# Patient Record
Sex: MALE | Race: BLACK OR AFRICAN AMERICAN | NOT HISPANIC OR LATINO | URBAN - METROPOLITAN AREA
[De-identification: names, ages, dates, MRNs, and addresses within clinical notes are randomized per-mention and may not be internally consistent; named-entity substitution may affect disease eponyms.]

---

## 2020-08-05 RX ADMIN — FENTANYL CITRATE 50 MICROGRAM(S): 50 INJECTION INTRAVENOUS at 23:00

## 2020-08-07 ENCOUNTER — INPATIENT (INPATIENT)
Facility: HOSPITAL | Age: 60
LOS: 1 days | Discharge: ANOTHER HOSPITAL | End: 2020-08-09
Attending: INTERNAL MEDICINE
Payer: MEDICAID

## 2020-08-07 ENCOUNTER — OUTPATIENT (OUTPATIENT)
Dept: OUTPATIENT SERVICES | Facility: HOSPITAL | Age: 60
LOS: 1 days | End: 2020-08-07

## 2020-08-07 PROCEDURE — 71045 X-RAY EXAM CHEST 1 VIEW: CPT | Mod: 26,77

## 2020-08-07 PROCEDURE — 74176 CT ABD & PELVIS W/O CONTRAST: CPT | Mod: 26

## 2020-08-07 PROCEDURE — 70450 CT HEAD/BRAIN W/O DYE: CPT | Mod: 26

## 2020-08-07 PROCEDURE — 99291 CRITICAL CARE FIRST HOUR: CPT | Mod: 25

## 2020-08-07 PROCEDURE — 71045 X-RAY EXAM CHEST 1 VIEW: CPT | Mod: 26

## 2020-08-07 PROCEDURE — 31500 INSERT EMERGENCY AIRWAY: CPT

## 2020-08-07 PROCEDURE — 72125 CT NECK SPINE W/O DYE: CPT | Mod: 26

## 2020-08-07 PROCEDURE — 93010 ELECTROCARDIOGRAM REPORT: CPT | Mod: 76

## 2020-08-07 PROCEDURE — 71250 CT THORAX DX C-: CPT | Mod: 26

## 2020-08-08 PROCEDURE — 99233 SBSQ HOSP IP/OBS HIGH 50: CPT

## 2020-08-08 PROCEDURE — 93010 ELECTROCARDIOGRAM REPORT: CPT

## 2020-08-08 PROCEDURE — 71045 X-RAY EXAM CHEST 1 VIEW: CPT | Mod: 26

## 2020-08-09 ENCOUNTER — TRANSCRIPTION ENCOUNTER (OUTPATIENT)
Age: 60
End: 2020-08-09

## 2020-08-09 ENCOUNTER — INPATIENT (INPATIENT)
Facility: HOSPITAL | Age: 60
LOS: 65 days | Discharge: NURSING, REHAB OR LONG-TERM CARE | DRG: 4 | End: 2020-10-14
Attending: HOSPITALIST | Admitting: NEUROLOGICAL SURGERY
Payer: MEDICAID

## 2020-08-09 VITALS
TEMPERATURE: 97 F | RESPIRATION RATE: 14 BRPM | OXYGEN SATURATION: 100 % | HEART RATE: 67 BPM | SYSTOLIC BLOOD PRESSURE: 144 MMHG | DIASTOLIC BLOOD PRESSURE: 105 MMHG

## 2020-08-09 DIAGNOSIS — I46.9 CARDIAC ARREST, CAUSE UNSPECIFIED: ICD-10-CM

## 2020-08-09 DIAGNOSIS — I62.00 NONTRAUMATIC SUBDURAL HEMORRHAGE, UNSPECIFIED: ICD-10-CM

## 2020-08-09 DIAGNOSIS — I48.91 UNSPECIFIED ATRIAL FIBRILLATION: ICD-10-CM

## 2020-08-09 DIAGNOSIS — I60.9 NONTRAUMATIC SUBARACHNOID HEMORRHAGE, UNSPECIFIED: ICD-10-CM

## 2020-08-09 LAB
A1C WITH ESTIMATED AVERAGE GLUCOSE RESULT: 6.3 % — HIGH (ref 4–5.6)
ALBUMIN SERPL ELPH-MCNC: 3.2 G/DL — LOW (ref 3.3–5)
ALP SERPL-CCNC: 80 U/L — SIGNIFICANT CHANGE UP (ref 40–120)
ALT FLD-CCNC: 52 U/L — HIGH (ref 10–45)
ANION GAP SERPL CALC-SCNC: 15 MMOL/L — SIGNIFICANT CHANGE UP (ref 5–17)
ANION GAP SERPL CALC-SCNC: 16 MMOL/L — SIGNIFICANT CHANGE UP (ref 5–17)
APPEARANCE UR: ABNORMAL
APTT BLD: 36.4 SEC — HIGH (ref 27.5–35.5)
APTT BLD: 38.8 SEC — HIGH (ref 27.5–35.5)
AST SERPL-CCNC: 81 U/L — HIGH (ref 10–40)
BACTERIA # UR AUTO: NEGATIVE — SIGNIFICANT CHANGE UP
BASOPHILS # BLD AUTO: 0.01 K/UL — SIGNIFICANT CHANGE UP (ref 0–0.2)
BASOPHILS # BLD AUTO: 0.02 K/UL — SIGNIFICANT CHANGE UP (ref 0–0.2)
BASOPHILS NFR BLD AUTO: 0.1 % — SIGNIFICANT CHANGE UP (ref 0–2)
BASOPHILS NFR BLD AUTO: 0.1 % — SIGNIFICANT CHANGE UP (ref 0–2)
BILIRUB SERPL-MCNC: 0.2 MG/DL — SIGNIFICANT CHANGE UP (ref 0.2–1.2)
BILIRUB UR-MCNC: NEGATIVE — SIGNIFICANT CHANGE UP
BLD GP AB SCN SERPL QL: NEGATIVE — SIGNIFICANT CHANGE UP
BLD GP AB SCN SERPL QL: NEGATIVE — SIGNIFICANT CHANGE UP
BUN SERPL-MCNC: 27 MG/DL — HIGH (ref 7–23)
BUN SERPL-MCNC: 27 MG/DL — HIGH (ref 7–23)
CALCIUM SERPL-MCNC: 8.8 MG/DL — SIGNIFICANT CHANGE UP (ref 8.4–10.5)
CALCIUM SERPL-MCNC: 8.8 MG/DL — SIGNIFICANT CHANGE UP (ref 8.4–10.5)
CHLORIDE SERPL-SCNC: 107 MMOL/L — SIGNIFICANT CHANGE UP (ref 96–108)
CHLORIDE SERPL-SCNC: 110 MMOL/L — HIGH (ref 96–108)
CHOLEST SERPL-MCNC: 148 MG/DL — SIGNIFICANT CHANGE UP (ref 10–199)
CO2 SERPL-SCNC: 18 MMOL/L — LOW (ref 22–31)
CO2 SERPL-SCNC: 18 MMOL/L — LOW (ref 22–31)
COLOR SPEC: SIGNIFICANT CHANGE UP
CREAT SERPL-MCNC: 1.62 MG/DL — HIGH (ref 0.5–1.3)
CREAT SERPL-MCNC: 1.72 MG/DL — HIGH (ref 0.5–1.3)
DIFF PNL FLD: ABNORMAL
EOSINOPHIL # BLD AUTO: 0.01 K/UL — SIGNIFICANT CHANGE UP (ref 0–0.5)
EOSINOPHIL # BLD AUTO: 0.05 K/UL — SIGNIFICANT CHANGE UP (ref 0–0.5)
EOSINOPHIL NFR BLD AUTO: 0.1 % — SIGNIFICANT CHANGE UP (ref 0–6)
EOSINOPHIL NFR BLD AUTO: 0.3 % — SIGNIFICANT CHANGE UP (ref 0–6)
EPI CELLS # UR: 1 /HPF — SIGNIFICANT CHANGE UP
ESTIMATED AVERAGE GLUCOSE: 134 MG/DL — HIGH (ref 68–114)
GAS PNL BLDA: SIGNIFICANT CHANGE UP
GAS PNL BLDA: SIGNIFICANT CHANGE UP
GLUCOSE SERPL-MCNC: 120 MG/DL — HIGH (ref 70–99)
GLUCOSE SERPL-MCNC: 138 MG/DL — HIGH (ref 70–99)
GLUCOSE UR QL: NEGATIVE — SIGNIFICANT CHANGE UP
HCT VFR BLD CALC: 37.1 % — LOW (ref 39–50)
HCT VFR BLD CALC: 38.4 % — LOW (ref 39–50)
HDLC SERPL-MCNC: 37 MG/DL — LOW
HGB BLD-MCNC: 11.7 G/DL — LOW (ref 13–17)
HGB BLD-MCNC: 12.2 G/DL — LOW (ref 13–17)
HYALINE CASTS # UR AUTO: 13 /LPF — HIGH (ref 0–2)
IMM GRANULOCYTES NFR BLD AUTO: 0.6 % — SIGNIFICANT CHANGE UP (ref 0–1.5)
IMM GRANULOCYTES NFR BLD AUTO: 0.7 % — SIGNIFICANT CHANGE UP (ref 0–1.5)
INR BLD: 1.42 RATIO — HIGH (ref 0.88–1.16)
INR BLD: 1.63 RATIO — HIGH (ref 0.88–1.16)
KETONES UR-MCNC: NEGATIVE — SIGNIFICANT CHANGE UP
LEUKOCYTE ESTERASE UR-ACNC: ABNORMAL
LIPID PNL WITH DIRECT LDL SERPL: 68 MG/DL — SIGNIFICANT CHANGE UP
LYMPHOCYTES # BLD AUTO: 0.51 K/UL — LOW (ref 1–3.3)
LYMPHOCYTES # BLD AUTO: 0.97 K/UL — LOW (ref 1–3.3)
LYMPHOCYTES # BLD AUTO: 2.8 % — LOW (ref 13–44)
LYMPHOCYTES # BLD AUTO: 5.4 % — LOW (ref 13–44)
MAGNESIUM SERPL-MCNC: 1.9 MG/DL — SIGNIFICANT CHANGE UP (ref 1.6–2.6)
MAGNESIUM SERPL-MCNC: 2.1 MG/DL — SIGNIFICANT CHANGE UP (ref 1.6–2.6)
MCHC RBC-ENTMCNC: 27.2 PG — SIGNIFICANT CHANGE UP (ref 27–34)
MCHC RBC-ENTMCNC: 27.4 PG — SIGNIFICANT CHANGE UP (ref 27–34)
MCHC RBC-ENTMCNC: 31.5 GM/DL — LOW (ref 32–36)
MCHC RBC-ENTMCNC: 31.8 GM/DL — LOW (ref 32–36)
MCV RBC AUTO: 86.1 FL — SIGNIFICANT CHANGE UP (ref 80–100)
MCV RBC AUTO: 86.3 FL — SIGNIFICANT CHANGE UP (ref 80–100)
MONOCYTES # BLD AUTO: 1.19 K/UL — HIGH (ref 0–0.9)
MONOCYTES # BLD AUTO: 1.3 K/UL — HIGH (ref 0–0.9)
MONOCYTES NFR BLD AUTO: 6.6 % — SIGNIFICANT CHANGE UP (ref 2–14)
MONOCYTES NFR BLD AUTO: 7.2 % — SIGNIFICANT CHANGE UP (ref 2–14)
NEUTROPHILS # BLD AUTO: 15.79 K/UL — HIGH (ref 1.8–7.4)
NEUTROPHILS # BLD AUTO: 16.05 K/UL — HIGH (ref 1.8–7.4)
NEUTROPHILS NFR BLD AUTO: 87 % — HIGH (ref 43–77)
NEUTROPHILS NFR BLD AUTO: 89.1 % — HIGH (ref 43–77)
NITRITE UR-MCNC: NEGATIVE — SIGNIFICANT CHANGE UP
NRBC # BLD: 0 /100 WBCS — SIGNIFICANT CHANGE UP (ref 0–0)
NRBC # BLD: 0 /100 WBCS — SIGNIFICANT CHANGE UP (ref 0–0)
PH UR: 5.5 — SIGNIFICANT CHANGE UP (ref 5–8)
PHOSPHATE SERPL-MCNC: 3.6 MG/DL — SIGNIFICANT CHANGE UP (ref 2.5–4.5)
PHOSPHATE SERPL-MCNC: 4 MG/DL — SIGNIFICANT CHANGE UP (ref 2.5–4.5)
PLATELET # BLD AUTO: 198 K/UL — SIGNIFICANT CHANGE UP (ref 150–400)
PLATELET # BLD AUTO: 203 K/UL — SIGNIFICANT CHANGE UP (ref 150–400)
POTASSIUM SERPL-MCNC: 4 MMOL/L — SIGNIFICANT CHANGE UP (ref 3.5–5.3)
POTASSIUM SERPL-MCNC: 4.1 MMOL/L — SIGNIFICANT CHANGE UP (ref 3.5–5.3)
POTASSIUM SERPL-SCNC: 4 MMOL/L — SIGNIFICANT CHANGE UP (ref 3.5–5.3)
POTASSIUM SERPL-SCNC: 4.1 MMOL/L — SIGNIFICANT CHANGE UP (ref 3.5–5.3)
PROT SERPL-MCNC: 6.2 G/DL — SIGNIFICANT CHANGE UP (ref 6–8.3)
PROT UR-MCNC: 100 — SIGNIFICANT CHANGE UP
PROTHROM AB SERPL-ACNC: 16.6 SEC — HIGH (ref 10.6–13.6)
PROTHROM AB SERPL-ACNC: 18.9 SEC — HIGH (ref 10.6–13.6)
RBC # BLD: 4.3 M/UL — SIGNIFICANT CHANGE UP (ref 4.2–5.8)
RBC # BLD: 4.46 M/UL — SIGNIFICANT CHANGE UP (ref 4.2–5.8)
RBC # FLD: 14.6 % — HIGH (ref 10.3–14.5)
RBC # FLD: 14.8 % — HIGH (ref 10.3–14.5)
RBC CASTS # UR COMP ASSIST: HIGH /HPF (ref 0–4)
RH IG SCN BLD-IMP: POSITIVE — SIGNIFICANT CHANGE UP
RH IG SCN BLD-IMP: POSITIVE — SIGNIFICANT CHANGE UP
SODIUM SERPL-SCNC: 140 MMOL/L — SIGNIFICANT CHANGE UP (ref 135–145)
SODIUM SERPL-SCNC: 144 MMOL/L — SIGNIFICANT CHANGE UP (ref 135–145)
SP GR SPEC: 1.03 — HIGH (ref 1.01–1.02)
T4 FREE SERPL-MCNC: 1.2 NG/DL — SIGNIFICANT CHANGE UP (ref 0.9–1.8)
TOTAL CHOLESTEROL/HDL RATIO MEASUREMENT: 4.1 RATIO — SIGNIFICANT CHANGE UP (ref 3.4–9.6)
TRIGL SERPL-MCNC: 218 MG/DL — HIGH (ref 10–149)
TROPONIN T, HIGH SENSITIVITY RESULT: 477 NG/L — HIGH (ref 0–51)
TSH SERPL-MCNC: 1.91 UIU/ML — SIGNIFICANT CHANGE UP (ref 0.27–4.2)
UROBILINOGEN FLD QL: NEGATIVE — SIGNIFICANT CHANGE UP
WBC # BLD: 18 K/UL — HIGH (ref 3.8–10.5)
WBC # BLD: 18.13 K/UL — HIGH (ref 3.8–10.5)
WBC # FLD AUTO: 18 K/UL — HIGH (ref 3.8–10.5)
WBC # FLD AUTO: 18.13 K/UL — HIGH (ref 3.8–10.5)
WBC UR QL: 166 /HPF — HIGH (ref 0–5)

## 2020-08-09 PROCEDURE — 93010 ELECTROCARDIOGRAM REPORT: CPT | Mod: 76

## 2020-08-09 PROCEDURE — 71045 X-RAY EXAM CHEST 1 VIEW: CPT | Mod: 26

## 2020-08-09 PROCEDURE — 99291 CRITICAL CARE FIRST HOUR: CPT

## 2020-08-09 PROCEDURE — 70496 CT ANGIOGRAPHY HEAD: CPT | Mod: 26

## 2020-08-09 PROCEDURE — 93010 ELECTROCARDIOGRAM REPORT: CPT | Mod: 77

## 2020-08-09 PROCEDURE — 95718 EEG PHYS/QHP 2-12 HR W/VEEG: CPT

## 2020-08-09 PROCEDURE — 99233 SBSQ HOSP IP/OBS HIGH 50: CPT

## 2020-08-09 PROCEDURE — 70498 CT ANGIOGRAPHY NECK: CPT | Mod: 26

## 2020-08-09 PROCEDURE — 93010 ELECTROCARDIOGRAM REPORT: CPT

## 2020-08-09 PROCEDURE — 99292 CRITICAL CARE ADDL 30 MIN: CPT

## 2020-08-09 PROCEDURE — 70450 CT HEAD/BRAIN W/O DYE: CPT | Mod: 26

## 2020-08-09 PROCEDURE — 93306 TTE W/DOPPLER COMPLETE: CPT | Mod: 26

## 2020-08-09 RX ORDER — MEPERIDINE HYDROCHLORIDE 50 MG/ML
50 INJECTION INTRAMUSCULAR; INTRAVENOUS; SUBCUTANEOUS EVERY 8 HOURS
Refills: 0 | Status: DISCONTINUED | OUTPATIENT
Start: 2020-08-09 | End: 2020-08-09

## 2020-08-09 RX ORDER — SODIUM CHLORIDE 9 MG/ML
10 INJECTION INTRAMUSCULAR; INTRAVENOUS; SUBCUTANEOUS
Refills: 0 | Status: DISCONTINUED | OUTPATIENT
Start: 2020-08-09 | End: 2020-08-29

## 2020-08-09 RX ORDER — MAGNESIUM SULFATE 500 MG/ML
1 VIAL (ML) INJECTION ONCE
Refills: 0 | Status: COMPLETED | OUTPATIENT
Start: 2020-08-09 | End: 2020-08-09

## 2020-08-09 RX ORDER — DEXTROSE 50 % IN WATER 50 %
25 SYRINGE (ML) INTRAVENOUS ONCE
Refills: 0 | Status: DISCONTINUED | OUTPATIENT
Start: 2020-08-09 | End: 2020-10-14

## 2020-08-09 RX ORDER — LEVETIRACETAM 250 MG/1
500 TABLET, FILM COATED ORAL EVERY 12 HOURS
Refills: 0 | Status: DISCONTINUED | OUTPATIENT
Start: 2020-08-09 | End: 2020-08-13

## 2020-08-09 RX ORDER — FENTANYL CITRATE 50 UG/ML
0.5 INJECTION INTRAVENOUS
Qty: 5000 | Refills: 0 | Status: DISCONTINUED | OUTPATIENT
Start: 2020-08-09 | End: 2020-08-09

## 2020-08-09 RX ORDER — SODIUM CHLORIDE 9 MG/ML
500 INJECTION INTRAMUSCULAR; INTRAVENOUS; SUBCUTANEOUS ONCE
Refills: 0 | Status: COMPLETED | OUTPATIENT
Start: 2020-08-09 | End: 2020-08-09

## 2020-08-09 RX ORDER — CHLORHEXIDINE GLUCONATE 213 G/1000ML
15 SOLUTION TOPICAL EVERY 12 HOURS
Refills: 0 | Status: DISCONTINUED | OUTPATIENT
Start: 2020-08-09 | End: 2020-09-01

## 2020-08-09 RX ORDER — PROPOFOL 10 MG/ML
10 INJECTION, EMULSION INTRAVENOUS
Qty: 1000 | Refills: 0 | Status: DISCONTINUED | OUTPATIENT
Start: 2020-08-09 | End: 2020-08-11

## 2020-08-09 RX ORDER — SODIUM CHLORIDE 9 MG/ML
1000 INJECTION, SOLUTION INTRAVENOUS
Refills: 0 | Status: DISCONTINUED | OUTPATIENT
Start: 2020-08-09 | End: 2020-10-14

## 2020-08-09 RX ORDER — NIMODIPINE 60 MG/10ML
60 SOLUTION ORAL EVERY 4 HOURS
Refills: 0 | Status: DISCONTINUED | OUTPATIENT
Start: 2020-08-09 | End: 2020-08-09

## 2020-08-09 RX ORDER — INSULIN LISPRO 100/ML
VIAL (ML) SUBCUTANEOUS EVERY 6 HOURS
Refills: 0 | Status: DISCONTINUED | OUTPATIENT
Start: 2020-08-09 | End: 2020-08-12

## 2020-08-09 RX ORDER — INSULIN LISPRO 100/ML
VIAL (ML) SUBCUTANEOUS
Refills: 0 | Status: DISCONTINUED | OUTPATIENT
Start: 2020-08-09 | End: 2020-08-09

## 2020-08-09 RX ORDER — CHLORHEXIDINE GLUCONATE 213 G/1000ML
1 SOLUTION TOPICAL
Refills: 0 | Status: DISCONTINUED | OUTPATIENT
Start: 2020-08-09 | End: 2020-08-21

## 2020-08-09 RX ORDER — NOREPINEPHRINE BITARTRATE/D5W 8 MG/250ML
0.05 PLASTIC BAG, INJECTION (ML) INTRAVENOUS
Qty: 16 | Refills: 0 | Status: DISCONTINUED | OUTPATIENT
Start: 2020-08-09 | End: 2020-08-09

## 2020-08-09 RX ORDER — FENTANYL CITRATE 50 UG/ML
25 INJECTION INTRAVENOUS
Refills: 0 | Status: DISCONTINUED | OUTPATIENT
Start: 2020-08-09 | End: 2020-08-11

## 2020-08-09 RX ORDER — DEXMEDETOMIDINE HYDROCHLORIDE IN 0.9% SODIUM CHLORIDE 4 UG/ML
0.3 INJECTION INTRAVENOUS
Qty: 200 | Refills: 0 | Status: DISCONTINUED | OUTPATIENT
Start: 2020-08-09 | End: 2020-08-09

## 2020-08-09 RX ORDER — DEXTROSE 50 % IN WATER 50 %
15 SYRINGE (ML) INTRAVENOUS ONCE
Refills: 0 | Status: DISCONTINUED | OUTPATIENT
Start: 2020-08-09 | End: 2020-10-14

## 2020-08-09 RX ORDER — SODIUM CHLORIDE 5 G/100ML
500 INJECTION, SOLUTION INTRAVENOUS
Refills: 0 | Status: DISCONTINUED | OUTPATIENT
Start: 2020-08-09 | End: 2020-08-09

## 2020-08-09 RX ORDER — FENTANYL CITRATE 50 UG/ML
0.5 INJECTION INTRAVENOUS
Qty: 2500 | Refills: 0 | Status: DISCONTINUED | OUTPATIENT
Start: 2020-08-09 | End: 2020-08-09

## 2020-08-09 RX ORDER — NIMODIPINE 60 MG/10ML
60 SOLUTION ORAL EVERY 4 HOURS
Refills: 0 | Status: DISCONTINUED | OUTPATIENT
Start: 2020-08-09 | End: 2020-08-13

## 2020-08-09 RX ORDER — SENNA PLUS 8.6 MG/1
10 TABLET ORAL AT BEDTIME
Refills: 0 | Status: DISCONTINUED | OUTPATIENT
Start: 2020-08-09 | End: 2020-08-18

## 2020-08-09 RX ORDER — PANTOPRAZOLE SODIUM 20 MG/1
40 TABLET, DELAYED RELEASE ORAL EVERY 12 HOURS
Refills: 0 | Status: DISCONTINUED | OUTPATIENT
Start: 2020-08-09 | End: 2020-08-10

## 2020-08-09 RX ORDER — POLYETHYLENE GLYCOL 3350 17 G/17G
17 POWDER, FOR SOLUTION ORAL EVERY 12 HOURS
Refills: 0 | Status: DISCONTINUED | OUTPATIENT
Start: 2020-08-09 | End: 2020-08-18

## 2020-08-09 RX ORDER — HYDRALAZINE HCL 50 MG
25 TABLET ORAL EVERY 8 HOURS
Refills: 0 | Status: DISCONTINUED | OUTPATIENT
Start: 2020-08-09 | End: 2020-08-10

## 2020-08-09 RX ORDER — DEXTROSE 50 % IN WATER 50 %
12.5 SYRINGE (ML) INTRAVENOUS ONCE
Refills: 0 | Status: DISCONTINUED | OUTPATIENT
Start: 2020-08-09 | End: 2020-10-14

## 2020-08-09 RX ORDER — AMIODARONE HYDROCHLORIDE 400 MG/1
1 TABLET ORAL
Qty: 900 | Refills: 0 | Status: DISCONTINUED | OUTPATIENT
Start: 2020-08-09 | End: 2020-08-10

## 2020-08-09 RX ORDER — PHYTONADIONE (VIT K1) 5 MG
10 TABLET ORAL DAILY
Refills: 0 | Status: COMPLETED | OUTPATIENT
Start: 2020-08-10 | End: 2020-08-11

## 2020-08-09 RX ORDER — GLUCAGON INJECTION, SOLUTION 0.5 MG/.1ML
1 INJECTION, SOLUTION SUBCUTANEOUS ONCE
Refills: 0 | Status: DISCONTINUED | OUTPATIENT
Start: 2020-08-09 | End: 2020-10-14

## 2020-08-09 RX ORDER — NICARDIPINE HYDROCHLORIDE 30 MG/1
5 CAPSULE, EXTENDED RELEASE ORAL
Qty: 40 | Refills: 0 | Status: DISCONTINUED | OUTPATIENT
Start: 2020-08-09 | End: 2020-08-10

## 2020-08-09 RX ADMIN — SENNA PLUS 10 MILLILITER(S): 8.6 TABLET ORAL at 22:58

## 2020-08-09 RX ADMIN — PROPOFOL 6.78 MICROGRAM(S)/KG/MIN: 10 INJECTION, EMULSION INTRAVENOUS at 23:07

## 2020-08-09 RX ADMIN — NICARDIPINE HYDROCHLORIDE 25 MG/HR: 30 CAPSULE, EXTENDED RELEASE ORAL at 23:07

## 2020-08-09 RX ADMIN — NICARDIPINE HYDROCHLORIDE 25 MG/HR: 30 CAPSULE, EXTENDED RELEASE ORAL at 17:00

## 2020-08-09 RX ADMIN — NIMODIPINE 60 MILLIGRAM(S): 60 SOLUTION ORAL at 18:39

## 2020-08-09 RX ADMIN — AMIODARONE HYDROCHLORIDE 33.3 MG/MIN: 400 TABLET ORAL at 23:08

## 2020-08-09 RX ADMIN — CHLORHEXIDINE GLUCONATE 1 APPLICATION(S): 213 SOLUTION TOPICAL at 23:02

## 2020-08-09 RX ADMIN — PROPOFOL 6.78 MICROGRAM(S)/KG/MIN: 10 INJECTION, EMULSION INTRAVENOUS at 14:00

## 2020-08-09 RX ADMIN — SODIUM CHLORIDE 30 MILLILITER(S): 5 INJECTION, SOLUTION INTRAVENOUS at 14:00

## 2020-08-09 RX ADMIN — Medication 100 GRAM(S): at 23:07

## 2020-08-09 RX ADMIN — LEVETIRACETAM 400 MILLIGRAM(S): 250 TABLET, FILM COATED ORAL at 18:20

## 2020-08-09 RX ADMIN — CHLORHEXIDINE GLUCONATE 15 MILLILITER(S): 213 SOLUTION TOPICAL at 18:20

## 2020-08-09 RX ADMIN — DEXMEDETOMIDINE HYDROCHLORIDE IN 0.9% SODIUM CHLORIDE 8.48 MICROGRAM(S)/KG/HR: 4 INJECTION INTRAVENOUS at 18:00

## 2020-08-09 RX ADMIN — SODIUM CHLORIDE 1000 MILLILITER(S): 9 INJECTION INTRAMUSCULAR; INTRAVENOUS; SUBCUTANEOUS at 17:00

## 2020-08-09 RX ADMIN — PANTOPRAZOLE SODIUM 40 MILLIGRAM(S): 20 TABLET, DELAYED RELEASE ORAL at 18:39

## 2020-08-09 NOTE — H&P ADULT - NSHPPHYSICALEXAM_GEN_ALL_CORE
Intubated, on Nimbex, prop, fentanyl, L pupil 2mm fixed, R pupil 4mm irregular fixed, no corneals, no gag, no dolls, moves nothing x4.

## 2020-08-09 NOTE — CHART NOTE - NSCHARTNOTEFT_GEN_A_CORE
CAPRINI SCORE [CLOT] Score on Admission for     AGE RELATED RISK FACTORS                                                       MOBILITY RELATED FACTORS  [X] Age 41-60 years                                            (1 Point)                  [ ] Bed rest                                                        (1 Point)  [ ] Age: 61-74 years                                           (2 Points)                [ ] Plaster cast                                                   (2 Points)  [ ] Age= 75 years                                              (3 Points)                  [ ] Bed bound for more than 72 hours         (2 Points)    DISEASE RELATED RISK FACTORS                                               GENDER SPECIFIC FACTORS  [ ] Edema in the lower extremities                       (1 Point)           [ ] Pregnancy                                                          (1 Point)  [ ] Varicose veins                                               (1 Point)                  [ ] Post-partum < 6 weeks                                   (1 Point)             [ ] BMI > 25 Kg/m2                                            (1 Point)                  [ ] Hormonal therapy  or oral contraception   (1 Point)                 [ ] Sepsis (in the previous month)                        (1 Point)            [ ] History of pregnancy complications             (1 point)  [ ] Pneumonia or serious lung disease                                            [ ] Unexplained or recurrent               (1 Point)           (in the previous month)                               (1 Point)  [ ] Abnormal pulmonary function test                     (1 Point)                 SURGERY RELATED RISK FACTORS (include planned surgeries)  [X] Acute myocardial infarction                              (1 Point)                 [ ]  Section                                             (1 Point)  [ ] Congestive heart failure (in the previous month)  (1 Point)        [ ] Minor surgery                                                  (1 Point)   [ ] Inflammatory bowel disease                             (1 Point)                 [ ] Arthroscopic surgery                                        (2 Points)  [ ] Central venous access                                      (2 Points)  [ ] History / presence of malignancy                   (2 points)   [] General surgery lasting more than 45 minutes   (2 Points)       [X] Stroke (in the previous month)                          (5 Points)               [ ] Elective arthroplasty                                         (5 Points)                                                                                                                                               HEMATOLOGY RELATED FACTORS                                                 TRAUMA RELATED RISK FACTORS  [ ] Prior episodes of VTE                                     (3 Points)                [ ] Fracture of the hip, pelvis, or leg                       (5 Points)  [ ] Positive family history for VTE                         (3 Points)             [ ] Acute spinal cord injury (in the previous month)  (5 Points)  [ ] Prothrombin 65162 A                                     (3 Points)                [ ] Paralysis  (less than 1 month)                             (5 Points)  [ ] Factor V Leiden                                             (3 Points)                   [ ] Multiple Trauma within 1 month                        (5 Points)  [ ] Lupus anticoagulants                                     (3 Points)                                                           [ ] Anticardiolipin antibodies                               (3 Points)                                                       [ ] High homocysteine in the blood                      (3 Points)                                             [ ] Other congenital or acquired thrombophilia      (3 Points)                                                [ ] Heparin induced thrombocytopenia                  (3 Points)                                          Total Score [     7     ]    Risk:  Very low 0   Low 1 to 2   Moderate 3 to 4   High =5       VTE Prophylaxis Recommendations:  [X] mechanical pneumatic compression devices                                      [ ] contraindicated: _____________________  [ ] chemoprophylaxis                                                                                    [X] contraindicated ______SAH_______________    **** HIGH LIKELIHOOD DVT PRESENT ON ADMISSION  [X] (please order LE dopplers within 24 hours of admission)

## 2020-08-09 NOTE — PROGRESS NOTE ADULT - ASSESSMENT
ASSESSMENT/PLAN:  Perimesencephalic (HH4 mF4) subarachnoid hemorrhage, post-bleed day 2?  afib  HTN    NEURO: SAH, possible sentinal event with cardiac arrest  Diagnosis: CT Head, CTA Head, conventional angiogram  Seizure Prophylaxis: Levetiracetam until aneurysm treated  Delayed Cerebral Ischemia Management: Serial screening TCDs, euvolemia, nimodipine  No hydro at this time- no EVD  hypertonics- cont 3% NS at 30cc/hr  Sedation: propofol wean as tolerated  stop nimbex gtt    PULM:  mech vent  spO2>92%  CXR    CV:  SBP goal<160  cardene prn  TTE pending  cardiac arrest- troponin 530, no cardiac cath at OSH due to neuro status  cardiology consult    RENAL:  Fluids: turbid urine, conservative fluid mgt  replace fluids prn  scott for monitoring    GI:  Diet: start TF  GI prophylaxis [] not indicated x[] PPI [] other:  Bowel regimen [x] senna [] other:    ENDO:   Goal euglycemia (-180)  ISS    HEME/ONC:  s/p vit K  VTE prophylaxis: [] SCDs [] chemoprophylaxis [x] hold chemoprophylaxis due to: SAH    ID:  monitor leukocytosis  TTM- goal 37C arctic sun    MISC:    SOCIAL/FAMILY:  [] awaiting [] updated at bedside [] family meeting    CODE STATUS:  [] Full Code [] DNR [] DNI [] Palliative/Comfort Care    DISPOSITION:  [] ICU [] Stroke Unit [] Floor [] EMU [] RCU [] PCU    [] Patient is at high risk of neurologic deterioration/death due to:     Time seen:  Time spent: ___ [] critical care minutes ASSESSMENT/PLAN:  Perimesencephalic (HH4 mF4) subarachnoid hemorrhage, post-bleed day 2?  afib  HTN    NEURO: SAH, possible sentinal event with cardiac arrest  Diagnosis: CT Head, CTA Head, conventional angiogram  Seizure Prophylaxis: Levetiracetam until aneurysm treated  Delayed Cerebral Ischemia Management: Serial screening TCDs, euvolemia, nimodipine  No hydro at this time- no EVD  hypertonics- cont 3% NS at 30cc/hr  Sedation: propofol wean as tolerated  stop nimbex gtt    PULM:  mech vent  spO2>92%  CXR    CV:  SBP goal<160  cardene prn  TTE pending  cardiac arrest- troponin 530, no cardiac cath at OSH due to neuro status  cardiology consult    RENAL:  Fluids: turbid urine, conservative fluid mgt  replace fluids prn  scott for monitoring    GI:  Diet: start TF  GI prophylaxis [] not indicated x[] PPI [] other:  Bowel regimen [x] senna [] other:    ENDO:   Goal euglycemia (-180)  ISS    HEME/ONC:  s/p vit K  VTE prophylaxis: [] SCDs [] chemoprophylaxis [x] hold chemoprophylaxis due to: SAH    ID:  monitor leukocytosis  TTM- goal 37C arctic sun    MISC:    SOCIAL/FAMILY:  [] awaiting [x] updated at bedside [] family meeting    CODE STATUS:  [x] Full Code [] DNR [] DNI [] Palliative/Comfort Care    DISPOSITION:  [x] ICU [] Stroke Unit [] Floor [] EMU [] RCU [] PCU    [x] Patient is at high risk of neurologic deterioration/death due to: sah    Time seen:  Time spent: _40 critical care minutes ASSESSMENT/PLAN:  Perimesencephalic (HH4 mF4) subarachnoid hemorrhage, post-bleed day 2?  afib  HTN    NEURO: SAH, possible sentinal event with cardiac arrest  Diagnosis: CT Head, CTA Head, conventional angiogram  Seizure Prophylaxis: Levetiracetam until aneurysm treated  Delayed Cerebral Ischemia Management: Serial screening TCDs, euvolemia, nimodipine  No hydro at this time- no EVD  hypertonics- cont 3% NS at 30cc/hr  Sedation: propofol wean as tolerated  stop nimbex gtt    PULM:  mech vent  spO2>92%  CXR    CV:  SBP goal<160  cardene prn  TTE pending  cardiac arrest- troponin 530, no cardiac cath at OSH due to neuro status  cardiology consult    RENAL:  Fluids: turbid urine, conservative fluid mgt  replace fluids prn  scott for monitoring    GI:  Diet: start TF  GI prophylaxis [] not indicated x[] PPI [] other:  Bowel regimen [x] senna [] other:    ENDO:   Goal euglycemia (-180)  ISS    HEME/ONC: afib on CMD at home  s/p vit K, cont 2 more doses  INR 1.6  VTE prophylaxis: [] SCDs [] chemoprophylaxis [x] hold chemoprophylaxis due to: SAH    ID:  monitor leukocytosis  TTM- goal 37C arctic sun    MISC:    SOCIAL/FAMILY:  [] awaiting [x] updated at bedside [] family meeting    CODE STATUS:  [x] Full Code [] DNR [] DNI [] Palliative/Comfort Care    DISPOSITION:  [x] ICU [] Stroke Unit [] Floor [] EMU [] RCU [] PCU    [x] Patient is at high risk of neurologic deterioration/death due to: sah    Time seen:  Time spent: _40 critical care minutes

## 2020-08-09 NOTE — H&P ADULT - ASSESSMENT
59YO male on coumadin for afib s/p cardiac arrest at work, down 25 minutes prior to ROSC. Pupils were R fixed and dilated, left fixed at the time, no gag reflex, post-CA cooling protocol was initiated down to 35 deg. Intubated and put on Nimbex drip. Also on Propofol, fentanyl, levophed. R groin minerva placed. Also put on heparin post-CA. 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 Course also c/b GIB, put on protonix drip. Prior to xfer was started on 3% at 30cc/hr. Exam improved on reeval off sedation, R side spont, L side nothing, L pupil 2 sluggish, R pupil 4 nonreactive, eo noxious.     - CTA head 3pm  - Hold nimbex  - hold off on EVD for now, no hydro  - rewarm to 37 deg per protocol   - SSI  - nimodipine  - continue levo as needed for MAP >65  - q1h neuro checks

## 2020-08-09 NOTE — CHART NOTE - NSCHARTNOTEFT_GEN_A_CORE
EEG preliminary read (not final):  On the initial hour(s) x 2 of recording.   No seizures recorded.  Slowing noted, nonspecific.  Final report to follow in morning tomorrow after completion of study.

## 2020-08-09 NOTE — PROCEDURE NOTE - NSURITECHNIQUE_GU_A_CORE
Proper hand hygiene was performed/A sterile drape was used to cover all adjacent areas/The site was cleaned with soap/water and sterile solution (betadine)/The catheter was secured with a securement device (e.g. StatLock)/The urinary drainage system is closed at the end of the procedure/All applicable medical record documentation is completed/Sterile gloves were worn for the duration of the procedure/The catheter was appropriately lubricated/The collection bag is below the level of the patient and urinary bladder

## 2020-08-09 NOTE — CONSULT NOTE ADULT - SUBJECTIVE AND OBJECTIVE BOX
CHIEF COMPLAINT:  Cardiac Arrest     HISTORY OF PRESENT ILLNESS:  61 YO male on coumadin for afib s/p cardiac arrest at work, down 25 minutes prior to ROSC. Pupils were R fixed and dilated, left fixed at the time, no gag reflex, post-CA cooling protocol was initiated down to 35 deg. Intubated and put on Nimbex drip. Also on Propofol, fentanyl, levophed. R groin minerva placed. Also put on heparin post-CA. 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 Course also c/b GIB, put on protonix drip. Prior to xfer was started on 3% at 30cc/hr.   Has been in Afib HR 90-110s with frequent ectopy.     PAST MEDICAL & SURGICAL HISTORY:  On warfarin for atrial fibrillation  No significant past surgical history          MEDICATIONS:  niCARdipine Infusion 5 mG/Hr IV Continuous <Continuous>  niMODipine 60 milliGRAM(s) Oral every 4 hours        busPIRone 30 milliGRAM(s) Oral every 8 hours PRN  dexMEDEtomidine Infusion 0.3 MICROgram(s)/kG/Hr IV Continuous <Continuous>  levETIRAcetam  IVPB 500 milliGRAM(s) IV Intermittent every 12 hours  meperidine     Injectable 50 milliGRAM(s) IV Push every 8 hours PRN  propofol Infusion 10 MICROgram(s)/kG/Min IV Continuous <Continuous>    pantoprazole    Tablet 40 milliGRAM(s) Oral every 12 hours    dextrose 40% Gel 15 Gram(s) Oral once PRN  dextrose 50% Injectable 12.5 Gram(s) IV Push once  dextrose 50% Injectable 25 Gram(s) IV Push once  dextrose 50% Injectable 25 Gram(s) IV Push once  glucagon  Injectable 1 milliGRAM(s) IntraMuscular once PRN  insulin lispro (HumaLOG) corrective regimen sliding scale   SubCutaneous three times a day before meals    chlorhexidine 0.12% Liquid 15 milliLiter(s) Oral Mucosa every 12 hours  chlorhexidine 4% Liquid 1 Application(s) Topical <User Schedule>  dextrose 5%. 1000 milliLiter(s) IV Continuous <Continuous>  sodium chloride 0.9% Bolus 500 milliLiter(s) IV Bolus once  sodium chloride 0.9% lock flush 10 milliLiter(s) IV Push every 1 hour PRN  sodium chloride 3%. 500 milliLiter(s) IV Continuous <Continuous>      FAMILY HISTORY:  No pertinent family history in first degree relatives      SOCIAL HISTORY:    [ ] Non-smoker  [ ] Smoker  [ ] Alcohol    Allergies    No Known Allergies    Intolerances    	    REVIEW OF SYSTEMS:    [ ] All others negative	  [XX ] Unable to obtain    PHYSICAL EXAM:  T(C): 37.3 (08-09-20 @ 18:00), Max: 37.4 (08-09-20 @ 17:00)  HR: 94 (08-09-20 @ 18:00) (66 - 110)  BP: 124/104 (08-09-20 @ 12:45) (124/104 - 144/105)  RR: 15 (08-09-20 @ 18:00) (14 - 18)  SpO2: 99% (08-09-20 @ 18:00) (98% - 100%)  Wt(kg): --  I&O's Summary    09 Aug 2020 07:01  -  09 Aug 2020 18:14  --------------------------------------------------------  IN: 780.7 mL / OUT: 210 mL / NET: 570.7 mL        Appearance: Intubated sedated 	  HEENT:   Dry  oral mucosa,  Lymphatic: No lymphadenopathy  Cardiovascular: Normal S1 S2, No JVD, No murmurs, No edema  Respiratory: Ventilated   Psychiatry: A & O x 0  Gastrointestinal:  Soft, Non-tender, + BS	  Skin: No rashes, No ecchymoses, No cyanosis	  Neurologic: Non-focal  Extremities: decreased range of motion, No clubbing, cyanosis or edema  Vascular: Peripheral pulses palpable 2+ bilaterally  +scott    TELEMETRY: 	Afib 90-110s    ECG:  	Afib PVCs, non specific stt changes   RADIOLOGY:  < from: CT Angio Neck w/ IV Cont (08.09.20 @ 15:41) >    EXAM:  CT ANGIO BRAIN (W)AW IC                          EXAM:  CT ANGIO NECK (W)AW IC                            PROCEDURE DATE:  08/09/2020            INTERPRETATION:  CLINICAL INFORMATION: Subarachnoid hemorrhage.    TECHNIQUE: Noncontrast axial CT images were acquired through the head. Two-dimensional sagittal and coronal reformats were generated.    Contrast enhanced axial CT images were acquired from the aortic arch to the vertex of the calvarium, during the angiographic phase.  Two-Dimensional and three-dimensional maximum intensity projection reformats were generated.  90 cc of Omnipaque-350 mg/ml were administered intravenously, without immediate complication.    CAROTID STENOSIS REFERENCE: (NASCET = 100x1-(N/D)). N=greatest narrowing. D=normal distal diameter.  MILD = <50% stenosis.  MODERATE = 50-69% stenosis.  SEVERE = 70-89% stenosis.  HAIRLINE/CRITICAL = 90-99% stenosis.  OCCLUDED = 100% stenosis.    COMPARISON: CT head from earlier same day    FINDINGS:  NONCONTRAST CT BRAIN:  Partially imaged nasoenteric and endotracheal tubes.    Redemonstrated moderate subarachnoid hemorrhage in the right prepontine, ambient, and suprasellar cisterns. Redemonstrated trace right intraventricular hemorrhage. These are grossly unchanged from earlier same day.    Chronic left occipital cortical infarct. A right parietal lucency likely representing chronic infarct.    The ventricles and sulci are normal in size.    The calvarium is intact.    Redemonstrated mucosal thickening of bilateral maxillary, sphenoid, ethmoid, and frontal sinuses. Bilateral  mastoid air cells are clear.    CT ANGIOGRAPHY NECK:  The aortic arch and great vessels are normal in appearance. The left vertebral artery arises directly from the aortic arch, an anatomic variant.    There is no evidence for significant stenosis or major vessel occlusion involving the bilateral carotid arteries.    There is no evidence for significant stenosis or major vessel occlusion involving the bilateral vertebral arteries.    Endotracheal tube tip terminates above the kayleigh. Enteric tube courses below the field-of-view. Right internal jugular central venous catheter tip is in the right brachiocephalic vein.    Visualized osseous structures are unremarkable.      CT ANGIOGRAPHY BRAIN:  There is no evidence for significant stenosis, major vessel occlusion, or aneurysm about the [place].    There is no evidence for significant stenosis, major vessel occlusion, or aneurysm involving the vertebrobasilar system.    No enlarged vascular lesions or clusters of abnormal vessels are noted to suggest an arterial venous malformation within the field-of-view.    Visualized portions of the superficial and deep venous systems are unremarkable.    IMPRESSION:  CT head: Redemonstrated moderate subarachnoid hemorrhage in the right prepontine, ambient, and suprasellar cisterns. Redemonstrated trace right intraventricular hemorrhage. These are grossly unchanged from earlier same day.    CT angiography neck: No evidence for carotid or vertebral artery stenosis or dissection.    CT angiography brain: No major vessel occlusion, proximal stenosis, or aneurysm identified. Conventional angiography is recommended.    The results of this examination were discussed with BONI Laboy in the N SCU at 4:39 PM on 8/9/2020              BIRGIT LITTLE M.D., RESIDENT RADIOLOGIST  This document has been electronically signed.  BENNIE SCHROEDER M.D., ATTENDING RADIOLOGIST  This document has been electronically signed. Aug  9 2020  4:40PM               < end of copied text >    OTHER: 	  	  LABS:	 	    CARDIAC MARKERS:  Troponin T, High Sensitivity (08.09.20 @ 13:25)    Troponin T, High Sensitivity Result: 530: Rapid upward or downward changes in high-sensitivity troponin levels  suggest acute myocardial injury. Renal impairment may cause sustained  troponin elevations.  Normal: <6 - 14 ng/L  Indeterminate: 15-51 ng/L  Elevated: > 51 ng/L  See http://labs/test/[email or web address] on the [facility] [employer] for more  information ng/L                                    12.2   18.13 )-----------( 203      ( 09 Aug 2020 13:25 )             38.4     08-09    140  |  107  |  27<H>  ----------------------------<  120<H>  4.1   |  18<L>  |  1.62<H>    Ca    8.8      09 Aug 2020 13:25  Phos  4.0     08-09  Mg     2.1     08-09    TPro  6.2  /  Alb  3.2<L>  /  TBili  0.2  /  DBili  x   /  AST  81<H>  /  ALT  52<H>  /  AlkPhos  80  08-09    proBNP:   Lipid Profile:   HgA1c:   TSH: Thyroid Stimulating Hormone, Serum: 1.91 uIU/mL (08-09 @ 17:20) CHIEF COMPLAINT:  Cardiac Arrest     HISTORY OF PRESENT ILLNESS:  61 YO male on coumadin for afib s/p cardiac arrest at work, down 25 minutes prior to ROSC. Pupils were R fixed and dilated, left fixed at the time, no gag reflex, post-CA cooling protocol was initiated down to 35 deg. Intubated and put on Nimbex drip. Also on Propofol, fentanyl, levophed. R groin minerva placed. Also put on heparin post-CA. 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 Course also c/b GIB, put on protonix drip. Prior to xfer was started on 3% at 30cc/hr.   Has been in Afib HR 90-110s with frequent ectopy.     PAST MEDICAL & SURGICAL HISTORY:  On warfarin for atrial fibrillation  No significant past surgical history          MEDICATIONS:  niCARdipine Infusion 5 mG/Hr IV Continuous <Continuous>  niMODipine 60 milliGRAM(s) Oral every 4 hours        busPIRone 30 milliGRAM(s) Oral every 8 hours PRN  dexMEDEtomidine Infusion 0.3 MICROgram(s)/kG/Hr IV Continuous <Continuous>  levETIRAcetam  IVPB 500 milliGRAM(s) IV Intermittent every 12 hours  meperidine     Injectable 50 milliGRAM(s) IV Push every 8 hours PRN  propofol Infusion 10 MICROgram(s)/kG/Min IV Continuous <Continuous>    pantoprazole    Tablet 40 milliGRAM(s) Oral every 12 hours    dextrose 40% Gel 15 Gram(s) Oral once PRN  dextrose 50% Injectable 12.5 Gram(s) IV Push once  dextrose 50% Injectable 25 Gram(s) IV Push once  dextrose 50% Injectable 25 Gram(s) IV Push once  glucagon  Injectable 1 milliGRAM(s) IntraMuscular once PRN  insulin lispro (HumaLOG) corrective regimen sliding scale   SubCutaneous three times a day before meals    chlorhexidine 0.12% Liquid 15 milliLiter(s) Oral Mucosa every 12 hours  chlorhexidine 4% Liquid 1 Application(s) Topical <User Schedule>  dextrose 5%. 1000 milliLiter(s) IV Continuous <Continuous>  sodium chloride 0.9% Bolus 500 milliLiter(s) IV Bolus once  sodium chloride 0.9% lock flush 10 milliLiter(s) IV Push every 1 hour PRN  sodium chloride 3%. 500 milliLiter(s) IV Continuous <Continuous>      FAMILY HISTORY:  No pertinent family history in first degree relatives      SOCIAL HISTORY:    [ ] Non-smoker  [ ] Smoker  [ ] Alcohol    Allergies    No Known Allergies    Intolerances    	    REVIEW OF SYSTEMS:    [ ] All others negative	  [XX ] Unable to obtain    PHYSICAL EXAM:  T(C): 37.3 (08-09-20 @ 18:00), Max: 37.4 (08-09-20 @ 17:00)  HR: 94 (08-09-20 @ 18:00) (66 - 110)  BP: 124/104 (08-09-20 @ 12:45) (124/104 - 144/105)  RR: 15 (08-09-20 @ 18:00) (14 - 18)  SpO2: 99% (08-09-20 @ 18:00) (98% - 100%)  Wt(kg): --  I&O's Summary    09 Aug 2020 07:01  -  09 Aug 2020 18:14  --------------------------------------------------------  IN: 780.7 mL / OUT: 210 mL / NET: 570.7 mL        Appearance: Intubated sedated 	  HEENT:   Dry  oral mucosa,  Lymphatic: No lymphadenopathy  Cardiovascular: Normal S1 S2, No JVD, No murmurs, No edema  Respiratory: Ventilated   Psychiatry: A & O x 0  Gastrointestinal:  Soft, Non-tender, + BS	  Skin: No rashes, No ecchymoses, No cyanosis	  Mental status- No acute distress, EO to stim  -CN- Pupils R 4mm NR, L 2mm sluggish, EOMI, tongue midline, face symmetric  +cough/gag  RUE localize AG  RLE spont AG  LUE flaccid  LLE flaccid    Extremities: decreased range of motion, No clubbing, cyanosis or edema  Vascular: Peripheral pulses palpable 2+ bilaterally  +scott    TELEMETRY: 	Afib 90-110s    ECG:  	Afib PVCs, non specific stt changes   RADIOLOGY:  < from: CT Angio Neck w/ IV Cont (08.09.20 @ 15:41) >    EXAM:  CT ANGIO BRAIN (W)AW IC                          EXAM:  CT ANGIO NECK (W)AW IC                            PROCEDURE DATE:  08/09/2020            INTERPRETATION:  CLINICAL INFORMATION: Subarachnoid hemorrhage.    TECHNIQUE: Noncontrast axial CT images were acquired through the head. Two-dimensional sagittal and coronal reformats were generated.    Contrast enhanced axial CT images were acquired from the aortic arch to the vertex of the calvarium, during the angiographic phase.  Two-Dimensional and three-dimensional maximum intensity projection reformats were generated.  90 cc of Omnipaque-350 mg/ml were administered intravenously, without immediate complication.    CAROTID STENOSIS REFERENCE: (NASCET = 100x1-(N/D)). N=greatest narrowing. D=normal distal diameter.  MILD = <50% stenosis.  MODERATE = 50-69% stenosis.  SEVERE = 70-89% stenosis.  HAIRLINE/CRITICAL = 90-99% stenosis.  OCCLUDED = 100% stenosis.    COMPARISON: CT head from earlier same day    FINDINGS:  NONCONTRAST CT BRAIN:  Partially imaged nasoenteric and endotracheal tubes.    Redemonstrated moderate subarachnoid hemorrhage in the right prepontine, ambient, and suprasellar cisterns. Redemonstrated trace right intraventricular hemorrhage. These are grossly unchanged from earlier same day.    Chronic left occipital cortical infarct. A right parietal lucency likely representing chronic infarct.    The ventricles and sulci are normal in size.    The calvarium is intact.    Redemonstrated mucosal thickening of bilateral maxillary, sphenoid, ethmoid, and frontal sinuses. Bilateral  mastoid air cells are clear.    CT ANGIOGRAPHY NECK:  The aortic arch and great vessels are normal in appearance. The left vertebral artery arises directly from the aortic arch, an anatomic variant.    There is no evidence for significant stenosis or major vessel occlusion involving the bilateral carotid arteries.    There is no evidence for significant stenosis or major vessel occlusion involving the bilateral vertebral arteries.    Endotracheal tube tip terminates above the kayleigh. Enteric tube courses below the field-of-view. Right internal jugular central venous catheter tip is in the right brachiocephalic vein.    Visualized osseous structures are unremarkable.      CT ANGIOGRAPHY BRAIN:  There is no evidence for significant stenosis, major vessel occlusion, or aneurysm about the [place].    There is no evidence for significant stenosis, major vessel occlusion, or aneurysm involving the vertebrobasilar system.    No enlarged vascular lesions or clusters of abnormal vessels are noted to suggest an arterial venous malformation within the field-of-view.    Visualized portions of the superficial and deep venous systems are unremarkable.    IMPRESSION:  CT head: Redemonstrated moderate subarachnoid hemorrhage in the right prepontine, ambient, and suprasellar cisterns. Redemonstrated trace right intraventricular hemorrhage. These are grossly unchanged from earlier same day.    CT angiography neck: No evidence for carotid or vertebral artery stenosis or dissection.    CT angiography brain: No major vessel occlusion, proximal stenosis, or aneurysm identified. Conventional angiography is recommended.    The results of this examination were discussed with BONI Laboy in the N SCU at 4:39 PM on 8/9/2020              BIRGIT LITTLE M.D., RESIDENT RADIOLOGIST  This document has been electronically signed.  BENNIE SCHROEDER M.D., ATTENDING RADIOLOGIST  This document has been electronically signed. Aug  9 2020  4:40PM               < end of copied text >    OTHER: 	  	  LABS:	 	    CARDIAC MARKERS:  Troponin T, High Sensitivity (08.09.20 @ 13:25)    Troponin T, High Sensitivity Result: 530: Rapid upward or downward changes in high-sensitivity troponin levels  suggest acute myocardial injury. Renal impairment may cause sustained  troponin elevations.  Normal: <6 - 14 ng/L  Indeterminate: 15-51 ng/L  Elevated: > 51 ng/L  See http://labs/test/[email or web address] on the [employer] for more  information ng/L                                    12.2   18.13 )-----------( 203      ( 09 Aug 2020 13:25 )             38.4     08-09    140  |  107  |  27<H>  ----------------------------<  120<H>  4.1   |  18<L>  |  1.62<H>    Ca    8.8      09 Aug 2020 13:25  Phos  4.0     08-09  Mg     2.1     08-09    TPro  6.2  /  Alb  3.2<L>  /  TBili  0.2  /  DBili  x   /  AST  81<H>  /  ALT  52<H>  /  AlkPhos  80  08-09    proBNP:   Lipid Profile:   HgA1c:   TSH: Thyroid Stimulating Hormone, Serum: 1.91 uIU/mL (08-09 @ 17:20)

## 2020-08-09 NOTE — PATIENT PROFILE ADULT - NSPROGENOTHERPROVIDER_GEN_A_NUR
Progress Note - Behavioral Health   Luis Underwood 64 y o  male MRN: [id number]  Unit/Bed#: LL4 467-43 Encounter: [id number]    The patient was seen for continuing care and reviewed with treatment team   Staff reports that the patient is minimally social and has limited interaction with peers  He is irritable at times  We are waiting to see if [facility] will accept him  The patient reports he is feeling lousy  He has increased anxiety related to not hearing a decision from [facility] yet  [facility] but has not [facility] yet  The patient rated his depression as 8/10 and anxiety as 7/10  The patient is not currently having suicidal ideation however he still reports that he might have thoughts to end his life if he cannot find housing  The patient still admits he has thoughts to kill his sisters and Kristen by strangling them but he said there is "no point" and he does not want to "go through the trouble"  He is reporting that he feels groggy and sleepy on his medications  He has been sleeping well at night and his appetite is normal   He has been attending all groups        Mental Status Evaluation:  Appearance:  Adequate hygiene and grooming, intermittent eye contact   Behavior:  calm and cooperative   Mood:  anxious and depressed   Affect: constricted   Speech: Normal rate and Normal volume   Thought Process:  Goal directed and coherent   Thought Content:  Does not verbalize delusional material   Perceptual Disturbances: Denies hallucinations and does not appear to be responding to internal stimuli   Risk Potential: Conditional suicidal ideations if discharged with no housing and homicidal ideations to strangle other people with no intent   Attention/Concentration [place] than expected   Orientation:   Oriented x3   Gait/Station: normal gait/station and normal balance   Motor Activity: No abnormal movement noted     Progress Toward Goals:  No change    Assessment/Plan    Principal Problem:    Bipolar 2 disorder, major depressive episode (HCC)  Active Problems:    Attention deficit hyperactivity disorder (ADHD), combined type      Recommended Treatment:      Continue Lexapro 20 mg daily  Continue lamotrigine 75 mg b i d       Continue Seroquel 300 mg hs  Continue trazodone 100 mg hs  Decrease Atarax to 25 mg t i d  Due to excess sedation  Continue with pharmacotherapy, group therapy, milieu therapy and occupational therapy  The patient will be maintained on the following medications:    Current Facility-Administered Medications:  acetaminophen 650 mg Oral Q6H PRN Abhijit Laws MD   aluminum-magnesium hydroxide-simethicone 30 mL Oral Q4H PRN Abhijit Laws MD   benztropine 1 mg Intramuscular Q6H PRN Abhijit Laws MD   benztropine 1 mg Oral Q6H PRN Abhijit Laws MD   escitalopram 20 mg Oral Daily Ken Sykes MD   haloperidol 5 mg Oral Q6H PRN Abhijit Laws MD   haloperidol lactate 5 mg Intramuscular Q6H PRN Abhijit Laws MD   hydrOXYzine HCL 25 mg Oral Q6H PRN Abhijit Laws MD   hydrOXYzine HCL 50 mg Oral TID NEHEMIAH Case   ibuprofen 600 mg Oral Q8H PRN Abhijit Laws MD   ibuprofen 800 mg Oral Q8H PRN Abhijit Laws MD   lamoTRIgine 75 mg Oral BID Ken Sykes MD   magnesium hydroxide 30 mL Oral Daily PRN Abhijit Laws MD   nicotine 1 patch Transdermal Daily Loly Kuo PA-C   QUEtiapine 300 mg Oral HS NEHEMIAH Case   risperiDONE 1 mg Oral Q6H PRN Abhijit Laws MD   traZODone 100 mg Oral HS NEHEMIAH Case   traZODone 50 mg Oral HS PRN NEHEMIAH Case       Risks, benefits and possible side effects of Medications:   Patient does not verbalize understanding at this time and will require further explanation  none

## 2020-08-09 NOTE — CONSULT NOTE ADULT - SUBJECTIVE AND OBJECTIVE BOX
HPI:    59YO male on coumadin for afib s/p cardiac arrest at work, down 25 minutes prior to ROSC. Pupils were R fixed and dilated, left fixed at the time, no gag reflex, post-CA cooling protocol was initiated down to 35 deg. Intubated and put on Nimbex drip. Also on Propofol, fentanyl, levophed. R groin minerva placed. Also put on heparin post-CA. 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 Course also c/b GIB, put on protonix drip. Prior to xfer was started on 3% at 30cc/hr.     Urology consulted for gross hematuria.  Upon arrival patient underwent scott exchange.  After exchange blood and clot noted in catheter.  Per chart, no prior urologic history.  ~50cc clot irrigated, urine light pink at end.  Catheter replaced.    O: Vital Signs Last 24 Hrs  T(C): 37.3 (09 Aug 2020 18:00), Max: 37.4 (09 Aug 2020 17:00)  T(F): 99.1 (09 Aug 2020 18:00), Max: 99.3 (09 Aug 2020 17:00)  HR: 88 (09 Aug 2020 18:15) (66 - 110)  BP: 124/104 (09 Aug 2020 12:45) (124/104 - 144/105)  BP(mean): 110 (09 Aug 2020 12:45) (110 - 113)  RR: 16 (09 Aug 2020 18:15) (14 - 18)  SpO2: 99% (09 Aug 2020 18:15) (98% - 100%)  Mode: AC/ CMV (Assist Control/ Continuous Mandatory Ventilation)  RR (machine): 14  TV (machine): 500  FiO2: 40  PEEP: 5  ITime: 0.1  MAP: 7  PIP: 15      09 Aug 2020 07:01  -  09 Aug 2020 19:10  --------------------------------------------------------  IN:    dexmedetomidine Infusion: 19.9 mL    niCARdipine Infusion: 125 mL    propofol Infusion: 64.2 mL    Sodium Chloride 0.9% IV Bolus: 500 mL    sodium chloride 3%.: 180 mL  Total IN: 889.1 mL    OUT:    Indwelling Catheter - Urethral: 240 mL  Total OUT: 240 mL    Total NET: 649.1 mL          Physical Exam:  Intubated and sedated  Abd soft, non-tender  Scott in place draining port colroed urine    Labs:                        12.2   18.13 )-----------( 203      ( 09 Aug 2020 13:25 )             38.4     09 Aug 2020 13:25    140    |  107    |  27     ----------------------------<  120    4.1     |  18     |  1.62     Ca    8.8        09 Aug 2020 13:25  Phos  4.0       09 Aug 2020 13:25  Mg     2.1       09 Aug 2020 13:25    TPro  6.2    /  Alb  3.2    /  TBili  0.2    /  DBili  x      /  AST  81     /  ALT  52     /  AlkPhos  80     09 Aug 2020 13:25    PT/INR - ( 09 Aug 2020 13:24 )   PT: 18.9 sec;   INR: 1.63 ratio         PTT - ( 09 Aug 2020 13:24 )  PTT:38.8 sec  CAPILLARY BLOOD GLUCOSE      POCT Blood Glucose.: 78 mg/dL (09 Aug 2020 18:45)        LIVER FUNCTIONS - ( 09 Aug 2020 13:25 )  Alb: 3.2 g/dL / Pro: 6.2 g/dL / ALK PHOS: 80 U/L / ALT: 52 U/L / AST: 81 U/L / GGT: x             Urinalysis Basic - ( 09 Aug 2020 13:29 )    Color: DARK BROWN / Appearance: Turbid / S.030 / pH: x  Gluc: x / Ketone: Negative  / Bili: Negative / Urobili: Negative   Blood: x / Protein: 100 / Nitrite: Negative   Leuk Esterase: Large / RBC: 05758 /hpf /  /HPF   Sq Epi: x / Non Sq Epi: 1 /hpf / Bacteria: Negative        MEDICATIONS  (STANDING):  aMIOdarone Infusion 1 mG/Min (33.3 mL/Hr) IV Continuous <Continuous>  chlorhexidine 0.12% Liquid 15 milliLiter(s) Oral Mucosa every 12 hours  chlorhexidine 4% Liquid 1 Application(s) Topical <User Schedule>  dexMEDEtomidine Infusion 0.3 MICROgram(s)/kG/Hr (8.48 mL/Hr) IV Continuous <Continuous>  dextrose 5%. 1000 milliLiter(s) (50 mL/Hr) IV Continuous <Continuous>  dextrose 50% Injectable 12.5 Gram(s) IV Push once  dextrose 50% Injectable 25 Gram(s) IV Push once  dextrose 50% Injectable 25 Gram(s) IV Push once  insulin lispro (HumaLOG) corrective regimen sliding scale   SubCutaneous three times a day before meals  levETIRAcetam  IVPB 500 milliGRAM(s) IV Intermittent every 12 hours  niCARdipine Infusion 5 mG/Hr (25 mL/Hr) IV Continuous <Continuous>  niMODipine 60 milliGRAM(s) Oral every 4 hours  pantoprazole    Tablet 40 milliGRAM(s) Oral every 12 hours  propofol Infusion 10 MICROgram(s)/kG/Min (6.78 mL/Hr) IV Continuous <Continuous>  sodium chloride 3%. 500 milliLiter(s) (30 mL/Hr) IV Continuous <Continuous>    MEDICATIONS  (PRN):  busPIRone 30 milliGRAM(s) Oral every 8 hours PRN shievering  dextrose 40% Gel 15 Gram(s) Oral once PRN Blood Glucose LESS THAN 70 milliGRAM(s)/deciliter  glucagon  Injectable 1 milliGRAM(s) IntraMuscular once PRN Glucose LESS THAN 70 milligrams/deciliter  meperidine     Injectable 50 milliGRAM(s) IV Push every 8 hours PRN sheivering  sodium chloride 0.9% lock flush 10 milliLiter(s) IV Push every 1 hour PRN Pre/post blood products, medications, blood draw, and to maintain line patency

## 2020-08-09 NOTE — PROGRESS NOTE ADULT - SUBJECTIVE AND OBJECTIVE BOX
Chief complaint:   Patient is a 60y old  Male who presents with a chief complaint of SAH  HPI:  59YO male on coumadin for afib s/p cardiac arrest at work, down 25 minutes prior to ROSC. Pupils were R fixed and dilated, left fixed at the time, no gag reflex, post-CA cooling protocol was initiated down to 35 deg. Intubated and put on Nimbex drip. Also on Propofol, fentanyl, levophed. R groin minerva placed. Also put on heparin post-CA. 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 Course also c/b GIB, put on protonix drip. Prior to xfer was started on 3% at 30cc/hr. (09 Aug 2020 14:30)    [place]hr EVENTS:  [place] [facility] [place]    ROS: [ x]  Unable to assess due to mental status   All other systems negative    -----------------------------------------------------------------------------------------------------------------------------------------------------------------------------------  ICU Vital Signs Last 24 Hrs  T(C): 36.2 (09 Aug 2020 12:35), Max: 36.2 (09 Aug 2020 12:35)  T(F): 97.2 (09 Aug 2020 12:35), Max: 97.2 (09 Aug 2020 12:35)  HR: 66 (09 Aug 2020 14:00) (66 - 77)  BP: 124/104 (09 Aug 2020 12:45) (124/104 - 144/105)  BP(mean): 110 (09 Aug 2020 12:45) (110 - 113)  ABP: 129/73 (09 Aug 2020 14:00) (129/73 - 140/74)  ABP(mean): 89 (09 Aug 2020 14:00) (89 - 107)  RR: 14 (09 Aug 2020 14:00) (14 - 18)  SpO2: 99% (09 Aug 2020 14:00) (98% - 100%)      I&O's Summary    09 Aug 2020 07:01  -  09 Aug 2020 15:59  --------------------------------------------------------  IN: 60 mL / OUT: 50 mL / NET: 10 mL        MEDICATIONS  (STANDING):  chlorhexidine 0.12% Liquid 15 milliLiter(s) Oral Mucosa every 12 hours  dexMEDEtomidine Infusion 0.3 MICROgram(s)/kG/Hr (8.48 mL/Hr) IV Continuous <Continuous>  dextrose 5%. 1000 milliLiter(s) (50 mL/Hr) IV Continuous <Continuous>  dextrose 50% Injectable 12.5 Gram(s) IV Push once  dextrose 50% Injectable 25 Gram(s) IV Push once  dextrose 50% Injectable 25 Gram(s) IV Push once  insulin lispro (HumaLOG) corrective regimen sliding scale   SubCutaneous three times a day before meals  levETIRAcetam  IVPB 500 milliGRAM(s) IV Intermittent every 12 hours  niMODipine 60 milliGRAM(s) Oral every 4 hours  norepinephrine Infusion 0.05 MICROgram(s)/kG/Min (5.3 mL/Hr) IV Continuous <Continuous>  pantoprazole    Tablet 40 milliGRAM(s) Oral every 12 hours  propofol Infusion 10 MICROgram(s)/kG/Min (6.78 mL/Hr) IV Continuous <Continuous>  sodium chloride 0.9% Bolus 500 milliLiter(s) IV Bolus once  sodium chloride 3%. 500 milliLiter(s) (30 mL/Hr) IV Continuous <Continuous>    IMAGING:   Recent imaging studies were reviewed.    LAB RESULTS:                          12.2   18.13 )-----------( 203      ( 09 Aug 2020 13:25 )             38.4       PT/INR - ( 09 Aug 2020 13:24 )   PT: 18.9 sec;   INR: 1.63 ratio         PTT - ( 09 Aug 2020 13:24 )  PTT:38.8 sec    08-09    140  |  107  |  27<H>  ----------------------------<  120<H>  4.1   |  18<L>  |  1.62<H>    Ca    8.8      09 Aug 2020 13:25  Phos  4.0     08-09  Mg     2.1     08-09    TPro  6.2  /  Alb  3.2<L>  /  TBili  0.2  /  DBili  x   /  AST  81<H>  /  ALT  52<H>  /  AlkPhos  80  08-09          ABG - ( 09 Aug 2020 13:11 )  pH, Arterial: 7.37  pH, Blood: x     /  pCO2: 39    /  pO2: 366   / HCO3: 22    / Base Excess: -2.3  /  SaO2: 100         -----------------------------------------------------------------------------------------------------------------------------------------------------------------------------------    PHYSICAL EXAM:  General: Calm, intubated  HEENT: MMM  Neuro:  -Mental status- No acute distress, EO to stim  -CN- Pupils R 4mm NR, L 2mm sluggish, EOMI, tongue midline, face symmetric  +cough/gag  RUE localize AG  RLE spont AG  LUE flaccid  LLE flaccid    CV: RRR  Pulm: clear to auscultation  Abd: Soft, nontender, nondistended  Ext: no noted edema in lower ext  Skin: warm, dry

## 2020-08-09 NOTE — CONSULT NOTE ADULT - ASSESSMENT
59YO male on coumadin for afib s/p cardiac arrest at work, down 25 minutes prior to ROSC. Pupils were R fixed and dilated, left fixed at the time, no gag reflex, post-CA cooling protocol was initiated down to 35 deg.  Now w/ gross hematuria.  Likely 2/2 traumatic catheterization.    - Umaña in  - Monitor color  - Urine cx pending  - Pending clinical course, will need outpatient follow-up with further workup  - Please reach out with any questions  - Discussed with attending on call Dr. Back

## 2020-08-09 NOTE — CONSULT NOTE ADULT - ASSESSMENT
61 YO male on coumadin for afib s/p cardiac arrest at work, down 25 minutes prior to ROSC. Pupils were R fixed and dilated, left fixed at the time, no gag reflex, post-CA cooling protocol was initiated down to 35 deg. Intubated and put on Nimbex drip. Also on Propofol, fentanyl, levophed. R groin minerva placed. Also put on heparin post-CA. 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 Course also c/b GIB, put on protonix drip. Prior to xfer was started on 3% at 30cc/hr.   Has been in Afib HR 90-110s with frequent ectopy.

## 2020-08-09 NOTE — H&P ADULT - HISTORY OF PRESENT ILLNESS
59YO male on coumadin for afib s/p cardiac arrest at work, down 25 minutes prior to ROSC. Pupils were R fixed and dilated, left fixed at the time, no gag reflex, post-CA cooling protocol was initiated down to 35 deg. Intubated and put on Nimbex drip. Also on Propofol, fentanyl, levophed. R groin minerva placed. Also put on heparin post-CA. 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 Course also c/b GIB, put on protonix drip. Prior to xfer was started on 3% at 30cc/hr.

## 2020-08-09 NOTE — PROGRESS NOTE ADULT - SUBJECTIVE AND OBJECTIVE BOX
O: Started on amiodarone for a fib   T(C): 37.3 (08-09-20 @ 18:00), Max: 37.4 (08-09-20 @ 17:00)  HR: 84 (08-09-20 @ 18:45) (66 - 110)  BP: 124/104 (08-09-20 @ 12:45) (124/104 - 144/105)  RR: 16 (08-09-20 @ 18:45) (14 - 18)  SpO2: 98% (08-09-20 @ 18:45) (98% - 100%)  08-09-20 @ 07:01  -  08-09-20 @ 20:04  --------------------------------------------------------  IN: 1097.5 mL / OUT: 270 mL / NET: 827.5 mL    aMIOdarone Infusion 1 mG/Min IV Continuous <Continuous>  busPIRone 30 milliGRAM(s) Oral every 8 hours PRN  chlorhexidine 0.12% Liquid 15 milliLiter(s) Oral Mucosa every 12 hours  chlorhexidine 4% Liquid 1 Application(s) Topical <User Schedule>  dexMEDEtomidine Infusion 0.3 MICROgram(s)/kG/Hr IV Continuous <Continuous>  dextrose 40% Gel 15 Gram(s) Oral once PRN  dextrose 5%. 1000 milliLiter(s) IV Continuous <Continuous>  dextrose 50% Injectable 12.5 Gram(s) IV Push once  dextrose 50% Injectable 25 Gram(s) IV Push once  dextrose 50% Injectable 25 Gram(s) IV Push once  glucagon  Injectable 1 milliGRAM(s) IntraMuscular once PRN  insulin lispro (HumaLOG) corrective regimen sliding scale   SubCutaneous three times a day before meals  levETIRAcetam  IVPB 500 milliGRAM(s) IV Intermittent every 12 hours  meperidine     Injectable 50 milliGRAM(s) IV Push every 8 hours PRN  niCARdipine Infusion 5 mG/Hr IV Continuous <Continuous>  niMODipine 60 milliGRAM(s) Oral every 4 hours  pantoprazole    Tablet 40 milliGRAM(s) Oral every 12 hours  polyethylene glycol 3350 17 Gram(s) Oral every 12 hours  propofol Infusion 10 MICROgram(s)/kG/Min IV Continuous <Continuous>  senna Syrup 10 milliLiter(s) Oral at bedtime  sodium chloride 0.9% lock flush 10 milliLiter(s) IV Push every 1 hour PRN  sodium chloride 3%. 500 milliLiter(s) IV Continuous <Continuous>  Mode: AC/ CMV (Assist Control/ Continuous Mandatory Ventilation), RR (machine): 14, TV (machine): 500, FiO2: 40, PEEP: 5, ITime: 0.1, MAP: 7, PIP: 15  PHYSICAL EXAM:  General: Calm, intubated  HEENT: MMM  Neuro:  -Mental status- No acute distress, EO to stim  -CN- Pupils R 4mm NR, L 2mm sluggish, EOMI, tongue midline, face symmetric  +cough/gag  RUE localize AG  RLE spont AG  LUE flaccid  LLE flaccid    CV: RRR  Pulm: clear to auscultation  Abd: Soft, nontender, nondistended  Ext: no noted edema in lower ext  Skin: warm, dry      LABS:  Na: 140 (08-09 @ 13:25)  K: 4.1 (08-09 @ 13:25)  Cl: 107 (08-09 @ 13:25)  CO2: 18 (08-09 @ 13:25)  BUN: 27 (08-09 @ 13:25)  Cr: 1.62 (08-09 @ 13:25)  Glu: 120(08-09 @ 13:25)    Hgb: 12.2 (08-09 @ 13:25)  Hct: 38.4 (08-09 @ 13:25)  WBC: 18.13 (08-09 @ 13:25)  Plt: 203 (08-09 @ 13:25)    INR: 1.63 08-09-20 @ 13:24  PTT: 38.8 08-09-20 @ 13:24    ASSESSMENT/PLAN:  Perimesencephalic (HH4 mF4) subarachnoid hemorrhage, post-bleed day 2?  afib  HTN  Unsure the reason of the cardiac arrest, could have been from the SAH   NEURO: SAH, CTA no aneurysm, will get DSA   Seizure Prophylaxis: Levetiracetam until aneurysm treated  Delayed Cerebral Ischemia Management: Serial screening TCDs, euvolemia, nimodipine  hypertonics- cont 3% NS at 30cc/hr  Sedation: propofol wean as tolerated  respiratory failure, intubated on mech vent  spO2>92%  CXR ET in good position   SBP goal 100-160  cardene prn  TTE pending  cardiac arrest- unsure the cause, rewarmed today   A.fib started on amiodarone   JALEN, likely from hypoperfusion during cardiac arrest   replace fluids prn  scott for monitoring  Diet: start TF  GI prophylaxis [] not indicated x[] PPI [] other:  Bowel regimen [x] senna [] other  Goal euglycemia (-180)  ISS   afib on CMD at home  INR 1.6 vitamin K   VTE prophylaxis: [] SCDs [] chemoprophylaxis [x] hold chemoprophylaxis due to: SAH  monitor leukocytosis  hyperthermia tylenol PRN     MISC:    SOCIAL/FAMILY:  [] awaiting [x] updated at bedside [] family meeting    CODE STATUS:  [x] Full Code [] DNR [] DNI [] Palliative/Comfort Care    DISPOSITION:  [x] ICU [] Stroke Unit [] Floor [] EMU [] RCU [] PCU    [x] Patient is at high risk of neurologic deterioration/death due to: sah    Time seen:  Time spent: _35 critical care minutes O: Started on amiodarone for a fib     T(C): 37.3 (08-09-20 @ 18:00), Max: 37.4 (08-09-20 @ 17:00)  HR: 84 (08-09-20 @ 18:45) (66 - 110)  BP: 124/104 (08-09-20 @ 12:45) (124/104 - 144/105)  RR: 16 (08-09-20 @ 18:45) (14 - 18)  SpO2: 98% (08-09-20 @ 18:45) (98% - 100%)  08-09-20 @ 07:01  -  08-09-20 @ 20:04  --------------------------------------------------------  IN: 1097.5 mL / OUT: 270 mL / NET: 827.5 mL    aMIOdarone Infusion 1 mG/Min IV Continuous <Continuous>  busPIRone 30 milliGRAM(s) Oral every 8 hours PRN  chlorhexidine 0.12% Liquid 15 milliLiter(s) Oral Mucosa every 12 hours  chlorhexidine 4% Liquid 1 Application(s) Topical <User Schedule>  dexMEDEtomidine Infusion 0.3 MICROgram(s)/kG/Hr IV Continuous <Continuous>  dextrose 40% Gel 15 Gram(s) Oral once PRN  dextrose 5%. 1000 milliLiter(s) IV Continuous <Continuous>  dextrose 50% Injectable 12.5 Gram(s) IV Push once  dextrose 50% Injectable 25 Gram(s) IV Push once  dextrose 50% Injectable 25 Gram(s) IV Push once  glucagon  Injectable 1 milliGRAM(s) IntraMuscular once PRN  insulin lispro (HumaLOG) corrective regimen sliding scale   SubCutaneous three times a day before meals  levETIRAcetam  IVPB 500 milliGRAM(s) IV Intermittent every 12 hours  meperidine     Injectable 50 milliGRAM(s) IV Push every 8 hours PRN  niCARdipine Infusion 5 mG/Hr IV Continuous <Continuous>  niMODipine 60 milliGRAM(s) Oral every 4 hours  pantoprazole    Tablet 40 milliGRAM(s) Oral every 12 hours  polyethylene glycol 3350 17 Gram(s) Oral every 12 hours  propofol Infusion 10 MICROgram(s)/kG/Min IV Continuous <Continuous>  senna Syrup 10 milliLiter(s) Oral at bedtime  sodium chloride 0.9% lock flush 10 milliLiter(s) IV Push every 1 hour PRN  sodium chloride 3%. 500 milliLiter(s) IV Continuous <Continuous>  Mode: AC/ CMV (Assist Control/ Continuous Mandatory Ventilation), RR (machine): 14, TV (machine): 500, FiO2: 40, PEEP: 5, ITime: 0.1, MAP: 7, PIP: 15  PHYSICAL EXAM:  General: Calm, intubated  HEENT: MMM  Neuro:  -Mental status- No acute distress, EO to stim  -CN- Pupils R 4mm NR, L 2mm sluggish, EOMI, tongue midline, face symmetric  +cough/gag  RUE localize AG  RLE spont AG  LUE flaccid  LLE flaccid    CV: RRR  Pulm: clear to auscultation  Abd: Soft, nontender, nondistended  Ext: no noted edema in lower ext  Skin: warm, dry      LABS:  Na: 140 (08-09 @ 13:25)  K: 4.1 (08-09 @ 13:25)  Cl: 107 (08-09 @ 13:25)  CO2: 18 (08-09 @ 13:25)  BUN: 27 (08-09 @ 13:25)  Cr: 1.62 (08-09 @ 13:25)  Glu: 120(08-09 @ 13:25)    Hgb: 12.2 (08-09 @ 13:25)  Hct: 38.4 (08-09 @ 13:25)  WBC: 18.13 (08-09 @ 13:25)  Plt: 203 (08-09 @ 13:25)    INR: 1.63 08-09-20 @ 13:24  PTT: 38.8 08-09-20 @ 13:24    ASSESSMENT/PLAN: SAH HH4 mF4 subarachnoid hemorrhage, post-bleed day 2?  afib  HTN  Unsure the reason of the cardiac arrest, could have been from the SAH   NEURO: SAH, CTA no aneurysm, will get DSA   Seizure Prophylaxis: Levetiracetam until aneurysm treated  Delayed Cerebral Ischemia Management: Serial screening TCDs, euvolemia, nimodipine  d/c 3% keep sodium 140-150  Sedation: propofol wean as tolerated  hydralazine 25 mg q 8 hrs orally , wean nicardipine   respiratory failure, intubated on mech vent  spO2>92%  CXR ET in good position   SBP goal 100-160  cardene prn  TTE pending  cardiac arrest- unsure the cause, rewarmed today   A.aneesh started on amiodarone   JALEN, likely from hypoperfusion during cardiac arrest   replace fluids prn  scott for monitoring  Diet: start TF, NPO after midnight   GI prophylaxis [] not indicated x[] PPI [] other:  Bowel regimen [x] senna [] other  Goal euglycemia (-180)  ISS   afib on CMD at home  INR 1.6 vitamin K   VTE prophylaxis: [] SCDs [] chemoprophylaxis [x] hold chemoprophylaxis due to: SAH  monitor leukocytosis  hyperthermia tylenol PRN and cooling blanket   repeat LFTs tomorrow     MISC:    SOCIAL/FAMILY:  [] awaiting [x] updated at bedside [] family meeting    CODE STATUS:  [x] Full Code [] DNR [] DNI [] Palliative/Comfort Care    DISPOSITION:  [x] ICU [] Stroke Unit [] Floor [] EMU [] RCU [] PCU    [x] Patient is at high risk of neurologic deterioration/death due to: sah    Time seen:  Time spent: _35 critical care minutes O: Started on amiodarone for a fib     T(C): 37.3 (08-09-20 @ 18:00), Max: 37.4 (08-09-20 @ 17:00)  HR: 84 (08-09-20 @ 18:45) (66 - 110)  BP: 124/104 (08-09-20 @ 12:45) (124/104 - 144/105)  RR: 16 (08-09-20 @ 18:45) (14 - 18)  SpO2: 98% (08-09-20 @ 18:45) (98% - 100%)  08-09-20 @ 07:01  -  08-09-20 @ 20:04  --------------------------------------------------------  IN: 1097.5 mL / OUT: 270 mL / NET: 827.5 mL    aMIOdarone Infusion 1 mG/Min IV Continuous <Continuous>  busPIRone 30 milliGRAM(s) Oral every 8 hours PRN  chlorhexidine 0.12% Liquid 15 milliLiter(s) Oral Mucosa every 12 hours  chlorhexidine 4% Liquid 1 Application(s) Topical <User Schedule>  dexMEDEtomidine Infusion 0.3 MICROgram(s)/kG/Hr IV Continuous <Continuous>  dextrose 40% Gel 15 Gram(s) Oral once PRN  dextrose 5%. 1000 milliLiter(s) IV Continuous <Continuous>  dextrose 50% Injectable 12.5 Gram(s) IV Push once  dextrose 50% Injectable 25 Gram(s) IV Push once  dextrose 50% Injectable 25 Gram(s) IV Push once  glucagon  Injectable 1 milliGRAM(s) IntraMuscular once PRN  insulin lispro (HumaLOG) corrective regimen sliding scale   SubCutaneous three times a day before meals  levETIRAcetam  IVPB 500 milliGRAM(s) IV Intermittent every 12 hours  meperidine     Injectable 50 milliGRAM(s) IV Push every 8 hours PRN  niCARdipine Infusion 5 mG/Hr IV Continuous <Continuous>  niMODipine 60 milliGRAM(s) Oral every 4 hours  pantoprazole    Tablet 40 milliGRAM(s) Oral every 12 hours  polyethylene glycol 3350 17 Gram(s) Oral every 12 hours  propofol Infusion 10 MICROgram(s)/kG/Min IV Continuous <Continuous>  senna Syrup 10 milliLiter(s) Oral at bedtime  sodium chloride 0.9% lock flush 10 milliLiter(s) IV Push every 1 hour PRN  sodium chloride 3%. 500 milliLiter(s) IV Continuous <Continuous>  Mode: AC/ CMV (Assist Control/ Continuous Mandatory Ventilation), RR (machine): 14, TV (machine): 500, FiO2: 40, PEEP: 5, ITime: 0.1, MAP: 7, PIP: 15  PHYSICAL EXAM:  General: Calm, intubated  HEENT: MMM  Neuro:  -Mental status- No acute distress, EO to stim  -CN- Pupils R 5mm NR, L 2mm sluggish, Eyes midline,   +cough/gag  + corneals  RUE localize AG  RLE spont AG  LUE flaccid  LLE flaccid    CV: RRR  Pulm: clear to auscultation  Abd: Soft, nontender, nondistended  Ext: no noted edema in lower ext  Skin: warm, dry      LABS:  Na: 140 (08-09 @ 13:25)  K: 4.1 (08-09 @ 13:25)  Cl: 107 (08-09 @ 13:25)  CO2: 18 (08-09 @ 13:25)  BUN: 27 (08-09 @ 13:25)  Cr: 1.62 (08-09 @ 13:25)  Glu: 120(08-09 @ 13:25)    Hgb: 12.2 (08-09 @ 13:25)  Hct: 38.4 (08-09 @ 13:25)  WBC: 18.13 (08-09 @ 13:25)  Plt: 203 (08-09 @ 13:25)    INR: 1.63 08-09-20 @ 13:24  PTT: 38.8 08-09-20 @ 13:24    ASSESSMENT/PLAN: SAH HH4 mF4 subarachnoid hemorrhage, post-bleed day 2?  afib  HTN  Unsure the reason of the cardiac arrest, could have been from the SAH   NEURO: SAH, CTA no aneurysm, will get DSA   Seizure Prophylaxis: Levetiracetam until aneurysm treated  Delayed Cerebral Ischemia Management: Serial screening TCDs, euvolemia, nimodipine  d/c 3% keep sodium 140-150  Sedation: propofol wean as tolerated  hydralazine 25 mg q 8 hrs orally , wean nicardipine   respiratory failure, intubated on mech vent  spO2>92%  CXR ET in good position   SBP goal 100-160  cardene prn  TTE pending  cardiac arrest- unsure the cause, rewarmed today   Mattie started on amiodarone   JALEN, likely from hypoperfusion during cardiac arrest   replace fluids prn  scott for monitoring  Diet: start TF, NPO after midnight   GI prophylaxis [] not indicated x[] PPI [] other:  Bowel regimen [x] senna [] other  Goal euglycemia (-180)  ISS   afib on CMD at home  INR 1.6 vitamin K   VTE prophylaxis: [] SCDs [] chemoprophylaxis [x] hold chemoprophylaxis due to: SAH  monitor leukocytosis  hyperthermia tylenol PRN and cooling blanket   repeat LFTs tomorrow     MISC:    SOCIAL/FAMILY:  [] awaiting [x] updated at bedside [] family meeting    CODE STATUS:  [x] Full Code [] DNR [] DNI [] Palliative/Comfort Care    DISPOSITION:  [x] ICU [] Stroke Unit [] Floor [] EMU [] RCU [] PCU    [x] Patient is at high risk of neurologic deterioration/death due to: sah    Time seen:  Time spent: _35 critical care minutes

## 2020-08-09 NOTE — PROCEDURE NOTE - ADDITIONAL PROCEDURE DETAILS
20 fr six eye catheter inserted.  Evacuated ~50cc of clot.  ~2L of irrigant infused overall.  By end of irrigation, urine light pink.  18fr scott w/ temp probe replace afterward w/ + return of urine.  Urine draining light red.

## 2020-08-10 ENCOUNTER — APPOINTMENT (OUTPATIENT)
Dept: NEUROSURGERY | Facility: HOSPITAL | Age: 60
End: 2020-08-10
Payer: MEDICAID

## 2020-08-10 LAB
A1C WITH ESTIMATED AVERAGE GLUCOSE RESULT: 6.6 % — HIGH (ref 4–5.6)
ALBUMIN SERPL ELPH-MCNC: 3.4 G/DL — SIGNIFICANT CHANGE UP (ref 3.3–5)
ALBUMIN SERPL ELPH-MCNC: 3.5 G/DL — SIGNIFICANT CHANGE UP (ref 3.3–5)
ALP SERPL-CCNC: 72 U/L — SIGNIFICANT CHANGE UP (ref 40–120)
ALP SERPL-CCNC: 76 U/L — SIGNIFICANT CHANGE UP (ref 40–120)
ALT FLD-CCNC: 47 U/L — HIGH (ref 10–45)
ALT FLD-CCNC: 48 U/L — HIGH (ref 10–45)
ANION GAP SERPL CALC-SCNC: 14 MMOL/L — SIGNIFICANT CHANGE UP (ref 5–17)
ANION GAP SERPL CALC-SCNC: 14 MMOL/L — SIGNIFICANT CHANGE UP (ref 5–17)
ANION GAP SERPL CALC-SCNC: 15 MMOL/L — SIGNIFICANT CHANGE UP (ref 5–17)
APTT BLD: 31.4 SEC — SIGNIFICANT CHANGE UP (ref 27.5–35.5)
AST SERPL-CCNC: 71 U/L — HIGH (ref 10–40)
AST SERPL-CCNC: 86 U/L — HIGH (ref 10–40)
BASOPHILS # BLD AUTO: 0.02 K/UL — SIGNIFICANT CHANGE UP (ref 0–0.2)
BASOPHILS NFR BLD AUTO: 0.1 % — SIGNIFICANT CHANGE UP (ref 0–2)
BILIRUB DIRECT SERPL-MCNC: 0.1 MG/DL — SIGNIFICANT CHANGE UP (ref 0–0.2)
BILIRUB DIRECT SERPL-MCNC: 0.2 MG/DL — SIGNIFICANT CHANGE UP (ref 0–0.2)
BILIRUB INDIRECT FLD-MCNC: 0.2 MG/DL — SIGNIFICANT CHANGE UP (ref 0.2–1)
BILIRUB INDIRECT FLD-MCNC: 0.2 MG/DL — SIGNIFICANT CHANGE UP (ref 0.2–1)
BILIRUB SERPL-MCNC: 0.3 MG/DL — SIGNIFICANT CHANGE UP (ref 0.2–1.2)
BILIRUB SERPL-MCNC: 0.4 MG/DL — SIGNIFICANT CHANGE UP (ref 0.2–1.2)
BUN SERPL-MCNC: 23 MG/DL — SIGNIFICANT CHANGE UP (ref 7–23)
BUN SERPL-MCNC: 26 MG/DL — HIGH (ref 7–23)
BUN SERPL-MCNC: 27 MG/DL — HIGH (ref 7–23)
CALCIUM SERPL-MCNC: 8.3 MG/DL — LOW (ref 8.4–10.5)
CALCIUM SERPL-MCNC: 8.7 MG/DL — SIGNIFICANT CHANGE UP (ref 8.4–10.5)
CALCIUM SERPL-MCNC: 8.8 MG/DL — SIGNIFICANT CHANGE UP (ref 8.4–10.5)
CHLORIDE SERPL-SCNC: 108 MMOL/L — SIGNIFICANT CHANGE UP (ref 96–108)
CHLORIDE SERPL-SCNC: 109 MMOL/L — HIGH (ref 96–108)
CHLORIDE SERPL-SCNC: 110 MMOL/L — HIGH (ref 96–108)
CO2 SERPL-SCNC: 20 MMOL/L — LOW (ref 22–31)
CREAT ?TM UR-MCNC: 134 MG/DL — SIGNIFICANT CHANGE UP
CREAT SERPL-MCNC: 1.73 MG/DL — HIGH (ref 0.5–1.3)
CREAT SERPL-MCNC: 1.77 MG/DL — HIGH (ref 0.5–1.3)
CREAT SERPL-MCNC: 1.86 MG/DL — HIGH (ref 0.5–1.3)
CULTURE RESULTS: NO GROWTH — SIGNIFICANT CHANGE UP
EOSINOPHIL # BLD AUTO: 0.03 K/UL — SIGNIFICANT CHANGE UP (ref 0–0.5)
EOSINOPHIL NFR BLD AUTO: 0.2 % — SIGNIFICANT CHANGE UP (ref 0–6)
ESTIMATED AVERAGE GLUCOSE: 143 MG/DL — HIGH (ref 68–114)
GAS PNL BLDA: SIGNIFICANT CHANGE UP
GAS PNL BLDA: SIGNIFICANT CHANGE UP
GLUCOSE SERPL-MCNC: 128 MG/DL — HIGH (ref 70–99)
GLUCOSE SERPL-MCNC: 145 MG/DL — HIGH (ref 70–99)
GLUCOSE SERPL-MCNC: 160 MG/DL — HIGH (ref 70–99)
GRAM STN FLD: SIGNIFICANT CHANGE UP
HCT VFR BLD CALC: 34.5 % — LOW (ref 39–50)
HCV AB S/CO SERPL IA: 0.11 S/CO — SIGNIFICANT CHANGE UP (ref 0–0.99)
HCV AB SERPL-IMP: SIGNIFICANT CHANGE UP
HGB BLD-MCNC: 10.6 G/DL — LOW (ref 13–17)
IMM GRANULOCYTES NFR BLD AUTO: 1.9 % — HIGH (ref 0–1.5)
INR BLD: 1.23 RATIO — HIGH (ref 0.88–1.16)
LYMPHOCYTES # BLD AUTO: 0.65 K/UL — LOW (ref 1–3.3)
LYMPHOCYTES # BLD AUTO: 4.3 % — LOW (ref 13–44)
MAGNESIUM SERPL-MCNC: 2.1 MG/DL — SIGNIFICANT CHANGE UP (ref 1.6–2.6)
MAGNESIUM SERPL-MCNC: 2.2 MG/DL — SIGNIFICANT CHANGE UP (ref 1.6–2.6)
MCHC RBC-ENTMCNC: 26.8 PG — LOW (ref 27–34)
MCHC RBC-ENTMCNC: 30.7 GM/DL — LOW (ref 32–36)
MCV RBC AUTO: 87.1 FL — SIGNIFICANT CHANGE UP (ref 80–100)
MONOCYTES # BLD AUTO: 1.24 K/UL — HIGH (ref 0–0.9)
MONOCYTES NFR BLD AUTO: 8.2 % — SIGNIFICANT CHANGE UP (ref 2–14)
NEUTROPHILS # BLD AUTO: 12.87 K/UL — HIGH (ref 1.8–7.4)
NEUTROPHILS NFR BLD AUTO: 85.3 % — HIGH (ref 43–77)
NRBC # BLD: 0 /100 WBCS — SIGNIFICANT CHANGE UP (ref 0–0)
PHOSPHATE SERPL-MCNC: 3.6 MG/DL — SIGNIFICANT CHANGE UP (ref 2.5–4.5)
PHOSPHATE SERPL-MCNC: 4.1 MG/DL — SIGNIFICANT CHANGE UP (ref 2.5–4.5)
PLATELET # BLD AUTO: 182 K/UL — SIGNIFICANT CHANGE UP (ref 150–400)
POTASSIUM SERPL-MCNC: 3.7 MMOL/L — SIGNIFICANT CHANGE UP (ref 3.5–5.3)
POTASSIUM SERPL-MCNC: 3.8 MMOL/L — SIGNIFICANT CHANGE UP (ref 3.5–5.3)
POTASSIUM SERPL-MCNC: 4 MMOL/L — SIGNIFICANT CHANGE UP (ref 3.5–5.3)
POTASSIUM SERPL-SCNC: 3.7 MMOL/L — SIGNIFICANT CHANGE UP (ref 3.5–5.3)
POTASSIUM SERPL-SCNC: 3.8 MMOL/L — SIGNIFICANT CHANGE UP (ref 3.5–5.3)
POTASSIUM SERPL-SCNC: 4 MMOL/L — SIGNIFICANT CHANGE UP (ref 3.5–5.3)
PROT SERPL-MCNC: 6.3 G/DL — SIGNIFICANT CHANGE UP (ref 6–8.3)
PROT SERPL-MCNC: 6.5 G/DL — SIGNIFICANT CHANGE UP (ref 6–8.3)
PROTHROM AB SERPL-ACNC: 14.5 SEC — HIGH (ref 10.6–13.6)
RBC # BLD: 3.96 M/UL — LOW (ref 4.2–5.8)
RBC # FLD: 14.8 % — HIGH (ref 10.3–14.5)
SODIUM SERPL-SCNC: 142 MMOL/L — SIGNIFICANT CHANGE UP (ref 135–145)
SODIUM SERPL-SCNC: 144 MMOL/L — SIGNIFICANT CHANGE UP (ref 135–145)
SODIUM SERPL-SCNC: 144 MMOL/L — SIGNIFICANT CHANGE UP (ref 135–145)
SODIUM UR-SCNC: <35 MMOL/L — SIGNIFICANT CHANGE UP
SPECIMEN SOURCE: SIGNIFICANT CHANGE UP
SPECIMEN SOURCE: SIGNIFICANT CHANGE UP
TROPONIN T, HIGH SENSITIVITY RESULT: 374 NG/L — HIGH (ref 0–51)
TROPONIN T, HIGH SENSITIVITY RESULT: 453 NG/L — HIGH (ref 0–51)
UUN UR-MCNC: 802 MG/DL — SIGNIFICANT CHANGE UP
WBC # BLD: 15.09 K/UL — HIGH (ref 3.8–10.5)
WBC # FLD AUTO: 15.09 K/UL — HIGH (ref 3.8–10.5)

## 2020-08-10 PROCEDURE — 99253 IP/OBS CNSLTJ NEW/EST LOW 45: CPT

## 2020-08-10 PROCEDURE — 95720 EEG PHY/QHP EA INCR W/VEEG: CPT

## 2020-08-10 PROCEDURE — 36224 PLACE CATH CAROTD ART: CPT | Mod: 50

## 2020-08-10 PROCEDURE — 36620 INSERTION CATHETER ARTERY: CPT

## 2020-08-10 PROCEDURE — 93970 EXTREMITY STUDY: CPT | Mod: 26

## 2020-08-10 PROCEDURE — 93306 TTE W/DOPPLER COMPLETE: CPT | Mod: 26

## 2020-08-10 PROCEDURE — 36227 PLACE CATH XTRNL CAROTID: CPT

## 2020-08-10 PROCEDURE — 70450 CT HEAD/BRAIN W/O DYE: CPT | Mod: 26

## 2020-08-10 PROCEDURE — 99292 CRITICAL CARE ADDL 30 MIN: CPT

## 2020-08-10 PROCEDURE — 36226 PLACE CATH VERTEBRAL ART: CPT | Mod: 50

## 2020-08-10 PROCEDURE — 76377 3D RENDER W/INTRP POSTPROCES: CPT | Mod: 26

## 2020-08-10 PROCEDURE — 99291 CRITICAL CARE FIRST HOUR: CPT

## 2020-08-10 RX ORDER — HYDRALAZINE HCL 50 MG
25 TABLET ORAL ONCE
Refills: 0 | Status: COMPLETED | OUTPATIENT
Start: 2020-08-10 | End: 2020-08-10

## 2020-08-10 RX ORDER — AMIODARONE HYDROCHLORIDE 400 MG/1
0.5 TABLET ORAL
Qty: 900 | Refills: 0 | Status: DISCONTINUED | OUTPATIENT
Start: 2020-08-10 | End: 2020-08-11

## 2020-08-10 RX ORDER — ACETAMINOPHEN 500 MG
1000 TABLET ORAL ONCE
Refills: 0 | Status: COMPLETED | OUTPATIENT
Start: 2020-08-10 | End: 2020-08-10

## 2020-08-10 RX ORDER — AMIODARONE HYDROCHLORIDE 400 MG/1
200 TABLET ORAL DAILY
Refills: 0 | Status: DISCONTINUED | OUTPATIENT
Start: 2020-08-11 | End: 2020-08-23

## 2020-08-10 RX ORDER — SODIUM CHLORIDE 9 MG/ML
1000 INJECTION, SOLUTION INTRAVENOUS
Refills: 0 | Status: DISCONTINUED | OUTPATIENT
Start: 2020-08-10 | End: 2020-08-11

## 2020-08-10 RX ORDER — PANTOPRAZOLE SODIUM 20 MG/1
40 TABLET, DELAYED RELEASE ORAL EVERY 12 HOURS
Refills: 0 | Status: DISCONTINUED | OUTPATIENT
Start: 2020-08-10 | End: 2020-08-14

## 2020-08-10 RX ORDER — HYDRALAZINE HCL 50 MG
50 TABLET ORAL EVERY 8 HOURS
Refills: 0 | Status: DISCONTINUED | OUTPATIENT
Start: 2020-08-10 | End: 2020-08-11

## 2020-08-10 RX ORDER — AMIODARONE HYDROCHLORIDE 400 MG/1
0.05 TABLET ORAL
Qty: 900 | Refills: 0 | Status: DISCONTINUED | OUTPATIENT
Start: 2020-08-10 | End: 2020-08-10

## 2020-08-10 RX ORDER — SODIUM CHLORIDE 9 MG/ML
1000 INJECTION INTRAMUSCULAR; INTRAVENOUS; SUBCUTANEOUS
Refills: 0 | Status: DISCONTINUED | OUTPATIENT
Start: 2020-08-10 | End: 2020-08-10

## 2020-08-10 RX ORDER — HYDRALAZINE HCL 50 MG
10 TABLET ORAL ONCE
Refills: 0 | Status: COMPLETED | OUTPATIENT
Start: 2020-08-10 | End: 2020-08-10

## 2020-08-10 RX ADMIN — Medication 25 MILLIGRAM(S): at 05:29

## 2020-08-10 RX ADMIN — NIMODIPINE 60 MILLIGRAM(S): 60 SOLUTION ORAL at 11:22

## 2020-08-10 RX ADMIN — AMIODARONE HYDROCHLORIDE 16.7 MG/MIN: 400 TABLET ORAL at 08:15

## 2020-08-10 RX ADMIN — CHLORHEXIDINE GLUCONATE 1 APPLICATION(S): 213 SOLUTION TOPICAL at 05:29

## 2020-08-10 RX ADMIN — NIMODIPINE 60 MILLIGRAM(S): 60 SOLUTION ORAL at 17:09

## 2020-08-10 RX ADMIN — FENTANYL CITRATE 25 MICROGRAM(S): 50 INJECTION INTRAVENOUS at 20:00

## 2020-08-10 RX ADMIN — CHLORHEXIDINE GLUCONATE 15 MILLILITER(S): 213 SOLUTION TOPICAL at 17:17

## 2020-08-10 RX ADMIN — PANTOPRAZOLE SODIUM 40 MILLIGRAM(S): 20 TABLET, DELAYED RELEASE ORAL at 17:17

## 2020-08-10 RX ADMIN — SENNA PLUS 10 MILLILITER(S): 8.6 TABLET ORAL at 21:19

## 2020-08-10 RX ADMIN — NIMODIPINE 60 MILLIGRAM(S): 60 SOLUTION ORAL at 21:19

## 2020-08-10 RX ADMIN — LEVETIRACETAM 400 MILLIGRAM(S): 250 TABLET, FILM COATED ORAL at 05:29

## 2020-08-10 RX ADMIN — POLYETHYLENE GLYCOL 3350 17 GRAM(S): 17 POWDER, FOR SOLUTION ORAL at 18:02

## 2020-08-10 RX ADMIN — FENTANYL CITRATE 25 MICROGRAM(S): 50 INJECTION INTRAVENOUS at 20:15

## 2020-08-10 RX ADMIN — Medication 25 MILLIGRAM(S): at 13:38

## 2020-08-10 RX ADMIN — PROPOFOL 6.78 MICROGRAM(S)/KG/MIN: 10 INJECTION, EMULSION INTRAVENOUS at 18:47

## 2020-08-10 RX ADMIN — Medication 102 MILLIGRAM(S): at 11:22

## 2020-08-10 RX ADMIN — SODIUM CHLORIDE 100 MILLILITER(S): 9 INJECTION, SOLUTION INTRAVENOUS at 18:46

## 2020-08-10 RX ADMIN — NIMODIPINE 60 MILLIGRAM(S): 60 SOLUTION ORAL at 06:07

## 2020-08-10 RX ADMIN — Medication 50 MILLIGRAM(S): at 21:19

## 2020-08-10 RX ADMIN — CHLORHEXIDINE GLUCONATE 15 MILLILITER(S): 213 SOLUTION TOPICAL at 05:29

## 2020-08-10 RX ADMIN — Medication 400 MILLIGRAM(S): at 03:30

## 2020-08-10 RX ADMIN — SODIUM CHLORIDE 100 MILLILITER(S): 9 INJECTION, SOLUTION INTRAVENOUS at 09:38

## 2020-08-10 RX ADMIN — AMIODARONE HYDROCHLORIDE 16.7 MG/MIN: 400 TABLET ORAL at 18:47

## 2020-08-10 RX ADMIN — PROPOFOL 6.78 MICROGRAM(S)/KG/MIN: 10 INJECTION, EMULSION INTRAVENOUS at 09:33

## 2020-08-10 RX ADMIN — POLYETHYLENE GLYCOL 3350 17 GRAM(S): 17 POWDER, FOR SOLUTION ORAL at 05:30

## 2020-08-10 RX ADMIN — NIMODIPINE 60 MILLIGRAM(S): 60 SOLUTION ORAL at 03:03

## 2020-08-10 RX ADMIN — Medication 10 MILLIGRAM(S): at 20:00

## 2020-08-10 RX ADMIN — PANTOPRAZOLE SODIUM 40 MILLIGRAM(S): 20 TABLET, DELAYED RELEASE ORAL at 05:37

## 2020-08-10 RX ADMIN — LEVETIRACETAM 400 MILLIGRAM(S): 250 TABLET, FILM COATED ORAL at 17:16

## 2020-08-10 NOTE — OCCUPATIONAL THERAPY INITIAL EVALUATION ADULT - RANGE OF MOTION EXAMINATION, UPPER EXTREMITY
Left UE Passive ROM was WFL  (within functional limits)/R hand AROM WFL- able to squeeze therapist's hand

## 2020-08-10 NOTE — PROCEDURE NOTE - NSCHLORHEXIDINEBATH_GEN_A_CORE
4% solution Bilobed Transposition Flap Text: The defect edges were debeveled with a #15 scalpel blade.  Given the location of the defect and the proximity to free margins a bilobed transposition flap was deemed most appropriate.  Using a sterile surgical marker, an appropriate bilobe flap drawn around the defect.    The area thus outlined was incised deep to adipose tissue with a #15 scalpel blade.  The skin margins were undermined to an appropriate distance in all directions utilizing iris scissors.

## 2020-08-10 NOTE — DISCHARGE NOTE NURSING/CASE MANAGEMENT/SOCIAL WORK - NSDCPEPTSTRK_GEN_ALL_CORE
Risk factors for stroke/Call 911 for stroke/Stroke support groups for patients, families, and friends/Need for follow up after discharge/Stroke education booklet/Stroke warning signs and symptoms/Prescribed medications/Signs and symptoms of stroke

## 2020-08-10 NOTE — OCCUPATIONAL THERAPY INITIAL EVALUATION ADULT - ADDITIONAL COMMENTS
Cerebral angiography 8/20/20  Head CT 8/12/20 IMPRESSION: Subarachnoid hemorrhage is again seen and slightly less conspicuous. New area of high attenuation involving the right posterior temporal/parietal region as described above.  MRI BRAIN 8/11/20: Restricted There is restricted diffusion within the region of the right basal ganglia, posterior limb of the right internal capsule, and anterior right temporal lobe, likely representing an acute right MCA territory infarct. Similar cisternal and sulcal subarachnoid hemorrhage, given differences in modality. Similar small hemorrhage layering in the occipital horns. No hydrocephalus. No abnormal parenchymal or leptomeningeal enhancement.  MRI CERVICAL SPINE 8/11/20: Multilevel degenerative changes superimposed upon congenital spinal canal stenosis.

## 2020-08-10 NOTE — PROCEDURE NOTE - NSINFORMCONSENT_GEN_A_CORE
Benefits, risks, and possible complications of procedure explained to patient/caregiver who verbalized understanding and gave verbal consent.
son/HCP/Benefits, risks, and possible complications of procedure explained to patient/caregiver who verbalized understanding and gave written consent.

## 2020-08-10 NOTE — OCCUPATIONAL THERAPY INITIAL EVALUATION ADULT - GENERAL OBSERVATIONS, REHAB EVAL
Pt received semisupine in bed NAD, +intubated, +ICU monitor, +NG tube. Pt received semisupine in bed NAD, +trach to vent, +ICU monitor, +rectal tube, +condom cath, +peg tube, +R hand restraint, +IV

## 2020-08-10 NOTE — PROGRESS NOTE ADULT - SUBJECTIVE AND OBJECTIVE BOX
UROLOGY DAILY PROGRESS NOTE:     Subjective:    Urine color improved.    Objective:    NAD  Intubated  Uma[place] urine clear    MEDICATIONS  (STANDING):  aMIOdarone Infusion 0.5 mG/Min (16.7 mL/Hr) IV Continuous <Continuous>  chlorhexidine 0.12% Liquid 15 milliLiter(s) Oral Mucosa every 12 hours  chlorhexidine 4% Liquid 1 Application(s) Topical <User Schedule>  dextrose 5%. 1000 milliLiter(s) (50 mL/Hr) IV Continuous <Continuous>  dextrose 50% Injectable 12.5 Gram(s) IV Push once  dextrose 50% Injectable 25 Gram(s) IV Push once  dextrose 50% Injectable 25 Gram(s) IV Push once  hydrALAZINE 25 milliGRAM(s) Oral every 8 hours  insulin lispro (HumaLOG) corrective regimen sliding scale   SubCutaneous every 6 hours  levETIRAcetam  IVPB 500 milliGRAM(s) IV Intermittent every 12 hours  niCARdipine Infusion 5 mG/Hr (25 mL/Hr) IV Continuous <Continuous>  niMODipine Oral Solution 60 milliGRAM(s) Oral/Enteral Tube every 4 hours  pantoprazole  Injectable 40 milliGRAM(s) IV Push every 12 hours  phytonadione  IVPB 10 milliGRAM(s) IV Intermittent daily  polyethylene glycol 3350 17 Gram(s) Oral every 12 hours  propofol Infusion 10 MICROgram(s)/kG/Min (6.78 mL/Hr) IV Continuous <Continuous>  senna Syrup 10 milliLiter(s) Oral at bedtime    MEDICATIONS  (PRN):  dextrose 40% Gel 15 Gram(s) Oral once PRN Blood Glucose LESS THAN 70 milliGRAM(s)/deciliter  fentaNYL    Injectable 25 MICROGram(s) IV Push every 2 hours PRN breakthrough pain  glucagon  Injectable 1 milliGRAM(s) IntraMuscular once PRN Glucose LESS THAN 70 milligrams/deciliter  sodium chloride 0.9% lock flush 10 milliLiter(s) IV Push every 1 hour PRN Pre/post blood products, medications, blood draw, and to maintain line patency      Vital Signs Last 24 Hrs  T(C): 37.1 (10 Aug 2020 07:00), Max: 37.9 (10 Aug 2020 03:00)  T(F): 98.8 (10 Aug 2020 07:00), Max: 100.2 (10 Aug 2020 03:00)  HR: 73 (10 Aug 2020 08:30) (63 - 110)  BP: 129/77 (10 Aug 2020 00:15) (92/73 - 144/105)  BP(mean): 92 (10 Aug 2020 00:15) (72 - 113)  RR: 22 (10 Aug 2020 08:30) (14 - 27)  SpO2: 100% (10 Aug 2020 08:30) (94% - 100%)    I&O's Detail    09 Aug 2020 07:01  -  10 Aug 2020 07:00  --------------------------------------------------------  IN:    amiodarone Infusion: 399.6 mL    dexmedetomidine Infusion: 52.9 mL    Enteral Tube Flush: 180 mL    IV PiggyBack: 300 mL    Nepro with Carb Steady: 50 mL    niCARdipine Infusion: 525 mL    propofol Infusion: 321.6 mL    Sodium Chloride 0.9% IV Bolus: 500 mL    sodium chloride 3%: 270 mL  Total IN: 2599.1 mL    OUT:    Indwelling Catheter - Urethral: 885 mL  Total OUT: 885 mL    Total NET: 1714.1 mL      10 Aug 2020 07:01  -  10 Aug 2020 08:58  --------------------------------------------------------  IN:    amiodarone Infusion: 33.3 mL    amiodarone Infusion: 16.7 mL    propofol Infusion: 19.8 mL  Total IN: 69.8 mL    OUT:    Indwelling Catheter - Urethral: 90 mL  Total OUT: 90 mL    Total NET: -20.2 mL          Daily Height in cm: 172.72 (09 Aug 2020 19:00)    Daily     LABS:                        11.7   18.00 )-----------( 198      ( 09 Aug 2020 22:14 )             37.1     08-10    142  |  108  |  27<H>  ----------------------------<  160<H>  3.8   |  20<L>  |  1.77<H>    Ca    8.3<L>      10 Aug 2020 06:17  Phos  3.6     08-  Mg     1.9     08-    TPro  6.3  /  Alb  3.4  /  TBili  0.3  /  DBili  0.1  /  AST  86<H>  /  ALT  48<H>  /  AlkPhos  72  08-10    PT/INR - ( 09 Aug 2020 22:14 )   PT: 16.6 sec;   INR: 1.42 ratio         PTT - ( 09 Aug 2020 22:14 )  PTT:36.4 sec  Urinalysis Basic - ( 09 Aug 2020 13:29 )    Color: DARK BROWN / Appearance: Turbid / S.030 / pH: x  Gluc: x / Ketone: Negative  / Bili: Negative / Urobili: Negative   Blood: x / Protein: 100 / Nitrite: Negative   Leuk Esterase: Large / RBC: 77633 /hpf /  /HPF   Sq Epi: x / Non Sq Epi: 1 /hpf / Bacteria: Negative

## 2020-08-10 NOTE — PROGRESS NOTE ADULT - SUBJECTIVE AND OBJECTIVE BOX
Subjective: Patient seen and examined. No new events except as noted.   remains intubated in NSCU   Rewarming   REmains on amiodarone gtt   HR stable and less ectopy       REVIEW OF SYSTEMS:  Unable to obtain       MEDICATIONS:  MEDICATIONS  (STANDING):  aMIOdarone Infusion 0.5 mG/Min (16.7 mL/Hr) IV Continuous <Continuous>  chlorhexidine 0.12% Liquid 15 milliLiter(s) Oral Mucosa every 12 hours  chlorhexidine 4% Liquid 1 Application(s) Topical <User Schedule>  dextrose 5%. 1000 milliLiter(s) (50 mL/Hr) IV Continuous <Continuous>  dextrose 50% Injectable 12.5 Gram(s) IV Push once  dextrose 50% Injectable 25 Gram(s) IV Push once  dextrose 50% Injectable 25 Gram(s) IV Push once  hydrALAZINE 25 milliGRAM(s) Oral every 8 hours  insulin lispro (HumaLOG) corrective regimen sliding scale   SubCutaneous every 6 hours  levETIRAcetam  IVPB 500 milliGRAM(s) IV Intermittent every 12 hours  multiple electrolytes Injection Type 1 1000 milliLiter(s) (100 mL/Hr) IV Continuous <Continuous>  niCARdipine Infusion 5 mG/Hr (25 mL/Hr) IV Continuous <Continuous>  niMODipine Oral Solution 60 milliGRAM(s) Oral/Enteral Tube every 4 hours  pantoprazole  Injectable 40 milliGRAM(s) IV Push every 12 hours  phytonadione  IVPB 10 milliGRAM(s) IV Intermittent daily  polyethylene glycol 3350 17 Gram(s) Oral every 12 hours  propofol Infusion 10 MICROgram(s)/kG/Min (6.78 mL/Hr) IV Continuous <Continuous>  senna Syrup 10 milliLiter(s) Oral at bedtime      PHYSICAL EXAM:  T(C): 37.1 (08-10-20 @ 07:00), Max: 37.9 (08-10-20 @ 03:00)  HR: 70 (08-10-20 @ 10:15) (63 - 110)  BP: 129/77 (08-10-20 @ 00:15) (92/73 - 144/105)  RR: 22 (08-10-20 @ 10:15) (14 - 27)  SpO2: 100% (08-10-20 @ 10:15) (94% - 100%)  Wt(kg): --  I&O's Summary    09 Aug 2020 07:01  -  10 Aug 2020 07:00  --------------------------------------------------------  IN: 2599.1 mL / OUT: 885 mL / NET: 1714.1 mL    10 Aug 2020 07:01  -  10 Aug 2020 10:51  --------------------------------------------------------  IN: 132.7 mL / OUT: 155 mL / NET: -22.3 mL      Height (cm): 172.7 ( @ :)  Weight (kg): 110 ( @ :)  BMI (kg/m2): 36.9 ( @ :)  BSA (m2): 2.22 ( @ :)        Appearance: Intubated sedated 	  HEENT:   Dry  oral mucosa,  Lymphatic: No lymphadenopathy  Cardiovascular: Normal S1 S2, No JVD, No murmurs, No edema  Respiratory: Ventilated   Psychiatry: A & O x 0  Gastrointestinal:  Soft, Non-tender, + BS	  Skin: No rashes, No ecchymoses, No cyanosis	  Mental status- No acute distress, EO to stim  -CN- Pupils R 4mm NR, L 2mm sluggish, EOMI, tongue midline, face symmetric  +cough/gag  RUE localize AG  RLE spont AG  LUE flaccid  LLE flaccid    Extremities: decreased range of motion, No clubbing, cyanosis or edema  Vascular: Peripheral pulses palpable 2+ bilaterally  +scott        LABS:    CARDIAC MARKERS:                                11.7   18.00 )-----------( 198      ( 09 Aug 2020 22:14 )             37.1     08-10    142  |  108  |  27<H>  ----------------------------<  160<H>  3.8   |  20<L>  |  1.77<H>    Ca    8.3<L>      10 Aug 2020 06:17  Phos  3.6       Mg     1.9         TPro  6.3  /  Alb  3.4  /  TBili  0.3  /  DBili  0.1  /  AST  86<H>  /  ALT  48<H>  /  AlkPhos  72  08-10    proBNP:   Lipid Profile:   HgA1c:   TSH: Thyroid Stimulating Hormone, Serum: 1.91 uIU/mL ( @ 17:20)      EEG REPORT:   EEG Report:  · EEG Report		  [facility] EPILEPSY [facility]   REPORT OF LONG-TERM VIDEO EEG     [facility]: [address] [place] [address], [place] [address], Ph#: [phone]    Patient Name: GUILLE DOLAN  Age and : 60y (64)  MRN #: [id number]  Location: [place] [address]  Referring Physician: Killian Reid    Start Time/Date: 18:15 on 20  End Time/Date: 08:00 on 08-10-20  Duration: 7 H 9 minutes     _____________________________________________________________  STUDY INFORMATION    EEG Recording Technique:  The patient underwent continuous Video-EEG monitoring, using Telemetry System hardware on the XLTek Digital System. EEG and video data were stored on a computer hard drive with important events saved in digital archive files. The material was reviewed by a physician (electroencephalographer / epileptologist) on a daily basis. Abhishek and seizure detection algorithms were utilized and reviewed. An EEG Technician attended to the patient, and was available throughout daytime work hours.  The epilepsy center neurologist was available in person or on call 24-hours per day.    EEG Placement and Labeling of Electrodes:  The EEG was performed utilizing 20 channel referential EEG connections (coronal over temporal over parasagittal montage) using all standard 10-20 electrode placements with EKG, with additional electrodes placed in the inferior temporal region using the modified 10-10 montage electrode placements for elective admissions, or if deemed necessary. Recording was at a sampling rate of 256 samples per second per channel. Time synchronized digital video recording was done simultaneously with EEG recording. A low light infrared camera was used for low light recording.     _____________________________________________________________  HISTORY    Patient is a 60y old  Male who presents with a chief complaint of     PERTINENT MEDICATION:  MEDICATIONS  (STANDING):  aMIOdarone Infusion 0.5 mG/Min (16.7 mL/Hr) IV Continuous <Continuous>  hydrALAZINE 25 milliGRAM(s) Oral every 8 hours  insulin lispro (HumaLOG) corrective regimen sliding scale   SubCutaneous every 6 hours  levETIRAcetam  IVPB 500 milliGRAM(s) IV Intermittent every 12 hours  niCARdipine Infusion 5 mG/Hr (25 mL/Hr) IV Continuous <Continuous>  niMODipine Oral Solution 60 milliGRAM(s) Oral/Enteral Tube every 4 hours  pantoprazole  Injectable 40 milliGRAM(s) IV Push every 12 hours  phytonadione  IVPB 10 milliGRAM(s) IV Intermittent daily  polyethylene glycol 3350 17 Gram(s) Oral every 12 hours  propofol Infusion 10 MICROgram(s)/kG/Min (6.78 mL/Hr) IV Continuous <Continuous>  senna Syrup 10 milliLiter(s) Oral at bedtime    _____________________________________________________________  STUDY INTERPRETATION    Findings: The background was continuous, spontaneously variable and reactive. During wakefulness, the posterior dominant rhythm consisted of symmetric, poorly formed modulated 5.5 Hz activity, with amplitude to 30 uV, that attenuated to eye opening.  Low amplitude frontal beta was noted in wakefulness.    Background Slowing:  continuous theta and polymorphic delta slowing.     Focal Slowing:   None were present.      Sleep Background:  Drowsiness was characterized by fragmentation, attenuation, and slowing of the background activity.    Sleep was characterized by the presence of vertex waves, rudimentary symmetric sleep spindles and K-complexes.    Other Non-Epileptiform Findings:  None were present.    Interictal Epileptiform Activity:   None were present.    Events:  Clinical events: None recorded.  Seizures: None recorded.    Activation Procedures:   Hyperventilation was not performed.    Photic stimulation was not performed.     Artifacts:  Intermittent myogenic and movement artifacts were noted.    ECG:  artifactual    _____________________________________________________________  EEG SUMMARY/CLASSIFICATION    Abnormal EEG in an altered / a sedated patient.  - Continuous theta and polymorphic delta slowing    _____________________________________________________________  EEG IMPRESSION/CLINICAL CORRELATE    Abnormal EEG study.  1. Moderate nonspecific diffuse or multifocal cerebral dysfunction.   2. No epileptiform pattern or seizure seen.  _____________________________________________________________      Jim Stroud   Epilepsy Fellow  [facility]    	      Electronic Signatures:  Hubert Walter)  (Signed 10-Aug-2020 10:44)  	Authored: EEG REPORT  	Co-Signer: EEG REPORT  Jim Stroud ([facility])  (Signed 10-Aug-2020 08:59)  	Authored: EEG REPORT      Last Updated: 10-Aug-2020 10:44 by Hubert Walter ([id number]          TELEMETRY: 	Afib 60-70s     ECG:  	  RADIOLOGY: < from: CT Head No Cont (08.10.20 @ 02:31) >    EXAM:  CT BRAIN                            PROCEDURE DATE:  08/10/2020            INTERPRETATION:  INDICATIONS:  sah    TECHNIQUE:  Serial axial images were obtained from the skull base to the vertex without intravenous contrast.    COMPARISON EXAMINATION: 2020 at 3:26 PM    FINDINGS:  VENTRICLES AND SULCI: Intraventricular hemorrhage layers in the occipital horns of the lateral ventricle as seen on the prior without significant interval change in appearance of the ventricles compared with the prior  INTRA-AXIAL: Evidence of left PCA territory infarct as seen on the prior. Right posterior insular lucency identified.  EXTRA-AXIAL: Interval stability and subarachnoid hemorrhage as seen on the prior with large hematoma in the right aspect of the suprasellar cistern  VISUALIZED SINUSES: Ethmoid mucosal thickening. Right maxillary sinus fluid level. Left maxillary sinus mucosal thickening. Subtle sphenoid mucosal thickening  VISUALIZED MASTOIDS:  Clear.  CALVARIUM:  Normal.  MISCELLANEOUS: NG and ET tube    IMPRESSION: Interval stability compared with the prior, no rehemorrhage. No change in the ventricle size compared with the prior                  YASMIN LAW M.D., ATTENDING RADIOLOGIST  This document has been electronically signed. Aug 10 2020  9:29AM                < end of copied text >    DIAGNOSTIC TESTING:  [ ] Echocardiogram:  [ ]  Catheterization:  [ ] Stress Test:    OTHER:

## 2020-08-10 NOTE — OCCUPATIONAL THERAPY INITIAL EVALUATION ADULT - PERTINENT HX OF CURRENT PROBLEM, REHAB EVAL
59 yo M coumadin for afib s/p cardiac arrest at work, down 25 minutes prior to ROSC. Pupils were R fixed and dilated, left fixed at the time, no gag reflex, post-CA cooling protocol was initiated down to 35 deg. Intubated and put on Nimbex drip. Also on Propofol, fentanyl, levophed. R groin minerva placed.  See below

## 2020-08-10 NOTE — OCCUPATIONAL THERAPY INITIAL EVALUATION ADULT - PRECAUTIONS/LIMITATIONS, REHAB EVAL
Also put on heparin post-CA. 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 Course also c/b GIB, put on protonix drip. Prior to xfer was started on 3% at 30cc/hr. fall precautions/Also put on heparin post-CA. 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 Course also c/b GIB, put on protonix drip. Prior to xfer was started on 3% at 30cc/hr.

## 2020-08-10 NOTE — EEG REPORT - NS EEG TEXT BOX
[facility]   COMPREHENSIVE EPILEPSY CENTER   REPORT OF LONG-TERM VIDEO EEG     [facility]: [address] [place] [address] [address], [place] [address], Ph#: [phone]    Patient Name: GUILLE DOLAN  Age and : 60y (64)  MRN #: [id number]  Location: [place] [address]  Referring Physician: Killian Reid    Start Time/Date: 18:15 on 20  End Time/Date: 08:00 on 08-10-20  Duration: 7 H 9 minutes     _____________________________________________________________  STUDY INFORMATION    EEG Recording Technique:  The patient underwent continuous Video-EEG monitoring, using Telemetry System hardware on the XLTek Digital System. EEG and video data were stored on a computer hard drive with important events saved in digital archive files. The material was reviewed by a physician (electroencephalographer / epileptologist) on a daily basis. Abhishek and seizure detection algorithms were utilized and reviewed. An EEG Technician attended to the patient, and was available throughout daytime work hours.  The epilepsy center neurologist was available in person or on call 24-hours per day.    EEG Placement and Labeling of Electrodes:  The EEG was performed utilizing 20 channel referential EEG connections (coronal over temporal over parasagittal montage) using all standard 10-20 electrode placements with EKG, with additional electrodes placed in the inferior temporal region using the modified 10-10 montage electrode placements for elective admissions, or if deemed necessary. Recording was at a sampling rate of 256 samples per second per channel. Time synchronized digital video recording was done simultaneously with EEG recording. A low light infrared camera was used for low light recording.     _____________________________________________________________  HISTORY    Patient is a 60y old  Male who presents with a chief complaint of     PERTINENT MEDICATION:  MEDICATIONS  (STANDING):  aMIOdarone Infusion 0.5 mG/Min (16.7 mL/Hr) IV Continuous <Continuous>  hydrALAZINE 25 milliGRAM(s) Oral every 8 hours  insulin lispro (HumaLOG) corrective regimen sliding scale   SubCutaneous every 6 hours  levETIRAcetam  IVPB 500 milliGRAM(s) IV Intermittent every 12 hours  niCARdipine Infusion 5 mG/Hr (25 mL/Hr) IV Continuous <Continuous>  niMODipine Oral Solution 60 milliGRAM(s) Oral/Enteral Tube every 4 hours  pantoprazole  Injectable 40 milliGRAM(s) IV Push every 12 hours  phytonadione  IVPB 10 milliGRAM(s) IV Intermittent daily  polyethylene glycol 3350 17 Gram(s) Oral every 12 hours  propofol Infusion 10 MICROgram(s)/kG/Min (6.78 mL/Hr) IV Continuous <Continuous>  senna Syrup 10 milliLiter(s) Oral at bedtime    _____________________________________________________________  STUDY INTERPRETATION    Findings: The background was continuous, spontaneously variable and reactive. During wakefulness, the posterior dominant rhythm consisted of symmetric, poorly formed modulated 5.5 Hz activity, with amplitude to 30 uV, that attenuated to eye opening.  Low amplitude frontal beta was noted in wakefulness.    Background Slowing:  continuous theta and polymorphic delta slowing.     Focal Slowing:   None were present.      Sleep Background:  Drowsiness was characterized by fragmentation, attenuation, and slowing of the background activity.    Sleep was characterized by the presence of vertex waves, rudimentary symmetric sleep spindles and K-complexes.    Other Non-Epileptiform Findings:  None were present.    Interictal Epileptiform Activity:   None were present.    Events:  Clinical events: None recorded.  Seizures: None recorded.    Activation Procedures:   Hyperventilation was not performed.    Photic stimulation was not performed.     Artifacts:  Intermittent myogenic and movement artifacts were noted.    ECG:  artifact     _____________________________________________________________  EEG SUMMARY/CLASSIFICATION    Abnormal EEG in an altered / a sedated patient.  - Continuous theta and polymorphic delta slowing    _____________________________________________________________  EEG IMPRESSION/CLINICAL CORRELATE    Abnormal EEG study.  1. Moderate nonspecific diffuse or multifocal cerebral dysfunction.   2. No epileptiform pattern or seizure seen.  _____________________________________________________________    Prelim read   [facility]   Epilepsy Fellow  [facility] Epilepsy [facility]   COMPREHENSIVE EPILEPSY CENTER   REPORT OF LONG-TERM VIDEO EEG     [facility]: [address] [place] [address] [address], [place] [address], Ph#: [phone]    Patient Name: GUILLE DOLAN  Age and : 60y (64)  MRN #: [id number]  Location: [place] [address]  Referring Physician: Killian Reid    Start Time/Date: 18:15 on 20  End Time/Date: 08:00 on 08-10-20  Duration: 7 H 9 minutes     _____________________________________________________________  STUDY INFORMATION    EEG Recording Technique:  The patient underwent continuous Video-EEG monitoring, using Telemetry System hardware on the XLTek Digital System. EEG and video data were stored on a computer hard drive with important events saved in digital archive files. The material was reviewed by a physician (electroencephalographer / epileptologist) on a daily basis. Abhishek and seizure detection algorithms were utilized and reviewed. An EEG Technician attended to the patient, and was available throughout daytime work hours.  The epilepsy center neurologist was available in person or on call 24-hours per day.    EEG Placement and Labeling of Electrodes:  The EEG was performed utilizing 20 channel referential EEG connections (coronal over temporal over parasagittal montage) using all standard 10-20 electrode placements with EKG, with additional electrodes placed in the inferior temporal region using the modified 10-10 montage electrode placements for elective admissions, or if deemed necessary. Recording was at a sampling rate of 256 samples per second per channel. Time synchronized digital video recording was done simultaneously with EEG recording. A low light infrared camera was used for low light recording.     _____________________________________________________________  HISTORY    Patient is a 60y old  Male who presents with a chief complaint of     PERTINENT MEDICATION:  MEDICATIONS  (STANDING):  aMIOdarone Infusion 0.5 mG/Min (16.7 mL/Hr) IV Continuous <Continuous>  hydrALAZINE 25 milliGRAM(s) Oral every 8 hours  insulin lispro (HumaLOG) corrective regimen sliding scale   SubCutaneous every 6 hours  levETIRAcetam  IVPB 500 milliGRAM(s) IV Intermittent every 12 hours  niCARdipine Infusion 5 mG/Hr (25 mL/Hr) IV Continuous <Continuous>  niMODipine Oral Solution 60 milliGRAM(s) Oral/Enteral Tube every 4 hours  pantoprazole  Injectable 40 milliGRAM(s) IV Push every 12 hours  phytonadione  IVPB 10 milliGRAM(s) IV Intermittent daily  polyethylene glycol 3350 17 Gram(s) Oral every 12 hours  propofol Infusion 10 MICROgram(s)/kG/Min (6.78 mL/Hr) IV Continuous <Continuous>  senna Syrup 10 milliLiter(s) Oral at bedtime    _____________________________________________________________  STUDY INTERPRETATION    Findings: The background was continuous, spontaneously variable and reactive. During wakefulness, the posterior dominant rhythm consisted of symmetric, poorly formed modulated 5.5 Hz activity, with amplitude to 30 uV, that attenuated to eye opening.  Low amplitude frontal beta was noted in wakefulness.    Background Slowing:  continuous theta and polymorphic delta slowing.     Focal Slowing:   None were present.      Sleep Background:  Drowsiness was characterized by fragmentation, attenuation, and slowing of the background activity.    Sleep was characterized by the presence of vertex waves, rudimentary symmetric sleep spindles and K-complexes.    Other Non-Epileptiform Findings:  None were present.    Interictal Epileptiform Activity:   None were present.    Events:  Clinical events: None recorded.  Seizures: None recorded.    Activation Procedures:   Hyperventilation was not performed.    Photic stimulation was not performed.     Artifacts:  Intermittent myogenic and movement artifacts were noted.    ECG:  artifactual    _____________________________________________________________  EEG SUMMARY/CLASSIFICATION    Abnormal EEG in an altered / a sedated patient.  - Continuous theta and polymorphic delta slowing    _____________________________________________________________  EEG IMPRESSION/CLINICAL CORRELATE    Abnormal EEG study.  1. Moderate nonspecific diffuse or multifocal cerebral dysfunction.   2. No epileptiform pattern or seizure seen.  _____________________________________________________________      [facility]   Epilepsy Fellow  [facility] Epilepsy [place]

## 2020-08-10 NOTE — OCCUPATIONAL THERAPY INITIAL EVALUATION ADULT - LIVES WITH, PROFILE
friend/Patient resides with roommate/girlfriend in a trailer home in [place], [place] with 2-4 steps to enter. Patient is employed as a  and was independent with ADLs and ambulation prior to admit with no AD. Patient has a CPAP and no home care services prior to admission.

## 2020-08-10 NOTE — PROGRESS NOTE ADULT - ASSESSMENT
Assessment: 60 year old old male s/p cardiac arrest c/b GIB and bleeding from scott with Perimesencephalic (HH4 mF4) subarachnoid hemorrhage, PBD 3    NEURO: SAH, possible sentinal event with cardiac arrest  CT Head: perimesencephalic SAH   CTA Head: no aneurysm noted  conventional angiogram pending  Seizure Prophylaxis: Levetiracetam until after angio?  Delayed Cerebral Ischemia Management: euvolemia, nimodipine 60 q4  hydro at this time- no EVD  stop nimbex gtt    PULM:  mech vent  spO2>92%  CXR: clear    CV:  SBP goal<160  TTE pending  cardiac arrest- troponin 530, no cardiac cath at OSH due to neuro status  cardiology consult appreciated  on amio gtt for Afib    RENAL:  Fluids: turbid urine, conservative fluid mgmt  on NS now  sodium goal 140-150  scott for monitoring    GI:  Diet: start TF  GI prophylaxis [] not indicated [x] PPI 40 BID IV push [] other:  Bowel regimen [x] senna [] other:    ENDO:   Goal euglycemia (-180)  ISS    HEME/ONC: afib on coumadin at home  s/p vit K, cont 2 more doses  INR 1.42  VTE prophylaxis: [] SCDs [] chemoprophylaxis [x] hold chemoprophylaxis due to: SAH    ID:  monitor leukocytosis  TTM- goal 37C arctic sun    SOCIAL/FAMILY:  [] awaiting [x] updated at bedside [] family meeting    CODE STATUS:  [x] Full Code [] DNR [] DNI [] Palliative/Comfort Care    DISPOSITION:  [x] ICU [] Stroke Unit [] Floor [] EMU [] RCU [] PCU    [x] Patient is at high risk of neurologic deterioration/death due to: sah    Time seen:  Time spent: _40 critical care minutes Assessment: 60 year old old male s/p cardiac arrest c/b GIB and bleeding from scott with Perimesencephalic (HH4 mF4) subarachnoid hemorrhage, PBD 3    NEURO: SAH, possible sentinal event with cardiac arrest  CT Head: perimesencephalic SAH   CTA Head: no aneurysm noted  conventional angiogram pending  Seizure Prophylaxis: Levetiracetam until after angio?  Delayed Cerebral Ischemia Management: euvolemia, nimodipine 60 q4  hydro at this time- no EVD  nimbex stopped    PULM:  mech vent  spO2>92%  CXR: clear    CV:  SBP goal<160  TTE pending  cardiac arrest- troponin 530, no cardiac cath at OSH due to neuro status  cardiology consult appreciated  on amio gtt for Afib: on 0.5 for 24 hr    RENAL:  Fluids: turbid urine, conservative fluid mgmt  on NS now  sodium goal 140-150  scott for monitoring    GI:  Diet: start TF  GI prophylaxis [] not indicated [x] PPI 40 BID IV push [] other:  Bowel regimen [x] senna [] other:    ENDO:   Goal euglycemia (-180)  ISS    HEME/ONC: afib on coumadin at home  s/p vit K, cont 2 more doses  INR 1.42  VTE prophylaxis: [] SCDs [] chemoprophylaxis [x] hold chemoprophylaxis due to: SAH    ID:  monitor leukocytosis  TTM- goal 37C arctic sun    SOCIAL/FAMILY:  [] awaiting [x] updated at bedside [] family meeting    CODE STATUS:  [x] Full Code [] DNR [] DNI [] Palliative/Comfort Care    DISPOSITION:  [x] ICU [] Stroke Unit [] Floor [] EMU [] RCU [] PCU    [x] Patient is at high risk of neurologic deterioration/death due to: sah    Time seen:  Time spent: _40 critical care minutes Assessment: 60 year old old male s/p cardiac arrest c/b GIB and bleeding from scott with Perimesencephalic (HH4 mF4) subarachnoid hemorrhage, PBD 3    NEURO: SAH, possible sentinal event with cardiac arrest  CT Head: perimesencephalic SAH   CTA Head: no aneurysm noted  conventional angiogram pending  Seizure Prophylaxis: Levetiracetam until after angio?  Delayed Cerebral Ischemia Management: euvolemia, nimodipine 60 q4  hydro at this time- no EVD  nimbex stopped  SBP < 140    PULM:  mech vent  spO2>92%  CXR: clear    CV:  SBP goal<140  TTE pending  cardiac arrest- troponin trending down, no cardiac cath at OSH due to neuro status  cardiology consult appreciated  on amio gtt for Afib: on 0.5 for 24 hr    RENAL:  Fluids: turbid urine, conservative fluid mgmt  plasmolyte  sodium goal 140-150  scott for monitoring    GI:  Diet: start TF  GI prophylaxis [] not indicated [x] PPI 40 BID IV push [] other:  Bowel regimen [x] senna [] other:    ENDO:   Goal euglycemia (-180)  ISS    HEME/ONC: afib on coumadin at home  s/p vit K, cont 2 more doses  INR 1.42  VTE prophylaxis: [] SCDs [] chemoprophylaxis [x] hold chemoprophylaxis due to: SAH    ID:  monitor leukocytosis  TTM- goal 37C arctic sun    SOCIAL/FAMILY:  [] awaiting [x] updated at bedside [] family meeting    CODE STATUS:  [x] Full Code [] DNR [] DNI [] Palliative/Comfort Care    DISPOSITION:  [x] ICU [] Stroke Unit [] Floor [] EMU [] RCU [] PCU    [x] Patient is at high risk of neurologic deterioration/death due to: sah    Time seen:  Time spent: _40 critical care minutes Assessment: 60 year old old male s/p cardiac arrest c/b GIB and bleeding from scott with Perimesencephalic (HH4 mF4) subarachnoid hemorrhage, PBD 3    NEURO: SAH, possible sentinal event with cardiac arrest  CT Head: perimesencephalic SAH   CTA Head: no aneurysm noted  conventional angiogram pending  Seizure Prophylaxis: Levetiracetam until after angio?  Delayed Cerebral Ischemia Management: euvolemia, nimodipine 60 q4  hydro at this time- no EVD  nimbex stopped  SBP < 140    PULM:  mech vent  spO2>92%  CXR: clear    CV:  SBP goal<140  TTE pending  cardiac arrest- troponin trending down, no cardiac cath at OSH due to neuro status  cardiology consult appreciated  on amio gtt for Afib: on 0.5 for 24 hr    RENAL:  Fluids: turbid urine, conservative fluid mgmt  plasmolyte  sodium goal 140-150  scott for monitoring    GI:  Diet: start TF  GI prophylaxis [] not indicated [x] PPI 40 BID IV push [] other:  Bowel regimen [x] senna [] other:    ENDO:   Goal euglycemia (-180)  ISS    HEME/ONC: afib on coumadin at home  s/p vit K, cont 2 more doses  INR 1.42  VTE prophylaxis: [] SCDs [] chemoprophylaxis [x] hold chemoprophylaxis due to: SAH    ID:  monitor leukocytosis  TTM- goal 37C arctic sun    SOCIAL/FAMILY:  [] awaiting [x] updated at bedside [] family meeting    CODE STATUS:  [x] Full Code [] DNR [] DNI [] Palliative/Comfort Care    DISPOSITION:  [x] ICU [] Stroke Unit [] Floor [] EMU [] RCU [] PCU    [x] Patient is at high risk of neurologic deterioration/death due to: sah    Time seen:  Time spent: _45 critical care minutes

## 2020-08-10 NOTE — CHART NOTE - NSCHARTNOTEFT_GEN_A_CORE
Interventional Neuro Radiology  Pre-Procedure Note ROBSON      This is a 60 year old male on coumadin for afib s/p cardiac arrest at work, down 25 minutes prior to ROSC. Pupils were R fixed and dilated, left fixed at the time, no gag reflex, post-CA cooling protocol was initiated down to 35 deg. Intubated and put on Nimbex drip. Also on Propofol, fentanyl, levophed. R groin minerva placed. Also put on heparin post-CA. 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 Course also c/b GIB, put on protonix drip. Prior to xfer was started on 3% at 30cc/hr. (09 Aug 2020 14:30)      Allergies: No Known Allergies  PMHX: atrial fibrillation  No significant past surgical history  Social History:   FAMILY HISTORY:  No pertinent family history in first degree relatives      Current Medications: aMIOdarone Infusion 0.5 mG/Min IV Continuous   chlorhexidine 0.12% Liquid 15 milliLiter(s) Oral Mucosa every 12 hours  chlorhexidine 4% Liquid 1 Application(s) Topical  dextrose 40% Gel 15 Gram(s) Oral once PRN  dextrose 5%. 1000 milliLiter(s) IV Continuous   dextrose 50% Injectable 12.5 Gram(s) IV Push once  dextrose 50% Injectable 25 Gram(s) IV Push once  dextrose 50% Injectable 25 Gram(s) IV Push once  fentaNYL    Injectable 25 MICROGram(s) IV Push every 2 hours PRN  glucagon  Injectable 1 milliGRAM(s) IntraMuscular once PRN  hydrALAZINE 25 milliGRAM(s) Oral every 8 hours  insulin lispro (HumaLOG) corrective regimen sliding scale   SubCutaneous every 6 hours  levETIRAcetam  IVPB 500 milliGRAM(s) IV Intermittent every 12 hours  multiple electrolytes Injection Type 1 1000 milliLiter(s) IV Continuous <Continuous>  niCARdipine Infusion 5 mG/Hr IV Continuous <Continuous>  niMODipine Oral Solution 60 milliGRAM(s) Oral/Enteral Tube every 4 hours  pantoprazole  Injectable 40 milliGRAM(s) IV Push every 12 hours  phytonadione  IVPB 10 milliGRAM(s) IV Intermittent daily  polyethylene glycol 3350 17 Gram(s) Oral every 12 hours  propofol Infusion 10 MICROgram(s)/kG/Min IV Continuous <Continuous>  senna Syrup 10 milliLiter(s) Oral at bedtime  sodium chloride 0.9% lock flush 10 milliLiter(s) IV Push every 1 hour PRN      Labs:                         11.7   18.00 )-----------( 198      ( 09 Aug 2020 22:14 )             37.1       08-10    144  |  110<H>  |  26<H>  ----------------------------<  145<H>  3.7   |  20<L>  |  1.86<H>    Ca    8.8      10 Aug 2020 13:06  Phos  3.6     08-10  Mg     2.2     08-10    TPro  6.3  /  Alb  3.4  /  TBili  0.3  /  DBili  0.1  /  AST  86<H>  /  ALT  48<H>  /  AlkPhos  72  08-10        Blood Bank: 0 positive         Assessment/Plan:   This is a 60y  year old right  hand dominant male who is transported to Neuro- for selective cerebral angiography and possible treatment of aneurysm. Procedure, goals, risks, benefits and alternatives were discussed with patient and patient's family.  All questions were answered.  Risks include but are not limited to stroke, vessel injury, hemorrhage, and or right  groin hematoma.  Patient demonstrates understanding  of all risks involved with this procedure and wishes to continue.   Appropriate  content was obtained from patient and consent is in the patient's chart. Interventional Neuro Radiology  Pre-Procedure Note ROBSON      This is a 60 year old male on coumadin for AFIB s/p cardiac arrest at work, down 25 minutes prior to ROSC. Pupils were R fixed and dilated, left fixed at the time, no gag reflex, post-CA cooling protocol was initiated down to 35 deg. Intubated and put on Nimbex drip. Also on Propofol, fentanyl, levophed. R groin minerva placed. Also put on heparin post-CA. Ct of the head revealed a right perimesencephalic SAH, concerning for aneurysm rupture, no hydrocephalus. Course also c/b GI bleed put on Protonix drip. patient is transported to Neuro IR for a selective cerebral angiography and possible embolization of aneurysm or vascular malformation.     Upon exam patient is intubated, sedated, not following commands, right pupil is 5mm, non-reactive and left pupil is 2mm, moving right side spontaneously.     Allergies: No Known Allergies  PMHX: atrial fibrillation  No significant past surgical history  Social History:   FAMILY HISTORY:  No pertinent family history in first degree relatives    Current Medications: aMIOdarone Infusion 0.5 mG/Min IV Continuous   chlorhexidine 0.12% Liquid 15 milliLiter(s) Oral Mucosa every 12 hours  chlorhexidine 4% Liquid 1 Application(s) Topical  dextrose 40% Gel 15 Gram(s) Oral once PRN  dextrose 5%. 1000 milliLiter(s) IV Continuous   dextrose 50% Injectable 12.5 Gram(s) IV Push once  dextrose 50% Injectable 25 Gram(s) IV Push once  dextrose 50% Injectable 25 Gram(s) IV Push once  fentaNYL    Injectable 25 MICROGram(s) IV Push every 2 hours PRN  glucagon  Injectable 1 milliGRAM(s) IntraMuscular once PRN  hydrALAZINE 25 milliGRAM(s) Oral every 8 hours  insulin lispro (HumaLOG) corrective regimen sliding scale   SubCutaneous every 6 hours  levETIRAcetam  IVPB 500 milliGRAM(s) IV Intermittent every 12 hours  multiple electrolytes Injection Type 1 1000 milliLiter(s) IV Continuous   niCARdipine Infusion 5 mG/Hr IV Continuous  niMODipine Oral Solution 60 milliGRAM(s) Oral/Enteral Tube every 4 hours  pantoprazole  Injectable 40 milliGRAM(s) IV Push every 12 hours  phytonadione  IVPB 10 milliGRAM(s) IV Intermittent daily  polyethylene glycol 3350 17 Gram(s) Oral every 12 hours  propofol Infusion 10 MICROgram(s)/kG/Min IV Continuous   senna Syrup 10 milliLiter(s) Oral at bedtime  sodium chloride 0.9% lock flush 10 milliLiter(s) IV Push every 1 hour PRN    Labs:                         11.7   18.00 )-----------( 198      ( 09 Aug 2020 22:14 )             37.1       08-10    144  |  110<H>  |  26<H>  ----------------------------<  145<H>  3.7   |  20<L>  |  1.86<H>    Ca    8.8      10 Aug 2020 13:06  Phos  3.6     08-10  Mg     2.2     08-10    TPro  6.3  /  Alb  3.4  /  TBili  0.3  /  DBili  0.1  /  AST  86<H>  /  ALT  48<H>  /  AlkPhos  72  08-10      Blood Bank: 0 positive     Assessment/Plan:   This is a 60 year old right  hand dominant male who is transported to Neuro- for selective cerebral angiography and possible treatment of aneurysm. Procedure, goals, risks, benefits and alternatives were discussed with patient and patient's family.  All questions were answered.  Risks include but are not limited to stroke, vessel injury, hemorrhage, and or right groin hematoma.  Patient's son demonstrates understanding of all risks involved with this procedure and wishes to continue.  Appropriate content was obtained from patient's son and consent is in the patient's chart.

## 2020-08-10 NOTE — PROGRESS NOTE ADULT - PROBLEM SELECTOR PLAN 2
Now better rate controlled   Once drip is completed, start  mg Daily   TTE as above   thyroid panel.

## 2020-08-10 NOTE — PROGRESS NOTE ADULT - SUBJECTIVE AND OBJECTIVE BOX
Patient seen and examined    Overnight events: exam stable, getting HCT to reevaluate the need for a EVD    Exam:  R pupils 4 NR, L 2mm reactive, moving R side spon, loc ue and wd LE on the R. L side nothing. Not FC.

## 2020-08-10 NOTE — PROGRESS NOTE ADULT - SUBJECTIVE AND OBJECTIVE BOX
events:  angio negative for vascular malformation   T(C): 37.4 (08-10-20 @ 11:00), Max: 37.9 (08-10-20 @ 03:00)  HR: 79 (08-10-20 @ 18:00) (61 - 110)  BP: 129/77 (08-10-20 @ 00:15) (92/73 - 137/79)  RR: 24 (08-10-20 @ 18:00) (12 - 27)  SpO2: 92% (08-10-20 @ 18:00) (92% - 100%)  08-09-20 @ 07:01  -  08-10-20 @ 07:00  --------------------------------------------------------  IN: 2599.1 mL / OUT: 885 mL / NET: 1714.1 mL    08-10-20 @ 07:01  -  08-10-20 @ 19:00  --------------------------------------------------------  IN: 1128.2 mL / OUT: 565 mL / NET: 563.2 mL    aMIOdarone Infusion 0.5 mG/Min IV Continuous <Continuous>  chlorhexidine 0.12% Liquid 15 milliLiter(s) Oral Mucosa every 12 hours  chlorhexidine 4% Liquid 1 Application(s) Topical <User Schedule>  dextrose 40% Gel 15 Gram(s) Oral once PRN  dextrose 5%. 1000 milliLiter(s) IV Continuous <Continuous>  dextrose 50% Injectable 12.5 Gram(s) IV Push once  dextrose 50% Injectable 25 Gram(s) IV Push once  dextrose 50% Injectable 25 Gram(s) IV Push once  fentaNYL    Injectable 25 MICROGram(s) IV Push every 2 hours PRN  glucagon  Injectable 1 milliGRAM(s) IntraMuscular once PRN  hydrALAZINE 25 milliGRAM(s) Oral every 8 hours  insulin lispro (HumaLOG) corrective regimen sliding scale   SubCutaneous every 6 hours  levETIRAcetam  IVPB 500 milliGRAM(s) IV Intermittent every 12 hours  multiple electrolytes Injection Type 1 1000 milliLiter(s) IV Continuous <Continuous>  niCARdipine Infusion 5 mG/Hr IV Continuous <Continuous>  niMODipine Oral Solution 60 milliGRAM(s) Oral/Enteral Tube every 4 hours  pantoprazole  Injectable 40 milliGRAM(s) IV Push every 12 hours  phytonadione  IVPB 10 milliGRAM(s) IV Intermittent daily  polyethylene glycol 3350 17 Gram(s) Oral every 12 hours  propofol Infusion 10 MICROgram(s)/kG/Min IV Continuous <Continuous>  senna Syrup 10 milliLiter(s) Oral at bedtime  sodium chloride 0.9% lock flush 10 milliLiter(s) IV Push every 1 hour PRN    Culture - Bronchial (collected 08-10-20 @ 09:59)  Source: Bronch Wash Combicath trap  Gram Stain (08-10-20 @ 13:56):    Few polymorphonuclear leukocytes per low power field    Few Squamous epithelial cells per low power field    Moderate Gram Negative Rods per oil power field    Moderate Gram positive cocci in pairs per oil power field    Mode: AC/ CMV (Assist Control/ Continuous Mandatory Ventilation), RR (machine): 14, TV (machine): 500, FiO2: 40, PEEP: 5, ITime: 1, MAP: 11, PIP: 21  EXAMINATION:  General: Calm, intubated  HEENT: MMM  Neuro:  -Mental status- No acute distress, EO to voice  -CN- Pupils R 5mm NR, L 2mm sluggish, Eyes midline, , +cough/gag, +corneals  RUE localize AG, RLE spont AG, LUE flaccid, LLE flaccid    Assessment and Plan:   · Assessment	  Assessment: 60 year old old male s/p cardiac arrest c/b GIB and bleeding from scott with Perimesencephalic (HH4 mF4) subarachnoid hemorrhage, PBD 3    NEURO: SAH, possible sentinal event with cardiac arrest  CT Head: perimesencephalic SAH   CTA Head: no aneurysm noted  conventional angiogram pending  Seizure Prophylaxis: Levetiracetam until after angio?  Delayed Cerebral Ischemia Management: euvolemia, nimodipine 60 q4  hydro at this time- no EVD  nimbex stopped  SBP < 140    PULM:  mech vent  spO2>92%  CXR: clear    CV:  SBP goal<140  TTE pending  cardiac arrest- troponin trending down, no cardiac cath at OSH due to neuro status  cardiology consult appreciated  on amio gtt for Afib: on 0.5 for 24 hr    RENAL:  Fluids: turbid urine, conservative fluid mgmt  plasmolyte  sodium goal 140-150  scott for monitoring    GI:  Diet: start TF  GI prophylaxis [] not indicated [x] PPI 40 BID IV push [] other:  Bowel regimen [x] senna [] other:    ENDO:   Goal euglycemia (-180)  ISS    HEME/ONC: afib on coumadin at home  s/p vit K, cont 2 more doses  INR 1.42  VTE prophylaxis: [] SCDs [] chemoprophylaxis [x] hold chemoprophylaxis due to: SAH    ID:  monitor leukocytosis  TTM- goal 37C arctic sun    SOCIAL/FAMILY:  [] awaiting [x] updated at bedside [] family meeting    CODE STATUS:  [x] Full Code [] DNR [] DNI [] Palliative/Comfort Care    DISPOSITION:  [x] ICU [] Stroke Unit [] Floor [] EMU [] RCU [] PCU    [x] Patient is at high risk of neurologic deterioration/death due to: sah    Time seen:  Time spent: _45 critical care minutes events:  angio negative for vascular malformation     T(C): 37.4 (08-10-20 @ 11:00), Max: 37.9 (08-10-20 @ 03:00)  HR: 79 (08-10-20 @ 18:00) (61 - 110)  BP: 129/77 (08-10-20 @ 00:15) (92/73 - 137/79)  RR: 24 (08-10-20 @ 18:00) (12 - 27)  SpO2: 92% (08-10-20 @ 18:00) (92% - 100%)  08-09-20 @ 07:01  -  08-10-20 @ 07:00  --------------------------------------------------------  IN: 2599.1 mL / OUT: 885 mL / NET: 1714.1 mL    08-10-20 @ 07:01  -  08-10-20 @ 19:00  --------------------------------------------------------  IN: 1128.2 mL / OUT: 565 mL / NET: 563.2 mL    chlorhexidine 0.12% Liquid 15 milliLiter(s) Oral Mucosa every 12 hours  chlorhexidine 4% Liquid 1 Application(s) Topical <User Schedule>  dextrose 40% Gel 15 Gram(s) Oral once PRN  dextrose 5%. 1000 milliLiter(s) IV Continuous <Continuous>  dextrose 50% Injectable 12.5 Gram(s) IV Push once  dextrose 50% Injectable 25 Gram(s) IV Push once  dextrose 50% Injectable 25 Gram(s) IV Push once  fentaNYL    Injectable 25 MICROGram(s) IV Push every 2 hours PRN  glucagon  Injectable 1 milliGRAM(s) IntraMuscular once PRN  hydrALAZINE 25 milliGRAM(s) Oral every 8 hours  insulin lispro (HumaLOG) corrective regimen sliding scale   SubCutaneous every 6 hours  levETIRAcetam  IVPB 500 milliGRAM(s) IV Intermittent every 12 hours  multiple electrolytes Injection Type 1 1000 milliLiter(s) IV Continuous <Continuous>  niCARdipine Infusion 5 mG/Hr IV Continuous <Continuous>  niMODipine Oral Solution 60 milliGRAM(s) Oral/Enteral Tube every 4 hours  pantoprazole  Injectable 40 milliGRAM(s) IV Push every 12 hours  phytonadione  IVPB 10 milliGRAM(s) IV Intermittent daily  polyethylene glycol 3350 17 Gram(s) Oral every 12 hours  propofol Infusion 10 MICROgram(s)/kG/Min IV Continuous <Continuous>  senna Syrup 10 milliLiter(s) Oral at bedtime  sodium chloride 0.9% lock flush 10 milliLiter(s) IV Push every 1 hour PRN    Culture - Bronchial (collected 08-10-20 @ 09:59)  Source: Bronch Wash Combicath trap  Gram Stain (08-10-20 @ 13:56):    Few polymorphonuclear leukocytes per low power field    Few Squamous epithelial cells per low power field    Moderate Gram Negative Rods per oil power field    Moderate Gram positive cocci in pairs per oil power field    Mode: AC/ CMV (Assist Control/ Continuous Mandatory Ventilation), RR (machine): 14, TV (machine): 500, FiO2: 40, PEEP: 5, ITime: 1, MAP: 11, PIP: 21  EXAMINATION:  General: Calm, intubated  HEENT: MMM  Neuro:  -Mental status- No acute distress, EO to voice  -CN- Pupils R 5mm NR, L 2mm sluggish, Eyes midline, , +cough/gag, +corneals  RUE localize AG, RLE spont AG, LUE flaccid, LLE flaccid    Assessment and Plan:   · Assessment	  Assessment: 60 year old old male s/p cardiac arrest c/b GIB and bleeding from scott with Perimesencephalic (HH4 mF4) subarachnoid hemorrhage, PBD 3  SAH, with cardiac arrest ROSC afetr 20 min   CT Head: perimesencephalic SAH   CTA Head: no aneurysm noted  conventional angiogram no aneurysm   Seizure Prophylaxis: Levetiracetam until after angio?  Delayed Cerebral Ischemia Management: euvolemia, nimodipine 60 q4  hydro at this time- no EVD  - 140 mmhg   MRI brain and cervical spine   EEG no seizures   respiratory failure intubated on  mech vent  spO2>92%, hypoxemic, increase FiO2 to 60   TTE right ventricular P  overload, will consider ct pe PROTOCOL , contraindicated for anticoagulation DUE TO SAH, LE dopplers negative, will discuss with cardiology if high suspicion for PE, might consider CT PE protocol, the has JALEN so trying to avoid contrast   on amio gtt for Afib change to PO amiodarone   plasmolyte  50 ml/hr  sodium goal 140-150  start TF   GI prophylaxis [] not indicated [x] PPI 40 BID IV push [] other:  Bowel regimen [x] senna [] other:  Goal euglycemia (-180)  ISS   afib on coumadin at home /p vit K, cont 2 more doses  VTE prophylaxis: [] SCDs [] chemoprophylaxis [x] hold chemoprophylaxis due to: SAH  monitor leukocytosis  euthermia   SOCIAL/FAMILY:  [] awaiting [x] updated at bedside [] family meeting    CODE STATUS:  [x] Full Code [] DNR [] DNI [] Palliative/Comfort Care    DISPOSITION:  [x] ICU [] Stroke Unit [] Floor [] EMU [] RCU [] PCU    [x] Patient is at high risk of neurologic deterioration/death due to: sah    Time seen:  Time spent: _35 critical care minutes

## 2020-08-10 NOTE — PROCEDURE NOTE - NSPROCDETAILS_GEN_ALL_CORE
all materials/supplies accounted for at end of procedure/location identified, draped/prepped, sterile technique used, needle inserted/introduced/sutured in place/positive blood return obtained via catheter/hemostasis with direct pressure, dressing applied/Seldinger technique/connected to a pressurized flush line
sterile technique, indwelling urinary device inserted

## 2020-08-10 NOTE — CHART NOTE - NSCHARTNOTEFT_GEN_A_CORE
EEG prelim report 08-10-20  EEG reviewed from 8 am until 3:30 pm.   No epileptiform pattern or seizure seen.   Final report will be uploaded in the morning.

## 2020-08-10 NOTE — CHART NOTE - NSCHARTNOTEFT_GEN_A_CORE
Interventional Neuro- Radiology   Procedure Note PA-NILA    Procedure: Selective Cerebral Angiography   Pre- Procedure Diagnosis:  Post- Procedure Diagnosis:    : Dr Killian Reid  Physician Assistant: Bren Petty PA-C    Nurse:  Radiologic Tech:  Anesthesiologist:  Sheath:      I/Os: EBL less than 10cc  IV fluids:     cc     Urine output     cc    Contrast Omnipaque 240      cc             Vitals: BP         HR      Spo2             Preliminary Report:  Using a 5 Colombian long sheath to the right groin under MAC sedation via left vertebral artery,  left internal carotid artery, left external carotid artery, right vertebral artery, right internal carotid artery, right external carotid artery a selective cerebral angiography was performed and demonstrated                       Official note to follow.  Patient tolerated procedure well, hemodynamically stable, no change in neurological status compared to baseline.  Results discussed with neuro ICU team, patient and patient's family. Right groin sheath was removed, manual compression held to hemostasis for 20 minutes, no active bleeding, no hematoma, quick clot and safeguard balloon dressing applied at Interventional Neuro- Radiology   Procedure Note PA-C    Procedure: Selective Cerebral Angiography   Pre- Procedure Diagnosis:    Post- Procedure Diagnosis:    : Dr Killian Reid  Physician Assistant: Bren Petty PA-C    Nurse:                    Veronica Spence RN  Radiologic Tech:    Stephon Gustafson LRT  Anesthesiologist:    Dr Henny Edwards  Sheath:      I/Os: EBL less than 10cc  IV fluids:     cc  Urine output     cc   Contrast Omnipaque 240      cc             Vitals: BP              Spo2 100%            Preliminary Report:  Using a 5 Macedonian long sheath to the right groin under MAC sedation via left vertebral artery,  left internal carotid artery, left external carotid artery, right vertebral artery, right internal carotid artery, right external carotid artery a selective cerebral angiography was performed and demonstrated                       Official note to follow.  Patient tolerated procedure well, hemodynamically stable, no change in neurological status compared to baseline.  Results discussed with neuro ICU team, patient and patient's family. Right groin sheath was removed, manual compression held to hemostasis for 20 minutes, no active bleeding, no hematoma, quick clot and safeguard balloon dressing applied at Interventional Neuro- Radiology   Procedure Note PA-C    Procedure: Selective Cerebral Angiography   Pre- Procedure Diagnosis:    Post- Procedure Diagnosis:    : Dr Killian Reid  Physician Assistant: Bren Petty PA-C    Nurse:                    Veronica Spence RN  Radiologic Tech:    Stephon Gustafson LRT  Anesthesiologist:    Dr Henny Edwards  Sheath:      I/Os: EBL less than 10cc  IV fluids:     cc  Urine output     cc   Contrast Omnipaque 240      cc             Vitals: BP              Spo2 100%            Preliminary Report:  Using a 5 Malaysian long sheath to the right groin under MAC sedation via left vertebral artery,  left internal carotid artery, left external carotid artery, right vertebral artery, right internal carotid artery, right external carotid artery a selective cerebral angiography was performed and demonstrated                       Official note to follow.  Patient tolerated procedure well, hemodynamically stable, no change in neurological status compared to baseline.  Results discussed with neuro ICU team, patient and patient's family. Right groin sheath was removed, manual compression held to hemostasis for 20 minutes, no active bleeding, no hematoma, quick clot and safeguard balloon dressing applied at Interventional Neuro- Radiology   Procedure Note PA-C    Procedure: Selective Cerebral Angiography   Pre- Procedure Diagnosis:    Post- Procedure Diagnosis:    : Dr Killian Reid  Physician Assistant: Bren Petty PA-C    Nurse:                    Veronica Spence RN  Radiologic Tech:    Stpehon Gustafson LRT  Anesthesiologist:    Dr Henny Edwards  Sheath:      I/Os: EBL less than 10cc  IV fluids:     cc  Urine output     cc   Contrast Omnipaque 240            Vitals: BP              Spo2 100%            Preliminary Report:  Using a 5 Malawian long sheath to the right groin under MAC sedation via left vertebral artery,  left internal carotid artery, left external carotid artery, right vertebral artery, right internal carotid artery, right external carotid artery a selective cerebral angiography was performed and demonstrated                       Official note to follow.  Patient tolerated procedure well, hemodynamically stable, no change in neurological status compared to baseline.  Results discussed with neuro ICU team, patient and patient's family. Right groin sheath was removed, manual compression held to hemostasis for 20 minutes, no active bleeding, no hematoma, quick clot and safeguard balloon dressing applied at Interventional Neuro- Radiology   Procedure Note PA-C    Procedure: Selective Cerebral Angiography   Pre- Procedure Diagnosis:   subarachnoid hemorrhage   Post- Procedure Diagnosis:  no source for hemorrhage no aneurysm, no AVM and no vascular abnormalities     : Dr Killian Reid  Physician Assistant: Bren Petty PA-C    Nurse:                    Veronica Spence RN      Rebekah Spence RN  Radiologic Tech:    Stephon Gustafson LRT  Anesthesiologist:    Dr Henny Edwards  Sheath:                   5 Niuean long sheath       I/Os: EBL less than 10cc  IV fluids:100cc  Urine output 400cc   Contrast Omnipaque 240 114          Vitals: BP  112/70            Spo2 100%            Preliminary Report:  Using a 5 Niuean long sheath to the right groin under general sedation via left vertebral artery, left internal carotid artery, left external carotid artery, right vertebral artery, right internal carotid artery, right external carotid artery a selective cerebral angiography was performed and demonstrated no source for hemorrhage. Official note to follow.  Patient tolerated procedure well, hemodynamically stable, no change in neurological status compared to baseline. Results discussed with neuro ICU team and patient's family. Right groin sheath was removed, manual compression held to hemostasis for 20 minutes, no active bleeding, no hematoma, quick clot and safeguard balloon dressing applied at 1615. Disposition Neuro ICU bed 15. Interventional Neuro- Radiology   Procedure Note PA-C    Procedure: Selective Cerebral Angiography   Pre- Procedure Diagnosis:   subarachnoid hemorrhage   Post- Procedure Diagnosis:  no source for hemorrhage no aneurysm, no AVM and no vascular abnormalities     : Dr Killian Reid  Physician Assistant: Bren Petty PA-C    Nurse:                    Veronica Spence RN      Rebekah Spence RN  Radiologic Tech:    Stephon Gustafson LRT  Anesthesiologist:    Dr Henny Edwards  Sheath:                   5 Divehi long sheath       I/Os: EBL less than 10cc  IV fluids:100cc  Urine output 400cc   Contrast Omnipaque 240 114          Vitals: BP  112/70            Spo2 100%            Preliminary Report:  Using a 5 Divehi long sheath to the right groin under general sedation via left vertebral artery, left internal carotid artery, left external carotid artery, right vertebral artery, right internal carotid artery, right external carotid artery a selective cerebral angiography was performed and demonstrated no source for hemorrhage. Official note to follow.  Patient tolerated procedure well, hemodynamically stable, no change in neurological status compared to baseline. Results discussed with neuro ICU team and patient's family. Right groin sheath was removed, manual compression held to hemostasis for 20 minutes, no active bleeding, no hematoma, quick clot and safeguard balloon dressing applied at 1615. Disposition Neuro ICU bed 15.

## 2020-08-10 NOTE — PROCEDURE NOTE - NSINDICATIONS_GEN_A_CORE
hematuria
subarachnoid hemorrhage/monitoring purposes/arterial puncture to obtain ABG's/critical patient/blood sampling

## 2020-08-10 NOTE — OCCUPATIONAL THERAPY INITIAL EVALUATION ADULT - PLANNED THERAPY INTERVENTIONS, OT EVAL
bed mobility training/transfer training/balance training/ADL retraining balance training/transfer training/cognitive, visual perceptual/ADL retraining/bed mobility training

## 2020-08-10 NOTE — DISCHARGE NOTE NURSING/CASE MANAGEMENT/SOCIAL WORK - PATIENT PORTAL LINK FT
You can access the FollowMyHealth Patient Portal offered by [facility] by registering at the following website: [email or web address]. By joining [employer]’s FollowMyHealth portal, you will also be able to view your health information using other applications (apps) compatible with our system.

## 2020-08-10 NOTE — PROGRESS NOTE ADULT - ASSESSMENT
61YO male on coumadin for afib s/p cardiac arrest at work, down 25 minutes prior to ROSC. Pupils were R fixed and dilated, left fixed at the time, no gag reflex, post-CA cooling protocol was initiated down to 35 deg.    Urology consulted for gross hematuria likely 2/2 traumatic catheterization    Urine color now improved    - Continue scott per primary team  - Monitor color  - Pending clinical course, will need outpatient follow-up with further workup  - Please notify urology if urine color acutely worsens

## 2020-08-10 NOTE — PROGRESS NOTE ADULT - PROBLEM SELECTOR PLAN 1
Hypothermia protocol. Now rewarming   Check TTE   Will need eventual cardiac cath for assessment of CORS

## 2020-08-10 NOTE — OCCUPATIONAL THERAPY INITIAL EVALUATION ADULT - RANGE OF MOTION EXAMINATION, LOWER EXTREMITY
pt spontaneously moved R LE, able to perform knee extension./bilateral LE Passive ROM was WFL  (within functional limits)

## 2020-08-10 NOTE — OCCUPATIONAL THERAPY INITIAL EVALUATION ADULT - BALANCE TRAINING, PT EVAL
GOAL: Pt will demonstrate improved static/dynamic balance to fair+ in order to increase safety and independence in ADLs within 4 weeks

## 2020-08-10 NOTE — PROCEDURE NOTE - NSPOSTCAREGUIDE_GEN_A_CORE
Instructed patient/caregiver to follow-up with primary care physician/Care for catheter as per unit/ICU protocols/Verbal/written post procedure instructions were given to patient/caregiver/Keep the cast/splint/dressing clean and dry/Instructed patient/caregiver regarding signs and symptoms of infection

## 2020-08-11 LAB
ANION GAP SERPL CALC-SCNC: 12 MMOL/L — SIGNIFICANT CHANGE UP (ref 5–17)
BASOPHILS # BLD AUTO: 0.04 K/UL — SIGNIFICANT CHANGE UP (ref 0–0.2)
BASOPHILS NFR BLD AUTO: 0.3 % — SIGNIFICANT CHANGE UP (ref 0–2)
BUN SERPL-MCNC: 21 MG/DL — SIGNIFICANT CHANGE UP (ref 7–23)
CALCIUM SERPL-MCNC: 9 MG/DL — SIGNIFICANT CHANGE UP (ref 8.4–10.5)
CHLORIDE SERPL-SCNC: 114 MMOL/L — HIGH (ref 96–108)
CO2 SERPL-SCNC: 19 MMOL/L — LOW (ref 22–31)
CREAT SERPL-MCNC: 1.67 MG/DL — HIGH (ref 0.5–1.3)
EOSINOPHIL # BLD AUTO: 0.25 K/UL — SIGNIFICANT CHANGE UP (ref 0–0.5)
EOSINOPHIL NFR BLD AUTO: 2.2 % — SIGNIFICANT CHANGE UP (ref 0–6)
GAS PNL BLDA: SIGNIFICANT CHANGE UP
GLUCOSE SERPL-MCNC: 127 MG/DL — HIGH (ref 70–99)
HCT VFR BLD CALC: 32.2 % — LOW (ref 39–50)
HGB BLD-MCNC: 10.1 G/DL — LOW (ref 13–17)
IMM GRANULOCYTES NFR BLD AUTO: 0.8 % — SIGNIFICANT CHANGE UP (ref 0–1.5)
LYMPHOCYTES # BLD AUTO: 0.79 K/UL — LOW (ref 1–3.3)
LYMPHOCYTES # BLD AUTO: 6.9 % — LOW (ref 13–44)
MAGNESIUM SERPL-MCNC: 2.2 MG/DL — SIGNIFICANT CHANGE UP (ref 1.6–2.6)
MCHC RBC-ENTMCNC: 27.7 PG — SIGNIFICANT CHANGE UP (ref 27–34)
MCHC RBC-ENTMCNC: 31.4 GM/DL — LOW (ref 32–36)
MCV RBC AUTO: 88.5 FL — SIGNIFICANT CHANGE UP (ref 80–100)
MONOCYTES # BLD AUTO: 0.79 K/UL — SIGNIFICANT CHANGE UP (ref 0–0.9)
MONOCYTES NFR BLD AUTO: 6.9 % — SIGNIFICANT CHANGE UP (ref 2–14)
NEUTROPHILS # BLD AUTO: 9.48 K/UL — HIGH (ref 1.8–7.4)
NEUTROPHILS NFR BLD AUTO: 82.9 % — HIGH (ref 43–77)
NRBC # BLD: 0 /100 WBCS — SIGNIFICANT CHANGE UP (ref 0–0)
NT-PROBNP SERPL-SCNC: 4789 PG/ML — HIGH (ref 0–300)
PHOSPHATE SERPL-MCNC: 3.4 MG/DL — SIGNIFICANT CHANGE UP (ref 2.5–4.5)
PLATELET # BLD AUTO: 168 K/UL — SIGNIFICANT CHANGE UP (ref 150–400)
POTASSIUM SERPL-MCNC: 3.8 MMOL/L — SIGNIFICANT CHANGE UP (ref 3.5–5.3)
POTASSIUM SERPL-SCNC: 3.8 MMOL/L — SIGNIFICANT CHANGE UP (ref 3.5–5.3)
RBC # BLD: 3.64 M/UL — LOW (ref 4.2–5.8)
RBC # FLD: 15.1 % — HIGH (ref 10.3–14.5)
SODIUM SERPL-SCNC: 145 MMOL/L — SIGNIFICANT CHANGE UP (ref 135–145)
WBC # BLD: 11.44 K/UL — HIGH (ref 3.8–10.5)
WBC # FLD AUTO: 11.44 K/UL — HIGH (ref 3.8–10.5)

## 2020-08-11 PROCEDURE — 93888 INTRACRANIAL LIMITED STUDY: CPT | Mod: 26

## 2020-08-11 PROCEDURE — 71275 CT ANGIOGRAPHY CHEST: CPT | Mod: 26

## 2020-08-11 PROCEDURE — 72156 MRI NECK SPINE W/O & W/DYE: CPT | Mod: 26

## 2020-08-11 PROCEDURE — 99291 CRITICAL CARE FIRST HOUR: CPT

## 2020-08-11 PROCEDURE — 99292 CRITICAL CARE ADDL 30 MIN: CPT

## 2020-08-11 PROCEDURE — 70553 MRI BRAIN STEM W/O & W/DYE: CPT | Mod: 26

## 2020-08-11 PROCEDURE — 71045 X-RAY EXAM CHEST 1 VIEW: CPT | Mod: 26

## 2020-08-11 PROCEDURE — 95720 EEG PHY/QHP EA INCR W/VEEG: CPT

## 2020-08-11 RX ORDER — ACETAMINOPHEN 500 MG
650 TABLET ORAL EVERY 6 HOURS
Refills: 0 | Status: DISCONTINUED | OUTPATIENT
Start: 2020-08-11 | End: 2020-08-29

## 2020-08-11 RX ORDER — HYDRALAZINE HCL 50 MG
75 TABLET ORAL EVERY 8 HOURS
Refills: 0 | Status: DISCONTINUED | OUTPATIENT
Start: 2020-08-11 | End: 2020-08-18

## 2020-08-11 RX ORDER — FENTANYL CITRATE 50 UG/ML
50 INJECTION INTRAVENOUS
Refills: 0 | Status: DISCONTINUED | OUTPATIENT
Start: 2020-08-11 | End: 2020-08-13

## 2020-08-11 RX ORDER — PROPOFOL 10 MG/ML
25 INJECTION, EMULSION INTRAVENOUS
Qty: 1000 | Refills: 0 | Status: DISCONTINUED | OUTPATIENT
Start: 2020-08-11 | End: 2020-08-13

## 2020-08-11 RX ORDER — OXYCODONE HYDROCHLORIDE 5 MG/1
10 TABLET ORAL EVERY 6 HOURS
Refills: 0 | Status: DISCONTINUED | OUTPATIENT
Start: 2020-08-11 | End: 2020-08-13

## 2020-08-11 RX ORDER — FENTANYL CITRATE 50 UG/ML
50 INJECTION INTRAVENOUS ONCE
Refills: 0 | Status: DISCONTINUED | OUTPATIENT
Start: 2020-08-11 | End: 2020-08-11

## 2020-08-11 RX ORDER — HYDRALAZINE HCL 50 MG
25 TABLET ORAL ONCE
Refills: 0 | Status: COMPLETED | OUTPATIENT
Start: 2020-08-11 | End: 2020-08-11

## 2020-08-11 RX ORDER — OXYCODONE HYDROCHLORIDE 5 MG/1
5 TABLET ORAL EVERY 6 HOURS
Refills: 0 | Status: DISCONTINUED | OUTPATIENT
Start: 2020-08-11 | End: 2020-08-13

## 2020-08-11 RX ADMIN — PANTOPRAZOLE SODIUM 40 MILLIGRAM(S): 20 TABLET, DELAYED RELEASE ORAL at 18:43

## 2020-08-11 RX ADMIN — LEVETIRACETAM 400 MILLIGRAM(S): 250 TABLET, FILM COATED ORAL at 07:41

## 2020-08-11 RX ADMIN — FENTANYL CITRATE 50 MICROGRAM(S): 50 INJECTION INTRAVENOUS at 07:55

## 2020-08-11 RX ADMIN — FENTANYL CITRATE 50 MICROGRAM(S): 50 INJECTION INTRAVENOUS at 23:45

## 2020-08-11 RX ADMIN — FENTANYL CITRATE 50 MICROGRAM(S): 50 INJECTION INTRAVENOUS at 20:40

## 2020-08-11 RX ADMIN — FENTANYL CITRATE 50 MICROGRAM(S): 50 INJECTION INTRAVENOUS at 20:25

## 2020-08-11 RX ADMIN — OXYCODONE HYDROCHLORIDE 5 MILLIGRAM(S): 5 TABLET ORAL at 10:15

## 2020-08-11 RX ADMIN — FENTANYL CITRATE 50 MICROGRAM(S): 50 INJECTION INTRAVENOUS at 04:30

## 2020-08-11 RX ADMIN — FENTANYL CITRATE 25 MICROGRAM(S): 50 INJECTION INTRAVENOUS at 01:00

## 2020-08-11 RX ADMIN — OXYCODONE HYDROCHLORIDE 10 MILLIGRAM(S): 5 TABLET ORAL at 20:40

## 2020-08-11 RX ADMIN — Medication 1 DROP(S): at 05:08

## 2020-08-11 RX ADMIN — FENTANYL CITRATE 50 MICROGRAM(S): 50 INJECTION INTRAVENOUS at 19:15

## 2020-08-11 RX ADMIN — NIMODIPINE 60 MILLIGRAM(S): 60 SOLUTION ORAL at 01:00

## 2020-08-11 RX ADMIN — Medication 102 MILLIGRAM(S): at 12:17

## 2020-08-11 RX ADMIN — AMIODARONE HYDROCHLORIDE 200 MILLIGRAM(S): 400 TABLET ORAL at 01:02

## 2020-08-11 RX ADMIN — SODIUM CHLORIDE 50 MILLILITER(S): 9 INJECTION, SOLUTION INTRAVENOUS at 07:39

## 2020-08-11 RX ADMIN — Medication 50 MILLIGRAM(S): at 13:17

## 2020-08-11 RX ADMIN — NIMODIPINE 60 MILLIGRAM(S): 60 SOLUTION ORAL at 13:17

## 2020-08-11 RX ADMIN — SODIUM CHLORIDE 50 MILLILITER(S): 9 INJECTION, SOLUTION INTRAVENOUS at 01:04

## 2020-08-11 RX ADMIN — SENNA PLUS 10 MILLILITER(S): 8.6 TABLET ORAL at 21:09

## 2020-08-11 RX ADMIN — Medication 1 DROP(S): at 00:28

## 2020-08-11 RX ADMIN — FENTANYL CITRATE 50 MICROGRAM(S): 50 INJECTION INTRAVENOUS at 15:15

## 2020-08-11 RX ADMIN — Medication 1 DROP(S): at 11:52

## 2020-08-11 RX ADMIN — FENTANYL CITRATE 50 MICROGRAM(S): 50 INJECTION INTRAVENOUS at 12:15

## 2020-08-11 RX ADMIN — PROPOFOL 6.78 MICROGRAM(S)/KG/MIN: 10 INJECTION, EMULSION INTRAVENOUS at 01:04

## 2020-08-11 RX ADMIN — Medication 650 MILLIGRAM(S): at 01:00

## 2020-08-11 RX ADMIN — NIMODIPINE 60 MILLIGRAM(S): 60 SOLUTION ORAL at 18:43

## 2020-08-11 RX ADMIN — FENTANYL CITRATE 50 MICROGRAM(S): 50 INJECTION INTRAVENOUS at 04:45

## 2020-08-11 RX ADMIN — FENTANYL CITRATE 50 MICROGRAM(S): 50 INJECTION INTRAVENOUS at 17:00

## 2020-08-11 RX ADMIN — Medication 1 DROP(S): at 18:44

## 2020-08-11 RX ADMIN — Medication 50 MILLIGRAM(S): at 21:09

## 2020-08-11 RX ADMIN — FENTANYL CITRATE 50 MICROGRAM(S): 50 INJECTION INTRAVENOUS at 09:45

## 2020-08-11 RX ADMIN — FENTANYL CITRATE 50 MICROGRAM(S): 50 INJECTION INTRAVENOUS at 12:45

## 2020-08-11 RX ADMIN — POLYETHYLENE GLYCOL 3350 17 GRAM(S): 17 POWDER, FOR SOLUTION ORAL at 05:07

## 2020-08-11 RX ADMIN — OXYCODONE HYDROCHLORIDE 10 MILLIGRAM(S): 5 TABLET ORAL at 05:00

## 2020-08-11 RX ADMIN — FENTANYL CITRATE 50 MICROGRAM(S): 50 INJECTION INTRAVENOUS at 23:30

## 2020-08-11 RX ADMIN — CHLORHEXIDINE GLUCONATE 15 MILLILITER(S): 213 SOLUTION TOPICAL at 05:08

## 2020-08-11 RX ADMIN — FENTANYL CITRATE 25 MICROGRAM(S): 50 INJECTION INTRAVENOUS at 01:15

## 2020-08-11 RX ADMIN — FENTANYL CITRATE 50 MICROGRAM(S): 50 INJECTION INTRAVENOUS at 15:00

## 2020-08-11 RX ADMIN — OXYCODONE HYDROCHLORIDE 10 MILLIGRAM(S): 5 TABLET ORAL at 20:10

## 2020-08-11 RX ADMIN — Medication 50 MILLIGRAM(S): at 05:07

## 2020-08-11 RX ADMIN — FENTANYL CITRATE 50 MICROGRAM(S): 50 INJECTION INTRAVENOUS at 17:15

## 2020-08-11 RX ADMIN — FENTANYL CITRATE 50 MICROGRAM(S): 50 INJECTION INTRAVENOUS at 07:40

## 2020-08-11 RX ADMIN — CHLORHEXIDINE GLUCONATE 1 APPLICATION(S): 213 SOLUTION TOPICAL at 05:08

## 2020-08-11 RX ADMIN — PANTOPRAZOLE SODIUM 40 MILLIGRAM(S): 20 TABLET, DELAYED RELEASE ORAL at 05:07

## 2020-08-11 RX ADMIN — NIMODIPINE 60 MILLIGRAM(S): 60 SOLUTION ORAL at 09:39

## 2020-08-11 RX ADMIN — FENTANYL CITRATE 50 MICROGRAM(S): 50 INJECTION INTRAVENOUS at 19:00

## 2020-08-11 RX ADMIN — CHLORHEXIDINE GLUCONATE 15 MILLILITER(S): 213 SOLUTION TOPICAL at 18:43

## 2020-08-11 RX ADMIN — PROPOFOL 16.5 MICROGRAM(S)/KG/MIN: 10 INJECTION, EMULSION INTRAVENOUS at 07:39

## 2020-08-11 RX ADMIN — LEVETIRACETAM 400 MILLIGRAM(S): 250 TABLET, FILM COATED ORAL at 18:44

## 2020-08-11 RX ADMIN — OXYCODONE HYDROCHLORIDE 10 MILLIGRAM(S): 5 TABLET ORAL at 05:30

## 2020-08-11 RX ADMIN — Medication 25 MILLIGRAM(S): at 00:28

## 2020-08-11 RX ADMIN — FENTANYL CITRATE 50 MICROGRAM(S): 50 INJECTION INTRAVENOUS at 09:30

## 2020-08-11 RX ADMIN — OXYCODONE HYDROCHLORIDE 5 MILLIGRAM(S): 5 TABLET ORAL at 09:30

## 2020-08-11 RX ADMIN — FENTANYL CITRATE 50 MICROGRAM(S): 50 INJECTION INTRAVENOUS at 04:15

## 2020-08-11 RX ADMIN — NIMODIPINE 60 MILLIGRAM(S): 60 SOLUTION ORAL at 05:07

## 2020-08-11 NOTE — PROGRESS NOTE ADULT - SUBJECTIVE AND OBJECTIVE BOX
Patient seen and examined    Overnight events: exam stable, getting HCT to reevaluate the need for a EVD    Exam:  R pupils 4 NR, L 2mm reactive,RUE loc, RLE TF, L side nothing. Not FC.

## 2020-08-11 NOTE — EEG REPORT - NS EEG TEXT BOX
[facility]   COMPREHENSIVE EPILEPSY CENTER   REPORT OF LONG-TERM VIDEO EEG     [facility]: [address] [place] [address] [address], [place] [address], Ph#: [phone]    Patient Name: GUILLE DOLAN  Age and : 60y (64)  MRN #: [id number]  Location: [place] [address]  Referring Physician: Killian Reid    Start Time/Date: 08:00 on 20  End Time/Date: 11:26 on 20  Duration: 3 H 26 minutes     _____________________________________________________________  STUDY INFORMATION    EEG Recording Technique:  The patient underwent continuous Video-EEG monitoring, using Telemetry System hardware on the XLTek Digital System. EEG and video data were stored on a computer hard drive with important events saved in digital archive files. The material was reviewed by a physician (electroencephalographer / epileptologist) on a daily basis. Abhishek and seizure detection algorithms were utilized and reviewed. An EEG Technician attended to the patient, and was available throughout daytime work hours.  The epilepsy center neurologist was available in person or on call 24-hours per day.    EEG Placement and Labeling of Electrodes:  The EEG was performed utilizing 20 channel referential EEG connections (coronal over temporal over parasagittal montage) using all standard 10-20 electrode placements with EKG, with additional electrodes placed in the inferior temporal region using the modified 10-10 montage electrode placements for elective admissions, or if deemed necessary. Recording was at a sampling rate of 256 samples per second per channel. Time synchronized digital video recording was done simultaneously with EEG recording. A low light infrared camera was used for low light recording.     _____________________________________________________________  HISTORY    Patient is a 60y old  Male who presents with a chief complaint of     PERTINENT MEDICATION:  MEDICATIONS  (STANDING):  aMIOdarone Infusion 0.5 mG/Min (16.7 mL/Hr) IV Continuous <Continuous>  hydrALAZINE 25 milliGRAM(s) Oral every 8 hours  insulin lispro (HumaLOG) corrective regimen sliding scale   SubCutaneous every 6 hours  levETIRAcetam  IVPB 500 milliGRAM(s) IV Intermittent every 12 hours  niCARdipine Infusion 5 mG/Hr (25 mL/Hr) IV Continuous <Continuous>  niMODipine Oral Solution 60 milliGRAM(s) Oral/Enteral Tube every 4 hours  pantoprazole  Injectable 40 milliGRAM(s) IV Push every 12 hours  phytonadione  IVPB 10 milliGRAM(s) IV Intermittent daily  polyethylene glycol 3350 17 Gram(s) Oral every 12 hours  propofol Infusion 10 MICROgram(s)/kG/Min (6.78 mL/Hr) IV Continuous <Continuous>      _____________________________________________________________  STUDY INTERPRETATION    Findings: Entire recording in drowsy and asleep state only, no PDR seen.      Background Slowing:  continuous theta and polymorphic delta slowing.     Focal Slowing:   None were present.    Sleep Background:  Drowsiness was characterized by fragmentation, attenuation, and slowing of the background activity.    Sleep was characterized by the presence of vertex waves, rudimentary symmetric sleep spindles and K-complexes.    Other Non-Epileptiform Findings:  Intermittent GIRDA     Interictal Epileptiform Activity:   none     Events:  Clinical events: None recorded.  Seizures: None recorded.    Activation Procedures:   Hyperventilation was not performed.    Photic stimulation was not performed.     Artifacts:  Intermittent myogenic and movement artifacts were noted.    ECG:  artifactual    _____________________________________________________________  EEG SUMMARY/CLASSIFICATION    Abnormal EEG in an altered / a sedated patient.  - Continuous theta and polymorphic delta slowing    _____________________________________________________________  EEG IMPRESSION/CLINICAL CORRELATE    Abnormal EEG study.  1. Moderate nonspecific diffuse or multifocal cerebral dysfunction.   2. No epileptiform pattern or seizure seen.  _____________________________________________________________      Prelim report       Jim Stroud   Epilepsy Fellow  [facility] Epilepsy [place] [facility]   COMPREHENSIVE EPILEPSY CENTER   REPORT OF LONG-TERM VIDEO EEG     [facility]: [address] [place] [address] [address], [place] [address], Ph#: [phone]    Patient Name: GUILLE DOLAN  Age and : 60y (64)  MRN #: [id number]  Location: [place] [address]  Referring Physician: Killian Reid    Start Time/Date: 08:00 on 20  End Time/Date: 11:26 on 20  Duration: 4 H 26 minutes     _____________________________________________________________  STUDY INFORMATION    EEG Recording Technique:  The patient underwent continuous Video-EEG monitoring, using Telemetry System hardware on the XLTek Digital System. EEG and video data were stored on a computer hard drive with important events saved in digital archive files. The material was reviewed by a physician (electroencephalographer / epileptologist) on a daily basis. Abhishek and seizure detection algorithms were utilized and reviewed. An EEG Technician attended to the patient, and was available throughout daytime work hours.  The epilepsy center neurologist was available in person or on call 24-hours per day.    EEG Placement and Labeling of Electrodes:  The EEG was performed utilizing 20 channel referential EEG connections (coronal over temporal over parasagittal montage) using all standard 10-20 electrode placements with EKG, with additional electrodes placed in the inferior temporal region using the modified 10-10 montage electrode placements for elective admissions, or if deemed necessary. Recording was at a sampling rate of 256 samples per second per channel. Time synchronized digital video recording was done simultaneously with EEG recording. A low light infrared camera was used for low light recording.     _____________________________________________________________  HISTORY    Patient is a 60y old  Male who presents with a chief complaint of     PERTINENT MEDICATION:  MEDICATIONS  (STANDING):  aMIOdarone Infusion 0.5 mG/Min (16.7 mL/Hr) IV Continuous <Continuous>  hydrALAZINE 25 milliGRAM(s) Oral every 8 hours  insulin lispro (HumaLOG) corrective regimen sliding scale   SubCutaneous every 6 hours  levETIRAcetam  IVPB 500 milliGRAM(s) IV Intermittent every 12 hours  niCARdipine Infusion 5 mG/Hr (25 mL/Hr) IV Continuous <Continuous>  niMODipine Oral Solution 60 milliGRAM(s) Oral/Enteral Tube every 4 hours	  pantoprazole  Injectable 40 milliGRAM(s) IV Push every 12 hours  phytonadione  IVPB 10 milliGRAM(s) IV Intermittent daily  polyethylene glycol 3350 17 Gram(s) Oral every 12 hours  propofol Infusion 10 MICROgram(s)/kG/Min (6.78 mL/Hr) IV Continuous <Continuous>      _____________________________________________________________  STUDY INTERPRETATION    Findings: Entire recording in drowsy and asleep state only, no PDR seen.      Background Slowing:  continuous theta and polymorphic delta slowing.     Focal Slowing:   None were present.    Sleep Background:  Drowsiness was characterized by fragmentation, attenuation, and slowing of the background activity.    Sleep was characterized by the presence of vertex waves, rudimentary symmetric sleep spindles and K-complexes.    Other Non-Epileptiform Findings:  Intermittent GIRDA     Interictal Epileptiform Activity:   none     Events:  Clinical events: None recorded.  Seizures: None recorded.    Activation Procedures:   Hyperventilation was not performed.    Photic stimulation was not performed.     Artifacts:  Intermittent myogenic and movement artifacts were noted.    ECG:  artifactual    _____________________________________________________________  EEG SUMMARY/CLASSIFICATION    Abnormal EEG in an altered / a sedated patient.  - Continuous theta and polymorphic delta slowing    _____________________________________________________________  EEG IMPRESSION/CLINICAL CORRELATE    Abnormal EEG study.  1. Moderate nonspecific diffuse or multifocal cerebral dysfunction.   2. No epileptiform pattern or seizure seen.  _____________________________________________________________      Prelim report       Jim Stroud   Epilepsy Fellow  [facility] Epilepsy [facility]   COMPREHENSIVE EPILEPSY CENTER   REPORT OF LONG-TERM VIDEO EEG     [facility]: [address] [place] [address] [address], [place] [address], Ph#: [phone]    Patient Name: GUILLE DOLAN  Age and : 60y (64)  MRN #: [id number]  Location: [place] [address]  Referring Physician: Killian Reid    Start Time/Date: 08:00 on 20  End Time/Date: 11:26 on 20  Duration: 4 H 26 minutes     _____________________________________________________________  STUDY INFORMATION    EEG Recording Technique:  The patient underwent continuous Video-EEG monitoring, using Telemetry System hardware on the XLTek Digital System. EEG and video data were stored on a computer hard drive with important events saved in digital archive files. The material was reviewed by a physician (electroencephalographer / epileptologist) on a daily basis. Abhishek and seizure detection algorithms were utilized and reviewed. An EEG Technician attended to the patient, and was available throughout daytime work hours.  The epilepsy center neurologist was available in person or on call 24-hours per day.    EEG Placement and Labeling of Electrodes:  The EEG was performed utilizing 20 channel referential EEG connections (coronal over temporal over parasagittal montage) using all standard 10-20 electrode placements with EKG, with additional electrodes placed in the inferior temporal region using the modified 10-10 montage electrode placements for elective admissions, or if deemed necessary. Recording was at a sampling rate of 256 samples per second per channel. Time synchronized digital video recording was done simultaneously with EEG recording. A low light infrared camera was used for low light recording.     _____________________________________________________________  HISTORY    Patient is a 60y old  Male who presents with a chief complaint of     PERTINENT MEDICATION:  MEDICATIONS  (STANDING):  aMIOdarone Infusion 0.5 mG/Min (16.7 mL/Hr) IV Continuous <Continuous>  hydrALAZINE 25 milliGRAM(s) Oral every 8 hours  insulin lispro (HumaLOG) corrective regimen sliding scale   SubCutaneous every 6 hours  levETIRAcetam  IVPB 500 milliGRAM(s) IV Intermittent every 12 hours  niCARdipine Infusion 5 mG/Hr (25 mL/Hr) IV Continuous <Continuous>  niMODipine Oral Solution 60 milliGRAM(s) Oral/Enteral Tube every 4 hours	  pantoprazole  Injectable 40 milliGRAM(s) IV Push every 12 hours  phytonadione  IVPB 10 milliGRAM(s) IV Intermittent daily  polyethylene glycol 3350 17 Gram(s) Oral every 12 hours  propofol Infusion 10 MICROgram(s)/kG/Min (6.78 mL/Hr) IV Continuous <Continuous>    _____________________________________________________________  STUDY INTERPRETATION    Findings: Entire recording in drowsy and asleep state only, no PDR seen.      Background Slowing:  continuous theta and polymorphic delta slowing.     Focal Slowing:   None were present.    Sleep Background:  Drowsiness was characterized by fragmentation, attenuation, and slowing of the background activity.    Sleep was characterized by the presence of vertex waves, rudimentary symmetric sleep spindles and K-complexes.    Other Non-Epileptiform Findings:  Intermittent GIRDA     Interictal Epileptiform Activity:   none     Events:  Clinical events: None recorded.  Seizures: None recorded.    Activation Procedures:   Hyperventilation was not performed.    Photic stimulation was not performed.     Artifacts:  Intermittent myogenic and movement artifacts were noted.    ECG:  artifactual    _____________________________________________________________  EEG SUMMARY/CLASSIFICATION    Abnormal EEG in an altered / a sedated patient.  - Continuous theta and polymorphic delta slowing    _____________________________________________________________  EEG IMPRESSION/CLINICAL CORRELATE    Abnormal EEG study.  1. Moderate nonspecific diffuse or multifocal cerebral dysfunction.   2. No epileptiform pattern or seizure seen.  _____________________________________________________________      Jim [facility]   Epilepsy Fellow  [facility] Epilepsy [place]

## 2020-08-11 NOTE — EEG REPORT - NS EEG TEXT BOX
[facility]   COMPREHENSIVE EPILEPSY CENTER   REPORT OF LONG-TERM VIDEO EEG     [facility]: [address] [place] [address] [address], [place] [address], Ph#: [phone]    Patient Name: GUILLE DOLAN  Age and : 60y (64)  MRN #: [id number]  Location: [place] [address]  Referring Physician: Killian Reid    Start Time/Date: 18:15 on 08-10-20  End Time/Date: 08:00 on 20  Duration: 20 H 42 minutes     _____________________________________________________________  STUDY INFORMATION    EEG Recording Technique:  The patient underwent continuous Video-EEG monitoring, using Telemetry System hardware on the XLTek Digital System. EEG and video data were stored on a computer hard drive with important events saved in digital archive files. The material was reviewed by a physician (electroencephalographer / epileptologist) on a daily basis. Abhishek and seizure detection algorithms were utilized and reviewed. An EEG Technician attended to the patient, and was available throughout daytime work hours.  The epilepsy center neurologist was available in person or on call 24-hours per day.    EEG Placement and Labeling of Electrodes:  The EEG was performed utilizing 20 channel referential EEG connections (coronal over temporal over parasagittal montage) using all standard 10-20 electrode placements with EKG, with additional electrodes placed in the inferior temporal region using the modified 10-10 montage electrode placements for elective admissions, or if deemed necessary. Recording was at a sampling rate of 256 samples per second per channel. Time synchronized digital video recording was done simultaneously with EEG recording. A low light infrared camera was used for low light recording.     _____________________________________________________________  HISTORY    Patient is a 60y old  Male who presents with a chief complaint of     PERTINENT MEDICATION:  MEDICATIONS  (STANDING):  aMIOdarone Infusion 0.5 mG/Min (16.7 mL/Hr) IV Continuous <Continuous>  hydrALAZINE 25 milliGRAM(s) Oral every 8 hours  insulin lispro (HumaLOG) corrective regimen sliding scale   SubCutaneous every 6 hours  levETIRAcetam  IVPB 500 milliGRAM(s) IV Intermittent every 12 hours  niCARdipine Infusion 5 mG/Hr (25 mL/Hr) IV Continuous <Continuous>  niMODipine Oral Solution 60 milliGRAM(s) Oral/Enteral Tube every 4 hours  pantoprazole  Injectable 40 milliGRAM(s) IV Push every 12 hours  phytonadione  IVPB 10 milliGRAM(s) IV Intermittent daily  polyethylene glycol 3350 17 Gram(s) Oral every 12 hours  propofol Infusion 10 MICROgram(s)/kG/Min (6.78 mL/Hr) IV Continuous <Continuous>  senna Syrup 10 milliLiter(s) Oral at bedtime    _____________________________________________________________  STUDY INTERPRETATION    Findings: Entire recording in drowsy and asleep state only, no PDR seen.      Background Slowing:  continuous theta and polymorphic delta slowing.     Focal Slowing:   None were present.    Sleep Background:  Drowsiness was characterized by fragmentation, attenuation, and slowing of the background activity.    Sleep was characterized by the presence of vertex waves, rudimentary symmetric sleep spindles and K-complexes.    Other Non-Epileptiform Findings:  Intermittent GIRDA     Interictal Epileptiform Activity:   none     Events:  Clinical events: None recorded.  Seizures: None recorded.    Activation Procedures:   Hyperventilation was not performed.    Photic stimulation was not performed.     Artifacts:  Intermittent myogenic and movement artifacts were noted.    ECG:  artifactual    _____________________________________________________________  EEG SUMMARY/CLASSIFICATION    Abnormal EEG in an altered / a sedated patient.  - Continuous theta and polymorphic delta slowing    _____________________________________________________________  EEG IMPRESSION/CLINICAL CORRELATE    Abnormal EEG study.  1. Moderate nonspecific diffuse or multifocal cerebral dysfunction.   2. No epileptiform pattern or seizure seen.  _____________________________________________________________      [facility]   Epilepsy Fellow  [facility] Epilepsy [place]

## 2020-08-11 NOTE — PROGRESS NOTE ADULT - ASSESSMENT
Assessment: 60 year old male s/p cardiac arrest c/b GIB and bleeding from scott with Perimesencephalic (HH4 mF4) subarachnoid hemorrhage, PBD 4.    NEURO: SAH, possible sentinal event with cardiac arrest  CT Head: perimesencephalic SAH   CTA Head: no aneurysm noted  Conventional angiogram negative  MRI neuroaxis  Seizure Prophylaxis: Levetiracetam.  VEEG negative thus far (no epileptiform discharges). DC 8/11 if remains neg.   Delayed Cerebral Ischemia Management: euvolemia, nimodipine 60 q4  No EVD as no significant hydrocephalus      PULM:  [facility] vent: settings- 14/500/60/5  spO2>92%  CXR: clear  CTA R/O PE.   CPAP today    CV:  SBP goal<140  TTE: Global LV dysfunction. Severe concentric LVH, flattening of interventricular septum.   Concern for possible PE. CTA r/o PE.   Cardiac arrest- troponin trending down, no cardiac cath at OSH due to neuro status  Cardiology consult appreciated  S/P amio gtt; changed to PO.      RENAL: ?CKD with superimposed JALEN. Unclear baseline.   Cr improving 1.8--> 1.7 (despite contrast for angio)  On plasmalyte @50  Sodium goal 140-150  Is,Os (urine output >50cc/hr)    GI:  Diet: TF  GI prophylaxis [] not indicated [x] PPI 40 BID IV push [] other:  Bowel regimen [x] senna [] other:  LBM: Awaiting    ENDO:   Goal euglycemia (-180)  ISS      HEME/ONC: afib on coumadin at home  s/p vit K  INR 1.42-->1.2  VTE prophylaxis: [] SCDs [] chemoprophylaxis [x] hold chemoprophylaxis due to: SAH- unclear etiology of bleed  Dopplers negative for DVT    ID:  Monitor leukocytosis- improving  S/P TTM. Now normothermia. Monitor for fevers.     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    NEURO: SAH, possible sentinal event with cardiac arrest  CT Head: perimesencephalic SAH   CTA Head: no aneurysm noted  Conventional angiogram negative  MRI neuroaxis  Seizure Prophylaxis: Levetiracetam.   VEEG negative thus far (no epileptiform discharges). DC 8/11 if remains neg.   Delayed Cerebral Ischemia Management: euvolemia, nimodipine 60 q4  No EVD as no significant hydrocephalus      PULM:  [facility] vent: settings- 14/500/60/5  spO2>92%  CXR: clear  CTA R/O PE.   CPAP today.     CV:  SBP goal<140  TTE: Global LV dysfunction. EF41%, Severe concentric LVH, flattening of interventricular septum.   Concern for possible PE. CTA r/o PE.   pro BNP  Cardiac arrest- troponin trending down, no cardiac cath at OSH due to neuro status  Will need cath per cardiology  Cardiology consult appreciated. Possible cMRI.   S/P amio gtt; changed to PO.       RENAL: ?CKD with superimposed JALEN. Unclear baseline.   Cr improving 1.8--> 1.7 (despite contrast for angio).  On plasmalyte @50  Sodium goal 140-150  Is,Os (urine output >50cc/hr)      GI: GI bleed.   Diet: TF- Nepro  GI prophylaxis [] not indicated [x] PPI 40 BID IV push [] other:  Bowel regimen [x] senna [] other:  LBM: 8/7.       ENDO:   Goal euglycemia (-180)  ISS      HEME/ONC: afib on coumadin at home  s/p vit K  INR 1.42-->1.2  VTE prophylaxis: [] SCDs [] chemoprophylaxis [x] hold chemoprophylaxis due to: SAH- unclear etiology of bleed  Dopplers negative for .    ID:  Monitor leukocytosis- improving  S/P TTM. Now normothermia. Monitor for fevers.     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    NEURO: SAH, possible sentinal event with cardiac arrest  CT Head: perimesencephalic SAH   CTA Head: no aneurysm noted  Conventional angiogram negative  MRI neuroaxis  Seizure Prophylaxis: Levetiracetam.   VEEG negative thus far (no epileptiform discharges). DC 8/11 if remains neg.   Delayed Cerebral Ischemia Management: euvolemia, nimodipine 60 q4  No EVD as no significant hydrocephalus      PULM:  [facility] vent: settings- 14/500/60/5  spO2>92%  CXR: clear  CTA R/O PE.   CPAP deferred today re P/F ratio 148    CV:  SBP goal<140  TTE: Global LV dysfunction. EF41%, Severe concentric LVH, flattening of interventricular septum.   Concern for possible PE. CTA r/o PE.   pro BNP. Possible need for diuresis in next 24 hrs.  Cardiac arrest- troponin trending down, no cardiac cath at OSH due to neuro status  Will need cath per cardiology  Cardiology consult appreciated. Possible cMRI.   S/P amio gtt; changed to PO.       RENAL: ?CKD with superimposed JALEN. Unclear baseline.   Cr improving 1.8--> 1.7 (despite contrast for angio).  On plasmalyte @50 for now, renoprotective.   Monitor closely for need for diuresis  Sodium goal 140-150  Is,Os (urine output >50cc/hr)      GI: GI bleed.   Diet: TF- Nepro  GI prophylaxis [] not indicated [x] PPI 40 BID IV push [] other:  Bowel regimen [x] senna [] other:  LBM: 8/7.       ENDO:   Goal euglycemia (-180)  ISS      HEME/ONC: afib on coumadin at home  s/p vit K  INR 1.42-->1.2  VTE prophylaxis: [] SCDs [] chemoprophylaxis [x] hold chemoprophylaxis due to: SAH- unclear etiology of bleed  Dopplers negative for .    ID:  Monitor leukocytosis- improving  S/P TTM. Now normothermia. Monitor for fevers.     SOCIAL/FAMILY:  [] awaiting [x] updated at bedside [] family meeting    CODE STATUS:  [x] Full Code [] DNR [] DNI [] Palliative/Comfort Care    DISPOSITION:  [x] ICU [] Stroke Unit [] Floor [] EMU [] RCU [] PCU    [x] Patient is at high risk of neurologic deterioration/death due to: SAH, cardiac arrest      Time spent: 45 critical care minutes

## 2020-08-11 NOTE — PROGRESS NOTE ADULT - SUBJECTIVE AND OBJECTIVE BOX
HPI:  Patient is a 60 year old male with PMHx of Afib on coumadin s/p cardiac arrest at work, downtime 25 minutes prior to ROSC. Pupils were R fixed and dilated, left fixed at the time, no gag reflex, post-CA cooling protocol was initiated down to 35 deg. Intubated and put on Nimbex drip. Also on Propofol, fentanyl, levophed. R groin minerva placed. Also put on heparin post-CA. HCT showed R periclinoidal/perimesencephalic SAH, d/t ?aneurysm rupture, no hydrocephalus. Course also c/b GIB, was put on protonix drip. Also had bleeding from [facility] - urology consulted, clot irrigated.     HOSP COURSE:   8/10: Angio- negative    Overnight events:  Afebrile. Followed commands on right side (showed 2 fingers, wiggles toes).        ICU Vital Signs Last 24 Hrs  T(C): 37.7 (11 Aug 2020 03:00), Max: 37.9 (10 Aug 2020 23:00)  T(F): 99.9 (11 Aug 2020 03:00), Max: 100.2 (10 Aug 2020 23:00)  HR: 69 (11 Aug 2020 06:00) (61 - 113)  ABP: 127/68 (11 Aug 2020 06:00) (104/62 - 191/92)  ABP(mean): 84 (11 Aug 2020 06:00) (68 - 117)  RR: 19 (11 Aug 2020 06:00) (12 - 30)  SpO2: 98% (11 Aug 2020 06:00) (89% - 100%)      08-10-20 @ 07:01  -  08-11-20 @ 07:00  --------------------------------------------------------  IN: 2549.4 mL / OUT: 1475 mL / NET: 1074.4 mL      Mode: AC/ CMV (Assist Control/ Continuous Mandatory Ventilation), RR (machine): 14, TV (machine): 500, FiO2: 40, PEEP: 5, ITime: 1, MAP: 9, PIP: 15  acetaminophen   Tablet .. 650 milliGRAM(s) Oral every 6 hours PRN  aMIOdarone    Tablet 200 milliGRAM(s) Oral daily  artificial  tears Solution 1 Drop(s) Both EYES every 6 hours  chlorhexidine 0.12% Liquid 15 milliLiter(s) Oral Mucosa every 12 hours  chlorhexidine 4% Liquid 1 Application(s) Topical <User Schedule>  dextrose 40% Gel 15 Gram(s) Oral once PRN  dextrose 5%. 1000 milliLiter(s) (50 mL/Hr) IV Continuous <Continuous>  dextrose 50% Injectable 12.5 Gram(s) IV Push once  dextrose 50% Injectable 25 Gram(s) IV Push once  dextrose 50% Injectable 25 Gram(s) IV Push once  fentaNYL    Injectable 50 MICROGram(s) IV Push every 2 hours PRN  glucagon  Injectable 1 milliGRAM(s) IntraMuscular once PRN  hydrALAZINE 50 milliGRAM(s) Oral every 8 hours  insulin lispro (HumaLOG) corrective regimen sliding scale   SubCutaneous every 6 hours  levETIRAcetam  IVPB 500 milliGRAM(s) IV Intermittent every 12 hours  multiple electrolytes Injection Type 1 1000 milliLiter(s) (50 mL/Hr) IV Continuous <Continuous>  niMODipine Oral Solution 60 milliGRAM(s) Oral/Enteral Tube every 4 hours  oxyCODONE    IR 5 milliGRAM(s) Oral every 6 hours PRN  oxyCODONE    IR 10 milliGRAM(s) Oral every 6 hours PRN  pantoprazole  Injectable 40 milliGRAM(s) IV Push every 12 hours  phytonadione  IVPB 10 milliGRAM(s) IV Intermittent daily  polyethylene glycol 3350 17 Gram(s) Oral every 12 hours  propofol Infusion 25 MICROgram(s)/kG/Min (16.5 mL/Hr) IV Continuous <Continuous>  senna Syrup 10 milliLiter(s) Oral at bedtime  sodium chloride 0.9% lock flush 10 milliLiter(s) IV Push every 1 hour PRN                          10.6   15.09 )-----------( 182      ( 10 Aug 2020 19:52 )             34.5     08-10    144  |  109<H>  |  23  ----------------------------<  128<H>  4.0   |  20<L>  |  1.73<H>    Ca    8.7      10 Aug 2020 19:52  Phos  4.1     08-10  Mg     2.1     08-10    TPro  6.5  /  Alb  3.5  /  TBili  0.4  /  DBili  0.2  /  AST  71<H>  /  ALT  47<H>  /  AlkPhos  76  08-10    LIVER FUNCTIONS - ( 10 Aug 2020 19:52 )  Alb: 3.5 g/dL / Pro: 6.5 g/dL / ALK PHOS: 76 U/L / ALT: 47 U/L / AST: 71 U/L / GGT: x           ABG - ( 10 Aug 2020 23:34 )  pH, Arterial: 7.44  pH, Blood: x     /  pCO2: 32    /  pO2: 89    / HCO3: 21    / Base Excess: -1.9  /  SaO2: 97          EXAMINATION:  General: Calm, intubated  HEENT: MMM  Neuro:  -Mental status- No acute distress, EO to voice. **Follows commands*  -CN- Pupils R 5mm NR, L 2mm sluggish, Eyes midline,  +cough/gag, +corneals  RUE localize AG, RLE spont AG, LUE flaccid, LLE flaccid  CVS: S1/S2  RESP: Diminished at bases  GI: Soft, non tender, mildly distended  Extremities: Warm, skin intact      1. Normal mitral valve. Minimal mitral regurgitation.  2. Normal trileaflet aortic valve. No aortic valve  regurgitation seen.  3. Severe concentric left ventricular hypertrophy. Consider  further evaluation with cMRI to evaluate for infiltrative  disease.  4. Mild global left ventricular systolic dysfunction.  Endocardial visualization enhanced with intravenous  injection of Ultrasonic Enhancing Agent (Definity). No left  ventricular thrombus. Flattening of the interventricular  septum in both systole and diastole is  consistent with  right ventricular pressure overload.  5. Indeterminate diastolic function.  6. The right ventricle is not well visualized; grossly  normal right ventricular systolic function.  *** No previous Echo exam. HPI:  Patient is a 60 year old male with PMHx of Afib on coumadin s/p cardiac arrest at work, downtime 25 minutes prior to ROSC. Pupils were R fixed and dilated, left fixed at the time, no gag reflex, post-CA cooling protocol was initiated down to 35 deg. Intubated and put on Nimbex drip. Also on Propofol, fentanyl, levophed. R groin minerva placed. Also put on heparin post-CA. HCT showed R periclinoidal/perimesencephalic SAH, d/t ?aneurysm rupture, no hydrocephalus. Course also c/b GIB, was put on protonix drip. Also had bleeding from [facility] - urology consulted, clot irrigated.     HOSP COURSE:   8/10: Angio- negative    Overnight events:  Afebrile. Followed commands on right side (showed 2 fingers, wiggles toes).      ICU Vital Signs Last 24 Hrs  T(C): 37.7 (11 Aug 2020 03:00), Max: 37.9 (10 Aug 2020 23:00)  T(F): 99.9 (11 Aug 2020 03:00), Max: 100.2 (10 Aug 2020 23:00)  HR: 69 (11 Aug 2020 06:00) (61 - 113)  ABP: 127/68 (11 Aug 2020 06:00) (104/62 - 191/92)  ABP(mean): 84 (11 Aug 2020 06:00) (68 - 117)  RR: 19 (11 Aug 2020 06:00) (12 - 30)  SpO2: 98% (11 Aug 2020 06:00) (89% - 100%)      08-10-20 @ 07:01  -  08-11-20 @ 07:00  --------------------------------------------------------  IN: 2549.4 mL / OUT: 1475 mL / NET: 1074.4 mL      Mode: AC/ CMV (Assist Control/ Continuous Mandatory Ventilation), RR (machine): 14, TV (machine): 500, FiO2: 40, PEEP: 5, ITime: 1, MAP: 9, PIP: 15  acetaminophen   Tablet .. 650 milliGRAM(s) Oral every 6 hours PRN  aMIOdarone    Tablet 200 milliGRAM(s) Oral daily  artificial  tears Solution 1 Drop(s) Both EYES every 6 hours  chlorhexidine 0.12% Liquid 15 milliLiter(s) Oral Mucosa every 12 hours  chlorhexidine 4% Liquid 1 Application(s) Topical <User Schedule>  dextrose 40% Gel 15 Gram(s) Oral once PRN  dextrose 5%. 1000 milliLiter(s) (50 mL/Hr) IV Continuous <Continuous>  dextrose 50% Injectable 12.5 Gram(s) IV Push once  dextrose 50% Injectable 25 Gram(s) IV Push once  dextrose 50% Injectable 25 Gram(s) IV Push once  fentaNYL    Injectable 50 MICROGram(s) IV Push every 2 hours PRN  glucagon  Injectable 1 milliGRAM(s) IntraMuscular once PRN  hydrALAZINE 50 milliGRAM(s) Oral every 8 hours  insulin lispro (HumaLOG) corrective regimen sliding scale   SubCutaneous every 6 hours  levETIRAcetam  IVPB 500 milliGRAM(s) IV Intermittent every 12 hours  multiple electrolytes Injection Type 1 1000 milliLiter(s) (50 mL/Hr) IV Continuous <Continuous>  niMODipine Oral Solution 60 milliGRAM(s) Oral/Enteral Tube every 4 hours  oxyCODONE    IR 5 milliGRAM(s) Oral every 6 hours PRN  oxyCODONE    IR 10 milliGRAM(s) Oral every 6 hours PRN  pantoprazole  Injectable 40 milliGRAM(s) IV Push every 12 hours  phytonadione  IVPB 10 milliGRAM(s) IV Intermittent daily  polyethylene glycol 3350 17 Gram(s) Oral every 12 hours  propofol Infusion 25 MICROgram(s)/kG/Min (16.5 mL/Hr) IV Continuous <Continuous>  senna Syrup 10 milliLiter(s) Oral at bedtime  sodium chloride 0.9% lock flush 10 milliLiter(s) IV Push every 1 hour PRN                          10.6   15.09 )-----------( 182      ( 10 Aug 2020 19:52 )             34.5     08-10    144  |  109<H>  |  23  ----------------------------<  128<H>  4.0   |  20<L>  |  1.73<H>    Ca    8.7      10 Aug 2020 19:52  Phos  4.1     08-10  Mg     2.1     08-10    TPro  6.5  /  Alb  3.5  /  TBili  0.4  /  DBili  0.2  /  AST  71<H>  /  ALT  47<H>  /  AlkPhos  76  08-10    LIVER FUNCTIONS - ( 10 Aug 2020 19:52 )  Alb: 3.5 g/dL / Pro: 6.5 g/dL / ALK PHOS: 76 U/L / ALT: 47 U/L / AST: 71 U/L / GGT: x           ABG - ( 10 Aug 2020 23:34 )  pH, Arterial: 7.44  pH, Blood: x     /  pCO2: 32    /  pO2: 89    / HCO3: 21    / Base Excess: -1.9  /  SaO2: 97          EXAMINATION:  General: Calm, intubated  HEENT: MMM  Neuro:  -Mental status- No acute distress, EO to voice. **Follows commands*  -CN- Pupils R 5mm NR, L 2mm sluggish, Eyes midline,  +cough/gag, +corneals  RUE localize AG, RLE spont AG, LUE flaccid, LLE flaccid  CVS: S1/S2  RESP: Diminished at bases  GI: Soft, non tender, mildly distended  Extremities: Warm, skin intact      1. Normal mitral valve. Minimal mitral regurgitation.  2. Normal trileaflet aortic valve. No aortic valve  regurgitation seen.  3. Severe concentric left ventricular hypertrophy. Consider  further evaluation with cMRI to evaluate for infiltrative  disease.  4. Mild global left ventricular systolic dysfunction.  Endocardial visualization enhanced with intravenous  injection of Ultrasonic Enhancing Agent (Definity). No left  ventricular thrombus. Flattening of the interventricular  septum in both systole and diastole is  consistent with  right ventricular pressure overload.  5. Indeterminate diastolic function.  6. The right ventricle is not well visualized; grossly  normal right ventricular systolic function.  *** No previous Echo exam. HPI:  Patient is a 60 year old male with PMHx of Afib on coumadin s/p cardiac arrest at work, downtime 25 minutes prior to ROSC. Pupils were R fixed and dilated, left fixed at the time, no gag reflex, post-CA cooling protocol was initiated down to 35 deg. Intubated and put on Nimbex drip. Also on Propofol, fentanyl, levophed. R groin minerva placed. Also put on heparin post-CA. HCT showed R periclinoidal/perimesencephalic SAH, d/t ?aneurysm rupture, no hydrocephalus. Course also c/b GIB, was put on protonix drip. Also had bleeding from [facility] - urology consulted, clot irrigated.     HOSP COURSE:   8/10: Angio- negative    Overnight events:  Afebrile. Followed commands on right side (showed 2 fingers, wiggles toes).      ICU Vital Signs Last 24 Hrs  T(C): 37.7 (11 Aug 2020 03:00), Max: 37.9 (10 Aug 2020 23:00)  T(F): 99.9 (11 Aug 2020 03:00), Max: 100.2 (10 Aug 2020 23:00)  HR: 69 (11 Aug 2020 06:00) (61 - 113)  ABP: 127/68 (11 Aug 2020 06:00) (104/62 - 191/92)  ABP(mean): 84 (11 Aug 2020 06:00) (68 - 117)  RR: 19 (11 Aug 2020 06:00) (12 - 30)  SpO2: 98% (11 Aug 2020 06:00) (89% - 100%)      08-10-20 @ 07:01  -  08-11-20 @ 07:00  --------------------------------------------------------  IN: 2549.4 mL / OUT: 1475 mL / NET: 1074.4 mL      Mode: AC/ CMV (Assist Control/ Continuous Mandatory Ventilation), RR (machine): 14, TV (machine): 500, FiO2: 40, PEEP: 5, ITime: 1, MAP: 9, PIP: 15  acetaminophen   Tablet .. 650 milliGRAM(s) Oral every 6 hours PRN  aMIOdarone    Tablet 200 milliGRAM(s) Oral daily  artificial  tears Solution 1 Drop(s) Both EYES every 6 hours  chlorhexidine 0.12% Liquid 15 milliLiter(s) Oral Mucosa every 12 hours  chlorhexidine 4% Liquid 1 Application(s) Topical <User Schedule>  dextrose 40% Gel 15 Gram(s) Oral once PRN  dextrose 5%. 1000 milliLiter(s) (50 mL/Hr) IV Continuous <Continuous>  dextrose 50% Injectable 12.5 Gram(s) IV Push once  dextrose 50% Injectable 25 Gram(s) IV Push once  dextrose 50% Injectable 25 Gram(s) IV Push once  fentaNYL    Injectable 50 MICROGram(s) IV Push every 2 hours PRN  glucagon  Injectable 1 milliGRAM(s) IntraMuscular once PRN  hydrALAZINE 50 milliGRAM(s) Oral every 8 hours  insulin lispro (HumaLOG) corrective regimen sliding scale   SubCutaneous every 6 hours  levETIRAcetam  IVPB 500 milliGRAM(s) IV Intermittent every 12 hours  multiple electrolytes Injection Type 1 1000 milliLiter(s) (50 mL/Hr) IV Continuous <Continuous>  niMODipine Oral Solution 60 milliGRAM(s) Oral/Enteral Tube every 4 hours  oxyCODONE    IR 5 milliGRAM(s) Oral every 6 hours PRN  oxyCODONE    IR 10 milliGRAM(s) Oral every 6 hours PRN  pantoprazole  Injectable 40 milliGRAM(s) IV Push every 12 hours  phytonadione  IVPB 10 milliGRAM(s) IV Intermittent daily  polyethylene glycol 3350 17 Gram(s) Oral every 12 hours  propofol Infusion 25 MICROgram(s)/kG/Min (16.5 mL/Hr) IV Continuous <Continuous>  senna Syrup 10 milliLiter(s) Oral at bedtime  sodium chloride 0.9% lock flush 10 milliLiter(s) IV Push every 1 hour PRN                          10.6   15.09 )-----------( 182      ( 10 Aug 2020 19:52 )             34.5     08-10    144  |  109<H>  |  23  ----------------------------<  128<H>  4.0   |  20<L>  |  1.73<H>    Ca    8.7      10 Aug 2020 19:52  Phos  4.1     08-10  Mg     2.1     08-10    TPro  6.5  /  Alb  3.5  /  TBili  0.4  /  DBili  0.2  /  AST  71<H>  /  ALT  47<H>  /  AlkPhos  76  08-10    LIVER FUNCTIONS - ( 10 Aug 2020 19:52 )  Alb: 3.5 g/dL / Pro: 6.5 g/dL / ALK PHOS: 76 U/L / ALT: 47 U/L / AST: 71 U/L / GGT: x           ABG - ( 10 Aug 2020 23:34 )  pH, Arterial: 7.44  pH, Blood: x     /  pCO2: 32    /  pO2: 89    / HCO3: 21    / Base Excess: -1.9  /  SaO2: 97          EXAMINATION:  General: Calm, intubated  HEENT: MMM  Neuro:  -Mental status- No acute distress, EO to voice. **Follows commands briskly, shows 2 fingers and wiggles toes on right. Able to lift RUE off bed AG.   -CN- Pupils R 5mm NR, L 2mm sluggish, Eyes midline,  +cough/gag, +corneals  RUE localize AG, RLE spont AG, LUE flaccid, LLE flaccid  CVS: S1/S2  RESP: Diminished at bases  GI: Soft, non tender, mildly distended  Extremities: Warm, skin intact      1. Normal mitral valve. Minimal mitral regurgitation.  2. Normal trileaflet aortic valve. No aortic valve  regurgitation seen.  3. Severe concentric left ventricular hypertrophy. Consider  further evaluation with cMRI to evaluate for infiltrative  disease.  4. Mild global left ventricular systolic dysfunction.  Endocardial visualization enhanced with intravenous  injection of Ultrasonic Enhancing Agent (Definity). No left  ventricular thrombus. Flattening of the interventricular  septum in both systole and diastole is  consistent with  right ventricular pressure overload.  5. Indeterminate diastolic function.  6. The right ventricle is not well visualized; grossly  normal right ventricular systolic function.  *** No previous Echo exam.

## 2020-08-11 NOTE — PROGRESS NOTE ADULT - SUBJECTIVE AND OBJECTIVE BOX
Subjective: Patient seen and examined. No new events except as noted.   remains intubated in NSCU   HR stable     REVIEW OF SYSTEMS:  Unable t o obtain       MEDICATIONS:  MEDICATIONS  (STANDING):  aMIOdarone    Tablet 200 milliGRAM(s) Oral daily  artificial  tears Solution 1 Drop(s) Both EYES every 6 hours  chlorhexidine 0.12% Liquid 15 milliLiter(s) Oral Mucosa every 12 hours  chlorhexidine 4% Liquid 1 Application(s) Topical <User Schedule>  dextrose 5%. 1000 milliLiter(s) (50 mL/Hr) IV Continuous <Continuous>  dextrose 50% Injectable 12.5 Gram(s) IV Push once  dextrose 50% Injectable 25 Gram(s) IV Push once  dextrose 50% Injectable 25 Gram(s) IV Push once  hydrALAZINE 50 milliGRAM(s) Oral every 8 hours  insulin lispro (HumaLOG) corrective regimen sliding scale   SubCutaneous every 6 hours  levETIRAcetam  IVPB 500 milliGRAM(s) IV Intermittent every 12 hours  multiple electrolytes Injection Type 1 1000 milliLiter(s) (50 mL/Hr) IV Continuous <Continuous>  niMODipine Oral Solution 60 milliGRAM(s) Oral/Enteral Tube every 4 hours  pantoprazole  Injectable 40 milliGRAM(s) IV Push every 12 hours  polyethylene glycol 3350 17 Gram(s) Oral every 12 hours  propofol Infusion 25 MICROgram(s)/kG/Min (16.5 mL/Hr) IV Continuous <Continuous>  senna Syrup 10 milliLiter(s) Oral at bedtime      PHYSICAL EXAM:  T(C): 37 (20 @ 11:00), Max: 37.9 (08-10-20 @ 23:00)  HR: 60 (20 @ 12:00) (60 - 113)  BP: --  RR: 14 (20 @ 12:00) (12 - 30)  SpO2: 100% (20 @ 12:00) (89% - 100%)  Wt(kg): --  I&O's Summary    10 Aug 2020 07:01  -  11 Aug 2020 07:00  --------------------------------------------------------  IN: 2635.9 mL / OUT: 1525 mL / NET: 1110.9 mL    11 Aug 2020 07:01  -  11 Aug 2020 12:39  --------------------------------------------------------  IN: 727.5 mL / OUT: 325 mL / NET: 402.5 mL      Height (cm): 172.72 (08-10 @ 15:19)  Weight (kg): 110 (08-10 @ 15:19)  BMI (kg/m2): 36.9 (08-10 @ 15:)  BSA (m2): 2.22 (08-10 @ 15:)      Appearance: Intubated sedated 	  HEENT:   Dry  oral mucosa,  Lymphatic: No lymphadenopathy  Cardiovascular: Normal S1 S2, No JVD, No murmurs, No edema  Respiratory: Ventilated   Psychiatry: A & O x 0  Gastrointestinal:  Soft, Non-tender, + BS	  Skin: No rashes, No ecchymoses, No cyanosis	  Mental status- No acute distress, EO to stim  -CN- Pupils R 4mm NR, L 2mm sluggish, EOMI, tongue midline, face symmetric  +cough/gag  RUE localize AG  RLE spont AG  LUE flaccid  LLE flaccid    Extremities: decreased range of motion, No clubbing, cyanosis or edema  Vascular: Peripheral pulses palpable 2+ bilaterally  +scott        LABS:    CARDIAC MARKERS:                                10.6   15.09 )-----------( 182      ( 10 Aug 2020 19:52 )             34.5     08-10    144  |  109<H>  |  23  ----------------------------<  128<H>  4.0   |  20<L>  |  1.73<H>    Ca    8.7      10 Aug 2020 19:52  Phos  4.1     08-10  Mg     2.1     08-10    TPro  6.5  /  Alb  3.5  /  TBili  0.4  /  DBili  0.2  /  AST  71<H>  /  ALT  47<H>  /  AlkPhos  76  08-10    proBNP:   Lipid Profile:   HgA1c:   TSH:     13          TELEMETRY: 	AF    ECG:  	  RADIOLOGY: < from: CT Head No Cont (08.10.20 @ 02:31) >    EXAM:  CT BRAIN                            PROCEDURE DATE:  08/10/2020            INTERPRETATION:  INDICATIONS:  sah    TECHNIQUE:  Serial axial images were obtained from the skull base to the vertex without intravenous contrast.    COMPARISON EXAMINATION: 2020 at 3:26 PM    FINDINGS:  VENTRICLES AND SULCI: Intraventricular hemorrhage layers in the occipital horns of the lateral ventricle as seen on the prior without significant interval change in appearance of the ventricles compared with the prior  INTRA-AXIAL: Evidence of left PCA territory infarct as seen on the prior. Right posterior insular lucency identified.  EXTRA-AXIAL: Interval stability and subarachnoid hemorrhage as seen on the prior with large hematoma in the right aspect of the suprasellar cistern  VISUALIZED SINUSES: Ethmoid mucosal thickening. Right maxillary sinus fluid level. Left maxillary sinus mucosal thickening. Subtle sphenoid mucosal thickening  VISUALIZED MASTOIDS:  Clear.  CALVARIUM:  Normal.  MISCELLANEOUS: NG and ET tube    IMPRESSION: Interval stability compared with the prior, no rehemorrhage. No change in the ventricle size compared with the prior                  YASMIN LAW M.D., ATTENDING RADIOLOGIST  This document has been electronically signed. Aug 10 2020  9:29AM                < end of copied text >    DIAGNOSTIC TESTING:  [ ] Echocardiogram:  < from: Transthoracic Echocardiogram (08.10.20 @ 10:48) >    Patient name: GUILLE DOLAN  YOB: 1960   Age: 60 (M)   MR#: [id number]  Study Date: 8/10/2020  [facility]  Study quality: Technically fair  Referring Physician: Killian Reid MD  Blood Pressure: 135/75 mmHg  Height: 172 cm  Weight: 113 kg  BSA: 2.2 m2  Heart Rate: 77 mmHg  ------------------------------------------------------------------------  PROCEDURE: Transthoracic echocardiogram with 2-D, M-Mode  and complete spectral and color flowDoppler.  INDICATION: Abnormal electrocardiogram (ECG) (EKG) (R94.31)  ------------------------------------------------------------------------  Dimensions:    Normal Values:  LA:     3.6    2.0 - 4.0 cm  Ao:     3.9    2.0 - 3.8 cm  SEPTUM: 1.4    0.6 - 1.2 cm  PWT:    1.8    0.6 - 1.1 cm  LVIDd:  5.4    3.0 - 5.6 cm  LVIDs:  4.1    1.8 - 4.0 cm  Derived variables:  LVMI: 178 g/m2  RWT: 0.66  Fractional short: 24 %  EF (Coon Rule): 41 %Doppler Peak Velocity (m/sec):  MV=1.3 AoV=1.8  ------------------------------------------------------------------------  Observations:  Mitral Valve: Normal mitral valve. Minimal mitral  regurgitation.  Aortic Valve/Aorta: Normal trileaflet aortic valve. Peak  transaortic valve gradient equals 13 mm Hg, mean  transaortic valve gradient equals 7 mm Hg. No aortic valve  regurgitation seen. Peak left ventricular outflow tract  gradient equals 5 mm Hg, mean gradient is equal to 2 mm Hg,  LVOT velocity time integral equals 18 cm.  Aortic Root: 3.9 cm.  LeftAtrium: Severely dilated left atrium.  LA volume index  = 69 cc/m2.  Left Ventricle: Mild global left ventricular systolic  dysfunction. Endocardial visualization enhanced with  intravenous injection of Ultrasonic Enhancing Agent  (Definity). No leftventricular thrombus. Flattening of the  interventricular septum in both systole and diastole is  consistent with right ventricular pressure overload. Severe  concentric left ventricular hypertrophy. Consider further  evaluation with cMRI to evaluatefor infiltrative disease.  Indeterminate diastolic function.  Right Heart: Normal right atrium. The right ventricle is  not well visualized; grossly normal right ventricular  systolic function. Normal tricuspid valve. Mild tricuspid  regurgitation. Normal pulmonic valve. Minimal pulmonic  regurgitation.  Pericardium/Pleura: Normal pericardium with no pericardial  effusion.  Hemodynamic: Estimated right ventricular systolic pressure  equals 29.36 - 34.36 mm Hg, assuming right atrial pressure  equals 10 - 15 mm Hg, consistent with normal pulmonary  hypertension.  ------------------------------------------------------------------------  Conclusions:  1. Normal mitral valve. Minimal mitral regurgitation.  2. Normal trileaflet aortic valve. No aortic valve  regurgitation seen.  3. Severe concentric left ventricular hypertrophy. Consider  further evaluation with cMRI to evaluate for infiltrative  disease.  4. Mild global left ventricular systolic dysfunction.  Endocardial visualization enhancedwith intravenous  injection of Ultrasonic Enhancing Agent (Definity). No left  ventricular thrombus. Flattening of the interventricular  septum in both systole and diastole is  consistent with  right ventricular pressure overload.  5. Indeterminate diastolic function.  6. The right ventricle is not well visualized; grossly  normal right ventricular systolic function.  *** No previous Echo exam.  ------------------------------------------------------------------------  Confirmed on  8/10/2020 - 13:42:44 by Valerio Hansen M.D.  ------------------------------------------------------------------------    < end of copied text >    [ ]  Catheterization:  [ ] Stress Test:    OTHER: 	EEG REPORT:   EEG Report:  · EEG Report		  [facility]PILEPS[facility] [place]   REPORT OF LONG-TERM VIDEO EEG     [facility]: [address], [place] [address], Ph#: [phone]    Patient Name: GUILLE DOLAN  Age and : 60y ([birth year])  MRN #: [id number]  Location: [place] [address]  Referring Physician: Killian Reid    Start Time/Date: 18:15 on 08-10-20  End Time/Date: 08:00 on 20  Duration: 20 H 42 minutes     _____________________________________________________________  STUDY INFORMATION    EEG Recording Technique:  The patient underwent continuous Video-EEG monitoring, using Telemetry System hardware on the XLTek Digital System. EEG and video data were stored on a computer hard drive with important events saved in digital archive files. The material was reviewed by a physician (electroencephalographer / epileptologist) on a daily basis. Abhishek and seizure detection algorithms were utilized and reviewed. An EEG Technician attended to the patient, and was available throughout daytime work hours.  The epilepsy center neurologist was available in person or on call 24-hours per day.    EEG Placement and Labeling of Electrodes:  The EEG was performed utilizing 20 channel referential EEG connections (coronal over temporal over parasagittal montage) using all standard 10-20 electrode placements with EKG, with additional electrodes placed in the inferior temporal region using the modified 10-10 montage electrode placements for elective admissions, or if deemed necessary. Recording was at a sampling rate of 256 samples per second per channel. Time synchronized digital video recording was done simultaneously with EEG recording. A low light infrared camera was used for low light recording.     _____________________________________________________________  HISTORY    Patient is a 60y old  Male who presents with a chief complaint of     PERTINENT MEDICATION:  MEDICATIONS  (STANDING):  aMIOdarone Infusion 0.5 mG/Min (16.7 mL/Hr) IV Continuous <Continuous>  hydrALAZINE 25 milliGRAM(s) Oral every 8 hours  insulin lispro (HumaLOG) corrective regimen sliding scale   SubCutaneous every 6 hours  levETIRAcetam  IVPB 500 milliGRAM(s) IV Intermittent every 12 hours  niCARdipine Infusion 5 mG/Hr (25 mL/Hr) IV Continuous <Continuous>  niMODipine Oral Solution 60 milliGRAM(s) Oral/Enteral Tube every 4 hours  pantoprazole  Injectable 40 milliGRAM(s) IV Push every 12 hours  phytonadione  IVPB 10 milliGRAM(s) IV Intermittent daily  polyethylene glycol 3350 17 Gram(s) Oral every 12 hours  propofol Infusion 10 MICROgram(s)/kG/Min (6.78 mL/Hr) IV Continuous <Continuous>  senna Syrup 10 milliLiter(s) Oral at bedtime    _____________________________________________________________  STUDY INTERPRETATION    Findings: Entire recording in drowsy and asleep state only, no PDR seen.      Background Slowing:  continuous theta and polymorphic delta slowing.     Focal Slowing:   None were present.    Sleep Background:  Drowsiness was characterized by fragmentation, attenuation, and slowing of the background activity.    Sleep was characterized by the presence of vertex waves, rudimentary symmetric sleep spindles and K-complexes.    Other Non-Epileptiform Findings:  Intermittent GIRDA     Interictal Epileptiform Activity:   none     Events:  Clinical events: None recorded.  Seizures: None recorded.    Activation Procedures:   Hyperventilation was not performed.    Photic stimulation was not performed.     Artifacts:  Intermittent myogenic and movement artifacts were noted.    ECG:  artifactual    _____________________________________________________________  EEG SUMMARY/CLASSIFICATION    Abnormal EEG in an altered / a sedated patient.  - Continuous theta and polymorphic delta slowing    _____________________________________________________________  EEG IMPRESSION/CLINICAL CORRELATE    Abnormal EEG study.  1. Moderate nonspecific diffuse or multifocal cerebral dysfunction.   2. No epileptiform pattern or seizure seen.  _____________________________________________________________      Jim Stroud   Epilepsy Fellow  [facility] [place]    	      Electronic Signatures:  Jim Stroud (JOANNA)  (Signed 11-Aug-2020 09:40)  	Authored: EEG REPORT      Last Updated: 11-Aug-2020 09:40 by Jim Stroud (JOANNA)

## 2020-08-12 LAB
-  AMIKACIN: SIGNIFICANT CHANGE UP
-  AZTREONAM: SIGNIFICANT CHANGE UP
-  CEFEPIME: SIGNIFICANT CHANGE UP
-  CEFTAZIDIME: SIGNIFICANT CHANGE UP
-  CIPROFLOXACIN: SIGNIFICANT CHANGE UP
-  GENTAMICIN: SIGNIFICANT CHANGE UP
-  IMIPENEM: SIGNIFICANT CHANGE UP
-  LEVOFLOXACIN: SIGNIFICANT CHANGE UP
-  MEROPENEM: SIGNIFICANT CHANGE UP
-  PIPERACILLIN/TAZOBACTAM: SIGNIFICANT CHANGE UP
-  TOBRAMYCIN: SIGNIFICANT CHANGE UP
ANION GAP SERPL CALC-SCNC: 14 MMOL/L — SIGNIFICANT CHANGE UP (ref 5–17)
BASOPHILS # BLD AUTO: 0.04 K/UL — SIGNIFICANT CHANGE UP (ref 0–0.2)
BASOPHILS NFR BLD AUTO: 0.4 % — SIGNIFICANT CHANGE UP (ref 0–2)
BUN SERPL-MCNC: 22 MG/DL — SIGNIFICANT CHANGE UP (ref 7–23)
CALCIUM SERPL-MCNC: 9.2 MG/DL — SIGNIFICANT CHANGE UP (ref 8.4–10.5)
CHLORIDE SERPL-SCNC: 112 MMOL/L — HIGH (ref 96–108)
CO2 SERPL-SCNC: 18 MMOL/L — LOW (ref 22–31)
CREAT SERPL-MCNC: 1.71 MG/DL — HIGH (ref 0.5–1.3)
CULTURE RESULTS: SIGNIFICANT CHANGE UP
EOSINOPHIL # BLD AUTO: 0.4 K/UL — SIGNIFICANT CHANGE UP (ref 0–0.5)
EOSINOPHIL NFR BLD AUTO: 3.6 % — SIGNIFICANT CHANGE UP (ref 0–6)
GAS PNL BLDA: SIGNIFICANT CHANGE UP
GLUCOSE SERPL-MCNC: 137 MG/DL — HIGH (ref 70–99)
HCT VFR BLD CALC: 35.6 % — LOW (ref 39–50)
HGB BLD-MCNC: 10.9 G/DL — LOW (ref 13–17)
IMM GRANULOCYTES NFR BLD AUTO: 1.1 % — SIGNIFICANT CHANGE UP (ref 0–1.5)
LYMPHOCYTES # BLD AUTO: 0.87 K/UL — LOW (ref 1–3.3)
LYMPHOCYTES # BLD AUTO: 7.9 % — LOW (ref 13–44)
MAGNESIUM SERPL-MCNC: 1.9 MG/DL — SIGNIFICANT CHANGE UP (ref 1.6–2.6)
MCHC RBC-ENTMCNC: 27 PG — SIGNIFICANT CHANGE UP (ref 27–34)
MCHC RBC-ENTMCNC: 30.6 GM/DL — LOW (ref 32–36)
MCV RBC AUTO: 88.3 FL — SIGNIFICANT CHANGE UP (ref 80–100)
METHOD TYPE: SIGNIFICANT CHANGE UP
MONOCYTES # BLD AUTO: 0.67 K/UL — SIGNIFICANT CHANGE UP (ref 0–0.9)
MONOCYTES NFR BLD AUTO: 6.1 % — SIGNIFICANT CHANGE UP (ref 2–14)
NEUTROPHILS # BLD AUTO: 8.97 K/UL — HIGH (ref 1.8–7.4)
NEUTROPHILS NFR BLD AUTO: 80.9 % — HIGH (ref 43–77)
NRBC # BLD: 0 /100 WBCS — SIGNIFICANT CHANGE UP (ref 0–0)
ORGANISM # SPEC MICROSCOPIC CNT: SIGNIFICANT CHANGE UP
ORGANISM # SPEC MICROSCOPIC CNT: SIGNIFICANT CHANGE UP
PHOSPHATE SERPL-MCNC: 3.4 MG/DL — SIGNIFICANT CHANGE UP (ref 2.5–4.5)
PLATELET # BLD AUTO: 195 K/UL — SIGNIFICANT CHANGE UP (ref 150–400)
POTASSIUM SERPL-MCNC: 3.6 MMOL/L — SIGNIFICANT CHANGE UP (ref 3.5–5.3)
POTASSIUM SERPL-SCNC: 3.6 MMOL/L — SIGNIFICANT CHANGE UP (ref 3.5–5.3)
PROCALCITONIN SERPL-MCNC: 0.32 NG/ML — HIGH (ref 0.02–0.1)
RBC # BLD: 4.03 M/UL — LOW (ref 4.2–5.8)
RBC # FLD: 15.3 % — HIGH (ref 10.3–14.5)
SODIUM SERPL-SCNC: 144 MMOL/L — SIGNIFICANT CHANGE UP (ref 135–145)
SPECIMEN SOURCE: SIGNIFICANT CHANGE UP
WBC # BLD: 11.07 K/UL — HIGH (ref 3.8–10.5)
WBC # FLD AUTO: 11.07 K/UL — HIGH (ref 3.8–10.5)

## 2020-08-12 PROCEDURE — 71045 X-RAY EXAM CHEST 1 VIEW: CPT | Mod: 26

## 2020-08-12 PROCEDURE — 70450 CT HEAD/BRAIN W/O DYE: CPT | Mod: 26

## 2020-08-12 PROCEDURE — 99291 CRITICAL CARE FIRST HOUR: CPT

## 2020-08-12 PROCEDURE — 99292 CRITICAL CARE ADDL 30 MIN: CPT

## 2020-08-12 RX ORDER — ACETYLCYSTEINE 200 MG/ML
4 VIAL (ML) MISCELLANEOUS EVERY 6 HOURS
Refills: 0 | Status: DISCONTINUED | OUTPATIENT
Start: 2020-08-12 | End: 2020-08-13

## 2020-08-12 RX ORDER — POTASSIUM CHLORIDE 20 MEQ
20 PACKET (EA) ORAL ONCE
Refills: 0 | Status: COMPLETED | OUTPATIENT
Start: 2020-08-12 | End: 2020-08-12

## 2020-08-12 RX ORDER — FUROSEMIDE 40 MG
20 TABLET ORAL ONCE
Refills: 0 | Status: COMPLETED | OUTPATIENT
Start: 2020-08-12 | End: 2020-08-12

## 2020-08-12 RX ORDER — HYDRALAZINE HCL 50 MG
10 TABLET ORAL ONCE
Refills: 0 | Status: COMPLETED | OUTPATIENT
Start: 2020-08-12 | End: 2020-08-12

## 2020-08-12 RX ORDER — IPRATROPIUM/ALBUTEROL SULFATE 18-103MCG
3 AEROSOL WITH ADAPTER (GRAM) INHALATION EVERY 6 HOURS
Refills: 0 | Status: COMPLETED | OUTPATIENT
Start: 2020-08-12 | End: 2020-08-13

## 2020-08-12 RX ORDER — HEPARIN SODIUM 5000 [USP'U]/ML
5000 INJECTION INTRAVENOUS; SUBCUTANEOUS EVERY 8 HOURS
Refills: 0 | Status: DISCONTINUED | OUTPATIENT
Start: 2020-08-12 | End: 2020-08-25

## 2020-08-12 RX ORDER — HEPARIN SODIUM 5000 [USP'U]/ML
5000 INJECTION INTRAVENOUS; SUBCUTANEOUS EVERY 8 HOURS
Refills: 0 | Status: DISCONTINUED | OUTPATIENT
Start: 2020-08-12 | End: 2020-08-12

## 2020-08-12 RX ORDER — LABETALOL HCL 100 MG
10 TABLET ORAL ONCE
Refills: 0 | Status: COMPLETED | OUTPATIENT
Start: 2020-08-12 | End: 2020-08-12

## 2020-08-12 RX ORDER — FUROSEMIDE 40 MG
10 TABLET ORAL ONCE
Refills: 0 | Status: COMPLETED | OUTPATIENT
Start: 2020-08-12 | End: 2020-08-12

## 2020-08-12 RX ORDER — FUROSEMIDE 40 MG
40 TABLET ORAL ONCE
Refills: 0 | Status: DISCONTINUED | OUTPATIENT
Start: 2020-08-12 | End: 2020-08-12

## 2020-08-12 RX ORDER — HYDRALAZINE HCL 50 MG
25 TABLET ORAL ONCE
Refills: 0 | Status: COMPLETED | OUTPATIENT
Start: 2020-08-12 | End: 2020-08-12

## 2020-08-12 RX ADMIN — Medication 75 MILLIGRAM(S): at 05:13

## 2020-08-12 RX ADMIN — CHLORHEXIDINE GLUCONATE 15 MILLILITER(S): 213 SOLUTION TOPICAL at 05:12

## 2020-08-12 RX ADMIN — Medication 75 MILLIGRAM(S): at 21:22

## 2020-08-12 RX ADMIN — Medication 1 DROP(S): at 05:13

## 2020-08-12 RX ADMIN — FENTANYL CITRATE 50 MICROGRAM(S): 50 INJECTION INTRAVENOUS at 07:30

## 2020-08-12 RX ADMIN — HEPARIN SODIUM 5000 UNIT(S): 5000 INJECTION INTRAVENOUS; SUBCUTANEOUS at 14:37

## 2020-08-12 RX ADMIN — LEVETIRACETAM 400 MILLIGRAM(S): 250 TABLET, FILM COATED ORAL at 05:11

## 2020-08-12 RX ADMIN — PROPOFOL 16.5 MICROGRAM(S)/KG/MIN: 10 INJECTION, EMULSION INTRAVENOUS at 00:52

## 2020-08-12 RX ADMIN — FENTANYL CITRATE 50 MICROGRAM(S): 50 INJECTION INTRAVENOUS at 19:00

## 2020-08-12 RX ADMIN — Medication 10 MILLIGRAM(S): at 07:51

## 2020-08-12 RX ADMIN — FENTANYL CITRATE 50 MICROGRAM(S): 50 INJECTION INTRAVENOUS at 14:00

## 2020-08-12 RX ADMIN — Medication 4 MILLILITER(S): at 17:21

## 2020-08-12 RX ADMIN — POLYETHYLENE GLYCOL 3350 17 GRAM(S): 17 POWDER, FOR SOLUTION ORAL at 17:59

## 2020-08-12 RX ADMIN — PANTOPRAZOLE SODIUM 40 MILLIGRAM(S): 20 TABLET, DELAYED RELEASE ORAL at 17:58

## 2020-08-12 RX ADMIN — OXYCODONE HYDROCHLORIDE 10 MILLIGRAM(S): 5 TABLET ORAL at 21:50

## 2020-08-12 RX ADMIN — AMIODARONE HYDROCHLORIDE 200 MILLIGRAM(S): 400 TABLET ORAL at 00:23

## 2020-08-12 RX ADMIN — CHLORHEXIDINE GLUCONATE 1 APPLICATION(S): 213 SOLUTION TOPICAL at 05:13

## 2020-08-12 RX ADMIN — NIMODIPINE 60 MILLIGRAM(S): 60 SOLUTION ORAL at 14:37

## 2020-08-12 RX ADMIN — SENNA PLUS 10 MILLILITER(S): 8.6 TABLET ORAL at 21:22

## 2020-08-12 RX ADMIN — PANTOPRAZOLE SODIUM 40 MILLIGRAM(S): 20 TABLET, DELAYED RELEASE ORAL at 05:12

## 2020-08-12 RX ADMIN — Medication 10 MILLIGRAM(S): at 11:22

## 2020-08-12 RX ADMIN — Medication 20 MILLIGRAM(S): at 12:10

## 2020-08-12 RX ADMIN — FENTANYL CITRATE 50 MICROGRAM(S): 50 INJECTION INTRAVENOUS at 14:15

## 2020-08-12 RX ADMIN — Medication 4 MILLILITER(S): at 23:35

## 2020-08-12 RX ADMIN — Medication 1 DROP(S): at 00:23

## 2020-08-12 RX ADMIN — FENTANYL CITRATE 50 MICROGRAM(S): 50 INJECTION INTRAVENOUS at 18:45

## 2020-08-12 RX ADMIN — Medication 25 MILLIGRAM(S): at 00:23

## 2020-08-12 RX ADMIN — HEPARIN SODIUM 5000 UNIT(S): 5000 INJECTION INTRAVENOUS; SUBCUTANEOUS at 21:22

## 2020-08-12 RX ADMIN — Medication 1 DROP(S): at 11:23

## 2020-08-12 RX ADMIN — FENTANYL CITRATE 50 MICROGRAM(S): 50 INJECTION INTRAVENOUS at 22:45

## 2020-08-12 RX ADMIN — FENTANYL CITRATE 50 MICROGRAM(S): 50 INJECTION INTRAVENOUS at 07:45

## 2020-08-12 RX ADMIN — FENTANYL CITRATE 50 MICROGRAM(S): 50 INJECTION INTRAVENOUS at 04:05

## 2020-08-12 RX ADMIN — Medication 10 MILLIGRAM(S): at 03:35

## 2020-08-12 RX ADMIN — OXYCODONE HYDROCHLORIDE 10 MILLIGRAM(S): 5 TABLET ORAL at 11:15

## 2020-08-12 RX ADMIN — LEVETIRACETAM 400 MILLIGRAM(S): 250 TABLET, FILM COATED ORAL at 17:58

## 2020-08-12 RX ADMIN — Medication 1 DROP(S): at 18:00

## 2020-08-12 RX ADMIN — OXYCODONE HYDROCHLORIDE 10 MILLIGRAM(S): 5 TABLET ORAL at 11:45

## 2020-08-12 RX ADMIN — Medication 10 MILLIGRAM(S): at 01:10

## 2020-08-12 RX ADMIN — Medication 75 MILLIGRAM(S): at 14:37

## 2020-08-12 RX ADMIN — FENTANYL CITRATE 50 MICROGRAM(S): 50 INJECTION INTRAVENOUS at 20:45

## 2020-08-12 RX ADMIN — OXYCODONE HYDROCHLORIDE 10 MILLIGRAM(S): 5 TABLET ORAL at 05:30

## 2020-08-12 RX ADMIN — FENTANYL CITRATE 50 MICROGRAM(S): 50 INJECTION INTRAVENOUS at 04:20

## 2020-08-12 RX ADMIN — OXYCODONE HYDROCHLORIDE 10 MILLIGRAM(S): 5 TABLET ORAL at 05:00

## 2020-08-12 RX ADMIN — CHLORHEXIDINE GLUCONATE 15 MILLILITER(S): 213 SOLUTION TOPICAL at 17:58

## 2020-08-12 RX ADMIN — PROPOFOL 16.5 MICROGRAM(S)/KG/MIN: 10 INJECTION, EMULSION INTRAVENOUS at 07:45

## 2020-08-12 RX ADMIN — NIMODIPINE 60 MILLIGRAM(S): 60 SOLUTION ORAL at 09:29

## 2020-08-12 RX ADMIN — FENTANYL CITRATE 50 MICROGRAM(S): 50 INJECTION INTRAVENOUS at 21:00

## 2020-08-12 RX ADMIN — FENTANYL CITRATE 50 MICROGRAM(S): 50 INJECTION INTRAVENOUS at 09:30

## 2020-08-12 RX ADMIN — Medication 3 MILLILITER(S): at 17:21

## 2020-08-12 RX ADMIN — Medication 10 MILLIGRAM(S): at 00:19

## 2020-08-12 RX ADMIN — OXYCODONE HYDROCHLORIDE 10 MILLIGRAM(S): 5 TABLET ORAL at 21:20

## 2020-08-12 RX ADMIN — Medication 3 MILLILITER(S): at 23:32

## 2020-08-12 RX ADMIN — FENTANYL CITRATE 50 MICROGRAM(S): 50 INJECTION INTRAVENOUS at 11:30

## 2020-08-12 RX ADMIN — Medication 20 MILLIEQUIVALENT(S): at 05:12

## 2020-08-12 RX ADMIN — FENTANYL CITRATE 50 MICROGRAM(S): 50 INJECTION INTRAVENOUS at 11:45

## 2020-08-12 RX ADMIN — NIMODIPINE 60 MILLIGRAM(S): 60 SOLUTION ORAL at 17:59

## 2020-08-12 RX ADMIN — FENTANYL CITRATE 50 MICROGRAM(S): 50 INJECTION INTRAVENOUS at 09:45

## 2020-08-12 RX ADMIN — POLYETHYLENE GLYCOL 3350 17 GRAM(S): 17 POWDER, FOR SOLUTION ORAL at 05:11

## 2020-08-12 RX ADMIN — NIMODIPINE 60 MILLIGRAM(S): 60 SOLUTION ORAL at 21:22
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
## 2020-08-12 NOTE — PROGRESS NOTE ADULT - ASSESSMENT
Assessment: 60 year old male s/p cardiac arrest c/b GIB and bleeding from scott with Perimesencephalic (HH4 mF4) subarachnoid hemorrhage, PBD 5.    NEURO: SAH, possible sentinal event with cardiac arrest  CT Head: perimesencephalic SAH   CTA Head: no aneurysm noted  Conventional angiogram negative  MRI neuroaxis: restricted diffusion in R BG, posterior limb of R IC, and anterior right temporal lobe   VEEG negative   Delayed Cerebral Ischemia Management: euvolemia, nimodipine 60 q4  No EVD as no significant hydrocephalus    PULM:  [facility] vent: settings- 14/500/60/5  spO2>92%  CXR: clear  CTA R/O PE: negative  CPAP deferred today re P/F ratio 148    CV:  SBP goal<140  TTE: Global LV dysfunction. EF41%, Severe concentric LVH, flattening of interventricular septum.   Concern for possible PE. CTA r/o PE.   hydralazine 75 q8  pro BNP. Possible need for diuresis in next 24 hrs.  Cardiac arrest- troponin trending down, no cardiac cath at OSH due to neuro status  Will need cath per cardiology  Cardiology consult appreciated. Possible cMRI.   S/P amio gtt; changed to  daily.     RENAL: ?CKD with superimposed JALEN. Unclear baseline.   Cr improving 1.8--> 1.7 (despite contrast for angio).  On plasmalyte @50 for now, renoprotective.   Monitor closely for need for diuresis  Sodium goal 140-150  Is,Os (urine output >50cc/hr)    GI: GI bleed.   Diet: TF- Nepro  GI prophylaxis [] not indicated [x] PPI 40 BID IV push [] other:  Bowel regimen [x] senna [] other:  LBM: 8/7.     ENDO:   Goal euglycemia (-180)  ISS    HEME/ONC: afib on coumadin at home  s/p vit K  INR 1.42-->1.2  VTE prophylaxis: [] SCDs [] chemoprophylaxis [x] hold chemoprophylaxis due to: SAH- unclear etiology of bleed  Dopplers negative for.    ID:  leukocytosis improving  S/P TTM. Now normothermia. Monitor for fevers.     SOCIAL/FAMILY:  [] awaiting [x] updated at bedside [] family meeting    CODE STATUS:  [x] Full Code [] DNR [] DNI [] Palliative/Comfort Care    DISPOSITION:  [x] ICU [] Stroke Unit [] Floor [] EMU [] RCU [] PCU    [x] Patient is at high risk of neurologic deterioration/death due to: SAH, cardiac arrest    Time spent: 45 critical care minutes Assessment: 60 year old male s/p cardiac arrest c/b GIB and bleeding from sctot with Perimesencephalic (HH4 mF4) subarachnoid hemorrhage, PBD 5.    NEURO: SAH, possible sentinal event with cardiac arrest  CT Head: perimesencephalic SAH   CTA Head: no aneurysm noted  Conventional angiogram negative  MRI neuroaxis: restricted diffusion in R BG, posterior limb of R IC, and anterior right temporal lobe   VEEG negative   Delayed Cerebral Ischemia Management: euvolemia, nimodipine 60 q4 as tolerated  No EVD as no significant hydrocephalus    PULM:  [facility] vent: settings- 14/500/60/5  spO2>92%  CXR: clear  CTA R/O PE: negative  CPAP deferred yesterday     CV:  SBP goal<140  TTE: Global LV dysfunction. EF41%, Severe concentric LVH, flattening of interventricular septum.   Concern for possible PE. CTA r/o PE.   hydralazine 75 q8  pro BNP. Possible need for diuresis in next 24 hrs.  Cardiac arrest- troponin trending down, no cardiac cath at OSH due to neuro status  Will need cath per cardiology  Cardiology consult appreciated. Possible cMRI.   S/P amio gtt; changed to  daily.     RENAL: ?CKD with superimposed JALEN. Unclear baseline.   Cr improving 1.8--> 1.7 (despite contrast for angio).  On plasmalyte @50 for now, renoprotective.   Monitor closely for need for diuresis  Sodium goal 140-150  Is,Os (urine output >50cc/hr)    GI: GI bleed.   Diet: TF- Nepro  GI prophylaxis [] not indicated [x] PPI 40 BID IV push [] other:  Bowel regimen [x] senna [] other:  LBM: 8/7.     ENDO:   Goal euglycemia (-180)  ISS    HEME/ONC: afib on coumadin at home  s/p vit K  INR 1.42-->1.2  VTE prophylaxis: [] SCDs [] chemoprophylaxis [x] hold chemoprophylaxis due to: SAH- unclear etiology of bleed  Dopplers negative for.    ID:  leukocytosis improving  S/P TTM. Now normothermia. Monitor for fevers.     SOCIAL/FAMILY:  [] awaiting [x] updated at bedside [] family meeting    CODE STATUS:  [x] Full Code [] DNR [] DNI [] Palliative/Comfort Care    DISPOSITION:  [x] ICU [] Stroke Unit [] Floor [] EMU [] RCU [] PCU    [x] Patient is at high risk of neurologic deterioration/death due to: SAH, cardiac arrest    Time spent: 45 critical care minutes Assessment: 60 year old male s/p cardiac arrest c/b GIB and bleeding from scott with Perimesencephalic (HH4 mF4) subarachnoid hemorrhage, PBD 5.    NEURO: SAH, possible sentinal event with cardiac arrest  CT Head: perimesencephalic SAH   CTA Head: no aneurysm noted  Conventional angiogram negative  MRI neuroaxis: restricted diffusion in R BG, posterior limb of R IC, and anterior right temporal lobe   VEEG negative   Delayed Cerebral Ischemia Management: euvolemia, nimodipine 60 q4 as tolerated  No EVD as no significant hydrocephalus    PULM:  [facility] vent: settings- 14/500/60/5  spO2>92%  CXR: clear  CTA R/O PE: negative  CPAP deferred yesterday     CV:  SBP goal<160  TTE: Global LV dysfunction. EF41%, Severe concentric LVH, flattening of interventricular septum.   Concern for possible PE - CTA negative  hydralazine 75 q8  Cardiac arrest- troponin trending down, no cardiac cath at OSH due to neuro status  Will need cath per cardiology  Cardiology consult appreciated. Possible cMRI.   S/P amio gtt; changed to  daily.     RENAL: ?CKD with superimposed JALEN. Unclear baseline.   Cr improving 1.8--> 1.6 (despite contrast for angio).  Monitor closely for need for diuresis  Sodium goal 140-150  Is,Os (urine output >50-75 cc/hr)    GI: GI bleed.   Diet: TF- Nepro  GI prophylaxis [] not indicated [x] PPI 40 BID IV push [] other:  Bowel regimen [x] senna [] other:  LBM: 8/7.     ENDO:   Goal euglycemia (-180)  ISS    HEME/ONC: afib on coumadin at home  s/p vit K  INR 1.42-->1.2  VTE prophylaxis: [] SCDs [] chemoprophylaxis [x] hold chemoprophylaxis due to: SAH- unclear etiology of bleed  Dopplers negative for.    ID:  leukocytosis improving  S/P TTM. Now normothermia. Monitor for fevers.     SOCIAL/FAMILY:  [] awaiting [x] updated at bedside [] family meeting    CODE STATUS:  [x] Full Code [] DNR [] DNI [] Palliative/Comfort Care    DISPOSITION:  [x] ICU [] Stroke Unit [] Floor [] EMU [] RCU [] PCU    [x] Patient is at high risk of neurologic deterioration/death due to: SAH, cardiac arrest    Time spent: 45 critical care minutes Assessment: 60 year old male s/p cardiac arrest c/b GIB and bleeding from [place]ott with Perimesencephalic (HH4 mF4) subarachnoid hemorrhage, PBD 5.    NEURO: SAH, possible sentinal event with cardiac arrest  CT Head: perimesencephalic SAH   CTA Head: no aneurysm noted  Conventional angiogram negative  MRI neuroaxis: restricted diffusion in R BG, posterior limb of R IC, and anterior right temporal lobe   VEEG negative   Delayed Cerebral Ischemia Management: euvolemia, nimodipine 60 q4 as tolerated  No EVD as no significant hydrocephalus    PULM:  [facility] vent: settings- 14/500/60/5  spO2>92%  CXR: clear  CTA R/O PE: negative  CPAP deferred yesterday     CV:  SBP goal<160  TTE: Global LV dysfunction. EF41%, Severe concentric LVH, flattening of interventricular septum.   Concern for possible PE - CTA negative  hydralazine 75 q8  Cardiac arrest- troponin trending down, no cardiac cath at OSH due to neuro status  Will need cath per cardiology  Cardiology consult appreciated. Possible cMRI.   S/P amio gtt; changed to  daily.     RENAL: ?CKD with superimposed JALEN. Unclear baseline.   Cr improving 1.8--> 1.6 (despite contrast for angio).  Monitor closely for need for diuresis  Sodium goal 140-150  I/os (urine output >50-75 cc/hr)    GI: GI bleed.   Diet: TF- Nepro  GI prophylaxis [] not indicated [x] PPI 40 BID IV push [] other:  Bowel regimen [x] senna [] other:  LBM: 8/7.     ENDO:   Goal euglycemia (-180)  ISS    HEME/ONC: afib on coumadin at home  s/p vit K  INR 1.42-->1.2  VTE prophylaxis: [] SCDs [] chemoprophylaxis [x] hold chemoprophylaxis due to: SAH- unclear etiology of bleed  Dopplers negative for.    ID:  leukocytosis improving  S/P TTM. Now normothermia. Monitor for fevers.     SOCIAL/FAMILY:  [] awaiting [x] updated at bedside [] family meeting    CODE STATUS:  [x] Full Code [] DNR [] DNI [] Palliative/Comfort Care    DISPOSITION:  [x] ICU [] Stroke Unit [] Floor [] EMU [] RCU [] PCU    [x] Patient is at high risk of neurologic deterioration/death due to: SAH, cardiac arrest    Time spent: 45 critical care minutes Assessment: 60 year old male s/p cardiac arrest c/b GIB and bleeding from [place]ott with Perimesencephalic (HH4 mF4) subarachnoid hemorrhage, PBD 5.    NEURO: SAH, possible sentinal event with cardiac arrest  CT Head: perimesencephalic SAH   CTA Head: no aneurysm noted  Conventional angiogram negative  MRI neuroaxis: restricted diffusion in R BG, posterior limb of R IC, and anterior right temporal lobe   VEEG negative   Delayed Cerebral Ischemia Management: euvolemia, nimodipine 60 q4 as tolerated  No EVD as no significant hydrocephalus    PULM:  [facility] vent: settings- 14/500/60/5  spO2>92%  CXR: pulm edema  CTA R/O PE: negative  CPAP deferred yesterday   lasix for pulm edema    CV:  SBP goal<160  TTE: Global LV dysfunction. EF41%, Severe concentric LVH, flattening of interventricular septum.   Concern for possible PE - CTA negative  hydralazine 75 q8  Cardiac arrest- troponin trending down, no cardiac cath at OSH due to neuro status  Will need cath per cardiology  Cardiology consult appreciated. Possible cMRI.   S/P amio gtt; changed to  daily.     RENAL: ?CKD with superimposed JALEN. Unclear baseline.   Cr improving 1.8--> 1.6 (despite contrast for angio).  Monitor closely for need for diuresis  Sodium goal 140-150  I/os (urine output >50-75 cc/hr)    GI: GI bleed.   Diet: TF- Nepro  GI prophylaxis [] not indicated [x] PPI 40 BID IV push [] other:  Bowel regimen [x] senna [] other:  LBM: 8/11.     ENDO:   Goal euglycemia (-180)  ISS    HEME/ONC: afib on coumadin at home  s/p vit K  INR 1.42-->1.2  VTE prophylaxis: [] SCDs [x] chemoprophylaxis on DVT ppx heparin subq  Dopplers negative for.    ID:  leukocytosis improving  S/P TTM. Now normothermia. Monitor for fevers.     SOCIAL/FAMILY:  [] awaiting [x] updated at bedside [] family meeting    CODE STATUS:  [x] Full Code [] DNR [] DNI [] Palliative/Comfort Care    DISPOSITION:  [x] ICU [] Stroke Unit [] Floor [] EMU [] RCU [] PCU    [x] Patient is at high risk of neurologic deterioration/death due to: SAH, cardiac arrest    Time spent: 45 critical care minutes

## 2020-08-12 NOTE — CHART NOTE - NSCHARTNOTEFT_GEN_A_CORE
Transcranial doppler exam #1 8/11/2020  mean velocity  cm/sec                              Left         Right  KUNAL                    x            x  MCA                   x            x  PCA                    x            x  VERT                  27          24  BA                            24  Technically difficult study.  No temporal windows bilaterally.  Official report to follow.  Edna

## 2020-08-12 NOTE — PROGRESS NOTE ADULT - SUBJECTIVE AND OBJECTIVE BOX
Subjective: Patient seen and examined. No new events except as noted.   Remains intubated in NSCU     REVIEW OF SYSTEMS:  Unable to obtain         MEDICATIONS:  MEDICATIONS  (STANDING):  acetylcysteine 20%  Inhalation 4 milliLiter(s) Inhalation every 6 hours  albuterol/ipratropium for Nebulization 3 milliLiter(s) Nebulizer every 6 hours  aMIOdarone    Tablet 200 milliGRAM(s) Oral daily  artificial  tears Solution 1 Drop(s) Both EYES every 6 hours  bisacodyl Suppository 10 milliGRAM(s) Rectal daily  chlorhexidine 0.12% Liquid 15 milliLiter(s) Oral Mucosa every 12 hours  chlorhexidine 4% Liquid 1 Application(s) Topical <User Schedule>  dextrose 5%. 1000 milliLiter(s) (50 mL/Hr) IV Continuous <Continuous>  dextrose 50% Injectable 12.5 Gram(s) IV Push once  dextrose 50% Injectable 25 Gram(s) IV Push once  dextrose 50% Injectable 25 Gram(s) IV Push once  heparin   Injectable 5000 Unit(s) SubCutaneous every 8 hours  hydrALAZINE 75 milliGRAM(s) Oral every 8 hours  levETIRAcetam  IVPB 500 milliGRAM(s) IV Intermittent every 12 hours  niMODipine Oral Solution 60 milliGRAM(s) Oral/Enteral Tube every 4 hours  pantoprazole  Injectable 40 milliGRAM(s) IV Push every 12 hours  polyethylene glycol 3350 17 Gram(s) Oral every 12 hours  propofol Infusion 25 MICROgram(s)/kG/Min (16.5 mL/Hr) IV Continuous <Continuous>  senna Syrup 10 milliLiter(s) Oral at bedtime      PHYSICAL EXAM:  T(C): 37.3 (08-12-20 @ 15:00), Max: 37.7 (08-12-20 @ 03:00)  HR: 59 (08-12-20 @ 18:00) (54 - 100)  BP: --  RR: 16 (08-12-20 @ 18:00) (12 - 25)  SpO2: 98% (08-12-20 @ 18:00) (96% - 100%)  Wt(kg): --  I&O's Summary    11 Aug 2020 07:01  -  12 Aug 2020 07:00  --------------------------------------------------------  IN: 2419.6 mL / OUT: 1930 mL / NET: 489.6 mL    12 Aug 2020 07:01  -  12 Aug 2020 19:51  --------------------------------------------------------  IN: 935.2 mL / OUT: 2025 mL / NET: -1089.8 mL          Appearance: Intubated sedated 	  HEENT:   Dry  oral mucosa,  Lymphatic: No lymphadenopathy  Cardiovascular: Normal S1 S2, No JVD, No murmurs, No edema  Respiratory: Ventilated   Psychiatry: A & O x 0  Gastrointestinal:  Soft, Non-tender, + BS	  Skin: No rashes, No ecchymoses, No cyanosis	  Mental status- No acute distress, EO to stim  -CN- Pupils R 4mm NR, L 2mm sluggish, EOMI, tongue midline, face symmetric  +cough/gag  RUE localize AG  RLE spont AG  LUE flaccid  LLE flaccid    Extremities: decreased range of motion, No clubbing, cyanosis or edema  Vascular: Peripheral pulses palpable 2+ bilaterally  +scott          LABS:    CARDIAC MARKERS:                                10.1   11.44 )-----------( 168      ( 11 Aug 2020 22:53 )             32.2     08-11    145  |  114<H>  |  21  ----------------------------<  127<H>  3.8   |  19<L>  |  1.67<H>    Ca    9.0      11 Aug 2020 22:53  Phos  3.4     08-11  Mg     2.2     08-11    TPro  6.5  /  Alb  3.5  /  TBili  0.4  /  DBili  0.2  /  AST  71<H>  /  ALT  47<H>  /  AlkPhos  76  08-10    proBNP:   Lipid Profile:   HgA1c:   TSH:             TELEMETRY: 	 AF   ECG:  	  RADIOLOGY: < from: CT Head No Cont (08.12.20 @ 09:51) >    EXAM:  CT BRAIN                            PROCEDURE DATE:  08/12/2020            INTERPRETATION:  Indication: Follow-up subarachnoid hemorrhage    Multiple axial sections were performed base skull to vertex for contrast enhancement.    Previously noted subarachnoid hemorrhage is less conspicuous.    New area of high attenuation is seen involving the right posterior temporal/parietal region. This could be compatible with new areas subarachnoid hemorrhage though parenchymal hemorrhage cannot because excluded. This finding can be better characterized with MRI (if there are no contraindications).    No significant shift or herniation is seen    Evaluation of the osseous structures with the appropriate window appears unremarkable.    Right-sided NG tube is seen.    Oral tracheal tube is seen.    Bilateral maxillary ethmoid sphenoid sinus coastal thickening is seen. Small air-fluid level seen involving right frontal sinus.    IMPRESSION: Subarachnoid hemorrhage is again seen and slightly less conspicuous.    New area of high attenuation involving the right posterior temporal/parietal region as described above.                  EUGENIO HIGUREA M.D., ATTENDING RADIOLOGIST  This document has been electronically signed. Aug 12 2020 10:07AM              < end of copied text >    DIAGNOSTIC TESTING:  [ ] Echocardiogram:  [ ]  Catheterization:  [ ] Stress Test:    OTHER:

## 2020-08-12 NOTE — DIETITIAN INITIAL EVALUATION ADULT. - ADD RECOMMEND
1) Monitor provision of propofol; GI tolerance. RD to remain available to adjust EN formulary, volume/rate PRN. 2) Obtain subjective wt/diet history from pt/family PRN. 3) Monitor electrolytes; consider resume Nepro if phos/K persistently elevated. 4) Monitor wt trends, nutrition related labs, skin integrity, hydration status and bowel regularity.

## 2020-08-12 NOTE — DIETITIAN INITIAL EVALUATION ADULT. - PERTINENT MEDS FT
Heparin, Peridex, Hibiclens, Duoneb, Amiodarone, Dulcolax, Fentanyl, Hydralazine, Oxycodone, Propofol, Protonix, Keppra, Nimodipine, Miralax, Senna

## 2020-08-12 NOTE — PROGRESS NOTE ADULT - SUBJECTIVE AND OBJECTIVE BOX
Patient seen and examined    Overnight events: started FC  Exam:  R pupils 4 NR, L 2mm reactive,RUE spon movement FC, RLE spon movement, L side nothing.

## 2020-08-12 NOTE — DIETITIAN INITIAL EVALUATION ADULT. - ENERGY NEEDS
Ht: 68"  Wt: 242 lbs   BMI: 36.9 kg/m2   IBW: 154 lbs  (+/-10%)     157 % IBW  Edema: 2+ periorbital          Skin: surgical incision right groin s/p angio

## 2020-08-12 NOTE — PROGRESS NOTE ADULT - SUBJECTIVE AND OBJECTIVE BOX
remains intubated  requiring propofol for vent synchrony, bradycardia with precedex    vitals/labs/meds reviewed  minimal EO to stim, intubated, does not nod, but FC, two fingers on RUE, distally AG, wiggles toes on RLE, no movements on L

## 2020-08-12 NOTE — DIETITIAN INITIAL EVALUATION ADULT. - PHYSICAL APPEARANCE
Pt unable to provide consent for Nutrition Focused Physical Assessment. Based on visual observation, pt with ? mild body fat depletion to orbitals. Will conduct full Nutrition Focused Physical Assessment as able.

## 2020-08-12 NOTE — DIETITIAN INITIAL EVALUATION ADULT. - ENTERAL
1) Consider change EN feeds to Vital AF at 60ml/hr x 24 hrs. To provide (with provision of Propofol, providing +517kcal daily): 1440ml, 2245kcal and 108g protein. Based on upper IBW 76.8kg, provides: 29kcal/kg and 1.4g protein/kg

## 2020-08-12 NOTE — DIETITIAN INITIAL EVALUATION ADULT. - OTHER INFO
Pt is a 59 yo M with PMH: [place] on coumadin s/p cardiac arrest at work. Downtime 25 minutes prior to ROSC. HCT showed R periclinoidal / perimesencephalic SAH on unclear etiology. Course c/b GIB; put on Protonix drip. Overnight, nimodipine held due to bradycardia; got Lasix for fluid overload. Per MD note, ? CKD with superimposed JALEN; unclear baseline.     Pt observed resting in bed at time of RD visit. Remains intubated at this time. Unable to obtain subjective wt/diet history. No documented food allergies. Baseline tolerance to chewing/swallowing and baseline provision of energy/protein intake unknown.     Currently prescribed Propofol; infusing at 19.6ml/hr. If to continue x 24 hrs, will provide: 517kcal daily.    Currently receiving Nepro with Carb Steady at 50ml/hr x 24 hrs. Providing 1200ml, 2160kcal and 97g protein. Held at time of RD visit. No documented BM's recorded thus far. On bowel regimen as ordered. Continues on Humalog for blood glucose control. A1c 6.6%. Home medication list unspecified as per review of H&P.     Dosing wt (8/10/20): 242 lbs. UBW unclear.

## 2020-08-12 NOTE — PROGRESS NOTE ADULT - SUBJECTIVE AND OBJECTIVE BOX
HPI:  Patient is a 60 year old male with PMHx of Afib on coumadin s/p cardiac arrest at work, downtime 25 minutes prior to ROSC. Pupils were R fixed and dilated, left fixed at the time, no gag reflex, post-CA cooling protocol was initiated down to 35 deg. Intubated and put on Nimbex drip. Also on Propofol, fentanyl, levophed. R groin minerva placed. Also put on heparin post-CA. HCT showed R periclinoidal/perimesencephalic SAH of unclear etiology, no hydrocephalus. Course also c/b GIB, was put on protonix drip. Also had bleeding from [facility] - urology consulted, clot irrigated.     HOSP COURSE:   8/10: Angio- negative    Overnight events: nimodipine DC due to bradycardia, got lasix 10 x1    VITALS:  T(C): , Max: 37.7 (08-12-20 @ 03:00)  HR:  (54 - 97)  BP: --  ABP:  (128/65 - 190/84)  RR:  (12 - 25)  SpO2:  (98% - 100%)  Wt(kg): --  Device: Avea, Mode: AC/ CMV (Assist Control/ Continuous Mandatory Ventilation), RR (machine): 14, TV (machine): 500, FiO2: 60, PEEP: 5, ITime: 1, MAP: 10, PIP: 20    08-11-20 @ 07:01  -  08-12-20 @ 07:00  --------------------------------------------------------  IN: 2350 mL / OUT: 1830 mL / NET: 520 mL    MEDICATIONS:  acetaminophen   Tablet .. 650 milliGRAM(s) Oral every 6 hours PRN  aMIOdarone    Tablet 200 milliGRAM(s) Oral daily  artificial  tears Solution 1 Drop(s) Both EYES every 6 hours  bisacodyl Suppository 10 milliGRAM(s) Rectal daily  chlorhexidine 0.12% Liquid 15 milliLiter(s) Oral Mucosa every 12 hours  chlorhexidine 4% Liquid 1 Application(s) Topical <User Schedule>  dextrose 40% Gel 15 Gram(s) Oral once PRN  dextrose 5%. 1000 milliLiter(s) IV Continuous <Continuous>  dextrose 50% Injectable 12.5 Gram(s) IV Push once  dextrose 50% Injectable 25 Gram(s) IV Push once  dextrose 50% Injectable 25 Gram(s) IV Push once  fentaNYL    Injectable 50 MICROGram(s) IV Push every 2 hours PRN  glucagon  Injectable 1 milliGRAM(s) IntraMuscular once PRN  hydrALAZINE 75 milliGRAM(s) Oral every 8 hours  insulin lispro (HumaLOG) corrective regimen sliding scale   SubCutaneous every 6 hours  levETIRAcetam  IVPB 500 milliGRAM(s) IV Intermittent every 12 hours  niMODipine Oral Solution 60 milliGRAM(s) Oral/Enteral Tube every 4 hours  oxyCODONE    IR 5 milliGRAM(s) Oral every 6 hours PRN  oxyCODONE    IR 10 milliGRAM(s) Oral every 6 hours PRN  pantoprazole  Injectable 40 milliGRAM(s) IV Push every 12 hours  polyethylene glycol 3350 17 Gram(s) Oral every 12 hours  propofol Infusion 25 MICROgram(s)/kG/Min IV Continuous <Continuous>  senna Syrup 10 milliLiter(s) Oral at bedtime  sodium chloride 0.9% lock flush 10 milliLiter(s) IV Push every 1 hour PRN    EXAMINATION:  General: Calm, intubated  HEENT: MMM  Neuro:  -Mental status- No acute distress, EO to voice. Follows commands briskly, shows 2 fingers and wiggles toes on right. Able to lift RUE off bed AG.   -CN- Pupils R 5mm NR, L 2mm sluggish, Eyes midline,  +cough/gag, +corneals  RUE localize AG, RLE spont AG, LUE flaccid, LLE flaccid  CVS: S1/S2  RESP: Diminished at bases  GI: Soft, non tender, mildly distended  Extremities: Warm, skin intact HPI:  Patient is a 60 year old male with PMHx of Afib on coumadin s/p cardiac arrest at work, downtime 25 minutes prior to ROSC. Pupils were R fixed and dilated, left fixed at the time, no gag reflex, post-CA cooling protocol was initiated down to 35 deg. Intubated and put on Nimbex drip. Also on Propofol, fentanyl, levophed. R groin minerva placed. Also put on heparin post-CA. HCT showed R periclinoidal/perimesencephalic SAH of unclear etiology, no hydrocephalus. Course also c/b GIB, was put on protonix drip. Also had bleeding from [facility] - urology consulted, clot irrigated.     HOSP COURSE:   8/10: Angio- negative    Overnight events: nimodipine held due to bradycardia, got lasix 10 x1    VITALS:  T(C): , Max: 37.7 (08-12-20 @ 03:00)  HR:  (54 - 97)  BP: --  ABP:  (128/65 - 190/84)  RR:  (12 - 25)  SpO2:  (98% - 100%)  Wt(kg): --  Device: Avea, Mode: AC/ CMV (Assist Control/ Continuous Mandatory Ventilation), RR (machine): 14, TV (machine): 500, FiO2: 60, PEEP: 5, ITime: 1, MAP: 10, PIP: 20    08-11-20 @ 07:01  -  08-12-20 @ 07:00  --------------------------------------------------------  IN: 2350 mL / OUT: 1830 mL / NET: 520 mL    MEDICATIONS:  acetaminophen   Tablet .. 650 milliGRAM(s) Oral every 6 hours PRN  aMIOdarone    Tablet 200 milliGRAM(s) Oral daily  artificial  tears Solution 1 Drop(s) Both EYES every 6 hours  bisacodyl Suppository 10 milliGRAM(s) Rectal daily  chlorhexidine 0.12% Liquid 15 milliLiter(s) Oral Mucosa every 12 hours  chlorhexidine 4% Liquid 1 Application(s) Topical <User Schedule>  dextrose 40% Gel 15 Gram(s) Oral once PRN  dextrose 5%. 1000 milliLiter(s) IV Continuous <Continuous>  dextrose 50% Injectable 12.5 Gram(s) IV Push once  dextrose 50% Injectable 25 Gram(s) IV Push once  dextrose 50% Injectable 25 Gram(s) IV Push once  fentaNYL    Injectable 50 MICROGram(s) IV Push every 2 hours PRN  glucagon  Injectable 1 milliGRAM(s) IntraMuscular once PRN  hydrALAZINE 75 milliGRAM(s) Oral every 8 hours  insulin lispro (HumaLOG) corrective regimen sliding scale   SubCutaneous every 6 hours  levETIRAcetam  IVPB 500 milliGRAM(s) IV Intermittent every 12 hours  niMODipine Oral Solution 60 milliGRAM(s) Oral/Enteral Tube every 4 hours  oxyCODONE    IR 5 milliGRAM(s) Oral every 6 hours PRN  oxyCODONE    IR 10 milliGRAM(s) Oral every 6 hours PRN  pantoprazole  Injectable 40 milliGRAM(s) IV Push every 12 hours  polyethylene glycol 3350 17 Gram(s) Oral every 12 hours  propofol Infusion 25 MICROgram(s)/kG/Min IV Continuous <Continuous>  senna Syrup 10 milliLiter(s) Oral at bedtime  sodium chloride 0.9% lock flush 10 milliLiter(s) IV Push every 1 hour PRN    EXAMINATION:  General: Calm, intubated  HEENT: MMM  Neuro:  -Mental status- No acute distress, EO to voice. Follows commands briskly, shows 2 fingers and wiggles toes on right. Able to lift RUE off bed AG.   -CN- Pupils R 5mm NR, L 2mm sluggish, Eyes midline,  +cough/gag, +corneals  RUE localize AG, RLE spont AG, LUE flaccid, LLE flaccid  CVS: S1/S2  RESP: Diminished at bases  GI: Soft, non tender, mildly distended  Extremities: Warm, skin intact HPI:  Patient is a 60 year old male with PMHx of Afib on coumadin s/p cardiac arrest at work, downtime 25 minutes prior to ROSC. Pupils were R fixed and dilated, left fixed at the time, no gag reflex, post-CA cooling protocol was initiated down to 35 deg. Intubated and put on Nimbex drip. Also on Propofol, fentanyl, levophed. R groin minerva placed. Also put on heparin post-CA. HCT showed R periclinoidal/perimesencephalic SAH of unclear etiology, no hydrocephalus. Course also c/b GIB, was put on protonix drip. Also had bleeding from [facility] - urology consulted, clot irrigated.     HOSP COURSE:   8/10: Angio- negative    Overnight events: nimodipine held due to bradycardia, got lasix 10 x1 for fluids overload (d/t getting contrast during daytime - BUN/Cr improving)    VITALS:  T(C): , Max: 37.7 (08-12-20 @ 03:00)  HR:  (54 - 97)  BP: --  ABP:  (128/65 - 190/84)  RR:  (12 - 25)  SpO2:  (98% - 100%)  Wt(kg): --  Device: Avea, Mode: AC/ CMV (Assist Control/ Continuous Mandatory Ventilation), RR (machine): 14, TV (machine): 500, FiO2: 60, PEEP: 5, ITime: 1, MAP: 10, PIP: 20    08-11-20 @ 07:01  -  08-12-20 @ 07:00  --------------------------------------------------------  IN: 2350 mL / OUT: 1830 mL / NET: 520 mL    MEDICATIONS:  acetaminophen   Tablet .. 650 milliGRAM(s) Oral every 6 hours PRN  aMIOdarone    Tablet 200 milliGRAM(s) Oral daily  artificial  tears Solution 1 Drop(s) Both EYES every 6 hours  bisacodyl Suppository 10 milliGRAM(s) Rectal daily  chlorhexidine 0.12% Liquid 15 milliLiter(s) Oral Mucosa every 12 hours  chlorhexidine 4% Liquid 1 Application(s) Topical <User Schedule>  dextrose 40% Gel 15 Gram(s) Oral once PRN  dextrose 5%. 1000 milliLiter(s) IV Continuous <Continuous>  dextrose 50% Injectable 12.5 Gram(s) IV Push once  dextrose 50% Injectable 25 Gram(s) IV Push once  dextrose 50% Injectable 25 Gram(s) IV Push once  fentaNYL    Injectable 50 MICROGram(s) IV Push every 2 hours PRN  glucagon  Injectable 1 milliGRAM(s) IntraMuscular once PRN  hydrALAZINE 75 milliGRAM(s) Oral every 8 hours  insulin lispro (HumaLOG) corrective regimen sliding scale   SubCutaneous every 6 hours  levETIRAcetam  IVPB 500 milliGRAM(s) IV Intermittent every 12 hours  niMODipine Oral Solution 60 milliGRAM(s) Oral/Enteral Tube every 4 hours  oxyCODONE    IR 5 milliGRAM(s) Oral every 6 hours PRN  oxyCODONE    IR 10 milliGRAM(s) Oral every 6 hours PRN  pantoprazole  Injectable 40 milliGRAM(s) IV Push every 12 hours  polyethylene glycol 3350 17 Gram(s) Oral every 12 hours  propofol Infusion 25 MICROgram(s)/kG/Min IV Continuous <Continuous>  senna Syrup 10 milliLiter(s) Oral at bedtime  sodium chloride 0.9% lock flush 10 milliLiter(s) IV Push every 1 hour PRN    EXAMINATION:  General: Calm, intubated  HEENT: MMM  Neuro:  -Mental status- No acute distress, EO to voice. Follows commands briskly, shows 2 fingers and wiggles toes on right. Able to lift RUE off bed AG.   -CN- Pupils R 5mm NR, L 2mm sluggish, Eyes midline,  +cough/gag, +corneals  RUE localize AG, RLE spont AG, LUE flaccid, LLE flaccid  CVS: S1/S2  RESP: Diminished at bases  GI: Soft, non tender, mildly distended  Extremities: Warm, skin intact HPI:  Patient is a 60 year old male with PMHx of Afib on coumadin s/p cardiac arrest at work, downtime 25 minutes prior to ROSC. Pupils were R fixed and dilated, left fixed at the time, no gag reflex, post-CA cooling protocol was initiated down to 35 deg. Intubated and put on Nimbex drip. Also on Propofol, fentanyl, levophed. R groin minerva placed. Also put on heparin post-CA. HCT showed R periclinoidal/perimesencephalic SAH of unclear etiology, no hydrocephalus. Course also c/b GIB, was put on protonix drip. Also had bleeding from [place] - urology consulted, clot irrigated.     HOSP COURSE:   8/10: Angio- negative    Overnight events: nimodipine held due to bradycardia, got lasix 10 x1 for fluids overload (d/t getting contrast during daytime - BUN/Cr improving)    VITALS:  T(C): , Max: 37.7 (08-12-20 @ 03:00)  HR:  (54 - 97)  BP: --  ABP:  (128/65 - 190/84)  RR:  (12 - 25)  SpO2:  (98% - 100%)  Wt(kg): --  Device: Avea, Mode: AC/ CMV (Assist Control/ Continuous Mandatory Ventilation), RR (machine): 14, TV (machine): 500, FiO2: 60, PEEP: 5, ITime: 1, MAP: 10, PIP: 20    08-11-20 @ 07:01  -  08-12-20 @ 07:00  --------------------------------------------------------  IN: 2350 mL / OUT: 1830 mL / NET: 520 mL    MEDICATIONS:  acetaminophen   Tablet .. 650 milliGRAM(s) Oral every 6 hours PRN  aMIOdarone    Tablet 200 milliGRAM(s) Oral daily  artificial  tears Solution 1 Drop(s) Both EYES every 6 hours  bisacodyl Suppository 10 milliGRAM(s) Rectal daily  chlorhexidine 0.12% Liquid 15 milliLiter(s) Oral Mucosa every 12 hours  chlorhexidine 4% Liquid 1 Application(s) Topical <User Schedule>  dextrose 40% Gel 15 Gram(s) Oral once PRN  dextrose 5%. 1000 milliLiter(s) IV Continuous <Continuous>  dextrose 50% Injectable 12.5 Gram(s) IV Push once  dextrose 50% Injectable 25 Gram(s) IV Push once  dextrose 50% Injectable 25 Gram(s) IV Push once  fentaNYL    Injectable 50 MICROGram(s) IV Push every 2 hours PRN  glucagon  Injectable 1 milliGRAM(s) IntraMuscular once PRN  hydrALAZINE 75 milliGRAM(s) Oral every 8 hours  insulin lispro (HumaLOG) corrective regimen sliding scale   SubCutaneous every 6 hours  levETIRAcetam  IVPB 500 milliGRAM(s) IV Intermittent every 12 hours  niMODipine Oral Solution 60 milliGRAM(s) Oral/Enteral Tube every 4 hours  oxyCODONE    IR 5 milliGRAM(s) Oral every 6 hours PRN  oxyCODONE    IR 10 milliGRAM(s) Oral every 6 hours PRN  pantoprazole  Injectable 40 milliGRAM(s) IV Push every 12 hours  polyethylene glycol 3350 17 Gram(s) Oral every 12 hours  propofol Infusion 25 MICROgram(s)/kG/Min IV Continuous <Continuous>  senna Syrup 10 milliLiter(s) Oral at bedtime  sodium chloride 0.9% lock flush 10 milliLiter(s) IV Push every 1 hour PRN    EXAMINATION:  General: Calm, intubated  HEENT: MMM  Neuro:  -Mental status- (when not sedated) No acute distress, EO to voice. Follows commands briskly, shows 2 fingers and wiggles toes on right. Able to lift RUE off bed AG.   -CN- Pupils R 5mm NR, L 1mm sluggish, Eyes midline,  +cough/gag, +corneals  RUE localize AG, RLE spont AG, LUE flaccid, LLE flaccid  CVS: S1/S2  RESP: Diminished at bases  GI: Soft, non tender, mildly distended  Extremities: Warm, skin intact

## 2020-08-12 NOTE — DIETITIAN INITIAL EVALUATION ADULT. - PERTINENT LABORATORY DATA
(8/11-12): POCT Blood Glucose 119, 112, 117; (8/11): Cr 1.67, Glu 127, GFR 44, Blood Gas Arterial Lactate 0.5, A1c 6.6%, Estimated Average Glucose 143; (8/9): , HDL 37

## 2020-08-12 NOTE — PROGRESS NOTE ADULT - ASSESSMENT
PBD#3 HH5 MF4 SAH, angio negative    minimize sedation  pain control  cont keppra  nimodipine interm held for bradycardia  -160  intubated, cont full vent support  aggressive pulm toilet, frequent suctioning  monitor renal function  cont tube feeding  monitor for fevers  SQH

## 2020-08-13 LAB
APPEARANCE UR: CLEAR — SIGNIFICANT CHANGE UP
BILIRUB UR-MCNC: NEGATIVE — SIGNIFICANT CHANGE UP
COLOR SPEC: YELLOW — SIGNIFICANT CHANGE UP
DIFF PNL FLD: NEGATIVE — SIGNIFICANT CHANGE UP
GLUCOSE UR QL: NEGATIVE — SIGNIFICANT CHANGE UP
GRAM STN FLD: SIGNIFICANT CHANGE UP
KETONES UR-MCNC: NEGATIVE — SIGNIFICANT CHANGE UP
LEUKOCYTE ESTERASE UR-ACNC: NEGATIVE — SIGNIFICANT CHANGE UP
LEVETIRACETAM SERPL-MCNC: 9.7 MCG/ML — LOW (ref 12–46)
NITRITE UR-MCNC: NEGATIVE — SIGNIFICANT CHANGE UP
PH UR: 6 — SIGNIFICANT CHANGE UP (ref 5–8)
PROT UR-MCNC: 100 — SIGNIFICANT CHANGE UP
SP GR SPEC: 1.04 — HIGH (ref 1.01–1.02)
SPECIMEN SOURCE: SIGNIFICANT CHANGE UP
UROBILINOGEN FLD QL: NEGATIVE — SIGNIFICANT CHANGE UP

## 2020-08-13 PROCEDURE — 70450 CT HEAD/BRAIN W/O DYE: CPT | Mod: 26

## 2020-08-13 PROCEDURE — 99291 CRITICAL CARE FIRST HOUR: CPT

## 2020-08-13 PROCEDURE — 99292 CRITICAL CARE ADDL 30 MIN: CPT

## 2020-08-13 PROCEDURE — 71045 X-RAY EXAM CHEST 1 VIEW: CPT | Mod: 26

## 2020-08-13 RX ORDER — LABETALOL HCL 100 MG
5 TABLET ORAL ONCE
Refills: 0 | Status: COMPLETED | OUTPATIENT
Start: 2020-08-13 | End: 2020-08-13

## 2020-08-13 RX ORDER — FENTANYL CITRATE 50 UG/ML
100 INJECTION INTRAVENOUS ONCE
Refills: 0 | Status: DISCONTINUED | OUTPATIENT
Start: 2020-08-13 | End: 2020-08-13

## 2020-08-13 RX ORDER — SODIUM CHLORIDE 9 MG/ML
4 INJECTION INTRAMUSCULAR; INTRAVENOUS; SUBCUTANEOUS EVERY 12 HOURS
Refills: 0 | Status: DISCONTINUED | OUTPATIENT
Start: 2020-08-13 | End: 2020-08-27

## 2020-08-13 RX ORDER — FENTANYL CITRATE 50 UG/ML
100 INJECTION INTRAVENOUS EVERY 4 HOURS
Refills: 0 | Status: DISCONTINUED | OUTPATIENT
Start: 2020-08-13 | End: 2020-08-13

## 2020-08-13 RX ORDER — AMPICILLIN SODIUM AND SULBACTAM SODIUM 250; 125 MG/ML; MG/ML
3 INJECTION, POWDER, FOR SUSPENSION INTRAMUSCULAR; INTRAVENOUS EVERY 6 HOURS
Refills: 0 | Status: DISCONTINUED | OUTPATIENT
Start: 2020-08-14 | End: 2020-08-14

## 2020-08-13 RX ORDER — POTASSIUM CHLORIDE 20 MEQ
20 PACKET (EA) ORAL ONCE
Refills: 0 | Status: COMPLETED | OUTPATIENT
Start: 2020-08-13 | End: 2020-08-13

## 2020-08-13 RX ORDER — FENTANYL CITRATE 50 UG/ML
0.5 INJECTION INTRAVENOUS
Qty: 5000 | Refills: 0 | Status: DISCONTINUED | OUTPATIENT
Start: 2020-08-13 | End: 2020-08-20

## 2020-08-13 RX ORDER — AMPICILLIN SODIUM AND SULBACTAM SODIUM 250; 125 MG/ML; MG/ML
3 INJECTION, POWDER, FOR SUSPENSION INTRAMUSCULAR; INTRAVENOUS ONCE
Refills: 0 | Status: COMPLETED | OUTPATIENT
Start: 2020-08-13 | End: 2020-08-13

## 2020-08-13 RX ORDER — IPRATROPIUM/ALBUTEROL SULFATE 18-103MCG
3 AEROSOL WITH ADAPTER (GRAM) INHALATION EVERY 6 HOURS
Refills: 0 | Status: DISCONTINUED | OUTPATIENT
Start: 2020-08-13 | End: 2020-09-19

## 2020-08-13 RX ORDER — AMPICILLIN SODIUM AND SULBACTAM SODIUM 250; 125 MG/ML; MG/ML
INJECTION, POWDER, FOR SUSPENSION INTRAMUSCULAR; INTRAVENOUS
Refills: 0 | Status: DISCONTINUED | OUTPATIENT
Start: 2020-08-13 | End: 2020-08-14

## 2020-08-13 RX ORDER — FENTANYL CITRATE 50 UG/ML
100 INJECTION INTRAVENOUS
Refills: 0 | Status: DISCONTINUED | OUTPATIENT
Start: 2020-08-13 | End: 2020-08-13

## 2020-08-13 RX ADMIN — Medication 4 MILLILITER(S): at 05:43

## 2020-08-13 RX ADMIN — PANTOPRAZOLE SODIUM 40 MILLIGRAM(S): 20 TABLET, DELAYED RELEASE ORAL at 17:46

## 2020-08-13 RX ADMIN — NIMODIPINE 60 MILLIGRAM(S): 60 SOLUTION ORAL at 01:33

## 2020-08-13 RX ADMIN — PROPOFOL 16.5 MICROGRAM(S)/KG/MIN: 10 INJECTION, EMULSION INTRAVENOUS at 00:00

## 2020-08-13 RX ADMIN — Medication 650 MILLIGRAM(S): at 00:30

## 2020-08-13 RX ADMIN — OXYCODONE HYDROCHLORIDE 10 MILLIGRAM(S): 5 TABLET ORAL at 10:30

## 2020-08-13 RX ADMIN — NIMODIPINE 60 MILLIGRAM(S): 60 SOLUTION ORAL at 10:40

## 2020-08-13 RX ADMIN — AMPICILLIN SODIUM AND SULBACTAM SODIUM 200 GRAM(S): 250; 125 INJECTION, POWDER, FOR SUSPENSION INTRAMUSCULAR; INTRAVENOUS at 20:30

## 2020-08-13 RX ADMIN — POLYETHYLENE GLYCOL 3350 17 GRAM(S): 17 POWDER, FOR SOLUTION ORAL at 05:10

## 2020-08-13 RX ADMIN — HEPARIN SODIUM 5000 UNIT(S): 5000 INJECTION INTRAVENOUS; SUBCUTANEOUS at 05:11

## 2020-08-13 RX ADMIN — FENTANYL CITRATE 50 MICROGRAM(S): 50 INJECTION INTRAVENOUS at 05:05

## 2020-08-13 RX ADMIN — Medication 1 DROP(S): at 00:24

## 2020-08-13 RX ADMIN — Medication 20 MILLIEQUIVALENT(S): at 05:11

## 2020-08-13 RX ADMIN — Medication 1 DROP(S): at 05:12

## 2020-08-13 RX ADMIN — NIMODIPINE 60 MILLIGRAM(S): 60 SOLUTION ORAL at 05:10

## 2020-08-13 RX ADMIN — OXYCODONE HYDROCHLORIDE 10 MILLIGRAM(S): 5 TABLET ORAL at 11:00

## 2020-08-13 RX ADMIN — FENTANYL CITRATE 50 MICROGRAM(S): 50 INJECTION INTRAVENOUS at 10:20

## 2020-08-13 RX ADMIN — PANTOPRAZOLE SODIUM 40 MILLIGRAM(S): 20 TABLET, DELAYED RELEASE ORAL at 05:11

## 2020-08-13 RX ADMIN — LEVETIRACETAM 400 MILLIGRAM(S): 250 TABLET, FILM COATED ORAL at 05:10

## 2020-08-13 RX ADMIN — OXYCODONE HYDROCHLORIDE 10 MILLIGRAM(S): 5 TABLET ORAL at 04:30

## 2020-08-13 RX ADMIN — Medication 1 DROP(S): at 12:43

## 2020-08-13 RX ADMIN — FENTANYL CITRATE 50 MICROGRAM(S): 50 INJECTION INTRAVENOUS at 05:20

## 2020-08-13 RX ADMIN — HEPARIN SODIUM 5000 UNIT(S): 5000 INJECTION INTRAVENOUS; SUBCUTANEOUS at 14:26

## 2020-08-13 RX ADMIN — FENTANYL CITRATE 50 MICROGRAM(S): 50 INJECTION INTRAVENOUS at 08:20

## 2020-08-13 RX ADMIN — Medication 3 MILLILITER(S): at 05:44

## 2020-08-13 RX ADMIN — FENTANYL CITRATE 100 MICROGRAM(S): 50 INJECTION INTRAVENOUS at 12:30

## 2020-08-13 RX ADMIN — Medication 5 MILLIGRAM(S): at 20:00

## 2020-08-13 RX ADMIN — Medication 10 MILLIGRAM(S): at 11:40

## 2020-08-13 RX ADMIN — OXYCODONE HYDROCHLORIDE 10 MILLIGRAM(S): 5 TABLET ORAL at 05:00

## 2020-08-13 RX ADMIN — FENTANYL CITRATE 100 MICROGRAM(S): 50 INJECTION INTRAVENOUS at 12:45

## 2020-08-13 RX ADMIN — Medication 3 MILLILITER(S): at 17:42

## 2020-08-13 RX ADMIN — Medication 1 DROP(S): at 17:47

## 2020-08-13 RX ADMIN — CHLORHEXIDINE GLUCONATE 1 APPLICATION(S): 213 SOLUTION TOPICAL at 05:12

## 2020-08-13 RX ADMIN — FENTANYL CITRATE 100 MICROGRAM(S): 50 INJECTION INTRAVENOUS at 11:50

## 2020-08-13 RX ADMIN — CHLORHEXIDINE GLUCONATE 15 MILLILITER(S): 213 SOLUTION TOPICAL at 05:10

## 2020-08-13 RX ADMIN — FENTANYL CITRATE 50 MICROGRAM(S): 50 INJECTION INTRAVENOUS at 10:35

## 2020-08-13 RX ADMIN — SODIUM CHLORIDE 4 MILLILITER(S): 9 INJECTION INTRAMUSCULAR; INTRAVENOUS; SUBCUTANEOUS at 17:42

## 2020-08-13 RX ADMIN — FENTANYL CITRATE 100 MICROGRAM(S): 50 INJECTION INTRAVENOUS at 15:45

## 2020-08-13 RX ADMIN — Medication 75 MILLIGRAM(S): at 14:26

## 2020-08-13 RX ADMIN — FENTANYL CITRATE 2.75 MICROGRAM(S)/KG/HR: 50 INJECTION INTRAVENOUS at 19:00

## 2020-08-13 RX ADMIN — FENTANYL CITRATE 50 MICROGRAM(S): 50 INJECTION INTRAVENOUS at 03:05

## 2020-08-13 RX ADMIN — Medication 75 MILLIGRAM(S): at 05:11

## 2020-08-13 RX ADMIN — FENTANYL CITRATE 100 MICROGRAM(S): 50 INJECTION INTRAVENOUS at 13:50

## 2020-08-13 RX ADMIN — CHLORHEXIDINE GLUCONATE 15 MILLILITER(S): 213 SOLUTION TOPICAL at 17:46

## 2020-08-13 RX ADMIN — FENTANYL CITRATE 100 MICROGRAM(S): 50 INJECTION INTRAVENOUS at 11:35

## 2020-08-13 RX ADMIN — FENTANYL CITRATE 50 MICROGRAM(S): 50 INJECTION INTRAVENOUS at 03:20

## 2020-08-13 RX ADMIN — FENTANYL CITRATE 100 MICROGRAM(S): 50 INJECTION INTRAVENOUS at 16:00

## 2020-08-13 RX ADMIN — HEPARIN SODIUM 5000 UNIT(S): 5000 INJECTION INTRAVENOUS; SUBCUTANEOUS at 21:19

## 2020-08-13 RX ADMIN — FENTANYL CITRATE 50 MICROGRAM(S): 50 INJECTION INTRAVENOUS at 08:35

## 2020-08-13 RX ADMIN — AMIODARONE HYDROCHLORIDE 200 MILLIGRAM(S): 400 TABLET ORAL at 01:30

## 2020-08-13 RX ADMIN — FENTANYL CITRATE 100 MICROGRAM(S): 50 INJECTION INTRAVENOUS at 13:35

## 2020-08-13 RX ADMIN — Medication 75 MILLIGRAM(S): at 21:31

## 2020-08-13 NOTE — PROGRESS NOTE ADULT - SUBJECTIVE AND OBJECTIVE BOX
remains intubated  off propofol    vitals/labs/meds reviewed  minimal EO to stim, intubated, does not nod, but FC, two fingers on RUE, distally AG, wiggles toes on RLE, no movements on L

## 2020-08-13 NOTE — PROGRESS NOTE ADULT - ASSESSMENT
59 YO male on coumadin for afib s/p cardiac arrest at work, down 25 minutes prior to ROSC. Pupils were R fixed and dilated, left fixed at the time, no gag reflex, post-CA cooling protocol was initiated down to 35 deg. Intubated and put on Nimbex drip. Also on Propofol, fentanyl, levophed. R groin minerva placed. Also put on heparin post-CA. 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 Course also c/b GIB, put on protonix drip. Prior to xfer was started on 3% at 30cc/hr.   Has been in Afib HR 90-110s with frequent ectopy.

## 2020-08-13 NOTE — PROGRESS NOTE ADULT - ASSESSMENT
Assessment: 60 year old male s/p cardiac arrest c/b GIB and bleeding from [facility] with Perimesencephalic (HH4 mF4) subarachnoid hemorrhage, PBD 6.    NEURO:   SAH, possible sentinal event with cardiac arrest  CT Head: perimesencephalic SAH   CTA Head: no aneurysm noted  Conventional angiogram negative  MRI neuroaxis: restricted diffusion in R BG, posterior limb of R IC, and anterior right temporal lobe   VEEG negative   Delayed Cerebral Ischemia Management: euvolemia, nimodipine 60 q4 as tolerated  No EVD as no significant hydrocephalus    PULM:  [facility] vent: settings- 14/500/60/5  spO2>92%  CXR: pulm edema  CTA R/O PE: negative  CPAP deferred yesterday   lasix for pulm edema    CV:  SBP goal<160  TTE: Global LV dysfunction. EF41%, Severe concentric LVH, flattening of interventricular septum.   Concern for possible PE - CTA negative  hydralazine 75 q8  Cardiac arrest- troponin trending down, no cardiac cath at OSH due to neuro status  Will need cath per cardiology  Cardiology consult appreciated. Possible cMRI.   S/P amio gtt; changed to  daily.     RENAL: ?CKD with superimposed JALEN. Unclear baseline.   Cr improving 1.8--> 1.6 (despite contrast for angio).  Monitor closely for need for diuresis  Sodium goal 140-150  I/os (urine output >50-75 cc/hr)    GI: GI bleed.   Diet: TF- Nepro  GI prophylaxis [] not indicated [x] PPI 40 BID IV push [] other:  Bowel regimen [x] senna [] other:  LBM: 8/11.     ENDO:   Goal euglycemia (-180)  ISS    HEME/ONC: afib on coumadin at home  s/p vit K  INR 1.42-->1.2  VTE prophylaxis: [] SCDs [x] chemoprophylaxis heparin subq  Dopplers negative for.    ID:  leukocytosis improving  S/P TTM. Now normothermia. Monitor for fevers.     SOCIAL/FAMILY:  [] awaiting [x] updated at bedside [] family meeting    CODE STATUS:  [x] Full Code [] DNR [] DNI [] Palliative/Comfort Care    DISPOSITION:  [x] ICU [] Stroke Unit [] Floor [] EMU [] RCU [] PCU    [x] Patient is at high risk of neurologic deterioration/death due to: SAH, cardiac arrest    Time spent: 45 critical care minutes Assessment: 60 year old male s/p cardiac arrest c/b GIB and bleeding from [place] with Perimesencephalic (HH4 mF4) subarachnoid hemorrhage, PBD 6.    NEURO:   SAH, possible sentinal event with cardiac arrest  CT Head: perimesencephalic SAH   CTA Head: no aneurysm noted  Conventional angiogram negative  MRI neuroaxis: restricted diffusion in R BG, posterior limb of R IC, and anterior right temporal lobe   VEEG negative   Delayed Cerebral Ischemia Management: euvolemia, nimodipine 60 q4 as tolerated  No EVD as no significant hydrocephalus  q2 neurochecks  fentanyl pushes    PULM:  Mech vent: settings- 14/500/60/5  spO2>92%  CXR: improved  CTA R/O PE: negative    CV:  SBP goal <200  TTE: Global LV dysfunction. EF41%, Severe concentric LVH, flattening of interventricular septum.   Concern for possible PE - CTA negative  hydralazine 75 q8  Cardiac arrest- troponin trending down, no cardiac cath at OSH due to neuro status  Will need cath per cardiology  Cardiology consult appreciated. Possible cMRI.   S/P amio gtt; changed to  daily.     RENAL: ?CKD with superimposed JALEN. Unclear baseline.   Cr improving   Monitor closely for need for diuresis  Sodium goal 140-150  I/os (urine output >50-75 cc/hr)    GI: GI bleed.   Diet: TF- Nepro  GI prophylaxis [] not indicated [x] PPI 40 BID IV push [] other:  Bowel regimen [x] senna [] other:  LBM: 8/11.     ENDO:   Goal euglycemia (-180)  ISS    HEME/ONC: afib on coumadin at home  s/p vit K  INR 1.42-->1.2  VTE prophylaxis: [] SCDs [x] chemoprophylaxis heparin subq  Dopplers negative for.    ID:  leukocytosis improving  S/P TTM. Now normothermia. Monitor for fevers.     SOCIAL/FAMILY:  [] awaiting [x] updated at bedside [] family meeting    CODE STATUS:  [x] Full Code [] DNR [] DNI [] Palliative/Comfort Care    DISPOSITION:  [x] ICU [] Stroke Unit [] Floor [] EMU [] RCU [] PCU    [x] Patient is at high risk of neurologic deterioration/death due to: SAH, cardiac arrest    Time spent: 45 critical care minutes

## 2020-08-13 NOTE — PROGRESS NOTE ADULT - ASSESSMENT
PBD#4 HH5 MF4 SAH, angio negative    fentanyl drip  cont keppra  nimodipine interm held for bradycardia  -180  intubated, cont full vent support  copious secretions  aggressive pulm toilet, frequent suctioning  bronch gram stain gram negative rods and gram negative diplococci, started on unasyn (8/13-8/20)  monitor renal function  cont tube feeding, NPO early am for possible extubation  monitor for fevers  SQH

## 2020-08-13 NOTE — PROGRESS NOTE ADULT - SUBJECTIVE AND OBJECTIVE BOX
HPI:  60 year old male with PMHx of Afib on coumadin s/p cardiac arrest at work, downtime 25 minutes prior to ROSC. Pupils were R fixed and dilated, left fixed at the time, no gag reflex, post-CA cooling protocol was initiated down to 35 deg. Intubated and put on Nimbex drip. Also on Propofol, fentanyl, levophed. R groin minerva placed. Also put on heparin post-CA. HCT showed R periclinoidal/perimesencephalic SAH of unclear etiology, no hydrocephalus. Course also c/b GIB, was put on protonix drip. Also had bleeding from [place]ott - urology consulted, clot irrigated.     HOSP COURSE:   8/10: Angio- negative    Overnight events: K 3.6, repleted, followed commands overnight.     VITALS:  T(C): , Max: 38.3 (08-13-20 @ 00:30)  HR:  (59 - 100)  BP: --  ABP:  (121/65 - 187/78)  RR:  (13 - 25)  SpO2:  (96% - 100%)  Wt(kg): --  Device: Avea, Mode: CPAP with PS, FiO2: 40, PEEP: 5, PS: 10, ITime: 1, MAP: 9, PIP: 17    08-12-20 @ 07:01  -  08-13-20 @ 07:00  --------------------------------------------------------  IN: 1955.6 mL / OUT: 2800 mL / NET: -844.4 mL    IMAGING:   Recent imaging studies were reviewed.    MEDICATIONS:  acetaminophen   Tablet .. 650 milliGRAM(s) Oral every 6 hours PRN  acetylcysteine 20%  Inhalation 4 milliLiter(s) Inhalation every 6 hours  albuterol/ipratropium for Nebulization 3 milliLiter(s) Nebulizer every 6 hours  aMIOdarone    Tablet 200 milliGRAM(s) Oral daily  artificial  tears Solution 1 Drop(s) Both EYES every 6 hours  bisacodyl Suppository 10 milliGRAM(s) Rectal daily  chlorhexidine 0.12% Liquid 15 milliLiter(s) Oral Mucosa every 12 hours  chlorhexidine 4% Liquid 1 Application(s) Topical <User Schedule>  dextrose 40% Gel 15 Gram(s) Oral once PRN  dextrose 5%. 1000 milliLiter(s) IV Continuous <Continuous>  dextrose 50% Injectable 12.5 Gram(s) IV Push once  dextrose 50% Injectable 25 Gram(s) IV Push once  dextrose 50% Injectable 25 Gram(s) IV Push once  fentaNYL    Injectable 50 MICROGram(s) IV Push every 2 hours PRN  glucagon  Injectable 1 milliGRAM(s) IntraMuscular once PRN  heparin   Injectable 5000 Unit(s) SubCutaneous every 8 hours  hydrALAZINE 75 milliGRAM(s) Oral every 8 hours  levETIRAcetam  IVPB 500 milliGRAM(s) IV Intermittent every 12 hours  niMODipine Oral Solution 60 milliGRAM(s) Oral/Enteral Tube every 4 hours  oxyCODONE    IR 5 milliGRAM(s) Oral every 6 hours PRN  oxyCODONE    IR 10 milliGRAM(s) Oral every 6 hours PRN  pantoprazole  Injectable 40 milliGRAM(s) IV Push every 12 hours  polyethylene glycol 3350 17 Gram(s) Oral every 12 hours  propofol Infusion 25 MICROgram(s)/kG/Min IV Continuous <Continuous>  senna Syrup 10 milliLiter(s) Oral at bedtime  sodium chloride 0.9% lock flush 10 milliLiter(s) IV Push every 1 hour PRN    EXAMINATION:  General: Calm, intubated  HEENT: MMM  Neuro:  -Mental status- (when not sedated) No acute distress, EO to voice. Follows commands briskly, shows 2 fingers and wiggles toes on right. Able to lift RUE off bed AG.   -CN- Pupils R 5mm NR, L 1mm sluggish, Eyes midline,  +cough/gag, +corneals  RUE localize AG, RLE spont AG, LUE flaccid, LLE flaccid  CVS: S1/S2  RESP: Diminished at bases  GI: Soft, non tender, mildly distended  Extremities: Warm, skin intact HPI:  60 year old male with PMHx of Afib on coumadin s/p cardiac arrest at work, downtime 25 minutes prior to ROSC. Pupils were R fixed and dilated, left fixed at the time, no gag reflex, post-CA cooling protocol was initiated down to 35 deg. Intubated and put on Nimbex drip. Also on Propofol, fentanyl, levophed. R groin minerva placed. Also put on heparin post-CA. HCT showed R periclinoidal/perimesencephalic SAH of unclear etiology, no hydrocephalus. Course also c/b GIB, was put on protonix drip. Also had bleeding from [place] - urology consulted, clot irrigated.     HOSP COURSE:   8/10: Angio- negative    Overnight events: K 3.6, repleted, followed commands overnight. Cr 1.7 <- 1.67. started on mucomyst and albuterol for copious secretions    VITALS:  T(C): , Max: 38.3 (08-13-20 @ 00:30)  HR:  (59 - 100)  BP: --  ABP:  (121/65 - 187/78)  RR:  (13 - 25)  SpO2:  (96% - 100%)  Wt(kg): --  Device: Avea, Mode: CPAP with PS, FiO2: 40, PEEP: 5, PS: 10, ITime: 1, MAP: 9, PIP: 17    08-12-20 @ 07:01  -  08-13-20 @ 07:00  --------------------------------------------------------  IN: 1955.6 mL / OUT: 2800 mL / NET: -844.4 mL    IMAGING:   Recent imaging studies were reviewed.    MEDICATIONS:  acetaminophen   Tablet .. 650 milliGRAM(s) Oral every 6 hours PRN  acetylcysteine 20%  Inhalation 4 milliLiter(s) Inhalation every 6 hours  albuterol/ipratropium for Nebulization 3 milliLiter(s) Nebulizer every 6 hours  aMIOdarone    Tablet 200 milliGRAM(s) Oral daily  artificial  tears Solution 1 Drop(s) Both EYES every 6 hours  bisacodyl Suppository 10 milliGRAM(s) Rectal daily  chlorhexidine 0.12% Liquid 15 milliLiter(s) Oral Mucosa every 12 hours  chlorhexidine 4% Liquid 1 Application(s) Topical <User Schedule>  dextrose 40% Gel 15 Gram(s) Oral once PRN  dextrose 5%. 1000 milliLiter(s) IV Continuous <Continuous>  dextrose 50% Injectable 12.5 Gram(s) IV Push once  dextrose 50% Injectable 25 Gram(s) IV Push once  dextrose 50% Injectable 25 Gram(s) IV Push once  fentaNYL    Injectable 50 MICROGram(s) IV Push every 2 hours PRN  glucagon  Injectable 1 milliGRAM(s) IntraMuscular once PRN  heparin   Injectable 5000 Unit(s) SubCutaneous every 8 hours  hydrALAZINE 75 milliGRAM(s) Oral every 8 hours  levETIRAcetam  IVPB 500 milliGRAM(s) IV Intermittent every 12 hours  niMODipine Oral Solution 60 milliGRAM(s) Oral/Enteral Tube every 4 hours  oxyCODONE    IR 5 milliGRAM(s) Oral every 6 hours PRN  oxyCODONE    IR 10 milliGRAM(s) Oral every 6 hours PRN  pantoprazole  Injectable 40 milliGRAM(s) IV Push every 12 hours  polyethylene glycol 3350 17 Gram(s) Oral every 12 hours  propofol Infusion 25 MICROgram(s)/kG/Min IV Continuous <Continuous>  senna Syrup 10 milliLiter(s) Oral at bedtime  sodium chloride 0.9% lock flush 10 milliLiter(s) IV Push every 1 hour PRN    EXAMINATION:  General: Calm, intubated  HEENT: MMM  Neuro:  -Mental status- (when not sedated) No acute distress, EO to voice. Follows commands briskly, shows 2 fingers and wiggles toes on right. Able to lift RUE off bed AG.   -CN- Pupils R 5mm NR, L 1mm sluggish, Eyes midline,  +cough/gag, +corneals  RUE localize AG, RLE spont AG, LUE flaccid, LLE flaccid  CVS: S1/S2  RESP: Diminished at bases  GI: Soft, non tender, mildly distended  Extremities: Warm, skin intact

## 2020-08-13 NOTE — PROGRESS NOTE ADULT - SUBJECTIVE AND OBJECTIVE BOX
Subjective: Patient seen and examined. No new events except as noted.   remains intubated in NSCU   HR stable       REVIEW OF SYSTEMS:  Unable to obtain     MEDICATIONS:  MEDICATIONS  (STANDING):  acetylcysteine 20%  Inhalation 4 milliLiter(s) Inhalation every 6 hours  albuterol/ipratropium for Nebulization 3 milliLiter(s) Nebulizer every 6 hours  aMIOdarone    Tablet 200 milliGRAM(s) Oral daily  artificial  tears Solution 1 Drop(s) Both EYES every 6 hours  bisacodyl Suppository 10 milliGRAM(s) Rectal daily  chlorhexidine 0.12% Liquid 15 milliLiter(s) Oral Mucosa every 12 hours  chlorhexidine 4% Liquid 1 Application(s) Topical <User Schedule>  dextrose 5%. 1000 milliLiter(s) (50 mL/Hr) IV Continuous <Continuous>  dextrose 50% Injectable 12.5 Gram(s) IV Push once  dextrose 50% Injectable 25 Gram(s) IV Push once  dextrose 50% Injectable 25 Gram(s) IV Push once  heparin   Injectable 5000 Unit(s) SubCutaneous every 8 hours  hydrALAZINE 75 milliGRAM(s) Oral every 8 hours  levETIRAcetam  IVPB 500 milliGRAM(s) IV Intermittent every 12 hours  niMODipine Oral Solution 60 milliGRAM(s) Oral/Enteral Tube every 4 hours  pantoprazole  Injectable 40 milliGRAM(s) IV Push every 12 hours  polyethylene glycol 3350 17 Gram(s) Oral every 12 hours  propofol Infusion 25 MICROgram(s)/kG/Min (16.5 mL/Hr) IV Continuous <Continuous>  senna Syrup 10 milliLiter(s) Oral at bedtime      PHYSICAL EXAM:  T(C): 37.3 (08-13-20 @ 05:00), Max: 38.3 (08-13-20 @ 00:30)  HR: 74 (08-13-20 @ 08:06) (59 - 94)  BP: --  RR: 17 (08-13-20 @ 08:00) (13 - 21)  SpO2: 99% (08-13-20 @ 08:06) (96% - 100%)  Wt(kg): --  I&O's Summary    12 Aug 2020 07:01  -  13 Aug 2020 07:00  --------------------------------------------------------  IN: 1955.6 mL / OUT: 2800 mL / NET: -844.4 mL          Appearance: Intubated sedated 	  HEENT:   Dry  oral mucosa,  Lymphatic: No lymphadenopathy  Cardiovascular: Normal S1 S2, No JVD, No murmurs, No edema  Respiratory: Ventilated   Psychiatry: A & O x 0  Gastrointestinal:  Soft, Non-tender, + BS	  Skin: No rashes, No ecchymoses, No cyanosis	  Mental status- No acute distress, EO to stim  -CN- Pupils R 4mm NR, L 2mm sluggish, EOMI, tongue midline, face symmetric  +cough/gag  RUE localize AG  RLE spont AG  LUE flaccid  LLE flaccid    Extremities: decreased range of motion, No clubbing, cyanosis or edema  Vascular: Peripheral pulses palpable 2+ bilaterally  +scott      LABS:    CARDIAC MARKERS:                                10.9   11.07 )-----------( 195      ( 12 Aug 2020 22:37 )             35.6     08-12    144  |  112<H>  |  22  ----------------------------<  137<H>  3.6   |  18<L>  |  1.71<H>    Ca    9.2      12 Aug 2020 22:37  Phos  3.4     08-12  Mg     1.9     08-12      proBNP:   Lipid Profile:   HgA1c:   TSH:   Blood Gas Profile - Arterial (08.12.20 @ 22:32)    pH, Arterial: 7.37    pCO2, Arterial: 37 mmHg    pO2, Arterial: 117 mmHg    HCO3, Arterial: 21 mmol/L    Base Excess, Arterial: -3.4 mmol/L    Oxygen Saturation, Arterial: 98 %    Total CO2, Arterial: 22 mmoL/L    FIO2, Arterial: 40            TELEMETRY: 	AF    ECG:  	  RADIOLOGY: < from: CT Head No Cont (08.12.20 @ 09:51) >    EXAM:  CT BRAIN                            PROCEDURE DATE:  08/12/2020            INTERPRETATION:  Indication: Follow-up subarachnoid hemorrhage    Multiple axial sections were performed base skull to vertex for contrast enhancement.    Previously noted subarachnoid hemorrhage is less conspicuous.    New area of high attenuation is seen involving the right posterior temporal/parietal region. This could be compatible with new areas subarachnoid hemorrhage though parenchymal hemorrhage cannot because excluded. This finding can be better characterized with MRI (if there are no contraindications).    No significant shift or herniation is seen    Evaluation of the osseous structures with the appropriate window appears unremarkable.    Right-sided NG tube is seen.    Oral tracheal tube is seen.    Bilateral maxillary ethmoid sphenoid sinus coastal thickening is seen. Small air-fluid level seen involving right frontal sinus.    IMPRESSION: Subarachnoid hemorrhage is again seen and slightly less conspicuous.    New area of high attenuation involving the right posterior temporal/parietal region as described above.                  EUGENIO HIGUERA M.D., ATTENDING RADIOLOGIST  This document has been electronically signed. Aug 12 2020 10:07AM              < end of copied text >    DIAGNOSTIC TESTING:  [ ] Echocardiogram:  [ ]  Catheterization:  [ ] Stress Test:    OTHER:

## 2020-08-13 NOTE — PROGRESS NOTE ADULT - SUBJECTIVE AND OBJECTIVE BOX
Patient seen and examined    Overnight events: started FC  Exam:  R pupils 4 NR, L 2mm reactive,RUE spon movement, RLE spon movement, L side nothing. not FC

## 2020-08-14 LAB
ANION GAP SERPL CALC-SCNC: 12 MMOL/L — SIGNIFICANT CHANGE UP (ref 5–17)
ANION GAP SERPL CALC-SCNC: 15 MMOL/L — SIGNIFICANT CHANGE UP (ref 5–17)
BASOPHILS # BLD AUTO: 0.03 K/UL — SIGNIFICANT CHANGE UP (ref 0–0.2)
BASOPHILS # BLD AUTO: 0.05 K/UL — SIGNIFICANT CHANGE UP (ref 0–0.2)
BASOPHILS NFR BLD AUTO: 0.3 % — SIGNIFICANT CHANGE UP (ref 0–2)
BASOPHILS NFR BLD AUTO: 0.6 % — SIGNIFICANT CHANGE UP (ref 0–2)
BUN SERPL-MCNC: 21 MG/DL — SIGNIFICANT CHANGE UP (ref 7–23)
BUN SERPL-MCNC: 26 MG/DL — HIGH (ref 7–23)
CALCIUM SERPL-MCNC: 9 MG/DL — SIGNIFICANT CHANGE UP (ref 8.4–10.5)
CALCIUM SERPL-MCNC: 9.6 MG/DL — SIGNIFICANT CHANGE UP (ref 8.4–10.5)
CHLORIDE SERPL-SCNC: 112 MMOL/L — HIGH (ref 96–108)
CHLORIDE SERPL-SCNC: 112 MMOL/L — HIGH (ref 96–108)
CO2 SERPL-SCNC: 16 MMOL/L — LOW (ref 22–31)
CO2 SERPL-SCNC: 19 MMOL/L — LOW (ref 22–31)
CREAT SERPL-MCNC: 1.55 MG/DL — HIGH (ref 0.5–1.3)
CREAT SERPL-MCNC: 1.74 MG/DL — HIGH (ref 0.5–1.3)
CULTURE RESULTS: SIGNIFICANT CHANGE UP
CULTURE RESULTS: SIGNIFICANT CHANGE UP
EOSINOPHIL # BLD AUTO: 0.19 K/UL — SIGNIFICANT CHANGE UP (ref 0–0.5)
EOSINOPHIL # BLD AUTO: 0.21 K/UL — SIGNIFICANT CHANGE UP (ref 0–0.5)
EOSINOPHIL NFR BLD AUTO: 2.4 % — SIGNIFICANT CHANGE UP (ref 0–6)
EOSINOPHIL NFR BLD AUTO: 2.4 % — SIGNIFICANT CHANGE UP (ref 0–6)
GAS PNL BLDA: SIGNIFICANT CHANGE UP
GAS PNL BLDA: SIGNIFICANT CHANGE UP
GLUCOSE SERPL-MCNC: 105 MG/DL — HIGH (ref 70–99)
GLUCOSE SERPL-MCNC: 144 MG/DL — HIGH (ref 70–99)
HCT VFR BLD CALC: 33.8 % — LOW (ref 39–50)
HCT VFR BLD CALC: 36 % — LOW (ref 39–50)
HGB BLD-MCNC: 10.1 G/DL — LOW (ref 13–17)
HGB BLD-MCNC: 10.9 G/DL — LOW (ref 13–17)
IMM GRANULOCYTES NFR BLD AUTO: 1.4 % — SIGNIFICANT CHANGE UP (ref 0–1.5)
IMM GRANULOCYTES NFR BLD AUTO: 1.6 % — HIGH (ref 0–1.5)
LYMPHOCYTES # BLD AUTO: 0.74 K/UL — LOW (ref 1–3.3)
LYMPHOCYTES # BLD AUTO: 0.77 K/UL — LOW (ref 1–3.3)
LYMPHOCYTES # BLD AUTO: 8.4 % — LOW (ref 13–44)
LYMPHOCYTES # BLD AUTO: 9.6 % — LOW (ref 13–44)
MAGNESIUM SERPL-MCNC: 1.9 MG/DL — SIGNIFICANT CHANGE UP (ref 1.6–2.6)
MAGNESIUM SERPL-MCNC: 2.2 MG/DL — SIGNIFICANT CHANGE UP (ref 1.6–2.6)
MCHC RBC-ENTMCNC: 26.4 PG — LOW (ref 27–34)
MCHC RBC-ENTMCNC: 27.1 PG — SIGNIFICANT CHANGE UP (ref 27–34)
MCHC RBC-ENTMCNC: 29.9 GM/DL — LOW (ref 32–36)
MCHC RBC-ENTMCNC: 30.3 GM/DL — LOW (ref 32–36)
MCV RBC AUTO: 88.3 FL — SIGNIFICANT CHANGE UP (ref 80–100)
MCV RBC AUTO: 89.6 FL — SIGNIFICANT CHANGE UP (ref 80–100)
MONOCYTES # BLD AUTO: 0.79 K/UL — SIGNIFICANT CHANGE UP (ref 0–0.9)
MONOCYTES # BLD AUTO: 0.99 K/UL — HIGH (ref 0–0.9)
MONOCYTES NFR BLD AUTO: 11.2 % — SIGNIFICANT CHANGE UP (ref 2–14)
MONOCYTES NFR BLD AUTO: 9.9 % — SIGNIFICANT CHANGE UP (ref 2–14)
NEUTROPHILS # BLD AUTO: 6.07 K/UL — SIGNIFICANT CHANGE UP (ref 1.8–7.4)
NEUTROPHILS # BLD AUTO: 6.74 K/UL — SIGNIFICANT CHANGE UP (ref 1.8–7.4)
NEUTROPHILS NFR BLD AUTO: 75.9 % — SIGNIFICANT CHANGE UP (ref 43–77)
NEUTROPHILS NFR BLD AUTO: 76.3 % — SIGNIFICANT CHANGE UP (ref 43–77)
NRBC # BLD: 0 /100 WBCS — SIGNIFICANT CHANGE UP (ref 0–0)
NRBC # BLD: 0 /100 WBCS — SIGNIFICANT CHANGE UP (ref 0–0)
PHOSPHATE SERPL-MCNC: 3.2 MG/DL — SIGNIFICANT CHANGE UP (ref 2.5–4.5)
PHOSPHATE SERPL-MCNC: 4 MG/DL — SIGNIFICANT CHANGE UP (ref 2.5–4.5)
PLATELET # BLD AUTO: 195 K/UL — SIGNIFICANT CHANGE UP (ref 150–400)
PLATELET # BLD AUTO: 206 K/UL — SIGNIFICANT CHANGE UP (ref 150–400)
POTASSIUM SERPL-MCNC: 4 MMOL/L — SIGNIFICANT CHANGE UP (ref 3.5–5.3)
POTASSIUM SERPL-MCNC: 4.2 MMOL/L — SIGNIFICANT CHANGE UP (ref 3.5–5.3)
POTASSIUM SERPL-SCNC: 4 MMOL/L — SIGNIFICANT CHANGE UP (ref 3.5–5.3)
POTASSIUM SERPL-SCNC: 4.2 MMOL/L — SIGNIFICANT CHANGE UP (ref 3.5–5.3)
RBC # BLD: 3.83 M/UL — LOW (ref 4.2–5.8)
RBC # BLD: 4.02 M/UL — LOW (ref 4.2–5.8)
RBC # FLD: 14.6 % — HIGH (ref 10.3–14.5)
RBC # FLD: 15.1 % — HIGH (ref 10.3–14.5)
SODIUM SERPL-SCNC: 143 MMOL/L — SIGNIFICANT CHANGE UP (ref 135–145)
SODIUM SERPL-SCNC: 143 MMOL/L — SIGNIFICANT CHANGE UP (ref 135–145)
SPECIMEN SOURCE: SIGNIFICANT CHANGE UP
SPECIMEN SOURCE: SIGNIFICANT CHANGE UP
WBC # BLD: 8 K/UL — SIGNIFICANT CHANGE UP (ref 3.8–10.5)
WBC # BLD: 8.83 K/UL — SIGNIFICANT CHANGE UP (ref 3.8–10.5)
WBC # FLD AUTO: 8 K/UL — SIGNIFICANT CHANGE UP (ref 3.8–10.5)
WBC # FLD AUTO: 8.83 K/UL — SIGNIFICANT CHANGE UP (ref 3.8–10.5)

## 2020-08-14 PROCEDURE — 71045 X-RAY EXAM CHEST 1 VIEW: CPT | Mod: 26,77

## 2020-08-14 PROCEDURE — 71045 X-RAY EXAM CHEST 1 VIEW: CPT | Mod: 26

## 2020-08-14 PROCEDURE — 99291 CRITICAL CARE FIRST HOUR: CPT

## 2020-08-14 PROCEDURE — 99292 CRITICAL CARE ADDL 30 MIN: CPT

## 2020-08-14 RX ORDER — CEFEPIME 1 G/1
2000 INJECTION, POWDER, FOR SOLUTION INTRAMUSCULAR; INTRAVENOUS EVERY 8 HOURS
Refills: 0 | Status: DISCONTINUED | OUTPATIENT
Start: 2020-08-14 | End: 2020-08-14

## 2020-08-14 RX ORDER — CEFEPIME 1 G/1
2000 INJECTION, POWDER, FOR SOLUTION INTRAMUSCULAR; INTRAVENOUS EVERY 8 HOURS
Refills: 0 | Status: COMPLETED | OUTPATIENT
Start: 2020-08-14 | End: 2020-08-20

## 2020-08-14 RX ORDER — CEFEPIME 1 G/1
INJECTION, POWDER, FOR SOLUTION INTRAMUSCULAR; INTRAVENOUS
Refills: 0 | Status: DISCONTINUED | OUTPATIENT
Start: 2020-08-14 | End: 2020-08-14

## 2020-08-14 RX ORDER — FENTANYL CITRATE 50 UG/ML
100 INJECTION INTRAVENOUS ONCE
Refills: 0 | Status: DISCONTINUED | OUTPATIENT
Start: 2020-08-14 | End: 2020-08-14

## 2020-08-14 RX ORDER — PANTOPRAZOLE SODIUM 20 MG/1
40 TABLET, DELAYED RELEASE ORAL DAILY
Refills: 0 | Status: DISCONTINUED | OUTPATIENT
Start: 2020-08-15 | End: 2020-08-21

## 2020-08-14 RX ORDER — CEFEPIME 1 G/1
2000 INJECTION, POWDER, FOR SOLUTION INTRAMUSCULAR; INTRAVENOUS ONCE
Refills: 0 | Status: DISCONTINUED | OUTPATIENT
Start: 2020-08-14 | End: 2020-08-14

## 2020-08-14 RX ORDER — MAGNESIUM SULFATE 500 MG/ML
1 VIAL (ML) INJECTION ONCE
Refills: 0 | Status: COMPLETED | OUTPATIENT
Start: 2020-08-14 | End: 2020-08-14

## 2020-08-14 RX ORDER — FUROSEMIDE 40 MG
20 TABLET ORAL ONCE
Refills: 0 | Status: COMPLETED | OUTPATIENT
Start: 2020-08-14 | End: 2020-08-14

## 2020-08-14 RX ORDER — CHLORHEXIDINE GLUCONATE 213 G/1000ML
1 SOLUTION TOPICAL
Refills: 0 | Status: DISCONTINUED | OUTPATIENT
Start: 2020-08-14 | End: 2020-08-28

## 2020-08-14 RX ORDER — CEFEPIME 1 G/1
2000 INJECTION, POWDER, FOR SOLUTION INTRAMUSCULAR; INTRAVENOUS ONCE
Refills: 0 | Status: COMPLETED | OUTPATIENT
Start: 2020-08-14 | End: 2020-08-14

## 2020-08-14 RX ADMIN — SODIUM CHLORIDE 4 MILLILITER(S): 9 INJECTION INTRAMUSCULAR; INTRAVENOUS; SUBCUTANEOUS at 18:05

## 2020-08-14 RX ADMIN — AMPICILLIN SODIUM AND SULBACTAM SODIUM 200 GRAM(S): 250; 125 INJECTION, POWDER, FOR SUSPENSION INTRAMUSCULAR; INTRAVENOUS at 00:50

## 2020-08-14 RX ADMIN — SODIUM CHLORIDE 4 MILLILITER(S): 9 INJECTION INTRAMUSCULAR; INTRAVENOUS; SUBCUTANEOUS at 05:39

## 2020-08-14 RX ADMIN — Medication 1 DROP(S): at 00:50

## 2020-08-14 RX ADMIN — CEFEPIME 100 MILLIGRAM(S): 1 INJECTION, POWDER, FOR SOLUTION INTRAMUSCULAR; INTRAVENOUS at 10:50

## 2020-08-14 RX ADMIN — CHLORHEXIDINE GLUCONATE 1 APPLICATION(S): 213 SOLUTION TOPICAL at 05:07

## 2020-08-14 RX ADMIN — Medication 3 MILLILITER(S): at 23:55

## 2020-08-14 RX ADMIN — CEFEPIME 100 MILLIGRAM(S): 1 INJECTION, POWDER, FOR SOLUTION INTRAMUSCULAR; INTRAVENOUS at 21:33

## 2020-08-14 RX ADMIN — Medication 1 DROP(S): at 11:22

## 2020-08-14 RX ADMIN — Medication 3 MILLILITER(S): at 11:39

## 2020-08-14 RX ADMIN — Medication 75 MILLIGRAM(S): at 14:25

## 2020-08-14 RX ADMIN — CHLORHEXIDINE GLUCONATE 15 MILLILITER(S): 213 SOLUTION TOPICAL at 05:06

## 2020-08-14 RX ADMIN — Medication 20 MILLIGRAM(S): at 10:38

## 2020-08-14 RX ADMIN — Medication 1 DROP(S): at 05:07

## 2020-08-14 RX ADMIN — CHLORHEXIDINE GLUCONATE 15 MILLILITER(S): 213 SOLUTION TOPICAL at 17:23

## 2020-08-14 RX ADMIN — Medication 75 MILLIGRAM(S): at 05:06

## 2020-08-14 RX ADMIN — CEFEPIME 100 MILLIGRAM(S): 1 INJECTION, POWDER, FOR SOLUTION INTRAMUSCULAR; INTRAVENOUS at 14:26

## 2020-08-14 RX ADMIN — FENTANYL CITRATE 2.75 MICROGRAM(S)/KG/HR: 50 INJECTION INTRAVENOUS at 21:51

## 2020-08-14 RX ADMIN — AMIODARONE HYDROCHLORIDE 200 MILLIGRAM(S): 400 TABLET ORAL at 01:28

## 2020-08-14 RX ADMIN — FENTANYL CITRATE 2.75 MICROGRAM(S)/KG/HR: 50 INJECTION INTRAVENOUS at 19:00

## 2020-08-14 RX ADMIN — Medication 102 GRAM(S): at 11:22

## 2020-08-14 RX ADMIN — PANTOPRAZOLE SODIUM 40 MILLIGRAM(S): 20 TABLET, DELAYED RELEASE ORAL at 05:06

## 2020-08-14 RX ADMIN — Medication 3 MILLILITER(S): at 18:05

## 2020-08-14 RX ADMIN — AMPICILLIN SODIUM AND SULBACTAM SODIUM 200 GRAM(S): 250; 125 INJECTION, POWDER, FOR SUSPENSION INTRAMUSCULAR; INTRAVENOUS at 05:06

## 2020-08-14 RX ADMIN — Medication 3 MILLILITER(S): at 05:17

## 2020-08-14 RX ADMIN — HEPARIN SODIUM 5000 UNIT(S): 5000 INJECTION INTRAVENOUS; SUBCUTANEOUS at 14:25

## 2020-08-14 RX ADMIN — Medication 1 DROP(S): at 17:23

## 2020-08-14 RX ADMIN — Medication 3 MILLILITER(S): at 00:08

## 2020-08-14 RX ADMIN — FENTANYL CITRATE 100 MICROGRAM(S): 50 INJECTION INTRAVENOUS at 16:40

## 2020-08-14 RX ADMIN — Medication 75 MILLIGRAM(S): at 21:33

## 2020-08-14 RX ADMIN — FENTANYL CITRATE 2.75 MICROGRAM(S)/KG/HR: 50 INJECTION INTRAVENOUS at 00:00

## 2020-08-14 RX ADMIN — HEPARIN SODIUM 5000 UNIT(S): 5000 INJECTION INTRAVENOUS; SUBCUTANEOUS at 05:06

## 2020-08-14 RX ADMIN — HEPARIN SODIUM 5000 UNIT(S): 5000 INJECTION INTRAVENOUS; SUBCUTANEOUS at 21:33

## 2020-08-14 NOTE — PROGRESS NOTE ADULT - ASSESSMENT
PBD#4 [id number] [id number] SAH, angio negative    fentanyl drip  cont keppra  nimodipine interm held for bradycardia  -180  intubated, cont full vent support  thick copious secretions  aggressive pulm toilet, frequent suctioning  pseudomonas on bronch culture, started on 8/14 cefepime, CrCl >60, but uptrending Cr, monitor renal function  cont tube feeding  monitor for fevers  SQH

## 2020-08-14 NOTE — PROGRESS NOTE ADULT - SUBJECTIVE AND OBJECTIVE BOX
Patient seen and examined    Overnight events: started FC  Exam:  R pupils 4 NR, L 2mm reactive,RUE spon movement, RLE spon movement, L side nothing. FC shows thumb and wiggle toes

## 2020-08-14 NOTE — PROGRESS NOTE ADULT - SUBJECTIVE AND OBJECTIVE BOX
Subjective: Patient seen and examined. No new events except as noted.   remains intubated in NSCU   started on unasyn for gram neg rods and gram neg diplococci on bronch gram stain -> switched to cefipime for pseudomonas  fentanyl pushes changed to drip for sedation  nimodipine and keppra DC  R IJ still terminating in R subclavian on CXR    REVIEW OF SYSTEMS:    Unable to obtain     MEDICATIONS:  MEDICATIONS  (STANDING):  albuterol/ipratropium for Nebulization. 3 milliLiter(s) Nebulizer every 6 hours  aMIOdarone    Tablet 200 milliGRAM(s) Oral daily  artificial  tears Solution 1 Drop(s) Both EYES every 6 hours  bisacodyl Suppository 10 milliGRAM(s) Rectal daily  cefepime   IVPB 2000 milliGRAM(s) IV Intermittent every 8 hours  chlorhexidine 0.12% Liquid 15 milliLiter(s) Oral Mucosa every 12 hours  chlorhexidine 4% Liquid 1 Application(s) Topical <User Schedule>  dextrose 5%. 1000 milliLiter(s) (50 mL/Hr) IV Continuous <Continuous>  dextrose 50% Injectable 12.5 Gram(s) IV Push once  dextrose 50% Injectable 25 Gram(s) IV Push once  dextrose 50% Injectable 25 Gram(s) IV Push once  fentaNYL   Infusion... 0.5 MICROgram(s)/kG/Hr (2.75 mL/Hr) IV Continuous <Continuous>  heparin   Injectable 5000 Unit(s) SubCutaneous every 8 hours  hydrALAZINE 75 milliGRAM(s) Oral every 8 hours  polyethylene glycol 3350 17 Gram(s) Oral every 12 hours  senna Syrup 10 milliLiter(s) Oral at bedtime  sodium chloride 7% Inhalation 4 milliLiter(s) Inhalation every 12 hours      PHYSICAL EXAM:  T(C): 37.5 (08-14-20 @ 07:00), Max: 37.8 (08-13-20 @ 19:00)  HR: 81 (08-14-20 @ 13:10) (65 - 111)  BP: --  RR: 20 (08-14-20 @ 11:00) (11 - 22)  SpO2: 100% (08-14-20 @ 13:10) (98% - 100%)  Wt(kg): --  I&O's Summary    13 Aug 2020 07:01  -  14 Aug 2020 07:00  --------------------------------------------------------  IN: 949.4 mL / OUT: 1415 mL / NET: -465.6 mL    14 Aug 2020 07:01  -  14 Aug 2020 13:30  --------------------------------------------------------  IN: 166 mL / OUT: 750 mL / NET: -584 mL          Appearance: Intubated sedated 	  HEENT:   Dry  oral mucosa,  Lymphatic: No lymphadenopathy  Cardiovascular: Normal S1 S2, No JVD, No murmurs, No edema  Respiratory: Ventilated   Psychiatry: A & O x 0  Gastrointestinal:  Soft, Non-tender, + BS	  Skin: No rashes, No ecchymoses, No cyanosis	  Mental status- No acute distress, EO to stim  -CN- Pupils R 4mm NR, L 2mm sluggish, EOMI, tongue midline, face symmetric  +cough/gag  RUE localize AG  RLE spont AG  LUE flaccid  LLE flaccid    Extremities: decreased range of motion, No clubbing, cyanosis or edema  Vascular: Peripheral pulses palpable 2+ bilaterally  +scott        LABS:    CARDIAC MARKERS:                                10.9   8.83  )-----------( 206      ( 14 Aug 2020 01:09 )             36.0     08-14    143  |  112<H>  |  21  ----------------------------<  105<H>  4.2   |  16<L>  |  1.55<H>    Ca    9.0      14 Aug 2020 01:09  Phos  4.0     08-14  Mg     1.9     08-14      proBNP:   Lipid Profile:   HgA1c:   TSH:    Culture - Bronchial (08.13.20 @ 07:00)    Gram Stain:   Few polymorphonuclear leukocytes per low power field  Few Squamous epithelial cells per low power field  Moderate Gram Negative Rods per oil power field  Few Gram Negative Diplococci per oil power field    Specimen Source: Bronch Wash Combicath          TELEMETRY: 	  AF  ECG:  	  RADIOLOGY: < from: CT Head No Cont (08.13.20 @ 08:55) >    EXAM:  CT BRAIN                            PROCEDURE DATE:  08/13/2020            INTERPRETATION:  Noncontrast CT of the brain.    CLINICAL INDICATION:  SAH    TECHNIQUE : Axial CT scanning of the brain was obtained from the skull base to the vertex without the administration of intravenous contrast. Sagittal and coronal reformats were provided.    COMPARISON: CT brain 8/12/2020    FINDINGS:    Similar extra-axial hemorrhage in the right aspect of the suprasellar cistern extending into the right sylvian fissure.    Scattered sulcal subarachnoid hemorrhage is similar.    Similar small layering intraventricular hemorrhage.    No hydrocephalus or midline shift.    Edema in the region of the right cerebral peduncle and posterior limb of the right internal capsule is redemonstrated.    IMPRESSION:    No significant interval change from 8/12/2020.                      IVANNA KIRK M.D., ATTENDING RADIOLOGIST  This document has been electronically signed. Aug 13 2020  9:31AM                < end of copied text >    DIAGNOSTIC TESTING:  [ ] Echocardiogram:  [ ]  Catheterization:  [ ] Stress Test:    OTHER:

## 2020-08-14 NOTE — PROGRESS NOTE ADULT - SUBJECTIVE AND OBJECTIVE BOX
HPI:  60 year old male with PMHx of Afib on coumadin s/p cardiac arrest at work, downtime 25 minutes prior to ROSC. Pupils were R fixed and dilated, left fixed at the time, no gag reflex, post-CA cooling protocol was initiated down to 35 deg. Intubated and put on Nimbex drip. Also on Propofol, fentanyl, levophed. R groin minerva placed. Also put on heparin post-CA. HCT showed R perimesencephalic SAH of unclear etiology, no hydrocephalus. Course also c/b GIB, was put on protonix drip. Also had bleeding from [place] - urology consulted, clot irrigated.     Overnight events:   started on unasyn for gram neg rods and gram neg diplococci on bronch gram stain  fentanyl pushes changed to drip for sedation  nimodipine and keppra DC  R IJ still terminating in R subclavian on CXR  feeds held for possible extubation    VITALS:  T(C): , Max: 37.8 (08-13-20 @ 19:00)  HR:  (65 - 104)  BP: --  ABP:  (149/70 - 196/94)  RR:  (15 - 22)  SpO2:  (97% - 100%)  Wt(kg): --  Device: Avea, Mode: AC/ CMV (Assist Control/ Continuous Mandatory Ventilation), RR (machine): 14, TV (machine): 500, FiO2: 40, PEEP: 5, ITime: 1, MAP: 14, PIP: 34    08-13-20 @ 07:01  -  08-14-20 @ 07:00  --------------------------------------------------------  IN: 932.9 mL / OUT: 1415 mL / NET: -482.1 mL    IMAGING:   Recent imaging studies were reviewed.    MEDICATIONS:  acetaminophen   Tablet .. 650 milliGRAM(s) Oral every 6 hours PRN  albuterol/ipratropium for Nebulization. 3 milliLiter(s) Nebulizer every 6 hours  aMIOdarone    Tablet 200 milliGRAM(s) Oral daily  ampicillin/sulbactam  IVPB      ampicillin/sulbactam  IVPB 3 Gram(s) IV Intermittent every 6 hours  artificial  tears Solution 1 Drop(s) Both EYES every 6 hours  bisacodyl Suppository 10 milliGRAM(s) Rectal daily  chlorhexidine 0.12% Liquid 15 milliLiter(s) Oral Mucosa every 12 hours  chlorhexidine 4% Liquid 1 Application(s) Topical <User Schedule>  dextrose 40% Gel 15 Gram(s) Oral once PRN  dextrose 5%. 1000 milliLiter(s) IV Continuous <Continuous>  dextrose 50% Injectable 12.5 Gram(s) IV Push once  dextrose 50% Injectable 25 Gram(s) IV Push once  dextrose 50% Injectable 25 Gram(s) IV Push once  fentaNYL   Infusion... 0.5 MICROgram(s)/kG/Hr IV Continuous <Continuous>  glucagon  Injectable 1 milliGRAM(s) IntraMuscular once PRN  heparin   Injectable 5000 Unit(s) SubCutaneous every 8 hours  hydrALAZINE 75 milliGRAM(s) Oral every 8 hours  pantoprazole  Injectable 40 milliGRAM(s) IV Push every 12 hours  polyethylene glycol 3350 17 Gram(s) Oral every 12 hours  senna Syrup 10 milliLiter(s) Oral at bedtime  sodium chloride 0.9% lock flush 10 milliLiter(s) IV Push every 1 hour PRN  sodium chloride 7% Inhalation 4 milliLiter(s) Inhalation every 12 hours    EXAMINATION:  General:  calm  HEENT:  MMM  Neuro:  awake, alert, oriented x 3, follows commands, moves all extremities  Cards:  RRR  Respiratory:  no respiratory distress  Adomen:  soft  Extremities:  no edema  Skin:  warm/dry HPI:  60 year old male with PMHx of Afib on coumadin s/p cardiac arrest at work, downtime 25 minutes prior to ROSC. Pupils were R fixed and dilated, left fixed at the time, no gag reflex, post-CA cooling protocol was initiated down to 35 deg. Intubated and put on Nimbex drip. Also on Propofol, fentanyl, levophed. R groin minerva placed. Also put on heparin post-CA. HCT showed R perimesencephalic SAH of unclear etiology, no hydrocephalus. Course also c/b GIB, was put on protonix drip. Also had bleeding from [place] - urology consulted, clot irrigated.     Overnight events:   started on unasyn for gram neg rods and gram neg diplococci on bronch gram stain  fentanyl pushes changed to drip for sedation  nimodipine and keppra DC  R IJ still terminating in R subclavian on CXR  feeds held for possible extubation    VITALS:  T(C): , Max: 37.8 (08-13-20 @ 19:00)  HR:  (65 - 104)  BP: --  ABP:  (149/70 - 196/94)  RR:  (15 - 22)  SpO2:  (97% - 100%)  Wt(kg): --  Device: Avea, Mode: AC/ CMV (Assist Control/ Continuous Mandatory Ventilation), RR (machine): 14, TV (machine): 500, FiO2: 40, PEEP: 5, ITime: 1, MAP: 14, PIP: 34    08-13-20 @ 07:01  -  08-14-20 @ 07:00  --------------------------------------------------------  IN: 932.9 mL / OUT: 1415 mL / NET: -482.1 mL    IMAGING:   Recent imaging studies were reviewed.    MEDICATIONS:  acetaminophen   Tablet .. 650 milliGRAM(s) Oral every 6 hours PRN  albuterol/ipratropium for Nebulization. 3 milliLiter(s) Nebulizer every 6 hours  aMIOdarone    Tablet 200 milliGRAM(s) Oral daily  ampicillin/sulbactam  IVPB      ampicillin/sulbactam  IVPB 3 Gram(s) IV Intermittent every 6 hours  artificial  tears Solution 1 Drop(s) Both EYES every 6 hours  bisacodyl Suppository 10 milliGRAM(s) Rectal daily  chlorhexidine 0.12% Liquid 15 milliLiter(s) Oral Mucosa every 12 hours  chlorhexidine 4% Liquid 1 Application(s) Topical <User Schedule>  dextrose 40% Gel 15 Gram(s) Oral once PRN  dextrose 5%. 1000 milliLiter(s) IV Continuous <Continuous>  dextrose 50% Injectable 12.5 Gram(s) IV Push once  dextrose 50% Injectable 25 Gram(s) IV Push once  dextrose 50% Injectable 25 Gram(s) IV Push once  fentaNYL   Infusion... 0.5 MICROgram(s)/kG/Hr IV Continuous <Continuous>  glucagon  Injectable 1 milliGRAM(s) IntraMuscular once PRN  heparin   Injectable 5000 Unit(s) SubCutaneous every 8 hours  hydrALAZINE 75 milliGRAM(s) Oral every 8 hours  pantoprazole  Injectable 40 milliGRAM(s) IV Push every 12 hours  polyethylene glycol 3350 17 Gram(s) Oral every 12 hours  senna Syrup 10 milliLiter(s) Oral at bedtime  sodium chloride 0.9% lock flush 10 milliLiter(s) IV Push every 1 hour PRN  sodium chloride 7% Inhalation 4 milliLiter(s) Inhalation every 12 hours    EXAMINATION:  General: Calm, intubated  HEENT: MMM  Neuro:  -Mental status- (when not sedated) No acute distress, EO to voice. Follows commands briskly, shows 2 fingers and wiggles toes on right. Able to lift RUE off bed AG.   -CN- Pupils R 5mm NR, L 1mm sluggish, Eyes midline,  +cough/gag, +corneals  RUE localize AG, RLE spont AG, LUE flaccid, LLE flaccid  CVS: S1/S2  RESP: Diminished at bases  GI: Soft, non tender, mildly distended  Extremities: Warm, skin intact HPI:  60 year old male with PMHx of Afib on coumadin s/p cardiac arrest at work, downtime 25 minutes prior to ROSC. Pupils were R fixed and dilated, left fixed at the time, no gag reflex, post-CA cooling protocol was initiated down to 35 deg. Intubated and put on Nimbex drip. Also on Propofol, fentanyl, levophed. R groin minerva placed. Also put on heparin post-CA. HCT showed R perimesencephalic SAH of unclear etiology, no hydrocephalus. Course also c/b GIB, was put on protonix drip. Also had bleeding from [place] - urology consulted, clot irrigated.     Overnight events:   started on unasyn for gram neg rods and gram neg diplococci on bronch gram stain -> switched to cefipime for pseudomonas  fentanyl pushes changed to drip for sedation  nimodipine and keppra DC  R IJ still terminating in R subclavian on CXR  feeds held for possible extubation    VITALS:  T(C): , Max: 37.8 (08-13-20 @ 19:00)  HR:  (65 - 104)  BP: --  ABP:  (149/70 - 196/94)  RR:  (15 - 22)  SpO2:  (97% - 100%)  Wt(kg): --  Device: Avea, Mode: AC/ CMV (Assist Control/ Continuous Mandatory Ventilation), RR (machine): 14, TV (machine): 500, FiO2: 40, PEEP: 5, ITime: 1, MAP: 14, PIP: 34    08-13-20 @ 07:01  -  08-14-20 @ 07:00  --------------------------------------------------------  IN: 932.9 mL / OUT: 1415 mL / NET: -482.1 mL    IMAGING:   Recent imaging studies were reviewed.    MEDICATIONS:  acetaminophen   Tablet .. 650 milliGRAM(s) Oral every 6 hours PRN  albuterol/ipratropium for Nebulization. 3 milliLiter(s) Nebulizer every 6 hours  aMIOdarone    Tablet 200 milliGRAM(s) Oral daily  ampicillin/sulbactam  IVPB      ampicillin/sulbactam  IVPB 3 Gram(s) IV Intermittent every 6 hours  artificial  tears Solution 1 Drop(s) Both EYES every 6 hours  bisacodyl Suppository 10 milliGRAM(s) Rectal daily  chlorhexidine 0.12% Liquid 15 milliLiter(s) Oral Mucosa every 12 hours  chlorhexidine 4% Liquid 1 Application(s) Topical <User Schedule>  dextrose 40% Gel 15 Gram(s) Oral once PRN  dextrose 5%. 1000 milliLiter(s) IV Continuous <Continuous>  dextrose 50% Injectable 12.5 Gram(s) IV Push once  dextrose 50% Injectable 25 Gram(s) IV Push once  dextrose 50% Injectable 25 Gram(s) IV Push once  fentaNYL   Infusion... 0.5 MICROgram(s)/kG/Hr IV Continuous <Continuous>  glucagon  Injectable 1 milliGRAM(s) IntraMuscular once PRN  heparin   Injectable 5000 Unit(s) SubCutaneous every 8 hours  hydrALAZINE 75 milliGRAM(s) Oral every 8 hours  pantoprazole  Injectable 40 milliGRAM(s) IV Push every 12 hours  polyethylene glycol 3350 17 Gram(s) Oral every 12 hours  senna Syrup 10 milliLiter(s) Oral at bedtime  sodium chloride 0.9% lock flush 10 milliLiter(s) IV Push every 1 hour PRN  sodium chloride 7% Inhalation 4 milliLiter(s) Inhalation every 12 hours    EXAMINATION:  General: Calm, intubated  HEENT: MMM  Neuro:  -Mental status- (when not sedated) No acute distress, EO to voice. Follows commands briskly, shows 2 fingers and wiggles toes on right. Able to lift RUE off bed AG.   -CN- Pupils R 5mm NR, L 1mm sluggish, Eyes midline,  +cough/gag, +corneals  RUE localize AG, RLE spont AG, LUE flaccid, LLE flaccid  CVS: S1/S2  RESP: Diminished at bases  GI: Soft, non tender, mildly distended  Extremities: Warm, skin intact

## 2020-08-14 NOTE — PROGRESS NOTE ADULT - ASSESSMENT
60 year old male s/p cardiac arrest c/b GIB and bleeding from [place] with Perimesencephalic (HH4 mF4) subarachnoid hemorrhage, PBD 7.    NEURO:   SAH, possible sentinal event with cardiac arrest  CT Head: perimesencephalic SAH   CTA Head: no aneurysm noted  Conventional angiogram negative  MRI neuroaxis: restricted diffusion in R BG, posterior limb of R IC, and anterior right temporal lobe   VEEG negative   DCI management - nimodipine DC, keppra DC  No EVD as no significant hydrocephalus  q2 neurochecks  fentanyl gtt for sedation    PULM:  Mech vent: settings- 14/500/60/5  spO2>92%  CXR: improved  CTA R/O PE: negative  wean to extubate    CV:  SBP goal <200  TTE: Global LV dysfunction. EF41%, Severe concentric LVH, flattening of interventricular septum.   Concern for possible PE - CTA negative  hydralazine 75 q8  Will need cath per cardiology  Cardiology consult appreciated. Possible cMRI.   S/P amio gtt; changed to  daily.     RENAL: ?CKD with superimposed JALEN. Unclear baseline.   Cr improving   Monitor closely for need for diuresis  Sodium goal 140-150  I/os (urine output >50-75 cc/hr)    GI: GI bleed.   Diet: TF- Nepro  GI prophylaxis [] not indicated [x] PPI 40 BID IV push [] other:  Bowel regimen [x] senna [] other:  LBM: 8/11.     ENDO:   Goal euglycemia (-180)  ISS    HEME/ONC: afib on coumadin at home  s/p vit K  INR 1.42-->1.2  VTE prophylaxis: [] SCDs [x] chemoprophylaxis heparin subq  Dopplers negative for DVT.    ID:  leukocytosis improving  S/P TTM. Now normothermia. Monitor for fevers.   started on unasyn (8/13 - 8/20) for gram neg rods and gram neg diplococci on bronch gram stain    SOCIAL/FAMILY:  [] awaiting [x] updated at bedside [] family meeting    CODE STATUS:  [x] Full Code [] DNR [] DNI [] Palliative/Comfort Care    DISPOSITION:  [x] ICU [] Stroke Unit [] Floor [] EMU [] RCU [] PCU    [x] Patient is at high risk of neurologic deterioration/death due to: SAH, cardiac arrest    Time spent: 45 critical care minutes 60 year old male s/p cardiac arrest c/b GIB and bleeding from [place] with Perimesencephalic (HH4 mF4) subarachnoid hemorrhage, PBD 7.    NEURO:   SAH, possible sentinal event with cardiac arrest  CT Head: perimesencephalic SAH   CTA Head: no aneurysm noted  Conventional angiogram negative  MRI neuroaxis: restricted diffusion in R BG, posterior limb of R IC, and anterior right temporal lobe   VEEG negative   DCI management - nimodipine DC, keppra DC  No EVD as no significant hydrocephalus  q2 neurochecks  fentanyl gtt for sedation    PULM:  Mech vent: settings- 14/500/60/5  spO2>92%  CXR: improved  CTA R/O PE: negative  wean to extubate    CV:  SBP goal <200  TTE: Global LV dysfunction. EF41%, Severe concentric LVH, flattening of interventricular septum.   Concern for possible PE - CTA negative  hydralazine 75 q8  Will need cath per cardiology  Cardiology consult appreciated. Possible cMRI.   S/P amio gtt; changed to  daily.     RENAL: ?CKD with superimposed JALEN. Unclear baseline.   Cr improving   Monitor closely for need for diuresis  Sodium goal 140-150  I/os - overnight 350 cc over 5 hrs.    GI: GI bleed.   Diet: TF- Nepro  GI prophylaxis [] not indicated [x] PPI 40 BID IV push [] other:  Bowel regimen [x] senna [] other:  LBM: 8/11.     ENDO:   Goal euglycemia (-180)  ISS    HEME/ONC: afib on coumadin at home  s/p vit K  INR 1.42-->1.2  VTE prophylaxis: [] SCDs [x] chemoprophylaxis heparin subq  Dopplers negative for DVT.    ID:  leukocytosis improving  S/P TTM. Now normothermia. Monitor for fevers.   started on unasyn (8/13 - 8/20) for gram neg rods and gram neg diplococci on bronch gram stain    SOCIAL/FAMILY:  [] awaiting [x] updated at bedside [] family meeting    CODE STATUS:  [x] Full Code [] DNR [] DNI [] Palliative/Comfort Care    DISPOSITION:  [x] ICU [] Stroke Unit [] Floor [] EMU [] RCU [] PCU    [x] Patient is at high risk of neurologic deterioration/death due to: SAH, cardiac arrest    Time spent: 45 critical care minutes 60 year old male s/p cardiac arrest c/b GIB and bleeding from [place] with Perimesencephalic (HH4 mF4) subarachnoid hemorrhage, PBD 7.    NEURO:   MR carmen planned for sunday 8/16 per nsx  SAH, possible sentinal event with cardiac arrest  CT Head: perimesencephalic SAH   CTA Head: no aneurysm noted  Conventional angiogram negative  MRI neuroaxis: restricted diffusion in R BG, posterior limb of R IC, and anterior right temporal lobe   VEEG negative   DCI management - nimodipine DC, keppra DC  No EVD as no significant hydrocephalus  q2 neurochecks  fentanyl gtt for sedation    PULM:  Mech vent: settings- 14/500/60/5  spO2>92%  CXR: improved  CTA R/O PE: negative  wean to extubate    CV:  SBP goal <200  TTE: Global LV dysfunction. EF41%, Severe concentric LVH, flattening of interventricular septum.   Concern for possible PE - CTA negative  hydralazine 75 q8  Will need cath per cardiology  Cardiology consult appreciated. Possible cMRI.   S/P amio gtt; changed to  daily.     RENAL: ?CKD with superimposed JALEN. Unclear baseline.   Cr improving   Monitor closely for need for diuresis  Sodium goal 140-150  I/os - overnight 350 cc over 5 hrs.    GI: GI bleed.   Diet: TF- Nepro  GI prophylaxis [] not indicated [x] PPI 40 BID IV push [] other:  Bowel regimen [x] senna [] other:  LBM: 8/11.   protonix daily    ENDO:   Goal euglycemia (-180)  ISS    HEME/ONC: afib on coumadin at home  s/p vit K  INR 1.42-->1.2  VTE prophylaxis: [] SCDs [x] chemoprophylaxis heparin subq  Dopplers negative for DVT.    ID:  leukocytosis improving  S/P TTM. Now normothermia. Monitor for fevers.   started on on cefipime for pseudomonas on bronch    SOCIAL/FAMILY:  [] awaiting [x] updated at bedside [] family meeting    CODE STATUS:  [x] Full Code [] DNR [] DNI [] Palliative/Comfort Care    DISPOSITION:  [x] ICU [] Stroke Unit [] Floor [] EMU [] RCU [] PCU    [x] Patient is at high risk of neurologic deterioration/death due to: SAH, cardiac arrest    Time spent: 45 critical care minutes

## 2020-08-15 LAB
-  AMIKACIN: SIGNIFICANT CHANGE UP
-  AZTREONAM: SIGNIFICANT CHANGE UP
-  CEFEPIME: SIGNIFICANT CHANGE UP
-  CEFTAZIDIME: SIGNIFICANT CHANGE UP
-  CIPROFLOXACIN: SIGNIFICANT CHANGE UP
-  GENTAMICIN: SIGNIFICANT CHANGE UP
-  IMIPENEM: SIGNIFICANT CHANGE UP
-  LEVOFLOXACIN: SIGNIFICANT CHANGE UP
-  MEROPENEM: SIGNIFICANT CHANGE UP
-  PIPERACILLIN/TAZOBACTAM: SIGNIFICANT CHANGE UP
-  TOBRAMYCIN: SIGNIFICANT CHANGE UP
ALBUMIN SERPL ELPH-MCNC: 3.3 G/DL — SIGNIFICANT CHANGE UP (ref 3.3–5)
ALP SERPL-CCNC: 83 U/L — SIGNIFICANT CHANGE UP (ref 40–120)
ALT FLD-CCNC: 54 U/L — HIGH (ref 10–45)
ANION GAP SERPL CALC-SCNC: 10 MMOL/L — SIGNIFICANT CHANGE UP (ref 5–17)
APPEARANCE UR: CLEAR — SIGNIFICANT CHANGE UP
AST SERPL-CCNC: 55 U/L — HIGH (ref 10–40)
BILIRUB DIRECT SERPL-MCNC: <0.1 MG/DL — SIGNIFICANT CHANGE UP (ref 0–0.2)
BILIRUB INDIRECT FLD-MCNC: >0.1 MG/DL — LOW (ref 0.2–1)
BILIRUB SERPL-MCNC: 0.2 MG/DL — SIGNIFICANT CHANGE UP (ref 0.2–1.2)
BILIRUB UR-MCNC: NEGATIVE — SIGNIFICANT CHANGE UP
BUN SERPL-MCNC: 32 MG/DL — HIGH (ref 7–23)
CALCIUM SERPL-MCNC: 9.4 MG/DL — SIGNIFICANT CHANGE UP (ref 8.4–10.5)
CHLORIDE SERPL-SCNC: 114 MMOL/L — HIGH (ref 96–108)
CO2 SERPL-SCNC: 21 MMOL/L — LOW (ref 22–31)
COLOR SPEC: YELLOW — SIGNIFICANT CHANGE UP
CREAT SERPL-MCNC: 1.76 MG/DL — HIGH (ref 0.5–1.3)
CULTURE RESULTS: SIGNIFICANT CHANGE UP
DIFF PNL FLD: NEGATIVE — SIGNIFICANT CHANGE UP
GLUCOSE SERPL-MCNC: 143 MG/DL — HIGH (ref 70–99)
GLUCOSE UR QL: NEGATIVE — SIGNIFICANT CHANGE UP
GRAM STN FLD: SIGNIFICANT CHANGE UP
KETONES UR-MCNC: NEGATIVE — SIGNIFICANT CHANGE UP
LEUKOCYTE ESTERASE UR-ACNC: NEGATIVE — SIGNIFICANT CHANGE UP
MAGNESIUM SERPL-MCNC: 2.1 MG/DL — SIGNIFICANT CHANGE UP (ref 1.6–2.6)
METHOD TYPE: SIGNIFICANT CHANGE UP
NITRITE UR-MCNC: NEGATIVE — SIGNIFICANT CHANGE UP
ORGANISM # SPEC MICROSCOPIC CNT: SIGNIFICANT CHANGE UP
ORGANISM # SPEC MICROSCOPIC CNT: SIGNIFICANT CHANGE UP
PH UR: 6 — SIGNIFICANT CHANGE UP (ref 5–8)
PHOSPHATE SERPL-MCNC: 3.5 MG/DL — SIGNIFICANT CHANGE UP (ref 2.5–4.5)
POTASSIUM SERPL-MCNC: 4 MMOL/L — SIGNIFICANT CHANGE UP (ref 3.5–5.3)
POTASSIUM SERPL-SCNC: 4 MMOL/L — SIGNIFICANT CHANGE UP (ref 3.5–5.3)
PROCALCITONIN SERPL-MCNC: 0.19 NG/ML — HIGH (ref 0.02–0.1)
PROT SERPL-MCNC: 6.9 G/DL — SIGNIFICANT CHANGE UP (ref 6–8.3)
PROT UR-MCNC: 100 — SIGNIFICANT CHANGE UP
SODIUM SERPL-SCNC: 145 MMOL/L — SIGNIFICANT CHANGE UP (ref 135–145)
SP GR SPEC: 1.03 — HIGH (ref 1.01–1.02)
SPECIMEN SOURCE: SIGNIFICANT CHANGE UP
UROBILINOGEN FLD QL: NEGATIVE — SIGNIFICANT CHANGE UP

## 2020-08-15 PROCEDURE — 99292 CRITICAL CARE ADDL 30 MIN: CPT

## 2020-08-15 PROCEDURE — 71045 X-RAY EXAM CHEST 1 VIEW: CPT | Mod: 26,76

## 2020-08-15 PROCEDURE — 99291 CRITICAL CARE FIRST HOUR: CPT

## 2020-08-15 RX ORDER — ALTEPLASE 100 MG
2 KIT INTRAVENOUS ONCE
Refills: 0 | Status: COMPLETED | OUTPATIENT
Start: 2020-08-15 | End: 2020-08-16

## 2020-08-15 RX ORDER — SODIUM CHLORIDE 9 MG/ML
250 INJECTION, SOLUTION INTRAVENOUS ONCE
Refills: 0 | Status: COMPLETED | OUTPATIENT
Start: 2020-08-15 | End: 2020-08-15

## 2020-08-15 RX ORDER — ALTEPLASE 100 MG
2 KIT INTRAVENOUS ONCE
Refills: 0 | Status: DISCONTINUED | OUTPATIENT
Start: 2020-08-15 | End: 2020-08-16

## 2020-08-15 RX ADMIN — CHLORHEXIDINE GLUCONATE 1 APPLICATION(S): 213 SOLUTION TOPICAL at 05:10

## 2020-08-15 RX ADMIN — CEFEPIME 100 MILLIGRAM(S): 1 INJECTION, POWDER, FOR SOLUTION INTRAMUSCULAR; INTRAVENOUS at 21:16

## 2020-08-15 RX ADMIN — CEFEPIME 100 MILLIGRAM(S): 1 INJECTION, POWDER, FOR SOLUTION INTRAMUSCULAR; INTRAVENOUS at 05:06

## 2020-08-15 RX ADMIN — HEPARIN SODIUM 5000 UNIT(S): 5000 INJECTION INTRAVENOUS; SUBCUTANEOUS at 05:06

## 2020-08-15 RX ADMIN — CEFEPIME 100 MILLIGRAM(S): 1 INJECTION, POWDER, FOR SOLUTION INTRAMUSCULAR; INTRAVENOUS at 15:12

## 2020-08-15 RX ADMIN — SODIUM CHLORIDE 4 MILLILITER(S): 9 INJECTION INTRAMUSCULAR; INTRAVENOUS; SUBCUTANEOUS at 17:15

## 2020-08-15 RX ADMIN — Medication 650 MILLIGRAM(S): at 00:03

## 2020-08-15 RX ADMIN — HEPARIN SODIUM 5000 UNIT(S): 5000 INJECTION INTRAVENOUS; SUBCUTANEOUS at 15:12

## 2020-08-15 RX ADMIN — AMIODARONE HYDROCHLORIDE 200 MILLIGRAM(S): 400 TABLET ORAL at 00:08

## 2020-08-15 RX ADMIN — PANTOPRAZOLE SODIUM 40 MILLIGRAM(S): 20 TABLET, DELAYED RELEASE ORAL at 13:03

## 2020-08-15 RX ADMIN — SODIUM CHLORIDE 250 MILLILITER(S): 9 INJECTION, SOLUTION INTRAVENOUS at 22:44

## 2020-08-15 RX ADMIN — Medication 1 DROP(S): at 13:03

## 2020-08-15 RX ADMIN — HEPARIN SODIUM 5000 UNIT(S): 5000 INJECTION INTRAVENOUS; SUBCUTANEOUS at 21:16

## 2020-08-15 RX ADMIN — Medication 3 MILLILITER(S): at 05:06

## 2020-08-15 RX ADMIN — FENTANYL CITRATE 2.75 MICROGRAM(S)/KG/HR: 50 INJECTION INTRAVENOUS at 19:24

## 2020-08-15 RX ADMIN — Medication 3 MILLILITER(S): at 12:09

## 2020-08-15 RX ADMIN — SODIUM CHLORIDE 4 MILLILITER(S): 9 INJECTION INTRAMUSCULAR; INTRAVENOUS; SUBCUTANEOUS at 05:06

## 2020-08-15 RX ADMIN — Medication 3 MILLILITER(S): at 17:14

## 2020-08-15 RX ADMIN — Medication 75 MILLIGRAM(S): at 05:07

## 2020-08-15 RX ADMIN — CHLORHEXIDINE GLUCONATE 15 MILLILITER(S): 213 SOLUTION TOPICAL at 05:06

## 2020-08-15 RX ADMIN — Medication 650 MILLIGRAM(S): at 08:30

## 2020-08-15 RX ADMIN — Medication 1 DROP(S): at 17:34

## 2020-08-15 RX ADMIN — Medication 1 DROP(S): at 00:04

## 2020-08-15 RX ADMIN — Medication 1 DROP(S): at 05:07

## 2020-08-15 RX ADMIN — Medication 75 MILLIGRAM(S): at 15:12

## 2020-08-15 RX ADMIN — CHLORHEXIDINE GLUCONATE 15 MILLILITER(S): 213 SOLUTION TOPICAL at 17:34

## 2020-08-15 RX ADMIN — Medication 75 MILLIGRAM(S): at 21:16

## 2020-08-15 RX ADMIN — FENTANYL CITRATE 2.75 MICROGRAM(S)/KG/HR: 50 INJECTION INTRAVENOUS at 10:03

## 2020-08-15 NOTE — PROGRESS NOTE ADULT - ASSESSMENT
PBD#5 HH5 MF4 SAH, angio negative    fentanyl drip--wean as able  cont keppra  nimodipine interm held for bradycardia  -180  intubated, cont full vent support  thick copious secretions  aggressive pulm toilet, frequent suctioning  pseudomonas on bronch culture, started on 8/14 cefepime, CrCl >60, but uptrending Cr, monitor renal function  f/u 8/15 cultures  cont tube feeding  monitor for fevers  SQH  d/w family re: trach/PEG

## 2020-08-15 NOTE — PROGRESS NOTE ADULT - SUBJECTIVE AND OBJECTIVE BOX
Subjective: Patient seen and examined. No new events except as noted.   requiring fentanyl gtt for vent synchrony  remains intubated in NSCU   copious secretions   WBC rising       REVIEW OF SYSTEMS:    Unable to obtain     MEDICATIONS:  MEDICATIONS  (STANDING):  albuterol/ipratropium for Nebulization. 3 milliLiter(s) Nebulizer every 6 hours  alteplase for catheter clearance 2 milliGRAM(s) Catheter once  alteplase for catheter clearance 2 milliGRAM(s) Catheter once  aMIOdarone    Tablet 200 milliGRAM(s) Oral daily  artificial  tears Solution 1 Drop(s) Both EYES every 6 hours  bisacodyl Suppository 10 milliGRAM(s) Rectal daily  cefepime   IVPB 2000 milliGRAM(s) IV Intermittent every 8 hours  chlorhexidine 0.12% Liquid 15 milliLiter(s) Oral Mucosa every 12 hours  chlorhexidine 4% Liquid 1 Application(s) Topical <User Schedule>  chlorhexidine 4% Liquid 1 Application(s) Topical <User Schedule>  dextrose 5%. 1000 milliLiter(s) (50 mL/Hr) IV Continuous <Continuous>  dextrose 50% Injectable 12.5 Gram(s) IV Push once  dextrose 50% Injectable 25 Gram(s) IV Push once  dextrose 50% Injectable 25 Gram(s) IV Push once  fentaNYL   Infusion... 0.5 MICROgram(s)/kG/Hr (2.75 mL/Hr) IV Continuous <Continuous>  heparin   Injectable 5000 Unit(s) SubCutaneous every 8 hours  hydrALAZINE 75 milliGRAM(s) Oral every 8 hours  pantoprazole  Injectable 40 milliGRAM(s) IV Push daily  polyethylene glycol 3350 17 Gram(s) Oral every 12 hours  senna Syrup 10 milliLiter(s) Oral at bedtime  sodium chloride 7% Inhalation 4 milliLiter(s) Inhalation every 12 hours      PHYSICAL EXAM:  T(C): 37.3 (08-15-20 @ 19:00), Max: 38.4 (08-14-20 @ 23:00)  HR: 86 (08-15-20 @ 22:00) (67 - 108)  BP: --  RR: 12 (08-15-20 @ 22:00) (0 - 18)  SpO2: 100% (08-15-20 @ 22:00) (98% - 100%)  Wt(kg): --  I&O's Summary    14 Aug 2020 07:01  -  15 Aug 2020 07:00  --------------------------------------------------------  IN: 2087.5 mL / OUT: 2750 mL / NET: -662.5 mL    15 Aug 2020 07:01  -  15 Aug 2020 22:45  --------------------------------------------------------  IN: 1040 mL / OUT: 850 mL / NET: 190 mL          Appearance: Intubated sedated 	  HEENT:   Dry  oral mucosa,  Lymphatic: No lymphadenopathy  Cardiovascular: Normal S1 S2, No JVD, No murmurs, No edema  Respiratory: Ventilated   Psychiatry: A & O x 0  Gastrointestinal:  Soft, Non-tender, + BS	  Skin: No rashes, No ecchymoses, No cyanosis	  Mental status- No acute distress, EO to stim  -CN- Pupils R 4mm NR, L 2mm sluggish, EOMI, tongue midline, face symmetric  +cough/gag  RUE localize AG  RLE spont AG  LUE flaccid  LLE flaccid    Extremities: decreased range of motion, No clubbing, cyanosis or edema  Vascular: Peripheral pulses palpable 2+ bilaterally  +scott      LABS:    CARDIAC MARKERS:                                10.1   8.00  )-----------( 195      ( 14 Aug 2020 21:57 )             33.8     08-15    145  |  114<H>  |  32<H>  ----------------------------<  143<H>  4.0   |  21<L>  |  1.76<H>    Ca    9.4      15 Aug 2020 21:25  Phos  3.5     08-15  Mg     2.1     08-15    TPro  6.9  /  Alb  3.3  /  TBili  0.2  /  DBili  <0.1  /  AST  55<H>  /  ALT  54<H>  /  AlkPhos  83  08-15    proBNP:   Lipid Profile:   HgA1c:   TSH:     Negative  Negative          TELEMETRY: AF	    ECG:  	  RADIOLOGY: < from: Xray Chest 1 View- PORTABLE-Urgent (08.15.20 @ 13:02) >    EXAM:  XR CHEST PORTABLE URGENT 1V                            PROCEDURE DATE:  08/15/2020            INTERPRETATION:  Indication: Check central line position.    Technique: Single portable view of the chest.    Comparison: 8/15/2020 at 5:27 AM    Impression: There is a right IJ central venous catheter with tip overlying the right internal jugular vein, unchanged. There has been no significant interval change compared to the prior exam.                  TARAS SÁNCHEZ M.D., ATTENDING RADIOLOGIST  This document has been electronically signed. Aug 15 2020 12:40PM                < end of copied text >    DIAGNOSTIC TESTING:  [ ] Echocardiogram:  [ ]  Catheterization:  [ ] Stress Test:    OTHER:

## 2020-08-15 NOTE — PROGRESS NOTE ADULT - SUBJECTIVE AND OBJECTIVE BOX
HPI:  60 year old male with PMHx of Afib on coumadin s/p cardiac arrest at work, downtime 25 minutes prior to ROSC. Pupils were R fixed and dilated, left fixed at the time, no gag reflex, post-CA cooling protocol was initiated down to 35 deg. Intubated and put on Nimbex drip. Also on Propofol, fentanyl, levophed. R groin minerva placed. Also put on heparin post-CA. HCT showed R perimesencephalic SAH of unclear etiology, no hydrocephalus. Course also c/b GIB, was put on protonix drip. Also had bleeding from [place] - urology consulted, clot irrigated.       HOSP COURSE:   8/11: VTACH noted (7 beats)  8/13: Febrile  8/14: Started on Unasyn for gram neg rods and gram neg diplococci on bronch gram stain -> switched to cefepime for pseudomonas noted 8/10    Overnight events: Afebrile. Remains intubated    ICU Vital Signs Last 24 Hrs  T(C): 37.9 (15 Aug 2020 03:00), Max: 38.4 (14 Aug 2020 23:00)  T(F): 100.2 (15 Aug 2020 03:00), Max: 101.2 (14 Aug 2020 23:00)  HR: 90 (15 Aug 2020 06:00) (67 - 111)  ABP: 174/89 (15 Aug 2020 06:00) (157/83 - 201/99)  ABP(mean): 106 (15 Aug 2020 06:00) (94 - 133)  RR: 18 (15 Aug 2020 06:00) (11 - 24)  SpO2: 100% (15 Aug 2020 06:00) (98% - 100%)      08-14-20 @ 07:01  -  08-15-20 @ 07:00  --------------------------------------------------------  IN: 2065.5 mL / OUT: 2750 mL / NET: -684.5 mL      Mode: AC/ CMV (Assist Control/ Continuous Mandatory Ventilation), RR (machine): 18, TV (machine): 500, FiO2: 40, PEEP: 5, ITime: 1, MAP: 13, PIP: 28  acetaminophen   Tablet .. 650 milliGRAM(s) Oral every 6 hours PRN  albuterol/ipratropium for Nebulization. 3 milliLiter(s) Nebulizer every 6 hours  aMIOdarone    Tablet 200 milliGRAM(s) Oral daily  artificial  tears Solution 1 Drop(s) Both EYES every 6 hours  bisacodyl Suppository 10 milliGRAM(s) Rectal daily  cefepime   IVPB 2000 milliGRAM(s) IV Intermittent every 8 hours  chlorhexidine 0.12% Liquid 15 milliLiter(s) Oral Mucosa every 12 hours  chlorhexidine 4% Liquid 1 Application(s) Topical <User Schedule>  chlorhexidine 4% Liquid 1 Application(s) Topical <User Schedule>  dextrose 40% Gel 15 Gram(s) Oral once PRN  dextrose 5%. 1000 milliLiter(s) (50 mL/Hr) IV Continuous <Continuous>  dextrose 50% Injectable 12.5 Gram(s) IV Push once  dextrose 50% Injectable 25 Gram(s) IV Push once  dextrose 50% Injectable 25 Gram(s) IV Push once  fentaNYL   Infusion... 0.5 MICROgram(s)/kG/Hr (2.75 mL/Hr) IV Continuous <Continuous>  glucagon  Injectable 1 milliGRAM(s) IntraMuscular once PRN  heparin   Injectable 5000 Unit(s) SubCutaneous every 8 hours  hydrALAZINE 75 milliGRAM(s) Oral every 8 hours  pantoprazole  Injectable 40 milliGRAM(s) IV Push daily  polyethylene glycol 3350 17 Gram(s) Oral every 12 hours  senna Syrup 10 milliLiter(s) Oral at bedtime  sodium chloride 0.9% lock flush 10 milliLiter(s) IV Push every 1 hour PRN  sodium chloride 7% Inhalation 4 milliLiter(s) Inhalation every 12 hours                          10.1   8.00  )-----------( 195      ( 14 Aug 2020 21:57 )             33.8     08-14    143  |  112<H>  |  26<H>  ----------------------------<  144<H>  4.0   |  19<L>  |  1.74<H>    Ca    9.6      14 Aug 2020 21:57  Phos  3.2     08-14  Mg     2.2     08-14        ABG - ( 14 Aug 2020 02:42 )  pH, Arterial: 7.34  pH, Blood: x     /  pCO2: 38    /  pO2: 145   / HCO3: 20    / Base Excess: -4.6  /  SaO2: 99        EXAMINATION:  General: Calm, intubated  HEENT: MMM  Neuro:  -Mental status- (sedation paused) No acute distress, EO to voice. Follows commands briskly, shows 2 fingers and wiggles toes on right. Able to lift RUE off bed AG.   -CN- Pupils R 5mm NR, L 1mm sluggish, Eyes midline,  +cough/gag, +corneals  RUE localize AG, RLE spont AG, LUE flaccid, LLE flaccid  CVS: S1/S2  RESP: Diminished at bases  GI: Soft, non tender, mildly distended  Extremities: Warm, skin intact HPI:  60 year old male with PMHx of Afib on coumadin s/p cardiac arrest at work, downtime 25 minutes prior to ROSC. Pupils were R fixed and dilated, left fixed at the time, no gag reflex, post-CA cooling protocol was initiated down to 35 deg. Intubated and put on Nimbex drip. Also on Propofol, fentanyl, levophed. R groin minerva placed. Also put on heparin post-CA. HCT showed R perimesencephalic SAH of unclear etiology, no hydrocephalus. Course also c/b GIB, was put on protonix drip. Also had bleeding from [place] - urology consulted, clot irrigated.       HOSP COURSE:   8/11: VTACH noted (7 beats)  8/13: Febrile  8/14: Started on Unasyn for gram neg rods and gram neg diplococci on bronch gram stain -> switched to cefepime for pseudomonas noted 8/10    Overnight events: Afebrile. Remains intubated    ICU Vital Signs Last 24 Hrs  T(C): 37.9 (15 Aug 2020 03:00), Max: 38.4 (14 Aug 2020 23:00)  T(F): 100.2 (15 Aug 2020 03:00), Max: 101.2 (14 Aug 2020 23:00)  HR: 90 (15 Aug 2020 06:00) (67 - 111)  ABP: 174/89 (15 Aug 2020 06:00) (157/83 - 201/99)  ABP(mean): 106 (15 Aug 2020 06:00) (94 - 133)  RR: 18 (15 Aug 2020 06:00) (11 - 24)  SpO2: 100% (15 Aug 2020 06:00) (98% - 100%)      08-14-20 @ 07:01  -  08-15-20 @ 07:00  --------------------------------------------------------  IN: 2065.5 mL / OUT: 2750 mL / NET: -684.5 mL      Mode: AC/ CMV (Assist Control/ Continuous Mandatory Ventilation), RR (machine): 18, TV (machine): 500, FiO2: 40, PEEP: 5, ITime: 1, MAP: 13, PIP: 28  acetaminophen   Tablet .. 650 milliGRAM(s) Oral every 6 hours PRN  albuterol/ipratropium for Nebulization. 3 milliLiter(s) Nebulizer every 6 hours  aMIOdarone    Tablet 200 milliGRAM(s) Oral daily  artificial  tears Solution 1 Drop(s) Both EYES every 6 hours  bisacodyl Suppository 10 milliGRAM(s) Rectal daily  cefepime   IVPB 2000 milliGRAM(s) IV Intermittent every 8 hours  chlorhexidine 0.12% Liquid 15 milliLiter(s) Oral Mucosa every 12 hours  chlorhexidine 4% Liquid 1 Application(s) Topical <User Schedule>  chlorhexidine 4% Liquid 1 Application(s) Topical <User Schedule>  dextrose 40% Gel 15 Gram(s) Oral once PRN  dextrose 5%. 1000 milliLiter(s) (50 mL/Hr) IV Continuous <Continuous>  dextrose 50% Injectable 12.5 Gram(s) IV Push once  dextrose 50% Injectable 25 Gram(s) IV Push once  dextrose 50% Injectable 25 Gram(s) IV Push once  fentaNYL   Infusion... 0.5 MICROgram(s)/kG/Hr (2.75 mL/Hr) IV Continuous <Continuous>  glucagon  Injectable 1 milliGRAM(s) IntraMuscular once PRN  heparin   Injectable 5000 Unit(s) SubCutaneous every 8 hours  hydrALAZINE 75 milliGRAM(s) Oral every 8 hours  pantoprazole  Injectable 40 milliGRAM(s) IV Push daily  polyethylene glycol 3350 17 Gram(s) Oral every 12 hours  senna Syrup 10 milliLiter(s) Oral at bedtime  sodium chloride 0.9% lock flush 10 milliLiter(s) IV Push every 1 hour PRN  sodium chloride 7% Inhalation 4 milliLiter(s) Inhalation every 12 hours                          10.1   8.00  )-----------( 195      ( 14 Aug 2020 21:57 )             33.8     08-14    143  |  112<H>  |  26<H>  ----------------------------<  144<H>  4.0   |  19<L>  |  1.74<H>    Ca    9.6      14 Aug 2020 21:57  Phos  3.2     08-14  Mg     2.2     08-14        ABG - ( 14 Aug 2020 02:42 )  pH, Arterial: 7.34  pH, Blood: x     /  pCO2: 38    /  pO2: 145   / HCO3: 20    / Base Excess: -4.6  /  SaO2: 99        EXAMINATION:  General: Calm, intubated  HEENT: MMM  Neuro:  -Mental status- (sedation paused) No acute distress, EO to voice. Strong and purposeful on R but did not follow commands today 8/15. Left side  0/5  -CN- Pupils R 5mm NR, L 1mm sluggish, Eyes dysconjugate,  +cough/gag, +corneals  RUE localize AG, RLE spont AG, LUE flaccid, LLE flaccid  CVS: S1/S2  RESP: Diminished at bases  GI: Soft, non tender, mildly distended  Extremities: Warm, skin intact

## 2020-08-15 NOTE — PROGRESS NOTE ADULT - SUBJECTIVE AND OBJECTIVE BOX
remains intubated  requiring fentanyl gtt for vent synchrony  febrile today, re-cultured    vitals/labs/meds reviewed  EO to voice, intubated, does not nod, but FC intermittently, two fingers on RUE, distally AG, wiggles toes on RLE, no movements on L

## 2020-08-15 NOTE — PROGRESS NOTE ADULT - ASSESSMENT
60 year old male s/p cardiac arrest c/b GIB and bleeding from [place] with Perimesencephalic (HH4 mF4) subarachnoid hemorrhage, PBD 8.    NEURO:   SAH, possible sentinal event with cardiac arrest  Q2 neurochecks  CT Head: perimesencephalic SAH   CTA Head: no aneurysm noted  Conventional angiogram negative  MRI neuroaxis: restricted diffusion in R BG, posterior limb of R IC, and anterior right temporal lobe  MR carmen planned for sunday 8/16 per nsx  VEEG negative   DCI management (off nimodipine). Poor windows  No EVD as no significant hydrocephalus  Fentanyl gtt for sedation    PULM:  [facility] vent: settings- 14/500/60/5 **  spO2>92%  CXR: improved  CTA R/O PE: negative  Weaning to extubate, however, still with significant thick secretions.  Continue hypertonic nebs.   CPAP as tolerated.      CV:  SBP goal <200  TTE: Global LV dysfunction. EF41%, Severe concentric LVH, flattening of interventricular septum.   Concern for possible PE - CTA negative  Continue hydralazine 75 q8  Will need cath per cardiology  S/P amio gtt; changed to  daily.     RENAL: ?CKD with superimposed JALEN. Unclear baseline.   Cr improving   Sodium goal 140-150  Has been diuresced. Net neg 680cc.     GI: GI bleed S/P PPI gtt  Diet: TF- Nepro  GI prophylaxis [] not indicated [x] PPI 40 daily in setting of GI bleed and intubation [] other:  Bowel regimen [x] senna [] other:  LBM: 8/11 **    ENDO:   Goal euglycemia (-180)  ISS    HEME/ONC: Afib on coumadin at home  s/p vit K  INR 1.42-->1.2  VTE prophylaxis: [] SCDs [x] chemoprophylaxis heparin subq  Dopplers negative for DVT.    ID:  Leukocytosis improving  S/P TTM. Now normothermia. Monitor for fevers.   Started on cefepime (switched from unasyn on 8/14) for pseudomonas noted in bronch cx 8/10    SOCIAL/FAMILY:  [] awaiting [x] updated at bedside [] family meeting    CODE STATUS:  [x] Full Code [] DNR [] DNI [] Palliative/Comfort Care    DISPOSITION:  [x] ICU [] Stroke Unit [] Floor [] EMU [] RCU [] PCU    [x] Patient is at high risk of neurologic deterioration/death due to: SAH, cardiac arrest    Time spent: 45 critical care minutes 60 year old male s/p cardiac arrest c/b GIB and bleeding from [place] with Perimesencephalic (HH4 mF4) subarachnoid hemorrhage, PBD 8.    NEURO:   SAH, possible sentinal event with cardiac arrest  Q2 neurochecks  CT Head: perimesencephalic SAH   CTA Head: no aneurysm noted  Conventional angiogram negative  MRI neuroaxis: restricted diffusion in R BG, posterior limb of R IC, and anterior right temporal lobe  MR carmen planned for sunday 8/16 per nsx.  VEEG negative   DCI management (off nimodipine). Poor windows  No EVD as no significant hydrocephalus  Fentanyl gtt for sedation    PULM:  [facility] vent: settings- 14/500/60/5   spO2>92%  CXR: improved  CTA R/O PE: negative  Weaning to extubate, however, still with significant thick secretions.  Continue hypertonic nebs.   CPAP as tolerated.      CV:  SBP goal <200  TTE: Global LV dysfunction. EF41%, Severe concentric LVH, flattening of interventricular septum.   Concern for possible PE - CTA negative  Continue hydralazine 75 q8  Will need cath per cardiology  S/P amio gtt; changed to  daily.       RENAL: ?CKD with superimposed JALEN. Unclear baseline.   Cr stable  Sodium goal 140-150  Has been diuresced. Net neg 680cc.       GI: GI bleed S/P PPI gtt  Diet: TF- Nepro.  GI prophylaxis [] not indicated [x] PPI 40 daily in setting of GI bleed and intubation [] other:  Bowel regimen [x] senna [] other:  LBM: 8/15    ENDO:   Goal euglycemia (-180)  ISS    HEME/ONC: Afib on coumadin at home  s/p vit K  INR 1.42-->1.2  VTE prophylaxis: [] SCDs [x] chemoprophylaxis heparin subq  Dopplers negative for DVT.    ID:  Leukocytosis improving  Fevers  Started on cefepime (switched from unasyn on 8/14) for pseudomonas noted in bronch cx 8/10  Urine culture, procal.   ?Arnol Sun.     SOCIAL/FAMILY:  [] awaiting [x] updated at bedside [] family meeting    CODE STATUS:  [x] Full Code [] DNR [] DNI [] Palliative/Comfort Care    DISPOSITION:  [x] ICU [] Stroke Unit [] Floor [] EMU [] RCU [] PCU    [x] Patient is at high risk of neurologic deterioration/death due to: SAH, cardiac arrest    Time spent: 45 critical care minutes

## 2020-08-16 LAB
ANION GAP SERPL CALC-SCNC: 11 MMOL/L — SIGNIFICANT CHANGE UP (ref 5–17)
ANION GAP SERPL CALC-SCNC: 13 MMOL/L — SIGNIFICANT CHANGE UP (ref 5–17)
BUN SERPL-MCNC: 35 MG/DL — HIGH (ref 7–23)
BUN SERPL-MCNC: 36 MG/DL — HIGH (ref 7–23)
CALCIUM SERPL-MCNC: 9.2 MG/DL — SIGNIFICANT CHANGE UP (ref 8.4–10.5)
CALCIUM SERPL-MCNC: 9.7 MG/DL — SIGNIFICANT CHANGE UP (ref 8.4–10.5)
CHLORIDE SERPL-SCNC: 113 MMOL/L — HIGH (ref 96–108)
CHLORIDE SERPL-SCNC: 114 MMOL/L — HIGH (ref 96–108)
CO2 SERPL-SCNC: 18 MMOL/L — LOW (ref 22–31)
CO2 SERPL-SCNC: 19 MMOL/L — LOW (ref 22–31)
CREAT SERPL-MCNC: 1.57 MG/DL — HIGH (ref 0.5–1.3)
CREAT SERPL-MCNC: 1.7 MG/DL — HIGH (ref 0.5–1.3)
GAS PNL BLDA: SIGNIFICANT CHANGE UP
GLUCOSE SERPL-MCNC: 106 MG/DL — HIGH (ref 70–99)
GLUCOSE SERPL-MCNC: 136 MG/DL — HIGH (ref 70–99)
MAGNESIUM SERPL-MCNC: 2.1 MG/DL — SIGNIFICANT CHANGE UP (ref 1.6–2.6)
MAGNESIUM SERPL-MCNC: 2.3 MG/DL — SIGNIFICANT CHANGE UP (ref 1.6–2.6)
PHOSPHATE SERPL-MCNC: 3.3 MG/DL — SIGNIFICANT CHANGE UP (ref 2.5–4.5)
PHOSPHATE SERPL-MCNC: 3.4 MG/DL — SIGNIFICANT CHANGE UP (ref 2.5–4.5)
POTASSIUM SERPL-MCNC: 3.7 MMOL/L — SIGNIFICANT CHANGE UP (ref 3.5–5.3)
POTASSIUM SERPL-MCNC: 3.9 MMOL/L — SIGNIFICANT CHANGE UP (ref 3.5–5.3)
POTASSIUM SERPL-SCNC: 3.7 MMOL/L — SIGNIFICANT CHANGE UP (ref 3.5–5.3)
POTASSIUM SERPL-SCNC: 3.9 MMOL/L — SIGNIFICANT CHANGE UP (ref 3.5–5.3)
SARS-COV-2 RNA SPEC QL NAA+PROBE: SIGNIFICANT CHANGE UP
SODIUM SERPL-SCNC: 143 MMOL/L — SIGNIFICANT CHANGE UP (ref 135–145)
SODIUM SERPL-SCNC: 145 MMOL/L — SIGNIFICANT CHANGE UP (ref 135–145)
TROPONIN T, HIGH SENSITIVITY RESULT: 57 NG/L — HIGH (ref 0–51)
TROPONIN T, HIGH SENSITIVITY RESULT: 62 NG/L — HIGH (ref 0–51)

## 2020-08-16 PROCEDURE — 99291 CRITICAL CARE FIRST HOUR: CPT

## 2020-08-16 PROCEDURE — 71045 X-RAY EXAM CHEST 1 VIEW: CPT | Mod: 26

## 2020-08-16 PROCEDURE — 99292 CRITICAL CARE ADDL 30 MIN: CPT

## 2020-08-16 RX ADMIN — HEPARIN SODIUM 5000 UNIT(S): 5000 INJECTION INTRAVENOUS; SUBCUTANEOUS at 05:19

## 2020-08-16 RX ADMIN — Medication 1 DROP(S): at 01:32

## 2020-08-16 RX ADMIN — Medication 3 MILLILITER(S): at 00:35

## 2020-08-16 RX ADMIN — ALTEPLASE 2 MILLIGRAM(S): KIT at 04:35

## 2020-08-16 RX ADMIN — CHLORHEXIDINE GLUCONATE 1 APPLICATION(S): 213 SOLUTION TOPICAL at 05:20

## 2020-08-16 RX ADMIN — SODIUM CHLORIDE 4 MILLILITER(S): 9 INJECTION INTRAMUSCULAR; INTRAVENOUS; SUBCUTANEOUS at 18:23

## 2020-08-16 RX ADMIN — Medication 75 MILLIGRAM(S): at 21:51

## 2020-08-16 RX ADMIN — FENTANYL CITRATE 2.75 MICROGRAM(S)/KG/HR: 50 INJECTION INTRAVENOUS at 19:45

## 2020-08-16 RX ADMIN — Medication 3 MILLILITER(S): at 23:53

## 2020-08-16 RX ADMIN — Medication 3 MILLILITER(S): at 18:21

## 2020-08-16 RX ADMIN — CEFEPIME 100 MILLIGRAM(S): 1 INJECTION, POWDER, FOR SOLUTION INTRAMUSCULAR; INTRAVENOUS at 14:21

## 2020-08-16 RX ADMIN — AMIODARONE HYDROCHLORIDE 200 MILLIGRAM(S): 400 TABLET ORAL at 01:33

## 2020-08-16 RX ADMIN — Medication 75 MILLIGRAM(S): at 05:19

## 2020-08-16 RX ADMIN — Medication 1 DROP(S): at 12:10

## 2020-08-16 RX ADMIN — HEPARIN SODIUM 5000 UNIT(S): 5000 INJECTION INTRAVENOUS; SUBCUTANEOUS at 14:21

## 2020-08-16 RX ADMIN — CHLORHEXIDINE GLUCONATE 15 MILLILITER(S): 213 SOLUTION TOPICAL at 05:19

## 2020-08-16 RX ADMIN — HEPARIN SODIUM 5000 UNIT(S): 5000 INJECTION INTRAVENOUS; SUBCUTANEOUS at 21:51

## 2020-08-16 RX ADMIN — CHLORHEXIDINE GLUCONATE 15 MILLILITER(S): 213 SOLUTION TOPICAL at 17:51

## 2020-08-16 RX ADMIN — CEFEPIME 100 MILLIGRAM(S): 1 INJECTION, POWDER, FOR SOLUTION INTRAMUSCULAR; INTRAVENOUS at 05:19

## 2020-08-16 RX ADMIN — Medication 75 MILLIGRAM(S): at 14:21

## 2020-08-16 RX ADMIN — SODIUM CHLORIDE 4 MILLILITER(S): 9 INJECTION INTRAMUSCULAR; INTRAVENOUS; SUBCUTANEOUS at 05:19

## 2020-08-16 RX ADMIN — Medication 3 MILLILITER(S): at 11:42

## 2020-08-16 RX ADMIN — Medication 3 MILLILITER(S): at 05:16

## 2020-08-16 RX ADMIN — Medication 1 DROP(S): at 17:52

## 2020-08-16 RX ADMIN — FENTANYL CITRATE 2.75 MICROGRAM(S)/KG/HR: 50 INJECTION INTRAVENOUS at 12:11

## 2020-08-16 RX ADMIN — Medication 1 DROP(S): at 05:20

## 2020-08-16 RX ADMIN — PANTOPRAZOLE SODIUM 40 MILLIGRAM(S): 20 TABLET, DELAYED RELEASE ORAL at 12:10

## 2020-08-16 RX ADMIN — CEFEPIME 100 MILLIGRAM(S): 1 INJECTION, POWDER, FOR SOLUTION INTRAMUSCULAR; INTRAVENOUS at 21:50

## 2020-08-16 NOTE — CHART NOTE - NSCHARTNOTEFT_GEN_A_CORE
Patient had fevers for past 24 hours. 2 peripheral IVs established. Rt IJ TLC was removed under standard precaution. Direct pressure to achieve hemostasis. Tegaderm dressing applied. Pt tolerated procedure well. No complication noted

## 2020-08-16 NOTE — PROGRESS NOTE ADULT - ASSESSMENT
60 year old male s/p cardiac arrest c/b GIB and bleeding from [place] with Perimesencephalic (HH4 mF4) subarachnoid hemorrhage, angio neg ,PBD 9.    NEURO:   SAH, possible sentinal event with cardiac arrest  Q2 neurochecks  CT Head: perimesencephalic SAH   CTA Head: no aneurysm noted  Conventional angiogram negative  MRI neuroaxis: restricted diffusion in R BG, posterior limb of R IC, and anterior right temporal lobe  MR carmen planned for sunday 8/17 per nsx.  VEEG negative   DCI management (off nimodipine). Poor windows  No EVD as no significant hydrocephalus  Fentanyl gtt for sedation    PULM:  [facility] vent: settings- 14/500/60/5   spO2>92%  CXR: improved  CTA R/O PE: negative  Weaning to extubate, however, still with significant thick secretions.  Continue hypertonic nebs.   CPAP as tolerated.      CV:  SBP goal <200  TTE: Global LV dysfunction. EF41%, Severe concentric LVH, flattening of interventricular septum.   Concern for possible PE - CTA negative  Continue hydralazine 75 q8  Will need cath per cardiology  S/P amio gtt; changed to  daily.       RENAL: ?CKD with superimposed JALEN. Unclear baseline.   Cr stable  Sodium goal 140-150  Has been diuresced. Net neg 680cc.       GI: GI bleed S/P PPI gtt  Diet: TF- Nepro.  GI prophylaxis [] not indicated [x] PPI 40 daily in setting of GI bleed and intubation [] other:  Bowel regimen [x] senna [] other:  LBM: 8/15    ENDO:   Goal euglycemia (-180)  ISS    HEME/ONC: Afib on coumadin at home  s/p vit K  INR 1.42-->1.2  VTE prophylaxis: [] SCDs [x] chemoprophylaxis heparin subq  Dopplers negative for DVT.    ID:  Leukocytosis improving  Fevers  Started on cefepime (switched from unasyn on 8/14) for pseudomonas noted in bronch cx 8/10  Urine culture, procal.   ?Arctic Sun.     SOCIAL/FAMILY:  [] awaiting [x] updated at bedside [] family meeting    CODE STATUS:  [x] Full Code [] DNR [] DNI [] Palliative/Comfort Care    DISPOSITION:  [x] ICU [] Stroke Unit [] Floor [] EMU [] RCU [] PCU    [x] Patient is at high risk of neurologic deterioration/death due to: SAH, cardiac arrest 60 year old male s/p cardiac arrest c/b GIB and bleeding from [place] with Perimesencephalic (HH4 mF4) subarachnoid hemorrhage, angio neg ,PBD 9.    NEURO:   SAH, possible sentinal event with cardiac arrest  Q2h neurochecks  CT Head: perimesencephalic SAH   CTA Head: no aneurysm noted  Conventional angiogram negative  MRI neuroaxis: restricted diffusion in R BG, posterior limb of R IC, and anterior right temporal lobe  MR carmen planned for mon 8/17 @ 13:00 pm  VEEG negative   DCI management (off nimodipine). Poor windows  No EVD as no significant hydrocephalus  Fentanyl gtt for sedation    PULM:  [facility] vent: settings- 18/500/40/5   spO2>92%  repeat abg today  CXR: improved  CTA R/O PE: negative  Weaning to extubate, however, still with significant thick secretions.  Continue hypertonic nebs.   CPAP as tolerated.      CV:  SBP goal <200  TTE: Global LV dysfunction. EF41%, Severe concentric LVH, flattening of interventricular septum.   Concern for possible PE - CTA negative  Continue hydralazine 75 q8  Will need cath per cardiology  S/P amio gtt; changed to  daily.       RENAL: ?CKD with superimposed JALEN. Unclear baseline.   Cr stable  Sodium goal 140-150  Has been diuresced. Net neg 680cc.       GI: GI bleed S/P PPI gtt  Diet: TF- Nepro.  GI prophylaxis [] not indicated [x] PPI 40 daily in setting of GI bleed and intubation [] other:  Bowel regimen [x] senna [] other:  LBM: 8/15    ENDO:   Goal euglycemia (-180)  ISS    HEME/ONC: Afib on coumadin at home  s/p vit K  INR 1.42-->1.2  VTE prophylaxis: [] SCDs [x] chemoprophylaxis heparin subq  Dopplers negative for DVT.    ID:  Leukocytosis improving  Fevers  Started on cefepime (switched from unasyn on 8/14) for pseudomonas noted in bronch cx 8/10  Urine culture, procal.   ?Arctic Sun.     SOCIAL/FAMILY:  [] awaiting [x] updated at bedside [] family meeting    CODE STATUS:  [x] Full Code [] DNR [] DNI [] Palliative/Comfort Care    DISPOSITION:  [x] ICU [] Stroke Unit [] Floor [] EMU [] RCU [] PCU    [x] Patient is at high risk of neurologic deterioration/death due to: SAH, cardiac arrest

## 2020-08-16 NOTE — PROGRESS NOTE ADULT - ASSESSMENT
PBD#6 HH5 MF4 SAH, angio negative    fentanyl drip--wean as able  cont keppra  nimodipine interm held for bradycardia  -180  intubated, cont full vent support  thick copious secretions  aggressive pulm toilet, frequent suctioning  pseudomonas on bronch culture, started on 8/14 cefepime, CrCl >60, but uptrending Cr, monitor renal function--stable  8/15 cultures NTD  cont tube feeding  monitor for fevers  SQH  d/w family re: trach/PEG

## 2020-08-16 NOTE — PROGRESS NOTE ADULT - SUBJECTIVE AND OBJECTIVE BOX
remains intubated  requiring fentanyl gtt for vent synchrony  low grade fever    vitals/labs/meds reviewed  EO to voice, intubated, does not nod, but FC intermittently, two fingers on RUE, distally AG, wiggles toes on RLE, no movements on L

## 2020-08-16 NOTE — PROGRESS NOTE ADULT - ASSESSMENT
PLAN  MR Corrigan in am  Discuss trach / peg w/ family  8/15 Pancx (Tmax 38.3) - P  Monitor LFTs  CPAP as tolerated (monitor secretions)  Will need cardiac cath (post ext)

## 2020-08-16 NOTE — PROGRESS NOTE ADULT - SUBJECTIVE AND OBJECTIVE BOX
Vital Signs Last 24 Hrs  T(C): 37.3 (15 Aug 2020 19:00), Max: 38.4 (15 Aug 2020 08:00)  T(F): 99.1 (15 Aug 2020 19:00), Max: 101.1 (15 Aug 2020 08:00)  HR: 86 (15 Aug 2020 22:00) (75 - 108)  BP: --  BP(mean): --  RR: 12 (15 Aug 2020 22:00) (0 - 18)  SpO2: 100% (15 Aug 2020 22:00) (98% - 100%)    EXAM  Intubated, EOs to voice,  Right pupil 4 s;uggish, left 3 R, (+) cough/gag, (+) corneals,  disconjugate gaze, interm FC RUE (two fingers), RLE spont, LT side nothing

## 2020-08-16 NOTE — PROGRESS NOTE ADULT - SUBJECTIVE AND OBJECTIVE BOX
HPI:  60 year old male with PMHx of Afib on coumadin s/p cardiac arrest at work, downtime 25 minutes prior to ROSC. Pupils were R fixed and dilated, left fixed at the time, no gag reflex, post-CA cooling protocol was initiated down to 35 deg. Intubated and put on Nimbex drip. Also on Propofol, fentanyl, levophed. R groin minerva placed. Also put on heparin post-CA. HCT showed R perimesencephalic SAH of unclear etiology, no hydrocephalus. Course also c/b GIB, was put on protonix drip. Also had bleeding from [place] - urology consulted, clot irrigated.       HOSP COURSE:   8/11: VTACH noted (7 beats)  8/13: Febrile  8/14: Started on Unasyn for gram neg rods and gram neg diplococci on bronch gram stain -> switched to cefepime for pseudomonas noted 8/10    Overnight events: none , Afebrile. Remains intubated    ICU Vital Signs Last 24 Hrs  T(C): 37.5 (16 Aug 2020 03:00), Max: 38.4 (15 Aug 2020 08:00)  T(F): 99.5 (16 Aug 2020 03:00), Max: 101.1 (15 Aug 2020 08:00)  HR: 76 (16 Aug 2020 07:00) (71 - 108)  BP: --  BP(mean): --  ABP: 172/88 (16 Aug 2020 07:00) (123/99 - 200/107)  ABP(mean): 107 (16 Aug 2020 07:00) (91 - 136)  RR: 18 (16 Aug 2020 07:00) (0 - 18)  SpO2: 100% (16 Aug 2020 07:00) (98% - 100%)      08-15-20 @ 07:01  -  08-16-20 @ 07:00  --------------------------------------------------------  IN: 1761.5 mL / OUT: 1525 mL / NET: 236.5 mL      Mode: AC/ CMV (Assist Control/ Continuous Mandatory Ventilation), RR (machine): 18, TV (machine): 500, FiO2: 40, PEEP: 5, ITime: 1, MAP: 12, PIP: 27  acetaminophen   Tablet .. 650 milliGRAM(s) Oral every 6 hours PRN  albuterol/ipratropium for Nebulization. 3 milliLiter(s) Nebulizer every 6 hours  alteplase for catheter clearance 2 milliGRAM(s) Catheter once  aMIOdarone    Tablet 200 milliGRAM(s) Oral daily  artificial  tears Solution 1 Drop(s) Both EYES every 6 hours  bisacodyl Suppository 10 milliGRAM(s) Rectal daily  cefepime   IVPB 2000 milliGRAM(s) IV Intermittent every 8 hours  chlorhexidine 0.12% Liquid 15 milliLiter(s) Oral Mucosa every 12 hours  chlorhexidine 4% Liquid 1 Application(s) Topical <User Schedule>  chlorhexidine 4% Liquid 1 Application(s) Topical <User Schedule>  dextrose 40% Gel 15 Gram(s) Oral once PRN  dextrose 5%. 1000 milliLiter(s) (50 mL/Hr) IV Continuous <Continuous>  dextrose 50% Injectable 12.5 Gram(s) IV Push once  dextrose 50% Injectable 25 Gram(s) IV Push once  dextrose 50% Injectable 25 Gram(s) IV Push once  fentaNYL   Infusion... 0.5 MICROgram(s)/kG/Hr (2.75 mL/Hr) IV Continuous <Continuous>  glucagon  Injectable 1 milliGRAM(s) IntraMuscular once PRN  heparin   Injectable 5000 Unit(s) SubCutaneous every 8 hours  hydrALAZINE 75 milliGRAM(s) Oral every 8 hours  pantoprazole  Injectable 40 milliGRAM(s) IV Push daily  polyethylene glycol 3350 17 Gram(s) Oral every 12 hours  senna Syrup 10 milliLiter(s) Oral at bedtime  sodium chloride 0.9% lock flush 10 milliLiter(s) IV Push every 1 hour PRN  sodium chloride 7% Inhalation 4 milliLiter(s) Inhalation every 12 hours                          10.1   8.00  )-----------( 195      ( 14 Aug 2020 21:57 )             33.8     08-15    145  |  114<H>  |  32<H>  ----------------------------<  143<H>  4.0   |  21<L>  |  1.76<H>    Ca    9.4      15 Aug 2020 21:25  Phos  3.5     08-15  Mg     2.1     08-15    TPro  6.9  /  Alb  3.3  /  TBili  0.2  /  DBili  <0.1  /  AST  55<H>  /  ALT  54<H>  /  AlkPhos  83  08-15    LIVER FUNCTIONS - ( 15 Aug 2020 21:25 )  Alb: 3.3 g/dL / Pro: 6.9 g/dL / ALK PHOS: 83 U/L / ALT: 54 U/L / AST: 55 U/L / GGT: x           EXAMINATION:  General: Calm, intubated  HEENT: MMM  Neuro:  -Mental status- (sedation paused) No acute distress, EO to voice. Strong and purposeful on R but did not follow commands today 8/15. Left side  0/5  -CN- Pupils R 5mm NR, L 1mm sluggish, Eyes dysconjugate,  +cough/gag, +corneals  RUE localize AG, RLE spont AG, LUE flaccid, LLE flaccid  CVS: S1/S2  RESP: Diminished at bases  GI: Soft, non tender, mildly distended  Extremities: Warm, skin intact

## 2020-08-16 NOTE — PROGRESS NOTE ADULT - SUBJECTIVE AND OBJECTIVE BOX
Subjective: Patient seen and examined. No new events except as noted.   Remains intubated   HE controlled     REVIEW OF SYSTEMS:  Unable to obtain     MEDICATIONS:  MEDICATIONS  (STANDING):  albuterol/ipratropium for Nebulization. 3 milliLiter(s) Nebulizer every 6 hours  alteplase for catheter clearance 2 milliGRAM(s) Catheter once  aMIOdarone    Tablet 200 milliGRAM(s) Oral daily  artificial  tears Solution 1 Drop(s) Both EYES every 6 hours  bisacodyl Suppository 10 milliGRAM(s) Rectal daily  cefepime   IVPB 2000 milliGRAM(s) IV Intermittent every 8 hours  chlorhexidine 0.12% Liquid 15 milliLiter(s) Oral Mucosa every 12 hours  chlorhexidine 4% Liquid 1 Application(s) Topical <User Schedule>  chlorhexidine 4% Liquid 1 Application(s) Topical <User Schedule>  dextrose 5%. 1000 milliLiter(s) (50 mL/Hr) IV Continuous <Continuous>  dextrose 50% Injectable 12.5 Gram(s) IV Push once  dextrose 50% Injectable 25 Gram(s) IV Push once  dextrose 50% Injectable 25 Gram(s) IV Push once  fentaNYL   Infusion... 0.5 MICROgram(s)/kG/Hr (2.75 mL/Hr) IV Continuous <Continuous>  heparin   Injectable 5000 Unit(s) SubCutaneous every 8 hours  hydrALAZINE 75 milliGRAM(s) Oral every 8 hours  pantoprazole  Injectable 40 milliGRAM(s) IV Push daily  polyethylene glycol 3350 17 Gram(s) Oral every 12 hours  senna Syrup 10 milliLiter(s) Oral at bedtime  sodium chloride 7% Inhalation 4 milliLiter(s) Inhalation every 12 hours      PHYSICAL EXAM:  T(C): 38.1 (08-16-20 @ 07:00), Max: 38.1 (08-16-20 @ 07:00)  HR: 79 (08-16-20 @ 11:44) (71 - 108)  BP: --  RR: 18 (08-16-20 @ 07:00) (0 - 18)  SpO2: 100% (08-16-20 @ 11:44) (98% - 100%)  Wt(kg): --  I&O's Summary    15 Aug 2020 07:01  -  16 Aug 2020 07:00  --------------------------------------------------------  IN: 1761.5 mL / OUT: 1525 mL / NET: 236.5 mL    16 Aug 2020 07:01  -  16 Aug 2020 11:46  --------------------------------------------------------  IN: 71 mL / OUT: 0 mL / NET: 71 mL            Appearance: Intubated sedated 	  HEENT:   Dry  oral mucosa,  Lymphatic: No lymphadenopathy  Cardiovascular: Normal S1 S2, No JVD, No murmurs, No edema  Respiratory: Ventilated   Psychiatry: A & O x 0  Gastrointestinal:  Soft, Non-tender, + BS	  Skin: No rashes, No ecchymoses, No cyanosis	  Mental status- No acute distress, EO to stim  -CN- Pupils R 4mm NR, L 2mm sluggish, EOMI, tongue midline, face symmetric  +cough/gag  RUE localize AG  RLE spont AG  LUE flaccid  LLE flaccid  Extremities: decreased range of motion, No clubbing, cyanosis or edema  Vascular: Peripheral pulses palpable 2+ bilaterally  +scott      LABS:    CARDIAC MARKERS:                                10.1   8.00  )-----------( 195      ( 14 Aug 2020 21:57 )             33.8     08-15    145  |  114<H>  |  32<H>  ----------------------------<  143<H>  4.0   |  21<L>  |  1.76<H>    Ca    9.4      15 Aug 2020 21:25  Phos  3.5     08-15  Mg     2.1     08-15    TPro  6.9  /  Alb  3.3  /  TBili  0.2  /  DBili  <0.1  /  AST  55<H>  /  ALT  54<H>  /  AlkPhos  83  08-15    proBNP:   Lipid Profile:   HgA1c:   TSH:           TELEMETRY: 	AF    ECG:  	  RADIOLOGY:   DIAGNOSTIC TESTING:  [ ] Echocardiogram:  [ ]  Catheterization:  [ ] Stress Test:    OTHER:

## 2020-08-17 LAB
ANION GAP SERPL CALC-SCNC: 10 MMOL/L — SIGNIFICANT CHANGE UP (ref 5–17)
BASOPHILS # BLD AUTO: 0 K/UL — SIGNIFICANT CHANGE UP (ref 0–0.2)
BASOPHILS NFR BLD AUTO: 0 % — SIGNIFICANT CHANGE UP (ref 0–2)
BUN SERPL-MCNC: 38 MG/DL — HIGH (ref 7–23)
CALCIUM SERPL-MCNC: 9.4 MG/DL — SIGNIFICANT CHANGE UP (ref 8.4–10.5)
CHLORIDE SERPL-SCNC: 114 MMOL/L — HIGH (ref 96–108)
CO2 SERPL-SCNC: 21 MMOL/L — LOW (ref 22–31)
CREAT SERPL-MCNC: 1.81 MG/DL — HIGH (ref 0.5–1.3)
EOSINOPHIL # BLD AUTO: 0 K/UL — SIGNIFICANT CHANGE UP (ref 0–0.5)
EOSINOPHIL NFR BLD AUTO: 0 % — SIGNIFICANT CHANGE UP (ref 0–6)
GLUCOSE SERPL-MCNC: 152 MG/DL — HIGH (ref 70–99)
HCT VFR BLD CALC: 36.1 % — LOW (ref 39–50)
HGB BLD-MCNC: 11.1 G/DL — LOW (ref 13–17)
LYMPHOCYTES # BLD AUTO: 0.29 K/UL — LOW (ref 1–3.3)
LYMPHOCYTES # BLD AUTO: 0.9 % — LOW (ref 13–44)
MAGNESIUM SERPL-MCNC: 2 MG/DL — SIGNIFICANT CHANGE UP (ref 1.6–2.6)
MANUAL SMEAR VERIFICATION: SIGNIFICANT CHANGE UP
MCHC RBC-ENTMCNC: 27 PG — SIGNIFICANT CHANGE UP (ref 27–34)
MCHC RBC-ENTMCNC: 30.7 GM/DL — LOW (ref 32–36)
MCV RBC AUTO: 87.8 FL — SIGNIFICANT CHANGE UP (ref 80–100)
MONOCYTES # BLD AUTO: 0.55 K/UL — SIGNIFICANT CHANGE UP (ref 0–0.9)
MONOCYTES NFR BLD AUTO: 1.7 % — LOW (ref 2–14)
NEUTROPHILS # BLD AUTO: 31.75 K/UL — HIGH (ref 1.8–7.4)
NEUTROPHILS NFR BLD AUTO: 96.5 % — HIGH (ref 43–77)
NEUTS BAND # BLD: 0.9 % — SIGNIFICANT CHANGE UP (ref 0–8)
PHOSPHATE SERPL-MCNC: 3.4 MG/DL — SIGNIFICANT CHANGE UP (ref 2.5–4.5)
PLAT MORPH BLD: NORMAL — SIGNIFICANT CHANGE UP
PLATELET # BLD AUTO: 224 K/UL — SIGNIFICANT CHANGE UP (ref 150–400)
POIKILOCYTOSIS BLD QL AUTO: SIGNIFICANT CHANGE UP
POTASSIUM SERPL-MCNC: 4.1 MMOL/L — SIGNIFICANT CHANGE UP (ref 3.5–5.3)
POTASSIUM SERPL-SCNC: 4.1 MMOL/L — SIGNIFICANT CHANGE UP (ref 3.5–5.3)
RBC # BLD: 4.11 M/UL — LOW (ref 4.2–5.8)
RBC # FLD: 14.3 % — SIGNIFICANT CHANGE UP (ref 10.3–14.5)
RBC BLD AUTO: SIGNIFICANT CHANGE UP
SODIUM SERPL-SCNC: 145 MMOL/L — SIGNIFICANT CHANGE UP (ref 135–145)
WBC # BLD: 32.6 K/UL — HIGH (ref 3.8–10.5)
WBC # FLD AUTO: 32.6 K/UL — HIGH (ref 3.8–10.5)

## 2020-08-17 PROCEDURE — 99291 CRITICAL CARE FIRST HOUR: CPT

## 2020-08-17 PROCEDURE — 71045 X-RAY EXAM CHEST 1 VIEW: CPT | Mod: 26

## 2020-08-17 PROCEDURE — 99292 CRITICAL CARE ADDL 30 MIN: CPT

## 2020-08-17 RX ORDER — ISOSORBIDE DINITRATE 5 MG/1
10 TABLET ORAL THREE TIMES A DAY
Refills: 0 | Status: DISCONTINUED | OUTPATIENT
Start: 2020-08-17 | End: 2020-08-21

## 2020-08-17 RX ORDER — FUROSEMIDE 40 MG
30 TABLET ORAL ONCE
Refills: 0 | Status: COMPLETED | OUTPATIENT
Start: 2020-08-17 | End: 2020-08-17

## 2020-08-17 RX ORDER — POTASSIUM CHLORIDE 20 MEQ
20 PACKET (EA) ORAL
Refills: 0 | Status: COMPLETED | OUTPATIENT
Start: 2020-08-17 | End: 2020-08-17

## 2020-08-17 RX ORDER — DEXMEDETOMIDINE HYDROCHLORIDE IN 0.9% SODIUM CHLORIDE 4 UG/ML
0.2 INJECTION INTRAVENOUS
Qty: 200 | Refills: 0 | Status: DISCONTINUED | OUTPATIENT
Start: 2020-08-17 | End: 2020-08-24

## 2020-08-17 RX ORDER — ACETYLCYSTEINE 200 MG/ML
4 VIAL (ML) MISCELLANEOUS THREE TIMES A DAY
Refills: 0 | Status: COMPLETED | OUTPATIENT
Start: 2020-08-17 | End: 2020-08-18

## 2020-08-17 RX ORDER — FENTANYL CITRATE 50 UG/ML
50 INJECTION INTRAVENOUS ONCE
Refills: 0 | Status: DISCONTINUED | OUTPATIENT
Start: 2020-08-17 | End: 2020-08-17

## 2020-08-17 RX ADMIN — HEPARIN SODIUM 5000 UNIT(S): 5000 INJECTION INTRAVENOUS; SUBCUTANEOUS at 14:21

## 2020-08-17 RX ADMIN — Medication 1 DROP(S): at 05:30

## 2020-08-17 RX ADMIN — Medication 1 DROP(S): at 18:01

## 2020-08-17 RX ADMIN — Medication 3 MILLILITER(S): at 06:12

## 2020-08-17 RX ADMIN — FENTANYL CITRATE 2.75 MICROGRAM(S)/KG/HR: 50 INJECTION INTRAVENOUS at 11:32

## 2020-08-17 RX ADMIN — PANTOPRAZOLE SODIUM 40 MILLIGRAM(S): 20 TABLET, DELAYED RELEASE ORAL at 11:31

## 2020-08-17 RX ADMIN — CEFEPIME 100 MILLIGRAM(S): 1 INJECTION, POWDER, FOR SOLUTION INTRAMUSCULAR; INTRAVENOUS at 14:29

## 2020-08-17 RX ADMIN — Medication 20 MILLIEQUIVALENT(S): at 15:33

## 2020-08-17 RX ADMIN — Medication 3 MILLILITER(S): at 17:17

## 2020-08-17 RX ADMIN — AMIODARONE HYDROCHLORIDE 200 MILLIGRAM(S): 400 TABLET ORAL at 00:22

## 2020-08-17 RX ADMIN — CHLORHEXIDINE GLUCONATE 1 APPLICATION(S): 213 SOLUTION TOPICAL at 05:30

## 2020-08-17 RX ADMIN — Medication 75 MILLIGRAM(S): at 22:11

## 2020-08-17 RX ADMIN — HEPARIN SODIUM 5000 UNIT(S): 5000 INJECTION INTRAVENOUS; SUBCUTANEOUS at 05:30

## 2020-08-17 RX ADMIN — ISOSORBIDE DINITRATE 10 MILLIGRAM(S): 5 TABLET ORAL at 22:11

## 2020-08-17 RX ADMIN — Medication 1 DROP(S): at 11:32

## 2020-08-17 RX ADMIN — Medication 20 MILLIEQUIVALENT(S): at 14:29

## 2020-08-17 RX ADMIN — CHLORHEXIDINE GLUCONATE 15 MILLILITER(S): 213 SOLUTION TOPICAL at 05:31

## 2020-08-17 RX ADMIN — CEFEPIME 100 MILLIGRAM(S): 1 INJECTION, POWDER, FOR SOLUTION INTRAMUSCULAR; INTRAVENOUS at 22:10

## 2020-08-17 RX ADMIN — FENTANYL CITRATE 50 MICROGRAM(S): 50 INJECTION INTRAVENOUS at 18:10

## 2020-08-17 RX ADMIN — CEFEPIME 100 MILLIGRAM(S): 1 INJECTION, POWDER, FOR SOLUTION INTRAMUSCULAR; INTRAVENOUS at 05:31

## 2020-08-17 RX ADMIN — Medication 3 MILLILITER(S): at 12:14

## 2020-08-17 RX ADMIN — HEPARIN SODIUM 5000 UNIT(S): 5000 INJECTION INTRAVENOUS; SUBCUTANEOUS at 22:11

## 2020-08-17 RX ADMIN — SODIUM CHLORIDE 4 MILLILITER(S): 9 INJECTION INTRAMUSCULAR; INTRAVENOUS; SUBCUTANEOUS at 06:12

## 2020-08-17 RX ADMIN — Medication 20 MILLIEQUIVALENT(S): at 18:01

## 2020-08-17 RX ADMIN — Medication 1 DROP(S): at 00:22

## 2020-08-17 RX ADMIN — CHLORHEXIDINE GLUCONATE 15 MILLILITER(S): 213 SOLUTION TOPICAL at 18:01

## 2020-08-17 RX ADMIN — FENTANYL CITRATE 50 MICROGRAM(S): 50 INJECTION INTRAVENOUS at 17:40

## 2020-08-17 RX ADMIN — Medication 4 MILLILITER(S): at 17:18

## 2020-08-17 RX ADMIN — Medication 75 MILLIGRAM(S): at 14:21

## 2020-08-17 RX ADMIN — Medication 75 MILLIGRAM(S): at 05:30

## 2020-08-17 RX ADMIN — Medication 30 MILLIGRAM(S): at 15:33

## 2020-08-17 NOTE — PROGRESS NOTE ADULT - SUBJECTIVE AND OBJECTIVE BOX
Subjective: Patient seen and examined. No new events except as noted.   remains intubated in NSCU   HR stable     REVIEW OF SYSTEMS:  Unable to obtain       MEDICATIONS:  MEDICATIONS  (STANDING):  albuterol/ipratropium for Nebulization. 3 milliLiter(s) Nebulizer every 6 hours  aMIOdarone    Tablet 200 milliGRAM(s) Oral daily  artificial  tears Solution 1 Drop(s) Both EYES every 6 hours  bisacodyl Suppository 10 milliGRAM(s) Rectal daily  cefepime   IVPB 2000 milliGRAM(s) IV Intermittent every 8 hours  chlorhexidine 0.12% Liquid 15 milliLiter(s) Oral Mucosa every 12 hours  chlorhexidine 4% Liquid 1 Application(s) Topical <User Schedule>  chlorhexidine 4% Liquid 1 Application(s) Topical <User Schedule>  dextrose 5%. 1000 milliLiter(s) (50 mL/Hr) IV Continuous <Continuous>  dextrose 50% Injectable 12.5 Gram(s) IV Push once  dextrose 50% Injectable 25 Gram(s) IV Push once  dextrose 50% Injectable 25 Gram(s) IV Push once  fentaNYL   Infusion... 0.5 MICROgram(s)/kG/Hr (2.75 mL/Hr) IV Continuous <Continuous>  heparin   Injectable 5000 Unit(s) SubCutaneous every 8 hours  hydrALAZINE 75 milliGRAM(s) Oral every 8 hours  pantoprazole  Injectable 40 milliGRAM(s) IV Push daily  polyethylene glycol 3350 17 Gram(s) Oral every 12 hours  senna Syrup 10 milliLiter(s) Oral at bedtime  sodium chloride 7% Inhalation 4 milliLiter(s) Inhalation every 12 hours      PHYSICAL EXAM:  T(C): 36.4 (08-17-20 @ 07:00), Max: 37.6 (08-16-20 @ 10:00)  HR: 87 (08-17-20 @ 08:07) (73 - 98)  BP: --  RR: 9 (08-17-20 @ 08:00) (0 - 19)  SpO2: 100% (08-17-20 @ 08:07) (99% - 100%)  Wt(kg): --  I&O's Summary    16 Aug 2020 07:01  -  17 Aug 2020 07:00  --------------------------------------------------------  IN: 1475.5 mL / OUT: 1675 mL / NET: -199.5 mL    17 Aug 2020 07:01  -  17 Aug 2020 09:28  --------------------------------------------------------  IN: 76.5 mL / OUT: 0 mL / NET: 76.5 mL        Appearance: Intubated sedated 	  HEENT:   Dry  oral mucosa,  Lymphatic: No lymphadenopathy  Cardiovascular: Normal S1 S2, No JVD, No murmurs, No edema  Respiratory: Ventilated   Psychiatry: A & O x 0  Gastrointestinal:  Soft, Non-tender, + BS	  Skin: No rashes, No ecchymoses, No cyanosis	  Mental status- No acute distress, EO to stim  -CN- Pupils R 4mm NR, L 2mm sluggish, EOMI, tongue midline, face symmetric  +cough/gag  RUE localize AG  RLE spont AG  LUE flaccid  LLE flaccid  Extremities: decreased range of motion, No clubbing, cyanosis or edema  Vascular: Peripheral pulses palpable 2+ bilaterally  +scott        LABS:    CARDIAC MARKERS:            08-16    145  |  114<H>  |  36<H>  ----------------------------<  106<H>  3.7   |  18<L>  |  1.57<H>    Ca    9.2      16 Aug 2020 21:04  Phos  3.3     08-16  Mg     2.1     08-16    TPro  6.9  /  Alb  3.3  /  TBili  0.2  /  DBili  <0.1  /  AST  55<H>  /  ALT  54<H>  /  AlkPhos  83  08-15    proBNP:   Lipid Profile:   HgA1c:   TSH:     Negative          TELEMETRY: 	AF    ECG:  	  RADIOLOGY:   DIAGNOSTIC TESTING:  [ ] Echocardiogram:  [ ]  Catheterization:  [ ] Stress Test:    OTHER:

## 2020-08-17 NOTE — PROGRESS NOTE ADULT - SUBJECTIVE AND OBJECTIVE BOX
remains intubated  requiring fentanyl gtt for vent synchrony    vitals/labs/meds reviewed  EO to voice, intubated, does not nod, but FC intermittently, two fingers on RUE, distally AG, wiggles toes on RLE, no movements on L

## 2020-08-17 NOTE — PROGRESS NOTE ADULT - ASSESSMENT
PBD#[id number] SAH, angio negative    fentanyl drip--wean as able  cont keppra  nimodipine interm held for bradycardia  -180  intubated, cont full vent support  thick copious secretions  aggressive pulm toilet, frequent suctioning  pseudomonas on bronch culture, started on 8/14 cefepime, CrCl >60, but uptrending Cr, monitor renal function--stable  8/15 cultures NTD  cont tube feeding  monitor for fevers  SQH  d/w family re: trach/PEG

## 2020-08-17 NOTE — PROGRESS NOTE ADULT - SUBJECTIVE AND OBJECTIVE BOX
HPI:  60 year old male with PMHx of Afib on coumadin s/p cardiac arrest at work, downtime 25 minutes prior to ROSC. Pupils were R fixed and dilated, left fixed at the time, no gag reflex, post-CA cooling protocol was initiated down to 35 deg. Intubated and put on Nimbex drip. Also on Propofol, fentanyl, levophed. R groin minerva placed. Also put on heparin post-CA. HCT showed R perimesencephalic SAH of unclear etiology, no hydrocephalus. Course also c/b GIB, was put on protonix drip. Also had bleeding from csott - urology consulted, clot irrigated.     HOSP COURSE:   8/11: VTACH noted (7 beats)  8/13: Febrile  8/14: Started on Unasyn for gram neg rods and gram neg diplococci on bronch gram stain -> switched to cefepime for pseudomonas noted 8/10    Overnight events: central line removed on 8/16. Cr improving 1.7 -> 1.57. MR Corrigan planned for today    VITALS:  T(C): , Max: 38 (08-16-20 @ 08:00)  HR:  (73 - 98)  BP: --  ABP:  (150/106 - 200/102)  RR:  (0 - 19)  SpO2:  (99% - 100%)  Wt(kg): --  Device: Avea, Mode: AC/ CMV (Assist Control/ Continuous Mandatory Ventilation), RR (machine): 18, TV (machine): 500, FiO2: 40, PEEP: 5, ITime: 1, MAP: 11, PIP: 21    08-16-20 @ 07:01  -  08-17-20 @ 07:00  --------------------------------------------------------  IN: 1475.5 mL / OUT: 1600 mL / NET: -124.5 mL    IMAGING:   Recent imaging studies were reviewed.    MEDICATIONS:  acetaminophen   Tablet .. 650 milliGRAM(s) Oral every 6 hours PRN  albuterol/ipratropium for Nebulization. 3 milliLiter(s) Nebulizer every 6 hours  aMIOdarone    Tablet 200 milliGRAM(s) Oral daily  artificial  tears Solution 1 Drop(s) Both EYES every 6 hours  bisacodyl Suppository 10 milliGRAM(s) Rectal daily  cefepime   IVPB 2000 milliGRAM(s) IV Intermittent every 8 hours  chlorhexidine 0.12% Liquid 15 milliLiter(s) Oral Mucosa every 12 hours  chlorhexidine 4% Liquid 1 Application(s) Topical <User Schedule>  chlorhexidine 4% Liquid 1 Application(s) Topical <User Schedule>  dextrose 40% Gel 15 Gram(s) Oral once PRN  dextrose 5%. 1000 milliLiter(s) IV Continuous <Continuous>  dextrose 50% Injectable 12.5 Gram(s) IV Push once  dextrose 50% Injectable 25 Gram(s) IV Push once  dextrose 50% Injectable 25 Gram(s) IV Push once  fentaNYL   Infusion... 0.5 MICROgram(s)/kG/Hr IV Continuous <Continuous>  glucagon  Injectable 1 milliGRAM(s) IntraMuscular once PRN  heparin   Injectable 5000 Unit(s) SubCutaneous every 8 hours  hydrALAZINE 75 milliGRAM(s) Oral every 8 hours  pantoprazole  Injectable 40 milliGRAM(s) IV Push daily  polyethylene glycol 3350 17 Gram(s) Oral every 12 hours  senna Syrup 10 milliLiter(s) Oral at bedtime  sodium chloride 0.9% lock flush 10 milliLiter(s) IV Push every 1 hour PRN  sodium chloride 7% Inhalation 4 milliLiter(s) Inhalation every 12 hours    EXAMINATION:  General: Calm, intubated  HEENT: MMM  Neuro:  -Mental status- (sedation paused) No acute distress, EO to voice. Strong and purposeful on R but did not follow commands today 8/15. Left side  0/5  -CN- Pupils R 5mm NR, L 1mm sluggish, Eyes dysconjugate,  +cough/gag, +corneals  RUE localize AG, RLE spont AG, LUE flaccid, LLE flaccid  CVS: S1/S2  RESP: Diminished at bases  GI: Soft, non tender, mildly distended  Extremities: Warm, skin intact HPI:  60 year old male with PMHx of Afib on coumadin s/p cardiac arrest at work, downtime 25 minutes prior to ROSC. Pupils were R fixed and dilated, left fixed at the time, no gag reflex, post-CA cooling protocol was initiated down to 35 deg. Intubated and put on Nimbex drip. Also on Propofol, fentanyl, levophed. R groin minerva placed. Also put on heparin post-CA. HCT showed R perimesencephalic SAH of unclear etiology, no hydrocephalus. Course also c/b GIB, was put on protonix drip. Also had bleeding from scott - urology consulted, clot irrigated.     HOSP COURSE:   8/11: VTACH noted (7 beats)  8/13: Febrile  8/14: Started on Unasyn for gram neg rods and gram neg diplococci on bronch gram stain -> switched to cefepime for pseudomonas noted 8/10    Overnight events: central line removed on 8/16. Cr improving 1.7 -> 1.57. MR Corrigan planned for today    VITALS:  T(C): , Max: 38 (08-16-20 @ 08:00)  HR:  (73 - 98)  BP: --  ABP:  (150/106 - 200/102)  RR:  (0 - 19)  SpO2:  (99% - 100%)  Wt(kg): --  Device: Avea, Mode: AC/ CMV (Assist Control/ Continuous Mandatory Ventilation), RR (machine): 18, TV (machine): 500, FiO2: 40, PEEP: 5, ITime: 1, MAP: 11, PIP: 21    08-16-20 @ 07:01  -  08-17-20 @ 07:00  --------------------------------------------------------  IN: 1475.5 mL / OUT: 1600 mL / NET: -124.5 mL    IMAGING:   Recent imaging studies were reviewed.    MEDICATIONS:  acetaminophen   Tablet .. 650 milliGRAM(s) Oral every 6 hours PRN  albuterol/ipratropium for Nebulization. 3 milliLiter(s) Nebulizer every 6 hours  aMIOdarone    Tablet 200 milliGRAM(s) Oral daily  artificial  tears Solution 1 Drop(s) Both EYES every 6 hours  bisacodyl Suppository 10 milliGRAM(s) Rectal daily  cefepime   IVPB 2000 milliGRAM(s) IV Intermittent every 8 hours  chlorhexidine 0.12% Liquid 15 milliLiter(s) Oral Mucosa every 12 hours  chlorhexidine 4% Liquid 1 Application(s) Topical <User Schedule>  chlorhexidine 4% Liquid 1 Application(s) Topical <User Schedule>  dextrose 40% Gel 15 Gram(s) Oral once PRN  dextrose 5%. 1000 milliLiter(s) IV Continuous <Continuous>  dextrose 50% Injectable 12.5 Gram(s) IV Push once  dextrose 50% Injectable 25 Gram(s) IV Push once  dextrose 50% Injectable 25 Gram(s) IV Push once  fentaNYL   Infusion... 0.5 MICROgram(s)/kG/Hr IV Continuous <Continuous>  glucagon  Injectable 1 milliGRAM(s) IntraMuscular once PRN  heparin   Injectable 5000 Unit(s) SubCutaneous every 8 hours  hydrALAZINE 75 milliGRAM(s) Oral every 8 hours  pantoprazole  Injectable 40 milliGRAM(s) IV Push daily  polyethylene glycol 3350 17 Gram(s) Oral every 12 hours  senna Syrup 10 milliLiter(s) Oral at bedtime  sodium chloride 0.9% lock flush 10 milliLiter(s) IV Push every 1 hour PRN  sodium chloride 7% Inhalation 4 milliLiter(s) Inhalation every 12 hours    EXAMINATION:  General: Calm, intubated  HEENT: MMM  Neuro:  -Mental status- (sedation paused) No acute distress, EO to voice. Strong and purposeful on RUE,  followed showing 2 fingers. Left side  0/5  -CN- Pupils R 5mm NR, L 1mm sluggish, Eyes dysconjugate,  +cough/gag, +corneals  RUE localize AG, RLE spont AG, LUE flaccid, LLE flaccid  CVS: S1/S2  RESP: Diminished at bases  GI: Soft, non tender, mildly distended  Extremities: Warm, skin intact HPI:  60 year old male with PMHx of Afib on coumadin s/p cardiac arrest at work, downtime 25 minutes prior to ROSC. Pupils were R fixed and dilated, left fixed at the time, no gag reflex, post-CA cooling protocol was initiated down to 35 deg. Intubated and put on Nimbex drip. Also on Propofol, fentanyl, levophed. R groin minerva placed. Also put on heparin post-CA. HCT showed R perimesencephalic SAH of unclear etiology, no hydrocephalus. Course also c/b GIB, was put on protonix drip. Also had bleeding from scott - urology consulted, clot irrigated.     HOSP COURSE:   8/10- Angio- neg   8/11: VTACH noted (7 beats)  8/13: Febrile  8/14: Started on Unasyn for gram neg rods and gram neg diplococci on bronch gram stain -> switched to cefepime for pseudomonas noted 8/10    Overnight events: central line removed on 8/16. Cr improving 1.7 -> 1.57. MR Corrigan planned for today    VITALS:  T(C): , Max: 38 (08-16-20 @ 08:00)  HR:  (73 - 98)  ABP:  (150/106 - 200/102)  RR:  (0 - 19)  SpO2:  (99% - 100%)  Wt(kg): --  Device: Avea, Mode: AC/ CMV (Assist Control/ Continuous Mandatory Ventilation), RR (machine): 18, TV (machine): 500, FiO2: 40, PEEP: 5, ITime: 1, MAP: 11, PIP: 21    08-16-20 @ 07:01  -  08-17-20 @ 07:00  --------------------------------------------------------  IN: 1475.5 mL / OUT: 1600 mL / NET: -124.5 mL    IMAGING:   Recent imaging studies were reviewed.   Xray Chest 1 View- PORTABLE-Routine (08.17.20 @ 05:53) >  COMPARISON: Multiple prior chest x-rays, with the most recent dated 8/16/2020.    FINDINGS:  There is an endotracheal tube, with its tip above the level of the kayleigh.  There is an enteric tube, coursing below the diaphragm, with its tip non-visualized below the level of the film.  There has been interval removal of a right IJ approach central venous catheter.  The size of the cardiomediastinal silhouette is enlarged.  There are no focal pulmonary consolidations.  There is no pneumothorax or pleural effusion.    IMPRESSION:  Interval removal of central line. Clear lungs.    CT Head No Cont (08.13.20 @ 08:55) >  TECHNIQUE : Axial CT scanning of the brain was obtained from the skull base to the vertex without the administration of intravenous contrast. Sagittal and coronal reformats were provided.    COMPARISON: CT brain 8/12/2020    FINDINGS:    Similar extra-axial hemorrhage in the right aspect of the suprasellar cistern extending into the right sylvian fissure.    Scattered sulcal subarachnoid hemorrhage is similar.    Similar small layering intraventricular hemorrhage.    No hydrocephalus or midline shift.    Edema in the region of the right cerebral peduncle and posterior limb of the right internal capsule is redemonstrated.    IMPRESSION:    No significant interval change from 8/12/2020.       MR Head w/wo IV Cont (08.11.20 @ 17:50) >  MRI BRAIN:    Redemonstration of subarachnoid hemorrhage in the right aspect of the suprasellar cistern and within the right sylvian fissure.    Scattered sulcal subarachnoid hemorrhage is redemonstrated.    Small hemorrhage layering in the occipital horns is similar.. Ventricles similar in size. No hydrocephalus.    There is restricted diffusion within the region of the right basal ganglia, posterior limb of the right internal capsule, and anterior right temporal lobe, likely representing an acute right MCA territory infarct.    Chronic left occipital infarct. Mild white matter microvascular ischemic disease. Signal voids are seen within the major intracranial vessels consistent with their patency.    No abnormal parenchymal or leptomeningeal enhancement.    Air-fluid levels and mucosal thickening in the paranasal sinuses. Minimal bilateral mastoid air cell effusions.    MRI CERVICAL SPINE:    Vertebral body height, marrow signal homogeneity, and facet alignment are maintained throughout the visualized spinal segments. Straightening of the normal cervical lordosis. Mild multilevel disc space narrowing. Cervicomedullary junction unremarkable.    No prevertebral or paravertebral edema.  No abnormal enhancement in the cervical region.    No spinal cord compression or abnormal intrinsic cord signal.    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    C2-C3: No acquired spinal canal stenosis. No neural foraminal narrowing.    C3-C4: Broad-based disc protrusion reaches the cord. Bilateral uncinate hypertrophy resulting in moderate bilateral neural foraminal narrowing.    C4-C5: Broad-based disc protrusion asymmetric to the left nearly reaches the cord. Bilateral uncinate hypertrophy resulting in moderate left and moderate to severe right neural foraminal narrowing.    C5-C6: Broad-based disc protrusion asymmetric to the left and left uncinate hypertrophy. Deformation of the left ventral thecal sac and effacement of the left lateral recess. Moderate left neural foraminal narrowing.    C6-C7: Broad-based disc protrusion deforms the ventral thecal sac. Left uncinate hypertrophy. Mild to moderate left neural foraminal narrowing.    C7-T1: Left facet hypertrophy. No acquired spinal canal stenosis or neural foraminal narrowing.    IMPRESSION:    MRI BRAIN:    Restricted There is restricted diffusion within the region of the right basal ganglia, posterior limb of the right internal capsule, and anterior right temporal lobe, likely representing an acute right MCA territory infarct.    Similar cisternal and sulcal subarachnoid hemorrhage, given differences in modality.    Similar small hemorrhage layering in the occipital horns. No hydrocephalus.    No abnormal parenchymal or leptomeningeal enhancement.    MRI CERVICAL SPINE:    Multilevel degenerative changes superimposed upon congenital spinal canal stenosis.    No abnormal enhancement in the cervical region.    < end of copied text >        MEDICATIONS:  acetaminophen   Tablet .. 650 milliGRAM(s) Oral every 6 hours PRN  albuterol/ipratropium for Nebulization. 3 milliLiter(s) Nebulizer every 6 hours  aMIOdarone    Tablet 200 milliGRAM(s) Oral daily  artificial  tears Solution 1 Drop(s) Both EYES every 6 hours  bisacodyl Suppository 10 milliGRAM(s) Rectal daily  cefepime   IVPB 2000 milliGRAM(s) IV Intermittent every 8 hours  chlorhexidine 0.12% Liquid 15 milliLiter(s) Oral Mucosa every 12 hours  chlorhexidine 4% Liquid 1 Application(s) Topical <User Schedule>  chlorhexidine 4% Liquid 1 Application(s) Topical <User Schedule>  dextrose 40% Gel 15 Gram(s) Oral once PRN  dextrose 5%. 1000 milliLiter(s) IV Continuous <Continuous>  dextrose 50% Injectable 12.5 Gram(s) IV Push once  dextrose 50% Injectable 25 Gram(s) IV Push once  dextrose 50% Injectable 25 Gram(s) IV Push once  fentaNYL   Infusion... 0.5 MICROgram(s)/kG/Hr IV Continuous <Continuous>  glucagon  Injectable 1 milliGRAM(s) IntraMuscular once PRN  heparin   Injectable 5000 Unit(s) SubCutaneous every 8 hours  hydrALAZINE 75 milliGRAM(s) Oral every 8 hours  pantoprazole  Injectable 40 milliGRAM(s) IV Push daily  polyethylene glycol 3350 17 Gram(s) Oral every 12 hours  senna Syrup 10 milliLiter(s) Oral at bedtime  sodium chloride 0.9% lock flush 10 milliLiter(s) IV Push every 1 hour PRN  sodium chloride 7% Inhalation 4 milliLiter(s) Inhalation every 12 hours    08-16    145  |  114<H>  |  36<H>  ----------------------------<  106<H>  3.7   |  18<L>  |  1.57<H>    Ca    9.2      16 Aug 2020 21:04  Phos  3.3     08-16  Mg     2.1     08-16    TPro  6.9  /  Alb  3.3  /  TBili  0.2  /  DBili  <0.1  /  AST  55<H>  /  ALT  54<H>  /  AlkPhos  83  08-15    ABG - ( 16 Aug 2020 12:27 )  pH, Arterial: 7.39  pH, Blood: x     /  pCO2: 35    /  pO2: 134   / HCO3: 21    / Base Excess: -3.1  /  SaO2: 99          BRONCH- 8/13/20- Pseudomonas- Cefepime           EXAMINATION:  General: Calm, intubated  HEENT: MMM  Neuro:  -Mental status-  No acute distress, EO to voice. Strong and purposeful on RUE,  followed showing 2 fingers. Left side  0/5  -CN- Pupils R 5mm NR, L 1mm sluggish, Eyes dysconjugate,  +cough/gag, +corneals  RUE localize AG, RLE spont AG, LUE flaccid, LLE flaccid  CVS: S1/S2  RESP: Diminished at bases  GI: Soft, non tender, mildly distended  Extremities: Warm, skin intact

## 2020-08-17 NOTE — CHART NOTE - NSCHARTNOTEFT_GEN_A_CORE
Nutrition Follow Up Note     Patient seen for: nutrition follow up on ICU    Source: patient, medical record, communication with team.     Pt is a 61 yo M with PMH: A.Fib on coumadin s/p cardiac arrest at work. Downtime 25 minutes prior to ROSC. 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 Was intubated at OSH, transferred to [facility] for further care. Course c/b GIB, put on Protonix drip. Started on antibiotics for gram neg rods and gram neg diplococci on bronch stain / pseudomonas. Discussions ongoing with family re: trach/PEG. Noted with significant thick secretions deferring extubation at this time.     Diet Order: Diet, NPO with Tube Feed:   Tube Feeding Modality: Nasogastric  Nepro with Carb Steady (NEPRORTH)  Total Volume for 24 Hours (mL): 1440  Continuous  Starting Tube Feed Rate {mL per Hour}: 10  Increase Tube Feed Rate by (mL): 10     Every 4 hours  Until Goal Tube Feed Rate (mL per Hour): 60  Tube Feed Duration (in Hours): 24  Tube Feed Start Time: 17:00  Supplement Feeding Modality:  Nasogastric  Probiotic Yogurt/Smoothie Cans or Servings Per Day:  2       Frequency:  Daily (08-14-20 @ 09:31)    CURRENT EN ORDER PROVIDES: 1440ml, 2592kcal and 117g protein.     EN provision (per nursing flow sheet):   (8/17): 480ml (thus far)  (8/16): 1140ml (79% of goal)  (8/15): 1140ml (79% of goal)  (8/14): 1020ml (71% of goal)  (8/13): 360ml (25% of goal)    Stool count (per nursing flow sheet): on bowel regimen as ordered (Senna, Miralax).   (8/16): x 3  (8/15): x 1  (8/14): x 1  (8/13): x 1    Via rectal tube: (8/17): 200ml.    Per discussion with RN, pt noted with loose stool this AM. Request to evaluate EN feeds. Propofol now discontinued.     Anthropometric Measurements:   Height (cm): 172.72 (08-10-20 @ 15:19)  Weight (kg): 110 (08-10-20 @ 15:19)  BMI (kg/m2): 36.9 (08-10-20 @ 15:19)    Medications: MEDICATIONS  (STANDING):  albuterol/ipratropium for Nebulization. 3 milliLiter(s) Nebulizer every 6 hours  aMIOdarone    Tablet 200 milliGRAM(s) Oral daily  artificial  tears Solution 1 Drop(s) Both EYES every 6 hours  bisacodyl Suppository 10 milliGRAM(s) Rectal daily  cefepime   IVPB 2000 milliGRAM(s) IV Intermittent every 8 hours  chlorhexidine 0.12% Liquid 15 milliLiter(s) Oral Mucosa every 12 hours  chlorhexidine 4% Liquid 1 Application(s) Topical <User Schedule>  chlorhexidine 4% Liquid 1 Application(s) Topical <User Schedule>  dextrose 5%. 1000 milliLiter(s) (50 mL/Hr) IV Continuous <Continuous>  dextrose 50% Injectable 12.5 Gram(s) IV Push once  dextrose 50% Injectable 25 Gram(s) IV Push once  dextrose 50% Injectable 25 Gram(s) IV Push once  fentaNYL   Infusion... 0.5 MICROgram(s)/kG/Hr (2.75 mL/Hr) IV Continuous <Continuous>  heparin   Injectable 5000 Unit(s) SubCutaneous every 8 hours  hydrALAZINE 75 milliGRAM(s) Oral every 8 hours  pantoprazole  Injectable 40 milliGRAM(s) IV Push daily  polyethylene glycol 3350 17 Gram(s) Oral every 12 hours  senna Syrup 10 milliLiter(s) Oral at bedtime  sodium chloride 7% Inhalation 4 milliLiter(s) Inhalation every 12 hours    MEDICATIONS  (PRN):  acetaminophen   Tablet .. 650 milliGRAM(s) Oral every 6 hours PRN Temp greater or equal to 38C (100.4F), Mild Pain (1 - 3)  dextrose 40% Gel 15 Gram(s) Oral once PRN Blood Glucose LESS THAN 70 milliGRAM(s)/deciliter  glucagon  Injectable 1 milliGRAM(s) IntraMuscular once PRN Glucose LESS THAN 70 milligrams/deciliter  sodium chloride 0.9% lock flush 10 milliLiter(s) IV Push every 1 hour PRN Pre/post blood products, medications, blood draw, and to maintain line patency    Labs: 08-16 @ 21:04: Sodium 145, Potassium 3.7, Calcium 9.2, Magnesium 2.1, Phosphorus 3.3, BUN 36<H>, Creatinine 1.57<H>, Glucose 106<H>, Alk Phos --, ALT/SGPT --, AST/SGOT --, Albumin --, Prealbumin --, Total Bilirubin --, Hemoglobin --, Hematocrit --, Ferritin --, C-Reactive Protein --, Creatine Kinase <<27>  08-16 @ 13:36: Sodium 143, Potassium 3.9, Calcium 9.7, Magnesium 2.3, Phosphorus 3.4, BUN 35<H>, Creatinine 1.70<H>, Glucose 136<H>, Alk Phos --, ALT/SGPT --, AST/SGOT --, Albumin --, Prealbumin --, Total Bilirubin --, Hemoglobin --, Hematocrit --, Ferritin --, C-Reactive Protein --, Creatine Kinase <<27>    Triglycerides, Serum: 218 mg/dL (08-09-20 @ 17:21)    Skin per nursing documentation: no pressure injuries documented; surgical incision right groin s/p angio; skin tear cervical spine  Edema: 2+ generalized    Estimated Needs: (based on upper IBW 76.8kg:   Energy: (25-30kcal/kg): 1920-2304kcal  Protein: (1.4-1.6g protein/kg): 108-123g protein    Previous Nutrition Diagnosis: increased nutrient needs  Nutrition Diagnosis is: ongoing, with provision of EN feeds    New Nutrition Diagnosis:  none at this time    Recommended Interventions:   1. Recommend change EN feeds: Vital AF at GOAL rate 70ml/hr x 24 hrs. To provide (at goal rate, based on upper IBW 76.8kg): 1680ml, 2016kcal (26kcal/kg) and 126g protein (1.6g protein/kg). Phos/K WNL; no clear indication for Nepro at this time.   2. Monitor GI tolerance. RD to remain available to adjust EN formulary, volume/rate PRN.   3. Determine nutritional goals of care (? PEG/trach).   4. Monitor wt trends, nutrition related labs, skin integrity, hydration status and bowel regularity.   5. Continue Yogurt/Smoothie Cans (Danactive) 2x daily in context of antibiotic use, loose BM's.   6. Trend BG levels, renal indices, LFT's, electrolytes and triglycerides     Monitoring and Evaluation:   Continue to monitor nutrition provision and tolerance, weights, labs, skin integrity.   RD remains available upon request and will follow up per protocol.    Alison Kleiner RD, [facility] ([phone])

## 2020-08-17 NOTE — PROGRESS NOTE ADULT - ASSESSMENT
60 year old male s/p cardiac arrest c/b GIB and bleeding from [place] with Perimesencephalic (HH4 mF4) subarachnoid hemorrhage, angio neg ,PBD 10.    NEURO:   SAH, possible sentinal event with cardiac arrest  Q2h neurochecks  CT Head: perimesencephalic SAH   CTA Head: no aneurysm noted  Conventional angiogram negative  MRI neuroaxis: restricted diffusion in R BG, posterior limb of R IC, and anterior right temporal lobe  MR carmen planned for mon 8/17 @ 13:00 pm  VEEG negative   DCI management (off nimodipine). Poor windows  No EVD as no significant hydrocephalus  Fentanyl gtt for sedation    PULM:  [facility] vent: settings- 18/500/40/5   spO2>92%  repeat abg today  CXR: improved  CTA R/O PE: negative  Weaning to extubate, however, still with significant thick secretions.  Continue hypertonic nebs.   CPAP as tolerated.      CV:  SBP goal <200  TTE: Global LV dysfunction. EF41%, Severe concentric LVH, flattening of interventricular septum.   Concern for possible PE - CTA negative  Continue hydralazine 75 q8  Will need cath per cardiology  S/P amio gtt; changed to  daily.     RENAL: ?CKD with superimposed JALEN. Unclear baseline.   Cr stable  Sodium goal 140-150  Has been diuresced. Net neg 680cc.     GI: GI bleed S/P PPI gtt  Diet: TF- Nepro.  GI prophylaxis [] not indicated [x] PPI 40 daily in setting of GI bleed and intubation [] other:  Bowel regimen [x] senna [] other:  LBM: 8/15    ENDO:   Goal euglycemia (-180)  ISS    HEME/ONC: Afib on coumadin at home  s/p vit K  INR 1.42-->1.2  VTE prophylaxis: [] SCDs [x] chemoprophylaxis heparin subq  Dopplers negative for DVT.    ID:  Leukocytosis improving  Fevers  Started on cefepime (switched from unasyn on 8/14) for pseudomonas noted in bronch cx 8/10  Urine culture, procal .19  ?[place].     SOCIAL/FAMILY:  [] awaiting [x] updated at bedside [] family meeting    CODE STATUS:  [x] Full Code [] DNR [] DNI [] Palliative/Comfort Care    DISPOSITION:  [x] ICU [] Stroke Unit [] Floor [] EMU [] RCU [] PCU    [x] Patient is at high risk of neurologic deterioration/death due to: SAH, cardiac arrest 60 year old male s/p cardiac arrest c/b GIB and bleeding from [place] with Perimesencephalic (HH4 mF4) subarachnoid hemorrhage, angio neg ,PBD 9.    NEURO:   Awaiting MR SERRANO  SAH, possible sentinal event with cardiac arrest  Q2h neurochecks  CT Head: perimesencephalic SAH   CTA Head: no aneurysm noted  Conventional angiogram negative  MRI neuroaxis: restricted diffusion in R BG, posterior limb of R IC, and anterior right temporal lobe  VEEG negative   DCI management (off nimodipine). Poor windows  No EVD as no significant hydrocephalus  Fentanyl gtt for sedation    PULM: Acute PUL edema  Lasix 30mg x1 now   [facility] vent: settings- 18/500/40/5   spO2>92%  CXR:  pul edema   CTA R/O PE: negative  Continue hypertonic nebs./ Mucomyst prn    CPAP as tolerated.      CV:  SBP goal <200  TTE: Global LV dysfunction. EF41%, Severe concentric LVH, flattening of interventricular septum.    S/P Concern for possible PE - CTA negative   hydralazine 75 q8 + isosorbide 10 mg q 12  Will need cath per cardiology  S/P amio gtt; changed to  daily.     RENAL: ?CKD with superimposed JALEN.- stable   Cr stable  Sodium goal 140-150  Has been diuresced. Net neg 680cc.     GI:  S/P GI bleed S/P PPI gtt  Diet: TF- Vital at 60cc/hr   GI prophylaxis [] not indicated [x] PPI 40 daily in setting of GI bleed and intubation [] other:  Bowel regimen [x] senna - rectal tube   LBM: 8/15    ENDO:   Goal euglycemia (-180)    HEME/ONC: Afib on coumadin at home  s/p vit K  INR 1.42-->1.2  VTE prophylaxis: [] SCDs [x] chemoprophylaxis heparin subq  Dopplers negative for DVT.    ID:  Leukocytosis improving  Fevers  Started on cefepime (switched from unasyn on 8/14) for pseudomonas- D/C 8/21/20  Urine culture, procal .19    SOCIAL/FAMILY:   [x] updated at bedside     CODE STATUS:  [x] Full Code [] DNR [] DNI [] Palliative/Comfort Care    DISPOSITION:  [x] ICU [] Stroke Unit [] Floor [] EMU [] RCU [] PCU    [x] Patient is at high risk of neurologic deterioration/death due to: SAH, cardiac arrest

## 2020-08-18 LAB
-  AMIKACIN: SIGNIFICANT CHANGE UP
-  AZTREONAM: SIGNIFICANT CHANGE UP
-  CEFEPIME: SIGNIFICANT CHANGE UP
-  CEFTAZIDIME: SIGNIFICANT CHANGE UP
-  CIPROFLOXACIN: SIGNIFICANT CHANGE UP
-  GENTAMICIN: SIGNIFICANT CHANGE UP
-  IMIPENEM: SIGNIFICANT CHANGE UP
-  LEVOFLOXACIN: SIGNIFICANT CHANGE UP
-  MEROPENEM: SIGNIFICANT CHANGE UP
-  PIPERACILLIN/TAZOBACTAM: SIGNIFICANT CHANGE UP
-  TOBRAMYCIN: SIGNIFICANT CHANGE UP
ALBUMIN SERPL ELPH-MCNC: 3 G/DL — LOW (ref 3.3–5)
ALP SERPL-CCNC: 72 U/L — SIGNIFICANT CHANGE UP (ref 40–120)
ALT FLD-CCNC: 62 U/L — HIGH (ref 10–45)
ANION GAP SERPL CALC-SCNC: 13 MMOL/L — SIGNIFICANT CHANGE UP (ref 5–17)
APTT BLD: 31.5 SEC — SIGNIFICANT CHANGE UP (ref 27.5–35.5)
AST SERPL-CCNC: 38 U/L — SIGNIFICANT CHANGE UP (ref 10–40)
BASOPHILS # BLD AUTO: 0.08 K/UL — SIGNIFICANT CHANGE UP (ref 0–0.2)
BASOPHILS NFR BLD AUTO: 0.3 % — SIGNIFICANT CHANGE UP (ref 0–2)
BILIRUB DIRECT SERPL-MCNC: <0.1 MG/DL — SIGNIFICANT CHANGE UP (ref 0–0.2)
BILIRUB INDIRECT FLD-MCNC: SIGNIFICANT CHANGE UP MG/DL (ref 0.2–1)
BILIRUB SERPL-MCNC: 0.3 MG/DL — SIGNIFICANT CHANGE UP (ref 0.2–1.2)
BLD GP AB SCN SERPL QL: NEGATIVE — SIGNIFICANT CHANGE UP
BUN SERPL-MCNC: 40 MG/DL — HIGH (ref 7–23)
C DIFF GDH STL QL: POSITIVE — SIGNIFICANT CHANGE UP
C DIFF GDH STL QL: SIGNIFICANT CHANGE UP
CALCIUM SERPL-MCNC: 9.3 MG/DL — SIGNIFICANT CHANGE UP (ref 8.4–10.5)
CHLORIDE SERPL-SCNC: 111 MMOL/L — HIGH (ref 96–108)
CO2 SERPL-SCNC: 21 MMOL/L — LOW (ref 22–31)
CREAT SERPL-MCNC: 1.95 MG/DL — HIGH (ref 0.5–1.3)
CULTURE RESULTS: SIGNIFICANT CHANGE UP
EOSINOPHIL # BLD AUTO: 0.21 K/UL — SIGNIFICANT CHANGE UP (ref 0–0.5)
EOSINOPHIL NFR BLD AUTO: 0.7 % — SIGNIFICANT CHANGE UP (ref 0–6)
GLUCOSE SERPL-MCNC: 100 MG/DL — HIGH (ref 70–99)
HCT VFR BLD CALC: 33.6 % — LOW (ref 39–50)
HGB BLD-MCNC: 10.5 G/DL — LOW (ref 13–17)
IMM GRANULOCYTES NFR BLD AUTO: 0.9 % — SIGNIFICANT CHANGE UP (ref 0–1.5)
INR BLD: 1.46 RATIO — HIGH (ref 0.88–1.16)
LIDOCAIN IGE QN: 101 U/L — HIGH (ref 7–60)
LYMPHOCYTES # BLD AUTO: 1.02 K/UL — SIGNIFICANT CHANGE UP (ref 1–3.3)
LYMPHOCYTES # BLD AUTO: 3.6 % — LOW (ref 13–44)
MAGNESIUM SERPL-MCNC: 2 MG/DL — SIGNIFICANT CHANGE UP (ref 1.6–2.6)
MCHC RBC-ENTMCNC: 27.3 PG — SIGNIFICANT CHANGE UP (ref 27–34)
MCHC RBC-ENTMCNC: 31.3 GM/DL — LOW (ref 32–36)
MCV RBC AUTO: 87.5 FL — SIGNIFICANT CHANGE UP (ref 80–100)
METHOD TYPE: SIGNIFICANT CHANGE UP
MONOCYTES # BLD AUTO: 1.17 K/UL — HIGH (ref 0–0.9)
MONOCYTES NFR BLD AUTO: 4.1 % — SIGNIFICANT CHANGE UP (ref 2–14)
NEUTROPHILS # BLD AUTO: 25.98 K/UL — HIGH (ref 1.8–7.4)
NEUTROPHILS NFR BLD AUTO: 90.4 % — HIGH (ref 43–77)
NRBC # BLD: 0 /100 WBCS — SIGNIFICANT CHANGE UP (ref 0–0)
NT-PROBNP SERPL-SCNC: 4472 PG/ML — HIGH (ref 0–300)
ORGANISM # SPEC MICROSCOPIC CNT: SIGNIFICANT CHANGE UP
ORGANISM # SPEC MICROSCOPIC CNT: SIGNIFICANT CHANGE UP
PHOSPHATE SERPL-MCNC: 3.4 MG/DL — SIGNIFICANT CHANGE UP (ref 2.5–4.5)
PLATELET # BLD AUTO: 234 K/UL — SIGNIFICANT CHANGE UP (ref 150–400)
POTASSIUM SERPL-MCNC: 3.7 MMOL/L — SIGNIFICANT CHANGE UP (ref 3.5–5.3)
POTASSIUM SERPL-SCNC: 3.7 MMOL/L — SIGNIFICANT CHANGE UP (ref 3.5–5.3)
PROCALCITONIN SERPL-MCNC: 0.34 NG/ML — HIGH (ref 0.02–0.1)
PROCALCITONIN SERPL-MCNC: 0.34 NG/ML — HIGH (ref 0.02–0.1)
PROT SERPL-MCNC: 6.5 G/DL — SIGNIFICANT CHANGE UP (ref 6–8.3)
PROTHROM AB SERPL-ACNC: 17.1 SEC — HIGH (ref 10.6–13.6)
RBC # BLD: 3.84 M/UL — LOW (ref 4.2–5.8)
RBC # FLD: 14.4 % — SIGNIFICANT CHANGE UP (ref 10.3–14.5)
RH IG SCN BLD-IMP: POSITIVE — SIGNIFICANT CHANGE UP
SODIUM SERPL-SCNC: 145 MMOL/L — SIGNIFICANT CHANGE UP (ref 135–145)
SPECIMEN SOURCE: SIGNIFICANT CHANGE UP
WBC # BLD: 28.72 K/UL — HIGH (ref 3.8–10.5)
WBC # FLD AUTO: 28.72 K/UL — HIGH (ref 3.8–10.5)

## 2020-08-18 PROCEDURE — 99292 CRITICAL CARE ADDL 30 MIN: CPT

## 2020-08-18 PROCEDURE — 71045 X-RAY EXAM CHEST 1 VIEW: CPT | Mod: 26

## 2020-08-18 PROCEDURE — 99291 CRITICAL CARE FIRST HOUR: CPT

## 2020-08-18 RX ORDER — METOPROLOL TARTRATE 50 MG
12.5 TABLET ORAL
Refills: 0 | Status: DISCONTINUED | OUTPATIENT
Start: 2020-08-18 | End: 2020-08-27

## 2020-08-18 RX ORDER — FUROSEMIDE 40 MG
40 TABLET ORAL ONCE
Refills: 0 | Status: COMPLETED | OUTPATIENT
Start: 2020-08-18 | End: 2020-08-18

## 2020-08-18 RX ORDER — METOPROLOL TARTRATE 50 MG
5 TABLET ORAL ONCE
Refills: 0 | Status: COMPLETED | OUTPATIENT
Start: 2020-08-18 | End: 2020-08-18

## 2020-08-18 RX ORDER — VANCOMYCIN HCL 1 G
125 VIAL (EA) INTRAVENOUS EVERY 6 HOURS
Refills: 0 | Status: DISCONTINUED | OUTPATIENT
Start: 2020-08-18 | End: 2020-08-24

## 2020-08-18 RX ORDER — HYDRALAZINE HCL 50 MG
50 TABLET ORAL EVERY 8 HOURS
Refills: 0 | Status: DISCONTINUED | OUTPATIENT
Start: 2020-08-18 | End: 2020-08-29

## 2020-08-18 RX ADMIN — Medication 75 MILLIGRAM(S): at 21:02

## 2020-08-18 RX ADMIN — Medication 1 DROP(S): at 12:28

## 2020-08-18 RX ADMIN — Medication 3 MILLILITER(S): at 12:39

## 2020-08-18 RX ADMIN — CHLORHEXIDINE GLUCONATE 1 APPLICATION(S): 213 SOLUTION TOPICAL at 05:04

## 2020-08-18 RX ADMIN — CHLORHEXIDINE GLUCONATE 15 MILLILITER(S): 213 SOLUTION TOPICAL at 05:03

## 2020-08-18 RX ADMIN — DEXMEDETOMIDINE HYDROCHLORIDE IN 0.9% SODIUM CHLORIDE 5.5 MICROGRAM(S)/KG/HR: 4 INJECTION INTRAVENOUS at 01:00

## 2020-08-18 RX ADMIN — ISOSORBIDE DINITRATE 10 MILLIGRAM(S): 5 TABLET ORAL at 05:04

## 2020-08-18 RX ADMIN — Medication 1 DROP(S): at 05:05

## 2020-08-18 RX ADMIN — Medication 3 MILLILITER(S): at 05:03

## 2020-08-18 RX ADMIN — Medication 4 MILLILITER(S): at 05:03

## 2020-08-18 RX ADMIN — ISOSORBIDE DINITRATE 10 MILLIGRAM(S): 5 TABLET ORAL at 13:26

## 2020-08-18 RX ADMIN — CEFEPIME 100 MILLIGRAM(S): 1 INJECTION, POWDER, FOR SOLUTION INTRAMUSCULAR; INTRAVENOUS at 13:25

## 2020-08-18 RX ADMIN — SODIUM CHLORIDE 4 MILLILITER(S): 9 INJECTION INTRAMUSCULAR; INTRAVENOUS; SUBCUTANEOUS at 17:28

## 2020-08-18 RX ADMIN — ISOSORBIDE DINITRATE 10 MILLIGRAM(S): 5 TABLET ORAL at 21:01

## 2020-08-18 RX ADMIN — AMIODARONE HYDROCHLORIDE 200 MILLIGRAM(S): 400 TABLET ORAL at 00:05

## 2020-08-18 RX ADMIN — HEPARIN SODIUM 5000 UNIT(S): 5000 INJECTION INTRAVENOUS; SUBCUTANEOUS at 21:01

## 2020-08-18 RX ADMIN — PANTOPRAZOLE SODIUM 40 MILLIGRAM(S): 20 TABLET, DELAYED RELEASE ORAL at 12:27

## 2020-08-18 RX ADMIN — CEFEPIME 100 MILLIGRAM(S): 1 INJECTION, POWDER, FOR SOLUTION INTRAMUSCULAR; INTRAVENOUS at 21:01

## 2020-08-18 RX ADMIN — Medication 5 MILLIGRAM(S): at 23:15

## 2020-08-18 RX ADMIN — Medication 125 MILLIGRAM(S): at 17:41

## 2020-08-18 RX ADMIN — CHLORHEXIDINE GLUCONATE 15 MILLILITER(S): 213 SOLUTION TOPICAL at 17:41

## 2020-08-18 RX ADMIN — Medication 40 MILLIGRAM(S): at 09:50

## 2020-08-18 RX ADMIN — Medication 3 MILLILITER(S): at 00:19

## 2020-08-18 RX ADMIN — FENTANYL CITRATE 2.75 MICROGRAM(S)/KG/HR: 50 INJECTION INTRAVENOUS at 10:24

## 2020-08-18 RX ADMIN — Medication 125 MILLIGRAM(S): at 10:24

## 2020-08-18 RX ADMIN — HEPARIN SODIUM 5000 UNIT(S): 5000 INJECTION INTRAVENOUS; SUBCUTANEOUS at 05:04

## 2020-08-18 RX ADMIN — DEXMEDETOMIDINE HYDROCHLORIDE IN 0.9% SODIUM CHLORIDE 5.5 MICROGRAM(S)/KG/HR: 4 INJECTION INTRAVENOUS at 05:04

## 2020-08-18 RX ADMIN — Medication 1 DROP(S): at 00:05

## 2020-08-18 RX ADMIN — CEFEPIME 100 MILLIGRAM(S): 1 INJECTION, POWDER, FOR SOLUTION INTRAMUSCULAR; INTRAVENOUS at 05:03

## 2020-08-18 RX ADMIN — SODIUM CHLORIDE 4 MILLILITER(S): 9 INJECTION INTRAMUSCULAR; INTRAVENOUS; SUBCUTANEOUS at 05:05

## 2020-08-18 RX ADMIN — Medication 75 MILLIGRAM(S): at 05:03

## 2020-08-18 RX ADMIN — Medication 4 MILLILITER(S): at 00:19

## 2020-08-18 RX ADMIN — Medication 3 MILLILITER(S): at 17:28

## 2020-08-18 NOTE — PROGRESS NOTE ADULT - ASSESSMENT
60 year old male s/p cardiac arrest c/b GIB and bleeding from [place] with Perimesencephalic (HH4 mF4) subarachnoid hemorrhage, angio neg ,PBD 9.    NEURO:   Awaiting MR SERRANO  SAH, possible sentinal event with cardiac arrest  Q2h neurochecks  CT Head: perimesencephalic SAH   CTA Head: no aneurysm noted  Conventional angiogram negative  MRI neuroaxis: restricted diffusion in R BG, posterior limb of R IC, and anterior right temporal lobe  VEEG negative   DCI management (off nimodipine). Poor windows  No EVD as no significant hydrocephalus  Fentanyl gtt for sedation    PULM: Acute PUL edema  Lasix 30mg x1 now   [facility] vent: settings- 18/500/40/5   spO2>92%  CXR:  pul edema   CTA R/O PE: negative  Continue hypertonic nebs./ Mucomyst prn    CPAP as tolerated.      CV:  SBP goal <200  TTE: Global LV dysfunction. EF41%, Severe concentric LVH, flattening of interventricular septum.    S/P Concern for possible PE - CTA negative   hydralazine 75 q8 + isosorbide 10 mg q 12  Will need cath per cardiology  S/P amio gtt; changed to  daily.     RENAL: ?CKD with superimposed JALEN.- stable   Cr stable  Sodium goal 140-150  Has been diuresced. Net neg 680cc.     GI:  S/P GI bleed S/P PPI gtt  Diet: TF- Vital at 60cc/hr   GI prophylaxis [] not indicated [x] PPI 40 daily in setting of GI bleed and intubation [] other:  Bowel regimen [x] senna - rectal tube   LBM: 8/15    ENDO:   Goal euglycemia (-180)    HEME/ONC: Afib on coumadin at home  s/p vit K  INR 1.42-->1.2  VTE prophylaxis: [] SCDs [x] chemoprophylaxis heparin subq  Dopplers negative for DVT.    ID:  Leukocytosis improving  Fevers  Started on cefepime (switched from unasyn on 8/14) for pseudomonas- D/C 8/21/20  Urine culture, procal .19    SOCIAL/FAMILY:   [x] updated at bedside     CODE STATUS:  [x] Full Code [] DNR [] DNI [] Palliative/Comfort Care    DISPOSITION:  [x] ICU [] Stroke Unit [] Floor [] EMU [] RCU [] PCU    [x] Patient is at high risk of neurologic deterioration/death due to: SAH, cardiac arrest 60 year old male s/p cardiac arrest c/b GIB and bleeding from [place] with Perimesencephalic (HH4 mF4) subarachnoid hemorrhage, angio neg ,PBD 9.    NEURO:   Awaiting MR SERRANO  SAH, possible sentinal event with cardiac arrest  Q2h neurochecks  CT Head: perimesencephalic SAH   CTA Head: no aneurysm noted  Conventional angiogram negative  MRI neuroaxis: restricted diffusion in R BG, posterior limb of R IC, and anterior right temporal lobe  VEEG negative   DCI management (off nimodipine). Poor windows  No EVD as no significant hydrocephalus  Precedex gtt for sedation- RASS 0 to -1    PULM: Acute PUL edema  Lasix 40mg x1 now   [facility] vent: settings- 18/500/40/5   spO2>92%  CXR:  pul edema - unchanged  CTA R/O PE: negative  Continue hypertonic nebs./ Mucomyst prn    CPAP as tolerated.      CV:  SBP goal <200  TTE: Global LV dysfunction. EF41%, Severe concentric LVH, flattening of interventricular septum.    S/P Concern for possible PE - CTA negative  hydralazine 75 q8 + isosorbide 10 mg q 8  Will need cath per cardiology  S/P amio gtt; changed to  daily.  Mg- 2.0 ; Po4- 3.0 ; k- 4.0    RENAL: ?CKD with superimposed JALEN.- stable   Cr stable  Sodium goal 140-150  Has been diuresced. Net neg 680cc.     GI:  S/P GI bleed S/P PPI gtt/ Heme stable  Diarrhea- - Increased WBC and ABX--> Vanco 125 mg q6 till cdiff results    Diet: TF- Vital at 60cc/hr ---> NPO for possible angio/MRI  GI prophylaxis - [x] PPI 40 daily in setting of GI bleed and intubation [] other:  Bowel regimen [x] senna - rectal tube   LBM: 8/15    ENDO:   Goal euglycemia (-180)    HEME/ONC: Afib on coumadin at home; leucocytosis  No eosinophilia check C diff   Check Lipase/ LFT  s/p vit K  INR 1.42-->1.2  VTE prophylaxis: [] SCDs [x] chemoprophylaxis heparin subq  Dopplers negative for DVT.    ID:  Leukocytosis - Diarrhea  R/O C difficile - see above; hypoactive BS  No fever  Started on cefepime (switched from unasyn on 8/14) for pseudomonas- D/C 8/21/20  Urine culture, procal .19    SOCIAL/FAMILY:   [x] updated at bedside     CODE STATUS:  [x] Full Code [] DNR [] DNI [] Palliative/Comfort Care    DISPOSITION:  [x] ICU [] Stroke Unit [] Floor [] EMU [] RCU [] PCU    [x] Patient is at high risk of neurologic deterioration/death due to: SAH, cardiac arrest

## 2020-08-18 NOTE — PROGRESS NOTE ADULT - ASSESSMENT
PBD#8 HH5 MF4 SAH, angio negative    pre op for angio in am  fentanyl drip--wean as able  cont keppra  -180, cont antihypertensives and amio, add metoprolol  intubated, cont full vent support  thick copious secretions  aggressive pulm toilet, frequent suctioning  pseudomonas on bronch culture, started on 8/14 cefepime, CrCl >60, but uptrending Cr, monitor renal function--stable  cdiff positive started on PO vanco   cont tube feeding, NPO after midnight  monitor for fevers  SQH  d/w family re: trach/PEG

## 2020-08-18 NOTE — PROGRESS NOTE ADULT - SUBJECTIVE AND OBJECTIVE BOX
HPI:  60 year old male with PMHx of Afib on coumadin s/p cardiac arrest at work, downtime 25 minutes prior to ROSC. Pupils were R fixed and dilated, left fixed at the time, no gag reflex, post-CA cooling protocol was initiated down to 35 deg. Intubated and put on Nimbex drip. Also on Propofol, fentanyl, levophed. R groin minerva placed. Also put on heparin post-CA. HCT showed R perimesencephalic SAH of unclear etiology, no hydrocephalus. Course also c/b GIB, was put on protonix drip. Also had bleeding from scott - urology consulted, clot irrigated.     HOSP COURSE:   8/10- Angio- neg   8/11: VTACH noted (7 beats)  8/13: Febrile  8/14: Started on Unasyn for gram neg rods and gram neg diplococci on bronch gram stain -> switched to cefepime for pseudomonas noted 8/10  8/16: central line removed on 8/16. Cr improving 1.7 -> 1.57. MR Meenu george. Could not tolerate since desatted when supine    Overnight events:    ICU Vital Signs Last 24 Hrs  T(C): 37.1 (18 Aug 2020 07:00), Max: 38.1 (17 Aug 2020 10:00)  T(F): 98.8 (18 Aug 2020 07:00), Max: 100.6 (17 Aug 2020 10:00)  HR: 93 (18 Aug 2020 08:27) (59 - 108)  ABP: 123/62 (18 Aug 2020 08:00) (115/62 - 199/100)  ABP(mean): 78 (18 Aug 2020 08:00) (76 - 127)  RR: 18 (18 Aug 2020 08:00) (13 - 21)  SpO2: 100% (18 Aug 2020 08:27) (97% - 100%)      08-17-20 @ 07:01  -  08-18-20 @ 07:00  --------------------------------------------------------  IN: 1823.9 mL / OUT: 2350 mL / NET: -526.1 mL    08-18-20 @ 07:01  -  08-18-20 @ 08:56  --------------------------------------------------------  IN: 44 mL / OUT: 0 mL / NET: 44 mL      Mode: AC/ CMV (Assist Control/ Continuous Mandatory Ventilation), RR (machine): 18, TV (machine): 500, FiO2: 40, PEEP: 5, ITime: 1, MAP: 10, PIP: 14  acetaminophen   Tablet .. 650 milliGRAM(s) Oral every 6 hours PRN  albuterol/ipratropium for Nebulization. 3 milliLiter(s) Nebulizer every 6 hours  aMIOdarone    Tablet 200 milliGRAM(s) Oral daily  artificial  tears Solution 1 Drop(s) Both EYES every 6 hours  cefepime   IVPB 2000 milliGRAM(s) IV Intermittent every 8 hours  chlorhexidine 0.12% Liquid 15 milliLiter(s) Oral Mucosa every 12 hours  chlorhexidine 4% Liquid 1 Application(s) Topical <User Schedule>  chlorhexidine 4% Liquid 1 Application(s) Topical <User Schedule>  dexMEDEtomidine Infusion 0.2 MICROgram(s)/kG/Hr (5.5 mL/Hr) IV Continuous <Continuous>  dextrose 40% Gel 15 Gram(s) Oral once PRN  dextrose 5%. 1000 milliLiter(s) (50 mL/Hr) IV Continuous <Continuous>  dextrose 50% Injectable 12.5 Gram(s) IV Push once  dextrose 50% Injectable 25 Gram(s) IV Push once  dextrose 50% Injectable 25 Gram(s) IV Push once  fentaNYL   Infusion... 0.5 MICROgram(s)/kG/Hr (2.75 mL/Hr) IV Continuous <Continuous>  glucagon  Injectable 1 milliGRAM(s) IntraMuscular once PRN  heparin   Injectable 5000 Unit(s) SubCutaneous every 8 hours  hydrALAZINE 75 milliGRAM(s) Oral every 8 hours  isosorbide   dinitrate Tablet (ISORDIL) 10 milliGRAM(s) Oral three times a day  pantoprazole  Injectable 40 milliGRAM(s) IV Push daily  sodium chloride 0.9% lock flush 10 milliLiter(s) IV Push every 1 hour PRN  sodium chloride 7% Inhalation 4 milliLiter(s) Inhalation every 12 hours                          11.1   32.60 )-----------( 224      ( 17 Aug 2020 21:39 )             36.1     08-17    145  |  114<H>  |  38<H>  ----------------------------<  152<H>  4.1   |  21<L>  |  1.81<H>    Ca    9.4      17 Aug 2020 21:39  Phos  3.4     08-17  Mg     2.0     08-17      ABG - ( 16 Aug 2020 12:27 )  pH, Arterial: 7.39  pH, Blood: x     /  pCO2: 35    /  pO2: 134   / HCO3: 21    / Base Excess: -3.1  /  SaO2: 99        08-16    145  |  114<H>  |  36<H>  ----------------------------<  106<H>  3.7   |  18<L>  |  1.57<H>    Ca    9.2      16 Aug 2020 21:04  Phos  3.3     08-16  Mg     2.1     08-16    TPro  6.9  /  Alb  3.3  /  TBili  0.2  /  DBili  <0.1  /  AST  55<H>  /  ALT  54<H>  /  AlkPhos  83  08-15    ABG - ( 16 Aug 2020 12:27 )  pH, Arterial: 7.39  pH, Blood: x     /  pCO2: 35    /  pO2: 134   / HCO3: 21    / Base Excess: -3.1  /  SaO2: 99          BRONCH- 8/13/20- Pseudomonas- Cefepime     IMAGING:   Recent imaging studies were reviewed.   Xray Chest 1 View- PORTABLE-Routine (08.17.20 @ 05:53) >  COMPARISON: Multiple prior chest x-rays, with the most recent dated 8/16/2020.    FINDINGS:  There is an endotracheal tube, with its tip above the level of the kayleigh.  There is an enteric tube, coursing below the diaphragm, with its tip non-visualized below the level of the film.  There has been interval removal of a right IJ approach central venous catheter.  The size of the cardiomediastinal silhouette is enlarged.  There are no focal pulmonary consolidations.  There is no pneumothorax or pleural effusion.    IMPRESSION:  Interval removal of central line. Clear lungs.    CT Head No Cont (08.13.20 @ 08:55) >  TECHNIQUE : Axial CT scanning of the brain was obtained from the skull base to the vertex without the administration of intravenous contrast. Sagittal and coronal reformats were provided.    COMPARISON: CT brain 8/12/2020    FINDINGS:    Similar extra-axial hemorrhage in the right aspect of the suprasellar cistern extending into the right sylvian fissure.    Scattered sulcal subarachnoid hemorrhage is similar.    Similar small layering intraventricular hemorrhage.    No hydrocephalus or midline shift.    Edema in the region of the right cerebral peduncle and posterior limb of the right internal capsule is redemonstrated.    IMPRESSION:    No significant interval change from 8/12/2020.       MR Head w/wo IV Cont (08.11.20 @ 17:50) >  MRI BRAIN:    Redemonstration of subarachnoid hemorrhage in the right aspect of the suprasellar cistern and within the right sylvian fissure.    Scattered sulcal subarachnoid hemorrhage is redemonstrated.    Small hemorrhage layering in the occipital horns is similar.. Ventricles similar in size. No hydrocephalus.    There is restricted diffusion within the region of the right basal ganglia, posterior limb of the right internal capsule, and anterior right temporal lobe, likely representing an acute right MCA territory infarct.    Chronic left occipital infarct. Mild white matter microvascular ischemic disease. Signal voids are seen within the major intracranial vessels consistent with their patency.    No abnormal parenchymal or leptomeningeal enhancement.    Air-fluid levels and mucosal thickening in the paranasal sinuses. Minimal bilateral mastoid air cell effusions.    MRI CERVICAL SPINE:    Vertebral body height, marrow signal homogeneity, and facet alignment are maintained throughout the visualized spinal segments. Straightening of the normal cervical lordosis. Mild multilevel disc space narrowing. Cervicomedullary junction unremarkable.    No prevertebral or paravertebral edema.  No abnormal enhancement in the cervical region.    No spinal cord compression or abnormal intrinsic cord signal.    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    C2-C3: No acquired spinal canal stenosis. No neural foraminal narrowing.    C3-C4: Broad-based disc protrusion reaches the cord. Bilateral uncinate hypertrophy resulting in moderate bilateral neural foraminal narrowing.    C4-C5: Broad-based disc protrusion asymmetric to the left nearly reaches the cord. Bilateral uncinate hypertrophy resulting in moderate left and moderate to severe right neural foraminal narrowing.    C5-C6: Broad-based disc protrusion asymmetric to the left and left uncinate hypertrophy. Deformation of the left ventral thecal sac and effacement of the left lateral recess. Moderate left neural foraminal narrowing.    C6-C7: Broad-based disc protrusion deforms the ventral thecal sac. Left uncinate hypertrophy. Mild to moderate left neural foraminal narrowing.    C7-T1: Left facet hypertrophy. No acquired spinal canal stenosis or neural foraminal narrowing.    IMPRESSION:    MRI BRAIN:    Restricted There is restricted diffusion within the region of the right basal ganglia, posterior limb of the right internal capsule, and anterior right temporal lobe, likely representing an acute right MCA territory infarct.    Similar cisternal and sulcal subarachnoid hemorrhage, given differences in modality.    Similar small hemorrhage layering in the occipital horns. No hydrocephalus.    No abnormal parenchymal or leptomeningeal enhancement.    MRI CERVICAL SPINE:    Multilevel degenerative changes superimposed upon congenital spinal canal stenosis.    No abnormal enhancement in the cervical region.            EXAMINATION:  General: Calm, intubated  HEENT: MMM  Neuro:  -Mental status-  No acute distress, EO to voice. Strong and purposeful on RUE,  followed showing 2 fingers. Left side  0/5  -CN- Pupils R 5mm NR, L 1mm sluggish, Eyes dysconjugate,  +cough/gag, +corneals  RUE localize AG, RLE spont AG, LUE flaccid, LLE flaccid  CVS: S1/S2  RESP: Diminished at bases  GI: Soft, non tender, mildly distended  Extremities: Warm, skin intact HPI:  60 year old male with PMHx of Afib on coumadin s/p cardiac arrest at work, downtime 25 minutes prior to ROSC. Pupils were R fixed and dilated, left fixed at the time, no gag reflex, post-CA cooling protocol was initiated down to 35 deg. Intubated and put on Nimbex drip. Also on Propofol, fentanyl, levophed. R groin minerva placed. Also put on heparin post-CA. HCT showed R perimesencephalic SAH of unclear etiology, no hydrocephalus. Course also c/b GIB, was put on protonix drip. Also had bleeding from [place] - urology consulted, clot irrigated.     HOSP COURSE:   8/10- Angio- neg   8/11: VTACH noted (7 beats)  8/13: Febrile  8/14: Started on Unasyn for gram neg rods and gram neg diplococci on bronch gram stain -> switched to cefepime for pseudomonas noted 8/10  8/16: central line removed on 8/16. Cr improving 1.7 -> 1.57. MR Meenu george. Could not tolerate since desatted when supine  8/17/20- Lasix 30mg x1    Overnight events:    ICU Vital Signs Last 24 Hrs  T(C): 37.1 (18 Aug 2020 07:00), Max: 38.1 (17 Aug 2020 10:00)  T(F): 98.8 (18 Aug 2020 07:00), Max: 100.6 (17 Aug 2020 10:00)  HR: 93 (18 Aug 2020 08:27) (59 - 108)  ABP: 123/62 (18 Aug 2020 08:00) (115/62 - 199/100)  ABP(mean): 78 (18 Aug 2020 08:00) (76 - 127)  RR: 18 (18 Aug 2020 08:00) (13 - 21)  SpO2: 100% (18 Aug 2020 08:27) (97% - 100%)      08-17-20 @ 07:01  -  08-18-20 @ 07:00  --------------------------------------------------------  IN: 1823.9 mL / OUT: 2350 mL / NET: -526.1 mL    08-18-20 @ 07:01  -  08-18-20 @ 08:56  --------------------------------------------------------  IN: 44 mL / OUT: 0 mL / NET: 44 mL      Mode: AC/ CMV (Assist Control/ Continuous Mandatory Ventilation), RR (machine): 18, TV (machine): 500, FiO2: 40, PEEP: 5, ITime: 1, MAP: 10, PIP: 14  acetaminophen   Tablet .. 650 milliGRAM(s) Oral every 6 hours PRN  albuterol/ipratropium for Nebulization. 3 milliLiter(s) Nebulizer every 6 hours  aMIOdarone    Tablet 200 milliGRAM(s) Oral daily  artificial  tears Solution 1 Drop(s) Both EYES every 6 hours  cefepime   IVPB 2000 milliGRAM(s) IV Intermittent every 8 hours  chlorhexidine 0.12% Liquid 15 milliLiter(s) Oral Mucosa every 12 hours  chlorhexidine 4% Liquid 1 Application(s) Topical <User Schedule>  chlorhexidine 4% Liquid 1 Application(s) Topical <User Schedule>  dexMEDEtomidine Infusion 0.2 MICROgram(s)/kG/Hr (5.5 mL/Hr) IV Continuous <Continuous>  dextrose 40% Gel 15 Gram(s) Oral once PRN  dextrose 5%. 1000 milliLiter(s) (50 mL/Hr) IV Continuous <Continuous>  dextrose 50% Injectable 12.5 Gram(s) IV Push once  dextrose 50% Injectable 25 Gram(s) IV Push once  dextrose 50% Injectable 25 Gram(s) IV Push once  fentaNYL   Infusion... 0.5 MICROgram(s)/kG/Hr (2.75 mL/Hr) IV Continuous <Continuous>  glucagon  Injectable 1 milliGRAM(s) IntraMuscular once PRN  heparin   Injectable 5000 Unit(s) SubCutaneous every 8 hours  hydrALAZINE 75 milliGRAM(s) Oral every 8 hours  isosorbide   dinitrate Tablet (ISORDIL) 10 milliGRAM(s) Oral three times a day  pantoprazole  Injectable 40 milliGRAM(s) IV Push daily  sodium chloride 0.9% lock flush 10 milliLiter(s) IV Push every 1 hour PRN  sodium chloride 7% Inhalation 4 milliLiter(s) Inhalation every 12 hours                          11.1   32.60 )-----------( 224      ( 17 Aug 2020 21:39 )             36.1     08-17    145  |  114<H>  |  38<H>  ----------------------------<  152<H>  4.1   |  21<L>  |  1.81<H>    Ca    9.4      17 Aug 2020 21:39  Phos  3.4     08-17  Mg     2.0     08-17      ABG - ( 16 Aug 2020 12:27 )  pH, Arterial: 7.39  pH, Blood: x     /  pCO2: 35    /  pO2: 134   / HCO3: 21    / Base Excess: -3.1  /  SaO2: 99        08-16    145  |  114<H>  |  36<H>  ----------------------------<  106<H>  3.7   |  18<L>  |  1.57<H>    Ca    9.2      16 Aug 2020 21:04  Phos  3.3     08-16  Mg     2.1     08-16    TPro  6.9  /  Alb  3.3  /  TBili  0.2  /  DBili  <0.1  /  AST  55<H>  /  ALT  54<H>  /  AlkPhos  83  08-15    ABG - ( 16 Aug 2020 12:27 )  pH, Arterial: 7.39  pH, Blood: x     /  pCO2: 35    /  pO2: 134   / HCO3: 21    / Base Excess: -3.1  /  SaO2: 99          BRONCH- 8/13/20- Pseudomonas- Cefepime     IMAGING:   Recent imaging studies were reviewed.   Xray Chest 1 View- PORTABLE-Routine (08.17.20 @ 05:53) >  COMPARISON: Multiple prior chest x-rays, with the most recent dated 8/16/2020.    FINDINGS:  There is an endotracheal tube, with its tip above the level of the kayleigh.  There is an enteric tube, coursing below the diaphragm, with its tip non-visualized below the level of the film.  There has been interval removal of a right IJ approach central venous catheter.  The size of the cardiomediastinal silhouette is enlarged.  There are no focal pulmonary consolidations.  There is no pneumothorax or pleural effusion.    IMPRESSION:  Interval removal of central line. Clear lungs.    CT Head No Cont (08.13.20 @ 08:55) >  TECHNIQUE : Axial CT scanning of the brain was obtained from the skull base to the vertex without the administration of intravenous contrast. Sagittal and coronal reformats were provided.    COMPARISON: CT brain 8/12/2020    FINDINGS:    Similar extra-axial hemorrhage in the right aspect of the suprasellar cistern extending into the right sylvian fissure.    Scattered sulcal subarachnoid hemorrhage is similar.    Similar small layering intraventricular hemorrhage.    No hydrocephalus or midline shift.    Edema in the region of the right cerebral peduncle and posterior limb of the right internal capsule is redemonstrated.    IMPRESSION:    No significant interval change from 8/12/2020.       MR Head w/wo IV Cont (08.11.20 @ 17:50) >  MRI BRAIN:    Redemonstration of subarachnoid hemorrhage in the right aspect of the suprasellar cistern and within the right sylvian fissure.    Scattered sulcal subarachnoid hemorrhage is redemonstrated.    Small hemorrhage layering in the occipital horns is similar.. Ventricles similar in size. No hydrocephalus.    There is restricted diffusion within the region of the right basal ganglia, posterior limb of the right internal capsule, and anterior right temporal lobe, likely representing an acute right MCA territory infarct.    Chronic left occipital infarct. Mild white matter microvascular ischemic disease. Signal voids are seen within the major intracranial vessels consistent with their patency.    No abnormal parenchymal or leptomeningeal enhancement.    Air-fluid levels and mucosal thickening in the paranasal sinuses. Minimal bilateral mastoid air cell effusions.    MRI CERVICAL SPINE:    Vertebral body height, marrow signal homogeneity, and facet alignment are maintained throughout the visualized spinal segments. Straightening of the normal cervical lordosis. Mild multilevel disc space narrowing. Cervicomedullary junction unremarkable.    No prevertebral or paravertebral edema.  No abnormal enhancement in the cervical region.    No spinal cord compression or abnormal intrinsic cord signal.    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    C2-C3: No acquired spinal canal stenosis. No neural foraminal narrowing.    C3-C4: Broad-based disc protrusion reaches the cord. Bilateral uncinate hypertrophy resulting in moderate bilateral neural foraminal narrowing.    C4-C5: Broad-based disc protrusion asymmetric to the left nearly reaches the cord. Bilateral uncinate hypertrophy resulting in moderate left and moderate to severe right neural foraminal narrowing.    C5-C6: Broad-based disc protrusion asymmetric to the left and left uncinate hypertrophy. Deformation of the left ventral thecal sac and effacement of the left lateral recess. Moderate left neural foraminal narrowing.    C6-C7: Broad-based disc protrusion deforms the ventral thecal sac. Left uncinate hypertrophy. Mild to moderate left neural foraminal narrowing.    C7-T1: Left facet hypertrophy. No acquired spinal canal stenosis or neural foraminal narrowing.    IMPRESSION:    MRI BRAIN:    Restricted There is restricted diffusion within the region of the right basal ganglia, posterior limb of the right internal capsule, and anterior right temporal lobe, likely representing an acute right MCA territory infarct.    Similar cisternal and sulcal subarachnoid hemorrhage, given differences in modality.    Similar small hemorrhage layering in the occipital horns. No hydrocephalus.    No abnormal parenchymal or leptomeningeal enhancement.    MRI CERVICAL SPINE:    Multilevel degenerative changes superimposed upon congenital spinal canal stenosis.    No abnormal enhancement in the cervical region.            EXAMINATION:  General: Calm, intubated  HEENT: MMM  Neuro:  -Mental status-  No acute distress, EO to voice. Strong and purposeful on RUE,  followed showing 2 fingers. Left side  0/5  -CN- Pupils R 5mm NR, L 1mm sluggish, Eyes dysconjugate,  +cough/gag, +corneals  RUE localize AG, RLE spont AG, LUE flaccid, LLE flaccid  CVS: S1/S2  RESP: Diminished at bases  GI: Soft, non tender, mildly distended, hypoactive BS  Extremities: Warm, skin intact

## 2020-08-18 NOTE — PROGRESS NOTE ADULT - SUBJECTIVE AND OBJECTIVE BOX
remains intubated  requiring fentanyl gtt for vent synchrony  afib with rvr    vitals/labs/meds reviewed  EO to voice, intubated, does not nod, but FC intermittently, two fingers on RUE, distally AG, wiggles toes on RLE, no movements on L remains intubated  requiring fentanyl gtt for vent synchrony  afib with rvr    vitals/labs/meds reviewed  EO to voice, intubated, does not consistently nod, unable to answer to choices for orientation, but FC briskly, two fingers/thumbs up on RUE, distally AG, wiggles toes on RLE, no movements on L

## 2020-08-18 NOTE — PROGRESS NOTE ADULT - SUBJECTIVE AND OBJECTIVE BOX
Subjective: Patient seen and examined. No new events except as noted.   Remains intubated in NSCU   HR controlled     REVIEW OF SYSTEMS:  Unable to obtain       MEDICATIONS:  MEDICATIONS  (STANDING):  albuterol/ipratropium for Nebulization. 3 milliLiter(s) Nebulizer every 6 hours  aMIOdarone    Tablet 200 milliGRAM(s) Oral daily  artificial  tears Solution 1 Drop(s) Both EYES every 6 hours  cefepime   IVPB 2000 milliGRAM(s) IV Intermittent every 8 hours  chlorhexidine 0.12% Liquid 15 milliLiter(s) Oral Mucosa every 12 hours  chlorhexidine 4% Liquid 1 Application(s) Topical <User Schedule>  chlorhexidine 4% Liquid 1 Application(s) Topical <User Schedule>  dexMEDEtomidine Infusion 0.2 MICROgram(s)/kG/Hr (5.5 mL/Hr) IV Continuous <Continuous>  dextrose 5%. 1000 milliLiter(s) (50 mL/Hr) IV Continuous <Continuous>  dextrose 50% Injectable 12.5 Gram(s) IV Push once  dextrose 50% Injectable 25 Gram(s) IV Push once  dextrose 50% Injectable 25 Gram(s) IV Push once  fentaNYL   Infusion... 0.5 MICROgram(s)/kG/Hr (2.75 mL/Hr) IV Continuous <Continuous>  furosemide   Injectable 40 milliGRAM(s) IV Push once  heparin   Injectable 5000 Unit(s) SubCutaneous every 8 hours  hydrALAZINE 75 milliGRAM(s) Oral every 8 hours  isosorbide   dinitrate Tablet (ISORDIL) 10 milliGRAM(s) Oral three times a day  pantoprazole  Injectable 40 milliGRAM(s) IV Push daily  sodium chloride 7% Inhalation 4 milliLiter(s) Inhalation every 12 hours  vancomycin    Solution 125 milliGRAM(s) Oral every 6 hours      PHYSICAL EXAM:  T(C): 37.1 (08-18-20 @ 07:00), Max: 38.1 (08-17-20 @ 10:00)  HR: 93 (08-18-20 @ 08:27) (59 - 108)  BP: --  RR: 18 (08-18-20 @ 08:00) (13 - 21)  SpO2: 100% (08-18-20 @ 08:27) (97% - 100%)  Wt(kg): --  I&O's Summary    17 Aug 2020 07:01  -  18 Aug 2020 07:00  --------------------------------------------------------  IN: 1823.9 mL / OUT: 2350 mL / NET: -526.1 mL    18 Aug 2020 07:01  -  18 Aug 2020 09:45  --------------------------------------------------------  IN: 66 mL / OUT: 0 mL / NET: 66 mL          Appearance: Intubated sedated 	  HEENT:   Dry  oral mucosa,  Lymphatic: No lymphadenopathy  Cardiovascular: Normal S1 S2, No JVD, No murmurs, No edema  Respiratory: Ventilated   Psychiatry: A & O x 0  Gastrointestinal:  Soft, Non-tender, + BS	  Skin: No rashes, No ecchymoses, No cyanosis	  Mental status- No acute distress, EO to stim  -CN- Pupils R 4mm NR, L 2mm sluggish, EOMI, tongue midline, face symmetric  +cough/gag  RUE localize AG  RLE spont AG  LUE flaccid  LLE flaccid  Extremities: decreased range of motion, No clubbing, cyanosis or edema  Vascular: Peripheral pulses palpable 2+ bilaterally  +scott            LABS:    CARDIAC MARKERS:                                11.1   32.60 )-----------( 224      ( 17 Aug 2020 21:39 )             36.1     08-17    145  |  114<H>  |  38<H>  ----------------------------<  152<H>  4.1   |  21<L>  |  1.81<H>    Ca    9.4      17 Aug 2020 21:39  Phos  3.4     08-17  Mg     2.0     08-17      proBNP:   Lipid Profile:   HgA1c:   TSH:     Negative          TELEMETRY: 	AF    ECG:  	  RADIOLOGY:   DIAGNOSTIC TESTING:  [ ] Echocardiogram:  [ ]  Catheterization:  [ ] Stress Test:    OTHER:

## 2020-08-19 DIAGNOSIS — A41.9 SEPSIS, UNSPECIFIED ORGANISM: ICD-10-CM

## 2020-08-19 PROBLEM — Z00.00 ENCOUNTER FOR PREVENTIVE HEALTH EXAMINATION: Status: ACTIVE | Noted: 2020-08-19

## 2020-08-19 PROBLEM — I48.91 UNSPECIFIED ATRIAL FIBRILLATION: Chronic | Status: ACTIVE | Noted: 2020-08-09

## 2020-08-19 LAB
ALBUMIN SERPL ELPH-MCNC: 3.1 G/DL — LOW (ref 3.3–5)
ALP SERPL-CCNC: 75 U/L — SIGNIFICANT CHANGE UP (ref 40–120)
ALT FLD-CCNC: 52 U/L — HIGH (ref 10–45)
ANION GAP SERPL CALC-SCNC: 10 MMOL/L — SIGNIFICANT CHANGE UP (ref 5–17)
AST SERPL-CCNC: 32 U/L — SIGNIFICANT CHANGE UP (ref 10–40)
BILIRUB SERPL-MCNC: 0.3 MG/DL — SIGNIFICANT CHANGE UP (ref 0.2–1.2)
BUN SERPL-MCNC: 42 MG/DL — HIGH (ref 7–23)
CALCIUM SERPL-MCNC: 9.4 MG/DL — SIGNIFICANT CHANGE UP (ref 8.4–10.5)
CHLORIDE SERPL-SCNC: 109 MMOL/L — HIGH (ref 96–108)
CO2 SERPL-SCNC: 21 MMOL/L — LOW (ref 22–31)
CREAT SERPL-MCNC: 1.8 MG/DL — HIGH (ref 0.5–1.3)
GLUCOSE SERPL-MCNC: 116 MG/DL — HIGH (ref 70–99)
MAGNESIUM SERPL-MCNC: 2 MG/DL — SIGNIFICANT CHANGE UP (ref 1.6–2.6)
PHOSPHATE SERPL-MCNC: 3.2 MG/DL — SIGNIFICANT CHANGE UP (ref 2.5–4.5)
POTASSIUM SERPL-MCNC: 3.6 MMOL/L — SIGNIFICANT CHANGE UP (ref 3.5–5.3)
POTASSIUM SERPL-SCNC: 3.6 MMOL/L — SIGNIFICANT CHANGE UP (ref 3.5–5.3)
PROT SERPL-MCNC: 6.9 G/DL — SIGNIFICANT CHANGE UP (ref 6–8.3)
SODIUM SERPL-SCNC: 140 MMOL/L — SIGNIFICANT CHANGE UP (ref 135–145)
TROPONIN T, HIGH SENSITIVITY RESULT: 66 NG/L — HIGH (ref 0–51)

## 2020-08-19 PROCEDURE — 99292 CRITICAL CARE ADDL 30 MIN: CPT

## 2020-08-19 PROCEDURE — 99291 CRITICAL CARE FIRST HOUR: CPT

## 2020-08-19 PROCEDURE — 93010 ELECTROCARDIOGRAM REPORT: CPT

## 2020-08-19 PROCEDURE — 71045 X-RAY EXAM CHEST 1 VIEW: CPT | Mod: 26

## 2020-08-19 RX ORDER — POTASSIUM CHLORIDE 20 MEQ
20 PACKET (EA) ORAL ONCE
Refills: 0 | Status: COMPLETED | OUTPATIENT
Start: 2020-08-19 | End: 2020-08-19

## 2020-08-19 RX ORDER — SODIUM CHLORIDE 9 MG/ML
500 INJECTION INTRAMUSCULAR; INTRAVENOUS; SUBCUTANEOUS ONCE
Refills: 0 | Status: COMPLETED | OUTPATIENT
Start: 2020-08-19 | End: 2020-08-19

## 2020-08-19 RX ORDER — POTASSIUM CHLORIDE 20 MEQ
40 PACKET (EA) ORAL ONCE
Refills: 0 | Status: COMPLETED | OUTPATIENT
Start: 2020-08-19 | End: 2020-08-19

## 2020-08-19 RX ADMIN — Medication 125 MILLIGRAM(S): at 11:25

## 2020-08-19 RX ADMIN — FENTANYL CITRATE 2.75 MICROGRAM(S)/KG/HR: 50 INJECTION INTRAVENOUS at 02:00

## 2020-08-19 RX ADMIN — DEXMEDETOMIDINE HYDROCHLORIDE IN 0.9% SODIUM CHLORIDE 5.5 MICROGRAM(S)/KG/HR: 4 INJECTION INTRAVENOUS at 02:00

## 2020-08-19 RX ADMIN — DEXMEDETOMIDINE HYDROCHLORIDE IN 0.9% SODIUM CHLORIDE 5.5 MICROGRAM(S)/KG/HR: 4 INJECTION INTRAVENOUS at 11:38

## 2020-08-19 RX ADMIN — Medication 3 MILLILITER(S): at 17:45

## 2020-08-19 RX ADMIN — ISOSORBIDE DINITRATE 10 MILLIGRAM(S): 5 TABLET ORAL at 17:17

## 2020-08-19 RX ADMIN — Medication 125 MILLIGRAM(S): at 05:34

## 2020-08-19 RX ADMIN — Medication 1 DROP(S): at 00:05

## 2020-08-19 RX ADMIN — Medication 125 MILLIGRAM(S): at 00:04

## 2020-08-19 RX ADMIN — Medication 1 DROP(S): at 12:00

## 2020-08-19 RX ADMIN — ISOSORBIDE DINITRATE 10 MILLIGRAM(S): 5 TABLET ORAL at 05:35

## 2020-08-19 RX ADMIN — Medication 1 DROP(S): at 05:36

## 2020-08-19 RX ADMIN — Medication 50 MILLIGRAM(S): at 15:52

## 2020-08-19 RX ADMIN — ISOSORBIDE DINITRATE 10 MILLIGRAM(S): 5 TABLET ORAL at 22:58

## 2020-08-19 RX ADMIN — SODIUM CHLORIDE 4 MILLILITER(S): 9 INJECTION INTRAMUSCULAR; INTRAVENOUS; SUBCUTANEOUS at 17:45

## 2020-08-19 RX ADMIN — HEPARIN SODIUM 5000 UNIT(S): 5000 INJECTION INTRAVENOUS; SUBCUTANEOUS at 05:35

## 2020-08-19 RX ADMIN — CHLORHEXIDINE GLUCONATE 1 APPLICATION(S): 213 SOLUTION TOPICAL at 05:35

## 2020-08-19 RX ADMIN — Medication 3 MILLILITER(S): at 00:44

## 2020-08-19 RX ADMIN — CEFEPIME 100 MILLIGRAM(S): 1 INJECTION, POWDER, FOR SOLUTION INTRAMUSCULAR; INTRAVENOUS at 13:57

## 2020-08-19 RX ADMIN — CHLORHEXIDINE GLUCONATE 1 APPLICATION(S): 213 SOLUTION TOPICAL at 05:36

## 2020-08-19 RX ADMIN — Medication 50 MILLIGRAM(S): at 23:00

## 2020-08-19 RX ADMIN — AMIODARONE HYDROCHLORIDE 200 MILLIGRAM(S): 400 TABLET ORAL at 00:04

## 2020-08-19 RX ADMIN — Medication 1 DROP(S): at 23:33

## 2020-08-19 RX ADMIN — Medication 20 MILLIEQUIVALENT(S): at 01:23

## 2020-08-19 RX ADMIN — Medication 3 MILLILITER(S): at 05:19

## 2020-08-19 RX ADMIN — PANTOPRAZOLE SODIUM 40 MILLIGRAM(S): 20 TABLET, DELAYED RELEASE ORAL at 11:25

## 2020-08-19 RX ADMIN — SODIUM CHLORIDE 500 MILLILITER(S): 9 INJECTION INTRAMUSCULAR; INTRAVENOUS; SUBCUTANEOUS at 20:25

## 2020-08-19 RX ADMIN — CHLORHEXIDINE GLUCONATE 15 MILLILITER(S): 213 SOLUTION TOPICAL at 05:34

## 2020-08-19 RX ADMIN — HEPARIN SODIUM 5000 UNIT(S): 5000 INJECTION INTRAVENOUS; SUBCUTANEOUS at 13:58

## 2020-08-19 RX ADMIN — Medication 12.5 MILLIGRAM(S): at 05:35

## 2020-08-19 RX ADMIN — Medication 50 MILLIGRAM(S): at 08:10

## 2020-08-19 RX ADMIN — SODIUM CHLORIDE 4 MILLILITER(S): 9 INJECTION INTRAMUSCULAR; INTRAVENOUS; SUBCUTANEOUS at 05:20

## 2020-08-19 RX ADMIN — CEFEPIME 100 MILLIGRAM(S): 1 INJECTION, POWDER, FOR SOLUTION INTRAMUSCULAR; INTRAVENOUS at 22:58

## 2020-08-19 RX ADMIN — Medication 40 MILLIEQUIVALENT(S): at 13:57

## 2020-08-19 RX ADMIN — Medication 125 MILLIGRAM(S): at 17:19

## 2020-08-19 RX ADMIN — CHLORHEXIDINE GLUCONATE 15 MILLILITER(S): 213 SOLUTION TOPICAL at 17:17

## 2020-08-19 RX ADMIN — Medication 125 MILLIGRAM(S): at 23:00

## 2020-08-19 RX ADMIN — CEFEPIME 100 MILLIGRAM(S): 1 INJECTION, POWDER, FOR SOLUTION INTRAMUSCULAR; INTRAVENOUS at 05:35

## 2020-08-19 RX ADMIN — Medication 1 DROP(S): at 17:20

## 2020-08-19 RX ADMIN — Medication 3 MILLILITER(S): at 12:37

## 2020-08-19 RX ADMIN — HEPARIN SODIUM 5000 UNIT(S): 5000 INJECTION INTRAVENOUS; SUBCUTANEOUS at 22:58

## 2020-08-19 NOTE — PROGRESS NOTE ADULT - ASSESSMENT
60 year old male s/p cardiac arrest c/b GIB and bleeding from [facility] with Perimesencephalic (HH4 mF4) subarachnoid hemorrhage, angio neg ,PBD 9.    NEURO:   Awaiting MR SERRANO  SAH, possible sentinal event with cardiac arrest  Q2h neurochecks  CT Head: perimesencephalic SAH   CTA Head: no aneurysm noted  Conventional angiogram negative  MRI neuroaxis: restricted diffusion in R BG, posterior limb of R IC, and anterior right temporal lobe  VEEG negative   DCI management (off nimodipine). Poor windows  No EVD as no significant hydrocephalus  Precedex gtt for sedation- RASS 0 to -1    PULM: Acute PUL edema  Lasix 40mg x1 now   Mech vent: settings- 18/500/40/5   spO2>92%  CXR:  pul edema - unchanged  CTA R/O PE: negative  Continue hypertonic nebs./ Mucomyst prn    CPAP as tolerated.      CV:  SBP goal <200  TTE: Global LV dysfunction. EF41%, Severe concentric LVH, flattening of interventricular septum.    S/P Concern for possible PE - CTA negative  hydralazine 75 q8 + isosorbide 10 mg q 8  Will need cath per cardiology  S/P amio gtt; changed to  daily.  Mg- 2.0 ; Po4- 3.0 ; k- 4.0    RENAL: ?CKD with superimposed JALEN.- stable   Cr stable  Sodium goal 140-150  Has been diuresced. Net neg 680cc.     GI:  S/P GI bleed S/P PPI gtt/ Heme stable  Diarrhea- - Increased WBC and ABX--> Vanco 125 mg q6 till cdiff results    Diet: TF- Vital at 60cc/hr ---> NPO for possible angio/MRI  GI prophylaxis - [x] PPI 40 daily in setting of GI bleed and intubation [] other:  Bowel regimen [x] senna - rectal tube   LBM: 8/15    ENDO:   Goal euglycemia (-180)    HEME/ONC: Afib on coumadin at home; leucocytosis  No eosinophilia check C diff   Check Lipase/ LFT  s/p vit K  INR 1.42-->1.2  VTE prophylaxis: [] SCDs [x] chemoprophylaxis heparin subq  Dopplers negative for DVT.    ID: Cdiff positive  Leukocytosis - Diarrhea  C difficile - see above; hypoactive BS  No fever  Started on cefepime (switched from unasyn on 8/14) for pseudomonas- D/C 8/21/20  Urine culture, procal .19  PO vancomycin    SOCIAL/FAMILY:   [x] updated at bedside     CODE STATUS:  [x] Full Code [] DNR [] DNI [] Palliative/Comfort Care    DISPOSITION:  [x] ICU [] Stroke Unit [] Floor [] EMU [] RCU [] PCU    [x] Patient is at high risk of neurologic deterioration/death due to: SAH, cardiac arrest 60 year old male s/p cardiac arrest c/b GIB and bleeding from [facility] with Perimesencephalic (HH4 mF4) subarachnoid hemorrhage, angio neg ,PBD 9.    NEURO:    NPO for angio today   SAH, possible sentinal event with cardiac arrest  Q2h neurochecks  CT Head: perimesencephalic SAH   CTA Head: no aneurysm noted  Initial Conventional angiogram negative  MRI neuroaxis: restricted diffusion in R BG, posterior limb of R IC, and anterior right temporal lobe  VEEG negative   DCI management (off nimodipine). Poor windows  No EVD as no significant hydrocephalus  Precedex gtt for sedation- RASS 0 to -1    PULM: Acute PUL edema  [facility] vent: settings- 18/500/40/5   spO2>92%  CXR:  pul edema - unchanged  CTA R/O PE: negative  Continue hypertonic nebs./ Mucomyst prn    CPAP as tolerated.      CV:  SBP goal <200  TTE: Global LV dysfunction. EF41%, Severe concentric LVH, flattening of interventricular septum.    S/P Concern for possible PE - CTA negative  hydralazine 75 q8 + isosorbide 10 mg q 8  Will need cath per cardiology  S/P amio gtt; changed to  daily.  Mg- 2.0 ; Po4- 3.0 ; k- 4.0    RENAL: ?CKD with superimposed JALEN.- stable   Cr stable  Sodium goal 140-150  Has been diuresced. Net neg 680cc.     GI:  S/P GI bleed S/P PPI gtt/ Heme stable  Diarrhea- - Increased WBC and ABX--> Vanco 125 mg q6 till cdiff results    Diet: TF- Vital at 60cc/hr ---> NPO for possible angio/MRI  GI prophylaxis - [x] PPI 40 daily in setting of GI bleed and intubation [] other:  Bowel regimen [x] senna - rectal tube   LBM: 8/15    ENDO:   Goal euglycemia (-180)    HEME/ONC: Afib on coumadin at home; leucocytosis  No eosinophilia check C diff   Check Lipase/ LFT  s/p vit K  INR 1.42-->1.2  VTE prophylaxis: [] SCDs [x] chemoprophylaxis heparin subq  Dopplers negative for DVT.    ID: Cdiff positive  Leukocytosis - Diarrhea  C difficile - see above; hypoactive BS  No fever  Started on cefepime (switched from unasyn on 8/14) for pseudomonas- D/C 8/21/20  Urine culture, procal .19  PO vancomycin    SOCIAL/FAMILY:   [x] updated at bedside     CODE STATUS:  [x] Full Code [] DNR [] DNI [] Palliative/Comfort Care    DISPOSITION:  [x] ICU [] Stroke Unit [] Floor [] EMU [] RCU [] PCU    [x] Patient is at high risk of neurologic deterioration/death due to: SAH, cardiac arrest

## 2020-08-19 NOTE — PROGRESS NOTE ADULT - SUBJECTIVE AND OBJECTIVE BOX
Subjective: Patient seen and examined. No new events except as noted.   Remains intubated in NSCU   requiring fentanyl gtt for vent synchrony  afib with rvr   REVIEW OF SYSTEMS:    Unable to obtain       MEDICATIONS:  MEDICATIONS  (STANDING):  albuterol/ipratropium for Nebulization. 3 milliLiter(s) Nebulizer every 6 hours  aMIOdarone    Tablet 200 milliGRAM(s) Oral daily  artificial  tears Solution 1 Drop(s) Both EYES every 6 hours  cefepime   IVPB 2000 milliGRAM(s) IV Intermittent every 8 hours  chlorhexidine 0.12% Liquid 15 milliLiter(s) Oral Mucosa every 12 hours  chlorhexidine 4% Liquid 1 Application(s) Topical <User Schedule>  chlorhexidine 4% Liquid 1 Application(s) Topical <User Schedule>  dexMEDEtomidine Infusion 0.2 MICROgram(s)/kG/Hr (5.5 mL/Hr) IV Continuous <Continuous>  dextrose 5%. 1000 milliLiter(s) (50 mL/Hr) IV Continuous <Continuous>  dextrose 50% Injectable 12.5 Gram(s) IV Push once  dextrose 50% Injectable 25 Gram(s) IV Push once  dextrose 50% Injectable 25 Gram(s) IV Push once  fentaNYL   Infusion... 0.5 MICROgram(s)/kG/Hr (2.75 mL/Hr) IV Continuous <Continuous>  heparin   Injectable 5000 Unit(s) SubCutaneous every 8 hours  hydrALAZINE 50 milliGRAM(s) Oral every 8 hours  isosorbide   dinitrate Tablet (ISORDIL) 10 milliGRAM(s) Oral three times a day  metoprolol tartrate 12.5 milliGRAM(s) Oral two times a day  pantoprazole  Injectable 40 milliGRAM(s) IV Push daily  sodium chloride 7% Inhalation 4 milliLiter(s) Inhalation every 12 hours  vancomycin    Solution 125 milliGRAM(s) Oral every 6 hours      PHYSICAL EXAM:  T(C): 37 (08-19-20 @ 07:00), Max: 37.4 (08-18-20 @ 11:00)  HR: 70 (08-19-20 @ 10:00) (68 - 140)  BP: 106/61 (08-18-20 @ 13:22) (106/61 - 106/61)  RR: 18 (08-19-20 @ 10:00) (13 - 21)  SpO2: 100% (08-19-20 @ 10:00) (99% - 100%)  Wt(kg): --  I&O's Summary    18 Aug 2020 07:01  -  19 Aug 2020 07:00  --------------------------------------------------------  IN: 933.5 mL / OUT: 2075 mL / NET: -1141.5 mL    19 Aug 2020 07:01  -  19 Aug 2020 10:47  --------------------------------------------------------  IN: 88 mL / OUT: 0 mL / NET: 88 mL      Height (cm): 172.72 (08-19 @ 06:20)  Weight (kg): 110 (08-19 @ 06:20)  BMI (kg/m2): 36.9 (08-19 @ 06:20)  BSA (m2): 2.22 (08-19 @ 06:20)        Appearance: Intubated sedated 	  HEENT:   Dry  oral mucosa,  Lymphatic: No lymphadenopathy  Cardiovascular: Normal S1 S2, No JVD, No murmurs, No edema  Respiratory: Ventilated   Psychiatry: A & O x 0  Gastrointestinal:  Soft, Non-tender, + BS	  Skin: No rashes, No ecchymoses, No cyanosis	  Mental status- No acute distress, EO to stim  -CN- Pupils R 4mm NR, L 2mm sluggish, EOMI, tongue midline, face symmetric  +cough/gag  C briskly, two fingers/thumbs up on RUE, distally AG, wiggles toes on RLE, no m  LABS:    CARDIAC MARKERS:                                10.5   28.72 )-----------( 234      ( 18 Aug 2020 21:36 )             33.6     08-18    145  |  111<H>  |  40<H>  ----------------------------<  100<H>  3.7   |  21<L>  |  1.95<H>    Ca    9.3      18 Aug 2020 21:36  Phos  3.4     08-18  Mg     2.0     08-18    TPro  6.5  /  Alb  3.0<L>  /  TBili  0.3  /  DBili  <0.1  /  AST  31  /  ALT  57<H>  /  AlkPhos  76  08-18    proBNP:   Lipid Profile:   HgA1c:   TSH:             TELEMETRY: AF   ECG:  	  RADIOLOGY: < from: CT Angio Chest w/ IV Cont (08.11.20 @ 12:36) >    EXAM:  CT ANGIO CHEST (W)AW IC                            PROCEDURE DATE:  08/11/2020            INTERPRETATION:  CLINICAL INFORMATION: Status post cardiac arrest. Request to evaluate for pulmonary embolism.    COMPARISON: Chest x-ray from 8/11/2020.    PROCEDURE:  CT Angiography of the Chest.  90 ml of Omnipaque 350 was injected intravenously. 10 ml were discarded.  Sagittal and coronal reformats were performed as well as 3D (MIP) reconstructions.    FINDINGS:    LUNGS AND AIRWAYS: The tip of the endotracheal tube is above the kayleigh.  Evaluation of lung parenchyma is somewhat limited due to respiratory motion and streak artifacts. Small bilateral pleural effusions with associated atelectasis.  PLEURA: No pneumothorax.  MEDIASTINUM AND RAUL: No lymphadenopathy.  VESSELS: The main pulmonary artery measures 3.0 cm which may indicate pulmonary anterior hypertension.  HEART: Cardiomegaly. No pericardial effusion. No signs of right ventricular strain. There is no main, central or left sidedlobar pulmonary embolus. Evaluation of right lobar, bilateral segmental and subsegmental branches is limited due to the study technique.  CHEST WALL AND LOWER NECK: Within normal limits.  VISUALIZED UPPER ABDOMEN: Enteric tube terminates in the stomach.  BONES: Degenerative changes of the visualized spine.    IMPRESSION:    Limited study. There is no main, central or left-sided lobar pulmonary embolus.    Cardiomegaly. Small bilateral pleural effusions.              ELÍAS ENCINAS M.D., RADIOLOGY RESIDENT  This document has been electronically signed.  KONRAD ANN M.D., ATTENDING RADIOLOGIST  This document has been electronically signed. Aug 11 2020  2:14PM                < end of copied text >    DIAGNOSTIC TESTING:  [ ] Echocardiogram:  < from: Transthoracic Echocardiogram (08.10.20 @ 10:48) >    Patient name: GUILLE DOLAN  YOB: 1960   Age: 60 (M)   MR#: [id number]  Study Date: 8/10/2020  [facility]ise [facility]CS  Study quality: Technically fair  Referring Physician: Killian Reid MD  Blood Pressure: 135/75 mmHg  Height: 172 cm  Weight: 113 kg  BSA: 2.2 m2  Heart Rate: 77 mmHg  ------------------------------------------------------------------------  PROCEDURE: Transthoracic echocardiogram with 2-D, M-Mode  and complete spectral and color flowDoppler.  INDICATION: Abnormal electrocardiogram (ECG) (EKG) (R94.31)  ------------------------------------------------------------------------  Dimensions:    Normal Values:  LA:     3.6    2.0 - 4.0 cm  Ao:     3.9    2.0 - 3.8 cm  SEPTUM: 1.4    0.6 - 1.2 cm  PWT:    1.8    0.6 - 1.1 cm  LVIDd:  5.4    3.0 - 5.6 cm  LVIDs:  4.1    1.8 - 4.0 cm  Derived variables:  LVMI: 178 g/m2  RWT: 0.66  Fractional short: 24 %  EF (Coon Rule): 41 %Doppler Peak Velocity (m/sec):  MV=1.3 AoV=1.8  ------------------------------------------------------------------------  Observations:  Mitral Valve: Normal mitral valve. Minimal mitral  regurgitation.  Aortic Valve/Aorta: Normal trileaflet aortic valve. Peak  transaortic valve gradient equals 13 mm Hg, mean  transaortic valve gradient equals 7 mm Hg. No aortic valve  regurgitation seen. Peak left ventricular outflow tract  gradient equals 5 mm Hg, mean gradient is equal to 2 mm Hg,  LVOT velocity time integral equals 18 cm.  Aortic Root: 3.9 cm.  LeftAtrium: Severely dilated left atrium.  LA volume index  = 69 cc/m2.  Left Ventricle: Mild global left ventricular systolic  dysfunction. Endocardial visualization enhanced with  intravenous injection of Ultrasonic Enhancing Agent  (Definity). No leftventricular thrombus. Flattening of the  interventricular septum in both systole and diastole is  consistent with right ventricular pressure overload. Severe  concentric left ventricular hypertrophy. Consider further  evaluation with cMRI to evaluatefor infiltrative disease.  Indeterminate diastolic function.  Right Heart: Normal right atrium. The right ventricle is  not well visualized; grossly normal right ventricular  systolic function. Normal tricuspid valve. Mild tricuspid  regurgitation. Normal pulmonic valve. Minimal pulmonic  regurgitation.  Pericardium/Pleura: Normal pericardium with no pericardial  effusion.  Hemodynamic: Estimated right ventricular systolic pressure  equals 29.36 - 34.36 mm Hg, assuming right atrial pressure  equals 10 - 15 mm Hg, consistent with normal pulmonary  hypertension.  ------------------------------------------------------------------------  Conclusions:  1. Normal mitral valve. Minimal mitral regurgitation.  2. Normal trileaflet aortic valve. No aortic valve  regurgitation seen.  3. Severe concentric left ventricular hypertrophy. Consider  further evaluation with cMRI to evaluate for infiltrative  disease.  4. Mild global left ventricular systolic dysfunction.  Endocardial visualization enhancedwith intravenous  injection of Ultrasonic Enhancing Agent (Definity). No left  ventricular thrombus. Flattening of the interventricular  septum in both systole and diastole is  consistent with  right ventricular pressure overload.  5. Indeterminate diastolic function.  6. The right ventricle is not well visualized; grossly  normal right ventricular systolic function.  *** No previous Echo exam.  ------------------------------------------------------------------------  Confirmed on  8/10/2020 - 13:42:44 by Valerio Hansen M.D.  ------------------------------------------------------------------------    < end of copied text >    [ ]  Catheterization:  [ ] Stress Test:    OTHER:

## 2020-08-19 NOTE — PROGRESS NOTE ADULT - SUBJECTIVE AND OBJECTIVE BOX
O: V TACH     EXAMINATION:  General: Calm, intubated  HEENT: MMM  Neuro:  -Mental status-  No acute distress, EO to voice. Strong and purposeful on RUE,  followed showing 2 fingers. Left side  0/5  -CN- Pupils R 5mm NR, L 1mm sluggish, Eyes dysconjugate,  +cough/gag, +corneals  RUE localize AG, RLE spont AG, LUE flaccid, LLE flaccid  CVS: S1/S2  RESP: Diminished at bases  GI: Soft, non tender, mildly distended, hypoactive BS  Extremities: Warm, skin intact    Assessment and Plan:   · Assessment	  60 year old male s/p cardiac arrest c/b GIB and bleeding from [facility] with Perimesencephalic (HH4 mF4) subarachnoid hemorrhage, angio neg ,PBD 9.    NEURO:    NPO for angio today   SAH, possible sentinal event with cardiac arrest  Q2h neurochecks  CT Head: perimesencephalic SAH   CTA Head: no aneurysm noted  Initial Conventional angiogram negative  MRI neuroaxis: restricted diffusion in R BG, posterior limb of R IC, and anterior right temporal lobe  VEEG negative   DCI management (off nimodipine). Poor windows  No EVD as no significant hydrocephalus  Precedex gtt for sedation- RASS 0 to -1    PULM: Acute PUL edema  [facility] vent: settings- 18/500/40/5   spO2>92%  CXR:  pul edema - unchanged  CTA R/O PE: negative  Continue hypertonic nebs./ Mucomyst prn    CPAP as tolerated.      CV:  SBP goal <200  TTE: Global LV dysfunction. EF41%, Severe concentric LVH, flattening of interventricular septum.    S/P Concern for possible PE - CTA negative  hydralazine 75 q8 + isosorbide 10 mg q 8  Will need cath per cardiology  S/P amio gtt; changed to  daily.  Mg- 2.0 ; Po4- 3.0 ; k- 4.0    RENAL: ?CKD with superimposed JALEN.- stable   Cr stable  Sodium goal 140-150  Has been diuresced. Net neg 680cc.     GI:  S/P GI bleed S/P PPI gtt/ Heme stable  Diarrhea- - Increased WBC and ABX--> Vanco 125 mg q6 till cdiff results    Diet: TF- Vital at 60cc/hr ---> NPO for possible angio/MRI  GI prophylaxis - [x] PPI 40 daily in setting of GI bleed and intubation [] other:  Bowel regimen [x] senna - rectal tube   LBM: 8/15    ENDO:   Goal euglycemia (-180)    HEME/ONC: Afib on coumadin at home; leucocytosis  No eosinophilia check C diff   Check Lipase/ LFT  s/p vit K  INR 1.42-->1.2  VTE prophylaxis: [] SCDs [x] chemoprophylaxis heparin subq  Dopplers negative for DVT.    ID: Cdiff positive  Leukocytosis - Diarrhea  C difficile - see above; hypoactive BS  No fever  Started on cefepime (switched from unasyn on 8/14) for pseudomonas- D/C 8/21/20  Urine culture, procal .19  PO vancomycin    SOCIAL/FAMILY:   [x] updated at bedside     CODE STATUS:  [x] Full Code [] DNR [] DNI [] Palliative/Comfort Care    DISPOSITION:  [x] ICU [] Stroke Unit [] Floor [] EMU [] RCU [] PCU    [x] Patient is at high risk of neurologic deterioration/death due to: SAH, cardiac arrest O: V TACH   T(C): 37.5 (08-19-20 @ 15:00), Max: 37.5 (08-19-20 @ 15:00)  HR: 83 (08-19-20 @ 20:47) (70 - 140)  BP: --  RR: 23 (08-19-20 @ 17:00) (13 - 23)  SpO2: 100% (08-19-20 @ 20:47) (100% - 100%)  08-18-20 @ 07:01  -  08-19-20 @ 07:00  --------------------------------------------------------  IN: 933.5 mL / OUT: 2075 mL / NET: -1141.5 mL    08-19-20 @ 07:01  -  08-19-20 @ 23:11  --------------------------------------------------------  IN: 361.9 mL / OUT: 500 mL / NET: -138.1 mL    acetaminophen   Tablet .. 650 milliGRAM(s) Oral every 6 hours PRN  albuterol/ipratropium for Nebulization. 3 milliLiter(s) Nebulizer every 6 hours  aMIOdarone    Tablet 200 milliGRAM(s) Oral daily  artificial  tears Solution 1 Drop(s) Both EYES every 6 hours  cefepime   IVPB 2000 milliGRAM(s) IV Intermittent every 8 hours  chlorhexidine 0.12% Liquid 15 milliLiter(s) Oral Mucosa every 12 hours  chlorhexidine 4% Liquid 1 Application(s) Topical <User Schedule>  chlorhexidine 4% Liquid 1 Application(s) Topical <User Schedule>  dexMEDEtomidine Infusion 0.2 MICROgram(s)/kG/Hr IV Continuous <Continuous>  dextrose 40% Gel 15 Gram(s) Oral once PRN  dextrose 5%. 1000 milliLiter(s) IV Continuous <Continuous>  dextrose 50% Injectable 12.5 Gram(s) IV Push once  dextrose 50% Injectable 25 Gram(s) IV Push once  dextrose 50% Injectable 25 Gram(s) IV Push once  fentaNYL   Infusion... 0.5 MICROgram(s)/kG/Hr IV Continuous <Continuous>  glucagon  Injectable 1 milliGRAM(s) IntraMuscular once PRN  heparin   Injectable 5000 Unit(s) SubCutaneous every 8 hours  hydrALAZINE 50 milliGRAM(s) Oral every 8 hours  isosorbide   dinitrate Tablet (ISORDIL) 10 milliGRAM(s) Oral three times a day  metoprolol tartrate 12.5 milliGRAM(s) Oral two times a day  pantoprazole  Injectable 40 milliGRAM(s) IV Push daily  sodium chloride 0.9% lock flush 10 milliLiter(s) IV Push every 1 hour PRN  sodium chloride 7% Inhalation 4 milliLiter(s) Inhalation every 12 hours  vancomycin    Solution 125 milliGRAM(s) Oral every 6 hours  Mode: AC/ CMV (Assist Control/ Continuous Mandatory Ventilation), RR (machine): 18, TV (machine): 500, FiO2: 40, PEEP: 5, ITime: 1, MAP: 12, PIP: 25    EXAMINATION:  General: Calm, intubated  HEENT: MMM  Neuro:  -Mental status-  No acute distress, EO to voice. Strong and purposeful on RUE,  followed showing 2 fingers. Left side  0/5  -CN- Pupils R 5mm NR, L 1mm sluggish, Eyes dysconjugate,  +cough/gag, +corneals  RUE localize AG, RLE spont AG, LUE flaccid, LLE flaccid  CVS: S1/S2  RESP: Diminished at bases  GI: Soft, non tender, mildly distended, hypoactive BS  Extremities: Warm, skin intact      LABS:  Na: 140 (08-19 @ 10:25), 145 (08-18 @ 21:36), 145 (08-17 @ 21:39)  K: 3.6 (08-19 @ 10:25), 3.7 (08-18 @ 21:36), 4.1 (08-17 @ 21:39)  Cl: 109 (08-19 @ 10:25), 111 (08-18 @ 21:36), 114 (08-17 @ 21:39)  CO2: 21 (08-19 @ 10:25), 21 (08-18 @ 21:36), 21 (08-17 @ 21:39)  BUN: 42 (08-19 @ 10:25), 40 (08-18 @ 21:36), 38 (08-17 @ 21:39)  Cr: 1.80 (08-19 @ 10:25), 1.95 (08-18 @ 21:36), 1.81 (08-17 @ 21:39)  Glu: 116(08-19 @ 10:25), 100(08-18 @ 21:36), 152(08-17 @ 21:39)    Hgb: 10.5 (08-18 @ 21:36), 11.1 (08-17 @ 21:39)  Hct: 33.6 (08-18 @ 21:36), 36.1 (08-17 @ 21:39)  WBC: 28.72 (08-18 @ 21:36), 32.60 (08-17 @ 21:39)  Plt: 234 (08-18 @ 21:36), 224 (08-17 @ 21:39)    INR: 1.46 08-18-20 @ 21:36  PTT: 31.5 08-18-20 @ 21:36      Assessment and Plan:   60 year old male s/p cardiac arrest c/b GIB and bleeding from [place] with Perimesencephalic (HH4 mF4) subarachnoid hemorrhage, angio neg ,PBD 9.  didnt get angio today   Q2h neurochecks  CT Head: perimesencephalic SAH   CTA Head: no aneurysm noted  will need repeat DSA to look for vascular malformation   MRI neuroaxis: restricted diffusion in R BG, posterior limb of R IC, and anterior right temporal lobe  VEEG negative   Precedex gtt for sedation- RASS 0 to -1  respiratory failure, ABG and adjust vent settings accordingly   [facility] vent: settings- 18/500/40/5   spO2>92%  CTA R/O PE: negative  Continue hypertonic nebs./ Mucomyst prn    CPAP as tolerated.    SBP goal 100-200  TTE: Global LV dysfunction. EF41%, Severe concentric LVH, flattening of interventricular septum.    S/P Concern for possible PE - CTA negative  hydralazine 75 q8 + isosorbide 10 mg q 8  Will need cath per cardiology  S/P amio gtt; changed to  daily.  Mg- 2.0 ; Po4- 3.0 ; k- 4.0   ?CKD with superimposed JALEN.- stable   Cr stable    S/P GI bleed S/P PPI gtt/ Heme stable  Diarrhea- - Vanco 125 mg q6 till cdiff results    Diet: TF- Vital at 60cc/hr   GI prophylaxis - [x] PPI 40 daily in setting of GI bleed and intubation [] other:  Bowel regimen [x] senna - rectal tube   LBM: 8/19  Goal euglycemia (-180)   Afib on coumadin at home; leucocytosis  VTE prophylaxis: [] SCDs [x] chemoprophylaxis heparin subq    SOCIAL/FAMILY:   [x] updated at bedside     CODE STATUS:  [x] Full Code [] DNR [] DNI [] Palliative/Comfort Care    DISPOSITION:  [x] ICU [] Stroke Unit [] Floor [] EMU [] RCU [] PCU    [x] Patient is at high risk of neurologic deterioration/death due to: SAH, cardiac arrest

## 2020-08-19 NOTE — PROGRESS NOTE ADULT - SUBJECTIVE AND OBJECTIVE BOX
HPI:  60 year old male with PMHx of Afib on coumadin s/p cardiac arrest at work, downtime 25 minutes prior to ROSC. Pupils were R fixed and dilated, left fixed at the time, no gag reflex, post-CA cooling protocol was initiated down to 35 deg. Intubated and put on Nimbex drip. Also on Propofol, fentanyl, levophed. R groin minerva placed. Also put on heparin post-CA. HCT showed R perimesencephalic SAH of unclear etiology, no hydrocephalus. Course also c/b GIB, was put on protonix drip. Also had bleeding from [facility] - urology consulted, clot irrigated.     HOSP COURSE:   8/10- Angio- neg   8/11: VTACH noted (7 beats)  8/13: Febrile  8/14: Started on Unasyn for gram neg rods and gram neg diplococci on bronch gram stain -> switched to cefepime for pseudomonas noted 8/10  8/16: central line removed on 8/16. Cr improving 1.7 -> 1.57. MR Meenu george. Could not tolerate since desatted when supine  8/17/20- Lasix 30mg x1  8/18: PO vanc for Cdiff    Overnight events: Cdiff positive    ICU Vital Signs Last 24 Hrs  T(C): 37 (19 Aug 2020 03:00), Max: 37.4 (18 Aug 2020 11:00)  T(F): 98.6 (19 Aug 2020 03:00), Max: 99.3 (18 Aug 2020 11:00)  HR: 90 (19 Aug 2020 06:00) (59 - 140)  BP: 106/61 (18 Aug 2020 13:22) (106/61 - 106/61)  ABP: 153/92 (19 Aug 2020 06:00) (112/61 - 195/92)  ABP(mean): 107 (19 Aug 2020 06:00) (72 - 118)  RR: 18 (19 Aug 2020 06:00) (13 - 21)  SpO2: 100% (19 Aug 2020 06:00) (99% - 100%)      08-18-20 @ 07:01  -  08-19-20 @ 07:00  --------------------------------------------------------  IN: 911.5 mL / OUT: 2075 mL / NET: -1163.5 mL        08-17-20 @ 07:01  - 08-18-20 @ 07:00  --------------------------------------------------------  IN: 1823.9 mL / OUT: 2350 mL / NET: -526.1 mL    08-18-20 @ 07:01  - 08-18-20 @ 08:56  --------------------------------------------------------  IN: 44 mL / OUT: 0 mL / NET: 44 mL      Mode: AC/ CMV (Assist Control/ Continuous Mandatory Ventilation), RR (machine): 18, TV (machine): 500, FiO2: 40, PEEP: 5, ITime: 1, MAP: 13, PIP: 30    acetaminophen   Tablet .. 650 milliGRAM(s) Oral every 6 hours PRN  albuterol/ipratropium for Nebulization. 3 milliLiter(s) Nebulizer every 6 hours  aMIOdarone    Tablet 200 milliGRAM(s) Oral daily  artificial  tears Solution 1 Drop(s) Both EYES every 6 hours  cefepime   IVPB 2000 milliGRAM(s) IV Intermittent every 8 hours  chlorhexidine 0.12% Liquid 15 milliLiter(s) Oral Mucosa every 12 hours  chlorhexidine 4% Liquid 1 Application(s) Topical <User Schedule>  chlorhexidine 4% Liquid 1 Application(s) Topical <User Schedule>  dexMEDEtomidine Infusion 0.2 MICROgram(s)/kG/Hr (5.5 mL/Hr) IV Continuous <Continuous>  dextrose 40% Gel 15 Gram(s) Oral once PRN  dextrose 5%. 1000 milliLiter(s) (50 mL/Hr) IV Continuous <Continuous>  dextrose 50% Injectable 12.5 Gram(s) IV Push once  dextrose 50% Injectable 25 Gram(s) IV Push once  dextrose 50% Injectable 25 Gram(s) IV Push once  fentaNYL   Infusion... 0.5 MICROgram(s)/kG/Hr (2.75 mL/Hr) IV Continuous <Continuous>  glucagon  Injectable 1 milliGRAM(s) IntraMuscular once PRN  heparin   Injectable 5000 Unit(s) SubCutaneous every 8 hours  hydrALAZINE 50 milliGRAM(s) Oral every 8 hours  isosorbide   dinitrate Tablet (ISORDIL) 10 milliGRAM(s) Oral three times a day  metoprolol tartrate 12.5 milliGRAM(s) Oral two times a day  pantoprazole  Injectable 40 milliGRAM(s) IV Push daily  sodium chloride 0.9% lock flush 10 milliLiter(s) IV Push every 1 hour PRN  sodium chloride 7% Inhalation 4 milliLiter(s) Inhalation every 12 hours  vancomycin    Solution 125 milliGRAM(s) Oral every 6 hours                              11.1   32.60 )-----------( 224      ( 17 Aug 2020 21:39 )             36.1     08-17    145  |  114<H>  |  38<H>  ----------------------------<  152<H>  4.1   |  21<L>  |  1.81<H>    Ca    9.4      17 Aug 2020 21:39  Phos  3.4     08-17  Mg     2.0     08-17      ABG - ( 16 Aug 2020 12:27 )  pH, Arterial: 7.39  pH, Blood: x     /  pCO2: 35    /  pO2: 134   / HCO3: 21    / Base Excess: -3.1  /  SaO2: 99        08-16    145  |  114<H>  |  36<H>  ----------------------------<  106<H>  3.7   |  18<L>  |  1.57<H>    Ca    9.2      16 Aug 2020 21:04  Phos  3.3     08-16  Mg     2.1     08-16    TPro  6.9  /  Alb  3.3  /  TBili  0.2  /  DBili  <0.1  /  AST  55<H>  /  ALT  54<H>  /  AlkPhos  83  08-15    ABG - ( 16 Aug 2020 12:27 )  pH, Arterial: 7.39  pH, Blood: x     /  pCO2: 35    /  pO2: 134   / HCO3: 21    / Base Excess: -3.1  /  SaO2: 99          BRONCH- 8/13/20- Pseudomonas- Cefepime     IMAGING:   Recent imaging studies were reviewed.   Xray Chest 1 View- PORTABLE-Routine (08.17.20 @ 05:53) >  COMPARISON: Multiple prior chest x-rays, with the most recent dated 8/16/2020.    FINDINGS:  There is an endotracheal tube, with its tip above the level of the kayleigh.  There is an enteric tube, coursing below the diaphragm, with its tip non-visualized below the level of the film.  There has been interval removal of a right IJ approach central venous catheter.  The size of the cardiomediastinal silhouette is enlarged.  There are no focal pulmonary consolidations.  There is no pneumothorax or pleural effusion.    IMPRESSION:  Interval removal of central line. Clear lungs.    CT Head No Cont (08.13.20 @ 08:55) >  TECHNIQUE : Axial CT scanning of the brain was obtained from the skull base to the vertex without the administration of intravenous contrast. Sagittal and coronal reformats were provided.    COMPARISON: CT brain 8/12/2020    FINDINGS:    Similar extra-axial hemorrhage in the right aspect of the suprasellar cistern extending into the right sylvian fissure.    Scattered sulcal subarachnoid hemorrhage is similar.    Similar small layering intraventricular hemorrhage.    No hydrocephalus or midline shift.    Edema in the region of the right cerebral peduncle and posterior limb of the right internal capsule is redemonstrated.    IMPRESSION:    No significant interval change from 8/12/2020.       MR Head w/wo IV Cont (08.11.20 @ 17:50) >  MRI BRAIN:    Redemonstration of subarachnoid hemorrhage in the right aspect of the suprasellar cistern and within the right sylvian fissure.    Scattered sulcal subarachnoid hemorrhage is redemonstrated.    Small hemorrhage layering in the occipital horns is similar.. Ventricles similar in size. No hydrocephalus.    There is restricted diffusion within the region of the right basal ganglia, posterior limb of the right internal capsule, and anterior right temporal lobe, likely representing an acute right MCA territory infarct.    Chronic left occipital infarct. Mild white matter microvascular ischemic disease. Signal voids are seen within the major intracranial vessels consistent with their patency.    No abnormal parenchymal or leptomeningeal enhancement.    Air-fluid levels and mucosal thickening in the paranasal sinuses. Minimal bilateral mastoid air cell effusions.    MRI CERVICAL SPINE:    Vertebral body height, marrow signal homogeneity, and facet alignment are maintained throughout the visualized spinal segments. Straightening of the normal cervical lordosis. Mild multilevel disc space narrowing. Cervicomedullary junction unremarkable.    No prevertebral or paravertebral edema.  No abnormal enhancement in the cervical region.    No spinal cord compression or abnormal intrinsic cord signal.    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    C2-C3: No acquired spinal canal stenosis. No neural foraminal narrowing.    C3-C4: Broad-based disc protrusion reaches the cord. Bilateral uncinate hypertrophy resulting in moderate bilateral neural foraminal narrowing.    C4-C5: Broad-based disc protrusion asymmetric to the left nearly reaches the cord. Bilateral uncinate hypertrophy resulting in moderate left and moderate to severe right neural foraminal narrowing.    C5-C6: Broad-based disc protrusion asymmetric to the left and left uncinate hypertrophy. Deformation of the left ventral thecal sac and effacement of the left lateral recess. Moderate left neural foraminal narrowing.    C6-C7: Broad-based disc protrusion deforms the ventral thecal sac. Left uncinate hypertrophy. Mild to moderate left neural foraminal narrowing.    C7-T1: Left facet hypertrophy. No acquired spinal canal stenosis or neural foraminal narrowing.    IMPRESSION:    MRI BRAIN:    Restricted There is restricted diffusion within the region of the right basal ganglia, posterior limb of the right internal capsule, and anterior right temporal lobe, likely representing an acute right MCA territory infarct.    Similar cisternal and sulcal subarachnoid hemorrhage, given differences in modality.    Similar small hemorrhage layering in the occipital horns. No hydrocephalus.    No abnormal parenchymal or leptomeningeal enhancement.    MRI CERVICAL SPINE:    Multilevel degenerative changes superimposed upon congenital spinal canal stenosis.    No abnormal enhancement in the cervical region.            EXAMINATION:  General: Calm, intubated  HEENT: MMM  Neuro:  -Mental status-  No acute distress, EO to voice. Strong and purposeful on RUE,  followed showing 2 fingers. Left side  0/5  -CN- Pupils R 5mm NR, L 1mm sluggish, Eyes dysconjugate,  +cough/gag, +corneals  RUE localize AG, RLE spont AG, LUE flaccid, LLE flaccid  CVS: S1/S2  RESP: Diminished at bases  GI: Soft, non tender, mildly distended, hypoactive BS  Extremities: Warm, skin intact HPI:  60 year old male with PMHx of Afib on coumadin s/p cardiac arrest at work, downtime 25 minutes prior to ROSC. Pupils were R fixed and dilated, left fixed at the time, no gag reflex, post-CA cooling protocol was initiated down to 35 deg. Intubated and put on Nimbex drip. Also on Propofol, fentanyl, levophed. R groin minerva placed. Also put on heparin post-CA. HCT showed R perimesencephalic SAH of unclear etiology, no hydrocephalus. Course also c/b GIB, was put on protonix drip. Also had bleeding from [facility] - urology consulted, clot irrigated.     HOSP COURSE:   8/10- Angio- neg   8/11: VTACH noted (7 beats)  8/13: Febrile  8/14: Started on Unasyn for gram neg rods and gram neg diplococci on bronch gram stain -> switched to cefepime for pseudomonas noted 8/10  8/16: central line removed on 8/16. Cr improving 1.7 -> 1.57. MR [facility] george. Could not tolerate since desatted when supine  8/17/20- Lasix 30mg x1  8/18: PO vanc for Cdiff  8/19- angio today / 7 beats VTACH    Overnight events: Cdiff positive    ICU Vital Signs Last 24 Hrs  T(C): 37 (19 Aug 2020 03:00), Max: 37.4 (18 Aug 2020 11:00)  T(F): 98.6 (19 Aug 2020 03:00), Max: 99.3 (18 Aug 2020 11:00)  HR: 90 (19 Aug 2020 06:00) (59 - 140)  BP: 106/61 (18 Aug 2020 13:22) (106/61 - 106/61)  ABP: 153/92 (19 Aug 2020 06:00) (112/61 - 195/92)  ABP(mean): 107 (19 Aug 2020 06:00) (72 - 118)  RR: 18 (19 Aug 2020 06:00) (13 - 21)  SpO2: 100% (19 Aug 2020 06:00) (99% - 100%)      08-18-20 @ 07:01  -  08-19-20 @ 07:00  --------------------------------------------------------  IN: 911.5 mL / OUT: 2075 mL / NET: -1163.5 mL        08-17-20 @ 07:01  -  08-18-20 @ 07:00  --------------------------------------------------------  IN: 1823.9 mL / OUT: 2350 mL / NET: -526.1 mL    08-18-20 @ 07:01  - 08-18-20 @ 08:56  --------------------------------------------------------  IN: 44 mL / OUT: 0 mL / NET: 44 mL      Mode: AC/ CMV (Assist Control/ Continuous Mandatory Ventilation), RR (machine): 18, TV (machine): 500, FiO2: 40, PEEP: 5, ITime: 1, MAP: 13, PIP: 30    acetaminophen   Tablet .. 650 milliGRAM(s) Oral every 6 hours PRN  albuterol/ipratropium for Nebulization. 3 milliLiter(s) Nebulizer every 6 hours  aMIOdarone    Tablet 200 milliGRAM(s) Oral daily  artificial  tears Solution 1 Drop(s) Both EYES every 6 hours  cefepime   IVPB 2000 milliGRAM(s) IV Intermittent every 8 hours  chlorhexidine 0.12% Liquid 15 milliLiter(s) Oral Mucosa every 12 hours  chlorhexidine 4% Liquid 1 Application(s) Topical <User Schedule>  chlorhexidine 4% Liquid 1 Application(s) Topical <User Schedule>  dexMEDEtomidine Infusion 0.2 MICROgram(s)/kG/Hr (5.5 mL/Hr) IV Continuous <Continuous>  dextrose 40% Gel 15 Gram(s) Oral once PRN  dextrose 5%. 1000 milliLiter(s) (50 mL/Hr) IV Continuous <Continuous>  dextrose 50% Injectable 12.5 Gram(s) IV Push once  dextrose 50% Injectable 25 Gram(s) IV Push once  dextrose 50% Injectable 25 Gram(s) IV Push once  fentaNYL   Infusion... 0.5 MICROgram(s)/kG/Hr (2.75 mL/Hr) IV Continuous <Continuous>  glucagon  Injectable 1 milliGRAM(s) IntraMuscular once PRN  heparin   Injectable 5000 Unit(s) SubCutaneous every 8 hours  hydrALAZINE 50 milliGRAM(s) Oral every 8 hours  isosorbide   dinitrate Tablet (ISORDIL) 10 milliGRAM(s) Oral three times a day  metoprolol tartrate 12.5 milliGRAM(s) Oral two times a day  pantoprazole  Injectable 40 milliGRAM(s) IV Push daily  sodium chloride 0.9% lock flush 10 milliLiter(s) IV Push every 1 hour PRN  sodium chloride 7% Inhalation 4 milliLiter(s) Inhalation every 12 hours  vancomycin    Solution 125 milliGRAM(s) Oral every 6 hours                              11.1   32.60 )-----------( 224      ( 17 Aug 2020 21:39 )             36.1     08-17    145  |  114<H>  |  38<H>  ----------------------------<  152<H>  4.1   |  21<L>  |  1.81<H>    Ca    9.4      17 Aug 2020 21:39  Phos  3.4     08-17  Mg     2.0     08-17      ABG - ( 16 Aug 2020 12:27 )  pH, Arterial: 7.39  pH, Blood: x     /  pCO2: 35    /  pO2: 134   / HCO3: 21    / Base Excess: -3.1  /  SaO2: 99        08-16    145  |  114<H>  |  36<H>  ----------------------------<  106<H>  3.7   |  18<L>  |  1.57<H>    Ca    9.2      16 Aug 2020 21:04  Phos  3.3     08-16  Mg     2.1     08-16    TPro  6.9  /  Alb  3.3  /  TBili  0.2  /  DBili  <0.1  /  AST  55<H>  /  ALT  54<H>  /  AlkPhos  83  08-15    ABG - ( 16 Aug 2020 12:27 )  pH, Arterial: 7.39  pH, Blood: x     /  pCO2: 35    /  pO2: 134   / HCO3: 21    / Base Excess: -3.1  /  SaO2: 99          BRONCH- 8/13/20- Pseudomonas- Cefepime     IMAGING:   Recent imaging studies were reviewed.   Xray Chest 1 View- PORTABLE-Routine (08.17.20 @ 05:53) >  COMPARISON: Multiple prior chest x-rays, with the most recent dated 8/16/2020.    FINDINGS:  There is an endotracheal tube, with its tip above the level of the kayleigh.  There is an enteric tube, coursing below the diaphragm, with its tip non-visualized below the level of the film.  There has been interval removal of a right IJ approach central venous catheter.  The size of the cardiomediastinal silhouette is enlarged.  There are no focal pulmonary consolidations.  There is no pneumothorax or pleural effusion.    IMPRESSION:  Interval removal of central line. Clear lungs.    CT Head No Cont (08.13.20 @ 08:55) >  TECHNIQUE : Axial CT scanning of the brain was obtained from the skull base to the vertex without the administration of intravenous contrast. Sagittal and coronal reformats were provided.    COMPARISON: CT brain 8/12/2020    FINDINGS:    Similar extra-axial hemorrhage in the right aspect of the suprasellar cistern extending into the right sylvian fissure.    Scattered sulcal subarachnoid hemorrhage is similar.    Similar small layering intraventricular hemorrhage.    No hydrocephalus or midline shift.    Edema in the region of the right cerebral peduncle and posterior limb of the right internal capsule is redemonstrated.    IMPRESSION:    No significant interval change from 8/12/2020.       MR Head w/wo IV Cont (08.11.20 @ 17:50) >  MRI BRAIN:    Redemonstration of subarachnoid hemorrhage in the right aspect of the suprasellar cistern and within the right sylvian fissure.    Scattered sulcal subarachnoid hemorrhage is redemonstrated.    Small hemorrhage layering in the occipital horns is similar.. Ventricles similar in size. No hydrocephalus.    There is restricted diffusion within the region of the right basal ganglia, posterior limb of the right internal capsule, and anterior right temporal lobe, likely representing an acute right MCA territory infarct.    Chronic left occipital infarct. Mild white matter microvascular ischemic disease. Signal voids are seen within the major intracranial vessels consistent with their patency.    No abnormal parenchymal or leptomeningeal enhancement.    Air-fluid levels and mucosal thickening in the paranasal sinuses. Minimal bilateral mastoid air cell effusions.    MRI CERVICAL SPINE:    Vertebral body height, marrow signal homogeneity, and facet alignment are maintained throughout the visualized spinal segments. Straightening of the normal cervical lordosis. Mild multilevel disc space narrowing. Cervicomedullary junction unremarkable.    No prevertebral or paravertebral edema.  No abnormal enhancement in the cervical region.    No spinal cord compression or abnormal intrinsic cord signal.    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    C2-C3: No acquired spinal canal stenosis. No neural foraminal narrowing.    C3-C4: Broad-based disc protrusion reaches the cord. Bilateral uncinate hypertrophy resulting in moderate bilateral neural foraminal narrowing.    C4-C5: Broad-based disc protrusion asymmetric to the left nearly reaches the cord. Bilateral uncinate hypertrophy resulting in moderate left and moderate to severe right neural foraminal narrowing.    C5-C6: Broad-based disc protrusion asymmetric to the left and left uncinate hypertrophy. Deformation of the left ventral thecal sac and effacement of the left lateral recess. Moderate left neural foraminal narrowing.    C6-C7: Broad-based disc protrusion deforms the ventral thecal sac. Left uncinate hypertrophy. Mild to moderate left neural foraminal narrowing.    C7-T1: Left facet hypertrophy. No acquired spinal canal stenosis or neural foraminal narrowing.    IMPRESSION:    MRI BRAIN:    Restricted There is restricted diffusion within the region of the right basal ganglia, posterior limb of the right internal capsule, and anterior right temporal lobe, likely representing an acute right MCA territory infarct.    Similar cisternal and sulcal subarachnoid hemorrhage, given differences in modality.    Similar small hemorrhage layering in the occipital horns. No hydrocephalus.    No abnormal parenchymal or leptomeningeal enhancement.    MRI CERVICAL SPINE:    Multilevel degenerative changes superimposed upon congenital spinal canal stenosis.    No abnormal enhancement in the cervical region.            EXAMINATION:  General: Calm, intubated  HEENT: MMM  Neuro:  -Mental status-  No acute distress, EO to voice. Strong and purposeful on RUE,  followed showing 2 fingers. Left side  0/5  -CN- Pupils R 5mm NR, L 1mm sluggish, Eyes dysconjugate,  +cough/gag, +corneals  RUE localize AG, RLE spont AG, LUE flaccid, LLE flaccid  CVS: S1/S2  RESP: Diminished at bases  GI: Soft, non tender, mildly distended, hypoactive BS  Extremities: Warm, skin intact HPI:  60 year old male with PMHx of Afib on coumadin s/p cardiac arrest at work, downtime 25 minutes prior to ROSC. Pupils were R fixed and dilated, left fixed at the time, no gag reflex, post-CA cooling protocol was initiated down to 35 deg. Intubated and put on Nimbex drip. Also on Propofol, fentanyl, levophed. R groin minerva placed. Also put on heparin post-CA. HCT showed R perimesencephalic SAH of unclear etiology, no hydrocephalus. Course also c/b GIB, was put on protonix drip. Also had bleeding from [facility] - urology consulted, clot irrigated.     HOSP COURSE:   8/10- Angio- neg   8/11: VTACH noted (7 beats)  8/13: Febrile  8/14: Started on Unasyn for gram neg rods and gram neg diplococci on bronch gram stain -> switched to cefepime for pseudomonas noted 8/10  8/16: central line removed on 8/16. Cr improving 1.7 -> 1.57. MR [facility] george. Could not tolerate since desatted when supine  8/17/20- Lasix 30mg x1  8/18: PO vanc for Cdiff  8/19- angio today / 7 beats VTACH    Overnight events: Cdiff positive    ICU Vital Signs Last 24 Hrs  T(C): 37 (19 Aug 2020 03:00), Max: 37.4 (18 Aug 2020 11:00)  T(F): 98.6 (19 Aug 2020 03:00), Max: 99.3 (18 Aug 2020 11:00)  HR: 90 (19 Aug 2020 06:00) (59 - 140)  BP: 106/61 (18 Aug 2020 13:22) (106/61 - 106/61)  ABP: 153/92 (19 Aug 2020 06:00) (112/61 - 195/92)  ABP(mean): 107 (19 Aug 2020 06:00) (72 - 118)  RR: 18 (19 Aug 2020 06:00) (13 - 21)  SpO2: 100% (19 Aug 2020 06:00) (99% - 100%)      08-18-20 @ 07:01  -  08-19-20 @ 07:00  --------------------------------------------------------  IN: 911.5 mL / OUT: 2075 mL / NET: -1163.5 mL        08-17-20 @ 07:01  -  08-18-20 @ 07:00  --------------------------------------------------------  IN: 1823.9 mL / OUT: 2350 mL / NET: -526.1 mL    08-18-20 @ 07:01  - 08-18-20 @ 08:56  --------------------------------------------------------  IN: 44 mL / OUT: 0 mL / NET: 44 mL      Mode: AC/ CMV (Assist Control/ Continuous Mandatory Ventilation), RR (machine): 18, TV (machine): 500, FiO2: 40, PEEP: 5, ITime: 1, MAP: 13, PIP: 30    acetaminophen   Tablet .. 650 milliGRAM(s) Oral every 6 hours PRN  albuterol/ipratropium for Nebulization. 3 milliLiter(s) Nebulizer every 6 hours  aMIOdarone    Tablet 200 milliGRAM(s) Oral daily  artificial  tears Solution 1 Drop(s) Both EYES every 6 hours  cefepime   IVPB 2000 milliGRAM(s) IV Intermittent every 8 hours  chlorhexidine 0.12% Liquid 15 milliLiter(s) Oral Mucosa every 12 hours  chlorhexidine 4% Liquid 1 Application(s) Topical <User Schedule>  chlorhexidine 4% Liquid 1 Application(s) Topical <User Schedule>  dexMEDEtomidine Infusion 0.2 MICROgram(s)/kG/Hr (5.5 mL/Hr) IV Continuous <Continuous>  dextrose 40% Gel 15 Gram(s) Oral once PRN  dextrose 5%. 1000 milliLiter(s) (50 mL/Hr) IV Continuous <Continuous>  dextrose 50% Injectable 12.5 Gram(s) IV Push once  dextrose 50% Injectable 25 Gram(s) IV Push once  dextrose 50% Injectable 25 Gram(s) IV Push once  fentaNYL   Infusion... 0.5 MICROgram(s)/kG/Hr (2.75 mL/Hr) IV Continuous <Continuous>  glucagon  Injectable 1 milliGRAM(s) IntraMuscular once PRN  heparin   Injectable 5000 Unit(s) SubCutaneous every 8 hours  hydrALAZINE 50 milliGRAM(s) Oral every 8 hours  isosorbide   dinitrate Tablet (ISORDIL) 10 milliGRAM(s) Oral three times a day  metoprolol tartrate 12.5 milliGRAM(s) Oral two times a day  pantoprazole  Injectable 40 milliGRAM(s) IV Push daily  sodium chloride 0.9% lock flush 10 milliLiter(s) IV Push every 1 hour PRN  sodium chloride 7% Inhalation 4 milliLiter(s) Inhalation every 12 hours  vancomycin    Solution 125 milliGRAM(s) Oral every 6 hours      08-19    140  |  109<H>  |  42<H>  ----------------------------<  116<H>  3.6   |  21<L>  |  1.80<H>                          10.5   28.72 )-----------( 234      ( 18 Aug 2020 21:36 )             33.6       Ca    9.4      19 Aug 2020 10:25  Phos  3.2     08-19  Mg     2.0     08-19    TPro  6.9  /  Alb  3.1<L>  /  TBili  0.3  /  DBili  x   /  AST  32  /  ALT  52<H>  /  AlkPhos  75  08-19                          11.1   32.60 )-----------( 224      ( 17 Aug 2020 21:39 )             36.1     08-17    145  |  114<H>  |  38<H>  ----------------------------<  152<H>  4.1   |  21<L>  |  1.81<H>    Ca    9.4      17 Aug 2020 21:39  Phos  3.4     08-17  Mg     2.0     08-17      ABG - ( 16 Aug 2020 12:27 )  pH, Arterial: 7.39  pH, Blood: x     /  pCO2: 35    /  pO2: 134   / HCO3: 21    / Base Excess: -3.1  /  SaO2: 99        08-16    145  |  114<H>  |  36<H>  ----------------------------<  106<H>  3.7   |  18<L>  |  1.57<H>    Ca    9.2      16 Aug 2020 21:04  Phos  3.3     08-16  Mg     2.1     08-16    TPro  6.9  /  Alb  3.3  /  TBili  0.2  /  DBili  <0.1  /  AST  55<H>  /  ALT  54<H>  /  AlkPhos  83  08-15    ABG - ( 16 Aug 2020 12:27 )  pH, Arterial: 7.39  pH, Blood: x     /  pCO2: 35    /  pO2: 134   / HCO3: 21    / Base Excess: -3.1  /  SaO2: 99          BRONCH- 8/13/20- Pseudomonas- Cefepime     IMAGING:   Recent imaging studies were reviewed.   Xray Chest 1 View- PORTABLE-Routine (08.17.20 @ 05:53) >  COMPARISON: Multiple prior chest x-rays, with the most recent dated 8/16/2020.    FINDINGS:  There is an endotracheal tube, with its tip above the level of the kayleigh.  There is an enteric tube, coursing below the diaphragm, with its tip non-visualized below the level of the film.  There has been interval removal of a right IJ approach central venous catheter.  The size of the cardiomediastinal silhouette is enlarged.  There are no focal pulmonary consolidations.  There is no pneumothorax or pleural effusion.    IMPRESSION:  Interval removal of central line. Clear lungs.    CT Head No Cont (08.13.20 @ 08:55) >  TECHNIQUE : Axial CT scanning of the brain was obtained from the skull base to the vertex without the administration of intravenous contrast. Sagittal and coronal reformats were provided.    COMPARISON: CT brain 8/12/2020    FINDINGS:    Similar extra-axial hemorrhage in the right aspect of the suprasellar cistern extending into the right sylvian fissure.    Scattered sulcal subarachnoid hemorrhage is similar.    Similar small layering intraventricular hemorrhage.    No hydrocephalus or midline shift.    Edema in the region of the right cerebral peduncle and posterior limb of the right internal capsule is redemonstrated.    IMPRESSION:    No significant interval change from 8/12/2020.       MR Head w/wo IV Cont (08.11.20 @ 17:50) >  MRI BRAIN:    Redemonstration of subarachnoid hemorrhage in the right aspect of the suprasellar cistern and within the right sylvian fissure.    Scattered sulcal subarachnoid hemorrhage is redemonstrated.    Small hemorrhage layering in the occipital horns is similar.. Ventricles similar in size. No hydrocephalus.    There is restricted diffusion within the region of the right basal ganglia, posterior limb of the right internal capsule, and anterior right temporal lobe, likely representing an acute right MCA territory infarct.    Chronic left occipital infarct. Mild white matter microvascular ischemic disease. Signal voids are seen within the major intracranial vessels consistent with their patency.    No abnormal parenchymal or leptomeningeal enhancement.    Air-fluid levels and mucosal thickening in the paranasal sinuses. Minimal bilateral mastoid air cell effusions.    MRI CERVICAL SPINE:    Vertebral body height, marrow signal homogeneity, and facet alignment are maintained throughout the visualized spinal segments. Straightening of the normal cervical lordosis. Mild multilevel disc space narrowing. Cervicomedullary junction unremarkable.    No prevertebral or paravertebral edema.  No abnormal enhancement in the cervical region.    No spinal cord compression or abnormal intrinsic cord signal.    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    C2-C3: No acquired spinal canal stenosis. No neural foraminal narrowing.    C3-C4: Broad-based disc protrusion reaches the cord. Bilateral uncinate hypertrophy resulting in moderate bilateral neural foraminal narrowing.    C4-C5: Broad-based disc protrusion asymmetric to the left nearly reaches the cord. Bilateral uncinate hypertrophy resulting in moderate left and moderate to severe right neural foraminal narrowing.    C5-C6: Broad-based disc protrusion asymmetric to the left and left uncinate hypertrophy. Deformation of the left ventral thecal sac and effacement of the left lateral recess. Moderate left neural foraminal narrowing.    C6-C7: Broad-based disc protrusion deforms the ventral thecal sac. Left uncinate hypertrophy. Mild to moderate left neural foraminal narrowing.    C7-T1: Left facet hypertrophy. No acquired spinal canal stenosis or neural foraminal narrowing.    IMPRESSION:    MRI BRAIN:    Restricted There is restricted diffusion within the region of the right basal ganglia, posterior limb of the right internal capsule, and anterior right temporal lobe, likely representing an acute right MCA territory infarct.    Similar cisternal and sulcal subarachnoid hemorrhage, given differences in modality.    Similar small hemorrhage layering in the occipital horns. No hydrocephalus.    No abnormal parenchymal or leptomeningeal enhancement.    MRI CERVICAL SPINE:    Multilevel degenerative changes superimposed upon congenital spinal canal stenosis.    No abnormal enhancement in the cervical region.            EXAMINATION:  General: Calm, intubated  HEENT: MMM  Neuro:  -Mental status-  No acute distress, EO to voice. Strong and purposeful on RUE,  followed showing 2 fingers. Left side  0/5  -CN- Pupils R 5mm NR, L 1mm sluggish, Eyes dysconjugate,  +cough/gag, +corneals  RUE localize AG, RLE spont AG, LUE flaccid, LLE flaccid  CVS: S1/S2  RESP: Diminished at bases  GI: Soft, non tender, mildly distended, hypoactive BS  Extremities: Warm, skin intact

## 2020-08-20 ENCOUNTER — APPOINTMENT (OUTPATIENT)
Dept: NEUROSURGERY | Facility: HOSPITAL | Age: 60
End: 2020-08-20
Payer: MEDICAID

## 2020-08-20 LAB
ALBUMIN SERPL ELPH-MCNC: 3.2 G/DL — LOW (ref 3.3–5)
ALP SERPL-CCNC: 81 U/L — SIGNIFICANT CHANGE UP (ref 40–120)
ALT FLD-CCNC: 47 U/L — HIGH (ref 10–45)
ANION GAP SERPL CALC-SCNC: 14 MMOL/L — SIGNIFICANT CHANGE UP (ref 5–17)
ANION GAP SERPL CALC-SCNC: 15 MMOL/L — SIGNIFICANT CHANGE UP (ref 5–17)
APTT BLD: 32.8 SEC — SIGNIFICANT CHANGE UP (ref 27.5–35.5)
AST SERPL-CCNC: 27 U/L — SIGNIFICANT CHANGE UP (ref 10–40)
BASOPHILS # BLD AUTO: 0.06 K/UL — SIGNIFICANT CHANGE UP (ref 0–0.2)
BASOPHILS # BLD AUTO: 0.07 K/UL — SIGNIFICANT CHANGE UP (ref 0–0.2)
BASOPHILS NFR BLD AUTO: 0.2 % — SIGNIFICANT CHANGE UP (ref 0–2)
BASOPHILS NFR BLD AUTO: 0.4 % — SIGNIFICANT CHANGE UP (ref 0–2)
BILIRUB DIRECT SERPL-MCNC: <0.1 MG/DL — SIGNIFICANT CHANGE UP (ref 0–0.2)
BILIRUB INDIRECT FLD-MCNC: >0.2 MG/DL — SIGNIFICANT CHANGE UP (ref 0.2–1)
BILIRUB SERPL-MCNC: 0.3 MG/DL — SIGNIFICANT CHANGE UP (ref 0.2–1.2)
BUN SERPL-MCNC: 40 MG/DL — HIGH (ref 7–23)
BUN SERPL-MCNC: 41 MG/DL — HIGH (ref 7–23)
CALCIUM SERPL-MCNC: 9 MG/DL — SIGNIFICANT CHANGE UP (ref 8.4–10.5)
CALCIUM SERPL-MCNC: 9.4 MG/DL — SIGNIFICANT CHANGE UP (ref 8.4–10.5)
CHLORIDE SERPL-SCNC: 109 MMOL/L — HIGH (ref 96–108)
CHLORIDE SERPL-SCNC: 113 MMOL/L — HIGH (ref 96–108)
CO2 SERPL-SCNC: 16 MMOL/L — LOW (ref 22–31)
CO2 SERPL-SCNC: 17 MMOL/L — LOW (ref 22–31)
CREAT SERPL-MCNC: 1.86 MG/DL — HIGH (ref 0.5–1.3)
CREAT SERPL-MCNC: 1.91 MG/DL — HIGH (ref 0.5–1.3)
CULTURE RESULTS: SIGNIFICANT CHANGE UP
CULTURE RESULTS: SIGNIFICANT CHANGE UP
EOSINOPHIL # BLD AUTO: 0.22 K/UL — SIGNIFICANT CHANGE UP (ref 0–0.5)
EOSINOPHIL # BLD AUTO: 0.34 K/UL — SIGNIFICANT CHANGE UP (ref 0–0.5)
EOSINOPHIL NFR BLD AUTO: 0.9 % — SIGNIFICANT CHANGE UP (ref 0–6)
EOSINOPHIL NFR BLD AUTO: 1.8 % — SIGNIFICANT CHANGE UP (ref 0–6)
GAS PNL BLDA: SIGNIFICANT CHANGE UP
GLUCOSE SERPL-MCNC: 124 MG/DL — HIGH (ref 70–99)
GLUCOSE SERPL-MCNC: 135 MG/DL — HIGH (ref 70–99)
HCT VFR BLD CALC: 33 % — LOW (ref 39–50)
HCT VFR BLD CALC: 33.1 % — LOW (ref 39–50)
HGB BLD-MCNC: 10.2 G/DL — LOW (ref 13–17)
HGB BLD-MCNC: 10.2 G/DL — LOW (ref 13–17)
IMM GRANULOCYTES NFR BLD AUTO: 1.1 % — SIGNIFICANT CHANGE UP (ref 0–1.5)
IMM GRANULOCYTES NFR BLD AUTO: 1.4 % — SIGNIFICANT CHANGE UP (ref 0–1.5)
INR BLD: 1.53 RATIO — HIGH (ref 0.88–1.16)
LYMPHOCYTES # BLD AUTO: 0.71 K/UL — LOW (ref 1–3.3)
LYMPHOCYTES # BLD AUTO: 0.75 K/UL — LOW (ref 1–3.3)
LYMPHOCYTES # BLD AUTO: 2.8 % — LOW (ref 13–44)
LYMPHOCYTES # BLD AUTO: 3.9 % — LOW (ref 13–44)
MAGNESIUM SERPL-MCNC: 2 MG/DL — SIGNIFICANT CHANGE UP (ref 1.6–2.6)
MAGNESIUM SERPL-MCNC: 2 MG/DL — SIGNIFICANT CHANGE UP (ref 1.6–2.6)
MCHC RBC-ENTMCNC: 27 PG — SIGNIFICANT CHANGE UP (ref 27–34)
MCHC RBC-ENTMCNC: 27.3 PG — SIGNIFICANT CHANGE UP (ref 27–34)
MCHC RBC-ENTMCNC: 30.8 GM/DL — LOW (ref 32–36)
MCHC RBC-ENTMCNC: 30.9 GM/DL — LOW (ref 32–36)
MCV RBC AUTO: 87.3 FL — SIGNIFICANT CHANGE UP (ref 80–100)
MCV RBC AUTO: 88.5 FL — SIGNIFICANT CHANGE UP (ref 80–100)
MONOCYTES # BLD AUTO: 0.72 K/UL — SIGNIFICANT CHANGE UP (ref 0–0.9)
MONOCYTES # BLD AUTO: 0.94 K/UL — HIGH (ref 0–0.9)
MONOCYTES NFR BLD AUTO: 3.7 % — SIGNIFICANT CHANGE UP (ref 2–14)
MONOCYTES NFR BLD AUTO: 3.7 % — SIGNIFICANT CHANGE UP (ref 2–14)
NEUTROPHILS # BLD AUTO: 17.33 K/UL — HIGH (ref 1.8–7.4)
NEUTROPHILS # BLD AUTO: 23.09 K/UL — HIGH (ref 1.8–7.4)
NEUTROPHILS NFR BLD AUTO: 89.1 % — HIGH (ref 43–77)
NEUTROPHILS NFR BLD AUTO: 91 % — HIGH (ref 43–77)
NRBC # BLD: 0 /100 WBCS — SIGNIFICANT CHANGE UP (ref 0–0)
NRBC # BLD: 0 /100 WBCS — SIGNIFICANT CHANGE UP (ref 0–0)
PHOSPHATE SERPL-MCNC: 3.5 MG/DL — SIGNIFICANT CHANGE UP (ref 2.5–4.5)
PHOSPHATE SERPL-MCNC: 3.8 MG/DL — SIGNIFICANT CHANGE UP (ref 2.5–4.5)
PLATELET # BLD AUTO: 265 K/UL — SIGNIFICANT CHANGE UP (ref 150–400)
PLATELET # BLD AUTO: 271 K/UL — SIGNIFICANT CHANGE UP (ref 150–400)
POTASSIUM SERPL-MCNC: 3.5 MMOL/L — SIGNIFICANT CHANGE UP (ref 3.5–5.3)
POTASSIUM SERPL-MCNC: 3.9 MMOL/L — SIGNIFICANT CHANGE UP (ref 3.5–5.3)
POTASSIUM SERPL-SCNC: 3.5 MMOL/L — SIGNIFICANT CHANGE UP (ref 3.5–5.3)
POTASSIUM SERPL-SCNC: 3.9 MMOL/L — SIGNIFICANT CHANGE UP (ref 3.5–5.3)
PROT SERPL-MCNC: 6.7 G/DL — SIGNIFICANT CHANGE UP (ref 6–8.3)
PROTHROM AB SERPL-ACNC: 17.8 SEC — HIGH (ref 10.6–13.6)
RBC # BLD: 3.74 M/UL — LOW (ref 4.2–5.8)
RBC # BLD: 3.78 M/UL — LOW (ref 4.2–5.8)
RBC # FLD: 14.3 % — SIGNIFICANT CHANGE UP (ref 10.3–14.5)
RBC # FLD: 14.6 % — HIGH (ref 10.3–14.5)
SODIUM SERPL-SCNC: 141 MMOL/L — SIGNIFICANT CHANGE UP (ref 135–145)
SODIUM SERPL-SCNC: 143 MMOL/L — SIGNIFICANT CHANGE UP (ref 135–145)
SPECIMEN SOURCE: SIGNIFICANT CHANGE UP
SPECIMEN SOURCE: SIGNIFICANT CHANGE UP
TROPONIN T, HIGH SENSITIVITY RESULT: 77 NG/L — HIGH (ref 0–51)
WBC # BLD: 19.42 K/UL — HIGH (ref 3.8–10.5)
WBC # BLD: 25.37 K/UL — HIGH (ref 3.8–10.5)
WBC # FLD AUTO: 19.42 K/UL — HIGH (ref 3.8–10.5)
WBC # FLD AUTO: 25.37 K/UL — HIGH (ref 3.8–10.5)

## 2020-08-20 PROCEDURE — 71045 X-RAY EXAM CHEST 1 VIEW: CPT | Mod: 26

## 2020-08-20 PROCEDURE — 36218 PLACE CATHETER IN ARTERY: CPT | Mod: 59

## 2020-08-20 PROCEDURE — 76377 3D RENDER W/INTRP POSTPROCES: CPT | Mod: 26

## 2020-08-20 PROCEDURE — 99291 CRITICAL CARE FIRST HOUR: CPT

## 2020-08-20 PROCEDURE — 75774 ARTERY X-RAY EACH VESSEL: CPT | Mod: 26,59

## 2020-08-20 PROCEDURE — 36226 PLACE CATH VERTEBRAL ART: CPT | Mod: 50

## 2020-08-20 PROCEDURE — 99292 CRITICAL CARE ADDL 30 MIN: CPT

## 2020-08-20 PROCEDURE — 36227 PLACE CATH XTRNL CAROTID: CPT

## 2020-08-20 PROCEDURE — 36224 PLACE CATH CAROTD ART: CPT | Mod: 50

## 2020-08-20 RX ORDER — FENTANYL CITRATE 50 UG/ML
50 INJECTION INTRAVENOUS ONCE
Refills: 0 | Status: DISCONTINUED | OUTPATIENT
Start: 2020-08-20 | End: 2020-08-20

## 2020-08-20 RX ORDER — HALOPERIDOL DECANOATE 100 MG/ML
2 INJECTION INTRAMUSCULAR ONCE
Refills: 0 | Status: COMPLETED | OUTPATIENT
Start: 2020-08-20 | End: 2020-08-20

## 2020-08-20 RX ORDER — POTASSIUM CHLORIDE 20 MEQ
40 PACKET (EA) ORAL ONCE
Refills: 0 | Status: COMPLETED | OUTPATIENT
Start: 2020-08-20 | End: 2020-08-21

## 2020-08-20 RX ORDER — FENTANYL CITRATE 50 UG/ML
25 INJECTION INTRAVENOUS
Refills: 0 | Status: DISCONTINUED | OUTPATIENT
Start: 2020-08-20 | End: 2020-08-23

## 2020-08-20 RX ORDER — HALOPERIDOL DECANOATE 100 MG/ML
2 INJECTION INTRAMUSCULAR ONCE
Refills: 0 | Status: DISCONTINUED | OUTPATIENT
Start: 2020-08-20 | End: 2020-08-20

## 2020-08-20 RX ORDER — CEFEPIME 1 G/1
2000 INJECTION, POWDER, FOR SOLUTION INTRAMUSCULAR; INTRAVENOUS EVERY 8 HOURS
Refills: 0 | Status: DISCONTINUED | OUTPATIENT
Start: 2020-08-20 | End: 2020-08-24

## 2020-08-20 RX ORDER — LACTOBACILLUS ACIDOPHILUS 100MM CELL
1 CAPSULE ORAL
Refills: 0 | Status: DISCONTINUED | OUTPATIENT
Start: 2020-08-20 | End: 2020-08-29

## 2020-08-20 RX ORDER — FENTANYL CITRATE 50 UG/ML
0.5 INJECTION INTRAVENOUS
Qty: 5000 | Refills: 0 | Status: DISCONTINUED | OUTPATIENT
Start: 2020-08-20 | End: 2020-08-27

## 2020-08-20 RX ORDER — ACETYLCYSTEINE 200 MG/ML
4 VIAL (ML) MISCELLANEOUS THREE TIMES A DAY
Refills: 0 | Status: DISCONTINUED | OUTPATIENT
Start: 2020-08-20 | End: 2020-09-02

## 2020-08-20 RX ADMIN — CEFEPIME 100 MILLIGRAM(S): 1 INJECTION, POWDER, FOR SOLUTION INTRAMUSCULAR; INTRAVENOUS at 13:21

## 2020-08-20 RX ADMIN — Medication 12.5 MILLIGRAM(S): at 05:30

## 2020-08-20 RX ADMIN — Medication 1 DROP(S): at 12:24

## 2020-08-20 RX ADMIN — CHLORHEXIDINE GLUCONATE 15 MILLILITER(S): 213 SOLUTION TOPICAL at 05:31

## 2020-08-20 RX ADMIN — Medication 50 MILLIGRAM(S): at 16:55

## 2020-08-20 RX ADMIN — Medication 3 MILLILITER(S): at 17:40

## 2020-08-20 RX ADMIN — FENTANYL CITRATE 25 MICROGRAM(S): 50 INJECTION INTRAVENOUS at 15:45

## 2020-08-20 RX ADMIN — Medication 1 TABLET(S): at 17:49

## 2020-08-20 RX ADMIN — FENTANYL CITRATE 50 MICROGRAM(S): 50 INJECTION INTRAVENOUS at 21:10

## 2020-08-20 RX ADMIN — Medication 1 DROP(S): at 05:29

## 2020-08-20 RX ADMIN — AMIODARONE HYDROCHLORIDE 200 MILLIGRAM(S): 400 TABLET ORAL at 01:00

## 2020-08-20 RX ADMIN — FENTANYL CITRATE 25 MICROGRAM(S): 50 INJECTION INTRAVENOUS at 23:45

## 2020-08-20 RX ADMIN — ISOSORBIDE DINITRATE 10 MILLIGRAM(S): 5 TABLET ORAL at 21:21

## 2020-08-20 RX ADMIN — Medication 125 MILLIGRAM(S): at 12:22

## 2020-08-20 RX ADMIN — HEPARIN SODIUM 5000 UNIT(S): 5000 INJECTION INTRAVENOUS; SUBCUTANEOUS at 21:20

## 2020-08-20 RX ADMIN — HEPARIN SODIUM 5000 UNIT(S): 5000 INJECTION INTRAVENOUS; SUBCUTANEOUS at 13:21

## 2020-08-20 RX ADMIN — FENTANYL CITRATE 25 MICROGRAM(S): 50 INJECTION INTRAVENOUS at 12:15

## 2020-08-20 RX ADMIN — CHLORHEXIDINE GLUCONATE 1 APPLICATION(S): 213 SOLUTION TOPICAL at 05:30

## 2020-08-20 RX ADMIN — HALOPERIDOL DECANOATE 2 MILLIGRAM(S): 100 INJECTION INTRAMUSCULAR at 13:23

## 2020-08-20 RX ADMIN — FENTANYL CITRATE 50 MICROGRAM(S): 50 INJECTION INTRAVENOUS at 09:30

## 2020-08-20 RX ADMIN — FENTANYL CITRATE 25 MICROGRAM(S): 50 INJECTION INTRAVENOUS at 23:30

## 2020-08-20 RX ADMIN — FENTANYL CITRATE 50 MICROGRAM(S): 50 INJECTION INTRAVENOUS at 04:25

## 2020-08-20 RX ADMIN — Medication 125 MILLIGRAM(S): at 05:29

## 2020-08-20 RX ADMIN — FENTANYL CITRATE 25 MICROGRAM(S): 50 INJECTION INTRAVENOUS at 12:00

## 2020-08-20 RX ADMIN — Medication 125 MILLIGRAM(S): at 17:50

## 2020-08-20 RX ADMIN — Medication 3 MILLILITER(S): at 05:01

## 2020-08-20 RX ADMIN — FENTANYL CITRATE 50 MICROGRAM(S): 50 INJECTION INTRAVENOUS at 04:31

## 2020-08-20 RX ADMIN — DEXMEDETOMIDINE HYDROCHLORIDE IN 0.9% SODIUM CHLORIDE 5.5 MICROGRAM(S)/KG/HR: 4 INJECTION INTRAVENOUS at 05:27

## 2020-08-20 RX ADMIN — FENTANYL CITRATE 50 MICROGRAM(S): 50 INJECTION INTRAVENOUS at 20:55

## 2020-08-20 RX ADMIN — FENTANYL CITRATE 50 MICROGRAM(S): 50 INJECTION INTRAVENOUS at 09:15

## 2020-08-20 RX ADMIN — ISOSORBIDE DINITRATE 10 MILLIGRAM(S): 5 TABLET ORAL at 13:22

## 2020-08-20 RX ADMIN — ISOSORBIDE DINITRATE 10 MILLIGRAM(S): 5 TABLET ORAL at 05:29

## 2020-08-20 RX ADMIN — CEFEPIME 100 MILLIGRAM(S): 1 INJECTION, POWDER, FOR SOLUTION INTRAMUSCULAR; INTRAVENOUS at 21:21

## 2020-08-20 RX ADMIN — Medication 0.1 MILLIGRAM(S): at 21:21

## 2020-08-20 RX ADMIN — PANTOPRAZOLE SODIUM 40 MILLIGRAM(S): 20 TABLET, DELAYED RELEASE ORAL at 12:23

## 2020-08-20 RX ADMIN — SODIUM CHLORIDE 4 MILLILITER(S): 9 INJECTION INTRAMUSCULAR; INTRAVENOUS; SUBCUTANEOUS at 05:01

## 2020-08-20 RX ADMIN — Medication 3 MILLILITER(S): at 00:51

## 2020-08-20 RX ADMIN — HALOPERIDOL DECANOATE 2 MILLIGRAM(S): 100 INJECTION INTRAMUSCULAR at 15:30

## 2020-08-20 RX ADMIN — Medication 50 MILLIGRAM(S): at 07:28

## 2020-08-20 RX ADMIN — FENTANYL CITRATE 25 MICROGRAM(S): 50 INJECTION INTRAVENOUS at 16:00

## 2020-08-20 RX ADMIN — Medication 1 DROP(S): at 17:53

## 2020-08-20 RX ADMIN — CHLORHEXIDINE GLUCONATE 15 MILLILITER(S): 213 SOLUTION TOPICAL at 17:49

## 2020-08-20 RX ADMIN — SODIUM CHLORIDE 4 MILLILITER(S): 9 INJECTION INTRAMUSCULAR; INTRAVENOUS; SUBCUTANEOUS at 17:41

## 2020-08-20 RX ADMIN — CEFEPIME 100 MILLIGRAM(S): 1 INJECTION, POWDER, FOR SOLUTION INTRAMUSCULAR; INTRAVENOUS at 05:49

## 2020-08-20 RX ADMIN — HEPARIN SODIUM 5000 UNIT(S): 5000 INJECTION INTRAVENOUS; SUBCUTANEOUS at 05:29

## 2020-08-20 RX ADMIN — Medication 12.5 MILLIGRAM(S): at 17:50

## 2020-08-20 NOTE — PROGRESS NOTE ADULT - SUBJECTIVE AND OBJECTIVE BOX
Subjective: Patient seen and examined. No new events except as noted.   remains in NSCU today   few beats of WCT overnight   REVIEW OF SYSTEMS:  Unable to obtain     MEDICATIONS:  MEDICATIONS  (STANDING):  albuterol/ipratropium for Nebulization. 3 milliLiter(s) Nebulizer every 6 hours  aMIOdarone    Tablet 200 milliGRAM(s) Oral daily  artificial  tears Solution 1 Drop(s) Both EYES every 6 hours  cefepime   IVPB 2000 milliGRAM(s) IV Intermittent every 8 hours  chlorhexidine 0.12% Liquid 15 milliLiter(s) Oral Mucosa every 12 hours  chlorhexidine 4% Liquid 1 Application(s) Topical <User Schedule>  chlorhexidine 4% Liquid 1 Application(s) Topical <User Schedule>  dexMEDEtomidine Infusion 0.2 MICROgram(s)/kG/Hr (5.5 mL/Hr) IV Continuous <Continuous>  dextrose 5%. 1000 milliLiter(s) (50 mL/Hr) IV Continuous <Continuous>  dextrose 50% Injectable 12.5 Gram(s) IV Push once  dextrose 50% Injectable 25 Gram(s) IV Push once  dextrose 50% Injectable 25 Gram(s) IV Push once  fentaNYL   Infusion... 0.5 MICROgram(s)/kG/Hr (2.75 mL/Hr) IV Continuous <Continuous>  heparin   Injectable 5000 Unit(s) SubCutaneous every 8 hours  hydrALAZINE 50 milliGRAM(s) Oral every 8 hours  isosorbide   dinitrate Tablet (ISORDIL) 10 milliGRAM(s) Oral three times a day  metoprolol tartrate 12.5 milliGRAM(s) Oral two times a day  pantoprazole  Injectable 40 milliGRAM(s) IV Push daily  sodium chloride 7% Inhalation 4 milliLiter(s) Inhalation every 12 hours  vancomycin    Solution 125 milliGRAM(s) Oral every 6 hours      PHYSICAL EXAM:  T(C): 36.3 (08-20-20 @ 08:26), Max: 37.5 (08-19-20 @ 15:00)  HR: 68 (08-20-20 @ 09:00) (66 - 168)  BP: 147/77 (08-20-20 @ 08:26) (147/77 - 147/77)  RR: 19 (08-20-20 @ 09:00) (18 - 31)  SpO2: 100% (08-20-20 @ 09:00) (100% - 100%)  Wt(kg): --  I&O's Summary    19 Aug 2020 07:01  -  20 Aug 2020 07:00  --------------------------------------------------------  IN: 1553.3 mL / OUT: 975 mL / NET: 578.3 mL    20 Aug 2020 07:01  -  20 Aug 2020 11:22  --------------------------------------------------------  IN: 99.6 mL / OUT: 0 mL / NET: 99.6 mL      Height (cm): 172.72 (08-20 @ 08:21)  Weight (kg): 110 (08-20 @ 08:21)  BMI (kg/m2): 36.9 (08-20 @ 08:21)  BSA (m2): 2.22 (08-20 @ 08:21)    Appearance: Intubated sedated 	  HEENT:   Dry  oral mucosa,  Lymphatic: No lymphadenopathy  Cardiovascular: Normal S1 S2, No JVD, No murmurs, No edema  Respiratory: Ventilated   Psychiatry: A & O x 0  Gastrointestinal:  Soft, Non-tender, + BS	  Skin: No rashes, No ecchymoses, No cyanosis	  Mental status- No acute distress, EO to stim  -CN- Pupils R 4mm NR, L 2mm sluggish, EOMI, tongue midline, face symmetric  +cough/gag  C briskly, two fingers/thumbs up on RUE, distally AG, wiggles toes on RLE, no   LABS:    CARDIAC MARKERS:                                10.2   25.37 )-----------( 271      ( 20 Aug 2020 01:26 )             33.0     08-20    141  |  109<H>  |  41<H>  ----------------------------<  124<H>  3.9   |  17<L>  |  1.91<H>    Ca    9.4      20 Aug 2020 01:26  Phos  3.8     08-20  Mg     2.0     08-20    TPro  6.7  /  Alb  3.2<L>  /  TBili  0.3  /  DBili  <0.1  /  AST  27  /  ALT  47<H>  /  AlkPhos  81  08-20    proBNP:   Lipid Profile:   HgA1c:   TSH:             TELEMETRY: AF, WCT     ECG:  	  RADIOLOGY:   DIAGNOSTIC TESTING:  [ ] Echocardiogram:  [ ]  Catheterization:  [ ] Stress Test:    OTHER:

## 2020-08-20 NOTE — CHART NOTE - NSCHARTNOTEFT_GEN_A_CORE
Nutrition Follow Up Note     Patient seen for: nutrition follow up on ICU    Source: patient, medical record, communication with team.     Pt is a 59 yo M with PMH: A.Fib on coumadin s/p cardiac arrest at work. Downtime 25 minutes prior to ROSC. 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 Was intubated at OSH, transferred to [facility] for further care. Course c/b GIB, put on Protonix drip. Started on antibiotics for gram neg rods and gram neg diplococci on bronch stain / pseudomonas. Found to be C.Diff positive. Discussions ongoing with family re: trach/PEG. Noted with significant thick secretions deferring extubation at this time.      Diet Order: Diet, NPO with Tube Feed:   Tube Feeding Modality: Nasogastric  Vital 1.5 Meir (VITAL1.5RTH)  Total Volume for 24 Hours (mL): 1440  Continuous  Starting Tube Feed Rate {mL per Hour}: 10  Increase Tube Feed Rate by (mL): 10     Every 4 hours  Until Goal Tube Feed Rate (mL per Hour): 60  Tube Feed Duration (in Hours): 24  Tube Feed Start Time: 17:00  Supplement Feeding Modality:  Nasogastric  Probiotic Yogurt/Smoothie Cans or Servings Per Day:  2       Frequency:  Daily (08-18-20 @ 19:59)  Diet, NPO after Midnight:      NPO Start Date: 18-Aug-2020,   NPO Start Time: 23:59 (08-18-20 @ 19:59)    CURRENT EN ORDER PROVIDES:1440ml, 2160kcal and 97g protein.     EN provision (per nursing flow sheet):   (8/20): 240ml (thus far; EN held for angio)  (8/19): EN held for angio (EN feeds changed to Vital 1.5 at 60ml/hr x 24 hrs)  (8/18): 420ml (pt was prescribed Vital AF at 70ml/hr x 24 hrs)  (8/17): 1120ml (EN feeds changed from Nepro 60ml/hr to Vital AF at 70ml/hr x 24 hrs)    Last BM (8/19): x 1; (8/16): x 4. Via rectal tube: (8/20): 100ml; (8/18): 50ml; (8/17): 400ml. Off bowel regimen.     Anthropometric Measurements:   Height (cm): 172.72 (08-20-20 @ 08:21)  Weight (kg): 110 (08-20-20 @ 08:21)  BMI (kg/m2): 36.9 (08-20-20 @ 08:21)    Medications: MEDICATIONS  (STANDING):  albuterol/ipratropium for Nebulization. 3 milliLiter(s) Nebulizer every 6 hours  aMIOdarone    Tablet 200 milliGRAM(s) Oral daily  artificial  tears Solution 1 Drop(s) Both EYES every 6 hours  cefepime   IVPB 2000 milliGRAM(s) IV Intermittent every 8 hours  chlorhexidine 0.12% Liquid 15 milliLiter(s) Oral Mucosa every 12 hours  chlorhexidine 4% Liquid 1 Application(s) Topical <User Schedule>  chlorhexidine 4% Liquid 1 Application(s) Topical <User Schedule>  dexMEDEtomidine Infusion 0.2 MICROgram(s)/kG/Hr (5.5 mL/Hr) IV Continuous <Continuous>  dextrose 5%. 1000 milliLiter(s) (50 mL/Hr) IV Continuous <Continuous>  dextrose 50% Injectable 12.5 Gram(s) IV Push once  dextrose 50% Injectable 25 Gram(s) IV Push once  dextrose 50% Injectable 25 Gram(s) IV Push once  fentaNYL   Infusion... 0.5 MICROgram(s)/kG/Hr (2.75 mL/Hr) IV Continuous <Continuous>  heparin   Injectable 5000 Unit(s) SubCutaneous every 8 hours  hydrALAZINE 50 milliGRAM(s) Oral every 8 hours  isosorbide   dinitrate Tablet (ISORDIL) 10 milliGRAM(s) Oral three times a day  metoprolol tartrate 12.5 milliGRAM(s) Oral two times a day  pantoprazole  Injectable 40 milliGRAM(s) IV Push daily  sodium chloride 7% Inhalation 4 milliLiter(s) Inhalation every 12 hours  vancomycin    Solution 125 milliGRAM(s) Oral every 6 hours    MEDICATIONS  (PRN):  acetaminophen   Tablet .. 650 milliGRAM(s) Oral every 6 hours PRN Temp greater or equal to 38C (100.4F), Mild Pain (1 - 3)  dextrose 40% Gel 15 Gram(s) Oral once PRN Blood Glucose LESS THAN 70 milliGRAM(s)/deciliter  glucagon  Injectable 1 milliGRAM(s) IntraMuscular once PRN Glucose LESS THAN 70 milligrams/deciliter  sodium chloride 0.9% lock flush 10 milliLiter(s) IV Push every 1 hour PRN Pre/post blood products, medications, blood draw, and to maintain line patency    Labs: 08-20 @ 01:26: Sodium 141, Potassium 3.9, Calcium 9.4, Magnesium 2.0, Phosphorus 3.8, BUN 41<H>, Creatinine 1.91<H>, Glucose 124<H>, Alk Phos 81, ALT/SGPT 47<H>, AST/SGOT 27, Albumin 3.2<L>, Prealbumin --, Total Bilirubin 0.3, Hemoglobin 10.2<L>, Hematocrit 33.0<L>, Ferritin --, C-Reactive Protein --, Creatine Kinase <<27>    Triglycerides, Serum: 218 mg/dL (08-09-20 @ 17:21)    POCT Blood Glucose.: 125 mg/dL (08-20-20 @ 06:44)  POCT Blood Glucose.: 106 mg/dL (08-19-20 @ 23:45)    Skin per nursing documentation: no pressure injuries documented  Edema:     Estimated Needs:   Energy:  Protein:    [place] Equation (REE):     Previous Nutrition Diagnosis:   Nutrition Diagnosis is:     New Nutrition Diagnosis:      Recommended Interventions:   1.   2.  3.  4.       Monitoring and Evaluation:   Continue to monitor nutrition provision and tolerance, weights, labs, skin integrity.   RD remains available upon request and will follow up per protocol.    Alison Kleiner RD, [facility] ([phone]) Nutrition Follow Up Note     Patient seen for: nutrition follow up on ICU    Source: patient, medical record, communication with team.     Pt is a 59 yo M with PMH: A.Fib on coumadin s/p cardiac arrest at work. Downtime 25 minutes prior to ROSC. 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 Was intubated at OSH, transferred to [facility] for further care. Course c/b GIB, put on Protonix drip. Started on antibiotics for gram neg rods and gram neg diplococci on bronch stain / pseudomonas. Found to be C.Diff positive. Discussions ongoing with family re: trach/PEG. Noted with significant thick secretions deferring extubation at this time.  Pt s/p angio.    Diet Order: Diet, NPO with Tube Feed:   Tube Feeding Modality: Nasogastric  Vital 1.5 Meir (VITAL1.5RTH)  Total Volume for 24 Hours (mL): 1440  Continuous  Starting Tube Feed Rate {mL per Hour}: 10  Increase Tube Feed Rate by (mL): 10     Every 4 hours  Until Goal Tube Feed Rate (mL per Hour): 60  Tube Feed Duration (in Hours): 24  Tube Feed Start Time: 17:00  Supplement Feeding Modality:  Nasogastric  Probiotic Yogurt/Smoothie Cans or Servings Per Day:  2       Frequency:  Daily (08-18-20 @ 19:59)  Diet, NPO after Midnight:      NPO Start Date: 18-Aug-2020,   NPO Start Time: 23:59 (08-18-20 @ 19:59)    CURRENT EN ORDER PROVIDES:1440ml, 2160kcal and 97g protein.     Pt observed resting in bed; s/p angio. Unable to participate in nutrition evaluation at this time.     EN provision (per nursing flow sheet):   (8/20): 240ml (thus far; EN held for angio)  (8/19): EN held for angio (EN feeds changed to Vital 1.5 at 60ml/hr x 24 hrs)  (8/18): 420ml (pt was prescribed Vital AF at 70ml/hr x 24 hrs)  (8/17): 1120ml (EN feeds changed from Nepro 60ml/hr to Vital AF at 70ml/hr x 24 hrs)  (8/16): 1140ml (79% of goal)  (8/15): 1140ml (79% of goal)  (8/14): 1020ml (71% of goal)  (8/13): 360ml (25% of goal)    Last BM (8/19): x 1; (8/16): x 4. Via rectal tube: (8/20): 100ml; (8/18): 50ml; (8/17): 400ml. Off bowel regimen.     Anthropometric Measurements:   Height (cm): 172.72 (08-20-20 @ 08:21)  Weight (kg): 110 (08-20-20 @ 08:21)  BMI (kg/m2): 36.9 (08-20-20 @ 08:21)    Medications: MEDICATIONS  (STANDING):  albuterol/ipratropium for Nebulization. 3 milliLiter(s) Nebulizer every 6 hours  aMIOdarone    Tablet 200 milliGRAM(s) Oral daily  artificial  tears Solution 1 Drop(s) Both EYES every 6 hours  cefepime   IVPB 2000 milliGRAM(s) IV Intermittent every 8 hours  chlorhexidine 0.12% Liquid 15 milliLiter(s) Oral Mucosa every 12 hours  chlorhexidine 4% Liquid 1 Application(s) Topical <User Schedule>  chlorhexidine 4% Liquid 1 Application(s) Topical <User Schedule>  dexMEDEtomidine Infusion 0.2 MICROgram(s)/kG/Hr (5.5 mL/Hr) IV Continuous <Continuous>  dextrose 5%. 1000 milliLiter(s) (50 mL/Hr) IV Continuous <Continuous>  dextrose 50% Injectable 12.5 Gram(s) IV Push once  dextrose 50% Injectable 25 Gram(s) IV Push once  dextrose 50% Injectable 25 Gram(s) IV Push once  fentaNYL   Infusion... 0.5 MICROgram(s)/kG/Hr (2.75 mL/Hr) IV Continuous <Continuous>  heparin   Injectable 5000 Unit(s) SubCutaneous every 8 hours  hydrALAZINE 50 milliGRAM(s) Oral every 8 hours  isosorbide   dinitrate Tablet (ISORDIL) 10 milliGRAM(s) Oral three times a day  metoprolol tartrate 12.5 milliGRAM(s) Oral two times a day  pantoprazole  Injectable 40 milliGRAM(s) IV Push daily  sodium chloride 7% Inhalation 4 milliLiter(s) Inhalation every 12 hours  vancomycin    Solution 125 milliGRAM(s) Oral every 6 hours    MEDICATIONS  (PRN):  acetaminophen   Tablet .. 650 milliGRAM(s) Oral every 6 hours PRN Temp greater or equal to 38C (100.4F), Mild Pain (1 - 3)  dextrose 40% Gel 15 Gram(s) Oral once PRN Blood Glucose LESS THAN 70 milliGRAM(s)/deciliter  glucagon  Injectable 1 milliGRAM(s) IntraMuscular once PRN Glucose LESS THAN 70 milligrams/deciliter  sodium chloride 0.9% lock flush 10 milliLiter(s) IV Push every 1 hour PRN Pre/post blood products, medications, blood draw, and to maintain line patency    Labs: 08-20 @ 01:26: Sodium 141, Potassium 3.9, Calcium 9.4, Magnesium 2.0, Phosphorus 3.8, BUN 41<H>, Creatinine 1.91<H>, Glucose 124<H>, Alk Phos 81, ALT/SGPT 47<H>, AST/SGOT 27, Albumin 3.2<L>, Prealbumin --, Total Bilirubin 0.3, Hemoglobin 10.2<L>, Hematocrit 33.0<L>, Ferritin --, C-Reactive Protein --, Creatine Kinase <<27>    Triglycerides, Serum: 218 mg/dL (08-09-20 @ 17:21)    POCT Blood Glucose.: 125 mg/dL (08-20-20 @ 06:44)  POCT Blood Glucose.: 106 mg/dL (08-19-20 @ 23:45)    Skin per nursing documentation: no pressure injuries documented  Edema: 2+ dependent;  generalized    Estimated Needs: (based on upper IBW 76.8kg:   Energy: (25-30kcal/kg): 1920-2304kcal  Protein: (1.4-1.6g protein/kg): 108-123g protein    Previous Nutrition Diagnosis: Increased nutrient needs  Nutrition Diagnosis is:  Ongoing, with provision of EN feeds    New Nutrition Diagnosis:  none noted    Recommended Interventions:   1.   2.  3.  4.       Monitoring and Evaluation:   Continue to monitor nutrition provision and tolerance, weights, labs, skin integrity.   RD remains available upon request and will follow up per protocol.    Alison Kleiner RD, [facility] ([phone]) Nutrition Follow Up Note     Patient seen for: nutrition follow up on ICU    Source: patient, medical record, communication with team.     Pt is a 61 yo M with PMH: A.Fib on coumadin s/p cardiac arrest at work. Downtime 25 minutes prior to ROSC. 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 Was intubated at OSH, transferred to [facility] for further care. Course c/b GIB, put on Protonix drip. Started on antibiotics for gram neg rods and gram neg diplococci on bronch stain / pseudomonas. Found to be C.Diff positive. Discussions ongoing with family re: trach/PEG. Noted with significant thick secretions deferring extubation at this time.  Pt s/p angio.    Diet Order: Diet, NPO with Tube Feed:   Tube Feeding Modality: Nasogastric  Vital 1.5 Meir (VITAL1.5RTH)  Total Volume for 24 Hours (mL): 1440  Continuous  Starting Tube Feed Rate {mL per Hour}: 10  Increase Tube Feed Rate by (mL): 10     Every 4 hours  Until Goal Tube Feed Rate (mL per Hour): 60  Tube Feed Duration (in Hours): 24  Tube Feed Start Time: 17:00  Supplement Feeding Modality:  Nasogastric  Probiotic Yogurt/Smoothie Cans or Servings Per Day:  2       Frequency:  Daily (08-18-20 @ 19:59)  Diet, NPO after Midnight:      NPO Start Date: 18-Aug-2020,   NPO Start Time: 23:59 (08-18-20 @ 19:59)    CURRENT EN ORDER PROVIDES:1440ml, 2160kcal and 97g protein.     Pt observed resting in bed; s/p angio. Unable to participate in nutrition evaluation at this time. Phos/K WNL. On antibiotics; dan active.     EN provision (per nursing flow sheet):   (8/20): 240ml (thus far; EN held for angio)  (8/19): EN held for angio (EN feeds changed to Vital 1.5 at 60ml/hr x 24 hrs)  (8/18): 420ml (pt was prescribed Vital AF at 70ml/hr x 24 hrs)  (8/17): 1120ml (EN feeds changed from Nepro 60ml/hr to Vital AF at 70ml/hr x 24 hrs)  (8/16): 1140ml (79% of goal)  (8/15): 1140ml (79% of goal)  (8/14): 1020ml (71% of goal)  (8/13): 360ml (25% of goal)    Last BM (8/19): x 1; (8/16): x 4. Via rectal tube: (8/20): 100ml; (8/18): 50ml; (8/17): 400ml. Off bowel regimen.     Anthropometric Measurements:   Height (cm): 172.72 (08-20-20 @ 08:21)  Weight (kg): 110 (08-20-20 @ 08:21)  BMI (kg/m2): 36.9 (08-20-20 @ 08:21)    Medications: MEDICATIONS  (STANDING):  albuterol/ipratropium for Nebulization. 3 milliLiter(s) Nebulizer every 6 hours  aMIOdarone    Tablet 200 milliGRAM(s) Oral daily  artificial  tears Solution 1 Drop(s) Both EYES every 6 hours  cefepime   IVPB 2000 milliGRAM(s) IV Intermittent every 8 hours  chlorhexidine 0.12% Liquid 15 milliLiter(s) Oral Mucosa every 12 hours  chlorhexidine 4% Liquid 1 Application(s) Topical <User Schedule>  chlorhexidine 4% Liquid 1 Application(s) Topical <User Schedule>  dexMEDEtomidine Infusion 0.2 MICROgram(s)/kG/Hr (5.5 mL/Hr) IV Continuous <Continuous>  dextrose 5%. 1000 milliLiter(s) (50 mL/Hr) IV Continuous <Continuous>  dextrose 50% Injectable 12.5 Gram(s) IV Push once  dextrose 50% Injectable 25 Gram(s) IV Push once  dextrose 50% Injectable 25 Gram(s) IV Push once  fentaNYL   Infusion... 0.5 MICROgram(s)/kG/Hr (2.75 mL/Hr) IV Continuous <Continuous>  heparin   Injectable 5000 Unit(s) SubCutaneous every 8 hours  hydrALAZINE 50 milliGRAM(s) Oral every 8 hours  isosorbide   dinitrate Tablet (ISORDIL) 10 milliGRAM(s) Oral three times a day  metoprolol tartrate 12.5 milliGRAM(s) Oral two times a day  pantoprazole  Injectable 40 milliGRAM(s) IV Push daily  sodium chloride 7% Inhalation 4 milliLiter(s) Inhalation every 12 hours  vancomycin    Solution 125 milliGRAM(s) Oral every 6 hours    MEDICATIONS  (PRN):  acetaminophen   Tablet .. 650 milliGRAM(s) Oral every 6 hours PRN Temp greater or equal to 38C (100.4F), Mild Pain (1 - 3)  dextrose 40% Gel 15 Gram(s) Oral once PRN Blood Glucose LESS THAN 70 milliGRAM(s)/deciliter  glucagon  Injectable 1 milliGRAM(s) IntraMuscular once PRN Glucose LESS THAN 70 milligrams/deciliter  sodium chloride 0.9% lock flush 10 milliLiter(s) IV Push every 1 hour PRN Pre/post blood products, medications, blood draw, and to maintain line patency    Labs: 08-20 @ 01:26: Sodium 141, Potassium 3.9, Calcium 9.4, Magnesium 2.0, Phosphorus 3.8, BUN 41<H>, Creatinine 1.91<H>, Glucose 124<H>, Alk Phos 81, ALT/SGPT 47<H>, AST/SGOT 27, Albumin 3.2<L>, Prealbumin --, Total Bilirubin 0.3, Hemoglobin 10.2<L>, Hematocrit 33.0<L>, Ferritin --, C-Reactive Protein --, Creatine Kinase <<27>    Triglycerides, Serum: 218 mg/dL (08-09-20 @ 17:21)    POCT Blood Glucose.: 125 mg/dL (08-20-20 @ 06:44)  POCT Blood Glucose.: 106 mg/dL (08-19-20 @ 23:45)    Skin per nursing documentation: no pressure injuries documented  Edema: 2+ dependent;  generalized    Estimated Needs: (based on upper IBW 76.8kg:   Energy: (25-30kcal/kg): 1920-2304kcal  Protein: (1.4-1.6g protein/kg): 108-123g protein    Previous Nutrition Diagnosis: Increased nutrient needs  Nutrition Diagnosis is:  Ongoing, with provision of EN feeds    New Nutrition Diagnosis:  none noted    Recommended Interventions:   1. Recommend to change feeds: Vital AF at GOAL rate 70ml/hr x 24 hrs. To provide (at goal rate, based on upper IBW 76.8kg): 1680ml, 2016kcal (26kcal/kg) and 126g protein (1.6g protein/kg). Phos/K WNL; no clear indication for Nepro at this time.   2. Monitor GI tolerance. RD to remain available to adjust EN formulary, volume/rate PRN.   3. Determine nutritional goals of care (? PEG/trach).   4. Monitor wt trends, nutrition related labs, skin integrity, hydration status and bowel regularity.   5. Consider Banatrol 1x daily if loose BM's persists.   4.       Monitoring and Evaluation:   Continue to monitor nutrition provision and tolerance, weights, labs, skin integrity.   RD remains available upon request and will follow up per protocol.    Alison Kleiner RD, [facility] ([phone]) Nutrition Follow Up Note     Patient seen for: nutrition follow up on ICU    Source: patient, medical record, communication with team.     Pt is a 61 yo M with PMH: A.Fib on coumadin s/p cardiac arrest at work. Downtime 25 minutes prior to ROSC. 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 Was intubated at OSH, transferred to [facility] for further care. Course c/b GIB, put on Protonix drip. Started on antibiotics for gram neg rods and gram neg diplococci on bronch stain / pseudomonas. Found to be C.Diff positive. Discussions ongoing with family re: trach/PEG. Noted with significant thick secretions deferring extubation at this time.  Pt s/p angio.    Diet Order: Diet, NPO with Tube Feed:   Tube Feeding Modality: Nasogastric  Vital 1.5 Meir (VITAL1.5RTH)  Total Volume for 24 Hours (mL): 1440  Continuous  Starting Tube Feed Rate {mL per Hour}: 10  Increase Tube Feed Rate by (mL): 10     Every 4 hours  Until Goal Tube Feed Rate (mL per Hour): 60  Tube Feed Duration (in Hours): 24  Tube Feed Start Time: 17:00  Supplement Feeding Modality:  Nasogastric  Probiotic Yogurt/Smoothie Cans or Servings Per Day:  2       Frequency:  Daily (08-18-20 @ 19:59)  Diet, NPO after Midnight:      NPO Start Date: 18-Aug-2020,   NPO Start Time: 23:59 (08-18-20 @ 19:59)    CURRENT EN ORDER PROVIDES:1440ml, 2160kcal and 97g protein.     Pt observed resting in bed; s/p angio. Unable to participate in nutrition evaluation at this time. Phos/K WNL. On antibiotics; dan active.     EN provision (per nursing flow sheet):   (8/20): 240ml (thus far; EN held for angio)  (8/19): EN held for angio (EN feeds changed to Vital 1.5 at 60ml/hr x 24 hrs)  (8/18): 420ml (pt was prescribed Vital AF at 70ml/hr x 24 hrs)  (8/17): 1120ml (EN feeds changed from Nepro 60ml/hr to Vital AF at 70ml/hr x 24 hrs)  (8/16): 1140ml (79% of goal)  (8/15): 1140ml (79% of goal)  (8/14): 1020ml (71% of goal)  (8/13): 360ml (25% of goal)    Last BM (8/19): x 1; (8/16): x 4. Via rectal tube: (8/20): 100ml; (8/18): 50ml; (8/17): 400ml. Off bowel regimen.     Anthropometric Measurements:   Height (cm): 172.72 (08-20-20 @ 08:21)  Weight (kg): 110 (08-20-20 @ 08:21)  BMI (kg/m2): 36.9 (08-20-20 @ 08:21)    Medications: MEDICATIONS  (STANDING):  albuterol/ipratropium for Nebulization. 3 milliLiter(s) Nebulizer every 6 hours  aMIOdarone    Tablet 200 milliGRAM(s) Oral daily  artificial  tears Solution 1 Drop(s) Both EYES every 6 hours  cefepime   IVPB 2000 milliGRAM(s) IV Intermittent every 8 hours  chlorhexidine 0.12% Liquid 15 milliLiter(s) Oral Mucosa every 12 hours  chlorhexidine 4% Liquid 1 Application(s) Topical <User Schedule>  chlorhexidine 4% Liquid 1 Application(s) Topical <User Schedule>  dexMEDEtomidine Infusion 0.2 MICROgram(s)/kG/Hr (5.5 mL/Hr) IV Continuous <Continuous>  dextrose 5%. 1000 milliLiter(s) (50 mL/Hr) IV Continuous <Continuous>  dextrose 50% Injectable 12.5 Gram(s) IV Push once  dextrose 50% Injectable 25 Gram(s) IV Push once  dextrose 50% Injectable 25 Gram(s) IV Push once  fentaNYL   Infusion... 0.5 MICROgram(s)/kG/Hr (2.75 mL/Hr) IV Continuous <Continuous>  heparin   Injectable 5000 Unit(s) SubCutaneous every 8 hours  hydrALAZINE 50 milliGRAM(s) Oral every 8 hours  isosorbide   dinitrate Tablet (ISORDIL) 10 milliGRAM(s) Oral three times a day  metoprolol tartrate 12.5 milliGRAM(s) Oral two times a day  pantoprazole  Injectable 40 milliGRAM(s) IV Push daily  sodium chloride 7% Inhalation 4 milliLiter(s) Inhalation every 12 hours  vancomycin    Solution 125 milliGRAM(s) Oral every 6 hours    MEDICATIONS  (PRN):  acetaminophen   Tablet .. 650 milliGRAM(s) Oral every 6 hours PRN Temp greater or equal to 38C (100.4F), Mild Pain (1 - 3)  dextrose 40% Gel 15 Gram(s) Oral once PRN Blood Glucose LESS THAN 70 milliGRAM(s)/deciliter  glucagon  Injectable 1 milliGRAM(s) IntraMuscular once PRN Glucose LESS THAN 70 milligrams/deciliter  sodium chloride 0.9% lock flush 10 milliLiter(s) IV Push every 1 hour PRN Pre/post blood products, medications, blood draw, and to maintain line patency    Labs: 08-20 @ 01:26: Sodium 141, Potassium 3.9, Calcium 9.4, Magnesium 2.0, Phosphorus 3.8, BUN 41<H>, Creatinine 1.91<H>, Glucose 124<H>, Alk Phos 81, ALT/SGPT 47<H>, AST/SGOT 27, Albumin 3.2<L>, Prealbumin --, Total Bilirubin 0.3, Hemoglobin 10.2<L>, Hematocrit 33.0<L>, Ferritin --, C-Reactive Protein --, Creatine Kinase <<27>    Triglycerides, Serum: 218 mg/dL (08-09-20 @ 17:21)    POCT Blood Glucose.: 125 mg/dL (08-20-20 @ 06:44)  POCT Blood Glucose.: 106 mg/dL (08-19-20 @ 23:45)    Skin per nursing documentation: no pressure injuries documented  Edema: 2+ dependent;  generalized    Estimated Needs: (based on upper IBW 76.8kg:   Energy: (25-30kcal/kg): 1920-2304kcal  Protein: (1.4-1.6g protein/kg): 108-123g protein    Previous Nutrition Diagnosis: Increased nutrient needs  Nutrition Diagnosis is:  Ongoing, with provision of EN feeds    New Nutrition Diagnosis:  none noted    Recommended Interventions:   1. Recommend to change feeds: Vital AF at GOAL rate 70ml/hr x 24 hrs. To provide (at goal rate, based on upper IBW 76.8kg): 1680ml, 2016kcal (26kcal/kg) and 126g protein (1.6g protein/kg). Phos/K WNL; no clear indication for Nepro at this time.   2. Monitor GI tolerance. RD to remain available to adjust EN formulary, volume/rate PRN.   3. Determine nutritional goals of care (? PEG/trach).   4. Monitor wt trends, nutrition related labs, skin integrity, hydration status and bowel regularity.   5. Consider Banatrol 1x daily if loose BM's persists.       Monitoring and Evaluation:   Continue to monitor nutrition provision and tolerance, weights, labs, skin integrity.   RD remains available upon request and will follow up per protocol.    Alison Kleiner RD, [facility] ([phone]) Nutrition Follow Up Note     Patient seen for: nutrition follow up on ICU    Source: patient, medical record, communication with team.     Pt is a 61 yo M with PMH: A.Fib on coumadin s/p cardiac arrest at work. Downtime 25 minutes prior to ROSC. 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 Was intubated at OSH, transferred to [facility] for further care. Course c/b GIB, put on Protonix drip. Started on antibiotics for gram neg rods and gram neg diplococci on bronch stain / pseudomonas. Found to be C.Diff positive. Discussions ongoing with family re: trach/PEG. Noted with significant thick secretions deferring extubation at this time.  Pt s/p angio.    Diet Order: Diet, NPO with Tube Feed:   Tube Feeding Modality: Nasogastric  Vital 1.5 Meir (VITAL1.5RTH)  Total Volume for 24 Hours (mL): 1440  Continuous  Starting Tube Feed Rate {mL per Hour}: 10  Increase Tube Feed Rate by (mL): 10     Every 4 hours  Until Goal Tube Feed Rate (mL per Hour): 60  Tube Feed Duration (in Hours): 24  Tube Feed Start Time: 17:00  Supplement Feeding Modality:  Nasogastric  Probiotic Yogurt/Smoothie Cans or Servings Per Day:  2       Frequency:  Daily (08-18-20 @ 19:59)  Diet, NPO after Midnight:      NPO Start Date: 18-Aug-2020,   NPO Start Time: 23:59 (08-18-20 @ 19:59)    CURRENT EN ORDER PROVIDES:1440ml, 2160kcal and 97g protein.     Pt observed resting in bed; s/p angio. Unable to participate in nutrition evaluation at this time. Phos/K WNL. On antibiotics; dan active.     EN provision (per nursing flow sheet):   (8/20): 240ml (thus far; EN held for angio)  (8/19): EN held for angio (EN feeds changed to Vital 1.5 at 60ml/hr x 24 hrs)  (8/18): 420ml (pt was prescribed Vital AF at 70ml/hr x 24 hrs)  (8/17): 1120ml (EN feeds changed from Nepro 60ml/hr to Vital AF at 70ml/hr x 24 hrs)  (8/16): 1140ml (79% of goal)  (8/15): 1140ml (79% of goal)  (8/14): 1020ml (71% of goal)  (8/13): 360ml (25% of goal)    Last BM (8/19): x 1; (8/16): x 4. Via rectal tube: (8/20): 100ml; (8/18): 50ml; (8/17): 400ml. Off bowel regimen.     Anthropometric Measurements:   Height (cm): 172.72 (08-20-20 @ 08:21)  Weight (kg): 110 (08-20-20 @ 08:21)  BMI (kg/m2): 36.9 (08-20-20 @ 08:21)    Medications: MEDICATIONS  (STANDING):  albuterol/ipratropium for Nebulization. 3 milliLiter(s) Nebulizer every 6 hours  aMIOdarone    Tablet 200 milliGRAM(s) Oral daily  artificial  tears Solution 1 Drop(s) Both EYES every 6 hours  cefepime   IVPB 2000 milliGRAM(s) IV Intermittent every 8 hours  chlorhexidine 0.12% Liquid 15 milliLiter(s) Oral Mucosa every 12 hours  chlorhexidine 4% Liquid 1 Application(s) Topical <User Schedule>  chlorhexidine 4% Liquid 1 Application(s) Topical <User Schedule>  dexMEDEtomidine Infusion 0.2 MICROgram(s)/kG/Hr (5.5 mL/Hr) IV Continuous <Continuous>  dextrose 5%. 1000 milliLiter(s) (50 mL/Hr) IV Continuous <Continuous>  dextrose 50% Injectable 12.5 Gram(s) IV Push once  dextrose 50% Injectable 25 Gram(s) IV Push once  dextrose 50% Injectable 25 Gram(s) IV Push once  fentaNYL   Infusion... 0.5 MICROgram(s)/kG/Hr (2.75 mL/Hr) IV Continuous <Continuous>  heparin   Injectable 5000 Unit(s) SubCutaneous every 8 hours  hydrALAZINE 50 milliGRAM(s) Oral every 8 hours  isosorbide   dinitrate Tablet (ISORDIL) 10 milliGRAM(s) Oral three times a day  metoprolol tartrate 12.5 milliGRAM(s) Oral two times a day  pantoprazole  Injectable 40 milliGRAM(s) IV Push daily  sodium chloride 7% Inhalation 4 milliLiter(s) Inhalation every 12 hours  vancomycin    Solution 125 milliGRAM(s) Oral every 6 hours    MEDICATIONS  (PRN):  acetaminophen   Tablet .. 650 milliGRAM(s) Oral every 6 hours PRN Temp greater or equal to 38C (100.4F), Mild Pain (1 - 3)  dextrose 40% Gel 15 Gram(s) Oral once PRN Blood Glucose LESS THAN 70 milliGRAM(s)/deciliter  glucagon  Injectable 1 milliGRAM(s) IntraMuscular once PRN Glucose LESS THAN 70 milligrams/deciliter  sodium chloride 0.9% lock flush 10 milliLiter(s) IV Push every 1 hour PRN Pre/post blood products, medications, blood draw, and to maintain line patency    Labs: 08-20 @ 01:26: Sodium 141, Potassium 3.9, Calcium 9.4, Magnesium 2.0, Phosphorus 3.8, BUN 41<H>, Creatinine 1.91<H>, Glucose 124<H>, Alk Phos 81, ALT/SGPT 47<H>, AST/SGOT 27, Albumin 3.2<L>, Prealbumin --, Total Bilirubin 0.3, Hemoglobin 10.2<L>, Hematocrit 33.0<L>, Ferritin --, C-Reactive Protein --, Creatine Kinase <<27>    Triglycerides, Serum: 218 mg/dL (08-09-20 @ 17:21)    POCT Blood Glucose.: 125 mg/dL (08-20-20 @ 06:44)  POCT Blood Glucose.: 106 mg/dL (08-19-20 @ 23:45)    Skin per nursing documentation: no pressure injuries documented  Edema: 2+ dependent;  generalized    Estimated Needs: (based on upper IBW 76.8kg:   Energy: (25-30kcal/kg): 1920-2304kcal  Protein: (1.4-1.6g protein/kg): 108-123g protein    Previous Nutrition Diagnosis: Increased nutrient needs  Nutrition Diagnosis is:  Ongoing, with provision of EN feeds    New Nutrition Diagnosis:  none noted    Recommended Interventions:   1. Recommend to change feeds: Vital AF at GOAL rate 70ml/hr x 24 hrs. To provide (at goal rate, based on upper IBW 76.8kg): 1680ml, 2016kcal (26kcal/kg) and 126g protein (1.6g protein/kg). Continue Cuong Active twice daily.   2. Monitor GI tolerance. RD to remain available to adjust EN formulary, volume/rate PRN.   3. Determine nutritional goals of care (? PEG/trach).   4. Monitor wt trends, nutrition related labs, skin integrity, hydration status and bowel regularity.   5. Consider Banatrol 1-2 packets daily if loose BM's persists.       Monitoring and Evaluation:   Continue to monitor nutrition provision and tolerance, weights, labs, skin integrity.   RD remains available upon request and will follow up per protocol.    Alison Kleiner RD, [facility], [place] ([phone])

## 2020-08-20 NOTE — CHART NOTE - NSCHARTNOTEFT_GEN_A_CORE
Interventional Neuro Radiology  Pre-Procedure Note PA-NILA      This is a 60 year old right hand dominant male on coumadin for afib s/p cardiac arrest at work, down 25 minutes prior to ROSC. Pupils were R fixed and dilated, left fixed at the time, no gag reflex, post-CA cooling protocol was initiated down to 35 deg. Intubated and put on Nimbex drip. Also on Propofol, fentanyl, levophed. R groin minerva placed. Also put on heparin post-CA. HCT showed right periclinoidal perimesencephalic SAH, c/f aneurysm rupture, no hydrocephalus. Course also c/b GIB, put on protonix drip. Prior to xfer was started on 3% at 30cc/hour. Patient is transported to Neuro IR for a selective cerebral angiography to determine a source for the hemorrhage.     Allergies: No Known Allergies  PMHX: atrial fibrillation  PSHX: 8- dx angio   Social History:   FAMILY HISTORY: No pertinent family history in first degree relatives    Current Medications: acetaminophen   Tablet 650 milliGRAM(s) Oral every 6 hours PRN  albuterol/ipratropium for Nebulization. 3 milliLiter(s) Nebulizer every 6 hours  aMIOdarone    Tablet 200 milliGRAM(s) Oral daily  artificial  tears Solution 1 Drop(s) Both EYES every 6 hours  cefepime   IVPB 2000 milliGRAM(s) IV Intermittent every 8 hours  chlorhexidine 0.12% Liquid 15 milliLiter(s) Oral Mucosa every 12 hours  chlorhexidine 4% Liquid 1 Application(s) Topical   chlorhexidine 4% Liquid 1 Application(s) Topical   dexMEDEtomidine Infusion 0.2 MICROgram(s)/kG/Hr IV Continuous   dextrose 40% Gel 15 Gram(s) Oral once PRN  dextrose 5%. 1000 milliLiter(s) IV Continuous   dextrose 50% Injectable 12.5 Gram(s) IV Push once  dextrose 50% Injectable 25 Gram(s) IV Push once  dextrose 50% Injectable 25 Gram(s) IV Push once  fentaNYL   Infusion... 0.5 MICROgram(s)/kG/Hr IV Continuous   glucagon  Injectable 1 milliGRAM(s) IntraMuscular once PRN  heparin   Injectable 5000 Unit(s) SubCutaneous every 8 hours  hydrALAZINE 50 milliGRAM(s) Oral every 8 hours  isosorbide   dinitrate Tablet (ISORDIL) 10 milliGRAM(s) Oral three times a day  metoprolol tartrate 12.5 milliGRAM(s) Oral two times a day  pantoprazole  Injectable 40 milliGRAM(s) IV Push daily  sodium chloride 0.9% lock flush 10 milliLiter(s) IV Push every 1 hour PRN  sodium chloride 7% Inhalation 4 milliLiter(s) Inhalation every 12 hours  vancomycin    Solution 125 milliGRAM(s) Oral every 6 hours      Labs:                         10.2   25.37 )-----------( 271      ( 20 Aug 2020 01:26 )             33.0       08-20    141  |  109<H>  |  41<H>  ----------------------------<  124<H>  3.9   |  17<L>  |  1.91<H>    Ca    9.4      20 Aug 2020 01:26  Phos  3.8     08-20  Mg     2.0     08-20    TPro  6.7  /  Alb  3.2<L>  /  TBili  0.3  /  DBili  <0.1  /  AST  27  /  ALT  47<H>  /  AlkPhos  81  08-20        Blood Bank: 0 positive available         Assessment/Plan:   This is a 60 year old right  hand dominant Male  presents with   Patient presents to neuro-IR for selective cerebral angiography.   Procedure, goals, risks, benefits and alternatives  were discussed with patient and patient's family.  All questions were answered.  Risks include but are not limited to stroke, vessel injury, hemorrhage, and or right  groin hematoma.  Patient demonstrates understanding  of all risks involved with this procedure and wishes to continue.   Appropriate  content was obtained from patient and consent is in the patient's chart. Interventional Neuro Radiology  Pre-Procedure Note PA-NILA    This is a 60 year old right hand dominant male on coumadin for afib s/p cardiac arrest at work, down 25 minutes prior to ROSC. Pupils were R fixed and dilated, left fixed at the time, no gag reflex, post-CA cooling protocol was initiated down to 35 deg. Intubated and put on Nimbex drip. Also on Propofol, fentanyl, levophed. R groin minerva placed. Also put on heparin post-CA. HCT showed right periclinoidal perimesencephalic SAH, c/f aneurysm rupture, no hydrocephalus. Course also c/b GIB, put on protonix drip. Prior to xfer was started on 3% at 30cc/hour. Patient is transported to Neuro IR for a selective cerebral angiography to determine a source for the hemorrhage.     Allergies: No Known Allergies  PMHX: atrial fibrillation  PSHX: 8- dx angio   Social History:   FAMILY HISTORY: No pertinent family history in first degree relatives    Current Medications: acetaminophen   Tablet 650 milliGRAM(s) Oral every 6 hours PRN  albuterol/ipratropium for Nebulization. 3 milliLiter(s) Nebulizer every 6 hours  aMIOdarone    Tablet 200 milliGRAM(s) Oral daily  artificial  tears Solution 1 Drop(s) Both EYES every 6 hours  cefepime   IVPB 2000 milliGRAM(s) IV Intermittent every 8 hours  chlorhexidine 0.12% Liquid 15 milliLiter(s) Oral Mucosa every 12 hours  chlorhexidine 4% Liquid 1 Application(s) Topical   chlorhexidine 4% Liquid 1 Application(s) Topical   dexMEDEtomidine Infusion 0.2 MICROgram(s)/kG/Hr IV Continuous   dextrose 40% Gel 15 Gram(s) Oral once PRN  dextrose 5%. 1000 milliLiter(s) IV Continuous   dextrose 50% Injectable 12.5 Gram(s) IV Push once  dextrose 50% Injectable 25 Gram(s) IV Push once  dextrose 50% Injectable 25 Gram(s) IV Push once  fentaNYL   Infusion... 0.5 MICROgram(s)/kG/Hr IV Continuous   glucagon  Injectable 1 milliGRAM(s) IntraMuscular once PRN  heparin   Injectable 5000 Unit(s) SubCutaneous every 8 hours  hydrALAZINE 50 milliGRAM(s) Oral every 8 hours  isosorbide   dinitrate Tablet (ISORDIL) 10 milliGRAM(s) Oral three times a day  metoprolol tartrate 12.5 milliGRAM(s) Oral two times a day  pantoprazole  Injectable 40 milliGRAM(s) IV Push daily  sodium chloride 0.9% lock flush 10 milliLiter(s) IV Push every 1 hour PRN  sodium chloride 7% Inhalation 4 milliLiter(s) Inhalation every 12 hours  vancomycin    Solution 125 milliGRAM(s) Oral every 6 hours      Labs:                         10.2   25.37 )-----------( 271      ( 20 Aug 2020 01:26 )             33.0       08-20    141  |  109<H>  |  41<H>  ----------------------------<  124<H>  3.9   |  17<L>  |  1.91<H>    Ca    9.4      20 Aug 2020 01:26  Phos  3.8     08-20  Mg     2.0     08-20    TPro  6.7  /  Alb  3.2<L>  /  TBili  0.3  /  DBili  <0.1  /  AST  27  /  ALT  47<H>  /  AlkPhos  81  08-20      Blood Bank: 0 positive available       Assessment/Plan:   This is a 60 year old right hand dominant male with a subarachnoid hemorrhage who is transported to Neuro IR for a selective cerebral angiography to determine a source for the hemorrhage. Procedure, goals, risks, benefits and alternatives were discussed with patient's son. All questions were answered. Risks include but are not limited to stroke, vessel injury, hemorrhage, and or right  groin hematoma. Patient's son demonstrates understanding of all risks involved with this procedure and wishes to continue.   Appropriate content was obtained from patient's son and consent is in the patient's chart.

## 2020-08-20 NOTE — CHART NOTE - NSCHARTNOTEFT_GEN_A_CORE
Interventional Neuro- Radiology   Procedure Note PA-C    Procedure: Selective Cerebral Angiography   Pre- Procedure Diagnosis:  Post- Procedure Diagnosis:    : Dr Abe Morris   Fellow:     Dr Timbo Loredo   Physician Assistant: Bren Petty PA-C    Nurse:  Radiologic Tech:  Anesthesiologist:  Sheath:      I/Os: EBL less than 10cc  IV fluids:     cc     Urine output     cc    Contrast Omnipaque 240      cc             Vitals: BP         HR      Spo2          Preliminary Report:  Using a 5 Maltese long sheath to the right groin under MAC sedation via left vertebral artery,  left internal carotid artery, left external carotid artery, right vertebral artery, right internal carotid artery, right external carotid artery a selective cerebral angiography was performed and demonstrated                       Official note to follow.  Patient tolerated procedure well, hemodynamically stable, no change in neurological status compared to baseline.  Results discussed with neuro ICU team, patient and patient's family. Right groin sheath was removed, manual compression held to hemostasis for 20 minutes, no active bleeding, no hematoma, quick clot and safeguard balloon dressing applied at Interventional Neuro- Radiology   Procedure Note PA-C    Procedure: Selective Cerebral Angiography   Pre- Procedure Diagnosis: SAH  Post- Procedure Diagnosis: no source for hemorrhage     : Dr Abe Morris   Fellow:     Dr Timbo Loredo   Physician Assistant: Bren Petty PA-C    Nurse:  Radiologic Tech:  Anesthesiologist:  Sheath:      I/Os: EBL less than 10cc  IV fluids:     cc     Urine output     cc   Contrast Omnipaque 240                Vitals: BP         HR      Spo2          Preliminary Report:  Using a 5 [place]vorian long sheath to the right groin under MAC sedation via left vertebral artery,  left internal carotid artery, left external carotid artery, right vertebral artery, right internal carotid artery, right external carotid artery a selective cerebral angiography was performed and demonstrated                       Official note to follow.  Patient tolerated procedure well, hemodynamically stable, no change in neurological status compared to baseline.  Results discussed with neuro ICU team, patient and patient's family. Right groin sheath was removed, manual compression held to hemostasis for 20 minutes, no active bleeding, no hematoma, quick clot and safeguard balloon dressing applied at Interventional Neuro- Radiology   Procedure Note PA-C    Procedure: Selective Cerebral Angiography   Pre- Procedure Diagnosis: SAH  Post- Procedure Diagnosis: no source for hemorrhage     : Dr Abe Morris   Fellow:     Dr Timbo Loredo   Physician Assistant: Bren Petty PA-C    Nurse:                   Adriana Spence RN  Radiologic Tech:   Martha Balbuena LRT  Anesthesiologist:   Dr Gilbert Lucas   Sheath:                 5 [place]isian long sheath       I/Os: EBL less than 10cc  IV fluids: 200cc Urine output due to void Contrast Omnipaque 240 120cc                Vitals: /88        HR 90     Spo2 99%         Preliminary Report:  Using a 5 Wallisian long sheath to the right groin under MAC sedation via left vertebral artery,  left internal carotid artery, left external carotid artery, right vertebral artery, right internal carotid artery, right external carotid artery a selective cerebral angiography was performed and demonstrated no source for hemorrhage. No aneurysm and no AVM. Official note to follow.  Patient tolerated procedure well, hemodynamically stable, no change in neurological status compared to baseline. Results discussed with neuro ICU team and patient's family. Right groin sheath was removed, manual compression held to hemostasis for 20 minutes, no active bleeding, no hematoma, quick clot and safeguard balloon dressing applied at 11:15am. Disposition Neuro ICU.

## 2020-08-20 NOTE — PROGRESS NOTE ADULT - SUBJECTIVE AND OBJECTIVE BOX
O: could not extubate given tachypena and tachycardia on PS  Angio repeated negative   T(C): 37 (08-20-20 @ 15:00), Max: 37.2 (08-20-20 @ 04:00)  HR: 67 (08-20-20 @ 18:00) (63 - 168)  BP: 147/77 (08-20-20 @ 08:26) (147/77 - 147/77)  RR: 18 (08-20-20 @ 18:00) (16 - 31)  SpO2: 100% (08-20-20 @ 18:00) (95% - 100%)  08-19-20 @ 07:01  -  08-20-20 @ 07:00  --------------------------------------------------------  IN: 1553.3 mL / OUT: 975 mL / NET: 578.3 mL    08-20-20 @ 07:01  -  08-20-20 @ 18:25  --------------------------------------------------------  IN: 417 mL / OUT: 950 mL / NET: -533 mL    acetaminophen   Tablet .. 650 milliGRAM(s) Oral every 6 hours PRN  acetylcysteine 20%  Inhalation 4 milliLiter(s) Inhalation three times a day PRN  albuterol/ipratropium for Nebulization. 3 milliLiter(s) Nebulizer every 6 hours  aMIOdarone    Tablet 200 milliGRAM(s) Oral daily  artificial  tears Solution 1 Drop(s) Both EYES every 6 hours  cefepime   IVPB 2000 milliGRAM(s) IV Intermittent every 8 hours  chlorhexidine 0.12% Liquid 15 milliLiter(s) Oral Mucosa every 12 hours  chlorhexidine 4% Liquid 1 Application(s) Topical <User Schedule>  chlorhexidine 4% Liquid 1 Application(s) Topical <User Schedule>  cloNIDine 0.1 milliGRAM(s) Oral three times a day  dexMEDEtomidine Infusion 0.2 MICROgram(s)/kG/Hr IV Continuous <Continuous>  dextrose 40% Gel 15 Gram(s) Oral once PRN  dextrose 5%. 1000 milliLiter(s) IV Continuous <Continuous>  dextrose 50% Injectable 12.5 Gram(s) IV Push once  dextrose 50% Injectable 25 Gram(s) IV Push once  dextrose 50% Injectable 25 Gram(s) IV Push once  fentaNYL    Injectable 25 MICROGram(s) IV Push every 2 hours PRN  fentaNYL   Infusion... 0.5 MICROgram(s)/kG/Hr IV Continuous <Continuous>  glucagon  Injectable 1 milliGRAM(s) IntraMuscular once PRN  heparin   Injectable 5000 Unit(s) SubCutaneous every 8 hours  hydrALAZINE 50 milliGRAM(s) Oral every 8 hours  isosorbide   dinitrate Tablet (ISORDIL) 10 milliGRAM(s) Oral three times a day  lactobacillus acidophilus 1 Tablet(s) Oral two times a day  metoprolol tartrate 12.5 milliGRAM(s) Oral two times a day  pantoprazole  Injectable 40 milliGRAM(s) IV Push daily  sodium chloride 0.9% lock flush 10 milliLiter(s) IV Push every 1 hour PRN  sodium chloride 7% Inhalation 4 milliLiter(s) Inhalation every 12 hours  vancomycin    Solution 125 milliGRAM(s) Oral every 6 hours  Mode: AC/ CMV (Assist Control/ Continuous Mandatory Ventilation), RR (machine): 18, TV (machine): 500, FiO2: 40, PEEP: 5, ITime: 1, MAP: 10, PIP: 20  EXAMINATION:  General: Calm, intubated  HEENT: MMM  Neuro:  -Mental status-  No acute distress, EO to voice. Strong and purposeful on RUE,  followed showing 2 fingers. Left side  0/5  -CN- Pupils R 5mm NR, L 1mm sluggish, Eyes dysconjugate,  +cough/gag, +corneals  RUE localize AG, RLE spont AG, LUE flaccid, LLE flaccid  CVS: S1/S2  RESP: Diminished at bases  GI: Soft, non tender, mildly distended, hypoactive BS  Extremities: Warm, skin intact    Assessment and Plan:   · Assessment	  60 year old male s/p cardiac arrest c/b GIB and bleeding from [facility] with Perimesencephalic (HH4 mF4) subarachnoid hemorrhage, angio neg ,PBD 13.    NEURO:    Angio 8/20: negative.   SAH, possible sentinal event with cardiac arrest  Q2h neurochecks  CT Head: perimesencephalic SAH   CTA Head: no aneurysm noted  Initial Conventional angiogram negative  MRI neuroaxis: Restricted diffusion in R BG, posterior limb of R IC, and anterior right temporal lobe  VEEG negative. DCed.   DCI management (off nimodipine). Poor windows  No EVD as no significant hydrocephalus  Precedex gtt for sedation- RASS 0 to -1, fentanyl gtt  Agitated: Haldol    PULM: Acute PULM edema  [facility] vent: settings- 18/500/40/5   spO2>92%  CXR:  pul edema 8/20 improved  CTA R/O PE: negative  Continue hypertonic nebs./ Mucomyst prn. Secretions improved.    CPAP as tolerated.  Weaning parameters  Pulm toilet    CV:  SBP goal 100- 160  TTE: Global LV dysfunction. EF41%, Severe concentric LVH, flattening of interventricular septum.    S/P Concern for possible PE - CTA negative  Hydralazine 75 q8 + isosorbide 10 mg q 8  Will need cath per cardiology  S/P amio gtt; changed to  daily.  Mg- 2.0 ; Po4- 3.0 ; k- 4.0  Troponin 65--> 66. Stable.      RENAL: ?CKD with superimposed JALEN.- stable   Cr stable 1.9. Monitor Cr and lytes.  Sodium goal 140-150  Has been diuresced.   Monitor Cr post angio      GI:  S/P GI bleed, hyperlipasemia  Diarrhea- - Increased WBC and ABX--> Vanco 125 mg q6 for Cdiff   Diet: NPO  GI prophylaxis - [x] PPI 40 daily in setting of recent GI bleed and intubation  Bowel regimen [x] senna - rectal tube   LBM: 8/20. Cdiff  Lipase 101 --> repeat.   LFTs    ENDO:   Goal euglycemia (-180)  TFTs wnl      HEME/ONC: Afib on coumadin at home; leucocytosis  Leukocytosis: No eosinophilia   Lipase slightly elevated 101, LFTs wnl  s/p vit K: INR 1.42-->1.2--> 1.5  VTE prophylaxis: [] SCDs [x] chemoprophylaxis heparin subq  Dopplers negative for DVT.      ID: Cdiff positive  Leukocytosis - Diarrhea. Improving  C difficile - see above; hypoactive BS  No fever  Started on cefepime (switched from unasyn on 8/14) for pseudomonas- D/C 8/28/20)- 2 week course for PNA  Urine culture, procal .19  PO vancomycin  Add probiotic    SOCIAL/FAMILY:  [x] updated at bedside       CODE STATUS:  [x] Full Code [] DNR [] DNI [] Palliative/Comfort Care    DISPOSITION:  [x] ICU [] Stroke Unit [] Floor [] EMU [] RCU [] PCU    [x] Patient is at high risk of neurologic deterioration/death due to: SAH, cardiac arrest O: could not extubate given tachypnea and tachycardia on PS  Angio repeated negative   episode of desaturation, had mucous plugging, bagged , oxygenation improved     T(C): 37 (08-20-20 @ 15:00), Max: 37.2 (08-20-20 @ 04:00)  HR: 67 (08-20-20 @ 18:00) (63 - 168)  BP: 147/77 (08-20-20 @ 08:26) (147/77 - 147/77)  RR: 18 (08-20-20 @ 18:00) (16 - 31)  SpO2: 100% (08-20-20 @ 18:00) (95% - 100%)  08-19-20 @ 07:01  -  08-20-20 @ 07:00  --------------------------------------------------------  IN: 1553.3 mL / OUT: 975 mL / NET: 578.3 mL    08-20-20 @ 07:01  -  08-20-20 @ 18:25  --------------------------------------------------------  IN: 417 mL / OUT: 950 mL / NET: -533 mL    acetaminophen   Tablet .. 650 milliGRAM(s) Oral every 6 hours PRN  acetylcysteine 20%  Inhalation 4 milliLiter(s) Inhalation three times a day PRN  albuterol/ipratropium for Nebulization. 3 milliLiter(s) Nebulizer every 6 hours  aMIOdarone    Tablet 200 milliGRAM(s) Oral daily  artificial  tears Solution 1 Drop(s) Both EYES every 6 hours  cefepime   IVPB 2000 milliGRAM(s) IV Intermittent every 8 hours  chlorhexidine 0.12% Liquid 15 milliLiter(s) Oral Mucosa every 12 hours  chlorhexidine 4% Liquid 1 Application(s) Topical <User Schedule>  chlorhexidine 4% Liquid 1 Application(s) Topical <User Schedule>  cloNIDine 0.1 milliGRAM(s) Oral three times a day  dexMEDEtomidine Infusion 0.2 MICROgram(s)/kG/Hr IV Continuous <Continuous>  dextrose 40% Gel 15 Gram(s) Oral once PRN  dextrose 5%. 1000 milliLiter(s) IV Continuous <Continuous>  dextrose 50% Injectable 12.5 Gram(s) IV Push once  dextrose 50% Injectable 25 Gram(s) IV Push once  dextrose 50% Injectable 25 Gram(s) IV Push once  fentaNYL    Injectable 25 MICROGram(s) IV Push every 2 hours PRN  fentaNYL   Infusion... 0.5 MICROgram(s)/kG/Hr IV Continuous <Continuous>  glucagon  Injectable 1 milliGRAM(s) IntraMuscular once PRN  heparin   Injectable 5000 Unit(s) SubCutaneous every 8 hours  hydrALAZINE 50 milliGRAM(s) Oral every 8 hours  isosorbide   dinitrate Tablet (ISORDIL) 10 milliGRAM(s) Oral three times a day  lactobacillus acidophilus 1 Tablet(s) Oral two times a day  metoprolol tartrate 12.5 milliGRAM(s) Oral two times a day  pantoprazole  Injectable 40 milliGRAM(s) IV Push daily  sodium chloride 0.9% lock flush 10 milliLiter(s) IV Push every 1 hour PRN  sodium chloride 7% Inhalation 4 milliLiter(s) Inhalation every 12 hours  vancomycin    Solution 125 milliGRAM(s) Oral every 6 hours    Mode: AC/ CMV (Assist Control/ Continuous Mandatory Ventilation), RR (machine): 18, TV (machine): 500, FiO2: 40, PEEP: 5, ITime: 1, MAP: 10, PIP: 20  EXAMINATION:  General: Calm, intubated  HEENT: MMM  Neuro:  -Mental status-  No acute distress, EO to voice. Strong and purposeful on RUE,  followed showing 2 fingers. Left side  0/5  -CN- Pupils R 5mm NR, L 1mm sluggish, Eyes dysconjugate,  +cough/gag, +corneals  RUE localize AG, RLE spont AG, LUE flaccid, LLE flaccid  CVS: S1/S2  RESP: Diminished at bases  GI: Soft, non tender, mildly distended, hypoactive BS  Extremities: Warm, skin intact      LABS:  Na: 141 (08-20 @ 01:26), 140 (08-19 @ 10:25), 145 (08-18 @ 21:36)  K: 3.9 (08-20 @ 01:26), 3.6 (08-19 @ 10:25), 3.7 (08-18 @ 21:36)  Cl: 109 (08-20 @ 01:26), 109 (08-19 @ 10:25), 111 (08-18 @ 21:36)  CO2: 17 (08-20 @ 01:26), 21 (08-19 @ 10:25), 21 (08-18 @ 21:36)  BUN: 41 (08-20 @ 01:26), 42 (08-19 @ 10:25), 40 (08-18 @ 21:36)  Cr: 1.91 (08-20 @ 01:26), 1.80 (08-19 @ 10:25), 1.95 (08-18 @ 21:36)  Glu: 124(08-20 @ 01:26), 116(08-19 @ 10:25), 100(08-18 @ 21:36)    Hgb: 10.2 (08-20 @ 22:09), 10.2 (08-20 @ 01:26), 10.5 (08-18 @ 21:36)  Hct: 33.1 (08-20 @ 22:09), 33.0 (08-20 @ 01:26), 33.6 (08-18 @ 21:36)  WBC: 19.42 (08-20 @ 22:09), 25.37 (08-20 @ 01:26), 28.72 (08-18 @ 21:36)  Plt: 265 (08-20 @ 22:09), 271 (08-20 @ 01:26), 234 (08-18 @ 21:36)    INR: 1.53 08-20-20 @ 01:26, 1.46 08-18-20 @ 21:36  PTT: 32.8 08-20-20 @ 01:26, 31.5 08-18-20 @ 21:36            Assessment and Plan:   · Assessment	  60 year old male s/p cardiac arrest c/b GIB and bleeding from [facility] with Perimesencephalic (HH4 mF4) subarachnoid hemorrhage, angio neg ,PBD 13.  angio x 2 negative   SAH, possible sentinal event with cardiac arrest  Q2h neurochecks  MRI neuroaxis: Restricted diffusion in R BG, posterior limb of R IC, and anterior right temporal lobe  VEEG negative. DCed.   DCI management (off nimodipine). Poor windows  No EVD as no significant hydrocephalus  Precedex gtt for sedation- RASS 0 to -1, fentanyl gt  PULM:   Mech vent: settings- 18/500/40/5   episode of desaturation, has thick secretions, on Mucomyst and dunoebs   will get chest xray   spO2>92%  might need lasix   CV:  SBP goal 100- 160  TTE: Global LV dysfunction. EF41%, Severe concentric LVH, flattening of interventricular septum.    S/P Concern for possible PE - CTA negative  Hydralazine 75 q8 + isosorbide 10 mg q 8   amio   daily. for a fib   Mg- 2.0 ; Po4- 3.0 ; k- 4.0  Troponin 65--> 66. Stable.   ?CKD with superimposed JALEN.- stable   Cr stable 1.9. Monitor Cr and lytes.  Sodium goal 140-150  Has been diuresced.   Monitor Cr post angio      GI:  S/P GI bleed, hyperlipasemia  Diarrhea- - Increased WBC and ABX--> Vanco 125 mg q6 for Cdiff   Diet: restrated on TF   Goal euglycemia (-180)  HEME/ONC: Afib on coumadin at home; leucocytosis  VTE prophylaxis: [] SCDs [x] chemoprophylaxis heparin subq  Dopplers negative for DVT.    Started on cefepime (switched from unasyn on 8/14) for pseudomonas- D/C 8/28/20)- 2 week course for PNA  PO vancomycin for c.diff     SOCIAL/FAMILY:  [x] updated at bedside       CODE STATUS:  [x] Full Code [] DNR [] DNI [] Palliative/Comfort Care    DISPOSITION:  [x] ICU [] Stroke Unit [] Floor [] EMU [] RCU [] PCU    [x] Patient is at high risk of neurologic deterioration/death due to: SAH, cardiac arrest

## 2020-08-20 NOTE — PROGRESS NOTE ADULT - SUBJECTIVE AND OBJECTIVE BOX
HPI:  60 year old male with PMHx of Afib on coumadin s/p cardiac arrest at work, downtime 25 minutes prior to ROSC. Pupils were R fixed and dilated, left fixed at the time, no gag reflex, post-CA cooling protocol was initiated down to 35 deg. Intubated and put on Nimbex drip. Also on Propofol, fentanyl, levophed. R groin minerva placed. Also put on heparin post-CA. HCT showed R perimesencephalic SAH of unclear etiology, no hydrocephalus. Course also c/b GIB, was put on protonix drip. Also had bleeding from [facility] - urology consulted, clot irrigated.     HOSP COURSE:   8/10- Angio- neg   8/11: VTACH noted (7 beats)  8/13: Febrile  8/14: Started on Unasyn for gram neg rods and gram neg diplococci on bronch gram stain -> switched to cefepime for pseudomonas noted 8/10  8/16: central line removed on 8/16. Cr improving 1.7 -> 1.57. MRNOVA planned. Could not tolerate since desatted when supine  8/17/20- Lasix 30mg x1  8/18: PO vanc for Cdiff  8/19- angio today / 7 beats VTACH  8/19: Few beats of VTACH noted.     Overnight events: Kept NPO for possible extubation.       ICU Vital Signs Last 24 Hrs  T(C): 37.2 (20 Aug 2020 04:00), Max: 37.5 (19 Aug 2020 15:00)  T(F): 98.9 (20 Aug 2020 04:00), Max: 99.5 (19 Aug 2020 15:00)  HR: 70 (20 Aug 2020 06:45) (66 - 168)  ABP: 133/64 (20 Aug 2020 06:45) (85/51 - 231/131)  ABP(mean): 80 (20 Aug 2020 06:45) (61 - 161)  RR: 19 (20 Aug 2020 06:45) (18 - 31)  SpO2: 100% (20 Aug 2020 06:45) (100% - 100%)      08-19-20 @ 07:01  -  08-20-20 @ 07:00  --------------------------------------------------------  IN: 1528.5 mL / OUT: 975 mL / NET: 553.5 mL        08-18-20 @ 07:01  -  08-19-20 @ 07:00  --------------------------------------------------------  IN: 911.5 mL / OUT: 2075 mL / NET: -1163.5 mL        08-17-20 @ 07:01  -  08-18-20 @ 07:00  --------------------------------------------------------  IN: 1823.9 mL / OUT: 2350 mL / NET: -526.1 mL    08-18-20 @ 07:01  -  08-18-20 @ 08:56  --------------------------------------------------------  IN: 44 mL / OUT: 0 mL / NET: 44 mL        Mode: CPAP with PS, FiO2: 40, PEEP: 5, PS: 10, MAP: 10, PIP: 15  acetaminophen   Tablet .. 650 milliGRAM(s) Oral every 6 hours PRN  albuterol/ipratropium for Nebulization. 3 milliLiter(s) Nebulizer every 6 hours  aMIOdarone    Tablet 200 milliGRAM(s) Oral daily  artificial  tears Solution 1 Drop(s) Both EYES every 6 hours  cefepime   IVPB 2000 milliGRAM(s) IV Intermittent every 8 hours  chlorhexidine 0.12% Liquid 15 milliLiter(s) Oral Mucosa every 12 hours  chlorhexidine 4% Liquid 1 Application(s) Topical <User Schedule>  chlorhexidine 4% Liquid 1 Application(s) Topical <User Schedule>  dexMEDEtomidine Infusion 0.2 MICROgram(s)/kG/Hr (5.5 mL/Hr) IV Continuous <Continuous>  dextrose 40% Gel 15 Gram(s) Oral once PRN  dextrose 5%. 1000 milliLiter(s) (50 mL/Hr) IV Continuous <Continuous>  dextrose 50% Injectable 12.5 Gram(s) IV Push once  dextrose 50% Injectable 25 Gram(s) IV Push once  dextrose 50% Injectable 25 Gram(s) IV Push once  fentaNYL   Infusion... 0.5 MICROgram(s)/kG/Hr (2.75 mL/Hr) IV Continuous <Continuous>  glucagon  Injectable 1 milliGRAM(s) IntraMuscular once PRN  heparin   Injectable 5000 Unit(s) SubCutaneous every 8 hours  hydrALAZINE 50 milliGRAM(s) Oral every 8 hours  isosorbide   dinitrate Tablet (ISORDIL) 10 milliGRAM(s) Oral three times a day  metoprolol tartrate 12.5 milliGRAM(s) Oral two times a day  pantoprazole  Injectable 40 milliGRAM(s) IV Push daily  sodium chloride 0.9% lock flush 10 milliLiter(s) IV Push every 1 hour PRN  sodium chloride 7% Inhalation 4 milliLiter(s) Inhalation every 12 hours  vancomycin    Solution 125 milliGRAM(s) Oral every 6 hours                         10.2   25.37 )-----------( 271      ( 20 Aug 2020 01:26 )             33.0     08-20    141  |  109<H>  |  41<H>  ----------------------------<  124<H>  3.9   |  17<L>  |  1.91<H>    Ca    9.4      20 Aug 2020 01:26  Phos  3.8     08-20  Mg     2.0     08-20    TPro  6.7  /  Alb  3.2<L>  /  TBili  0.3  /  DBili  <0.1  /  AST  27  /  ALT  47<H>  /  AlkPhos  81  08-20    LIVER FUNCTIONS - ( 20 Aug 2020 01:26 )  Alb: 3.2 g/dL / Pro: 6.7 g/dL / ALK PHOS: 81 U/L / ALT: 47 U/L / AST: 27 U/L / GGT: x               08-19    140  |  109<H>  |  42<H>  ----------------------------<  116<H>  3.6   |  21<L>  |  1.80<H>                          10.5   28.72 )-----------( 234      ( 18 Aug 2020 21:36 )             33.6       Ca    9.4      19 Aug 2020 10:25  Phos  3.2     08-19  Mg     2.0     08-19    TPro  6.9  /  Alb  3.1<L>  /  TBili  0.3  /  DBili  x   /  AST  32  /  ALT  52<H>  /  AlkPhos  75  08-19                          11.1   32.60 )-----------( 224      ( 17 Aug 2020 21:39 )             36.1     08-17    145  |  114<H>  |  38<H>  ----------------------------<  152<H>  4.1   |  21<L>  |  1.81<H>    Ca    9.4      17 Aug 2020 21:39  Phos  3.4     08-17  Mg     2.0     08-17      ABG - ( 16 Aug 2020 12:27 )  pH, Arterial: 7.39  pH, Blood: x     /  pCO2: 35    /  pO2: 134   / HCO3: 21    / Base Excess: -3.1  /  SaO2: 99        08-16    145  |  114<H>  |  36<H>  ----------------------------<  106<H>  3.7   |  18<L>  |  1.57<H>    Ca    9.2      16 Aug 2020 21:04  Phos  3.3     08-16  Mg     2.1     08-16    TPro  6.9  /  Alb  3.3  /  TBili  0.2  /  DBili  <0.1  /  AST  55<H>  /  ALT  54<H>  /  AlkPhos  83  08-15    ABG - ( 16 Aug 2020 12:27 )  pH, Arterial: 7.39  pH, Blood: x     /  pCO2: 35    /  pO2: 134   / HCO3: 21    / Base Excess: -3.1  /  SaO2: 99          BRONCH- 8/13/20- Pseudomonas- Cefepime     IMAGING:   Recent imaging studies were reviewed.   Xray Chest 1 View- PORTABLE-Routine (08.17.20 @ 05:53) >  COMPARISON: Multiple prior chest x-rays, with the most recent dated 8/16/2020.    FINDINGS:  There is an endotracheal tube, with its tip above the level of the kayleigh.  There is an enteric tube, coursing below the diaphragm, with its tip non-visualized below the level of the film.  There has been interval removal of a right IJ approach central venous catheter.  The size of the cardiomediastinal silhouette is enlarged.  There are no focal pulmonary consolidations.  There is no pneumothorax or pleural effusion.    IMPRESSION:  Interval removal of central line. Clear lungs.    CT Head No Cont (08.13.20 @ 08:55) >  TECHNIQUE : Axial CT scanning of the brain was obtained from the skull base to the vertex without the administration of intravenous contrast. Sagittal and coronal reformats were provided.    COMPARISON: CT brain 8/12/2020    FINDINGS:    Similar extra-axial hemorrhage in the right aspect of the suprasellar cistern extending into the right sylvian fissure.    Scattered sulcal subarachnoid hemorrhage is similar.    Similar small layering intraventricular hemorrhage.    No hydrocephalus or midline shift.    Edema in the region of the right cerebral peduncle and posterior limb of the right internal capsule is redemonstrated.    IMPRESSION:    No significant interval change from 8/12/2020.      MR Head w/wo IV Cont (08.11.20 @ 17:50) >  MRI BRAIN:    Redemonstration of subarachnoid hemorrhage in the right aspect of the suprasellar cistern and within the right sylvian fissure.    Scattered sulcal subarachnoid hemorrhage is redemonstrated.    Small hemorrhage layering in the occipital horns is similar.. Ventricles similar in size. No hydrocephalus.    There is restricted diffusion within the region of the right basal ganglia, posterior limb of the right internal capsule, and anterior right temporal lobe, likely representing an acute right MCA territory infarct.    Chronic left occipital infarct. Mild white matter microvascular ischemic disease. Signal voids are seen within the major intracranial vessels consistent with their patency.    No abnormal parenchymal or leptomeningeal enhancement.    Air-fluid levels and mucosal thickening in the paranasal sinuses. Minimal bilateral mastoid air cell effusions.    MRI CERVICAL SPINE:  Vertebral body height, marrow signal homogeneity, and facet alignment are maintained throughout the visualized spinal segments. Straightening of the normal cervical lordosis. Mild multilevel disc space narrowing. Cervicomedullary junction unremarkable.  No prevertebral or paravertebral edema.  No abnormal enhancement in the cervical region.  No spinal cord compression or abnormal intrinsic cord signal.  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    C2-C3: No acquired spinal canal stenosis. No neural foraminal narrowing.    C3-C4: Broad-based disc protrusion reaches the cord. Bilateral uncinate hypertrophy resulting in moderate bilateral neural foraminal narrowing.    C4-C5: Broad-based disc protrusion asymmetric to the left nearly reaches the cord. Bilateral uncinate hypertrophy resulting in moderate left and moderate to severe right neural foraminal narrowing.    C5-C6: Broad-based disc protrusion asymmetric to the left and left uncinate hypertrophy. Deformation of the left ventral thecal sac and effacement of the left lateral recess. Moderate left neural foraminal narrowing.    C6-C7: Broad-based disc protrusion deforms the ventral thecal sac. Left uncinate hypertrophy. Mild to moderate left neural foraminal narrowing.    C7-T1: Left facet hypertrophy. No acquired spinal canal stenosis or neural foraminal narrowing.    IMPRESSION:    MRI BRAIN:  Restricted There is restricted diffusion within the region of the right basal ganglia, posterior limb of the right internal capsule, and anterior right temporal lobe, likely representing an acute right MCA territory infarct.  Similar cisternal and sulcal subarachnoid hemorrhage, given differences in modality.  Similar small hemorrhage layering in the occipital horns. No hydrocephalus.  No abnormal parenchymal or leptomeningeal enhancement.    MRI CERVICAL SPINE:  Multilevel degenerative changes superimposed upon congenital spinal canal stenosis.  No abnormal enhancement in the cervical region.        EXAMINATION:  General: Calm, intubated  HEENT: MMM  Neuro:  -Mental status-  No acute distress, EO to voice. Strong and purposeful on RUE,  followed showing 2 fingers. Left side  0/5  -CN- Pupils R 5mm NR, L 1mm sluggish, Eyes dysconjugate,  +cough/gag, +corneals  RUE localize AG, RLE spont AG, LUE flaccid, LLE flaccid  CVS: S1/S2  RESP: Diminished at bases  GI: Soft, non tender, mildly distended, hypoactive BS  Extremities: Warm, skin intact HPI:  60 year old male with PMHx of Afib on coumadin s/p cardiac arrest at work, downtime 25 minutes prior to ROSC. Pupils were R fixed and dilated, left fixed at the time, no gag reflex, post-CA cooling protocol was initiated down to 35 deg. Intubated and put on Nimbex drip. Also on Propofol, fentanyl, levophed. R groin minerva placed. Also put on heparin post-CA. HCT showed R perimesencephalic SAH of unclear etiology, no hydrocephalus. Course also c/b GIB, was put on protonix drip. Also had bleeding from [place] - urology consulted, clot irrigated.     HOSP COURSE:   8/10- Angio- neg   8/11: VTACH noted (7 beats)  8/13: Febrile  8/14: Started on Unasyn for gram neg rods and gram neg diplococci on bronch gram stain -> switched to cefepime for pseudomonas noted 8/10  8/16: central line removed on 8/16. Cr improving 1.7 -> 1.57. MRNOVA planned. Could not tolerate since desatted when supine  8/17/20- Lasix 30mg x1  8/18: PO vanc for Cdiff  8/19- angio today / 7 beats VTACH  8/19: Few beats of VTACH noted.  8/20- agitation intermittent hypoxia secondary to mucous plugging     Overnight events: Kept NPO for possible extubation.       ICU Vital Signs Last 24 Hrs  T(C): 37.2 (20 Aug 2020 04:00), Max: 37.5 (19 Aug 2020 15:00)  T(F): 98.9 (20 Aug 2020 04:00), Max: 99.5 (19 Aug 2020 15:00)  HR: 70 (20 Aug 2020 06:45) (66 - 168)  ABP: 133/64 (20 Aug 2020 06:45) (85/51 - 231/131)  ABP(mean): 80 (20 Aug 2020 06:45) (61 - 161)  RR: 19 (20 Aug 2020 06:45) (18 - 31)  SpO2: 100% (20 Aug 2020 06:45) (100% - 100%)      08-19-20 @ 07:01  -  08-20-20 @ 07:00  --------------------------------------------------------  IN: 1528.5 mL / OUT: 975 mL / NET: 553.5 mL        08-18-20 @ 07:01  -  08-19-20 @ 07:00  --------------------------------------------------------  IN: 911.5 mL / OUT: 2075 mL / NET: -1163.5 mL        08-17-20 @ 07:01  -  08-18-20 @ 07:00  --------------------------------------------------------  IN: 1823.9 mL / OUT: 2350 mL / NET: -526.1 mL    08-18-20 @ 07:01  -  08-18-20 @ 08:56  --------------------------------------------------------  IN: 44 mL / OUT: 0 mL / NET: 44 mL        Mode: CPAP with PS, FiO2: 40, PEEP: 5, PS: 10, MAP: 10, PIP: 15  acetaminophen   Tablet .. 650 milliGRAM(s) Oral every 6 hours PRN  albuterol/ipratropium for Nebulization. 3 milliLiter(s) Nebulizer every 6 hours  aMIOdarone    Tablet 200 milliGRAM(s) Oral daily  artificial  tears Solution 1 Drop(s) Both EYES every 6 hours  cefepime   IVPB 2000 milliGRAM(s) IV Intermittent every 8 hours  chlorhexidine 0.12% Liquid 15 milliLiter(s) Oral Mucosa every 12 hours  chlorhexidine 4% Liquid 1 Application(s) Topical <User Schedule>  chlorhexidine 4% Liquid 1 Application(s) Topical <User Schedule>  dexMEDEtomidine Infusion 0.2 MICROgram(s)/kG/Hr (5.5 mL/Hr) IV Continuous <Continuous>  dextrose 40% Gel 15 Gram(s) Oral once PRN  dextrose 5%. 1000 milliLiter(s) (50 mL/Hr) IV Continuous <Continuous>  dextrose 50% Injectable 12.5 Gram(s) IV Push once  dextrose 50% Injectable 25 Gram(s) IV Push once  dextrose 50% Injectable 25 Gram(s) IV Push once  fentaNYL   Infusion... 0.5 MICROgram(s)/kG/Hr (2.75 mL/Hr) IV Continuous <Continuous>  glucagon  Injectable 1 milliGRAM(s) IntraMuscular once PRN  heparin   Injectable 5000 Unit(s) SubCutaneous every 8 hours  hydrALAZINE 50 milliGRAM(s) Oral every 8 hours  isosorbide   dinitrate Tablet (ISORDIL) 10 milliGRAM(s) Oral three times a day  metoprolol tartrate 12.5 milliGRAM(s) Oral two times a day  pantoprazole  Injectable 40 milliGRAM(s) IV Push daily  sodium chloride 0.9% lock flush 10 milliLiter(s) IV Push every 1 hour PRN  sodium chloride 7% Inhalation 4 milliLiter(s) Inhalation every 12 hours  vancomycin    Solution 125 milliGRAM(s) Oral every 6 hours                         10.2   25.37 )-----------( 271      ( 20 Aug 2020 01:26 )             33.0     08-20    141  |  109<H>  |  41<H>  ----------------------------<  124<H>  3.9   |  17<L>  |  1.91<H>    Ca    9.4      20 Aug 2020 01:26  Phos  3.8     08-20  Mg     2.0     08-20    TPro  6.7  /  Alb  3.2<L>  /  TBili  0.3  /  DBili  <0.1  /  AST  27  /  ALT  47<H>  /  AlkPhos  81  08-20    LIVER FUNCTIONS - ( 20 Aug 2020 01:26 )  Alb: 3.2 g/dL / Pro: 6.7 g/dL / ALK PHOS: 81 U/L / ALT: 47 U/L / AST: 27 U/L / GGT: x               08-19    140  |  109<H>  |  42<H>  ----------------------------<  116<H>  3.6   |  21<L>  |  1.80<H>                          10.5   28.72 )-----------( 234      ( 18 Aug 2020 21:36 )             33.6       Ca    9.4      19 Aug 2020 10:25  Phos  3.2     08-19  Mg     2.0     08-19    TPro  6.9  /  Alb  3.1<L>  /  TBili  0.3  /  DBili  x   /  AST  32  /  ALT  52<H>  /  AlkPhos  75  08-19                          11.1   32.60 )-----------( 224      ( 17 Aug 2020 21:39 )             36.1     08-17    145  |  114<H>  |  38<H>  ----------------------------<  152<H>  4.1   |  21<L>  |  1.81<H>    Ca    9.4      17 Aug 2020 21:39  Phos  3.4     08-17  Mg     2.0     08-17      ABG - ( 16 Aug 2020 12:27 )  pH, Arterial: 7.39  pH, Blood: x     /  pCO2: 35    /  pO2: 134   / HCO3: 21    / Base Excess: -3.1  /  SaO2: 99        08-16    145  |  114<H>  |  36<H>  ----------------------------<  106<H>  3.7   |  18<L>  |  1.57<H>    Ca    9.2      16 Aug 2020 21:04  Phos  3.3     08-16  Mg     2.1     08-16    TPro  6.9  /  Alb  3.3  /  TBili  0.2  /  DBili  <0.1  /  AST  55<H>  /  ALT  54<H>  /  AlkPhos  83  08-15    ABG - ( 16 Aug 2020 12:27 )  pH, Arterial: 7.39  pH, Blood: x     /  pCO2: 35    /  pO2: 134   / HCO3: 21    / Base Excess: -3.1  /  SaO2: 99          BRONCH- 8/13/20- Pseudomonas- Cefepime     IMAGING:   Recent imaging studies were reviewed.   Xray Chest 1 View- PORTABLE-Routine (08.17.20 @ 05:53) >  COMPARISON: Multiple prior chest x-rays, with the most recent dated 8/16/2020.    FINDINGS:  There is an endotracheal tube, with its tip above the level of the kayleigh.  There is an enteric tube, coursing below the diaphragm, with its tip non-visualized below the level of the film.  There has been interval removal of a right IJ approach central venous catheter.  The size of the cardiomediastinal silhouette is enlarged.  There are no focal pulmonary consolidations.  There is no pneumothorax or pleural effusion.    IMPRESSION:  Interval removal of central line. Clear lungs.    CT Head No Cont (08.13.20 @ 08:55) >  TECHNIQUE : Axial CT scanning of the brain was obtained from the skull base to the vertex without the administration of intravenous contrast. Sagittal and coronal reformats were provided.    COMPARISON: CT brain 8/12/2020    FINDINGS:    Similar extra-axial hemorrhage in the right aspect of the suprasellar cistern extending into the right sylvian fissure.    Scattered sulcal subarachnoid hemorrhage is similar.    Similar small layering intraventricular hemorrhage.    No hydrocephalus or midline shift.    Edema in the region of the right cerebral peduncle and posterior limb of the right internal capsule is redemonstrated.    IMPRESSION:    No significant interval change from 8/12/2020.      MR Head w/wo IV Cont (08.11.20 @ 17:50) >  MRI BRAIN:    Redemonstration of subarachnoid hemorrhage in the right aspect of the suprasellar cistern and within the right sylvian fissure.    Scattered sulcal subarachnoid hemorrhage is redemonstrated.    Small hemorrhage layering in the occipital horns is similar.. Ventricles similar in size. No hydrocephalus.    There is restricted diffusion within the region of the right basal ganglia, posterior limb of the right internal capsule, and anterior right temporal lobe, likely representing an acute right MCA territory infarct.    Chronic left occipital infarct. Mild white matter microvascular ischemic disease. Signal voids are seen within the major intracranial vessels consistent with their patency.    No abnormal parenchymal or leptomeningeal enhancement.    Air-fluid levels and mucosal thickening in the paranasal sinuses. Minimal bilateral mastoid air cell effusions.    MRI CERVICAL SPINE:  Vertebral body height, marrow signal homogeneity, and facet alignment are maintained throughout the visualized spinal segments. Straightening of the normal cervical lordosis. Mild multilevel disc space narrowing. Cervicomedullary junction unremarkable.  No prevertebral or paravertebral edema.  No abnormal enhancement in the cervical region.  No spinal cord compression or abnormal intrinsic cord signal.  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    C2-C3: No acquired spinal canal stenosis. No neural foraminal narrowing.    C3-C4: Broad-based disc protrusion reaches the cord. Bilateral uncinate hypertrophy resulting in moderate bilateral neural foraminal narrowing.    C4-C5: Broad-based disc protrusion asymmetric to the left nearly reaches the cord. Bilateral uncinate hypertrophy resulting in moderate left and moderate to severe right neural foraminal narrowing.    C5-C6: Broad-based disc protrusion asymmetric to the left and left uncinate hypertrophy. Deformation of the left ventral thecal sac and effacement of the left lateral recess. Moderate left neural foraminal narrowing.    C6-C7: Broad-based disc protrusion deforms the ventral thecal sac. Left uncinate hypertrophy. Mild to moderate left neural foraminal narrowing.    C7-T1: Left facet hypertrophy. No acquired spinal canal stenosis or neural foraminal narrowing.    IMPRESSION:    MRI BRAIN:  Restricted There is restricted diffusion within the region of the right basal ganglia, posterior limb of the right internal capsule, and anterior right temporal lobe, likely representing an acute right MCA territory infarct.  Similar cisternal and sulcal subarachnoid hemorrhage, given differences in modality.  Similar small hemorrhage layering in the occipital horns. No hydrocephalus.  No abnormal parenchymal or leptomeningeal enhancement.    MRI CERVICAL SPINE:  Multilevel degenerative changes superimposed upon congenital spinal canal stenosis.  No abnormal enhancement in the cervical region.        EXAMINATION:  General: Calm, intubated  HEENT: MMM  Neuro:  -Mental status-  No acute distress, EO to voice. Strong and purposeful on RUE,  followed showing 2 fingers. Left side  0/5  -CN- Pupils R 5mm NR, L 1mm sluggish, Eyes dysconjugate,  +cough/gag, +corneals  RUE localize AG, RLE spont AG, LUE flaccid, LLE flaccid  CVS: S1/S2  RESP: Diminished at bases  GI: Soft, non tender, mildly distended, hypoactive BS  Extremities: Warm, skin intact

## 2020-08-20 NOTE — PROGRESS NOTE ADULT - ASSESSMENT
60 year old male s/p cardiac arrest c/b GIB and bleeding from [facility] with Perimesencephalic (HH4 mF4) subarachnoid hemorrhage, angio neg ,PBD 13.    NEURO:    NPO for possible angio today (was not done on 8/19)  SAH, possible sentinal event with cardiac arrest  Q2h neurochecks  CT Head: perimesencephalic SAH   CTA Head: no aneurysm noted  Initial Conventional angiogram negative  MRI neuroaxis: Restricted diffusion in R BG, posterior limb of R IC, and anterior right temporal lobe  VEEG negative. DCed.   DCI management (off nimodipine). Poor windows  No EVD as no significant hydrocephalus  Precedex gtt for sedation- RASS 0 to -1    PULM: Acute PULM edema  [facility] vent: settings- 18/500/40/5   spO2>92%  CXR:  pul edema - unchanged  CTA R/O PE: negative  Continue hypertonic nebs./ Mucomyst prn. Secretions improved.    CPAP as tolerated.      CV:  SBP goal <200  TTE: Global LV dysfunction. EF41%, Severe concentric LVH, flattening of interventricular septum.    S/P Concern for possible PE - CTA negative  Hydralazine 75 q8 + isosorbide 10 mg q 8  Will need cath per cardiology  S/P amio gtt; changed to  daily.  Mg- 2.0 ; Po4- 3.0 ; k- 4.0    RENAL: ?CKD with superimposed JALEN.- stable   Cr stable 1.9. Monitor Cr and lytes.  Sodium goal 140-150  Has been diuresced.     GI:  S/P GI bleed S/P PPI gtt/ Heme stable  Diarrhea- - Increased WBC and ABX--> Vanco 125 mg q6 for Cdiff   Diet: TF- Vital at 60cc/hr ---> NPO for possible angio/MRI  GI prophylaxis - [x] PPI 40 daily in setting of recent GI bleed and intubation  Bowel regimen [x] senna - rectal tube   LBM: **    ENDO:   Goal euglycemia (-180)    HEME/ONC: Afib on coumadin at home; leucocytosis  Leukocytosis: No eosinophilia   Lipase slightly elevated 101, LFTs wnl  s/p vit K: INR 1.42-->1.2--> 1.5  VTE prophylaxis: [] SCDs [x] chemoprophylaxis heparin subq  Dopplers negative for DVT.    ID: Cdiff positive  Leukocytosis - Diarrhea  C difficile - see above; hypoactive BS  No fever  Started on cefepime (switched from unasyn on 8/14) for pseudomonas- D/C 8/21/20.  Urine culture, procal .19  PO vancomycin    SOCIAL/FAMILY:   [x] updated at bedside     CODE STATUS:  [x] Full Code [] DNR [] DNI [] Palliative/Comfort Care    DISPOSITION:  [x] ICU [] Stroke Unit [] Floor [] EMU [] RCU [] PCU    [x] Patient is at high risk of neurologic deterioration/death due to: SAH, cardiac arrest 60 year old male s/p cardiac arrest c/b GIB and bleeding from [place] with Perimesencephalic (HH4 mF4) subarachnoid hemorrhage, angio neg ,PBD 13.    NEURO:    Angio 8/20: negative.   SAH, possible sentinal event with cardiac arrest  Q2h neurochecks  CT Head: perimesencephalic SAH   CTA Head: no aneurysm noted  Initial Conventional angiogram negative  MRI neuroaxis: Restricted diffusion in R BG, posterior limb of R IC, and anterior right temporal lobe  VEEG negative. DCed.   DCI management (off nimodipine). Poor windows  No EVD as no significant hydrocephalus  Precedex gtt for sedation- RASS 0 to -1, fentanyl gtt  Agitated: Haldol    PULM: Acute PULM edema  [facility] vent: settings- 18/500/40/5   spO2>92%  CXR:  pul edema 8/20 improved  CTA R/O PE: negative  Continue hypertonic nebs./ Mucomyst prn. Secretions improved.    CPAP as tolerated.  Weaning parameters  Pulm toilet    CV:  SBP goal 100- 160  TTE: Global LV dysfunction. EF41%, Severe concentric LVH, flattening of interventricular septum.    S/P Concern for possible PE - CTA negative  Hydralazine 75 q8 + isosorbide 10 mg q 8  Will need cath per cardiology  S/P amio gtt; changed to  daily.  Mg- 2.0 ; Po4- 3.0 ; k- 4.0  Troponin 65--> 66. Stable.      RENAL: ?CKD with superimposed JALEN.- stable   Cr stable 1.9. Monitor Cr and lytes.  Sodium goal 140-150  Has been diuresced.   Monitor Cr post angio      GI:  S/P GI bleed, hyperlipasemia  Diarrhea- - Increased WBC and ABX--> Vanco 125 mg q6 for Cdiff   Diet: NPO  GI prophylaxis - [x] PPI 40 daily in setting of recent GI bleed and intubation  Bowel regimen [x] senna - rectal tube   LBM: 8/20. Cdiff  Lipase 101 --> repeat.   LFTs    ENDO:   Goal euglycemia (-180)  TFTs wnl      HEME/ONC: Afib on coumadin at home; leucocytosis  Leukocytosis: No eosinophilia   Lipase slightly elevated 101, LFTs wnl  s/p vit K: INR 1.42-->1.2--> 1.5  VTE prophylaxis: [] SCDs [x] chemoprophylaxis heparin subq  Dopplers negative for DVT.      ID: Cdiff positive  Leukocytosis - Diarrhea. Improving  C difficile - see above; hypoactive BS  No fever  Started on cefepime (switched from unasyn on 8/14) for pseudomonas- D/C 8/28/20)- 2 week course for PNA  Urine culture, procal .19  PO vancomycin  Add probiotic    SOCIAL/FAMILY:  [x] updated at bedside       CODE STATUS:  [x] Full Code [] DNR [] DNI [] Palliative/Comfort Care    DISPOSITION:  [x] ICU [] Stroke Unit [] Floor [] EMU [] RCU [] PCU    [x] Patient is at high risk of neurologic deterioration/death due to: SAH, cardiac arrest

## 2020-08-20 NOTE — PROGRESS NOTE ADULT - PROBLEM SELECTOR PLAN 3
Stable   Orders per NSCU team. For cerebral angiogram today   For PEG/Trach. Acceptable cardiac risk to proceed

## 2020-08-21 DIAGNOSIS — J96.90 RESPIRATORY FAILURE, UNSPECIFIED, UNSPECIFIED WHETHER WITH HYPOXIA OR HYPERCAPNIA: ICD-10-CM

## 2020-08-21 LAB
ANION GAP SERPL CALC-SCNC: 13 MMOL/L — SIGNIFICANT CHANGE UP (ref 5–17)
BASOPHILS # BLD AUTO: 0.06 K/UL — SIGNIFICANT CHANGE UP (ref 0–0.2)
BASOPHILS NFR BLD AUTO: 0.4 % — SIGNIFICANT CHANGE UP (ref 0–2)
BUN SERPL-MCNC: 38 MG/DL — HIGH (ref 7–23)
CALCIUM SERPL-MCNC: 9.2 MG/DL — SIGNIFICANT CHANGE UP (ref 8.4–10.5)
CHLORIDE SERPL-SCNC: 114 MMOL/L — HIGH (ref 96–108)
CO2 SERPL-SCNC: 18 MMOL/L — LOW (ref 22–31)
CREAT SERPL-MCNC: 1.92 MG/DL — HIGH (ref 0.5–1.3)
EOSINOPHIL # BLD AUTO: 0.47 K/UL — SIGNIFICANT CHANGE UP (ref 0–0.5)
EOSINOPHIL NFR BLD AUTO: 3 % — SIGNIFICANT CHANGE UP (ref 0–6)
GAS PNL BLDA: SIGNIFICANT CHANGE UP
GLUCOSE SERPL-MCNC: 130 MG/DL — HIGH (ref 70–99)
HCT VFR BLD CALC: 34.9 % — LOW (ref 39–50)
HGB BLD-MCNC: 10.7 G/DL — LOW (ref 13–17)
IMM GRANULOCYTES NFR BLD AUTO: 0.6 % — SIGNIFICANT CHANGE UP (ref 0–1.5)
LYMPHOCYTES # BLD AUTO: 1.2 K/UL — SIGNIFICANT CHANGE UP (ref 1–3.3)
LYMPHOCYTES # BLD AUTO: 7.7 % — LOW (ref 13–44)
MAGNESIUM SERPL-MCNC: 2.2 MG/DL — SIGNIFICANT CHANGE UP (ref 1.6–2.6)
MCHC RBC-ENTMCNC: 26.8 PG — LOW (ref 27–34)
MCHC RBC-ENTMCNC: 30.7 GM/DL — LOW (ref 32–36)
MCV RBC AUTO: 87.3 FL — SIGNIFICANT CHANGE UP (ref 80–100)
MONOCYTES # BLD AUTO: 1.04 K/UL — HIGH (ref 0–0.9)
MONOCYTES NFR BLD AUTO: 6.7 % — SIGNIFICANT CHANGE UP (ref 2–14)
NEUTROPHILS # BLD AUTO: 12.74 K/UL — HIGH (ref 1.8–7.4)
NEUTROPHILS NFR BLD AUTO: 81.6 % — HIGH (ref 43–77)
NRBC # BLD: 0 /100 WBCS — SIGNIFICANT CHANGE UP (ref 0–0)
NT-PROBNP SERPL-SCNC: 5106 PG/ML — HIGH (ref 0–300)
PHOSPHATE SERPL-MCNC: 2.8 MG/DL — SIGNIFICANT CHANGE UP (ref 2.5–4.5)
PLATELET # BLD AUTO: 328 K/UL — SIGNIFICANT CHANGE UP (ref 150–400)
POTASSIUM SERPL-MCNC: 3.6 MMOL/L — SIGNIFICANT CHANGE UP (ref 3.5–5.3)
POTASSIUM SERPL-SCNC: 3.6 MMOL/L — SIGNIFICANT CHANGE UP (ref 3.5–5.3)
RBC # BLD: 4 M/UL — LOW (ref 4.2–5.8)
RBC # FLD: 14.3 % — SIGNIFICANT CHANGE UP (ref 10.3–14.5)
SODIUM SERPL-SCNC: 145 MMOL/L — SIGNIFICANT CHANGE UP (ref 135–145)
WBC # BLD: 15.61 K/UL — HIGH (ref 3.8–10.5)
WBC # FLD AUTO: 15.61 K/UL — HIGH (ref 3.8–10.5)

## 2020-08-21 PROCEDURE — 71045 X-RAY EXAM CHEST 1 VIEW: CPT | Mod: 26

## 2020-08-21 PROCEDURE — 99254 IP/OBS CNSLTJ NEW/EST MOD 60: CPT

## 2020-08-21 PROCEDURE — 99292 CRITICAL CARE ADDL 30 MIN: CPT

## 2020-08-21 PROCEDURE — 99291 CRITICAL CARE FIRST HOUR: CPT

## 2020-08-21 RX ORDER — FAMOTIDINE 10 MG/ML
20 INJECTION INTRAVENOUS
Refills: 0 | Status: DISCONTINUED | OUTPATIENT
Start: 2020-08-21 | End: 2020-08-25

## 2020-08-21 RX ORDER — CHLORHEXIDINE GLUCONATE 213 G/1000ML
1 SOLUTION TOPICAL
Refills: 0 | Status: DISCONTINUED | OUTPATIENT
Start: 2020-08-21 | End: 2020-10-14

## 2020-08-21 RX ORDER — ISOSORBIDE DINITRATE 5 MG/1
10 TABLET ORAL THREE TIMES A DAY
Refills: 0 | Status: DISCONTINUED | OUTPATIENT
Start: 2020-08-21 | End: 2020-08-29

## 2020-08-21 RX ORDER — FENTANYL CITRATE 50 UG/ML
50 INJECTION INTRAVENOUS ONCE
Refills: 0 | Status: DISCONTINUED | OUTPATIENT
Start: 2020-08-21 | End: 2020-08-21

## 2020-08-21 RX ORDER — FUROSEMIDE 40 MG
40 TABLET ORAL ONCE
Refills: 0 | Status: COMPLETED | OUTPATIENT
Start: 2020-08-21 | End: 2020-08-21

## 2020-08-21 RX ADMIN — FENTANYL CITRATE 50 MICROGRAM(S): 50 INJECTION INTRAVENOUS at 14:45

## 2020-08-21 RX ADMIN — Medication 3 MILLILITER(S): at 17:17

## 2020-08-21 RX ADMIN — Medication 1 DROP(S): at 06:03

## 2020-08-21 RX ADMIN — CHLORHEXIDINE GLUCONATE 1 APPLICATION(S): 213 SOLUTION TOPICAL at 22:05

## 2020-08-21 RX ADMIN — FENTANYL CITRATE 50 MICROGRAM(S): 50 INJECTION INTRAVENOUS at 15:00

## 2020-08-21 RX ADMIN — HEPARIN SODIUM 5000 UNIT(S): 5000 INJECTION INTRAVENOUS; SUBCUTANEOUS at 05:04

## 2020-08-21 RX ADMIN — Medication 125 MILLIGRAM(S): at 13:03

## 2020-08-21 RX ADMIN — Medication 12.5 MILLIGRAM(S): at 05:02

## 2020-08-21 RX ADMIN — Medication 0.1 MILLIGRAM(S): at 05:04

## 2020-08-21 RX ADMIN — HEPARIN SODIUM 5000 UNIT(S): 5000 INJECTION INTRAVENOUS; SUBCUTANEOUS at 13:03

## 2020-08-21 RX ADMIN — Medication 50 MILLIGRAM(S): at 17:18

## 2020-08-21 RX ADMIN — Medication 3 MILLILITER(S): at 05:21

## 2020-08-21 RX ADMIN — Medication 1 TABLET(S): at 05:03

## 2020-08-21 RX ADMIN — DEXMEDETOMIDINE HYDROCHLORIDE IN 0.9% SODIUM CHLORIDE 5.5 MICROGRAM(S)/KG/HR: 4 INJECTION INTRAVENOUS at 05:02

## 2020-08-21 RX ADMIN — CHLORHEXIDINE GLUCONATE 15 MILLILITER(S): 213 SOLUTION TOPICAL at 05:03

## 2020-08-21 RX ADMIN — HEPARIN SODIUM 5000 UNIT(S): 5000 INJECTION INTRAVENOUS; SUBCUTANEOUS at 21:21

## 2020-08-21 RX ADMIN — SODIUM CHLORIDE 4 MILLILITER(S): 9 INJECTION INTRAMUSCULAR; INTRAVENOUS; SUBCUTANEOUS at 17:18

## 2020-08-21 RX ADMIN — CHLORHEXIDINE GLUCONATE 15 MILLILITER(S): 213 SOLUTION TOPICAL at 17:18

## 2020-08-21 RX ADMIN — Medication 40 MILLIEQUIVALENT(S): at 00:05

## 2020-08-21 RX ADMIN — Medication 0.1 MILLIGRAM(S): at 22:06

## 2020-08-21 RX ADMIN — Medication 3 MILLILITER(S): at 00:04

## 2020-08-21 RX ADMIN — CHLORHEXIDINE GLUCONATE 1 APPLICATION(S): 213 SOLUTION TOPICAL at 05:05

## 2020-08-21 RX ADMIN — ISOSORBIDE DINITRATE 10 MILLIGRAM(S): 5 TABLET ORAL at 05:08

## 2020-08-21 RX ADMIN — Medication 0.1 MILLIGRAM(S): at 13:03

## 2020-08-21 RX ADMIN — Medication 40 MILLIGRAM(S): at 04:58

## 2020-08-21 RX ADMIN — ISOSORBIDE DINITRATE 10 MILLIGRAM(S): 5 TABLET ORAL at 22:06

## 2020-08-21 RX ADMIN — Medication 1 DROP(S): at 13:03

## 2020-08-21 RX ADMIN — AMIODARONE HYDROCHLORIDE 200 MILLIGRAM(S): 400 TABLET ORAL at 00:57

## 2020-08-21 RX ADMIN — Medication 3 MILLILITER(S): at 12:42

## 2020-08-21 RX ADMIN — Medication 50 MILLIGRAM(S): at 09:04

## 2020-08-21 RX ADMIN — Medication 1 TABLET(S): at 17:17

## 2020-08-21 RX ADMIN — Medication 1 DROP(S): at 17:18

## 2020-08-21 RX ADMIN — FENTANYL CITRATE 25 MICROGRAM(S): 50 INJECTION INTRAVENOUS at 14:30

## 2020-08-21 RX ADMIN — Medication 125 MILLIGRAM(S): at 17:21

## 2020-08-21 RX ADMIN — FENTANYL CITRATE 50 MICROGRAM(S): 50 INJECTION INTRAVENOUS at 14:55

## 2020-08-21 RX ADMIN — CEFEPIME 100 MILLIGRAM(S): 1 INJECTION, POWDER, FOR SOLUTION INTRAMUSCULAR; INTRAVENOUS at 05:03

## 2020-08-21 RX ADMIN — CEFEPIME 100 MILLIGRAM(S): 1 INJECTION, POWDER, FOR SOLUTION INTRAMUSCULAR; INTRAVENOUS at 13:03

## 2020-08-21 RX ADMIN — FAMOTIDINE 20 MILLIGRAM(S): 10 INJECTION INTRAVENOUS at 17:17

## 2020-08-21 RX ADMIN — FENTANYL CITRATE 50 MICROGRAM(S): 50 INJECTION INTRAVENOUS at 15:10

## 2020-08-21 RX ADMIN — SODIUM CHLORIDE 4 MILLILITER(S): 9 INJECTION INTRAMUSCULAR; INTRAVENOUS; SUBCUTANEOUS at 05:22

## 2020-08-21 RX ADMIN — CEFEPIME 100 MILLIGRAM(S): 1 INJECTION, POWDER, FOR SOLUTION INTRAMUSCULAR; INTRAVENOUS at 21:20

## 2020-08-21 RX ADMIN — FENTANYL CITRATE 2.75 MICROGRAM(S)/KG/HR: 50 INJECTION INTRAVENOUS at 09:03

## 2020-08-21 RX ADMIN — Medication 50 MILLIGRAM(S): at 00:05

## 2020-08-21 RX ADMIN — Medication 1 DROP(S): at 00:57

## 2020-08-21 RX ADMIN — Medication 125 MILLIGRAM(S): at 00:05

## 2020-08-21 RX ADMIN — FENTANYL CITRATE 25 MICROGRAM(S): 50 INJECTION INTRAVENOUS at 13:30

## 2020-08-21 RX ADMIN — Medication 4 MILLILITER(S): at 00:04

## 2020-08-21 RX ADMIN — FENTANYL CITRATE 2.75 MICROGRAM(S)/KG/HR: 50 INJECTION INTRAVENOUS at 22:06

## 2020-08-21 RX ADMIN — ISOSORBIDE DINITRATE 10 MILLIGRAM(S): 5 TABLET ORAL at 13:03

## 2020-08-21 RX ADMIN — Medication 125 MILLIGRAM(S): at 05:03

## 2020-08-21 NOTE — CONSULT NOTE ADULT - PROBLEM SELECTOR RECOMMENDATION 9
- pending booking for trach monday 8/24 with Dr. Alvarez - pending booking for trach monday 8/24 with Dr. Alvarez  optimize medically   hold any anticoagulation if not medically contraindicated   pre-op

## 2020-08-21 NOTE — PROGRESS NOTE ADULT - SUBJECTIVE AND OBJECTIVE BOX
Subjective: Patient seen and examined. No new events except as noted.   remains intubated in NSCU   s/p Cerebral angiogram, negative  REVIEW OF SYSTEMS:  Unable to obtain       MEDICATIONS:  MEDICATIONS  (STANDING):  albuterol/ipratropium for Nebulization. 3 milliLiter(s) Nebulizer every 6 hours  aMIOdarone    Tablet 200 milliGRAM(s) Oral daily  artificial  tears Solution 1 Drop(s) Both EYES every 6 hours  cefepime   IVPB 2000 milliGRAM(s) IV Intermittent every 8 hours  chlorhexidine 0.12% Liquid 15 milliLiter(s) Oral Mucosa every 12 hours  chlorhexidine 4% Liquid 1 Application(s) Topical <User Schedule>  cloNIDine 0.1 milliGRAM(s) Oral three times a day  dexMEDEtomidine Infusion 0.2 MICROgram(s)/kG/Hr (5.5 mL/Hr) IV Continuous <Continuous>  dextrose 5%. 1000 milliLiter(s) (50 mL/Hr) IV Continuous <Continuous>  dextrose 50% Injectable 12.5 Gram(s) IV Push once  dextrose 50% Injectable 25 Gram(s) IV Push once  dextrose 50% Injectable 25 Gram(s) IV Push once  fentaNYL   Infusion... 0.5 MICROgram(s)/kG/Hr (2.75 mL/Hr) IV Continuous <Continuous>  heparin   Injectable 5000 Unit(s) SubCutaneous every 8 hours  hydrALAZINE 50 milliGRAM(s) Oral every 8 hours  isosorbide   dinitrate Tablet (ISORDIL) 10 milliGRAM(s) Oral three times a day  lactobacillus acidophilus 1 Tablet(s) Oral two times a day  metoprolol tartrate 12.5 milliGRAM(s) Oral two times a day  pantoprazole  Injectable 40 milliGRAM(s) IV Push daily  sodium chloride 7% Inhalation 4 milliLiter(s) Inhalation every 12 hours  vancomycin    Solution 125 milliGRAM(s) Oral every 6 hours      PHYSICAL EXAM:  T(C): 36.5 (08-21-20 @ 03:00), Max: 37 (08-20-20 @ 15:00)  HR: 74 (08-21-20 @ 08:43) (59 - 122)  BP: --  RR: 18 (08-21-20 @ 06:00) (16 - 27)  SpO2: 100% (08-21-20 @ 08:43) (91% - 100%)  Wt(kg): --  I&O's Summary    20 Aug 2020 07:01  -  21 Aug 2020 07:00  --------------------------------------------------------  IN: 1534.6 mL / OUT: 2700 mL / NET: -1165.4 mL        Appearance: Intubated sedated 	  HEENT:   Dry  oral mucosa,  Lymphatic: No lymphadenopathy  Cardiovascular: Normal S1 S2, No JVD, No murmurs, No edema  Respiratory: Ventilated   Psychiatry: A & O x 0  Gastrointestinal:  Soft, Non-tender, + BS	  Skin: No rashes, No ecchymoses, No cyanosis	  Mental status- No acute distress, EO to stim  -CN- Pupils R 4mm NR, L 2mm sluggish, EOMI, tongue midline, face symmetric  +cough/gag  C briskly, two fingers/thumbs up on RUE, distally AG, wiggles toes on RLE, no    LABS:    CARDIAC MARKERS:  C. difficile GDH &amp; toxins A/B by EIA (08.18.20 @ 10:47)    Clostridium difficile GDH Toxins A&amp;B, EIA:   Positive    Clostridium difficile GDH Interpretation: Positive for toxigenic C. Difficile.  This specimen is positive for C.  Difficile glutamate dehydrogenase (GDH) antigen and positive for C.  Difficile Toxins A & B, by EIA.  GDH is a highly sensitive marker for C.  Difficile that is produced in largeamounts by all C. Difficile strains,  both toxigenic and nontoxigenic.  This assay has not been validated as a  test of cure.  The results of this assay should always be interpreted in  conjunction with patient's clinical history.                                  10.2   19.42 )-----------( 265      ( 20 Aug 2020 22:09 )             33.1     08-20    143  |  113<H>  |  40<H>  ----------------------------<  135<H>  3.5   |  16<L>  |  1.86<H>    Ca    9.0      20 Aug 2020 22:10  Phos  3.5     08-20  Mg     2.0     08-20    TPro  6.7  /  Alb  3.2<L>  /  TBili  0.3  /  DBili  <0.1  /  AST  27  /  ALT  47<H>  /  AlkPhos  81  08-20    proBNP: Serum Pro-Brain Natriuretic Peptide: 5106 pg/mL (08-21 @ 08:24)    Lipid Profile:   HgA1c:   TSH:             TELEMETRY: AF	    ECG:  	  RADIOLOGY: < from: IR Neuro (08.20.20 @ 11:01) >    EXAM:  IR PROCEDURE NEURO                                PROCEDURE DATE:  08/20/2020          INTERPRETATION:  Pre-procedure diagnosis: Subarachnoid hemorrhage    Post-procedure diagnosis:  Normal cerebral angiogram    Procedure: Cerebral angiography    Interventionalist:  Abe Morris MD    Fellow: Timbo Loredo MD    Assistant:  Bren PLATA    Anesthesiologist: Henny Kent MD , Jovanna Edwards CRNA    Contrast: Omnipaque 240, 114 cc    Radiation Dose:  A: 20.7 min, 1668 mGy B: 11.4 min,  348.5 mGy  Total: 32.0 min, 2016 mGy    Indications:  The patient is a 60-year-old male on Coumadin for atrial fibrillation who suffered a cardiac arrest were and was subsequently resuscitated. His pupils were fixed and dilated at that time and hypothermia was initiated.  He was intubated placed on Nimbex, Versed, propofol and fentanyl at [facility]. A noncontrast head CT demonstrated subarachnoid hemorrhage predominantly in the right suprasellar cistern extending into the right crural cistern. He was transferred to [facility] for further evaluation and management. He underwent cerebral angiogram on 8/10/2020 which was negative for aneurysm. He has since made a clinical recovery to the point where he was following commands. The patient now presents for repeat cerebral angiography to again evaluate for underlying vascular lesion. The risks, benefits, alternatives, complications and personnel associated with the procedure was discussed with the patient's family in great detail. They request that we proceed.    Procedure: The patient was brought into the angiography suite and positioned on the table supine.  Both femoral regions were prepped and draped in the standard sterile fashion. The skin overlying the right femoral artery was infiltrated with lidocaine and accessed using a micropuncture kit and modified Seldinger technique. The baseline activated clotting time was 143 seconds. A 5 Turkish long sheath was inserted and attached to heparinized flush. A 5 Turkish [employer] Vert diagnostic catheter over an angled Glidewire was navigated into the right internal and external carotid artery and angiography of the cranial and extracranial circulation was performed.  A rotational angiogram was performed ofthe right internal carotid circulation. Three-dimensional reconstructed images were evaluated on an independent [employer] workstation. The catheter was then navigated into the right subclavian artery and angiography of the cervical circulation was performed. The catheter was then navigated into the right thyrocervical trunk and angiography of the cervical circulation was performed. The catheter was then navigated into the right vertebral artery and angiography of the cervical and intracranial circulation was performed. A rotational angiogram was performed of the right internal carotid circulation. Three-dimensional reconstructed images were evaluated on an independent [employer] workstation. The catheter was then navigated into the left internal and external carotid artery and angiography of the cranial and extracranial circulation was performed. The catheter was navigated into the left vertebral artery and angiography of the intracranial circulation was performed. The catheter was navigated into the left subclavian artery and angiography of the cervical circulation was performed. The catheter was then navigated into the left thyrocervical trunk and angiography of the cervical circulation was performed. The catheter was then navigated into the left costocervical trunk and angiography of the cervical circulation was performed.    All catheters and guidewires were removed and hemostasis was obtained with manual compression, Quickclot and the Safeguard dressing.    Findings:    Right internal and external carotid artery including 3-D rotational:  There is normal transit of contrast through the arterial, capillary and venous phases. Anterior and posterior communicating arteries were briefly opacified.  There is no evidence of intracranial aneurysm, arteriovenous malformation or arteriovenous shunting. Multiple superficial cortical veins are identified. The superior sagittal sinus drains into the left transverse and sigmoid sinuses. The basal vein of Cristofer is identified.  The internal cerebral vein drains into the great vein of Mack and the straight sinus. The sylvian venous system drains into the cavernous and inferior petrosal sinus. There is normal transit of contrast through the right external carotid circulation without evidence of arteriovenous shunting or dural fistula.    Right subclavian artery: There is normal transit of contrast through the arterial, capillary and venous phases. Anterograde flow is identified into the right common carotid artery, right vertebral artery, right internal mammary artery, and right costocervical and thyrocervical trunks. There is no evidence of aneurysm, arteriovenous definition or fistula.    Right thyrocervical trunk: There is normal transit of contrast through the arterial, capillary and venous phases. There is no evidence of aneurysm, arteriovenous definition or fistula.    Right vertebral artery including 3-D rotational:  There is normal transit of contrast through the arterial, capillary and venous phases. There is minimal retrograde reflux into the contralateral vertebral artery and washout is visualized.  The basilar artery terminates in the right posterior cerebral artery. The left P1 segment was not opacified. Posterior communicating arteries were not opacified  There is no evidence of intracranial aneurysm, arteriovenous malformation or arteriovenous shunting. The posterior circulation drains into the straight sinus and both transverse and sigmoid sinuses.    Left internal and external carotid artery:  There is normal transit of contrast through the arterial, capillary and venous phases. There is brief opacification across the anterior communicating complex. A fetal origin posterior cerebral artery is visualized.  There is no evidence of intracranial aneurysm, arteriovenous malformation or arteriovenous shunting. Multiple superficial cortical veins are identified. The superior sagittal sinus drains into both transverse and sigmoid sinuses. The vein of Darren is identified. The basal vein of Cristofer is identified.  The internal cerebral vein drains into the great vein of Mack and the straight sinus. The sylvian venous system drains into the superior petrosal sinus. There is normal transit of contrast through the left external carotid circulation without evidence of arteriovenous shunting or dural fistula.    Left vertebral artery: The left vertebral artery arises from the aortic arch. There is normal transit of contrast through the arterial, capillary and venous phases. There is minimal retrograde reflux into the contralateral vertebral artery and significant washout is visualized.  The basilar artery is only faintly opacified. A mild stenosis is identified in the distal left vertebral artery. There is no evidence of intracranialaneurysm, arteriovenous malformation or arteriovenous shunting. The posterior circulation drains into the straight sinus and both transverse and sigmoid sinuses.    Left thyrocervical trunk: There is normal transit of contrast through the arterial, capillary and venous phases. There is no evidence of aneurysm, arteriovenous definition or fistula.    Left costocervical trunk: There is normal transit of contrast through the arterial, capillary and venous phases. The deep cervical arteries identified. There is no evidence of aneurysm, arteriovenous definition or fistula.      Impression: Normal cerebral angiogram      ABE MORRIS M.D., NEUROSURGERY  This document has been electronically signed. Aug 20 2020  3:00PM                < end of copied text >    DIAGNOSTIC TESTING:  [ ] Echocardiogram:  [ ]  Catheterization:  [ ] Stress Test:    OTHER:

## 2020-08-21 NOTE — PROGRESS NOTE ADULT - SUBJECTIVE AND OBJECTIVE BOX
O: has thick secretions    T(C): 37.5 (08-21-20 @ 15:00), Max: 37.5 (08-21-20 @ 15:00)  HR: 77 (08-21-20 @ 18:00) (56 - 114)  BP: 117/82 (08-21-20 @ 18:00) (101/76 - 143/98)  RR: 16 (08-21-20 @ 18:00) (0 - 31)  SpO2: 100% (08-21-20 @ 18:00) (91% - 100%)  08-20-20 @ 07:01  -  08-21-20 @ 07:00  --------------------------------------------------------  IN: 1534.6 mL / OUT: 2700 mL / NET: -1165.4 mL    08-21-20 @ 07:01  -  08-21-20 @ 18:32  --------------------------------------------------------  IN: 1754.5 mL / OUT: 1900 mL / NET: -145.5 mL    acetaminophen   Tablet .. 650 milliGRAM(s) Oral every 6 hours PRN  acetylcysteine 20%  Inhalation 4 milliLiter(s) Inhalation three times a day PRN  albuterol/ipratropium for Nebulization. 3 milliLiter(s) Nebulizer every 6 hours  aMIOdarone    Tablet 200 milliGRAM(s) Oral daily  artificial  tears Solution 1 Drop(s) Both EYES every 6 hours  cefepime   IVPB 2000 milliGRAM(s) IV Intermittent every 8 hours  chlorhexidine 0.12% Liquid 15 milliLiter(s) Oral Mucosa every 12 hours  chlorhexidine 4% Liquid 1 Application(s) Topical <User Schedule>  cloNIDine 0.1 milliGRAM(s) Oral three times a day  dexMEDEtomidine Infusion 0.2 MICROgram(s)/kG/Hr IV Continuous <Continuous>  dextrose 40% Gel 15 Gram(s) Oral once PRN  dextrose 5%. 1000 milliLiter(s) IV Continuous <Continuous>  dextrose 50% Injectable 12.5 Gram(s) IV Push once  dextrose 50% Injectable 25 Gram(s) IV Push once  dextrose 50% Injectable 25 Gram(s) IV Push once  famotidine    Tablet 20 milliGRAM(s) Oral two times a day  fentaNYL    Injectable 25 MICROGram(s) IV Push every 2 hours PRN  fentaNYL   Infusion... 0.5 MICROgram(s)/kG/Hr IV Continuous <Continuous>  glucagon  Injectable 1 milliGRAM(s) IntraMuscular once PRN  heparin   Injectable 5000 Unit(s) SubCutaneous every 8 hours  hydrALAZINE 50 milliGRAM(s) Oral every 8 hours  isosorbide   dinitrate Tablet (ISORDIL) 10 milliGRAM(s) Oral three times a day  lactobacillus acidophilus 1 Tablet(s) Oral two times a day  metoprolol tartrate 12.5 milliGRAM(s) Oral two times a day  sodium chloride 0.9% lock flush 10 milliLiter(s) IV Push every 1 hour PRN  sodium chloride 7% Inhalation 4 milliLiter(s) Inhalation every 12 hours  vancomycin    Solution 125 milliGRAM(s) Oral every 6 hours  Mode: AC/ CMV (Assist Control/ Continuous Mandatory Ventilation), RR (machine): 18, TV (machine): 500, FiO2: 40, PEEP: 5, ITime: 1, MAP: 12, PIP: 34  EXAMINATION:  General: Calm, intubated  HEENT: MMM  Neuro:  -Mental status-  No acute distress, EO to voice. Strong and purposeful on RUE,  followed showing 2 fingers. Left side  0/5  -CN- Pupils R 5mm NR, L 1mm sluggish, Eyes dysconjugate,  +cough/gag, +corneals  RUE localize AG, RLE spont AG, LUE flaccid, LLE flaccid  CVS: S1/S2  RESP: Diminished at bases  GI: Soft, non tender, mildly distended, hypoactive BS  Extremities: Warm, skin intact    Assessment and Plan:   · Assessment	  60 year old male s/p cardiac arrest c/b GIB and bleeding from [place] with perimesencephalic (HH4 mF4) subarachnoid hemorrhage, angio neg x2 ,PBD 14.  Intubation day: 12.    NEURO:    SAH, possible sentinal event with cardiac arrest  Angio 8/20: negative.   Q2h neurochecks  CT Head: perimesencephalic SAH   CTA Head: no aneurysm noted  Initial Conventional angiogram 8/10 negative  MRI neuroaxis: Restricted diffusion in R BG, posterior limb of R IC, and anterior right temporal lobe  VEEG negative. DCed.   DCI management (off nimodipine). Poor windows  No EVD as no significant hydrocephalus  Precedex gtt for sedation- RASS 0 to -1, fentanyl gtt.  Agitated: Haldol prn- monitor QTc. Consider olanzepine, risperidone prn to wean if needed for less QTc prolongation.     PULM: Acute PULM edema- resolved. Hypoxia secondary to mucus plugging  Mec vent: settings- 18/500/40/5.   spO2>92%  S/P lasix 40mg x1. Currently 1.1L neg.  CXR 8/21:  CTA R/O PE: negative  Continue hypertonic nebs./ Mucomyst prn. Thick secretions.  CPAP as tolerated though less likely to be extubated.   Will need trach. 8/24 trach.  Pulm toilet    CV:  SBP goal 100- 160  TTE: Global LV dysfunction. EF 41%, Severe concentric LVH, flattening of interventricular septum.   S/P Concern for possible PE - CTA negative  Hydralazine 75 q8 + isosorbide 10 mg q 8. Clonidine added.  Will need cath per cardiology  S/P amio gtt; changed to  daily.  Mg- 2.0 ; Po4- 3.0 ; k- 4.0  Troponin 65--> 66. Stable.  Continue lasix.   proBNP 5000.       RENAL: Non anion gap metabolic acidosis  Likely in the setting of diarrhea.  ?CKD with superimposed JALEN.- stable   Monitor Cr and lytes.  Sodium goal 140-150  S/P lasix 40mg. Monitor Is/Os  Cr post angio: stable      GI: S/P GI bleed, hyperlipasemia, Cdiff positive  Diarrhea- - Increased WBC and ABX--> Vanco 125 mg q6 for Cdiff   Diet: Resume TFs.  GI prophylaxis - [x] PPI 40 daily in setting of recent GI bleed and intubation. PO pepcid  Bowel regimen - hold for diarrhea  LBM: 8/20. Cdiff  Lipase 101 --> 89  LFTs stable. Monitor on amio    ENDO:   Goal euglycemia (-180)  TFTs wnl      HEME/ONC: Afib on coumadin at home; leukocytosis  Leukocytosis: No eosinophilia   Lipase slightly elevated 101-->89, LFTs wnl  s/p vit K: INR 1.42-->1.2--> 1.5  VTE prophylaxis: [] SCDs [x] chemoprophylaxis heparin subq.  Dopplers negative for DVT.  Will eventually need to resume coumadin once trach/PEG.       ID: Cdiff positive  Leukocytosis- Diarrhea. Improving  C difficile - see above; hypoactive BS. On PO vancomycin. Continue PO vanc for additional week after cefepime course completed.  Afebrile  Started on cefepime (switched from unasyn on 8/14) for pseudomonas- D/C 8/28/20)- 2 week course for PNA  Urine culture, procal .19  Added probiotic      SOCIAL/FAMILY:  [x] updated at bedside     CODE STATUS:  [x] Full Code [] DNR [] DNI [] Palliative/Comfort Care    DISPOSITION:  [x] ICU [] Stroke Unit [] Floor [] EMU [] RCU [] PCU    [x] Patient is at high risk of neurologic deterioration/death due to: SAH, cardiac arrest O: has thick secretions  ET was adjusted, he became very agitated     T(C): 37.5 (08-21-20 @ 15:00), Max: 37.5 (08-21-20 @ 15:00)  HR: 77 (08-21-20 @ 18:00) (56 - 114)  BP: 117/82 (08-21-20 @ 18:00) (101/76 - 143/98)  RR: 16 (08-21-20 @ 18:00) (0 - 31)  SpO2: 100% (08-21-20 @ 18:00) (91% - 100%)  08-20-20 @ 07:01  -  08-21-20 @ 07:00  --------------------------------------------------------  IN: 1534.6 mL / OUT: 2700 mL / NET: -1165.4 mL    08-21-20 @ 07:01  -  08-21-20 @ 18:32  --------------------------------------------------------  IN: 1754.5 mL / OUT: 1900 mL / NET: -145.5 mL    acetaminophen   Tablet .. 650 milliGRAM(s) Oral every 6 hours PRN  acetylcysteine 20%  Inhalation 4 milliLiter(s) Inhalation three times a day PRN  albuterol/ipratropium for Nebulization. 3 milliLiter(s) Nebulizer every 6 hours  aMIOdarone    Tablet 200 milliGRAM(s) Oral daily  artificial  tears Solution 1 Drop(s) Both EYES every 6 hours  cefepime   IVPB 2000 milliGRAM(s) IV Intermittent every 8 hours  chlorhexidine 0.12% Liquid 15 milliLiter(s) Oral Mucosa every 12 hours  chlorhexidine 4% Liquid 1 Application(s) Topical <User Schedule>  cloNIDine 0.1 milliGRAM(s) Oral three times a day  dexMEDEtomidine Infusion 0.2 MICROgram(s)/kG/Hr IV Continuous <Continuous>  dextrose 40% Gel 15 Gram(s) Oral once PRN  dextrose 5%. 1000 milliLiter(s) IV Continuous <Continuous>  dextrose 50% Injectable 12.5 Gram(s) IV Push once  dextrose 50% Injectable 25 Gram(s) IV Push once  dextrose 50% Injectable 25 Gram(s) IV Push once  famotidine    Tablet 20 milliGRAM(s) Oral two times a day  fentaNYL    Injectable 25 MICROGram(s) IV Push every 2 hours PRN  fentaNYL   Infusion... 0.5 MICROgram(s)/kG/Hr IV Continuous <Continuous>  glucagon  Injectable 1 milliGRAM(s) IntraMuscular once PRN  heparin   Injectable 5000 Unit(s) SubCutaneous every 8 hours  hydrALAZINE 50 milliGRAM(s) Oral every 8 hours  isosorbide   dinitrate Tablet (ISORDIL) 10 milliGRAM(s) Oral three times a day  lactobacillus acidophilus 1 Tablet(s) Oral two times a day  metoprolol tartrate 12.5 milliGRAM(s) Oral two times a day  sodium chloride 0.9% lock flush 10 milliLiter(s) IV Push every 1 hour PRN  sodium chloride 7% Inhalation 4 milliLiter(s) Inhalation every 12 hours  vancomycin    Solution 125 milliGRAM(s) Oral every 6 hours  Mode: AC/ CMV (Assist Control/ Continuous Mandatory Ventilation), RR (machine): 18, TV (machine): 500, FiO2: 40, PEEP: 5, ITime: 1, MAP: 12, PIP: 34      EXAMINATION:  General: Calm, intubated  HEENT: MMM  Neuro:  -Mental status-  No acute distress, EO to voice. Strong and purposeful on RUE,  followed showing 2 fingers. Left side  0/5  -CN- Pupils R 5mm NR, L 1mm sluggish, Eyes dysconjugate,  +cough/gag, +corneals  RUE localize AG, RLE spont AG, LUE flaccid, LLE flaccid  CVS: S1/S2  RESP: Diminished at bases  GI: Soft, non tender, mildly distended, hypoactive BS  Extremities: Warm, skin intact      LABS:  Na: 143 (08-20 @ 22:10), 141 (08-20 @ 01:26), 140 (08-19 @ 10:25), 145 (08-18 @ 21:36)  K: 3.5 (08-20 @ 22:10), 3.9 (08-20 @ 01:26), 3.6 (08-19 @ 10:25), 3.7 (08-18 @ 21:36)  Cl: 113 (08-20 @ 22:10), 109 (08-20 @ 01:26), 109 (08-19 @ 10:25), 111 (08-18 @ 21:36)  CO2: 16 (08-20 @ 22:10), 17 (08-20 @ 01:26), 21 (08-19 @ 10:25), 21 (08-18 @ 21:36)  BUN: 40 (08-20 @ 22:10), 41 (08-20 @ 01:26), 42 (08-19 @ 10:25), 40 (08-18 @ 21:36)  Cr: 1.86 (08-20 @ 22:10), 1.91 (08-20 @ 01:26), 1.80 (08-19 @ 10:25), 1.95 (08-18 @ 21:36)  Glu: 135(08-20 @ 22:10), 124(08-20 @ 01:26), 116(08-19 @ 10:25), 100(08-18 @ 21:36)    Hgb: 10.2 (08-20 @ 22:09), 10.2 (08-20 @ 01:26), 10.5 (08-18 @ 21:36)  Hct: 33.1 (08-20 @ 22:09), 33.0 (08-20 @ 01:26), 33.6 (08-18 @ 21:36)  WBC: 19.42 (08-20 @ 22:09), 25.37 (08-20 @ 01:26), 28.72 (08-18 @ 21:36)  Plt: 265 (08-20 @ 22:09), 271 (08-20 @ 01:26), 234 (08-18 @ 21:36)    INR: 1.53 08-20-20 @ 01:26, 1.46 08-18-20 @ 21:36  PTT: 32.8 08-20-20 @ 01:26, 31.5 08-18-20 @ 21:36            Assessment and Plan: 	  60 year old male s/p cardiac arrest c/b GIB and bleeding from [place] with perimesencephalic (HH4 mF4) subarachnoid hemorrhage, angio neg x2 ,PBD 14.  Intubation day: 12.   SAH, possible sentinal event with cardiac arrest  Angio 8/20: negative.   Q4h neurochecks  DSA x 2 negative for vascular malformation   MRI neuroaxis: Restricted diffusion in R BG, posterior limb of R IC, and anterior right temporal lobe  Precedex gtt for sedation- RASS 0 to -1, fentanyl gtt.  PULM: Acute PULM edema- resolved. Hypoxia secondary to mucus plugging  ET elevated, was pushed down, now at 5 cm from [place]   Mech vent: settings- 18/500/40/5.   ABG and adjsut vent settings accordingly   Continue hypertonic nebs./ Mucomyst prn. Thick secretions.  CPAP as tolerated though less likely to be extubated.   Will need trach. 8/24 trach  Pulm toilet  SBP goal 100- 160  TTE: Global LV dysfunction. EF 41%, Severe concentric LVH, flattening of interventricular septum.   S/P Concern for possible PE - CTA negative  Hydralazine 75 q8 + isosorbide 10 mg q 8. Clonidine added.  amiodarone  daily for a fib   ?CKD with superimposed JALEN.- stable   Sodium goal 140-150  c.diff on oral vanco   cefepime for ABX  d/c on the 27   Goal euglycemia (-180)  VTE prophylaxis: [] SCDs [x] chemoprophylaxis heparin subq.      SOCIAL/FAMILY:  [x] updated at bedside     CODE STATUS:  [x] Full Code [] DNR [] DNI [] Palliative/Comfort Care    DISPOSITION:  [x] ICU [] Stroke Unit [] Floor [] EMU [] RCU [] PCU    [x] Patient is at high risk of neurologic deterioration/death due to: SAH, cardiac arrest

## 2020-08-21 NOTE — CONSULT NOTE ADULT - SUBJECTIVE AND OBJECTIVE BOX
CC: trach     HPI:  60 year old male with PMHx of Afib on coumadin s/p cardiac arrest at work, downtime 25 minutes prior to ROSC. Pupils were R fixed and dilated, left fixed at the time, no gag reflex, post-CA cooling protocol was initiated down to 35 deg. Intubated and put on Nimbex drip. Also on Propofol, fentanyl, levophed. R groin minerva placed. Also put on heparin post-CA. HCT showed R perimesencephalic SAH of unclear etiology, no hydrocephalus. Course also c/b GIB, was put on protonix drip. ENT called for trach evaluation. fio2- 40 with peep 5, wbc 19 down from 25, temp 98.6.     PAST MEDICAL & SURGICAL HISTORY:  On warfarin for atrial fibrillation  No significant past surgical history    Allergies    No Known Allergies    Intolerances      MEDICATIONS  (STANDING):  albuterol/ipratropium for Nebulization. 3 milliLiter(s) Nebulizer every 6 hours  aMIOdarone    Tablet 200 milliGRAM(s) Oral daily  artificial  tears Solution 1 Drop(s) Both EYES every 6 hours  cefepime   IVPB 2000 milliGRAM(s) IV Intermittent every 8 hours  chlorhexidine 0.12% Liquid 15 milliLiter(s) Oral Mucosa every 12 hours  chlorhexidine 4% Liquid 1 Application(s) Topical <User Schedule>  cloNIDine 0.1 milliGRAM(s) Oral three times a day  dexMEDEtomidine Infusion 0.2 MICROgram(s)/kG/Hr (5.5 mL/Hr) IV Continuous <Continuous>  dextrose 5%. 1000 milliLiter(s) (50 mL/Hr) IV Continuous <Continuous>  dextrose 50% Injectable 12.5 Gram(s) IV Push once  dextrose 50% Injectable 25 Gram(s) IV Push once  dextrose 50% Injectable 25 Gram(s) IV Push once  fentaNYL   Infusion... 0.5 MICROgram(s)/kG/Hr (2.75 mL/Hr) IV Continuous <Continuous>  heparin   Injectable 5000 Unit(s) SubCutaneous every 8 hours  hydrALAZINE 50 milliGRAM(s) Oral every 8 hours  isosorbide   dinitrate Tablet (ISORDIL) 10 milliGRAM(s) Oral three times a day  lactobacillus acidophilus 1 Tablet(s) Oral two times a day  metoprolol tartrate 12.5 milliGRAM(s) Oral two times a day  pantoprazole  Injectable 40 milliGRAM(s) IV Push daily  sodium chloride 7% Inhalation 4 milliLiter(s) Inhalation every 12 hours  vancomycin    Solution 125 milliGRAM(s) Oral every 6 hours    MEDICATIONS  (PRN):  acetaminophen   Tablet .. 650 milliGRAM(s) Oral every 6 hours PRN Temp greater or equal to 38C (100.4F), Mild Pain (1 - 3)  acetylcysteine 20%  Inhalation 4 milliLiter(s) Inhalation three times a day PRN secretions  dextrose 40% Gel 15 Gram(s) Oral once PRN Blood Glucose LESS THAN 70 milliGRAM(s)/deciliter  fentaNYL    Injectable 25 MICROGram(s) IV Push every 2 hours PRN Moderate Pain (4 - 6)  glucagon  Injectable 1 milliGRAM(s) IntraMuscular once PRN Glucose LESS THAN 70 milligrams/deciliter  sodium chloride 0.9% lock flush 10 milliLiter(s) IV Push every 1 hour PRN Pre/post blood products, medications, blood draw, and to maintain line patency      Social History: no tobacco, no etoh   Family history: Pt denies any sign FHx    ROS:   ENT: all negative except as noted in HPI   CV: denies palpitations  Pulm: denies SOB, cough, hemoptysis  GI: denies change in apetite, indigestion, n/v  : denies pertinent urinary symptoms, urgency  Neuro: denies numbness/tingling, loss of sensation  Psych: denies anxiety  MS: denies muscle weakness, instability  Heme: denies easy bruising or bleeding  Endo: denies heat/cold intolerance, excessive sweating  Vascular: denies LE edema    Vital Signs Last 24 Hrs  T(C): 36.5 (21 Aug 2020 03:00), Max: 37 (20 Aug 2020 15:00)  T(F): 97.7 (21 Aug 2020 03:00), Max: 98.6 (20 Aug 2020 15:00)  HR: 74 (21 Aug 2020 08:43) (59 - 122)  BP: --  BP(mean): --  RR: 18 (21 Aug 2020 06:00) (16 - 27)  SpO2: 100% (21 Aug 2020 08:43) (91% - 100%)                          10.2   19.42 )-----------( 265      ( 20 Aug 2020 22:09 )             33.1    08-20    143  |  113<H>  |  40<H>  ----------------------------<  135<H>  3.5   |  16<L>  |  1.86<H>    Ca    9.0      20 Aug 2020 22:10  Phos  3.5     08-20  Mg     2.0     08-20    TPro  6.7  /  Alb  3.2<L>  /  TBili  0.3  /  DBili  <0.1  /  AST  27  /  ALT  47<H>  /  AlkPhos  81  08-20   PT/INR - ( 20 Aug 2020 01:26 )   PT: 17.8 sec;   INR: 1.53 ratio         PTT - ( 20 Aug 2020 01:26 )  PTT:32.8 sec    PHYSICAL EXAM:  Gen: NAD  Skin: No rashes, bruises, or lesions  Head: Normocephalic, Atraumatic  Face: no edema, erythema, or fluctuance. Parotid glands soft without mass  Eyes: no scleral injection  Nose: Nares bilaterally patent, no discharge  Mouth: No Stridor / Drooling / Trismus.  Mucosa moist, tongue/uvula midline, oropharynx with ETT in place   Neck: Flat, supple, no lymphadenopathy, trachea midline, no masses  Lymphatic: No lymphadenopathy  Resp: on vent   CV: no peripheral edema/cyanosis  GI: nondistended   Peripheral vascular: no JVD or edema  Neuro: facial nerve intact, no facial droop

## 2020-08-21 NOTE — PROGRESS NOTE ADULT - ASSESSMENT
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    NEURO:    SAH, possible sentinal event with cardiac arrest  Angio 8/20: negative.   Q2h neurochecks  CT Head: perimesencephalic SAH   CTA Head: no aneurysm noted  Initial Conventional angiogram 8/10 negative  MRI neuroaxis: Restricted diffusion in R BG, posterior limb of R IC, and anterior right temporal lobe  VEEG negative. DCed.   DCI management (off nimodipine). Poor windows  No EVD as no significant hydrocephalus  Precedex gtt for sedation- RASS 0 to -1, fentanyl gtt  Agitated: Haldol prn    PULM: Acute PULM edema  [facility] vent: settings- 18/500/40/5   spO2>92%  S/P lasix 40mg x1. Currently 1.1L neg.  CXR 8/21:  CTA R/O PE: negative  Continue hypertonic nebs./ Mucomyst prn. Thick secretions.  CPAP as tolerated though less likely to be extubated.   Will need trach  Pulm toilet    CV:  SBP goal 100- 160  TTE: Global LV dysfunction. EF41%, Severe concentric LVH, flattening of interventricular septum.   S/P Concern for possible PE - CTA negative  Hydralazine 75 q8 + isosorbide 10 mg q 8. Clonidine added.  Will need cath per cardiology  S/P amio gtt; changed to  daily.  Mg- 2.0 ; Po4- 3.0 ; k- 4.0  Troponin 65--> 66. Stable.  Continue lasix  proBNP      RENAL: ?CKD with superimposed JALEN.- stable   Monitor Cr and lytes.  Sodium goal 140-150  S/P lasix 40mg. Monitor Is/Os  Cr post angio: stable      GI: S/P GI bleed, hyperlipasemia, Cdiff positive  Diarrhea- - Increased WBC and ABX--> Vanco 125 mg q6 for Cdiff   Diet: Resume TFs  GI prophylaxis - [x] PPI 40 daily in setting of recent GI bleed and intubation  Bowel regimen - hold for diarrhea  LBM: 8/20. Cdiff  Lipase 101 --> 89  LFTs stable. Monitor on amio    ENDO:   Goal euglycemia (-180)  TFTs wnl      HEME/ONC: Afib on coumadin at home; leukocytosis  Leukocytosis: No eosinophilia   Lipase slightly elevated 101-->89, LFTs wnl  s/p vit K: INR 1.42-->1.2--> 1.5  VTE prophylaxis: [] SCDs [x] chemoprophylaxis heparin subq  Dopplers negative for DVT.      ID: Cdiff positive  Leukocytosis- Diarrhea. Improving  C difficile - see above; hypoactive BS. On PO vancomycin. Continue PO vanc for additional week after cefepime course completed  Afebrile  Started on cefepime (switched from unasyn on 8/14) for pseudomonas- D/C 8/28/20)- 2 week course for PNA  Urine culture, procal .19  Added probiotic    SOCIAL/FAMILY:  [x] updated at bedside       CODE STATUS:  [x] Full Code [] DNR [] DNI [] Palliative/Comfort Care    DISPOSITION:  [x] ICU [] Stroke Unit [] Floor [] EMU [] RCU [] PCU    [x] Patient is at high risk of neurologic deterioration/death due to: SAH, cardiac arrest 60 year old male s/p cardiac arrest c/b GIB and bleeding from [place] with perimesencephalic (HH4 mF4) subarachnoid hemorrhage, angio neg x2 ,PBD 14.  Intubation day: 12.    NEURO:    SAH, possible sentinal event with cardiac arrest  Angio 8/20: negative.   Q2h neurochecks  CT Head: perimesencephalic SAH   CTA Head: no aneurysm noted  Initial Conventional angiogram 8/10 negative  MRI neuroaxis: Restricted diffusion in R BG, posterior limb of R IC, and anterior right temporal lobe  VEEG negative. DCed.   DCI management (off nimodipine). Poor windows  No EVD as no significant hydrocephalus  Precedex gtt for sedation- RASS 0 to -1, fentanyl gtt.  Agitated: Haldol prn- monitor QTc. Consider olanzepine, risperidone prn to wean if needed for less QTc prolongation.     PULM: Acute PULM edema- resolved. Hypoxia secondary to mucus plugging  [facility] vent: settings- 18/500/40/5.   spO2>92%  S/P lasix 40mg x1. Currently 1.1L neg.  CXR 8/21:  CTA R/O PE: negative  Continue hypertonic nebs./ Mucomyst prn. Thick secretions.  CPAP as tolerated though less likely to be extubated.   Will need trach. 8/24 trach.  Pulm toilet    CV:  SBP goal 100- 160  TTE: Global LV dysfunction. EF 41%, Severe concentric LVH, flattening of interventricular septum.   S/P Concern for possible PE - CTA negative  Hydralazine 75 q8 + isosorbide 10 mg q 8. Clonidine added.  Will need cath per cardiology  S/P amio gtt; changed to  daily.  Mg- 2.0 ; Po4- 3.0 ; k- 4.0  Troponin 65--> 66. Stable.  Continue lasix.   proBNP 5000.       RENAL: Non anion gap metabolic acidosis  Likely in the setting of diarrhea.  ?CKD with superimposed JALEN.- stable   Monitor Cr and lytes.  Sodium goal 140-150  S/P lasix 40mg. Monitor Is/Os  Cr post angio: stable      GI: S/P GI bleed, hyperlipasemia, Cdiff positive  Diarrhea- - Increased WBC and ABX--> Vanco 125 mg q6 for Cdiff   Diet: Resume TFs.  GI prophylaxis - [x] PPI 40 daily in setting of recent GI bleed and intubation. PO pepcid  Bowel regimen - hold for diarrhea  LBM: 8/20. Cdiff  Lipase 101 --> 89  LFTs stable. Monitor on amio    ENDO:   Goal euglycemia (-180)  TFTs wnl      HEME/ONC: Afib on coumadin at home; leukocytosis  Leukocytosis: No eosinophilia   Lipase slightly elevated 101-->89, LFTs wnl  s/p vit K: INR 1.42-->1.2--> 1.5  VTE prophylaxis: [] SCDs [x] chemoprophylaxis heparin subq.  Dopplers negative for DVT.  Will eventually need to resume coumadin once trach/PEG.       ID: Cdiff positive  Leukocytosis- Diarrhea. Improving  C difficile - see above; hypoactive BS. On PO vancomycin. Continue PO vanc for additional week after cefepime course completed.  Afebrile  Started on cefepime (switched from unasyn on 8/14) for pseudomonas- D/C 8/28/20)- 2 week course for PNA  Urine culture, procal .19  Added probiotic      SOCIAL/FAMILY:  [x] updated at bedside     CODE STATUS:  [x] Full Code [] DNR [] DNI [] Palliative/Comfort Care    DISPOSITION:  [x] ICU [] Stroke Unit [] Floor [] EMU [] RCU [] PCU    [x] Patient is at high risk of neurologic deterioration/death due to: SAH, cardiac arrest 60 year old male s/p cardiac arrest c/b GIB and bleeding from [place] with perimesencephalic (HH4 mF4) subarachnoid hemorrhage, angio neg x2 ,PBD 14.  Intubation day: 12.    NEURO:    SAH,   possible sentinal event with cardiac arrest  Angio 8/20: negative.   Q2h neurochecks  CT Head: perimesencephalic SAH   CTA Head: no aneurysm noted  Initial Conventional angiogram 8/10 negative  MRI neuroaxis: Restricted diffusion in R BG, posterior limb of R IC, and anterior right temporal lobe  VEEG negative. DCed.   DCI management (off nimodipine). Poor windows  No EVD as no significant hydrocephalus  Precedex gtt for sedation- RASS 0 to -1, fentanyl gtt.  Agitated: Haldol prn- monitor QTc. Consider olanzepine, risperidone prn to wean if needed for less QTc prolongation.     PULM: Acute PULM edema- resolved. Hypoxia secondary to mucus plugging  [facility] vent: settings- 18/500/40/5.   spO2>92%  S/P lasix 40mg x1. Currently 1.1L neg.  CXR 8/21:  CTA R/O PE: negative  Continue hypertonic nebs./ Mucomyst prn. Thick secretions.  CPAP as tolerated though less likely to be extubated.   Will need trach. 8/24 trach.  Pulm toilet    CV:  SBP goal 100- 160  TTE: Global LV dysfunction. EF 41%, Severe concentric LVH, flattening of interventricular septum.   S/P Concern for possible PE - CTA negative  Hydralazine 75 q8 + isosorbide 10 mg q 8. Clonidine added.  Will need cath per cardiology  S/P amio gtt; changed to  daily.  Mg- 2.0 ; Po4- 3.0 ; k- 4.0  Troponin 65--> 66. Stable.  Continue lasix.   proBNP 5000.       RENAL: Non anion gap metabolic acidosis  Likely in the setting of diarrhea.  ?CKD with superimposed JALEN.- stable   Monitor Cr and lytes.  Sodium goal 140-150  S/P lasix 40mg. Monitor Is/Os  Cr post angio: stable      GI: S/P GI bleed, hyperlipasemia, Cdiff positive  Diarrhea- - Increased WBC and ABX--> Vanco 125 mg q6 for Cdiff   Diet: Resume TFs.  GI prophylaxis - [x] PPI 40 daily in setting of recent GI bleed and intubation. PO pepcid  Bowel regimen - hold for diarrhea  LBM: 8/20. Cdiff  Lipase 101 --> 89  LFTs stable. Monitor on amio    ENDO:   Goal euglycemia (-180)  TFTs wnl      HEME/ONC: Afib on coumadin at home; leukocytosis  Leukocytosis: No eosinophilia   Lipase slightly elevated 101-->89, LFTs wnl  s/p vit K: INR 1.42-->1.2--> 1.5  VTE prophylaxis: [] SCDs [x] chemoprophylaxis heparin subq.  Dopplers negative for DVT.  Will eventually need to resume coumadin once trach/PEG.       ID: Cdiff positive  Leukocytosis- Diarrhea. Improving  C difficile - see above; hypoactive BS. On PO vancomycin. Continue PO vanc for additional week after cefepime course completed.  Afebrile  Started on cefepime (switched from unasyn on 8/14) for pseudomonas- D/C 8/28/20)- 2 week course for PNA  Urine culture, procal .19  Added probiotic      SOCIAL/FAMILY:  [x] updated at bedside     CODE STATUS:  [x] Full Code [] DNR [] DNI [] Palliative/Comfort Care    DISPOSITION:  [x] ICU [] Stroke Unit [] Floor [] EMU [] RCU [] PCU    [x] Patient is at high risk of neurologic deterioration/death due to: SAH, cardiac arrest

## 2020-08-21 NOTE — PROGRESS NOTE ADULT - PROBLEM SELECTOR PLAN 3
Stable   Orders per [facility]SCU team. s/p cerebral angiogram  For PEG/Trach. Acceptable cardiac risk to proceed

## 2020-08-21 NOTE — PROGRESS NOTE ADULT - SUBJECTIVE AND OBJECTIVE BOX
HPI:  60 year old male with PMHx of Afib on coumadin s/p cardiac arrest at work, downtime 25 minutes prior to ROSC. Pupils were R fixed and dilated, left fixed at the time, no gag reflex, post-CA cooling protocol was initiated down to 35 deg. Intubated and put on Nimbex drip. Also on Propofol, fentanyl, levophed. R groin minerva placed. Also put on heparin post-CA. HCT showed R perimesencephalic SAH of unclear etiology, no hydrocephalus. Course also c/b GIB, was put on protonix drip. Also had bleeding from [facility] - urology consulted, clot irrigated.       HOSP COURSE:   8/10- Angio- neg   8/11: VTACH noted (7 beats)  8/13: Febrile  8/14: Started on Unasyn for gram neg rods and gram neg diplococci on bronch gram stain -> switched to cefepime for pseudomonas noted 8/10  8/16: central line removed on 8/16. Cr improving 1.7 -> 1.57. MRNOVA planned. Could not tolerate since desatted when supine  8/17/20: Lasix 30mg x1  8/18: PO vanc for Cdiff  8/19: Few beats of VTACH noted.   8/20: Taken for angio: Negative.    Overnight events: Episode of respiratory distress overnight; desaturated 90%, mucus plug/ambu bagged.   S/P lasix 40mgx1    ICU Vital Signs Last 24 Hrs  T(C): 36.5 (21 Aug 2020 03:00), Max: 37 (20 Aug 2020 15:00)  T(F): 97.7 (21 Aug 2020 03:00), Max: 98.6 (20 Aug 2020 15:00)  HR: 64 (21 Aug 2020 06:00) (59 - 122)  BP: 147/77 (20 Aug 2020 08:26) (147/77 - 147/77)  ABP: 111/72 (21 Aug 2020 06:00) (100/88 - 171/100)  ABP(mean): 82 (21 Aug 2020 06:00) (68 - 119)  RR: 18 (21 Aug 2020 06:00) (16 - 27)  SpO2: 100% (21 Aug 2020 06:00) (91% - 100%)      08-20-20 @ 07:01  -  08-21-20 @ 07:00  --------------------------------------------------------  IN: 1534.6 mL / OUT: 2700 mL / NET: -1165.4 mL      08-19-20 @ 07:01  -  08-20-20 @ 07:00  --------------------------------------------------------  IN: 1528.5 mL / OUT: 975 mL / NET: 553.5 mL      Mode: AC/ CMV (Assist Control/ Continuous Mandatory Ventilation), RR (machine): 18, TV (machine): 500, FiO2: 40, PEEP: 5, ITime: 1, MAP: 12, PIP: 25  acetaminophen   Tablet .. 650 milliGRAM(s) Oral every 6 hours PRN  acetylcysteine 20%  Inhalation 4 milliLiter(s) Inhalation three times a day PRN  albuterol/ipratropium for Nebulization. 3 milliLiter(s) Nebulizer every 6 hours  aMIOdarone    Tablet 200 milliGRAM(s) Oral daily  artificial  tears Solution 1 Drop(s) Both EYES every 6 hours  cefepime   IVPB 2000 milliGRAM(s) IV Intermittent every 8 hours  chlorhexidine 0.12% Liquid 15 milliLiter(s) Oral Mucosa every 12 hours  chlorhexidine 4% Liquid 1 Application(s) Topical <User Schedule>  cloNIDine 0.1 milliGRAM(s) Oral three times a day  dexMEDEtomidine Infusion 0.2 MICROgram(s)/kG/Hr (5.5 mL/Hr) IV Continuous <Continuous>  dextrose 40% Gel 15 Gram(s) Oral once PRN  dextrose 5%. 1000 milliLiter(s) (50 mL/Hr) IV Continuous <Continuous>  dextrose 50% Injectable 12.5 Gram(s) IV Push once  dextrose 50% Injectable 25 Gram(s) IV Push once  dextrose 50% Injectable 25 Gram(s) IV Push once  fentaNYL    Injectable 25 MICROGram(s) IV Push every 2 hours PRN  fentaNYL   Infusion... 0.5 MICROgram(s)/kG/Hr (2.75 mL/Hr) IV Continuous <Continuous>  glucagon  Injectable 1 milliGRAM(s) IntraMuscular once PRN  heparin   Injectable 5000 Unit(s) SubCutaneous every 8 hours  hydrALAZINE 50 milliGRAM(s) Oral every 8 hours  isosorbide   dinitrate Tablet (ISORDIL) 10 milliGRAM(s) Oral three times a day  lactobacillus acidophilus 1 Tablet(s) Oral two times a day  metoprolol tartrate 12.5 milliGRAM(s) Oral two times a day  pantoprazole  Injectable 40 milliGRAM(s) IV Push daily  sodium chloride 0.9% lock flush 10 milliLiter(s) IV Push every 1 hour PRN  sodium chloride 7% Inhalation 4 milliLiter(s) Inhalation every 12 hours  vancomycin    Solution 125 milliGRAM(s) Oral every 6 hours                          10.2   19.42 )-----------( 265      ( 20 Aug 2020 22:09 )             33.1     08-20    143  |  113<H>  |  40<H>  ----------------------------<  135<H>  3.5   |  16<L>  |  1.86<H>    Ca    9.0      20 Aug 2020 22:10  Phos  3.5     08-20  Mg     2.0     08-20    TPro  6.7  /  Alb  3.2<L>  /  TBili  0.3  /  DBili  <0.1  /  AST  27  /  ALT  47<H>  /  AlkPhos  81  08-20    LIVER FUNCTIONS - ( 20 Aug 2020 01:26 )  Alb: 3.2 g/dL / Pro: 6.7 g/dL / ALK PHOS: 81 U/L / ALT: 47 U/L / AST: 27 U/L / GGT: x           ABG - ( 20 Aug 2020 22:07 )  pH, Arterial: 7.36  pH, Blood: x     /  pCO2: 24    /  pO2: 72    / HCO3: 13    / Base Excess: -10.8 /  SaO2: 94            08-19    140  |  109<H>  |  42<H>  ----------------------------<  116<H>  3.6   |  21<L>  |  1.80<H>                          10.5   28.72 )-----------( 234      ( 18 Aug 2020 21:36 )             33.6       Ca    9.4      19 Aug 2020 10:25  Phos  3.2     08-19  Mg     2.0     08-19    TPro  6.9  /  Alb  3.1<L>  /  TBili  0.3  /  DBili  x   /  AST  32  /  ALT  52<H>  /  AlkPhos  75  08-19                          11.1   32.60 )-----------( 224      ( 17 Aug 2020 21:39 )             36.1     08-17    145  |  114<H>  |  38<H>  ----------------------------<  152<H>  4.1   |  21<L>  |  1.81<H>    Ca    9.4      17 Aug 2020 21:39  Phos  3.4     08-17  Mg     2.0     08-17      BRONCH- 8/13/20- Pseudomonas- Cefepime     IMAGING:   Recent imaging studies were reviewed.  Xray Chest 1 View- PORTABLE-Routine (08.17.20 @ 05:53) >  COMPARISON: Multiple prior chest x-rays, with the most recent dated 8/16/2020.  FINDINGS:  There is an endotracheal tube, with its tip above the level of the kayleigh.  There is an enteric tube, coursing below the diaphragm, with its tip non-visualized below the level of the film.  There has been interval removal of a right IJ approach central venous catheter.  The size of the cardiomediastinal silhouette is enlarged.  There are no focal pulmonary consolidations.  There is no pneumothorax or pleural effusion.    IMPRESSION:  Interval removal of central line. Clear lungs.      CT Head No Cont (08.13.20 @ 08:55) >  TECHNIQUE : Axial CT scanning of the brain was obtained from the skull base to the vertex without the administration of intravenous contrast. Sagittal and coronal reformats were provided.  COMPARISON: CT brain 8/12/2020  FINDINGS:  Similar extra-axial hemorrhage in the right aspect of the suprasellar cistern extending into the right sylvian fissure.  Scattered sulcal subarachnoid hemorrhage is similar.  Similar small layering intraventricular hemorrhage.  No hydrocephalus or midline shift.  Edema in the region of the right cerebral peduncle and posterior limb of the right internal capsule is redemonstrated.    IMPRESSION:  No significant interval change from 8/12/2020.      MR Head w/wo IV Cont (08.11.20 @ 17:50) >  MRI BRAIN:  Redemonstration of subarachnoid hemorrhage in the right aspect of the suprasellar cistern and within the right sylvian fissure.  Scattered sulcal subarachnoid hemorrhage is redemonstrated.  Small hemorrhage layering in the occipital horns is similar.. Ventricles similar in size. No hydrocephalus.  There is restricted diffusion within the region of the right basal ganglia, posterior limb of the right internal capsule, and anterior right temporal lobe, likely representing an acute right MCA territory infarct.  Chronic left occipital infarct. Mild white matter microvascular ischemic disease. Signal voids are seen within the major intracranial vessels consistent with their patency.  No abnormal parenchymal or leptomeningeal enhancement.  Air-fluid levels and mucosal thickening in the paranasal sinuses. Minimal bilateral mastoid air cell effusions.      MRI CERVICAL SPINE:  Vertebral body height, marrow signal homogeneity, and facet alignment are maintained throughout the visualized spinal segments. Straightening of the normal cervical lordosis. Mild multilevel disc space narrowing. Cervicomedullary junction unremarkable.  No prevertebral or paravertebral edema.  No abnormal enhancement in the cervical region.  No spinal cord compression or abnormal intrinsic cord signal.  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  C2-C3: No acquired spinal canal stenosis. No neural foraminal narrowing.  C3-C4: Broad-based disc protrusion reaches the cord. Bilateral uncinate hypertrophy resulting in moderate bilateral neural foraminal narrowing.  C4-C5: Broad-based disc protrusion asymmetric to the left nearly reaches the cord. Bilateral uncinate hypertrophy resulting in moderate left and moderate to severe right neural foraminal narrowing.  C5-C6: Broad-based disc protrusion asymmetric to the left and left uncinate hypertrophy. Deformation of the left ventral thecal sac and effacement of the left lateral recess. Moderate left neural foraminal narrowing.  C6-C7: Broad-based disc protrusion deforms the ventral thecal sac. Left uncinate hypertrophy. Mild to moderate left neural foraminal narrowing.  C7-T1: Left facet hypertrophy. No acquired spinal canal stenosis or neural foraminal narrowing.    IMPRESSION:      MRI BRAIN:  Restricted There is restricted diffusion within the region of the right basal ganglia, posterior limb of the right internal capsule, and anterior right temporal lobe, likely representing an acute right MCA territory infarct.  Similar cisternal and sulcal subarachnoid hemorrhage, given differences in modality.  Similar small hemorrhage layering in the occipital horns. No hydrocephalus.  No abnormal parenchymal or leptomeningeal enhancement.      MRI CERVICAL SPINE:  Multilevel degenerative changes superimposed upon congenital spinal canal stenosis.  No abnormal enhancement in the cervical region.        EXAMINATION:  General: Calm, intubated  HEENT: MMM  Neuro:  -Mental status-  No acute distress, EO to voice. Strong and purposeful on RUE,  followed showing 2 fingers. Left side  0/5  -CN- Pupils R 5mm NR, L 1mm sluggish, Eyes dysconjugate,  +cough/gag, +corneals  RUE localize AG, RLE spont AG, LUE flaccid, LLE flaccid  CVS: S1/S2  RESP: Diminished at bases  GI: Soft, non tender, mildly distended, hypoactive BS  Extremities: Warm, skin intact HPI:  60 year old male with PMHx of Afib on coumadin s/p cardiac arrest at work, downtime 25 minutes prior to ROSC. Pupils were R fixed and dilated, left fixed at the time, no gag reflex, post-CA cooling protocol was initiated down to 35 deg. Intubated and put on Nimbex drip. Also on Propofol, fentanyl, levophed. R groin minerva placed. Also put on heparin post-CA. HCT showed R perimesencephalic SAH of unclear etiology, no hydrocephalus. Course also c/b GIB, was put on protonix drip. Also had bleeding from [facility] - urology consulted, clot irrigated.       HOSP COURSE:   8/10- Angio- neg   8/11: VTACH noted (7 beats)  8/13: Febrile  8/14: Started on Unasyn for gram neg rods and gram neg diplococci on bronch gram stain -> switched to cefepime for pseudomonas noted 8/10  8/16: central line removed on 8/16. Cr improving 1.7 -> 1.57. MRNOVA planned. Could not tolerate since desatted when supine  8/17/20: Lasix 30mg x1  8/18: PO vanc for Cdiff  8/19: Few beats of VTACH noted.   8/20: Taken for angio: Negative.    Overnight events: Episode of respiratory distress overnight; desaturated 85%, mucus plug/ambu bagged.   S/P lasix 40mgx1.       ICU Vital Signs Last 24 Hrs  T(C): 36.5 (21 Aug 2020 03:00), Max: 37 (20 Aug 2020 15:00)  T(F): 97.7 (21 Aug 2020 03:00), Max: 98.6 (20 Aug 2020 15:00)  HR: 64 (21 Aug 2020 06:00) (59 - 122)  BP: 147/77 (20 Aug 2020 08:26) (147/77 - 147/77)  ABP: 111/72 (21 Aug 2020 06:00) (100/88 - 171/100)  ABP(mean): 82 (21 Aug 2020 06:00) (68 - 119)  RR: 18 (21 Aug 2020 06:00) (16 - 27)  SpO2: 100% (21 Aug 2020 06:00) (91% - 100%)      08-20-20 @ 07:01  -  08-21-20 @ 07:00  --------------------------------------------------------  IN: 1534.6 mL / OUT: 2700 mL / NET: -1165.4 mL      08-19-20 @ 07:01  -  08-20-20 @ 07:00  --------------------------------------------------------  IN: 1528.5 mL / OUT: 975 mL / NET: 553.5 mL      Mode: AC/ CMV (Assist Control/ Continuous Mandatory Ventilation), RR (machine): 18, TV (machine): 500, FiO2: 40, PEEP: 5, ITime: 1, MAP: 12, PIP: 25  acetaminophen   Tablet .. 650 milliGRAM(s) Oral every 6 hours PRN  acetylcysteine 20%  Inhalation 4 milliLiter(s) Inhalation three times a day PRN  albuterol/ipratropium for Nebulization. 3 milliLiter(s) Nebulizer every 6 hours  aMIOdarone    Tablet 200 milliGRAM(s) Oral daily  artificial  tears Solution 1 Drop(s) Both EYES every 6 hours  cefepime   IVPB 2000 milliGRAM(s) IV Intermittent every 8 hours  chlorhexidine 0.12% Liquid 15 milliLiter(s) Oral Mucosa every 12 hours  chlorhexidine 4% Liquid 1 Application(s) Topical <User Schedule>  cloNIDine 0.1 milliGRAM(s) Oral three times a day  dexMEDEtomidine Infusion 0.2 MICROgram(s)/kG/Hr (5.5 mL/Hr) IV Continuous <Continuous>  dextrose 40% Gel 15 Gram(s) Oral once PRN  dextrose 5%. 1000 milliLiter(s) (50 mL/Hr) IV Continuous <Continuous>  dextrose 50% Injectable 12.5 Gram(s) IV Push once  dextrose 50% Injectable 25 Gram(s) IV Push once  dextrose 50% Injectable 25 Gram(s) IV Push once  fentaNYL    Injectable 25 MICROGram(s) IV Push every 2 hours PRN  fentaNYL   Infusion... 0.5 MICROgram(s)/kG/Hr (2.75 mL/Hr) IV Continuous <Continuous>  glucagon  Injectable 1 milliGRAM(s) IntraMuscular once PRN  heparin   Injectable 5000 Unit(s) SubCutaneous every 8 hours  hydrALAZINE 50 milliGRAM(s) Oral every 8 hours  isosorbide   dinitrate Tablet (ISORDIL) 10 milliGRAM(s) Oral three times a day  lactobacillus acidophilus 1 Tablet(s) Oral two times a day  metoprolol tartrate 12.5 milliGRAM(s) Oral two times a day  pantoprazole  Injectable 40 milliGRAM(s) IV Push daily  sodium chloride 0.9% lock flush 10 milliLiter(s) IV Push every 1 hour PRN  sodium chloride 7% Inhalation 4 milliLiter(s) Inhalation every 12 hours  vancomycin    Solution 125 milliGRAM(s) Oral every 6 hours                          10.2   19.42 )-----------( 265      ( 20 Aug 2020 22:09 )             33.1     08-20    143  |  113<H>  |  40<H>  ----------------------------<  135<H>  3.5   |  16<L>  |  1.86<H>    Ca    9.0      20 Aug 2020 22:10  Phos  3.5     08-20  Mg     2.0     08-20    TPro  6.7  /  Alb  3.2<L>  /  TBili  0.3  /  DBili  <0.1  /  AST  27  /  ALT  47<H>  /  AlkPhos  81  08-20    LIVER FUNCTIONS - ( 20 Aug 2020 01:26 )  Alb: 3.2 g/dL / Pro: 6.7 g/dL / ALK PHOS: 81 U/L / ALT: 47 U/L / AST: 27 U/L / GGT: x           ABG - ( 20 Aug 2020 22:07 )  pH, Arterial: 7.36  pH, Blood: x     /  pCO2: 24    /  pO2: 72    / HCO3: 13    / Base Excess: -10.8 /  SaO2: 94            08-19    140  |  109<H>  |  42<H>  ----------------------------<  116<H>  3.6   |  21<L>  |  1.80<H>                          10.5   28.72 )-----------( 234      ( 18 Aug 2020 21:36 )             33.6       Ca    9.4      19 Aug 2020 10:25  Phos  3.2     08-19  Mg     2.0     08-19    TPro  6.9  /  Alb  3.1<L>  /  TBili  0.3  /  DBili  x   /  AST  32  /  ALT  52<H>  /  AlkPhos  75  08-19                          11.1   32.60 )-----------( 224      ( 17 Aug 2020 21:39 )             36.1     08-17    145  |  114<H>  |  38<H>  ----------------------------<  152<H>  4.1   |  21<L>  |  1.81<H>    Ca    9.4      17 Aug 2020 21:39  Phos  3.4     08-17  Mg     2.0     08-17      BRONCH- 8/13/20- Pseudomonas- Cefepime     IMAGING:   Recent imaging studies were reviewed.  Xray Chest 1 View- PORTABLE-Routine (08.17.20 @ 05:53) >  COMPARISON: Multiple prior chest x-rays, with the most recent dated 8/16/2020.  FINDINGS:  There is an endotracheal tube, with its tip above the level of the kayleigh.  There is an enteric tube, coursing below the diaphragm, with its tip non-visualized below the level of the film.  There has been interval removal of a right IJ approach central venous catheter.  The size of the cardiomediastinal silhouette is enlarged.  There are no focal pulmonary consolidations.  There is no pneumothorax or pleural effusion.    IMPRESSION:  Interval removal of central line. Clear lungs.      CT Head No Cont (08.13.20 @ 08:55) >  TECHNIQUE : Axial CT scanning of the brain was obtained from the skull base to the vertex without the administration of intravenous contrast. Sagittal and coronal reformats were provided.  COMPARISON: CT brain 8/12/2020  FINDINGS:  Similar extra-axial hemorrhage in the right aspect of the suprasellar cistern extending into the right sylvian fissure.  Scattered sulcal subarachnoid hemorrhage is similar.  Similar small layering intraventricular hemorrhage.  No hydrocephalus or midline shift.  Edema in the region of the right cerebral peduncle and posterior limb of the right internal capsule is redemonstrated.    IMPRESSION:  No significant interval change from 8/12/2020.      MR Head w/wo IV Cont (08.11.20 @ 17:50) >  MRI BRAIN:  Redemonstration of subarachnoid hemorrhage in the right aspect of the suprasellar cistern and within the right sylvian fissure.  Scattered sulcal subarachnoid hemorrhage is redemonstrated.  Small hemorrhage layering in the occipital horns is similar.. Ventricles similar in size. No hydrocephalus.  There is restricted diffusion within the region of the right basal ganglia, posterior limb of the right internal capsule, and anterior right temporal lobe, likely representing an acute right MCA territory infarct.  Chronic left occipital infarct. Mild white matter microvascular ischemic disease. Signal voids are seen within the major intracranial vessels consistent with their patency.  No abnormal parenchymal or leptomeningeal enhancement.  Air-fluid levels and mucosal thickening in the paranasal sinuses. Minimal bilateral mastoid air cell effusions.      MRI CERVICAL SPINE:  Vertebral body height, marrow signal homogeneity, and facet alignment are maintained throughout the visualized spinal segments. Straightening of the normal cervical lordosis. Mild multilevel disc space narrowing. Cervicomedullary junction unremarkable.  No prevertebral or paravertebral edema.  No abnormal enhancement in the cervical region.  No spinal cord compression or abnormal intrinsic cord signal.  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  C2-C3: No acquired spinal canal stenosis. No neural foraminal narrowing.  C3-C4: Broad-based disc protrusion reaches the cord. Bilateral uncinate hypertrophy resulting in moderate bilateral neural foraminal narrowing.  C4-C5: Broad-based disc protrusion asymmetric to the left nearly reaches the cord. Bilateral uncinate hypertrophy resulting in moderate left and moderate to severe right neural foraminal narrowing.  C5-C6: Broad-based disc protrusion asymmetric to the left and left uncinate hypertrophy. Deformation of the left ventral thecal sac and effacement of the left lateral recess. Moderate left neural foraminal narrowing.  C6-C7: Broad-based disc protrusion deforms the ventral thecal sac. Left uncinate hypertrophy. Mild to moderate left neural foraminal narrowing.  C7-T1: Left facet hypertrophy. No acquired spinal canal stenosis or neural foraminal narrowing.    IMPRESSION:      MRI BRAIN:  Restricted There is restricted diffusion within the region of the right basal ganglia, posterior limb of the right internal capsule, and anterior right temporal lobe, likely representing an acute right MCA territory infarct.  Similar cisternal and sulcal subarachnoid hemorrhage, given differences in modality.  Similar small hemorrhage layering in the occipital horns. No hydrocephalus.  No abnormal parenchymal or leptomeningeal enhancement.      MRI CERVICAL SPINE:  Multilevel degenerative changes superimposed upon congenital spinal canal stenosis.  No abnormal enhancement in the cervical region.        EXAMINATION:  General: Calm, intubated  HEENT: MMM  Neuro:  -Mental status-  No acute distress, EO to voice. Strong and purposeful on RUE,  followed showing 2 fingers. Left side  0/5  -CN- Pupils R 5mm NR, L 1mm sluggish, Eyes dysconjugate,  +cough/gag, +corneals  RUE localize AG, RLE spont AG, LUE flaccid, LLE flaccid  CVS: S1/S2  RESP: Diminished at bases  GI: Soft, non tender, mildly distended, hypoactive BS  Extremities: Warm, skin intact

## 2020-08-21 NOTE — PROGRESS NOTE ADULT - SUBJECTIVE AND OBJECTIVE BOX
Patient seen and examined    Overnight events: s/p angio neg      EXAM  Intubated, EOs to voice,  Right pupil 4 sluggish, left 3 R,  disconjugate gaze, interm FC RUE (two fingers), RLE spont, LT side nothing

## 2020-08-22 PROCEDURE — 99292 CRITICAL CARE ADDL 30 MIN: CPT

## 2020-08-22 PROCEDURE — 71045 X-RAY EXAM CHEST 1 VIEW: CPT | Mod: 26

## 2020-08-22 PROCEDURE — 99291 CRITICAL CARE FIRST HOUR: CPT

## 2020-08-22 RX ORDER — FENTANYL CITRATE 50 UG/ML
50 INJECTION INTRAVENOUS ONCE
Refills: 0 | Status: DISCONTINUED | OUTPATIENT
Start: 2020-08-22 | End: 2020-08-22

## 2020-08-22 RX ORDER — QUETIAPINE FUMARATE 200 MG/1
25 TABLET, FILM COATED ORAL
Refills: 0 | Status: DISCONTINUED | OUTPATIENT
Start: 2020-08-22 | End: 2020-08-22

## 2020-08-22 RX ORDER — QUETIAPINE FUMARATE 200 MG/1
25 TABLET, FILM COATED ORAL EVERY 8 HOURS
Refills: 0 | Status: DISCONTINUED | OUTPATIENT
Start: 2020-08-22 | End: 2020-08-24

## 2020-08-22 RX ORDER — MIDAZOLAM HYDROCHLORIDE 1 MG/ML
2 INJECTION, SOLUTION INTRAMUSCULAR; INTRAVENOUS ONCE
Refills: 0 | Status: DISCONTINUED | OUTPATIENT
Start: 2020-08-22 | End: 2020-08-22

## 2020-08-22 RX ORDER — SODIUM CHLORIDE 9 MG/ML
500 INJECTION INTRAMUSCULAR; INTRAVENOUS; SUBCUTANEOUS ONCE
Refills: 0 | Status: COMPLETED | OUTPATIENT
Start: 2020-08-22 | End: 2020-08-22

## 2020-08-22 RX ADMIN — Medication 50 MILLIGRAM(S): at 00:31

## 2020-08-22 RX ADMIN — HEPARIN SODIUM 5000 UNIT(S): 5000 INJECTION INTRAVENOUS; SUBCUTANEOUS at 14:23

## 2020-08-22 RX ADMIN — FENTANYL CITRATE 50 MICROGRAM(S): 50 INJECTION INTRAVENOUS at 16:55

## 2020-08-22 RX ADMIN — Medication 3 MILLILITER(S): at 12:15

## 2020-08-22 RX ADMIN — DEXMEDETOMIDINE HYDROCHLORIDE IN 0.9% SODIUM CHLORIDE 5.5 MICROGRAM(S)/KG/HR: 4 INJECTION INTRAVENOUS at 11:53

## 2020-08-22 RX ADMIN — QUETIAPINE FUMARATE 25 MILLIGRAM(S): 200 TABLET, FILM COATED ORAL at 20:05

## 2020-08-22 RX ADMIN — CHLORHEXIDINE GLUCONATE 1 APPLICATION(S): 213 SOLUTION TOPICAL at 05:22

## 2020-08-22 RX ADMIN — Medication 1 DROP(S): at 05:21

## 2020-08-22 RX ADMIN — FENTANYL CITRATE 25 MICROGRAM(S): 50 INJECTION INTRAVENOUS at 00:15

## 2020-08-22 RX ADMIN — Medication 1 DROP(S): at 00:00

## 2020-08-22 RX ADMIN — Medication 125 MILLIGRAM(S): at 05:22

## 2020-08-22 RX ADMIN — Medication 3 MILLILITER(S): at 17:15

## 2020-08-22 RX ADMIN — FENTANYL CITRATE 2.75 MICROGRAM(S)/KG/HR: 50 INJECTION INTRAVENOUS at 12:12

## 2020-08-22 RX ADMIN — CEFEPIME 100 MILLIGRAM(S): 1 INJECTION, POWDER, FOR SOLUTION INTRAMUSCULAR; INTRAVENOUS at 14:23

## 2020-08-22 RX ADMIN — SODIUM CHLORIDE 4 MILLILITER(S): 9 INJECTION INTRAMUSCULAR; INTRAVENOUS; SUBCUTANEOUS at 17:15

## 2020-08-22 RX ADMIN — Medication 0.1 MILLIGRAM(S): at 23:08

## 2020-08-22 RX ADMIN — FENTANYL CITRATE 25 MICROGRAM(S): 50 INJECTION INTRAVENOUS at 00:30

## 2020-08-22 RX ADMIN — CEFEPIME 100 MILLIGRAM(S): 1 INJECTION, POWDER, FOR SOLUTION INTRAMUSCULAR; INTRAVENOUS at 23:07

## 2020-08-22 RX ADMIN — AMIODARONE HYDROCHLORIDE 200 MILLIGRAM(S): 400 TABLET ORAL at 00:30

## 2020-08-22 RX ADMIN — CHLORHEXIDINE GLUCONATE 15 MILLILITER(S): 213 SOLUTION TOPICAL at 05:26

## 2020-08-22 RX ADMIN — Medication 50 MILLIGRAM(S): at 08:33

## 2020-08-22 RX ADMIN — ISOSORBIDE DINITRATE 10 MILLIGRAM(S): 5 TABLET ORAL at 23:08

## 2020-08-22 RX ADMIN — FENTANYL CITRATE 25 MICROGRAM(S): 50 INJECTION INTRAVENOUS at 06:45

## 2020-08-22 RX ADMIN — QUETIAPINE FUMARATE 25 MILLIGRAM(S): 200 TABLET, FILM COATED ORAL at 11:52

## 2020-08-22 RX ADMIN — Medication 1 DROP(S): at 17:43

## 2020-08-22 RX ADMIN — Medication 125 MILLIGRAM(S): at 23:07

## 2020-08-22 RX ADMIN — HEPARIN SODIUM 5000 UNIT(S): 5000 INJECTION INTRAVENOUS; SUBCUTANEOUS at 23:07

## 2020-08-22 RX ADMIN — ISOSORBIDE DINITRATE 10 MILLIGRAM(S): 5 TABLET ORAL at 05:23

## 2020-08-22 RX ADMIN — Medication 0.1 MILLIGRAM(S): at 05:26

## 2020-08-22 RX ADMIN — FAMOTIDINE 20 MILLIGRAM(S): 10 INJECTION INTRAVENOUS at 05:23

## 2020-08-22 RX ADMIN — Medication 1 TABLET(S): at 17:43

## 2020-08-22 RX ADMIN — Medication 125 MILLIGRAM(S): at 11:52

## 2020-08-22 RX ADMIN — CHLORHEXIDINE GLUCONATE 15 MILLILITER(S): 213 SOLUTION TOPICAL at 17:43

## 2020-08-22 RX ADMIN — Medication 125 MILLIGRAM(S): at 17:43

## 2020-08-22 RX ADMIN — Medication 3 MILLILITER(S): at 05:11

## 2020-08-22 RX ADMIN — Medication 1 TABLET(S): at 05:23

## 2020-08-22 RX ADMIN — HEPARIN SODIUM 5000 UNIT(S): 5000 INJECTION INTRAVENOUS; SUBCUTANEOUS at 05:23

## 2020-08-22 RX ADMIN — MIDAZOLAM HYDROCHLORIDE 2 MILLIGRAM(S): 1 INJECTION, SOLUTION INTRAMUSCULAR; INTRAVENOUS at 17:00

## 2020-08-22 RX ADMIN — SODIUM CHLORIDE 4 MILLILITER(S): 9 INJECTION INTRAMUSCULAR; INTRAVENOUS; SUBCUTANEOUS at 05:11

## 2020-08-22 RX ADMIN — Medication 125 MILLIGRAM(S): at 00:31

## 2020-08-22 RX ADMIN — FAMOTIDINE 20 MILLIGRAM(S): 10 INJECTION INTRAVENOUS at 17:43

## 2020-08-22 RX ADMIN — Medication 3 MILLILITER(S): at 01:31

## 2020-08-22 RX ADMIN — Medication 12.5 MILLIGRAM(S): at 05:23

## 2020-08-22 RX ADMIN — Medication 1 DROP(S): at 23:08

## 2020-08-22 RX ADMIN — SODIUM CHLORIDE 500 MILLILITER(S): 9 INJECTION INTRAMUSCULAR; INTRAVENOUS; SUBCUTANEOUS at 17:44

## 2020-08-22 RX ADMIN — CEFEPIME 100 MILLIGRAM(S): 1 INJECTION, POWDER, FOR SOLUTION INTRAMUSCULAR; INTRAVENOUS at 05:21

## 2020-08-22 RX ADMIN — Medication 1 DROP(S): at 11:53

## 2020-08-22 RX ADMIN — FENTANYL CITRATE 25 MICROGRAM(S): 50 INJECTION INTRAVENOUS at 06:30

## 2020-08-22 NOTE — PROGRESS NOTE ADULT - SUBJECTIVE AND OBJECTIVE BOX
Subjective: Patient seen and examined. No new events except as noted.   remains intubated in [facility]     REVIEW OF SYSTEMS:  Unable to obtain       MEDICATIONS:  MEDICATIONS  (STANDING):  albuterol/ipratropium for Nebulization. 3 milliLiter(s) Nebulizer every 6 hours  aMIOdarone    Tablet 200 milliGRAM(s) Oral daily  artificial  tears Solution 1 Drop(s) Both EYES every 6 hours  cefepime   IVPB 2000 milliGRAM(s) IV Intermittent every 8 hours  chlorhexidine 0.12% Liquid 15 milliLiter(s) Oral Mucosa every 12 hours  chlorhexidine 4% Liquid 1 Application(s) Topical <User Schedule>  chlorhexidine 4% Liquid 1 Application(s) Topical <User Schedule>  cloNIDine 0.1 milliGRAM(s) Oral three times a day  dexMEDEtomidine Infusion 0.2 MICROgram(s)/kG/Hr (5.5 mL/Hr) IV Continuous <Continuous>  dextrose 5%. 1000 milliLiter(s) (50 mL/Hr) IV Continuous <Continuous>  dextrose 50% Injectable 12.5 Gram(s) IV Push once  dextrose 50% Injectable 25 Gram(s) IV Push once  dextrose 50% Injectable 25 Gram(s) IV Push once  famotidine    Tablet 20 milliGRAM(s) Oral two times a day  fentaNYL   Infusion... 0.5 MICROgram(s)/kG/Hr (2.75 mL/Hr) IV Continuous <Continuous>  heparin   Injectable 5000 Unit(s) SubCutaneous every 8 hours  hydrALAZINE 50 milliGRAM(s) Oral every 8 hours  isosorbide   dinitrate Tablet (ISORDIL) 10 milliGRAM(s) Oral three times a day  lactobacillus acidophilus 1 Tablet(s) Oral two times a day  metoprolol tartrate 12.5 milliGRAM(s) Oral two times a day  QUEtiapine 25 milliGRAM(s) Oral every 8 hours  sodium chloride 7% Inhalation 4 milliLiter(s) Inhalation every 12 hours  vancomycin    Solution 125 milliGRAM(s) Oral every 6 hours      PHYSICAL EXAM:  T(C): 36.9 (08-22-20 @ 19:00), Max: 37.8 (08-22-20 @ 07:00)  HR: 75 (08-22-20 @ 22:00) (57 - 113)  BP: --  RR: 18 (08-22-20 @ 22:00) (9 - 22)  SpO2: 100% (08-22-20 @ 22:00) (88% - 100%)  Wt(kg): --  I&O's Summary    21 Aug 2020 07:01  -  22 Aug 2020 07:00  --------------------------------------------------------  IN: 3254.6 mL / OUT: 2675 mL / NET: 579.6 mL    22 Aug 2020 07:01  -  22 Aug 2020 22:21  --------------------------------------------------------  IN: 2423.5 mL / OUT: 575 mL / NET: 1848.5 mL          Appearance: Intubated sedated 	  HEENT:   Dry  oral mucosa,  Lymphatic: No lymphadenopathy  Cardiovascular: Normal S1 S2, No JVD, No murmurs, No edema  Respiratory: Ventilated   Psychiatry: A & O x 0  Gastrointestinal:  Soft, Non-tender, + BS	  Skin: No rashes, No ecchymoses, No cyanosis	  Mental status- No acute distress, EO to stim  -CN- Pupils R 4mm NR, L 2mm sluggish, EOMI, tongue midline, face symmetric  +cough/gag  C briskly, two fingers/thumbs up on RUE, distally AG, wiggles toes on RLE, no      LABS:    CARDIAC MARKERS:                                10.7   15.61 )-----------( 328      ( 21 Aug 2020 22:43 )             34.9     08-21    145  |  114<H>  |  38<H>  ----------------------------<  130<H>  3.6   |  18<L>  |  1.92<H>    Ca    9.2      21 Aug 2020 22:43  Phos  2.8     08-21  Mg     2.2     08-21      proBNP:   Lipid Profile:   HgA1c:   TSH:             TELEMETRY: 	AF    ECG:  	  RADIOLOGY:   DIAGNOSTIC TESTING:  [ ] Echocardiogram:  [ ]  Catheterization:  [ ] Stress Test:    OTHER:

## 2020-08-22 NOTE — PROGRESS NOTE ADULT - ASSESSMENT
60 year old male s/p cardiac arrest c/b GIB and bleeding from [facility] with perimesencephalic (HH4 mF4) subarachnoid hemorrhage, angio neg x2 ,PBD 15.  Intubation day: 13.    NEURO:    SAH, possible sentinal event with cardiac arrest  Angio 8/20: negative.   Q2h neurochecks  CT Head: perimesencephalic SAH   CTA Head: no aneurysm noted  Initial Conventional angiogram 8/10 negative  MRI neuroaxis: Restricted diffusion in R BG, posterior limb of R IC, and anterior right temporal lobe  VEEG negative. DCed.   DCI management (off nimodipine). Poor windows  No EVD as no significant hydrocephalus  Precedex gtt for sedation- RASS 0 to -1, fentanyl gtt.  Agitated: Haldol prn- monitor QTc. Consider olanzepine, risperidone prn to wean if needed for less QTc prolongation.   Start seroquel 8/22    PULM: Acute PULM edema- resolved. Hypoxia secondary to mucus plugging  Mech vent: settings- 18/500/40/5.   spO2>92%  CXR- improved  CTA R/O PE: negative  Continue hypertonic nebs./ Mucomyst prn. Thick secretions.  CPAP as tolerated though less likely to be extubated.   Will need trach. 8/24 trach.  Pulm toilet    CV:  SBP goal 100- 160  TTE: Global LV dysfunction. EF 41%, Severe concentric LVH, flattening of interventricular septum.   S/P Concern for possible PE - CTA negative  Hydralazine 75 q8 + isosorbide 10 mg q 8. Clonidine added.  Will need cath per cardiology  S/P amio gtt; changed to  daily.  Mg- 2.0 ; Po4- 3.0 ; k- 4.0  Troponin 65--> 66. Stable.  Hold lasix.   Repeat EKG for QTc      RENAL: Non anion gap metabolic acidosis  Likely in the setting of diarrhea.  ?CKD with superimposed JALEN.- stable   Monitor Cr and lytes.  Sodium goal 140-150  S/P lasix 40mg. Monitor Is/Os  Cr post angio: stable      GI: S/P GI bleed, hyperlipasemia, Cdiff positive  Diarrhea- - Increased WBC and ABX--> Vanco 125 mg q6 for Cdiff   Diet:Contine TFs. Vital.  GI prop [x] PPI 40 daily in setting of recent GI bleed and intubation. PO pepcid  Bowel regimen - hold for diarrhea  LBM: 8/22. Cdiff  Lipase 101 --> 89  LFTs stable. Monitor while   on amiodarone.     ENDO:   Goal euglycemia (-180)  TFTs wnl      HEME/ONC: Afib on coumadin at home; leukocytosis  Leukocytosis: No eosinophilia   Lipase slightly elevated 101-->89, LFTs wnl  s/p vit K: INR 1.42-->1.2--> 1.5.   Repeat INR 8/23.  VTE prophylaxis: [] SCDs [x] chemoprophylaxis heparin subq.  Dopplers negative for DVT.  Will eventually need to resume coumadin once trach/PEG.       ID: Cdiff positive  Leukocytosis- Diarrhea. Improving  C difficile - see above; hypoactive BS. On PO vancomycin. Continue PO vanc for additional week after cefepime course completed.  Afebrile  Started on cefepime (switched from unasyn on 8/14) for pseudomonas- D/C 8/28/20)- 2 week course for PNA  Urine culture, procal .19  Added probiotic      SOCIAL/FAMILY:  [x] updated at bedside     CODE STATUS:  [x] Full Code [] DNR [] DNI [] Palliative/Comfort Care    DISPOSITION:  [x] ICU [] Stroke Unit [] Floor [] EMU [] RCU [] PCU    [x] Patient is at high risk of neurologic deterioration/death due to: SAH, cardiac arrest

## 2020-08-22 NOTE — PROGRESS NOTE ADULT - SUBJECTIVE AND OBJECTIVE BOX
Hypotensive during day status post fluid bolus. Hypotension due to sedation. Sedation held, but then became agitated.     Intubated, follows on the right, nods appropriately to questions, left side plegic

## 2020-08-22 NOTE — PROGRESS NOTE ADULT - ASSESSMENT
angio negative subarachnoid hemorrhage, cardiac arrest status post TTM   - Sedation: on Precedex, Fentanyl gtt - wean in favor of quetiapine (but must check QtC)  - Cefepime for PNA until 8/28  - Planned for tracheostomy/PEG  - Vtach: amiodarone angio negative subarachnoid hemorrhage, cardiac arrest status post TTM   - Sedation: on Precedex, Fentanyl gtt - wean in favor of quetiapine (but must check QtC)  - Cefepime for PNA until 8/28  - Enteral Vanco for c. diff  - Planned for tracheostomy/PEG  - Vtach: amiodarone

## 2020-08-22 NOTE — PROGRESS NOTE ADULT - SUBJECTIVE AND OBJECTIVE BOX
HPI:  60 year old male with PMHx of Afib on coumadin s/p cardiac arrest at work, downtime 25 minutes prior to ROSC. Pupils were R fixed and dilated, left fixed at the time, no gag reflex, post-CA cooling protocol was initiated down to 35 deg. Intubated and put on Nimbex drip. Also on Propofol, fentanyl, levophed. R groin minerva placed. Also put on heparin post-CA. HCT showed R perimesencephalic SAH of unclear etiology, no hydrocephalus. Course also c/b GIB, was put on protonix drip. Also had bleeding from [facility] - urology consulted, clot irrigated.       HOSP COURSE:   8/10- Angio- neg   8/11: VTACH noted (7 beats)  8/13: Febrile  8/14: Started on Unasyn for gram neg rods and gram neg diplococci on bronch gram stain -> switched to cefepime for pseudomonas noted 8/10  8/16: central line removed on 8/16. Cr improving 1.7 -> 1.57. MRNOVA planned. Could not tolerate since desatted when supine  8/17/20: Lasix 30mg x1  8/18: PO vanc for Cdiff  8/19: Few beats of VTACH noted.   8/20: Taken for angio: Negative.  8/21: Episode of respiratory distress overnight; desaturated 85%, mucus plug/ambu bagged.   S/P lasix 40mgx1.     Overnight events: Yesterday pm, sedation paused. Pt thrashing and agitated.     ICU Vital Signs Last 24 Hrs  T(C): 37.8 (22 Aug 2020 07:00), Max: 37.8 (22 Aug 2020 07:00)  T(F): 100 (22 Aug 2020 07:00), Max: 100 (22 Aug 2020 07:00)  HR: 70 (22 Aug 2020 09:30) (54 - 114)  BP: 117/82 (21 Aug 2020 18:00) (101/76 - 143/98)  BP(mean): 90 (21 Aug 2020 18:00) (85 - 112)  ABP: 106/54 (22 Aug 2020 09:00) (95/60 - 168/77)  ABP(mean): 67 (22 Aug 2020 09:00) (67 - 110)  RR: 18 (22 Aug 2020 09:00) (0 - 22)  SpO2: 100% (22 Aug 2020 09:30) (88% - 100%)      08-21-20 @ 07:01  -  08-22-20 @ 07:00  --------------------------------------------------------  IN: 3254.6 mL / OUT: 2675 mL / NET: 579.6 mL    08-22-20 @ 07:01  -  08-22-20 @ 10:21  --------------------------------------------------------  IN: 442.6 mL / OUT: 0 mL / NET: 442.6 mL      Mode: AC/ CMV (Assist Control/ Continuous Mandatory Ventilation), RR (machine): 18, TV (machine): 500, FiO2: 40, PEEP: 5, ITime: 1, MAP: 12, PIP: 25  acetaminophen   Tablet .. 650 milliGRAM(s) Oral every 6 hours PRN  acetylcysteine 20%  Inhalation 4 milliLiter(s) Inhalation three times a day PRN  albuterol/ipratropium for Nebulization. 3 milliLiter(s) Nebulizer every 6 hours  aMIOdarone    Tablet 200 milliGRAM(s) Oral daily  artificial  tears Solution 1 Drop(s) Both EYES every 6 hours  cefepime   IVPB 2000 milliGRAM(s) IV Intermittent every 8 hours  chlorhexidine 0.12% Liquid 15 milliLiter(s) Oral Mucosa every 12 hours  chlorhexidine 4% Liquid 1 Application(s) Topical <User Schedule>  chlorhexidine 4% Liquid 1 Application(s) Topical <User Schedule>  cloNIDine 0.1 milliGRAM(s) Oral three times a day  dexMEDEtomidine Infusion 0.2 MICROgram(s)/kG/Hr (5.5 mL/Hr) IV Continuous <Continuous>  dextrose 40% Gel 15 Gram(s) Oral once PRN  dextrose 5%. 1000 milliLiter(s) (50 mL/Hr) IV Continuous <Continuous>  dextrose 50% Injectable 12.5 Gram(s) IV Push once  dextrose 50% Injectable 25 Gram(s) IV Push once  dextrose 50% Injectable 25 Gram(s) IV Push once  famotidine    Tablet 20 milliGRAM(s) Oral two times a day  fentaNYL    Injectable 25 MICROGram(s) IV Push every 2 hours PRN  fentaNYL   Infusion... 0.5 MICROgram(s)/kG/Hr (2.75 mL/Hr) IV Continuous <Continuous>  glucagon  Injectable 1 milliGRAM(s) IntraMuscular once PRN  heparin   Injectable 5000 Unit(s) SubCutaneous every 8 hours  hydrALAZINE 50 milliGRAM(s) Oral every 8 hours  isosorbide   dinitrate Tablet (ISORDIL) 10 milliGRAM(s) Oral three times a day  lactobacillus acidophilus 1 Tablet(s) Oral two times a day  metoprolol tartrate 12.5 milliGRAM(s) Oral two times a day  sodium chloride 0.9% lock flush 10 milliLiter(s) IV Push every 1 hour PRN  sodium chloride 7% Inhalation 4 milliLiter(s) Inhalation every 12 hours  vancomycin    Solution 125 milliGRAM(s) Oral every 6 hours                          10.7   15.61 )-----------( 328      ( 21 Aug 2020 22:43 )             34.9     08-21    145  |  114<H>  |  38<H>  ----------------------------<  130<H>  3.6   |  18<L>  |  1.92<H>    Ca    9.2      21 Aug 2020 22:43  Phos  2.8     08-21  Mg     2.2     08-21        ABG - ( 21 Aug 2020 22:40 )  pH, Arterial: 7.35  pH, Blood: x     /  pCO2: 39    /  pO2: 141   / HCO3: 21    / Base Excess: -3.9  /  SaO2: 99                       10.2   19.42 )-----------( 265      ( 20 Aug 2020 22:09 )             33.1     08-20    143  |  113<H>  |  40<H>  ----------------------------<  135<H>  3.5   |  16<L>  |  1.86<H>    Ca    9.0      20 Aug 2020 22:10  Phos  3.5     08-20  Mg     2.0     08-20      BRONCH- 8/13/20- Pseudomonas- Cefepime     IMAGING:   Recent imaging studies were reviewed.  Xray Chest 1 View- PORTABLE-Routine (08.17.20 @ 05:53) >  COMPARISON: Multiple prior chest x-rays, with the most recent dated 8/16/2020.  FINDINGS:  There is an endotracheal tube, with its tip above the level of the kayleigh.  There is an enteric tube, coursing below the diaphragm, with its tip non-visualized below the level of the film.  There has been interval removal of a right IJ approach central venous catheter.  The size of the cardiomediastinal silhouette is enlarged.  There are no focal pulmonary consolidations.  There is no pneumothorax or pleural effusion.    IMPRESSION:  Interval removal of central line. Clear lungs.      CT Head No Cont (08.13.20 @ 08:55) >  TECHNIQUE : Axial CT scanning of the brain was obtained from the skull base to the vertex without the administration of intravenous contrast. Sagittal and coronal reformats were provided.  COMPARISON: CT brain 8/12/2020  FINDINGS:  Similar extra-axial hemorrhage in the right aspect of the suprasellar cistern extending into the right sylvian fissure.  Scattered sulcal subarachnoid hemorrhage is similar.  Similar small layering intraventricular hemorrhage.  No hydrocephalus or midline shift.  Edema in the region of the right cerebral peduncle and posterior limb of the right internal capsule is redemonstrated.    IMPRESSION:  No significant interval change from 8/12/2020.      MR Head w/wo IV Cont (08.11.20 @ 17:50) >  MRI BRAIN:  Redemonstration of subarachnoid hemorrhage in the right aspect of the suprasellar cistern and within the right sylvian fissure.  Scattered sulcal subarachnoid hemorrhage is redemonstrated.  Small hemorrhage layering in the occipital horns is similar.. Ventricles similar in size. No hydrocephalus.  There is restricted diffusion within the region of the right basal ganglia, posterior limb of the right internal capsule, and anterior right temporal lobe, likely representing an acute right MCA territory infarct.  Chronic left occipital infarct. Mild white matter microvascular ischemic disease. Signal voids are seen within the major intracranial vessels consistent with their patency.  No abnormal parenchymal or leptomeningeal enhancement.  Air-fluid levels and mucosal thickening in the paranasal sinuses. Minimal bilateral mastoid air cell effusions.      MRI CERVICAL SPINE:  Vertebral body height, marrow signal homogeneity, and facet alignment are maintained throughout the visualized spinal segments. Straightening of the normal cervical lordosis. Mild multilevel disc space narrowing. Cervicomedullary junction unremarkable.  No prevertebral or paravertebral edema.  No abnormal enhancement in the cervical region.  No spinal cord compression or abnormal intrinsic cord signal.  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  C2-C3: No acquired spinal canal stenosis. No neural foraminal narrowing.  C3-C4: Broad-based disc protrusion reaches the cord. Bilateral uncinate hypertrophy resulting in moderate bilateral neural foraminal narrowing.  C4-C5: Broad-based disc protrusion asymmetric to the left nearly reaches the cord. Bilateral uncinate hypertrophy resulting in moderate left and moderate to severe right neural foraminal narrowing.  C5-C6: Broad-based disc protrusion asymmetric to the left and left uncinate hypertrophy. Deformation of the left ventral thecal sac and effacement of the left lateral recess. Moderate left neural foraminal narrowing.  C6-C7: Broad-based disc protrusion deforms the ventral thecal sac. Left uncinate hypertrophy. Mild to moderate left neural foraminal narrowing.  C7-T1: Left facet hypertrophy. No acquired spinal canal stenosis or neural foraminal narrowing.    IMPRESSION:      MRI BRAIN:  Restricted There is restricted diffusion within the region of the right basal ganglia, posterior limb of the right internal capsule, and anterior right temporal lobe, likely representing an acute right MCA territory infarct.  Similar cisternal and sulcal subarachnoid hemorrhage, given differences in modality.  Similar small hemorrhage layering in the occipital horns. No hydrocephalus.  No abnormal parenchymal or leptomeningeal enhancement.      MRI CERVICAL SPINE:  Multilevel degenerative changes superimposed upon congenital spinal canal stenosis.  No abnormal enhancement in the cervical region.        EXAMINATION:  General: Agitated, intubated  HEENT: MMM  Neuro:  -Mental status-  No acute distress, EO to voice. Strong and purposeful on RUE,  followed showing 2 fingers. Left side  0/5  -CN- Pupils R 5mm NR, L 1mm sluggish, Eyes dysconjugate,  +cough/gag, +corneals  RUE localize AG, RLE spont AG, LUE flaccid, LLE flaccid  CVS: S1/S2  RESP: Diminished at bases  GI: Soft, non tender, mildly distended, hypoactive BS  Extremities: Warm, skin intact

## 2020-08-23 ENCOUNTER — TRANSCRIPTION ENCOUNTER (OUTPATIENT)
Age: 60
End: 2020-08-23

## 2020-08-23 LAB
ANION GAP SERPL CALC-SCNC: 10 MMOL/L — SIGNIFICANT CHANGE UP (ref 5–17)
ANION GAP SERPL CALC-SCNC: 12 MMOL/L — SIGNIFICANT CHANGE UP (ref 5–17)
APTT BLD: 29.6 SEC — SIGNIFICANT CHANGE UP (ref 27.5–35.5)
BASOPHILS # BLD AUTO: 0.09 K/UL — SIGNIFICANT CHANGE UP (ref 0–0.2)
BASOPHILS NFR BLD AUTO: 0.8 % — SIGNIFICANT CHANGE UP (ref 0–2)
BLD GP AB SCN SERPL QL: NEGATIVE — SIGNIFICANT CHANGE UP
BUN SERPL-MCNC: 36 MG/DL — HIGH (ref 7–23)
BUN SERPL-MCNC: 37 MG/DL — HIGH (ref 7–23)
CALCIUM SERPL-MCNC: 8.9 MG/DL — SIGNIFICANT CHANGE UP (ref 8.4–10.5)
CALCIUM SERPL-MCNC: 9.3 MG/DL — SIGNIFICANT CHANGE UP (ref 8.4–10.5)
CHLORIDE SERPL-SCNC: 115 MMOL/L — HIGH (ref 96–108)
CHLORIDE SERPL-SCNC: 117 MMOL/L — HIGH (ref 96–108)
CO2 SERPL-SCNC: 17 MMOL/L — LOW (ref 22–31)
CO2 SERPL-SCNC: 17 MMOL/L — LOW (ref 22–31)
CREAT SERPL-MCNC: 1.89 MG/DL — HIGH (ref 0.5–1.3)
CREAT SERPL-MCNC: 2.08 MG/DL — HIGH (ref 0.5–1.3)
EOSINOPHIL # BLD AUTO: 0.44 K/UL — SIGNIFICANT CHANGE UP (ref 0–0.5)
EOSINOPHIL NFR BLD AUTO: 4.2 % — SIGNIFICANT CHANGE UP (ref 0–6)
GLUCOSE SERPL-MCNC: 123 MG/DL — HIGH (ref 70–99)
GLUCOSE SERPL-MCNC: 128 MG/DL — HIGH (ref 70–99)
HCT VFR BLD CALC: 33.1 % — LOW (ref 39–50)
HGB BLD-MCNC: 10 G/DL — LOW (ref 13–17)
IMM GRANULOCYTES NFR BLD AUTO: 1.1 % — SIGNIFICANT CHANGE UP (ref 0–1.5)
INR BLD: 1.23 RATIO — HIGH (ref 0.88–1.16)
LYMPHOCYTES # BLD AUTO: 0.96 K/UL — LOW (ref 1–3.3)
LYMPHOCYTES # BLD AUTO: 9.1 % — LOW (ref 13–44)
MAGNESIUM SERPL-MCNC: 2.1 MG/DL — SIGNIFICANT CHANGE UP (ref 1.6–2.6)
MAGNESIUM SERPL-MCNC: 2.1 MG/DL — SIGNIFICANT CHANGE UP (ref 1.6–2.6)
MCHC RBC-ENTMCNC: 26.7 PG — LOW (ref 27–34)
MCHC RBC-ENTMCNC: 30.2 GM/DL — LOW (ref 32–36)
MCV RBC AUTO: 88.5 FL — SIGNIFICANT CHANGE UP (ref 80–100)
MONOCYTES # BLD AUTO: 0.82 K/UL — SIGNIFICANT CHANGE UP (ref 0–0.9)
MONOCYTES NFR BLD AUTO: 7.7 % — SIGNIFICANT CHANGE UP (ref 2–14)
NEUTROPHILS # BLD AUTO: 8.16 K/UL — HIGH (ref 1.8–7.4)
NEUTROPHILS NFR BLD AUTO: 77.1 % — HIGH (ref 43–77)
NRBC # BLD: 0 /100 WBCS — SIGNIFICANT CHANGE UP (ref 0–0)
PHOSPHATE SERPL-MCNC: 2.6 MG/DL — SIGNIFICANT CHANGE UP (ref 2.5–4.5)
PHOSPHATE SERPL-MCNC: 2.8 MG/DL — SIGNIFICANT CHANGE UP (ref 2.5–4.5)
PLATELET # BLD AUTO: 352 K/UL — SIGNIFICANT CHANGE UP (ref 150–400)
POTASSIUM SERPL-MCNC: 3.4 MMOL/L — LOW (ref 3.5–5.3)
POTASSIUM SERPL-MCNC: 3.6 MMOL/L — SIGNIFICANT CHANGE UP (ref 3.5–5.3)
POTASSIUM SERPL-SCNC: 3.4 MMOL/L — LOW (ref 3.5–5.3)
POTASSIUM SERPL-SCNC: 3.6 MMOL/L — SIGNIFICANT CHANGE UP (ref 3.5–5.3)
PROTHROM AB SERPL-ACNC: 14.5 SEC — HIGH (ref 10.6–13.6)
RBC # BLD: 3.74 M/UL — LOW (ref 4.2–5.8)
RBC # FLD: 14.4 % — SIGNIFICANT CHANGE UP (ref 10.3–14.5)
RH IG SCN BLD-IMP: POSITIVE — SIGNIFICANT CHANGE UP
SODIUM SERPL-SCNC: 142 MMOL/L — SIGNIFICANT CHANGE UP (ref 135–145)
SODIUM SERPL-SCNC: 146 MMOL/L — HIGH (ref 135–145)
WBC # BLD: 10.59 K/UL — HIGH (ref 3.8–10.5)
WBC # FLD AUTO: 10.59 K/UL — HIGH (ref 3.8–10.5)

## 2020-08-23 PROCEDURE — 71045 X-RAY EXAM CHEST 1 VIEW: CPT | Mod: 26

## 2020-08-23 PROCEDURE — 99291 CRITICAL CARE FIRST HOUR: CPT

## 2020-08-23 PROCEDURE — 99292 CRITICAL CARE ADDL 30 MIN: CPT

## 2020-08-23 PROCEDURE — 93010 ELECTROCARDIOGRAM REPORT: CPT

## 2020-08-23 RX ADMIN — CHLORHEXIDINE GLUCONATE 15 MILLILITER(S): 213 SOLUTION TOPICAL at 05:39

## 2020-08-23 RX ADMIN — Medication 3 MILLILITER(S): at 00:27

## 2020-08-23 RX ADMIN — Medication 0.1 MILLIGRAM(S): at 21:28

## 2020-08-23 RX ADMIN — CHLORHEXIDINE GLUCONATE 1 APPLICATION(S): 213 SOLUTION TOPICAL at 05:40

## 2020-08-23 RX ADMIN — HEPARIN SODIUM 5000 UNIT(S): 5000 INJECTION INTRAVENOUS; SUBCUTANEOUS at 05:39

## 2020-08-23 RX ADMIN — Medication 12.5 MILLIGRAM(S): at 17:08

## 2020-08-23 RX ADMIN — FAMOTIDINE 20 MILLIGRAM(S): 10 INJECTION INTRAVENOUS at 05:39

## 2020-08-23 RX ADMIN — FENTANYL CITRATE 2.75 MICROGRAM(S)/KG/HR: 50 INJECTION INTRAVENOUS at 14:27

## 2020-08-23 RX ADMIN — Medication 1 TABLET(S): at 17:08

## 2020-08-23 RX ADMIN — FAMOTIDINE 20 MILLIGRAM(S): 10 INJECTION INTRAVENOUS at 17:08

## 2020-08-23 RX ADMIN — SODIUM CHLORIDE 4 MILLILITER(S): 9 INJECTION INTRAMUSCULAR; INTRAVENOUS; SUBCUTANEOUS at 18:03

## 2020-08-23 RX ADMIN — CEFEPIME 100 MILLIGRAM(S): 1 INJECTION, POWDER, FOR SOLUTION INTRAMUSCULAR; INTRAVENOUS at 13:01

## 2020-08-23 RX ADMIN — Medication 125 MILLIGRAM(S): at 05:38

## 2020-08-23 RX ADMIN — Medication 1 DROP(S): at 05:41

## 2020-08-23 RX ADMIN — SODIUM CHLORIDE 4 MILLILITER(S): 9 INJECTION INTRAMUSCULAR; INTRAVENOUS; SUBCUTANEOUS at 05:02

## 2020-08-23 RX ADMIN — HEPARIN SODIUM 5000 UNIT(S): 5000 INJECTION INTRAVENOUS; SUBCUTANEOUS at 13:01

## 2020-08-23 RX ADMIN — Medication 3 MILLILITER(S): at 23:59

## 2020-08-23 RX ADMIN — Medication 12.5 MILLIGRAM(S): at 05:40

## 2020-08-23 RX ADMIN — DEXMEDETOMIDINE HYDROCHLORIDE IN 0.9% SODIUM CHLORIDE 5.5 MICROGRAM(S)/KG/HR: 4 INJECTION INTRAVENOUS at 19:33

## 2020-08-23 RX ADMIN — AMIODARONE HYDROCHLORIDE 200 MILLIGRAM(S): 400 TABLET ORAL at 01:19

## 2020-08-23 RX ADMIN — CHLORHEXIDINE GLUCONATE 15 MILLILITER(S): 213 SOLUTION TOPICAL at 17:08

## 2020-08-23 RX ADMIN — Medication 1 TABLET(S): at 05:39

## 2020-08-23 RX ADMIN — Medication 1 DROP(S): at 13:01

## 2020-08-23 RX ADMIN — QUETIAPINE FUMARATE 25 MILLIGRAM(S): 200 TABLET, FILM COATED ORAL at 05:40

## 2020-08-23 RX ADMIN — Medication 1 DROP(S): at 17:08

## 2020-08-23 RX ADMIN — FENTANYL CITRATE 2.75 MICROGRAM(S)/KG/HR: 50 INJECTION INTRAVENOUS at 21:29

## 2020-08-23 RX ADMIN — QUETIAPINE FUMARATE 25 MILLIGRAM(S): 200 TABLET, FILM COATED ORAL at 21:28

## 2020-08-23 RX ADMIN — CEFEPIME 100 MILLIGRAM(S): 1 INJECTION, POWDER, FOR SOLUTION INTRAMUSCULAR; INTRAVENOUS at 05:38

## 2020-08-23 RX ADMIN — Medication 3 MILLILITER(S): at 05:04

## 2020-08-23 RX ADMIN — ISOSORBIDE DINITRATE 10 MILLIGRAM(S): 5 TABLET ORAL at 21:28

## 2020-08-23 RX ADMIN — ISOSORBIDE DINITRATE 10 MILLIGRAM(S): 5 TABLET ORAL at 13:01

## 2020-08-23 RX ADMIN — Medication 125 MILLIGRAM(S): at 17:08

## 2020-08-23 RX ADMIN — CEFEPIME 100 MILLIGRAM(S): 1 INJECTION, POWDER, FOR SOLUTION INTRAMUSCULAR; INTRAVENOUS at 21:28

## 2020-08-23 RX ADMIN — QUETIAPINE FUMARATE 25 MILLIGRAM(S): 200 TABLET, FILM COATED ORAL at 13:01

## 2020-08-23 RX ADMIN — Medication 3 MILLILITER(S): at 12:00

## 2020-08-23 RX ADMIN — Medication 3 MILLILITER(S): at 18:02

## 2020-08-23 RX ADMIN — Medication 125 MILLIGRAM(S): at 13:01

## 2020-08-23 RX ADMIN — Medication 50 MILLIGRAM(S): at 15:56

## 2020-08-23 RX ADMIN — HEPARIN SODIUM 5000 UNIT(S): 5000 INJECTION INTRAVENOUS; SUBCUTANEOUS at 21:28

## 2020-08-23 NOTE — PROGRESS NOTE ADULT - PROBLEM SELECTOR PLAN 2
WCT on tele likely Afib with aberrancy.   Metoprolol  DC Amiodarone for now given bradycardia   TTE as above

## 2020-08-23 NOTE — PROGRESS NOTE ADULT - SUBJECTIVE AND OBJECTIVE BOX
Subjective: Patient seen and examined. No new events except as noted.   Remains intubated in NSCU   Has been bradycardic     REVIEW OF SYSTEMS:  Unable to obtain       MEDICATIONS:  MEDICATIONS  (STANDING):  albuterol/ipratropium for Nebulization. 3 milliLiter(s) Nebulizer every 6 hours  aMIOdarone    Tablet 200 milliGRAM(s) Oral daily  artificial  tears Solution 1 Drop(s) Both EYES every 6 hours  cefepime   IVPB 2000 milliGRAM(s) IV Intermittent every 8 hours  chlorhexidine 0.12% Liquid 15 milliLiter(s) Oral Mucosa every 12 hours  chlorhexidine 4% Liquid 1 Application(s) Topical <User Schedule>  chlorhexidine 4% Liquid 1 Application(s) Topical <User Schedule>  cloNIDine 0.1 milliGRAM(s) Oral three times a day  dexMEDEtomidine Infusion 0.2 MICROgram(s)/kG/Hr (5.5 mL/Hr) IV Continuous <Continuous>  dextrose 5%. 1000 milliLiter(s) (50 mL/Hr) IV Continuous <Continuous>  dextrose 50% Injectable 12.5 Gram(s) IV Push once  dextrose 50% Injectable 25 Gram(s) IV Push once  dextrose 50% Injectable 25 Gram(s) IV Push once  famotidine    Tablet 20 milliGRAM(s) Oral two times a day  fentaNYL   Infusion... 0.5 MICROgram(s)/kG/Hr (2.75 mL/Hr) IV Continuous <Continuous>  heparin   Injectable 5000 Unit(s) SubCutaneous every 8 hours  hydrALAZINE 50 milliGRAM(s) Oral every 8 hours  isosorbide   dinitrate Tablet (ISORDIL) 10 milliGRAM(s) Oral three times a day  lactobacillus acidophilus 1 Tablet(s) Oral two times a day  metoprolol tartrate 12.5 milliGRAM(s) Oral two times a day  QUEtiapine 25 milliGRAM(s) Oral every 8 hours  sodium chloride 7% Inhalation 4 milliLiter(s) Inhalation every 12 hours  vancomycin    Solution 125 milliGRAM(s) Oral every 6 hours      PHYSICAL EXAM:  T(C): 36.9 (08-22-20 @ 19:00), Max: 36.9 (08-22-20 @ 11:00)  HR: 54 (08-23-20 @ 08:55) (54 - 113)  BP: --  RR: 19 (08-23-20 @ 08:00) (16 - 20)  SpO2: 100% (08-23-20 @ 08:55) (100% - 100%)  Wt(kg): --  I&O's Summary    22 Aug 2020 07:01  -  23 Aug 2020 07:00  --------------------------------------------------------  IN: 3396.7 mL / OUT: 1125 mL / NET: 2271.7 mL    23 Aug 2020 07:01  -  23 Aug 2020 10:54  --------------------------------------------------------  IN: 293.8 mL / OUT: 400 mL / NET: -106.2 mL            Appearance: Intubated sedated 	  HEENT:   Dry  oral mucosa,  Lymphatic: No lymphadenopathy  Cardiovascular: Normal S1 S2, No JVD, No murmurs, No edema  Respiratory: Ventilated   Psychiatry: A & O x 0  Gastrointestinal:  Soft, Non-tender, + BS	  Skin: No rashes, No ecchymoses, No cyanosis	  Mental status- No acute distress, EO to stim  -CN- Pupils R 4mm NR, L 2mm sluggish, EOMI, tongue midline, face symmetric  +cough/gag  C briskly, two fingers/thumbs up on RUE, distally AG, wiggles toes on RLE, no      LABS:    CARDIAC MARKERS:                                10.7   15.61 )-----------( 328      ( 21 Aug 2020 22:43 )             34.9     08-21    145  |  114<H>  |  38<H>  ----------------------------<  130<H>  3.6   |  18<L>  |  1.92<H>    Ca    9.2      21 Aug 2020 22:43  Phos  2.8     08-21  Mg     2.2     08-21      proBNP:   Lipid Profile:   HgA1c:   TSH:             TELEMETRY: 	AF 40s to 50s    ECG:  	  RADIOLOGY:   DIAGNOSTIC TESTING:  [ ] Echocardiogram:  [ ]  Catheterization:  [ ] Stress Test:    OTHER:

## 2020-08-23 NOTE — PROGRESS NOTE ADULT - SUBJECTIVE AND OBJECTIVE BOX
Surgeon: Dr Sutton  Dx: Respiratory Failure Surgeon: Dr Sutton  Dx: Respiratory Failure  Procedure: Tracheostomy    ICU Vital Signs Last 24 Hrs  T(C): 37 (23 Aug 2020 19:00), Max: 37 (23 Aug 2020 19:00)  T(F): 98.6 (23 Aug 2020 19:00), Max: 98.6 (23 Aug 2020 19:00)  HR: 90 (24 Aug 2020 00:02) (54 - 93)  BP: --  BP(mean): --  ABP: 133/71 (23 Aug 2020 22:00) (97/51 - 171/75)  ABP(mean): 85 (23 Aug 2020 22:00) (53 - 103)  RR: 18 (23 Aug 2020 22:00) (16 - 23)  SpO2: 100% (24 Aug 2020 00:02) (99% - 100%)    08-23    142  |  115<H>  |  36<H>  ----------------------------<  123<H>  3.6   |  17<L>  |  1.89<H>    Ca    8.9      23 Aug 2020 21:57  Phos  2.6     08-23  Mg     2.1     08-23    PT/INR - ( 23 Aug 2020 21:57 )   PT: 14.5 sec;   INR: 1.23 ratio    PTT - ( 23 Aug 2020 21:57 )  PTT:29.6 sec    EKG: Afib, incomplete RBBB  CXR: Questionable small left pleural effusion  Type/Screen: Blood available

## 2020-08-23 NOTE — PROGRESS NOTE ADULT - SUBJECTIVE AND OBJECTIVE BOX
HPI:  61YO male on coumadin for afib s/p cardiac arrest at work, down 25 minutes prior to ROSC. Pupils were R fixed and dilated, left fixed at the time, no gag reflex, post-CA cooling protocol was initiated down to 35 deg. Intubated and put on Nimbex drip. Also on Propofol, fentanyl, levophed. R groin minerva placed. Also put on heparin post-CA. 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 Course also c/b GIB, put on protonix drip. Prior to xfer was started on 3% at 30cc/hr. (09 Aug 2020 14:30)    SURGERY:   PAST MEDICAL HISTORY: On warfarin for atrial fibrillation    PAST SURGICAL HISTORY: No significant past surgical history    FAMILY HISTORY:  No pertinent family history in first degree relatives    ALLERGIES: No Known Allergies    **************************************  **************************************    OVERNIGHT EVENTS: [] None    ROS  Unobtainable due to mental status[] Negative []  Positives:    ADMISSION SCORES: GCS: HH: MF: NIHSS: RASS: CAM-ICU: ICP:    ICU Vital Signs Last 24 Hrs  T(C): 36.9 (22 Aug 2020 19:00), Max: 36.9 (22 Aug 2020 11:00)  T(F): 98.4 (22 Aug 2020 19:00), Max: 98.4 (22 Aug 2020 11:00)  HR: 63 (23 Aug 2020 07:00) (58 - 113)  BP: --  BP(mean): --  ABP: 97/51 (23 Aug 2020 07:00) (75/59 - 202/95)  ABP(mean): 64 (23 Aug 2020 07:00) (53 - 121)  RR: 18 (23 Aug 2020 07:00) (16 - 20)  SpO2: 100% (23 Aug 2020 07:00) (100% - 100%)     08-22 @ 07:01  -  08-23 @ 07:00  --------------------------------------------------------  IN: 3396.7 mL / OUT: 1125 mL / NET: 2271.7 mL    08-23 @ 07:01  - 08-23 @ 08:33  --------------------------------------------------------  IN: 83.8 mL / OUT: 0 mL / NET: 83.8 mL       Mode: AC/ CMV (Assist Control/ Continuous Mandatory Ventilation)  RR (machine): 18  TV (machine): 500  FiO2: 40  PEEP: 5  ITime: 1  MAP: 10  PIP: 24      DEVICES: [] Restraints [] BERNARDO/HMV []LD [] ET tube [] Trach [] Chest Tube [] A-line [] [place] [] NGT [] Rectal Tube [] EVD [] CVL  [] ICP/LiCOx    NEUROIMAGING:     EEG REPORT:     MEDICATIONS:  acetaminophen   Tablet .. 650 milliGRAM(s) Oral every 6 hours PRN  acetylcysteine 20%  Inhalation 4 milliLiter(s) Inhalation three times a day PRN  albuterol/ipratropium for Nebulization. 3 milliLiter(s) Nebulizer every 6 hours  aMIOdarone    Tablet 200 milliGRAM(s) Oral daily  artificial  tears Solution 1 Drop(s) Both EYES every 6 hours  cefepime   IVPB 2000 milliGRAM(s) IV Intermittent every 8 hours  chlorhexidine 0.12% Liquid 15 milliLiter(s) Oral Mucosa every 12 hours  chlorhexidine 4% Liquid 1 Application(s) Topical <User Schedule>  chlorhexidine 4% Liquid 1 Application(s) Topical <User Schedule>  cloNIDine 0.1 milliGRAM(s) Oral three times a day  dexMEDEtomidine Infusion 0.2 MICROgram(s)/kG/Hr IV Continuous <Continuous>  dextrose 40% Gel 15 Gram(s) Oral once PRN  dextrose 5%. 1000 milliLiter(s) IV Continuous <Continuous>  dextrose 50% Injectable 12.5 Gram(s) IV Push once  dextrose 50% Injectable 25 Gram(s) IV Push once  dextrose 50% Injectable 25 Gram(s) IV Push once  famotidine    Tablet 20 milliGRAM(s) Oral two times a day  fentaNYL    Injectable 25 MICROGram(s) IV Push every 2 hours PRN  fentaNYL   Infusion... 0.5 MICROgram(s)/kG/Hr IV Continuous <Continuous>  glucagon  Injectable 1 milliGRAM(s) IntraMuscular once PRN  heparin   Injectable 5000 Unit(s) SubCutaneous every 8 hours  hydrALAZINE 50 milliGRAM(s) Oral every 8 hours  isosorbide   dinitrate Tablet (ISORDIL) 10 milliGRAM(s) Oral three times a day  lactobacillus acidophilus 1 Tablet(s) Oral two times a day  metoprolol tartrate 12.5 milliGRAM(s) Oral two times a day  QUEtiapine 25 milliGRAM(s) Oral every 8 hours  sodium chloride 0.9% lock flush 10 milliLiter(s) IV Push every 1 hour PRN  sodium chloride 7% Inhalation 4 milliLiter(s) Inhalation every 12 hours  vancomycin    Solution 125 milliGRAM(s) Oral every 6 hours      PHYSICAL EXAM:  General: Agitated, intubated  HEENT: MMM  Neuro:  -Mental status-  No acute distress, EO to voice. Strong and purposeful on RUE,  followed showing 2 fingers. Left side  0/5  -CN- Pupils R 5mm NR, L 1mm sluggish, Eyes dysconjugate,  +cough/gag, +corneals  RUE localize AG, RLE spont AG, LUE flaccid, LLE flaccid  CVS: S1/S2  RESP: Diminished at bases  GI: Soft, non tender, mildly distended, hypoactive BS  Extremities: Warm, skin intact    LABS:                        10.7   15.61 )-----------( 328      ( 21 Aug 2020 22:43 )             34.9    08-21    145  |  114<H>  |  38<H>  ----------------------------<  130<H>  3.6   |  18<L>  |  1.92<H>    Ca    9.2      21 Aug 2020 22:43  Phos  2.8     08-21  Mg     2.2     08-21     ABG - ( 21 Aug 2020 22:40 )  pH, Arterial: 7.35  pH, Blood: x     /  pCO2: 39    /  pO2: 141   / HCO3: 21    / Base Excess: -3.9  /  SaO2: 99

## 2020-08-23 NOTE — PROGRESS NOTE ADULT - SUBJECTIVE AND OBJECTIVE BOX
Weaned off Precedex. Planned for tracheostomy in AM.    Intubated, eye opening to voice, intermittently follows commands on the right, spontaneous on the right, plegic on the left

## 2020-08-23 NOTE — PROGRESS NOTE ADULT - ASSESSMENT
60 year old male s/p cardiac arrest c/b GIB and bleeding from [place]ott with perimesencephalic (HH4 mF4) subarachnoid hemorrhage, angio neg x2 ,PBD 15.  Intubation day: 13.    NEURO:    SAH, possible sentinal event with cardiac arrest  Angio 8/20: negative.   Q2h neurochecks  CT Head: perimesencephalic SAH   CTA Head: no aneurysm noted  Initial Conventional angiogram 8/10 negative  MRI neuroaxis: Restricted diffusion in R BG, posterior limb of R IC, and anterior right temporal lobe  VEEG negative. DCed.   DCI management (off nimodipine). Poor windows  No EVD as no significant hydrocephalus  Precedex gtt for sedation- RASS 0 to -1, fentanyl gtt.  Agitated: Haldol prn- monitor QTc. Consider olanzepine, risperidone prn to wean if needed for less QTc prolongation.   Start seroquel 8/22    PULM: Acute PULM edema- resolved. Hypoxia secondary to mucus plugging  Mech vent: settings- 18/500/40/5.   spO2>92%  CXR- improved  CTA R/O PE: negative  Continue hypertonic nebs./ Mucomyst prn. Thick secretions.  CPAP as tolerated though less likely to be extubated.   Will need trach. 8/24 trach.  Pulm toilet    CV:  SBP goal 100- 160  TTE: Global LV dysfunction. EF 41%, Severe concentric LVH, flattening of interventricular septum.   S/P Concern for possible PE - CTA negative  Hydralazine 75 q8 + isosorbide 10 mg q 8. Clonidine added.  Will need cath per cardiology  S/P amio gtt; changed to  daily.  Mg- 2.0 ; Po4- 3.0 ; k- 4.0  Troponin 65--> 66. Stable.  Hold lasix.   Repeat EKG for QTc      RENAL: Non anion gap metabolic acidosis  Likely in the setting of diarrhea.  ?CKD with superimposed JALEN.- stable   Monitor Cr and lytes.  Sodium goal 140-150  S/P lasix 40mg. Monitor Is/Os  Cr post angio: stable      GI: S/P GI bleed, hyperlipasemia, Cdiff positive  Diarrhea- - Increased WBC and ABX--> Vanco 125 mg q6 for Cdiff   Diet:Contine TFs. Vital.  GI prop [x] PPI 40 daily in setting of recent GI bleed and intubation. PO pepcid  Bowel regimen - hold for diarrhea  LBM: 8/22. Cdiff  Lipase 101 --> 89  LFTs stable. Monitor while   on amiodarone.     ENDO:   Goal euglycemia (-180)  TFTs wnl      HEME/ONC: Afib on coumadin at home; leukocytosis  Leukocytosis: No eosinophilia   Lipase slightly elevated 101-->89, LFTs wnl  s/p vit K: INR 1.42-->1.2--> 1.5.   Repeat INR 8/23.  VTE prophylaxis: [] SCDs [x] chemoprophylaxis heparin subq.  Dopplers negative for DVT.  Will eventually need to resume coumadin once trach/PEG.       ID: Cdiff positive  Leukocytosis- Diarrhea. Improving  C difficile - see above; hypoactive BS. On PO vancomycin. Continue PO vanc for additional week after cefepime course completed.  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  Intubation day: 14.    NEURO:    SAH, possible sentinal event with cardiac arrest  Angio 8/20: negative.   Q2h neurochecks  CT Head: perimesencephalic SAH   CTA Head: no aneurysm noted  Initial Conventional angiogram 8/10 negative  MRI neuroaxis: Restricted diffusion in R BG, posterior limb of R IC, and anterior right temporal lobe  VEEG negative. DCed.   DCI management (off nimodipine). Poor windows  No EVD as no significant hydrocephalus  Precedex gtt for sedation- RASS 0 to -1, fentanyl gtt.  Agitated: Haldol prn- monitor QTc. Consider olanzepine, risperidone prn to wean if needed for less QTc prolongation.   Start seroquel 8/22    PULM: Acute PULM edema- resolved. Hypoxia secondary to mucus plugging  Mech vent: settings- 18/500/40/5.   spO2>92%  CXR- improved  CTA R/O PE: negative  Continue hypertonic nebs./ Mucomyst prn. Thick secretions.  CPAP as tolerated though less likely to be extubated.   Will need trach. 8/24 trach.  Pulm toilet    CV:  SBP goal 100- 160  TTE: Global LV dysfunction. EF 41%, Severe concentric LVH, flattening of interventricular septum.   S/P Concern for possible PE - CTA negative  Hydralazine 75 q8 + isosorbide 10 mg q 8. Clonidine added.  Will need cath per cardiology  S/P amio gtt; changed to  daily.  Mg- 2.0 ; Po4- 3.0 ; k- 4.0  Troponin 65--> 66. Stable.  Hold lasix.   Repeat EKG for QTc      RENAL: Non anion gap metabolic acidosis  Likely in the setting of diarrhea.  ?CKD with superimposed JALEN.- stable   Monitor Cr and lytes.  Sodium goal 140-150  S/P lasix 40mg. Monitor Is/Os  Cr post angio: stable      GI: S/P GI bleed, hyperlipasemia, Cdiff positive  Diarrhea- - Increased WBC and ABX--> Vanco 125 mg q6 for Cdiff   Diet:Contine TFs. Vital.  GI prop [x] PPI 40 daily in setting of recent GI bleed and intubation. PO pepcid  Bowel regimen - hold for diarrhea  LBM: 8/22. Cdiff  Lipase 101 --> 89  LFTs stable. Monitor while   on amiodarone.     ENDO:   Goal euglycemia (-180)  TFTs wnl      HEME/ONC: Afib on coumadin at home; leukocytosis  Leukocytosis: No eosinophilia   Lipase slightly elevated 101-->89, LFTs wnl  s/p vit K: INR 1.42-->1.2--> 1.5.   Repeat INR 8/23.  VTE prophylaxis: [] SCDs [x] chemoprophylaxis heparin subq.  Dopplers negative for DVT.  Will eventually need to resume coumadin once trach/PEG.       ID: Cdiff positive  Leukocytosis- Diarrhea. Improving  C difficile - see above; hypoactive BS. On PO vancomycin. Continue PO vanc for additional week after cefepime course completed.  Afebrile  Started on cefepime (switched from unasyn on 8/14) for pseudomonas- D/C 8/28/20)- 2 week course for PNA  Urine culture, procal .19  Added probiotic      SOCIAL/FAMILY:  [x] updated at bedside     CODE STATUS:  [x] Full Code [] DNR [] DNI [] Palliative/Comfort Care    DISPOSITION:  [x] ICU [] Stroke Unit [] Floor [] EMU [] RCU [] PCU    [x] Patient is at high risk of neurologic deterioration/death due to: SAH, cardiac arres

## 2020-08-23 NOTE — PROGRESS NOTE ADULT - ASSESSMENT
HH5 mF4, post-bleed day 14  - Cefepime for PNA  - Enteral Vanco for c. diff  - Awaiting tracheostomy  - Continue sedation with low dose fentanyl gtt  - Seroquel, monitor Qtc  - Amiodarone for arrhythmia

## 2020-08-23 NOTE — PROGRESS NOTE ADULT - SUBJECTIVE AND OBJECTIVE BOX
Visit Summary: Patient seen and evaluated at bedside.    Overnight Events: none    Exam:  T(C): 36.9 (08-22-20 @ 19:00), Max: 37.8 (08-22-20 @ 07:00)  HR: 67 (08-23-20 @ 05:37) (58 - 113)  BP: --  RR: 18 (08-23-20 @ 05:37) (9 - 20)  SpO2: 100% (08-23-20 @ 05:37) (100% - 100%)  Wt(kg): --    Intubated, follows on the right, nods appropriately to questions, left side plegic                             10.7   15.61 )-----------( 328      ( 21 Aug 2020 22:43 )             34.9     08-21    145  |  114<H>  |  38<H>  ----------------------------<  130<H>  3.6   |  18<L>  |  1.92<H>    Ca    9.2      21 Aug 2020 22:43  Phos  2.8     08-21  Mg     2.2     08-21

## 2020-08-24 DIAGNOSIS — Z93.0 TRACHEOSTOMY STATUS: ICD-10-CM

## 2020-08-24 LAB
ALBUMIN SERPL ELPH-MCNC: 2.8 G/DL — LOW (ref 3.3–5)
ALP SERPL-CCNC: 67 U/L — SIGNIFICANT CHANGE UP (ref 40–120)
ALT FLD-CCNC: 29 U/L — SIGNIFICANT CHANGE UP (ref 10–45)
ANION GAP SERPL CALC-SCNC: 12 MMOL/L — SIGNIFICANT CHANGE UP (ref 5–17)
AST SERPL-CCNC: 20 U/L — SIGNIFICANT CHANGE UP (ref 10–40)
BILIRUB DIRECT SERPL-MCNC: <0.1 MG/DL — SIGNIFICANT CHANGE UP (ref 0–0.2)
BILIRUB INDIRECT FLD-MCNC: >0.1 MG/DL — LOW (ref 0.2–1)
BILIRUB SERPL-MCNC: 0.2 MG/DL — SIGNIFICANT CHANGE UP (ref 0.2–1.2)
BUN SERPL-MCNC: 35 MG/DL — HIGH (ref 7–23)
CALCIUM SERPL-MCNC: 9.2 MG/DL — SIGNIFICANT CHANGE UP (ref 8.4–10.5)
CHLORIDE SERPL-SCNC: 115 MMOL/L — HIGH (ref 96–108)
CO2 SERPL-SCNC: 16 MMOL/L — LOW (ref 22–31)
CREAT SERPL-MCNC: 2.3 MG/DL — HIGH (ref 0.5–1.3)
GLUCOSE SERPL-MCNC: 130 MG/DL — HIGH (ref 70–99)
MAGNESIUM SERPL-MCNC: 2.1 MG/DL — SIGNIFICANT CHANGE UP (ref 1.6–2.6)
PHOSPHATE SERPL-MCNC: 4 MG/DL — SIGNIFICANT CHANGE UP (ref 2.5–4.5)
POTASSIUM SERPL-MCNC: 3.7 MMOL/L — SIGNIFICANT CHANGE UP (ref 3.5–5.3)
POTASSIUM SERPL-SCNC: 3.7 MMOL/L — SIGNIFICANT CHANGE UP (ref 3.5–5.3)
PROT SERPL-MCNC: 6.3 G/DL — SIGNIFICANT CHANGE UP (ref 6–8.3)
SARS-COV-2 RNA SPEC QL NAA+PROBE: SIGNIFICANT CHANGE UP
SODIUM SERPL-SCNC: 143 MMOL/L — SIGNIFICANT CHANGE UP (ref 135–145)

## 2020-08-24 PROCEDURE — 99292 CRITICAL CARE ADDL 30 MIN: CPT

## 2020-08-24 PROCEDURE — 31600 PLANNED TRACHEOSTOMY: CPT

## 2020-08-24 PROCEDURE — 99233 SBSQ HOSP IP/OBS HIGH 50: CPT

## 2020-08-24 PROCEDURE — 99291 CRITICAL CARE FIRST HOUR: CPT

## 2020-08-24 PROCEDURE — ZZZZZ: CPT

## 2020-08-24 PROCEDURE — 99232 SBSQ HOSP IP/OBS MODERATE 35: CPT | Mod: 25

## 2020-08-24 PROCEDURE — 93010 ELECTROCARDIOGRAM REPORT: CPT

## 2020-08-24 PROCEDURE — 71045 X-RAY EXAM CHEST 1 VIEW: CPT | Mod: 26

## 2020-08-24 RX ORDER — FENTANYL CITRATE 50 UG/ML
50 INJECTION INTRAVENOUS ONCE
Refills: 0 | Status: DISCONTINUED | OUTPATIENT
Start: 2020-08-24 | End: 2020-08-24

## 2020-08-24 RX ORDER — VANCOMYCIN HCL 1 G
125 VIAL (EA) INTRAVENOUS EVERY 6 HOURS
Refills: 0 | Status: COMPLETED | OUTPATIENT
Start: 2020-08-24 | End: 2020-08-29

## 2020-08-24 RX ORDER — PROPOFOL 10 MG/ML
10 INJECTION, EMULSION INTRAVENOUS
Qty: 500 | Refills: 0 | Status: DISCONTINUED | OUTPATIENT
Start: 2020-08-24 | End: 2020-08-25

## 2020-08-24 RX ORDER — CEFEPIME 1 G/1
2000 INJECTION, POWDER, FOR SOLUTION INTRAMUSCULAR; INTRAVENOUS EVERY 8 HOURS
Refills: 0 | Status: DISCONTINUED | OUTPATIENT
Start: 2020-08-24 | End: 2020-08-25

## 2020-08-24 RX ORDER — QUETIAPINE FUMARATE 200 MG/1
50 TABLET, FILM COATED ORAL EVERY 8 HOURS
Refills: 0 | Status: DISCONTINUED | OUTPATIENT
Start: 2020-08-24 | End: 2020-08-25

## 2020-08-24 RX ORDER — TRAMADOL HYDROCHLORIDE 50 MG/1
50 TABLET ORAL EVERY 6 HOURS
Refills: 0 | Status: DISCONTINUED | OUTPATIENT
Start: 2020-08-24 | End: 2020-08-27

## 2020-08-24 RX ORDER — LIDOCAINE 4 G/100G
1 CREAM TOPICAL DAILY
Refills: 0 | Status: DISCONTINUED | OUTPATIENT
Start: 2020-08-24 | End: 2020-08-24

## 2020-08-24 RX ORDER — QUETIAPINE FUMARATE 200 MG/1
25 TABLET, FILM COATED ORAL ONCE
Refills: 0 | Status: COMPLETED | OUTPATIENT
Start: 2020-08-24 | End: 2020-08-24

## 2020-08-24 RX ORDER — AMIODARONE HYDROCHLORIDE 400 MG/1
200 TABLET ORAL DAILY
Refills: 0 | Status: DISCONTINUED | OUTPATIENT
Start: 2020-08-24 | End: 2020-08-24

## 2020-08-24 RX ORDER — MIDAZOLAM HYDROCHLORIDE 1 MG/ML
2 INJECTION, SOLUTION INTRAMUSCULAR; INTRAVENOUS ONCE
Refills: 0 | Status: DISCONTINUED | OUTPATIENT
Start: 2020-08-24 | End: 2020-08-24

## 2020-08-24 RX ORDER — TRAMADOL HYDROCHLORIDE 50 MG/1
25 TABLET ORAL EVERY 6 HOURS
Refills: 0 | Status: DISCONTINUED | OUTPATIENT
Start: 2020-08-24 | End: 2020-08-27

## 2020-08-24 RX ORDER — VALPROIC ACID (AS SODIUM SALT) 250 MG/5ML
500 SOLUTION, ORAL ORAL THREE TIMES A DAY
Refills: 0 | Status: DISCONTINUED | OUTPATIENT
Start: 2020-08-24 | End: 2020-08-25

## 2020-08-24 RX ADMIN — CEFEPIME 100 MILLIGRAM(S): 1 INJECTION, POWDER, FOR SOLUTION INTRAMUSCULAR; INTRAVENOUS at 21:01

## 2020-08-24 RX ADMIN — Medication 50 MILLIGRAM(S): at 00:12

## 2020-08-24 RX ADMIN — FAMOTIDINE 20 MILLIGRAM(S): 10 INJECTION INTRAVENOUS at 18:09

## 2020-08-24 RX ADMIN — Medication 12.5 MILLIGRAM(S): at 05:08

## 2020-08-24 RX ADMIN — FENTANYL CITRATE 50 MICROGRAM(S): 50 INJECTION INTRAVENOUS at 12:40

## 2020-08-24 RX ADMIN — CHLORHEXIDINE GLUCONATE 1 APPLICATION(S): 213 SOLUTION TOPICAL at 05:07

## 2020-08-24 RX ADMIN — Medication 27.5 MILLIGRAM(S): at 05:07

## 2020-08-24 RX ADMIN — Medication 125 MILLIGRAM(S): at 11:40

## 2020-08-24 RX ADMIN — Medication 1 DROP(S): at 18:09

## 2020-08-24 RX ADMIN — Medication 50 MILLIGRAM(S): at 23:02

## 2020-08-24 RX ADMIN — QUETIAPINE FUMARATE 50 MILLIGRAM(S): 200 TABLET, FILM COATED ORAL at 13:22

## 2020-08-24 RX ADMIN — Medication 125 MILLIGRAM(S): at 23:02

## 2020-08-24 RX ADMIN — Medication 27.5 MILLIGRAM(S): at 21:01

## 2020-08-24 RX ADMIN — Medication 1 DROP(S): at 11:40

## 2020-08-24 RX ADMIN — CHLORHEXIDINE GLUCONATE 15 MILLILITER(S): 213 SOLUTION TOPICAL at 18:09

## 2020-08-24 RX ADMIN — CHLORHEXIDINE GLUCONATE 15 MILLILITER(S): 213 SOLUTION TOPICAL at 05:07

## 2020-08-24 RX ADMIN — Medication 125 MILLIGRAM(S): at 12:03

## 2020-08-24 RX ADMIN — Medication 125 MILLIGRAM(S): at 00:12

## 2020-08-24 RX ADMIN — FENTANYL CITRATE 50 MICROGRAM(S): 50 INJECTION INTRAVENOUS at 17:00

## 2020-08-24 RX ADMIN — Medication 3 MILLILITER(S): at 18:02

## 2020-08-24 RX ADMIN — Medication 0.1 MILLIGRAM(S): at 05:08

## 2020-08-24 RX ADMIN — Medication 1 DROP(S): at 05:08

## 2020-08-24 RX ADMIN — Medication 3 MILLILITER(S): at 05:13

## 2020-08-24 RX ADMIN — QUETIAPINE FUMARATE 50 MILLIGRAM(S): 200 TABLET, FILM COATED ORAL at 21:02

## 2020-08-24 RX ADMIN — Medication 1 TABLET(S): at 05:08

## 2020-08-24 RX ADMIN — PROPOFOL 6.6 MICROGRAM(S)/KG/MIN: 10 INJECTION, EMULSION INTRAVENOUS at 19:19

## 2020-08-24 RX ADMIN — ISOSORBIDE DINITRATE 10 MILLIGRAM(S): 5 TABLET ORAL at 05:08

## 2020-08-24 RX ADMIN — Medication 12.5 MILLIGRAM(S): at 18:09

## 2020-08-24 RX ADMIN — Medication 3 MILLILITER(S): at 12:09

## 2020-08-24 RX ADMIN — Medication 27.5 MILLIGRAM(S): at 13:22

## 2020-08-24 RX ADMIN — PROPOFOL 6.6 MICROGRAM(S)/KG/MIN: 10 INJECTION, EMULSION INTRAVENOUS at 01:53

## 2020-08-24 RX ADMIN — QUETIAPINE FUMARATE 50 MILLIGRAM(S): 200 TABLET, FILM COATED ORAL at 05:08

## 2020-08-24 RX ADMIN — Medication 1 TABLET(S): at 18:09

## 2020-08-24 RX ADMIN — ISOSORBIDE DINITRATE 10 MILLIGRAM(S): 5 TABLET ORAL at 13:39

## 2020-08-24 RX ADMIN — HEPARIN SODIUM 5000 UNIT(S): 5000 INJECTION INTRAVENOUS; SUBCUTANEOUS at 21:02

## 2020-08-24 RX ADMIN — HEPARIN SODIUM 5000 UNIT(S): 5000 INJECTION INTRAVENOUS; SUBCUTANEOUS at 13:22

## 2020-08-24 RX ADMIN — SODIUM CHLORIDE 4 MILLILITER(S): 9 INJECTION INTRAMUSCULAR; INTRAVENOUS; SUBCUTANEOUS at 05:13

## 2020-08-24 RX ADMIN — SODIUM CHLORIDE 4 MILLILITER(S): 9 INJECTION INTRAMUSCULAR; INTRAVENOUS; SUBCUTANEOUS at 18:02

## 2020-08-24 RX ADMIN — MIDAZOLAM HYDROCHLORIDE 2 MILLIGRAM(S): 1 INJECTION, SOLUTION INTRAMUSCULAR; INTRAVENOUS at 00:12

## 2020-08-24 RX ADMIN — Medication 125 MILLIGRAM(S): at 18:09

## 2020-08-24 RX ADMIN — FENTANYL CITRATE 50 MICROGRAM(S): 50 INJECTION INTRAVENOUS at 12:55

## 2020-08-24 RX ADMIN — FENTANYL CITRATE 2.75 MICROGRAM(S)/KG/HR: 50 INJECTION INTRAVENOUS at 19:17

## 2020-08-24 RX ADMIN — Medication 0.1 MILLIGRAM(S): at 21:01

## 2020-08-24 RX ADMIN — Medication 1 DROP(S): at 00:14

## 2020-08-24 RX ADMIN — Medication 2 MILLIGRAM(S): at 01:51

## 2020-08-24 RX ADMIN — Medication 1 DROP(S): at 23:05

## 2020-08-24 RX ADMIN — ISOSORBIDE DINITRATE 10 MILLIGRAM(S): 5 TABLET ORAL at 21:02

## 2020-08-24 RX ADMIN — CEFEPIME 100 MILLIGRAM(S): 1 INJECTION, POWDER, FOR SOLUTION INTRAMUSCULAR; INTRAVENOUS at 05:07

## 2020-08-24 RX ADMIN — Medication 125 MILLIGRAM(S): at 05:08

## 2020-08-24 RX ADMIN — HEPARIN SODIUM 5000 UNIT(S): 5000 INJECTION INTRAVENOUS; SUBCUTANEOUS at 05:08

## 2020-08-24 RX ADMIN — QUETIAPINE FUMARATE 25 MILLIGRAM(S): 200 TABLET, FILM COATED ORAL at 00:50

## 2020-08-24 RX ADMIN — FENTANYL CITRATE 50 MICROGRAM(S): 50 INJECTION INTRAVENOUS at 17:15

## 2020-08-24 RX ADMIN — FAMOTIDINE 20 MILLIGRAM(S): 10 INJECTION INTRAVENOUS at 05:08

## 2020-08-24 NOTE — CONSULT NOTE ADULT - ASSESSMENT
59YO male on coumadin for afib s/p cardiac arrest at work, down 25 minutes prior to ROSC. Pupils were R fixed and dilated, left fixed at the time, no gag reflex, post-CA cooling protocol was initiated down to 35 deg. Intubated and put on Nimbex drip. Also on Propofol, fentanyl, levophed. R groin mnierva placed. Also put on heparin post-CA. 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 Course also c/b GIB, put on protonix drip. Prior to xfer was started on 3% at 30cc/hr. (09 Aug 2020 14:30)  Also developed gross hematuria - Urology consulted +catheter with clot (irrigated and exchanged) suspected 2/2 traumatic catheterization    Hospital Course complicated by fever and Pseudomonas PNA (s/p Rx) then developed C diff (now with resolved leukocytosis and improved stooling, now soft per report); remains on enteral Vanco    Respiratory Failure - planned trach 8/24  Dysphagia - +NGT  C diff - clinically improving but noted to have significant abdominal distension    RECS:  -Continue enteral Vanco and Bacid as ordered  -Check AXR in AM  -Monitor stools and WBC  -Continue NGT for now  -Will reach out to family and review indications, risks, benefits and alternatives to PEG. If agreeable, will arrange for later this week    Further management per NSCU team    Thank you for the courtesy of this consult.    Benja Flores PA-C    [facility]  [phone]  After hours and weekend coverage [phone]

## 2020-08-24 NOTE — PROGRESS NOTE ADULT - SUBJECTIVE AND OBJECTIVE BOX
ENT ISSUE/POD: S/P #8 DCT Tracheostomy POD #0.     HPI:       PAST MEDICAL & SURGICAL HISTORY:  On warfarin for atrial fibrillation  No significant past surgical history    Allergies.  No Known Allergies    Intolerances.  MEDICATIONS  (STANDING):  albuterol/ipratropium for Nebulization. 3 milliLiter(s) Nebulizer every 6 hours  artificial  tears Solution 1 Drop(s) Both EYES every 6 hours  cefepime   IVPB 2000 milliGRAM(s) IV Intermittent every 8 hours  chlorhexidine 0.12% Liquid 15 milliLiter(s) Oral Mucosa every 12 hours  chlorhexidine 4% Liquid 1 Application(s) Topical <User Schedule>  chlorhexidine 4% Liquid 1 Application(s) Topical <User Schedule>  cloNIDine 0.1 milliGRAM(s) Oral three times a day  dextrose 5%. 1000 milliLiter(s) (50 mL/Hr) IV Continuous <Continuous>  dextrose 50% Injectable 12.5 Gram(s) IV Push once  dextrose 50% Injectable 25 Gram(s) IV Push once  dextrose 50% Injectable 25 Gram(s) IV Push once  famotidine    Tablet 20 milliGRAM(s) Oral two times a day  fentaNYL   Infusion... 0.5 MICROgram(s)/kG/Hr (2.75 mL/Hr) IV Continuous <Continuous>  heparin   Injectable 5000 Unit(s) SubCutaneous every 8 hours  hydrALAZINE 50 milliGRAM(s) Oral every 8 hours  isosorbide   dinitrate Tablet (ISORDIL) 10 milliGRAM(s) Oral three times a day  lactobacillus acidophilus 1 Tablet(s) Oral two times a day  metoprolol tartrate 12.5 milliGRAM(s) Oral two times a day  propofol Infusion 10 MICROgram(s)/kG/Min (6.6 mL/Hr) IV Continuous <Continuous>  QUEtiapine 50 milliGRAM(s) Oral every 8 hours  sodium chloride 7% Inhalation 4 milliLiter(s) Inhalation every 12 hours  valproate sodium IVPB 500 milliGRAM(s) IV Intermittent three times a day  vancomycin    Solution 125 milliGRAM(s) Oral every 6 hours    MEDICATIONS  (PRN):  acetaminophen   Tablet .. 650 milliGRAM(s) Oral every 6 hours PRN Temp greater or equal to 38C (100.4F), Mild Pain (1 - 3)  acetylcysteine 20%  Inhalation 4 milliLiter(s) Inhalation three times a day PRN secretions  dextrose 40% Gel 15 Gram(s) Oral once PRN Blood Glucose LESS THAN 70 milliGRAM(s)/deciliter  glucagon  Injectable 1 milliGRAM(s) IntraMuscular once PRN Glucose LESS THAN 70 milligrams/deciliter  sodium chloride 0.9% lock flush 10 milliLiter(s) IV Push every 1 hour PRN Pre/post blood products, medications, blood draw, and to maintain line patency  traMADol 25 milliGRAM(s) Oral every 6 hours PRN Moderate Pain (4 - 6)  traMADol 50 milliGRAM(s) Oral every 6 hours PRN Severe Pain (7 - 10)    Social History: SEE CONSULT.   Family history: SEE CONSULT.   ROS:   ENT: all negative except as noted in HPI     Vital Signs Last 24 Hrs  T(C): 36.7 (24 Aug 2020 19:00), Max: 37.1 (24 Aug 2020 03:00)  T(F): 98.1 (24 Aug 2020 19:00), Max: 98.7 (24 Aug 2020 03:00)  HR: 98 (24 Aug 2020 22:00) (66 - 143)  BP: --  BP(mean): --  RR: 18 (24 Aug 2020 22:00) (17 - 18)  SpO2: 100% (24 Aug 2020 22:00) (97% - 100%)                        10.0   10.59 )-----------( 352      ( 23 Aug 2020 21:57 )             33.1    08-24    143  |  115<H>  |  35<H>  ----------------------------<  130<H>  3.7   |  16<L>  |  2.30<H>    Ca    9.2      24 Aug 2020 21:28  Phos  4.0     08-24  Mg     2.1     08-24    TPro  6.3  /  Alb  2.8<L>  /  TBili  0.2  /  DBili  <0.1  /  AST  20  /  ALT  29  /  AlkPhos  67  08-24   PT/INR - ( 23 Aug 2020 21:57 )   PT: 14.5 sec;   INR: 1.23 ratio     PTT - ( 23 Aug 2020 21:57 )  PTT:29.6 sec    PHYSICAL EXAM:  Gen: On Vent.  Skin: No rashes, bruises, or lesions.  Head: Normocephalic, Atraumatic.  Face: no edema, erythema, or fluctuance. Parotid glands soft without mass.  Eyes: no scleral injection.  Nose: Nares bilaterally patent, no discharge  Mouth: No Stridor / Drooling / Trismus.  Mucosa moist, tongue/uvula midline, oropharynx clear  Neck: # 8 DCT Trach  secured with sutures x4 and Umbilical Tie. Minimal oozing of serosanguinous secretions, no bleeding noted. No hematoma/crepitus. Flat, supple, no lymphadenopathy, trachea midline, no masses.  Lymphatic: No lymphadenopathy  Resp: On Vent.   Neuro: Unable to obtain. ENT ISSUE/POD: S/P #8 DCT Tracheostomy POD #0.     HPI: 61 YO M with PMH of  Afib on coumadin,  cardiac arrest at work downtime 25 minutes prior to ROSC s/p Intubation and put on Nimbex drip. Hospital course c/b R perimesencephalic SAH of unclear etiology, no hydrocephalus. Now S/P #8 DCT Tracheostomy on 8/24. Doing well on the ventilator. No bleeding noted.    PAST MEDICAL & SURGICAL HISTORY:  On warfarin for atrial fibrillation  No significant past surgical history    Allergies.  No Known Allergies    Intolerances.  MEDICATIONS  (STANDING):  albuterol/ipratropium for Nebulization. 3 milliLiter(s) Nebulizer every 6 hours  artificial  tears Solution 1 Drop(s) Both EYES every 6 hours  cefepime   IVPB 2000 milliGRAM(s) IV Intermittent every 8 hours  chlorhexidine 0.12% Liquid 15 milliLiter(s) Oral Mucosa every 12 hours  chlorhexidine 4% Liquid 1 Application(s) Topical <User Schedule>  chlorhexidine 4% Liquid 1 Application(s) Topical <User Schedule>  cloNIDine 0.1 milliGRAM(s) Oral three times a day  dextrose 5%. 1000 milliLiter(s) (50 mL/Hr) IV Continuous <Continuous>  dextrose 50% Injectable 12.5 Gram(s) IV Push once  dextrose 50% Injectable 25 Gram(s) IV Push once  dextrose 50% Injectable 25 Gram(s) IV Push once  famotidine    Tablet 20 milliGRAM(s) Oral two times a day  fentaNYL   Infusion... 0.5 MICROgram(s)/kG/Hr (2.75 mL/Hr) IV Continuous <Continuous>  heparin   Injectable 5000 Unit(s) SubCutaneous every 8 hours  hydrALAZINE 50 milliGRAM(s) Oral every 8 hours  isosorbide   dinitrate Tablet (ISORDIL) 10 milliGRAM(s) Oral three times a day  lactobacillus acidophilus 1 Tablet(s) Oral two times a day  metoprolol tartrate 12.5 milliGRAM(s) Oral two times a day  propofol Infusion 10 MICROgram(s)/kG/Min (6.6 mL/Hr) IV Continuous <Continuous>  QUEtiapine 50 milliGRAM(s) Oral every 8 hours  sodium chloride 7% Inhalation 4 milliLiter(s) Inhalation every 12 hours  valproate sodium IVPB 500 milliGRAM(s) IV Intermittent three times a day  vancomycin    Solution 125 milliGRAM(s) Oral every 6 hours    MEDICATIONS  (PRN):  acetaminophen   Tablet .. 650 milliGRAM(s) Oral every 6 hours PRN Temp greater or equal to 38C (100.4F), Mild Pain (1 - 3)  acetylcysteine 20%  Inhalation 4 milliLiter(s) Inhalation three times a day PRN secretions  dextrose 40% Gel 15 Gram(s) Oral once PRN Blood Glucose LESS THAN 70 milliGRAM(s)/deciliter  glucagon  Injectable 1 milliGRAM(s) IntraMuscular once PRN Glucose LESS THAN 70 milligrams/deciliter  sodium chloride 0.9% lock flush 10 milliLiter(s) IV Push every 1 hour PRN Pre/post blood products, medications, blood draw, and to maintain line patency  traMADol 25 milliGRAM(s) Oral every 6 hours PRN Moderate Pain (4 - 6)  traMADol 50 milliGRAM(s) Oral every 6 hours PRN Severe Pain (7 - 10)    Social History: SEE CONSULT.   Family history: SEE CONSULT.   ROS:   ENT: all negative except as noted in HPI     Vital Signs Last 24 Hrs  T(C): 36.7 (24 Aug 2020 19:00), Max: 37.1 (24 Aug 2020 03:00)  T(F): 98.1 (24 Aug 2020 19:00), Max: 98.7 (24 Aug 2020 03:00)  HR: 98 (24 Aug 2020 22:00) (66 - 143)  BP: --  BP(mean): --  RR: 18 (24 Aug 2020 22:00) (17 - 18)  SpO2: 100% (24 Aug 2020 22:00) (97% - 100%)                        10.0   10.59 )-----------( 352      ( 23 Aug 2020 21:57 )             33.1    08-24    143  |  115<H>  |  35<H>  ----------------------------<  130<H>  3.7   |  16<L>  |  2.30<H>    Ca    9.2      24 Aug 2020 21:28  Phos  4.0     08-24  Mg     2.1     08-24    TPro  6.3  /  Alb  2.8<L>  /  TBili  0.2  /  DBili  <0.1  /  AST  20  /  ALT  29  /  AlkPhos  67  08-24   PT/INR - ( 23 Aug 2020 21:57 )   PT: 14.5 sec;   INR: 1.23 ratio     PTT - ( 23 Aug 2020 21:57 )  PTT:29.6 sec    PHYSICAL EXAM:  Gen: On Vent.  Skin: No rashes, bruises, or lesions.  Head: Normocephalic, Atraumatic.  Face: no edema, erythema, or fluctuance. Parotid glands soft without mass.  Eyes: no scleral injection.  Nose: Nares bilaterally patent, no discharge  Mouth: No Stridor / Drooling / Trismus.  Mucosa moist, tongue/uvula midline, oropharynx clear  Neck: # 8 DCT Trach  secured with sutures x4 and Umbilical Tie. Minimal oozing of serosanguinous secretions, no bleeding noted. No hematoma/crepitus. Flat, supple, no lymphadenopathy, trachea midline, no masses.  Lymphatic: No lymphadenopathy  Resp: On Vent.   Neuro: Unable to obtain.

## 2020-08-24 NOTE — PROGRESS NOTE ADULT - ASSESSMENT
59 YO M with PMH of  Afib on coumadin, cardiac arrest at work downtime 25 minutes prior to ROSC s/p Intubation and put on Nimbex drip. Hospital course c/b  R perimesencephalic SAH of unclear etiology, no hydrocephalus. Now S/P #8 DCT Tracheostomy on 8/24.  Trach  secured with sutures x4 and Umbilical Tie. Minimal oozing of serosanguinous secretions, no bleeding noted. No hematoma/crepitus. Doing well on the ventilator.

## 2020-08-24 NOTE — CONSULT NOTE ADULT - SUBJECTIVE AND OBJECTIVE BOX
Patient is a 60y old  Male who presents with a chief complaint of sah (16 Aug 2020 07:48)    Pt seen in AM along with NSCU team on morning rounds    HPI:  61YO male on coumadin for afib s/p cardiac arrest at work, down 25 minutes prior to ROSC. Pupils were R fixed and dilated, left fixed at the time, no gag reflex, post-CA cooling protocol was initiated down to 35 deg. Intubated and put on Nimbex drip. Also on Propofol, fentanyl, levophed. R groin minerva placed. Also put on heparin post-CA. 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 Course also c/b GIB, put on protonix drip. Prior to xfer was started on 3% at 30cc/hr. (09 Aug 2020 14:30)  Also developed gross hematuria - Urology consulted +catheter with clot (irrigated and exchanged) suspected 2/2 traumatic catheterization    HOSP COURSE:   8/10- Angio- neg   8/11: VTACH noted (7 beats)  8/13: Febrile  8/14: Started on Unasyn for gram neg rods and gram neg diplococci on bronch gram stain -> switched to cefepime for pseudomonas noted 8/10  8/16: central line removed on 8/16. Cr improving 1.7 -> 1.57. MRNOVA planned. Could not tolerate since desatted when supine  8/17/20: Lasix 30mg x1  8/18: PO vanc for Cdiff  8/19: Few beats of VTACH noted.   8/20: Taken for angio: Negative.  8/21: Episode of respiratory distress overnight; desaturated 85%, mucus plug/ambu bagged.   S/P lasix 40mgx1.     PLanned trach today 8/24  rectal tube removed yesterday - reported 3 stools (brown, soft). On enteral Vanco for +C diff  Leukocytosis much improved  pt not following commands, remains agitated/restless  +NGT for feeds and meds    PAST MEDICAL & SURGICAL HISTORY:  On warfarin for atrial fibrillation  No significant past surgical history      Allergies  No Known Allergies        MEDICATIONS  (STANDING):  albuterol/ipratropium for Nebulization. 3 milliLiter(s) Nebulizer every 6 hours  artificial  tears Solution 1 Drop(s) Both EYES every 6 hours  chlorhexidine 0.12% Liquid 15 milliLiter(s) Oral Mucosa every 12 hours  chlorhexidine 4% Liquid 1 Application(s) Topical <User Schedule>  chlorhexidine 4% Liquid 1 Application(s) Topical <User Schedule>  cloNIDine 0.1 milliGRAM(s) Oral three times a day  dextrose 5%. 1000 milliLiter(s) (50 mL/Hr) IV Continuous <Continuous>  dextrose 50% Injectable 12.5 Gram(s) IV Push once  dextrose 50% Injectable 25 Gram(s) IV Push once  dextrose 50% Injectable 25 Gram(s) IV Push once  famotidine    Tablet 20 milliGRAM(s) Oral two times a day  fentaNYL    Injectable 50 MICROGram(s) IV Push once  fentaNYL   Infusion... 0.5 MICROgram(s)/kG/Hr (2.75 mL/Hr) IV Continuous <Continuous>  heparin   Injectable 5000 Unit(s) SubCutaneous every 8 hours  hydrALAZINE 50 milliGRAM(s) Oral every 8 hours  isosorbide   dinitrate Tablet (ISORDIL) 10 milliGRAM(s) Oral three times a day  lactobacillus acidophilus 1 Tablet(s) Oral two times a day  metoprolol tartrate 12.5 milliGRAM(s) Oral two times a day  propofol Infusion 10 MICROgram(s)/kG/Min (6.6 mL/Hr) IV Continuous <Continuous>  QUEtiapine 50 milliGRAM(s) Oral every 8 hours  sodium chloride 7% Inhalation 4 milliLiter(s) Inhalation every 12 hours  valproate sodium IVPB 500 milliGRAM(s) IV Intermittent three times a day  vancomycin    Solution 125 milliGRAM(s) Oral every 6 hours    MEDICATIONS  (PRN):  acetaminophen   Tablet .. 650 milliGRAM(s) Oral every 6 hours PRN Temp greater or equal to 38C (100.4F), Mild Pain (1 - 3)  acetylcysteine 20%  Inhalation 4 milliLiter(s) Inhalation three times a day PRN secretions  dextrose 40% Gel 15 Gram(s) Oral once PRN Blood Glucose LESS THAN 70 milliGRAM(s)/deciliter  glucagon  Injectable 1 milliGRAM(s) IntraMuscular once PRN Glucose LESS THAN 70 milligrams/deciliter  sodium chloride 0.9% lock flush 10 milliLiter(s) IV Push every 1 hour PRN Pre/post blood products, medications, blood draw, and to maintain line patency  traMADol 25 milliGRAM(s) Oral every 6 hours PRN Moderate Pain (4 - 6)  traMADol 50 milliGRAM(s) Oral every 6 hours PRN Severe Pain (7 - 10)      Social History:  , lives with spouse  Emergency Contact Name Rufino Vieyra  Emergency Contact Phone # [phone]  Emergency Contact Relationship Son      Substance Use (street drugs): (  ) never used  (  ) other:  Tobacco Usage:  (   ) never smoked   (   ) former smoker   (   ) current smoker  (     ) pack year  (        ) last cigarette date  Alcohol Usage:  Sexual History:     Family History   unable to obtain       Advanced Directives: (   X  ) None    (      ) DNR    (     ) DNI    (     ) Health Care Proxy:     Review of Systems:  unable to obtain from pt    Vital Signs Last 24 Hrs  T(C): 36.9 (24 Aug 2020 15:00), Max: 37.1 (23 Aug 2020 23:00)  T(F): 98.5 (24 Aug 2020 15:00), Max: 98.8 (23 Aug 2020 23:00)  HR: 112 (24 Aug 2020 16:00) (56 - 143)  BP: --  BP(mean): --  RR: 18 (24 Aug 2020 16:00) (17 - 23)  SpO2: 100% (24 Aug 2020 16:00) (99% - 100%)    PHYSICAL EXAM:    Constitutional: NAD, agitated/restless +NGT  orally intubated on vent. opens eyes, not following commands  Neck: No LAD, supple  Respiratory: +vent sounds, decreased BS at bases  Cardiovascular: S1 and S2, tachy  Gastrointestinal: BS+, softly distended no rebound or rigidity  Extremities: No peripheral edema  Vascular: 2+ peripheral pulses  Neurological: restless/agitated. opens eyes. not following commands  moves RUE  +dysconjugate gaze  Skin: No rashes        LABS:                        10.0   10.59 )-----------( 352      ( 23 Aug 2020 21:57 )             33.1     WBC Count: 32.60 K/uL (08.17.20 @ 21:39)      08-23    142  |  115<H>  |  36<H>  ----------------------------<  123<H>  3.6   |  17<L>  |  1.89<H>    Ca    8.9      23 Aug 2020 21:57  Phos  2.6     08-23  Mg     2.1     08-23      PT/INR - ( 23 Aug 2020 21:57 )   PT: 14.5 sec;   INR: 1.23 ratio    PTT - ( 23 Aug 2020 21:57 )  PTT:29.6 sec    Thyroid Stimulating Hormone, Serum (08.09.20 @ 17:20)    Thyroid Stimulating Hormone, Serum: 1.91 uIU/mL      LIVER FUNCTIONS - ( 20 Aug 2020 01:26 )  Alb: 3.2 g/dL / Pro: 6.7 g/dL / ALK PHOS: 81 U/L / ALT: 47 U/L / AST: 27 U/L / GGT: x           COVID-19 PCR . (08.23.20 @ 19:58)    COVID-19 PCR: NotDetec: Testing is performed using polymerase chain reaction (PCR) or  transcription mediated amplification (TMA). This COVID-19 (SARS-CoV-2)  nucleic acid amplification test was validated by [employer] and is  in use under the FDA Emergency Use Authorization (EUA) for clinical labs  CLIA-certified to perform high complexity testing. Test results should be  correlated with clinical presentation, patient history, and epidemiology.      C. difficile GDH &amp; toxins A/B by EIA (08.18.20 @ 10:47)    Clostridium difficile GDH Toxins A&amp;B, EIA:   Positive    Clostridium difficile GDH Interpretation: Positive for toxigenic C. Difficile.  This specimen is positive for C.  Difficile glutamate dehydrogenase (GDH) antigen and positive for C.  Difficile Toxins A & B, by EIA.  GDH is a highly sensitive marker for C.  Difficile that is produced in largeamounts by all C. Difficile strains,  both toxigenic and nontoxigenic.  This assay has not been validated as a  test of cure.  The results of this assay should always be interpreted in  conjunction with patient's clinical history.        RADIOLOGY & ADDITIONAL TESTS:  < from: Xray Chest 1 View- PORTABLE-Routine (08.24.20 @ 05:49) >  IMPRESSION:    The mediastinal cardiac silhouette is unremarkable. Endotracheal tube at level of clavicles. Enteric tube below hemidiaphragms tip not seen. Right PICC line in superior vena cava.    Obscuration of left hemidiaphragm which may be secondary to overlying soft tissues versus small effusion and/or atelectasis. Unchanged from previous exam.    No acute osseous finding.      < from: CT Head No Cont (08.13.20 @ 08:55) >  FINDINGS:    Similar extra-axial hemorrhage in the right aspect of the suprasellar cistern extending into the right sylvian fissure.    Scattered sulcal subarachnoid hemorrhage is similar.    Similar small layering intraventricular hemorrhage.    No hydrocephalus or midline shift.    Edema in the region of the right cerebral peduncle and posterior limb of the right internal capsule is redemonstrated.    IMPRESSION:    No significant interval change from 8/12/2020.

## 2020-08-24 NOTE — PROGRESS NOTE ADULT - SUBJECTIVE AND OBJECTIVE BOX
Visit Summary: Patient seen and evaluated at bedside.    Overnight Events: none    Exam:  Vital Signs Last 24 Hrs  T(C): 37.1 (23 Aug 2020 23:00), Max: 37.1 (23 Aug 2020 23:00)  T(F): 98.8 (23 Aug 2020 23:00), Max: 98.8 (23 Aug 2020 23:00)  HR: 90 (24 Aug 2020 02:00) (54 - 143)  BP: --  BP(mean): --  RR: 18 (24 Aug 2020 02:00) (17 - 23)  SpO2: 100% (24 Aug 2020 02:00) (99% - 100%)    Intubated, follows on the right, nods appropriately to questions, left side plegic                               10.7   15.61 )-----------( 328      ( 21 Aug 2020 22:43 )             34.9     08-21    145  |  114<H>  |  38<H>  ----------------------------<  130<H>  3.6   |  18<L>  |  1.92<H>    Ca    9.2      21 Aug 2020 22:43  Phos  2.8     08-21  Mg     2.2     08-21

## 2020-08-24 NOTE — PROGRESS NOTE ADULT - SUBJECTIVE AND OBJECTIVE BOX
HPI:  60 year old male with PMHx of Afib on coumadin s/p cardiac arrest at work, downtime 25 minutes prior to ROSC. Pupils were R fixed and dilated, left fixed at the time, no gag reflex, post-CA cooling protocol was initiated down to 35 deg. Intubated and put on Nimbex drip. Also on Propofol, fentanyl, levophed. R groin minerva placed. Also put on heparin post-CA. HCT showed R perimesencephalic SAH of unclear etiology, no hydrocephalus. Course also c/b GIB, was put on protonix drip. Also had bleeding from [facility] - urology consulted, clot irrigated.       HOSP COURSE:   8/10- Angio- neg   8/11: VTACH noted (7 beats)  8/13: Febrile  8/14: Started on Unasyn for gram neg rods and gram neg diplococci on bronch gram stain -> switched to cefepime for pseudomonas noted 8/10  8/16: central line removed on 8/16. Cr improving 1.7 -> 1.57. MRNOVA planned. Could not tolerate since desatted when supine  8/17/20: Lasix 30mg x1  8/18: PO vanc for Cdiff  8/19: Few beats of VTACH noted.   8/20: Taken for angio: Negative.  8/21: Episode of respiratory distress overnight; desaturated 85%, mucus plug/ambu bagged.   S/P lasix 40mgx1.     Overnight events:no events          ICU Vital Signs Last 24 Hrs  T(C): 36.7 (24 Aug 2020 07:00), Max: 37.1 (23 Aug 2020 23:00)  T(F): 98 (24 Aug 2020 07:00), Max: 98.8 (23 Aug 2020 23:00)  HR: 88 (24 Aug 2020 11:00) (54 - 143)  BP: --  BP(mean): --  ABP: 133/64 (24 Aug 2020 11:00) (105/56 - 164/81)  ABP(mean): 80 (24 Aug 2020 11:00) (69 - 118)  RR: 18 (24 Aug 2020 11:00) (17 - 23)  SpO2: 100% (24 Aug 2020 11:00) (100% - 100%)      08-23-20 @ 07:01  -  08-24-20 @ 07:00  --------------------------------------------------------  IN: 1675.1 mL / OUT: 1625 mL / NET: 50.1 mL    08-24-20 @ 07:01  -  08-24-20 @ 11:19  --------------------------------------------------------  IN: 49.3 mL / OUT: 0 mL / NET: 49.3 mL        Mode: AC/ CMV (Assist Control/ Continuous Mandatory Ventilation), RR (machine): 18, TV (machine): 500, FiO2: 40, PEEP: 5, ITime: 1, MAP: 12, PIP: 30    acetaminophen   Tablet .. 650 milliGRAM(s) Oral every 6 hours PRN  acetylcysteine 20%  Inhalation 4 milliLiter(s) Inhalation three times a day PRN  albuterol/ipratropium for Nebulization. 3 milliLiter(s) Nebulizer every 6 hours  artificial  tears Solution 1 Drop(s) Both EYES every 6 hours  cefepime   IVPB 2000 milliGRAM(s) IV Intermittent every 8 hours  cloNIDine 0.1 milliGRAM(s) Oral three times a day    famotidine    Tablet 20 milliGRAM(s) Oral two times a day  fentaNYL   Infusion... 0.5 MICROgram(s)/kG/Hr (2.75 mL/Hr) IV Continuous <Continuous>  glucagon  Injectable 1 milliGRAM(s) IntraMuscular once PRN  heparin   Injectable 5000 Unit(s) SubCutaneous every 8 hours  hydrALAZINE 50 milliGRAM(s) Oral every 8 hours  isosorbide   dinitrate Tablet (ISORDIL) 10 milliGRAM(s) Oral three times a day  lactobacillus acidophilus 1 Tablet(s) Oral two times a day  metoprolol tartrate 12.5 milliGRAM(s) Oral two times a day  propofol Infusion 10 MICROgram(s)/kG/Min (6.6 mL/Hr) IV Continuous <Continuous>  QUEtiapine 50 milliGRAM(s) Oral every 8 hours  sodium chloride 0.9% lock flush 10 milliLiter(s) IV Push every 1 hour PRN  sodium chloride 7% Inhalation 4 milliLiter(s) Inhalation every 12 hours  traMADol 25 milliGRAM(s) Oral every 6 hours PRN  traMADol 50 milliGRAM(s) Oral every 6 hours PRN  valproate sodium IVPB 500 milliGRAM(s) IV Intermittent three times a day  vancomycin    Solution 125 milliGRAM(s) Oral every 6 hours      LABS:  Na: 142 (08-23 @ 21:57), 146 (08-23 @ 13:23), 145 (08-21 @ 22:43)  K: 3.6 (08-23 @ 21:57), 3.4 (08-23 @ 13:23), 3.6 (08-21 @ 22:43)  Cl: 115 (08-23 @ 21:57), 117 (08-23 @ 13:23), 114 (08-21 @ 22:43)  CO2: 17 (08-23 @ 21:57), 17 (08-23 @ 13:23), 18 (08-21 @ 22:43)  BUN: 36 (08-23 @ 21:57), 37 (08-23 @ 13:23), 38 (08-21 @ 22:43)  Cr: 1.89 (08-23 @ 21:57), 2.08 (08-23 @ 13:23), 1.92 (08-21 @ 22:43)  Glu: 123(08-23 @ 21:57), 128(08-23 @ 13:23), 130(08-21 @ 22:43)    Hgb: 10.0 (08-23 @ 21:57), 10.7 (08-21 @ 22:43)  Hct: 33.1 (08-23 @ 21:57), 34.9 (08-21 @ 22:43)  WBC: 10.59 (08-23 @ 21:57), 15.61 (08-21 @ 22:43)  Plt: 352 (08-23 @ 21:57), 328 (08-21 @ 22:43)    INR: 1.23 08-23-20 @ 21:57  PTT: 29.6 08-23-20 @ 21:57                    BRONCH- 8/13/20- Pseudomonas- Cefepime     EXAMINATION:  General: Agitated, intubated  HEENT: MMM  Neuro:  -Mental status-  No acute distress, EO to voice. Strong and purposeful on RUE,  followed showing 2 fingers. Left side  0/5  -CN- Pupils R 5mm NR, L 1mm sluggish, Eyes dysconjugate,  +cough/gag, +corneals  RUE localize AG, RLE spont AG, LUE flaccid, LLE flaccid  CVS: S1/S2  RESP: Diminished at bases  GI: Soft, non tender, mildly distended, hypoactive BS  Extremities: Warm, skin intact

## 2020-08-24 NOTE — PROGRESS NOTE ADULT - ASSESSMENT
60 year old male s/p cardiac arrest c/b GIB and bleeding from [facility] with perimesencephalic (HH4 mF4) subarachnoid hemorrhage, angio neg x2 ,PBD 17.  Intubation day: 15.    NEURO:    SAH, possible sentinal event with cardiac arrest  Angio 8/20: negative.   Q2h neurochecks  CT Head: perimesencephalic SAH   CTA Head: no aneurysm noted  Initial Conventional angiogram 8/10 negative  MRI neuroaxis: Restricted diffusion in R BG, posterior limb of R IC, and anterior right temporal lobe  VEEG negative. DCed.   DCI management (off nimodipine). Poor windows  No EVD as no significant hydrocephalus  Precedex gtt for sedation- RASS 0 to -1, fentanyl gtt.  agitation : Seroquel, Clonidine  , VPA   Sedation Propofol     PULM: Acute PULM edema- resolved. Hypoxia secondary to mucus plugging  [facility] vent: settings- 18/500/40/5.   spO2>92%  CTA R/O PE: negative  Continue hypertonic nebs./ Mucomyst prn.   CPAP as tolerated though less likely to be extubated.   trach today     CV:  SBP goal 100- 160  TTE: Global LV dysfunction. EF 41%, Severe concentric LVH, flattening of interventricular septum.   S/P Concern for possible PE - CTA negative  Hydralazine , metoprolol   Clonidine   Will need cath per cardiology  S/P amio gtt; c/w  daily.  Mg- 2.0 ; Po4- 3.0 ; k- 4.0  Troponin 65--> 66. Stable.  Hold lasix.   monitor QTc      RENAL: Non anion gap metabolic acidosis  Likely in the setting of diarrhea.  ?CKD with superimposed JALEN.- stable   Monitor Cr and lytes.  Sodium goal 140-150  Monitor Is/Os  Cr post angio: stable      GI: S/P GI bleed, hyperlipasemia, Cdiff positive  Diarrhea- - Increased WBC and ABX--> Vanco 125 mg q6 for Cdiff   Diet: NPO for procedure   GI prop PO pepcid  Bowel regimen - hold for diarrhea  LBM: 8/22. Cdiff  Lipase 101 --> 89  LFTs stable. Monitor while    ENDO:   Goal euglycemia (-180)  TFTs wnl      HEME/ONC: Afib on coumadin at home; leukocytosis  Leukocytosis: No eosinophilia   Lipase slightly elevated 101-->89, LFTs wnl  s/p vit K: INR 1.42-->1.2--> 1.5.   Repeat INR 8/23.  VTE prophylaxis: [] SCDs [x] chemoprophylaxis heparin subq.  Dopplers negative for DVT.  Will eventually need to resume coumadin once trach/PEG.       ID: Cdiff positive  Leukocytosis- Diarrhea. Improving  C difficile - see above; hypoactive BS. On PO vancomycin. Continue PO vanc for additional week after cefepime course completed.  Afebrile  Started on cefepime (switched from unasyn on 8/14) for pseudomonas- D/C 8/28/20)- 2 week course for PNA  Added probiotic      SOCIAL/FAMILY:  [x] updated at bedside     CODE STATUS:  [x] Full Code [] DNR [] DNI [] Palliative/Comfort Care    DISPOSITION:  [x] ICU [] Stroke Unit [] Floor [] EMU [] RCU [] PCU    [x] Patient is at high risk of neurologic deterioration/death due to: SAH, cardiac arrest

## 2020-08-24 NOTE — PROGRESS NOTE ADULT - SUBJECTIVE AND OBJECTIVE BOX
S/p tracheostomy today. Markedly agitated requiring propofol and fentanyl gtts.     Eye opening to voice, intermittently follows commands on the right, spontaneous on the right, plegic on the left

## 2020-08-24 NOTE — PROGRESS NOTE ADULT - PROBLEM SELECTOR PLAN 1
- Keep the Trach site clean and dry.   - Will remove the sutures on POD #7.   - Keep the Omniflex for a week to avoid the manipulation of the stoma.   - Elevate HOB.  - Gentle suction prn.   - Nebs prn.   - Vent per RT.   - ENT will follow.   - Call with questions.     HERMAN LAMB PA-C.   # [id number]  ENT PA.

## 2020-08-24 NOTE — PROGRESS NOTE ADULT - ASSESSMENT
60M angio negative subarachnoid hemorrhage, cardiac arrest status post TTM   - Sedation: on Precedex, Fentanyl gtt - wean in favor of quetiapine (but must check QtC)  - Cefepime for PNA until 8/28  - Enteral Vanco for c. diff  - Planned for tracheostomy/PEG  - Vtach: amiodarone

## 2020-08-24 NOTE — PROGRESS NOTE ADULT - SUBJECTIVE AND OBJECTIVE BOX
Subjective: Patient seen and examined. No new events except as noted.   remains intubated in NSCU   for PEG/Trach       REVIEW OF SYSTEMS:  Unable to obtain       MEDICATIONS:  MEDICATIONS  (STANDING):  albuterol/ipratropium for Nebulization. 3 milliLiter(s) Nebulizer every 6 hours  artificial  tears Solution 1 Drop(s) Both EYES every 6 hours  cefepime   IVPB 2000 milliGRAM(s) IV Intermittent every 8 hours  chlorhexidine 0.12% Liquid 15 milliLiter(s) Oral Mucosa every 12 hours  chlorhexidine 4% Liquid 1 Application(s) Topical <User Schedule>  chlorhexidine 4% Liquid 1 Application(s) Topical <User Schedule>  cloNIDine 0.1 milliGRAM(s) Oral three times a day  dextrose 5%. 1000 milliLiter(s) (50 mL/Hr) IV Continuous <Continuous>  dextrose 50% Injectable 12.5 Gram(s) IV Push once  dextrose 50% Injectable 25 Gram(s) IV Push once  dextrose 50% Injectable 25 Gram(s) IV Push once  famotidine    Tablet 20 milliGRAM(s) Oral two times a day  fentaNYL   Infusion... 0.5 MICROgram(s)/kG/Hr (2.75 mL/Hr) IV Continuous <Continuous>  heparin   Injectable 5000 Unit(s) SubCutaneous every 8 hours  hydrALAZINE 50 milliGRAM(s) Oral every 8 hours  isosorbide   dinitrate Tablet (ISORDIL) 10 milliGRAM(s) Oral three times a day  lactobacillus acidophilus 1 Tablet(s) Oral two times a day  metoprolol tartrate 12.5 milliGRAM(s) Oral two times a day  propofol Infusion 10 MICROgram(s)/kG/Min (6.6 mL/Hr) IV Continuous <Continuous>  QUEtiapine 50 milliGRAM(s) Oral every 8 hours  sodium chloride 7% Inhalation 4 milliLiter(s) Inhalation every 12 hours  valproate sodium IVPB 500 milliGRAM(s) IV Intermittent three times a day  vancomycin    Solution 125 milliGRAM(s) Oral every 6 hours      PHYSICAL EXAM:  T(C): 36.7 (08-24-20 @ 07:00), Max: 37.1 (08-23-20 @ 23:00)  HR: 71 (08-24-20 @ 08:17) (54 - 143)  BP: --  RR: 18 (08-24-20 @ 08:00) (17 - 23)  SpO2: 100% (08-24-20 @ 08:17) (99% - 100%)  Wt(kg): --  I&O's Summary    23 Aug 2020 07:01  -  24 Aug 2020 07:00  --------------------------------------------------------  IN: 1675.1 mL / OUT: 1625 mL / NET: 50.1 mL    24 Aug 2020 07:01  -  24 Aug 2020 10:03  --------------------------------------------------------  IN: 22.9 mL / OUT: 0 mL / NET: 22.9 mL            Appearance: Intubated sedated 	  HEENT:   Dry  oral mucosa,  Lymphatic: No lymphadenopathy  Cardiovascular: Normal S1 S2, No JVD, No murmurs, No edema  Respiratory: Ventilated   Psychiatry: A & O x 0  Gastrointestinal:  Soft, Non-tender, + BS	  Skin: No rashes, No ecchymoses, No cyanosis	  Mental status- No acute distress, EO to stim  -CN- Pupils R 4mm NR, L 2mm sluggish, EOMI, tongue midline, face symmetric  +cough/gag  C briskly, two fingers/thumbs up on RUE, distally AG, wiggles toes on RLE, no    LABS:    CARDIAC MARKERS:                                10.0   10.59 )-----------( 352      ( 23 Aug 2020 21:57 )             33.1     08-23    142  |  115<H>  |  36<H>  ----------------------------<  123<H>  3.6   |  17<L>  |  1.89<H>    Ca    8.9      23 Aug 2020 21:57  Phos  2.6     08-23  Mg     2.1     08-23      proBNP:   Lipid Profile:   HgA1c:   TSH:             TELEMETRY: 	  AF  ECG:  	  RADIOLOGY:   DIAGNOSTIC TESTING:  [ ] Echocardiogram:  [ ]  Catheterization:  [ ] Stress Test:    OTHER:

## 2020-08-24 NOTE — PROGRESS NOTE ADULT - PROBLEM SELECTOR PLAN 2
WCT on tele likely Afib with aberrancy.   Metoprolol  DCed Amiodarone for now given bradycardia   TTE as above

## 2020-08-24 NOTE — PROGRESS NOTE ADULT - ASSESSMENT
HH5 mF4, post-bleed day 15  - Cefepime for PNA  - Enteral Vanco for c. diff  - S/p tracheostomy, full vent support tonight but pressure support trial in AM  - Continue sedation given likely pain/discomfort from tracheostomy; will plan to wean in early AM and throughout tomorrow  - Seroquel, monitor Qtc  - Amiodarone for arrhythmia

## 2020-08-25 DIAGNOSIS — N17.9 ACUTE KIDNEY FAILURE, UNSPECIFIED: ICD-10-CM

## 2020-08-25 LAB
ALBUMIN SERPL ELPH-MCNC: 3 G/DL — LOW (ref 3.3–5)
ALP SERPL-CCNC: 66 U/L — SIGNIFICANT CHANGE UP (ref 40–120)
ALT FLD-CCNC: 21 U/L — SIGNIFICANT CHANGE UP (ref 10–45)
ANION GAP SERPL CALC-SCNC: 14 MMOL/L — SIGNIFICANT CHANGE UP (ref 5–17)
APTT BLD: 32.1 SEC — SIGNIFICANT CHANGE UP (ref 27.5–35.5)
AST SERPL-CCNC: 23 U/L — SIGNIFICANT CHANGE UP (ref 10–40)
BASOPHILS # BLD AUTO: 0.05 K/UL — SIGNIFICANT CHANGE UP (ref 0–0.2)
BASOPHILS NFR BLD AUTO: 0.4 % — SIGNIFICANT CHANGE UP (ref 0–2)
BILIRUB DIRECT SERPL-MCNC: <0.1 MG/DL — SIGNIFICANT CHANGE UP (ref 0–0.2)
BILIRUB INDIRECT FLD-MCNC: >0.1 MG/DL — LOW (ref 0.2–1)
BILIRUB SERPL-MCNC: 0.2 MG/DL — SIGNIFICANT CHANGE UP (ref 0.2–1.2)
BLD GP AB SCN SERPL QL: NEGATIVE — SIGNIFICANT CHANGE UP
BUN SERPL-MCNC: 36 MG/DL — HIGH (ref 7–23)
CALCIUM SERPL-MCNC: 9.2 MG/DL — SIGNIFICANT CHANGE UP (ref 8.4–10.5)
CHLORIDE SERPL-SCNC: 114 MMOL/L — HIGH (ref 96–108)
CHLORIDE UR-SCNC: 70 MMOL/L — SIGNIFICANT CHANGE UP
CO2 SERPL-SCNC: 15 MMOL/L — LOW (ref 22–31)
CREAT ?TM UR-MCNC: 148 MG/DL — SIGNIFICANT CHANGE UP
CREAT SERPL-MCNC: 2.31 MG/DL — HIGH (ref 0.5–1.3)
EOSINOPHIL # BLD AUTO: 0.21 K/UL — SIGNIFICANT CHANGE UP (ref 0–0.5)
EOSINOPHIL NFR BLD AUTO: 1.8 % — SIGNIFICANT CHANGE UP (ref 0–6)
GLUCOSE SERPL-MCNC: 155 MG/DL — HIGH (ref 70–99)
HCT VFR BLD CALC: 33.6 % — LOW (ref 39–50)
HGB BLD-MCNC: 10.4 G/DL — LOW (ref 13–17)
IMM GRANULOCYTES NFR BLD AUTO: 1.4 % — SIGNIFICANT CHANGE UP (ref 0–1.5)
INR BLD: 1.36 RATIO — HIGH (ref 0.88–1.16)
LYMPHOCYTES # BLD AUTO: 0.72 K/UL — LOW (ref 1–3.3)
LYMPHOCYTES # BLD AUTO: 6.2 % — LOW (ref 13–44)
MAGNESIUM SERPL-MCNC: 2.2 MG/DL — SIGNIFICANT CHANGE UP (ref 1.6–2.6)
MCHC RBC-ENTMCNC: 27.2 PG — SIGNIFICANT CHANGE UP (ref 27–34)
MCHC RBC-ENTMCNC: 31 GM/DL — LOW (ref 32–36)
MCV RBC AUTO: 87.7 FL — SIGNIFICANT CHANGE UP (ref 80–100)
MONOCYTES # BLD AUTO: 1.07 K/UL — HIGH (ref 0–0.9)
MONOCYTES NFR BLD AUTO: 9.2 % — SIGNIFICANT CHANGE UP (ref 2–14)
NEUTROPHILS # BLD AUTO: 9.41 K/UL — HIGH (ref 1.8–7.4)
NEUTROPHILS NFR BLD AUTO: 81 % — HIGH (ref 43–77)
NRBC # BLD: 0 /100 WBCS — SIGNIFICANT CHANGE UP (ref 0–0)
PHOSPHATE SERPL-MCNC: 4.1 MG/DL — SIGNIFICANT CHANGE UP (ref 2.5–4.5)
PLATELET # BLD AUTO: 341 K/UL — SIGNIFICANT CHANGE UP (ref 150–400)
POTASSIUM SERPL-MCNC: 4.1 MMOL/L — SIGNIFICANT CHANGE UP (ref 3.5–5.3)
POTASSIUM SERPL-SCNC: 4.1 MMOL/L — SIGNIFICANT CHANGE UP (ref 3.5–5.3)
PROT SERPL-MCNC: 6.6 G/DL — SIGNIFICANT CHANGE UP (ref 6–8.3)
PROTHROM AB SERPL-ACNC: 16 SEC — HIGH (ref 10.6–13.6)
RBC # BLD: 3.83 M/UL — LOW (ref 4.2–5.8)
RBC # FLD: 14.7 % — HIGH (ref 10.3–14.5)
RH IG SCN BLD-IMP: POSITIVE — SIGNIFICANT CHANGE UP
SODIUM SERPL-SCNC: 143 MMOL/L — SIGNIFICANT CHANGE UP (ref 135–145)
SODIUM UR-SCNC: 70 MMOL/L — SIGNIFICANT CHANGE UP
WBC # BLD: 11.62 K/UL — HIGH (ref 3.8–10.5)
WBC # FLD AUTO: 11.62 K/UL — HIGH (ref 3.8–10.5)

## 2020-08-25 PROCEDURE — 99231 SBSQ HOSP IP/OBS SF/LOW 25: CPT

## 2020-08-25 PROCEDURE — 93010 ELECTROCARDIOGRAM REPORT: CPT

## 2020-08-25 PROCEDURE — 71045 X-RAY EXAM CHEST 1 VIEW: CPT | Mod: 26

## 2020-08-25 PROCEDURE — 99292 CRITICAL CARE ADDL 30 MIN: CPT

## 2020-08-25 PROCEDURE — 99291 CRITICAL CARE FIRST HOUR: CPT

## 2020-08-25 PROCEDURE — 74018 RADEX ABDOMEN 1 VIEW: CPT | Mod: 26

## 2020-08-25 RX ORDER — FENTANYL CITRATE 50 UG/ML
1 INJECTION INTRAVENOUS
Refills: 0 | Status: DISCONTINUED | OUTPATIENT
Start: 2020-08-25 | End: 2020-08-25

## 2020-08-25 RX ORDER — DEXMEDETOMIDINE HYDROCHLORIDE IN 0.9% SODIUM CHLORIDE 4 UG/ML
0.2 INJECTION INTRAVENOUS
Qty: 200 | Refills: 0 | Status: DISCONTINUED | OUTPATIENT
Start: 2020-08-25 | End: 2020-08-27

## 2020-08-25 RX ORDER — QUETIAPINE FUMARATE 200 MG/1
50 TABLET, FILM COATED ORAL EVERY 6 HOURS
Refills: 0 | Status: DISCONTINUED | OUTPATIENT
Start: 2020-08-25 | End: 2020-08-25

## 2020-08-25 RX ORDER — CEFAZOLIN SODIUM 1 G
2000 VIAL (EA) INJECTION ONCE
Refills: 0 | Status: COMPLETED | OUTPATIENT
Start: 2020-08-26 | End: 2020-08-26

## 2020-08-25 RX ORDER — ALBUMIN HUMAN 25 %
250 VIAL (ML) INTRAVENOUS ONCE
Refills: 0 | Status: COMPLETED | OUTPATIENT
Start: 2020-08-25 | End: 2020-08-25

## 2020-08-25 RX ORDER — FENTANYL CITRATE 50 UG/ML
1 INJECTION INTRAVENOUS
Refills: 0 | Status: DISCONTINUED | OUTPATIENT
Start: 2020-08-25 | End: 2020-08-27

## 2020-08-25 RX ORDER — QUETIAPINE FUMARATE 200 MG/1
50 TABLET, FILM COATED ORAL EVERY 6 HOURS
Refills: 0 | Status: DISCONTINUED | OUTPATIENT
Start: 2020-08-25 | End: 2020-08-29

## 2020-08-25 RX ORDER — FAMOTIDINE 10 MG/ML
20 INJECTION INTRAVENOUS DAILY
Refills: 0 | Status: DISCONTINUED | OUTPATIENT
Start: 2020-08-25 | End: 2020-08-26

## 2020-08-25 RX ORDER — FENTANYL CITRATE 50 UG/ML
50 INJECTION INTRAVENOUS
Refills: 0 | Status: DISCONTINUED | OUTPATIENT
Start: 2020-08-25 | End: 2020-08-26

## 2020-08-25 RX ADMIN — QUETIAPINE FUMARATE 50 MILLIGRAM(S): 200 TABLET, FILM COATED ORAL at 17:31

## 2020-08-25 RX ADMIN — Medication 125 MILLIGRAM(S): at 11:30

## 2020-08-25 RX ADMIN — Medication 1 DROP(S): at 05:23

## 2020-08-25 RX ADMIN — Medication 1 TABLET(S): at 17:31

## 2020-08-25 RX ADMIN — QUETIAPINE FUMARATE 50 MILLIGRAM(S): 200 TABLET, FILM COATED ORAL at 05:23

## 2020-08-25 RX ADMIN — Medication 125 MILLIGRAM(S): at 17:31

## 2020-08-25 RX ADMIN — Medication 1 DROP(S): at 23:01

## 2020-08-25 RX ADMIN — CHLORHEXIDINE GLUCONATE 15 MILLILITER(S): 213 SOLUTION TOPICAL at 17:31

## 2020-08-25 RX ADMIN — CHLORHEXIDINE GLUCONATE 1 APPLICATION(S): 213 SOLUTION TOPICAL at 05:23

## 2020-08-25 RX ADMIN — Medication 27.5 MILLIGRAM(S): at 05:21

## 2020-08-25 RX ADMIN — Medication 3 MILLILITER(S): at 00:40

## 2020-08-25 RX ADMIN — SODIUM CHLORIDE 4 MILLILITER(S): 9 INJECTION INTRAMUSCULAR; INTRAVENOUS; SUBCUTANEOUS at 05:16

## 2020-08-25 RX ADMIN — FENTANYL CITRATE 50 MICROGRAM(S): 50 INJECTION INTRAVENOUS at 16:10

## 2020-08-25 RX ADMIN — FAMOTIDINE 20 MILLIGRAM(S): 10 INJECTION INTRAVENOUS at 05:23

## 2020-08-25 RX ADMIN — Medication 125 MILLIGRAM(S): at 05:22

## 2020-08-25 RX ADMIN — Medication 3 MILLILITER(S): at 17:35

## 2020-08-25 RX ADMIN — FENTANYL CITRATE 1 PATCH: 50 INJECTION INTRAVENOUS at 19:59

## 2020-08-25 RX ADMIN — HEPARIN SODIUM 5000 UNIT(S): 5000 INJECTION INTRAVENOUS; SUBCUTANEOUS at 05:22

## 2020-08-25 RX ADMIN — DEXMEDETOMIDINE HYDROCHLORIDE IN 0.9% SODIUM CHLORIDE 5.5 MICROGRAM(S)/KG/HR: 4 INJECTION INTRAVENOUS at 20:19

## 2020-08-25 RX ADMIN — FENTANYL CITRATE 50 MICROGRAM(S): 50 INJECTION INTRAVENOUS at 13:50

## 2020-08-25 RX ADMIN — Medication 1 TABLET(S): at 05:23

## 2020-08-25 RX ADMIN — Medication 3 MILLILITER(S): at 05:16

## 2020-08-25 RX ADMIN — FENTANYL CITRATE 50 MICROGRAM(S): 50 INJECTION INTRAVENOUS at 14:05

## 2020-08-25 RX ADMIN — Medication 125 MILLILITER(S): at 15:12

## 2020-08-25 RX ADMIN — Medication 3 MILLILITER(S): at 11:28

## 2020-08-25 RX ADMIN — CEFEPIME 100 MILLIGRAM(S): 1 INJECTION, POWDER, FOR SOLUTION INTRAMUSCULAR; INTRAVENOUS at 05:22

## 2020-08-25 RX ADMIN — ISOSORBIDE DINITRATE 10 MILLIGRAM(S): 5 TABLET ORAL at 13:36

## 2020-08-25 RX ADMIN — ISOSORBIDE DINITRATE 10 MILLIGRAM(S): 5 TABLET ORAL at 05:22

## 2020-08-25 RX ADMIN — CHLORHEXIDINE GLUCONATE 15 MILLILITER(S): 213 SOLUTION TOPICAL at 05:22

## 2020-08-25 RX ADMIN — HEPARIN SODIUM 5000 UNIT(S): 5000 INJECTION INTRAVENOUS; SUBCUTANEOUS at 13:36

## 2020-08-25 RX ADMIN — ISOSORBIDE DINITRATE 10 MILLIGRAM(S): 5 TABLET ORAL at 21:47

## 2020-08-25 RX ADMIN — Medication 12.5 MILLIGRAM(S): at 17:31

## 2020-08-25 RX ADMIN — FENTANYL CITRATE 2.75 MICROGRAM(S)/KG/HR: 50 INJECTION INTRAVENOUS at 20:18

## 2020-08-25 RX ADMIN — Medication 50 MILLIGRAM(S): at 23:01

## 2020-08-25 RX ADMIN — HEPARIN SODIUM 5000 UNIT(S): 5000 INJECTION INTRAVENOUS; SUBCUTANEOUS at 21:47

## 2020-08-25 RX ADMIN — QUETIAPINE FUMARATE 50 MILLIGRAM(S): 200 TABLET, FILM COATED ORAL at 23:01

## 2020-08-25 RX ADMIN — Medication 0.1 MILLIGRAM(S): at 05:23

## 2020-08-25 RX ADMIN — SODIUM CHLORIDE 4 MILLILITER(S): 9 INJECTION INTRAMUSCULAR; INTRAVENOUS; SUBCUTANEOUS at 17:35

## 2020-08-25 RX ADMIN — FENTANYL CITRATE 1 PATCH: 50 INJECTION INTRAVENOUS at 11:30

## 2020-08-25 RX ADMIN — Medication 1 DROP(S): at 17:32

## 2020-08-25 RX ADMIN — Medication 50 MILLIGRAM(S): at 17:31

## 2020-08-25 RX ADMIN — Medication 1 DROP(S): at 11:30

## 2020-08-25 RX ADMIN — Medication 125 MILLIGRAM(S): at 23:01

## 2020-08-25 RX ADMIN — QUETIAPINE FUMARATE 50 MILLIGRAM(S): 200 TABLET, FILM COATED ORAL at 11:30

## 2020-08-25 RX ADMIN — Medication 12.5 MILLIGRAM(S): at 05:23

## 2020-08-25 NOTE — PHYSICAL THERAPY INITIAL EVALUATION ADULT - DIAGNOSIS, PT EVAL
Pt presents with impairments in cognition, command follow, ROM, strength all impacting ability to perform ADLs and functional mobility.

## 2020-08-25 NOTE — PROGRESS NOTE ADULT - SUBJECTIVE AND OBJECTIVE BOX
Visit Summary: Patient seen and evaluated at bedside.    Overnight Events: none    Exam:  .Vital Signs Last 24 Hrs  T(C): 36.9 (25 Aug 2020 03:00), Max: 36.9 (24 Aug 2020 15:00)  T(F): 98.4 (25 Aug 2020 03:00), Max: 98.5 (24 Aug 2020 15:00)  HR: 100 (25 Aug 2020 05:18) (66 - 112)  BP: --  BP(mean): --  RR: 18 (25 Aug 2020 03:00) (18 - 18)  SpO2: 100% (25 Aug 2020 05:18) (97% - 100%)    Intubated, follows on the right, nods appropriately to questions, left side plegic                               10.7   15.61 )-----------( 328      ( 21 Aug 2020 22:43 )             34.9     08-21    145  |  114<H>  |  38<H>  ----------------------------<  130<H>  3.6   |  18<L>  |  1.92<H>    Ca    9.2      21 Aug 2020 22:43  Phos  2.8     08-21  Mg     2.2     08-21

## 2020-08-25 NOTE — PROGRESS NOTE ADULT - SUBJECTIVE AND OBJECTIVE BOX
ENT ISSUE/POD: s/p trach POD 1    HPI: 61 y/o male s/p tracheostomy #8 [facility], POD 1. Pt seen and examined at bedside. No acute events overnight. Pt doing well on vent. No issues per team.       PAST MEDICAL & SURGICAL HISTORY:  On warfarin for atrial fibrillation  No significant past surgical history    Allergies    No Known Allergies    Intolerances      MEDICATIONS  (STANDING):  albuterol/ipratropium for Nebulization. 3 milliLiter(s) Nebulizer every 6 hours  artificial  tears Solution 1 Drop(s) Both EYES every 6 hours  cefepime   IVPB 2000 milliGRAM(s) IV Intermittent every 8 hours  chlorhexidine 0.12% Liquid 15 milliLiter(s) Oral Mucosa every 12 hours  chlorhexidine 4% Liquid 1 Application(s) Topical <User Schedule>  chlorhexidine 4% Liquid 1 Application(s) Topical <User Schedule>  cloNIDine 0.1 milliGRAM(s) Oral three times a day  dextrose 5%. 1000 milliLiter(s) (50 mL/Hr) IV Continuous <Continuous>  dextrose 50% Injectable 12.5 Gram(s) IV Push once  dextrose 50% Injectable 25 Gram(s) IV Push once  dextrose 50% Injectable 25 Gram(s) IV Push once  famotidine    Tablet 20 milliGRAM(s) Oral two times a day  fentaNYL   Infusion... 0.5 MICROgram(s)/kG/Hr (2.75 mL/Hr) IV Continuous <Continuous>  heparin   Injectable 5000 Unit(s) SubCutaneous every 8 hours  hydrALAZINE 50 milliGRAM(s) Oral every 8 hours  isosorbide   dinitrate Tablet (ISORDIL) 10 milliGRAM(s) Oral three times a day  lactobacillus acidophilus 1 Tablet(s) Oral two times a day  metoprolol tartrate 12.5 milliGRAM(s) Oral two times a day  propofol Infusion 10 MICROgram(s)/kG/Min (6.6 mL/Hr) IV Continuous <Continuous>  QUEtiapine 50 milliGRAM(s) Oral every 8 hours  sodium chloride 7% Inhalation 4 milliLiter(s) Inhalation every 12 hours  valproate sodium IVPB 500 milliGRAM(s) IV Intermittent three times a day  vancomycin    Solution 125 milliGRAM(s) Oral every 6 hours    MEDICATIONS  (PRN):  acetaminophen   Tablet .. 650 milliGRAM(s) Oral every 6 hours PRN Temp greater or equal to 38C (100.4F), Mild Pain (1 - 3)  acetylcysteine 20%  Inhalation 4 milliLiter(s) Inhalation three times a day PRN secretions  dextrose 40% Gel 15 Gram(s) Oral once PRN Blood Glucose LESS THAN 70 milliGRAM(s)/deciliter  glucagon  Injectable 1 milliGRAM(s) IntraMuscular once PRN Glucose LESS THAN 70 milligrams/deciliter  sodium chloride 0.9% lock flush 10 milliLiter(s) IV Push every 1 hour PRN Pre/post blood products, medications, blood draw, and to maintain line patency  traMADol 25 milliGRAM(s) Oral every 6 hours PRN Moderate Pain (4 - 6)  traMADol 50 milliGRAM(s) Oral every 6 hours PRN Severe Pain (7 - 10)      Social History: see consult note    Family history: see consult note    ROS:   Unable to obtain due to patient's condition.      Vital Signs Last 24 Hrs  T(C): 36.9 (25 Aug 2020 03:00), Max: 36.9 (24 Aug 2020 15:00)  T(F): 98.4 (25 Aug 2020 03:00), Max: 98.5 (24 Aug 2020 15:00)  HR: 131 (25 Aug 2020 06:02) (66 - 131)  BP: --  BP(mean): --  RR: 18 (25 Aug 2020 06:00) (18 - 18)  SpO2: 100% (25 Aug 2020 06:02) (97% - 100%)                          10.0   10.59 )-----------( 352      ( 23 Aug 2020 21:57 )             33.1    08-24    143  |  115<H>  |  35<H>  ----------------------------<  130<H>  3.7   |  16<L>  |  2.30<H>    Ca    9.2      24 Aug 2020 21:28  Phos  4.0     08-24  Mg     2.1     08-24    TPro  6.3  /  Alb  2.8<L>  /  TBili  0.2  /  DBili  <0.1  /  AST  20  /  ALT  29  /  AlkPhos  67  08-24   PT/INR - ( 23 Aug 2020 21:57 )   PT: 14.5 sec;   INR: 1.23 ratio         PTT - ( 23 Aug 2020 21:57 )  PTT:29.6 sec    PHYSICAL EXAM:  Gen: NAD  Skin: No rashes, bruises, or lesions  Head: Normocephalic, Atraumatic  Face: no edema, erythema, or fluctuance. Parotid glands soft without mass  Eyes: no scleral injection  Nose: Nares bilaterally patent, no discharge  Mouth: No Stridor / Drooling / Trismus.  Mucosa moist, tongue/uvula midline, oropharynx clear  Neck: #8 [place] DCT in place, inflated cuff. Scant serosanguineous drainage. No bleeding noted from stoma, sutures x4 and umbilical tie in place.  No lymphadenopathy, trachea midline, no masses  Lymphatic: No lymphadenopathy  Resp: on vent, ventilating well  Neuro: unable to assess due to patient's condition

## 2020-08-25 NOTE — PHYSICAL THERAPY INITIAL EVALUATION ADULT - LIVES WITH, PROFILE
significant other/Patient resides with roommate/girlfriend in a trailer home in [place], [place] with 2-4 steps to enter. Patient is employed as a  and was independent with ADLs and ambulation prior to admit with no AD. Patient has a CPAP and no home care services prior to admission.

## 2020-08-25 NOTE — PROGRESS NOTE ADULT - SUBJECTIVE AND OBJECTIVE BOX
Weaned down to minimal fentanyl gtt    Eye opening to voice, intermittently follows commands on the right, spontaneous on the right, plegic on the left

## 2020-08-25 NOTE — PHYSICAL THERAPY INITIAL EVALUATION ADULT - MANUAL MUSCLE TESTING RESULTS, REHAB EVAL
R elbow flex/ext and R hand  at least 3/5, R ankle: 3/5, R knee flex/ext: 3/5, pt spontaneously moving R UE/LE inconsistent command follow. Unable to formally assess MMT/grossly assessed due to grossly assessed due to/L UE/LE: 0/5, R elbow flex/ext and R hand  at least 3/5, R ankle: 3/5, R knee flex/ext: 3/5, pt spontaneously moving R UE/LE inconsistent command follow. Unable to formally assess MMT

## 2020-08-25 NOTE — PROGRESS NOTE ADULT - ASSESSMENT
59YO male on coumadin for afib s/p cardiac arrest at work, down 25 minutes prior to ROSC. Pupils were R fixed and dilated, left fixed at the time, no gag reflex, post-CA cooling protocol was initiated down to 35 deg. Intubated and put on Nimbex drip. Also on Propofol, fentanyl, levophed. R groin minerva placed. Also put on heparin post-CA. 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 Course also c/b GIB, put on protonix drip. Prior to xfer was started on 3% at 30cc/hr. (09 Aug 2020 14:30)  Also developed gross hematuria - Urology consulted +catheter with clot (irrigated and exchanged) suspected 2/2 traumatic catheterization    Hospital Course complicated by fever and Pseudomonas PNA (s/p Rx) then developed C diff (now with resolved leukocytosis and improved stooling, now soft per report); remains on enteral Vanco    Respiratory Failure - s/p trach 8/24  Dysphagia - +NGT  C diff - clinically improving   Abdominal distension - follow up offical ACR report (prelim viewing by me with GI attending - NOBG, no colonic distension, ileus or obstruction)    Emergency Contact Name Rufino Vieyra  Emergency Contact Phone # [phone]  Emergency Contact Relationship Son    RECS:  -Continue enteral Vanco and Bacid as ordered  -f/u official AXR report  -Monitor stools and WBC  -Continue NGT for now, hold feeds at MN for planned EGD with PEG placement 8/26 (bedside). Discussed with HCP (pt's son) indications, risks, benefits and alternatives reviewed. Willing to proceed with PEG  -Ancef 2gram x1 dose on call for PEG    Further management per NSCU team    Benja Flores PA-C    [facility]  [phone]  After hours and weekend coverage [phone]

## 2020-08-25 NOTE — PHYSICAL THERAPY INITIAL EVALUATION ADULT - GENERAL OBSERVATIONS, REHAB EVAL
Pt received semisupine in bed NAD, +intubated, +ICU monitor, +NG tube. Pt received semisupine in bed NAD, +tele, +cont pulse ox, +BP cuff, +NGT, +IVF, +R wrist restraint, +b/l venodynes, +trach, VSS, agreeable to participate in therapy at this time

## 2020-08-25 NOTE — PHYSICAL THERAPY INITIAL EVALUATION ADULT - PERTINENT HX OF CURRENT PROBLEM, REHAB EVAL
61 yo M coumadin for afib s/p cardiac arrest at work, down 25 minutes prior to ROSC. Pupils were R fixed and dilated, left fixed at the time, no gag reflex, post-CA cooling protocol was initiated down to 35 deg. Intubated and put on Nimbex drip. Also on Propofol, fentanyl, levophed. R groin minerva placed.  See below

## 2020-08-25 NOTE — PHYSICAL THERAPY INITIAL EVALUATION ADULT - FOLLOWS COMMANDS/ANSWERS QUESTIONS, REHAB EVAL
25% of the time/intermittently following ~25% basic one step commands and instructions/able to follow single-step instructions

## 2020-08-25 NOTE — PROGRESS NOTE ADULT - ASSESSMENT
61 y/o male s/p trach POD 1. On exam, #8 DCT trach in place, sutures x4 and umbilical tie in place. No bleeding noted. Doing well on ventilator.

## 2020-08-25 NOTE — PROGRESS NOTE ADULT - ASSESSMENT
HH5 mF4, post-bleed day 16  - Cefepime for PNA  - Enteral Vanco for c. diff  - S/p tracheostomy, pressure support trial  - Seroquel, monitor Qtc  - Amiodarone for arrhythmia  - Plan for PEG tomorrow  - Cardiac cath when stable

## 2020-08-25 NOTE — PHYSICAL THERAPY INITIAL EVALUATION ADULT - PRECAUTIONS/LIMITATIONS, REHAB EVAL
CONTINUED: Also put on heparin post-CA. 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 Course also c/b GIB, put on protonix drip. Prior to xfer was started on 3% at 30cc/hr/fall precautions

## 2020-08-25 NOTE — PROGRESS NOTE ADULT - ASSESSMENT
60 year old male s/p cardiac arrest c/b GIB and bleeding from scott with perimesencephalic (HH4 mF4) subarachnoid hemorrhage, angio neg x2 ,PBD 17.  Intubation day: 15.    NEURO:    SAH, possible sentinal event with cardiac arrest  Angio 8/20: negative.   Q2h neurochecks  CT Head: perimesencephalic SAH   CTA Head: no aneurysm noted  Initial Conventional angiogram 8/10 negative  MRI neuroaxis: Restricted diffusion in R BG, posterior limb of R IC, and anterior right temporal lobe  VEEG negative. DCed.   DCI management (off nimodipine). Poor windows  No EVD as no significant hydrocephalus  Precedex gtt for sedation- RASS 0 to -1, fentanyl gtt.  agitation : Seroquel, Clonidine  , VPA   Sedation Propofol     PULM: Acute PULM edema- resolved. Hypoxia secondary to mucus plugging  [facility] vent: settings- 18/500/40/5.   spO2>92%  CTA R/O PE: negative  Continue hypertonic nebs./ Mucomyst prn.   CPAP as tolerated though less likely to be extubated.   trach today     CV:  SBP goal 100- 160  TTE: Global LV dysfunction. EF 41%, Severe concentric LVH, flattening of interventricular septum.   S/P Concern for possible PE - CTA negative  Hydralazine , metoprolol   Clonidine   Will need cath per cardiology  S/P amio gtt; c/w  daily.  Mg- 2.0 ; Po4- 3.0 ; k- 4.0  Troponin 65--> 66. Stable.  Hold lasix.   monitor QTc      RENAL: Non anion gap metabolic acidosis  Likely in the setting of diarrhea.  ?CKD with superimposed JALEN.- stable   Monitor Cr and lytes.  Sodium goal 140-150  Monitor Is/Os  Cr post angio: stable      GI: S/P GI bleed, hyperlipasemia, Cdiff positive  Diarrhea- - Increased WBC and ABX--> Vanco 125 mg q6 for Cdiff   Diet: NPO for procedure   GI prop PO pepcid  Bowel regimen - hold for diarrhea  LBM: 8/22. Cdiff  Lipase 101 --> 89  LFTs stable. Monitor while    ENDO:   Goal euglycemia (-180)  TFTs wnl      HEME/ONC: Afib on coumadin at home; leukocytosis  Leukocytosis: No eosinophilia   Lipase slightly elevated 101-->89, LFTs wnl  s/p vit K: INR 1.42-->1.2--> 1.5.   Repeat INR 8/23.  VTE prophylaxis: [] SCDs [x] chemoprophylaxis heparin subq.  Dopplers negative for DVT.  Will eventually need to resume coumadin once trach/PEG.       ID: Cdiff positive  Leukocytosis- Diarrhea. Improving  C difficile - see above; hypoactive BS. On PO vancomycin. Continue PO vanc for additional week after cefepime course completed.  Afebrile  Started on cefepime (switched from unasyn on 8/14) for pseudomonas- D/C 8/28/20)- 2 week course for PNA  Added probiotic      SOCIAL/FAMILY:  [x] updated at bedside     CODE STATUS:  [x] Full Code [] DNR [] DNI [] Palliative/Comfort Care    DISPOSITION:  [x] ICU [] Stroke Unit [] Floor [] EMU [] RCU [] PCU    [x] Patient is at high risk of neurologic deterioration/death due to: SAH, cardiac arrest 60 year old male s/p cardiac arrest c/b GIB and bleeding from scott with perimesencephalic (HH4 mF4) subarachnoid hemorrhage, angio neg x2 ,PBD 18.    NEURO:    SAH, possible sentinal event with cardiac arrest  Angio 8/20: negative.   Q2h neurochecks  CT Head: perimesencephalic SAH   CTA Head: no aneurysm noted  Initial Conventional angiogram 8/10 negative  MRI neuroaxis: Restricted diffusion in R BG, posterior limb of R IC, and anterior right temporal lobe  VEEG negative. DCed.   DCI management (off nimodipine). Poor windows  No EVD as no significant hydrocephalus  Precedex gtt for sedation- RASS 0 to -1, fentanyl gtt weaned off  agitation : Seroquel, prn opioids, patch    PULM: Acute PULM edema- resolved  trach, psv/tc trials  spO2>92%  CTA R/O PE: negative  Continue hypertonic nebs./ Mucomyst prn.   CPAP as tolerated though less likely to be extubated.   trach today     CV:  SBP goal 100- 160  TTE: Global LV dysfunction. EF 41%, Severe concentric LVH, flattening of interventricular septum.   S/P Concern for possible PE - CTA negative  Hydralazine/imdur , metoprolol   Will need cath per cardiology  S/P amio gtt; c/w  daily.  Mg- 2.0 ; Po4- 3.0 ; k- 4.0  Troponin 65--> 66. Stable.  monitor QTc      RENAL: Non anion gap metabolic acidosis  Likely in the setting of diarrhea.  ?CKD with superimposed JALEN.- worsening  Monitor Cr and lytes.  Monitor Is/Os  Cr post angio: stable      GI: S/P GI bleed, hyperlipasemia, Cdiff positive  Diarrhea- - Increased WBC and ABX--> Vanco 125 mg q6 for Cdiff   Diet: NPO for procedure   GI prop PO pepcid  Bowel regimen - hold for diarrhea  LBM: 8/22. Cdiff  Lipase 101 --> 89  LFTs stable    ENDO:   Goal euglycemia (-180)  TFTs wnl      HEME/ONC: Afib on coumadin at home; leukocytosis  Leukocytosis: No eosinophilia   Lipase slightly elevated 101-->89, LFTs wnl  s/p vit K: INR 1.42-->1.2--> 1.5.   Repeat INR 8/23.  VTE prophylaxis: [] SCDs [x] chemoprophylaxis heparin subq.  Dopplers negative for DVT.  Will eventually need to resume coumadin once trach/PEG.       ID: Cdiff positive  Leukocytosis- Diarrhea. Improving  C difficile - see above; hypoactive BS. On PO vancomycin. Continue PO vanc for additional week after cefepime course completed.  Afebrile  completed cefepime  Added probiotic      SOCIAL/FAMILY:  [x] updated at bedside     CODE STATUS:  [x] Full Code [] DNR [] DNI [] Palliative/Comfort Care    DISPOSITION:  [x] ICU [] Stroke Unit [] Floor [] EMU [] RCU [] PCU    [x] Patient is at high risk of neurologic deterioration/death due to: SAH, cardiac arrest

## 2020-08-25 NOTE — PHYSICAL THERAPY INITIAL EVALUATION ADULT - IMPAIRMENTS CONTRIBUTING IMPAIRED BED MOBILITY, REHAB EVAL
impaired coordination/decreased flexibility/decreased strength/cognition/decreased ROM/impaired postural control/impaired balance

## 2020-08-25 NOTE — PROGRESS NOTE ADULT - SUBJECTIVE AND OBJECTIVE BOX
Patient is a 60y old  Male who presented with a chief complaint of sah (16 Aug 2020 07:48)      INTERVAL HPI/OVERNIGHT EVENTS:  s/p trach yesterday  2 soft BMs during day yesterday and 2 soft BMs overnight per nursing report  tolerating NGTF  brown stools, no rectal bleeding or melena      MEDICATIONS  (STANDING):  albuterol/ipratropium for Nebulization. 3 milliLiter(s) Nebulizer every 6 hours  artificial  tears Solution 1 Drop(s) Both EYES every 6 hours  chlorhexidine 0.12% Liquid 15 milliLiter(s) Oral Mucosa every 12 hours  chlorhexidine 4% Liquid 1 Application(s) Topical <User Schedule>  chlorhexidine 4% Liquid 1 Application(s) Topical <User Schedule>  dexMEDEtomidine Infusion 0.2 MICROgram(s)/kG/Hr (5.5 mL/Hr) IV Continuous <Continuous>  dextrose 5%. 1000 milliLiter(s) (50 mL/Hr) IV Continuous <Continuous>  dextrose 50% Injectable 12.5 Gram(s) IV Push once  dextrose 50% Injectable 25 Gram(s) IV Push once  dextrose 50% Injectable 25 Gram(s) IV Push once  famotidine    Tablet 20 milliGRAM(s) Oral daily  fentaNYL   Infusion... 0.5 MICROgram(s)/kG/Hr (2.75 mL/Hr) IV Continuous <Continuous>  fentaNYL   Patch 100 MICROgram(s)/Hr 1 Patch Transdermal every 72 hours  heparin   Injectable 5000 Unit(s) SubCutaneous every 8 hours  hydrALAZINE 50 milliGRAM(s) Oral every 8 hours  isosorbide   dinitrate Tablet (ISORDIL) 10 milliGRAM(s) Oral three times a day  lactobacillus acidophilus 1 Tablet(s) Oral two times a day  metoprolol tartrate 12.5 milliGRAM(s) Oral two times a day  QUEtiapine 50 milliGRAM(s) Oral every 6 hours  sodium chloride 7% Inhalation 4 milliLiter(s) Inhalation every 12 hours  vancomycin    Solution 125 milliGRAM(s) Oral every 6 hours    MEDICATIONS  (PRN):  acetaminophen   Tablet .. 650 milliGRAM(s) Oral every 6 hours PRN Temp greater or equal to 38C (100.4F), Mild Pain (1 - 3)  acetylcysteine 20%  Inhalation 4 milliLiter(s) Inhalation three times a day PRN secretions  dextrose 40% Gel 15 Gram(s) Oral once PRN Blood Glucose LESS THAN 70 milliGRAM(s)/deciliter  fentaNYL    Injectable 50 MICROGram(s) IV Push every 2 hours PRN Severe Pain (7 - 10)  glucagon  Injectable 1 milliGRAM(s) IntraMuscular once PRN Glucose LESS THAN 70 milligrams/deciliter  sodium chloride 0.9% lock flush 10 milliLiter(s) IV Push every 1 hour PRN Pre/post blood products, medications, blood draw, and to maintain line patency  traMADol 25 milliGRAM(s) Oral every 6 hours PRN Moderate Pain (4 - 6)  traMADol 50 milliGRAM(s) Oral every 6 hours PRN Severe Pain (7 - 10)      Allergies  No Known Allergies    Review of Systems:  unable to obtain from pt    Vital Signs Last 24 Hrs  T(C): 37.3 (25 Aug 2020 11:00), Max: 37.4 (25 Aug 2020 07:00)  T(F): 99.2 (25 Aug 2020 11:00), Max: 99.4 (25 Aug 2020 07:00)  HR: 80 (25 Aug 2020 11:30) (69 - 131)  BP: --  BP(mean): --  RR: 21 (25 Aug 2020 11:00) (18 - 21)  SpO2: 100% (25 Aug 2020 11:30) (97% - 100%)    PHYSICAL EXAM:  Constitutional: NAD, intermittently restless +NGT . opens eyes, not following commands  Neck: +trach - clean  Respiratory: +vent sounds, decreased BS at bases  Cardiovascular: S1 and S2, tachy  Gastrointestinal: BS+, softly distended no rebound or rigidity  Extremities: No peripheral edema  Vascular: 2+ peripheral pulses  Neurological: intermittently restless opens eyes. not following commands  moves RUE  +dysconjugate gaze  Skin: No rashes    LABS:                        10.0   10.59 )-----------( 352      ( 23 Aug 2020 21:57 )             33.1     08-24    143  |  115<H>  |  35<H>  ----------------------------<  130<H>  3.7   |  16<L>  |  2.30<H>    Ca    9.2      24 Aug 2020 21:28  Phos  4.0     08-24  Mg     2.1     08-24    TPro  6.3  /  Alb  2.8<L>  /  TBili  0.2  /  DBili  <0.1  /  AST  20  /  ALT  29  /  AlkPhos  67  08-24    PT/INR - ( 23 Aug 2020 21:57 )   PT: 14.5 sec;   INR: 1.23 ratio    PTT - ( 23 Aug 2020 21:57 )  PTT:29.6 sec        RADIOLOGY & ADDITIONAL TESTS:  AXR - completed, report pending  image reviewed by myself with GI attending  no toxic megacolon, no obstruction, ileus or colonic distension

## 2020-08-25 NOTE — PROGRESS NOTE ADULT - SUBJECTIVE AND OBJECTIVE BOX
HPI:  60 year old male with PMHx of Afib on coumadin s/p cardiac arrest at work, downtime 25 minutes prior to ROSC. Pupils were R fixed and dilated, left fixed at the time, no gag reflex, post-CA cooling protocol was initiated down to 35 deg. Intubated and put on Nimbex drip. Also on Propofol, fentanyl, levophed. R groin minerva placed. Also put on heparin post-CA. HCT showed R perimesencephalic SAH of unclear etiology, no hydrocephalus. Course also c/b GIB, was put on protonix drip. Also had bleeding from [facility] - urology consulted, clot irrigated.       HOSP COURSE:   8/10- Angio- neg   8/11: VTACH noted (7 beats)  8/13: Febrile  8/14: Started on Unasyn for gram neg rods and gram neg diplococci on bronch gram stain -> switched to cefepime for pseudomonas noted 8/10  8/16: central line removed on 8/16. Cr improving 1.7 -> 1.57. MRNOVA planned. Could not tolerate since desatted when supine  8/17/20: Lasix 30mg x1  8/18: PO vanc for Cdiff  8/19: Few beats of VTACH noted.   8/20: Taken for angio: Negative.  8/21: Episode of respiratory distress overnight; desaturated 85%, mucus plug/ambu bagged.   S/P lasix 40mgx1.     Overnight events:no events              ICU Vital Signs Last 24 Hrs  T(C): 37.4 (25 Aug 2020 07:00), Max: 37.4 (25 Aug 2020 07:00)  T(F): 99.4 (25 Aug 2020 07:00), Max: 99.4 (25 Aug 2020 07:00)  HR: 91 (25 Aug 2020 07:00) (69 - 131)  BP: --  BP(mean): --  ABP: 121/65 (25 Aug 2020 07:00) (99/55 - 158/84)  ABP(mean): 79 (25 Aug 2020 07:00) (66 - 101)  RR: 19 (25 Aug 2020 07:00) (18 - 19)  SpO2: 100% (25 Aug 2020 07:00) (97% - 100%)      08-24-20 @ 07:01  -  08-25-20 @ 07:00  --------------------------------------------------------  IN: 913.4 mL / OUT: 825 mL / NET: 88.4 mL        Mode: CPAP with PS, FiO2: 30, PEEP: 5, PS: 10, MAP: 11, PIP: 18    acetaminophen   Tablet .. 650 milliGRAM(s) Oral every 6 hours PRN  acetylcysteine 20%  Inhalation 4 milliLiter(s) Inhalation three times a day PRN  albuterol/ipratropium for Nebulization. 3 milliLiter(s) Nebulizer every 6 hours  artificial  tears Solution 1 Drop(s) Both EYES every 6 hours  cefepime   IVPB 2000 milliGRAM(s) IV Intermittent every 8 hours  chlorhexidine 0.12% Liquid 15 milliLiter(s) Oral Mucosa every 12 hours  chlorhexidine 4% Liquid 1 Application(s) Topical <User Schedule>  chlorhexidine 4% Liquid 1 Application(s) Topical <User Schedule>  cloNIDine 0.1 milliGRAM(s) Oral three times a day  dextrose 40% Gel 15 Gram(s) Oral once PRN  dextrose 5%. 1000 milliLiter(s) (50 mL/Hr) IV Continuous <Continuous>  dextrose 50% Injectable 12.5 Gram(s) IV Push once  dextrose 50% Injectable 25 Gram(s) IV Push once  dextrose 50% Injectable 25 Gram(s) IV Push once  famotidine    Tablet 20 milliGRAM(s) Oral two times a day  fentaNYL   Infusion... 0.5 MICROgram(s)/kG/Hr (2.75 mL/Hr) IV Continuous <Continuous>  glucagon  Injectable 1 milliGRAM(s) IntraMuscular once PRN  heparin   Injectable 5000 Unit(s) SubCutaneous every 8 hours  hydrALAZINE 50 milliGRAM(s) Oral every 8 hours  isosorbide   dinitrate Tablet (ISORDIL) 10 milliGRAM(s) Oral three times a day  lactobacillus acidophilus 1 Tablet(s) Oral two times a day  metoprolol tartrate 12.5 milliGRAM(s) Oral two times a day  propofol Infusion 10 MICROgram(s)/kG/Min (6.6 mL/Hr) IV Continuous <Continuous>  QUEtiapine 50 milliGRAM(s) Oral every 8 hours  sodium chloride 0.9% lock flush 10 milliLiter(s) IV Push every 1 hour PRN  sodium chloride 7% Inhalation 4 milliLiter(s) Inhalation every 12 hours  traMADol 25 milliGRAM(s) Oral every 6 hours PRN  traMADol 50 milliGRAM(s) Oral every 6 hours PRN  valproate sodium IVPB 500 milliGRAM(s) IV Intermittent three times a day  vancomycin    Solution 125 milliGRAM(s) Oral every 6 hours      LABS:  Na: 143 (08-24 @ 21:28), 142 (08-23 @ 21:57), 146 (08-23 @ 13:23)  K: 3.7 (08-24 @ 21:28), 3.6 (08-23 @ 21:57), 3.4 (08-23 @ 13:23)  Cl: 115 (08-24 @ 21:28), 115 (08-23 @ 21:57), 117 (08-23 @ 13:23)  CO2: 16 (08-24 @ 21:28), 17 (08-23 @ 21:57), 17 (08-23 @ 13:23)  BUN: 35 (08-24 @ 21:28), 36 (08-23 @ 21:57), 37 (08-23 @ 13:23)  Cr: 2.30 (08-24 @ 21:28), 1.89 (08-23 @ 21:57), 2.08 (08-23 @ 13:23)  Glu: 130(08-24 @ 21:28), 123(08-23 @ 21:57), 128(08-23 @ 13:23)    Hgb: 10.0 (08-23 @ 21:57)  Hct: 33.1 (08-23 @ 21:57)  WBC: 10.59 (08-23 @ 21:57)  Plt: 352 (08-23 @ 21:57)    INR: 1.23 08-23-20 @ 21:57  PTT: 29.6 08-23-20 @ 21:57          LIVER FUNCTIONS - ( 24 Aug 2020 21:28 )  Alb: 2.8 g/dL / Pro: 6.3 g/dL / ALK PHOS: 67 U/L / ALT: 29 U/L / AST: 20 U/L / GGT: x                       BRONCH- 8/13/20- Pseudomonas- Cefepime     EXAMINATION:  General: Agitated, intubated  HEENT: MMM  Neuro:  -Mental status-  No acute distress, EO to pain . Strong and purposeful on RUE,  followed simple commands on the right ,  Left side  0/5  -CN- Pupils R 5mm NR, L 1mm sluggish, Eyes dysconjugate,  +cough/gag, +corneals  RUE localize AG, RLE spont AG, LUE flaccid, LLE flaccid  CVS: S1/S2  RESP: Diminished at bases  GI: Soft, non tender, mildly distended, hypoactive BS  Extremities: Warm, skin intact HPI:  60 year old male with PMHx of Afib on coumadin s/p cardiac arrest at work, downtime 25 minutes prior to ROSC. Pupils were R fixed and dilated, left fixed at the time, no gag reflex, post-CA cooling protocol was initiated down to 35 deg. Intubated and put on Nimbex drip. Also on Propofol, fentanyl, levophed. R groin minerva placed. Also put on heparin post-CA. HCT showed R perimesencephalic SAH of unclear etiology, no hydrocephalus. Course also c/b GIB, was put on protonix drip. Also had bleeding from [place] - urology consulted, clot irrigated.       HOSP COURSE:   8/10- Angio- neg   8/11: VTACH noted (7 beats)  8/13: Febrile  8/14: Started on Unasyn for gram neg rods and gram neg diplococci on bronch gram stain -> switched to cefepime for pseudomonas noted 8/10  8/16: central line removed on 8/16. Cr improving 1.7 -> 1.57. MRNOVA planned. Could not tolerate since desatted when supine  8/17/20: Lasix 30mg x1  8/18: PO vanc for Cdiff  8/19: Few beats of VTACH noted.   8/20: Taken for angio: Negative.  8/21: Episode of respiratory distress overnight; desaturated 85%, mucus plug/ambu bagged.   S/P lasix 40mgx1.     Overnight events:no events              ICU Vital Signs Last 24 Hrs  T(C): 37.4 (25 Aug 2020 07:00), Max: 37.4 (25 Aug 2020 07:00)  T(F): 99.4 (25 Aug 2020 07:00), Max: 99.4 (25 Aug 2020 07:00)  HR: 91 (25 Aug 2020 07:00) (69 - 131)  BP: --  BP(mean): --  ABP: 121/65 (25 Aug 2020 07:00) (99/55 - 158/84)  ABP(mean): 79 (25 Aug 2020 07:00) (66 - 101)  RR: 19 (25 Aug 2020 07:00) (18 - 19)  SpO2: 100% (25 Aug 2020 07:00) (97% - 100%)      08-24-20 @ 07:01  -  08-25-20 @ 07:00  --------------------------------------------------------  IN: 913.4 mL / OUT: 825 mL / NET: 88.4 mL        Mode: CPAP with PS, FiO2: 30, PEEP: 5, PS: 10, MAP: 11, PIP: 18    acetaminophen   Tablet .. 650 milliGRAM(s) Oral every 6 hours PRN  acetylcysteine 20%  Inhalation 4 milliLiter(s) Inhalation three times a day PRN  albuterol/ipratropium for Nebulization. 3 milliLiter(s) Nebulizer every 6 hours  artificial  tears Solution 1 Drop(s) Both EYES every 6 hours  cefepime   IVPB 2000 milliGRAM(s) IV Intermittent every 8 hours  chlorhexidine 0.12% Liquid 15 milliLiter(s) Oral Mucosa every 12 hours  chlorhexidine 4% Liquid 1 Application(s) Topical <User Schedule>  chlorhexidine 4% Liquid 1 Application(s) Topical <User Schedule>  cloNIDine 0.1 milliGRAM(s) Oral three times a day  dextrose 40% Gel 15 Gram(s) Oral once PRN  dextrose 5%. 1000 milliLiter(s) (50 mL/Hr) IV Continuous <Continuous>  dextrose 50% Injectable 12.5 Gram(s) IV Push once  dextrose 50% Injectable 25 Gram(s) IV Push once  dextrose 50% Injectable 25 Gram(s) IV Push once  famotidine    Tablet 20 milliGRAM(s) Oral two times a day  fentaNYL   Infusion... 0.5 MICROgram(s)/kG/Hr (2.75 mL/Hr) IV Continuous <Continuous>  glucagon  Injectable 1 milliGRAM(s) IntraMuscular once PRN  heparin   Injectable 5000 Unit(s) SubCutaneous every 8 hours  hydrALAZINE 50 milliGRAM(s) Oral every 8 hours  isosorbide   dinitrate Tablet (ISORDIL) 10 milliGRAM(s) Oral three times a day  lactobacillus acidophilus 1 Tablet(s) Oral two times a day  metoprolol tartrate 12.5 milliGRAM(s) Oral two times a day  propofol Infusion 10 MICROgram(s)/kG/Min (6.6 mL/Hr) IV Continuous <Continuous>  QUEtiapine 50 milliGRAM(s) Oral every 8 hours  sodium chloride 0.9% lock flush 10 milliLiter(s) IV Push every 1 hour PRN  sodium chloride 7% Inhalation 4 milliLiter(s) Inhalation every 12 hours  traMADol 25 milliGRAM(s) Oral every 6 hours PRN  traMADol 50 milliGRAM(s) Oral every 6 hours PRN  valproate sodium IVPB 500 milliGRAM(s) IV Intermittent three times a day  vancomycin    Solution 125 milliGRAM(s) Oral every 6 hours      LABS:  Na: 143 (08-24 @ 21:28), 142 (08-23 @ 21:57), 146 (08-23 @ 13:23)  K: 3.7 (08-24 @ 21:28), 3.6 (08-23 @ 21:57), 3.4 (08-23 @ 13:23)  Cl: 115 (08-24 @ 21:28), 115 (08-23 @ 21:57), 117 (08-23 @ 13:23)  CO2: 16 (08-24 @ 21:28), 17 (08-23 @ 21:57), 17 (08-23 @ 13:23)  BUN: 35 (08-24 @ 21:28), 36 (08-23 @ 21:57), 37 (08-23 @ 13:23)  Cr: 2.30 (08-24 @ 21:28), 1.89 (08-23 @ 21:57), 2.08 (08-23 @ 13:23)  Glu: 130(08-24 @ 21:28), 123(08-23 @ 21:57), 128(08-23 @ 13:23)    Hgb: 10.0 (08-23 @ 21:57)  Hct: 33.1 (08-23 @ 21:57)  WBC: 10.59 (08-23 @ 21:57)  Plt: 352 (08-23 @ 21:57)    INR: 1.23 08-23-20 @ 21:57  PTT: 29.6 08-23-20 @ 21:57          LIVER FUNCTIONS - ( 24 Aug 2020 21:28 )  Alb: 2.8 g/dL / Pro: 6.3 g/dL / ALK PHOS: 67 U/L / ALT: 29 U/L / AST: 20 U/L / GGT: x                       BRONCH- 8/13/20- Pseudomonas- Cefepime     EXAMINATION:  General: Agitated, intubated  HEENT: MMM  Neuro:  -Mental status-  No acute distress, EO to pain . Strong and purposeful on RUE,  followed simple commands on the right ,  Left side  0/5  -CN- Pupils R 5mm NR, L 1mm sluggish, Eyes dysconjugate,    CVS: S1/S2  RESP: Diminished at bases  GI: Soft, non tender, mildly distended, hypoactive BS  Extremities: Warm, skin intact

## 2020-08-25 NOTE — PROGRESS NOTE ADULT - PROBLEM SELECTOR PLAN 1
- Plan to remove sutures on POD #7  - Do not remove umbilical trach tie  - HOB elevation  - Suction PRN  - Continue trach care  - ENT will continue to follow, call with questions x [phone].

## 2020-08-25 NOTE — CHART NOTE - NSCHARTNOTEFT_GEN_A_CORE
Nutrition Follow Up Note     Patient seen for: nutrition follow up on ICU    Source: patient, medical record, communication with team.     Chart reviewed, events noted. Pt is a 61 yo M with PMH: A.Katia on coumadin s/p cardiac arrest at work. Downtime 25 minutes prior to ROSC. 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 Was intubated at OSH, transferred to [facility] for further care. Course c/b GIB, put on Protonix drip. C.Diff positive; continues on antibiotics as ordered. Pt s/p angio 8/20; negative. S/P tracheostomy 8/24. C/b agitation, requiring propofol and fentanyl gtts. Pending PEG placement.     Diet Order: Diet, NPO with Tube Feed:   Tube Feeding Modality: Nasogastric  Vital AF (VITALAFRTH)  Total Volume for 24 Hours (mL): 1680  Continuous  Starting Tube Feed Rate {mL per Hour}: 10  Increase Tube Feed Rate by (mL): 10     Every 4 hours  Until Goal Tube Feed Rate (mL per Hour): 70  Tube Feed Duration (in Hours): 24  Tube Feed Start Time: 17:00  Supplement Feeding Modality:  Nasogastric  Probiotic Yogurt/Smoothie Cans or Servings Per Day:  2       Frequency:  Daily (08-21-20 @ 12:31)    CURRENT EN ORDER PROVIDES: 1680ml, 2016kcal and 126g protein.     EN provision (per nursing flow sheet):   (8/25):  (8/24):  (8/23):  (8/22):   (8/21):     Nutrition Events:   - PO Intake:  - Enteral Nutrition:  -  -  - Nutrition Education:  - Handouts Provided:     Last BM (date). Bowel regimen: Miralax, senna    NGT/OGT output:     Ileostomy/Colostomy output:    Anthropometric Measurements:   Height (cm): 172.72 (08-24-20 @ 14:49)  Weight (kg): 110 (08-24-20 @ 14:49)  BMI (kg/m2): 36.9 (08-24-20 @ 14:49)  IBW:       Medications: MEDICATIONS  (STANDING):  albuterol/ipratropium for Nebulization. 3 milliLiter(s) Nebulizer every 6 hours  artificial  tears Solution 1 Drop(s) Both EYES every 6 hours  chlorhexidine 0.12% Liquid 15 milliLiter(s) Oral Mucosa every 12 hours  chlorhexidine 4% Liquid 1 Application(s) Topical <User Schedule>  chlorhexidine 4% Liquid 1 Application(s) Topical <User Schedule>  dexMEDEtomidine Infusion 0.2 MICROgram(s)/kG/Hr (5.5 mL/Hr) IV Continuous <Continuous>  dextrose 5%. 1000 milliLiter(s) (50 mL/Hr) IV Continuous <Continuous>  dextrose 50% Injectable 12.5 Gram(s) IV Push once  dextrose 50% Injectable 25 Gram(s) IV Push once  dextrose 50% Injectable 25 Gram(s) IV Push once  famotidine    Tablet 20 milliGRAM(s) Oral two times a day  fentaNYL   Infusion... 0.5 MICROgram(s)/kG/Hr (2.75 mL/Hr) IV Continuous <Continuous>  heparin   Injectable 5000 Unit(s) SubCutaneous every 8 hours  hydrALAZINE 50 milliGRAM(s) Oral every 8 hours  isosorbide   dinitrate Tablet (ISORDIL) 10 milliGRAM(s) Oral three times a day  lactobacillus acidophilus 1 Tablet(s) Oral two times a day  metoprolol tartrate 12.5 milliGRAM(s) Oral two times a day  QUEtiapine 50 milliGRAM(s) Oral every 8 hours  sodium chloride 7% Inhalation 4 milliLiter(s) Inhalation every 12 hours  vancomycin    Solution 125 milliGRAM(s) Oral every 6 hours    MEDICATIONS  (PRN):  acetaminophen   Tablet .. 650 milliGRAM(s) Oral every 6 hours PRN Temp greater or equal to 38C (100.4F), Mild Pain (1 - 3)  acetylcysteine 20%  Inhalation 4 milliLiter(s) Inhalation three times a day PRN secretions  dextrose 40% Gel 15 Gram(s) Oral once PRN Blood Glucose LESS THAN 70 milliGRAM(s)/deciliter  glucagon  Injectable 1 milliGRAM(s) IntraMuscular once PRN Glucose LESS THAN 70 milligrams/deciliter  sodium chloride 0.9% lock flush 10 milliLiter(s) IV Push every 1 hour PRN Pre/post blood products, medications, blood draw, and to maintain line patency  traMADol 25 milliGRAM(s) Oral every 6 hours PRN Moderate Pain (4 - 6)  traMADol 50 milliGRAM(s) Oral every 6 hours PRN Severe Pain (7 - 10)    Labs: 08-24 @ 21:28: Sodium 143, Potassium 3.7, Calcium 9.2, Magnesium 2.1, Phosphorus 4.0, BUN 35<H>, Creatinine 2.30<H>, Glucose 130<H>, Alk Phos 67, ALT/SGPT 29, AST/SGOT 20, Albumin 2.8<L>, Prealbumin --, Total Bilirubin 0.2, Hemoglobin --, Hematocrit --, Ferritin --, C-Reactive Protein --, Creatine Kinase <<27>      Triglycerides, Serum: 218 mg/dL (08-09-20 @ 17:21)      Skin per nursing documentation: no pressure injuries documented  Edema:     Estimated Needs:   Energy:  Protein:    [place] State Equation (REE):     Previous Nutrition Diagnosis:   Nutrition Diagnosis is:     New Nutrition Diagnosis:      Recommended Interventions:   1.   2.  3.  4.       Monitoring and Evaluation:   Continue to monitor nutrition provision and tolerance, weights, labs, skin integrity.   RD remains available upon request and will follow up per protocol.    Alison Kleiner RD, [facility], [place] ([phone]) Nutrition Follow Up Note     Patient seen for: nutrition follow up on ICU    Source: patient, medical record, communication with team.     Chart reviewed, events noted. Pt is a 61 yo M with PMH: A.Katia on coumadin s/p cardiac arrest at work. Downtime 25 minutes prior to ROSC. 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 Was intubated at OSH, transferred to [facility] for further care. Course c/b GIB, put on Protonix drip. C.Diff positive; continues on antibiotics as ordered. Pt s/p angio 8/20; negative. S/P tracheostomy 8/24. C/b agitation, requiring propofol and fentanyl gtts. Pending PEG placement.     Diet Order: Diet, NPO with Tube Feed:   Tube Feeding Modality: Nasogastric  Vital AF (VITALAFRTH)  Total Volume for 24 Hours (mL): 1680  Continuous  Starting Tube Feed Rate {mL per Hour}: 10  Increase Tube Feed Rate by (mL): 10     Every 4 hours  Until Goal Tube Feed Rate (mL per Hour): 70  Tube Feed Duration (in Hours): 24  Tube Feed Start Time: 17:00  Supplement Feeding Modality:  Nasogastric  Probiotic Yogurt/Smoothie Cans or Servings Per Day:  2       Frequency:  Daily (08-21-20 @ 12:31)    CURRENT EN ORDER PROVIDES: 1680ml, 2016kcal and 126g protein.     EN provision (per nursing flow sheet):   (8/25): 220ml (thus far)  (8/24): 60ml (3.6% of goal; EN held for trach placement, resumed midday)  (8/23): 1540ml (92% of goal)  (8/22): 1645ml (98% of goal)          Nutrition Events:   - PO Intake:  - Enteral Nutrition:  -  -  - Nutrition Education:  - Handouts Provided:     Last BM (date). Bowel regimen: Miralax, senna    NGT/OGT output:     Ileostomy/Colostomy output:    Anthropometric Measurements:   Height (cm): 172.72 (08-24-20 @ 14:49)  Weight (kg): 110 (08-24-20 @ 14:49)  BMI (kg/m2): 36.9 (08-24-20 @ 14:49)  IBW:       Medications: MEDICATIONS  (STANDING):  albuterol/ipratropium for Nebulization. 3 milliLiter(s) Nebulizer every 6 hours  artificial  tears Solution 1 Drop(s) Both EYES every 6 hours  chlorhexidine 0.12% Liquid 15 milliLiter(s) Oral Mucosa every 12 hours  chlorhexidine 4% Liquid 1 Application(s) Topical <User Schedule>  chlorhexidine 4% Liquid 1 Application(s) Topical <User Schedule>  dexMEDEtomidine Infusion 0.2 MICROgram(s)/kG/Hr (5.5 mL/Hr) IV Continuous <Continuous>  dextrose 5%. 1000 milliLiter(s) (50 mL/Hr) IV Continuous <Continuous>  dextrose 50% Injectable 12.5 Gram(s) IV Push once  dextrose 50% Injectable 25 Gram(s) IV Push once  dextrose 50% Injectable 25 Gram(s) IV Push once  famotidine    Tablet 20 milliGRAM(s) Oral two times a day  fentaNYL   Infusion... 0.5 MICROgram(s)/kG/Hr (2.75 mL/Hr) IV Continuous <Continuous>  heparin   Injectable 5000 Unit(s) SubCutaneous every 8 hours  hydrALAZINE 50 milliGRAM(s) Oral every 8 hours  isosorbide   dinitrate Tablet (ISORDIL) 10 milliGRAM(s) Oral three times a day  lactobacillus acidophilus 1 Tablet(s) Oral two times a day  metoprolol tartrate 12.5 milliGRAM(s) Oral two times a day  QUEtiapine 50 milliGRAM(s) Oral every 8 hours  sodium chloride 7% Inhalation 4 milliLiter(s) Inhalation every 12 hours  vancomycin    Solution 125 milliGRAM(s) Oral every 6 hours    MEDICATIONS  (PRN):  acetaminophen   Tablet .. 650 milliGRAM(s) Oral every 6 hours PRN Temp greater or equal to 38C (100.4F), Mild Pain (1 - 3)  acetylcysteine 20%  Inhalation 4 milliLiter(s) Inhalation three times a day PRN secretions  dextrose 40% Gel 15 Gram(s) Oral once PRN Blood Glucose LESS THAN 70 milliGRAM(s)/deciliter  glucagon  Injectable 1 milliGRAM(s) IntraMuscular once PRN Glucose LESS THAN 70 milligrams/deciliter  sodium chloride 0.9% lock flush 10 milliLiter(s) IV Push every 1 hour PRN Pre/post blood products, medications, blood draw, and to maintain line patency  traMADol 25 milliGRAM(s) Oral every 6 hours PRN Moderate Pain (4 - 6)  traMADol 50 milliGRAM(s) Oral every 6 hours PRN Severe Pain (7 - 10)    Labs: 08-24 @ 21:28: Sodium 143, Potassium 3.7, Calcium 9.2, Magnesium 2.1, Phosphorus 4.0, BUN 35<H>, Creatinine 2.30<H>, Glucose 130<H>, Alk Phos 67, ALT/SGPT 29, AST/SGOT 20, Albumin 2.8<L>, Prealbumin --, Total Bilirubin 0.2, Hemoglobin --, Hematocrit --, Ferritin --, C-Reactive Protein --, Creatine Kinase <<27>      Triglycerides, Serum: 218 mg/dL (08-09-20 @ 17:21)      Skin per nursing documentation: no pressure injuries documented  Edema:     Estimated Needs:   Energy:  Protein:    [place] State Equation (REE):     Previous Nutrition Diagnosis:   Nutrition Diagnosis is:     New Nutrition Diagnosis:      Recommended Interventions:   1.   2.  3.  4.       Monitoring and Evaluation:   Continue to monitor nutrition provision and tolerance, weights, labs, skin integrity.   RD remains available upon request and will follow up per protocol.    Alison Kleiner RD, [facility], [place] ([phone]) Nutrition Follow Up Note     Patient seen for: nutrition follow up on ICU    Source: patient, medical record, communication with team.     Chart reviewed, events noted. Pt is a 61 yo M with PMH: A.Katia on coumadin s/p cardiac arrest at work. Downtime 25 minutes prior to ROSC. 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 Was intubated at OSH, transferred to [facility] for further care. Course c/b GIB, put on Protonix drip. C.Diff positive; continues on antibiotics as ordered. Pt s/p angio 8/20; negative. S/P tracheostomy 8/24. C/b agitation, requiring propofol and fentanyl gtts. Pending PEG placement.     Diet Order: Diet, NPO with Tube Feed:   Tube Feeding Modality: Nasogastric  Vital AF (VITALAFRTH)  Total Volume for 24 Hours (mL): 1680  Continuous  Starting Tube Feed Rate {mL per Hour}: 10  Increase Tube Feed Rate by (mL): 10     Every 4 hours  Until Goal Tube Feed Rate (mL per Hour): 70  Tube Feed Duration (in Hours): 24  Tube Feed Start Time: 17:00  Supplement Feeding Modality:  Nasogastric  Probiotic Yogurt/Smoothie Cans or Servings Per Day:  2       Frequency:  Daily (08-21-20 @ 12:31)    CURRENT EN ORDER PROVIDES: 1680ml, 2016kcal and 126g protein. Pt had previously been prescribed propofol between 8/24-8/25, stopped this AM at 9am. Continues to receive Cuong Active twice daily.     EN provision (per nursing flow sheet):   (8/25): 220ml (thus far)  (8/24): 60ml (3.6% of goal; EN held for trach placement, resumed midday)  (8/23): 1540ml (92% of goal)  (8/22): 1645ml (98% of goal)  (8/21): 1310ml (EN feeds changed from Vital 1.5 at 60ml/hr x 24 hrs to Vital AF at 70ml/hr x 24 hrs) .     Stool count (per nursing flow sheet):   (8/25): x 2  (8/24): x 4  (8/23): x 1  (8/22): x 1   Via rectal tube: (discontinued 8/23)  (8/23): 200ml   (8/22): 125ml   (8/21): 125ml  (8/20): 200ml     Anthropometric Measurements:   Height (cm): 172.72 (08-24-20 @ 14:49)  Weight (kg): 110 (08-24-20 @ 14:49)  BMI (kg/m2): 36.9 (08-24-20 @ 14:49)    Medications: MEDICATIONS  (STANDING):  albuterol/ipratropium for Nebulization. 3 milliLiter(s) Nebulizer every 6 hours  artificial  tears Solution 1 Drop(s) Both EYES every 6 hours  chlorhexidine 0.12% Liquid 15 milliLiter(s) Oral Mucosa every 12 hours  chlorhexidine 4% Liquid 1 Application(s) Topical <User Schedule>  chlorhexidine 4% Liquid 1 Application(s) Topical <User Schedule>  dexMEDEtomidine Infusion 0.2 MICROgram(s)/kG/Hr (5.5 mL/Hr) IV Continuous <Continuous>  dextrose 5%. 1000 milliLiter(s) (50 mL/Hr) IV Continuous <Continuous>  dextrose 50% Injectable 12.5 Gram(s) IV Push once  dextrose 50% Injectable 25 Gram(s) IV Push once  dextrose 50% Injectable 25 Gram(s) IV Push once  famotidine    Tablet 20 milliGRAM(s) Oral two times a day  fentaNYL   Infusion... 0.5 MICROgram(s)/kG/Hr (2.75 mL/Hr) IV Continuous <Continuous>  heparin   Injectable 5000 Unit(s) SubCutaneous every 8 hours  hydrALAZINE 50 milliGRAM(s) Oral every 8 hours  isosorbide   dinitrate Tablet (ISORDIL) 10 milliGRAM(s) Oral three times a day  lactobacillus acidophilus 1 Tablet(s) Oral two times a day  metoprolol tartrate 12.5 milliGRAM(s) Oral two times a day  QUEtiapine 50 milliGRAM(s) Oral every 8 hours  sodium chloride 7% Inhalation 4 milliLiter(s) Inhalation every 12 hours  vancomycin    Solution 125 milliGRAM(s) Oral every 6 hours    MEDICATIONS  (PRN):  acetaminophen   Tablet .. 650 milliGRAM(s) Oral every 6 hours PRN Temp greater or equal to 38C (100.4F), Mild Pain (1 - 3)  acetylcysteine 20%  Inhalation 4 milliLiter(s) Inhalation three times a day PRN secretions  dextrose 40% Gel 15 Gram(s) Oral once PRN Blood Glucose LESS THAN 70 milliGRAM(s)/deciliter  glucagon  Injectable 1 milliGRAM(s) IntraMuscular once PRN Glucose LESS THAN 70 milligrams/deciliter  sodium chloride 0.9% lock flush 10 milliLiter(s) IV Push every 1 hour PRN Pre/post blood products, medications, blood draw, and to maintain line patency  traMADol 25 milliGRAM(s) Oral every 6 hours PRN Moderate Pain (4 - 6)  traMADol 50 milliGRAM(s) Oral every 6 hours PRN Severe Pain (7 - 10)    Labs: 08-24 @ 21:28: Sodium 143, Potassium 3.7, Calcium 9.2, Magnesium 2.1, Phosphorus 4.0, BUN 35<H>, Creatinine 2.30<H>, Glucose 130<H>, Alk Phos 67, ALT/SGPT 29, AST/SGOT 20, Albumin 2.8<L>, Prealbumin --, Total Bilirubin 0.2, Hemoglobin --, Hematocrit --, Ferritin --, C-Reactive Protein --, Creatine Kinase <<27>    Triglycerides, Serum: 218 mg/dL (08-09-20 @ 17:21)    Skin per nursing documentation: no pressure injuries documented. Right groin surgical incision s/p angio 8/20  Edema: 2+ generalized     Estimated Needs: (based on upper IBW 76.8kg:   Energy: (25-30kcal/kg): 1920-2304kcal  Protein: (1.4-1.6g protein/kg): 108-123g protein    Previous Nutrition Diagnosis: increased nutrient needs  Nutrition Diagnosis is: ongoing, with provision of EN feeds    New Nutrition Diagnosis: none    Recommended Interventions:   1. Recommend to continue feeds: Vital AF at GOAL rate 70ml/hr x 24 hrs. To provide (at goal rate, based on upper IBW 76.8kg): 1680ml, 2016kcal (26kcal/kg) and 126g protein (1.6g protein/kg). Continue Cuong Active twice daily.   2. Monitor GI tolerance. RD to remain available to adjust EN formulary, volume/rate PRN.   3. Monitor wt trends, nutrition related labs, skin integrity, hydration status, bowel regularity.     Monitoring and Evaluation:   Continue to monitor nutrition provision and tolerance, weights, labs, skin integrity.   RD remains available upon request and will follow up per protocol.    Alison Kleiner RD, [facility] ([phone])

## 2020-08-25 NOTE — PHYSICAL THERAPY INITIAL EVALUATION ADULT - ADDITIONAL COMMENTS
Procedure: Cerebral angiography 8/20/20  Head CT 8/12/20 IMPRESSION: Subarachnoid hemorrhage is again seen and slightly less conspicuous. New area of high attenuation involving the right posterior temporal/parietal region as described above.  MRI BRAIN 8/11/20: Restricted There is restricted diffusion within the region of the right basal ganglia, posterior limb of the right internal capsule, and anterior right temporal lobe, likely representing an acute right MCA territory infarct. Similar cisternal and sulcal subarachnoid hemorrhage, given differences in modality. Similar small hemorrhage layering in the occipital horns. No hydrocephalus. No abnormal parenchymal or leptomeningeal enhancement.  MRI CERVICAL SPINE 8/11/20: Multilevel degenerative changes superimposed upon congenital spinal canal stenosis.

## 2020-08-25 NOTE — PROGRESS NOTE ADULT - ATTENDING COMMENTS
Ovidio Tomas MD, FACP, FACG, AGAF  [facility]  [phone]     After hours and weekend coverage GI service : [phone]

## 2020-08-25 NOTE — PROGRESS NOTE ADULT - PROBLEM SELECTOR PLAN 1
Completed Hypothermia protocol  Will need eventual cardiac cath for assessment of CORS once off sedation and cleared by nephrology and ID

## 2020-08-25 NOTE — PROGRESS NOTE ADULT - SUBJECTIVE AND OBJECTIVE BOX
Subjective: Patient seen and examined. No new events except as noted.   remains in NSCU   s/p trach   remains on precedex and fentanyl     REVIEW OF SYSTEMS:  Unable to obtain       MEDICATIONS:  MEDICATIONS  (STANDING):  albuterol/ipratropium for Nebulization. 3 milliLiter(s) Nebulizer every 6 hours  artificial  tears Solution 1 Drop(s) Both EYES every 6 hours  chlorhexidine 0.12% Liquid 15 milliLiter(s) Oral Mucosa every 12 hours  chlorhexidine 4% Liquid 1 Application(s) Topical <User Schedule>  chlorhexidine 4% Liquid 1 Application(s) Topical <User Schedule>  dexMEDEtomidine Infusion 0.2 MICROgram(s)/kG/Hr (5.5 mL/Hr) IV Continuous <Continuous>  dextrose 5%. 1000 milliLiter(s) (50 mL/Hr) IV Continuous <Continuous>  dextrose 50% Injectable 12.5 Gram(s) IV Push once  dextrose 50% Injectable 25 Gram(s) IV Push once  dextrose 50% Injectable 25 Gram(s) IV Push once  famotidine    Tablet 20 milliGRAM(s) Oral daily  fentaNYL   Infusion... 0.5 MICROgram(s)/kG/Hr (2.75 mL/Hr) IV Continuous <Continuous>  fentaNYL   Patch 100 MICROgram(s)/Hr 1 Patch Transdermal every 72 hours  heparin   Injectable 5000 Unit(s) SubCutaneous every 8 hours  hydrALAZINE 50 milliGRAM(s) Oral every 8 hours  isosorbide   dinitrate Tablet (ISORDIL) 10 milliGRAM(s) Oral three times a day  lactobacillus acidophilus 1 Tablet(s) Oral two times a day  metoprolol tartrate 12.5 milliGRAM(s) Oral two times a day  QUEtiapine 50 milliGRAM(s) Oral every 6 hours  sodium chloride 7% Inhalation 4 milliLiter(s) Inhalation every 12 hours  vancomycin    Solution 125 milliGRAM(s) Oral every 6 hours      PHYSICAL EXAM:  T(C): 37.3 (08-25-20 @ 11:00), Max: 37.4 (08-25-20 @ 07:00)  HR: 80 (08-25-20 @ 11:30) (69 - 131)  BP: --  RR: 21 (08-25-20 @ 11:00) (18 - 21)  SpO2: 100% (08-25-20 @ 11:30) (97% - 100%)  Wt(kg): --  I&O's Summary    24 Aug 2020 07:01  -  25 Aug 2020 07:00  --------------------------------------------------------  IN: 913.4 mL / OUT: 825 mL / NET: 88.4 mL    25 Aug 2020 07:01  -  25 Aug 2020 12:07  --------------------------------------------------------  IN: 251.7 mL / OUT: 550 mL / NET: -298.3 mL      Height (cm): 172.72 (08-24 @ 14:49)  Weight (kg): 110 (08-24 @ 14:49)  BMI (kg/m2): 36.9 (08-24 @ 14:49)  BSA (m2): 2.22 (08-24 @ 14:49)    Appearance: sedated , +trach   HEENT:   Dry  oral mucosa,  Lymphatic: No lymphadenopathy  Cardiovascular: Normal S1 S2, No JVD, No murmurs, No edema  Respiratory: Ventilated   Psychiatry: A & O x 0  Gastrointestinal:  Soft, Non-tender, + BS	  Skin: No rashes, No ecchymoses, No cyanosis	  Mental status- No acute distress, EO to stim  -CN- Pupils R 4mm NR, L 2mm sluggish, EOMI, tongue midline, face symmetric  +cough/gag  C briskly, two fingers/thumbs up on RUE, distally AG, wiggles toes on RLE, no      LABS:    CARDIAC MARKERS:                                10.0   10.59 )-----------( 352      ( 23 Aug 2020 21:57 )             33.1     08-24    143  |  115<H>  |  35<H>  ----------------------------<  130<H>  3.7   |  16<L>  |  2.30<H>    Ca    9.2      24 Aug 2020 21:28  Phos  4.0     08-24  Mg     2.1     08-24    TPro  6.3  /  Alb  2.8<L>  /  TBili  0.2  /  DBili  <0.1  /  AST  20  /  ALT  29  /  AlkPhos  67  08-24    proBNP:   Lipid Profile:   HgA1c:   TSH:             TELEMETRY: 	 AF   ECG:  	  RADIOLOGY:   DIAGNOSTIC TESTING:  [ ] Echocardiogram:  [ ]  Catheterization:  [ ] Stress Test:    OTHER:

## 2020-08-26 LAB
ALBUMIN SERPL ELPH-MCNC: 2.6 G/DL — LOW (ref 3.3–5)
ALP SERPL-CCNC: 58 U/L — SIGNIFICANT CHANGE UP (ref 40–120)
ALT FLD-CCNC: 20 U/L — SIGNIFICANT CHANGE UP (ref 10–45)
ANION GAP SERPL CALC-SCNC: 15 MMOL/L — SIGNIFICANT CHANGE UP (ref 5–17)
APTT BLD: 31.9 SEC — SIGNIFICANT CHANGE UP (ref 27.5–35.5)
AST SERPL-CCNC: 20 U/L — SIGNIFICANT CHANGE UP (ref 10–40)
BILIRUB DIRECT SERPL-MCNC: <0.1 MG/DL — SIGNIFICANT CHANGE UP (ref 0–0.2)
BILIRUB INDIRECT FLD-MCNC: >0.1 MG/DL — LOW (ref 0.2–1)
BILIRUB SERPL-MCNC: 0.2 MG/DL — SIGNIFICANT CHANGE UP (ref 0.2–1.2)
BUN SERPL-MCNC: 34 MG/DL — HIGH (ref 7–23)
CALCIUM SERPL-MCNC: 8.3 MG/DL — LOW (ref 8.4–10.5)
CHLORIDE SERPL-SCNC: 114 MMOL/L — HIGH (ref 96–108)
CO2 SERPL-SCNC: 17 MMOL/L — LOW (ref 22–31)
CREAT SERPL-MCNC: 2.31 MG/DL — HIGH (ref 0.5–1.3)
GLUCOSE SERPL-MCNC: 119 MG/DL — HIGH (ref 70–99)
INR BLD: 1.38 RATIO — HIGH (ref 0.88–1.16)
MAGNESIUM SERPL-MCNC: 2.2 MG/DL — SIGNIFICANT CHANGE UP (ref 1.6–2.6)
PHOSPHATE SERPL-MCNC: 3.4 MG/DL — SIGNIFICANT CHANGE UP (ref 2.5–4.5)
POTASSIUM SERPL-MCNC: 3.8 MMOL/L — SIGNIFICANT CHANGE UP (ref 3.5–5.3)
POTASSIUM SERPL-SCNC: 3.8 MMOL/L — SIGNIFICANT CHANGE UP (ref 3.5–5.3)
PROT SERPL-MCNC: 5.7 G/DL — LOW (ref 6–8.3)
PROTHROM AB SERPL-ACNC: 16.2 SEC — HIGH (ref 10.6–13.6)
SODIUM SERPL-SCNC: 146 MMOL/L — HIGH (ref 135–145)

## 2020-08-26 PROCEDURE — 99292 CRITICAL CARE ADDL 30 MIN: CPT

## 2020-08-26 PROCEDURE — 99291 CRITICAL CARE FIRST HOUR: CPT

## 2020-08-26 PROCEDURE — 99232 SBSQ HOSP IP/OBS MODERATE 35: CPT | Mod: 25

## 2020-08-26 PROCEDURE — 43246 EGD PLACE GASTROSTOMY TUBE: CPT

## 2020-08-26 PROCEDURE — 99231 SBSQ HOSP IP/OBS SF/LOW 25: CPT

## 2020-08-26 RX ORDER — SODIUM CHLORIDE 9 MG/ML
1000 INJECTION, SOLUTION INTRAVENOUS
Refills: 0 | Status: DISCONTINUED | OUTPATIENT
Start: 2020-08-26 | End: 2020-08-27

## 2020-08-26 RX ORDER — FENTANYL CITRATE 50 UG/ML
100 INJECTION INTRAVENOUS ONCE
Refills: 0 | Status: DISCONTINUED | OUTPATIENT
Start: 2020-08-26 | End: 2020-08-26

## 2020-08-26 RX ORDER — MIDAZOLAM HYDROCHLORIDE 1 MG/ML
0.02 INJECTION, SOLUTION INTRAMUSCULAR; INTRAVENOUS
Qty: 100 | Refills: 0 | Status: DISCONTINUED | OUTPATIENT
Start: 2020-08-26 | End: 2020-08-26

## 2020-08-26 RX ORDER — CLONAZEPAM 1 MG
0.5 TABLET ORAL EVERY 8 HOURS
Refills: 0 | Status: DISCONTINUED | OUTPATIENT
Start: 2020-08-26 | End: 2020-08-29

## 2020-08-26 RX ORDER — SODIUM CHLORIDE 9 MG/ML
500 INJECTION INTRAMUSCULAR; INTRAVENOUS; SUBCUTANEOUS ONCE
Refills: 0 | Status: COMPLETED | OUTPATIENT
Start: 2020-08-26 | End: 2020-08-26

## 2020-08-26 RX ORDER — HYDROMORPHONE HYDROCHLORIDE 2 MG/ML
0.5 INJECTION INTRAMUSCULAR; INTRAVENOUS; SUBCUTANEOUS
Refills: 0 | Status: DISCONTINUED | OUTPATIENT
Start: 2020-08-26 | End: 2020-08-27

## 2020-08-26 RX ORDER — HEPARIN SODIUM 5000 [USP'U]/ML
5000 INJECTION INTRAVENOUS; SUBCUTANEOUS EVERY 8 HOURS
Refills: 0 | Status: DISCONTINUED | OUTPATIENT
Start: 2020-08-27 | End: 2020-10-14

## 2020-08-26 RX ORDER — FAMOTIDINE 10 MG/ML
20 INJECTION INTRAVENOUS DAILY
Refills: 0 | Status: DISCONTINUED | OUTPATIENT
Start: 2020-08-26 | End: 2020-08-28

## 2020-08-26 RX ORDER — MIDAZOLAM HYDROCHLORIDE 1 MG/ML
4 INJECTION, SOLUTION INTRAMUSCULAR; INTRAVENOUS ONCE
Refills: 0 | Status: DISCONTINUED | OUTPATIENT
Start: 2020-08-26 | End: 2020-08-26

## 2020-08-26 RX ORDER — HEPARIN SODIUM 5000 [USP'U]/ML
5000 INJECTION INTRAVENOUS; SUBCUTANEOUS
Refills: 0 | Status: COMPLETED | OUTPATIENT
Start: 2020-08-26 | End: 2020-08-26

## 2020-08-26 RX ORDER — MIDAZOLAM HYDROCHLORIDE 1 MG/ML
4 INJECTION, SOLUTION INTRAMUSCULAR; INTRAVENOUS ONCE
Refills: 0 | Status: DISCONTINUED | OUTPATIENT
Start: 2020-08-26 | End: 2020-08-27

## 2020-08-26 RX ORDER — ATORVASTATIN CALCIUM 80 MG/1
1 TABLET, FILM COATED ORAL
Qty: 0 | Refills: 0 | DISCHARGE

## 2020-08-26 RX ADMIN — Medication 1 DROP(S): at 17:05

## 2020-08-26 RX ADMIN — Medication 3 MILLILITER(S): at 05:36

## 2020-08-26 RX ADMIN — DEXMEDETOMIDINE HYDROCHLORIDE IN 0.9% SODIUM CHLORIDE 5.5 MICROGRAM(S)/KG/HR: 4 INJECTION INTRAVENOUS at 17:06

## 2020-08-26 RX ADMIN — Medication 100 MILLIGRAM(S): at 10:03

## 2020-08-26 RX ADMIN — Medication 1 DROP(S): at 11:11

## 2020-08-26 RX ADMIN — Medication 1 DROP(S): at 23:50

## 2020-08-26 RX ADMIN — ISOSORBIDE DINITRATE 10 MILLIGRAM(S): 5 TABLET ORAL at 13:50

## 2020-08-26 RX ADMIN — FENTANYL CITRATE 100 MICROGRAM(S): 50 INJECTION INTRAVENOUS at 12:45

## 2020-08-26 RX ADMIN — HYDROMORPHONE HYDROCHLORIDE 0.5 MILLIGRAM(S): 2 INJECTION INTRAMUSCULAR; INTRAVENOUS; SUBCUTANEOUS at 07:15

## 2020-08-26 RX ADMIN — Medication 125 MILLIGRAM(S): at 17:04

## 2020-08-26 RX ADMIN — Medication 125 MILLIGRAM(S): at 11:10

## 2020-08-26 RX ADMIN — CHLORHEXIDINE GLUCONATE 1 APPLICATION(S): 213 SOLUTION TOPICAL at 05:09

## 2020-08-26 RX ADMIN — FENTANYL CITRATE 50 MICROGRAM(S): 50 INJECTION INTRAVENOUS at 09:00

## 2020-08-26 RX ADMIN — Medication 3 MILLILITER(S): at 18:47

## 2020-08-26 RX ADMIN — QUETIAPINE FUMARATE 50 MILLIGRAM(S): 200 TABLET, FILM COATED ORAL at 05:11

## 2020-08-26 RX ADMIN — FENTANYL CITRATE 1 PATCH: 50 INJECTION INTRAVENOUS at 07:17

## 2020-08-26 RX ADMIN — FENTANYL CITRATE 50 MICROGRAM(S): 50 INJECTION INTRAVENOUS at 08:45

## 2020-08-26 RX ADMIN — Medication 3 MILLILITER(S): at 12:36

## 2020-08-26 RX ADMIN — SODIUM CHLORIDE 4 MILLILITER(S): 9 INJECTION INTRAMUSCULAR; INTRAVENOUS; SUBCUTANEOUS at 05:37

## 2020-08-26 RX ADMIN — Medication 1 TABLET(S): at 17:04

## 2020-08-26 RX ADMIN — SODIUM CHLORIDE 500 MILLILITER(S): 9 INJECTION INTRAMUSCULAR; INTRAVENOUS; SUBCUTANEOUS at 14:00

## 2020-08-26 RX ADMIN — ISOSORBIDE DINITRATE 10 MILLIGRAM(S): 5 TABLET ORAL at 05:11

## 2020-08-26 RX ADMIN — SODIUM CHLORIDE 1000 MILLILITER(S): 9 INJECTION INTRAMUSCULAR; INTRAVENOUS; SUBCUTANEOUS at 22:09

## 2020-08-26 RX ADMIN — HYDROMORPHONE HYDROCHLORIDE 0.5 MILLIGRAM(S): 2 INJECTION INTRAMUSCULAR; INTRAVENOUS; SUBCUTANEOUS at 20:50

## 2020-08-26 RX ADMIN — ISOSORBIDE DINITRATE 10 MILLIGRAM(S): 5 TABLET ORAL at 21:33

## 2020-08-26 RX ADMIN — CHLORHEXIDINE GLUCONATE 15 MILLILITER(S): 213 SOLUTION TOPICAL at 05:12

## 2020-08-26 RX ADMIN — HYDROMORPHONE HYDROCHLORIDE 0.5 MILLIGRAM(S): 2 INJECTION INTRAMUSCULAR; INTRAVENOUS; SUBCUTANEOUS at 21:05

## 2020-08-26 RX ADMIN — QUETIAPINE FUMARATE 50 MILLIGRAM(S): 200 TABLET, FILM COATED ORAL at 23:49

## 2020-08-26 RX ADMIN — Medication 50 MILLIGRAM(S): at 08:14

## 2020-08-26 RX ADMIN — MIDAZOLAM HYDROCHLORIDE 4 MILLIGRAM(S): 1 INJECTION, SOLUTION INTRAMUSCULAR; INTRAVENOUS at 12:50

## 2020-08-26 RX ADMIN — Medication 50 MILLIGRAM(S): at 16:42

## 2020-08-26 RX ADMIN — HEPARIN SODIUM 5000 UNIT(S): 5000 INJECTION INTRAVENOUS; SUBCUTANEOUS at 21:33

## 2020-08-26 RX ADMIN — QUETIAPINE FUMARATE 50 MILLIGRAM(S): 200 TABLET, FILM COATED ORAL at 17:06

## 2020-08-26 RX ADMIN — Medication 125 MILLIGRAM(S): at 23:49

## 2020-08-26 RX ADMIN — Medication 1 TABLET(S): at 05:12

## 2020-08-26 RX ADMIN — FENTANYL CITRATE 100 MICROGRAM(S): 50 INJECTION INTRAVENOUS at 12:30

## 2020-08-26 RX ADMIN — FAMOTIDINE 20 MILLIGRAM(S): 10 INJECTION INTRAVENOUS at 05:12

## 2020-08-26 RX ADMIN — Medication 0.5 MILLIGRAM(S): at 21:33

## 2020-08-26 RX ADMIN — QUETIAPINE FUMARATE 50 MILLIGRAM(S): 200 TABLET, FILM COATED ORAL at 11:10

## 2020-08-26 RX ADMIN — SODIUM CHLORIDE 4 MILLILITER(S): 9 INJECTION INTRAMUSCULAR; INTRAVENOUS; SUBCUTANEOUS at 18:47

## 2020-08-26 RX ADMIN — CHLORHEXIDINE GLUCONATE 1 APPLICATION(S): 213 SOLUTION TOPICAL at 05:10

## 2020-08-26 RX ADMIN — Medication 12.5 MILLIGRAM(S): at 05:14

## 2020-08-26 RX ADMIN — Medication 1 DROP(S): at 05:14

## 2020-08-26 RX ADMIN — Medication 125 MILLIGRAM(S): at 05:11

## 2020-08-26 RX ADMIN — HYDROMORPHONE HYDROCHLORIDE 0.5 MILLIGRAM(S): 2 INJECTION INTRAMUSCULAR; INTRAVENOUS; SUBCUTANEOUS at 07:00

## 2020-08-26 RX ADMIN — CHLORHEXIDINE GLUCONATE 15 MILLILITER(S): 213 SOLUTION TOPICAL at 17:04

## 2020-08-26 RX ADMIN — Medication 12.5 MILLIGRAM(S): at 17:39

## 2020-08-26 RX ADMIN — SODIUM CHLORIDE 50 MILLILITER(S): 9 INJECTION, SOLUTION INTRAVENOUS at 17:06

## 2020-08-26 RX ADMIN — FENTANYL CITRATE 1 PATCH: 50 INJECTION INTRAVENOUS at 19:54

## 2020-08-26 RX ADMIN — Medication 3 MILLILITER(S): at 01:16

## 2020-08-26 NOTE — PROGRESS NOTE ADULT - SUBJECTIVE AND OBJECTIVE BOX
S/p PEG. Still requiring IV sedation.    Trached, oriented to self (nods), intermittently follows on right, right side spontaneous, left side 0/5

## 2020-08-26 NOTE — PROGRESS NOTE ADULT - SUBJECTIVE AND OBJECTIVE BOX
Diplopia message sent to patient. Chandler Card do not show evidence of rheumatology medication toxicity. Continue current treatment plan. Repeat labs every 3 months. Follow up as planned.   Dr. Denisse Vazquez Visit Summary: Patient seen and evaluated at bedside.    Overnight Events: none    Exam:  .Vital Signs Last 24 Hrs  T(C): 36.9 (25 Aug 2020 03:00), Max: 36.9 (24 Aug 2020 15:00)  T(F): 98.4 (25 Aug 2020 03:00), Max: 98.5 (24 Aug 2020 15:00)  HR: 100 (25 Aug 2020 05:18) (66 - 112)  BP: --  BP(mean): --  RR: 18 (25 Aug 2020 03:00) (18 - 18)  SpO2: 100% (25 Aug 2020 05:18) (97% - 100%)    Intubated, sedated, EO light stim, FC on R 2 fingers, wiggle toes, nothing on L                              10.7   15.61 )-----------( 328      ( 21 Aug 2020 22:43 )             34.9     08-21    145  |  114<H>  |  38<H>  ----------------------------<  130<H>  3.6   |  18<L>  |  1.92<H>    Ca    9.2      21 Aug 2020 22:43  Phos  2.8     08-21  Mg     2.2     08-21

## 2020-08-26 NOTE — PROGRESS NOTE ADULT - SUBJECTIVE AND OBJECTIVE BOX
HPI:  60 year old male with PMHx of Afib on coumadin s/p cardiac arrest at work, downtime 25 minutes prior to ROSC. Pupils were R fixed and dilated, left fixed at the time, no gag reflex, post-CA cooling protocol was initiated down to 35 deg. Intubated and put on Nimbex drip. Also on Propofol, fentanyl, levophed. R groin minerva placed. Also put on heparin post-CA. HCT showed R perimesencephalic SAH of unclear etiology, no hydrocephalus. Course also c/b GIB, was put on protonix drip. Also had bleeding from [facility] - urology consulted, clot irrigated.       HOSP COURSE:   8/10- Angio- neg   8/11: VTACH noted (7 beats)  8/13: Febrile  8/14: Started on Unasyn for gram neg rods and gram neg diplococci on bronch gram stain -> switched to cefepime for pseudomonas noted 8/10  8/16: central line removed on 8/16. Cr improving 1.7 -> 1.57. MRNOVA planned. Could not tolerate since desatted when supine  8/17/20: Lasix 30mg x1  8/18: PO vanc for Cdiff  8/19: Few beats of VTACH noted.   8/20: Taken for angio: Negative.  8/21: Episode of respiratory distress overnight; desaturated 85%, mucus plug/ambu bagged.   S/P lasix 40mgx1.     Overnight events:no events              ICU Vital Signs Last 24 Hrs  T(C): 37.2 (26 Aug 2020 03:00), Max: 37.4 (25 Aug 2020 07:00)  T(F): 99 (26 Aug 2020 03:00), Max: 99.4 (25 Aug 2020 07:00)  HR: 80 (26 Aug 2020 06:00) (56 - 112)  BP: --  BP(mean): --  ABP: 114/62 (26 Aug 2020 06:00) (103/61 - 211/94)  ABP(mean): 76 (26 Aug 2020 06:00) (69 - 121)  RR: 18 (26 Aug 2020 06:00) (18 - 23)  SpO2: 100% (26 Aug 2020 06:00) (96% - 100%)      08-24-20 @ 07:01  -  08-25-20 @ 07:00  --------------------------------------------------------  IN: 913.4 mL / OUT: 825 mL / NET: 88.4 mL    08-25-20 @ 07:01  -  08-26-20 @ 06:51  --------------------------------------------------------  IN: 1467.1 mL / OUT: 1525 mL / NET: -57.9 mL        Mode: CPAP with PS, FiO2: 30, PEEP: 5, PS: 5, MAP: 8, PIP: 11    acetaminophen   Tablet .. 650 milliGRAM(s) Oral every 6 hours PRN  acetylcysteine 20%  Inhalation 4 milliLiter(s) Inhalation three times a day PRN  albuterol/ipratropium for Nebulization. 3 milliLiter(s) Nebulizer every 6 hours  artificial  tears Solution 1 Drop(s) Both EYES every 6 hours  ceFAZolin   IVPB 2000 milliGRAM(s) IV Intermittent once  chlorhexidine 0.12% Liquid 15 milliLiter(s) Oral Mucosa every 12 hours  chlorhexidine 4% Liquid 1 Application(s) Topical <User Schedule>  chlorhexidine 4% Liquid 1 Application(s) Topical <User Schedule>  dexMEDEtomidine Infusion 0.2 MICROgram(s)/kG/Hr (5.5 mL/Hr) IV Continuous <Continuous>  dextrose 40% Gel 15 Gram(s) Oral once PRN  dextrose 5%. 1000 milliLiter(s) (50 mL/Hr) IV Continuous <Continuous>  dextrose 50% Injectable 12.5 Gram(s) IV Push once  dextrose 50% Injectable 25 Gram(s) IV Push once  dextrose 50% Injectable 25 Gram(s) IV Push once  famotidine    Tablet 20 milliGRAM(s) Oral daily  fentaNYL    Injectable 50 MICROGram(s) IV Push every 2 hours PRN  fentaNYL   Infusion... 0.5 MICROgram(s)/kG/Hr (2.75 mL/Hr) IV Continuous <Continuous>  fentaNYL   Patch 100 MICROgram(s)/Hr 1 Patch Transdermal every 72 hours  glucagon  Injectable 1 milliGRAM(s) IntraMuscular once PRN  hydrALAZINE 50 milliGRAM(s) Oral every 8 hours  isosorbide   dinitrate Tablet (ISORDIL) 10 milliGRAM(s) Oral three times a day  lactobacillus acidophilus 1 Tablet(s) Oral two times a day  metoprolol tartrate 12.5 milliGRAM(s) Oral two times a day  QUEtiapine 50 milliGRAM(s) Oral every 6 hours  sodium chloride 0.9% lock flush 10 milliLiter(s) IV Push every 1 hour PRN  sodium chloride 7% Inhalation 4 milliLiter(s) Inhalation every 12 hours  traMADol 25 milliGRAM(s) Oral every 6 hours PRN  traMADol 50 milliGRAM(s) Oral every 6 hours PRN  vancomycin    Solution 125 milliGRAM(s) Oral every 6 hours      LABS:  Na: 143 (08-25 @ 20:57), 143 (08-24 @ 21:28), 142 (08-23 @ 21:57), 146 (08-23 @ 13:23)  K: 4.1 (08-25 @ 20:57), 3.7 (08-24 @ 21:28), 3.6 (08-23 @ 21:57), 3.4 (08-23 @ 13:23)  Cl: 114 (08-25 @ 20:57), 115 (08-24 @ 21:28), 115 (08-23 @ 21:57), 117 (08-23 @ 13:23)  CO2: 15 (08-25 @ 20:57), 16 (08-24 @ 21:28), 17 (08-23 @ 21:57), 17 (08-23 @ 13:23)  BUN: 36 (08-25 @ 20:57), 35 (08-24 @ 21:28), 36 (08-23 @ 21:57), 37 (08-23 @ 13:23)  Cr: 2.31 (08-25 @ 20:57), 2.30 (08-24 @ 21:28), 1.89 (08-23 @ 21:57), 2.08 (08-23 @ 13:23)  Glu: 155(08-25 @ 20:57), 130(08-24 @ 21:28), 123(08-23 @ 21:57), 128(08-23 @ 13:23)    Hgb: 10.4 (08-25 @ 20:57), 10.0 (08-23 @ 21:57)  Hct: 33.6 (08-25 @ 20:57), 33.1 (08-23 @ 21:57)  WBC: 11.62 (08-25 @ 20:57), 10.59 (08-23 @ 21:57)  Plt: 341 (08-25 @ 20:57), 352 (08-23 @ 21:57)    INR: 1.36 08-25-20 @ 20:57, 1.23 08-23-20 @ 21:57  PTT: 32.1 08-25-20 @ 20:57, 29.6 08-23-20 @ 21:57          LIVER FUNCTIONS - ( 25 Aug 2020 20:57 )  Alb: 3.0 g/dL / Pro: 6.6 g/dL / ALK PHOS: 66 U/L / ALT: 21 U/L / AST: 23 U/L / GGT: x                   BRONCH- 8/13/20- Pseudomonas- Cefepime     EXAMINATION:  General: Agitated, intubated  HEENT: MMM  Neuro:  -Mental status-  No acute distress, EO spont . Strong and purposeful on RUE,  followed simple commands on the right ,  Left side  0/5  -CN- Pupils R 5mm NR, L 1mm sluggish, Eyes dysconjugate,    CVS: S1/S2  RESP: Diminished at bases  GI: Soft, non tender, mildly distended, hypoactive BS  Extremities: Warm, skin intact HPI:  60 year old male with PMHx of Afib on coumadin s/p cardiac arrest at work, downtime 25 minutes prior to ROSC. Pupils were R fixed and dilated, left fixed at the time, no gag reflex, post-CA cooling protocol was initiated down to 35 deg. Intubated and put on Nimbex drip. Also on Propofol, fentanyl, levophed. R groin minerva placed. Also put on heparin post-CA. HCT showed R perimesencephalic SAH of unclear etiology, no hydrocephalus. Course also c/b GIB, was put on protonix drip. Also had bleeding from [facility] - urology consulted, clot irrigated.       HOSP COURSE:   8/10- Angio- neg   8/11: VTACH noted (7 beats)  8/13: Febrile  8/14: Started on Unasyn for gram neg rods and gram neg diplococci on bronch gram stain -> switched to cefepime for pseudomonas noted 8/10  8/16: central line removed on 8/16. Cr improving 1.7 -> 1.57. MRNOVA planned. Could not tolerate since desatted when supine  8/17/20: Lasix 30mg x1  8/18: PO vanc for Cdiff  8/19: Few beats of VTACH noted.   8/20: Taken for angio: Negative.  8/21: Episode of respiratory distress overnight; desaturated 85%, mucus plug/ambu bagged.   S/P lasix 40mgx1.     Overnight events:no events              ICU Vital Signs Last 24 Hrs  T(C): 37.2 (26 Aug 2020 03:00), Max: 37.4 (25 Aug 2020 07:00)  T(F): 99 (26 Aug 2020 03:00), Max: 99.4 (25 Aug 2020 07:00)  HR: 80 (26 Aug 2020 06:00) (56 - 112)  BP: --  BP(mean): --  ABP: 114/62 (26 Aug 2020 06:00) (103/61 - 211/94)  ABP(mean): 76 (26 Aug 2020 06:00) (69 - 121)  RR: 18 (26 Aug 2020 06:00) (18 - 23)  SpO2: 100% (26 Aug 2020 06:00) (96% - 100%)      08-24-20 @ 07:01  -  08-25-20 @ 07:00  --------------------------------------------------------  IN: 913.4 mL / OUT: 825 mL / NET: 88.4 mL    08-25-20 @ 07:01  -  08-26-20 @ 06:51  --------------------------------------------------------  IN: 1467.1 mL / OUT: 1525 mL / NET: -57.9 mL        Mode: CPAP with PS, FiO2: 30, PEEP: 5, PS: 5, MAP: 8, PIP: 11    acetaminophen   Tablet .. 650 milliGRAM(s) Oral every 6 hours PRN  acetylcysteine 20%  Inhalation 4 milliLiter(s) Inhalation three times a day PRN  albuterol/ipratropium for Nebulization. 3 milliLiter(s) Nebulizer every 6 hours  artificial  tears Solution 1 Drop(s) Both EYES every 6 hours  ceFAZolin   IVPB 2000 milliGRAM(s) IV Intermittent once  chlorhexidine 0.12% Liquid 15 milliLiter(s) Oral Mucosa every 12 hours  chlorhexidine 4% Liquid 1 Application(s) Topical <User Schedule>  chlorhexidine 4% Liquid 1 Application(s) Topical <User Schedule>  dexMEDEtomidine Infusion 0.2 MICROgram(s)/kG/Hr (5.5 mL/Hr) IV Continuous <Continuous>  dextrose 40% Gel 15 Gram(s) Oral once PRN  dextrose 5%. 1000 milliLiter(s) (50 mL/Hr) IV Continuous <Continuous>  dextrose 50% Injectable 12.5 Gram(s) IV Push once  dextrose 50% Injectable 25 Gram(s) IV Push once  dextrose 50% Injectable 25 Gram(s) IV Push once  famotidine    Tablet 20 milliGRAM(s) Oral daily  fentaNYL    Injectable 50 MICROGram(s) IV Push every 2 hours PRN  fentaNYL   Infusion... 0.5 MICROgram(s)/kG/Hr (2.75 mL/Hr) IV Continuous <Continuous>  fentaNYL   Patch 100 MICROgram(s)/Hr 1 Patch Transdermal every 72 hours  glucagon  Injectable 1 milliGRAM(s) IntraMuscular once PRN  hydrALAZINE 50 milliGRAM(s) Oral every 8 hours  isosorbide   dinitrate Tablet (ISORDIL) 10 milliGRAM(s) Oral three times a day  lactobacillus acidophilus 1 Tablet(s) Oral two times a day  metoprolol tartrate 12.5 milliGRAM(s) Oral two times a day  QUEtiapine 50 milliGRAM(s) Oral every 6 hours  sodium chloride 0.9% lock flush 10 milliLiter(s) IV Push every 1 hour PRN  sodium chloride 7% Inhalation 4 milliLiter(s) Inhalation every 12 hours  traMADol 25 milliGRAM(s) Oral every 6 hours PRN  traMADol 50 milliGRAM(s) Oral every 6 hours PRN  vancomycin    Solution 125 milliGRAM(s) Oral every 6 hours      LABS:  Na: 143 (08-25 @ 20:57), 143 (08-24 @ 21:28), 142 (08-23 @ 21:57), 146 (08-23 @ 13:23)  K: 4.1 (08-25 @ 20:57), 3.7 (08-24 @ 21:28), 3.6 (08-23 @ 21:57), 3.4 (08-23 @ 13:23)  Cl: 114 (08-25 @ 20:57), 115 (08-24 @ 21:28), 115 (08-23 @ 21:57), 117 (08-23 @ 13:23)  CO2: 15 (08-25 @ 20:57), 16 (08-24 @ 21:28), 17 (08-23 @ 21:57), 17 (08-23 @ 13:23)  BUN: 36 (08-25 @ 20:57), 35 (08-24 @ 21:28), 36 (08-23 @ 21:57), 37 (08-23 @ 13:23)  Cr: 2.31 (08-25 @ 20:57), 2.30 (08-24 @ 21:28), 1.89 (08-23 @ 21:57), 2.08 (08-23 @ 13:23)  Glu: 155(08-25 @ 20:57), 130(08-24 @ 21:28), 123(08-23 @ 21:57), 128(08-23 @ 13:23)    Hgb: 10.4 (08-25 @ 20:57), 10.0 (08-23 @ 21:57)  Hct: 33.6 (08-25 @ 20:57), 33.1 (08-23 @ 21:57)  WBC: 11.62 (08-25 @ 20:57), 10.59 (08-23 @ 21:57)  Plt: 341 (08-25 @ 20:57), 352 (08-23 @ 21:57)    INR: 1.36 08-25-20 @ 20:57, 1.23 08-23-20 @ 21:57  PTT: 32.1 08-25-20 @ 20:57, 29.6 08-23-20 @ 21:57          LIVER FUNCTIONS - ( 25 Aug 2020 20:57 )  Alb: 3.0 g/dL / Pro: 6.6 g/dL / ALK PHOS: 66 U/L / ALT: 21 U/L / AST: 23 U/L / GGT: x                   BRONCH- 8/13/20- Pseudomonas- Cefepime     EXAMINATION:  General: Agitated, intubated  HEENT: MMM  Neuro:  -Mental status-  No acute distress, EO spont . Strong and purposeful on RUE AG,  followed simple commands on the right , RLE Prox at least 3 distally 4,  Left side  0/5  -CN- Pupils R 5mm NR, L 1mm sluggish, Eyes dysconjugate,    CVS: S1/S2  RESP: Diminished at bases  GI: Soft, non tender, mildly distended, hypoactive BS  Extremities: Warm, skin intact HPI:  60 year old male with PMHx of Afib on coumadin s/p cardiac arrest at work, downtime 25 minutes prior to ROSC. Pupils were R fixed and dilated, left fixed at the time, no gag reflex, post-CA cooling protocol was initiated down to 35 deg. Intubated and put on Nimbex drip. Also on Propofol, fentanyl, levophed. R groin minerva placed. Also put on heparin post-CA. HCT showed R perimesencephalic SAH of unclear etiology, no hydrocephalus. Course also c/b GIB, was put on protonix drip. Also had bleeding from [facility] - urology consulted, clot irrigated.       HOSP COURSE:   8/10- Angio- neg   8/11: VTACH noted (7 beats)  8/13: Febrile  8/14: Started on Unasyn for gram neg rods and gram neg diplococci on bronch gram stain -> switched to cefepime for pseudomonas noted 8/10  8/16: central line removed on 8/16. Cr improving 1.7 -> 1.57. MRNOVA planned. Could not tolerate since desatted when supine  8/17/20: Lasix 30mg x1  8/18: PO vanc for Cdiff  8/19: Few beats of VTACH noted.   8/20: Taken for angio: Negative.  8/21: Episode of respiratory distress overnight; desaturated 85%, mucus plug/ambu bagged.   S/P lasix 40mgx1.     Overnight events: agitation       T(C): 37.2 (08-26-20 @ 11:00), Max: 37.2 (08-26-20 @ 03:00)  HR: 83 (08-26-20 @ 12:40) (11 - 118)  BP: --  RR: 20 (08-26-20 @ 12:00) (18 - 25)  SpO2: 95% (08-26-20 @ 12:40) (95% - 100%)  08-25-20 @ 07:01  -  08-26-20 @ 07:00  --------------------------------------------------------  IN: 1480.9 mL / OUT: 1525 mL / NET: -44.1 mL    08-26-20 @ 07:01  -  08-26-20 @ 12:43  --------------------------------------------------------  IN: 87.6 mL / OUT: 0 mL / NET: 87.6 mL    acetaminophen   Tablet .. 650 milliGRAM(s) Oral every 6 hours PRN  acetylcysteine 20%  Inhalation 4 milliLiter(s) Inhalation three times a day PRN  albuterol/ipratropium for Nebulization. 3 milliLiter(s) Nebulizer every 6 hours  artificial  tears Solution 1 Drop(s) Both EYES every 6 hours  chlorhexidine 0.12% Liquid 15 milliLiter(s) Oral Mucosa every 12 hours  chlorhexidine 4% Liquid 1 Application(s) Topical <User Schedule>  chlorhexidine 4% Liquid 1 Application(s) Topical <User Schedule>  dexMEDEtomidine Infusion 0.2 MICROgram(s)/kG/Hr IV Continuous <Continuous>  dextrose 40% Gel 15 Gram(s) Oral once PRN  dextrose 5%. 1000 milliLiter(s) IV Continuous <Continuous>  dextrose 50% Injectable 12.5 Gram(s) IV Push once  dextrose 50% Injectable 25 Gram(s) IV Push once  dextrose 50% Injectable 25 Gram(s) IV Push once  famotidine    Tablet 20 milliGRAM(s) Oral daily  fentaNYL    Injectable 50 MICROGram(s) IV Push every 2 hours PRN  fentaNYL    Injectable 100 MICROGram(s) IV Push once  fentaNYL    Injectable 100 MICROGram(s) IV Push once  fentaNYL   Infusion... 0.5 MICROgram(s)/kG/Hr IV Continuous <Continuous>  fentaNYL   Patch 100 MICROgram(s)/Hr 1 Patch Transdermal every 72 hours  glucagon  Injectable 1 milliGRAM(s) IntraMuscular once PRN  hydrALAZINE 50 milliGRAM(s) Oral every 8 hours  HYDROmorphone  Injectable 0.5 milliGRAM(s) IV Push every 3 hours PRN  isosorbide   dinitrate Tablet (ISORDIL) 10 milliGRAM(s) Oral three times a day  lactobacillus acidophilus 1 Tablet(s) Oral two times a day  metoprolol tartrate 12.5 milliGRAM(s) Oral two times a day  midazolam Infusion 0.02 mG/kG/Hr IV Continuous <Continuous>  midazolam Injectable 4 milliGRAM(s) IV Push once  midazolam Injectable 4 milliGRAM(s) IV Push once  QUEtiapine 50 milliGRAM(s) Oral every 6 hours  sodium chloride 0.9% lock flush 10 milliLiter(s) IV Push every 1 hour PRN  sodium chloride 7% Inhalation 4 milliLiter(s) Inhalation every 12 hours  traMADol 25 milliGRAM(s) Oral every 6 hours PRN  traMADol 50 milliGRAM(s) Oral every 6 hours PRN  vancomycin    Solution 125 milliGRAM(s) Oral every 6 hours  Mode: AC/ CMV (Assist Control/ Continuous Mandatory Ventilation), RR (machine): 18, TV (machine): 500, FiO2: 30, PEEP: 5, ITime: 1, MAP: 8, PIP: 15  Mode: CPAP with PS, FiO2: 30, PEEP: 5, PS: 5, MAP: 8, PIP: 11          LIVER FUNCTIONS - ( 25 Aug 2020 20:57 )  Alb: 3.0 g/dL / Pro: 6.6 g/dL / ALK PHOS: 66 U/L / ALT: 21 U/L / AST: 23 U/L / GGT: x                   BRONCH- 8/13/20- Pseudomonas- Cefepime     EXAMINATION:  General: Agitated, intubated  HEENT: MMM  Neuro:  -Mental status-  No acute distress, EO spont . Strong and purposeful on RUE AG,  followed simple commands on the right , RLE Prox at least 3 distally 4,  Left side  0/5  -CN- Pupils R 5mm NR, L 1mm sluggish, Eyes dysconjugate,    CVS: S1/S2  RESP: Diminished at bases  GI: Soft, non tender, mildly distended, hypoactive BS  Extremities: Warm, skin intact      LABS:  Na: 143 (08-25 @ 20:57), 143 (08-24 @ 21:28), 142 (08-23 @ 21:57), 146 (08-23 @ 13:23)  K: 4.1 (08-25 @ 20:57), 3.7 (08-24 @ 21:28), 3.6 (08-23 @ 21:57), 3.4 (08-23 @ 13:23)  Cl: 114 (08-25 @ 20:57), 115 (08-24 @ 21:28), 115 (08-23 @ 21:57), 117 (08-23 @ 13:23)  CO2: 15 (08-25 @ 20:57), 16 (08-24 @ 21:28), 17 (08-23 @ 21:57), 17 (08-23 @ 13:23)  BUN: 36 (08-25 @ 20:57), 35 (08-24 @ 21:28), 36 (08-23 @ 21:57), 37 (08-23 @ 13:23)  Cr: 2.31 (08-25 @ 20:57), 2.30 (08-24 @ 21:28), 1.89 (08-23 @ 21:57), 2.08 (08-23 @ 13:23)  Glu: 155(08-25 @ 20:57), 130(08-24 @ 21:28), 123(08-23 @ 21:57), 128(08-23 @ 13:23)    Hgb: 10.4 (08-25 @ 20:57), 10.0 (08-23 @ 21:57)  Hct: 33.6 (08-25 @ 20:57), 33.1 (08-23 @ 21:57)  WBC: 11.62 (08-25 @ 20:57), 10.59 (08-23 @ 21:57)  Plt: 341 (08-25 @ 20:57), 352 (08-23 @ 21:57)    INR: 1.36 08-25-20 @ 20:57, 1.23 08-23-20 @ 21:57  PTT: 32.1 08-25-20 @ 20:57, 29.6 08-23-20 @ 21:57

## 2020-08-26 NOTE — PROGRESS NOTE ADULT - PROBLEM SELECTOR PLAN 3
Stable   Orders per [facility]U team. s/p cerebral angiogram  s/p trach   For PEG. Acceptable cardiac risk to proceed

## 2020-08-26 NOTE — PRE-OP CHECKLIST - TUBE FEEDING HELD SINCE
23-Aug-2020 23:59
25-Aug-2020 23:59
10-Aug-2020 00:00
18-Aug-2020 23:59
20-Aug-2020 03:00
18-Aug-2020 06:00

## 2020-08-26 NOTE — PROGRESS NOTE ADULT - ASSESSMENT
60 year old male s/p cardiac arrest c/b GIB and bleeding from [facility] with perimesencephalic (HH4 mF4) subarachnoid hemorrhage, angio neg x2 ,PBD 18.    NEURO:    SAH, possible sentinal event with cardiac arrest  Angio 8/20: negative.   Q2h neurochecks  CT Head: perimesencephalic SAH   CTA Head: no aneurysm noted  Initial Conventional angiogram 8/10 negative  MRI neuroaxis: Restricted diffusion in R BG, posterior limb of R IC, and anterior right temporal lobe  VEEG negative. DCed.   DCI management (off nimodipine). Poor windows  No EVD as no significant hydrocephalus  Precedex gtt for sedation- RASS 0 to -1, fentanyl gtt weaned off  agitation : Seroquel, prn opioids, patch    PULM: Acute PULM edema- resolved  trach, psv/tc trials  spO2>92%  CTA R/O PE: negative  Continue hypertonic nebs./ Mucomyst prn.   CPAP as tolerated though less likely to be extubated.   trach today     CV:  SBP goal 100- 160  TTE: Global LV dysfunction. EF 41%, Severe concentric LVH, flattening of interventricular septum.   S/P Concern for possible PE - CTA negative  Hydralazine/imdur , metoprolol   Will need cath per cardiology  S/P amio gtt; c/w  daily.  Mg- 2.0 ; Po4- 3.0 ; k- 4.0  Troponin 65--> 66. Stable.  monitor QTc      RENAL: Non anion gap metabolic acidosis  Likely in the setting of diarrhea.  ?CKD with superimposed JALEN.- worsening  Monitor Cr and lytes.  Monitor Is/Os  Cr post angio: stable      GI: S/P GI bleed, hyperlipasemia, Cdiff positive  Diarrhea- - Increased WBC and ABX--> Vanco 125 mg q6 for Cdiff   Diet: NPO for procedure   GI prop PO pepcid  Bowel regimen - hold for diarrhea  LBM: 8/22. Cdiff  Lipase 101 --> 89  LFTs stable    ENDO:   Goal euglycemia (-180)  TFTs wnl      HEME/ONC: Afib on coumadin at home; leukocytosis  Leukocytosis: No eosinophilia   Lipase slightly elevated 101-->89, LFTs wnl  s/p vit K: INR 1.42-->1.2--> 1.5.   Repeat INR 8/23.  VTE prophylaxis: [] SCDs [x] chemoprophylaxis heparin subq.  Dopplers negative for DVT.  Will eventually need to resume coumadin once trach/PEG.       ID: Cdiff positive  Leukocytosis- Diarrhea. Improving  C difficile - see above; hypoactive BS. On PO vancomycin. Continue PO vanc for additional week after cefepime course completed.  Afebrile  completed cefepime  Added probiotic      SOCIAL/FAMILY:  [x] updated at bedside     CODE STATUS:  [x] Full Code [] DNR [] DNI [] Palliative/Comfort Care    DISPOSITION:  [x] ICU [] Stroke Unit [] Floor [] EMU [] RCU [] PCU    [x] Patient is at high risk of neurologic deterioration/death due to: SAH, cardiac arrest 60 year old male s/p cardiac arrest c/b GIB and bleeding from [facility] with perimesencephalic (HH4 mF4) subarachnoid hemorrhage, angio neg x2 ,PBD 19.    NEURO:    SAH, possible sentinal event with cardiac arrest  Angio 8/20: negative.   Q2h neurochecks  CT Head: perimesencephalic SAH   CTA Head: no aneurysm noted  Initial Conventional angiogram 8/10 negative  MRI neuroaxis: Restricted diffusion in R BG, posterior limb of R IC, and anterior right temporal lobe  VEEG negative. DCed.   DCI management (off nimodipine). Poor windows  No EVD as no significant hydrocephalus  Precedex gtt for sedation- RASS 0 to -1, fentanyl gtt weaned off  agitation : Seroquel, prn opioids, patch    PULM: Acute PULM edema- resolved  trach, psv/tc trials  spO2>92%  CTA R/O PE: negative  Continue hypertonic nebs./ Mucomyst prn.   CPAP as tolerated though less likely to be extubated.   trach today     CV:  SBP goal 100- 160  TTE: Global LV dysfunction. EF 41%, Severe concentric LVH, flattening of interventricular septum.   S/P Concern for possible PE - CTA negative  Hydralazine/imdur , metoprolol   Will need cath per cardiology  S/P amio gtt; c/w  daily.  Mg- 2.0 ; Po4- 3.0 ; k- 4.0  Troponin 65--> 66. Stable.  monitor QTc      RENAL: Non anion gap metabolic acidosis  Likely in the setting of diarrhea.  ?CKD with superimposed JALEN.- worsening  Monitor Cr and lytes.  Monitor Is/Os  Cr post angio: stable      GI: S/P GI bleed, hyperlipasemia, Cdiff positive  Diarrhea- - Increased WBC and ABX--> Vanco 125 mg q6 for Cdiff   Diet: NPO for procedure   GI prop PO pepcid  Bowel regimen - hold for diarrhea  LBM: 8/22. Cdiff  Lipase 101 --> 89  LFTs stable    ENDO:   Goal euglycemia (-180)  TFTs wnl      HEME/ONC: Afib on coumadin at home; leukocytosis  Leukocytosis: No eosinophilia   Lipase slightly elevated 101-->89, LFTs wnl  s/p vit K: INR 1.42-->1.2--> 1.5.   Repeat INR 8/23.  VTE prophylaxis: [] SCDs [x] chemoprophylaxis heparin subq.  Dopplers negative for DVT.  Will eventually need to resume coumadin once trach/PEG.       ID: Cdiff positive  Leukocytosis- Diarrhea. Improving  C difficile - see above; hypoactive BS. On PO vancomycin. Continue PO vanc for additional week after cefepime course completed.  Afebrile  completed cefepime  Added probiotic      SOCIAL/FAMILY:  [x] updated at bedside     CODE STATUS:  [x] Full Code [] DNR [] DNI [] Palliative/Comfort Care    DISPOSITION:  [x] ICU [] Stroke Unit [] Floor [] EMU [] RCU [] PCU    [x] Patient is at high risk of neurologic deterioration/death due to: SAH, cardiac arrest 60 year old male s/p cardiac arrest c/b GIB and bleeding from [facility] with perimesencephalic (HH4 mF4) subarachnoid hemorrhage, angio neg x2 ,PBD 19.    NEURO:    SAH, possible sentinal event with cardiac arrest  Angio 8/20: negative.   Q4h neurochecks  CT Head: perimesencephalic SAH   CTA Head: no aneurysm noted  Initial Conventional angiogram 8/10 negative  MRI neuroaxis: Restricted diffusion in R BG, posterior limb of R IC, and anterior right temporal lobe  VEEG negative. DCed.   DCI management (off nimodipine). Poor windows  No EVD as no significant hydrocephalus  Precedex gtt for sedation- RASS 0 to -1  valium 5 mg q 8 hr   agitation : Seroquel 50 mg q6hr   fentanyl 100 mcg patch    PULM: Acute PULM edema- resolved  trached    psv/tc trials  spO2>92%  CTA R/O PE: negative  Continue hypertonic nebs./ Mucomyst prn.   less secretions     CV:  SBP goal 100- 160  TTE: Global LV dysfunction. EF 41%, Severe concentric LVH, flattening of interventricular septum.   S/P Concern for possible PE - CTA negative  Hydralazine/imdur , metoprolol   Will need cath per cardiology  amio  200 daily.  Mg- 2.0 ; Po4- 3.0 ; k- 4.0  monitor QTc while on seroquel       RENAL: Non anion gap metabolic acidosis  Likely in the setting of diarrhea.  ?CKD with superimposed JALEN.- worsening  Monitor Cr and lytes.  Monitor Is/Os  Cr post angio: stable      GI: S/P GI bleed, hyperlipasemia, Cdiff positive  PEG placement today   C.diff Vanco 125 mg q6 for Cdiff   Diet: NPO for procedure   GI prop pepcid  last BM 8/26/2020     ENDO:   Goal euglycemia (-180)  TFTs wnl      HEME/ONC: Afib on coumadin at home; leukocytosis  Leukocytosis: No eosinophilia   VTE prophylaxis: [] SCDs [x] chemoprophylaxis heparin subq.  Dopplers negative for DVT.  Will eventually need to resume coumadin once trach/PEG.       ID: Cdiff positive   Continue PO vanc   Afebrile  completed cefepime  Added probiotic      SOCIAL/FAMILY:  [x] updated at bedside     CODE STATUS:  [x] Full Code [] DNR [] DNI [] Palliative/Comfort Care    DISPOSITION:  [x] ICU [] Stroke Unit [] Floor [] EMU [] RCU [] PCU    [x] Patient is at high risk of neurologic deterioration/death due to: SAH, cardiac arrest

## 2020-08-26 NOTE — PROGRESS NOTE ADULT - SUBJECTIVE AND OBJECTIVE BOX
ENT ISSUE/POD: s/p trach POD 2    HPI: 59 y/o male s/p tracheostomy #8 [facility], POD 2. Pt seen and examined at bedside. No acute events overnight. Pt doing well on vent. No issues per team.         PAST MEDICAL & SURGICAL HISTORY:  On warfarin for atrial fibrillation  No significant past surgical history    Allergies    No Known Allergies    Intolerances      MEDICATIONS  (STANDING):  albuterol/ipratropium for Nebulization. 3 milliLiter(s) Nebulizer every 6 hours  artificial  tears Solution 1 Drop(s) Both EYES every 6 hours  ceFAZolin   IVPB 2000 milliGRAM(s) IV Intermittent once  chlorhexidine 0.12% Liquid 15 milliLiter(s) Oral Mucosa every 12 hours  chlorhexidine 4% Liquid 1 Application(s) Topical <User Schedule>  chlorhexidine 4% Liquid 1 Application(s) Topical <User Schedule>  dexMEDEtomidine Infusion 0.2 MICROgram(s)/kG/Hr (5.5 mL/Hr) IV Continuous <Continuous>  dextrose 5%. 1000 milliLiter(s) (50 mL/Hr) IV Continuous <Continuous>  dextrose 50% Injectable 12.5 Gram(s) IV Push once  dextrose 50% Injectable 25 Gram(s) IV Push once  dextrose 50% Injectable 25 Gram(s) IV Push once  famotidine    Tablet 20 milliGRAM(s) Oral daily  fentaNYL   Infusion... 0.5 MICROgram(s)/kG/Hr (2.75 mL/Hr) IV Continuous <Continuous>  fentaNYL   Patch 100 MICROgram(s)/Hr 1 Patch Transdermal every 72 hours  hydrALAZINE 50 milliGRAM(s) Oral every 8 hours  isosorbide   dinitrate Tablet (ISORDIL) 10 milliGRAM(s) Oral three times a day  lactobacillus acidophilus 1 Tablet(s) Oral two times a day  metoprolol tartrate 12.5 milliGRAM(s) Oral two times a day  QUEtiapine 50 milliGRAM(s) Oral every 6 hours  sodium chloride 7% Inhalation 4 milliLiter(s) Inhalation every 12 hours  vancomycin    Solution 125 milliGRAM(s) Oral every 6 hours    MEDICATIONS  (PRN):  acetaminophen   Tablet .. 650 milliGRAM(s) Oral every 6 hours PRN Temp greater or equal to 38C (100.4F), Mild Pain (1 - 3)  acetylcysteine 20%  Inhalation 4 milliLiter(s) Inhalation three times a day PRN secretions  dextrose 40% Gel 15 Gram(s) Oral once PRN Blood Glucose LESS THAN 70 milliGRAM(s)/deciliter  fentaNYL    Injectable 50 MICROGram(s) IV Push every 2 hours PRN Severe Pain (7 - 10)  glucagon  Injectable 1 milliGRAM(s) IntraMuscular once PRN Glucose LESS THAN 70 milligrams/deciliter  HYDROmorphone  Injectable 0.5 milliGRAM(s) IV Push every 3 hours PRN Agitation  sodium chloride 0.9% lock flush 10 milliLiter(s) IV Push every 1 hour PRN Pre/post blood products, medications, blood draw, and to maintain line patency  traMADol 25 milliGRAM(s) Oral every 6 hours PRN Moderate Pain (4 - 6)  traMADol 50 milliGRAM(s) Oral every 6 hours PRN Severe Pain (7 - 10)      Social History: see consult    Family history: see consult    ROS:   unable to obtain due to pts condition      Vital Signs Last 24 Hrs  T(C): 37.1 (26 Aug 2020 07:00), Max: 37.3 (25 Aug 2020 11:00)  T(F): 98.8 (26 Aug 2020 07:00), Max: 99.2 (25 Aug 2020 11:00)  HR: 71 (26 Aug 2020 08:20) (56 - 112)  BP: --  BP(mean): --  RR: 21 (26 Aug 2020 07:00) (18 - 23)  SpO2: 100% (26 Aug 2020 08:20) (96% - 100%)                          10.4   11.62 )-----------( 341      ( 25 Aug 2020 20:57 )             33.6    08-25    143  |  114<H>  |  36<H>  ----------------------------<  155<H>  4.1   |  15<L>  |  2.31<H>    Ca    9.2      25 Aug 2020 20:57  Phos  4.1     08-25  Mg     2.2     08-25    TPro  6.6  /  Alb  3.0<L>  /  TBili  0.2  /  DBili  <0.1  /  AST  23  /  ALT  21  /  AlkPhos  66  08-25   PT/INR - ( 25 Aug 2020 20:57 )   PT: 16.0 sec;   INR: 1.36 ratio         PTT - ( 25 Aug 2020 20:57 )  PTT:32.1 sec    PHYSICAL EXAM:  Gen: NAD  Skin: No rashes, bruises, or lesions  Head: Normocephalic, Atraumatic  Face: no edema, erythema, or fluctuance. Parotid glands soft without mass  Eyes: no scleral injection  Nose: Nares bilaterally patent, no discharge  Mouth: No Stridor / Drooling / Trismus.  Mucosa moist, tongue/uvula midline, oropharynx clear  Neck: #8 [facility] DCT in place, inflated cuff. Scant serosanguineous drainage. No bleeding noted from stoma, sutures x4 and umbilical tie in place.  No lymphadenopathy, trachea midline, no masses  Lymphatic: No lymphadenopathy  Resp: on vent, ventilating well  Neuro: unable to assess due to patient's condition

## 2020-08-26 NOTE — PROGRESS NOTE ADULT - SUBJECTIVE AND OBJECTIVE BOX
Subjective: Patient seen and examined. No new events except as noted.   remains in NSCU     REVIEW OF SYSTEMS:  Unable to obtain     MEDICATIONS:  MEDICATIONS  (STANDING):  albuterol/ipratropium for Nebulization. 3 milliLiter(s) Nebulizer every 6 hours  artificial  tears Solution 1 Drop(s) Both EYES every 6 hours  chlorhexidine 0.12% Liquid 15 milliLiter(s) Oral Mucosa every 12 hours  chlorhexidine 4% Liquid 1 Application(s) Topical <User Schedule>  chlorhexidine 4% Liquid 1 Application(s) Topical <User Schedule>  clonazePAM  Tablet 0.5 milliGRAM(s) Oral every 8 hours  dexMEDEtomidine Infusion 0.2 MICROgram(s)/kG/Hr (5.5 mL/Hr) IV Continuous <Continuous>  dextrose 5%. 1000 milliLiter(s) (50 mL/Hr) IV Continuous <Continuous>  dextrose 50% Injectable 12.5 Gram(s) IV Push once  dextrose 50% Injectable 25 Gram(s) IV Push once  dextrose 50% Injectable 25 Gram(s) IV Push once  famotidine Injectable 20 milliGRAM(s) IV Push daily  fentaNYL   Infusion... 0.5 MICROgram(s)/kG/Hr (2.75 mL/Hr) IV Continuous <Continuous>  fentaNYL   Patch 100 MICROgram(s)/Hr 1 Patch Transdermal every 72 hours  heparin   Injectable 5000 Unit(s) SubCutaneous <User Schedule>  hydrALAZINE 50 milliGRAM(s) Oral every 8 hours  isosorbide   dinitrate Tablet (ISORDIL) 10 milliGRAM(s) Oral three times a day  lactobacillus acidophilus 1 Tablet(s) Oral two times a day  metoprolol tartrate 12.5 milliGRAM(s) Oral two times a day  midazolam Injectable 4 milliGRAM(s) IV Push once  multiple electrolytes Injection Type 1 1000 milliLiter(s) (50 mL/Hr) IV Continuous <Continuous>  QUEtiapine 50 milliGRAM(s) Oral every 6 hours  sodium chloride 7% Inhalation 4 milliLiter(s) Inhalation every 12 hours  vancomycin    Solution 125 milliGRAM(s) Oral every 6 hours      PHYSICAL EXAM:  T(C): 37.1 (08-26-20 @ 20:00), Max: 37.2 (08-26-20 @ 03:00)  HR: 72 (08-26-20 @ 20:00) (11 - 118)  BP: --  RR: 18 (08-26-20 @ 20:00) (15 - 27)  SpO2: 100% (08-26-20 @ 20:00) (95% - 100%)  Wt(kg): --  I&O's Summary    25 Aug 2020 07:01  -  26 Aug 2020 07:00  --------------------------------------------------------  IN: 1480.9 mL / OUT: 1525 mL / NET: -44.1 mL    26 Aug 2020 07:01  -  26 Aug 2020 20:53  --------------------------------------------------------  IN: 245.4 mL / OUT: 400 mL / NET: -154.6 mL        Appearance: more awake , +trach   HEENT:   Dry  oral mucosa,  Lymphatic: No lymphadenopathy  Cardiovascular: Normal S1 S2, No JVD, No murmurs, No edema  Respiratory: Ventilated   Psychiatry: A & O x 0  Gastrointestinal:  Soft, Non-tender, + BS	  Skin: No rashes, No ecchymoses, No cyanosis	  Mental status- No acute distress, EO to stim  -CN- Pupils R 4mm NR, L 2mm sluggish, EOMI, tongue midline, face symmetric  +cough/gag  C briskly, two fingers/thumbs up on RUE, distally AG, wiggles toes on RLE, no        LABS:    CARDIAC MARKERS:                                10.4   11.62 )-----------( 341      ( 25 Aug 2020 20:57 )             33.6     08-25    143  |  114<H>  |  36<H>  ----------------------------<  155<H>  4.1   |  15<L>  |  2.31<H>    Ca    9.2      25 Aug 2020 20:57  Phos  4.1     08-25  Mg     2.2     08-25    TPro  6.6  /  Alb  3.0<L>  /  TBili  0.2  /  DBili  <0.1  /  AST  23  /  ALT  21  /  AlkPhos  66  08-25    proBNP:   Lipid Profile:   HgA1c:   TSH:             TELEMETRY: 	 AF   ECG:  	  RADIOLOGY:   DIAGNOSTIC TESTING:  [ ] Echocardiogram:  [ ]  Catheterization:  [ ] Stress Test:    OTHER:

## 2020-08-26 NOTE — PROGRESS NOTE ADULT - ASSESSMENT
HH5 mF4, post-bleed day 17  - Enteral Vanco for c. diff  - S/p tracheostomy, pressure support trial  - Seroquel, monitor Qtc  - Amiodarone for arrhythmia  - S/p PEG   - Cardiac cath when stable  - Add benzo to wean sedation

## 2020-08-26 NOTE — PROGRESS NOTE ADULT - ASSESSMENT
61 y/o male s/p trach POD 2. On exam, #8 DCT trach in place, sutures x4 and umbilical tie in place. No bleeding noted. Doing well on ventilator.

## 2020-08-26 NOTE — PROGRESS NOTE ADULT - PROBLEM SELECTOR PLAN 1
- Plan to remove sutures on POD #7  - Do not remove umbilical trach tie  - HOB elevation  - Suction PRN  - Continue trach care  - ENT will continue to follow, call with questions x [phone]

## 2020-08-26 NOTE — PROGRESS NOTE ADULT - ASSESSMENT
59YO male on coumadin for afib s/p cardiac arrest at work, down 25 minutes prior to ROSC. Pupils were R fixed and dilated, left fixed at the time, no gag reflex, post-CA cooling protocol was initiated down to 35 deg. Intubated and put on Nimbex drip. Also on Propofol, fentanyl, levophed. R groin minerva placed. Also put on heparin post-CA. 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 Course also c/b GIB, put on protonix drip. Prior to xfer was started on 3% at 30cc/hr. (09 Aug 2020 14:30)  Also developed gross hematuria - Urology consulted +catheter with clot (irrigated and exchanged) suspected 2/2 traumatic catheterization    Hospital Course complicated by fever and Pseudomonas PNA (s/p Rx) then developed C diff (now with resolved leukocytosis and improved stooling, now soft per report); remains on enteral Vanco    Respiratory Failure - s/p trach 8/24  Dysphagia - +NGT, awaiting PEG  C diff - clinically improving   Abdominal distension -NOBGP; no colonic distension      RECS:  -Continue enteral Vanco and Bacid as ordered  -Monitor stools and WBC  -Pepcid for GI prophylaxis  -NPO for planned PEG today; informed consent obtained from pt's spouse Ashley Vieyra (placed in chart)  -Ancef 2gram x1 dose on call for PEG  -sedation as per NSCU team    Further management per NSCU team    Benja Flores PA-C    [facility]  [phone]  After hours and weekend coverage [phone]

## 2020-08-26 NOTE — PROGRESS NOTE ADULT - SUBJECTIVE AND OBJECTIVE BOX
Patient is a 60y old  Male who presented with a chief complaint of sah (16 Aug 2020 07:48)      INTERVAL HPI/OVERNIGHT EVENTS:  3 BMs yesterday  intermittently agitated, requiring sedation  no rectal bleeding or melena    MEDICATIONS  (STANDING):  albuterol/ipratropium for Nebulization. 3 milliLiter(s) Nebulizer every 6 hours  artificial  tears Solution 1 Drop(s) Both EYES every 6 hours  ceFAZolin   IVPB 2000 milliGRAM(s) IV Intermittent once  chlorhexidine 0.12% Liquid 15 milliLiter(s) Oral Mucosa every 12 hours  chlorhexidine 4% Liquid 1 Application(s) Topical <User Schedule>  chlorhexidine 4% Liquid 1 Application(s) Topical <User Schedule>  dexMEDEtomidine Infusion 0.2 MICROgram(s)/kG/Hr (5.5 mL/Hr) IV Continuous <Continuous>  dextrose 5%. 1000 milliLiter(s) (50 mL/Hr) IV Continuous <Continuous>  dextrose 50% Injectable 12.5 Gram(s) IV Push once  dextrose 50% Injectable 25 Gram(s) IV Push once  dextrose 50% Injectable 25 Gram(s) IV Push once  famotidine    Tablet 20 milliGRAM(s) Oral daily  fentaNYL   Infusion... 0.5 MICROgram(s)/kG/Hr (2.75 mL/Hr) IV Continuous <Continuous>  fentaNYL   Patch 100 MICROgram(s)/Hr 1 Patch Transdermal every 72 hours  hydrALAZINE 50 milliGRAM(s) Oral every 8 hours  isosorbide   dinitrate Tablet (ISORDIL) 10 milliGRAM(s) Oral three times a day  lactobacillus acidophilus 1 Tablet(s) Oral two times a day  metoprolol tartrate 12.5 milliGRAM(s) Oral two times a day  QUEtiapine 50 milliGRAM(s) Oral every 6 hours  sodium chloride 7% Inhalation 4 milliLiter(s) Inhalation every 12 hours  vancomycin    Solution 125 milliGRAM(s) Oral every 6 hours    MEDICATIONS  (PRN):  acetaminophen   Tablet .. 650 milliGRAM(s) Oral every 6 hours PRN Temp greater or equal to 38C (100.4F), Mild Pain (1 - 3)  acetylcysteine 20%  Inhalation 4 milliLiter(s) Inhalation three times a day PRN secretions  dextrose 40% Gel 15 Gram(s) Oral once PRN Blood Glucose LESS THAN 70 milliGRAM(s)/deciliter  fentaNYL    Injectable 50 MICROGram(s) IV Push every 2 hours PRN Severe Pain (7 - 10)  glucagon  Injectable 1 milliGRAM(s) IntraMuscular once PRN Glucose LESS THAN 70 milligrams/deciliter  HYDROmorphone  Injectable 0.5 milliGRAM(s) IV Push every 3 hours PRN Agitation  sodium chloride 0.9% lock flush 10 milliLiter(s) IV Push every 1 hour PRN Pre/post blood products, medications, blood draw, and to maintain line patency  traMADol 25 milliGRAM(s) Oral every 6 hours PRN Moderate Pain (4 - 6)  traMADol 50 milliGRAM(s) Oral every 6 hours PRN Severe Pain (7 - 10)      Allergies  No Known Allergies      Review of Systems:  unable to obtain    Vital Signs Last 24 Hrs  T(C): 37.1 (26 Aug 2020 07:00), Max: 37.3 (25 Aug 2020 11:00)  T(F): 98.8 (26 Aug 2020 07:00), Max: 99.2 (25 Aug 2020 11:00)  HR: 118 (26 Aug 2020 09:00) (56 - 118)  BP: --  BP(mean): --  RR: 21 (26 Aug 2020 09:00) (18 - 25)  SpO2: 99% (26 Aug 2020 09:00) (96% - 100%)    PHYSICAL EXAM:  Constitutional: NAD, intermittently restless +NGT . opens eyes, not following commands  Neck: +trach - clean  Respiratory: +vent sounds, decreased BS at bases  Cardiovascular: S1 and S2, tachy  Gastrointestinal: BS+, softly distended no rebound or rigidity  Extremities: No peripheral edema  Vascular: 2+ peripheral pulses  Neurological: intermittently restless opens eyes. not following commands  moves RUE  +dysconjugate gaze  Skin: No rashes    LABS:                        10.4   11.62 )-----------( 341      ( 25 Aug 2020 20:57 )             33.6     08-25    143  |  114<H>  |  36<H>  ----------------------------<  155<H>  4.1   |  15<L>  |  2.31<H>    Ca    9.2      25 Aug 2020 20:57  Phos  4.1     08-25  Mg     2.2     08-25    TPro  6.6  /  Alb  3.0<L>  /  TBili  0.2  /  DBili  <0.1  /  AST  23  /  ALT  21  /  AlkPhos  66  08-25    PT/INR - ( 25 Aug 2020 20:57 )   PT: 16.0 sec;   INR: 1.36 ratio    PTT - ( 25 Aug 2020 20:57 )  PTT:32.1 sec        RADIOLOGY & ADDITIONAL TESTS:  < from: Xray Abdomen 1 View PORTABLE -Routine (08.25.20 @ 06:19) >  FINDINGS:    Nonobstructive bowel gas pattern.  There are degenerative changes of the spine.  An enteric tube is seen with its tip overlying the stomach.    IMPRESSION:    Nonobstructive bowel gas pattern..

## 2020-08-27 LAB
ANION GAP SERPL CALC-SCNC: 14 MMOL/L — SIGNIFICANT CHANGE UP (ref 5–17)
BASOPHILS # BLD AUTO: 0.07 K/UL — SIGNIFICANT CHANGE UP (ref 0–0.2)
BASOPHILS NFR BLD AUTO: 0.6 % — SIGNIFICANT CHANGE UP (ref 0–2)
BUN SERPL-MCNC: 32 MG/DL — HIGH (ref 7–23)
CALCIUM SERPL-MCNC: 9.2 MG/DL — SIGNIFICANT CHANGE UP (ref 8.4–10.5)
CHLORIDE SERPL-SCNC: 116 MMOL/L — HIGH (ref 96–108)
CO2 SERPL-SCNC: 16 MMOL/L — LOW (ref 22–31)
CREAT SERPL-MCNC: 2.37 MG/DL — HIGH (ref 0.5–1.3)
EOSINOPHIL # BLD AUTO: 0.21 K/UL — SIGNIFICANT CHANGE UP (ref 0–0.5)
EOSINOPHIL NFR BLD AUTO: 1.9 % — SIGNIFICANT CHANGE UP (ref 0–6)
GAS PNL BLDA: SIGNIFICANT CHANGE UP
GLUCOSE SERPL-MCNC: 121 MG/DL — HIGH (ref 70–99)
HCT VFR BLD CALC: 30.3 % — LOW (ref 39–50)
HGB BLD-MCNC: 9.3 G/DL — LOW (ref 13–17)
IMM GRANULOCYTES NFR BLD AUTO: 1.5 % — SIGNIFICANT CHANGE UP (ref 0–1.5)
LYMPHOCYTES # BLD AUTO: 0.82 K/UL — LOW (ref 1–3.3)
LYMPHOCYTES # BLD AUTO: 7.6 % — LOW (ref 13–44)
MAGNESIUM SERPL-MCNC: 2.2 MG/DL — SIGNIFICANT CHANGE UP (ref 1.6–2.6)
MCHC RBC-ENTMCNC: 27.3 PG — SIGNIFICANT CHANGE UP (ref 27–34)
MCHC RBC-ENTMCNC: 30.7 GM/DL — LOW (ref 32–36)
MCV RBC AUTO: 88.9 FL — SIGNIFICANT CHANGE UP (ref 80–100)
MONOCYTES # BLD AUTO: 0.81 K/UL — SIGNIFICANT CHANGE UP (ref 0–0.9)
MONOCYTES NFR BLD AUTO: 7.5 % — SIGNIFICANT CHANGE UP (ref 2–14)
NEUTROPHILS # BLD AUTO: 8.79 K/UL — HIGH (ref 1.8–7.4)
NEUTROPHILS NFR BLD AUTO: 80.9 % — HIGH (ref 43–77)
NRBC # BLD: 0 /100 WBCS — SIGNIFICANT CHANGE UP (ref 0–0)
PHOSPHATE SERPL-MCNC: 3.6 MG/DL — SIGNIFICANT CHANGE UP (ref 2.5–4.5)
PLATELET # BLD AUTO: 309 K/UL — SIGNIFICANT CHANGE UP (ref 150–400)
POTASSIUM SERPL-MCNC: 4.2 MMOL/L — SIGNIFICANT CHANGE UP (ref 3.5–5.3)
POTASSIUM SERPL-SCNC: 4.2 MMOL/L — SIGNIFICANT CHANGE UP (ref 3.5–5.3)
RBC # BLD: 3.41 M/UL — LOW (ref 4.2–5.8)
RBC # FLD: 15 % — HIGH (ref 10.3–14.5)
SODIUM SERPL-SCNC: 146 MMOL/L — HIGH (ref 135–145)
WBC # BLD: 10.86 K/UL — HIGH (ref 3.8–10.5)
WBC # FLD AUTO: 10.86 K/UL — HIGH (ref 3.8–10.5)

## 2020-08-27 PROCEDURE — 99223 1ST HOSP IP/OBS HIGH 75: CPT

## 2020-08-27 PROCEDURE — 93010 ELECTROCARDIOGRAM REPORT: CPT

## 2020-08-27 PROCEDURE — 99232 SBSQ HOSP IP/OBS MODERATE 35: CPT

## 2020-08-27 PROCEDURE — 99291 CRITICAL CARE FIRST HOUR: CPT

## 2020-08-27 PROCEDURE — 99231 SBSQ HOSP IP/OBS SF/LOW 25: CPT

## 2020-08-27 PROCEDURE — 99292 CRITICAL CARE ADDL 30 MIN: CPT

## 2020-08-27 RX ORDER — HYDROMORPHONE HYDROCHLORIDE 2 MG/ML
0.25 INJECTION INTRAMUSCULAR; INTRAVENOUS; SUBCUTANEOUS ONCE
Refills: 0 | Status: DISCONTINUED | OUTPATIENT
Start: 2020-08-27 | End: 2020-08-27

## 2020-08-27 RX ORDER — POTASSIUM CHLORIDE 20 MEQ
20 PACKET (EA) ORAL ONCE
Refills: 0 | Status: COMPLETED | OUTPATIENT
Start: 2020-08-27 | End: 2020-08-27

## 2020-08-27 RX ORDER — SODIUM CHLORIDE 9 MG/ML
250 INJECTION INTRAMUSCULAR; INTRAVENOUS; SUBCUTANEOUS ONCE
Refills: 0 | Status: COMPLETED | OUTPATIENT
Start: 2020-08-27 | End: 2020-08-27

## 2020-08-27 RX ORDER — HYDROMORPHONE HYDROCHLORIDE 2 MG/ML
2 INJECTION INTRAMUSCULAR; INTRAVENOUS; SUBCUTANEOUS EVERY 4 HOURS
Refills: 0 | Status: DISCONTINUED | OUTPATIENT
Start: 2020-08-27 | End: 2020-08-29

## 2020-08-27 RX ORDER — FENTANYL CITRATE 50 UG/ML
50 INJECTION INTRAVENOUS ONCE
Refills: 0 | Status: DISCONTINUED | OUTPATIENT
Start: 2020-08-27 | End: 2020-08-27

## 2020-08-27 RX ORDER — FENTANYL CITRATE 50 UG/ML
100 INJECTION INTRAVENOUS ONCE
Refills: 0 | Status: DISCONTINUED | OUTPATIENT
Start: 2020-08-27 | End: 2020-08-27

## 2020-08-27 RX ORDER — HYDROMORPHONE HYDROCHLORIDE 2 MG/ML
0.5 INJECTION INTRAMUSCULAR; INTRAVENOUS; SUBCUTANEOUS ONCE
Refills: 0 | Status: DISCONTINUED | OUTPATIENT
Start: 2020-08-27 | End: 2020-08-27

## 2020-08-27 RX ORDER — METOPROLOL TARTRATE 50 MG
5 TABLET ORAL ONCE
Refills: 0 | Status: COMPLETED | OUTPATIENT
Start: 2020-08-27 | End: 2020-08-27

## 2020-08-27 RX ORDER — SODIUM CHLORIDE 9 MG/ML
4 INJECTION INTRAMUSCULAR; INTRAVENOUS; SUBCUTANEOUS EVERY 8 HOURS
Refills: 0 | Status: DISCONTINUED | OUTPATIENT
Start: 2020-08-27 | End: 2020-09-01

## 2020-08-27 RX ORDER — ALTEPLASE 100 MG
2 KIT INTRAVENOUS ONCE
Refills: 0 | Status: COMPLETED | OUTPATIENT
Start: 2020-08-27 | End: 2020-08-28

## 2020-08-27 RX ORDER — METOPROLOL TARTRATE 50 MG
25 TABLET ORAL ONCE
Refills: 0 | Status: COMPLETED | OUTPATIENT
Start: 2020-08-27 | End: 2020-08-27

## 2020-08-27 RX ORDER — METOPROLOL TARTRATE 50 MG
50 TABLET ORAL THREE TIMES A DAY
Refills: 0 | Status: DISCONTINUED | OUTPATIENT
Start: 2020-08-27 | End: 2020-08-29

## 2020-08-27 RX ORDER — METOPROLOL TARTRATE 50 MG
25 TABLET ORAL THREE TIMES A DAY
Refills: 0 | Status: DISCONTINUED | OUTPATIENT
Start: 2020-08-27 | End: 2020-08-27

## 2020-08-27 RX ORDER — SODIUM CHLORIDE 9 MG/ML
500 INJECTION INTRAMUSCULAR; INTRAVENOUS; SUBCUTANEOUS ONCE
Refills: 0 | Status: COMPLETED | OUTPATIENT
Start: 2020-08-27 | End: 2020-08-27

## 2020-08-27 RX ORDER — AMIODARONE HYDROCHLORIDE 400 MG/1
200 TABLET ORAL DAILY
Refills: 0 | Status: DISCONTINUED | OUTPATIENT
Start: 2020-08-27 | End: 2020-08-29

## 2020-08-27 RX ADMIN — HYDROMORPHONE HYDROCHLORIDE 0.25 MILLIGRAM(S): 2 INJECTION INTRAMUSCULAR; INTRAVENOUS; SUBCUTANEOUS at 11:45

## 2020-08-27 RX ADMIN — Medication 25 MILLIGRAM(S): at 14:33

## 2020-08-27 RX ADMIN — Medication 1 DROP(S): at 18:13

## 2020-08-27 RX ADMIN — Medication 12.5 MILLIGRAM(S): at 06:02

## 2020-08-27 RX ADMIN — Medication 4 MILLILITER(S): at 14:22

## 2020-08-27 RX ADMIN — HYDROMORPHONE HYDROCHLORIDE 0.5 MILLIGRAM(S): 2 INJECTION INTRAMUSCULAR; INTRAVENOUS; SUBCUTANEOUS at 00:15

## 2020-08-27 RX ADMIN — ISOSORBIDE DINITRATE 10 MILLIGRAM(S): 5 TABLET ORAL at 21:00

## 2020-08-27 RX ADMIN — Medication 25 MILLIGRAM(S): at 21:00

## 2020-08-27 RX ADMIN — Medication 1 TABLET(S): at 17:20

## 2020-08-27 RX ADMIN — Medication 3 MILLILITER(S): at 18:02

## 2020-08-27 RX ADMIN — CHLORHEXIDINE GLUCONATE 15 MILLILITER(S): 213 SOLUTION TOPICAL at 05:04

## 2020-08-27 RX ADMIN — ISOSORBIDE DINITRATE 10 MILLIGRAM(S): 5 TABLET ORAL at 14:29

## 2020-08-27 RX ADMIN — SODIUM CHLORIDE 1000 MILLILITER(S): 9 INJECTION INTRAMUSCULAR; INTRAVENOUS; SUBCUTANEOUS at 01:50

## 2020-08-27 RX ADMIN — Medication 0.5 MILLIGRAM(S): at 05:01

## 2020-08-27 RX ADMIN — Medication 5 MILLIGRAM(S): at 11:48

## 2020-08-27 RX ADMIN — Medication 50 MILLIGRAM(S): at 16:13

## 2020-08-27 RX ADMIN — HYDROMORPHONE HYDROCHLORIDE 0.5 MILLIGRAM(S): 2 INJECTION INTRAMUSCULAR; INTRAVENOUS; SUBCUTANEOUS at 01:10

## 2020-08-27 RX ADMIN — HYDROMORPHONE HYDROCHLORIDE 0.5 MILLIGRAM(S): 2 INJECTION INTRAMUSCULAR; INTRAVENOUS; SUBCUTANEOUS at 14:00

## 2020-08-27 RX ADMIN — Medication 125 MILLIGRAM(S): at 17:14

## 2020-08-27 RX ADMIN — SODIUM CHLORIDE 4 MILLILITER(S): 9 INJECTION INTRAMUSCULAR; INTRAVENOUS; SUBCUTANEOUS at 14:30

## 2020-08-27 RX ADMIN — Medication 125 MILLIGRAM(S): at 05:01

## 2020-08-27 RX ADMIN — Medication 1 TABLET(S): at 05:01

## 2020-08-27 RX ADMIN — HYDROMORPHONE HYDROCHLORIDE 0.5 MILLIGRAM(S): 2 INJECTION INTRAMUSCULAR; INTRAVENOUS; SUBCUTANEOUS at 01:30

## 2020-08-27 RX ADMIN — CHLORHEXIDINE GLUCONATE 1 APPLICATION(S): 213 SOLUTION TOPICAL at 05:02

## 2020-08-27 RX ADMIN — Medication 1 DROP(S): at 23:02

## 2020-08-27 RX ADMIN — Medication 50 MILLIGRAM(S): at 23:00

## 2020-08-27 RX ADMIN — HYDROMORPHONE HYDROCHLORIDE 0.5 MILLIGRAM(S): 2 INJECTION INTRAMUSCULAR; INTRAVENOUS; SUBCUTANEOUS at 07:57

## 2020-08-27 RX ADMIN — TRAMADOL HYDROCHLORIDE 50 MILLIGRAM(S): 50 TABLET ORAL at 19:20

## 2020-08-27 RX ADMIN — SODIUM CHLORIDE 1000 MILLILITER(S): 9 INJECTION INTRAMUSCULAR; INTRAVENOUS; SUBCUTANEOUS at 01:30

## 2020-08-27 RX ADMIN — QUETIAPINE FUMARATE 50 MILLIGRAM(S): 200 TABLET, FILM COATED ORAL at 17:15

## 2020-08-27 RX ADMIN — Medication 3 MILLILITER(S): at 00:51

## 2020-08-27 RX ADMIN — HEPARIN SODIUM 5000 UNIT(S): 5000 INJECTION INTRAVENOUS; SUBCUTANEOUS at 05:04

## 2020-08-27 RX ADMIN — Medication 2 MILLIGRAM(S): at 01:15

## 2020-08-27 RX ADMIN — QUETIAPINE FUMARATE 50 MILLIGRAM(S): 200 TABLET, FILM COATED ORAL at 05:01

## 2020-08-27 RX ADMIN — QUETIAPINE FUMARATE 50 MILLIGRAM(S): 200 TABLET, FILM COATED ORAL at 11:39

## 2020-08-27 RX ADMIN — HYDROMORPHONE HYDROCHLORIDE 0.5 MILLIGRAM(S): 2 INJECTION INTRAMUSCULAR; INTRAVENOUS; SUBCUTANEOUS at 23:41

## 2020-08-27 RX ADMIN — HEPARIN SODIUM 5000 UNIT(S): 5000 INJECTION INTRAVENOUS; SUBCUTANEOUS at 14:26

## 2020-08-27 RX ADMIN — FENTANYL CITRATE 1 PATCH: 50 INJECTION INTRAVENOUS at 19:14

## 2020-08-27 RX ADMIN — HYDROMORPHONE HYDROCHLORIDE 0.5 MILLIGRAM(S): 2 INJECTION INTRAMUSCULAR; INTRAVENOUS; SUBCUTANEOUS at 07:42

## 2020-08-27 RX ADMIN — FENTANYL CITRATE 50 MICROGRAM(S): 50 INJECTION INTRAVENOUS at 10:30

## 2020-08-27 RX ADMIN — Medication 25 MILLIGRAM(S): at 23:49

## 2020-08-27 RX ADMIN — SODIUM CHLORIDE 4 MILLILITER(S): 9 INJECTION INTRAMUSCULAR; INTRAVENOUS; SUBCUTANEOUS at 05:26

## 2020-08-27 RX ADMIN — Medication 0.5 MILLIGRAM(S): at 21:00

## 2020-08-27 RX ADMIN — TRAMADOL HYDROCHLORIDE 50 MILLIGRAM(S): 50 TABLET ORAL at 19:50

## 2020-08-27 RX ADMIN — QUETIAPINE FUMARATE 50 MILLIGRAM(S): 200 TABLET, FILM COATED ORAL at 23:00

## 2020-08-27 RX ADMIN — HYDROMORPHONE HYDROCHLORIDE 0.5 MILLIGRAM(S): 2 INJECTION INTRAMUSCULAR; INTRAVENOUS; SUBCUTANEOUS at 23:26

## 2020-08-27 RX ADMIN — CHLORHEXIDINE GLUCONATE 1 APPLICATION(S): 213 SOLUTION TOPICAL at 05:03

## 2020-08-27 RX ADMIN — Medication 20 MILLIEQUIVALENT(S): at 05:01

## 2020-08-27 RX ADMIN — FENTANYL CITRATE 50 MICROGRAM(S): 50 INJECTION INTRAVENOUS at 10:45

## 2020-08-27 RX ADMIN — FAMOTIDINE 20 MILLIGRAM(S): 10 INJECTION INTRAVENOUS at 11:39

## 2020-08-27 RX ADMIN — Medication 0.5 MILLIGRAM(S): at 14:28

## 2020-08-27 RX ADMIN — HYDROMORPHONE HYDROCHLORIDE 0.25 MILLIGRAM(S): 2 INJECTION INTRAMUSCULAR; INTRAVENOUS; SUBCUTANEOUS at 11:30

## 2020-08-27 RX ADMIN — Medication 125 MILLIGRAM(S): at 23:00

## 2020-08-27 RX ADMIN — HEPARIN SODIUM 5000 UNIT(S): 5000 INJECTION INTRAVENOUS; SUBCUTANEOUS at 21:00

## 2020-08-27 RX ADMIN — Medication 1 DROP(S): at 05:02

## 2020-08-27 RX ADMIN — FENTANYL CITRATE 1 PATCH: 50 INJECTION INTRAVENOUS at 07:41

## 2020-08-27 RX ADMIN — CHLORHEXIDINE GLUCONATE 15 MILLILITER(S): 213 SOLUTION TOPICAL at 17:14

## 2020-08-27 RX ADMIN — Medication 125 MILLIGRAM(S): at 11:39

## 2020-08-27 RX ADMIN — HYDROMORPHONE HYDROCHLORIDE 0.5 MILLIGRAM(S): 2 INJECTION INTRAMUSCULAR; INTRAVENOUS; SUBCUTANEOUS at 00:00

## 2020-08-27 RX ADMIN — HYDROMORPHONE HYDROCHLORIDE 0.5 MILLIGRAM(S): 2 INJECTION INTRAMUSCULAR; INTRAVENOUS; SUBCUTANEOUS at 13:45

## 2020-08-27 RX ADMIN — Medication 1 DROP(S): at 12:14

## 2020-08-27 RX ADMIN — ISOSORBIDE DINITRATE 10 MILLIGRAM(S): 5 TABLET ORAL at 06:02

## 2020-08-27 RX ADMIN — AMIODARONE HYDROCHLORIDE 200 MILLIGRAM(S): 400 TABLET ORAL at 12:01

## 2020-08-27 RX ADMIN — Medication 3 MILLILITER(S): at 05:26

## 2020-08-27 RX ADMIN — Medication 3 MILLILITER(S): at 12:18

## 2020-08-27 NOTE — PROGRESS NOTE ADULT - SUBJECTIVE AND OBJECTIVE BOX
Patient is a 60y old  Male who presented with a chief complaint of sah (16 Aug 2020 07:48)      INTERVAL HPI/OVERNIGHT EVENTS:  s/p PEG yesterday, remains on vent  no reported issues with PEG overnight - no bleeding  +stools more loose/mucoid, non bloody. rectal tube replaced  continues to have agitation  no fever    < from: Upper Endoscopy (08.26.20 @ 14:57) >  Findings:       The examined esophagus was normal.       No gross lesions were noted in the entire examined stomach. Placement of an externally        removable PEG with 2 T-fasteners was successfully completed. The external bumper was at the        2.0 cm marking on the tube. Estimated blood loss was minimal.       The examined duodenum was normal.                                                                                                        Impression:          - Normal esophagus.                       - No gross lesions in the stomach.                       - Normal examined duodenum.                       - An externally removable PEG placement was successfully completed.                       - No specimens collected.  Recommendation:      - Please follow the post-PEG recommendations including: antibiotic ointment                        to site, change dressing once per day, check site for bleeding q 4 hrs, clean                        site with soap and water daily and dry thoroughly, NPO x4 hrs then water              today, may use PEG today for meds and water and may use PEG tomorrow for                        feedings.      MEDICATIONS  (STANDING):  albuterol/ipratropium for Nebulization. 3 milliLiter(s) Nebulizer every 6 hours  aMIOdarone    Tablet 200 milliGRAM(s) Oral daily  artificial  tears Solution 1 Drop(s) Both EYES every 6 hours  chlorhexidine 0.12% Liquid 15 milliLiter(s) Oral Mucosa every 12 hours  chlorhexidine 4% Liquid 1 Application(s) Topical <User Schedule>  chlorhexidine 4% Liquid 1 Application(s) Topical <User Schedule>  clonazePAM  Tablet 0.5 milliGRAM(s) Oral every 8 hours  dextrose 5%. 1000 milliLiter(s) (50 mL/Hr) IV Continuous <Continuous>  dextrose 50% Injectable 12.5 Gram(s) IV Push once  dextrose 50% Injectable 25 Gram(s) IV Push once  dextrose 50% Injectable 25 Gram(s) IV Push once  famotidine Injectable 20 milliGRAM(s) IV Push daily  heparin   Injectable 5000 Unit(s) SubCutaneous every 8 hours  hydrALAZINE 50 milliGRAM(s) Oral every 8 hours  isosorbide   dinitrate Tablet (ISORDIL) 10 milliGRAM(s) Oral three times a day  lactobacillus acidophilus 1 Tablet(s) Oral two times a day  metoprolol tartrate 25 milliGRAM(s) Oral three times a day  QUEtiapine 50 milliGRAM(s) Oral every 6 hours  sodium chloride 7% Inhalation 4 milliLiter(s) Inhalation every 8 hours  vancomycin    Solution 125 milliGRAM(s) Oral every 6 hours    MEDICATIONS  (PRN):  acetaminophen   Tablet .. 650 milliGRAM(s) Oral every 6 hours PRN Temp greater or equal to 38C (100.4F), Mild Pain (1 - 3)  acetylcysteine 20%  Inhalation 4 milliLiter(s) Inhalation three times a day PRN secretions  dextrose 40% Gel 15 Gram(s) Oral once PRN Blood Glucose LESS THAN 70 milliGRAM(s)/deciliter  glucagon  Injectable 1 milliGRAM(s) IntraMuscular once PRN Glucose LESS THAN 70 milligrams/deciliter  HYDROmorphone  Injectable 0.5 milliGRAM(s) IV Push every 3 hours PRN Agitation  sodium chloride 0.9% lock flush 10 milliLiter(s) IV Push every 1 hour PRN Pre/post blood products, medications, blood draw, and to maintain line patency  traMADol 25 milliGRAM(s) Oral every 6 hours PRN Moderate Pain (4 - 6)  traMADol 50 milliGRAM(s) Oral every 6 hours PRN Severe Pain (7 - 10)    Allergies  No Known Allergies    Review of Systems:  unable to obtain    Vital Signs Last 24 Hrs  T(C): 37.4 (27 Aug 2020 11:00), Max: 37.4 (27 Aug 2020 11:00)  T(F): 99.4 (27 Aug 2020 11:00), Max: 99.4 (27 Aug 2020 11:00)  HR: 121 (27 Aug 2020 11:45) (11 - 143)  BP: 159/90 (27 Aug 2020 11:45) (126/87 - 159/90)  BP(mean): 111 (27 Aug 2020 11:45) (76 - 111)  RR: 24 (27 Aug 2020 11:45) (15 - 50)  SpO2: 99% (27 Aug 2020 11:45) (91% - 100%)    PHYSICAL EXAM:  Constitutional: NAD, restless +right UE wrist restraint +LUE splint in place. opens eyes, not following commands  Neck: +trach - clean  Respiratory: +vent sounds, decreased BS at bases  Cardiovascular: S1 and S2, tachy  Gastrointestinal: BS+, obese softly distended no rebound or rigidity  +PEG site clean and dry bumper at 2 cm, spins freely 360 degrees +rectal tube  Extremities: No peripheral edema  Vascular: 2+ peripheral pulses  Neurological: intermittently restless opens eyes. not following commands  moves RUE  +dysconjugate gaze  Skin: No rashes    LABS:                        9.3    10.86 )-----------( 309      ( 26 Aug 2020 23:11 )             30.3     08-27    146<H>  |  116<H>  |  32<H>  ----------------------------<  121<H>  4.2   |  16<L>  |  2.37<H>    Ca    9.2      27 Aug 2020 10:28  Phos  3.6     08-27  Mg     2.2     08-27    TPro  5.7<L>  /  Alb  2.6<L>  /  TBili  0.2  /  DBili  <0.1  /  AST  20  /  ALT  20  /  AlkPhos  58  08-26    PT/INR - ( 26 Aug 2020 23:11 )   PT: 16.2 sec;   INR: 1.38 ratio    PTT - ( 26 Aug 2020 23:11 )  PTT:31.9 sec       RADIOLOGY & ADDITIONAL TESTS:

## 2020-08-27 NOTE — CONSULT NOTE ADULT - ASSESSMENT
Mr. Vieyra is a 60-year-old male with a history of A-Fib on warfarin who presents with cardiac arrest at work with downtime of about 25 minutes prior to ROSC. S/p therapeutic hypothermia. 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 Now with resolved neurogenic/cardiogenic shock. Course complicated by GI bleed s/p PPI gtt, bleeding from scott s/p clot irrigation, prolonged respiratory failure s/p trach (8/24) and PEG (8/26). Currently with A-Fib with RVR and C diff colitis.    1. SAH with negative angiogram:  - Moving RUE/RLE, but without movement on left  - No further neurosurgical intervention at this time    2. Acute Encephalopathy:  - Continue fentanyl patch, clonazepam, and quetiapine  - Hydromorphone and tramadol prn    3. Acute systolic heart failure:  - Possible stunned myocardium due to cardiac arrest vs. SAH  - Will need cardiac cath to evaluate for ischemia  - Continue afterload/preload reduction with hydralazine and isosorbide dinitrate    4. A-Fib with RVR:  - Continue amiodarone 200 mg daily  - Increase metoprolol to 25 mg q8h  - Hold off on therapeutic a/c given SAH    5. Acute hypoxemic respiratory failure s/p tracheostomy:  - Continue lung protective ventilation, pressure support trials as tolerated  - Continue Duonebs and hypertonic saline, chest PT    6. Oropharyngeal dysphagia:  - PEG feeds as tolerated  - Famotidine for GI ppx    7. C diff colitis:  - Continue oral vancomycin  - Leukocytosis improved    8. Pseudomonas aeruginosa pneumonia/colonization:  - S/p prolonged course of cefepime until 8/25  - Hold off on systemic antibiotics at this time  - If develops increased secretions, may benefit from inhaled tobramycin    9. JALEN, possible CKD:  - Strict I/O's, trend kidney function and lytes    10. Dispo:  - Prognosis guarded depending on neurological recovery  - PT/OT eval  - If encephalopathy improves and he can cooperate, will consider acute rehab eval  - Full code  - Tried to call wife ([phone]), but number not working. Left message for son Rufino ([phone]) Mr. Vieyra is a 60-year-old male with a history of A-Fib on warfarin who presents with cardiac arrest at work with downtime of about 25 minutes prior to ROSC. S/p therapeutic hypothermia. 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 Now with resolved neurogenic/cardiogenic shock. Course complicated by GI bleed s/p PPI gtt, bleeding from scott s/p clot irrigation, prolonged respiratory failure s/p trach (8/24) and PEG (8/26). Currently with A-Fib with RVR and C diff colitis.    1. SAH with negative angiogram:  - Moving RUE/RLE, but without movement on left  - No further neurosurgical intervention at this time    2. Acute Encephalopathy:  - Continue fentanyl patch, clonazepam, and quetiapine  - Hydromorphone and tramadol prn    3. Acute systolic heart failure:  - Possible stunned myocardium due to cardiac arrest vs. SAH  - Will need cardiac cath to evaluate for ischemia  - Continue afterload/preload reduction with hydralazine and isosorbide dinitrate    4. A-Fib with RVR:  - Continue amiodarone 200 mg daily  - Increase metoprolol to 25 mg q8h  - Hold off on therapeutic a/c given SAH    5. Acute hypoxemic respiratory failure s/p tracheostomy:  - Continue lung protective ventilation, pressure support trials as tolerated  - Continue Duonebs and hypertonic saline, chest PT    6. Oropharyngeal dysphagia:  - PEG feeds as tolerated  - Famotidine for GI ppx    7. C diff colitis:  - Continue oral vancomycin  - Leukocytosis improved    8. Pseudomonas aeruginosa pneumonia/colonization:  - S/p prolonged course of cefepime until 8/25  - Hold off on systemic antibiotics at this time  - If develops increased secretions, may benefit from inhaled tobramycin    9. JALEN, possible CKD:  - Strict I/O's, trend kidney function and lytes    10. Dispo:  - Transfer to [facility]CU in AM if A-Fib more adequately controlled with metoprolol and amiodarone  - Overall prognosis guarded depending on neurological recovery  - PT/OT eval  - If encephalopathy improves and he can cooperate, will consider acute rehab eval  - Full code  - Tried to call wife ([phone]), but number not working. Left message for son Rufino ([phone])

## 2020-08-27 NOTE — PROGRESS NOTE ADULT - SUBJECTIVE AND OBJECTIVE BOX
· Subjective and Objective:   HPI:  60 year old male with PMHx of Afib on coumadin s/p cardiac arrest at work, downtime 25 minutes prior to ROSC. Pupils were R fixed and dilated, left fixed at the time, no gag reflex, post-CA cooling protocol was initiated down to 35 deg. Intubated and put on Nimbex drip. Also on Propofol, fentanyl, levophed. R groin minerva placed. Also put on heparin post-CA. HCT showed R perimesencephalic SAH of unclear etiology, no hydrocephalus. Course also c/b GIB, was put on protonix drip. Also had bleeding from [facility] - urology consulted, clot irrigated.       HOSP COURSE:   8/10- Angio- neg   8/11: VTACH noted (7 beats)  8/13: Febrile  8/14: Started on Unasyn for gram neg rods and gram neg diplococci on bronch gram stain -> switched to cefepime for pseudomonas noted 8/10  8/16: central line removed on 8/16. Cr improving 1.7 -> 1.57. MRNOVA planned. Could not tolerate since desatted when supine  8/17/20: Lasix 30mg x1  8/18: PO vanc for Cdiff  8/19: Few beats of VTACH noted.   8/20: Taken for angio: Negative.  8/21: Episode of respiratory distress overnight; desaturated 85%, mucus plug/ambu bagged.   S/P lasix 40mgx1.     Events: high rate a fib       T(C): 37 (08-27-20 @ 07:00), Max: 37.2 (08-26-20 @ 15:00)  HR: 97 (08-27-20 @ 10:00) (11 - 139)  BP: 126/87 (08-27-20 @ 10:00) (126/87 - 126/87)  RR: 23 (08-27-20 @ 10:00) (15 - 50)  SpO2: 99% (08-27-20 @ 10:00) (91% - 100%)  08-26-20 @ 07:01  -  08-27-20 @ 07:00  --------------------------------------------------------  IN: 2178.4 mL / OUT: 1125 mL / NET: 1053.4 mL    08-27-20 @ 07:01 - 08-27-20 @ 11:19  --------------------------------------------------------  IN: 100 mL / OUT: 0 mL / NET: 100 mL    acetaminophen   Tablet .. 650 milliGRAM(s) Oral every 6 hours PRN  acetylcysteine 20%  Inhalation 4 milliLiter(s) Inhalation three times a day PRN  albuterol/ipratropium for Nebulization. 3 milliLiter(s) Nebulizer every 6 hours  aMIOdarone    Tablet 200 milliGRAM(s) Oral daily  artificial  tears Solution 1 Drop(s) Both EYES every 6 hours  chlorhexidine 0.12% Liquid 15 milliLiter(s) Oral Mucosa every 12 hours  chlorhexidine 4% Liquid 1 Application(s) Topical <User Schedule>  chlorhexidine 4% Liquid 1 Application(s) Topical <User Schedule>  clonazePAM  Tablet 0.5 milliGRAM(s) Oral every 8 hours  dextrose 40% Gel 15 Gram(s) Oral once PRN  dextrose 5%. 1000 milliLiter(s) IV Continuous <Continuous>  dextrose 50% Injectable 12.5 Gram(s) IV Push once  dextrose 50% Injectable 25 Gram(s) IV Push once  dextrose 50% Injectable 25 Gram(s) IV Push once  famotidine Injectable 20 milliGRAM(s) IV Push daily  fentaNYL   Patch 100 MICROgram(s)/Hr 1 Patch Transdermal every 72 hours  glucagon  Injectable 1 milliGRAM(s) IntraMuscular once PRN  heparin   Injectable 5000 Unit(s) SubCutaneous every 8 hours  hydrALAZINE 50 milliGRAM(s) Oral every 8 hours  HYDROmorphone  Injectable 0.5 milliGRAM(s) IV Push every 3 hours PRN  isosorbide   dinitrate Tablet (ISORDIL) 10 milliGRAM(s) Oral three times a day  lactobacillus acidophilus 1 Tablet(s) Oral two times a day  metoprolol tartrate 12.5 milliGRAM(s) Oral two times a day  midazolam Injectable 4 milliGRAM(s) IV Push once  multiple electrolytes Injection Type 1 1000 milliLiter(s) IV Continuous <Continuous>  QUEtiapine 50 milliGRAM(s) Oral every 6 hours  sodium chloride 0.9% lock flush 10 milliLiter(s) IV Push every 1 hour PRN  sodium chloride 7% Inhalation 4 milliLiter(s) Inhalation every 12 hours  traMADol 25 milliGRAM(s) Oral every 6 hours PRN  traMADol 50 milliGRAM(s) Oral every 6 hours PRN  vancomycin    Solution 125 milliGRAM(s) Oral every 6 hours  Mode: CPAP with PS, FiO2: 30, PEEP: 5, PS: 5, MAP: 7, PIP: 12LIVER FUNCTIONS - ( 25 Aug 2020 20:57 )  Alb: 3.0 g/dL / Pro: 6.6 g/dL / ALK PHOS: 66 U/L / ALT: 21 U/L / AST: 23 U/L / GGT: x                   BRONCH- 8/13/20- Pseudomonas- Cefepime     EXAMINATION:  General: Agitated, intubated  HEENT: MMM  Neuro:  -Mental status-  No acute distress, EO spont . Strong and purposeful on RUE AG,  followed simple commands on the right , RLE Prox at least 3 distally 4,  Left side  0/5  -CN- Pupils R 5mm NR, L 1mm sluggish, Eyes dysconjugate,    CVS: S1/S2  RESP: Diminished at bases  GI: Soft, non tender, mildly distended, hypoactive BS  Extremities: Warm, skin intact      LABS:  Na: 143 (08-25 @ 20:57), 143 (08-24 @ 21:28), 142 (08-23 @ 21:57), 146 (08-23 @ 13:23)  K: 4.1 (08-25 @ 20:57), 3.7 (08-24 @ 21:28), 3.6 (08-23 @ 21:57), 3.4 (08-23 @ 13:23)  Cl: 114 (08-25 @ 20:57), 115 (08-24 @ 21:28), 115 (08-23 @ 21:57), 117 (08-23 @ 13:23)  CO2: 15 (08-25 @ 20:57), 16 (08-24 @ 21:28), 17 (08-23 @ 21:57), 17 (08-23 @ 13:23)  BUN: 36 (08-25 @ 20:57), 35 (08-24 @ 21:28), 36 (08-23 @ 21:57), 37 (08-23 @ 13:23)  Cr: 2.31 (08-25 @ 20:57), 2.30 (08-24 @ 21:28), 1.89 (08-23 @ 21:57), 2.08 (08-23 @ 13:23)  Glu: 155(08-25 @ 20:57), 130(08-24 @ 21:28), 123(08-23 @ 21:57), 128(08-23 @ 13:23)    Hgb: 10.4 (08-25 @ 20:57), 10.0 (08-23 @ 21:57)  Hct: 33.6 (08-25 @ 20:57), 33.1 (08-23 @ 21:57)  WBC: 11.62 (08-25 @ 20:57), 10.59 (08-23 @ 21:57)  Plt: 341 (08-25 @ 20:57), 352 (08-23 @ 21:57)    INR: 1.36 08-25-20 @ 20:57, 1.23 08-23-20 @ 21:57  PTT: 32.1 08-25-20 @ 20:57, 29.6 08-23-20 @ 21:57            Assessment and Plan:   · Assessment	  60 year old male s/p cardiac arrest c/b GIB and bleeding from [facility] with perimesencephalic (HH4 mF4) subarachnoid hemorrhage, angio neg x2 ,PBD 19.    NEURO:    SAH, possible sentinal event with cardiac arrest  Angio 8/20: negative.   Q4h neurochecks  CT Head: perimesencephalic SAH   CTA Head: no aneurysm noted  Initial Conventional angiogram 8/10 negative  MRI neuroaxis: Restricted diffusion in R BG, posterior limb of R IC, and anterior right temporal lobe  VEEG negative. DCed.   DCI management (off nimodipine). Poor windows  No EVD as no significant hydrocephalus  Precedex gtt for sedation- RASS 0 to -1  valium 5 mg q 8 hr   agitation : Seroquel 50 mg q6hr   fentanyl 100 mcg patch    PULM: Acute PULM edema- resolved  trached    psv/tc trials  spO2>92%  CTA R/O PE: negative  Continue hypertonic nebs./ Mucomyst prn.   less secretions     CV:  SBP goal 100- 160  TTE: Global LV dysfunction. EF 41%, Severe concentric LVH, flattening of interventricular septum.   S/P Concern for possible PE - CTA negative  Hydralazine/imdur , metoprolol   Will need cath per cardiology  amio  200 daily.  Mg- 2.0 ; Po4- 3.0 ; k- 4.0  monitor QTc while on seroquel       RENAL: Non anion gap metabolic acidosis  Likely in the setting of diarrhea.  ?CKD with superimposed JALEN.- worsening  Monitor Cr and lytes.  Monitor Is/Os  Cr post angio: stable      GI: S/P GI bleed, hyperlipasemia, Cdiff positive  PEG placement today   C.diff Vanco 125 mg q6 for Cdiff   Diet: NPO for procedure   GI prop pepcid  last BM 8/26/2020     ENDO:   Goal euglycemia (-180)  TFTs wnl      HEME/ONC: Afib on coumadin at home; leukocytosis  Leukocytosis: No eosinophilia   VTE prophylaxis: [] SCDs [x] chemoprophylaxis heparin subq.  Dopplers negative for DVT.  Will eventually need to resume coumadin once trach/PEG.       ID: Cdiff positive   Continue PO vanc   Afebrile  completed cefepime  Added probiotic      SOCIAL/FAMILY:  [x] updated at bedside     CODE STATUS:  [x] Full Code [] DNR [] DNI [] Palliative/Comfort Care    DISPOSITION:  [x] ICU [] Stroke Unit [] Floor [] EMU [] RCU [] PCU    [x] Patient is at high risk of neurologic deterioration/death due to: SAH, cardiac arrest · Subjective and Objective:   HPI:  60 year old male with PMHx of Afib on coumadin s/p cardiac arrest at work, downtime 25 minutes prior to ROSC. Pupils were R fixed and dilated, left fixed at the time, no gag reflex, post-CA cooling protocol was initiated down to 35 deg. Intubated and put on Nimbex drip. Also on Propofol, fentanyl, levophed. R groin minerva placed. Also put on heparin post-CA. HCT showed R perimesencephalic SAH of unclear etiology, no hydrocephalus. Course also c/b GIB, was put on protonix drip. Also had bleeding from [facility] - urology consulted, clot irrigated.       HOSP COURSE:   8/10- Angio- neg   8/11: VTACH noted (7 beats)  8/13: Febrile  8/14: Started on Unasyn for gram neg rods and gram neg diplococci on bronch gram stain -> switched to cefepime for pseudomonas noted 8/10  8/16: central line removed on 8/16. Cr improving 1.7 -> 1.57. MRNOVA planned. Could not tolerate since desatted when supine  8/17/20: Lasix 30mg x1  8/18: PO vanc for Cdiff  8/19: Few beats of VTACH noted.   8/20: Taken for angio: Negative.  8/21: Episode of respiratory distress overnight; desaturated 85%, mucus plug/ambu bagged.   S/P lasix 40mgx1.     Events: high rate a fib   agitation       T(C): 37 (08-27-20 @ 07:00), Max: 37.2 (08-26-20 @ 15:00)  HR: 97 (08-27-20 @ 10:00) (11 - 139)  BP: 126/87 (08-27-20 @ 10:00) (126/87 - 126/87)  RR: 23 (08-27-20 @ 10:00) (15 - 50)  SpO2: 99% (08-27-20 @ 10:00) (91% - 100%)  08-26-20 @ 07:01  -  08-27-20 @ 07:00  --------------------------------------------------------  IN: 2178.4 mL / OUT: 1125 mL / NET: 1053.4 mL    08-27-20 @ 07:01  - 08-27-20 @ 11:19  --------------------------------------------------------  IN: 100 mL / OUT: 0 mL / NET: 100 mL    acetaminophen   Tablet .. 650 milliGRAM(s) Oral every 6 hours PRN  acetylcysteine 20%  Inhalation 4 milliLiter(s) Inhalation three times a day PRN  albuterol/ipratropium for Nebulization. 3 milliLiter(s) Nebulizer every 6 hours  aMIOdarone    Tablet 200 milliGRAM(s) Oral daily  artificial  tears Solution 1 Drop(s) Both EYES every 6 hours  chlorhexidine 0.12% Liquid 15 milliLiter(s) Oral Mucosa every 12 hours  chlorhexidine 4% Liquid 1 Application(s) Topical <User Schedule>  chlorhexidine 4% Liquid 1 Application(s) Topical <User Schedule>  clonazePAM  Tablet 0.5 milliGRAM(s) Oral every 8 hours  dextrose 40% Gel 15 Gram(s) Oral once PRN  dextrose 5%. 1000 milliLiter(s) IV Continuous <Continuous>  dextrose 50% Injectable 12.5 Gram(s) IV Push once  dextrose 50% Injectable 25 Gram(s) IV Push once  dextrose 50% Injectable 25 Gram(s) IV Push once  famotidine Injectable 20 milliGRAM(s) IV Push daily  fentaNYL   Patch 100 MICROgram(s)/Hr 1 Patch Transdermal every 72 hours  glucagon  Injectable 1 milliGRAM(s) IntraMuscular once PRN  heparin   Injectable 5000 Unit(s) SubCutaneous every 8 hours  hydrALAZINE 50 milliGRAM(s) Oral every 8 hours  HYDROmorphone  Injectable 0.5 milliGRAM(s) IV Push every 3 hours PRN  isosorbide   dinitrate Tablet (ISORDIL) 10 milliGRAM(s) Oral three times a day  lactobacillus acidophilus 1 Tablet(s) Oral two times a day  metoprolol tartrate 12.5 milliGRAM(s) Oral two times a day  midazolam Injectable 4 milliGRAM(s) IV Push once  multiple electrolytes Injection Type 1 1000 milliLiter(s) IV Continuous <Continuous>  QUEtiapine 50 milliGRAM(s) Oral every 6 hours  sodium chloride 0.9% lock flush 10 milliLiter(s) IV Push every 1 hour PRN  sodium chloride 7% Inhalation 4 milliLiter(s) Inhalation every 12 hours  traMADol 25 milliGRAM(s) Oral every 6 hours PRN  traMADol 50 milliGRAM(s) Oral every 6 hours PRN  vancomycin    Solution 125 milliGRAM(s) Oral every 6 hours  Mode: CPAP with PS, FiO2: 30, PEEP: 5, PS: 5, MAP: 7, PIP: 12LIVER FUNCTIONS - ( 25 Aug 2020 20:57 )  Alb: 3.0 g/dL / Pro: 6.6 g/dL / ALK PHOS: 66 U/L / ALT: 21 U/L / AST: 23 U/L / GGT: x                   BRONCH- 8/13/20- Pseudomonas- Cefepime     EXAMINATION:  General: Agitated, intubated  HEENT: MMM  Neuro:  -Mental status-  No acute distress, EO spont . Strong and purposeful on RUE AG,  followed simple commands on the right , RLE Prox at least 3 distally 4,  Left side  0/5  -CN- Pupils R 5mm NR, L 1mm sluggish, Eyes dysconjugate,    CVS: S1/S2  RESP: Diminished at bases  GI: Soft, non tender, mildly distended, hypoactive BS  Extremities: Warm, skin intact        LABS:  Na: 146 (08-27 @ 10:28), 146 (08-26 @ 23:11), 143 (08-25 @ 20:57), 143 (08-24 @ 21:28)  K: 4.2 (08-27 @ 10:28), 3.8 (08-26 @ 23:11), 4.1 (08-25 @ 20:57), 3.7 (08-24 @ 21:28)  Cl: 116 (08-27 @ 10:28), 114 (08-26 @ 23:11), 114 (08-25 @ 20:57), 115 (08-24 @ 21:28)  CO2: 16 (08-27 @ 10:28), 17 (08-26 @ 23:11), 15 (08-25 @ 20:57), 16 (08-24 @ 21:28)  BUN: 32 (08-27 @ 10:28), 34 (08-26 @ 23:11), 36 (08-25 @ 20:57), 35 (08-24 @ 21:28)  Cr: 2.37 (08-27 @ 10:28), 2.31 (08-26 @ 23:11), 2.31 (08-25 @ 20:57), 2.30 (08-24 @ 21:28)  Glu: 121(08-27 @ 10:28), 119(08-26 @ 23:11), 155(08-25 @ 20:57), 130(08-24 @ 21:28)    Hgb: 9.3 (08-26 @ 23:11), 10.4 (08-25 @ 20:57)  Hct: 30.3 (08-26 @ 23:11), 33.6 (08-25 @ 20:57)  WBC: 10.86 (08-26 @ 23:11), 11.62 (08-25 @ 20:57)  Plt: 309 (08-26 @ 23:11), 341 (08-25 @ 20:57)    INR: 1.38 08-26-20 @ 23:11, 1.36 08-25-20 @ 20:57  PTT: 31.9 08-26-20 @ 23:11, 32.1 08-25-20 @ 20:57                  Assessment and Plan:   · Assessment	  60 year old male s/p cardiac arrest c/b GIB and bleeding from [facility] with perimesencephalic (HH4 mF4) subarachnoid hemorrhage, angio neg x2 ,PBD 19.    NEURO:    SAH, possible sentinal event with cardiac arrest  Angiox2 : negative.   Q4h neurochecks  MRI neuroaxis: Restricted diffusion in R BG, posterior limb of R IC, and anterior right temporal lobe  off precedex   clonazepam  5 mg q 8 hr   dilaudid   agitation : Seroquel 50 mg q6hr   fentanyl 100 mcg patch    PULM: Acute PULM edema- resolved  trached    psv/tc trials  spO2>92%  CTA R/O PE: negative  Continue hypertonic nebs./ Mucomyst prn.   thick secretions     CV:  SBP goal 100- 160  TTE: Global LV dysfunction. EF 41%, Severe concentric LVH, flattening of interventricular septum.   S/P Concern for possible PE - CTA negative  Hydralazine/imdur , metoprolol   high rate a.fib, restart amiodarone   Will need cath per cardiology, needs nephrology clearance   Mg- 2.0 ; Po4- 3.0 ; k- 4.0   QTc 422 On seroquel       RENAL: Non anion gap metabolic acidosis  Likely in the setting of diarrhea.  ?CKD with superimposed JALEN.- worsening  Monitor Cr and lytes.  Monitor Is/Os  Cr post angio: stable      GI: S/P GI bleed, hyperlipasemia, Cdiff positive  s/p PEG  start feeds today   C.diff Vanco 125 mg q6 for Cdiff   GI prop pepcid  last BM 8/27/2020     ENDO:   Goal euglycemia (-180)  TFTs wnl      HEME/ONC: Afib on coumadin at home; leukocytosis  Leukocytosis: No eosinophilia   VTE prophylaxis: [] SCDs [x] chemoprophylaxis heparin subq.  Dopplers negative for DVT.  Will eventually need to resume coumadin once trach/PEG.       ID: Cdiff positive   Continue PO vanc   afebrile       SOCIAL/FAMILY:  [x] updated at bedside     CODE STATUS:  [x] Full Code [] DNR [] DNI [] Palliative/Comfort Care    DISPOSITION:  [x] ICU [] Stroke Unit [] Floor [] EMU [] RCU [] PCU    [x] Patient is at high risk of neurologic deterioration/death due to: SAH, cardiac arrest · Subjective and Objective:   HPI:  60 year old male with PMHx of Afib on coumadin s/p cardiac arrest at work, downtime 25 minutes prior to ROSC. Pupils were R fixed and dilated, left fixed at the time, no gag reflex, post-CA cooling protocol was initiated down to 35 deg. Intubated and put on Nimbex drip. Also on Propofol, fentanyl, levophed. R groin minerva placed. Also put on heparin post-CA. HCT showed R perimesencephalic SAH of unclear etiology, no hydrocephalus. Course also c/b GIB, was put on protonix drip. Also had bleeding from [facility] - urology consulted, clot irrigated.       HOSP COURSE:   8/10- Angio- neg   8/11: VTACH noted (7 beats)  8/13: Febrile  8/14: Started on Unasyn for gram neg rods and gram neg diplococci on bronch gram stain -> switched to cefepime for pseudomonas noted 8/10  8/16: central line removed on 8/16. Cr improving 1.7 -> 1.57. MRNOVA planned. Could not tolerate since desatted when supine  8/17/20: Lasix 30mg x1  8/18: PO vanc for Cdiff  8/19: Few beats of VTACH noted.   8/20: Taken for angio: Negative.  8/21: Episode of respiratory distress overnight; desaturated 85%, mucus plug/ambu bagged.   S/P lasix 40mgx1.     Events: high rate a fib   agitation       T(C): 37 (08-27-20 @ 07:00), Max: 37.2 (08-26-20 @ 15:00)  HR: 97 (08-27-20 @ 10:00) (11 - 139)  BP: 126/87 (08-27-20 @ 10:00) (126/87 - 126/87)  RR: 23 (08-27-20 @ 10:00) (15 - 50)  SpO2: 99% (08-27-20 @ 10:00) (91% - 100%)  08-26-20 @ 07:01  -  08-27-20 @ 07:00  --------------------------------------------------------  IN: 2178.4 mL / OUT: 1125 mL / NET: 1053.4 mL    08-27-20 @ 07:01  - 08-27-20 @ 11:19  --------------------------------------------------------  IN: 100 mL / OUT: 0 mL / NET: 100 mL    acetaminophen   Tablet .. 650 milliGRAM(s) Oral every 6 hours PRN  acetylcysteine 20%  Inhalation 4 milliLiter(s) Inhalation three times a day PRN  albuterol/ipratropium for Nebulization. 3 milliLiter(s) Nebulizer every 6 hours  aMIOdarone    Tablet 200 milliGRAM(s) Oral daily  artificial  tears Solution 1 Drop(s) Both EYES every 6 hours  chlorhexidine 0.12% Liquid 15 milliLiter(s) Oral Mucosa every 12 hours  chlorhexidine 4% Liquid 1 Application(s) Topical <User Schedule>  chlorhexidine 4% Liquid 1 Application(s) Topical <User Schedule>  clonazePAM  Tablet 0.5 milliGRAM(s) Oral every 8 hours  dextrose 40% Gel 15 Gram(s) Oral once PRN  dextrose 5%. 1000 milliLiter(s) IV Continuous <Continuous>  dextrose 50% Injectable 12.5 Gram(s) IV Push once  dextrose 50% Injectable 25 Gram(s) IV Push once  dextrose 50% Injectable 25 Gram(s) IV Push once  famotidine Injectable 20 milliGRAM(s) IV Push daily  fentaNYL   Patch 100 MICROgram(s)/Hr 1 Patch Transdermal every 72 hours  glucagon  Injectable 1 milliGRAM(s) IntraMuscular once PRN  heparin   Injectable 5000 Unit(s) SubCutaneous every 8 hours  hydrALAZINE 50 milliGRAM(s) Oral every 8 hours  HYDROmorphone  Injectable 0.5 milliGRAM(s) IV Push every 3 hours PRN  isosorbide   dinitrate Tablet (ISORDIL) 10 milliGRAM(s) Oral three times a day  lactobacillus acidophilus 1 Tablet(s) Oral two times a day  metoprolol tartrate 12.5 milliGRAM(s) Oral two times a day  midazolam Injectable 4 milliGRAM(s) IV Push once  multiple electrolytes Injection Type 1 1000 milliLiter(s) IV Continuous <Continuous>  QUEtiapine 50 milliGRAM(s) Oral every 6 hours  sodium chloride 0.9% lock flush 10 milliLiter(s) IV Push every 1 hour PRN  sodium chloride 7% Inhalation 4 milliLiter(s) Inhalation every 12 hours  traMADol 25 milliGRAM(s) Oral every 6 hours PRN  traMADol 50 milliGRAM(s) Oral every 6 hours PRN  vancomycin    Solution 125 milliGRAM(s) Oral every 6 hours  Mode: CPAP with PS, FiO2: 30, PEEP: 5, PS: 5, MAP: 7, PIP: 12LIVER FUNCTIONS - ( 25 Aug 2020 20:57 )  Alb: 3.0 g/dL / Pro: 6.6 g/dL / ALK PHOS: 66 U/L / ALT: 21 U/L / AST: 23 U/L / GGT: x                   BRONCH- 8/13/20- Pseudomonas- Cefepime     EXAMINATION:  General: Agitated, intubated  HEENT: MMM  Neuro:  -Mental status-  No acute distress, EO spont . Strong and purposeful on RUE AG,  followed simple commands on the right , RLE Prox at least 3 distally 4,  Left side  0/5  -CN- Pupils R 5mm NR, L 1mm sluggish, Eyes dysconjugate,    CVS: S1/S2  RESP: Diminished at bases  GI: Soft, non tender, mildly distended, hypoactive BS  Extremities: Warm, skin intact        LABS:  Na: 146 (08-27 @ 10:28), 146 (08-26 @ 23:11), 143 (08-25 @ 20:57), 143 (08-24 @ 21:28)  K: 4.2 (08-27 @ 10:28), 3.8 (08-26 @ 23:11), 4.1 (08-25 @ 20:57), 3.7 (08-24 @ 21:28)  Cl: 116 (08-27 @ 10:28), 114 (08-26 @ 23:11), 114 (08-25 @ 20:57), 115 (08-24 @ 21:28)  CO2: 16 (08-27 @ 10:28), 17 (08-26 @ 23:11), 15 (08-25 @ 20:57), 16 (08-24 @ 21:28)  BUN: 32 (08-27 @ 10:28), 34 (08-26 @ 23:11), 36 (08-25 @ 20:57), 35 (08-24 @ 21:28)  Cr: 2.37 (08-27 @ 10:28), 2.31 (08-26 @ 23:11), 2.31 (08-25 @ 20:57), 2.30 (08-24 @ 21:28)  Glu: 121(08-27 @ 10:28), 119(08-26 @ 23:11), 155(08-25 @ 20:57), 130(08-24 @ 21:28)    Hgb: 9.3 (08-26 @ 23:11), 10.4 (08-25 @ 20:57)  Hct: 30.3 (08-26 @ 23:11), 33.6 (08-25 @ 20:57)  WBC: 10.86 (08-26 @ 23:11), 11.62 (08-25 @ 20:57)  Plt: 309 (08-26 @ 23:11), 341 (08-25 @ 20:57)    INR: 1.38 08-26-20 @ 23:11, 1.36 08-25-20 @ 20:57  PTT: 31.9 08-26-20 @ 23:11, 32.1 08-25-20 @ 20:57                  Assessment and Plan:   · Assessment	  60 year old male s/p cardiac arrest c/b GIB and bleeding from [facility] with perimesencephalic (HH4 mF4) subarachnoid hemorrhage, angio neg x2 ,PBD 19.    NEURO:    SAH, possible sentinal event with cardiac arrest  Angiox2 : negative.   Q4h neurochecks  MRI neuroaxis: Restricted diffusion in R BG, posterior limb of R IC, and anterior right temporal lobe  off precedex   clonazepam  5 mg q 8 hr   dilaudid   agitation : Seroquel 50 mg q6hr   fentanyl 100 mcg patch    PULM: Acute PULM edema- resolved  trached    psv/tc trials  spO2>92%  CTA R/O PE: negative  Continue hypertonic nebs./ Mucomyst prn.   thick secretions     CV:  SBP goal 100- 160  TTE: Global LV dysfunction. EF 41%, Severe concentric LVH, flattening of interventricular septum.   S/P Concern for possible PE - CTA negative  Hydralazine/imdur ,   increased metoprolol 25 mg q 8 hr    high rate a.fib, restart amiodarone   Will need cath per cardiology, needs nephrology clearance   Mg- 2.0 ; Po4- 3.0 ; k- 4.0   QTc 422 On seroquel       RENAL: Non anion gap metabolic acidosis  Likely in the setting of diarrhea.  ?CKD with superimposed JALEN.- worsening  Monitor Cr and lytes.  Monitor Is/Os  Cr post angio: stable      GI: S/P GI bleed, hyperlipasemia, Cdiff positive  s/p PEG  start feeds today   C.diff Vanco 125 mg q6 for Cdiff   GI prop pepcid  last BM 8/27/2020     ENDO:   Goal euglycemia (-180)  TFTs wnl      HEME/ONC: Afib on coumadin at home; leukocytosis  Leukocytosis: No eosinophilia   VTE prophylaxis: [] SCDs [x] chemoprophylaxis heparin subq.  Dopplers negative for DVT.  Will eventually need to resume coumadin once trach/PEG.       ID: Cdiff positive   Continue PO vanc   afebrile       SOCIAL/FAMILY:  [x] updated at bedside     CODE STATUS:  [x] Full Code [] DNR [] DNI [] Palliative/Comfort Care    DISPOSITION:  [x] ICU [] Stroke Unit [] Floor [] EMU [] RCU [] PCU    [x] Patient is at high risk of neurologic deterioration/death due to: SAH, cardiac arrest

## 2020-08-27 NOTE — PROGRESS NOTE ADULT - PROBLEM SELECTOR PLAN 1
- Plan to remove sutures on POD #7  - Do not remove umbilical trach tie  - HOB elevation  - Suction PRN  - Continue trach care  - ENT will continue to follow, call with questions x [phone].

## 2020-08-27 NOTE — CONSULT NOTE ADULT - SUBJECTIVE AND OBJECTIVE BOX
[facility] - Division of Pulmonary, Critical Care and Sleep Medicine   Please call [phone] between 8am-pm weekdays, [phone] after hours and weekends      CHIEF COMPLAINT: cardiac arrest at work    HPI:  Mr. Vieyra is a 60-year-old male with a history of A-Fib on warfarin who presents with cardiac arrest at work with downtime of about 25 minutes prior to ROSC. Pupils were fixed and dilated without gag initially s/p cooling to 35 degrees. He required mechanical ventilation, neuromuscular blockade, sedation, and pressors for neurogenic/cardiogenic shock. 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 Cerebral angiogram without evidence of aneurysm. ICU course complicated by GI bleed s/p PPI gtt and bleeding from scott s/p clot irrigation by urology. Now with prolonged respiratory failure s/p trach on 8/24 and PEG on 8/26. Noted on echo to have acute LV systolic heart failure, possible stunned myocardium due to cardiac arrest vs. SAH. Currently, he is awake and alert, moving RUE/RLE spontaneously, following simple commands. He is currently in A-Fib with RVR. Being treated for C diff colitis    PAST MEDICAL & SURGICAL HISTORY:  On warfarin for atrial fibrillation  No significant past surgical history      FAMILY HISTORY:  No pertinent family history in first degree relatives      SOCIAL HISTORY:  Smoking: [ ] Never Smoked [ ] Former Smoker (__ packs x ___ years) [ ] Current Smoker  (__ packs x ___ years)  Substance Use: [ ] Never Used [ ] Used ____  EtOH Use:  Marital Status: [ ] Single [ ]  [ ]  [ ]   Occupation:  Recent Travel:  Country of Birth:  Advance Directives:    Allergies    No Known Allergies    Intolerances        HOME MEDICATIONS:    REVIEW OF SYSTEMS:  Constitutional: [ ] fevers [ ] chills [ ] weight loss [ ] weight gain  HEENT: [ ] dry eyes [ ] eye irritation [ ] postnasal drip [ ] nasal congestion  CV: [ ] chest pain [ ] orthopnea [ ] palpitations [ ] murmur  Resp: [ ] cough [ ] shortness of breath [ ] wheezing [ ] sputum [ ] hemoptysis  GI: [ ] nausea [ ] vomiting [ ] diarrhea [ ] constipation [ ] abd pain [ ] dysphagia   : [ ] dysuria [ ] nocturia [ ] hematuria [ ] increased urinary frequency  Musculoskeletal: [ ] myalgias [ ] arthralgias   Skin: [ ] rash [ ] itch  Neurological: [ ] headache [ ] dizziness [ ] syncope [ ] weakness [ ] numbness  Psychiatric: [ ] anxiety [ ] depression  Endocrine: [ ] diabetes [ ] thyroid problem  Hematologic/Lymphatic: [ ] anemia [ ] bleeding problem  Allergic/Immunologic: [ ] itchy eyes [ ] nasal discharge [ ] hives [ ] angioedema  [ ] All other systems negative  [ ] Unable to assess ROS because ________    OBJECTIVE:  ICU Vital Signs Last 24 Hrs  T(C): 37.7 (27 Aug 2020 15:00), Max: 37.7 (27 Aug 2020 15:00)  T(F): 99.9 (27 Aug 2020 15:00), Max: 99.9 (27 Aug 2020 15:00)  HR: 85 (27 Aug 2020 16:48) (66 - 143)  BP: 130/88 (27 Aug 2020 15:00) (125/86 - 159/90)  BP(mean): 99 (27 Aug 2020 15:00) (76 - 145)  ABP: 100/89 (27 Aug 2020 16:00) (89/46 - 180/101)  ABP(mean): 93 (27 Aug 2020 16:00) (59 - 107)  RR: 24 (27 Aug 2020 16:00) (16 - 50)  SpO2: 97% (27 Aug 2020 16:48) (91% - 100%)    Mode: AC/ CMV (Assist Control/ Continuous Mandatory Ventilation), RR (machine): 18, TV (machine): 500, FiO2: 30, PEEP: 5, ITime: 0.9, MAP: 7, PIP: 16    08-26 @ 07:01  -  08-27 @ 07:00  --------------------------------------------------------  IN: 2178.4 mL / OUT: 1125 mL / NET: 1053.4 mL    08-27 @ 07:01  -  08-27 @ 18:09  --------------------------------------------------------  IN: 200 mL / OUT: 0 mL / NET: 200 mL      CAPILLARY BLOOD GLUCOSE      POCT Blood Glucose.: 120 mg/dL (27 Aug 2020 15:03)      PHYSICAL EXAM:  General:  NAD  HEENT:  NC/AT  Lymph Nodes: No cervical or supraclavicular lymphadenopathy   Neck: Supple  Respiratory:  CTA B/L, no wheezes, crackles or rhonchi  Cardiovascular:  RRR, no m/r/g  Abdomen: soft, NT/ND, +BS  Extremities: no clubbing, cyanosis or edema, warm  Skin: no rash  Neurological: AAOx3, no focal deficits  Psychiatry: not anxious, normal affect    HOSPITAL MEDICATIONS:  MEDICATIONS  (STANDING):  albuterol/ipratropium for Nebulization. 3 milliLiter(s) Nebulizer every 6 hours  aMIOdarone    Tablet 200 milliGRAM(s) Oral daily  artificial  tears Solution 1 Drop(s) Both EYES every 6 hours  chlorhexidine 0.12% Liquid 15 milliLiter(s) Oral Mucosa every 12 hours  chlorhexidine 4% Liquid 1 Application(s) Topical <User Schedule>  chlorhexidine 4% Liquid 1 Application(s) Topical <User Schedule>  clonazePAM  Tablet 0.5 milliGRAM(s) Oral every 8 hours  dextrose 5%. 1000 milliLiter(s) (50 mL/Hr) IV Continuous <Continuous>  dextrose 50% Injectable 12.5 Gram(s) IV Push once  dextrose 50% Injectable 25 Gram(s) IV Push once  dextrose 50% Injectable 25 Gram(s) IV Push once  famotidine Injectable 20 milliGRAM(s) IV Push daily  heparin   Injectable 5000 Unit(s) SubCutaneous every 8 hours  hydrALAZINE 50 milliGRAM(s) Oral every 8 hours  isosorbide   dinitrate Tablet (ISORDIL) 10 milliGRAM(s) Oral three times a day  lactobacillus acidophilus 1 Tablet(s) Oral two times a day  metoprolol tartrate 25 milliGRAM(s) Oral three times a day  QUEtiapine 50 milliGRAM(s) Oral every 6 hours  sodium chloride 7% Inhalation 4 milliLiter(s) Inhalation every 8 hours  vancomycin    Solution 125 milliGRAM(s) Oral every 6 hours    MEDICATIONS  (PRN):  acetaminophen   Tablet .. 650 milliGRAM(s) Oral every 6 hours PRN Temp greater or equal to 38C (100.4F), Mild Pain (1 - 3)  acetylcysteine 20%  Inhalation 4 milliLiter(s) Inhalation three times a day PRN secretions  dextrose 40% Gel 15 Gram(s) Oral once PRN Blood Glucose LESS THAN 70 milliGRAM(s)/deciliter  glucagon  Injectable 1 milliGRAM(s) IntraMuscular once PRN Glucose LESS THAN 70 milligrams/deciliter  HYDROmorphone  Injectable 0.5 milliGRAM(s) IV Push every 3 hours PRN Agitation  sodium chloride 0.9% lock flush 10 milliLiter(s) IV Push every 1 hour PRN Pre/post blood products, medications, blood draw, and to maintain line patency  traMADol 25 milliGRAM(s) Oral every 6 hours PRN Moderate Pain (4 - 6)  traMADol 50 milliGRAM(s) Oral every 6 hours PRN Severe Pain (7 - 10)      LABS:                        9.3    10.86 )-----------( 309      ( 26 Aug 2020 23:11 )             30.3     Hgb Trend: 9.3<--, 10.4<--, 10.0<--, 10.7<--, 10.2<--  08-27    146<H>  |  116<H>  |  32<H>  ----------------------------<  121<H>  4.2   |  16<L>  |  2.37<H>    Ca    9.2      27 Aug 2020 10:28  Phos  3.6     08-27  Mg     2.2     08-27    TPro  5.7<L>  /  Alb  2.6<L>  /  TBili  0.2  /  DBili  <0.1  /  AST  20  /  ALT  20  /  AlkPhos  58  08-26    Creatinine Trend: 2.37<--, 2.31<--, 2.31<--, 2.30<--, 1.89<--, 2.08<--  PT/INR - ( 26 Aug 2020 23:11 )   PT: 16.2 sec;   INR: 1.38 ratio         PTT - ( 26 Aug 2020 23:11 )  PTT:31.9 sec    Arterial Blood Gas:  08-27 @ 10:26  7.40/30/99/18/97/-5.4  ABG lactate: --        MICROBIOLOGY:     RADIOLOGY:  [ ] Reviewed and interpreted by me    PULMONARY FUNCTION TESTS:    EKG: [facility] - Division of Pulmonary, Critical Care and Sleep Medicine   Please call [phone] between 8am-pm weekdays, [phone] after hours and weekends      CHIEF COMPLAINT: cardiac arrest at work    HPI:  Mr. Vieyra is a 60-year-old male with a history of A-Fib on warfarin who presents with cardiac arrest at work with downtime of about 25 minutes prior to ROSC. Pupils were fixed and dilated without gag initially s/p cooling to 35 degrees. He required mechanical ventilation, neuromuscular blockade, sedation, and pressors for neurogenic/cardiogenic shock. 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 Cerebral angiogram without evidence of aneurysm. ICU course complicated by GI bleed s/p PPI gtt and bleeding from scott s/p clot irrigation by urology. Now with prolonged respiratory failure s/p trach on 8/24 and PEG on 8/26. Noted on echo to have acute LV systolic heart failure, possible stunned myocardium due to cardiac arrest vs. SAH. Currently, he is awake and alert, moving RUE/RLE spontaneously, following simple commands. He is currently in A-Fib with RVR. Being treated for C diff colitis    PAST MEDICAL & SURGICAL HISTORY:  On warfarin for atrial fibrillation  No significant past surgical history      FAMILY HISTORY:  Unable to obtain information, left message for family      SOCIAL HISTORY:  Unable to obtain information, left message for family    Allergies  No Known Allergies    HOME MEDICATIONS: warfarin    REVIEW OF SYSTEMS:  [x] Unable to assess ROS because of patient's encephalopathy    OBJECTIVE:  ICU Vital Signs Last 24 Hrs  T(C): 37.7 (27 Aug 2020 15:00), Max: 37.7 (27 Aug 2020 15:00)  T(F): 99.9 (27 Aug 2020 15:00), Max: 99.9 (27 Aug 2020 15:00)  HR: 85 (27 Aug 2020 16:48) (66 - 143)  BP: 130/88 (27 Aug 2020 15:00) (125/86 - 159/90)  BP(mean): 99 (27 Aug 2020 15:00) (76 - 145)  ABP: 100/89 (27 Aug 2020 16:00) (89/46 - 180/101)  ABP(mean): 93 (27 Aug 2020 16:00) (59 - 107)  RR: 24 (27 Aug 2020 16:00) (16 - 50)  SpO2: 97% (27 Aug 2020 16:48) (91% - 100%)    Mode: AC/ CMV (Assist Control/ Continuous Mandatory Ventilation), RR (machine): 18, TV (machine): 500, FiO2: 30, PEEP: 5, ITime: 0.9, MAP: 7, PIP: 16    08-26 @ 07:01 - 08-27 @ 07:00  --------------------------------------------------------  IN: 2178.4 mL / OUT: 1125 mL / NET: 1053.4 mL    08-27 @ 07:01 - 08-27 @ 18:09  --------------------------------------------------------  IN: 200 mL / OUT: 0 mL / NET: 200 mL      CAPILLARY BLOOD GLUCOSE      POCT Blood Glucose.: 120 mg/dL (27 Aug 2020 15:03)      PHYSICAL EXAM:  General: NAD; awake and follows simple commands, but not able to answer questions  Neuro: CN II-XII grossly intact; moving RUE/RLE spontaneously, no movement on left  HEENT: NC/AT; EOMI; MMM; +trach to vent  CV: normal S1 & S2; irregularly irregular with tachycardia  Pulm: course breath sounds bilaterally  Abdomen: soft; distended; NT; +PEG  Ext: trace edema; +R arm PICC  Skin: warm, well perfused    HOSPITAL MEDICATIONS:  MEDICATIONS  (STANDING):  albuterol/ipratropium for Nebulization. 3 milliLiter(s) Nebulizer every 6 hours  aMIOdarone    Tablet 200 milliGRAM(s) Oral daily  artificial  tears Solution 1 Drop(s) Both EYES every 6 hours  chlorhexidine 0.12% Liquid 15 milliLiter(s) Oral Mucosa every 12 hours  chlorhexidine 4% Liquid 1 Application(s) Topical <User Schedule>  chlorhexidine 4% Liquid 1 Application(s) Topical <User Schedule>  clonazePAM  Tablet 0.5 milliGRAM(s) Oral every 8 hours  dextrose 5%. 1000 milliLiter(s) (50 mL/Hr) IV Continuous <Continuous>  dextrose 50% Injectable 12.5 Gram(s) IV Push once  dextrose 50% Injectable 25 Gram(s) IV Push once  dextrose 50% Injectable 25 Gram(s) IV Push once  famotidine Injectable 20 milliGRAM(s) IV Push daily  heparin   Injectable 5000 Unit(s) SubCutaneous every 8 hours  hydrALAZINE 50 milliGRAM(s) Oral every 8 hours  isosorbide   dinitrate Tablet (ISORDIL) 10 milliGRAM(s) Oral three times a day  lactobacillus acidophilus 1 Tablet(s) Oral two times a day  metoprolol tartrate 25 milliGRAM(s) Oral three times a day  QUEtiapine 50 milliGRAM(s) Oral every 6 hours  sodium chloride 7% Inhalation 4 milliLiter(s) Inhalation every 8 hours  vancomycin    Solution 125 milliGRAM(s) Oral every 6 hours    MEDICATIONS  (PRN):  acetaminophen   Tablet .. 650 milliGRAM(s) Oral every 6 hours PRN Temp greater or equal to 38C (100.4F), Mild Pain (1 - 3)  acetylcysteine 20%  Inhalation 4 milliLiter(s) Inhalation three times a day PRN secretions  dextrose 40% Gel 15 Gram(s) Oral once PRN Blood Glucose LESS THAN 70 milliGRAM(s)/deciliter  glucagon  Injectable 1 milliGRAM(s) IntraMuscular once PRN Glucose LESS THAN 70 milligrams/deciliter  HYDROmorphone  Injectable 0.5 milliGRAM(s) IV Push every 3 hours PRN Agitation  sodium chloride 0.9% lock flush 10 milliLiter(s) IV Push every 1 hour PRN Pre/post blood products, medications, blood draw, and to maintain line patency  traMADol 25 milliGRAM(s) Oral every 6 hours PRN Moderate Pain (4 - 6)  traMADol 50 milliGRAM(s) Oral every 6 hours PRN Severe Pain (7 - 10)      LABS:                        9.3    10.86 )-----------( 309      ( 26 Aug 2020 23:11 )             30.3     Hgb Trend: 9.3<--, 10.4<--, 10.0<--, 10.7<--, 10.2<--  08-27    146<H>  |  116<H>  |  32<H>  ----------------------------<  121<H>  4.2   |  16<L>  |  2.37<H>    Ca    9.2      27 Aug 2020 10:28  Phos  3.6     08-27  Mg     2.2     08-27    TPro  5.7<L>  /  Alb  2.6<L>  /  TBili  0.2  /  DBili  <0.1  /  AST  20  /  ALT  20  /  AlkPhos  58  08-26    Creatinine Trend: 2.37<--, 2.31<--, 2.31<--, 2.30<--, 1.89<--, 2.08<--  PT/INR - ( 26 Aug 2020 23:11 )   PT: 16.2 sec;   INR: 1.38 ratio         PTT - ( 26 Aug 2020 23:11 )  PTT:31.9 sec    Arterial Blood Gas:  08-27 @ 10:26  7.40/30/99/18/97/-5.4  ABG lactate: --    Bronchial combicath cultures (8/10, 8/13, 8/15): pan-sensitive pseudomonas aeruginosa  Blood cultures negative (8/15)      RADIOLOGY:  CTPA (8/11): no main, central, or left-sided lobar PE. No significant alveolar consolidation, but exam limited due to motion artifact. Small bilateral pleural effusions    CXR (8/25): trach in place above kayleigh; enteric tube in stomach, R PICC with tip in SVC; lungs are clear

## 2020-08-27 NOTE — PROGRESS NOTE ADULT - SUBJECTIVE AND OBJECTIVE BOX
ENT ISSUE/POD: S/p tracheostomy POD 3    HPI: 59 y/o male s/p tracheostomy #8 [facility], POD 3. Pt seen and examined at bedside. No acute events overnight. Pt doing well on vent. No issues per team.         PAST MEDICAL & SURGICAL HISTORY:  On warfarin for atrial fibrillation  No significant past surgical history    Allergies    No Known Allergies    Intolerances      MEDICATIONS  (STANDING):  albuterol/ipratropium for Nebulization. 3 milliLiter(s) Nebulizer every 6 hours  artificial  tears Solution 1 Drop(s) Both EYES every 6 hours  chlorhexidine 0.12% Liquid 15 milliLiter(s) Oral Mucosa every 12 hours  chlorhexidine 4% Liquid 1 Application(s) Topical <User Schedule>  chlorhexidine 4% Liquid 1 Application(s) Topical <User Schedule>  clonazePAM  Tablet 0.5 milliGRAM(s) Oral every 8 hours  dexMEDEtomidine Infusion 0.2 MICROgram(s)/kG/Hr (5.5 mL/Hr) IV Continuous <Continuous>  dextrose 5%. 1000 milliLiter(s) (50 mL/Hr) IV Continuous <Continuous>  dextrose 50% Injectable 12.5 Gram(s) IV Push once  dextrose 50% Injectable 25 Gram(s) IV Push once  dextrose 50% Injectable 25 Gram(s) IV Push once  famotidine Injectable 20 milliGRAM(s) IV Push daily  fentaNYL   Infusion... 0.5 MICROgram(s)/kG/Hr (2.75 mL/Hr) IV Continuous <Continuous>  fentaNYL   Patch 100 MICROgram(s)/Hr 1 Patch Transdermal every 72 hours  heparin   Injectable 5000 Unit(s) SubCutaneous every 8 hours  hydrALAZINE 50 milliGRAM(s) Oral every 8 hours  isosorbide   dinitrate Tablet (ISORDIL) 10 milliGRAM(s) Oral three times a day  lactobacillus acidophilus 1 Tablet(s) Oral two times a day  metoprolol tartrate 12.5 milliGRAM(s) Oral two times a day  midazolam Injectable 4 milliGRAM(s) IV Push once  multiple electrolytes Injection Type 1 1000 milliLiter(s) (50 mL/Hr) IV Continuous <Continuous>  QUEtiapine 50 milliGRAM(s) Oral every 6 hours  sodium chloride 7% Inhalation 4 milliLiter(s) Inhalation every 12 hours  vancomycin    Solution 125 milliGRAM(s) Oral every 6 hours    MEDICATIONS  (PRN):  acetaminophen   Tablet .. 650 milliGRAM(s) Oral every 6 hours PRN Temp greater or equal to 38C (100.4F), Mild Pain (1 - 3)  acetylcysteine 20%  Inhalation 4 milliLiter(s) Inhalation three times a day PRN secretions  dextrose 40% Gel 15 Gram(s) Oral once PRN Blood Glucose LESS THAN 70 milliGRAM(s)/deciliter  glucagon  Injectable 1 milliGRAM(s) IntraMuscular once PRN Glucose LESS THAN 70 milligrams/deciliter  HYDROmorphone  Injectable 0.5 milliGRAM(s) IV Push every 3 hours PRN Agitation  sodium chloride 0.9% lock flush 10 milliLiter(s) IV Push every 1 hour PRN Pre/post blood products, medications, blood draw, and to maintain line patency  traMADol 25 milliGRAM(s) Oral every 6 hours PRN Moderate Pain (4 - 6)  traMADol 50 milliGRAM(s) Oral every 6 hours PRN Severe Pain (7 - 10)      ROS:   Unable to obtain due to pts clinical condition      Vital Signs Last 24 Hrs  T(C): 36.9 (27 Aug 2020 05:00), Max: 37.2 (26 Aug 2020 11:00)  T(F): 98.5 (27 Aug 2020 05:00), Max: 99 (26 Aug 2020 11:00)  HR: 109 (27 Aug 2020 06:35) (11 - 121)  BP: --  BP(mean): --  RR: 21 (27 Aug 2020 06:30) (15 - 29)  SpO2: 96% (27 Aug 2020 06:35) (91% - 100%)                          9.3    10.86 )-----------( 309      ( 26 Aug 2020 23:11 )             30.3    08-26    146<H>  |  114<H>  |  34<H>  ----------------------------<  119<H>  3.8   |  17<L>  |  2.31<H>    Ca    8.3<L>      26 Aug 2020 23:11  Phos  3.4     08-26  Mg     2.2     08-26    TPro  5.7<L>  /  Alb  2.6<L>  /  TBili  0.2  /  DBili  <0.1  /  AST  20  /  ALT  20  /  AlkPhos  58  08-26   PT/INR - ( 26 Aug 2020 23:11 )   PT: 16.2 sec;   INR: 1.38 ratio         PTT - ( 26 Aug 2020 23:11 )  PTT:31.9 sec    PHYSICAL EXAM:  Gen: NAD  Skin: No rashes, bruises, or lesions  Head: Normocephalic, Atraumatic  Face: no edema, erythema, or fluctuance. Parotid glands soft without mass  Eyes: no scleral injection  Nose: Nares bilaterally patent, no discharge  Mouth: No Stridor / Drooling / Trismus.  Mucosa moist, tongue/uvula midline, oropharynx clear  Neck: #8 [facility] DCT in place, inflated cuff. Scant serosanguineous drainage. No bleeding noted from stoma, sutures x4 and umbilical tie in place.  No lymphadenopathy, trachea midline, no masses  Lymphatic: No lymphadenopathy  Resp: on vent, ventilating well  Neuro: unable to assess due to patient's condition

## 2020-08-27 NOTE — PROGRESS NOTE ADULT - ASSESSMENT
HH5 mF4, post-bleed day 18  - Enteral Vanco for c. diff  - S/p tracheostomy, pressure support trial  - Seroquel, monitor Qtc, added klonopin  - dilaudid PO prn for pain control  - Amiodarone for arrhythmia, increase metoprolol  - S/p PEG   - Cardiac cath when stable

## 2020-08-27 NOTE — PROGRESS NOTE ADULT - ASSESSMENT
61 y/o male s/p trach POD 3. On exam, #8 DCT trach in place, sutures x4 and umbilical tie in place. No bleeding noted. Doing well on ventilator.

## 2020-08-27 NOTE — PROGRESS NOTE ADULT - SUBJECTIVE AND OBJECTIVE BOX
Visit Summary: Patient seen and evaluated at bedside.    Overnight Events: none    Exam:  .Vital Signs Last 24 Hrs  T(C): 36.9 (25 Aug 2020 03:00), Max: 36.9 (24 Aug 2020 15:00)  T(F): 98.4 (25 Aug 2020 03:00), Max: 98.5 (24 Aug 2020 15:00)  HR: 100 (25 Aug 2020 05:18) (66 - 112)  BP: --  BP(mean): --  RR: 18 (25 Aug 2020 03:00) (18 - 18)  SpO2: 100% (25 Aug 2020 05:18) (97% - 100%)    Intubated, sedated, EO light stim, FC on R 2 fingers, wiggle toes, nothing on L                              10.7   15.61 )-----------( 328      ( 21 Aug 2020 22:43 )             34.9     08-21    145  |  114<H>  |  38<H>  ----------------------------<  130<H>  3.6   |  18<L>  |  1.92<H>    Ca    9.2      21 Aug 2020 22:43  Phos  2.8     08-21  Mg     2.2     08-21

## 2020-08-27 NOTE — PROGRESS NOTE ADULT - SUBJECTIVE AND OBJECTIVE BOX
S/p PEG. off IV sedation    Trached, oriented to self (nods), intermittently follows on right--two fingers and raises leg, right side spontaneous, left side 0/5

## 2020-08-27 NOTE — PROGRESS NOTE ADULT - SUBJECTIVE AND OBJECTIVE BOX
Subjective: Patient seen and examined. No new events except as noted.   Remains iN NSCU     REVIEW OF SYSTEMS:  Unable to obtain       MEDICATIONS:  MEDICATIONS  (STANDING):  albuterol/ipratropium for Nebulization. 3 milliLiter(s) Nebulizer every 6 hours  artificial  tears Solution 1 Drop(s) Both EYES every 6 hours  chlorhexidine 0.12% Liquid 15 milliLiter(s) Oral Mucosa every 12 hours  chlorhexidine 4% Liquid 1 Application(s) Topical <User Schedule>  chlorhexidine 4% Liquid 1 Application(s) Topical <User Schedule>  clonazePAM  Tablet 0.5 milliGRAM(s) Oral every 8 hours  dextrose 5%. 1000 milliLiter(s) (50 mL/Hr) IV Continuous <Continuous>  dextrose 50% Injectable 12.5 Gram(s) IV Push once  dextrose 50% Injectable 25 Gram(s) IV Push once  dextrose 50% Injectable 25 Gram(s) IV Push once  famotidine Injectable 20 milliGRAM(s) IV Push daily  fentaNYL    Injectable 100 MICROGram(s) IV Push once  fentaNYL   Patch 100 MICROgram(s)/Hr 1 Patch Transdermal every 72 hours  heparin   Injectable 5000 Unit(s) SubCutaneous every 8 hours  hydrALAZINE 50 milliGRAM(s) Oral every 8 hours  isosorbide   dinitrate Tablet (ISORDIL) 10 milliGRAM(s) Oral three times a day  lactobacillus acidophilus 1 Tablet(s) Oral two times a day  metoprolol tartrate 12.5 milliGRAM(s) Oral two times a day  midazolam Injectable 4 milliGRAM(s) IV Push once  multiple electrolytes Injection Type 1 1000 milliLiter(s) (50 mL/Hr) IV Continuous <Continuous>  QUEtiapine 50 milliGRAM(s) Oral every 6 hours  sodium chloride 7% Inhalation 4 milliLiter(s) Inhalation every 12 hours  vancomycin    Solution 125 milliGRAM(s) Oral every 6 hours      PHYSICAL EXAM:  T(C): 36.9 (08-27-20 @ 05:00), Max: 37.2 (08-26-20 @ 11:00)  HR: 78 (08-27-20 @ 08:59) (11 - 121)  BP: --  RR: 21 (08-27-20 @ 06:30) (15 - 29)  SpO2: 95% (08-27-20 @ 08:59) (91% - 100%)  Wt(kg): --  I&O's Summary    26 Aug 2020 07:01  -  27 Aug 2020 07:00  --------------------------------------------------------  IN: 2178.4 mL / OUT: 1125 mL / NET: 1053.4 mL          Appearance: more awake , +trach   HEENT:   Dry  oral mucosa,  Lymphatic: No lymphadenopathy  Cardiovascular: Normal S1 S2, No JVD, No murmurs, No edema  Respiratory: Ventilated   Psychiatry: A & O x 0  Gastrointestinal:  Soft, Non-tender, + BS	  Skin: No rashes, No ecchymoses, No cyanosis	  Mental status- No acute distress, EO to stim  -CN- Pupils R 4mm NR, L 2mm sluggish, EOMI, tongue midline, face symmetric  +cough/gag  C briskly, two fingers/thumbs up on RUE, distally AG, wiggles toes on RLE, no  LABS:    CARDIAC MARKERS:                                9.3    10.86 )-----------( 309      ( 26 Aug 2020 23:11 )             30.3     08-26    146<H>  |  114<H>  |  34<H>  ----------------------------<  119<H>  3.8   |  17<L>  |  2.31<H>    Ca    8.3<L>      26 Aug 2020 23:11  Phos  3.4     08-26  Mg     2.2     08-26    TPro  5.7<L>  /  Alb  2.6<L>  /  TBili  0.2  /  DBili  <0.1  /  AST  20  /  ALT  20  /  AlkPhos  58  08-26    proBNP:   Lipid Profile:   HgA1c:   TSH:             TELEMETRY: 	  AF  ECG:  	  RADIOLOGY:   DIAGNOSTIC TESTING:  [ ] Echocardiogram:  [ ]  Catheterization:  [ ] Stress Test:    OTHER:

## 2020-08-27 NOTE — PROGRESS NOTE ADULT - ASSESSMENT
59YO male on coumadin for afib s/p cardiac arrest at work, down 25 minutes prior to ROSC. Pupils were R fixed and dilated, left fixed at the time, no gag reflex, post-CA cooling protocol was initiated down to 35 deg. Intubated and put on Nimbex drip. Also on Propofol, fentanyl, levophed. R groin minerva placed. Also put on heparin post-CA. 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 Course also c/b GIB, put on protonix drip. Prior to xfer was started on 3% at 30cc/hr. (09 Aug 2020 14:30)  Also developed gross hematuria - Urology consulted +catheter with clot (irrigated and exchanged) suspected 2/2 traumatic catheterization    Hospital Course complicated by fever and Pseudomonas PNA (s/p Rx) then developed C diff (now with resolved leukocytosis and improved stooling, now soft per report); remains on enteral Vanco    Respiratory Failure - s/p trach 8/24  Dysphagia - s/p PEG 8/26  C diff - clinically improving   Abdominal distension -NOBGP; no colonic distension      RECS:  -Continue enteral Vanco and Bacid as ordered  -Monitor stools and WBC  -rectal tube for fecal management  -Pepcid for GI prophylaxis  -ok to start PEG feeds, titrate to goal. monitor tolerance  -abdominal binder to prevent pt accidentally pulling PEG    Further management per NSCU team    Benja Flores PA-C    [facility]  [phone]  After hours and weekend coverage [phone]

## 2020-08-28 LAB
ANION GAP SERPL CALC-SCNC: 12 MMOL/L — SIGNIFICANT CHANGE UP (ref 5–17)
BASOPHILS # BLD AUTO: 0.09 K/UL — SIGNIFICANT CHANGE UP (ref 0–0.2)
BASOPHILS NFR BLD AUTO: 0.8 % — SIGNIFICANT CHANGE UP (ref 0–2)
BUN SERPL-MCNC: 31 MG/DL — HIGH (ref 7–23)
CALCIUM SERPL-MCNC: 8.8 MG/DL — SIGNIFICANT CHANGE UP (ref 8.4–10.5)
CHLORIDE SERPL-SCNC: 115 MMOL/L — HIGH (ref 96–108)
CO2 SERPL-SCNC: 18 MMOL/L — LOW (ref 22–31)
CREAT SERPL-MCNC: 2.28 MG/DL — HIGH (ref 0.5–1.3)
EOSINOPHIL # BLD AUTO: 0.29 K/UL — SIGNIFICANT CHANGE UP (ref 0–0.5)
EOSINOPHIL NFR BLD AUTO: 2.5 % — SIGNIFICANT CHANGE UP (ref 0–6)
GLUCOSE SERPL-MCNC: 132 MG/DL — HIGH (ref 70–99)
HCT VFR BLD CALC: 30.4 % — LOW (ref 39–50)
HGB BLD-MCNC: 9.1 G/DL — LOW (ref 13–17)
IMM GRANULOCYTES NFR BLD AUTO: 2.1 % — HIGH (ref 0–1.5)
LYMPHOCYTES # BLD AUTO: 0.89 K/UL — LOW (ref 1–3.3)
LYMPHOCYTES # BLD AUTO: 7.7 % — LOW (ref 13–44)
MAGNESIUM SERPL-MCNC: 2.1 MG/DL — SIGNIFICANT CHANGE UP (ref 1.6–2.6)
MCHC RBC-ENTMCNC: 26.7 PG — LOW (ref 27–34)
MCHC RBC-ENTMCNC: 29.9 GM/DL — LOW (ref 32–36)
MCV RBC AUTO: 89.1 FL — SIGNIFICANT CHANGE UP (ref 80–100)
MONOCYTES # BLD AUTO: 0.88 K/UL — SIGNIFICANT CHANGE UP (ref 0–0.9)
MONOCYTES NFR BLD AUTO: 7.6 % — SIGNIFICANT CHANGE UP (ref 2–14)
NEUTROPHILS # BLD AUTO: 9.19 K/UL — HIGH (ref 1.8–7.4)
NEUTROPHILS NFR BLD AUTO: 79.3 % — HIGH (ref 43–77)
NRBC # BLD: 0 /100 WBCS — SIGNIFICANT CHANGE UP (ref 0–0)
PHOSPHATE SERPL-MCNC: 2.7 MG/DL — SIGNIFICANT CHANGE UP (ref 2.5–4.5)
PLATELET # BLD AUTO: 301 K/UL — SIGNIFICANT CHANGE UP (ref 150–400)
POTASSIUM SERPL-MCNC: 3.5 MMOL/L — SIGNIFICANT CHANGE UP (ref 3.5–5.3)
POTASSIUM SERPL-SCNC: 3.5 MMOL/L — SIGNIFICANT CHANGE UP (ref 3.5–5.3)
RBC # BLD: 3.41 M/UL — LOW (ref 4.2–5.8)
RBC # FLD: 14.9 % — HIGH (ref 10.3–14.5)
SODIUM SERPL-SCNC: 145 MMOL/L — SIGNIFICANT CHANGE UP (ref 135–145)
WBC # BLD: 11.58 K/UL — HIGH (ref 3.8–10.5)
WBC # FLD AUTO: 11.58 K/UL — HIGH (ref 3.8–10.5)

## 2020-08-28 PROCEDURE — 99233 SBSQ HOSP IP/OBS HIGH 50: CPT

## 2020-08-28 PROCEDURE — 99233 SBSQ HOSP IP/OBS HIGH 50: CPT | Mod: GC

## 2020-08-28 PROCEDURE — 99231 SBSQ HOSP IP/OBS SF/LOW 25: CPT

## 2020-08-28 RX ORDER — ASPIRIN/CALCIUM CARB/MAGNESIUM 324 MG
81 TABLET ORAL DAILY
Refills: 0 | Status: DISCONTINUED | OUTPATIENT
Start: 2020-08-28 | End: 2020-08-28

## 2020-08-28 RX ORDER — FAMOTIDINE 10 MG/ML
20 INJECTION INTRAVENOUS DAILY
Refills: 0 | Status: DISCONTINUED | OUTPATIENT
Start: 2020-08-28 | End: 2020-08-29

## 2020-08-28 RX ORDER — ASPIRIN/CALCIUM CARB/MAGNESIUM 324 MG
81 TABLET ORAL DAILY
Refills: 0 | Status: DISCONTINUED | OUTPATIENT
Start: 2020-08-28 | End: 2020-10-13

## 2020-08-28 RX ORDER — FENTANYL CITRATE 50 UG/ML
1 INJECTION INTRAVENOUS
Refills: 0 | Status: DISCONTINUED | OUTPATIENT
Start: 2020-08-28 | End: 2020-09-03

## 2020-08-28 RX ORDER — LABETALOL HCL 100 MG
10 TABLET ORAL ONCE
Refills: 0 | Status: COMPLETED | OUTPATIENT
Start: 2020-08-28 | End: 2020-08-28

## 2020-08-28 RX ORDER — HYDROMORPHONE HYDROCHLORIDE 2 MG/ML
2 INJECTION INTRAMUSCULAR; INTRAVENOUS; SUBCUTANEOUS ONCE
Refills: 0 | Status: DISCONTINUED | OUTPATIENT
Start: 2020-08-28 | End: 2020-08-28

## 2020-08-28 RX ADMIN — QUETIAPINE FUMARATE 50 MILLIGRAM(S): 200 TABLET, FILM COATED ORAL at 17:25

## 2020-08-28 RX ADMIN — Medication 50 MILLIGRAM(S): at 08:11

## 2020-08-28 RX ADMIN — ISOSORBIDE DINITRATE 10 MILLIGRAM(S): 5 TABLET ORAL at 23:27

## 2020-08-28 RX ADMIN — FENTANYL CITRATE 1 PATCH: 50 INJECTION INTRAVENOUS at 11:34

## 2020-08-28 RX ADMIN — FAMOTIDINE 20 MILLIGRAM(S): 10 INJECTION INTRAVENOUS at 21:08

## 2020-08-28 RX ADMIN — Medication 3 MILLILITER(S): at 00:41

## 2020-08-28 RX ADMIN — FENTANYL CITRATE 1 PATCH: 50 INJECTION INTRAVENOUS at 11:35

## 2020-08-28 RX ADMIN — Medication 3 MILLILITER(S): at 17:35

## 2020-08-28 RX ADMIN — HYDROMORPHONE HYDROCHLORIDE 2 MILLIGRAM(S): 2 INJECTION INTRAMUSCULAR; INTRAVENOUS; SUBCUTANEOUS at 10:30

## 2020-08-28 RX ADMIN — HEPARIN SODIUM 5000 UNIT(S): 5000 INJECTION INTRAVENOUS; SUBCUTANEOUS at 21:07

## 2020-08-28 RX ADMIN — Medication 3 MILLILITER(S): at 12:12

## 2020-08-28 RX ADMIN — Medication 50 MILLIGRAM(S): at 15:37

## 2020-08-28 RX ADMIN — SODIUM CHLORIDE 4 MILLILITER(S): 9 INJECTION INTRAMUSCULAR; INTRAVENOUS; SUBCUTANEOUS at 00:41

## 2020-08-28 RX ADMIN — ISOSORBIDE DINITRATE 10 MILLIGRAM(S): 5 TABLET ORAL at 13:34

## 2020-08-28 RX ADMIN — Medication 50 MILLIGRAM(S): at 13:33

## 2020-08-28 RX ADMIN — SODIUM CHLORIDE 4 MILLILITER(S): 9 INJECTION INTRAMUSCULAR; INTRAVENOUS; SUBCUTANEOUS at 21:42

## 2020-08-28 RX ADMIN — Medication 1 DROP(S): at 05:02

## 2020-08-28 RX ADMIN — Medication 125 MILLIGRAM(S): at 23:23

## 2020-08-28 RX ADMIN — HYDROMORPHONE HYDROCHLORIDE 2 MILLIGRAM(S): 2 INJECTION INTRAMUSCULAR; INTRAVENOUS; SUBCUTANEOUS at 04:20

## 2020-08-28 RX ADMIN — Medication 0.5 MILLIGRAM(S): at 05:01

## 2020-08-28 RX ADMIN — HYDROMORPHONE HYDROCHLORIDE 2 MILLIGRAM(S): 2 INJECTION INTRAMUSCULAR; INTRAVENOUS; SUBCUTANEOUS at 04:50

## 2020-08-28 RX ADMIN — FENTANYL CITRATE 1 PATCH: 50 INJECTION INTRAVENOUS at 08:06

## 2020-08-28 RX ADMIN — HYDROMORPHONE HYDROCHLORIDE 2 MILLIGRAM(S): 2 INJECTION INTRAMUSCULAR; INTRAVENOUS; SUBCUTANEOUS at 02:52

## 2020-08-28 RX ADMIN — QUETIAPINE FUMARATE 50 MILLIGRAM(S): 200 TABLET, FILM COATED ORAL at 05:01

## 2020-08-28 RX ADMIN — HYDROMORPHONE HYDROCHLORIDE 2 MILLIGRAM(S): 2 INJECTION INTRAMUSCULAR; INTRAVENOUS; SUBCUTANEOUS at 02:22

## 2020-08-28 RX ADMIN — HEPARIN SODIUM 5000 UNIT(S): 5000 INJECTION INTRAVENOUS; SUBCUTANEOUS at 13:33

## 2020-08-28 RX ADMIN — Medication 1 TABLET(S): at 05:01

## 2020-08-28 RX ADMIN — Medication 10 MILLIGRAM(S): at 04:20

## 2020-08-28 RX ADMIN — HYDROMORPHONE HYDROCHLORIDE 2 MILLIGRAM(S): 2 INJECTION INTRAMUSCULAR; INTRAVENOUS; SUBCUTANEOUS at 21:08

## 2020-08-28 RX ADMIN — QUETIAPINE FUMARATE 50 MILLIGRAM(S): 200 TABLET, FILM COATED ORAL at 11:34

## 2020-08-28 RX ADMIN — FAMOTIDINE 20 MILLIGRAM(S): 10 INJECTION INTRAVENOUS at 11:34

## 2020-08-28 RX ADMIN — AMIODARONE HYDROCHLORIDE 200 MILLIGRAM(S): 400 TABLET ORAL at 05:02

## 2020-08-28 RX ADMIN — ALTEPLASE 2 MILLIGRAM(S): KIT at 05:45

## 2020-08-28 RX ADMIN — CHLORHEXIDINE GLUCONATE 1 APPLICATION(S): 213 SOLUTION TOPICAL at 05:01

## 2020-08-28 RX ADMIN — Medication 50 MILLIGRAM(S): at 21:08

## 2020-08-28 RX ADMIN — Medication 1 DROP(S): at 17:50

## 2020-08-28 RX ADMIN — Medication 3 MILLILITER(S): at 23:29

## 2020-08-28 RX ADMIN — Medication 1 DROP(S): at 12:05

## 2020-08-28 RX ADMIN — Medication 81 MILLIGRAM(S): at 13:33

## 2020-08-28 RX ADMIN — Medication 125 MILLIGRAM(S): at 11:34

## 2020-08-28 RX ADMIN — CHLORHEXIDINE GLUCONATE 15 MILLILITER(S): 213 SOLUTION TOPICAL at 17:26

## 2020-08-28 RX ADMIN — Medication 125 MILLIGRAM(S): at 05:01

## 2020-08-28 RX ADMIN — Medication 125 MILLIGRAM(S): at 17:25

## 2020-08-28 RX ADMIN — SODIUM CHLORIDE 4 MILLILITER(S): 9 INJECTION INTRAMUSCULAR; INTRAVENOUS; SUBCUTANEOUS at 05:53

## 2020-08-28 RX ADMIN — Medication 0.5 MILLIGRAM(S): at 13:33

## 2020-08-28 RX ADMIN — Medication 0.5 MILLIGRAM(S): at 21:07

## 2020-08-28 RX ADMIN — Medication 3 MILLILITER(S): at 05:53

## 2020-08-28 RX ADMIN — CHLORHEXIDINE GLUCONATE 15 MILLILITER(S): 213 SOLUTION TOPICAL at 05:02

## 2020-08-28 RX ADMIN — HYDROMORPHONE HYDROCHLORIDE 2 MILLIGRAM(S): 2 INJECTION INTRAMUSCULAR; INTRAVENOUS; SUBCUTANEOUS at 10:00

## 2020-08-28 RX ADMIN — HYDROMORPHONE HYDROCHLORIDE 2 MILLIGRAM(S): 2 INJECTION INTRAMUSCULAR; INTRAVENOUS; SUBCUTANEOUS at 21:40

## 2020-08-28 RX ADMIN — Medication 50 MILLIGRAM(S): at 05:01

## 2020-08-28 RX ADMIN — ISOSORBIDE DINITRATE 10 MILLIGRAM(S): 5 TABLET ORAL at 05:02

## 2020-08-28 RX ADMIN — Medication 50 MILLIGRAM(S): at 23:27

## 2020-08-28 RX ADMIN — HEPARIN SODIUM 5000 UNIT(S): 5000 INJECTION INTRAVENOUS; SUBCUTANEOUS at 05:01

## 2020-08-28 RX ADMIN — Medication 1 TABLET(S): at 17:26

## 2020-08-28 NOTE — PROGRESS NOTE ADULT - SUBJECTIVE AND OBJECTIVE BOX
ENT ISSUE/POD: S/p tracheostomy POD 4    HPI: 61 y/o male s/p tracheostomy #8 [facility], POD 4. Pt seen and examined at bedside. No acute events overnight. Pt doing well on vent. No issues per team.          PAST MEDICAL & SURGICAL HISTORY:  On warfarin for atrial fibrillation  No significant past surgical history    Allergies    No Known Allergies    Intolerances      MEDICATIONS  (STANDING):  albuterol/ipratropium for Nebulization. 3 milliLiter(s) Nebulizer every 6 hours  aMIOdarone    Tablet 200 milliGRAM(s) Oral daily  artificial  tears Solution 1 Drop(s) Both EYES every 6 hours  chlorhexidine 0.12% Liquid 15 milliLiter(s) Oral Mucosa every 12 hours  chlorhexidine 4% Liquid 1 Application(s) Topical <User Schedule>  chlorhexidine 4% Liquid 1 Application(s) Topical <User Schedule>  clonazePAM  Tablet 0.5 milliGRAM(s) Oral every 8 hours  dextrose 5%. 1000 milliLiter(s) (50 mL/Hr) IV Continuous <Continuous>  dextrose 50% Injectable 12.5 Gram(s) IV Push once  dextrose 50% Injectable 25 Gram(s) IV Push once  dextrose 50% Injectable 25 Gram(s) IV Push once  famotidine Injectable 20 milliGRAM(s) IV Push daily  heparin   Injectable 5000 Unit(s) SubCutaneous every 8 hours  hydrALAZINE 50 milliGRAM(s) Oral every 8 hours  isosorbide   dinitrate Tablet (ISORDIL) 10 milliGRAM(s) Oral three times a day  lactobacillus acidophilus 1 Tablet(s) Oral two times a day  metoprolol tartrate 50 milliGRAM(s) Oral three times a day  QUEtiapine 50 milliGRAM(s) Oral every 6 hours  sodium chloride 7% Inhalation 4 milliLiter(s) Inhalation every 8 hours  vancomycin    Solution 125 milliGRAM(s) Oral every 6 hours    MEDICATIONS  (PRN):  acetaminophen   Tablet .. 650 milliGRAM(s) Oral every 6 hours PRN Temp greater or equal to 38C (100.4F), Mild Pain (1 - 3)  acetylcysteine 20%  Inhalation 4 milliLiter(s) Inhalation three times a day PRN secretions  dextrose 40% Gel 15 Gram(s) Oral once PRN Blood Glucose LESS THAN 70 milliGRAM(s)/deciliter  glucagon  Injectable 1 milliGRAM(s) IntraMuscular once PRN Glucose LESS THAN 70 milligrams/deciliter  HYDROmorphone   Tablet 2 milliGRAM(s) Oral every 4 hours PRN Severe Pain (7 - 10)  sodium chloride 0.9% lock flush 10 milliLiter(s) IV Push every 1 hour PRN Pre/post blood products, medications, blood draw, and to maintain line patency      Social History: see consult    Family history: see consult    ROS:   ENT: all negative except as noted in HPI   Pulm: denies SOB, cough, hemoptysis  Neuro: denies numbness/tingling, loss of sensation  Endo: denies heat/cold intolerance, excessive sweating      Vital Signs Last 24 Hrs  T(C): 37.9 (28 Aug 2020 03:00), Max: 38.1 (27 Aug 2020 18:00)  T(F): 100.3 (28 Aug 2020 03:00), Max: 100.6 (27 Aug 2020 18:00)  HR: 101 (28 Aug 2020 06:00) (78 - 143)  BP: 111/79 (28 Aug 2020 06:00) (111/79 - 193/128)  BP(mean): 87 (28 Aug 2020 06:00) (76 - 145)  RR: 25 (28 Aug 2020 06:00) (19 - 50)  SpO2: 95% (28 Aug 2020 06:00) (94% - 100%)                          9.3    10.86 )-----------( 309      ( 26 Aug 2020 23:11 )             30.3    08-27    146<H>  |  116<H>  |  32<H>  ----------------------------<  121<H>  4.2   |  16<L>  |  2.37<H>    Ca    9.2      27 Aug 2020 10:28  Phos  3.6     08-27  Mg     2.2     08-27    TPro  5.7<L>  /  Alb  2.6<L>  /  TBili  0.2  /  DBili  <0.1  /  AST  20  /  ALT  20  /  AlkPhos  58  08-26   PT/INR - ( 26 Aug 2020 23:11 )   PT: 16.2 sec;   INR: 1.38 ratio         PTT - ( 26 Aug 2020 23:11 )  PTT:31.9 sec    PHYSICAL EXAM:  Gen: NAD  Skin: No rashes, bruises, or lesions  Head: Normocephalic, Atraumatic  Face: no edema, erythema, or fluctuance. Parotid glands soft without mass  Eyes: no scleral injection  Nose: Nares bilaterally patent, no discharge  Mouth: No Stridor / Drooling / Trismus.  Mucosa moist, tongue/uvula midline, oropharynx clear  Neck: #8 [facility] DCT in place, inflated cuff. Scant serosanguineous drainage. No bleeding noted from stoma, sutures x4 and umbilical tie in place.  No lymphadenopathy, trachea midline, no masses  Lymphatic: No lymphadenopathy  Resp: on vent, ventilating well  Neuro: unable to assess due to patient's condition

## 2020-08-28 NOTE — PROGRESS NOTE ADULT - ASSESSMENT
· Assessment	  60 year old male s/p cardiac arrest c/b GIB and bleeding from [place]ott with perimesencephalic (HH4 mF4) subarachnoid hemorrhage, angio neg x2 ,PBD 19.    NEURO:    SAH, possible sentinal event with cardiac arrest  Angiox2 : negative.   Q4h neurochecks  MRI neuroaxis: Restricted diffusion in R BG, posterior limb of R IC, and anterior right temporal lobe  off precedex   clonazepam  5 mg q 8 hr   dilaudid   agitation : Seroquel 50 mg q6hr   fentanyl 100 mcg patch    PULM: Acute PULM edema- resolved  trached    psv/tc trials  spO2>92%  CTA R/O PE: negative  Continue hypertonic nebs./ Mucomyst prn.   thick secretions     CV:  SBP goal 100- 160  TTE: Global LV dysfunction. EF 41%, Severe concentric LVH, flattening of interventricular septum.   S/P Concern for possible PE - CTA negative  Hydralazine/imdur ,   increased metoprolol 25 mg q 8 hr    high rate a.fib, restart amiodarone   Will need cath per cardiology, needs nephrology clearance   Mg- 2.0 ; Po4- 3.0 ; k- 4.0   QTc 422 On seroquel       RENAL: Non anion gap metabolic acidosis  Likely in the setting of diarrhea.  ?CKD with superimposed JALEN.- worsening  Monitor Cr and lytes.  Monitor Is/Os  Cr post angio: stable      GI: S/P GI bleed, hyperlipasemia, Cdiff positive  s/p PEG  start feeds today   C.diff Vanco 125 mg q6 for Cdiff   GI prop pepcid  last BM 8/27/2020     ENDO:   Goal euglycemia (-180)  TFTs wnl      HEME/ONC: Afib on coumadin at home; leukocytosis  Leukocytosis: No eosinophilia   VTE prophylaxis: [] SCDs [x] chemoprophylaxis heparin subq.  Dopplers negative for DVT.  Will eventually need to resume coumadin once trach/PEG.       ID: Cdiff positive   Continue PO vanc   afebrile       SOCIAL/FAMILY:  [x] updated at bedside     CODE STATUS:  [x] Full Code [] DNR [] DNI [] Palliative/Comfort Care    DISPOSITION:  [x] ICU [] Stroke Unit [] Floor [] EMU [] RCU [] PCU    [x] Patient is at high risk of neurologic deterioration/death due to: SAH, cardiac arrest · Assessment	  60 year old male s/p cardiac arrest c/b GIB and bleeding from [place] with perimesencephalic (HH4 mF4) subarachnoid hemorrhage, angio neg x2 ,PBD 20    NEURO:    SAH, with cardiac arrest  DSA 2x negative for vascular malformation   Q4h neurochecks  MRI neuroaxis: Restricted diffusion in R BG, posterior limb of R IC, and anterior right temporal lobe  off precedex   clonazepam  5 mg q 8 hr   dilaudid 2 mg q 4hr prn   agitation : Seroquel 50 mg q6hr   fentanyl 100 mcg patch  PT/OT    PULM: Acute PULM edema- resolved  trached    psv/tc trials  spO2>92%  CTA R/O PE: negative  Continue hypertonic nebs./ Mucomyst prn.   thick secretions     CV:  SBP goal 100- 160  TTE: Global LV dysfunction. EF 41%, Severe concentric LVH, flattening of interventricular septum.   S/P Concern for possible PE - CTA negative  Hydralazine/imdur ,   metoprolol 50 mg q 8 hr    high rate a.fib,  amiodarone   Will need cath per cardiology, needs nephrology clearance   Mg- 2.0 ; Po4- 3.0 ; k- 4.0   QTc 422 On seroquel       RENAL:   ?CKD with superimposed JALEN.-NEPRHOLOGY ON CONSULT   Monitor Cr and lytes.  Monitor Is/Os  Cr post angio: stable      GI: S/P GI bleed, hyperlipasemia, Cdiff positive  s/p PEG  start feeds today   C.diff Vanco 125 mg q6 for Cdiff till tomorrow (14 days)   GI prop pepcid  last BM 8/27/2020     ENDO:   Goal euglycemia (-180)  TFTs wnl      HEME/ONC: Afib on coumadin at home; leukocytosis  Leukocytosis: No eosinophilia   VTE prophylaxis: [] SCDs [x] chemoprophylaxis heparin subq.  Dopplers negative for DVT.  WILL DISCUSS WITH ns IF OK TO START ASPIRIN       ID: Cdiff positive   Continue PO vanc   afebrile       SOCIAL/FAMILY:  [x] updated at bedside     CODE STATUS:  [x] Full Code [] DNR [] DNI [] Palliative/Comfort Care    DISPOSITION:  [] ICU [] Stroke Unit [] Floor [] EMU [X] RCU [] PCU    [x] Patient is at high risk of neurologic deterioration/death due to: SAH, cardiac arrest

## 2020-08-28 NOTE — PROGRESS NOTE ADULT - ASSESSMENT
59YO male on coumadin for afib s/p cardiac arrest at work, down 25 minutes prior to ROSC. Pupils were R fixed and dilated, left fixed at the time, no gag reflex, post-CA cooling protocol was initiated down to 35 deg. Intubated and put on Nimbex drip. Also on Propofol, fentanyl, levophed. R groin minerva placed. Also put on heparin post-CA. 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 Course also c/b GIB, put on protonix drip. Prior to xfer was started on 3% at 30cc/hr. (09 Aug 2020 14:30)  Also developed gross hematuria - Urology consulted +catheter with clot (irrigated and exchanged) suspected 2/2 traumatic catheterization    Hospital Course complicated by fever and Pseudomonas PNA (s/p Rx) then developed C diff (now with resolved leukocytosis and improved stooling, now soft per report); remains on enteral Vanco    Respiratory Failure - s/p trach 8/24  Dysphagia - s/p PEG 8/26  C diff - clinically improving   Abdominal distension -NOBGP; no colonic distension      RECS:  -Continue enteral Vanco (scheduled to complete course 8/29) and Bacid as ordered  -Monitor stools and WBC  -rectal tube for fecal management, monitor output and consistency  -Pepcid for GI prophylaxis  -PEG feeds, monitor tolerance  -abdominal binder to prevent pt accidentally pulling PEG    Further management per NSCU team  Please call over weekend prn with GI concerns   GI service : [phone]      Benja Flores PA-C    [facility]  [phone]  After hours and weekend coverage [phone]

## 2020-08-28 NOTE — CONSULT NOTE ADULT - ASSESSMENT
60y male with afib s/p cardiac arrest at work, down 25 minutes prior to ROSC. Pupils were R fixed and dilated, left fixed at the time, no gag reflex, post-CA cooling protocol was initiated down to 35 deg. Intubated and put on Nimbex drip. 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 Course also c/b GIB, put on protonix drip. Also developed gross hematuria - Urology consulted +catheter with clot (irrigated and exchanged) suspected 2/2 traumatic catheterization.   he is s/p PEG as well. he was treated for pseudomonas HCAP, and now developed C diff and has been on PO Vanc since 8/18. Pt now awaiting cath/angiogram    PLAN:  complete 14 days of PO Vanco, if relapse of diarrhea, may consider slow PO Vanco taper  d/w neurosx team  no absolute contraindications for cath from ID viewpoint

## 2020-08-28 NOTE — CONSULT NOTE ADULT - SUBJECTIVE AND OBJECTIVE BOX
[facility]  [phone]  NEPHROLOGY      INITIAL CONSULT NOTE  --------------------------------------------------------------------------------  HPI:    60 year old male s/p cardiac arrest c/b GIB and bleeding from [place] with perimesencephalic (HH4 mF4) subarachnoid hemorrhage, CT showed R periclinoidal/perimesencephalic SAH, c/f aneurysm rupture, no hydrocephalus.  angio neg x2, trach/vent and peg. course also complicated by gross hematuria - Urology consulted +catheter with clot (irrigated and exchanged) suspected 2/2 traumatic catheterization. treated for pseudomonas HCAP. In addition, has developed c diff. pt noticed with abn renal function and requires coronary angiogram.        PAST HISTORY  --------------------------------------------------------------------------------  PAST MEDICAL & SURGICAL HISTORY:  On warfarin for atrial fibrillation  No significant past surgical history    FAMILY HISTORY:  No pertinent family history in first degree relatives    PAST SOCIAL HISTORY:  pt unable to give   ALLERGIES & MEDICATIONS  --------------------------------------------------------------------------------  Allergies    No Known Allergies    Intolerances      Standing Inpatient Medications  albuterol/ipratropium for Nebulization. 3 milliLiter(s) Nebulizer every 6 hours  aMIOdarone    Tablet 200 milliGRAM(s) Oral daily  artificial  tears Solution 1 Drop(s) Both EYES every 6 hours  aspirin  chewable 81 milliGRAM(s) Enteral Tube daily  chlorhexidine 0.12% Liquid 15 milliLiter(s) Oral Mucosa every 12 hours  chlorhexidine 4% Liquid 1 Application(s) Topical <User Schedule>  clonazePAM  Tablet 0.5 milliGRAM(s) Oral every 8 hours  dextrose 5%. 1000 milliLiter(s) IV Continuous <Continuous>  dextrose 50% Injectable 12.5 Gram(s) IV Push once  dextrose 50% Injectable 25 Gram(s) IV Push once  dextrose 50% Injectable 25 Gram(s) IV Push once  famotidine    Tablet 20 milliGRAM(s) Oral daily  fentaNYL   Patch  50 MICROgram(s)/Hr 1 Patch Transdermal every 72 hours  heparin   Injectable 5000 Unit(s) SubCutaneous every 8 hours  hydrALAZINE 50 milliGRAM(s) Oral every 8 hours  isosorbide   dinitrate Tablet (ISORDIL) 10 milliGRAM(s) Oral three times a day  lactobacillus acidophilus 1 Tablet(s) Oral two times a day  metoprolol tartrate 50 milliGRAM(s) Oral three times a day  QUEtiapine 50 milliGRAM(s) Oral every 6 hours  sodium chloride 7% Inhalation 4 milliLiter(s) Inhalation every 8 hours  vancomycin    Solution 125 milliGRAM(s) Oral every 6 hours    PRN Inpatient Medications  acetaminophen   Tablet .. 650 milliGRAM(s) Oral every 6 hours PRN  acetylcysteine 20%  Inhalation 4 milliLiter(s) Inhalation three times a day PRN  dextrose 40% Gel 15 Gram(s) Oral once PRN  glucagon  Injectable 1 milliGRAM(s) IntraMuscular once PRN  HYDROmorphone   Tablet 2 milliGRAM(s) Oral every 4 hours PRN  sodium chloride 0.9% lock flush 10 milliLiter(s) IV Push every 1 hour PRN      REVIEW OF SYSTEMS  --------------------------------------------------------------------------------  pt unable       VITALS/PHYSICAL EXAM  --------------------------------------------------------------------------------  T(C): 37.3 (08-28-20 @ 20:58), Max: 37.9 (08-28-20 @ 03:00)  HR: 82 (08-28-20 @ 21:42) (82 - 119)  BP: 170/94 (08-28-20 @ 20:58) (106/68 - 193/128)  RR: 20 (08-28-20 @ 20:58) (18 - 30)  SpO2: 98% (08-28-20 @ 21:42) (94% - 100%)  Wt(kg): --        08-27-20 @ 07:01  -  08-28-20 @ 07:00  --------------------------------------------------------  IN: 660 mL / OUT: 500 mL / NET: 160 mL    08-28-20 @ 07:01  -  08-28-20 @ 22:24  --------------------------------------------------------  IN: 870 mL / OUT: 1000 mL / NET: -130 mL      Physical Exam:  	Gen: traech   	no jvd    	Pulm: decrease bs  no rales  + ronchi -  wheezing  	CV: RRR, S1S2; no rub  	Abd: +BS, soft, nontender/nondistended + peg   	UE: Warm, no cyanosis  no clubbing,  no edema  	LE: Warm, no cyanosis  no clubbing, no edema  	Neuro: vented   	Skin: Warm, no decrease skin turgor     LABS/STUDIES  --------------------------------------------------------------------------------              9.1    11.58 >-----------<  301      [08-28-20 @ 19:47]              30.4     145  |  115  |  31  ----------------------------<  132      [08-28-20 @ 19:47]  3.5   |  18  |  2.28        Ca     8.8     [08-28-20 @ 19:47]      Mg     2.1     [08-28-20 @ 19:47]      Phos  2.7     [08-28-20 @ 19:47]    TPro  5.7  /  Alb  2.6  /  TBili  0.2  /  DBili  <0.1  /  AST  20  /  ALT  20  /  AlkPhos  58  [08-26-20 @ 23:11]    PT/INR: PT 16.2 , INR 1.38       [08-26-20 @ 23:11]  PTT: 31.9       [08-26-20 @ 23:11]      Creatinine Trend:  SCr 2.28 [08-28 @ 19:47]  SCr 2.37 [08-27 @ 10:28]  SCr 2.31 [08-26 @ 23:11]  SCr 2.31 [08-25 @ 20:57]  SCr 2.30 [08-24 @ 21:28]    Urinalysis - [08-15-20 @ 12:30]      Color Yellow / Appearance Clear / SG 1.028 / pH 6.0      Gluc Negative / Ketone Negative  / Bili Negative / Urobili Negative       Blood Negative / Protein 100 / Leuk Est Negative / Nitrite Negative      RBC  / WBC  / Hyaline  / Gran  / Sq Epi  / Non Sq Epi  / Bacteria     Urine Creatinine 148      [08-25-20 @ 11:51]  Urine Sodium 70      [08-25-20 @ 11:51]  Urine Chloride 70      [08-25-20 @ 11:51]    TSH 1.91      [08-09-20 @ 17:20]  Lipid: chol 148, , HDL 37, LDL 68      [08-09-20 @ 17:21]    HCV 0.11, Nonreact      [08-10-20 @ 19:25]

## 2020-08-28 NOTE — PROGRESS NOTE ADULT - SUBJECTIVE AND OBJECTIVE BOX
Visit Summary: Patient seen and evaluated at bedside.    Overnight Events: none    Exam:  Vital Signs Last 24 Hrs  T(C): 37.7 (27 Aug 2020 23:00), Max: 38.1 (27 Aug 2020 18:00)  T(F): 99.9 (27 Aug 2020 23:00), Max: 100.6 (27 Aug 2020 18:00)  HR: 94 (28 Aug 2020 01:00) (78 - 143)  BP: 147/84 (28 Aug 2020 01:00) (125/86 - 176/112)  BP(mean): 104 (28 Aug 2020 01:00) (76 - 145)  RR: 25 (28 Aug 2020 01:00) (19 - 50)  SpO2: 100% (28 Aug 2020 01:00) (91% - 100%)    Intubated, sedated, EO light stim, FC on R 2 fingers, wiggle toes will lift leg AG, nothing on L                              10.7   15.61 )-----------( 328      ( 21 Aug 2020 22:43 )             34.9     08-21    145  |  114<H>  |  38<H>  ----------------------------<  130<H>  3.6   |  18<L>  |  1.92<H>    Ca    9.2      21 Aug 2020 22:43  Phos  2.8     08-21  Mg     2.2     08-21

## 2020-08-28 NOTE — PROGRESS NOTE ADULT - SUBJECTIVE AND OBJECTIVE BOX
Subjective: Patient seen and examined. No new events except as noted.   remains in NSCU   s/p PEG      REVIEW OF SYSTEMS:  Unable to obtain     MEDICATIONS:  MEDICATIONS  (STANDING):  albuterol/ipratropium for Nebulization. 3 milliLiter(s) Nebulizer every 6 hours  aMIOdarone    Tablet 200 milliGRAM(s) Oral daily  artificial  tears Solution 1 Drop(s) Both EYES every 6 hours  chlorhexidine 0.12% Liquid 15 milliLiter(s) Oral Mucosa every 12 hours  chlorhexidine 4% Liquid 1 Application(s) Topical <User Schedule>  chlorhexidine 4% Liquid 1 Application(s) Topical <User Schedule>  clonazePAM  Tablet 0.5 milliGRAM(s) Oral every 8 hours  dextrose 5%. 1000 milliLiter(s) (50 mL/Hr) IV Continuous <Continuous>  dextrose 50% Injectable 12.5 Gram(s) IV Push once  dextrose 50% Injectable 25 Gram(s) IV Push once  dextrose 50% Injectable 25 Gram(s) IV Push once  famotidine Injectable 20 milliGRAM(s) IV Push daily  fentaNYL   Patch  50 MICROgram(s)/Hr 1 Patch Transdermal every 72 hours  heparin   Injectable 5000 Unit(s) SubCutaneous every 8 hours  hydrALAZINE 50 milliGRAM(s) Oral every 8 hours  isosorbide   dinitrate Tablet (ISORDIL) 10 milliGRAM(s) Oral three times a day  lactobacillus acidophilus 1 Tablet(s) Oral two times a day  metoprolol tartrate 50 milliGRAM(s) Oral three times a day  QUEtiapine 50 milliGRAM(s) Oral every 6 hours  sodium chloride 7% Inhalation 4 milliLiter(s) Inhalation every 8 hours  vancomycin    Solution 125 milliGRAM(s) Oral every 6 hours      PHYSICAL EXAM:  T(C): 36.8 (08-28-20 @ 07:00), Max: 38.1 (08-27-20 @ 18:00)  HR: 94 (08-28-20 @ 09:00) (83 - 143)  BP: 145/88 (08-28-20 @ 09:00) (106/68 - 193/128)  RR: 21 (08-28-20 @ 09:00) (18 - 43)  SpO2: 97% (08-28-20 @ 09:00) (94% - 100%)  Wt(kg): --  I&O's Summary    27 Aug 2020 07:01  -  28 Aug 2020 07:00  --------------------------------------------------------  IN: 660 mL / OUT: 500 mL / NET: 160 mL    28 Aug 2020 07:01  -  28 Aug 2020 10:04  --------------------------------------------------------  IN: 80 mL / OUT: 0 mL / NET: 80 mL        Appearance: more awake , +trach   HEENT:   Dry  oral mucosa,  Lymphatic: No lymphadenopathy  Cardiovascular: Normal S1 S2, No JVD, No murmurs, No edema  Respiratory: Ventilated   Psychiatry: A & O x 0  Gastrointestinal:  Soft, Non-tender, +PEG   Skin: No rashes, No ecchymoses, No cyanosis	  Mental status- No acute distress, EO to stim  -CN- Pupils R 4mm NR, L 2mm sluggish, EOMI, tongue midline, face symmetric  +cough/gag  C briskly, two fingers/thumbs up on RUE, distally AG, wiggles toes on RLE, no      LABS:    CARDIAC MARKERS:                                9.3    10.86 )-----------( 309      ( 26 Aug 2020 23:11 )             30.3     08-27    146<H>  |  116<H>  |  32<H>  ----------------------------<  121<H>  4.2   |  16<L>  |  2.37<H>    Ca    9.2      27 Aug 2020 10:28  Phos  3.6     08-27  Mg     2.2     08-27    TPro  5.7<L>  /  Alb  2.6<L>  /  TBili  0.2  /  DBili  <0.1  /  AST  20  /  ALT  20  /  AlkPhos  58  08-26    proBNP:   Lipid Profile:   HgA1c:   TSH:             TELEMETRY: 	  AF  ECG:  	  RADIOLOGY:   DIAGNOSTIC TESTING:  [ ] Echocardiogram:  [ ]  Catheterization:  [ ] Stress Test:    OTHER: 	    < from: Upper Endoscopy (08.26.20 @ 14:57) >    [facility]  ____________________________________________________________________________________________________  Patient Name: Cam Vieyra                    MRN: [id number]  Account Number: [id number]                     YOB: 1960  Room: Endoscopy Room 4                           Gender: Male  Attending MD: Robert Brunner , MD                Procedure Date No Time: 8/26/2020  ____________________________________________________________________________________________________     Procedure:           Upper GI endoscopy  Indications:         Dysphagia  Providers:           Robert Brunner, MD  Medicines:           Monitored Anesthesia Care  Complications:       No immediate complications.  ____________________________________________________________________________________________________  Procedure:           Pre-Anesthesia Assessment:                       - Prior to the procedure, a History and Physical was performed, and patient          medications, allergies and sensitivities were reviewed. The patient's                        tolerance of previous anesthesia was reviewed.                       - The risks and benefits of the procedure and the sedation options and risks                     were discussed with the patient. All questions were answered and informed                        consent was obtained.                       - The risks and benefits of the procedure and the sedation options and risks           were discussed with the patient. All questions were answered and informed                        consent was obtained.                       - Patient identification and proposed procedure were verified prior to the                        procedure by the physician and the nurse. The procedure was verified in the                        procedure room.                       - Universal Protocol:                       - Pre-procedure Verification: Prior to the procedure, the patient's identity                        was verified by full name, date of birth and medical record number. The                        patient's identity was verified on all pertinent medical records, including                        History and Physical. Also prior to the procedure, a History and Physical was                        performed, and patient medications, allergies and sensitivities were                        reviewed. The patient's tolerance of previous anesthesia was reviewed. The            risks and benefits of the procedure and the sedation options and risks were                        discussed with the patient. All questions were answered and informed consent                        was obtained.                       - Time-Out: Prior to the start of the procedure, the patient's                        identification, proposed procedure, accurate signed consent, correctly                        labeled images and records, and need for prophylactic antibiotics were                   verified by the physician and the nurse in the procedure room.                       After obtaining informed consent, the endoscope was passed under direct                        vision. Throughout the procedure, the patient's blood pressure, pulse, and                        oxygen saturations were monitored continuously. The Endoscope was introduced                        through the mouth, and advanced to the second part of duodenum. The upper GI                        endoscopy was accomplished without difficulty. The patient tolerated the                        procedure well.                                                                                                        Findings:       The examined esophagus was normal.       No gross lesions were noted in the entire examined stomach. Placement of an externally        removable PEG with 2 T-fasteners was successfully completed. The external bumper was at the        2.0 cm marking on the tube. Estimated blood loss was minimal.       The examined duodenum was normal.                                                                                                        Impression:          - Normal esophagus.                       - No gross lesions in the stomach.                       - Normal examined duodenum.                       - An externally removable PEG placement was successfully completed.                       - No specimens collected.  Recommendation:      - Please follow the post-PEG recommendations including: antibiotic ointment                        to site, change dressing once per day, check site for bleeding q 4 hrs, clean                        site with soap and water daily and dry thoroughly, NPO x4 hrs then water              today, may use PEG today for meds and water and may use PEG tomorrow for                        feedings.                                                                                                        Attending Participation:  I personally performed the entire procedure.                                                                                                          __________________  Robert Brunner, MD  8/26/2020 2:59:56 PM  This report has been signed electronically.  Number of Addenda: 0    Note Initiated On: 8/26/2020 2:57 PM    < end of copied text >

## 2020-08-28 NOTE — CONSULT NOTE ADULT - ASSESSMENT
60 year old male s/p cardiac arrest c/b GIB and bleeding from scott with perimesencephalic (HH4 mF4) subarachnoid hemorrhage, CT showed R periclinoidal/perimesencephalic SAH, c/f aneurysm rupture, no hydrocephalus.  angio neg x2, trach/vent and peg. course also complicated by gross hematuria - Urology consulted +catheter with clot (irrigated and exchanged) suspected 2/2 traumatic catheterization. treated for pseudomonas HCAP. In addition, has developed c diff. pt noticed with abn renal function and requires coronary angiogram.      1- JALEN on ckd III likely   2- HTN   3- respiratory failure  4- s/p cardiac arrest  5- c diff   6- s/p cardiac arrest       JALEN in setting of infections suspected along with his cardiac arrest   he will stand risk for worsening renal function with coronary angiogram   if coronary angio scheduled then to receive ivf analisa procedure  to have urine pro/cr ratio given proteinuria and to have repeat urinalysis   vanco feeding tube 125 mg q6   vent support   hydralazine 50 mg tid   d/w cards and NSCU team when seen earlier

## 2020-08-28 NOTE — PROGRESS NOTE ADULT - SUBJECTIVE AND OBJECTIVE BOX
HPI:  60 year old male with PMHx of Afib on coumadin s/p cardiac arrest at work, downtime 25 minutes prior to ROSC. Pupils were R fixed and dilated, left fixed at the time, no gag reflex, post-CA cooling protocol was initiated down to 35 deg. Intubated and put on Nimbex drip. Also on Propofol, fentanyl, levophed. R groin minerva placed. Also put on heparin post-CA. HCT showed R perimesencephalic SAH of unclear etiology, no hydrocephalus. Course also c/b GIB, was put on protonix drip. Also had bleeding from scott - urology consulted, clot irrigated.       HOSP COURSE:   8/10- Angio- neg   8/11: VTACH noted (7 beats)  8/13: Febrile  8/14: Started on Unasyn for gram neg rods and gram neg diplococci on bronch gram stain -> switched to cefepime for pseudomonas noted 8/10  8/16: central line removed on 8/16. Cr improving 1.7 -> 1.57. MRNOVA planned. Could not tolerate since desatted when supine  8/17/20: Lasix 30mg x1  8/18: PO vanc for Cdiff  8/19: Few beats of VTACH noted.   8/20: Taken for angio: Negative.  8/21: Episode of respiratory distress overnight; desaturated 85%, mucus plug/ambu bagged.   S/P lasix 40mgx1.       no acute overnight events        ICU Vital Signs Last 24 Hrs  T(C): 37.9 (28 Aug 2020 03:00), Max: 38.1 (27 Aug 2020 18:00)  T(F): 100.3 (28 Aug 2020 03:00), Max: 100.6 (27 Aug 2020 18:00)  HR: 101 (28 Aug 2020 06:00) (78 - 143)  BP: 111/79 (28 Aug 2020 06:00) (111/79 - 193/128)  BP(mean): 87 (28 Aug 2020 06:00) (76 - 145)  ABP: 109/94 (27 Aug 2020 20:00) (95/77 - 180/101)  ABP(mean): 101 (27 Aug 2020 20:00) (84 - 165)  RR: 25 (28 Aug 2020 06:00) (19 - 50)  SpO2: 95% (28 Aug 2020 06:00) (94% - 100%)      08-27-20 @ 07:01  -  08-28-20 @ 07:00  --------------------------------------------------------  IN: 660 mL / OUT: 500 mL / NET: 160 mL        Mode: AC/ CMV (Assist Control/ Continuous Mandatory Ventilation), RR (machine): 18, TV (machine): 500, FiO2: 30, PEEP: 5, ITime: 0.9, MAP: 7, PIP: 12    acetaminophen   Tablet .. 650 milliGRAM(s) Oral every 6 hours PRN  acetylcysteine 20%  Inhalation 4 milliLiter(s) Inhalation three times a day PRN  albuterol/ipratropium for Nebulization. 3 milliLiter(s) Nebulizer every 6 hours  aMIOdarone    Tablet 200 milliGRAM(s) Oral daily  artificial  tears Solution 1 Drop(s) Both EYES every 6 hours  clonazePAM  Tablet 0.5 milliGRAM(s) Oral every 8 hours  famotidine Injectable 20 milliGRAM(s) IV Push daily  glucagon  Injectable 1 milliGRAM(s) IntraMuscular once PRN  heparin   Injectable 5000 Unit(s) SubCutaneous every 8 hours  hydrALAZINE 50 milliGRAM(s) Oral every 8 hours  HYDROmorphone   Tablet 2 milliGRAM(s) Oral every 4 hours PRN  isosorbide   dinitrate Tablet (ISORDIL) 10 milliGRAM(s) Oral three times a day  lactobacillus acidophilus 1 Tablet(s) Oral two times a day  metoprolol tartrate 50 milliGRAM(s) Oral three times a day  QUEtiapine 50 milliGRAM(s) Oral every 6 hours  sodium chloride 0.9% lock flush 10 milliLiter(s) IV Push every 1 hour PRN  sodium chloride 7% Inhalation 4 milliLiter(s) Inhalation every 8 hours  vancomycin    Solution 125 milliGRAM(s) Oral every 6 hours          EXAMINATION:  General: Agitated, intubated  HEENT: MMM  Neuro:  -Mental status-  No acute distress, EO spont . Strong and purposeful on RUE AG,  followed simple commands on the right , RLE Prox at least 3 distally 4,  Left side  0/5  -CN- Pupils R 5mm NR, L 1mm sluggish, Eyes dysconjugate,    CVS: S1/S2  RESP: Diminished at bases  GI: Soft, non tender, mildly distended, hypoactive BS  Extremities: Warm, skin intact      BRONCH- 8/13/20- Pseudomonas- Cefepime       LABS:  Na: 146 (08-27 @ 10:28), 146 (08-26 @ 23:11), 143 (08-25 @ 20:57)  K: 4.2 (08-27 @ 10:28), 3.8 (08-26 @ 23:11), 4.1 (08-25 @ 20:57)  Cl: 116 (08-27 @ 10:28), 114 (08-26 @ 23:11), 114 (08-25 @ 20:57)  CO2: 16 (08-27 @ 10:28), 17 (08-26 @ 23:11), 15 (08-25 @ 20:57)  BUN: 32 (08-27 @ 10:28), 34 (08-26 @ 23:11), 36 (08-25 @ 20:57)  Cr: 2.37 (08-27 @ 10:28), 2.31 (08-26 @ 23:11), 2.31 (08-25 @ 20:57)  Glu: 121(08-27 @ 10:28), 119(08-26 @ 23:11), 155(08-25 @ 20:57)    Hgb: 9.3 (08-26 @ 23:11), 10.4 (08-25 @ 20:57)  Hct: 30.3 (08-26 @ 23:11), 33.6 (08-25 @ 20:57)  WBC: 10.86 (08-26 @ 23:11), 11.62 (08-25 @ 20:57)  Plt: 309 (08-26 @ 23:11), 341 (08-25 @ 20:57)    INR: 1.38 08-26-20 @ 23:11, 1.36 08-25-20 @ 20:57  PTT: 31.9 08-26-20 @ 23:11, 32.1 08-25-20 @ 20:57      LIVER FUNCTIONS - ( 26 Aug 2020 23:11 )  Alb: 2.6 g/dL / Pro: 5.7 g/dL / ALK PHOS: 58 U/L / ALT: 20 U/L / AST: 20 U/L / GGT: x           ABG - ( 27 Aug 2020 10:26 )  pH, Arterial: 7.40  pH, Blood: x     /  pCO2: 30    /  pO2: 99    / HCO3: 18    / Base Excess: -5.4  /  SaO2: 97 History of Present Illness:  History of Present Illness:   59YO male on coumadin for afib s/p cardiac arrest at work, down 25 minutes prior to ROSC. Pupils were R fixed and dilated, left fixed at the time, no gag reflex, post-CA cooling protocol was initiated down to 35 deg. Intubated and put on Nimbex drip. Also on Propofol, fentanyl, levophed. R groin minerva placed. Also put on heparin post-CA. 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 Course also c/b GIB, put on protonix drip. Prior to xfer was started on 3% at 30cc/hr.      Review of Systems:  Review of Systems: as above      Allergies and Intolerances:        Allergies:  	No Known Allergies:     Home Medications:   * Outpatient Medication Status not yet specified  .    Patient History:    Past Medical, Past Surgical, and Family History:  PAST MEDICAL HISTORY:  On warfarin for atrial fibrillation.     No significant past surgical history.     No pertinent family history in first degree relatives.     No Pertinent Family History in first degree relatives of: n/a.     Social History:  Social History (marital status, living situation, occupation, tobacco use, alcohol and drug use, and sexual history): n/a     Tobacco Screening:  · Core Measure Site	No    Risk Assessment:    Present on Admission:  Deep Venous Thrombosis	no  Pulmonary Embolus	no     Heart Failure:  Does this patient have a history of or has been diagnosed with heart failure? unknown.    HIV Screen (per [facility] Department of Health, HIV screening must be offered to every individual between ages 13 and 64)	Unable to offer due to clinical condition          HOSP COURSE:   8/10- Angio- neg   8/11: VTACH noted (7 beats)  8/13: Febrile  8/14: Started on Unasyn for gram neg rods and gram neg diplococci on bronch gram stain -> switched to cefepime for pseudomonas noted 8/10  8/16: central line removed on 8/16. Cr improving 1.7 -> 1.57. MRNOVA planned. Could not tolerate since desatted when supine  8/17/20: Lasix 30mg x1  8/18: PO vanc for Cdiff  8/19: Few beats of VTACH noted.   8/20: Taken for angio: Negative.  8/21: Episode of respiratory distress overnight; desaturated 85%, mucus plug/ambu bagged.   S/P lasix 40mgx1.       overnight events: metoprolol dose increased         ICU Vital Signs Last 24 Hrs  T(C): 37.9 (28 Aug 2020 03:00), Max: 38.1 (27 Aug 2020 18:00)  T(F): 100.3 (28 Aug 2020 03:00), Max: 100.6 (27 Aug 2020 18:00)  HR: 101 (28 Aug 2020 06:00) (78 - 143)  BP: 111/79 (28 Aug 2020 06:00) (111/79 - 193/128)  BP(mean): 87 (28 Aug 2020 06:00) (76 - 145)  ABP: 109/94 (27 Aug 2020 20:00) (95/77 - 180/101)  ABP(mean): 101 (27 Aug 2020 20:00) (84 - 165)  RR: 25 (28 Aug 2020 06:00) (19 - 50)  SpO2: 95% (28 Aug 2020 06:00) (94% - 100%)      08-27-20 @ 07:01  -  08-28-20 @ 07:00  --------------------------------------------------------  IN: 660 mL / OUT: 500 mL / NET: 160 mL        Mode: AC/ CMV (Assist Control/ Continuous Mandatory Ventilation), RR (machine): 18, TV (machine): 500, FiO2: 30, PEEP: 5, ITime: 0.9, MAP: 7, PIP: 12    acetaminophen   Tablet .. 650 milliGRAM(s) Oral every 6 hours PRN  acetylcysteine 20%  Inhalation 4 milliLiter(s) Inhalation three times a day PRN  albuterol/ipratropium for Nebulization. 3 milliLiter(s) Nebulizer every 6 hours  aMIOdarone    Tablet 200 milliGRAM(s) Oral daily  artificial  tears Solution 1 Drop(s) Both EYES every 6 hours  clonazePAM  Tablet 0.5 milliGRAM(s) Oral every 8 hours  famotidine Injectable 20 milliGRAM(s) IV Push daily  glucagon  Injectable 1 milliGRAM(s) IntraMuscular once PRN  heparin   Injectable 5000 Unit(s) SubCutaneous every 8 hours  hydrALAZINE 50 milliGRAM(s) Oral every 8 hours  HYDROmorphone   Tablet 2 milliGRAM(s) Oral every 4 hours PRN  isosorbide   dinitrate Tablet (ISORDIL) 10 milliGRAM(s) Oral three times a day  lactobacillus acidophilus 1 Tablet(s) Oral two times a day  metoprolol tartrate 50 milliGRAM(s) Oral three times a day  QUEtiapine 50 milliGRAM(s) Oral every 6 hours  sodium chloride 0.9% lock flush 10 milliLiter(s) IV Push every 1 hour PRN  sodium chloride 7% Inhalation 4 milliLiter(s) Inhalation every 8 hours  vancomycin    Solution 125 milliGRAM(s) Oral every 6 hours          EXAMINATION:  General: Agitated, intubated  HEENT: MMM  Neuro:  -Mental status-  No acute distress, EO spont . Strong and purposeful on RUE AG,  followed simple commands on the right , RLE Prox at least 3 distally 4,  Left side  0/5  -CN- Pupils R 5mm NR, L 1mm sluggish, Eyes dysconjugate,    CVS: S1/S2  RESP: Diminished at bases  GI: Soft, non tender, mildly distended, hypoactive BS  Extremities: Warm, skin intact      BRONCH- 8/13/20- Pseudomonas- Cefepime       LABS:  Na: 146 (08-27 @ 10:28), 146 (08-26 @ 23:11), 143 (08-25 @ 20:57)  K: 4.2 (08-27 @ 10:28), 3.8 (08-26 @ 23:11), 4.1 (08-25 @ 20:57)  Cl: 116 (08-27 @ 10:28), 114 (08-26 @ 23:11), 114 (08-25 @ 20:57)  CO2: 16 (08-27 @ 10:28), 17 (08-26 @ 23:11), 15 (08-25 @ 20:57)  BUN: 32 (08-27 @ 10:28), 34 (08-26 @ 23:11), 36 (08-25 @ 20:57)  Cr: 2.37 (08-27 @ 10:28), 2.31 (08-26 @ 23:11), 2.31 (08-25 @ 20:57)  Glu: 121(08-27 @ 10:28), 119(08-26 @ 23:11), 155(08-25 @ 20:57)    Hgb: 9.3 (08-26 @ 23:11), 10.4 (08-25 @ 20:57)  Hct: 30.3 (08-26 @ 23:11), 33.6 (08-25 @ 20:57)  WBC: 10.86 (08-26 @ 23:11), 11.62 (08-25 @ 20:57)  Plt: 309 (08-26 @ 23:11), 341 (08-25 @ 20:57)    INR: 1.38 08-26-20 @ 23:11, 1.36 08-25-20 @ 20:57  PTT: 31.9 08-26-20 @ 23:11, 32.1 08-25-20 @ 20:57      LIVER FUNCTIONS - ( 26 Aug 2020 23:11 )  Alb: 2.6 g/dL / Pro: 5.7 g/dL / ALK PHOS: 58 U/L / ALT: 20 U/L / AST: 20 U/L / GGT: x           ABG - ( 27 Aug 2020 10:26 )  pH, Arterial: 7.40  pH, Blood: x     /  pCO2: 30    /  pO2: 99    / HCO3: 18    / Base Excess: -5.4  /  SaO2: 97

## 2020-08-28 NOTE — PROGRESS NOTE ADULT - ASSESSMENT
Mr. Vieyra is a 60-year-old male with a history of A-Fib on warfarin who presents with cardiac arrest at work with downtime of about 25 minutes prior to ROSC. S/p therapeutic hypothermia. 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 Now with resolved neurogenic/cardiogenic shock. Course complicated by GI bleed s/p PPI gtt, bleeding from scott s/p clot irrigation, prolonged respiratory failure s/p trach (8/24) and PEG (8/26). Currently with A-Fib with RVR and C diff colitis.    1. SAH with negative angiogram:  - Moving RUE/RLE, but without movement on left  - No further neurosurgical intervention at this time    2. Acute Encephalopathy:  - Continue fentanyl patch, clonazepam, and quetiapine  - Hydromorphone and tramadol prn    3. Acute systolic heart failure:  - Possible stunned myocardium due to cardiac arrest vs. SAH  - Will need cardiac cath to evaluate for ischemia  - Continue afterload/preload reduction with hydralazine and isosorbide dinitrate    4. A-Fib with RVR:  - Continue amiodarone 200 mg daily  - Increase metoprolol to 50 mg q8h  - Hold off on therapeutic a/c given SAH    5. Acute hypoxemic respiratory failure s/p tracheostomy:  - Continue lung protective ventilation, pressure support trials as tolerated, currently on 5/5  - Continue Duonebs and hypertonic saline, chest PT    6. Oropharyngeal dysphagia:  - PEG feeds as tolerated  - Famotidine for GI ppx    7. C diff colitis:  - Continue oral vancomycin  - Leukocytosis improved    8. Pseudomonas aeruginosa pneumonia/colonization:  - S/p prolonged course of cefepime until 8/25  - Hold off on systemic antibiotics at this time  - If develops increased secretions, may benefit from inhaled tobramycin    9. JALEN, possible CKD:  - Strict I/O's, trend kidney function and lytes    10. Dispo:  - Ok to transfer to RCU given improved A-Fib with RVR  - Full code, overall prognosis guarded depending on neurological recovery  - PT/OT eval. If encephalopathy improves and he can cooperate, will consider acute rehab eval  - Discussed with son Rufino ([phone]). He is aware of patient's current condition. Reports that patient has been estranged from his wife Ashley for 15 years, who is an alcoholic with addition problems. I tried calling her at [phone], but there is no response

## 2020-08-28 NOTE — PROGRESS NOTE ADULT - PROBLEM SELECTOR PLAN 1
- Plan to remove sutures on POD #7  - Do not remove umbilical trach tie  - HOB elevation  - Suction PRN  - Continue trach care  - ENT will continue to follow, call with questions x [phone].

## 2020-08-28 NOTE — PROGRESS NOTE ADULT - SUBJECTIVE AND OBJECTIVE BOX
[facility] - Division of Pulmonary, Critical Care and Sleep Medicine   Please call [phone] between 8am-pm weekdays, [phone] after hours and weekends    Interval Events: no acute events overnight. HR with improved control. Low grade temp of 100.6 overnight. Hemodynamically stable    REVIEW OF SYSTEMS:  [x] Unable to assess due to encephalopathy      OBJECTIVE:  ICU Vital Signs Last 24 Hrs  T(C): 36.8 (28 Aug 2020 11:00), Max: 38.1 (27 Aug 2020 18:00)  T(F): 98.3 (28 Aug 2020 11:00), Max: 100.6 (27 Aug 2020 18:00)  HR: 100 (28 Aug 2020 14:00) (83 - 128)  BP: 153/103 (28 Aug 2020 14:00) (106/68 - 193/128)  BP(mean): 118 (28 Aug 2020 14:00) (78 - 139)  ABP: 109/94 (27 Aug 2020 20:00) (95/91 - 162/151)  ABP(mean): 101 (27 Aug 2020 20:00) (93 - 165)  RR: 20 (28 Aug 2020 14:00) (18 - 31)  SpO2: 96% (28 Aug 2020 14:00) (94% - 100%)    Mode: CPAP with PS, FiO2: 30, PEEP: 5, PS: 5, MAP: 6, PIP: 15    08-27 @ 07:01 - 08-28 @ 07:00  --------------------------------------------------------  IN: 660 mL / OUT: 500 mL / NET: 160 mL    08-28 @ 07:01 - 08-28 @ 14:56  --------------------------------------------------------  IN: 380 mL / OUT: 400 mL / NET: -20 mL    POCT Blood Glucose.: 120 mg/dL (27 Aug 2020 15:03)    PHYSICAL EXAM:  General: NAD; awake and follows simple commands, but not able to answer questions  Neuro: CN II-XII grossly intact; moving RUE/RLE spontaneously, no movement on left  HEENT: NC/AT; unable to open R eye; R pupil 3-4 mm non-reactive; L pupil 2-3 mm sluggishly reactive; MMM; +trach to vent  CV: normal S1 & S2; irregularly irregular  Pulm: course breath sounds bilaterally  Abdomen: soft; distended; NT; +PEG  Ext: trace edema; +R arm PICC  Skin: warm, well perfused    HOSPITAL MEDICATIONS:  MEDICATIONS  (STANDING):  albuterol/ipratropium for Nebulization. 3 milliLiter(s) Nebulizer every 6 hours  aMIOdarone    Tablet 200 milliGRAM(s) Oral daily  artificial  tears Solution 1 Drop(s) Both EYES every 6 hours  aspirin  chewable 81 milliGRAM(s) Enteral Tube daily  chlorhexidine 0.12% Liquid 15 milliLiter(s) Oral Mucosa every 12 hours  chlorhexidine 4% Liquid 1 Application(s) Topical <User Schedule>  chlorhexidine 4% Liquid 1 Application(s) Topical <User Schedule>  clonazePAM  Tablet 0.5 milliGRAM(s) Oral every 8 hours  dextrose 5%. 1000 milliLiter(s) (50 mL/Hr) IV Continuous <Continuous>  dextrose 50% Injectable 12.5 Gram(s) IV Push once  dextrose 50% Injectable 25 Gram(s) IV Push once  dextrose 50% Injectable 25 Gram(s) IV Push once  famotidine Injectable 20 milliGRAM(s) IV Push daily  fentaNYL   Patch  50 MICROgram(s)/Hr 1 Patch Transdermal every 72 hours  heparin   Injectable 5000 Unit(s) SubCutaneous every 8 hours  hydrALAZINE 50 milliGRAM(s) Oral every 8 hours  isosorbide   dinitrate Tablet (ISORDIL) 10 milliGRAM(s) Oral three times a day  lactobacillus acidophilus 1 Tablet(s) Oral two times a day  metoprolol tartrate 50 milliGRAM(s) Oral three times a day  QUEtiapine 50 milliGRAM(s) Oral every 6 hours  sodium chloride 7% Inhalation 4 milliLiter(s) Inhalation every 8 hours  vancomycin    Solution 125 milliGRAM(s) Oral every 6 hours    MEDICATIONS  (PRN):  acetaminophen   Tablet .. 650 milliGRAM(s) Oral every 6 hours PRN Temp greater or equal to 38C (100.4F), Mild Pain (1 - 3)  acetylcysteine 20%  Inhalation 4 milliLiter(s) Inhalation three times a day PRN secretions  dextrose 40% Gel 15 Gram(s) Oral once PRN Blood Glucose LESS THAN 70 milliGRAM(s)/deciliter  glucagon  Injectable 1 milliGRAM(s) IntraMuscular once PRN Glucose LESS THAN 70 milligrams/deciliter  HYDROmorphone   Tablet 2 milliGRAM(s) Oral every 4 hours PRN Severe Pain (7 - 10)  sodium chloride 0.9% lock flush 10 milliLiter(s) IV Push every 1 hour PRN Pre/post blood products, medications, blood draw, and to maintain line patency      LABS:                        9.3    10.86 )-----------( 309      ( 26 Aug 2020 23:11 )             30.3     Hgb Trend: 9.3<--, 10.4<--, 10.0<--, 10.7<--  08-27    146<H>  |  116<H>  |  32<H>  ----------------------------<  121<H>  4.2   |  16<L>  |  2.37<H>    Ca    9.2      27 Aug 2020 10:28  Phos  3.6     08-27  Mg     2.2     08-27    TPro  5.7<L>  /  Alb  2.6<L>  /  TBili  0.2  /  DBili  <0.1  /  AST  20  /  ALT  20  /  AlkPhos  58  08-26    Creatinine Trend: 2.37<--, 2.31<--, 2.31<--, 2.30<--, 1.89<--, 2.08<--  PT/INR - ( 26 Aug 2020 23:11 )   PT: 16.2 sec;   INR: 1.38 ratio         PTT - ( 26 Aug 2020 23:11 )  PTT:31.9 sec    Arterial Blood Gas:  08-27 @ 10:26  7.40/30/99/18/97/-5.4  ABG lactate: --    Bronchial combicath cultures (8/10, 8/13, 8/15): pan-sensitive pseudomonas aeruginosa  Blood cultures negative (8/15)      RADIOLOGY:  CTPA (8/11): no main, central, or left-sided lobar PE. No significant alveolar consolidation, but exam limited due to motion artifact. Small bilateral pleural effusions    CXR (8/25): trach in place above kayleigh; enteric tube in stomach, R PICC with tip in SVC; lungs are clear

## 2020-08-28 NOTE — CONSULT NOTE ADULT - SUBJECTIVE AND OBJECTIVE BOX
HPI:   Patient is a 60y male with afib s/p cardiac arrest at work, down 25 minutes prior to ROSC. Pupils were R fixed and dilated, left fixed at the time, no gag reflex, post-CA cooling protocol was initiated down to 35 deg. Intubated and put on Nimbex drip. 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 Course also c/b GIB, put on protonix drip. Also developed gross hematuria - Urology consulted +catheter with clot (irrigated and exchanged) suspected 2/2 traumatic catheterization.   he is s/p PEG as well. he was treated for pseudomonas HCAP, and now developed C diff and has been on PO Vanc since 8/18. Pt now awaiting cath/angiogram  REVIEW OF SYSTEMS:  All other review of systems negative (Comprehensive ROS)    PAST MEDICAL & SURGICAL HISTORY:  On warfarin for atrial fibrillation  No significant past surgical history      Allergies    No Known Allergies    Intolerances        Antimicrobials Day #    vancomycin    Solution 125 milliGRAM(s) Oral every 6 hours    Other Medications:  acetaminophen   Tablet .. 650 milliGRAM(s) Oral every 6 hours PRN  acetylcysteine 20%  Inhalation 4 milliLiter(s) Inhalation three times a day PRN  albuterol/ipratropium for Nebulization. 3 milliLiter(s) Nebulizer every 6 hours  aMIOdarone    Tablet 200 milliGRAM(s) Oral daily  artificial  tears Solution 1 Drop(s) Both EYES every 6 hours  aspirin  chewable 81 milliGRAM(s) Enteral Tube daily  chlorhexidine 0.12% Liquid 15 milliLiter(s) Oral Mucosa every 12 hours  chlorhexidine 4% Liquid 1 Application(s) Topical <User Schedule>  chlorhexidine 4% Liquid 1 Application(s) Topical <User Schedule>  clonazePAM  Tablet 0.5 milliGRAM(s) Oral every 8 hours  dextrose 40% Gel 15 Gram(s) Oral once PRN  dextrose 5%. 1000 milliLiter(s) IV Continuous <Continuous>  dextrose 50% Injectable 12.5 Gram(s) IV Push once  dextrose 50% Injectable 25 Gram(s) IV Push once  dextrose 50% Injectable 25 Gram(s) IV Push once  famotidine Injectable 20 milliGRAM(s) IV Push daily  fentaNYL   Patch  50 MICROgram(s)/Hr 1 Patch Transdermal every 72 hours  glucagon  Injectable 1 milliGRAM(s) IntraMuscular once PRN  heparin   Injectable 5000 Unit(s) SubCutaneous every 8 hours  hydrALAZINE 50 milliGRAM(s) Oral every 8 hours  HYDROmorphone   Tablet 2 milliGRAM(s) Oral every 4 hours PRN  isosorbide   dinitrate Tablet (ISORDIL) 10 milliGRAM(s) Oral three times a day  lactobacillus acidophilus 1 Tablet(s) Oral two times a day  metoprolol tartrate 50 milliGRAM(s) Oral three times a day  QUEtiapine 50 milliGRAM(s) Oral every 6 hours  sodium chloride 0.9% lock flush 10 milliLiter(s) IV Push every 1 hour PRN  sodium chloride 7% Inhalation 4 milliLiter(s) Inhalation every 8 hours      FAMILY HISTORY:  No pertinent family history in first degree relatives      SOCIAL HISTORY:  Smoking:     ETOH:     Drug Use:     Single     T(F): 98.3 (08-28-20 @ 11:00), Max: 100.6 (08-27-20 @ 18:00)  HR: 100 (08-28-20 @ 14:00)  BP: 153/103 (08-28-20 @ 14:00)  RR: 20 (08-28-20 @ 14:00)  SpO2: 96% (08-28-20 @ 14:00)  Wt(kg): --    PHYSICAL EXAM:  General: trach and PEG, not communicative  Eyes:  anicteric, no conjunctival injection, no discharge  Oropharynx: no lesions or injection 	  Neck: supple, without adenopathy  Lungs: clear to auscultation  Heart: regular rate and rhythm; no murmur, rubs or gallops  Abdomen: soft, nondistended,PEG, without mass or organomegaly  Skin: no lesions  Extremities: no clubbing, cyanosis, or edema  Neurologic: not follows commands    LAB RESULTS:                        9.3    10.86 )-----------( 309      ( 26 Aug 2020 23:11 )             30.3     08-27    146<H>  |  116<H>  |  32<H>  ----------------------------<  121<H>  4.2   |  16<L>  |  2.37<H>    Ca    9.2      27 Aug 2020 10:28  Phos  3.6     08-27  Mg     2.2     08-27    TPro  5.7<L>  /  Alb  2.6<L>  /  TBili  0.2  /  DBili  <0.1  /  AST  20  /  ALT  20  /  AlkPhos  58  08-26    LIVER FUNCTIONS - ( 26 Aug 2020 23:11 )  Alb: 2.6 g/dL / Pro: 5.7 g/dL / ALK PHOS: 58 U/L / ALT: 20 U/L / AST: 20 U/L / GGT: x               MICROBIOLOGY REVIEWED:  Culture - Blood (08.15.20 @ 12:01)    Specimen Source: .Blood Blood    Culture Results:   No Growth Final      RADIOLOGY REVIEWED:  < from: Xray Abdomen 1 View PORTABLE -Routine (08.25.20 @ 06:19) >  IMPRESSION:    Nonobstructive bowel gas pattern..    < end of copied text >  < from: Xray Chest 1 View- PORTABLE-Routine (08.25.20 @ 06:19) >  FINDINGS/  IMPRESSION: The lower lungs are not imaged.    Tracheostomy tube above the kayleigh. Enteric tube courses through the esophagus. The tip is not visualized. Right PICC tip within SVC.    The visualized parts of the lungs are clear.    < end of copied text >

## 2020-08-28 NOTE — PROGRESS NOTE ADULT - SUBJECTIVE AND OBJECTIVE BOX
Patient is a 60y old  Male who presented with a chief complaint of sah (16 Aug 2020 07:48)      INTERVAL HPI/OVERNIGHT EVENTS:  tolerating PEG feeds  stools loose/pasty consistency  still with periods of agitation - increased with turning/movement  no fever   remains on vent    MEDICATIONS  (STANDING):  albuterol/ipratropium for Nebulization. 3 milliLiter(s) Nebulizer every 6 hours  aMIOdarone    Tablet 200 milliGRAM(s) Oral daily  artificial  tears Solution 1 Drop(s) Both EYES every 6 hours  chlorhexidine 0.12% Liquid 15 milliLiter(s) Oral Mucosa every 12 hours  chlorhexidine 4% Liquid 1 Application(s) Topical <User Schedule>  chlorhexidine 4% Liquid 1 Application(s) Topical <User Schedule>  clonazePAM  Tablet 0.5 milliGRAM(s) Oral every 8 hours  dextrose 5%. 1000 milliLiter(s) (50 mL/Hr) IV Continuous <Continuous>  dextrose 50% Injectable 12.5 Gram(s) IV Push once  dextrose 50% Injectable 25 Gram(s) IV Push once  dextrose 50% Injectable 25 Gram(s) IV Push once  famotidine Injectable 20 milliGRAM(s) IV Push daily  fentaNYL   Patch  50 MICROgram(s)/Hr 1 Patch Transdermal every 72 hours  heparin   Injectable 5000 Unit(s) SubCutaneous every 8 hours  hydrALAZINE 50 milliGRAM(s) Oral every 8 hours  isosorbide   dinitrate Tablet (ISORDIL) 10 milliGRAM(s) Oral three times a day  lactobacillus acidophilus 1 Tablet(s) Oral two times a day  metoprolol tartrate 50 milliGRAM(s) Oral three times a day  QUEtiapine 50 milliGRAM(s) Oral every 6 hours  sodium chloride 7% Inhalation 4 milliLiter(s) Inhalation every 8 hours  vancomycin    Solution 125 milliGRAM(s) Oral every 6 hours    MEDICATIONS  (PRN):  acetaminophen   Tablet .. 650 milliGRAM(s) Oral every 6 hours PRN Temp greater or equal to 38C (100.4F), Mild Pain (1 - 3)  acetylcysteine 20%  Inhalation 4 milliLiter(s) Inhalation three times a day PRN secretions  dextrose 40% Gel 15 Gram(s) Oral once PRN Blood Glucose LESS THAN 70 milliGRAM(s)/deciliter  glucagon  Injectable 1 milliGRAM(s) IntraMuscular once PRN Glucose LESS THAN 70 milligrams/deciliter  HYDROmorphone   Tablet 2 milliGRAM(s) Oral every 4 hours PRN Severe Pain (7 - 10)  sodium chloride 0.9% lock flush 10 milliLiter(s) IV Push every 1 hour PRN Pre/post blood products, medications, blood draw, and to maintain line patency      Allergies  No Known Allergies      Review of Systems:  unable to obtain    Vital Signs Last 24 Hrs  T(C): 36.8 (28 Aug 2020 07:00), Max: 38.1 (27 Aug 2020 18:00)  T(F): 98.2 (28 Aug 2020 07:00), Max: 100.6 (27 Aug 2020 18:00)  HR: 104 (28 Aug 2020 11:30) (83 - 128)  BP: 145/88 (28 Aug 2020 09:00) (106/68 - 193/128)  BP(mean): 102 (28 Aug 2020 09:00) (78 - 145)  RR: 21 (28 Aug 2020 11:30) (18 - 31)  SpO2: 98% (28 Aug 2020 11:30) (94% - 100%)    PHYSICAL EXAM:  Constitutional: NAD, restless +right UE wrist restraint +LUE splint in place. opens eyes, not following commands  Neck: +trach - clean  Respiratory: +vent sounds, decreased BS at bases  Cardiovascular: S1 and S2, tachy  Gastrointestinal: BS+, obese softly distended no rebound or rigidity  +PEG site clean and dry bumper at 2 cm, spins freely 360 degrees +rectal tube - pasty brown stool in tubing  Extremities: No peripheral edema  Vascular: 2+ peripheral pulses  Neurological: intermittently restless opens eyes. not following commands  moves RUE  +dysconjugate gaze  Skin: No rashes    LABS:                        9.3    10.86 )-----------( 309      ( 26 Aug 2020 23:11 )             30.3     08-27    146<H>  |  116<H>  |  32<H>  ----------------------------<  121<H>  4.2   |  16<L>  |  2.37<H>    Ca    9.2      27 Aug 2020 10:28  Phos  3.6     08-27  Mg     2.2     08-27    TPro  5.7<L>  /  Alb  2.6<L>  /  TBili  0.2  /  DBili  <0.1  /  AST  20  /  ALT  20  /  AlkPhos  58  08-26    PT/INR - ( 26 Aug 2020 23:11 )   PT: 16.2 sec;   INR: 1.38 ratio    PTT - ( 26 Aug 2020 23:11 )  PTT:31.9 sec      RADIOLOGY & ADDITIONAL TESTS:

## 2020-08-28 NOTE — PROGRESS NOTE ADULT - ASSESSMENT
61 y/o male s/p trach POD 4. On exam, #8 DCT trach in place, sutures x4 and umbilical tie in place. No bleeding noted. Doing well on ventilator.

## 2020-08-29 DIAGNOSIS — B99.9 UNSPECIFIED INFECTIOUS DISEASE: ICD-10-CM

## 2020-08-29 DIAGNOSIS — G93.49 OTHER ENCEPHALOPATHY: ICD-10-CM

## 2020-08-29 DIAGNOSIS — R13.12 DYSPHAGIA, OROPHARYNGEAL PHASE: ICD-10-CM

## 2020-08-29 DIAGNOSIS — I48.20 CHRONIC ATRIAL FIBRILLATION, UNSPECIFIED: ICD-10-CM

## 2020-08-29 DIAGNOSIS — I50.9 HEART FAILURE, UNSPECIFIED: ICD-10-CM

## 2020-08-29 LAB
ALBUMIN SERPL ELPH-MCNC: 2.9 G/DL — LOW (ref 3.3–5)
ALP SERPL-CCNC: 70 U/L — SIGNIFICANT CHANGE UP (ref 40–120)
ALT FLD-CCNC: 34 U/L — SIGNIFICANT CHANGE UP (ref 10–45)
AST SERPL-CCNC: 37 U/L — SIGNIFICANT CHANGE UP (ref 10–40)
BASOPHILS # BLD AUTO: 0.08 K/UL — SIGNIFICANT CHANGE UP (ref 0–0.2)
BASOPHILS NFR BLD AUTO: 0.7 % — SIGNIFICANT CHANGE UP (ref 0–2)
BILIRUB DIRECT SERPL-MCNC: <0.1 MG/DL — SIGNIFICANT CHANGE UP (ref 0–0.2)
BILIRUB INDIRECT FLD-MCNC: >0 MG/DL — LOW (ref 0.2–1)
BILIRUB SERPL-MCNC: 0.1 MG/DL — LOW (ref 0.2–1.2)
EOSINOPHIL # BLD AUTO: 0.35 K/UL — SIGNIFICANT CHANGE UP (ref 0–0.5)
EOSINOPHIL NFR BLD AUTO: 3 % — SIGNIFICANT CHANGE UP (ref 0–6)
HCT VFR BLD CALC: 30.2 % — LOW (ref 39–50)
HGB BLD-MCNC: 9.1 G/DL — LOW (ref 13–17)
IMM GRANULOCYTES NFR BLD AUTO: 2.1 % — HIGH (ref 0–1.5)
INR BLD: 1.36 RATIO — HIGH (ref 0.88–1.16)
LYMPHOCYTES # BLD AUTO: 1.11 K/UL — SIGNIFICANT CHANGE UP (ref 1–3.3)
LYMPHOCYTES # BLD AUTO: 9.5 % — LOW (ref 13–44)
MAGNESIUM SERPL-MCNC: 2.2 MG/DL — SIGNIFICANT CHANGE UP (ref 1.6–2.6)
MCHC RBC-ENTMCNC: 26.8 PG — LOW (ref 27–34)
MCHC RBC-ENTMCNC: 30.1 GM/DL — LOW (ref 32–36)
MCV RBC AUTO: 89.1 FL — SIGNIFICANT CHANGE UP (ref 80–100)
MONOCYTES # BLD AUTO: 0.86 K/UL — SIGNIFICANT CHANGE UP (ref 0–0.9)
MONOCYTES NFR BLD AUTO: 7.4 % — SIGNIFICANT CHANGE UP (ref 2–14)
NEUTROPHILS # BLD AUTO: 9.02 K/UL — HIGH (ref 1.8–7.4)
NEUTROPHILS NFR BLD AUTO: 77.3 % — HIGH (ref 43–77)
NRBC # BLD: 0 /100 WBCS — SIGNIFICANT CHANGE UP (ref 0–0)
PHOSPHATE SERPL-MCNC: 2.7 MG/DL — SIGNIFICANT CHANGE UP (ref 2.5–4.5)
PLATELET # BLD AUTO: 321 K/UL — SIGNIFICANT CHANGE UP (ref 150–400)
PROT SERPL-MCNC: 6.4 G/DL — SIGNIFICANT CHANGE UP (ref 6–8.3)
PROTHROM AB SERPL-ACNC: 16 SEC — HIGH (ref 10.6–13.6)
RBC # BLD: 3.39 M/UL — LOW (ref 4.2–5.8)
RBC # FLD: 15 % — HIGH (ref 10.3–14.5)
WBC # BLD: 11.66 K/UL — HIGH (ref 3.8–10.5)
WBC # FLD AUTO: 11.66 K/UL — HIGH (ref 3.8–10.5)

## 2020-08-29 PROCEDURE — 99233 SBSQ HOSP IP/OBS HIGH 50: CPT | Mod: GC

## 2020-08-29 RX ORDER — CLONAZEPAM 1 MG
0.5 TABLET ORAL EVERY 8 HOURS
Refills: 0 | Status: DISCONTINUED | OUTPATIENT
Start: 2020-08-29 | End: 2020-08-31

## 2020-08-29 RX ORDER — HYDROMORPHONE HYDROCHLORIDE 2 MG/ML
0.5 INJECTION INTRAMUSCULAR; INTRAVENOUS; SUBCUTANEOUS EVERY 4 HOURS
Refills: 0 | Status: DISCONTINUED | OUTPATIENT
Start: 2020-08-29 | End: 2020-09-02

## 2020-08-29 RX ORDER — FAMOTIDINE 10 MG/ML
20 INJECTION INTRAVENOUS DAILY
Refills: 0 | Status: DISCONTINUED | OUTPATIENT
Start: 2020-08-29 | End: 2020-09-24

## 2020-08-29 RX ORDER — HYDRALAZINE HCL 50 MG
50 TABLET ORAL ONCE
Refills: 0 | Status: COMPLETED | OUTPATIENT
Start: 2020-08-29 | End: 2020-08-29

## 2020-08-29 RX ORDER — HYDROMORPHONE HYDROCHLORIDE 2 MG/ML
2 INJECTION INTRAMUSCULAR; INTRAVENOUS; SUBCUTANEOUS EVERY 4 HOURS
Refills: 0 | Status: DISCONTINUED | OUTPATIENT
Start: 2020-08-29 | End: 2020-09-02

## 2020-08-29 RX ORDER — METOPROLOL TARTRATE 50 MG
50 TABLET ORAL THREE TIMES A DAY
Refills: 0 | Status: DISCONTINUED | OUTPATIENT
Start: 2020-08-29 | End: 2020-09-11

## 2020-08-29 RX ORDER — HYDRALAZINE HCL 50 MG
50 TABLET ORAL EVERY 8 HOURS
Refills: 0 | Status: DISCONTINUED | OUTPATIENT
Start: 2020-08-29 | End: 2020-08-30

## 2020-08-29 RX ORDER — ISOSORBIDE DINITRATE 5 MG/1
10 TABLET ORAL THREE TIMES A DAY
Refills: 0 | Status: DISCONTINUED | OUTPATIENT
Start: 2020-08-29 | End: 2020-08-31

## 2020-08-29 RX ORDER — ACETAMINOPHEN 500 MG
650 TABLET ORAL EVERY 6 HOURS
Refills: 0 | Status: DISCONTINUED | OUTPATIENT
Start: 2020-08-29 | End: 2020-09-06

## 2020-08-29 RX ORDER — AMIODARONE HYDROCHLORIDE 400 MG/1
200 TABLET ORAL DAILY
Refills: 0 | Status: DISCONTINUED | OUTPATIENT
Start: 2020-08-29 | End: 2020-08-31

## 2020-08-29 RX ORDER — LACTOBACILLUS ACIDOPHILUS 100MM CELL
1 CAPSULE ORAL
Refills: 0 | Status: DISCONTINUED | OUTPATIENT
Start: 2020-08-29 | End: 2020-10-14

## 2020-08-29 RX ORDER — QUETIAPINE FUMARATE 200 MG/1
50 TABLET, FILM COATED ORAL EVERY 6 HOURS
Refills: 0 | Status: DISCONTINUED | OUTPATIENT
Start: 2020-08-29 | End: 2020-09-06

## 2020-08-29 RX ADMIN — Medication 50 MILLIGRAM(S): at 05:40

## 2020-08-29 RX ADMIN — ISOSORBIDE DINITRATE 10 MILLIGRAM(S): 5 TABLET ORAL at 13:51

## 2020-08-29 RX ADMIN — Medication 1 TABLET(S): at 18:08

## 2020-08-29 RX ADMIN — HEPARIN SODIUM 5000 UNIT(S): 5000 INJECTION INTRAVENOUS; SUBCUTANEOUS at 13:31

## 2020-08-29 RX ADMIN — AMIODARONE HYDROCHLORIDE 200 MILLIGRAM(S): 400 TABLET ORAL at 05:46

## 2020-08-29 RX ADMIN — HEPARIN SODIUM 5000 UNIT(S): 5000 INJECTION INTRAVENOUS; SUBCUTANEOUS at 22:48

## 2020-08-29 RX ADMIN — HYDROMORPHONE HYDROCHLORIDE 0.5 MILLIGRAM(S): 2 INJECTION INTRAMUSCULAR; INTRAVENOUS; SUBCUTANEOUS at 20:23

## 2020-08-29 RX ADMIN — FENTANYL CITRATE 1 PATCH: 50 INJECTION INTRAVENOUS at 19:00

## 2020-08-29 RX ADMIN — FENTANYL CITRATE 1 PATCH: 50 INJECTION INTRAVENOUS at 08:10

## 2020-08-29 RX ADMIN — Medication 125 MILLIGRAM(S): at 05:41

## 2020-08-29 RX ADMIN — Medication 3 MILLILITER(S): at 23:53

## 2020-08-29 RX ADMIN — CHLORHEXIDINE GLUCONATE 1 APPLICATION(S): 213 SOLUTION TOPICAL at 05:47

## 2020-08-29 RX ADMIN — QUETIAPINE FUMARATE 50 MILLIGRAM(S): 200 TABLET, FILM COATED ORAL at 23:25

## 2020-08-29 RX ADMIN — CHLORHEXIDINE GLUCONATE 15 MILLILITER(S): 213 SOLUTION TOPICAL at 18:08

## 2020-08-29 RX ADMIN — QUETIAPINE FUMARATE 50 MILLIGRAM(S): 200 TABLET, FILM COATED ORAL at 18:08

## 2020-08-29 RX ADMIN — SODIUM CHLORIDE 4 MILLILITER(S): 9 INJECTION INTRAMUSCULAR; INTRAVENOUS; SUBCUTANEOUS at 05:15

## 2020-08-29 RX ADMIN — QUETIAPINE FUMARATE 50 MILLIGRAM(S): 200 TABLET, FILM COATED ORAL at 13:30

## 2020-08-29 RX ADMIN — Medication 0.5 MILLIGRAM(S): at 14:45

## 2020-08-29 RX ADMIN — CHLORHEXIDINE GLUCONATE 15 MILLILITER(S): 213 SOLUTION TOPICAL at 05:38

## 2020-08-29 RX ADMIN — HEPARIN SODIUM 5000 UNIT(S): 5000 INJECTION INTRAVENOUS; SUBCUTANEOUS at 05:38

## 2020-08-29 RX ADMIN — Medication 50 MILLIGRAM(S): at 08:33

## 2020-08-29 RX ADMIN — SODIUM CHLORIDE 4 MILLILITER(S): 9 INJECTION INTRAMUSCULAR; INTRAVENOUS; SUBCUTANEOUS at 13:01

## 2020-08-29 RX ADMIN — Medication 81 MILLIGRAM(S): at 13:30

## 2020-08-29 RX ADMIN — Medication 1 DROP(S): at 05:46

## 2020-08-29 RX ADMIN — Medication 3 MILLILITER(S): at 11:19

## 2020-08-29 RX ADMIN — Medication 1 DROP(S): at 13:30

## 2020-08-29 RX ADMIN — Medication 0.5 MILLIGRAM(S): at 05:46

## 2020-08-29 RX ADMIN — Medication 3 MILLILITER(S): at 05:14

## 2020-08-29 RX ADMIN — QUETIAPINE FUMARATE 50 MILLIGRAM(S): 200 TABLET, FILM COATED ORAL at 05:40

## 2020-08-29 RX ADMIN — Medication 1 DROP(S): at 18:09

## 2020-08-29 RX ADMIN — Medication 50 MILLIGRAM(S): at 22:50

## 2020-08-29 RX ADMIN — FAMOTIDINE 20 MILLIGRAM(S): 10 INJECTION INTRAVENOUS at 22:48

## 2020-08-29 RX ADMIN — FAMOTIDINE 20 MILLIGRAM(S): 10 INJECTION INTRAVENOUS at 13:30

## 2020-08-29 RX ADMIN — Medication 50 MILLIGRAM(S): at 14:52

## 2020-08-29 RX ADMIN — HYDROMORPHONE HYDROCHLORIDE 0.5 MILLIGRAM(S): 2 INJECTION INTRAMUSCULAR; INTRAVENOUS; SUBCUTANEOUS at 01:30

## 2020-08-29 RX ADMIN — Medication 0.5 MILLIGRAM(S): at 22:52

## 2020-08-29 RX ADMIN — SODIUM CHLORIDE 4 MILLILITER(S): 9 INJECTION INTRAMUSCULAR; INTRAVENOUS; SUBCUTANEOUS at 21:00

## 2020-08-29 RX ADMIN — Medication 50 MILLIGRAM(S): at 18:37

## 2020-08-29 RX ADMIN — QUETIAPINE FUMARATE 50 MILLIGRAM(S): 200 TABLET, FILM COATED ORAL at 00:15

## 2020-08-29 RX ADMIN — HYDROMORPHONE HYDROCHLORIDE 0.5 MILLIGRAM(S): 2 INJECTION INTRAMUSCULAR; INTRAVENOUS; SUBCUTANEOUS at 02:00

## 2020-08-29 RX ADMIN — Medication 1 TABLET(S): at 13:29

## 2020-08-29 RX ADMIN — ISOSORBIDE DINITRATE 10 MILLIGRAM(S): 5 TABLET ORAL at 22:49

## 2020-08-29 RX ADMIN — ISOSORBIDE DINITRATE 10 MILLIGRAM(S): 5 TABLET ORAL at 05:39

## 2020-08-29 RX ADMIN — AMIODARONE HYDROCHLORIDE 200 MILLIGRAM(S): 400 TABLET ORAL at 18:07

## 2020-08-29 RX ADMIN — Medication 1 DROP(S): at 23:24

## 2020-08-29 RX ADMIN — Medication 3 MILLILITER(S): at 17:08

## 2020-08-29 RX ADMIN — HYDROMORPHONE HYDROCHLORIDE 0.5 MILLIGRAM(S): 2 INJECTION INTRAMUSCULAR; INTRAVENOUS; SUBCUTANEOUS at 20:45

## 2020-08-29 RX ADMIN — Medication 1 DROP(S): at 00:19

## 2020-08-29 NOTE — PROGRESS NOTE ADULT - ASSESSMENT
60-year-old male with a history of A-Fib on warfarin who presents with cardiac arrest at work with downtime of about 25 minutes prior to ROSC. S/p therapeutic hypothermia. 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 Now with resolved neurogenic/cardiogenic shock. Course complicated by GI bleed s/p PPI gtt, bleeding from scott s/p clot irrigation, prolonged respiratory failure s/p trach (8/24) and PEG (8/26). Currently with A-Fib with RVR and C diff colitis.  Also developed gross hematuria - Urology consulted +catheter with clot (irrigated and exchanged) suspected 2/2 traumatic catheterization. Additionally course complicated by fever and Pseudomonas PNA (s/p Rx) then developed C diff (now with resolved leukocytosis and improved stooling, now soft per report); remains on enteral Vanco    8/29: Received to RCU

## 2020-08-29 NOTE — PROGRESS NOTE ADULT - SUBJECTIVE AND OBJECTIVE BOX
ENT ISSUE/POD: S/p tracheostomy POD 5    HPI: 59 y/o male s/p tracheostomy #8 [facility], POD 5. Pt seen and examined at bedside. No acute events overnight. Pt doing well on vent. No issues per team.              PAST MEDICAL & SURGICAL HISTORY:  On warfarin for atrial fibrillation  No significant past surgical history    Allergies    No Known Allergies    Intolerances      MEDICATIONS  (STANDING):  albuterol/ipratropium for Nebulization. 3 milliLiter(s) Nebulizer every 6 hours  aMIOdarone    Tablet 200 milliGRAM(s) Oral daily  artificial  tears Solution 1 Drop(s) Both EYES every 6 hours  aspirin  chewable 81 milliGRAM(s) Enteral Tube daily  chlorhexidine 0.12% Liquid 15 milliLiter(s) Oral Mucosa every 12 hours  chlorhexidine 4% Liquid 1 Application(s) Topical <User Schedule>  clonazePAM  Tablet 0.5 milliGRAM(s) Oral every 8 hours  dextrose 5%. 1000 milliLiter(s) (50 mL/Hr) IV Continuous <Continuous>  dextrose 50% Injectable 12.5 Gram(s) IV Push once  dextrose 50% Injectable 25 Gram(s) IV Push once  dextrose 50% Injectable 25 Gram(s) IV Push once  famotidine    Tablet 20 milliGRAM(s) Oral daily  fentaNYL   Patch  50 MICROgram(s)/Hr 1 Patch Transdermal every 72 hours  heparin   Injectable 5000 Unit(s) SubCutaneous every 8 hours  hydrALAZINE 50 milliGRAM(s) Oral every 8 hours  isosorbide   dinitrate Tablet (ISORDIL) 10 milliGRAM(s) Oral three times a day  lactobacillus acidophilus 1 Tablet(s) Oral two times a day  metoprolol tartrate 50 milliGRAM(s) Oral three times a day  QUEtiapine 50 milliGRAM(s) Oral every 6 hours  sodium chloride 7% Inhalation 4 milliLiter(s) Inhalation every 8 hours    MEDICATIONS  (PRN):  acetaminophen   Tablet .. 650 milliGRAM(s) Oral every 6 hours PRN Temp greater or equal to 38C (100.4F), Mild Pain (1 - 3)  acetylcysteine 20%  Inhalation 4 milliLiter(s) Inhalation three times a day PRN secretions  dextrose 40% Gel 15 Gram(s) Oral once PRN Blood Glucose LESS THAN 70 milliGRAM(s)/deciliter  glucagon  Injectable 1 milliGRAM(s) IntraMuscular once PRN Glucose LESS THAN 70 milligrams/deciliter  HYDROmorphone   Tablet 2 milliGRAM(s) Oral every 4 hours PRN Severe Pain (7 - 10)  HYDROmorphone  Injectable 0.5 milliGRAM(s) IV Push every 4 hours PRN breakthrough pain  sodium chloride 0.9% lock flush 10 milliLiter(s) IV Push every 1 hour PRN Pre/post blood products, medications, blood draw, and to maintain line patency      Social History: see consult    Family history: see consult    ROS:   ENT: all negative except as noted in HPI   Pulm: denies SOB, cough, hemoptysis  Neuro: denies numbness/tingling, loss of sensation  Endo: denies heat/cold intolerance, excessive sweating      Vital Signs Last 24 Hrs  T(C): 36.7 (29 Aug 2020 08:32), Max: 37.3 (28 Aug 2020 20:58)  T(F): 98 (29 Aug 2020 08:32), Max: 99.1 (28 Aug 2020 20:58)  HR: 75 (29 Aug 2020 08:43) (62 - 119)  BP: 166/95 (29 Aug 2020 08:32) (119/89 - 170/94)  BP(mean): 105 (28 Aug 2020 19:00) (99 - 120)  RR: 20 (29 Aug 2020 03:00) (20 - 25)  SpO2: 100% (29 Aug 2020 08:43) (96% - 100%)                          9.1    11.66 )-----------( 321      ( 29 Aug 2020 06:59 )             30.2    08-28    145  |  115<H>  |  31<H>  ----------------------------<  132<H>  3.5   |  18<L>  |  2.28<H>    Ca    8.8      28 Aug 2020 19:47  Phos  2.7     08-29  Mg     2.2     08-29    TPro  6.4  /  Alb  2.9<L>  /  TBili  0.1<L>  /  DBili  <0.1  /  AST  37  /  ALT  34  /  AlkPhos  70  08-29   PT/INR - ( 29 Aug 2020 06:59 )   PT: 16.0 sec;   INR: 1.36 ratio             PHYSICAL EXAM:  Gen: NAD  Skin: No rashes, bruises, or lesions  Head: Normocephalic, Atraumatic  Face: no edema, erythema, or fluctuance. Parotid glands soft without mass  Eyes: no scleral injection  Nose: Nares bilaterally patent, no discharge  Mouth: No Stridor / Drooling / Trismus.  Mucosa moist, tongue/uvula midline, oropharynx clear  Neck: #8 [place] DCT in place, inflated cuff. Scant serosanguineous drainage. No bleeding noted from stoma, sutures x4 and umbilical tie in place.  No lymphadenopathy, trachea midline, no masses  Lymphatic: No lymphadenopathy  Resp: on vent, ventilating well  Neuro: unable to assess due to patient's condition

## 2020-08-29 NOTE — PROGRESS NOTE ADULT - PROBLEM SELECTOR PLAN 4
Possible stunned myocardium due to cardiac arrest vs. SAH  - Will need cardiac cath to evaluate for ischemia  - Continue afterload/preload reduction with hydralazine and isosorbide dinitrate

## 2020-08-29 NOTE — PROGRESS NOTE ADULT - PROBLEM SELECTOR PLAN 1
- Plan to remove sutures on POD #7  - Do not remove umbilical trach tie  - HOB elevation  - Suction PRN  - Continue trach care  - ENT will continue to follow, call with questions x [phone].

## 2020-08-29 NOTE — PROGRESS NOTE ADULT - PROBLEM SELECTOR PLAN 6
A-Fib with RVR:  - Continue amiodarone 200 mg daily  - Increase metoprolol to 50 mg q8h  -Continue Hydralazine/imdur  - Hold off on therapeutic a/c given SAH  - SBP goal 100- 160  -TTE: Global LV dysfunction. EF 41%, Severe concentric LVH, flattening of interventricular septum.   -CTA chest - negative PE   - Will need Angiogram when cleared

## 2020-08-29 NOTE — PROGRESS NOTE ADULT - ASSESSMENT
59 y/o male s/p trach POD 5. On exam, #8 DCT trach in place, sutures x4 and umbilical tie in place. No bleeding noted. Doing well on ventilator.

## 2020-08-29 NOTE — PROGRESS NOTE ADULT - SUBJECTIVE AND OBJECTIVE BOX
Subjective: Patient seen and examined. No new events except as noted.   transferred out of NSCU to RCU   resting comfortably     REVIEW OF SYSTEMS:  Unable to obtain     MEDICATIONS:  MEDICATIONS  (STANDING):  albuterol/ipratropium for Nebulization. 3 milliLiter(s) Nebulizer every 6 hours  aMIOdarone    Tablet 200 milliGRAM(s) Oral daily  artificial  tears Solution 1 Drop(s) Both EYES every 6 hours  aspirin  chewable 81 milliGRAM(s) Enteral Tube daily  chlorhexidine 0.12% Liquid 15 milliLiter(s) Oral Mucosa every 12 hours  chlorhexidine 4% Liquid 1 Application(s) Topical <User Schedule>  clonazePAM  Tablet 0.5 milliGRAM(s) Oral every 8 hours  dextrose 5%. 1000 milliLiter(s) (50 mL/Hr) IV Continuous <Continuous>  dextrose 50% Injectable 12.5 Gram(s) IV Push once  dextrose 50% Injectable 25 Gram(s) IV Push once  dextrose 50% Injectable 25 Gram(s) IV Push once  famotidine    Tablet 20 milliGRAM(s) Oral daily  fentaNYL   Patch  50 MICROgram(s)/Hr 1 Patch Transdermal every 72 hours  heparin   Injectable 5000 Unit(s) SubCutaneous every 8 hours  hydrALAZINE 50 milliGRAM(s) Oral every 8 hours  isosorbide   dinitrate Tablet (ISORDIL) 10 milliGRAM(s) Oral three times a day  lactobacillus acidophilus 1 Tablet(s) Oral two times a day  metoprolol tartrate 50 milliGRAM(s) Oral three times a day  QUEtiapine 50 milliGRAM(s) Oral every 6 hours  sodium chloride 7% Inhalation 4 milliLiter(s) Inhalation every 8 hours      PHYSICAL EXAM:  T(C): 36.9 (08-29-20 @ 19:29), Max: 37.3 (08-29-20 @ 18:01)  HR: 85 (08-29-20 @ 20:56) (62 - 110)  BP: 136/80 (08-29-20 @ 19:29) (109/71 - 166/95)  RR: 20 (08-29-20 @ 19:29) (18 - 20)  SpO2: 100% (08-29-20 @ 20:56) (97% - 100%)  Wt(kg): --  I&O's Summary    28 Aug 2020 07:01  -  29 Aug 2020 07:00  --------------------------------------------------------  IN: 1940 mL / OUT: 1850 mL / NET: 90 mL    29 Aug 2020 07:01  -  29 Aug 2020 22:09  --------------------------------------------------------  IN: 930 mL / OUT: 750 mL / NET: 180 mL        Appearance: sleepy , +trach   HEENT:   Dry  oral mucosa,  Lymphatic: No lymphadenopathy  Cardiovascular: Normal S1 S2, No JVD, No murmurs, No edema  Respiratory: Ventilated   Psychiatry: A & O x 0  Gastrointestinal:  Soft, Non-tender, +PEG   Skin: No rashes, No ecchymoses, No cyanosis	  Mental status- No acute distress, EO to stim  -CN- Pupils R 4mm NR, L 2mm sluggish, EOMI, tongue midline, face symmetric  +cough/gag  C briskly, two fingers/thumbs up on RUE, distally AG, wiggles toes on RLE, no      LABS:    CARDIAC MARKERS:                                9.1    11.66 )-----------( 321      ( 29 Aug 2020 06:59 )             30.2     08-28    145  |  115<H>  |  31<H>  ----------------------------<  132<H>  3.5   |  18<L>  |  2.28<H>    Ca    8.8      28 Aug 2020 19:47  Phos  2.7     08-29  Mg     2.2     08-29    TPro  6.4  /  Alb  2.9<L>  /  TBili  0.1<L>  /  DBili  <0.1  /  AST  37  /  ALT  34  /  AlkPhos  70  08-29    proBNP:   Lipid Profile:   HgA1c:   TSH:             TELEMETRY: 	    ECG:  	  RADIOLOGY:   DIAGNOSTIC TESTING:  [ ] Echocardiogram:  [ ]  Catheterization:  [ ] Stress Test:    OTHER:

## 2020-08-29 NOTE — PROGRESS NOTE ADULT - PROBLEM SELECTOR PLAN 1
-Mechanical Ventilation [facility] 500/18/5/30   -Wean as tolerated   -Trach Care/ Pulmonary toileting   -Continue Duonebs and hypertonic saline, Mucomyst, chest PT -Mechanical Ventilation [facility] 500/18/5/30   - Trached 8/24 with ENT  -Wean as tolerated   -Trach Care/ Pulmonary toileting   -Continue Duonebs and hypertonic saline, Mucomyst, chest PT

## 2020-08-29 NOTE — PROGRESS NOTE ADULT - PROBLEM SELECTOR PLAN 2
-S/p angio neg x2 ,PBD 20  -S/p MRI neuroaxis: Restricted diffusion in R BG, posterior limb of R IC, and anterior right temporal lobe  - No Further neurosurgical intervention at this time -S/p angio neg x2 ,PBD 20  -CT Head: perimesencephalic SAH   -CTA Head: no aneurysm noted  -Conventional angiogram negative  -MRI neuroaxis: restricted diffusion in R BG, posterior limb of R IC, and anterior right temporal lobe  -VEEG negative   - No Further neurosurgical intervention at this time

## 2020-08-29 NOTE — PROGRESS NOTE ADULT - SUBJECTIVE AND OBJECTIVE BOX
Patient is a 60y old  Male who presents with a chief complaint of sah (16 Aug 2020 07:48)      Interval Events:    REVIEW OF SYSTEMS:  [ ] Positive  [ ] All other systems negative  [ ] Unable to assess ROS because ________    Vital Signs Last 24 Hrs  T(C): 37.1 (08-29-20 @ 03:00), Max: 37.3 (08-28-20 @ 20:58)  T(F): 98.8 (08-29-20 @ 03:00), Max: 99.1 (08-28-20 @ 20:58)  HR: 74 (08-29-20 @ 05:25) (62 - 119)  BP: 120/81 (08-29-20 @ 03:00) (119/89 - 170/94)  RR: 20 (08-29-20 @ 03:00) (18 - 25)  SpO2: 100% (08-29-20 @ 05:25) (96% - 100%)PHYSICAL EXAM:  HEENT:   [ ]Tracheostomy:  [ ]Pupils equal  [ ]No oral lesions  [ ]Abnormal        SKIN  [ ]No Rash  [ ] Abnormal  [ ] pressure    CARDIAC  [ ]Regular  [ ]Abnormal    PULMONARY  [ ]Bilateral Clear Breath Sounds  [ ]Normal Excursion  [ ]Abnormal    GI  [ ]PEG      [ ] +BS		              [ ]Soft, nondistended, nontender	  [ ]Abnormal    MUSCULOSKELETAL                                   [ ]Bedbound                 [ ]Abnormal    [ ]Ambulatory/OOB to chair                           EXTREMITIES                                         [ ]Normal  [ ]Edema                           NEUROLOGIC  [ ] Normal, non focal  [ ] Focal findings:    PSYCHIATRIC  [ ]Alert and appropriate  [ ] Sedated	 [ ]Agitated    :  Umaña: [ ] Yes, if yes: Date of Placement:                   [  ] No    LINES: Central Lines [ ] Yes, if yes: Date of Placement                                     [  ] No    HOSPITAL MEDICATIONS:  MEDICATIONS  (STANDING):  albuterol/ipratropium for Nebulization. 3 milliLiter(s) Nebulizer every 6 hours  aMIOdarone    Tablet 200 milliGRAM(s) Oral daily  artificial  tears Solution 1 Drop(s) Both EYES every 6 hours  aspirin  chewable 81 milliGRAM(s) Enteral Tube daily  chlorhexidine 0.12% Liquid 15 milliLiter(s) Oral Mucosa every 12 hours  chlorhexidine 4% Liquid 1 Application(s) Topical <User Schedule>  clonazePAM  Tablet 0.5 milliGRAM(s) Oral every 8 hours  dextrose 5%. 1000 milliLiter(s) (50 mL/Hr) IV Continuous <Continuous>  dextrose 50% Injectable 12.5 Gram(s) IV Push once  dextrose 50% Injectable 25 Gram(s) IV Push once  dextrose 50% Injectable 25 Gram(s) IV Push once  famotidine    Tablet 20 milliGRAM(s) Oral daily  fentaNYL   Patch  50 MICROgram(s)/Hr 1 Patch Transdermal every 72 hours  heparin   Injectable 5000 Unit(s) SubCutaneous every 8 hours  hydrALAZINE 50 milliGRAM(s) Oral every 8 hours  isosorbide   dinitrate Tablet (ISORDIL) 10 milliGRAM(s) Oral three times a day  lactobacillus acidophilus 1 Tablet(s) Oral two times a day  metoprolol tartrate 50 milliGRAM(s) Oral three times a day  QUEtiapine 50 milliGRAM(s) Oral every 6 hours  sodium chloride 7% Inhalation 4 milliLiter(s) Inhalation every 8 hours    MEDICATIONS  (PRN):  acetaminophen   Tablet .. 650 milliGRAM(s) Oral every 6 hours PRN Temp greater or equal to 38C (100.4F), Mild Pain (1 - 3)  acetylcysteine 20%  Inhalation 4 milliLiter(s) Inhalation three times a day PRN secretions  dextrose 40% Gel 15 Gram(s) Oral once PRN Blood Glucose LESS THAN 70 milliGRAM(s)/deciliter  glucagon  Injectable 1 milliGRAM(s) IntraMuscular once PRN Glucose LESS THAN 70 milligrams/deciliter  HYDROmorphone   Tablet 2 milliGRAM(s) Oral every 4 hours PRN Severe Pain (7 - 10)  HYDROmorphone  Injectable 0.5 milliGRAM(s) IV Push every 4 hours PRN breakthrough pain  sodium chloride 0.9% lock flush 10 milliLiter(s) IV Push every 1 hour PRN Pre/post blood products, medications, blood draw, and to maintain line patency      LABS:                        9.1    11.58 )-----------( 301      ( 28 Aug 2020 19:47 )             30.4     08-28    145  |  115<H>  |  31<H>  ----------------------------<  132<H>  3.5   |  18<L>  |  2.28<H>    Ca    8.8      28 Aug 2020 19:47  Phos  2.7     08-28  Mg     2.1     08-28          Arterial Blood Gas:  08-27 @ 10:26  7.40/30/99/18/97/-5.4  ABG lactate: --      CAPILLARY BLOOD GLUCOSE    MICROBIOLOGY:     RADIOLOGY:  [ ] Reviewed and interpreted by me    Mode: AC/ CMV (Assist Control/ Continuous Mandatory Ventilation)  RR (machine): 18  TV (machine): 500  FiO2: 30  PEEP: 5  ITime: 1  MAP: 6  PIP: 17 Patient is a 60y old  Male who presents with a chief complaint of sah (16 Aug 2020 07:48)      Interval Events: Temp 100.3 ; Agitation overnight     HPI:  59YO male on coumadin for afib s/p cardiac arrest at work, down 25 minutes prior to ROSC. Pupils were R fixed and dilated, left fixed at the time, no gag reflex, post-CA cooling protocol was initiated down to 35 deg. Intubated and put on Nimbex drip. Also on Propofol, fentanyl, levophed. R groin minerva placed. Also put on heparin post-CA. 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 Course also c/b GIB, put on protonix drip. Prior to xfer was started on 3% at 30cc/hr. (09 Aug 2020 14:30)      REVIEW OF SYSTEMS:  [ ] Positive  [ ] All other systems negative  [x] Unable to assess ROS because __Nonvebal ____    Vital Signs Last 24 Hrs  T(C): 37.1 (08-29-20 @ 03:00), Max: 37.3 (08-28-20 @ 20:58)  T(F): 98.8 (08-29-20 @ 03:00), Max: 99.1 (08-28-20 @ 20:58)  HR: 74 (08-29-20 @ 05:25) (62 - 119)  BP: 120/81 (08-29-20 @ 03:00) (119/89 - 170/94)  RR: 20 (08-29-20 @ 03:00) (18 - 25)  SpO2: 100% (08-29-20 @ 05:25) (96% - 100%)    PHYSICAL EXAM:  HEENT:   [x ]Tracheostomy: # 8 DCT [place]   [ ]Pupils equal  [ ]No oral lesions  [ ]Abnormal    SKIN  [x ]No Rash  [ ] Abnormal  [ ] pressure    CARDIAC  [ ]Regular  [x ]Abnormal- Irreg Ireg, + Murmur     PULMONARY  [ x]Bilateral Clear Breath Sounds  [ ]Normal Excursion  [ ]Abnormal    GI  [ x]PEG      [ ] +BS		              [x ]Soft, nondistended, nontender	  [ ]Abnormal    MUSCULOSKELETAL                                   [ ]Bedbound                 [ x]Abnormal- Moves RUE/RLE; unable to move L side s/p SAH this admission   [ ]Ambulatory/OOB to chair                           EXTREMITIES                                         [ ]Normal  [ x]Edema- trace bilateral edema                     NEUROLOGIC  [ ] Normal, non focal  [x ] Focal findings: Awake, open eyes, attempts to track w/ Lt pupil but sluggish, attempts to follow some commands as able to squeeze w/ Rt Hand     PSYCHIATRIC  [ ]Alert and appropriate  [ ] Sedated	 [ x]Agitated- overnight, now     :  Umaña: [ ] Yes, if yes: Date of Placement:                   [  ] No    LINES: Central Lines [ ] Yes, if yes: Date of Placement                                     [  ] No    HOSPITAL MEDICATIONS:  MEDICATIONS  (STANDING):  albuterol/ipratropium for Nebulization. 3 milliLiter(s) Nebulizer every 6 hours  aMIOdarone    Tablet 200 milliGRAM(s) Oral daily  artificial  tears Solution 1 Drop(s) Both EYES every 6 hours  aspirin  chewable 81 milliGRAM(s) Enteral Tube daily  chlorhexidine 0.12% Liquid 15 milliLiter(s) Oral Mucosa every 12 hours  chlorhexidine 4% Liquid 1 Application(s) Topical <User Schedule>  clonazePAM  Tablet 0.5 milliGRAM(s) Oral every 8 hours  dextrose 5%. 1000 milliLiter(s) (50 mL/Hr) IV Continuous <Continuous>  dextrose 50% Injectable 12.5 Gram(s) IV Push once  dextrose 50% Injectable 25 Gram(s) IV Push once  dextrose 50% Injectable 25 Gram(s) IV Push once  famotidine    Tablet 20 milliGRAM(s) Oral daily  fentaNYL   Patch  50 MICROgram(s)/Hr 1 Patch Transdermal every 72 hours  heparin   Injectable 5000 Unit(s) SubCutaneous every 8 hours  hydrALAZINE 50 milliGRAM(s) Oral every 8 hours  isosorbide   dinitrate Tablet (ISORDIL) 10 milliGRAM(s) Oral three times a day  lactobacillus acidophilus 1 Tablet(s) Oral two times a day  metoprolol tartrate 50 milliGRAM(s) Oral three times a day  QUEtiapine 50 milliGRAM(s) Oral every 6 hours  sodium chloride 7% Inhalation 4 milliLiter(s) Inhalation every 8 hours    MEDICATIONS  (PRN):  acetaminophen   Tablet .. 650 milliGRAM(s) Oral every 6 hours PRN Temp greater or equal to 38C (100.4F), Mild Pain (1 - 3)  acetylcysteine 20%  Inhalation 4 milliLiter(s) Inhalation three times a day PRN secretions  dextrose 40% Gel 15 Gram(s) Oral once PRN Blood Glucose LESS THAN 70 milliGRAM(s)/deciliter  glucagon  Injectable 1 milliGRAM(s) IntraMuscular once PRN Glucose LESS THAN 70 milligrams/deciliter  HYDROmorphone   Tablet 2 milliGRAM(s) Oral every 4 hours PRN Severe Pain (7 - 10)  HYDROmorphone  Injectable 0.5 milliGRAM(s) IV Push every 4 hours PRN breakthrough pain  sodium chloride 0.9% lock flush 10 milliLiter(s) IV Push every 1 hour PRN Pre/post blood products, medications, blood draw, and to maintain line patency      LABS:                        9.1    11.58 )-----------( 301      ( 28 Aug 2020 19:47 )             30.4     08-28    145  |  115<H>  |  31<H>  ----------------------------<  132<H>  3.5   |  18<L>  |  2.28<H>    Ca    8.8      28 Aug 2020 19:47  Phos  2.7     08-28  Mg     2.1     08-28          Arterial Blood Gas:  08-27 @ 10:26  7.40/30/99/18/97/-5.4  ABG lactate: --      CAPILLARY BLOOD GLUCOSE    MICROBIOLOGY:     RADIOLOGY:  [ ] Reviewed and interpreted by me    Mode: AC/ CMV (Assist Control/ Continuous Mandatory Ventilation)  RR (machine): 18  TV (machine): 500  FiO2: 30  PEEP: 5  ITime: 1  MAP: 6  PIP: 17

## 2020-08-29 NOTE — PROGRESS NOTE ADULT - PROBLEM SELECTOR PLAN 5
Completed Abx for Pseudomonas aeruginosa pneumonia/colonization:  - S/p prolonged course of cefepime until 8/25  - Hold off on systemic antibiotics at this time  - If develops increased secretions, may benefit from inhaled tobramycin    Cdiff- Completed oral Vanco Completed Abx for Pseudomonas aeruginosa pneumonia/colonization:  - S/p prolonged course of cefepime until 8/14-8/20   - Hold off on systemic antibiotics at this time  - If develops increased secretions, may benefit from inhaled tobramycin  - Cdiff- Completed oral Vanco 8/29   - ID Appreciated

## 2020-08-29 NOTE — PROGRESS NOTE ADULT - PROBLEM SELECTOR PLAN 7
S/p Peg   Tolerating Enteral Feeds   GI ppx with pecid 20mg daily S/p Peg 8/24 by ENT   Tolerating Enteral Feeds   GI ppx with pecid 20mg daily S/p Peg 8/26 by GI   Tolerating Enteral Feeds   GI ppx with pecid 20mg daily

## 2020-08-29 NOTE — PROGRESS NOTE ADULT - ATTENDING COMMENTS
[place] [facility]  [place]/o as above  weaning well, on pressure support 5/5  hopefully can get to TC  Nephrology, cardiology, ID, GI, NS follow up

## 2020-08-30 LAB
ALBUMIN SERPL ELPH-MCNC: 3.1 G/DL — LOW (ref 3.3–5)
ALP SERPL-CCNC: 68 U/L — SIGNIFICANT CHANGE UP (ref 40–120)
ALT FLD-CCNC: 33 U/L — SIGNIFICANT CHANGE UP (ref 10–45)
ANION GAP SERPL CALC-SCNC: 12 MMOL/L — SIGNIFICANT CHANGE UP (ref 5–17)
AST SERPL-CCNC: 35 U/L — SIGNIFICANT CHANGE UP (ref 10–40)
BASOPHILS # BLD AUTO: 0.07 K/UL — SIGNIFICANT CHANGE UP (ref 0–0.2)
BASOPHILS NFR BLD AUTO: 0.6 % — SIGNIFICANT CHANGE UP (ref 0–2)
BILIRUB SERPL-MCNC: 0.1 MG/DL — LOW (ref 0.2–1.2)
BUN SERPL-MCNC: 30 MG/DL — HIGH (ref 7–23)
CALCIUM SERPL-MCNC: 9.2 MG/DL — SIGNIFICANT CHANGE UP (ref 8.4–10.5)
CHLORIDE SERPL-SCNC: 112 MMOL/L — HIGH (ref 96–108)
CO2 SERPL-SCNC: 19 MMOL/L — LOW (ref 22–31)
CREAT SERPL-MCNC: 2.26 MG/DL — HIGH (ref 0.5–1.3)
EOSINOPHIL # BLD AUTO: 0.25 K/UL — SIGNIFICANT CHANGE UP (ref 0–0.5)
EOSINOPHIL NFR BLD AUTO: 2.2 % — SIGNIFICANT CHANGE UP (ref 0–6)
GLUCOSE SERPL-MCNC: 144 MG/DL — HIGH (ref 70–99)
GRAM STN FLD: SIGNIFICANT CHANGE UP
HCT VFR BLD CALC: 31.6 % — LOW (ref 39–50)
HGB BLD-MCNC: 9.7 G/DL — LOW (ref 13–17)
IMM GRANULOCYTES NFR BLD AUTO: 2.1 % — HIGH (ref 0–1.5)
LYMPHOCYTES # BLD AUTO: 1.1 K/UL — SIGNIFICANT CHANGE UP (ref 1–3.3)
LYMPHOCYTES # BLD AUTO: 9.8 % — LOW (ref 13–44)
MAGNESIUM SERPL-MCNC: 2.2 MG/DL — SIGNIFICANT CHANGE UP (ref 1.6–2.6)
MCHC RBC-ENTMCNC: 27.1 PG — SIGNIFICANT CHANGE UP (ref 27–34)
MCHC RBC-ENTMCNC: 30.7 GM/DL — LOW (ref 32–36)
MCV RBC AUTO: 88.3 FL — SIGNIFICANT CHANGE UP (ref 80–100)
MONOCYTES # BLD AUTO: 0.79 K/UL — SIGNIFICANT CHANGE UP (ref 0–0.9)
MONOCYTES NFR BLD AUTO: 7 % — SIGNIFICANT CHANGE UP (ref 2–14)
NEUTROPHILS # BLD AUTO: 8.78 K/UL — HIGH (ref 1.8–7.4)
NEUTROPHILS NFR BLD AUTO: 78.3 % — HIGH (ref 43–77)
NRBC # BLD: 0 /100 WBCS — SIGNIFICANT CHANGE UP (ref 0–0)
PHOSPHATE SERPL-MCNC: 3 MG/DL — SIGNIFICANT CHANGE UP (ref 2.5–4.5)
PLATELET # BLD AUTO: 309 K/UL — SIGNIFICANT CHANGE UP (ref 150–400)
POTASSIUM SERPL-MCNC: 3.6 MMOL/L — SIGNIFICANT CHANGE UP (ref 3.5–5.3)
POTASSIUM SERPL-SCNC: 3.6 MMOL/L — SIGNIFICANT CHANGE UP (ref 3.5–5.3)
PROT SERPL-MCNC: 6.6 G/DL — SIGNIFICANT CHANGE UP (ref 6–8.3)
RBC # BLD: 3.58 M/UL — LOW (ref 4.2–5.8)
RBC # FLD: 15.1 % — HIGH (ref 10.3–14.5)
SARS-COV-2 RNA SPEC QL NAA+PROBE: SIGNIFICANT CHANGE UP
SODIUM SERPL-SCNC: 143 MMOL/L — SIGNIFICANT CHANGE UP (ref 135–145)
SPECIMEN SOURCE: SIGNIFICANT CHANGE UP
WBC # BLD: 11.23 K/UL — HIGH (ref 3.8–10.5)
WBC # FLD AUTO: 11.23 K/UL — HIGH (ref 3.8–10.5)

## 2020-08-30 PROCEDURE — 99232 SBSQ HOSP IP/OBS MODERATE 35: CPT | Mod: 25

## 2020-08-30 RX ORDER — HYDRALAZINE HCL 50 MG
50 TABLET ORAL EVERY 8 HOURS
Refills: 0 | Status: DISCONTINUED | OUTPATIENT
Start: 2020-08-30 | End: 2020-08-31

## 2020-08-30 RX ADMIN — Medication 0.5 MILLIGRAM(S): at 22:03

## 2020-08-30 RX ADMIN — Medication 3 MILLILITER(S): at 11:11

## 2020-08-30 RX ADMIN — ISOSORBIDE DINITRATE 10 MILLIGRAM(S): 5 TABLET ORAL at 11:16

## 2020-08-30 RX ADMIN — ISOSORBIDE DINITRATE 10 MILLIGRAM(S): 5 TABLET ORAL at 05:12

## 2020-08-30 RX ADMIN — QUETIAPINE FUMARATE 50 MILLIGRAM(S): 200 TABLET, FILM COATED ORAL at 05:12

## 2020-08-30 RX ADMIN — Medication 0.5 MILLIGRAM(S): at 12:27

## 2020-08-30 RX ADMIN — SODIUM CHLORIDE 4 MILLILITER(S): 9 INJECTION INTRAMUSCULAR; INTRAVENOUS; SUBCUTANEOUS at 13:05

## 2020-08-30 RX ADMIN — SODIUM CHLORIDE 4 MILLILITER(S): 9 INJECTION INTRAMUSCULAR; INTRAVENOUS; SUBCUTANEOUS at 21:03

## 2020-08-30 RX ADMIN — AMIODARONE HYDROCHLORIDE 200 MILLIGRAM(S): 400 TABLET ORAL at 05:10

## 2020-08-30 RX ADMIN — HEPARIN SODIUM 5000 UNIT(S): 5000 INJECTION INTRAVENOUS; SUBCUTANEOUS at 13:51

## 2020-08-30 RX ADMIN — Medication 50 MILLIGRAM(S): at 13:50

## 2020-08-30 RX ADMIN — HYDROMORPHONE HYDROCHLORIDE 0.5 MILLIGRAM(S): 2 INJECTION INTRAMUSCULAR; INTRAVENOUS; SUBCUTANEOUS at 00:50

## 2020-08-30 RX ADMIN — QUETIAPINE FUMARATE 50 MILLIGRAM(S): 200 TABLET, FILM COATED ORAL at 23:29

## 2020-08-30 RX ADMIN — HEPARIN SODIUM 5000 UNIT(S): 5000 INJECTION INTRAVENOUS; SUBCUTANEOUS at 05:11

## 2020-08-30 RX ADMIN — Medication 50 MILLIGRAM(S): at 12:16

## 2020-08-30 RX ADMIN — Medication 50 MILLIGRAM(S): at 05:11

## 2020-08-30 RX ADMIN — Medication 1 DROP(S): at 11:16

## 2020-08-30 RX ADMIN — FENTANYL CITRATE 1 PATCH: 50 INJECTION INTRAVENOUS at 19:44

## 2020-08-30 RX ADMIN — CHLORHEXIDINE GLUCONATE 1 APPLICATION(S): 213 SOLUTION TOPICAL at 05:10

## 2020-08-30 RX ADMIN — HYDROMORPHONE HYDROCHLORIDE 0.5 MILLIGRAM(S): 2 INJECTION INTRAMUSCULAR; INTRAVENOUS; SUBCUTANEOUS at 17:44

## 2020-08-30 RX ADMIN — Medication 1 DROP(S): at 17:16

## 2020-08-30 RX ADMIN — Medication 1 TABLET(S): at 17:15

## 2020-08-30 RX ADMIN — SODIUM CHLORIDE 4 MILLILITER(S): 9 INJECTION INTRAMUSCULAR; INTRAVENOUS; SUBCUTANEOUS at 05:14

## 2020-08-30 RX ADMIN — QUETIAPINE FUMARATE 50 MILLIGRAM(S): 200 TABLET, FILM COATED ORAL at 11:16

## 2020-08-30 RX ADMIN — Medication 1 DROP(S): at 23:28

## 2020-08-30 RX ADMIN — FAMOTIDINE 20 MILLIGRAM(S): 10 INJECTION INTRAVENOUS at 11:16

## 2020-08-30 RX ADMIN — FENTANYL CITRATE 1 PATCH: 50 INJECTION INTRAVENOUS at 08:12

## 2020-08-30 RX ADMIN — ISOSORBIDE DINITRATE 10 MILLIGRAM(S): 5 TABLET ORAL at 22:05

## 2020-08-30 RX ADMIN — CHLORHEXIDINE GLUCONATE 15 MILLILITER(S): 213 SOLUTION TOPICAL at 05:10

## 2020-08-30 RX ADMIN — Medication 0.5 MILLIGRAM(S): at 05:11

## 2020-08-30 RX ADMIN — Medication 81 MILLIGRAM(S): at 11:16

## 2020-08-30 RX ADMIN — QUETIAPINE FUMARATE 50 MILLIGRAM(S): 200 TABLET, FILM COATED ORAL at 17:16

## 2020-08-30 RX ADMIN — HEPARIN SODIUM 5000 UNIT(S): 5000 INJECTION INTRAVENOUS; SUBCUTANEOUS at 22:04

## 2020-08-30 RX ADMIN — HYDROMORPHONE HYDROCHLORIDE 0.5 MILLIGRAM(S): 2 INJECTION INTRAMUSCULAR; INTRAVENOUS; SUBCUTANEOUS at 16:35

## 2020-08-30 RX ADMIN — Medication 50 MILLIGRAM(S): at 22:05

## 2020-08-30 RX ADMIN — Medication 3 MILLILITER(S): at 17:10

## 2020-08-30 RX ADMIN — Medication 50 MILLIGRAM(S): at 05:12

## 2020-08-30 RX ADMIN — Medication 1 DROP(S): at 05:10

## 2020-08-30 RX ADMIN — CHLORHEXIDINE GLUCONATE 15 MILLILITER(S): 213 SOLUTION TOPICAL at 17:15

## 2020-08-30 RX ADMIN — Medication 50 MILLIGRAM(S): at 22:06

## 2020-08-30 RX ADMIN — Medication 3 MILLILITER(S): at 05:14

## 2020-08-30 RX ADMIN — Medication 1 TABLET(S): at 05:12

## 2020-08-30 RX ADMIN — HYDROMORPHONE HYDROCHLORIDE 0.5 MILLIGRAM(S): 2 INJECTION INTRAMUSCULAR; INTRAVENOUS; SUBCUTANEOUS at 22:06

## 2020-08-30 NOTE — SPEECH LANGUAGE PATHOLOGY EVALUATION - COMMENTS
Continued:   s/p clot irrigation by urology; suspected 2/2 traumatic catheterization. 8/21: Episode of respiratory distress overnight; desaturated 85%, mucus plug/ambu bagged. Pt w/ prolonged respiratory failure s/p trach (#8 DCT Tracheostomy)-Trach  secured with sutures x4 and Umbilical Tie- on 8/24 and PEG on 8/26. Noted on echo to have acute LV systolic heart failure, possible stunned myocardium due to cardiac arrest vs. SAH. Currently, he is awake and alert, moving RUE/RLE spontaneously, following simple commands. He is currently in A-Fib with RVR. Being treated for C diff colitis Limited ability to point to and look at objects/pictures/words effectively to use for communication board with/without laser pointer. AAC evaluation was conducted to determine use of a device during h-course to assist w/ pt being an active participant in his care. He was able to follow simple, one step directions with 75% accuracy (e.g. open your mouth, show me a thumbs up, etc). He was asked to locate/point to objects/pictures/words in a VF / visual field of 1-5 pt with <10% accuracy. Noted pt pointing to the side of the paper/communication board despite max cues by SLP and RN. Currently an AAC device would not be an effective tool for functional communication. He is able to head gesture (shake no/ yes) and intermittently mouth words such as his name when asked.     Will continue to follow for AAC intervention in 2-3 days. D/w NP possible use of an AAC device and PMV depending upon pt medical status. DNT No voice present during this evaluation.

## 2020-08-30 NOTE — PROGRESS NOTE ADULT - PROBLEM SELECTOR PLAN 1
-Mechanical Ventilation [facility] 500/18/5/30   - Trached 8/24 with ENT  -Wean as tolerated   -Trach Care/ Pulmonary toileting   -Continue Duonebs and hypertonic saline, Mucomyst, chest PT

## 2020-08-30 NOTE — PROGRESS NOTE ADULT - SUBJECTIVE AND OBJECTIVE BOX
Subjective: Patient seen and examined. No new events except as noted.     REVIEW OF SYSTEMS:  Unable to obtain     MEDICATIONS:  MEDICATIONS  (STANDING):  albuterol/ipratropium for Nebulization. 3 milliLiter(s) Nebulizer every 6 hours  aMIOdarone    Tablet 200 milliGRAM(s) Oral daily  artificial  tears Solution 1 Drop(s) Both EYES every 6 hours  aspirin  chewable 81 milliGRAM(s) Enteral Tube daily  chlorhexidine 0.12% Liquid 15 milliLiter(s) Oral Mucosa every 12 hours  chlorhexidine 4% Liquid 1 Application(s) Topical <User Schedule>  clonazePAM  Tablet 0.5 milliGRAM(s) Oral every 8 hours  dextrose 5%. 1000 milliLiter(s) (50 mL/Hr) IV Continuous <Continuous>  dextrose 50% Injectable 12.5 Gram(s) IV Push once  dextrose 50% Injectable 25 Gram(s) IV Push once  dextrose 50% Injectable 25 Gram(s) IV Push once  famotidine    Tablet 20 milliGRAM(s) Oral daily  fentaNYL   Patch  50 MICROgram(s)/Hr 1 Patch Transdermal every 72 hours  heparin   Injectable 5000 Unit(s) SubCutaneous every 8 hours  hydrALAZINE 50 milliGRAM(s) Oral every 8 hours  isosorbide   dinitrate Tablet (ISORDIL) 10 milliGRAM(s) Oral three times a day  lactobacillus acidophilus 1 Tablet(s) Oral two times a day  metoprolol tartrate 50 milliGRAM(s) Oral three times a day  QUEtiapine 50 milliGRAM(s) Oral every 6 hours  sodium chloride 7% Inhalation 4 milliLiter(s) Inhalation every 8 hours      PHYSICAL EXAM:  T(C): 37.1 (08-30-20 @ 11:00), Max: 37.3 (08-29-20 @ 18:01)  HR: 91 (08-30-20 @ 11:00) (63 - 110)  BP: 155/97 (08-30-20 @ 11:00) (98/70 - 157/91)  RR: 18 (08-30-20 @ 11:00) (18 - 20)  SpO2: 98% (08-30-20 @ 11:00) (97% - 100%)  Wt(kg): --  I&O's Summary    29 Aug 2020 07:01  -  30 Aug 2020 07:00  --------------------------------------------------------  IN: 1860 mL / OUT: 1700 mL / NET: 160 mL      Appearance: sleepy , +trach   HEENT:   Dry  oral mucosa,  Lymphatic: No lymphadenopathy  Cardiovascular: Normal S1 S2, No JVD, No murmurs, No edema  Respiratory: Ventilated   Psychiatry: A & O x 0  Gastrointestinal:  Soft, Non-tender, +PEG   Skin: No rashes, No ecchymoses, No cyanosis	  Mental status- No acute distress, EO to stim  -CN- Pupils R 4mm NR, L 2mm sluggish, EOMI, tongue midline, face symmetric  +cough/gag  C briskly, two fingers/thumbs up on RUE, distally AG, wiggles toes on RLE, no      LABS:    CARDIAC MARKERS:                                9.7    11.23 )-----------( 309      ( 30 Aug 2020 06:33 )             31.6     08-30    143  |  112<H>  |  30<H>  ----------------------------<  144<H>  3.6   |  19<L>  |  2.26<H>    Ca    9.2      30 Aug 2020 06:35  Phos  3.0     08-30  Mg     2.2     08-30    TPro  6.6  /  Alb  3.1<L>  /  TBili  0.1<L>  /  DBili  x   /  AST  35  /  ALT  33  /  AlkPhos  68  08-30    proBNP:   Lipid Profile:   HgA1c:   TSH:             TELEMETRY: 	    ECG:  	  RADIOLOGY:   DIAGNOSTIC TESTING:  [ ] Echocardiogram:  [ ]  Catheterization:  [ ] Stress Test:    OTHER:

## 2020-08-30 NOTE — PROGRESS NOTE ADULT - ASSESSMENT
60-year-old male with a history of A-Fib on warfarin who presents with cardiac arrest at work with downtime of about 25 minutes prior to ROSC. S/p therapeutic hypothermia. 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 Now with resolved neurogenic/cardiogenic shock. Course complicated by GI bleed s/p PPI gtt, bleeding from scott s/p clot irrigation, prolonged respiratory failure s/p trach (8/24) and PEG (8/26). Currently with A-Fib with RVR and C diff colitis.  Also developed gross hematuria - Urology consulted +catheter with clot (irrigated and exchanged) suspected 2/2 traumatic catheterization. Additionally course complicated by fever and Pseudomonas PNA (s/p Rx) then developed C diff (now with resolved leukocytosis and improved stooling, now soft per report); remains on enteral Vanco    8/29: Received to [facility]  8/30: 60-year-old male with a history of A-Fib on warfarin who presents with cardiac arrest at work with downtime of about 25 minutes prior to ROSC. S/p therapeutic hypothermia. 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 Now with resolved neurogenic/cardiogenic shock. Course complicated by GI bleed s/p PPI gtt, bleeding from scott s/p clot irrigation, prolonged respiratory failure s/p trach (8/24) and PEG (8/26). Currently with A-Fib with RVR and C diff colitis.  Also developed gross hematuria - Urology consulted +catheter with clot (irrigated and exchanged) suspected 2/2 traumatic catheterization. Additionally course complicated by fever and Pseudomonas PNA (s/p Rx) then developed C diff (now with resolved leukocytosis and improved stooling, now soft per report); remains on enteral Vanco    8/29: Received to [facility]  8/30: Less Agitated overnight, Afebrile, WBC decreasing, CR elevated but Stable, Tolerating TC all day yesterday, will Attempt TC ATC tonight and ABG in am, F/u Bcx from 8/30. Rectal tube dc'd. 60-year-old male with a history of A-Fib on warfarin who presents with cardiac arrest at work with downtime of about 25 minutes prior to ROSC. S/p therapeutic hypothermia. 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 Now with resolved neurogenic/cardiogenic shock. Course complicated by GI bleed s/p PPI gtt, bleeding from scott s/p clot irrigation, prolonged respiratory failure s/p trach (8/24) and PEG (8/26). Currently with A-Fib with RVR and C diff colitis.  Also developed gross hematuria - Urology consulted +catheter with clot (irrigated and exchanged) suspected 2/2 traumatic catheterization. Additionally course complicated by fever and Pseudomonas PNA (s/p Rx) then developed C diff (now with resolved leukocytosis and improved stooling, now soft per report); remains on enteral Vanco    8/29: Received to [facility]  8/30: Less Agitated overnight, Afebrile, WBC decreasing, CR elevated but Stable, Tolerating TC all day yesterday, will Attempt TC ATC tonight and ABG in am, F/u Bcx from 8/30. Rectal tube dc'd.  Speech eval for AAC device and will F/u.

## 2020-08-30 NOTE — PROGRESS NOTE ADULT - ASSESSMENT
61 y/o male s/p trach POD 6. On exam, #8 DCT trach in place, sutures x4 and umbilical tie in place. No bleeding noted. Doing well on trach collar.

## 2020-08-30 NOTE — PROGRESS NOTE ADULT - PROBLEM SELECTOR PLAN 5
Completed Abx for Pseudomonas aeruginosa pneumonia/colonization:  - S/p prolonged course of cefepime until 8/14-8/20   - Hold off on systemic antibiotics at this time  - If develops increased secretions, may benefit from inhaled tobramycin  - Cdiff- Completed oral Vanco 8/29   - ID Appreciated

## 2020-08-30 NOTE — PROGRESS NOTE ADULT - PROBLEM SELECTOR PLAN 2
-S/p angio neg x2 ,PBD 20  -CT Head: perimesencephalic SAH   -CTA Head: no aneurysm noted  -Conventional angiogram negative  -MRI neuroaxis: restricted diffusion in R BG, posterior limb of R IC, and anterior right temporal lobe  -VEEG negative   - No Further neurosurgical intervention at this time

## 2020-08-30 NOTE — PROGRESS NOTE ADULT - ATTENDING COMMENTS
tolerated TC all day yesterday,  trial of round the clock today -  less agitated on TC, hopefully can start to titrating down meds

## 2020-08-30 NOTE — PROGRESS NOTE ADULT - SUBJECTIVE AND OBJECTIVE BOX
Patient is a 60y old  Male who presents with a chief complaint of sah (16 Aug 2020 07:48)      Interval Events:    REVIEW OF SYSTEMS:  [ ] Positive  [ ] All other systems negative  [ ] Unable to assess ROS because ________    Vital Signs Last 24 Hrs  T(C): 36.7 (08-30-20 @ 08:00), Max: 37.3 (08-29-20 @ 18:01)  T(F): 98.1 (08-30-20 @ 08:00), Max: 99.1 (08-29-20 @ 18:01)  HR: 98 (08-30-20 @ 08:29) (63 - 110)  BP: 98/70 (08-30-20 @ 08:00) (98/70 - 157/91)  RR: 18 (08-30-20 @ 08:00) (18 - 20)  SpO2: 99% (08-30-20 @ 08:29) (97% - 100%)PHYSICAL EXAM:  HEENT:   [ ]Tracheostomy:  [ ]Pupils equal  [ ]No oral lesions  [ ]Abnormal        SKIN  [ ]No Rash  [ ] Abnormal  [ ] pressure    CARDIAC  [ ]Regular  [ ]Abnormal    PULMONARY  [ ]Bilateral Clear Breath Sounds  [ ]Normal Excursion  [ ]Abnormal    GI  [ ]PEG      [ ] +BS		              [ ]Soft, nondistended, nontender	  [ ]Abnormal    MUSCULOSKELETAL                                   [ ]Bedbound                 [ ]Abnormal    [ ]Ambulatory/OOB to chair                           EXTREMITIES                                         [ ]Normal  [ ]Edema                           NEUROLOGIC  [ ] Normal, non focal  [ ] Focal findings:    PSYCHIATRIC  [ ]Alert and appropriate  [ ] Sedated	 [ ]Agitated    :  Umaña: [ ] Yes, if yes: Date of Placement:                   [  ] No    LINES: Central Lines [ ] Yes, if yes: Date of Placement                                     [  ] No    HOSPITAL MEDICATIONS:  MEDICATIONS  (STANDING):  albuterol/ipratropium for Nebulization. 3 milliLiter(s) Nebulizer every 6 hours  aMIOdarone    Tablet 200 milliGRAM(s) Oral daily  artificial  tears Solution 1 Drop(s) Both EYES every 6 hours  aspirin  chewable 81 milliGRAM(s) Enteral Tube daily  chlorhexidine 0.12% Liquid 15 milliLiter(s) Oral Mucosa every 12 hours  chlorhexidine 4% Liquid 1 Application(s) Topical <User Schedule>  clonazePAM  Tablet 0.5 milliGRAM(s) Oral every 8 hours  dextrose 5%. 1000 milliLiter(s) (50 mL/Hr) IV Continuous <Continuous>  dextrose 50% Injectable 12.5 Gram(s) IV Push once  dextrose 50% Injectable 25 Gram(s) IV Push once  dextrose 50% Injectable 25 Gram(s) IV Push once  famotidine    Tablet 20 milliGRAM(s) Oral daily  fentaNYL   Patch  50 MICROgram(s)/Hr 1 Patch Transdermal every 72 hours  heparin   Injectable 5000 Unit(s) SubCutaneous every 8 hours  hydrALAZINE 50 milliGRAM(s) Oral every 8 hours  isosorbide   dinitrate Tablet (ISORDIL) 10 milliGRAM(s) Oral three times a day  lactobacillus acidophilus 1 Tablet(s) Oral two times a day  metoprolol tartrate 50 milliGRAM(s) Oral three times a day  QUEtiapine 50 milliGRAM(s) Oral every 6 hours  sodium chloride 7% Inhalation 4 milliLiter(s) Inhalation every 8 hours    MEDICATIONS  (PRN):  acetaminophen   Tablet .. 650 milliGRAM(s) Oral every 6 hours PRN Temp greater or equal to 38C (100.4F), Mild Pain (1 - 3)  acetylcysteine 20%  Inhalation 4 milliLiter(s) Inhalation three times a day PRN secretions  dextrose 40% Gel 15 Gram(s) Oral once PRN Blood Glucose LESS THAN 70 milliGRAM(s)/deciliter  glucagon  Injectable 1 milliGRAM(s) IntraMuscular once PRN Glucose LESS THAN 70 milligrams/deciliter  HYDROmorphone   Tablet 2 milliGRAM(s) Oral every 4 hours PRN Severe Pain (7 - 10)  HYDROmorphone  Injectable 0.5 milliGRAM(s) IV Push every 4 hours PRN breakthrough pain      LABS:                        9.7    11.23 )-----------( 309      ( 30 Aug 2020 06:33 )             31.6     08-30    143  |  112<H>  |  30<H>  ----------------------------<  144<H>  3.6   |  19<L>  |  2.26<H>    Ca    9.2      30 Aug 2020 06:35  Phos  3.0     08-30  Mg     2.2     08-30    TPro  6.6  /  Alb  3.1<L>  /  TBili  0.1<L>  /  DBili  x   /  AST  35  /  ALT  33  /  AlkPhos  68  08-30    PT/INR - ( 29 Aug 2020 06:59 )   PT: 16.0 sec;   INR: 1.36 ratio                 CAPILLARY BLOOD GLUCOSE    MICROBIOLOGY:     RADIOLOGY:  [ ] Reviewed and interpreted by me    Mode: T/C 30% Patient is a 60y old  Male who presents with a chief complaint of sah (16 Aug 2020 07:48)      Interval Events: No events    REVIEW OF SYSTEMS:  [ ] Positive  [ ] All other systems negative  [ ] Unable to assess ROS because ________    Vital Signs Last 24 Hrs  T(C): 36.7 (08-30-20 @ 08:00), Max: 37.3 (08-29-20 @ 18:01)  T(F): 98.1 (08-30-20 @ 08:00), Max: 99.1 (08-29-20 @ 18:01)  HR: 98 (08-30-20 @ 08:29) (63 - 110)  BP: 98/70 (08-30-20 @ 08:00) (98/70 - 157/91)  RR: 18 (08-30-20 @ 08:00) (18 - 20)  SpO2: 99% (08-30-20 @ 08:29) (97% - 100%)    PHYSICAL EXAM:    HEENT:   [x ]Tracheostomy: # 8 DCT [place]   [ ]Pupils equal  [ ]No oral lesions  [ ]Abnormal    SKIN  [x ]No Rash  [ ] Abnormal  [ ] pressure    CARDIAC  [ ]Regular  [x ]Abnormal- Irreg Ireg, + Murmur     PULMONARY  [ x]Bilateral Clear Breath Sounds  [ ]Normal Excursion  [ ]Abnormal    GI  [ x]PEG      [ ] +BS		              [x ]Soft, nondistended, nontender	  [ ]Abnormal    MUSCULOSKELETAL                                   [ ]Bedbound                 [ x]Abnormal- Moves RUE/RLE; unable to move L side s/p SAH this admission   [ ]Ambulatory/OOB to chair                           EXTREMITIES                                         [ ]Normal  [ x]Edema- trace bilateral edema                     NEUROLOGIC  [ ] Normal, non focal  [x ] Focal findings: Awake, open eyes, attempts to track w/ Lt pupil but sluggish, attempts to follow some commands as able to squeeze w/ Rt Hand     PSYCHIATRIC  [x ]Alert and appropriate  [ ] Sedated	 [ ]Agitated-    :  Umaña: [ ] Yes, if yes: Date of Placement:                   [ x ] No    LINES: Central Lines [x ] Yes, if yes: Date of Placement: RUE PICC line 8/21                                     [  ] No      HOSPITAL MEDICATIONS:  MEDICATIONS  (STANDING):  albuterol/ipratropium for Nebulization. 3 milliLiter(s) Nebulizer every 6 hours  aMIOdarone    Tablet 200 milliGRAM(s) Oral daily  artificial  tears Solution 1 Drop(s) Both EYES every 6 hours  aspirin  chewable 81 milliGRAM(s) Enteral Tube daily  chlorhexidine 0.12% Liquid 15 milliLiter(s) Oral Mucosa every 12 hours  chlorhexidine 4% Liquid 1 Application(s) Topical <User Schedule>  clonazePAM  Tablet 0.5 milliGRAM(s) Oral every 8 hours  dextrose 5%. 1000 milliLiter(s) (50 mL/Hr) IV Continuous <Continuous>  dextrose 50% Injectable 12.5 Gram(s) IV Push once  dextrose 50% Injectable 25 Gram(s) IV Push once  dextrose 50% Injectable 25 Gram(s) IV Push once  famotidine    Tablet 20 milliGRAM(s) Oral daily  fentaNYL   Patch  50 MICROgram(s)/Hr 1 Patch Transdermal every 72 hours  heparin   Injectable 5000 Unit(s) SubCutaneous every 8 hours  hydrALAZINE 50 milliGRAM(s) Oral every 8 hours  isosorbide   dinitrate Tablet (ISORDIL) 10 milliGRAM(s) Oral three times a day  lactobacillus acidophilus 1 Tablet(s) Oral two times a day  metoprolol tartrate 50 milliGRAM(s) Oral three times a day  QUEtiapine 50 milliGRAM(s) Oral every 6 hours  sodium chloride 7% Inhalation 4 milliLiter(s) Inhalation every 8 hours    MEDICATIONS  (PRN):  acetaminophen   Tablet .. 650 milliGRAM(s) Oral every 6 hours PRN Temp greater or equal to 38C (100.4F), Mild Pain (1 - 3)  acetylcysteine 20%  Inhalation 4 milliLiter(s) Inhalation three times a day PRN secretions  dextrose 40% Gel 15 Gram(s) Oral once PRN Blood Glucose LESS THAN 70 milliGRAM(s)/deciliter  glucagon  Injectable 1 milliGRAM(s) IntraMuscular once PRN Glucose LESS THAN 70 milligrams/deciliter  HYDROmorphone   Tablet 2 milliGRAM(s) Oral every 4 hours PRN Severe Pain (7 - 10)  HYDROmorphone  Injectable 0.5 milliGRAM(s) IV Push every 4 hours PRN breakthrough pain      LABS:                        9.7    11.23 )-----------( 309      ( 30 Aug 2020 06:33 )             31.6     08-30    143  |  112<H>  |  30<H>  ----------------------------<  144<H>  3.6   |  19<L>  |  2.26<H>    Ca    9.2      30 Aug 2020 06:35  Phos  3.0     08-30  Mg     2.2     08-30    TPro  6.6  /  Alb  3.1<L>  /  TBili  0.1<L>  /  DBili  x   /  AST  35  /  ALT  33  /  AlkPhos  68  08-30    PT/INR - ( 29 Aug 2020 06:59 )   PT: 16.0 sec;   INR: 1.36 ratio                 CAPILLARY BLOOD GLUCOSE    MICROBIOLOGY:     RADIOLOGY:  [ ] Reviewed and interpreted by me    Mode: T/C 30%

## 2020-08-30 NOTE — PROGRESS NOTE ADULT - SUBJECTIVE AND OBJECTIVE BOX
ENT ISSUE/POD: S/p tracheostomy POD 6    HPI: 61 y/o male s/p tracheostomy #8 , POD 6. Pt seen and examined at bedside. No acute events overnight. Pt doing well on vent. No issues per team.         PAST MEDICAL & SURGICAL HISTORY:  On warfarin for atrial fibrillation  No significant past surgical history    Allergies    No Known Allergies    Intolerances      MEDICATIONS  (STANDING):  albuterol/ipratropium for Nebulization. 3 milliLiter(s) Nebulizer every 6 hours  aMIOdarone    Tablet 200 milliGRAM(s) Oral daily  artificial  tears Solution 1 Drop(s) Both EYES every 6 hours  aspirin  chewable 81 milliGRAM(s) Enteral Tube daily  chlorhexidine 0.12% Liquid 15 milliLiter(s) Oral Mucosa every 12 hours  chlorhexidine 4% Liquid 1 Application(s) Topical <User Schedule>  clonazePAM  Tablet 0.5 milliGRAM(s) Oral every 8 hours  dextrose 5%. 1000 milliLiter(s) (50 mL/Hr) IV Continuous <Continuous>  dextrose 50% Injectable 12.5 Gram(s) IV Push once  dextrose 50% Injectable 25 Gram(s) IV Push once  dextrose 50% Injectable 25 Gram(s) IV Push once  famotidine    Tablet 20 milliGRAM(s) Oral daily  fentaNYL   Patch  50 MICROgram(s)/Hr 1 Patch Transdermal every 72 hours  heparin   Injectable 5000 Unit(s) SubCutaneous every 8 hours  hydrALAZINE 50 milliGRAM(s) Oral every 8 hours  isosorbide   dinitrate Tablet (ISORDIL) 10 milliGRAM(s) Oral three times a day  lactobacillus acidophilus 1 Tablet(s) Oral two times a day  metoprolol tartrate 50 milliGRAM(s) Oral three times a day  QUEtiapine 50 milliGRAM(s) Oral every 6 hours  sodium chloride 7% Inhalation 4 milliLiter(s) Inhalation every 8 hours    MEDICATIONS  (PRN):  acetaminophen   Tablet .. 650 milliGRAM(s) Oral every 6 hours PRN Temp greater or equal to 38C (100.4F), Mild Pain (1 - 3)  acetylcysteine 20%  Inhalation 4 milliLiter(s) Inhalation three times a day PRN secretions  dextrose 40% Gel 15 Gram(s) Oral once PRN Blood Glucose LESS THAN 70 milliGRAM(s)/deciliter  glucagon  Injectable 1 milliGRAM(s) IntraMuscular once PRN Glucose LESS THAN 70 milligrams/deciliter  HYDROmorphone   Tablet 2 milliGRAM(s) Oral every 4 hours PRN Severe Pain (7 - 10)  HYDROmorphone  Injectable 0.5 milliGRAM(s) IV Push every 4 hours PRN breakthrough pain      Social History: see consult    Family history: see consult    ROS:   ENT: all negative except as noted in HPI   Pulm: denies SOB, cough, hemoptysis  Neuro: denies numbness/tingling, loss of sensation  Endo: denies heat/cold intolerance, excessive sweating      Vital Signs Last 24 Hrs  T(C): 37.1 (30 Aug 2020 11:00), Max: 37.3 (29 Aug 2020 18:01)  T(F): 98.8 (30 Aug 2020 11:00), Max: 99.1 (29 Aug 2020 18:01)  HR: 91 (30 Aug 2020 11:00) (63 - 110)  BP: 155/97 (30 Aug 2020 11:00) (98/70 - 157/91)  BP(mean): --  RR: 18 (30 Aug 2020 11:00) (18 - 20)  SpO2: 98% (30 Aug 2020 11:00) (97% - 100%)                          9.7    11.23 )-----------( 309      ( 30 Aug 2020 06:33 )             31.6    08-30    143  |  112<H>  |  30<H>  ----------------------------<  144<H>  3.6   |  19<L>  |  2.26<H>    Ca    9.2      30 Aug 2020 06:35  Phos  3.0     08-30  Mg     2.2     08-30    TPro  6.6  /  Alb  3.1<L>  /  TBili  0.1<L>  /  DBili  x   /  AST  35  /  ALT  33  /  AlkPhos  68  08-30   PT/INR - ( 29 Aug 2020 06:59 )   PT: 16.0 sec;   INR: 1.36 ratio             PHYSICAL EXAM:  Gen: NAD  Skin: No rashes, bruises, or lesions  Head: Normocephalic, Atraumatic  Face: no edema, erythema, or fluctuance. Parotid glands soft without mass  Eyes: no scleral injection  Nose: Nares bilaterally patent, no discharge  Mouth: No Stridor / Drooling / Trismus.  Mucosa moist, tongue/uvula midline, oropharynx clear  Neck: #8 Shiley DCT in place, inflated cuff. Scant serosanguineous drainage. No bleeding noted from stoma, sutures x4 and umbilical tie in place.  No lymphadenopathy, trachea midline, no masses  Lymphatic: No lymphadenopathy  Resp: on trach collar, breathing easily  Neuro: unable to assess due to patient's condition

## 2020-08-30 NOTE — SPEECH LANGUAGE PATHOLOGY EVALUATION - SLP DIAGNOSIS
Mr. Vieyra is a 60-year-old male who presents with cardiac arrest at work with downtime of about 25 minutes prior to ROSC. 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 Cerebral angiogram without evidence of aneurysm. ICU course complicated by GI bleed. Pt w/ prolonged respiratory failure s/p trach. Currently pt is not appropriate for an AAC device.

## 2020-08-30 NOTE — PROGRESS NOTE ADULT - PROBLEM SELECTOR PLAN 1
- Plan to remove sutures on POD #7  - Do not remove umbilical trach tie  - HOB elevation  - Suction PRN  - Continue trach care  - ENT will continue to follow, call with questions x [phone].

## 2020-08-30 NOTE — SPEECH LANGUAGE PATHOLOGY EVALUATION - SLP PERTINENT HISTORY OF CURRENT PROBLEM
Mr. Vieyra is a 60-year-old male with a history of A-Fib on warfarin who presents with cardiac arrest at work with downtime of about 25 minutes prior to ROSC. He is s/p cooling to 35 degrees. He required mechanical ventilation, neuromuscular blockade, sedation, and pressors for neurogenic/cardiogenic shock. 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 Cerebral angiogram without evidence of aneurysm. ICU course complicated by GI bleed s/p PPI gtt and bleeding from scott

## 2020-08-31 DIAGNOSIS — Z29.9 ENCOUNTER FOR PROPHYLACTIC MEASURES, UNSPECIFIED: ICD-10-CM

## 2020-08-31 LAB
ALBUMIN SERPL ELPH-MCNC: 2.9 G/DL — LOW (ref 3.3–5)
ALP SERPL-CCNC: 67 U/L — SIGNIFICANT CHANGE UP (ref 40–120)
ALT FLD-CCNC: 37 U/L — SIGNIFICANT CHANGE UP (ref 10–45)
ANION GAP SERPL CALC-SCNC: 12 MMOL/L — SIGNIFICANT CHANGE UP (ref 5–17)
AST SERPL-CCNC: 35 U/L — SIGNIFICANT CHANGE UP (ref 10–40)
BASE EXCESS BLDA CALC-SCNC: -2.4 MMOL/L — LOW (ref -2–2)
BASOPHILS # BLD AUTO: 0.09 K/UL — SIGNIFICANT CHANGE UP (ref 0–0.2)
BASOPHILS NFR BLD AUTO: 0.8 % — SIGNIFICANT CHANGE UP (ref 0–2)
BILIRUB SERPL-MCNC: 0.2 MG/DL — SIGNIFICANT CHANGE UP (ref 0.2–1.2)
BUN SERPL-MCNC: 31 MG/DL — HIGH (ref 7–23)
CALCIUM SERPL-MCNC: 9.3 MG/DL — SIGNIFICANT CHANGE UP (ref 8.4–10.5)
CHLORIDE SERPL-SCNC: 111 MMOL/L — HIGH (ref 96–108)
CO2 BLDA-SCNC: 23 MMOL/L — SIGNIFICANT CHANGE UP (ref 22–30)
CO2 SERPL-SCNC: 21 MMOL/L — LOW (ref 22–31)
CREAT SERPL-MCNC: 2.04 MG/DL — HIGH (ref 0.5–1.3)
EOSINOPHIL # BLD AUTO: 0.33 K/UL — SIGNIFICANT CHANGE UP (ref 0–0.5)
EOSINOPHIL NFR BLD AUTO: 2.9 % — SIGNIFICANT CHANGE UP (ref 0–6)
GAS PNL BLDA: SIGNIFICANT CHANGE UP
GLUCOSE BLDC GLUCOMTR-MCNC: 144 MG/DL — HIGH (ref 70–99)
GLUCOSE BLDC GLUCOMTR-MCNC: 159 MG/DL — HIGH (ref 70–99)
GLUCOSE BLDC GLUCOMTR-MCNC: 161 MG/DL — HIGH (ref 70–99)
GLUCOSE SERPL-MCNC: 143 MG/DL — HIGH (ref 70–99)
HCO3 BLDA-SCNC: 22 MMOL/L — SIGNIFICANT CHANGE UP (ref 21–29)
HCT VFR BLD CALC: 32.1 % — LOW (ref 39–50)
HGB BLD-MCNC: 9.8 G/DL — LOW (ref 13–17)
IMM GRANULOCYTES NFR BLD AUTO: 2.5 % — HIGH (ref 0–1.5)
LYMPHOCYTES # BLD AUTO: 1.08 K/UL — SIGNIFICANT CHANGE UP (ref 1–3.3)
LYMPHOCYTES # BLD AUTO: 9.5 % — LOW (ref 13–44)
MAGNESIUM SERPL-MCNC: 2.1 MG/DL — SIGNIFICANT CHANGE UP (ref 1.6–2.6)
MCHC RBC-ENTMCNC: 27 PG — SIGNIFICANT CHANGE UP (ref 27–34)
MCHC RBC-ENTMCNC: 30.5 GM/DL — LOW (ref 32–36)
MCV RBC AUTO: 88.4 FL — SIGNIFICANT CHANGE UP (ref 80–100)
MONOCYTES # BLD AUTO: 0.68 K/UL — SIGNIFICANT CHANGE UP (ref 0–0.9)
MONOCYTES NFR BLD AUTO: 6 % — SIGNIFICANT CHANGE UP (ref 2–14)
NEUTROPHILS # BLD AUTO: 8.95 K/UL — HIGH (ref 1.8–7.4)
NEUTROPHILS NFR BLD AUTO: 78.3 % — HIGH (ref 43–77)
NRBC # BLD: 0 /100 WBCS — SIGNIFICANT CHANGE UP (ref 0–0)
PCO2 BLDA: 40 MMHG — SIGNIFICANT CHANGE UP (ref 32–46)
PH BLDA: 7.36 — SIGNIFICANT CHANGE UP (ref 7.35–7.45)
PHOSPHATE SERPL-MCNC: 3.4 MG/DL — SIGNIFICANT CHANGE UP (ref 2.5–4.5)
PLATELET # BLD AUTO: 323 K/UL — SIGNIFICANT CHANGE UP (ref 150–400)
PO2 BLDA: 231 MMHG — HIGH (ref 74–108)
POTASSIUM SERPL-MCNC: 3.5 MMOL/L — SIGNIFICANT CHANGE UP (ref 3.5–5.3)
POTASSIUM SERPL-SCNC: 3.5 MMOL/L — SIGNIFICANT CHANGE UP (ref 3.5–5.3)
PROT SERPL-MCNC: 6.6 G/DL — SIGNIFICANT CHANGE UP (ref 6–8.3)
RBC # BLD: 3.63 M/UL — LOW (ref 4.2–5.8)
RBC # FLD: 15.1 % — HIGH (ref 10.3–14.5)
SAO2 % BLDA: 99 % — HIGH (ref 92–96)
SODIUM SERPL-SCNC: 144 MMOL/L — SIGNIFICANT CHANGE UP (ref 135–145)
WBC # BLD: 11.41 K/UL — HIGH (ref 3.8–10.5)
WBC # FLD AUTO: 11.41 K/UL — HIGH (ref 3.8–10.5)

## 2020-08-31 PROCEDURE — 99233 SBSQ HOSP IP/OBS HIGH 50: CPT | Mod: GC

## 2020-08-31 PROCEDURE — 99231 SBSQ HOSP IP/OBS SF/LOW 25: CPT

## 2020-08-31 PROCEDURE — 99232 SBSQ HOSP IP/OBS MODERATE 35: CPT

## 2020-08-31 RX ORDER — ISOSORBIDE DINITRATE 5 MG/1
20 TABLET ORAL THREE TIMES A DAY
Refills: 0 | Status: DISCONTINUED | OUTPATIENT
Start: 2020-08-31 | End: 2020-08-31

## 2020-08-31 RX ORDER — HYDRALAZINE HCL 50 MG
10 TABLET ORAL ONCE
Refills: 0 | Status: COMPLETED | OUTPATIENT
Start: 2020-08-31 | End: 2020-08-31

## 2020-08-31 RX ORDER — HYDRALAZINE HCL 50 MG
25 TABLET ORAL THREE TIMES A DAY
Refills: 0 | Status: DISCONTINUED | OUTPATIENT
Start: 2020-08-31 | End: 2020-08-31

## 2020-08-31 RX ORDER — ISOSORBIDE DINITRATE 5 MG/1
20 TABLET ORAL EVERY 8 HOURS
Refills: 0 | Status: DISCONTINUED | OUTPATIENT
Start: 2020-08-31 | End: 2020-08-31

## 2020-08-31 RX ORDER — POTASSIUM CHLORIDE 20 MEQ
20 PACKET (EA) ORAL ONCE
Refills: 0 | Status: COMPLETED | OUTPATIENT
Start: 2020-08-31 | End: 2020-08-31

## 2020-08-31 RX ORDER — HYDRALAZINE HCL 50 MG
50 TABLET ORAL THREE TIMES A DAY
Refills: 0 | Status: DISCONTINUED | OUTPATIENT
Start: 2020-08-31 | End: 2020-09-03

## 2020-08-31 RX ORDER — ISOSORBIDE DINITRATE 5 MG/1
20 TABLET ORAL
Refills: 0 | Status: DISCONTINUED | OUTPATIENT
Start: 2020-08-31 | End: 2020-10-14

## 2020-08-31 RX ORDER — CLONAZEPAM 1 MG
0.25 TABLET ORAL EVERY 12 HOURS
Refills: 0 | Status: DISCONTINUED | OUTPATIENT
Start: 2020-08-31 | End: 2020-09-06

## 2020-08-31 RX ORDER — HYDRALAZINE HCL 50 MG
25 TABLET ORAL ONCE
Refills: 0 | Status: COMPLETED | OUTPATIENT
Start: 2020-08-31 | End: 2020-08-31

## 2020-08-31 RX ORDER — ISOSORBIDE DINITRATE 5 MG/1
20 TABLET ORAL ONCE
Refills: 0 | Status: COMPLETED | OUTPATIENT
Start: 2020-08-31 | End: 2020-08-31

## 2020-08-31 RX ORDER — QUETIAPINE FUMARATE 200 MG/1
25 TABLET, FILM COATED ORAL AT BEDTIME
Refills: 0 | Status: DISCONTINUED | OUTPATIENT
Start: 2020-08-31 | End: 2020-08-31

## 2020-08-31 RX ORDER — MAGNESIUM SULFATE 500 MG/ML
2 VIAL (ML) INJECTION ONCE
Refills: 0 | Status: COMPLETED | OUTPATIENT
Start: 2020-08-31 | End: 2020-08-31

## 2020-08-31 RX ORDER — QUETIAPINE FUMARATE 200 MG/1
25 TABLET, FILM COATED ORAL ONCE
Refills: 0 | Status: COMPLETED | OUTPATIENT
Start: 2020-08-31 | End: 2020-08-31

## 2020-08-31 RX ADMIN — Medication 20 MILLIEQUIVALENT(S): at 09:48

## 2020-08-31 RX ADMIN — QUETIAPINE FUMARATE 50 MILLIGRAM(S): 200 TABLET, FILM COATED ORAL at 11:22

## 2020-08-31 RX ADMIN — Medication 3 MILLILITER(S): at 05:27

## 2020-08-31 RX ADMIN — HYDROMORPHONE HYDROCHLORIDE 0.5 MILLIGRAM(S): 2 INJECTION INTRAMUSCULAR; INTRAVENOUS; SUBCUTANEOUS at 03:39

## 2020-08-31 RX ADMIN — ISOSORBIDE DINITRATE 10 MILLIGRAM(S): 5 TABLET ORAL at 05:02

## 2020-08-31 RX ADMIN — Medication 3 MILLILITER(S): at 11:55

## 2020-08-31 RX ADMIN — FENTANYL CITRATE 1 PATCH: 50 INJECTION INTRAVENOUS at 10:01

## 2020-08-31 RX ADMIN — QUETIAPINE FUMARATE 25 MILLIGRAM(S): 200 TABLET, FILM COATED ORAL at 09:48

## 2020-08-31 RX ADMIN — HEPARIN SODIUM 5000 UNIT(S): 5000 INJECTION INTRAVENOUS; SUBCUTANEOUS at 23:02

## 2020-08-31 RX ADMIN — Medication 3 MILLILITER(S): at 00:09

## 2020-08-31 RX ADMIN — HYDROMORPHONE HYDROCHLORIDE 0.5 MILLIGRAM(S): 2 INJECTION INTRAMUSCULAR; INTRAVENOUS; SUBCUTANEOUS at 23:30

## 2020-08-31 RX ADMIN — ISOSORBIDE DINITRATE 20 MILLIGRAM(S): 5 TABLET ORAL at 18:25

## 2020-08-31 RX ADMIN — Medication 1 TABLET(S): at 18:18

## 2020-08-31 RX ADMIN — Medication 0.25 MILLIGRAM(S): at 18:21

## 2020-08-31 RX ADMIN — FAMOTIDINE 20 MILLIGRAM(S): 10 INJECTION INTRAVENOUS at 11:22

## 2020-08-31 RX ADMIN — Medication 1 DROP(S): at 23:02

## 2020-08-31 RX ADMIN — Medication 1 TABLET(S): at 05:12

## 2020-08-31 RX ADMIN — Medication 50 MILLIGRAM(S): at 23:02

## 2020-08-31 RX ADMIN — Medication 3 MILLILITER(S): at 17:00

## 2020-08-31 RX ADMIN — QUETIAPINE FUMARATE 50 MILLIGRAM(S): 200 TABLET, FILM COATED ORAL at 23:03

## 2020-08-31 RX ADMIN — Medication 0.5 MILLIGRAM(S): at 05:01

## 2020-08-31 RX ADMIN — ISOSORBIDE DINITRATE 20 MILLIGRAM(S): 5 TABLET ORAL at 23:03

## 2020-08-31 RX ADMIN — Medication 1 DROP(S): at 05:00

## 2020-08-31 RX ADMIN — Medication 25 MILLIGRAM(S): at 16:01

## 2020-08-31 RX ADMIN — HYDROMORPHONE HYDROCHLORIDE 0.5 MILLIGRAM(S): 2 INJECTION INTRAMUSCULAR; INTRAVENOUS; SUBCUTANEOUS at 23:01

## 2020-08-31 RX ADMIN — CHLORHEXIDINE GLUCONATE 15 MILLILITER(S): 213 SOLUTION TOPICAL at 18:17

## 2020-08-31 RX ADMIN — Medication 50 MILLIGRAM(S): at 05:03

## 2020-08-31 RX ADMIN — HYDROMORPHONE HYDROCHLORIDE 0.5 MILLIGRAM(S): 2 INJECTION INTRAMUSCULAR; INTRAVENOUS; SUBCUTANEOUS at 08:36

## 2020-08-31 RX ADMIN — ISOSORBIDE DINITRATE 20 MILLIGRAM(S): 5 TABLET ORAL at 13:43

## 2020-08-31 RX ADMIN — Medication 50 MILLIGRAM(S): at 05:02

## 2020-08-31 RX ADMIN — FENTANYL CITRATE 1 PATCH: 50 INJECTION INTRAVENOUS at 11:33

## 2020-08-31 RX ADMIN — Medication 3 MILLILITER(S): at 23:30

## 2020-08-31 RX ADMIN — HYDROMORPHONE HYDROCHLORIDE 0.5 MILLIGRAM(S): 2 INJECTION INTRAMUSCULAR; INTRAVENOUS; SUBCUTANEOUS at 08:06

## 2020-08-31 RX ADMIN — Medication 1 DROP(S): at 11:46

## 2020-08-31 RX ADMIN — Medication 50 GRAM(S): at 09:48

## 2020-08-31 RX ADMIN — Medication 50 MILLIGRAM(S): at 13:43

## 2020-08-31 RX ADMIN — HYDROMORPHONE HYDROCHLORIDE 0.5 MILLIGRAM(S): 2 INJECTION INTRAMUSCULAR; INTRAVENOUS; SUBCUTANEOUS at 16:11

## 2020-08-31 RX ADMIN — Medication 81 MILLIGRAM(S): at 11:22

## 2020-08-31 RX ADMIN — HYDROMORPHONE HYDROCHLORIDE 0.5 MILLIGRAM(S): 2 INJECTION INTRAMUSCULAR; INTRAVENOUS; SUBCUTANEOUS at 16:41

## 2020-08-31 RX ADMIN — QUETIAPINE FUMARATE 50 MILLIGRAM(S): 200 TABLET, FILM COATED ORAL at 18:18

## 2020-08-31 RX ADMIN — Medication 10 MILLIGRAM(S): at 15:47

## 2020-08-31 RX ADMIN — CHLORHEXIDINE GLUCONATE 15 MILLILITER(S): 213 SOLUTION TOPICAL at 05:01

## 2020-08-31 RX ADMIN — SODIUM CHLORIDE 4 MILLILITER(S): 9 INJECTION INTRAMUSCULAR; INTRAVENOUS; SUBCUTANEOUS at 12:01

## 2020-08-31 RX ADMIN — HEPARIN SODIUM 5000 UNIT(S): 5000 INJECTION INTRAVENOUS; SUBCUTANEOUS at 05:01

## 2020-08-31 RX ADMIN — Medication 10 MILLIGRAM(S): at 16:53

## 2020-08-31 RX ADMIN — AMIODARONE HYDROCHLORIDE 200 MILLIGRAM(S): 400 TABLET ORAL at 05:00

## 2020-08-31 RX ADMIN — HEPARIN SODIUM 5000 UNIT(S): 5000 INJECTION INTRAVENOUS; SUBCUTANEOUS at 13:44

## 2020-08-31 RX ADMIN — CHLORHEXIDINE GLUCONATE 1 APPLICATION(S): 213 SOLUTION TOPICAL at 05:01

## 2020-08-31 RX ADMIN — QUETIAPINE FUMARATE 50 MILLIGRAM(S): 200 TABLET, FILM COATED ORAL at 05:13

## 2020-08-31 RX ADMIN — SODIUM CHLORIDE 4 MILLILITER(S): 9 INJECTION INTRAMUSCULAR; INTRAVENOUS; SUBCUTANEOUS at 21:28

## 2020-08-31 RX ADMIN — FENTANYL CITRATE 1 PATCH: 50 INJECTION INTRAVENOUS at 11:25

## 2020-08-31 RX ADMIN — Medication 25 MILLIGRAM(S): at 13:43

## 2020-08-31 RX ADMIN — SODIUM CHLORIDE 4 MILLILITER(S): 9 INJECTION INTRAMUSCULAR; INTRAVENOUS; SUBCUTANEOUS at 05:27

## 2020-08-31 RX ADMIN — Medication 1 DROP(S): at 18:18

## 2020-08-31 NOTE — PROGRESS NOTE ADULT - PROBLEM SELECTOR PLAN 3
- Continue fentanyl patch, clonazepam, and quetiapine  - Hydromorphone and tramadol prn - Continue fentanyl patch, clonazepam, and quetiapine, wean down opiods  - Hydromorphone and tramadol prn

## 2020-08-31 NOTE — PROGRESS NOTE ADULT - ASSESSMENT
60 year old male s/p cardiac arrest c/b GIB and bleeding from scott with perimesencephalic (HH4 mF4) subarachnoid hemorrhage, CT showed R periclinoidal/perimesencephalic SAH, c/f aneurysm rupture, no hydrocephalus.  angio neg x2, trach/vent and peg. course also complicated by gross hematuria - Urology consulted +catheter with clot (irrigated and exchanged) suspected 2/2 traumatic catheterization. treated for pseudomonas HCAP. In addition, has developed c diff. pt noticed with abn renal function and requires coronary angiogram.      1- JALEN on ckd III likely   2- HTN   3- respiratory failure  4- s/p cardiac arrest  5- c diff   6- s/p cardiac arrest       JALEN in setting of infections suspected along with his cardiac arrest   he will stand risk for worsening renal function with coronary angiogram   if coronary angio scheduled then to receive ivf analisa procedure    vent support   hydralazine 50 mg tid   metoprolol 50 mg tid   d/w  rcu  team when seen earlier

## 2020-08-31 NOTE — PROGRESS NOTE ADULT - SUBJECTIVE AND OBJECTIVE BOX
ENT ISSUE/POD: s/p tracheostomy POD7    HPI: 59 y/o male s/p tracheostomy #8 [facility], POD 7. Pt seen and examined at bedside. No acute events overnight. Pt tolerating TC since overnight, has been undergoing CPAP and TC trials, on vent most recent yesterday. No issues per team.         PAST MEDICAL & SURGICAL HISTORY:  On warfarin for atrial fibrillation  No significant past surgical history    Allergies    No Known Allergies    Intolerances      MEDICATIONS  (STANDING):  albuterol/ipratropium for Nebulization. 3 milliLiter(s) Nebulizer every 6 hours  aMIOdarone    Tablet 200 milliGRAM(s) Oral daily  artificial  tears Solution 1 Drop(s) Both EYES every 6 hours  aspirin  chewable 81 milliGRAM(s) Enteral Tube daily  chlorhexidine 0.12% Liquid 15 milliLiter(s) Oral Mucosa every 12 hours  chlorhexidine 4% Liquid 1 Application(s) Topical <User Schedule>  clonazePAM  Tablet 0.5 milliGRAM(s) Oral every 8 hours  dextrose 5%. 1000 milliLiter(s) (50 mL/Hr) IV Continuous <Continuous>  dextrose 50% Injectable 12.5 Gram(s) IV Push once  dextrose 50% Injectable 25 Gram(s) IV Push once  dextrose 50% Injectable 25 Gram(s) IV Push once  famotidine    Tablet 20 milliGRAM(s) Oral daily  fentaNYL   Patch  50 MICROgram(s)/Hr 1 Patch Transdermal every 72 hours  heparin   Injectable 5000 Unit(s) SubCutaneous every 8 hours  hydrALAZINE 50 milliGRAM(s) Oral every 8 hours  isosorbide   dinitrate Tablet (ISORDIL) 10 milliGRAM(s) Oral three times a day  lactobacillus acidophilus 1 Tablet(s) Oral two times a day  metoprolol tartrate 50 milliGRAM(s) Oral three times a day  QUEtiapine 50 milliGRAM(s) Oral every 6 hours  sodium chloride 7% Inhalation 4 milliLiter(s) Inhalation every 8 hours    MEDICATIONS  (PRN):  acetaminophen   Tablet .. 650 milliGRAM(s) Oral every 6 hours PRN Temp greater or equal to 38C (100.4F), Mild Pain (1 - 3)  acetylcysteine 20%  Inhalation 4 milliLiter(s) Inhalation three times a day PRN secretions  dextrose 40% Gel 15 Gram(s) Oral once PRN Blood Glucose LESS THAN 70 milliGRAM(s)/deciliter  glucagon  Injectable 1 milliGRAM(s) IntraMuscular once PRN Glucose LESS THAN 70 milligrams/deciliter  HYDROmorphone   Tablet 2 milliGRAM(s) Oral every 4 hours PRN Severe Pain (7 - 10)  HYDROmorphone  Injectable 0.5 milliGRAM(s) IV Push every 4 hours PRN breakthrough pain        ROS:   Unable to obtain due to pts clinical condition      Vital Signs Last 24 Hrs  T(C): 36.9 (31 Aug 2020 04:00), Max: 37.1 (30 Aug 2020 11:00)  T(F): 98.4 (31 Aug 2020 04:00), Max: 98.8 (30 Aug 2020 11:00)  HR: 82 (31 Aug 2020 05:34) (63 - 108)  BP: 160/90 (31 Aug 2020 04:00) (98/70 - 188/74)  BP(mean): --  RR: 18 (31 Aug 2020 04:45) (18 - 24)  SpO2: 99% (31 Aug 2020 05:34) (94% - 100%)                          9.8    11.41 )-----------( 323      ( 31 Aug 2020 06:43 )             32.1    08-30    143  |  112<H>  |  30<H>  ----------------------------<  144<H>  3.6   |  19<L>  |  2.26<H>    Ca    9.2      30 Aug 2020 06:35  Phos  3.0     08-30  Mg     2.2     08-30    TPro  6.6  /  Alb  3.1<L>  /  TBili  0.1<L>  /  DBili  x   /  AST  35  /  ALT  33  /  AlkPhos  68  08-30       PHYSICAL EXAM:  Gen: NAD  Skin: No rashes, bruises, or lesions  Head: Normocephalic, Atraumatic  Face: no edema, erythema, or fluctuance. Parotid glands soft without mass  Eyes: no scleral injection  Nose: Nares bilaterally patent, no discharge  Mouth: No Stridor / Drooling / Trismus.  Mucosa moist, tongue/uvula midline, oropharynx clear  Neck: #8 Shiley DCT in place, cuff deflated. Scant serosanguineous drainage. No bleeding noted from stoma, sutures x4 removed and umbilical tie replaced with velcro tie.  No lymphadenopathy, trachea midline, no masses  Lymphatic: No lymphadenopathy  Resp: on trach collar, breathing easily  Neuro: unable to assess due to patient's condition

## 2020-08-31 NOTE — PROGRESS NOTE ADULT - PROBLEM SELECTOR PLAN 6
A-Fib with RVR:  - Continue amiodarone 200 mg daily  - Increase metoprolol to 50 mg q8h  -Continue Hydralazine/imdur  - Hold off on therapeutic a/c given SAH  - SBP goal 100- 160  -TTE: Global LV dysfunction. EF 41%, Severe concentric LVH, flattening of interventricular septum.   -CTA chest - negative PE   - Will need Angiogram when cleared A-Fib with RVR:  - DC Amiodarone (8/31)  - continue metoprolol to 50 mg q8h  - continue Hydralazine/imdur  - Hold off on therapeutic a/c given SAH  - SBP goal 100- 160  -TTE: Global LV dysfunction. EF 41%, Severe concentric LVH, flattening of interventricular septum.   -CTA chest - negative PE   - Will need Angiogram when cleared

## 2020-08-31 NOTE — PROGRESS NOTE ADULT - SUBJECTIVE AND OBJECTIVE BOX
Patient is a 60y old  Male who presented with a chief complaint of sah (16 Aug 2020 07:48)      INTERVAL HPI/OVERNIGHT EVENTS:  transferred to [facility] over weekend  tolerating PEG feeds  stools thicker/more pasty, leaking around rectal tube    MEDICATIONS  (STANDING):  albuterol/ipratropium for Nebulization. 3 milliLiter(s) Nebulizer every 6 hours  artificial  tears Solution 1 Drop(s) Both EYES every 6 hours  aspirin  chewable 81 milliGRAM(s) Enteral Tube daily  chlorhexidine 0.12% Liquid 15 milliLiter(s) Oral Mucosa every 12 hours  chlorhexidine 4% Liquid 1 Application(s) Topical <User Schedule>  clonazePAM  Tablet 0.25 milliGRAM(s) Oral every 12 hours  dextrose 5%. 1000 milliLiter(s) (50 mL/Hr) IV Continuous <Continuous>  dextrose 50% Injectable 12.5 Gram(s) IV Push once  dextrose 50% Injectable 25 Gram(s) IV Push once  dextrose 50% Injectable 25 Gram(s) IV Push once  famotidine    Tablet 20 milliGRAM(s) Oral daily  fentaNYL   Patch  50 MICROgram(s)/Hr 1 Patch Transdermal every 72 hours  heparin   Injectable 5000 Unit(s) SubCutaneous every 8 hours  hydrALAZINE 50 milliGRAM(s) Oral every 8 hours  isosorbide   dinitrate Tablet (ISORDIL) 10 milliGRAM(s) Oral three times a day  lactobacillus acidophilus 1 Tablet(s) Oral two times a day  metoprolol tartrate 50 milliGRAM(s) Oral three times a day  QUEtiapine 50 milliGRAM(s) Oral every 6 hours  sodium chloride 7% Inhalation 4 milliLiter(s) Inhalation every 8 hours    MEDICATIONS  (PRN):  acetaminophen   Tablet .. 650 milliGRAM(s) Oral every 6 hours PRN Temp greater or equal to 38C (100.4F), Mild Pain (1 - 3)  acetylcysteine 20%  Inhalation 4 milliLiter(s) Inhalation three times a day PRN secretions  dextrose 40% Gel 15 Gram(s) Oral once PRN Blood Glucose LESS THAN 70 milliGRAM(s)/deciliter  glucagon  Injectable 1 milliGRAM(s) IntraMuscular once PRN Glucose LESS THAN 70 milligrams/deciliter  HYDROmorphone   Tablet 2 milliGRAM(s) Oral every 4 hours PRN Severe Pain (7 - 10)  HYDROmorphone  Injectable 0.5 milliGRAM(s) IV Push every 4 hours PRN breakthrough pain      Allergies  No Known Allergies    Review of Systems:  unable to obtain    Vital Signs Last 24 Hrs  T(C): 37.2 (31 Aug 2020 09:00), Max: 37.2 (31 Aug 2020 09:00)  T(F): 98.9 (31 Aug 2020 09:00), Max: 98.9 (31 Aug 2020 09:00)  HR: 82 (31 Aug 2020 09:00) (68 - 108)  BP: 180/95 (31 Aug 2020 09:00) (150/80 - 188/74)  BP(mean): --  RR: 20 (31 Aug 2020 09:00) (18 - 24)  SpO2: 97% (31 Aug 2020 09:00) (94% - 100%)    PHYSICAL EXAM:  Constitutional: NAD, restless +LUE splint in place. opens eyes, not following commands  Neck: +trach - clean  Respiratory: +vent sounds, decreased BS at bases  Cardiovascular: S1 and S2, tachy  Gastrointestinal: BS+, obese softly distended no rebound or rigidity  +PEG site clean and dry bumper at 2 cm, spins freely 360 degrees +rectal tube - thick pasty brown stool in tubing  Extremities: No peripheral edema  Vascular: 2+ peripheral pulses  Neurological: intermittently restless opens eyes. not following commands  moves RUE  +dysconjugate gaze  Skin: No rashes    LABS:                        9.8    11.41 )-----------( 323      ( 31 Aug 2020 06:43 )             32.1     08-31    144  |  111<H>  |  31<H>  ----------------------------<  143<H>  3.5   |  21<L>  |  2.04<H>    Ca    9.3      31 Aug 2020 06:43  Phos  3.4     08-31  Mg     2.1     08-31    TPro  6.6  /  Alb  2.9<L>  /  TBili  0.2  /  DBili  x   /  AST  35  /  ALT  37  /  AlkPhos  67  08-31        RADIOLOGY & ADDITIONAL TESTS:

## 2020-08-31 NOTE — PROGRESS NOTE ADULT - ASSESSMENT
61YO male on coumadin for afib s/p cardiac arrest at work, down 25 minutes prior to ROSC. Pupils were R fixed and dilated, left fixed at the time, no gag reflex, post-CA cooling protocol was initiated down to 35 deg. Intubated and put on Nimbex drip. Also on Propofol, fentanyl, levophed. R groin minerva placed. Also put on heparin post-CA. 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 Course also c/b GIB, put on protonix drip. Prior to xfer was started on 3% at 30cc/hr. (09 Aug 2020 14:30)  Also developed gross hematuria - Urology consulted +catheter with clot (irrigated and exchanged) suspected 2/2 traumatic catheterization    Hospital Course complicated by fever and Pseudomonas PNA (s/p Rx) then developed C diff (now with resolved leukocytosis and improved stooling, now soft per report); completed course of enteral Vanco    Respiratory Failure - s/p trach 8/24  Dysphagia - s/p PEG 8/26  C diff - s/p Rx enteral Vanco  Abdominal distension -NOBGP; no colonic distension      RECS:  -Continue Bacid as ordered  -Monitor stools and WBC  -recommend d/c rectal tube and monitor stooling  -Pepcid for GI prophylaxis (decreased SE of increased stooling)  -PEG feeds, monitor tolerance  -abdominal binder to prevent pt accidentally pulling PEG    Further management per RCU team    Benja Flores PA-C    [facility]  [phone]  After hours and weekend coverage [phone]

## 2020-08-31 NOTE — PROGRESS NOTE ADULT - SUBJECTIVE AND OBJECTIVE BOX
Patient is a 60y old  Male who presents with a chief complaint of sah (16 Aug 2020 07:48)    HPI:  61YO male on coumadin for afib s/p cardiac arrest at work, down 25 minutes prior to ROSC. Pupils were R fixed and dilated, left fixed at the time, no gag reflex, post-CA cooling protocol was initiated down to 35 deg. Intubated and put on Nimbex drip. Also on Propofol, fentanyl, levophed. R groin minerva placed. Also put on heparin post-CA. 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 Course also c/b GIB, put on protonix drip. Prior to xfer was started on 3% at 30cc/hr. (09 Aug 2020 14:30)    Interval Events:    REVIEW OF SYSTEMS:  [ ] Positive  [ ] All other systems negative  [ ] Unable to assess ROS because ________    Vital Signs Last 24 Hrs  T(C): 36.9 (08-31-20 @ 04:00), Max: 37.1 (08-30-20 @ 11:00)  T(F): 98.4 (08-31-20 @ 04:00), Max: 98.8 (08-30-20 @ 11:00)  HR: 82 (08-31-20 @ 05:34) (63 - 108)  BP: 160/90 (08-31-20 @ 04:00) (98/70 - 188/74)  RR: 18 (08-31-20 @ 04:45) (18 - 24)  SpO2: 99% (08-31-20 @ 05:34) (94% - 100%)    PHYSICAL EXAM:  HEENT:   [ ]Tracheostomy:  [ ]Pupils equal  [ ]No oral lesions  [ ]Abnormal    SKIN  [ ] No Rash  [ ] Abnormal  [ ] pressure    CARDIAC  [ ]Regular  [ ]Abnormal    PULMONARY  [ ]Bilateral Clear Breath Sounds  [ ]Normal Excursion  [ ]Abnormal    GI  [ ]PEG      [ ] +BS		              [ ]Soft, nondistended, nontender	  [ ]Abnormal    MUSCULOSKELETAL                                   [ ]Bedbound                 [ ]Abnormal    [ ]Ambulatory/OOB to chair                           EXTREMITIES                                         [ ]Normal  [ ]Edema                           NEUROLOGIC  [ ] Normal, non focal  [ ] Focal findings:    PSYCHIATRIC  [ ]Alert and appropriate  [ ] Sedated	 [ ]Agitated    :  Leeroy: [ ] Yes, if yes: Date of Placement:                   [  ] No    LINES: Central Lines [ ] Yes, if yes: Date of Placement                                     [  ] No    HOSPITAL MEDICATIONS:  MEDICATIONS  (STANDING):  albuterol/ipratropium for Nebulization. 3 milliLiter(s) Nebulizer every 6 hours  aMIOdarone    Tablet 200 milliGRAM(s) Oral daily  artificial  tears Solution 1 Drop(s) Both EYES every 6 hours  aspirin  chewable 81 milliGRAM(s) Enteral Tube daily  chlorhexidine 0.12% Liquid 15 milliLiter(s) Oral Mucosa every 12 hours  chlorhexidine 4% Liquid 1 Application(s) Topical <User Schedule>  clonazePAM  Tablet 0.5 milliGRAM(s) Oral every 8 hours  dextrose 5%. 1000 milliLiter(s) (50 mL/Hr) IV Continuous <Continuous>  dextrose 50% Injectable 12.5 Gram(s) IV Push once  dextrose 50% Injectable 25 Gram(s) IV Push once  dextrose 50% Injectable 25 Gram(s) IV Push once  famotidine    Tablet 20 milliGRAM(s) Oral daily  fentaNYL   Patch  50 MICROgram(s)/Hr 1 Patch Transdermal every 72 hours  heparin   Injectable 5000 Unit(s) SubCutaneous every 8 hours  hydrALAZINE 50 milliGRAM(s) Oral every 8 hours  isosorbide   dinitrate Tablet (ISORDIL) 10 milliGRAM(s) Oral three times a day  lactobacillus acidophilus 1 Tablet(s) Oral two times a day  metoprolol tartrate 50 milliGRAM(s) Oral three times a day  QUEtiapine 50 milliGRAM(s) Oral every 6 hours  sodium chloride 7% Inhalation 4 milliLiter(s) Inhalation every 8 hours    MEDICATIONS  (PRN):  acetaminophen   Tablet .. 650 milliGRAM(s) Oral every 6 hours PRN Temp greater or equal to 38C (100.4F), Mild Pain (1 - 3)  acetylcysteine 20%  Inhalation 4 milliLiter(s) Inhalation three times a day PRN secretions  dextrose 40% Gel 15 Gram(s) Oral once PRN Blood Glucose LESS THAN 70 milliGRAM(s)/deciliter  glucagon  Injectable 1 milliGRAM(s) IntraMuscular once PRN Glucose LESS THAN 70 milligrams/deciliter  HYDROmorphone   Tablet 2 milliGRAM(s) Oral every 4 hours PRN Severe Pain (7 - 10)  HYDROmorphone  Injectable 0.5 milliGRAM(s) IV Push every 4 hours PRN breakthrough pain      LABS:                        9.8    11.41 )-----------( 323      ( 31 Aug 2020 06:43 )             32.1     08-31    144  |  111<H>  |  31<H>  ----------------------------<  143<H>  3.5   |  21<L>  |  2.04<H>    Ca    9.3      31 Aug 2020 06:43  Phos  3.4     08-31  Mg     2.1     08-31    TPro  6.6  /  Alb  2.9<L>  /  TBili  0.2  /  DBili  x   /  AST  35  /  ALT  37  /  AlkPhos  67  08-31    Arterial Blood Gas:  08-31 @ 05:43  7.36/40/231/22/99/-2.4  ABG lactate: --  Mode: VENT OFF          Radha Shearer, [facility]  [id number] Patient is a 60y old  Male who presents with a chief complaint of sah (16 Aug 2020 07:48)    HPI:  61YO male on coumadin for afib s/p cardiac arrest at work, down 25 minutes prior to ROSC. Pupils were R fixed and dilated, left fixed at the time, no gag reflex, post-CA cooling protocol was initiated down to 35 deg. Intubated and put on Nimbex drip. Also on Propofol, fentanyl, levophed. R groin minerva placed. Also put on heparin post-CA. 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 Course also c/b GIB, put on protonix drip. Prior to xfer was started on 3% at 30cc/hr. (09 Aug 2020 14:30)    Interval Events: No issues overnight.  Periods of agitation but re-directable.    REVIEW OF SYSTEMS:  [ ] Positive  [x ] All other systems negative complaining of need to urinate  [ ] Unable to assess ROS because    Vital Signs Last 24 Hrs  T(C): 36.9 (08-31-20 @ 04:00), Max: 37.1 (08-30-20 @ 11:00)  T(F): 98.4 (08-31-20 @ 04:00), Max: 98.8 (08-30-20 @ 11:00)  HR: 82 (08-31-20 @ 05:34) (63 - 108)  BP: 160/90 (08-31-20 @ 04:00) (98/70 - 188/74)  RR: 18 (08-31-20 @ 04:45) (18 - 24)  SpO2: 99% (08-31-20 @ 05:34) (94% - 100%)    PHYSICAL EXAM:    HEENT:   [x ]Tracheostomy: # 8 DCT [place]   [ ]Pupils equal  [ ]No oral lesions  [ ]Abnormal    SKIN  [x ]No Rash  [ ] Abnormal  [ ] pressure    CARDIAC  [ ]Regular  [x ]Abnormal- Irreg Ireg, + Murmur     PULMONARY  [ x]Bilateral Clear Breath Sounds, thick clear secretions   [ ]Normal Excursion  [ ]Abnormal    GI  [ x]PEG      [ ] +BS		              [x ]Soft, nondistended, nontender	  [ ]Abnormal    MUSCULOSKELETAL                                   [ ]Bedbound                 [ x]Abnormal- Moves RUE/RLE; unable to move L side s/p SAH this admission   [ ]Ambulatory/OOB to chair                           EXTREMITIES                                         [ ]Normal  [ x]Edema- trace bilateral edema                     NEUROLOGIC  [ ] Normal, non focal  [x ] Focal findings: Awake, open eyes, attempts to track w/ Lt pupil but sluggish, attempts to follow some commands is able to squeeze w/ Rt Hand     PSYCHIATRIC  [x ]Alert and appropriate  [ ] Sedated	 [ ]Agitated-    :  Umaña: [ ] Yes, if yes: Date of Placement:                   [ x ] No    LINES: Central Lines [x ] Yes, if yes: Date of Placement: RUE PICC line 8/21                                     [  ] No    HOSPITAL MEDICATIONS:  MEDICATIONS  (STANDING):  albuterol/ipratropium for Nebulization. 3 milliLiter(s) Nebulizer every 6 hours  aMIOdarone    Tablet 200 milliGRAM(s) Oral daily  artificial  tears Solution 1 Drop(s) Both EYES every 6 hours  aspirin  chewable 81 milliGRAM(s) Enteral Tube daily  chlorhexidine 0.12% Liquid 15 milliLiter(s) Oral Mucosa every 12 hours  chlorhexidine 4% Liquid 1 Application(s) Topical <User Schedule>  clonazePAM  Tablet 0.5 milliGRAM(s) Oral every 8 hours  dextrose 5%. 1000 milliLiter(s) (50 mL/Hr) IV Continuous <Continuous>  dextrose 50% Injectable 12.5 Gram(s) IV Push once  dextrose 50% Injectable 25 Gram(s) IV Push once  dextrose 50% Injectable 25 Gram(s) IV Push once  famotidine    Tablet 20 milliGRAM(s) Oral daily  fentaNYL   Patch  50 MICROgram(s)/Hr 1 Patch Transdermal every 72 hours  heparin   Injectable 5000 Unit(s) SubCutaneous every 8 hours  hydrALAZINE 50 milliGRAM(s) Oral every 8 hours  isosorbide   dinitrate Tablet (ISORDIL) 10 milliGRAM(s) Oral three times a day  lactobacillus acidophilus 1 Tablet(s) Oral two times a day  metoprolol tartrate 50 milliGRAM(s) Oral three times a day  QUEtiapine 50 milliGRAM(s) Oral every 6 hours  sodium chloride 7% Inhalation 4 milliLiter(s) Inhalation every 8 hours    MEDICATIONS  (PRN):  acetaminophen   Tablet .. 650 milliGRAM(s) Oral every 6 hours PRN Temp greater or equal to 38C (100.4F), Mild Pain (1 - 3)  acetylcysteine 20%  Inhalation 4 milliLiter(s) Inhalation three times a day PRN secretions  dextrose 40% Gel 15 Gram(s) Oral once PRN Blood Glucose LESS THAN 70 milliGRAM(s)/deciliter  glucagon  Injectable 1 milliGRAM(s) IntraMuscular once PRN Glucose LESS THAN 70 milligrams/deciliter  HYDROmorphone   Tablet 2 milliGRAM(s) Oral every 4 hours PRN Severe Pain (7 - 10)  HYDROmorphone  Injectable 0.5 milliGRAM(s) IV Push every 4 hours PRN breakthrough pain      LABS:                        9.8    11.41 )-----------( 323      ( 31 Aug 2020 06:43 )             32.1     08-31    144  |  111<H>  |  31<H>  ----------------------------<  143<H>  3.5   |  21<L>  |  2.04<H>    Ca    9.3      31 Aug 2020 06:43  Phos  3.4     08-31  Mg     2.1     08-31    TPro  6.6  /  Alb  2.9<L>  /  TBili  0.2  /  DBili  x   /  AST  35  /  ALT  37  /  AlkPhos  67  08-31    Arterial Blood Gas:  08-31 @ 05:43  7.36/40/231/22/99/-2.4  ABG lactate: --  Mode: VENT OFF      Radha Shearer, [facility]  [id number]

## 2020-08-31 NOTE — PROGRESS NOTE ADULT - PROBLEM SELECTOR PLAN 1
-Mechanical Ventilation [facility] 500/18/5/30   - Trached 8/24 with ENT  -Wean as tolerated   -Trach Care/ Pulmonary toileting   -Continue Duonebs and hypertonic saline, Mucomyst, chest PT -Mechanical Ventilation [facility] 500/18/5/30   - Trached 8/24 with ENT  -Wean as tolerated   -Trach Care/ Pulmonary toileting   -Continue Duonebs and hypertonic saline, Mucomyst, chest PT  - Trach collar 30% around the lock

## 2020-08-31 NOTE — PROGRESS NOTE ADULT - PROBLEM SELECTOR PLAN 1
- HOB elevation  - Suction PRN  - Continue trach care  -Pt may be downsized to a cuffless trach once pt tolerating TC ATC >72 hrs with no planned surgical procedures  -Reconsult ENT as needed

## 2020-08-31 NOTE — PROGRESS NOTE ADULT - ASSESSMENT
60-year-old male with a history of A-Fib on warfarin who presents with cardiac arrest at work with downtime of about 25 minutes prior to ROSC. S/p therapeutic hypothermia. 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 Now with resolved neurogenic/cardiogenic shock. Course complicated by GI bleed s/p PPI gtt, bleeding from scott s/p clot irrigation, prolonged respiratory failure s/p trach (8/24) and PEG (8/26). Currently with A-Fib with RVR and C diff colitis.  Also developed gross hematuria - Urology consulted +catheter with clot (irrigated and exchanged) suspected 2/2 traumatic catheterization. Additionally course complicated by fever and Pseudomonas PNA (s/p Rx) then developed C diff (now with resolved leukocytosis and improved stooling, now soft per report); remains on enteral Vanco    8/29: Received to RCU  8/30: Less Agitated overnight, Afebrile, WBC decreasing, CR elevated but Stable, Tolerating TC all day yesterday, will Attempt TC ATC tonight and ABG in am, F/u Bcx from 8/30. Rectal tube dc'd.  Speech eval for AAC device and will F/u. 60-year-old male with a history of A-Fib on warfarin who presents with cardiac arrest at work with downtime of about 25 minutes prior to ROSC. S/p therapeutic hypothermia. 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 Now with resolved neurogenic/cardiogenic shock. Course complicated by GI bleed s/p PPI gtt, bleeding from scott s/p clot irrigation, prolonged respiratory failure s/p trach (8/24) and PEG (8/26). Currently with A-Fib with RVR and C diff colitis.  Also developed gross hematuria - Urology consulted +catheter with clot (irrigated and exchanged) suspected 2/2 traumatic catheterization. Additionally course complicated by fever and Pseudomonas PNA (s/p Rx) then developed C diff (now with resolved leukocytosis and improved stooling, now soft per report); remains on enteral Vanco    8/29: Received to RCU  8/30: Less Agitated overnight, Afebrile, WBC decreasing, CR elevated but Stable, Tolerating TC all day yesterday, will Attempt TC ATC tonight and ABG in am, F/u Bcx from 8/30. Rectal tube dc'd.  Speech eval for AAC device and will F/u.  8/31: will attempt TC around the clock again tonight. Stopped amiodarone as per cards.  Will be cathed if cleared by ID and renal.

## 2020-08-31 NOTE — PROGRESS NOTE ADULT - ASSESSMENT
59 y/o male s/p trach POD 7. On exam, #8 DCT trach in place, sutures x4 removed and umbilical tie replaced w velcro tie. No bleeding noted. Doing well on trach collar

## 2020-08-31 NOTE — PROGRESS NOTE ADULT - PROBLEM SELECTOR PLAN 7
S/p Peg 8/26 by GI   Tolerating Enteral Feeds   GI ppx with pecid 20mg daily S/p Peg 8/26 by GI   Tolerating Enteral Feeds   GI ppx with Pepcid 20mg daily

## 2020-08-31 NOTE — PROGRESS NOTE ADULT - PROBLEM SELECTOR PLAN 4
Possible stunned myocardium due to cardiac arrest vs. SAH  - Will need cardiac cath to evaluate for ischemia  - Continue afterload/preload reduction with hydralazine and isosorbide dinitrate Possible stunned myocardium due to cardiac arrest vs. SAH  - Will need cardiac cath to evaluate for ischemia when cleared by ID and renal  - Continue afterload/preload reduction with hydralazine and isosorbide dinitrate

## 2020-08-31 NOTE — PROGRESS NOTE ADULT - SUBJECTIVE AND OBJECTIVE BOX
[facility]   [phone]  RENAL FOLLOW UP NOTE  --------------------------------------------------------------------------------  Chief Complaint:    24 hour events/subjective:    seen earlier. on trach collar non communicative     PAST HISTORY  --------------------------------------------------------------------------------  No significant changes to PMH, PSH, FHx, SHx, unless otherwise noted    ALLERGIES & MEDICATIONS  --------------------------------------------------------------------------------  Allergies    No Known Allergies    Intolerances      Standing Inpatient Medications  albuterol/ipratropium for Nebulization. 3 milliLiter(s) Nebulizer every 6 hours  artificial  tears Solution 1 Drop(s) Both EYES every 6 hours  aspirin  chewable 81 milliGRAM(s) Enteral Tube daily  chlorhexidine 0.12% Liquid 15 milliLiter(s) Oral Mucosa every 12 hours  chlorhexidine 4% Liquid 1 Application(s) Topical <User Schedule>  clonazePAM  Tablet 0.25 milliGRAM(s) Oral every 12 hours  dextrose 5%. 1000 milliLiter(s) IV Continuous <Continuous>  dextrose 50% Injectable 12.5 Gram(s) IV Push once  dextrose 50% Injectable 25 Gram(s) IV Push once  dextrose 50% Injectable 25 Gram(s) IV Push once  famotidine    Tablet 20 milliGRAM(s) Oral daily  fentaNYL   Patch  50 MICROgram(s)/Hr 1 Patch Transdermal every 72 hours  heparin   Injectable 5000 Unit(s) SubCutaneous every 8 hours  hydrALAZINE 50 milliGRAM(s) Oral three times a day  isosorbide   dinitrate Tablet (ISORDIL) 20 milliGRAM(s) Enteral Tube <User Schedule>  lactobacillus acidophilus 1 Tablet(s) Oral two times a day  metoprolol tartrate 50 milliGRAM(s) Oral three times a day  QUEtiapine 50 milliGRAM(s) Oral every 6 hours  sodium chloride 7% Inhalation 4 milliLiter(s) Inhalation every 8 hours    PRN Inpatient Medications  acetaminophen   Tablet .. 650 milliGRAM(s) Oral every 6 hours PRN  acetylcysteine 20%  Inhalation 4 milliLiter(s) Inhalation three times a day PRN  dextrose 40% Gel 15 Gram(s) Oral once PRN  glucagon  Injectable 1 milliGRAM(s) IntraMuscular once PRN  HYDROmorphone   Tablet 2 milliGRAM(s) Oral every 4 hours PRN  HYDROmorphone  Injectable 0.5 milliGRAM(s) IV Push every 4 hours PRN      REVIEW OF SYSTEMS  --------------------------------------------------------------------------------      VITALS/PHYSICAL EXAM  --------------------------------------------------------------------------------  T(C): 36.6 (08-31-20 @ 19:21), Max: 37.2 (08-31-20 @ 09:00)  HR: 83 (08-31-20 @ 21:25) (68 - 102)  BP: 169/91 (08-31-20 @ 19:21) (150/80 - 197/121)  RR: 21 (08-31-20 @ 20:35) (18 - 24)  SpO2: 99% (08-31-20 @ 21:25) (96% - 100%)  Wt(kg): --        08-30-20 @ 07:01  -  08-31-20 @ 07:00  --------------------------------------------------------  IN: 1800 mL / OUT: 1600 mL / NET: 200 mL    08-31-20 @ 07:01  -  08-31-20 @ 22:21  --------------------------------------------------------  IN: 0 mL / OUT: 350 mL / NET: -350 mL      Physical Exam:  	    	Gen: traech   	no jvd    	Pulm: decrease bs  no rales  + diffuse ronchi -  wheezing  	CV: RRR, S1S2; no rub  	Abd: +BS, soft, nontender/nondistended + peg   	UE: Warm, no cyanosis  no clubbing,  no edema  	LE: Warm, no cyanosis  no clubbing, no edema  	Neuro: vented   	Skin: Warm, no decrease skin turgor       LABS/STUDIES  --------------------------------------------------------------------------------              9.8    11.41 >-----------<  323      [08-31-20 @ 06:43]              32.1     144  |  111  |  31  ----------------------------<  143      [08-31-20 @ 06:43]  3.5   |  21  |  2.04        Ca     9.3     [08-31-20 @ 06:43]      Mg     2.1     [08-31-20 @ 06:43]      Phos  3.4     [08-31-20 @ 06:43]    TPro  6.6  /  Alb  2.9  /  TBili  0.2  /  DBili  x   /  AST  35  /  ALT  37  /  AlkPhos  67  [08-31-20 @ 06:43]          Creatinine Trend:  SCr 2.04 [08-31 @ 06:43]  SCr 2.26 [08-30 @ 06:35]  SCr 2.28 [08-28 @ 19:47]  SCr 2.37 [08-27 @ 10:28]  SCr 2.31 [08-26 @ 23:11]              Urinalysis - [08-15-20 @ 12:30]      Color Yellow / Appearance Clear / SG 1.028 / pH 6.0      Gluc Negative / Ketone Negative  / Bili Negative / Urobili Negative       Blood Negative / Protein 100 / Leuk Est Negative / Nitrite Negative      RBC  / WBC  / Hyaline  / Gran  / Sq Epi  / Non Sq Epi  / Bacteria     Urine Creatinine 148      [08-25-20 @ 11:51]  Urine Sodium 70      [08-25-20 @ 11:51]  Urine Chloride 70      [08-25-20 @ 11:51]    TSH 1.91      [08-09-20 @ 17:20]  Lipid: chol 148, , HDL 37, LDL 68      [08-09-20 @ 17:21]

## 2020-08-31 NOTE — CHART NOTE - NSCHARTNOTEFT_GEN_A_CORE
Nutrition Chart Note.  Pt seen for: nutrition follow-up on RCU.    Source: EMR, Team; Pt trached/unable to speak    Chart reviewed, events noted. Pt is a 61 yo M with PMH: [place]ka on coumadin s/p cardiac arrest at work. Downtime 25 minutes prior to ROSC. 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 Was intubated at OSH, transferred to [facility] for further care. Course c/b GIB, put on Protonix drip. C.Diff positive; continues on antibiotics as ordered. Pt s/p angio 8/20; negative. S/P tracheostomy 8/24. C/b agitation, requiring propofol and fentanyl gtts (both discontinued). S/p PEG 8/26. Pt currently on TC 30% around the clock    Diet : NPO with Tube Feeds via NGT   Enteral /Parenteral Nutrition: Vital AF goal rate 70mL/hr x 24hrs. Provides total volume 1680mL formula, 2016kcals, 126g protein, 1363mL free H2O  - Continues to receive DanActive twice daily.     Per flowsheets, Pt has received >75% EN provisions in the last 3 days; EN held 8/26, resumed 8/27 at 10mL/hr and titrated back up to goal rate (as above) - goal reached 8/28    Nutrition Events:  - Rectal tube discontinued 8/30  - S/p PEG 8/26  - Receiving lactobacillus acidophilus   - Propofol discontinued 8/13    GI: c fecal incontinence, last BM noted 8/30 - no bowel regimen ordered. Noted c Cdiff- Completed oral Vanco 8/29   - abdominal distention noted in flowsheets today      Current Weight: No additional weights indicated in chart. Will continue to monitor/trend weight status.  Weight (kg): 110.2 (08-12), 110.1 (08-26)  % Weight Change    Pertinent Medications: MEDICATIONS  (STANDING):  albuterol/ipratropium for Nebulization. 3 milliLiter(s) Nebulizer every 6 hours  artificial  tears Solution 1 Drop(s) Both EYES every 6 hours  aspirin  chewable 81 milliGRAM(s) Enteral Tube daily  chlorhexidine 0.12% Liquid 15 milliLiter(s) Oral Mucosa every 12 hours  chlorhexidine 4% Liquid 1 Application(s) Topical <User Schedule>  clonazePAM  Tablet 0.25 milliGRAM(s) Oral every 12 hours  dextrose 5%. 1000 milliLiter(s) (50 mL/Hr) IV Continuous <Continuous>  dextrose 50% Injectable 12.5 Gram(s) IV Push once  dextrose 50% Injectable 25 Gram(s) IV Push once  dextrose 50% Injectable 25 Gram(s) IV Push once  famotidine    Tablet 20 milliGRAM(s) Oral daily  fentaNYL   Patch  50 MICROgram(s)/Hr 1 Patch Transdermal every 72 hours  heparin   Injectable 5000 Unit(s) SubCutaneous every 8 hours  hydrALAZINE 25 milliGRAM(s) Oral three times a day  isosorbide   dinitrate Tablet (ISORDIL) 20 milliGRAM(s) Enteral Tube every 8 hours  lactobacillus acidophilus 1 Tablet(s) Oral two times a day  metoprolol tartrate 50 milliGRAM(s) Oral three times a day  QUEtiapine 50 milliGRAM(s) Oral every 6 hours  sodium chloride 7% Inhalation 4 milliLiter(s) Inhalation every 8 hours    MEDICATIONS  (PRN):  acetaminophen   Tablet .. 650 milliGRAM(s) Oral every 6 hours PRN Temp greater or equal to 38C (100.4F), Mild Pain (1 - 3)  acetylcysteine 20%  Inhalation 4 milliLiter(s) Inhalation three times a day PRN secretions  dextrose 40% Gel 15 Gram(s) Oral once PRN Blood Glucose LESS THAN 70 milliGRAM(s)/deciliter  glucagon  Injectable 1 milliGRAM(s) IntraMuscular once PRN Glucose LESS THAN 70 milligrams/deciliter  HYDROmorphone   Tablet 2 milliGRAM(s) Oral every 4 hours PRN Severe Pain (7 - 10)  HYDROmorphone  Injectable 0.5 milliGRAM(s) IV Push every 4 hours PRN breakthrough pain    Pertinent Labs:  08-31 Na144 mmol/L Glu 143 mg/dL<H> K+ 3.5 mmol/L Cr  2.04 mg/dL<H> BUN 31 mg/dL<H> 08-31 Phos 3.4 mg/dL 08-31 Alb 2.9 g/dL<L> 08-09 Chol 148 mg/dL LDL 68 mg/dL HDL 37 mg/dL<L> Trig 218 mg/dL<H>    CAPILLARY BLOOD GLUCOSE  POCT Blood Glucose.: 159 mg/dL (31 Aug 2020 11:36)  POCT Blood Glucose.: 101 mg/dL (31 Aug 2020 05:14)  POCT Blood Glucose.: 124 mg/dL (30 Aug 2020 23:27)  POCT Blood Glucose.: 133 mg/dL (30 Aug 2020 18:37)    Skin per nursing documentation: no pressure injuries noted  Edema: none    Estimated Needs:   based on upper IBW 76.8kg:   Energy: (25-30kcal/kg): 1920-2304kcal  Protein: (1.4-1.6g protein/kg): 108-123g protein      Previous Nutrition Diagnosis: increased nutrient needs  Nutrition Diagnosis is: ongoing, with provision of EN feeds         New Nutrition Diagnosis: N/A      Interventions:   1. Recommend to continue feeds: Vital AF at GOAL rate 70ml/hr x 24 hrs. To provide (at goal rate, based on upper IBW 76.8kg): 1680ml, 2016kcal (26kcal/kg) and 126g protein (1.6g protein/kg). Continue Cuong Active twice daily.   2. Monitor GI tolerance. RD to remain available to adjust EN formulary, volume/rate PRN.      Monitoring and Evaluation:   Continue to monitor Nutritional intake, Tolerance to diet prescription, weights, labs, skin integrity  RD remains available upon request and will follow up per protocol Nutrition Chart Note.  Pt seen for: nutrition follow-up on RCU.    Source: EMR, Team; Pt trached/unable to speak    Chart reviewed, events noted. Pt is a 61 yo M with PMH: [place] on coumadin s/p cardiac arrest at work. Downtime 25 minutes prior to ROSC. 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 Was intubated at OSH, transferred to [facility] for further care. Course c/b GIB, put on Protonix drip. C.Diff positive; continues on antibiotics as ordered. Pt s/p angio 8/20; negative. S/P tracheostomy 8/24. C/b agitation, requiring propofol and fentanyl gtts (both discontinued). S/p PEG 8/26. Pt currently on TC 30% around the clock since 8/30    Diet : NPO with Tube Feeds via PEG  Enteral /Parenteral Nutrition: Vital AF goal rate 70mL/hr x 24hrs. Provides total volume 1680mL formula, 2016kcals, 126g protein, 1363mL free H2O  - Continues to receive DanActive twice daily.     Per flowsheets, Pt has received >75% EN provisions in the last 3 days; EN held 8/26, resumed 8/27 at 10mL/hr and titrated back up to goal rate (as above) - goal reached 8/28; Noted to be tolerating EN feeds    Nutrition Events:  - Rectal tube discontinued 8/31  - S/p PEG 8/26  - Receiving lactobacillus acidophilus   - Propofol discontinued 8/13    GI: c fecal incontinence, last BM noted 8/30 - no bowel regimen ordered. Noted c Cdiff- Completed oral Vanco 8/29   - abdominal distention noted in flowsheets today      Current Weight: No additional weights indicated in chart. Will continue to monitor/trend weight status.  Weight (kg): 110.2 (08-12), 110.1 (08-26)  % Weight Change    Pertinent Medications: MEDICATIONS  (STANDING):  albuterol/ipratropium for Nebulization. 3 milliLiter(s) Nebulizer every 6 hours  artificial  tears Solution 1 Drop(s) Both EYES every 6 hours  aspirin  chewable 81 milliGRAM(s) Enteral Tube daily  chlorhexidine 0.12% Liquid 15 milliLiter(s) Oral Mucosa every 12 hours  chlorhexidine 4% Liquid 1 Application(s) Topical <User Schedule>  clonazePAM  Tablet 0.25 milliGRAM(s) Oral every 12 hours  dextrose 5%. 1000 milliLiter(s) (50 mL/Hr) IV Continuous <Continuous>  dextrose 50% Injectable 12.5 Gram(s) IV Push once  dextrose 50% Injectable 25 Gram(s) IV Push once  dextrose 50% Injectable 25 Gram(s) IV Push once  famotidine    Tablet 20 milliGRAM(s) Oral daily  fentaNYL   Patch  50 MICROgram(s)/Hr 1 Patch Transdermal every 72 hours  heparin   Injectable 5000 Unit(s) SubCutaneous every 8 hours  hydrALAZINE 25 milliGRAM(s) Oral three times a day  isosorbide   dinitrate Tablet (ISORDIL) 20 milliGRAM(s) Enteral Tube every 8 hours  lactobacillus acidophilus 1 Tablet(s) Oral two times a day  metoprolol tartrate 50 milliGRAM(s) Oral three times a day  QUEtiapine 50 milliGRAM(s) Oral every 6 hours  sodium chloride 7% Inhalation 4 milliLiter(s) Inhalation every 8 hours    MEDICATIONS  (PRN):  acetaminophen   Tablet .. 650 milliGRAM(s) Oral every 6 hours PRN Temp greater or equal to 38C (100.4F), Mild Pain (1 - 3)  acetylcysteine 20%  Inhalation 4 milliLiter(s) Inhalation three times a day PRN secretions  dextrose 40% Gel 15 Gram(s) Oral once PRN Blood Glucose LESS THAN 70 milliGRAM(s)/deciliter  glucagon  Injectable 1 milliGRAM(s) IntraMuscular once PRN Glucose LESS THAN 70 milligrams/deciliter  HYDROmorphone   Tablet 2 milliGRAM(s) Oral every 4 hours PRN Severe Pain (7 - 10)  HYDROmorphone  Injectable 0.5 milliGRAM(s) IV Push every 4 hours PRN breakthrough pain    Pertinent Labs:  08-31 Na144 mmol/L Glu 143 mg/dL<H> K+ 3.5 mmol/L Cr  2.04 mg/dL<H> BUN 31 mg/dL<H> 08-31 Phos 3.4 mg/dL 08-31 Alb 2.9 g/dL<L> 08-09 Chol 148 mg/dL LDL 68 mg/dL HDL 37 mg/dL<L> Trig 218 mg/dL<H>    CAPILLARY BLOOD GLUCOSE  POCT Blood Glucose.: 159 mg/dL (31 Aug 2020 11:36)  POCT Blood Glucose.: 101 mg/dL (31 Aug 2020 05:14)  POCT Blood Glucose.: 124 mg/dL (30 Aug 2020 23:27)  POCT Blood Glucose.: 133 mg/dL (30 Aug 2020 18:37)    Skin per nursing documentation: no pressure injuries noted  Edema: none    Estimated Needs:   based on upper IBW 76.8kg:   Energy: (25-30kcal/kg): 1920-2304kcal  Protein: (1.4-1.6g protein/kg): 108-123g protein      Previous Nutrition Diagnosis: increased nutrient needs  Nutrition Diagnosis is: ongoing, with provision of EN feeds         New Nutrition Diagnosis: N/A      Interventions:   1. Recommend to continue feeds: Vital AF at GOAL rate 70ml/hr x 24 hrs. To provide (at goal rate, based on upper IBW 76.8kg): 1680ml, 2016kcal (26kcal/kg) and 126g protein (1.6g protein/kg). Continue Cuong Active twice daily.   2. Monitor GI tolerance. RD to remain available to adjust EN formulary, volume/rate PRN.      Monitoring and Evaluation:   Continue to monitor Nutritional intake, Tolerance to diet prescription, weights, labs, skin integrity  RD remains available upon request and will follow up per protocol Nutrition Chart Note.  Pt seen for: nutrition follow-up on RCU.    Source: EMR, Team; Pt trached/unable to speak    Chart reviewed, events noted. Pt is a 61 yo M with PMH: [place] on coumadin s/p cardiac arrest at work. Downtime 25 minutes prior to ROSC. 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 Was intubated at OSH, transferred to [facility] for further care. Course c/b GIB, put on Protonix drip. C.Diff positive; continues on antibiotics as ordered. Pt s/p angio 8/20; negative. S/P tracheostomy 8/24. C/b agitation, requiring propofol and fentanyl gtts (both discontinued). S/p PEG 8/26. Pt currently on TC 30% around the clock since 8/30    Diet : NPO with Tube Feeds via PEG  Enteral /Parenteral Nutrition: Vital AF goal rate 70mL/hr x 24hrs. Provides total volume 1680mL formula, 2016kcals, 126g protein, 1363mL free H2O  - Continues to receive DanActive twice daily.     Per flowsheets, Pt has received >75% EN provisions in the last 3 days; EN held 8/26, resumed 8/27 at 10mL/hr and titrated back up to goal rate (as above) - goal reached 8/28; Noted to be tolerating EN PEG feeds at goal    Nutrition Events:  - Rectal tube discontinued 8/31  - S/p PEG 8/26  - Receiving lactobacillus acidophilus   - Propofol discontinued 8/13    GI: c fecal incontinence, last BM noted 8/30 - no bowel regimen ordered. Noted c Cdiff- Completed oral Vanco 8/29       Current Weight: No additional weights indicated in chart. Will continue to monitor/trend weight status.  Weight (kg): 110.2 (08-12), 110.1 (08-26)  % Weight Change    Pertinent Medications: MEDICATIONS  (STANDING):  albuterol/ipratropium for Nebulization. 3 milliLiter(s) Nebulizer every 6 hours  artificial  tears Solution 1 Drop(s) Both EYES every 6 hours  aspirin  chewable 81 milliGRAM(s) Enteral Tube daily  chlorhexidine 0.12% Liquid 15 milliLiter(s) Oral Mucosa every 12 hours  chlorhexidine 4% Liquid 1 Application(s) Topical <User Schedule>  clonazePAM  Tablet 0.25 milliGRAM(s) Oral every 12 hours  dextrose 5%. 1000 milliLiter(s) (50 mL/Hr) IV Continuous <Continuous>  dextrose 50% Injectable 12.5 Gram(s) IV Push once  dextrose 50% Injectable 25 Gram(s) IV Push once  dextrose 50% Injectable 25 Gram(s) IV Push once  famotidine    Tablet 20 milliGRAM(s) Oral daily  fentaNYL   Patch  50 MICROgram(s)/Hr 1 Patch Transdermal every 72 hours  heparin   Injectable 5000 Unit(s) SubCutaneous every 8 hours  hydrALAZINE 25 milliGRAM(s) Oral three times a day  isosorbide   dinitrate Tablet (ISORDIL) 20 milliGRAM(s) Enteral Tube every 8 hours  lactobacillus acidophilus 1 Tablet(s) Oral two times a day  metoprolol tartrate 50 milliGRAM(s) Oral three times a day  QUEtiapine 50 milliGRAM(s) Oral every 6 hours  sodium chloride 7% Inhalation 4 milliLiter(s) Inhalation every 8 hours    MEDICATIONS  (PRN):  acetaminophen   Tablet .. 650 milliGRAM(s) Oral every 6 hours PRN Temp greater or equal to 38C (100.4F), Mild Pain (1 - 3)  acetylcysteine 20%  Inhalation 4 milliLiter(s) Inhalation three times a day PRN secretions  dextrose 40% Gel 15 Gram(s) Oral once PRN Blood Glucose LESS THAN 70 milliGRAM(s)/deciliter  glucagon  Injectable 1 milliGRAM(s) IntraMuscular once PRN Glucose LESS THAN 70 milligrams/deciliter  HYDROmorphone   Tablet 2 milliGRAM(s) Oral every 4 hours PRN Severe Pain (7 - 10)  HYDROmorphone  Injectable 0.5 milliGRAM(s) IV Push every 4 hours PRN breakthrough pain    Pertinent Labs:  08-31 Na144 mmol/L Glu 143 mg/dL<H> K+ 3.5 mmol/L Cr  2.04 mg/dL<H> BUN 31 mg/dL<H> 08-31 Phos 3.4 mg/dL 08-31 Alb 2.9 g/dL<L> 08-09 Chol 148 mg/dL LDL 68 mg/dL HDL 37 mg/dL<L> Trig 218 mg/dL<H>    CAPILLARY BLOOD GLUCOSE  POCT Blood Glucose.: 159 mg/dL (31 Aug 2020 11:36)  POCT Blood Glucose.: 101 mg/dL (31 Aug 2020 05:14)  POCT Blood Glucose.: 124 mg/dL (30 Aug 2020 23:27)  POCT Blood Glucose.: 133 mg/dL (30 Aug 2020 18:37)    Skin per nursing documentation: no pressure injuries noted  Edema: none    Estimated Needs:   based on upper IBW 76.8kg:   Energy: (25-30kcal/kg): 1920-2304kcal  Protein: (1.4-1.6g protein/kg): 108-123g protein      Previous Nutrition Diagnosis: increased nutrient needs  Nutrition Diagnosis is: ongoing, with provision of EN feeds         New Nutrition Diagnosis: N/A      Interventions:   1. Continue Vital AF at goal rate 70ml/hr x 24 hrs. Provides total volume 1680mL formula, 2016kcals, 126g protein, 1363mL free H2O (meets 26kcal/kg & 1.6g protein/kg, based on upper IBW 76.8kg)  2. Continue Cuong Active twice daily.   3. Monitor GI tolerance. RD to remain available to adjust EN formulary, volume/rate PRN.      Monitoring and Evaluation:   Continue to monitor Nutritional intake, Tolerance to diet prescription, weights, labs, skin integrity  RD remains available upon request and will follow up per protocol    Henny Strange, MS, RD, CDN  pager #[phone]

## 2020-08-31 NOTE — PROGRESS NOTE ADULT - SUBJECTIVE AND OBJECTIVE BOX
Subjective: Patient seen and examined. No new events except as noted.     REVIEW OF SYSTEMS:  Unable to obtain     MEDICATIONS:  MEDICATIONS  (STANDING):  albuterol/ipratropium for Nebulization. 3 milliLiter(s) Nebulizer every 6 hours  artificial  tears Solution 1 Drop(s) Both EYES every 6 hours  aspirin  chewable 81 milliGRAM(s) Enteral Tube daily  chlorhexidine 0.12% Liquid 15 milliLiter(s) Oral Mucosa every 12 hours  chlorhexidine 4% Liquid 1 Application(s) Topical <User Schedule>  clonazePAM  Tablet 0.25 milliGRAM(s) Oral every 12 hours  dextrose 5%. 1000 milliLiter(s) (50 mL/Hr) IV Continuous <Continuous>  dextrose 50% Injectable 12.5 Gram(s) IV Push once  dextrose 50% Injectable 25 Gram(s) IV Push once  dextrose 50% Injectable 25 Gram(s) IV Push once  famotidine    Tablet 20 milliGRAM(s) Oral daily  fentaNYL   Patch  50 MICROgram(s)/Hr 1 Patch Transdermal every 72 hours  heparin   Injectable 5000 Unit(s) SubCutaneous every 8 hours  hydrALAZINE 50 milliGRAM(s) Oral every 8 hours  isosorbide   dinitrate Tablet (ISORDIL) 10 milliGRAM(s) Oral three times a day  lactobacillus acidophilus 1 Tablet(s) Oral two times a day  metoprolol tartrate 50 milliGRAM(s) Oral three times a day  QUEtiapine 50 milliGRAM(s) Oral every 6 hours  sodium chloride 7% Inhalation 4 milliLiter(s) Inhalation every 8 hours      PHYSICAL EXAM:  T(C): 37.2 (08-31-20 @ 09:00), Max: 37.2 (08-31-20 @ 09:00)  HR: 82 (08-31-20 @ 09:00) (68 - 108)  BP: 180/95 (08-31-20 @ 09:00) (150/80 - 188/74)  RR: 20 (08-31-20 @ 09:00) (18 - 24)  SpO2: 97% (08-31-20 @ 09:00) (94% - 100%)  Wt(kg): --  I&O's Summary    30 Aug 2020 07:01  -  31 Aug 2020 07:00  --------------------------------------------------------  IN: 1800 mL / OUT: 1600 mL / NET: 200 mL          Appearance: sleepy , +trach   HEENT:   Dry  oral mucosa,  Lymphatic: No lymphadenopathy  Cardiovascular: Normal S1 S2, No JVD, No murmurs, No edema  Respiratory: Ventilated   Psychiatry: A & O x 0  Gastrointestinal:  Soft, Non-tender, +PEG   Skin: No rashes, No ecchymoses, No cyanosis	  Mental status- No acute distress, EO to stim  -CN- Pupils R 4mm NR, L 2mm sluggish, EOMI, tongue midline, face symmetric  +cough/gag  C briskly, two fingers/thumbs up on RUE, distally AG, wiggles toes on RLE, no      LABS:    CARDIAC MARKERS:                                9.8    11.41 )-----------( 323      ( 31 Aug 2020 06:43 )             32.1     08-31    144  |  111<H>  |  31<H>  ----------------------------<  143<H>  3.5   |  21<L>  |  2.04<H>    Ca    9.3      31 Aug 2020 06:43  Phos  3.4     08-31  Mg     2.1     08-31    TPro  6.6  /  Alb  2.9<L>  /  TBili  0.2  /  DBili  x   /  AST  35  /  ALT  37  /  AlkPhos  67  08-31    proBNP:   Lipid Profile:   HgA1c:   TSH:             TELEMETRY: 	    ECG:  	  RADIOLOGY:   DIAGNOSTIC TESTING:  [ ] Echocardiogram:  [ ]  Catheterization:  [ ] Stress Test:    OTHER:

## 2020-08-31 NOTE — PROGRESS NOTE ADULT - ATTENDING COMMENTS
60-year-old male with a history of A-Fib on warfarin who presents with cardiac arrest at work with downtime of about 25 minutes prior to ROSC. S/p therapeutic hypothermia. 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 Now with resolved neurogenic/cardiogenic shock. Course complicated by GI bleed s/p PPI gtt, bleeding from [place]ott s/p clot irrigation, prolonged respiratory failure s/p trach (8/24) and PEG (8/26). Currently with c. diff colitis    1. SAH with negative angiogram:  - Moving RUE/RLE, but without movement on left  - No further neurosurgical intervention at this time    2. Acute Encephalopathy:  - Continue fentanyl patch, clonazepam, and quetiapine  - Hydromorphone and tramadol prn  - Will start to titrate down medications as able while maintaining patient safety    3. Cardiovascular - patient with known Afib and new acute systolic heart failure in setting of cardiac arrest  3. Acute systolic heart failure:  - Possible stunned myocardium due to cardiac arrest vs. SAH  - Will need eventual cardiac cath to evaluate for ischemia  - Continue afterload/preload reduction with hydralazine and isosorbide dinitrate  - For afib will contine amiodarone 200 mg daily and metoprolol  - Hold off on therapeutic a/c given SAH    4. Acute hypoxemic respiratory failure s/p tracheostomy:  - Patient tolerated TC overnight - will aim for another 24 hours off mechanical ventilation and then will D/C ventilator  - repeat ABG in AM  - Continue Duonebs and hypertonic saline, chest PT  - Patient not yet able to tolerate finger occlusion of tracheostomy    5. Oropharyngeal dysphagia:  - PEG feeds as tolerated  - Famotidine for GI ppx    6. C diff colitis:  - Continue oral vancomycin  - Monitor leukocytosis  - plan to D/C rectal tube    7. Pseudomonas aeruginosa pneumonia/colonization:  - S/p prolonged course of cefepime until 8/25  - Hold off on systemic antibiotics at this time  - If develops increased secretions, may benefit from inhaled tobramycin    8. JALEN, possible CKD:  - Strict I/O's, trend kidney function and lytes  - renal function slowly improving    9. Dispo:  - Full code, overall prognosis guarded depending on neurological recovery  - PT/OT eval. If encephalopathy improves and he can cooperate, may be a candidate for acute rehab eval  - Will need to clarify family situation - most information has been discussed with son Rufino 60-year-old male with a history of A-Fib on warfarin who presents with cardiac arrest at work with downtime of about 25 minutes prior to ROSC. S/p therapeutic hypothermia. 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 Now with resolved neurogenic/cardiogenic shock. Course complicated by GI bleed s/p PPI gtt, bleeding from [place] s/p clot irrigation, prolonged respiratory failure s/p trach (8/24) and PEG (8/26). Currently with c. diff colitis    1. SAH with negative angiogram:  - Moving RUE/RLE, but without movement on left  - No further neurosurgical intervention at this time    2. Acute Encephalopathy:  - Continue fentanyl patch, clonazepam, and quetiapine  - Hydromorphone and tramadol prn  - Will start to titrate down medications as able while maintaining patient safety    3. Cardiovascular - patient with known Afib and new acute systolic heart failure in setting of cardiac arrest  3. Acute systolic heart failure:  - Possible stunned myocardium due to cardiac arrest vs. SAH  - Will need eventual cardiac cath to evaluate for ischemia  - Continue afterload/preload reduction with hydralazine and isosorbide dinitrate  - For afib will continue amiodarone 200 mg daily and metoprolol  - Hold off on therapeutic a/c given SAH    4. Acute hypoxemic respiratory failure s/p tracheostomy:  - Patient tolerated TC overnight - will aim for another 24 hours off mechanical ventilation and then will D/C ventilator  - repeat ABG in AM  - Continue Duo nebs and hypertonic saline, chest PT  - Patient not yet able to tolerate finger occlusion of tracheostomy    5. Oropharyngeal dysphagia:  - PEG feeds as tolerated  - Famotidine for GI ppx    6. C diff colitis:  - Continue oral vancomycin  - Monitor leukocytosis  - plan to D/C rectal tube    7. Pseudomonas aeruginosa pneumonia/colonization:  - S/p prolonged course of cefepime until 8/25  - Hold off on systemic antibiotics at this time  - If develops increased secretions, may benefit from inhaled tobramycin    8. JALEN, possible CKD:  - Strict I/O's, trend kidney function and lytes  - renal function slowly improving    9. Dispo:  - Full code, overall prognosis guarded depending on neurological recovery  - PT/OT eval. If encephalopathy improves and he can cooperate, may be a candidate for acute rehab eval  - Will need to clarify family situation - most information has been discussed with son Rufino

## 2020-09-01 LAB
-  AMIKACIN: SIGNIFICANT CHANGE UP
-  AZTREONAM: SIGNIFICANT CHANGE UP
-  CEFEPIME: SIGNIFICANT CHANGE UP
-  CEFTAZIDIME: SIGNIFICANT CHANGE UP
-  CIPROFLOXACIN: SIGNIFICANT CHANGE UP
-  GENTAMICIN: SIGNIFICANT CHANGE UP
-  IMIPENEM: SIGNIFICANT CHANGE UP
-  LEVOFLOXACIN: SIGNIFICANT CHANGE UP
-  MEROPENEM: SIGNIFICANT CHANGE UP
-  PIPERACILLIN/TAZOBACTAM: SIGNIFICANT CHANGE UP
-  TOBRAMYCIN: SIGNIFICANT CHANGE UP
ALBUMIN SERPL ELPH-MCNC: 3.5 G/DL — SIGNIFICANT CHANGE UP (ref 3.3–5)
ALP SERPL-CCNC: 70 U/L — SIGNIFICANT CHANGE UP (ref 40–120)
ALT FLD-CCNC: 32 U/L — SIGNIFICANT CHANGE UP (ref 10–45)
ANION GAP SERPL CALC-SCNC: 13 MMOL/L — SIGNIFICANT CHANGE UP (ref 5–17)
AST SERPL-CCNC: 34 U/L — SIGNIFICANT CHANGE UP (ref 10–40)
BASOPHILS # BLD AUTO: 0.08 K/UL — SIGNIFICANT CHANGE UP (ref 0–0.2)
BASOPHILS NFR BLD AUTO: 0.6 % — SIGNIFICANT CHANGE UP (ref 0–2)
BILIRUB SERPL-MCNC: 0.2 MG/DL — SIGNIFICANT CHANGE UP (ref 0.2–1.2)
BUN SERPL-MCNC: 31 MG/DL — HIGH (ref 7–23)
CALCIUM SERPL-MCNC: 9.7 MG/DL — SIGNIFICANT CHANGE UP (ref 8.4–10.5)
CHLORIDE SERPL-SCNC: 110 MMOL/L — HIGH (ref 96–108)
CO2 SERPL-SCNC: 22 MMOL/L — SIGNIFICANT CHANGE UP (ref 22–31)
CREAT SERPL-MCNC: 2.07 MG/DL — HIGH (ref 0.5–1.3)
EOSINOPHIL # BLD AUTO: 0.3 K/UL — SIGNIFICANT CHANGE UP (ref 0–0.5)
EOSINOPHIL NFR BLD AUTO: 2.1 % — SIGNIFICANT CHANGE UP (ref 0–6)
GAS PNL BLDA: SIGNIFICANT CHANGE UP
GLUCOSE BLDC GLUCOMTR-MCNC: 115 MG/DL — HIGH (ref 70–99)
GLUCOSE BLDC GLUCOMTR-MCNC: 120 MG/DL — HIGH (ref 70–99)
GLUCOSE BLDC GLUCOMTR-MCNC: 121 MG/DL — HIGH (ref 70–99)
GLUCOSE BLDC GLUCOMTR-MCNC: 135 MG/DL — HIGH (ref 70–99)
GLUCOSE SERPL-MCNC: 141 MG/DL — HIGH (ref 70–99)
HCT VFR BLD CALC: 32.4 % — LOW (ref 39–50)
HGB BLD-MCNC: 10 G/DL — LOW (ref 13–17)
IMM GRANULOCYTES NFR BLD AUTO: 1.8 % — HIGH (ref 0–1.5)
LYMPHOCYTES # BLD AUTO: 1.08 K/UL — SIGNIFICANT CHANGE UP (ref 1–3.3)
LYMPHOCYTES # BLD AUTO: 7.7 % — LOW (ref 13–44)
MAGNESIUM SERPL-MCNC: 2.4 MG/DL — SIGNIFICANT CHANGE UP (ref 1.6–2.6)
MCHC RBC-ENTMCNC: 27 PG — SIGNIFICANT CHANGE UP (ref 27–34)
MCHC RBC-ENTMCNC: 30.9 GM/DL — LOW (ref 32–36)
MCV RBC AUTO: 87.6 FL — SIGNIFICANT CHANGE UP (ref 80–100)
METHOD TYPE: SIGNIFICANT CHANGE UP
MONOCYTES # BLD AUTO: 0.73 K/UL — SIGNIFICANT CHANGE UP (ref 0–0.9)
MONOCYTES NFR BLD AUTO: 5.2 % — SIGNIFICANT CHANGE UP (ref 2–14)
NEUTROPHILS # BLD AUTO: 11.61 K/UL — HIGH (ref 1.8–7.4)
NEUTROPHILS NFR BLD AUTO: 82.6 % — HIGH (ref 43–77)
NRBC # BLD: 0 /100 WBCS — SIGNIFICANT CHANGE UP (ref 0–0)
PHOSPHATE SERPL-MCNC: 4.4 MG/DL — SIGNIFICANT CHANGE UP (ref 2.5–4.5)
PLATELET # BLD AUTO: 348 K/UL — SIGNIFICANT CHANGE UP (ref 150–400)
POTASSIUM SERPL-MCNC: 3.8 MMOL/L — SIGNIFICANT CHANGE UP (ref 3.5–5.3)
POTASSIUM SERPL-SCNC: 3.8 MMOL/L — SIGNIFICANT CHANGE UP (ref 3.5–5.3)
PROT SERPL-MCNC: 7.3 G/DL — SIGNIFICANT CHANGE UP (ref 6–8.3)
RBC # BLD: 3.7 M/UL — LOW (ref 4.2–5.8)
RBC # FLD: 15.3 % — HIGH (ref 10.3–14.5)
SODIUM SERPL-SCNC: 145 MMOL/L — SIGNIFICANT CHANGE UP (ref 135–145)
WBC # BLD: 14.06 K/UL — HIGH (ref 3.8–10.5)
WBC # FLD AUTO: 14.06 K/UL — HIGH (ref 3.8–10.5)

## 2020-09-01 PROCEDURE — 70450 CT HEAD/BRAIN W/O DYE: CPT | Mod: 26

## 2020-09-01 PROCEDURE — 99233 SBSQ HOSP IP/OBS HIGH 50: CPT | Mod: GC

## 2020-09-01 PROCEDURE — 93010 ELECTROCARDIOGRAM REPORT: CPT

## 2020-09-01 RX ORDER — METOPROLOL TARTRATE 50 MG
5 TABLET ORAL ONCE
Refills: 0 | Status: DISCONTINUED | OUTPATIENT
Start: 2020-09-01 | End: 2020-09-01

## 2020-09-01 RX ORDER — SODIUM CHLORIDE 9 MG/ML
4 INJECTION INTRAMUSCULAR; INTRAVENOUS; SUBCUTANEOUS EVERY 12 HOURS
Refills: 0 | Status: DISCONTINUED | OUTPATIENT
Start: 2020-09-01 | End: 2020-09-10

## 2020-09-01 RX ADMIN — Medication 1 DROP(S): at 12:06

## 2020-09-01 RX ADMIN — Medication 50 MILLIGRAM(S): at 21:57

## 2020-09-01 RX ADMIN — QUETIAPINE FUMARATE 50 MILLIGRAM(S): 200 TABLET, FILM COATED ORAL at 12:05

## 2020-09-01 RX ADMIN — Medication 3 MILLILITER(S): at 05:36

## 2020-09-01 RX ADMIN — Medication 1 DROP(S): at 06:13

## 2020-09-01 RX ADMIN — Medication 0.25 MILLIGRAM(S): at 17:33

## 2020-09-01 RX ADMIN — Medication 50 MILLIGRAM(S): at 13:33

## 2020-09-01 RX ADMIN — FAMOTIDINE 20 MILLIGRAM(S): 10 INJECTION INTRAVENOUS at 12:06

## 2020-09-01 RX ADMIN — SODIUM CHLORIDE 4 MILLILITER(S): 9 INJECTION INTRAMUSCULAR; INTRAVENOUS; SUBCUTANEOUS at 05:36

## 2020-09-01 RX ADMIN — QUETIAPINE FUMARATE 50 MILLIGRAM(S): 200 TABLET, FILM COATED ORAL at 17:25

## 2020-09-01 RX ADMIN — FENTANYL CITRATE 1 PATCH: 50 INJECTION INTRAVENOUS at 09:41

## 2020-09-01 RX ADMIN — HEPARIN SODIUM 5000 UNIT(S): 5000 INJECTION INTRAVENOUS; SUBCUTANEOUS at 21:57

## 2020-09-01 RX ADMIN — ISOSORBIDE DINITRATE 20 MILLIGRAM(S): 5 TABLET ORAL at 10:27

## 2020-09-01 RX ADMIN — Medication 3 MILLILITER(S): at 12:23

## 2020-09-01 RX ADMIN — CHLORHEXIDINE GLUCONATE 15 MILLILITER(S): 213 SOLUTION TOPICAL at 06:13

## 2020-09-01 RX ADMIN — HYDROMORPHONE HYDROCHLORIDE 2 MILLIGRAM(S): 2 INJECTION INTRAMUSCULAR; INTRAVENOUS; SUBCUTANEOUS at 18:23

## 2020-09-01 RX ADMIN — Medication 50 MILLIGRAM(S): at 06:15

## 2020-09-01 RX ADMIN — HYDROMORPHONE HYDROCHLORIDE 2 MILLIGRAM(S): 2 INJECTION INTRAMUSCULAR; INTRAVENOUS; SUBCUTANEOUS at 17:35

## 2020-09-01 RX ADMIN — CHLORHEXIDINE GLUCONATE 1 APPLICATION(S): 213 SOLUTION TOPICAL at 06:13

## 2020-09-01 RX ADMIN — Medication 81 MILLIGRAM(S): at 12:05

## 2020-09-01 RX ADMIN — HEPARIN SODIUM 5000 UNIT(S): 5000 INJECTION INTRAVENOUS; SUBCUTANEOUS at 06:14

## 2020-09-01 RX ADMIN — HEPARIN SODIUM 5000 UNIT(S): 5000 INJECTION INTRAVENOUS; SUBCUTANEOUS at 13:33

## 2020-09-01 RX ADMIN — Medication 3 MILLILITER(S): at 23:18

## 2020-09-01 RX ADMIN — SODIUM CHLORIDE 4 MILLILITER(S): 9 INJECTION INTRAMUSCULAR; INTRAVENOUS; SUBCUTANEOUS at 12:24

## 2020-09-01 RX ADMIN — ISOSORBIDE DINITRATE 20 MILLIGRAM(S): 5 TABLET ORAL at 17:24

## 2020-09-01 RX ADMIN — Medication 1 DROP(S): at 17:26

## 2020-09-01 RX ADMIN — Medication 1 TABLET(S): at 17:25

## 2020-09-01 RX ADMIN — QUETIAPINE FUMARATE 50 MILLIGRAM(S): 200 TABLET, FILM COATED ORAL at 06:14

## 2020-09-01 RX ADMIN — Medication 0.25 MILLIGRAM(S): at 06:13

## 2020-09-01 RX ADMIN — Medication 1 TABLET(S): at 06:21

## 2020-09-01 RX ADMIN — Medication 3 MILLILITER(S): at 17:15

## 2020-09-01 RX ADMIN — CHLORHEXIDINE GLUCONATE 15 MILLILITER(S): 213 SOLUTION TOPICAL at 17:24

## 2020-09-01 RX ADMIN — Medication 50 MILLIGRAM(S): at 21:58

## 2020-09-01 RX ADMIN — Medication 50 MILLIGRAM(S): at 06:14

## 2020-09-01 NOTE — PROGRESS NOTE ADULT - ASSESSMENT
Called as pt was less arousable this AM and got CTH which was had slightly increased vents but resolving SAH. They called with concern for hydro, however on eval he was easily aroused, FC, moving the right side 5/5, nodding yes and no to questions. Unlikely hydro given improved exam.

## 2020-09-01 NOTE — PROGRESS NOTE ADULT - SUBJECTIVE AND OBJECTIVE BOX
Patient is a 60y old  Male who presented with a chief complaint of cardiac arrest (01 Sep 2020 08:43)      INTERVAL HPI/OVERNIGHT EVENTS:  stools thick  rectal tube out  no GI events per report    MEDICATIONS  (STANDING):  albuterol/ipratropium for Nebulization. 3 milliLiter(s) Nebulizer every 6 hours  artificial  tears Solution 1 Drop(s) Both EYES every 6 hours  aspirin  chewable 81 milliGRAM(s) Enteral Tube daily  chlorhexidine 0.12% Liquid 15 milliLiter(s) Oral Mucosa every 12 hours  chlorhexidine 4% Liquid 1 Application(s) Topical <User Schedule>  clonazePAM  Tablet 0.25 milliGRAM(s) Oral every 12 hours  dextrose 5%. 1000 milliLiter(s) (50 mL/Hr) IV Continuous <Continuous>  dextrose 50% Injectable 12.5 Gram(s) IV Push once  dextrose 50% Injectable 25 Gram(s) IV Push once  dextrose 50% Injectable 25 Gram(s) IV Push once  famotidine    Tablet 20 milliGRAM(s) Oral daily  fentaNYL   Patch  50 MICROgram(s)/Hr 1 Patch Transdermal every 72 hours  heparin   Injectable 5000 Unit(s) SubCutaneous every 8 hours  hydrALAZINE 50 milliGRAM(s) Oral three times a day  isosorbide   dinitrate Tablet (ISORDIL) 20 milliGRAM(s) Enteral Tube <User Schedule>  lactobacillus acidophilus 1 Tablet(s) Oral two times a day  metoprolol tartrate 50 milliGRAM(s) Oral three times a day  QUEtiapine 50 milliGRAM(s) Oral every 6 hours  sodium chloride 7% Inhalation 4 milliLiter(s) Inhalation every 8 hours    MEDICATIONS  (PRN):  acetaminophen   Tablet .. 650 milliGRAM(s) Oral every 6 hours PRN Temp greater or equal to 38C (100.4F), Mild Pain (1 - 3)  acetylcysteine 20%  Inhalation 4 milliLiter(s) Inhalation three times a day PRN secretions  dextrose 40% Gel 15 Gram(s) Oral once PRN Blood Glucose LESS THAN 70 milliGRAM(s)/deciliter  glucagon  Injectable 1 milliGRAM(s) IntraMuscular once PRN Glucose LESS THAN 70 milligrams/deciliter  HYDROmorphone   Tablet 2 milliGRAM(s) Oral every 4 hours PRN Severe Pain (7 - 10)  HYDROmorphone  Injectable 0.5 milliGRAM(s) IV Push every 4 hours PRN breakthrough pain      Allergies  No Known Allergies      Review of Systems:  unable to obtain    Vital Signs Last 24 Hrs  T(C): 36.6 (01 Sep 2020 13:43), Max: 37.3 (01 Sep 2020 06:00)  T(F): 97.8 (01 Sep 2020 13:43), Max: 99.2 (01 Sep 2020 06:00)  HR: 98 (01 Sep 2020 13:43) (51 - 98)  BP: 126/82 (01 Sep 2020 13:43) (126/82 - 197/121)  BP(mean): --  RR: 22 (01 Sep 2020 13:43) (20 - 24)  SpO2: 100% (01 Sep 2020 13:43) (96% - 100%)    PHYSICAL EXAM:  Constitutional: NAD, restless +LUE splint in place. opens eyes, not following commands  Neck: +trach - clean  Respiratory: +vent sounds, decreased BS at bases  Cardiovascular: S1 and S2, tachy  Gastrointestinal: BS+, obese softly distended no rebound or rigidity  +PEG site clean and dry bumper at 2 cm, spins freely 360 degrees   Extremities: No peripheral edema  Vascular: 2+ peripheral pulses  Neurological: intermittently restless opens eyes. not following commands  moves RUE  +dysconjugate gaze  Skin: No rashes    LABS:                        10.0   14.06 )-----------( 348      ( 01 Sep 2020 07:13 )             32.4     09-01    145  |  110<H>  |  31<H>  ----------------------------<  141<H>  3.8   |  22  |  2.07<H>    Ca    9.7      01 Sep 2020 07:13  Phos  4.4     09-01  Mg     2.4     09-01    TPro  7.3  /  Alb  3.5  /  TBili  0.2  /  DBili  x   /  AST  34  /  ALT  32  /  AlkPhos  70  09-01      RADIOLOGY & ADDITIONAL TESTS:

## 2020-09-01 NOTE — PROGRESS NOTE ADULT - ATTENDING COMMENTS
Ovidio Tomas MD, FACP, FACG, AGAF  [facility]  [phone]     After hours and weekend coverage GI service : [phone]

## 2020-09-01 NOTE — PROGRESS NOTE ADULT - SUBJECTIVE AND OBJECTIVE BOX
CC: f/u for C Diff    Patient reports: he is nonverbal, vanco stopped 8/29    REVIEW OF SYSTEMS:  All other review of systems negative (Comprehensive ROS): stool formed    Antimicrobials Day #  :  nystatin    Suspension [id number] Unit(s) Oral every 6 hours    Other Medications Reviewed    T(F): 97.8 (09-01-20 @ 13:43), Max: 99.2 (09-01-20 @ 06:00)  HR: 75 (09-01-20 @ 16:29)  BP: 126/82 (09-01-20 @ 13:43)  RR: 20 (09-01-20 @ 16:29)  SpO2: 100% (09-01-20 @ 16:29)  Wt(kg): --    PHYSICAL EXAM:  General: lethargic, no acute distress  Eyes:  anicteric, no conjunctival injection, no discharge  Oropharynx: no lesions or injection 	  Neck: supple, without adenopathy  Lungs: clear to auscultation  Heart: irregular rate and rhythm; no murmur, rubs or gallops  Abdomen: soft, distended, nontender, peg   Skin: no lesions  Extremities: no clubbing, cyanosis, or edema  Neurologic: poorly interactive    LAB RESULTS:                        10.0   14.06 )-----------( 348      ( 01 Sep 2020 07:13 )             32.4     09-01    145  |  110<H>  |  31<H>  ----------------------------<  141<H>  3.8   |  22  |  2.07<H>    Ca    9.7      01 Sep 2020 07:13  Phos  4.4     09-01  Mg     2.4     09-01    TPro  7.3  /  Alb  3.5  /  TBili  0.2  /  DBili  x   /  AST  34  /  ALT  32  /  AlkPhos  70  09-01    LIVER FUNCTIONS - ( 01 Sep 2020 07:13 )  Alb: 3.5 g/dL / Pro: 7.3 g/dL / ALK PHOS: 70 U/L / ALT: 32 U/L / AST: 34 U/L / GGT: x             MICROBIOLOGY:  RECENT CULTURES:  08-30 @ 10:20 .Blood Blood     No growth to date.      08-30 @ 10:14 .Sputum Sputum Pseudomonas aeruginosa    Moderate Pseudomonas aeruginosa  Normal Respiratory Lucrecia present    Few polymorphonuclear leukocytes per low power field  Few Squamous epithelial cells per low power field  Few Gram Negative Rods per oil power field        RADIOLOGY REVIEWED:    < from: CT Head No Cont (09.01.20 @ 14:11) >  IMPRESSION:    Redemonstration interventricular hemorrhage in lateral ventricle occipital horns, slightly increased size of lateral and third ventricles, developing hydrocephalus not excluded.    Continued resolution prior subarachnoid hemorrhage, no new hemorrhage or shift. Redemonstration volume loss and microvascular disease with evolving ischemic changes as on MRI in right medial temporal lobe, right posterior limb internal capsule, thalamocapsular junction, thalamus and cerebral peduncle. If symptoms persist consider follow-up head CT or MR if no contraindications.    < end of copied text >  < from: Xray Chest 1 View- PORTABLE-Routine (08.25.20 @ 06:19) >  INTERPRETATION:  XR CHEST. One view.    INDICATION: Intubated    COMPARISON: 8/24/2020    FINDINGS/  IMPRESSION: The lower lungs are not imaged.    Tracheostomy tube above the kayleigh. Enteric tube courses through the esophagus. The tip is not visualized. Right PICC tip within SVC.    The visualized parts of the lungs are clear.

## 2020-09-01 NOTE — PROGRESS NOTE ADULT - ASSESSMENT
60-year-old male with a history of A-Fib on warfarin who presents with cardiac arrest at work with downtime of about 25 minutes prior to ROSC. S/p therapeutic hypothermia. 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 Now with resolved neurogenic/cardiogenic shock. Course complicated by GI bleed s/p PPI gtt, bleeding from scott s/p clot irrigation, prolonged respiratory failure s/p trach (8/24) and PEG (8/26). Currently with A-Fib with RVR and C diff colitis.  Also developed gross hematuria - Urology consulted +catheter with clot (irrigated and exchanged) suspected 2/2 traumatic catheterization. Additionally course complicated by fever and Pseudomonas PNA (s/p Rx) then developed C diff (now with resolved leukocytosis and improved stooling, now soft per report); remains on enteral Vanco    8/29: Received to RCU  8/30: Less Agitated overnight, Afebrile, WBC decreasing, CR elevated but Stable, Tolerating TC all day yesterday, will Attempt TC ATC tonight and ABG in am, F/u Bcx from 8/30. Rectal tube dc'd.  Speech eval for AAC device and will F/u.  8/31: will attempt TC around the clock again tonight. Stopped amiodarone as per cards.  Will be cathed if cleared by ID and renal.  9/1: 60-year-old male with a history of A-Fib on warfarin who presents with cardiac arrest at work with downtime of about 25 minutes prior to ROSC. S/p therapeutic hypothermia. 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 Now with resolved neurogenic/cardiogenic shock. Course complicated by GI bleed s/p PPI gtt, bleeding from scott s/p clot irrigation, prolonged respiratory failure s/p trach (8/24) and PEG (8/26). Currently with A-Fib with RVR and C diff colitis.  Also developed gross hematuria - Urology consulted +catheter with clot (irrigated and exchanged) suspected 2/2 traumatic catheterization. Additionally course complicated by fever and Pseudomonas PNA (s/p Rx) then developed C diff (now with resolved leukocytosis and improved stooling, now soft per report); remains on enteral Vanco    8/29: Received to RCU  8/30: Less Agitated overnight, Afebrile, WBC decreasing, CR elevated but Stable, Tolerating TC all day yesterday, will Attempt TC ATC tonight and ABG in am, F/u Bcx from 8/30. Rectal tube dc'd.  Speech eval for AAC device and will F/u.  8/31: will attempt TC around the clock again tonight. Stopped amiodarone as per cards.  Will be cathed if cleared by ID and renal.  9/1: Elevated BP overnight, BP stable today, afebrile. Tolerating TC ATC, will attempt cuffless trach tomorrow.  Evaluated by nephrology for coronary angiogram. 60-year-old male with a history of A-Fib on warfarin who presents with cardiac arrest at work with downtime of about 25 minutes prior to ROSC. S/p therapeutic hypothermia. 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 Now with resolved neurogenic/cardiogenic shock. Course complicated by GI bleed s/p PPI gtt, bleeding from scott s/p clot irrigation, prolonged respiratory failure s/p trach (8/24) and PEG (8/26). Currently with A-Fib with RVR and C diff colitis.  Also developed gross hematuria - Urology consulted +catheter with clot (irrigated and exchanged) suspected 2/2 traumatic catheterization. Additionally course complicated by fever and Pseudomonas PNA (s/p Rx) then developed C diff (now with resolved leukocytosis and improved stooling, now soft per report); remains on enteral Vanco    8/29: Received to RCU  8/30: Less Agitated overnight, Afebrile, WBC decreasing, CR elevated but Stable, Tolerating TC all day yesterday, will Attempt TC ATC tonight and ABG in am, F/u Bcx from 8/30. Rectal tube dc'd.  Speech eval for AAC device and will F/u.  8/31: will attempt TC around the clock again tonight. Stopped amiodarone as per cards.  Will be cathed if cleared by ID and renal.  9/1: Elevated BP overnight, BP stable today, afebrile. Lethargic earlier today on exam; ABG Stable; HCT done- no new ICH, ventricles enlarged ? Hydrocephalus, Spoke to Max from [facility] who will see- although felt no intervention required at this time. Tolerating TC ATC, will attempt cuffless trach tomorrow. C diff Iso discontinued as discussed w/ Inf control.

## 2020-09-01 NOTE — PROGRESS NOTE ADULT - ASSESSMENT
59YO male on coumadin for afib s/p cardiac arrest at work, down 25 minutes prior to ROSC. Pupils were R fixed and dilated, left fixed at the time, no gag reflex, post-CA cooling protocol was initiated down to 35 deg. Intubated and put on Nimbex drip. Also on Propofol, fentanyl, levophed. R groin minerva placed. Also put on heparin post-CA. 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 Course also c/b GIB, put on protonix drip. Prior to xfer was started on 3% at 30cc/hr. (09 Aug 2020 14:30)  Also developed gross hematuria - Urology consulted +catheter with clot (irrigated and exchanged) suspected 2/2 traumatic catheterization    Hospital Course complicated by fever and Pseudomonas PNA (s/p Rx) then developed C diff (now with resolved leukocytosis and improved stooling, now soft per report); completed course of enteral Vanco    Respiratory Failure - s/p trach 8/24  Dysphagia - s/p PEG 8/26  C diff - s/p Rx enteral Vanco  Abdominal distension -NOBGP; no colonic distension    RECS:  -Continue Bacid as ordered  -Monitor stools and WBC  -monitor stooling  -Pepcid for GI prophylaxis (decreased SE of increased stooling)  -PEG feeds, monitor tolerance  -abdominal binder to prevent pt accidentally pulling PEG    Further management per R[facility] team    Benja Flores PA-C    [facility]  [phone]  After hours and weekend coverage [phone]

## 2020-09-01 NOTE — PROGRESS NOTE ADULT - PROBLEM SELECTOR PLAN 3
- Continue fentanyl patch, clonazepam, and quetiapine, wean down opiods  - Hydromorphone and tramadol prn

## 2020-09-01 NOTE — PROGRESS NOTE ADULT - SUBJECTIVE AND OBJECTIVE BOX
Patient is a 60y old  Male who presents with a chief complaint of sah (16 Aug 2020 07:48)      Interval Events:    REVIEW OF SYSTEMS:  [ ] Positive  [ ] All other systems negative  [ ] Unable to assess ROS because ________    Vital Signs Last 24 Hrs  T(C): 37.2 (09-01-20 @ 08:37), Max: 37.3 (09-01-20 @ 06:00)  T(F): 98.9 (09-01-20 @ 08:37), Max: 99.2 (09-01-20 @ 06:00)  HR: 71 (09-01-20 @ 08:37) (54 - 102)  BP: 132/90 (09-01-20 @ 08:37) (132/90 - 197/121)  RR: 22 (09-01-20 @ 08:37) (20 - 24)  SpO2: 98% (09-01-20 @ 06:00) (96% - 100%)PHYSICAL EXAM:  HEENT:   [ ]Tracheostomy:  [ ]Pupils equal  [ ]No oral lesions  [ ]Abnormal        SKIN  [ ]No Rash  [ ] Abnormal  [ ] pressure    CARDIAC  [ ]Regular  [ ]Abnormal    PULMONARY  [ ]Bilateral Clear Breath Sounds  [ ]Normal Excursion  [ ]Abnormal    GI  [ ]PEG      [ ] +BS		              [ ]Soft, nondistended, nontender	  [ ]Abnormal    MUSCULOSKELETAL                                   [ ]Bedbound                 [ ]Abnormal    [ ]Ambulatory/OOB to chair                           EXTREMITIES                                         [ ]Normal  [ ]Edema                           NEUROLOGIC  [ ] Normal, non focal  [ ] Focal findings:    PSYCHIATRIC  [ ]Alert and appropriate  [ ] Sedated	 [ ]Agitated    :  Umaña: [ ] Yes, if yes: Date of Placement:                   [  ] No    LINES: Central Lines [ ] Yes, if yes: Date of Placement                                     [  ] No    HOSPITAL MEDICATIONS:  MEDICATIONS  (STANDING):  albuterol/ipratropium for Nebulization. 3 milliLiter(s) Nebulizer every 6 hours  artificial  tears Solution 1 Drop(s) Both EYES every 6 hours  aspirin  chewable 81 milliGRAM(s) Enteral Tube daily  chlorhexidine 0.12% Liquid 15 milliLiter(s) Oral Mucosa every 12 hours  chlorhexidine 4% Liquid 1 Application(s) Topical <User Schedule>  clonazePAM  Tablet 0.25 milliGRAM(s) Oral every 12 hours  dextrose 5%. 1000 milliLiter(s) (50 mL/Hr) IV Continuous <Continuous>  dextrose 50% Injectable 12.5 Gram(s) IV Push once  dextrose 50% Injectable 25 Gram(s) IV Push once  dextrose 50% Injectable 25 Gram(s) IV Push once  famotidine    Tablet 20 milliGRAM(s) Oral daily  fentaNYL   Patch  50 MICROgram(s)/Hr 1 Patch Transdermal every 72 hours  heparin   Injectable 5000 Unit(s) SubCutaneous every 8 hours  hydrALAZINE 50 milliGRAM(s) Oral three times a day  isosorbide   dinitrate Tablet (ISORDIL) 20 milliGRAM(s) Enteral Tube <User Schedule>  lactobacillus acidophilus 1 Tablet(s) Oral two times a day  metoprolol tartrate 50 milliGRAM(s) Oral three times a day  metoprolol tartrate Injectable 5 milliGRAM(s) IV Push once  QUEtiapine 50 milliGRAM(s) Oral every 6 hours  sodium chloride 7% Inhalation 4 milliLiter(s) Inhalation every 8 hours    MEDICATIONS  (PRN):  acetaminophen   Tablet .. 650 milliGRAM(s) Oral every 6 hours PRN Temp greater or equal to 38C (100.4F), Mild Pain (1 - 3)  acetylcysteine 20%  Inhalation 4 milliLiter(s) Inhalation three times a day PRN secretions  dextrose 40% Gel 15 Gram(s) Oral once PRN Blood Glucose LESS THAN 70 milliGRAM(s)/deciliter  glucagon  Injectable 1 milliGRAM(s) IntraMuscular once PRN Glucose LESS THAN 70 milligrams/deciliter  HYDROmorphone   Tablet 2 milliGRAM(s) Oral every 4 hours PRN Severe Pain (7 - 10)  HYDROmorphone  Injectable 0.5 milliGRAM(s) IV Push every 4 hours PRN breakthrough pain      LABS:                        10.0   14.06 )-----------( 348      ( 01 Sep 2020 07:13 )             32.4     09-01    145  |  110<H>  |  31<H>  ----------------------------<  141<H>  3.8   |  22  |  2.07<H>    Ca    9.7      01 Sep 2020 07:13  Phos  4.4     09-01  Mg     2.4     09-01    TPro  7.3  /  Alb  3.5  /  TBili  0.2  /  DBili  x   /  AST  34  /  ALT  32  /  AlkPhos  70  09-01        Arterial Blood Gas:  09-01 @ 05:31  7.41/35/125/22/99/-1.8  ABG lactate: --  Arterial Blood Gas:  08-31 @ 05:43  7.36/40/231/22/99/-2.4  ABG lactate: --      CAPILLARY BLOOD GLUCOSE    MICROBIOLOGY:     RADIOLOGY:  [ ] Reviewed and interpreted by me    Mode: OFF Patient is a 60y old  Male who presents with a chief complaint of sah (16 Aug 2020 07:48)      Interval Events: Blood pressure moderately elevated overnight.     REVIEW OF SYSTEMS:  [ ] Positive  [ ] All other systems negative  [x] Unable to assess ROS because patient is non-verbal     Vital Signs Last 24 Hrs  T(C): 37.2 (09-01-20 @ 08:37), Max: 37.3 (09-01-20 @ 06:00)  T(F): 98.9 (09-01-20 @ 08:37), Max: 99.2 (09-01-20 @ 06:00)  HR: 71 (09-01-20 @ 08:37) (54 - 102)  BP: 132/90 (09-01-20 @ 08:37) (132/90 - 197/121)  RR: 22 (09-01-20 @ 08:37) (20 - 24)  SpO2: 98% (09-01-20 @ 06:00) (96% - 100%)    PHYSICAL EXAM:  HEENT:   [x]Tracheostomy: #8 ANTONIA Watson    [ ]Pupils equal  [ ]No oral lesions  [x]Abnormal: pupils unequal    SKIN  [x]No Rash  [ ] Abnormal  [ ] pressure    CARDIAC  [ ]Regular  [x]Abnormal- irregularly irregular     PULMONARY  [x]Bilateral Clear Breath Sounds  [ ]Normal Excursion  [ ]Abnormal    GI  [x]PEG      [x] +BS		              [ ]Soft, nondistended, nontender	  [x]Abnormal- mildly distended, mild rigidity lower abdomen     MUSCULOSKELETAL                                   [x]Bedbound                 [x]Abnormal- moves RUE/RLE; unable to move L side s/p SAH this admission   [ ]Ambulatory/OOB to chair                           EXTREMITIES                                         [x]Normal  [ ]Edema                           NEUROLOGIC  [ ] Normal, non focal  [x] Focal findings: awake, open eyes, sluggish tracking of L eye, able to squeeze R hand, able to move R toes    PSYCHIATRIC  [x]Alert and appropriate  [ ] Sedated	 [ ]Agitated    :  Umaña: [ ] Yes, if yes: Date of Placement:                   [x] No: condom catheter     LINES: Central Lines [x] Yes, if yes: Date of Placement- RUE PICC line 8/21                                     [  ] No    HOSPITAL MEDICATIONS:  MEDICATIONS  (STANDING):  albuterol/ipratropium for Nebulization. 3 milliLiter(s) Nebulizer every 6 hours  artificial  tears Solution 1 Drop(s) Both EYES every 6 hours  aspirin  chewable 81 milliGRAM(s) Enteral Tube daily  chlorhexidine 0.12% Liquid 15 milliLiter(s) Oral Mucosa every 12 hours  chlorhexidine 4% Liquid 1 Application(s) Topical <User Schedule>  clonazePAM  Tablet 0.25 milliGRAM(s) Oral every 12 hours  dextrose 5%. 1000 milliLiter(s) (50 mL/Hr) IV Continuous <Continuous>  dextrose 50% Injectable 12.5 Gram(s) IV Push once  dextrose 50% Injectable 25 Gram(s) IV Push once  dextrose 50% Injectable 25 Gram(s) IV Push once  famotidine    Tablet 20 milliGRAM(s) Oral daily  fentaNYL   Patch  50 MICROgram(s)/Hr 1 Patch Transdermal every 72 hours  heparin   Injectable 5000 Unit(s) SubCutaneous every 8 hours  hydrALAZINE 50 milliGRAM(s) Oral three times a day  isosorbide   dinitrate Tablet (ISORDIL) 20 milliGRAM(s) Enteral Tube <User Schedule>  lactobacillus acidophilus 1 Tablet(s) Oral two times a day  metoprolol tartrate 50 milliGRAM(s) Oral three times a day  metoprolol tartrate Injectable 5 milliGRAM(s) IV Push once  QUEtiapine 50 milliGRAM(s) Oral every 6 hours  sodium chloride 7% Inhalation 4 milliLiter(s) Inhalation every 8 hours    MEDICATIONS  (PRN):  acetaminophen   Tablet .. 650 milliGRAM(s) Oral every 6 hours PRN Temp greater or equal to 38C (100.4F), Mild Pain (1 - 3)  acetylcysteine 20%  Inhalation 4 milliLiter(s) Inhalation three times a day PRN secretions  dextrose 40% Gel 15 Gram(s) Oral once PRN Blood Glucose LESS THAN 70 milliGRAM(s)/deciliter  glucagon  Injectable 1 milliGRAM(s) IntraMuscular once PRN Glucose LESS THAN 70 milligrams/deciliter  HYDROmorphone   Tablet 2 milliGRAM(s) Oral every 4 hours PRN Severe Pain (7 - 10)  HYDROmorphone  Injectable 0.5 milliGRAM(s) IV Push every 4 hours PRN breakthrough pain      LABS:                        10.0   14.06 )-----------( 348      ( 01 Sep 2020 07:13 )             32.4     09-01    145  |  110<H>  |  31<H>  ----------------------------<  141<H>  3.8   |  22  |  2.07<H>    Ca    9.7      01 Sep 2020 07:13  Phos  4.4     09-01  Mg     2.4     09-01    TPro  7.3  /  Alb  3.5  /  TBili  0.2  /  DBili  x   /  AST  34  /  ALT  32  /  AlkPhos  70  09-01        Arterial Blood Gas:  09-01 @ 05:31  7.41/35/125/22/99/-1.8  ABG lactate: --  Arterial Blood Gas:  08-31 @ 05:43  7.36/40/231/22/99/-2.4  ABG lactate: --      CAPILLARY BLOOD GLUCOSE    MICROBIOLOGY:     RADIOLOGY:  [ ] Reviewed and interpreted by me    Mode: OFF Patient is a 60y old  Male who presents with a chief complaint of sah (16 Aug 2020 07:48)      Interval Events: Blood pressure moderately elevated overnight.     REVIEW OF SYSTEMS:  [ ] Positive  [ ] All other systems negative  [x] Unable to assess ROS because patient is non-verbal     Vital Signs Last 24 Hrs  T(C): 37.2 (09-01-20 @ 08:37), Max: 37.3 (09-01-20 @ 06:00)  T(F): 98.9 (09-01-20 @ 08:37), Max: 99.2 (09-01-20 @ 06:00)  HR: 71 (09-01-20 @ 08:37) (54 - 102)  BP: 132/90 (09-01-20 @ 08:37) (132/90 - 197/121)  RR: 22 (09-01-20 @ 08:37) (20 - 24)  SpO2: 98% (09-01-20 @ 06:00) (96% - 100%)    PHYSICAL EXAM:  HEENT:   [x]Tracheostomy: #8 ANTONIA Watson    [ ]Pupils equal  [ ]No oral lesions  [x]Abnormal: pupils unequal    SKIN  [x]No Rash  [ ] Abnormal  [ ] pressure    CARDIAC  [ ]Regular  [x]Abnormal- irregularly irregular     PULMONARY  [x]Bilateral Clear Breath Sounds  [ ]Normal Excursion  [ ]Abnormal    GI  [x]PEG      [x] +BS		              [ ]Soft, nondistended, nontender	  [x]Abnormal- mild distention     MUSCULOSKELETAL                                   [x]Bedbound                 [x]Abnormal- moves RUE/RLE; unable to move L side s/p SAH this admission   [ ]Ambulatory/OOB to chair                           EXTREMITIES                                         []Normal  [x]Edema- trace LE edema                           NEUROLOGIC  [ ] Normal, non focal  [x] Focal findings: awake, open eyes, sluggish tracking of L eye, able to squeeze R hand, able to move R toes    PSYCHIATRIC  [x]Alert and appropriate  [ ] Sedated	 [ ]Agitated    :  Umaña: [ ] Yes, if yes: Date of Placement:                   [x] No: condom catheter     LINES: Central Lines [x] Yes, if yes: Date of Placement- RUE PICC line 8/21                                     [  ] No    HOSPITAL MEDICATIONS:  MEDICATIONS  (STANDING):  albuterol/ipratropium for Nebulization. 3 milliLiter(s) Nebulizer every 6 hours  artificial  tears Solution 1 Drop(s) Both EYES every 6 hours  aspirin  chewable 81 milliGRAM(s) Enteral Tube daily  chlorhexidine 0.12% Liquid 15 milliLiter(s) Oral Mucosa every 12 hours  chlorhexidine 4% Liquid 1 Application(s) Topical <User Schedule>  clonazePAM  Tablet 0.25 milliGRAM(s) Oral every 12 hours  dextrose 5%. 1000 milliLiter(s) (50 mL/Hr) IV Continuous <Continuous>  dextrose 50% Injectable 12.5 Gram(s) IV Push once  dextrose 50% Injectable 25 Gram(s) IV Push once  dextrose 50% Injectable 25 Gram(s) IV Push once  famotidine    Tablet 20 milliGRAM(s) Oral daily  fentaNYL   Patch  50 MICROgram(s)/Hr 1 Patch Transdermal every 72 hours  heparin   Injectable 5000 Unit(s) SubCutaneous every 8 hours  hydrALAZINE 50 milliGRAM(s) Oral three times a day  isosorbide   dinitrate Tablet (ISORDIL) 20 milliGRAM(s) Enteral Tube <User Schedule>  lactobacillus acidophilus 1 Tablet(s) Oral two times a day  metoprolol tartrate 50 milliGRAM(s) Oral three times a day  metoprolol tartrate Injectable 5 milliGRAM(s) IV Push once  QUEtiapine 50 milliGRAM(s) Oral every 6 hours  sodium chloride 7% Inhalation 4 milliLiter(s) Inhalation every 8 hours    MEDICATIONS  (PRN):  acetaminophen   Tablet .. 650 milliGRAM(s) Oral every 6 hours PRN Temp greater or equal to 38C (100.4F), Mild Pain (1 - 3)  acetylcysteine 20%  Inhalation 4 milliLiter(s) Inhalation three times a day PRN secretions  dextrose 40% Gel 15 Gram(s) Oral once PRN Blood Glucose LESS THAN 70 milliGRAM(s)/deciliter  glucagon  Injectable 1 milliGRAM(s) IntraMuscular once PRN Glucose LESS THAN 70 milligrams/deciliter  HYDROmorphone   Tablet 2 milliGRAM(s) Oral every 4 hours PRN Severe Pain (7 - 10)  HYDROmorphone  Injectable 0.5 milliGRAM(s) IV Push every 4 hours PRN breakthrough pain      LABS:                        10.0   14.06 )-----------( 348      ( 01 Sep 2020 07:13 )             32.4     09-01    145  |  110<H>  |  31<H>  ----------------------------<  141<H>  3.8   |  22  |  2.07<H>    Ca    9.7      01 Sep 2020 07:13  Phos  4.4     09-01  Mg     2.4     09-01    TPro  7.3  /  Alb  3.5  /  TBili  0.2  /  DBili  x   /  AST  34  /  ALT  32  /  AlkPhos  70  09-01        Arterial Blood Gas:  09-01 @ 05:31  7.41/35/125/22/99/-1.8  ABG lactate: --  Arterial Blood Gas:  08-31 @ 05:43  7.36/40/231/22/99/-2.4  ABG lactate: --      CAPILLARY BLOOD GLUCOSE    MICROBIOLOGY:     RADIOLOGY:  [ ] Reviewed and interpreted by me    Mode: OFF Patient is a 60y old  Male who presents with a chief complaint of sah (16 Aug 2020 07:48)      Interval Events: Blood pressure moderately elevated overnight.     REVIEW OF SYSTEMS:  [ ] Positive  [ ] All other systems negative  [x] Unable to assess ROS because patient is non-verbal     Vital Signs Last 24 Hrs  T(C): 37.2 (09-01-20 @ 08:37), Max: 37.3 (09-01-20 @ 06:00)  T(F): 98.9 (09-01-20 @ 08:37), Max: 99.2 (09-01-20 @ 06:00)  HR: 71 (09-01-20 @ 08:37) (54 - 102)  BP: 132/90 (09-01-20 @ 08:37) (132/90 - 197/121)  RR: 22 (09-01-20 @ 08:37) (20 - 24)  SpO2: 98% (09-01-20 @ 06:00) (96% - 100%)    PHYSICAL EXAM:    HEENT:   [x]Tracheostomy: #8 ANTONIA Watson    [ ]Pupils equal  [ ]No oral lesions  [x]Abnormal: pupils unequal    SKIN  [x]No Rash  [ ] Abnormal  [ ] pressure    CARDIAC  [ ]Regular  [x]Abnormal- irregularly irregular     PULMONARY  [x]Bilateral Clear Breath Sounds  [ ]Normal Excursion  [ ]Abnormal    GI  [x]PEG      [x] +BS		              [ ]Soft, nondistended, nontender	  [x]Abnormal- mild distention     MUSCULOSKELETAL                                   [x]Bedbound                 [x]Abnormal- moves RUE/RLE; unable to move L side s/p SAH this admission   [ ]Ambulatory/OOB to chair                           EXTREMITIES                                         []Normal  [x]Edema- trace LE edema                           NEUROLOGIC  [ ] Normal, non focal  [x] Focal findings: awake, open eyes, sluggish tracking of L eye, able to squeeze R hand, able to move R toes    PSYCHIATRIC  [x]Alert and appropriate  [ ] Sedated	 [ ]Agitated    :  Umaña: [ ] Yes, if yes: Date of Placement:                   [x] No: condom catheter     LINES: Central Lines [x] Yes, if yes: Date of Placement- RUE PICC line 8/21                                     [  ] No    HOSPITAL MEDICATIONS:  MEDICATIONS  (STANDING):  albuterol/ipratropium for Nebulization. 3 milliLiter(s) Nebulizer every 6 hours  artificial  tears Solution 1 Drop(s) Both EYES every 6 hours  aspirin  chewable 81 milliGRAM(s) Enteral Tube daily  chlorhexidine 0.12% Liquid 15 milliLiter(s) Oral Mucosa every 12 hours  chlorhexidine 4% Liquid 1 Application(s) Topical <User Schedule>  clonazePAM  Tablet 0.25 milliGRAM(s) Oral every 12 hours  dextrose 5%. 1000 milliLiter(s) (50 mL/Hr) IV Continuous <Continuous>  dextrose 50% Injectable 12.5 Gram(s) IV Push once  dextrose 50% Injectable 25 Gram(s) IV Push once  dextrose 50% Injectable 25 Gram(s) IV Push once  famotidine    Tablet 20 milliGRAM(s) Oral daily  fentaNYL   Patch  50 MICROgram(s)/Hr 1 Patch Transdermal every 72 hours  heparin   Injectable 5000 Unit(s) SubCutaneous every 8 hours  hydrALAZINE 50 milliGRAM(s) Oral three times a day  isosorbide   dinitrate Tablet (ISORDIL) 20 milliGRAM(s) Enteral Tube <User Schedule>  lactobacillus acidophilus 1 Tablet(s) Oral two times a day  metoprolol tartrate 50 milliGRAM(s) Oral three times a day  metoprolol tartrate Injectable 5 milliGRAM(s) IV Push once  QUEtiapine 50 milliGRAM(s) Oral every 6 hours  sodium chloride 7% Inhalation 4 milliLiter(s) Inhalation every 8 hours    MEDICATIONS  (PRN):  acetaminophen   Tablet .. 650 milliGRAM(s) Oral every 6 hours PRN Temp greater or equal to 38C (100.4F), Mild Pain (1 - 3)  acetylcysteine 20%  Inhalation 4 milliLiter(s) Inhalation three times a day PRN secretions  dextrose 40% Gel 15 Gram(s) Oral once PRN Blood Glucose LESS THAN 70 milliGRAM(s)/deciliter  glucagon  Injectable 1 milliGRAM(s) IntraMuscular once PRN Glucose LESS THAN 70 milligrams/deciliter  HYDROmorphone   Tablet 2 milliGRAM(s) Oral every 4 hours PRN Severe Pain (7 - 10)  HYDROmorphone  Injectable 0.5 milliGRAM(s) IV Push every 4 hours PRN breakthrough pain      LABS:                        10.0   14.06 )-----------( 348      ( 01 Sep 2020 07:13 )             32.4     09-01    145  |  110<H>  |  31<H>  ----------------------------<  141<H>  3.8   |  22  |  2.07<H>    Ca    9.7      01 Sep 2020 07:13  Phos  4.4     09-01  Mg     2.4     09-01    TPro  7.3  /  Alb  3.5  /  TBili  0.2  /  DBili  x   /  AST  34  /  ALT  32  /  AlkPhos  70  09-01        Arterial Blood Gas:  09-01 @ 05:31  7.41/35/125/22/99/-1.8  ABG lactate: --  Arterial Blood Gas:  08-31 @ 05:43  7.36/40/231/22/99/-2.4  ABG lactate: --      CAPILLARY BLOOD GLUCOSE    MICROBIOLOGY:     RADIOLOGY:  [ ] Reviewed and interpreted by me    Mode: OFF

## 2020-09-01 NOTE — PROGRESS NOTE ADULT - SUBJECTIVE AND OBJECTIVE BOX
Subjective: Patient seen and examined. No new events except as noted.     REVIEW OF SYSTEMS:  Unable to obtain       MEDICATIONS:  MEDICATIONS  (STANDING):  albuterol/ipratropium for Nebulization. 3 milliLiter(s) Nebulizer every 6 hours  artificial  tears Solution 1 Drop(s) Both EYES every 6 hours  aspirin  chewable 81 milliGRAM(s) Enteral Tube daily  chlorhexidine 0.12% Liquid 15 milliLiter(s) Oral Mucosa every 12 hours  chlorhexidine 4% Liquid 1 Application(s) Topical <User Schedule>  clonazePAM  Tablet 0.25 milliGRAM(s) Oral every 12 hours  dextrose 5%. 1000 milliLiter(s) (50 mL/Hr) IV Continuous <Continuous>  dextrose 50% Injectable 12.5 Gram(s) IV Push once  dextrose 50% Injectable 25 Gram(s) IV Push once  dextrose 50% Injectable 25 Gram(s) IV Push once  famotidine    Tablet 20 milliGRAM(s) Oral daily  fentaNYL   Patch  50 MICROgram(s)/Hr 1 Patch Transdermal every 72 hours  heparin   Injectable 5000 Unit(s) SubCutaneous every 8 hours  hydrALAZINE 50 milliGRAM(s) Oral three times a day  isosorbide   dinitrate Tablet (ISORDIL) 20 milliGRAM(s) Enteral Tube <User Schedule>  lactobacillus acidophilus 1 Tablet(s) Oral two times a day  metoprolol tartrate 50 milliGRAM(s) Oral three times a day  QUEtiapine 50 milliGRAM(s) Oral every 6 hours  sodium chloride 7% Inhalation 4 milliLiter(s) Inhalation every 8 hours      PHYSICAL EXAM:  T(C): 37.2 (09-01-20 @ 08:37), Max: 37.3 (09-01-20 @ 06:00)  HR: 51 (09-01-20 @ 09:04) (51 - 96)  BP: 132/90 (09-01-20 @ 08:37) (132/90 - 197/121)  RR: 22 (09-01-20 @ 09:03) (20 - 24)  SpO2: 98% (09-01-20 @ 09:04) (96% - 100%)  Wt(kg): --  I&O's Summary    31 Aug 2020 07:01  -  01 Sep 2020 07:00  --------------------------------------------------------  IN: 840 mL / OUT: 1450 mL / NET: -610 mL              Appearance: sleepy , +trach   HEENT:   Dry  oral mucosa,  Lymphatic: No lymphadenopathy  Cardiovascular: Normal S1 S2, No JVD, No murmurs, No edema  Respiratory: Ventilated   Psychiatry: A & O x 0  Gastrointestinal:  Soft, Non-tender, +PEG   Skin: No rashes, No ecchymoses, No cyanosis	  Mental status- No acute distress, EO to stim  -CN- Pupils R 4mm NR, L 2mm sluggish, EOMI, tongue midline, face symmetric  +cough/gag  C briskly, two fingers/thumbs up on RUE, distally AG, wiggles toes on RLE          LABS:    CARDIAC MARKERS:                                10.0   14.06 )-----------( 348      ( 01 Sep 2020 07:13 )             32.4     09-01    145  |  110<H>  |  31<H>  ----------------------------<  141<H>  3.8   |  22  |  2.07<H>    Ca    9.7      01 Sep 2020 07:13  Phos  4.4     09-01  Mg     2.4     09-01    TPro  7.3  /  Alb  3.5  /  TBili  0.2  /  DBili  x   /  AST  34  /  ALT  32  /  AlkPhos  70  09-01    proBNP:   Lipid Profile:   HgA1c:   TSH:             TELEMETRY: 	    ECG:  	  RADIOLOGY:   DIAGNOSTIC TESTING:  [ ] Echocardiogram:  [ ]  Catheterization:  [ ] Stress Test:    OTHER:

## 2020-09-01 NOTE — PROGRESS NOTE ADULT - THIS PATIENT HAS THE FOLLOWING CONDITION(S)/DIAGNOSES ON THIS ADMISSION:
Cerebral Edema/Acute Respiratory Failure
None
Acute Respiratory Failure
Acute Respiratory Failure
Cerebral Edema
None
None
Acute Respiratory Failure/cardiac arrest
None
SAH
high rate a fib
Acute Respiratory Failure
Brain Compression / Herniation/Acute Respiratory Failure/Encephalopathy/Cerebral Edema
Brain Compression / Herniation/Cerebral Edema
Cerebral Edema
Cerebral Edema/Brain Compression / Herniation
high rate a fib
subarachnoid hemorrhage
SAH
Brain Compression / Herniation/Cerebral Edema
SAH
Cerebral Edema

## 2020-09-01 NOTE — PROGRESS NOTE ADULT - PROBLEM SELECTOR PLAN 4
Possible stunned myocardium due to cardiac arrest vs. SAH  - Will need cardiac cath to evaluate for ischemia when cleared by ID and renal  - Continue afterload/preload reduction with hydralazine and isosorbide dinitrate

## 2020-09-01 NOTE — PROGRESS NOTE ADULT - ASSESSMENT
60y male with afib s/p cardiac arrest at work, down 25 minutes prior to ROSC. Pupils were R fixed and dilated, left fixed at the time, no gag reflex, post-CA cooling protocol was initiated down to 35 deg. Intubated and put on Nimbex drip. 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 Course also c/b GIB, put on protonix drip. Also developed gross hematuria - Urology consulted +catheter with clot (irrigated and exchanged) suspected 2/2 traumatic catheterization.   he is s/p PEG as well. he was treated for pseudomonas HCAP, and now developed C diff and has been on PO Vanc since 8/18., course completed 8/29  Stools are more formed, tolerating enteral feeds  Mild leukocytosis is nonspecific  Colonized with pseudomonas in airways  PLAN:  Monitor off antibiotics  Supportive care per NS  Disposition per RICU  Monitor for relapse of C Diff

## 2020-09-01 NOTE — PROGRESS NOTE ADULT - PROBLEM SELECTOR PLAN 1
-Mechanical Ventilation PRVC 500/18/5/30   - Trached 8/24 with ENT  -Wean as tolerated   -Trach Care/ Pulmonary toileting   -Continue Duonebs and hypertonic saline, Mucomyst, chest PT  - Trach collar 30% around the lock

## 2020-09-01 NOTE — PROGRESS NOTE ADULT - PROBLEM SELECTOR PLAN 6
A-Fib with RVR:  - DC Amiodarone (8/31)  - continue metoprolol to 50 mg q8h  - continue Hydralazine/imdur  - Hold off on therapeutic a/c given SAH  - SBP goal 100- 160  -TTE: Global LV dysfunction. EF 41%, Severe concentric LVH, flattening of interventricular septum.   -CTA chest - negative PE   - Will need Angiogram when cleared

## 2020-09-02 LAB
ALBUMIN SERPL ELPH-MCNC: 3.3 G/DL — SIGNIFICANT CHANGE UP (ref 3.3–5)
ALP SERPL-CCNC: 68 U/L — SIGNIFICANT CHANGE UP (ref 40–120)
ALT FLD-CCNC: 34 U/L — SIGNIFICANT CHANGE UP (ref 10–45)
ANION GAP SERPL CALC-SCNC: 11 MMOL/L — SIGNIFICANT CHANGE UP (ref 5–17)
AST SERPL-CCNC: 35 U/L — SIGNIFICANT CHANGE UP (ref 10–40)
BILIRUB SERPL-MCNC: 0.2 MG/DL — SIGNIFICANT CHANGE UP (ref 0.2–1.2)
BUN SERPL-MCNC: 35 MG/DL — HIGH (ref 7–23)
CALCIUM SERPL-MCNC: 9.5 MG/DL — SIGNIFICANT CHANGE UP (ref 8.4–10.5)
CHLORIDE SERPL-SCNC: 108 MMOL/L — SIGNIFICANT CHANGE UP (ref 96–108)
CO2 SERPL-SCNC: 24 MMOL/L — SIGNIFICANT CHANGE UP (ref 22–31)
CREAT SERPL-MCNC: 2.21 MG/DL — HIGH (ref 0.5–1.3)
GLUCOSE BLDC GLUCOMTR-MCNC: 110 MG/DL — HIGH (ref 70–99)
GLUCOSE BLDC GLUCOMTR-MCNC: 122 MG/DL — HIGH (ref 70–99)
GLUCOSE BLDC GLUCOMTR-MCNC: 127 MG/DL — HIGH (ref 70–99)
GLUCOSE BLDC GLUCOMTR-MCNC: 128 MG/DL — HIGH (ref 70–99)
GLUCOSE SERPL-MCNC: 137 MG/DL — HIGH (ref 70–99)
HCT VFR BLD CALC: 27.8 % — LOW (ref 39–50)
HCT VFR BLD CALC: 31.1 % — LOW (ref 39–50)
HGB BLD-MCNC: 8.1 G/DL — LOW (ref 13–17)
HGB BLD-MCNC: 9.4 G/DL — LOW (ref 13–17)
MAGNESIUM SERPL-MCNC: 2.4 MG/DL — SIGNIFICANT CHANGE UP (ref 1.6–2.6)
MCHC RBC-ENTMCNC: 26.3 PG — LOW (ref 27–34)
MCHC RBC-ENTMCNC: 26.8 PG — LOW (ref 27–34)
MCHC RBC-ENTMCNC: 29.1 GM/DL — LOW (ref 32–36)
MCHC RBC-ENTMCNC: 30.2 GM/DL — LOW (ref 32–36)
MCV RBC AUTO: 88.6 FL — SIGNIFICANT CHANGE UP (ref 80–100)
MCV RBC AUTO: 90.3 FL — SIGNIFICANT CHANGE UP (ref 80–100)
NRBC # BLD: 0 /100 WBCS — SIGNIFICANT CHANGE UP (ref 0–0)
NRBC # BLD: 0 /100 WBCS — SIGNIFICANT CHANGE UP (ref 0–0)
PHOSPHATE SERPL-MCNC: 5.1 MG/DL — HIGH (ref 2.5–4.5)
PLATELET # BLD AUTO: 206 K/UL — SIGNIFICANT CHANGE UP (ref 150–400)
PLATELET # BLD AUTO: 325 K/UL — SIGNIFICANT CHANGE UP (ref 150–400)
POTASSIUM SERPL-MCNC: 4 MMOL/L — SIGNIFICANT CHANGE UP (ref 3.5–5.3)
POTASSIUM SERPL-SCNC: 4 MMOL/L — SIGNIFICANT CHANGE UP (ref 3.5–5.3)
PROT SERPL-MCNC: 7 G/DL — SIGNIFICANT CHANGE UP (ref 6–8.3)
RBC # BLD: 3.08 M/UL — LOW (ref 4.2–5.8)
RBC # BLD: 3.51 M/UL — LOW (ref 4.2–5.8)
RBC # FLD: 15.2 % — HIGH (ref 10.3–14.5)
RBC # FLD: 15.3 % — HIGH (ref 10.3–14.5)
SODIUM SERPL-SCNC: 143 MMOL/L — SIGNIFICANT CHANGE UP (ref 135–145)
WBC # BLD: 10.74 K/UL — HIGH (ref 3.8–10.5)
WBC # BLD: 8.72 K/UL — SIGNIFICANT CHANGE UP (ref 3.8–10.5)
WBC # FLD AUTO: 10.74 K/UL — HIGH (ref 3.8–10.5)
WBC # FLD AUTO: 8.72 K/UL — SIGNIFICANT CHANGE UP (ref 3.8–10.5)

## 2020-09-02 PROCEDURE — 99232 SBSQ HOSP IP/OBS MODERATE 35: CPT

## 2020-09-02 PROCEDURE — 99233 SBSQ HOSP IP/OBS HIGH 50: CPT | Mod: 25

## 2020-09-02 RX ORDER — HYDROMORPHONE HYDROCHLORIDE 2 MG/ML
0.5 INJECTION INTRAMUSCULAR; INTRAVENOUS; SUBCUTANEOUS EVERY 6 HOURS
Refills: 0 | Status: DISCONTINUED | OUTPATIENT
Start: 2020-09-02 | End: 2020-09-03

## 2020-09-02 RX ADMIN — Medication 1 DROP(S): at 00:29

## 2020-09-02 RX ADMIN — ISOSORBIDE DINITRATE 20 MILLIGRAM(S): 5 TABLET ORAL at 00:28

## 2020-09-02 RX ADMIN — Medication 650 MILLIGRAM(S): at 23:00

## 2020-09-02 RX ADMIN — QUETIAPINE FUMARATE 50 MILLIGRAM(S): 200 TABLET, FILM COATED ORAL at 17:25

## 2020-09-02 RX ADMIN — HYDROMORPHONE HYDROCHLORIDE 0.5 MILLIGRAM(S): 2 INJECTION INTRAMUSCULAR; INTRAVENOUS; SUBCUTANEOUS at 09:35

## 2020-09-02 RX ADMIN — HEPARIN SODIUM 5000 UNIT(S): 5000 INJECTION INTRAVENOUS; SUBCUTANEOUS at 13:13

## 2020-09-02 RX ADMIN — Medication 50 MILLIGRAM(S): at 06:09

## 2020-09-02 RX ADMIN — ISOSORBIDE DINITRATE 20 MILLIGRAM(S): 5 TABLET ORAL at 23:52

## 2020-09-02 RX ADMIN — Medication 1 DROP(S): at 13:11

## 2020-09-02 RX ADMIN — Medication 50 MILLIGRAM(S): at 21:53

## 2020-09-02 RX ADMIN — FAMOTIDINE 20 MILLIGRAM(S): 10 INJECTION INTRAVENOUS at 13:12

## 2020-09-02 RX ADMIN — QUETIAPINE FUMARATE 50 MILLIGRAM(S): 200 TABLET, FILM COATED ORAL at 21:55

## 2020-09-02 RX ADMIN — ISOSORBIDE DINITRATE 20 MILLIGRAM(S): 5 TABLET ORAL at 09:44

## 2020-09-02 RX ADMIN — QUETIAPINE FUMARATE 50 MILLIGRAM(S): 200 TABLET, FILM COATED ORAL at 13:13

## 2020-09-02 RX ADMIN — FENTANYL CITRATE 1 PATCH: 50 INJECTION INTRAVENOUS at 23:02

## 2020-09-02 RX ADMIN — ISOSORBIDE DINITRATE 20 MILLIGRAM(S): 5 TABLET ORAL at 17:25

## 2020-09-02 RX ADMIN — Medication 1 DROP(S): at 06:09

## 2020-09-02 RX ADMIN — Medication 3 MILLILITER(S): at 17:52

## 2020-09-02 RX ADMIN — Medication 0.25 MILLIGRAM(S): at 17:25

## 2020-09-02 RX ADMIN — SODIUM CHLORIDE 4 MILLILITER(S): 9 INJECTION INTRAMUSCULAR; INTRAVENOUS; SUBCUTANEOUS at 17:52

## 2020-09-02 RX ADMIN — QUETIAPINE FUMARATE 50 MILLIGRAM(S): 200 TABLET, FILM COATED ORAL at 06:10

## 2020-09-02 RX ADMIN — Medication 1 DROP(S): at 21:54

## 2020-09-02 RX ADMIN — Medication 650 MILLIGRAM(S): at 22:30

## 2020-09-02 RX ADMIN — Medication 1 DROP(S): at 17:26

## 2020-09-02 RX ADMIN — Medication 50 MILLIGRAM(S): at 13:14

## 2020-09-02 RX ADMIN — CHLORHEXIDINE GLUCONATE 1 APPLICATION(S): 213 SOLUTION TOPICAL at 06:09

## 2020-09-02 RX ADMIN — Medication 81 MILLIGRAM(S): at 13:11

## 2020-09-02 RX ADMIN — HYDROMORPHONE HYDROCHLORIDE 0.5 MILLIGRAM(S): 2 INJECTION INTRAMUSCULAR; INTRAVENOUS; SUBCUTANEOUS at 02:21

## 2020-09-02 RX ADMIN — HYDROMORPHONE HYDROCHLORIDE 0.5 MILLIGRAM(S): 2 INJECTION INTRAMUSCULAR; INTRAVENOUS; SUBCUTANEOUS at 10:05

## 2020-09-02 RX ADMIN — FENTANYL CITRATE 1 PATCH: 50 INJECTION INTRAVENOUS at 08:34

## 2020-09-02 RX ADMIN — Medication 3 MILLILITER(S): at 05:12

## 2020-09-02 RX ADMIN — Medication 3 MILLILITER(S): at 11:57

## 2020-09-02 RX ADMIN — HEPARIN SODIUM 5000 UNIT(S): 5000 INJECTION INTRAVENOUS; SUBCUTANEOUS at 21:53

## 2020-09-02 RX ADMIN — QUETIAPINE FUMARATE 50 MILLIGRAM(S): 200 TABLET, FILM COATED ORAL at 00:28

## 2020-09-02 RX ADMIN — Medication 1 TABLET(S): at 06:10

## 2020-09-02 RX ADMIN — Medication 1 TABLET(S): at 17:25

## 2020-09-02 RX ADMIN — Medication 50 MILLIGRAM(S): at 06:10

## 2020-09-02 RX ADMIN — Medication 0.25 MILLIGRAM(S): at 06:13

## 2020-09-02 RX ADMIN — SODIUM CHLORIDE 4 MILLILITER(S): 9 INJECTION INTRAMUSCULAR; INTRAVENOUS; SUBCUTANEOUS at 05:12

## 2020-09-02 RX ADMIN — HEPARIN SODIUM 5000 UNIT(S): 5000 INJECTION INTRAVENOUS; SUBCUTANEOUS at 06:09

## 2020-09-02 NOTE — PROGRESS NOTE ADULT - SUBJECTIVE AND OBJECTIVE BOX
Patient is a 60y old  Male who presents with a chief complaint of MI, cardiac arrest (01 Sep 2020 16:52)    HPI:  61YO male on coumadin for afib s/p cardiac arrest at work, down 25 minutes prior to ROSC. Pupils were R fixed and dilated, left fixed at the time, no gag reflex, post-CA cooling protocol was initiated down to 35 deg. Intubated and put on Nimbex drip. Also on Propofol, fentanyl, levophed. R groin minerva placed. Also put on heparin post-CA. 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 Course also c/b GIB, put on protonix drip. Prior to xfer was started on 3% at 30cc/hr. (09 Aug 2020 14:30)    Interval Events:    REVIEW OF SYSTEMS:  [ ] Positive  [ ] All other systems negative  [ ] Unable to assess ROS because ________    Vital Signs Last 24 Hrs  T(C): 36.9 (09-02-20 @ 05:05), Max: 37.2 (09-01-20 @ 08:37)  T(F): 98.4 (09-02-20 @ 05:05), Max: 98.9 (09-01-20 @ 08:37)  HR: 77 (09-02-20 @ 05:27) (51 - 103)  BP: 135/85 (09-02-20 @ 05:05) (114/74 - 135/85)  RR: 21 (09-02-20 @ 05:05) (17 - 22)  SpO2: 97% (09-02-20 @ 05:27) (94% - 100%)    PHYSICAL EXAM:  HEENT:   [ ]Tracheostomy:  [ ]Pupils equal  [ ]No oral lesions  [ ]Abnormal    SKIN  [ ] No Rash  [ ] Abnormal  [ ] pressure    CARDIAC  [ ]Regular  [ ]Abnormal    PULMONARY  [ ]Bilateral Clear Breath Sounds  [ ]Normal Excursion  [ ]Abnormal    GI  [ ]PEG      [ ] +BS		              [ ]Soft, nondistended, nontender	  [ ]Abnormal    MUSCULOSKELETAL                                   [ ]Bedbound                 [ ]Abnormal    [ ]Ambulatory/OOB to chair                           EXTREMITIES                                         [ ]Normal  [ ]Edema                           NEUROLOGIC  [ ] Normal, non focal  [ ] Focal findings:    PSYCHIATRIC  [ ]Alert and appropriate  [ ] Sedated	 [ ]Agitated    :  Leeroy: [ ] Yes, if yes: Date of Placement:                   [  ] No    LINES: Central Lines [ ] Yes, if yes: Date of Placement                                     [  ] No    HOSPITAL MEDICATIONS:  MEDICATIONS  (STANDING):  albuterol/ipratropium for Nebulization. 3 milliLiter(s) Nebulizer every 6 hours  artificial  tears Solution 1 Drop(s) Both EYES every 6 hours  aspirin  chewable 81 milliGRAM(s) Enteral Tube daily  chlorhexidine 4% Liquid 1 Application(s) Topical <User Schedule>  clonazePAM  Tablet 0.25 milliGRAM(s) Oral every 12 hours  dextrose 5%. 1000 milliLiter(s) (50 mL/Hr) IV Continuous <Continuous>  dextrose 50% Injectable 12.5 Gram(s) IV Push once  dextrose 50% Injectable 25 Gram(s) IV Push once  dextrose 50% Injectable 25 Gram(s) IV Push once  famotidine    Tablet 20 milliGRAM(s) Oral daily  fentaNYL   Patch  50 MICROgram(s)/Hr 1 Patch Transdermal every 72 hours  heparin   Injectable 5000 Unit(s) SubCutaneous every 8 hours  hydrALAZINE 50 milliGRAM(s) Oral three times a day  isosorbide   dinitrate Tablet (ISORDIL) 20 milliGRAM(s) Enteral Tube <User Schedule>  lactobacillus acidophilus 1 Tablet(s) Oral two times a day  metoprolol tartrate 50 milliGRAM(s) Oral three times a day  nystatin    Suspension 744186 Unit(s) Oral every 6 hours  QUEtiapine 50 milliGRAM(s) Oral every 6 hours  sodium chloride 7% Inhalation 4 milliLiter(s) Inhalation every 12 hours    MEDICATIONS  (PRN):  acetaminophen   Tablet .. 650 milliGRAM(s) Oral every 6 hours PRN Temp greater or equal to 38C (100.4F), Mild Pain (1 - 3)  acetylcysteine 20%  Inhalation 4 milliLiter(s) Inhalation three times a day PRN secretions  dextrose 40% Gel 15 Gram(s) Oral once PRN Blood Glucose LESS THAN 70 milliGRAM(s)/deciliter  glucagon  Injectable 1 milliGRAM(s) IntraMuscular once PRN Glucose LESS THAN 70 milligrams/deciliter  HYDROmorphone   Tablet 2 milliGRAM(s) Oral every 4 hours PRN Severe Pain (7 - 10)  HYDROmorphone  Injectable 0.5 milliGRAM(s) IV Push every 4 hours PRN breakthrough pain      LABS:                        10.0   14.06 )-----------( 348      ( 01 Sep 2020 07:13 )             32.4     09-01    145  |  110<H>  |  31<H>  ----------------------------<  141<H>  3.8   |  22  |  2.07<H>    Ca    9.7      01 Sep 2020 07:13  Phos  4.4     09-01  Mg     2.4     09-01    TPro  7.3  /  Alb  3.5  /  TBili  0.2  /  DBili  x   /  AST  34  /  ALT  32  /  AlkPhos  70  09-01    Arterial Blood Gas:  09-01 @ 05:31  7.41/35/125/22/99/-1.8  ABG lactate: --    Mode: 30 trach collar        Radha Shearer, [facility]  [id number] Patient is a 60y old  Male who presents with a chief complaint of MI, cardiac arrest (01 Sep 2020 16:52)    HPI:  61YO male on coumadin for afib s/p cardiac arrest at work, down 25 minutes prior to ROSC. Pupils were R fixed and dilated, left fixed at the time, no gag reflex, post-CA cooling protocol was initiated down to 35 deg. Intubated and put on Nimbex drip. Also on Propofol, fentanyl, levophed. R groin minerva placed. Also put on heparin post-CA. 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 Course also c/b GIB, put on protonix drip. Prior to xfer was started on 3% at 30cc/hr. (09 Aug 2020 14:30)    Interval Events: No issues overnight.    REVIEW OF SYSTEMS:  [ ] Positive  [x ] All other systems negative  [ ] Unable to assess ROS because ________    Vital Signs Last 24 Hrs  T(C): 36.9 (09-02-20 @ 05:05), Max: 37.2 (09-01-20 @ 08:37)  T(F): 98.4 (09-02-20 @ 05:05), Max: 98.9 (09-01-20 @ 08:37)  HR: 77 (09-02-20 @ 05:27) (51 - 103)  BP: 135/85 (09-02-20 @ 05:05) (114/74 - 135/85)  RR: 21 (09-02-20 @ 05:05) (17 - 22)  SpO2: 97% (09-02-20 @ 05:27) (94% - 100%)    PHYSICAL EXAM:    HEENT:   [x]Tracheostomy: #8 DCT Shirley    [ ]Pupils equal; Right eye ptosis   [ ]No oral lesions  [x]Abnormal: pupils unequal    SKIN  [x]No Rash  [ ] Abnormal  [ ] pressure    CARDIAC  [ ]Regular  [x]Abnormal- irregularly irregular     PULMONARY  [x]Bilateral Clear Breath Sounds  [ ]Normal Excursion  [ ]Abnormal    GI  [x]PEG      [x] +BS		              [ ]Soft, nondistended, nontender	  [x]Abnormal- mild distention     MUSCULOSKELETAL                                   [x]Bedbound                 [x]Abnormal- moves RUE/RLE; unable to move L side s/p SAH this admission   [ ]Ambulatory/OOB to chair                           EXTREMITIES                                         []Normal  [x]Edema- trace LE edema                           NEUROLOGIC  [ ] Normal, non focal  [x] Focal findings: awake, open eyes, sluggish tracking of L eye, able to squeeze R hand, able to move R toes    PSYCHIATRIC  [x]Alert and appropriate  [ ] Sedated	 [ ]Agitated    :  Umaña: [ ] Yes, if yes: Date of Placement:                   [x] No: condom catheter     LINES: Central Lines [x] Yes, if yes: Date of Placement- RUE PICC line 8/21                                     [  ] No    HOSPITAL MEDICATIONS:  MEDICATIONS  (STANDING):  albuterol/ipratropium for Nebulization. 3 milliLiter(s) Nebulizer every 6 hours  artificial  tears Solution 1 Drop(s) Both EYES every 6 hours  aspirin  chewable 81 milliGRAM(s) Enteral Tube daily  chlorhexidine 4% Liquid 1 Application(s) Topical <User Schedule>  clonazePAM  Tablet 0.25 milliGRAM(s) Oral every 12 hours  dextrose 5%. 1000 milliLiter(s) (50 mL/Hr) IV Continuous <Continuous>  dextrose 50% Injectable 12.5 Gram(s) IV Push once  dextrose 50% Injectable 25 Gram(s) IV Push once  dextrose 50% Injectable 25 Gram(s) IV Push once  famotidine    Tablet 20 milliGRAM(s) Oral daily  fentaNYL   Patch  50 MICROgram(s)/Hr 1 Patch Transdermal every 72 hours  heparin   Injectable 5000 Unit(s) SubCutaneous every 8 hours  hydrALAZINE 50 milliGRAM(s) Oral three times a day  isosorbide   dinitrate Tablet (ISORDIL) 20 milliGRAM(s) Enteral Tube <User Schedule>  lactobacillus acidophilus 1 Tablet(s) Oral two times a day  metoprolol tartrate 50 milliGRAM(s) Oral three times a day  nystatin    Suspension [id number] Unit(s) Oral every 6 hours  QUEtiapine 50 milliGRAM(s) Oral every 6 hours  sodium chloride 7% Inhalation 4 milliLiter(s) Inhalation every 12 hours    MEDICATIONS  (PRN):  acetaminophen   Tablet .. 650 milliGRAM(s) Oral every 6 hours PRN Temp greater or equal to 38C (100.4F), Mild Pain (1 - 3)  acetylcysteine 20%  Inhalation 4 milliLiter(s) Inhalation three times a day PRN secretions  dextrose 40% Gel 15 Gram(s) Oral once PRN Blood Glucose LESS THAN 70 milliGRAM(s)/deciliter  glucagon  Injectable 1 milliGRAM(s) IntraMuscular once PRN Glucose LESS THAN 70 milligrams/deciliter  HYDROmorphone   Tablet 2 milliGRAM(s) Oral every 4 hours PRN Severe Pain (7 - 10)  HYDROmorphone  Injectable 0.5 milliGRAM(s) IV Push every 4 hours PRN breakthrough pain      LABS:                        10.0   14.06 )-----------( 348      ( 01 Sep 2020 07:13 )             32.4     09-01    145  |  110<H>  |  31<H>  ----------------------------<  141<H>  3.8   |  22  |  2.07<H>    Ca    9.7      01 Sep 2020 07:13  Phos  4.4     09-01  Mg     2.4     09-01    TPro  7.3  /  Alb  3.5  /  TBili  0.2  /  DBili  x   /  AST  34  /  ALT  32  /  AlkPhos  70  09-01    Mode: 30 trach collar        Radha Shearer, -[facility]  [id number]

## 2020-09-02 NOTE — PROGRESS NOTE ADULT - SUBJECTIVE AND OBJECTIVE BOX
Subjective: Patient seen and examined. No new events except as noted.   remains in RCU     REVIEW OF SYSTEMS:  Unable to obtain       MEDICATIONS:  MEDICATIONS  (STANDING):  albuterol/ipratropium for Nebulization. 3 milliLiter(s) Nebulizer every 6 hours  artificial  tears Solution 1 Drop(s) Both EYES every 6 hours  aspirin  chewable 81 milliGRAM(s) Enteral Tube daily  chlorhexidine 4% Liquid 1 Application(s) Topical <User Schedule>  clonazePAM  Tablet 0.25 milliGRAM(s) Oral every 12 hours  dextrose 5%. 1000 milliLiter(s) (50 mL/Hr) IV Continuous <Continuous>  dextrose 50% Injectable 12.5 Gram(s) IV Push once  dextrose 50% Injectable 25 Gram(s) IV Push once  dextrose 50% Injectable 25 Gram(s) IV Push once  famotidine    Tablet 20 milliGRAM(s) Oral daily  fentaNYL   Patch  50 MICROgram(s)/Hr 1 Patch Transdermal every 72 hours  heparin   Injectable 5000 Unit(s) SubCutaneous every 8 hours  hydrALAZINE 50 milliGRAM(s) Oral three times a day  isosorbide   dinitrate Tablet (ISORDIL) 20 milliGRAM(s) Enteral Tube <User Schedule>  lactobacillus acidophilus 1 Tablet(s) Oral two times a day  metoprolol tartrate 50 milliGRAM(s) Oral three times a day  nystatin    Suspension [id number] Unit(s) Oral every 6 hours  QUEtiapine 50 milliGRAM(s) Oral every 6 hours  sodium chloride 7% Inhalation 4 milliLiter(s) Inhalation every 12 hours      PHYSICAL EXAM:  T(C): 36.9 (09-02-20 @ 05:05), Max: 36.9 (09-02-20 @ 05:05)  HR: 99 (09-02-20 @ 10:00) (53 - 103)  BP: 169/101 (09-02-20 @ 10:00) (114/74 - 169/101)  RR: 22 (09-02-20 @ 09:04) (17 - 22)  SpO2: 98% (09-02-20 @ 09:04) (94% - 100%)  Wt(kg): --  I&O's Summary    01 Sep 2020 07:01  -  02 Sep 2020 07:00  --------------------------------------------------------  IN: 1040 mL / OUT: 550 mL / NET: 490 mL          Appearance: sleepy , +trach   HEENT:   Dry  oral mucosa,  Lymphatic: No lymphadenopathy  Cardiovascular: Normal S1 S2, No JVD, No murmurs, No edema  Respiratory: Ventilated   Psychiatry: A & O x 0  Gastrointestinal:  Soft, Non-tender, +PEG   Skin: No rashes, No ecchymoses, No cyanosis	  Mental status- No acute distress, EO to stim  -CN- Pupils R 4mm NR, L 2mm sluggish, EOMI, tongue midline, face symmetric  +cough/gag  C briskly, two fingers/thumbs up on RUE, distally AG, wiggles toes on RLE      LABS:    CARDIAC MARKERS:                                8.1    8.72  )-----------( 206      ( 02 Sep 2020 07:07 )             27.8     09-02    143  |  108  |  35<H>  ----------------------------<  137<H>  4.0   |  24  |  2.21<H>    Ca    9.5      02 Sep 2020 07:06  Phos  5.1     09-02  Mg     2.4     09-02    TPro  7.0  /  Alb  3.3  /  TBili  0.2  /  DBili  x   /  AST  35  /  ALT  34  /  AlkPhos  68  09-02    proBNP:   Lipid Profile:   HgA1c:   TSH:             TELEMETRY: 	    ECG:  	  RADIOLOGY: < from: CT Head No Cont (09.01.20 @ 14:11) >    EXAM:  CT BRAIN                            PROCEDURE DATE:  09/01/2020            INTERPRETATION:  CLINICAL INDICATION: Nontraumatic subarachnoid hemorrhage, follow-up, 60-year-old with acute kidney injury, chronic atrial fibrillation on warfarin, cardiac arrest with resuscitation.    Technique: Noncontrast CT of the head was performed.    Multiple contiguous axial images were acquired from the skull base to the vertex without the administration of intravenous contrast. Coronal and sagittal reformations were made.    COMPARISON: Head CT, 8/13/2020, 8/10/2020, MRI brain, 8/11/2020    FINDINGS:    Redemonstration intraventricular hemorrhage layering in the occipital horns of the lateral ventricles, with slight increase in size of the lateral ventricles with a bifrontal diameter now measuring 4.4 cm, previously 2.7 cm, third ventricle is enlarged with transverse measurement of 1 cm, previously 6.4 mm, developing hydrocephalus is not excluded.    Redemonstration enlarged sulci with volume loss, near complete resolution of subarachnoid hemorrhage layering along the right suprasellar cistern, sylvian fissure and right posterior lateral temporal lobe. Unchanged volume loss, and left occipital encephalomalacia and gliosis.    Redemonstration, evolving ischemia with decreased attenuation, loss of gray-white distinction  right posterior lateral temporal lobe, right medial temporal lobe and hippocampus (2:17), right lentiform nuclei, right posterior limb internal capsule and right thalamocapsular junction, right thalamus, hypothalamus, cerebral peduncle, with smaller foci left corpus callosum splenium, and left cerebral hemisphere, better seen on prior DWI MRI. There is redemonstration of hemispheric white matter decreased attenuation likely sequela of microvascular disease with additional remote in age indeterminate lacunar infarcts. The basal cisterns remain patent.    Calvarium is intact, disconjugate orbital gaze, mucosal thickening, retention cyst or polyps in the maxillary and ethmoid sinuses, redemonstration opacification with air-fluid levels in the mastoid tips..    IMPRESSION:    Redemonstration interventricular hemorrhage in lateral ventricle occipital horns, slightly increased size of lateral and third ventricles, developing hydrocephalus not excluded.    Continued resolution prior subarachnoid hemorrhage, no new hemorrhage or shift. Redemonstration volume loss and microvascular disease with evolving ischemic changes as on MRI in right medial temporal lobe, right posterior limb internal capsule, thalamocapsular junction, thalamus and cerebral peduncle. If symptoms persist consider follow-up head CT or MR if no contraindications.                  DONOVAN CALHOUN M.D., ATTENDING RADIOLOGIST  This document has been electronically signed. Sep  1 2020  2:22PM                < end of copied text >    DIAGNOSTIC TESTING:  [ ] Echocardiogram:  [ ]  Catheterization:  [ ] Stress Test:    OTHER:

## 2020-09-02 NOTE — PROGRESS NOTE ADULT - ASSESSMENT
61YO male on coumadin for afib s/p cardiac arrest at work, down 25 minutes prior to ROSC. Pupils were R fixed and dilated, left fixed at the time, no gag reflex, post-CA cooling protocol was initiated down to 35 deg. Intubated and put on Nimbex drip. Also on Propofol, fentanyl, levophed. R groin minerva placed. Also put on heparin post-CA. 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 Course also c/b GIB, put on protonix drip. Prior to xfer was started on 3% at 30cc/hr. (09 Aug 2020 14:30)  Also developed gross hematuria - Urology consulted +catheter with clot (irrigated and exchanged) suspected 2/2 traumatic catheterization    Hospital Course complicated by fever and Pseudomonas PNA (s/p Rx) then developed C diff (now with resolved leukocytosis and improved stooling, now soft per report); completed course of enteral Vanco    Respiratory Failure - s/p trach 8/24  Dysphagia - s/p PEG 8/26  C diff - s/p Rx enteral Vanco  Abdominal distension -NOBGP; no colonic distension    RECS:  -Continue Bacid as ordered  -Monitor stools and WBC  -monitor stooling  -Pepcid for GI prophylaxis (decreased SE of increased stooling)  -PEG feeds, monitor tolerance  -abdominal binder to prevent pt accidentally pulling PEG    Further management per RCU team  Stable GI issues at this time    Will Sign off care. Please recall prn for GI concerns.  Thank You.    Benja Flores PA-C    [facility]  [phone]  After hours and weekend coverage [phone]

## 2020-09-02 NOTE — PROGRESS NOTE ADULT - PROBLEM SELECTOR PLAN 1
Completed Hypothermia protocol  Will need eventual cardiac cath for assessment of CORS once off sedation and cleared by nephrology and ID as an outpatient. There is an increase in size of the hemorrhage on yesterdays CT head

## 2020-09-02 NOTE — PROGRESS NOTE ADULT - ASSESSMENT
60-year-old male with a history of A-Fib on warfarin who presents with cardiac arrest at work with downtime of about 25 minutes prior to ROSC. S/p therapeutic hypothermia. 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 Now with resolved neurogenic/cardiogenic shock. Course complicated by GI bleed s/p PPI gtt, bleeding from scott s/p clot irrigation, prolonged respiratory failure s/p trach (8/24) and PEG (8/26). Currently with A-Fib with RVR and C diff colitis.  Also developed gross hematuria - Urology consulted +catheter with clot (irrigated and exchanged) suspected 2/2 traumatic catheterization. Additionally course complicated by fever and Pseudomonas PNA (s/p Rx) then developed C diff (now with resolved leukocytosis and improved stooling, now soft per report); remains on enteral Vanco    8/29: Received to RCU  8/30: Less Agitated overnight, Afebrile, WBC decreasing, CR elevated but Stable, Tolerating TC all day yesterday, will Attempt TC ATC tonight and ABG in am, F/u Bcx from 8/30. Rectal tube dc'd.  Speech eval for AAC device and will F/u.  8/31: will attempt TC around the clock again tonight. Stopped amiodarone as per cards.  Will be cathed if cleared by ID and renal.  9/1: Elevated BP overnight, BP stable today, afebrile. Lethargic earlier today on exam; ABG Stable; HCT done- no new ICH, ventricles enlarged ? Hydrocephalus, Spoke to Max from NSX who will see- although felt no intervention required at this time. Tolerating TC ATC, will attempt cuffless trach tomorrow. C diff Iso discontinued as discussed w/ Inf control. 60-year-old male with a history of A-Fib on warfarin who presents with cardiac arrest at work with downtime of about 25 minutes prior to ROSC. S/p therapeutic hypothermia. 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 Now with resolved neurogenic/cardiogenic shock. Course complicated by GI bleed s/p PPI gtt, bleeding from scott s/p clot irrigation, prolonged respiratory failure s/p trach (8/24) and PEG (8/26). Currently with A-Fib with RVR and C diff colitis.  Also developed gross hematuria - Urology consulted +catheter with clot (irrigated and exchanged) suspected 2/2 traumatic catheterization. Additionally course complicated by fever and Pseudomonas PNA (s/p Rx) then developed C diff (now with resolved leukocytosis and improved stooling, now soft per report); remains on enteral Vanco    8/29: Received to RCU  8/30: Less Agitated overnight, Afebrile, WBC decreasing, CR elevated but Stable, Tolerating TC all day yesterday, will Attempt TC ATC tonight and ABG in am, F/u Bcx from 8/30. Rectal tube dc'd.  Speech eval for AAC device and will F/u.  8/31: will attempt TC around the clock again tonight. Stopped amiodarone as per cards.  Will be cathed if cleared by ID and renal.  9/1: Elevated BP overnight, BP stable today, afebrile. Lethargic earlier today on exam; ABG Stable; Head CT done- no new ICH, ventricles enlarged ? Hydrocephalus, Spoke to Max from NSX who will see- although felt no intervention required at this time. Tolerating TC ATC, will attempt cuffless trach tomorrow. C diff Iso discontinued as discussed w/ Inf control.  9/2: still with secretions, will downsize when improved. Will cath as outpatient. Decreased opioids

## 2020-09-02 NOTE — PROGRESS NOTE ADULT - ATTENDING COMMENTS
60-year-old male with a history of A-Fib on warfarin who presents with cardiac arrest at work with downtime of about 25 minutes prior to ROSC. S/p therapeutic hypothermia. 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 Now with resolved neurogenic/cardiogenic shock. Course complicated by GI bleed s/p PPI gtt, bleeding from Umaña s/p clot irrigation, prolonged respiratory failure s/p trach (8/24) and PEG (8/26). Currently with c. diff colitis    1. SAH with negative angiogram:  - Moving RUE/RLE, but without movement on left  - No further neurosurgical intervention at this time    2. Acute Encephalopathy:  - Continue fentanyl patch, clonazepam, and quetiapine  - Hydromorphone and tramadol prn  - Will start to titrate down medications as able while maintaining patient safety. Patient more alert this AM and appropriately interactive    3. Cardiovascular - patient with known Afib and new acute systolic heart failure in setting of cardiac arrest  - Possible stunned myocardium due to cardiac arrest vs. SAH  - Will need eventual cardiac cath to evaluate for ischemia - now planned for as outpatient per Dr. Deleon  - Continue afterload/preload reduction with hydralazine and isosorbide dinitrate  - For afib will continue metoprolol and monitor HR. Amio stopped due to prior pauses noted.  - Hold off on therapeutic a/c given SAH    4. Acute hypoxemic respiratory failure s/p tracheostomy:  - Patient tolerated TC overnight again with appropriate ABG. If remains well and secretions manageable will plan to downsize trach tomorrow  - Continue Duo nebs and hypertonic saline, chest PT  - Patient is able to phonate with finger occlusion of trach - but still with some air trapping    5. Oropharyngeal dysphagia:  - PEG feeds as tolerated  - Famotidine for GI ppx    6. C diff colitis:  - Completed PO vanco  - Monitor leukocytosis  - rectal tube d/c'd  - will discuss with infection control regarding needs for continued isolation    7. Pseudomonas aeruginosa pneumonia/colonization:  - S/p prolonged course of cefepime until 8/25  - Hold off on systemic antibiotics at this time  - If develops increased secretions, may benefit from inhaled tobramycin    8. JALEN, possible CKD:  - Strict I/O's, trend kidney function and lytes  - renal function stable - likely with CKD III    9. Dispo:  - Full code, overall prognosis guarded depending on neurological recovery  - PT/OT eval. If encephalopathy improves and he can cooperate, may be a candidate for acute rehab

## 2020-09-02 NOTE — PROGRESS NOTE ADULT - PROBLEM SELECTOR PLAN 8
- Trend Creatinine   -Monitor I/O's and electrolytes   - Appreciated renal input - Trend Creatinine   - Monitor I/O's and electrolytes   - Appreciated renal input

## 2020-09-02 NOTE — PROGRESS NOTE ADULT - PROBLEM SELECTOR PLAN 3
- Continue fentanyl patch, clonazepam, and quetiapine, wean down opiods  - Hydromorphone and tramadol prn - Continue fentanyl patch, clonazepam, and quetiapine, wean down opioids

## 2020-09-02 NOTE — PROGRESS NOTE ADULT - PROBLEM SELECTOR PLAN 1
-Mechanical Ventilation PRVC 500/18/5/30   - Trached 8/24 with ENT  -Wean as tolerated   -Trach Care/ Pulmonary toileting   -Continue Duonebs and hypertonic saline, Mucomyst, chest PT  - Trach collar 30% around the lock - continue 30% trach ollar  - Trached 8/24 with ENT  -Trach Care/ Pulmonary toileting   -Continue Duonebs and hypertonic saline, Mucomyst, chest PT  - Trach collar 30% around the clock

## 2020-09-02 NOTE — PROGRESS NOTE ADULT - SUBJECTIVE AND OBJECTIVE BOX
CC: f/u for  cdif  Patient reports  he is ok  REVIEW OF SYSTEMS:  All other review of systems cannot get (Comprehensive ROS)    Antimicrobials Day #  :  nystatin    Suspension 752109 Unit(s) Oral every 6 hours    Other Medications Reviewed    T(F): 98.2 (09-02-20 @ 14:34), Max: 98.4 (09-02-20 @ 05:05)  HR: 78 (09-02-20 @ 16:03)  BP: 189/93 (09-02-20 @ 14:34)  RR: 23 (09-02-20 @ 16:03)  SpO2: 98% (09-02-20 @ 16:03)  Wt(kg): --  on tc, no diarrhea reported  PHYSICAL EXAM:  General: awake, no acute distress  Eyes:  anicteric, no conjunctival injection, no discharge  Oropharynx: no lesions or injection 	  Neck: supple, without adenopathy  Lungs: clear to auscultation  Heart: regular rate and rhythm; no murmur, rubs or gallops  Abdomen: soft, nondistended, nontender, without mass or organomegaly  Skin: no lesions  Extremities: no clubbing, cyanosis, or edema  Neurologic: alert, moves right side extremities    LAB RESULTS:                        9.4    10.74 )-----------( 325      ( 02 Sep 2020 10:32 )             31.1     09-02    143  |  108  |  35<H>  ----------------------------<  137<H>  4.0   |  24  |  2.21<H>    Ca    9.5      02 Sep 2020 07:06  Phos  5.1     09-02  Mg     2.4     09-02    TPro  7.0  /  Alb  3.3  /  TBili  0.2  /  DBili  x   /  AST  35  /  ALT  34  /  AlkPhos  68  09-02    LIVER FUNCTIONS - ( 02 Sep 2020 07:06 )  Alb: 3.3 g/dL / Pro: 7.0 g/dL / ALK PHOS: 68 U/L / ALT: 34 U/L / AST: 35 U/L / GGT: x             MICROBIOLOGY:  RECENT CULTURES:  08-30 @ 10:20 .Blood Blood     No growth to date.      08-30 @ 10:14 .Sputum Sputum Pseudomonas aeruginosa    Moderate Pseudomonas aeruginosa  Moderate Providencia stuartii  Normal Respiratory Lucrecia present    Few polymorphonuclear leukocytes per low power field  Few Squamous epithelial cells per low power field  Few Gram Negative Rods per oil power field        RADIOLOGY REVIEWED:    < from: CT Head No Cont (09.01.20 @ 14:11) >    EXAM:  CT BRAIN                            PROCEDURE DATE:  09/01/2020            INTERPRETATION:  CLINICAL INDICATION: Nontraumatic subarachnoid hemorrhage, follow-up, 60-year-old with acute kidney injury, chronic atrial fibrillation on warfarin, cardiac arrest with resuscitation.    Technique: Noncontrast CT of the head was performed.    Multiple contiguous axial images were acquired from the skull base to the vertex without the administration of intravenous contrast. Coronal and sagittal reformations were made.    COMPARISON: Head CT, 8/13/2020, 8/10/2020, MRI brain, 8/11/2020    FINDINGS:    Redemonstration intraventricular hemorrhage layering in the occipital horns of the lateral ventricles, with slight increase in size of the lateral ventricles with a bifrontal diameter now measuring 4.4 cm, previously 2.7 cm, third ventricle is enlarged with transverse measurement of 1 cm, previously 6.4 mm, developing hydrocephalus is not excluded.    Redemonstration enlarged sulci with volume loss, near complete resolution of subarachnoid hemorrhage layering along the right suprasellar cistern, sylvian fissure and right posterior lateral temporal lobe. Unchanged volume loss, and left occipital encephalomalacia and gliosis.    Redemonstration, evolving ischemia with decreased attenuation, loss of gray-white distinction  right posterior lateral temporal lobe, right medial temporal lobe and hippocampus (2:17), right lentiform nuclei, right posterior limb internal capsule and right thalamocapsular junction, right thalamus, hypothalamus, cerebral peduncle, with smaller foci left corpus callosum splenium, and left cerebral hemisphere, better seen on prior DWI MRI. There is redemonstration of hemispheric white matter decreased attenuation likely sequela of microvascular disease with additional remote in age indeterminate lacunar infarcts. The basal cisterns remain patent.    Calvarium is intact, disconjugate orbital gaze, mucosal thickening, retention cyst or polyps in the maxillary and ethmoid sinuses, redemonstration opacification with air-fluid levels in the mastoid tips..    IMPRESSION:    Redemonstration interventricular hemorrhage in lateral ventricle occipital horns, slightly increased size of lateral and third ventricles, developing hydrocephalus not excluded.    Continued resolution prior subarachnoid hemorrhage, no new hemorrhage or shift. Redemonstration volume loss and microvascular disease with evolving ischemic changes as on MRI in right medial temporal lobe, right posterior limb internal capsule, thalamocapsular junction, thalamus and cerebral peduncle. If symptoms persist consider follow-up head CT or MR if no contraindications.                < end of copied text >            Assessment:  patient with cardiac arrest, had cooling, found to have sah, s/p angio and no avm or aneurysm found, s/p a week of cefepime, s/p peg and trach now on trach collar. developed cdif finished a course of enteral vanco, no further diarrhea. No infection apparent at present.   Plan:  monitor off antibiotics  no ID contraindication for cardiac cath at this time

## 2020-09-02 NOTE — PROGRESS NOTE ADULT - SUBJECTIVE AND OBJECTIVE BOX
Patient is a 60y old  Male who presented with a chief complaint of MI, cardiac arrest (01 Sep 2020 16:52)      INTERVAL HPI/OVERNIGHT EVENTS:  no diarrhea  tolerating PEG feeds    MEDICATIONS  (STANDING):  albuterol/ipratropium for Nebulization. 3 milliLiter(s) Nebulizer every 6 hours  artificial  tears Solution 1 Drop(s) Both EYES every 6 hours  aspirin  chewable 81 milliGRAM(s) Enteral Tube daily  chlorhexidine 4% Liquid 1 Application(s) Topical <User Schedule>  clonazePAM  Tablet 0.25 milliGRAM(s) Oral every 12 hours  dextrose 5%. 1000 milliLiter(s) (50 mL/Hr) IV Continuous <Continuous>  dextrose 50% Injectable 12.5 Gram(s) IV Push once  dextrose 50% Injectable 25 Gram(s) IV Push once  dextrose 50% Injectable 25 Gram(s) IV Push once  famotidine    Tablet 20 milliGRAM(s) Oral daily  fentaNYL   Patch  50 MICROgram(s)/Hr 1 Patch Transdermal every 72 hours  heparin   Injectable 5000 Unit(s) SubCutaneous every 8 hours  hydrALAZINE 50 milliGRAM(s) Oral three times a day  isosorbide   dinitrate Tablet (ISORDIL) 20 milliGRAM(s) Enteral Tube <User Schedule>  lactobacillus acidophilus 1 Tablet(s) Oral two times a day  metoprolol tartrate 50 milliGRAM(s) Oral three times a day  nystatin    Suspension [id number] Unit(s) Oral every 6 hours  QUEtiapine 50 milliGRAM(s) Oral every 6 hours  sodium chloride 7% Inhalation 4 milliLiter(s) Inhalation every 12 hours    MEDICATIONS  (PRN):  acetaminophen   Tablet .. 650 milliGRAM(s) Oral every 6 hours PRN Temp greater or equal to 38C (100.4F), Mild Pain (1 - 3)  dextrose 40% Gel 15 Gram(s) Oral once PRN Blood Glucose LESS THAN 70 milliGRAM(s)/deciliter  glucagon  Injectable 1 milliGRAM(s) IntraMuscular once PRN Glucose LESS THAN 70 milligrams/deciliter  HYDROmorphone  Injectable 0.5 milliGRAM(s) IV Push every 6 hours PRN breakthrough pain      Allergies  No Known Allergies      Review of Systems:  unable to obtain    Vital Signs Last 24 Hrs  T(C): 36.8 (02 Sep 2020 14:34), Max: 36.9 (02 Sep 2020 05:05)  T(F): 98.2 (02 Sep 2020 14:34), Max: 98.4 (02 Sep 2020 05:05)  HR: 77 (02 Sep 2020 14:34) (53 - 103)  BP: 189/93 (02 Sep 2020 14:34) (114/74 - 189/93)  BP(mean): --  RR: 23 (02 Sep 2020 14:34) (17 - 23)  SpO2: 100% (02 Sep 2020 14:34) (94% - 100%)    PHYSICAL EXAM:  Constitutional: NAD, restless +LUE splint in place. opens eyes, not following commands. on trach collar  Neck: +trach - clean  Respiratory: +rhonchi, decreased BS at bases  Cardiovascular: S1 and S2, regular  Gastrointestinal: BS+, obese softly distended no rebound or rigidity  +PEG site clean and dry bumper at 2 cm, spins freely 360 degrees   Extremities: No peripheral edema  Vascular: 2+ peripheral pulses  Neurological: intermittently restless opens eyes. not following commands  moves RUE  +dysconjugate gaze  Skin: No rashes    LABS:                        9.4    10.74 )-----------( 325      ( 02 Sep 2020 10:32 )             31.1     09-02    143  |  108  |  35<H>  ----------------------------<  137<H>  4.0   |  24  |  2.21<H>    Ca    9.5      02 Sep 2020 07:06  Phos  5.1     09-02  Mg     2.4     09-02    TPro  7.0  /  Alb  3.3  /  TBili  0.2  /  DBili  x   /  AST  35  /  ALT  34  /  AlkPhos  68  09-02    RADIOLOGY & ADDITIONAL TESTS:

## 2020-09-03 LAB
-  AMIKACIN: SIGNIFICANT CHANGE UP
-  AMOXICILLIN/CLAVULANIC ACID: SIGNIFICANT CHANGE UP
-  AMPICILLIN/SULBACTAM: SIGNIFICANT CHANGE UP
-  AMPICILLIN: SIGNIFICANT CHANGE UP
-  AZTREONAM: SIGNIFICANT CHANGE UP
-  CEFAZOLIN: SIGNIFICANT CHANGE UP
-  CEFEPIME: SIGNIFICANT CHANGE UP
-  CEFOXITIN: SIGNIFICANT CHANGE UP
-  CEFTRIAXONE: SIGNIFICANT CHANGE UP
-  CIPROFLOXACIN: SIGNIFICANT CHANGE UP
-  ERTAPENEM: SIGNIFICANT CHANGE UP
-  LEVOFLOXACIN: SIGNIFICANT CHANGE UP
-  MEROPENEM: SIGNIFICANT CHANGE UP
-  PIPERACILLIN/TAZOBACTAM: SIGNIFICANT CHANGE UP
-  TRIMETHOPRIM/SULFAMETHOXAZOLE: SIGNIFICANT CHANGE UP
ALBUMIN SERPL ELPH-MCNC: 3.6 G/DL — SIGNIFICANT CHANGE UP (ref 3.3–5)
ALP SERPL-CCNC: 72 U/L — SIGNIFICANT CHANGE UP (ref 40–120)
ALT FLD-CCNC: 32 U/L — SIGNIFICANT CHANGE UP (ref 10–45)
ANION GAP SERPL CALC-SCNC: 13 MMOL/L — SIGNIFICANT CHANGE UP (ref 5–17)
AST SERPL-CCNC: 31 U/L — SIGNIFICANT CHANGE UP (ref 10–40)
BASOPHILS # BLD AUTO: 0.09 K/UL — SIGNIFICANT CHANGE UP (ref 0–0.2)
BASOPHILS NFR BLD AUTO: 0.9 % — SIGNIFICANT CHANGE UP (ref 0–2)
BILIRUB SERPL-MCNC: 0.2 MG/DL — SIGNIFICANT CHANGE UP (ref 0.2–1.2)
BUN SERPL-MCNC: 38 MG/DL — HIGH (ref 7–23)
CALCIUM SERPL-MCNC: 9.8 MG/DL — SIGNIFICANT CHANGE UP (ref 8.4–10.5)
CHLORIDE SERPL-SCNC: 107 MMOL/L — SIGNIFICANT CHANGE UP (ref 96–108)
CO2 SERPL-SCNC: 24 MMOL/L — SIGNIFICANT CHANGE UP (ref 22–31)
CREAT SERPL-MCNC: 2.24 MG/DL — HIGH (ref 0.5–1.3)
CULTURE RESULTS: SIGNIFICANT CHANGE UP
EOSINOPHIL # BLD AUTO: 0.57 K/UL — HIGH (ref 0–0.5)
EOSINOPHIL NFR BLD AUTO: 5.5 % — SIGNIFICANT CHANGE UP (ref 0–6)
GLUCOSE BLDC GLUCOMTR-MCNC: 113 MG/DL — HIGH (ref 70–99)
GLUCOSE BLDC GLUCOMTR-MCNC: 126 MG/DL — HIGH (ref 70–99)
GLUCOSE BLDC GLUCOMTR-MCNC: 126 MG/DL — HIGH (ref 70–99)
GLUCOSE SERPL-MCNC: 114 MG/DL — HIGH (ref 70–99)
HCT VFR BLD CALC: 31.4 % — LOW (ref 39–50)
HGB BLD-MCNC: 9.6 G/DL — LOW (ref 13–17)
IMM GRANULOCYTES NFR BLD AUTO: 1.6 % — HIGH (ref 0–1.5)
LYMPHOCYTES # BLD AUTO: 1.53 K/UL — SIGNIFICANT CHANGE UP (ref 1–3.3)
LYMPHOCYTES # BLD AUTO: 14.8 % — SIGNIFICANT CHANGE UP (ref 13–44)
MAGNESIUM SERPL-MCNC: 2.3 MG/DL — SIGNIFICANT CHANGE UP (ref 1.6–2.6)
MCHC RBC-ENTMCNC: 27.2 PG — SIGNIFICANT CHANGE UP (ref 27–34)
MCHC RBC-ENTMCNC: 30.6 GM/DL — LOW (ref 32–36)
MCV RBC AUTO: 89 FL — SIGNIFICANT CHANGE UP (ref 80–100)
METHOD TYPE: SIGNIFICANT CHANGE UP
MONOCYTES # BLD AUTO: 0.76 K/UL — SIGNIFICANT CHANGE UP (ref 0–0.9)
MONOCYTES NFR BLD AUTO: 7.4 % — SIGNIFICANT CHANGE UP (ref 2–14)
NEUTROPHILS # BLD AUTO: 7.19 K/UL — SIGNIFICANT CHANGE UP (ref 1.8–7.4)
NEUTROPHILS NFR BLD AUTO: 69.8 % — SIGNIFICANT CHANGE UP (ref 43–77)
NRBC # BLD: 0 /100 WBCS — SIGNIFICANT CHANGE UP (ref 0–0)
ORGANISM # SPEC MICROSCOPIC CNT: SIGNIFICANT CHANGE UP
PHOSPHATE SERPL-MCNC: 4.5 MG/DL — SIGNIFICANT CHANGE UP (ref 2.5–4.5)
PLATELET # BLD AUTO: 335 K/UL — SIGNIFICANT CHANGE UP (ref 150–400)
POTASSIUM SERPL-MCNC: 3.8 MMOL/L — SIGNIFICANT CHANGE UP (ref 3.5–5.3)
POTASSIUM SERPL-SCNC: 3.8 MMOL/L — SIGNIFICANT CHANGE UP (ref 3.5–5.3)
PROT SERPL-MCNC: 7.3 G/DL — SIGNIFICANT CHANGE UP (ref 6–8.3)
RBC # BLD: 3.53 M/UL — LOW (ref 4.2–5.8)
RBC # FLD: 15.1 % — HIGH (ref 10.3–14.5)
SODIUM SERPL-SCNC: 144 MMOL/L — SIGNIFICANT CHANGE UP (ref 135–145)
SPECIMEN SOURCE: SIGNIFICANT CHANGE UP
WBC # BLD: 10.31 K/UL — SIGNIFICANT CHANGE UP (ref 3.8–10.5)
WBC # FLD AUTO: 10.31 K/UL — SIGNIFICANT CHANGE UP (ref 3.8–10.5)

## 2020-09-03 PROCEDURE — 99254 IP/OBS CNSLTJ NEW/EST MOD 60: CPT | Mod: GC

## 2020-09-03 PROCEDURE — 31502 CHANGE OF WINDPIPE AIRWAY: CPT

## 2020-09-03 PROCEDURE — 99233 SBSQ HOSP IP/OBS HIGH 50: CPT | Mod: GC

## 2020-09-03 PROCEDURE — 74018 RADEX ABDOMEN 1 VIEW: CPT | Mod: 26

## 2020-09-03 RX ORDER — FENTANYL CITRATE 50 UG/ML
1 INJECTION INTRAVENOUS
Refills: 0 | Status: DISCONTINUED | OUTPATIENT
Start: 2020-09-03 | End: 2020-09-06

## 2020-09-03 RX ORDER — HYDRALAZINE HCL 50 MG
75 TABLET ORAL THREE TIMES A DAY
Refills: 0 | Status: DISCONTINUED | OUTPATIENT
Start: 2020-09-03 | End: 2020-09-04

## 2020-09-03 RX ORDER — POTASSIUM CHLORIDE 20 MEQ
20 PACKET (EA) ORAL ONCE
Refills: 0 | Status: COMPLETED | OUTPATIENT
Start: 2020-09-03 | End: 2020-09-03

## 2020-09-03 RX ORDER — HYDROMORPHONE HYDROCHLORIDE 2 MG/ML
0.5 INJECTION INTRAMUSCULAR; INTRAVENOUS; SUBCUTANEOUS EVERY 8 HOURS
Refills: 0 | Status: DISCONTINUED | OUTPATIENT
Start: 2020-09-03 | End: 2020-09-06

## 2020-09-03 RX ADMIN — FENTANYL CITRATE 1 PATCH: 50 INJECTION INTRAVENOUS at 12:58

## 2020-09-03 RX ADMIN — QUETIAPINE FUMARATE 50 MILLIGRAM(S): 200 TABLET, FILM COATED ORAL at 13:00

## 2020-09-03 RX ADMIN — Medication 1 TABLET(S): at 19:03

## 2020-09-03 RX ADMIN — FENTANYL CITRATE 1 PATCH: 50 INJECTION INTRAVENOUS at 13:00

## 2020-09-03 RX ADMIN — Medication 50 MILLIGRAM(S): at 05:23

## 2020-09-03 RX ADMIN — Medication 3 MILLILITER(S): at 05:30

## 2020-09-03 RX ADMIN — Medication 3 MILLILITER(S): at 23:29

## 2020-09-03 RX ADMIN — HYDROMORPHONE HYDROCHLORIDE 0.5 MILLIGRAM(S): 2 INJECTION INTRAMUSCULAR; INTRAVENOUS; SUBCUTANEOUS at 10:27

## 2020-09-03 RX ADMIN — SODIUM CHLORIDE 4 MILLILITER(S): 9 INJECTION INTRAMUSCULAR; INTRAVENOUS; SUBCUTANEOUS at 17:24

## 2020-09-03 RX ADMIN — Medication 1 DROP(S): at 19:03

## 2020-09-03 RX ADMIN — Medication 20 MILLIEQUIVALENT(S): at 10:26

## 2020-09-03 RX ADMIN — HEPARIN SODIUM 5000 UNIT(S): 5000 INJECTION INTRAVENOUS; SUBCUTANEOUS at 21:40

## 2020-09-03 RX ADMIN — QUETIAPINE FUMARATE 50 MILLIGRAM(S): 200 TABLET, FILM COATED ORAL at 19:03

## 2020-09-03 RX ADMIN — Medication 1 DROP(S): at 12:59

## 2020-09-03 RX ADMIN — QUETIAPINE FUMARATE 50 MILLIGRAM(S): 200 TABLET, FILM COATED ORAL at 23:21

## 2020-09-03 RX ADMIN — Medication 10 MILLIGRAM(S): at 21:40

## 2020-09-03 RX ADMIN — HYDROMORPHONE HYDROCHLORIDE 0.5 MILLIGRAM(S): 2 INJECTION INTRAMUSCULAR; INTRAVENOUS; SUBCUTANEOUS at 10:45

## 2020-09-03 RX ADMIN — Medication 50 MILLIGRAM(S): at 15:23

## 2020-09-03 RX ADMIN — Medication 3 MILLILITER(S): at 17:24

## 2020-09-03 RX ADMIN — HYDROMORPHONE HYDROCHLORIDE 0.5 MILLIGRAM(S): 2 INJECTION INTRAMUSCULAR; INTRAVENOUS; SUBCUTANEOUS at 01:30

## 2020-09-03 RX ADMIN — SODIUM CHLORIDE 4 MILLILITER(S): 9 INJECTION INTRAMUSCULAR; INTRAVENOUS; SUBCUTANEOUS at 05:31

## 2020-09-03 RX ADMIN — FENTANYL CITRATE 1 PATCH: 50 INJECTION INTRAVENOUS at 19:59

## 2020-09-03 RX ADMIN — Medication 0.25 MILLIGRAM(S): at 19:02

## 2020-09-03 RX ADMIN — HYDROMORPHONE HYDROCHLORIDE 0.5 MILLIGRAM(S): 2 INJECTION INTRAMUSCULAR; INTRAVENOUS; SUBCUTANEOUS at 21:38

## 2020-09-03 RX ADMIN — ISOSORBIDE DINITRATE 20 MILLIGRAM(S): 5 TABLET ORAL at 10:06

## 2020-09-03 RX ADMIN — HEPARIN SODIUM 5000 UNIT(S): 5000 INJECTION INTRAVENOUS; SUBCUTANEOUS at 05:23

## 2020-09-03 RX ADMIN — QUETIAPINE FUMARATE 50 MILLIGRAM(S): 200 TABLET, FILM COATED ORAL at 05:23

## 2020-09-03 RX ADMIN — Medication 75 MILLIGRAM(S): at 21:38

## 2020-09-03 RX ADMIN — HYDROMORPHONE HYDROCHLORIDE 0.5 MILLIGRAM(S): 2 INJECTION INTRAMUSCULAR; INTRAVENOUS; SUBCUTANEOUS at 01:15

## 2020-09-03 RX ADMIN — HYDROMORPHONE HYDROCHLORIDE 0.5 MILLIGRAM(S): 2 INJECTION INTRAMUSCULAR; INTRAVENOUS; SUBCUTANEOUS at 16:09

## 2020-09-03 RX ADMIN — ISOSORBIDE DINITRATE 20 MILLIGRAM(S): 5 TABLET ORAL at 23:21

## 2020-09-03 RX ADMIN — HEPARIN SODIUM 5000 UNIT(S): 5000 INJECTION INTRAVENOUS; SUBCUTANEOUS at 13:00

## 2020-09-03 RX ADMIN — Medication 3 MILLILITER(S): at 12:00

## 2020-09-03 RX ADMIN — FAMOTIDINE 20 MILLIGRAM(S): 10 INJECTION INTRAVENOUS at 13:00

## 2020-09-03 RX ADMIN — HYDROMORPHONE HYDROCHLORIDE 0.5 MILLIGRAM(S): 2 INJECTION INTRAMUSCULAR; INTRAVENOUS; SUBCUTANEOUS at 22:00

## 2020-09-03 RX ADMIN — Medication 81 MILLIGRAM(S): at 15:23

## 2020-09-03 RX ADMIN — FENTANYL CITRATE 1 PATCH: 50 INJECTION INTRAVENOUS at 05:24

## 2020-09-03 RX ADMIN — Medication 1 DROP(S): at 05:22

## 2020-09-03 RX ADMIN — Medication 0.25 MILLIGRAM(S): at 05:23

## 2020-09-03 RX ADMIN — Medication 50 MILLIGRAM(S): at 21:39

## 2020-09-03 RX ADMIN — Medication 1 DROP(S): at 23:21

## 2020-09-03 RX ADMIN — CHLORHEXIDINE GLUCONATE 1 APPLICATION(S): 213 SOLUTION TOPICAL at 05:23

## 2020-09-03 RX ADMIN — ISOSORBIDE DINITRATE 20 MILLIGRAM(S): 5 TABLET ORAL at 16:09

## 2020-09-03 RX ADMIN — Medication 1 TABLET(S): at 05:23

## 2020-09-03 RX ADMIN — Medication 3 MILLILITER(S): at 00:00

## 2020-09-03 NOTE — PROGRESS NOTE ADULT - PROBLEM SELECTOR PLAN 1
- continue 30% trach collar  - Trached 8/24 with ENT  -Trach Care/ Pulmonary toileting   -Continue Duoneb and hypertonic saline, Mucomyst, chest PT  - Trach collar 30% around the clock - continue 30% trach collar  - Trached 8/24 with ENT  -Trach Care/ Pulmonary toileting   -Continue Duoneb and hypertonic saline, Mucomyst, chest PT  - Trach collar 30% around the clock  - downsized to #7 cuffless Portex

## 2020-09-03 NOTE — PROGRESS NOTE ADULT - PROBLEM SELECTOR PLAN 4
Possible stunned myocardium due to cardiac arrest vs. SAH  - Will need cardiac cath to evaluate for ischemia when cleared by ID and renal  - Continue afterload/preload reduction with hydralazine and isosorbide dinitrate Possible stunned myocardium due to cardiac arrest vs. SAH  - Will need cardiac cath to evaluate for ischemia when cleared by ID and renal  - Continue afterload/preload reduction with hydralazine and isosorbide dinitrate  - Will obtain Cath as outpatient

## 2020-09-03 NOTE — CONSULT NOTE ADULT - SUBJECTIVE AND OBJECTIVE BOX
Patient is a 60y old Male who presents with a chief complaint of MI, cardiac arrest    HPI:  60 year old male with PMH of Afib on coumadin presents wiht cardiac arrest at work, down 25 minutes prior to ROSC. At the time, pupils were R fixed and dilated, left fixed, no gag reflex. Patient is status post CA cooling protocol. Patient was intubated and put on Nimbex drip, Propofol, fentanyl, and levophed. R groin minerva placed. Patient was also put on heparin post-CA. Head CT showed R periclinoidal/perimesencephalic SAH of unclear etiology, with cerebral angiogram on 8/10 negative for aneurysm, no hydrocephalus. Patient's hospital course complicated by GI Bleed status post protonix drip, hematuria from traumatic cath status post clot irrigation, respiratory failure status post tracheostomy (8/24) and PEG (8/26). Patient also had fever and Pseudomonas PNA (s/p Rx) then developed C diff (now with resolved leukocytosis); completed course of enteral Vanco.    REVIEW OF SYSTEMS: +Left sided weakness. +Lower back pain. No chest pain, shortness of breath, nausea, vomiting or diarrhea other ROS neg     PAST MEDICAL & SURGICAL HISTORY  On warfarin for atrial fibrillation  No significant past surgical history    FUNCTIONAL HISTORY:  Lives with significant other; Patient resides with roommate/girlfriend in a trailer home in [place], [place] with 2-4 steps to enter. Patient is employed as a  and was independent with ADLs and ambulation prior to admit with no AD. Patient has a CPAP and no home care services prior to admission.    CURRENT FUNCTIONAL STATUS:    Bed Mobility: Scooting/Bridging with Maximum assist, Sit to Supine with Maximum assist, Supine to Sit with Maximum assist    FAMILY HISTORY   No pertinent family history in first degree relatives    RECENT LABS/IMAGING  CBC Full  -  ( 03 Sep 2020 07:17 )  WBC Count : 10.31 K/uL  RBC Count : 3.53 M/uL  Hemoglobin : 9.6 g/dL  Hematocrit : 31.4 %  Platelet Count - Automated : 335 K/uL  Mean Cell Volume : 89.0 fl  Mean Cell Hemoglobin : 27.2 pg  Mean Cell Hemoglobin Concentration : 30.6 gm/dL  Auto Neutrophil # : 7.19 K/uL  Auto Lymphocyte # : 1.53 K/uL  Auto Monocyte # : 0.76 K/uL  Auto Eosinophil # : 0.57 K/uL  Auto Basophil # : 0.09 K/uL  Auto Neutrophil % : 69.8 %  Auto Lymphocyte % : 14.8 %  Auto Monocyte % : 7.4 %  Auto Eosinophil % : 5.5 %  Auto Basophil % : 0.9 %    09-03    144  |  107  |  38<H>  ----------------------------<  114<H>  3.8   |  24  |  2.24<H>    Ca    9.8      03 Sep 2020 07:15  Phos  4.5     09-03  Mg     2.3     09-03    TPro  7.3  /  Alb  3.6  /  TBili  0.2  /  DBili  x   /  AST  31  /  ALT  32  /  AlkPhos  72  09-03    < from: CT Head No Cont (09.01.20 @ 14:11) >  IMPRESSION:    Redemonstration interventricular hemorrhage in lateral ventricle occipital horns, slightly increased size of lateral and third ventricles, developing hydrocephalus not excluded.    Continued resolution prior subarachnoid hemorrhage, no new hemorrhage or shift. Redemonstration volume loss and microvascular disease with evolving ischemic changes as on MRI in right medial temporal lobe, right posterior limb internal capsule, thalamocapsular junction, thalamus and cerebral peduncle. If symptoms persist consider follow-up head CT or MR if no contraindications.    < end of copied text >    < from: CT Head No Cont (08.12.20 @ 09:51) >  IMPRESSION: Subarachnoid hemorrhage is again seen and slightly less conspicuous.    New area of high attenuation involving the right posterior temporal/parietal region as described above.    < end of copied text >    < from: MR Head w/wo IV Cont (08.11.20 @ 17:50) >  IMPRESSION:    MRI BRAIN:    Restricted There is restricted diffusion within the region of the right basal ganglia, posterior limb of the right internal capsule, and anterior right temporal lobe, likely representing an acute right MCA territory infarct.    Similar cisternal and sulcal subarachnoid hemorrhage, given differences in modality.    Similar small hemorrhage layering in the occipital horns. No hydrocephalus.    No abnormal parenchymal or leptomeningeal enhancement.    MRI CERVICAL SPINE:    Multilevel degenerative changes superimposed upon congenital spinal canal stenosis.    No abnormal enhancement in the cervical region.    < end of copied text >      < from: CT Angio Chest w/ IV Cont (08.11.20 @ 12:36) >  IMPRESSION:    Limited study. There is no main, central or left-sided lobar pulmonary embolus.    Cardiomegaly. Small bilateral pleural effusions.    < end of copied text >    < from: CT Head No Cont (08.10.20 @ 02:31) >  IMPRESSION: Interval stability compared with the prior, no rehemorrhage. No change in the ventricle size compared with the prior    < end of copied text >    < from: CT Angio Neck w/ IV Cont (08.09.20 @ 15:41) >  IMPRESSION:  CT head: Redemonstrated moderate subarachnoid hemorrhage in the right prepontine, ambient, and suprasellar cisterns. Redemonstrated trace right intraventricular hemorrhage. These are grossly unchanged from earlier same day.    CT angiography neck: No evidence for carotid or vertebral artery stenosis or dissection.    CT angiography brain: No major vessel occlusion, proximal stenosis, or aneurysm identified. Conventional angiography is recommended.    The results of this examination were discussed with BONI Laboy in the N SCU at 4:39 PM on 8/9/2020    < end of copied text >    VITALS  T(C): 36.4 (09-03-20 @ 10:00), Max: 36.8 (09-02-20 @ 14:34)  HR: 103 (09-03-20 @ 10:00) (72 - 104)  BP: 165/97 (09-03-20 @ 10:00) (149/90 - 189/93)  RR: 22 (09-03-20 @ 10:00) (22 - 23)  SpO2: 99% (09-03-20 @ 10:00) (95% - 100%)  Wt(kg): 110kg    ALLERGIES  No Known Allergies    MEDICATIONS   acetaminophen   Tablet .. 650 milliGRAM(s) Oral every 6 hours PRN  albuterol/ipratropium for Nebulization. 3 milliLiter(s) Nebulizer every 6 hours  artificial  tears Solution 1 Drop(s) Both EYES every 6 hours  aspirin  chewable 81 milliGRAM(s) Enteral Tube daily  chlorhexidine 4% Liquid 1 Application(s) Topical <User Schedule>  clonazePAM  Tablet 0.25 milliGRAM(s) Oral every 12 hours  dextrose 40% Gel 15 Gram(s) Oral once PRN  dextrose 5%. 1000 milliLiter(s) IV Continuous <Continuous>  dextrose 50% Injectable 12.5 Gram(s) IV Push once  dextrose 50% Injectable 25 Gram(s) IV Push once  dextrose 50% Injectable 25 Gram(s) IV Push once  famotidine    Tablet 20 milliGRAM(s) Oral daily  fentaNYL   Patch  50 MICROgram(s)/Hr 1 Patch Transdermal every 72 hours  glucagon  Injectable 1 milliGRAM(s) IntraMuscular once PRN  heparin   Injectable 5000 Unit(s) SubCutaneous every 8 hours  hydrALAZINE 50 milliGRAM(s) Oral three times a day  HYDROmorphone  Injectable 0.5 milliGRAM(s) IV Push every 6 hours PRN  isosorbide   dinitrate Tablet (ISORDIL) 20 milliGRAM(s) Enteral Tube <User Schedule>  lactobacillus acidophilus 1 Tablet(s) Oral two times a day  metoprolol tartrate 50 milliGRAM(s) Oral three times a day  nystatin    Suspension 255102 Unit(s) Oral every 6 hours  QUEtiapine 50 milliGRAM(s) Oral every 6 hours  sodium chloride 7% Inhalation 4 milliLiter(s) Inhalation every 12 hours    ----------------------------------------------------------------------------------------  PHYSICAL EXAM  Constitutional - NAD, Mild discomfort from back pain  HEENT - NCAT, EOMI  Neck - Supple, No limited ROM, Tracheostomy in place  Abdomen - Soft, NTND, PEG tube in place  Extremities - No C/C/E, No calf tenderness   Neurologic Exam -                    Cognitive - Awake, Alert, AAO to self, place, situation     Communication - Able to speak with hoarse voice tracheostomy capped     Cranial Nerves - Right pupil: 4mm, irregular, sluggish, Left pupil 2mm, reactive, Right eyelid droop noted, Left facial droop noted     Motor -   LUE Strength 0/5, ROM limited by weakness  LLE Strength 1/5 proximally, 0/5 distally, ROM limited by weakness  RUE Strength 5/5, ROM WFL  RLE Strength 5/5, ROM WFL  	     Sensory - Intact to LT     Reflexes - Babinski downgoing on right, mute on Left DTR Intact, Dent's positive on the left  Psychiatric - Slightly agitated, Affect WNL    Impression:  60 year old Male with PMH of A-Fib on warfarin with functional deficits secondary to diagnosis of cardiac arrest at work with downtime of about 25 minutes prior to ROSC, found to have R perimesencephalic SAH of unclear etiology with cerebral angiogram on 8/10 negative for aneurysm. Now with resolved neurogenic/cardiogenic shock. Course complicated by GI bleed s/p PPI gtt, bleeding from [place] s/p clot irrigation, prolonged respiratory failure s/p trach (8/24) and PEG (8/26), and C.diff colitis    Plan:  PT- ROM, Bed Mob, Transfers, Amb w AD and bracing as needed  OT- ADLs, bracing  SLP- Dysphagia eval and treat  Prec- Falls, Cardiac  DVT Prophylaxis  Skin- Turn q2 h  Dispo- Acute Rehab Patient is a 60y old Male who presents with a chief complaint of MI, cardiac arrest    HPI:  60 year old male with PMH of Afib on coumadin presents wiht cardiac arrest at work, down 25 minutes prior to ROSC. At the time, pupils were R fixed and dilated, left fixed, no gag reflex. Patient is status post CA cooling protocol. Patient was intubated and put on Nimbex drip, Propofol, fentanyl, and levophed. R groin minerva placed. Patient was also put on heparin post-CA. Head CT showed R periclinoidal/perimesencephalic SAH of unclear etiology, with cerebral angiogram on 8/10 negative for aneurysm, no hydrocephalus. Patient's hospital course complicated by GI Bleed status post protonix drip, hematuria from traumatic cath status post clot irrigation, respiratory failure status post tracheostomy (8/24) and PEG (8/26). Patient also had fever and Pseudomonas PNA (s/p Rx) then developed C diff (now with resolved leukocytosis); completed course of enteral Vanco.    REVIEW OF SYSTEMS: +Left sided weakness. +Lower back pain. No chest pain, shortness of breath, nausea, vomiting or diarrhea other ROS neg     PAST MEDICAL & SURGICAL HISTORY  On warfarin for atrial fibrillation  No significant past surgical history    FUNCTIONAL HISTORY:  Lives with significant other; Patient resides with roommate/girlfriend in a trailer home in [place], [place] with 2-4 steps to enter. Patient is employed as a  and was independent with ADLs and ambulation prior to admit with no AD. Patient has a CPAP and no home care services prior to admission.    CURRENT FUNCTIONAL STATUS:    Bed Mobility: Scooting/Bridging with Maximum assist, Sit to Supine with Maximum assist, Supine to Sit with Maximum assist    FAMILY HISTORY   No pertinent family history in first degree relatives    RECENT LABS/IMAGING  CBC Full  -  ( 03 Sep 2020 07:17 )  WBC Count : 10.31 K/uL  RBC Count : 3.53 M/uL  Hemoglobin : 9.6 g/dL  Hematocrit : 31.4 %  Platelet Count - Automated : 335 K/uL  Mean Cell Volume : 89.0 fl  Mean Cell Hemoglobin : 27.2 pg  Mean Cell Hemoglobin Concentration : 30.6 gm/dL  Auto Neutrophil # : 7.19 K/uL  Auto Lymphocyte # : 1.53 K/uL  Auto Monocyte # : 0.76 K/uL  Auto Eosinophil # : 0.57 K/uL  Auto Basophil # : 0.09 K/uL  Auto Neutrophil % : 69.8 %  Auto Lymphocyte % : 14.8 %  Auto Monocyte % : 7.4 %  Auto Eosinophil % : 5.5 %  Auto Basophil % : 0.9 %    09-03    144  |  107  |  38<H>  ----------------------------<  114<H>  3.8   |  24  |  2.24<H>    Ca    9.8      03 Sep 2020 07:15  Phos  4.5     09-03  Mg     2.3     09-03    TPro  7.3  /  Alb  3.6  /  TBili  0.2  /  DBili  x   /  AST  31  /  ALT  32  /  AlkPhos  72  09-03    < from: CT Head No Cont (09.01.20 @ 14:11) >  IMPRESSION:    Redemonstration interventricular hemorrhage in lateral ventricle occipital horns, slightly increased size of lateral and third ventricles, developing hydrocephalus not excluded.    Continued resolution prior subarachnoid hemorrhage, no new hemorrhage or shift. Redemonstration volume loss and microvascular disease with evolving ischemic changes as on MRI in right medial temporal lobe, right posterior limb internal capsule, thalamocapsular junction, thalamus and cerebral peduncle. If symptoms persist consider follow-up head CT or MR if no contraindications.    < end of copied text >    < from: CT Head No Cont (08.12.20 @ 09:51) >  IMPRESSION: Subarachnoid hemorrhage is again seen and slightly less conspicuous.    New area of high attenuation involving the right posterior temporal/parietal region as described above.    < end of copied text >    < from: MR Head w/wo IV Cont (08.11.20 @ 17:50) >  IMPRESSION:    MRI BRAIN:    Restricted There is restricted diffusion within the region of the right basal ganglia, posterior limb of the right internal capsule, and anterior right temporal lobe, likely representing an acute right MCA territory infarct.    Similar cisternal and sulcal subarachnoid hemorrhage, given differences in modality.    Similar small hemorrhage layering in the occipital horns. No hydrocephalus.    No abnormal parenchymal or leptomeningeal enhancement.    MRI CERVICAL SPINE:    Multilevel degenerative changes superimposed upon congenital spinal canal stenosis.    No abnormal enhancement in the cervical region.    < end of copied text >      < from: CT Angio Chest w/ IV Cont (08.11.20 @ 12:36) >  IMPRESSION:    Limited study. There is no main, central or left-sided lobar pulmonary embolus.    Cardiomegaly. Small bilateral pleural effusions.    < end of copied text >    < from: CT Head No Cont (08.10.20 @ 02:31) >  IMPRESSION: Interval stability compared with the prior, no rehemorrhage. No change in the ventricle size compared with the prior    < end of copied text >    < from: CT Angio Neck w/ IV Cont (08.09.20 @ 15:41) >  IMPRESSION:  CT head: Redemonstrated moderate subarachnoid hemorrhage in the right prepontine, ambient, and suprasellar cisterns. Redemonstrated trace right intraventricular hemorrhage. These are grossly unchanged from earlier same day.    CT angiography neck: No evidence for carotid or vertebral artery stenosis or dissection.    CT angiography brain: No major vessel occlusion, proximal stenosis, or aneurysm identified. Conventional angiography is recommended.    The results of this examination were discussed with BONI Laboy in the N SCU at 4:39 PM on 8/9/2020    < end of copied text >    VITALS  T(C): 36.4 (09-03-20 @ 10:00), Max: 36.8 (09-02-20 @ 14:34)  HR: 103 (09-03-20 @ 10:00) (72 - 104)  BP: 165/97 (09-03-20 @ 10:00) (149/90 - 189/93)  RR: 22 (09-03-20 @ 10:00) (22 - 23)  SpO2: 99% (09-03-20 @ 10:00) (95% - 100%)  Wt(kg): 110kg    ALLERGIES  No Known Allergies    MEDICATIONS   acetaminophen   Tablet .. 650 milliGRAM(s) Oral every 6 hours PRN  albuterol/ipratropium for Nebulization. 3 milliLiter(s) Nebulizer every 6 hours  artificial  tears Solution 1 Drop(s) Both EYES every 6 hours  aspirin  chewable 81 milliGRAM(s) Enteral Tube daily  chlorhexidine 4% Liquid 1 Application(s) Topical <User Schedule>  clonazePAM  Tablet 0.25 milliGRAM(s) Oral every 12 hours  dextrose 40% Gel 15 Gram(s) Oral once PRN  dextrose 5%. 1000 milliLiter(s) IV Continuous <Continuous>  dextrose 50% Injectable 12.5 Gram(s) IV Push once  dextrose 50% Injectable 25 Gram(s) IV Push once  dextrose 50% Injectable 25 Gram(s) IV Push once  famotidine    Tablet 20 milliGRAM(s) Oral daily  fentaNYL   Patch  50 MICROgram(s)/Hr 1 Patch Transdermal every 72 hours  glucagon  Injectable 1 milliGRAM(s) IntraMuscular once PRN  heparin   Injectable 5000 Unit(s) SubCutaneous every 8 hours  hydrALAZINE 50 milliGRAM(s) Oral three times a day  HYDROmorphone  Injectable 0.5 milliGRAM(s) IV Push every 6 hours PRN  isosorbide   dinitrate Tablet (ISORDIL) 20 milliGRAM(s) Enteral Tube <User Schedule>  lactobacillus acidophilus 1 Tablet(s) Oral two times a day  metoprolol tartrate 50 milliGRAM(s) Oral three times a day  nystatin    Suspension 934671 Unit(s) Oral every 6 hours  QUEtiapine 50 milliGRAM(s) Oral every 6 hours  sodium chloride 7% Inhalation 4 milliLiter(s) Inhalation every 12 hours    ----------------------------------------------------------------------------------------  PHYSICAL EXAM  Constitutional - NAD, Mild discomfort from back pain  HEENT - NCAT, EOMI  Neck - Supple, No limited ROM, Tracheostomy in place  Abdomen - Soft, NTND, PEG tube in place  Extremities - No C/C/E, No calf tenderness   Neurologic Exam -                    Cognitive - Awake, Alert, AAO to self, place, situation     Communication - Able to speak with hoarse voice tracheostomy capped     Cranial Nerves - Right pupil: 4mm, irregular, sluggish, Left pupil 2mm, reactive, Right eyelid droop noted, Left facial droop noted     Motor -   LUE Strength 0/5, ROM limited by weakness  LLE Strength 1/5 proximally, 0/5 distally, ROM limited by weakness  RUE Strength 5/5, ROM WFL  RLE Strength 5/5, ROM WFL  	     Sensory - Intact to LT     Reflexes - Babinski downgoing on right, mute on Left DTR Intact, Dent's positive on the left  Psychiatric - Slightly agitated, Affect WNL    Impression:  60 year old Male with PMH of A-Fib on warfarin with functional deficits secondary to diagnosis of cardiac arrest at work with downtime of about 25 minutes prior to ROSC, found to have R perimesencephalic SAH of unclear etiology with cerebral angiogram on 8/10 negative for aneurysm. Now with resolved neurogenic/cardiogenic shock. Course complicated by GI bleed s/p PPI gtt, bleeding from [place] s/p clot irrigation, prolonged respiratory failure s/p trach (8/24) and PEG (8/26), and C.diff colitis    Plan:  PT- ROM, Bed Mob, Transfers, Amb w AD and bracing as needed  OT- ADLs, bracing  SLP- Dysphagia eval and treat  Prec- Falls, Cardiac, Pulm  DVT Prophylaxis- On Heparin  Skin- Turn q2 h  Dispo- Acute TBI Rehab- can tolerate 3h/d PT/OT/SLP and requires daily physician visits

## 2020-09-03 NOTE — PROGRESS NOTE ADULT - ATTENDING COMMENTS
60-year-old male with a history of A-Fib on warfarin who presents with cardiac arrest at work with downtime of about 25 minutes prior to ROSC. S/p therapeutic hypothermia. 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 Now with resolved neurogenic/cardiogenic shock. Course complicated by GI bleed s/p PPI gtt, bleeding from [place] s/p clot irrigation, prolonged respiratory failure s/p trach (8/24) and PEG (8/26). Currently with c. diff colitis    1. SAH with negative angiogram:  - Moving RUE/RLE, but without movement on left  - No further neurosurgical intervention at this time    2. Acute Encephalopathy - improving  - Continue fentanyl patch, clonazepam, and quetiapine  - Hydromorphone and tramadol prn  - Will start to titrate down medications as able while maintaining patient safety. Patient more alert this AM and appropriately interactive    3. Cardiovascular - patient with known Afib and new acute systolic heart failure in setting of cardiac arrest  - Possible stunned myocardium due to cardiac arrest vs. SAH  - Will need eventual cardiac cath to evaluate for ischemia - now planned for as outpatient per Dr. Deleon  - Continue afterload/preload reduction with hydralazine and isosorbide dinitrate  - For afib will continue metoprolol and monitor HR. Amio stopped due to prior pauses noted.  - Hold off on therapeutic a/c given SAH    4. Acute hypoxemic respiratory failure s/p tracheostomy:  - Patient tolerated TC overnight again with appropriate ABG. If remains well and secretions manageable will plan to downsize trach tomorrow  - Continue Duo nebs and hypertonic saline, chest PT  - Patient is able to phonate with finger occlusion of trach - and with less air trapping today  - ENT evaluation for downsize    5. Oropharyngeal dysphagia:  - PEG feeds as tolerated  - Famotidine for GI ppx    6. C diff colitis:  - Completed PO vanco  - Monitor leukocytosis  - rectal tube d/c'd  - will discuss with infection control regarding needs for continued isolation    7. Pseudomonas aeruginosa pneumonia/colonization:  - S/p prolonged course of cefepime until 8/25  - Hold off on systemic antibiotics at this time  - If develops increased secretions, may benefit from inhaled tobramycin    8. JALEN, possible CKD:  - Strict I/O's, trend kidney function and lytes  - renal function stable - likely with CKD III    9. Dispo:  - Full code, overall prognosis guarded depending on neurological recovery  - PMR evaluation - possible acute rehab candidate

## 2020-09-03 NOTE — PROGRESS NOTE ADULT - SUBJECTIVE AND OBJECTIVE BOX
CC: f/u for  cdif  Patient reports his back is hurting    REVIEW OF SYSTEMS:  All other review of systems negative (Comprehensive ROS)    Antimicrobials Day #  :  nystatin    Suspension 190422 Unit(s) Oral every 6 hours    Other Medications Reviewed    T(F): 98.1 (09-03-20 @ 15:00), Max: 98.1 (09-03-20 @ 15:00)  HR: 97 (09-03-20 @ 15:00)  BP: 177/100 (09-03-20 @ 15:00)  RR: 20 (09-03-20 @ 13:21)  SpO2: 99% (09-03-20 @ 13:21)  Wt(kg): --    PHYSICAL EXAM:  General: alert, acute distress  Eyes:  anicteric, no conjunctival injection, no discharge  Oropharynx: no lesions or injection 	  Neck: supple, without adenopathy, trach  Lungs: clear to auscultation  Heart: regular rate and rhythm; no murmur, rubs or gallops  Abdomen: distended, nontender, without mass or organomegaly  Skin: no lesions  Extremities: no clubbing, cyanosis, or edema  Neurologic: alert, moves right extremities    LAB RESULTS:                        9.6    10.31 )-----------( 335      ( 03 Sep 2020 07:17 )             31.4     09-03    144  |  107  |  38<H>  ----------------------------<  114<H>  3.8   |  24  |  2.24<H>    Ca    9.8      03 Sep 2020 07:15  Phos  4.5     09-03  Mg     2.3     09-03    TPro  7.3  /  Alb  3.6  /  TBili  0.2  /  DBili  x   /  AST  31  /  ALT  32  /  AlkPhos  72  09-03    LIVER FUNCTIONS - ( 03 Sep 2020 07:15 )  Alb: 3.6 g/dL / Pro: 7.3 g/dL / ALK PHOS: 72 U/L / ALT: 32 U/L / AST: 31 U/L / GGT: x             MICROBIOLOGY:  RECENT CULTURES:  08-30 @ 10:20 .Blood Blood     No growth to date.      08-30 @ 10:14 .Sputum Sputum Pseudomonas aeruginosa  Providencia stuartii    Moderate Pseudomonas aeruginosa  Moderate Providencia stuartii  Normal Respiratory Lucrecia present    Few polymorphonuclear leukocytes per low power field  Few Squamous epithelial cells per low power field  Few Gram Negative Rods per oil power field        RADIOLOGY REVIEWED:    < from: CT Head No Cont (09.01.20 @ 14:11) >    IMPRESSION:    Redemonstration interventricular hemorrhage in lateral ventricle occipital horns, slightly increased size of lateral and third ventricles, developing hydrocephalus not excluded.    Continued resolution prior subarachnoid hemorrhage, no new hemorrhage or shift. Redemonstration volume loss and microvascular disease with evolving ischemic changes as on MRI in right medial temporal lobe, right posterior limb internal capsule, thalamocapsular junction, thalamus and cerebral peduncle. If symptoms persist consider follow-up head CT or MR if no contraindications.        < end of copied text >            Assessment:  Patient admitted with cardiac arrest, had cooling, sah, negative angiogram, peg, trach, had tx for pneumonia and now treated for cdif. He has a distended abdomen and no bm in the past 24 hours to have abdo film. Back pain of concern, could he have fractured his spine during arrest/code.   Plan:  monitor off antibiotics  await abdo xray\  T and L spine imaging

## 2020-09-03 NOTE — PROGRESS NOTE ADULT - SUBJECTIVE AND OBJECTIVE BOX
Subjective: Patient seen and examined. No new events except as noted.     REVIEW OF SYSTEMS:  Unable to obtain     MEDICATIONS:  MEDICATIONS  (STANDING):  albuterol/ipratropium for Nebulization. 3 milliLiter(s) Nebulizer every 6 hours  artificial  tears Solution 1 Drop(s) Both EYES every 6 hours  aspirin  chewable 81 milliGRAM(s) Enteral Tube daily  chlorhexidine 4% Liquid 1 Application(s) Topical <User Schedule>  clonazePAM  Tablet 0.25 milliGRAM(s) Oral every 12 hours  dextrose 5%. 1000 milliLiter(s) (50 mL/Hr) IV Continuous <Continuous>  dextrose 50% Injectable 12.5 Gram(s) IV Push once  dextrose 50% Injectable 25 Gram(s) IV Push once  dextrose 50% Injectable 25 Gram(s) IV Push once  famotidine    Tablet 20 milliGRAM(s) Oral daily  fentaNYL   Patch  50 MICROgram(s)/Hr 1 Patch Transdermal every 72 hours  heparin   Injectable 5000 Unit(s) SubCutaneous every 8 hours  hydrALAZINE 50 milliGRAM(s) Oral three times a day  isosorbide   dinitrate Tablet (ISORDIL) 20 milliGRAM(s) Enteral Tube <User Schedule>  lactobacillus acidophilus 1 Tablet(s) Oral two times a day  metoprolol tartrate 50 milliGRAM(s) Oral three times a day  nystatin    Suspension 680730 Unit(s) Oral every 6 hours  QUEtiapine 50 milliGRAM(s) Oral every 6 hours  sodium chloride 7% Inhalation 4 milliLiter(s) Inhalation every 12 hours      PHYSICAL EXAM:  T(C): 36.4 (09-03-20 @ 10:00), Max: 36.8 (09-02-20 @ 14:34)  HR: 103 (09-03-20 @ 10:00) (72 - 104)  BP: 165/97 (09-03-20 @ 10:00) (149/90 - 189/93)  RR: 22 (09-03-20 @ 10:00) (22 - 23)  SpO2: 99% (09-03-20 @ 10:00) (95% - 100%)  Wt(kg): --  I&O's Summary    02 Sep 2020 07:01  -  03 Sep 2020 07:00  --------------------------------------------------------  IN: 970 mL / OUT: 1800 mL / NET: -830 mL        Appearance: sleepy , +trach   HEENT:   Dry  oral mucosa,  Lymphatic: No lymphadenopathy  Cardiovascular: Normal S1 S2, No JVD, No murmurs, No edema  Respiratory: Ventilated   Psychiatry: A & O x 0  Gastrointestinal:  Soft, Non-tender, +PEG   Skin: No rashes, No ecchymoses, No cyanosis	  Mental status- No acute distress, EO to stim  -CN- Pupils R 4mm NR, L 2mm sluggish, EOMI, tongue midline, face symmetric  +cough/gag  C briskly, two fingers/thumbs up on RUE, distally AG, wiggles toes on RLE      LABS:    CARDIAC MARKERS:                                9.6    10.31 )-----------( 335      ( 03 Sep 2020 07:17 )             31.4     09-03    144  |  107  |  38<H>  ----------------------------<  114<H>  3.8   |  24  |  2.24<H>    Ca    9.8      03 Sep 2020 07:15  Phos  4.5     09-03  Mg     2.3     09-03    TPro  7.3  /  Alb  3.6  /  TBili  0.2  /  DBili  x   /  AST  31  /  ALT  32  /  AlkPhos  72  09-03    proBNP:   Lipid Profile:   HgA1c:   TSH:             TELEMETRY:  	    ECG:  	  RADIOLOGY:   DIAGNOSTIC TESTING:  [ ] Echocardiogram:  [ ]  Catheterization:  [ ] Stress Test:    OTHER:

## 2020-09-03 NOTE — PROGRESS NOTE ADULT - ASSESSMENT
60-year-old male with a history of A-Fib on warfarin who presents with cardiac arrest at work with downtime of about 25 minutes prior to ROSC. S/p therapeutic hypothermia. 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 Now with resolved neurogenic/cardiogenic shock. Course complicated by GI bleed s/p PPI gtt, bleeding from scott s/p clot irrigation, prolonged respiratory failure s/p trach (8/24) and PEG (8/26). Currently with A-Fib with RVR and C diff colitis.  Also developed gross hematuria - Urology consulted +catheter with clot (irrigated and exchanged) suspected 2/2 traumatic catheterization. Additionally course complicated by fever and Pseudomonas PNA (s/p Rx) then developed C diff (now with resolved leukocytosis and improved stooling, now soft per report); remains on enteral Vanco    8/29: Received to [facility]  8/30: Less Agitated overnight, Afebrile, WBC decreasing, CR elevated but Stable, Tolerating TC all day yesterday, will Attempt TC ATC tonight and ABG in am, F/u Bcx from 8/30. Rectal tube dc'd.  Speech eval for AAC device and will F/u.  8/31: will attempt TC around the clock again tonight. Stopped amiodarone as per cards.  Will be cathed if cleared by ID and renal.  9/1: Elevated BP overnight, BP stable today, afebrile. Lethargic earlier today on exam; ABG Stable; Head CT done- no new ICH, ventricles enlarged ? Hydrocephalus, Spoke to Max from [facility] who will see- although felt no intervention required at this time. Tolerating TC ATC, will attempt cuffless trach tomorrow. C diff Iso discontinued as discussed w/ Inf control.  9/2: still with secretions, will downsize when improved. Will cath as outpatient. Decreased opioids 60-year-old male with a history of A-Fib on warfarin who presents with cardiac arrest at work with downtime of about 25 minutes prior to ROSC. S/p therapeutic hypothermia. 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 Now with resolved neurogenic/cardiogenic shock. Course complicated by GI bleed s/p PPI gtt, bleeding from scott s/p clot irrigation, prolonged respiratory failure s/p trach (8/24) and PEG (8/26). Currently with A-Fib with RVR and C diff colitis.  Also developed gross hematuria - Urology consulted +catheter with clot (irrigated and exchanged) suspected 2/2 traumatic catheterization. Additionally course complicated by fever and Pseudomonas PNA (s/p Rx) then developed C diff (now with resolved leukocytosis and improved stooling, now soft per report); remains on enteral Vanco    8/29: Received to [facility]  8/30: Less Agitated overnight, Afebrile, WBC decreasing, CR elevated but Stable, Tolerating TC all day yesterday, will Attempt TC ATC tonight and ABG in am, F/u Bcx from 8/30. Rectal tube dc'd.  Speech eval for AAC device and will F/u.  8/31: will attempt TC around the clock again tonight. Stopped amiodarone as per cards.  Will be cathed if cleared by ID and renal.  9/1: Elevated BP overnight, BP stable today, afebrile. Lethargic earlier today on exam; ABG Stable; Head CT done- no new ICH, ventricles enlarged ? Hydrocephalus, Spoke to Max from NSX who will see- although felt no intervention required at this time. Tolerating TC ATC, will attempt cuffless trach tomorrow. C diff Iso discontinued as discussed w/ Inf control.  9/2: still with secretions, will downsize when improved. Will cath as outpatient. Decreased opioids  9/3: PICC dc'd, Trach downsized to #7 cuffless Portex. stomach distended

## 2020-09-03 NOTE — PROVIDER CONTACT NOTE (CHANGE IN STATUS NOTIFICATION) - ASSESSMENT
Chief Complaint   Patient presents with    Cholesterol Problem     he is a 49y.o. year old male who presents for follow-up of   hyperlipidemia. Cardiovascular risk analysis - hyperlipidemia. Compliance with treatment thus far has been good. New concerns: No.     Social History, Diet and Lifestyle: not attempting to follow a low fat, low cholesterol diet      Lab Results  Component Value Date/Time   Cholesterol, total 150 12/21/2017 09:21 AM   HDL Cholesterol 60 12/21/2017 09:21 AM   LDL, calculated 65 12/21/2017 09:21 AM   Triglyceride 124 12/21/2017 09:21 AM     Lab Results  Component Value Date/Time   ALT (SGPT) 36 12/21/2017 09:21 AM   AST (SGOT) 33 12/21/2017 09:21 AM   Alk. phosphatase 72 12/21/2017 09:21 AM   Bilirubin, total 1.2 12/21/2017 09:21 AM   Albumin 4.3 12/21/2017 09:21 AM   Protein, total 6.7 12/21/2017 09:21 AM   PLATELET 829 74/39/0110 02:31 AM       Lab Results  Component Value Date/Time   GFR est non-AA 85 12/21/2017 09:21 AM   GFR est AA 98 12/21/2017 09:21 AM   Creatinine 1.00 12/21/2017 09:21 AM   BUN 15 12/21/2017 09:21 AM   Sodium 145 (H) 12/21/2017 09:21 AM   Potassium 3.8 12/21/2017 09:21 AM   Chloride 103 12/21/2017 09:21 AM   CO2 25 12/21/2017 09:21 AM        ROS: taking medications as instructed, no medication side effects noted, no TIA's, no chest pain on exertion, no dyspnea on exertion, no swelling of ankles. Reviewed and agree with Nurse Note and duplicated in this note. Reviewed PmHx, RxHx, FmHx, SocHx, AllgHx and updated and dated in the chart.     Family History   Problem Relation Age of Onset    Heart Disease Mother     Stroke Mother        Past Medical History:   Diagnosis Date    Anxiety disorder     CAD (coronary artery disease) 10/10/2017    STEMI and stents    Depression     Diarrhea     Falls     Headache     Joint pain     Joint pain     Memory disorder     Memory loss     Muscle pain     Muscle pain     Muscle weakness     Muscle weakness  Torn rotator cuff     right side    Vertigo     Visual disturbance     Visual disturbance       Social History     Social History    Marital status:      Spouse name: N/A    Number of children: N/A    Years of education: N/A     Social History Main Topics    Smoking status: Never Smoker    Smokeless tobacco: Never Used    Alcohol use No    Drug use: Not on file    Sexual activity: Not on file     Other Topics Concern    Not on file     Social History Narrative      Patient Active Problem List    Diagnosis Date Noted    [facility]    Chest pain 10/10/2017    ST elevation (STEMI) myocardial infarction involving right coronary artery ([facility] 10/10/2017    Anxiety 12/13/2016    Intractable chronic post-traumatic headache 12/13/2016    ADD (attention deficit disorder) 12/13/2016    Hypovitaminosis D 03/07/2016    Hypercholesteremia 03/07/2016     Current Outpatient Prescriptions   Medication Sig Dispense Refill    clopidogrel (PLAVIX) 75 mg tab TAKE 1 TABLET BY ORAL ROUTE EVERY DAY  5    gabapentin (NEURONTIN) 600 mg tablet TAKE ONE(1) TABLET BY MOUTH THREE(3) TIMES DAILY 90 Tab 5    rosuvastatin (CRESTOR) 10 mg tablet Take 1 Tab by mouth nightly. 30 Tab 6    aspirin 81 mg chewable tablet Take 1 Tab by mouth daily. 30 Tab 6    ticagrelor (BRILINTA) 90 mg tablet Take 1 Tab by mouth every twelve (12) hours every twelve (12) hours. 60 Tab 6    metoprolol tartrate (LOPRESSOR) 25 mg tablet Take 1 Tab by mouth every twelve (12) hours. 60 Tab 6    traZODone (DESYREL) 50 mg tablet Take 25 mg by mouth nightly.  sertraline (ZOLOFT) 100 mg tablet Take 200 mg by mouth daily.  ARIPiprazole (ABILIFY) 2 mg tablet Take  by mouth daily.        No Known Allergies  Past Medical History:   Diagnosis Date    Anxiety disorder     CAD (coronary artery disease) 10/10/2017    STEMI and stents    Depression     Diarrhea     Falls     Headache     Joint pain     Joint pain     Memory disorder     Memory loss     Muscle pain     Muscle pain     Muscle weakness     Muscle weakness     Torn rotator cuff     right side    Vertigo     Visual disturbance     Visual disturbance      Past Surgical History:   Procedure Laterality Date    HX ORTHOPAEDIC  02/2017    rotator cuff repair    HX OTHER SURGICAL  2016    cervical radiculopathy     Family History   Problem Relation Age of Onset    Heart Disease Mother     Stroke Mother            Objective: There were no vitals filed for this visit. Physical Examination: General appearance - alert, well appearing, and in no distress  Eyes - pupils equal and reactive, extraocular eye movements intact  Ears - bilateral TM's and external ear canals normal  Nose - normal and patent, no erythema, discharge or polyps  Mouth - mucous membranes moist, pharynx normal without lesions  Neck - supple, no significant adenopathy  Chest - clear to auscultation, no wheezes, rales or rhonchi, symmetric air entry  Heart - normal rate, regular rhythm, normal S1, S2, no murmurs, rubs, clicks or gallops  Abdomen - soft, nontender, nondistended, no masses or organomegaly  Musculoskeletal - no joint tenderness, deformity or swelling  Extremities - peripheral pulses normal, no pedal edema, no clubbing or cyanosis  Skin - normal coloration and turgor, no rashes, no suspicious skin lesions noted    Assessment/ Plan:   Diagnoses and all orders for this visit:    1. Hypercholesteremia  -     LIPID PANEL    2. Hypovitaminosis D  -     VITAMIN D, 25 HYDROXY    3. Elevated blood sugar  -     HEMOGLOBIN A1C WITH EAG    4. ST elevation (STEMI) myocardial infarction involving right coronary artery [facility])  Assessment & Plan:  Stable, based on history, physical exam and review of pertinent labs, studies and medications; meds reconciled; continue current treatment plan.   Key CAD CHF Meds             clopidogrel (PLAVIX) 75 mg tab  (Taking) TAKE 1 TABLET BY ORAL ROUTE EVERY DAY    aspirin 81 mg chewable tablet  (Taking) Take 1 Tab by mouth daily. ticagrelor (BRILINTA) 90 mg tablet  (Taking) Take 1 Tab by mouth every twelve (12) hours every twelve (12) hours. metoprolol tartrate (LOPRESSOR) 25 mg tablet  (Taking) Take 1 Tab by mouth every twelve (12) hours. Key Antihyperlipidemia Meds             rosuvastatin (CRESTOR) 10 mg tablet  (Taking) Take 1 Tab by mouth nightly. Lab Results   Component Value Date/Time    Sodium 145 12/21/2017 09:21 AM    Potassium 3.8 12/21/2017 09:21 AM    Cholesterol, total 150 12/21/2017 09:21 AM    HDL Cholesterol 60 12/21/2017 09:21 AM    LDL, calculated 65 12/21/2017 09:21 AM    Triglyceride 124 12/21/2017 09:21 AM         5. Recurrent depression ([facility]  Assessment & Plan:  Stable, based on history, physical exam and review of pertinent labs, studies and medications; meds reconciled; continue current treatment plan. Key Psychotherapeutic Meds             traZODone (DESYREL) 50 mg tablet  (Taking) Take 25 mg by mouth nightly. sertraline (ZOLOFT) 100 mg tablet  (Taking) Take 200 mg by mouth daily. ARIPiprazole (ABILIFY) 2 mg tablet Take  by mouth daily. Other [facility]             gabapentin (NEURONTIN) 600 mg tablet  (Taking) TAKE ONE(1) TABLET BY MOUTH THREE(3) TIMES DAILY        Lab Results   Component Value Date/Time    Sodium 145 12/21/2017 09:21 AM    Creatinine 1.00 12/21/2017 09:21 AM    WBC 7.9 10/11/2017 02:31 AM    ALT (SGPT) 36 12/21/2017 09:21 AM    AST (SGOT) 33 12/21/2017 09:21 AM          Follow-up Disposition:  Return in about 6 months (around 8/16/2018) for Complete Physical.    I have discussed the diagnosis with the patient and the intended plan as seen in the above orders. The patient has received an after-visit summary and questions were answered concerning future plans.      Medication Side Effects and Warnings were discussed with patient: yes  Patient Labs were reviewed and Pt alet, confused, anxious, restless, not in acute form of distress. or requested: yes  Patient Past Records were reviewed and or requested  yes  I have discussed the diagnosis with the patient and the intended plan as seen in the above orders. The patient has received an after-visit summary and questions were answered concerning future plans. Pt agrees to call or return to clinic and/or go to closest ER with any worsening of symptoms. This may include, but not limited to increased fever (>100.4) with NSAIDS or Tylenol, increased edema, confusion, rash, worsening of presenting symptoms. 1) Remember to stay active and/or exercise regularly (I suggest 30-45 minutes daily)   2) For reliable dietary information, go to www. EATRIGHT.org. You may wish to consider seeing the nutritionist at [facility] at #[phone] or [phone], also consider the [address] [facility]. 3) I routinely suggest a complete physical exam once each year (your birth month)    1. Have you been to the ER, urgent care clinic since your last visit? Hospitalized since your last visit? No    2. Have you seen or consulted any other health care providers outside of the [address] [place] [address] since your last visit? Include any pap smears or colon screening.  No

## 2020-09-03 NOTE — PROGRESS NOTE ADULT - ASSESSMENT
60 year old male s/p cardiac arrest c/b GIB and bleeding from scott with perimesencephalic (HH4 mF4) subarachnoid hemorrhage, CT showed R periclinoidal/perimesencephalic SAH, c/f aneurysm rupture, no hydrocephalus.  angio neg x2, trach/vent and peg. course also complicated by gross hematuria - Urology consulted +catheter with clot (irrigated and exchanged) suspected 2/2 traumatic catheterization. treated for pseudomonas HCAP. In addition, has developed c diff. pt noticed with abn renal function and requires coronary angiogram.      1- JALEN on ckd III likely   2- HTN   3- respiratory failure  4- s/p cardiac arrest  5- c diff   6- s/p cardiac arrest       JALEN in setting of infections suspected along with his cardiac arrest  cr is now stabilizing at this level   cont O2 support    hydralazine 50 mg tid   metoprolol 50 mg tid

## 2020-09-03 NOTE — PROGRESS NOTE ADULT - SUBJECTIVE AND OBJECTIVE BOX
[facility]   [phone]  RENAL FOLLOW UP NOTE  --------------------------------------------------------------------------------  Chief Complaint:    24 hour events/subjective:    seen earlier. + trach     PAST HISTORY  --------------------------------------------------------------------------------  No significant changes to PMH, PSH, FHx, SHx, unless otherwise noted    ALLERGIES & MEDICATIONS  --------------------------------------------------------------------------------  Allergies    No Known Allergies    Intolerances      Standing Inpatient Medications  albuterol/ipratropium for Nebulization. 3 milliLiter(s) Nebulizer every 6 hours  artificial  tears Solution 1 Drop(s) Both EYES every 6 hours  aspirin  chewable 81 milliGRAM(s) Enteral Tube daily  bisacodyl Suppository 10 milliGRAM(s) Rectal at bedtime  chlorhexidine 4% Liquid 1 Application(s) Topical <User Schedule>  clonazePAM  Tablet 0.25 milliGRAM(s) Oral every 12 hours  dextrose 5%. 1000 milliLiter(s) IV Continuous <Continuous>  dextrose 50% Injectable 12.5 Gram(s) IV Push once  dextrose 50% Injectable 25 Gram(s) IV Push once  dextrose 50% Injectable 25 Gram(s) IV Push once  famotidine    Tablet 20 milliGRAM(s) Oral daily  fentaNYL   Patch  50 MICROgram(s)/Hr 1 Patch Transdermal every 72 hours  heparin   Injectable 5000 Unit(s) SubCutaneous every 8 hours  hydrALAZINE 75 milliGRAM(s) Oral three times a day  isosorbide   dinitrate Tablet (ISORDIL) 20 milliGRAM(s) Enteral Tube <User Schedule>  lactobacillus acidophilus 1 Tablet(s) Oral two times a day  metoprolol tartrate 50 milliGRAM(s) Oral three times a day  nystatin    Suspension [id number] Unit(s) Oral every 6 hours  QUEtiapine 50 milliGRAM(s) Oral every 6 hours  sodium chloride 7% Inhalation 4 milliLiter(s) Inhalation every 12 hours    PRN Inpatient Medications  acetaminophen   Tablet .. 650 milliGRAM(s) Oral every 6 hours PRN  dextrose 40% Gel 15 Gram(s) Oral once PRN  glucagon  Injectable 1 milliGRAM(s) IntraMuscular once PRN  HYDROmorphone  Injectable 0.5 milliGRAM(s) IV Push every 8 hours PRN      REVIEW OF SYSTEMS  --------------------------------------------------------------------------------        VITALS/PHYSICAL EXAM  --------------------------------------------------------------------------------  T(C): 36.7 (09-03-20 @ 15:00), Max: 36.7 (09-03-20 @ 15:00)  HR: 92 (09-03-20 @ 17:26) (72 - 104)  BP: 156/99 (09-03-20 @ 17:00) (149/90 - 178/123)  RR: 20 (09-03-20 @ 16:11) (20 - 922)  SpO2: 100% (09-03-20 @ 17:26) (95% - 100%)  Wt(kg): --        09-02-20 @ 07:01  -  09-03-20 @ 07:00  --------------------------------------------------------  IN: 970 mL / OUT: 1800 mL / NET: -830 mL      Physical Exam:  	    Gen: trach    	no jvd    	Pulm: decrease bs  no rales  + ronchi -  wheezing  	CV: RRR, S1S2; no rub  	Abd: +BS, soft, nontender/nondistended + peg   	UE: Warm, no cyanosis  no clubbing,  no edema  	LE: Warm, no cyanosis  no clubbing, no edema  	Skin: Warm, no decrease skin turgor       LABS/STUDIES  --------------------------------------------------------------------------------              9.6    10.31 >-----------<  335      [09-03-20 @ 07:17]              31.4     144  |  107  |  38  ----------------------------<  114      [09-03-20 @ 07:15]  3.8   |  24  |  2.24        Ca     9.8     [09-03-20 @ 07:15]      Mg     2.3     [09-03-20 @ 07:15]      Phos  4.5     [09-03-20 @ 07:15]    TPro  7.3  /  Alb  3.6  /  TBili  0.2  /  DBili  x   /  AST  31  /  ALT  32  /  AlkPhos  72  [09-03-20 @ 07:15]          Creatinine Trend:  SCr 2.24 [09-03 @ 07:15]  SCr 2.21 [09-02 @ 07:06]  SCr 2.07 [09-01 @ 07:13]  SCr 2.04 [08-31 @ 06:43]  SCr 2.26 [08-30 @ 06:35]              Urinalysis - [08-15-20 @ 12:30]      Color Yellow / Appearance Clear / SG 1.028 / pH 6.0      Gluc Negative / Ketone Negative  / Bili Negative / Urobili Negative       Blood Negative / Protein 100 / Leuk Est Negative / Nitrite Negative      RBC  / WBC  / Hyaline  / Gran  / Sq Epi  / Non Sq Epi  / Bacteria       TSH 1.91      [08-09-20 @ 17:20]  Lipid: chol 148, , HDL 37, LDL 68      [08-09-20 @ 17:21]

## 2020-09-03 NOTE — PROGRESS NOTE ADULT - PROBLEM SELECTOR PLAN 6
A-Fib with RVR:  - DC'd Amiodarone (8/31)  - continue metoprolol to 50 mg q8h  - continue Hydralazine/imdur  - Hold off on therapeutic a/c given SAH  - SBP goal 100- 160  -TTE: Global LV dysfunction. EF 41%, Severe concentric LVH, flattening of interventricular septum.   -CTA chest - negative PE   - Will need Angiogram when cleared A-Fib with RVR:  - DC'd Amiodarone (8/31)  - continue metoprolol to 50 mg q8h  - continue Hydralazine/imdur  - Hold off on therapeutic a/c given SAH  - SBP goal 100- 160  -TTE: Global LV dysfunction. EF 41%, Severe concentric LVH, flattening of interventricular septum.   -CTA chest - negative PE

## 2020-09-03 NOTE — PROGRESS NOTE ADULT - SUBJECTIVE AND OBJECTIVE BOX
Patient is a 60y old  Male who presents with a chief complaint of cdif (02 Sep 2020 17:18)    HPI:  61YO male on coumadin for afib s/p cardiac arrest at work, down 25 minutes prior to ROSC. Pupils were R fixed and dilated, left fixed at the time, no gag reflex, post-CA cooling protocol was initiated down to 35 deg. Intubated and put on Nimbex drip. Also on Propofol, fentanyl, levophed. R groin minerva placed. Also put on heparin post-CA. 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 Course also c/b GIB, put on protonix drip. Prior to xfer was started on 3% at 30cc/hr. (09 Aug 2020 14:30)    Interval Events:    REVIEW OF SYSTEMS:  [ ] Positive  [ ] All other systems negative  [ ] Unable to assess ROS because ________    Vital Signs Last 24 Hrs  T(C): 36.6 (09-03-20 @ 05:45), Max: 36.8 (09-02-20 @ 14:34)  T(F): 97.9 (09-03-20 @ 05:45), Max: 98.2 (09-02-20 @ 14:34)  HR: 96 (09-03-20 @ 05:45) (72 - 104)  BP: 169/100 (09-03-20 @ 05:45) (149/90 - 189/93)  RR: 22 (09-03-20 @ 05:45) (22 - 23)  SpO2: 98% (09-03-20 @ 05:45) (95% - 100%)    PHYSICAL EXAM:  HEENT:   [ ]Tracheostomy:  [ ]Pupils equal  [ ]No oral lesions  [ ]Abnormal    SKIN  [ ] No Rash  [ ] Abnormal  [ ] pressure    CARDIAC  [ ]Regular  [ ]Abnormal    PULMONARY  [ ]Bilateral Clear Breath Sounds  [ ]Normal Excursion  [ ]Abnormal    GI  [ ]PEG      [ ] +BS		              [ ]Soft, nondistended, nontender	  [ ]Abnormal    MUSCULOSKELETAL                                   [ ]Bedbound                 [ ]Abnormal    [ ]Ambulatory/OOB to chair                           EXTREMITIES                                         [ ]Normal  [ ]Edema                           NEUROLOGIC  [ ] Normal, non focal  [ ] Focal findings:    PSYCHIATRIC  [ ]Alert and appropriate  [ ] Sedated	 [ ]Agitated    :  Leeroy: [ ] Yes, if yes: Date of Placement:                   [  ] No    LINES: Central Lines [ ] Yes, if yes: Date of Placement                                     [  ] No    HOSPITAL MEDICATIONS:  MEDICATIONS  (STANDING):  albuterol/ipratropium for Nebulization. 3 milliLiter(s) Nebulizer every 6 hours  artificial  tears Solution 1 Drop(s) Both EYES every 6 hours  aspirin  chewable 81 milliGRAM(s) Enteral Tube daily  chlorhexidine 4% Liquid 1 Application(s) Topical <User Schedule>  clonazePAM  Tablet 0.25 milliGRAM(s) Oral every 12 hours  dextrose 5%. 1000 milliLiter(s) (50 mL/Hr) IV Continuous <Continuous>  dextrose 50% Injectable 12.5 Gram(s) IV Push once  dextrose 50% Injectable 25 Gram(s) IV Push once  dextrose 50% Injectable 25 Gram(s) IV Push once  famotidine    Tablet 20 milliGRAM(s) Oral daily  fentaNYL   Patch  50 MICROgram(s)/Hr 1 Patch Transdermal every 72 hours  heparin   Injectable 5000 Unit(s) SubCutaneous every 8 hours  hydrALAZINE 50 milliGRAM(s) Oral three times a day  isosorbide   dinitrate Tablet (ISORDIL) 20 milliGRAM(s) Enteral Tube <User Schedule>  lactobacillus acidophilus 1 Tablet(s) Oral two times a day  metoprolol tartrate 50 milliGRAM(s) Oral three times a day  nystatin    Suspension 773906 Unit(s) Oral every 6 hours  QUEtiapine 50 milliGRAM(s) Oral every 6 hours  sodium chloride 7% Inhalation 4 milliLiter(s) Inhalation every 12 hours    MEDICATIONS  (PRN):  acetaminophen   Tablet .. 650 milliGRAM(s) Oral every 6 hours PRN Temp greater or equal to 38C (100.4F), Mild Pain (1 - 3)  dextrose 40% Gel 15 Gram(s) Oral once PRN Blood Glucose LESS THAN 70 milliGRAM(s)/deciliter  glucagon  Injectable 1 milliGRAM(s) IntraMuscular once PRN Glucose LESS THAN 70 milligrams/deciliter  HYDROmorphone  Injectable 0.5 milliGRAM(s) IV Push every 6 hours PRN breakthrough pain      LABS:                        9.4    10.74 )-----------( 325      ( 02 Sep 2020 10:32 )             31.1     09-02    143  |  108  |  35<H>  ----------------------------<  137<H>  4.0   |  24  |  2.21<H>    Ca    9.5      02 Sep 2020 07:06  Phos  5.1     09-02  Mg     2.4     09-02    TPro  7.0  /  Alb  3.3  /  TBili  0.2  /  DBili  x   /  AST  35  /  ALT  34  /  AlkPhos  68  09-02    Radha Shearer, [facility]  [id number] Patient is a 60y old  Male who presents with a chief complaint of cdif (02 Sep 2020 17:18)    HPI:  61YO male on coumadin for afib s/p cardiac arrest at work, down 25 minutes prior to ROSC. Pupils were R fixed and dilated, left fixed at the time, no gag reflex, post-CA cooling protocol was initiated down to 35 deg. Intubated and put on Nimbex drip. Also on Propofol, fentanyl, levophed. R groin minerva placed. Also put on heparin post-CA. 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 Course also c/b GIB, put on protonix drip. Prior to xfer was started on 3% at 30cc/hr. (09 Aug 2020 14:30)    Interval Events: No issues overnight. Patient with peripheral access, PICC line DC'd. Trach downsized by ENT    REVIEW OF SYSTEMS:  [ x] Positive, complaining of back pain  [ ] All other systems negative  [ ] Unable to assess ROS because ________    Vital Signs Last 24 Hrs  T(C): 36.6 (09-03-20 @ 05:45), Max: 36.8 (09-02-20 @ 14:34)  T(F): 97.9 (09-03-20 @ 05:45), Max: 98.2 (09-02-20 @ 14:34)  HR: 96 (09-03-20 @ 05:45) (72 - 104)  BP: 169/100 (09-03-20 @ 05:45) (149/90 - 189/93)  RR: 22 (09-03-20 @ 05:45) (22 - 23)  SpO2: 98% (09-03-20 @ 05:45) (95% - 100%)    PHYSICAL EXAM:    HEENT:   [x]Tracheostomy: #7 cuffless, Portex  [ ]Pupils equal; Right eye ptosis   [ ]No oral lesions  [x]Abnormal: pupils unequal    SKIN  [x]No Rash  [ ] Abnormal  [ ] pressure    CARDIAC  [ ]Regular  [x]Abnormal- irregularly irregular     PULMONARY  [x]Bilateral Clear Breath Sounds  [ ]Normal Excursion  [ ]Abnormal    GI  [x]PEG      [x] +BS		              [ ]Soft, nondistended, nontender	  [x]Abnormal- mild distention, tympanic    MUSCULOSKELETAL                                   [x]Bedbound                 [x]Abnormal- moves RUE/RLE; unable to move L side s/p SAH this admission   [ ]Ambulatory/OOB to chair                           EXTREMITIES                                         []Normal  [x]Edema- trace LE edema                           NEUROLOGIC  [ ] Normal, non focal  [x] Focal findings: awake, open eyes, sluggish tracking of L eye, able to squeeze R hand, able to move R toes    PSYCHIATRIC  [x]Alert and appropriate  [ ] Sedated	 [ ]Agitated    :  Umaña: [ ] Yes, if yes: Date of Placement:                   [x] No: condom catheter     LINES: Central Lines [] Yes, if yes: Date of Placement                                     [  ] No    HOSPITAL MEDICATIONS:  MEDICATIONS  (STANDING):  albuterol/ipratropium for Nebulization. 3 milliLiter(s) Nebulizer every 6 hours  artificial  tears Solution 1 Drop(s) Both EYES every 6 hours  aspirin  chewable 81 milliGRAM(s) Enteral Tube daily  chlorhexidine 4% Liquid 1 Application(s) Topical <User Schedule>  clonazePAM  Tablet 0.25 milliGRAM(s) Oral every 12 hours  dextrose 5%. 1000 milliLiter(s) (50 mL/Hr) IV Continuous <Continuous>  dextrose 50% Injectable 12.5 Gram(s) IV Push once  dextrose 50% Injectable 25 Gram(s) IV Push once  dextrose 50% Injectable 25 Gram(s) IV Push once  famotidine    Tablet 20 milliGRAM(s) Oral daily  fentaNYL   Patch  50 MICROgram(s)/Hr 1 Patch Transdermal every 72 hours  heparin   Injectable 5000 Unit(s) SubCutaneous every 8 hours  hydrALAZINE 50 milliGRAM(s) Oral three times a day  isosorbide   dinitrate Tablet (ISORDIL) 20 milliGRAM(s) Enteral Tube <User Schedule>  lactobacillus acidophilus 1 Tablet(s) Oral two times a day  metoprolol tartrate 50 milliGRAM(s) Oral three times a day  nystatin    Suspension [id number] Unit(s) Oral every 6 hours  QUEtiapine 50 milliGRAM(s) Oral every 6 hours  sodium chloride 7% Inhalation 4 milliLiter(s) Inhalation every 12 hours    MEDICATIONS  (PRN):  acetaminophen   Tablet .. 650 milliGRAM(s) Oral every 6 hours PRN Temp greater or equal to 38C (100.4F), Mild Pain (1 - 3)  dextrose 40% Gel 15 Gram(s) Oral once PRN Blood Glucose LESS THAN 70 milliGRAM(s)/deciliter  glucagon  Injectable 1 milliGRAM(s) IntraMuscular once PRN Glucose LESS THAN 70 milligrams/deciliter  HYDROmorphone  Injectable 0.5 milliGRAM(s) IV Push every 6 hours PRN breakthrough pain      LABS:                        9.4    10.74 )-----------( 325      ( 02 Sep 2020 10:32 )             31.1     09-02    143  |  108  |  35<H>  ----------------------------<  137<H>  4.0   |  24  |  2.21<H>    Ca    9.5      02 Sep 2020 07:06  Phos  5.1     09-02  Mg     2.4     09-02    TPro  7.0  /  Alb  3.3  /  TBili  0.2  /  DBili  x   /  AST  35  /  ALT  34  /  AlkPhos  68  09-02    Radha Shearer, [facility]  [id number]

## 2020-09-03 NOTE — CHART NOTE - NSCHARTNOTEFT_GEN_A_CORE
Nutrition Chart Note.  Pt seen for: Nutrition follow-up on RCU    Source: EMR, Team; Pt trached/unable to speak    Chart reviewed, events noted. Pt is a 59 yo M with PMH: [place] on coumadin s/p cardiac arrest at work. Downtime 25 minutes prior to ROSC. 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 Was intubated at OSH, transferred to [facility] for further care. Course c/b GIB, put on Protonix drip. C.Diff positive; continues on antibiotics as ordered. Pt s/p angio 8/20; negative. S/P tracheostomy 8/24. C/b agitation, requiring propofol and fentanyl gtts (both discontinued). S/p PEG 8/26. Pt currently on TC 30% around the clock since 8/30, trach downsized.    Diet : NPO with Tube Feeds via PEG  Enteral /Parenteral Nutrition: Vital AF goal rate 70mL/hr x 24hrs. Provides total volume 1680mL formula, 2016kcals, 126g protein, 1363mL free H2O  - Continues to receive DanActive twice daily.     Nutrition Events:  - Rectal tube discontinued 8/31  - S/p PEG 8/26  - Receiving lactobacillus acidophilus   - Propofol discontinued 8/13    GI: c fecal incontinence, last BM noted 9/2 - no bowel regimen ordered. RN notes pt tolerating EN regimen well, no N+V, however pt with abdominal distention.  - was c Cdiff, Completed oral Vanco 8/29     Current Weight: Will continue to monitor/trend weight status.  Weight (kg): 110.2 (08-12), 110.1 (08-26), 106.2 (09-02)      Pertinent Medications: MEDICATIONS  (STANDING):  albuterol/ipratropium for Nebulization. 3 milliLiter(s) Nebulizer every 6 hours  artificial  tears Solution 1 Drop(s) Both EYES every 6 hours  aspirin  chewable 81 milliGRAM(s) Enteral Tube daily  chlorhexidine 4% Liquid 1 Application(s) Topical <User Schedule>  clonazePAM  Tablet 0.25 milliGRAM(s) Oral every 12 hours  dextrose 5%. 1000 milliLiter(s) (50 mL/Hr) IV Continuous <Continuous>  dextrose 50% Injectable 12.5 Gram(s) IV Push once  dextrose 50% Injectable 25 Gram(s) IV Push once  dextrose 50% Injectable 25 Gram(s) IV Push once  famotidine    Tablet 20 milliGRAM(s) Oral daily  heparin   Injectable 5000 Unit(s) SubCutaneous every 8 hours  hydrALAZINE 50 milliGRAM(s) Oral three times a day  isosorbide   dinitrate Tablet (ISORDIL) 20 milliGRAM(s) Enteral Tube <User Schedule>  lactobacillus acidophilus 1 Tablet(s) Oral two times a day  metoprolol tartrate 50 milliGRAM(s) Oral three times a day  nystatin    Suspension 290420 Unit(s) Oral every 6 hours  QUEtiapine 50 milliGRAM(s) Oral every 6 hours  sodium chloride 7% Inhalation 4 milliLiter(s) Inhalation every 12 hours    MEDICATIONS  (PRN):  acetaminophen   Tablet .. 650 milliGRAM(s) Oral every 6 hours PRN Temp greater or equal to 38C (100.4F), Mild Pain (1 - 3)  dextrose 40% Gel 15 Gram(s) Oral once PRN Blood Glucose LESS THAN 70 milliGRAM(s)/deciliter  glucagon  Injectable 1 milliGRAM(s) IntraMuscular once PRN Glucose LESS THAN 70 milligrams/deciliter  HYDROmorphone  Injectable 0.5 milliGRAM(s) IV Push every 6 hours PRN breakthrough pain    Pertinent Labs:  09-03 Na144 mmol/L Glu 114 mg/dL<H> K+ 3.8 mmol/L Cr  2.24 mg/dL<H> BUN 38 mg/dL<H> 09-03 Phos 4.5 mg/dL 09-03 Alb 3.6 g/dL 08-09 Chol 148 mg/dL LDL 68 mg/dL HDL 37 mg/dL<L> Trig 218 mg/dL<H>    CAPILLARY BLOOD GLUCOSE  POCT Blood Glucose.: 113 mg/dL (03 Sep 2020 11:34)  POCT Blood Glucose.: 126 mg/dL (03 Sep 2020 06:14)  POCT Blood Glucose.: 122 mg/dL (02 Sep 2020 23:43)  POCT Blood Glucose.: 127 mg/dL (02 Sep 2020 17:39)    Skin per nursing documentation: no pressure injuries noted  Edema: none indicated at this time    Estimated Needs:   based on upper IBW 76.8kg:   Energy: (25-30kcal/kg): 1920-2304kcal  Protein: (1.4-1.6g protein/kg): 108-123g protein      Previous Nutrition Diagnosis: increased nutrient needs  Nutrition Diagnosis is: ongoing, with provision of EN feeds       New Nutrition Diagnosis: N/A      Interventions:   1. Continue Vital AF at goal rate 70ml/hr x 24 hrs. Provides total volume 1680mL formula, 2016kcals, 126g protein, 1363mL free H2O (meets 26kcal/kg & 1.6g protein/kg, based on upper IBW 76.8kg)  2. Continue Cuong Active twice daily.   3. Monitor GI tolerance. RD to remain available to adjust EN formulary, volume/rate PRN.       Monitoring and Evaluation:   Continue to monitor Nutritional intake, Tolerance to diet prescription, weights, labs, skin integrity  RD remains available upon request and will follow up per protocol Nutrition Chart Note.  Pt seen for: Nutrition follow-up on RCU    Source: EMR, Team; Pt trached/unable to speak    Chart reviewed, events noted. Pt is a 59 yo M with PMH: [place] on coumadin s/p cardiac arrest at work. Downtime 25 minutes prior to ROSC. 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 Was intubated at OSH, transferred to [facility] for further care. Course c/b GIB, put on Protonix drip. C.Diff positive; continues on antibiotics as ordered. Pt s/p angio 8/20; negative. S/P tracheostomy 8/24. C/b agitation, requiring propofol and fentanyl gtts (both discontinued). S/p PEG 8/26. Pt currently on TC 30% around the clock since 8/30, trach downsized.    Diet : NPO with Tube Feeds via PEG  Enteral /Parenteral Nutrition: Vital AF goal rate 70mL/hr x 24hrs. Provides total volume 1680mL formula, 2016kcals, 126g protein, 1363mL free H2O  - Continues to receive DanActive twice daily.     Nutrition Events:  - Rectal tube discontinued 8/31  - S/p PEG 8/26  - Receiving lactobacillus acidophilus   - Propofol discontinued 8/13    GI: c fecal incontinence, last BM noted 9/2 - no bowel regimen ordered. RN notes pt tolerating EN regimen well, no N+V, however pt with abdominal distention, GI following.  - was c Cdiff, Completed oral Vanco 8/29     Current Weight: Will continue to monitor/trend weight status.  Weight (kg): 110.2 (08-12), 110.1 (08-26), 106.2 (09-02)      Pertinent Medications: MEDICATIONS  (STANDING):  albuterol/ipratropium for Nebulization. 3 milliLiter(s) Nebulizer every 6 hours  artificial  tears Solution 1 Drop(s) Both EYES every 6 hours  aspirin  chewable 81 milliGRAM(s) Enteral Tube daily  chlorhexidine 4% Liquid 1 Application(s) Topical <User Schedule>  clonazePAM  Tablet 0.25 milliGRAM(s) Oral every 12 hours  dextrose 5%. 1000 milliLiter(s) (50 mL/Hr) IV Continuous <Continuous>  dextrose 50% Injectable 12.5 Gram(s) IV Push once  dextrose 50% Injectable 25 Gram(s) IV Push once  dextrose 50% Injectable 25 Gram(s) IV Push once  famotidine    Tablet 20 milliGRAM(s) Oral daily  heparin   Injectable 5000 Unit(s) SubCutaneous every 8 hours  hydrALAZINE 50 milliGRAM(s) Oral three times a day  isosorbide   dinitrate Tablet (ISORDIL) 20 milliGRAM(s) Enteral Tube <User Schedule>  lactobacillus acidophilus 1 Tablet(s) Oral two times a day  metoprolol tartrate 50 milliGRAM(s) Oral three times a day  nystatin    Suspension 509765 Unit(s) Oral every 6 hours  QUEtiapine 50 milliGRAM(s) Oral every 6 hours  sodium chloride 7% Inhalation 4 milliLiter(s) Inhalation every 12 hours    MEDICATIONS  (PRN):  acetaminophen   Tablet .. 650 milliGRAM(s) Oral every 6 hours PRN Temp greater or equal to 38C (100.4F), Mild Pain (1 - 3)  dextrose 40% Gel 15 Gram(s) Oral once PRN Blood Glucose LESS THAN 70 milliGRAM(s)/deciliter  glucagon  Injectable 1 milliGRAM(s) IntraMuscular once PRN Glucose LESS THAN 70 milligrams/deciliter  HYDROmorphone  Injectable 0.5 milliGRAM(s) IV Push every 6 hours PRN breakthrough pain    Pertinent Labs:  09-03 Na144 mmol/L Glu 114 mg/dL<H> K+ 3.8 mmol/L Cr  2.24 mg/dL<H> BUN 38 mg/dL<H> 09-03 Phos 4.5 mg/dL 09-03 Alb 3.6 g/dL 08-09 Chol 148 mg/dL LDL 68 mg/dL HDL 37 mg/dL<L> Trig 218 mg/dL<H>    CAPILLARY BLOOD GLUCOSE  POCT Blood Glucose.: 113 mg/dL (03 Sep 2020 11:34)  POCT Blood Glucose.: 126 mg/dL (03 Sep 2020 06:14)  POCT Blood Glucose.: 122 mg/dL (02 Sep 2020 23:43)  POCT Blood Glucose.: 127 mg/dL (02 Sep 2020 17:39)    Skin per nursing documentation: no pressure injuries noted  Edema: none indicated at this time    Estimated Needs:   based on upper IBW 76.8kg:   Energy: (25-30kcal/kg): 1920-2304kcal  Protein: (1.4-1.6g protein/kg): 108-123g protein      Previous Nutrition Diagnosis: increased nutrient needs  Nutrition Diagnosis is: ongoing, with provision of EN feeds       New Nutrition Diagnosis: N/A      Interventions:   1. Continue Vital AF at goal rate 70ml/hr x 24 hrs. Provides total volume 1680mL formula, 2016kcals, 126g protein, 1363mL free H2O (meets 26kcal/kg & 1.6g protein/kg, based on upper IBW 76.8kg)  2. Continue Cuong Active twice daily.   3. Monitor GI tolerance. RD to remain available to adjust EN formulary, volume/rate PRN.       Monitoring and Evaluation:   Continue to monitor Nutritional intake, Tolerance to diet prescription, weights, labs, skin integrity  RD remains available upon request and will follow up per protocol    Henny Strange, MS, RD, CDN  pager #[phone]

## 2020-09-03 NOTE — PROGRESS NOTE ADULT - SUBJECTIVE AND OBJECTIVE BOX
ENT ISSUE/POD:  s/p tracheostomy POD10    HPI: 61 y/o male s/p tracheostomy #8 [facility], POD 10. Pt seen and examined at bedside. No acute events overnight. Pt tolerating TC for the past 4 days. ENT was called for trach downsize. No acute events overnight.           PAST MEDICAL & SURGICAL HISTORY:  On warfarin for atrial fibrillation  No significant past surgical history    Allergies    No Known Allergies    Intolerances      MEDICATIONS  (STANDING):  albuterol/ipratropium for Nebulization. 3 milliLiter(s) Nebulizer every 6 hours  artificial  tears Solution 1 Drop(s) Both EYES every 6 hours  aspirin  chewable 81 milliGRAM(s) Enteral Tube daily  chlorhexidine 4% Liquid 1 Application(s) Topical <User Schedule>  clonazePAM  Tablet 0.25 milliGRAM(s) Oral every 12 hours  dextrose 5%. 1000 milliLiter(s) (50 mL/Hr) IV Continuous <Continuous>  dextrose 50% Injectable 12.5 Gram(s) IV Push once  dextrose 50% Injectable 25 Gram(s) IV Push once  dextrose 50% Injectable 25 Gram(s) IV Push once  famotidine    Tablet 20 milliGRAM(s) Oral daily  fentaNYL   Patch  50 MICROgram(s)/Hr 1 Patch Transdermal every 72 hours  heparin   Injectable 5000 Unit(s) SubCutaneous every 8 hours  hydrALAZINE 50 milliGRAM(s) Oral three times a day  isosorbide   dinitrate Tablet (ISORDIL) 20 milliGRAM(s) Enteral Tube <User Schedule>  lactobacillus acidophilus 1 Tablet(s) Oral two times a day  metoprolol tartrate 50 milliGRAM(s) Oral three times a day  nystatin    Suspension 577584 Unit(s) Oral every 6 hours  QUEtiapine 50 milliGRAM(s) Oral every 6 hours  sodium chloride 7% Inhalation 4 milliLiter(s) Inhalation every 12 hours    MEDICATIONS  (PRN):  acetaminophen   Tablet .. 650 milliGRAM(s) Oral every 6 hours PRN Temp greater or equal to 38C (100.4F), Mild Pain (1 - 3)  dextrose 40% Gel 15 Gram(s) Oral once PRN Blood Glucose LESS THAN 70 milliGRAM(s)/deciliter  glucagon  Injectable 1 milliGRAM(s) IntraMuscular once PRN Glucose LESS THAN 70 milligrams/deciliter  HYDROmorphone  Injectable 0.5 milliGRAM(s) IV Push every 6 hours PRN breakthrough pain      Social History: see consult    Family history: see consult    ROS:   ENT: all negative except as noted in HPI   Pulm: denies SOB, cough, hemoptysis  Neuro: denies numbness/tingling, loss of sensation  Endo: denies heat/cold intolerance, excessive sweating      Vital Signs Last 24 Hrs  T(C): 36.4 (03 Sep 2020 10:00), Max: 36.8 (02 Sep 2020 14:34)  T(F): 97.5 (03 Sep 2020 10:00), Max: 98.2 (02 Sep 2020 14:34)  HR: 103 (03 Sep 2020 10:00) (72 - 104)  BP: 165/97 (03 Sep 2020 10:00) (149/90 - 189/93)  BP(mean): --  RR: 22 (03 Sep 2020 10:00) (22 - 23)  SpO2: 99% (03 Sep 2020 10:00) (95% - 100%)                          9.6    10.31 )-----------( 335      ( 03 Sep 2020 07:17 )             31.4    09-03    144  |  107  |  38<H>  ----------------------------<  114<H>  3.8   |  24  |  2.24<H>    Ca    9.8      03 Sep 2020 07:15  Phos  4.5     09-03  Mg     2.3     09-03    TPro  7.3  /  Alb  3.6  /  TBili  0.2  /  DBili  x   /  AST  31  /  ALT  32  /  AlkPhos  72  09-03       PHYSICAL EXAM:  Skin: No rashes, bruises, or lesions  Head: Normocephalic, Atraumatic  Face: no edema, erythema, or fluctuance. Parotid glands soft without mass  Eyes: no scleral injection  Nose: Nares bilaterally patent, no discharge  Mouth: No Stridor / Drooling / Trismus.  Mucosa moist, tongue/uvula midline, oropharynx clear  Neck: #8 Shiley DCT in place, cuff deflated. Scant serosanguineous drainage. No bleeding noted from stoma, sutures x4 removed and umbilical tie replaced with velcro tie.  No lymphadenopathy, trachea midline, no masses  Lymphatic: No lymphadenopathy  Resp: on trach collar, breathing easily  Neuro: unable to assess due to patient's condition       Rsiks and benefits discussed with pt. Then, he was placed in a supine position with neck extended. A 10 CC syringe used to completely removed any remaining air in the trach cuff. #8 shiley cuffed trach tube was removed and replaced with with a #7 portex uncuffed. No bleeding. Clear secretions suctioned from stoma. Bedside tracheoscopy performed, kayleigh visualized, no purulence, no erythema, no evidence of tracheomalacia. Pt tolerated the procedure well without complications.

## 2020-09-03 NOTE — PROGRESS NOTE ADULT - ASSESSMENT
59 y/o male s/p trach change from # shiley DCT to #7 portex uncuffed. Pt tolerated the procedure well without complications.

## 2020-09-03 NOTE — PROGRESS NOTE ADULT - PROBLEM SELECTOR PLAN 1
Completed Hypothermia protocol  Will need eventual cardiac cath for assessment of CORS once off sedation and cleared by nephrology and ID as an outpatient. Increase in size of the hemorrhage on repeat CT head

## 2020-09-03 NOTE — PROVIDER CONTACT NOTE (CHANGE IN STATUS NOTIFICATION) - ACTION/TREATMENT ORDERED:
Seen and examined by NP, due BP meds given, will closely monitor BP/HR, will closely monitor.
Urology paged and consulted. Irrigated as per MD.

## 2020-09-04 LAB
ALBUMIN SERPL ELPH-MCNC: 3.5 G/DL — SIGNIFICANT CHANGE UP (ref 3.3–5)
ALP SERPL-CCNC: 75 U/L — SIGNIFICANT CHANGE UP (ref 40–120)
ALT FLD-CCNC: 29 U/L — SIGNIFICANT CHANGE UP (ref 10–45)
ANION GAP SERPL CALC-SCNC: 15 MMOL/L — SIGNIFICANT CHANGE UP (ref 5–17)
AST SERPL-CCNC: 29 U/L — SIGNIFICANT CHANGE UP (ref 10–40)
BASOPHILS # BLD AUTO: 0.11 K/UL — SIGNIFICANT CHANGE UP (ref 0–0.2)
BASOPHILS NFR BLD AUTO: 1.2 % — SIGNIFICANT CHANGE UP (ref 0–2)
BILIRUB SERPL-MCNC: 0.2 MG/DL — SIGNIFICANT CHANGE UP (ref 0.2–1.2)
BUN SERPL-MCNC: 38 MG/DL — HIGH (ref 7–23)
CALCIUM SERPL-MCNC: 9.7 MG/DL — SIGNIFICANT CHANGE UP (ref 8.4–10.5)
CHLORIDE SERPL-SCNC: 106 MMOL/L — SIGNIFICANT CHANGE UP (ref 96–108)
CO2 SERPL-SCNC: 23 MMOL/L — SIGNIFICANT CHANGE UP (ref 22–31)
CREAT SERPL-MCNC: 2.14 MG/DL — HIGH (ref 0.5–1.3)
CULTURE RESULTS: SIGNIFICANT CHANGE UP
CULTURE RESULTS: SIGNIFICANT CHANGE UP
EOSINOPHIL # BLD AUTO: 0.46 K/UL — SIGNIFICANT CHANGE UP (ref 0–0.5)
EOSINOPHIL NFR BLD AUTO: 4.8 % — SIGNIFICANT CHANGE UP (ref 0–6)
GLUCOSE BLDC GLUCOMTR-MCNC: 105 MG/DL — HIGH (ref 70–99)
GLUCOSE SERPL-MCNC: 109 MG/DL — HIGH (ref 70–99)
HCT VFR BLD CALC: 34.9 % — LOW (ref 39–50)
HGB BLD-MCNC: 10.2 G/DL — LOW (ref 13–17)
IMM GRANULOCYTES NFR BLD AUTO: 1.8 % — HIGH (ref 0–1.5)
LYMPHOCYTES # BLD AUTO: 1.56 K/UL — SIGNIFICANT CHANGE UP (ref 1–3.3)
LYMPHOCYTES # BLD AUTO: 16.4 % — SIGNIFICANT CHANGE UP (ref 13–44)
MAGNESIUM SERPL-MCNC: 2.2 MG/DL — SIGNIFICANT CHANGE UP (ref 1.6–2.6)
MCHC RBC-ENTMCNC: 26.2 PG — LOW (ref 27–34)
MCHC RBC-ENTMCNC: 29.2 GM/DL — LOW (ref 32–36)
MCV RBC AUTO: 89.7 FL — SIGNIFICANT CHANGE UP (ref 80–100)
MONOCYTES # BLD AUTO: 0.83 K/UL — SIGNIFICANT CHANGE UP (ref 0–0.9)
MONOCYTES NFR BLD AUTO: 8.7 % — SIGNIFICANT CHANGE UP (ref 2–14)
NEUTROPHILS # BLD AUTO: 6.38 K/UL — SIGNIFICANT CHANGE UP (ref 1.8–7.4)
NEUTROPHILS NFR BLD AUTO: 67.1 % — SIGNIFICANT CHANGE UP (ref 43–77)
NRBC # BLD: 0 /100 WBCS — SIGNIFICANT CHANGE UP (ref 0–0)
PHOSPHATE SERPL-MCNC: 5.1 MG/DL — HIGH (ref 2.5–4.5)
PLATELET # BLD AUTO: 341 K/UL — SIGNIFICANT CHANGE UP (ref 150–400)
POTASSIUM SERPL-MCNC: 4.2 MMOL/L — SIGNIFICANT CHANGE UP (ref 3.5–5.3)
POTASSIUM SERPL-SCNC: 4.2 MMOL/L — SIGNIFICANT CHANGE UP (ref 3.5–5.3)
PROT SERPL-MCNC: 7.4 G/DL — SIGNIFICANT CHANGE UP (ref 6–8.3)
RBC # BLD: 3.89 M/UL — LOW (ref 4.2–5.8)
RBC # FLD: 15.2 % — HIGH (ref 10.3–14.5)
SODIUM SERPL-SCNC: 144 MMOL/L — SIGNIFICANT CHANGE UP (ref 135–145)
SPECIMEN SOURCE: SIGNIFICANT CHANGE UP
SPECIMEN SOURCE: SIGNIFICANT CHANGE UP
WBC # BLD: 9.51 K/UL — SIGNIFICANT CHANGE UP (ref 3.8–10.5)
WBC # FLD AUTO: 9.51 K/UL — SIGNIFICANT CHANGE UP (ref 3.8–10.5)

## 2020-09-04 PROCEDURE — 99233 SBSQ HOSP IP/OBS HIGH 50: CPT | Mod: GC

## 2020-09-04 RX ORDER — HYDRALAZINE HCL 50 MG
100 TABLET ORAL THREE TIMES A DAY
Refills: 0 | Status: DISCONTINUED | OUTPATIENT
Start: 2020-09-04 | End: 2020-09-24

## 2020-09-04 RX ORDER — POLYETHYLENE GLYCOL 3350 17 G/17G
17 POWDER, FOR SOLUTION ORAL DAILY
Refills: 0 | Status: DISCONTINUED | OUTPATIENT
Start: 2020-09-04 | End: 2020-09-10

## 2020-09-04 RX ADMIN — Medication 1 TABLET(S): at 05:07

## 2020-09-04 RX ADMIN — Medication 100 MILLIGRAM(S): at 09:35

## 2020-09-04 RX ADMIN — Medication 1 DROP(S): at 23:44

## 2020-09-04 RX ADMIN — HYDROMORPHONE HYDROCHLORIDE 0.5 MILLIGRAM(S): 2 INJECTION INTRAMUSCULAR; INTRAVENOUS; SUBCUTANEOUS at 06:08

## 2020-09-04 RX ADMIN — Medication 50 MILLIGRAM(S): at 20:13

## 2020-09-04 RX ADMIN — Medication 1 TABLET(S): at 17:15

## 2020-09-04 RX ADMIN — ISOSORBIDE DINITRATE 20 MILLIGRAM(S): 5 TABLET ORAL at 15:56

## 2020-09-04 RX ADMIN — QUETIAPINE FUMARATE 50 MILLIGRAM(S): 200 TABLET, FILM COATED ORAL at 05:07

## 2020-09-04 RX ADMIN — Medication 75 MILLIGRAM(S): at 05:07

## 2020-09-04 RX ADMIN — HYDROMORPHONE HYDROCHLORIDE 0.5 MILLIGRAM(S): 2 INJECTION INTRAMUSCULAR; INTRAVENOUS; SUBCUTANEOUS at 06:25

## 2020-09-04 RX ADMIN — Medication 3 MILLILITER(S): at 05:24

## 2020-09-04 RX ADMIN — Medication 1 DROP(S): at 17:16

## 2020-09-04 RX ADMIN — SODIUM CHLORIDE 4 MILLILITER(S): 9 INJECTION INTRAMUSCULAR; INTRAVENOUS; SUBCUTANEOUS at 17:20

## 2020-09-04 RX ADMIN — ISOSORBIDE DINITRATE 20 MILLIGRAM(S): 5 TABLET ORAL at 09:35

## 2020-09-04 RX ADMIN — Medication 1 DROP(S): at 11:47

## 2020-09-04 RX ADMIN — Medication 100 MILLIGRAM(S): at 21:47

## 2020-09-04 RX ADMIN — Medication 10 MILLIGRAM(S): at 21:47

## 2020-09-04 RX ADMIN — CHLORHEXIDINE GLUCONATE 1 APPLICATION(S): 213 SOLUTION TOPICAL at 05:07

## 2020-09-04 RX ADMIN — QUETIAPINE FUMARATE 50 MILLIGRAM(S): 200 TABLET, FILM COATED ORAL at 23:45

## 2020-09-04 RX ADMIN — QUETIAPINE FUMARATE 50 MILLIGRAM(S): 200 TABLET, FILM COATED ORAL at 17:16

## 2020-09-04 RX ADMIN — HEPARIN SODIUM 5000 UNIT(S): 5000 INJECTION INTRAVENOUS; SUBCUTANEOUS at 21:47

## 2020-09-04 RX ADMIN — ISOSORBIDE DINITRATE 20 MILLIGRAM(S): 5 TABLET ORAL at 23:45

## 2020-09-04 RX ADMIN — Medication 650 MILLIGRAM(S): at 00:30

## 2020-09-04 RX ADMIN — FAMOTIDINE 20 MILLIGRAM(S): 10 INJECTION INTRAVENOUS at 11:47

## 2020-09-04 RX ADMIN — Medication 1 DROP(S): at 05:06

## 2020-09-04 RX ADMIN — Medication 100 MILLIGRAM(S): at 13:52

## 2020-09-04 RX ADMIN — Medication 650 MILLIGRAM(S): at 00:00

## 2020-09-04 RX ADMIN — Medication 50 MILLIGRAM(S): at 13:52

## 2020-09-04 RX ADMIN — Medication 81 MILLIGRAM(S): at 11:48

## 2020-09-04 RX ADMIN — FENTANYL CITRATE 1 PATCH: 50 INJECTION INTRAVENOUS at 19:14

## 2020-09-04 RX ADMIN — Medication 3 MILLILITER(S): at 17:20

## 2020-09-04 RX ADMIN — HYDROMORPHONE HYDROCHLORIDE 0.5 MILLIGRAM(S): 2 INJECTION INTRAMUSCULAR; INTRAVENOUS; SUBCUTANEOUS at 16:48

## 2020-09-04 RX ADMIN — POLYETHYLENE GLYCOL 3350 17 GRAM(S): 17 POWDER, FOR SOLUTION ORAL at 13:53

## 2020-09-04 RX ADMIN — Medication 3 MILLILITER(S): at 11:12

## 2020-09-04 RX ADMIN — HYDROMORPHONE HYDROCHLORIDE 0.5 MILLIGRAM(S): 2 INJECTION INTRAMUSCULAR; INTRAVENOUS; SUBCUTANEOUS at 16:18

## 2020-09-04 RX ADMIN — SODIUM CHLORIDE 4 MILLILITER(S): 9 INJECTION INTRAMUSCULAR; INTRAVENOUS; SUBCUTANEOUS at 05:25

## 2020-09-04 RX ADMIN — FENTANYL CITRATE 1 PATCH: 50 INJECTION INTRAVENOUS at 05:08

## 2020-09-04 RX ADMIN — HEPARIN SODIUM 5000 UNIT(S): 5000 INJECTION INTRAVENOUS; SUBCUTANEOUS at 13:51

## 2020-09-04 RX ADMIN — HEPARIN SODIUM 5000 UNIT(S): 5000 INJECTION INTRAVENOUS; SUBCUTANEOUS at 05:07

## 2020-09-04 RX ADMIN — Medication 0.25 MILLIGRAM(S): at 05:07

## 2020-09-04 RX ADMIN — Medication 50 MILLIGRAM(S): at 05:07

## 2020-09-04 RX ADMIN — QUETIAPINE FUMARATE 50 MILLIGRAM(S): 200 TABLET, FILM COATED ORAL at 11:47

## 2020-09-04 RX ADMIN — Medication 0.25 MILLIGRAM(S): at 17:14

## 2020-09-04 NOTE — PROGRESS NOTE ADULT - SUBJECTIVE AND OBJECTIVE BOX
CC: f/u for cdif    Patient reports he wants to move    REVIEW OF SYSTEMS:  All other review of systems negative (Comprehensive ROS)    Antimicrobials Day #  :  nystatin    Suspension 633386 Unit(s) Oral every 6 hours    Other Medications Reviewed    T(F): 97.7 (09-04-20 @ 14:22), Max: 99.3 (09-03-20 @ 18:16)  HR: 90 (09-04-20 @ 17:21)  BP: 143/100 (09-04-20 @ 14:22)  RR: 20 (09-04-20 @ 16:41)  SpO2: 97% (09-04-20 @ 17:21)  Wt(kg): --    PHYSICAL EXAM:  General: alert, restless but no acute distress  Eyes:  anicteric, no conjunctival injection, no discharge  Oropharynx: no lesions or injection 	  Neck: supple, without adenopathy, trach  Lungs: course  to auscultation  Heart: regular rate and rhythm; no murmur, rubs or gallops  Abdomen: soft, nondistended, nontender, without mass or organomegaly peg ok  Skin: no lesions  Extremities: no clubbing, cyanosis, or edema  Neurologic: alert,  moves right side extremities    LAB RESULTS:                        10.2   9.51  )-----------( 341      ( 04 Sep 2020 06:36 )             34.9     09-04    144  |  106  |  38<H>  ----------------------------<  109<H>  4.2   |  23  |  2.14<H>    Ca    9.7      04 Sep 2020 06:36  Phos  5.1     09-04  Mg     2.2     09-04    TPro  7.4  /  Alb  3.5  /  TBili  0.2  /  DBili  x   /  AST  29  /  ALT  29  /  AlkPhos  75  09-04    LIVER FUNCTIONS - ( 04 Sep 2020 06:36 )  Alb: 3.5 g/dL / Pro: 7.4 g/dL / ALK PHOS: 75 U/L / ALT: 29 U/L / AST: 29 U/L / GGT: x             MICROBIOLOGY:  RECENT CULTURES:      RADIOLOGY REVIEWED:      < from: Xray Abdomen 1 View PORTABLE -Urgent (09.03.20 @ 15:48) >    EXAM:  XR ABDOMEN PORTABLE URGENT 1V                            PROCEDURE DATE:  09/03/2020            INTERPRETATION:  CLINICAL INFORMATION: Abdominal distention, recently treated for C. difficile.    TECHNIQUE: Single portable radiograph of the abdomen.    COMPARISON: Abdominal x-ray dated 8/25/2020.    FINDINGS:    Gas and stool is seen throughout the large bowel.  There are no dilated, air-filled loops of small bowel.  No acute bony pathology.    IMPRESSION:    Nonobstructive bowel gas pattern with moderate stool burden.        < end of copied text >          Assessment:  Cardiac arrest, sah, negative findings on brain angio, treated for pneumonia, treated for cdif, needs eventual cardiac cath on hold for now. Had distended abdomen yesterday now improved. No active infection   Plan:  monitor off antibiotics  No Id contraindication to cath

## 2020-09-04 NOTE — PROGRESS NOTE ADULT - SUBJECTIVE AND OBJECTIVE BOX
[facility]   [phone]  RENAL FOLLOW UP NOTE  --------------------------------------------------------------------------------  Chief Complaint:    24 hour events/subjective:    non communicative when seen   + trach collar     PAST HISTORY  --------------------------------------------------------------------------------  No significant changes to PMH, PSH, FHx, SHx, unless otherwise noted    ALLERGIES & MEDICATIONS  --------------------------------------------------------------------------------  Allergies    No Known Allergies    Intolerances      Standing Inpatient Medications  albuterol/ipratropium for Nebulization. 3 milliLiter(s) Nebulizer every 6 hours  artificial  tears Solution 1 Drop(s) Both EYES every 6 hours  aspirin  chewable 81 milliGRAM(s) Enteral Tube daily  bisacodyl Suppository 10 milliGRAM(s) Rectal at bedtime  chlorhexidine 4% Liquid 1 Application(s) Topical <User Schedule>  clonazePAM  Tablet 0.25 milliGRAM(s) Oral every 12 hours  dextrose 5%. 1000 milliLiter(s) IV Continuous <Continuous>  dextrose 50% Injectable 12.5 Gram(s) IV Push once  dextrose 50% Injectable 25 Gram(s) IV Push once  dextrose 50% Injectable 25 Gram(s) IV Push once  famotidine    Tablet 20 milliGRAM(s) Oral daily  fentaNYL   Patch  50 MICROgram(s)/Hr 1 Patch Transdermal every 72 hours  heparin   Injectable 5000 Unit(s) SubCutaneous every 8 hours  hydrALAZINE 100 milliGRAM(s) Oral three times a day  isosorbide   dinitrate Tablet (ISORDIL) 20 milliGRAM(s) Enteral Tube <User Schedule>  lactobacillus acidophilus 1 Tablet(s) Oral two times a day  metoprolol tartrate 50 milliGRAM(s) Oral three times a day  nystatin    Suspension 679428 Unit(s) Oral every 6 hours  polyethylene glycol 3350 17 Gram(s) Oral daily  QUEtiapine 50 milliGRAM(s) Oral every 6 hours  sodium chloride 7% Inhalation 4 milliLiter(s) Inhalation every 12 hours    PRN Inpatient Medications  acetaminophen   Tablet .. 650 milliGRAM(s) Oral every 6 hours PRN  dextrose 40% Gel 15 Gram(s) Oral once PRN  glucagon  Injectable 1 milliGRAM(s) IntraMuscular once PRN  HYDROmorphone  Injectable 0.5 milliGRAM(s) IV Push every 8 hours PRN      REVIEW OF SYSTEMS  --------------------------------------------------------------------------------        VITALS/PHYSICAL EXAM  --------------------------------------------------------------------------------  T(C): 36.5 (09-04-20 @ 14:22), Max: 37.4 (09-03-20 @ 18:16)  HR: 90 (09-04-20 @ 17:21) (65 - 97)  BP: 143/100 (09-04-20 @ 14:22) (128/73 - 180/100)  RR: 20 (09-04-20 @ 16:41) (18 - 21)  SpO2: 97% (09-04-20 @ 17:21) (96% - 100%)  Wt(kg): --        09-03-20 @ 07:01  -  09-04-20 @ 07:00  --------------------------------------------------------  IN: 970 mL / OUT: 500 mL / NET: 470 mL    09-04-20 @ 07:01  -  09-04-20 @ 18:12  --------------------------------------------------------  IN: 0 mL / OUT: 350 mL / NET: -350 mL      Physical Exam:  	    Gen: trach    	no jvd    	Pulm: decrease bs  no rales  + ronchi -  wheezing  	CV: RRR, S1S2; no rub  	Abd: +BS, soft, nontender/nondistended + peg   	UE: Warm, no cyanosis  no clubbing,  no edema  	LE: Warm, no cyanosis  no clubbing, no edema  	Skin: Warm, no decrease skin turgor     LABS/STUDIES  --------------------------------------------------------------------------------              10.2   9.51  >-----------<  341      [09-04-20 @ 06:36]              34.9     144  |  106  |  38  ----------------------------<  109      [09-04-20 @ 06:36]  4.2   |  23  |  2.14        Ca     9.7     [09-04-20 @ 06:36]      Mg     2.2     [09-04-20 @ 06:36]      Phos  5.1     [09-04-20 @ 06:36]    TPro  7.4  /  Alb  3.5  /  TBili  0.2  /  DBili  x   /  AST  29  /  ALT  29  /  AlkPhos  75  [09-04-20 @ 06:36]          Creatinine Trend:  SCr 2.14 [09-04 @ 06:36]  SCr 2.24 [09-03 @ 07:15]  SCr 2.21 [09-02 @ 07:06]  SCr 2.07 [09-01 @ 07:13]  SCr 2.04 [08-31 @ 06:43]              Urinalysis - [08-15-20 @ 12:30]      Color Yellow / Appearance Clear / SG 1.028 / pH 6.0      Gluc Negative / Ketone Negative  / Bili Negative / Urobili Negative       Blood Negative / Protein 100 / Leuk Est Negative / Nitrite Negative      RBC  / WBC  / Hyaline  / Gran  / Sq Epi  / Non Sq Epi  / Bacteria       TSH 1.91      [08-09-20 @ 17:20]  Lipid: chol 148, , HDL 37, LDL 68      [08-09-20 @ 17:21]

## 2020-09-04 NOTE — PROGRESS NOTE ADULT - SUBJECTIVE AND OBJECTIVE BOX
Subjective: Patient seen and examined. No new events except as noted.     REVIEW OF SYSTEMS:  Unable to obtain       MEDICATIONS:  MEDICATIONS  (STANDING):  albuterol/ipratropium for Nebulization. 3 milliLiter(s) Nebulizer every 6 hours  artificial  tears Solution 1 Drop(s) Both EYES every 6 hours  aspirin  chewable 81 milliGRAM(s) Enteral Tube daily  bisacodyl Suppository 10 milliGRAM(s) Rectal at bedtime  chlorhexidine 4% Liquid 1 Application(s) Topical <User Schedule>  clonazePAM  Tablet 0.25 milliGRAM(s) Oral every 12 hours  dextrose 5%. 1000 milliLiter(s) (50 mL/Hr) IV Continuous <Continuous>  dextrose 50% Injectable 12.5 Gram(s) IV Push once  dextrose 50% Injectable 25 Gram(s) IV Push once  dextrose 50% Injectable 25 Gram(s) IV Push once  famotidine    Tablet 20 milliGRAM(s) Oral daily  fentaNYL   Patch  50 MICROgram(s)/Hr 1 Patch Transdermal every 72 hours  heparin   Injectable 5000 Unit(s) SubCutaneous every 8 hours  hydrALAZINE 100 milliGRAM(s) Oral three times a day  isosorbide   dinitrate Tablet (ISORDIL) 20 milliGRAM(s) Enteral Tube <User Schedule>  lactobacillus acidophilus 1 Tablet(s) Oral two times a day  metoprolol tartrate 50 milliGRAM(s) Oral three times a day  nystatin    Suspension 375307 Unit(s) Oral every 6 hours  QUEtiapine 50 milliGRAM(s) Oral every 6 hours  sodium chloride 7% Inhalation 4 milliLiter(s) Inhalation every 12 hours      PHYSICAL EXAM:  T(C): 36.8 (09-04-20 @ 05:04), Max: 37.4 (09-03-20 @ 18:16)  HR: 76 (09-04-20 @ 11:14) (68 - 97)  BP: 128/73 (09-04-20 @ 09:40) (128/73 - 180/100)  RR: 21 (09-04-20 @ 11:13) (18 - 922)  SpO2: 98% (09-04-20 @ 11:14) (96% - 100%)  Wt(kg): --  I&O's Summary    03 Sep 2020 07:01  -  04 Sep 2020 07:00  --------------------------------------------------------  IN: 970 mL / OUT: 500 mL / NET: 470 mL          Appearance: sleepy , +trach   HEENT:   Dry  oral mucosa,  Lymphatic: No lymphadenopathy  Cardiovascular: Normal S1 S2, No JVD, No murmurs, No edema  Respiratory: Ventilated   Psychiatry: A & O x 0  Gastrointestinal:  Soft, Non-tender, +PEG   Skin: No rashes, No ecchymoses, No cyanosis	  Mental status- No acute distress, EO to stim  -CN- Pupils R 4mm NR, L 2mm sluggish, EOMI, tongue midline, face symmetric  +cough/gag  C briskly, two fingers/thumbs up on RUE, distally AG, wiggles toes on RLE      LABS:    CARDIAC MARKERS:                                10.2   9.51  )-----------( 341      ( 04 Sep 2020 06:36 )             34.9     09-04    144  |  106  |  38<H>  ----------------------------<  109<H>  4.2   |  23  |  2.14<H>    Ca    9.7      04 Sep 2020 06:36  Phos  5.1     09-04  Mg     2.2     09-04    TPro  7.4  /  Alb  3.5  /  TBili  0.2  /  DBili  x   /  AST  29  /  ALT  29  /  AlkPhos  75  09-04    proBNP:   Lipid Profile:   HgA1c:   TSH:             TELEMETRY: 	    ECG:  	  RADIOLOGY:   DIAGNOSTIC TESTING:  [ ] Echocardiogram:  [ ]  Catheterization:  [ ] Stress Test:    OTHER:

## 2020-09-04 NOTE — PROGRESS NOTE ADULT - ASSESSMENT
60-year-old male with a history of A-Fib on warfarin who presents with cardiac arrest at work with downtime of about 25 minutes prior to ROSC. S/p therapeutic hypothermia. 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 Now with resolved neurogenic/cardiogenic shock. Course complicated by GI bleed s/p PPI gtt, bleeding from scott s/p clot irrigation, prolonged respiratory failure s/p trach (8/24) and PEG (8/26). Currently with A-Fib with RVR and C diff colitis.  Also developed gross hematuria - Urology consulted +catheter with clot (irrigated and exchanged) suspected 2/2 traumatic catheterization. Additionally course complicated by fever and Pseudomonas PNA (s/p Rx) then developed C diff (now with resolved leukocytosis and improved stooling, now soft per report); remains on enteral Vanco    8/29: Received to [facility]  8/30: Less Agitated overnight, Afebrile, WBC decreasing, CR elevated but Stable, Tolerating TC all day yesterday, will Attempt TC ATC tonight and ABG in am, F/u Bcx from 8/30. Rectal tube dc'd.  Speech eval for AAC device and will F/u.  8/31: will attempt TC around the clock again tonight. Stopped amiodarone as per cards.  Will be cathed if cleared by ID and renal.  9/1: Elevated BP overnight, BP stable today, afebrile. Lethargic earlier today on exam; ABG Stable; Head CT done- no new ICH, ventricles enlarged ? Hydrocephalus, Spoke to Max from [facility] who will see- although felt no intervention required at this time. Tolerating TC ATC, will attempt cuffless trach tomorrow. C diff Iso discontinued as discussed w/ Inf control.  9/2: still with secretions, will downsize when improved. Will cath as outpatient. Decreased opioids  9/3: PICC dc'd, Trach downsized to #7 cuffless [place]. stomach distended 60-year-old male with a history of A-Fib on warfarin who presents with cardiac arrest at work with downtime of about 25 minutes prior to ROSC. S/p therapeutic hypothermia. 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 Now with resolved neurogenic/cardiogenic shock. Course complicated by GI bleed s/p PPI gtt, bleeding from [place]ott s/p clot irrigation, prolonged respiratory failure s/p trach (8/24) and PEG (8/26). Currently with A-Fib with RVR and C diff colitis.  Also developed gross hematuria - Urology consulted +catheter with clot (irrigated and exchanged) suspected 2/2 traumatic catheterization. Additionally course complicated by fever and Pseudomonas PNA (s/p Rx) then developed C diff (now with resolved leukocytosis and improved stooling, now soft per report); remains on enteral Vanco    8/29: Received to [facility]  8/30: Less Agitated overnight, Afebrile, WBC decreasing, CR elevated but Stable, Tolerating TC all day yesterday, will Attempt TC ATC tonight and ABG in am, F/u Bcx from 8/30. Rectal tube dc'd.  Speech eval for AAC device and will F/u.  8/31: will attempt TC around the clock again tonight. Stopped amiodarone as per cards.  Will be cathed if cleared by ID and renal.  9/1: Elevated BP overnight, BP stable today, afebrile. Lethargic earlier today on exam; ABG Stable; Head CT done- no new ICH, ventricles enlarged ? Hydrocephalus, Spoke to Max from NSX who will see- although felt no intervention required at this time. Tolerating TC ATC, will attempt cuffless trach tomorrow. C diff Iso discontinued as discussed w/ Inf control.  9/2: still with secretions, will downsize when improved. Will cath as outpatient. Decreased opioids  9/3: PICC dc'd, Trach downsized to #7 cuffless [place]. stomach distended  9/4: Restless, afebrile. Tolerating cuffless trach. No recent BM. 60-year-old male with a history of A-Fib on warfarin who presents with cardiac arrest at work with downtime of about 25 minutes prior to ROSC. S/p therapeutic hypothermia. 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 Now with resolved neurogenic/cardiogenic shock. Course complicated by GI bleed s/p PPI gtt, bleeding from scott s/p clot irrigation, prolonged respiratory failure s/p trach (8/24) and PEG (8/26). Currently with A-Fib with RVR and C diff colitis.  Also developed gross hematuria - Urology consulted +catheter with clot (irrigated and exchanged) suspected 2/2 traumatic catheterization. Additionally course complicated by fever and Pseudomonas PNA (s/p Rx) then developed C diff (now with resolved leukocytosis and improved stooling, now soft per report); remains on enteral Vanco    8/29: Received to [facility]  8/30: Less Agitated overnight, Afebrile, WBC decreasing, CR elevated but Stable, Tolerating TC all day yesterday, will Attempt TC ATC tonight and ABG in am, F/u Bcx from 8/30. Rectal tube dc'd.  Speech eval for AAC device and will F/u.  8/31: will attempt TC around the clock again tonight. Stopped amiodarone as per cards.  Will be cathed if cleared by ID and renal.  9/1: Elevated BP overnight, BP stable today, afebrile. Lethargic earlier today on exam; ABG Stable; Head CT done- no new ICH, ventricles enlarged ? Hydrocephalus, Spoke to Max from NSX who will see- although felt no intervention required at this time. Tolerating TC ATC, will attempt cuffless trach tomorrow. C diff Iso discontinued as discussed w/ Inf control.  9/2: still with secretions, will downsize when improved. Will cath as outpatient. Decreased opioids  9/3: PICC dc'd, Trach downsized to #7 cuffless [place]. stomach distended  9/4: Occasionally Restless, afebrile. Tolerating cuffless trach, phonating w/ digital occlusion.  Spoke to speech for PMV re-eval soon than 9/6.  Elevated BP, Hydralazine increased.- will monitor.

## 2020-09-04 NOTE — PROGRESS NOTE ADULT - PROBLEM SELECTOR PLAN 1
- continue 30% trach collar  - Trached 8/24 with ENT  -Trach Care/ Pulmonary toileting   -Continue Duoneb and hypertonic saline, Mucomyst, chest PT  - Trach collar 30% around the clock  - downsized to #7 cuffless [place]

## 2020-09-04 NOTE — PROGRESS NOTE ADULT - PROBLEM SELECTOR PLAN 6
A-Fib with RVR:  - DC'd Amiodarone (8/31)  - continue metoprolol to 50 mg q8h  - continue Hydralazine/imdur  - Hold off on therapeutic a/c given SAH  - SBP goal 100- 160  -TTE: Global LV dysfunction. EF 41%, Severe concentric LVH, flattening of interventricular septum.   -CTA chest - negative PE

## 2020-09-04 NOTE — PROGRESS NOTE ADULT - ASSESSMENT
60 year old male s/p cardiac arrest c/b GIB and bleeding from scott with perimesencephalic (HH4 mF4) subarachnoid hemorrhage, CT showed R periclinoidal/perimesencephalic SAH, c/f aneurysm rupture, no hydrocephalus.  angio neg x2, trach/vent and peg. course also complicated by gross hematuria - Urology consulted +catheter with clot (irrigated and exchanged) suspected 2/2 traumatic catheterization. treated for pseudomonas HCAP. In addition, has developed c diff. pt noticed with abn renal function and requires coronary angiogram.      1- JALEN on ckd III likely   2- HTN   3- respiratory failure  4- s/p cardiac arrest  5- c diff   6- s/p cardiac arrest       JALEN in setting of infections suspected along with his cardiac arrest  cr is now stable  at this level   cont O2 support    hydralazine 100 mg tid   metoprolol 50 mg tid

## 2020-09-04 NOTE — PROGRESS NOTE ADULT - ATTENDING COMMENTS
60-year-old male with a history of A-Fib on warfarin who presents with cardiac arrest at work with downtime of about 25 minutes prior to ROSC. S/p therapeutic hypothermia. 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 Now with resolved neurogenic/cardiogenic shock. Course complicated by GI bleed s/p PPI gtt, bleeding from [place] s/p clot irrigation, prolonged respiratory failure s/p trach (8/24) and PEG (8/26). Currently with c. diff colitis    1. SAH with negative angiogram:  - Moving RUE/RLE, but without movement on left  - No further neurosurgical intervention at this time    2. Acute Encephalopathy - improving  - Continue fentanyl patch, clonazepam, and quetiapine  - Hydromorphone and tramadol prn  - Will titrate down medications as able while maintaining patient safety. Patient continues to be more alert and appropriately interactive    3. Cardiovascular - patient with known Afib and new acute systolic heart failure in setting of cardiac arrest  - Possible stunned myocardium due to cardiac arrest vs. SAH  - Will need eventual cardiac cath to evaluate for ischemia - now planned for as outpatient per Dr. Deleon  - Continue afterload/preload reduction with hydralazine and isosorbide dinitrate  - For afib will continue metoprolol and monitor HR. Amio stopped due to prior pauses noted.  - Hold off on therapeutic a/c given SAH    4. Acute hypoxemic respiratory failure s/p tracheostomy:  - Patient downsized to cuffless trach 9/3  - Continue Duo nebs and hypertonic saline, chest PT  - Patient is able to phonate with finger occlusion of trach - will ask for PMV eval. Patient is hypophonic but without air trapping today    5. Oropharyngeal dysphagia:  - PEG feeds as tolerated  - Famotidine for GI ppx    6. C diff colitis:  - Completed PO vanco  - Monitor leukocytosis  - rectal tube d/c'd  - will discuss with infection control regarding needs for continued isolation    7. Pseudomonas aeruginosa pneumonia/colonization:  - S/p prolonged course of cefepime until 8/25  - Hold off on systemic antibiotics at this time  - If develops increased secretions, may benefit from inhaled tobramycin    8. JALEN, possible CKD:  - Strict I/O's, trend kidney function and lytes  - renal function stable - likely with CKD III    9. Dispo:  - Full code, overall prognosis guarded depending on neurological recovery  - Patient is a candidate for acute rehab  - will need to clarify insurance issues

## 2020-09-04 NOTE — PROGRESS NOTE ADULT - SUBJECTIVE AND OBJECTIVE BOX
Patient is a 60y old  Male who presents with a chief complaint of cardiac arrest (03 Sep 2020 15:46)      Interval Events:    REVIEW OF SYSTEMS:  [ ] Positive  [ ] All other systems negative  [ ] Unable to assess ROS because ________    Vital Signs Last 24 Hrs  T(C): 36.8 (09-04-20 @ 05:04), Max: 37.4 (09-03-20 @ 18:16)  T(F): 98.2 (09-04-20 @ 05:04), Max: 99.3 (09-03-20 @ 18:16)  HR: 86 (09-04-20 @ 05:57) (75 - 103)  BP: 149/100 (09-04-20 @ 05:04) (149/100 - 180/100)  RR: 20 (09-04-20 @ 05:04) (18 - 922)  SpO2: 98% (09-04-20 @ 05:57) (97% - 100%)PHYSICAL EXAM:  HEENT:   [ ]Tracheostomy:  [ ]Pupils equal  [ ]No oral lesions  [ ]Abnormal        SKIN  [ ]No Rash  [ ] Abnormal  [ ] pressure    CARDIAC  [ ]Regular  [ ]Abnormal    PULMONARY  [ ]Bilateral Clear Breath Sounds  [ ]Normal Excursion  [ ]Abnormal    GI  [ ]PEG      [ ] +BS		              [ ]Soft, nondistended, nontender	  [ ]Abnormal    MUSCULOSKELETAL                                   [ ]Bedbound                 [ ]Abnormal    [ ]Ambulatory/OOB to chair                           EXTREMITIES                                         [ ]Normal  [ ]Edema                           NEUROLOGIC  [ ] Normal, non focal  [ ] Focal findings:    PSYCHIATRIC  [ ]Alert and appropriate  [ ] Sedated	 [ ]Agitated    :  Umaña: [ ] Yes, if yes: Date of Placement:                   [  ] No    LINES: Central Lines [ ] Yes, if yes: Date of Placement                                     [  ] No    HOSPITAL MEDICATIONS:  MEDICATIONS  (STANDING):  albuterol/ipratropium for Nebulization. 3 milliLiter(s) Nebulizer every 6 hours  artificial  tears Solution 1 Drop(s) Both EYES every 6 hours  aspirin  chewable 81 milliGRAM(s) Enteral Tube daily  bisacodyl Suppository 10 milliGRAM(s) Rectal at bedtime  chlorhexidine 4% Liquid 1 Application(s) Topical <User Schedule>  clonazePAM  Tablet 0.25 milliGRAM(s) Oral every 12 hours  dextrose 5%. 1000 milliLiter(s) (50 mL/Hr) IV Continuous <Continuous>  dextrose 50% Injectable 12.5 Gram(s) IV Push once  dextrose 50% Injectable 25 Gram(s) IV Push once  dextrose 50% Injectable 25 Gram(s) IV Push once  famotidine    Tablet 20 milliGRAM(s) Oral daily  fentaNYL   Patch  50 MICROgram(s)/Hr 1 Patch Transdermal every 72 hours  heparin   Injectable 5000 Unit(s) SubCutaneous every 8 hours  hydrALAZINE 75 milliGRAM(s) Oral three times a day  isosorbide   dinitrate Tablet (ISORDIL) 20 milliGRAM(s) Enteral Tube <User Schedule>  lactobacillus acidophilus 1 Tablet(s) Oral two times a day  metoprolol tartrate 50 milliGRAM(s) Oral three times a day  nystatin    Suspension 089828 Unit(s) Oral every 6 hours  QUEtiapine 50 milliGRAM(s) Oral every 6 hours  sodium chloride 7% Inhalation 4 milliLiter(s) Inhalation every 12 hours    MEDICATIONS  (PRN):  acetaminophen   Tablet .. 650 milliGRAM(s) Oral every 6 hours PRN Temp greater or equal to 38C (100.4F), Mild Pain (1 - 3)  dextrose 40% Gel 15 Gram(s) Oral once PRN Blood Glucose LESS THAN 70 milliGRAM(s)/deciliter  glucagon  Injectable 1 milliGRAM(s) IntraMuscular once PRN Glucose LESS THAN 70 milligrams/deciliter  HYDROmorphone  Injectable 0.5 milliGRAM(s) IV Push every 8 hours PRN breakthrough pain      LABS:                        10.2   9.51  )-----------( 341      ( 04 Sep 2020 06:36 )             34.9     09-04    144  |  106  |  38<H>  ----------------------------<  109<H>  4.2   |  23  |  2.14<H>    Ca    9.7      04 Sep 2020 06:36  Phos  5.1     09-04  Mg     2.2     09-04    TPro  7.4  /  Alb  3.5  /  TBili  0.2  /  DBili  x   /  AST  29  /  ALT  29  /  AlkPhos  75  09-04            CAPILLARY BLOOD GLUCOSE    MICROBIOLOGY:     RADIOLOGY:  [ ] Reviewed and interpreted by me Patient is a 60y old  Male who presents with a chief complaint of cardiac arrest (03 Sep 2020 15:46)      Interval Events: no overnight events.     REVIEW OF SYSTEMS:  [ ] Positive  [x] All other systems negative  [ ] Unable to assess     Vital Signs Last 24 Hrs  T(C): 36.8 (09-04-20 @ 05:04), Max: 37.4 (09-03-20 @ 18:16)  T(F): 98.2 (09-04-20 @ 05:04), Max: 99.3 (09-03-20 @ 18:16)  HR: 86 (09-04-20 @ 05:57) (75 - 103)  BP: 149/100 (09-04-20 @ 05:04) (149/100 - 180/100)  RR: 20 (09-04-20 @ 05:04) (18 - 922)  SpO2: 98% (09-04-20 @ 05:57) (97% - 100%)    PHYSICAL EXAM:    HEENT:   [x]Tracheostomy: #7 Portex cuffless   [ ]Pupils equal  [ ]No oral lesions  [x]Abnormal- pupils unequal, white film on tongue     SKIN  [ ]No Rash  [ ] Abnormal  [ ] pressure    CARDIAC  [ ]Regular  [x]Abnormal- irregularly irregular     PULMONARY  [x]Bilateral Clear Breath Sounds  [ ]Normal Excursion  [ ]Abnormal    GI  [x]PEG      [x] +BS		              [ ]Soft, nondistended, nontender	  [ ]Abnormal    MUSCULOSKELETAL                                   [x]Bedbound                 [x]Abnormal- movement of RUE and RLE, unable to move L side s/p SAH this admission   [ ]Ambulatory/OOB to chair                           EXTREMITIES                                         [ ]Normal  [x]Edema- trace LE                        NEUROLOGIC  [ ] Normal, non focal  [x] Focal findings: awake, opens eye, following commands, able to squeeze R hand, able to move R toes     PSYCHIATRIC  [ ]Alert and appropriate  [ ] Sedated	 [x]Agitated    :  Umaña: [ ] Yes, if yes: Date of Placement:                   [x] No condom catheter     LINES: Central Lines [ ] Yes, if yes: Date of Placement                                     [x] No    HOSPITAL MEDICATIONS:  MEDICATIONS  (STANDING):  albuterol/ipratropium for Nebulization. 3 milliLiter(s) Nebulizer every 6 hours  artificial  tears Solution 1 Drop(s) Both EYES every 6 hours  aspirin  chewable 81 milliGRAM(s) Enteral Tube daily  bisacodyl Suppository 10 milliGRAM(s) Rectal at bedtime  chlorhexidine 4% Liquid 1 Application(s) Topical <User Schedule>  clonazePAM  Tablet 0.25 milliGRAM(s) Oral every 12 hours  dextrose 5%. 1000 milliLiter(s) (50 mL/Hr) IV Continuous <Continuous>  dextrose 50% Injectable 12.5 Gram(s) IV Push once  dextrose 50% Injectable 25 Gram(s) IV Push once  dextrose 50% Injectable 25 Gram(s) IV Push once  famotidine    Tablet 20 milliGRAM(s) Oral daily  fentaNYL   Patch  50 MICROgram(s)/Hr 1 Patch Transdermal every 72 hours  heparin   Injectable 5000 Unit(s) SubCutaneous every 8 hours  hydrALAZINE 75 milliGRAM(s) Oral three times a day  isosorbide   dinitrate Tablet (ISORDIL) 20 milliGRAM(s) Enteral Tube <User Schedule>  lactobacillus acidophilus 1 Tablet(s) Oral two times a day  metoprolol tartrate 50 milliGRAM(s) Oral three times a day  nystatin    Suspension 523027 Unit(s) Oral every 6 hours  QUEtiapine 50 milliGRAM(s) Oral every 6 hours  sodium chloride 7% Inhalation 4 milliLiter(s) Inhalation every 12 hours    MEDICATIONS  (PRN):  acetaminophen   Tablet .. 650 milliGRAM(s) Oral every 6 hours PRN Temp greater or equal to 38C (100.4F), Mild Pain (1 - 3)  dextrose 40% Gel 15 Gram(s) Oral once PRN Blood Glucose LESS THAN 70 milliGRAM(s)/deciliter  glucagon  Injectable 1 milliGRAM(s) IntraMuscular once PRN Glucose LESS THAN 70 milligrams/deciliter  HYDROmorphone  Injectable 0.5 milliGRAM(s) IV Push every 8 hours PRN breakthrough pain      LABS:                        10.2   9.51  )-----------( 341      ( 04 Sep 2020 06:36 )             34.9     09-04    144  |  106  |  38<H>  ----------------------------<  109<H>  4.2   |  23  |  2.14<H>    Ca    9.7      04 Sep 2020 06:36  Phos  5.1     09-04  Mg     2.2     09-04    TPro  7.4  /  Alb  3.5  /  TBili  0.2  /  DBili  x   /  AST  29  /  ALT  29  /  AlkPhos  75  09-04            CAPILLARY BLOOD GLUCOSE    MICROBIOLOGY:     RADIOLOGY:  [ ] Reviewed and interpreted by me Patient is a 60y old  Male who presents with a chief complaint of cardiac arrest (03 Sep 2020 15:46)      Interval Events: Hypertensive     REVIEW OF SYSTEMS:  [ ] Positive  [x] All other systems negative  [ ] Unable to assess     Vital Signs Last 24 Hrs  T(C): 36.8 (09-04-20 @ 05:04), Max: 37.4 (09-03-20 @ 18:16)  T(F): 98.2 (09-04-20 @ 05:04), Max: 99.3 (09-03-20 @ 18:16)  HR: 86 (09-04-20 @ 05:57) (75 - 103)  BP: 149/100 (09-04-20 @ 05:04) (149/100 - 180/100)  RR: 20 (09-04-20 @ 05:04) (18 - 922)  SpO2: 98% (09-04-20 @ 05:57) (97% - 100%)    PHYSICAL EXAM:    HEENT:   [x]Tracheostomy: #7 Portex cuffless   [ ]Pupils equal  [ ]No oral lesions  [x]Abnormal- pupils unequal, + oral candidiasis     SKIN  [x ]No Rash  [ ] Abnormal  [ ] pressure    CARDIAC  [ ]Regular  [x]Abnormal- irregularly irregular     PULMONARY  [x]Bilateral Clear Breath Sounds  [ ]Normal Excursion  [ ]Abnormal    GI  [x]PEG      [x] +BS		              [ ]Soft, nondistended, nontender	  [ ]Abnormal    MUSCULOSKELETAL                                   [x]Bedbound                 [x]Abnormal- movement of RUE and RLE, unable to move L side s/p SAH this admission   [ ]Ambulatory/OOB to chair                           EXTREMITIES                                         [ ]Normal  [x]Edema- trace LE                        NEUROLOGIC  [ ] Normal, non focal  [x] Focal findings: awake, opens eyes, following commands, able to squeeze R hand, able to move R toes     PSYCHIATRIC  [ x]Alert, Occassionally agitated   [ ] Sedated	 []Agitated    :  Umaña: [ ] Yes, if yes: Date of Placement:                   [x] No condom catheter     LINES: Central Lines [ ] Yes, if yes: Date of Placement                                     [x] No    HOSPITAL MEDICATIONS:  MEDICATIONS  (STANDING):  albuterol/ipratropium for Nebulization. 3 milliLiter(s) Nebulizer every 6 hours  artificial  tears Solution 1 Drop(s) Both EYES every 6 hours  aspirin  chewable 81 milliGRAM(s) Enteral Tube daily  bisacodyl Suppository 10 milliGRAM(s) Rectal at bedtime  chlorhexidine 4% Liquid 1 Application(s) Topical <User Schedule>  clonazePAM  Tablet 0.25 milliGRAM(s) Oral every 12 hours  dextrose 5%. 1000 milliLiter(s) (50 mL/Hr) IV Continuous <Continuous>  dextrose 50% Injectable 12.5 Gram(s) IV Push once  dextrose 50% Injectable 25 Gram(s) IV Push once  dextrose 50% Injectable 25 Gram(s) IV Push once  famotidine    Tablet 20 milliGRAM(s) Oral daily  fentaNYL   Patch  50 MICROgram(s)/Hr 1 Patch Transdermal every 72 hours  heparin   Injectable 5000 Unit(s) SubCutaneous every 8 hours  hydrALAZINE 75 milliGRAM(s) Oral three times a day  isosorbide   dinitrate Tablet (ISORDIL) 20 milliGRAM(s) Enteral Tube <User Schedule>  lactobacillus acidophilus 1 Tablet(s) Oral two times a day  metoprolol tartrate 50 milliGRAM(s) Oral three times a day  nystatin    Suspension 489740 Unit(s) Oral every 6 hours  QUEtiapine 50 milliGRAM(s) Oral every 6 hours  sodium chloride 7% Inhalation 4 milliLiter(s) Inhalation every 12 hours    MEDICATIONS  (PRN):  acetaminophen   Tablet .. 650 milliGRAM(s) Oral every 6 hours PRN Temp greater or equal to 38C (100.4F), Mild Pain (1 - 3)  dextrose 40% Gel 15 Gram(s) Oral once PRN Blood Glucose LESS THAN 70 milliGRAM(s)/deciliter  glucagon  Injectable 1 milliGRAM(s) IntraMuscular once PRN Glucose LESS THAN 70 milligrams/deciliter  HYDROmorphone  Injectable 0.5 milliGRAM(s) IV Push every 8 hours PRN breakthrough pain      LABS:                        10.2   9.51  )-----------( 341      ( 04 Sep 2020 06:36 )             34.9     09-04    144  |  106  |  38<H>  ----------------------------<  109<H>  4.2   |  23  |  2.14<H>    Ca    9.7      04 Sep 2020 06:36  Phos  5.1     09-04  Mg     2.2     09-04    TPro  7.4  /  Alb  3.5  /  TBili  0.2  /  DBili  x   /  AST  29  /  ALT  29  /  AlkPhos  75  09-04            CAPILLARY BLOOD GLUCOSE    MICROBIOLOGY:     RADIOLOGY:  [ ] Reviewed and interpreted by me

## 2020-09-04 NOTE — PROGRESS NOTE ADULT - PROBLEM SELECTOR PLAN 4
Possible stunned myocardium due to cardiac arrest vs. SAH  - Will need cardiac cath to evaluate for ischemia when cleared by ID and renal  - Continue afterload/preload reduction with hydralazine and isosorbide dinitrate  - Will obtain Cath as outpatient

## 2020-09-05 LAB
ALBUMIN SERPL ELPH-MCNC: 3.4 G/DL — SIGNIFICANT CHANGE UP (ref 3.3–5)
ALP SERPL-CCNC: 70 U/L — SIGNIFICANT CHANGE UP (ref 40–120)
ALT FLD-CCNC: 25 U/L — SIGNIFICANT CHANGE UP (ref 10–45)
ANION GAP SERPL CALC-SCNC: 13 MMOL/L — SIGNIFICANT CHANGE UP (ref 5–17)
AST SERPL-CCNC: 23 U/L — SIGNIFICANT CHANGE UP (ref 10–40)
BASOPHILS # BLD AUTO: 0.09 K/UL — SIGNIFICANT CHANGE UP (ref 0–0.2)
BASOPHILS NFR BLD AUTO: 0.7 % — SIGNIFICANT CHANGE UP (ref 0–2)
BILIRUB SERPL-MCNC: 0.1 MG/DL — LOW (ref 0.2–1.2)
BUN SERPL-MCNC: 39 MG/DL — HIGH (ref 7–23)
CALCIUM SERPL-MCNC: 9.3 MG/DL — SIGNIFICANT CHANGE UP (ref 8.4–10.5)
CHLORIDE SERPL-SCNC: 106 MMOL/L — SIGNIFICANT CHANGE UP (ref 96–108)
CO2 SERPL-SCNC: 24 MMOL/L — SIGNIFICANT CHANGE UP (ref 22–31)
CREAT SERPL-MCNC: 2.16 MG/DL — HIGH (ref 0.5–1.3)
EOSINOPHIL # BLD AUTO: 0.45 K/UL — SIGNIFICANT CHANGE UP (ref 0–0.5)
EOSINOPHIL NFR BLD AUTO: 3.4 % — SIGNIFICANT CHANGE UP (ref 0–6)
GLUCOSE BLDC GLUCOMTR-MCNC: 108 MG/DL — HIGH (ref 70–99)
GLUCOSE BLDC GLUCOMTR-MCNC: 118 MG/DL — HIGH (ref 70–99)
GLUCOSE BLDC GLUCOMTR-MCNC: 119 MG/DL — HIGH (ref 70–99)
GLUCOSE BLDC GLUCOMTR-MCNC: 120 MG/DL — HIGH (ref 70–99)
GLUCOSE SERPL-MCNC: 133 MG/DL — HIGH (ref 70–99)
HCT VFR BLD CALC: 32.1 % — LOW (ref 39–50)
HGB BLD-MCNC: 9.9 G/DL — LOW (ref 13–17)
IMM GRANULOCYTES NFR BLD AUTO: 1 % — SIGNIFICANT CHANGE UP (ref 0–1.5)
LYMPHOCYTES # BLD AUTO: 1.49 K/UL — SIGNIFICANT CHANGE UP (ref 1–3.3)
LYMPHOCYTES # BLD AUTO: 11.2 % — LOW (ref 13–44)
MAGNESIUM SERPL-MCNC: 2.3 MG/DL — SIGNIFICANT CHANGE UP (ref 1.6–2.6)
MCHC RBC-ENTMCNC: 26.8 PG — LOW (ref 27–34)
MCHC RBC-ENTMCNC: 30.8 GM/DL — LOW (ref 32–36)
MCV RBC AUTO: 87 FL — SIGNIFICANT CHANGE UP (ref 80–100)
MONOCYTES # BLD AUTO: 0.98 K/UL — HIGH (ref 0–0.9)
MONOCYTES NFR BLD AUTO: 7.4 % — SIGNIFICANT CHANGE UP (ref 2–14)
NEUTROPHILS # BLD AUTO: 10.17 K/UL — HIGH (ref 1.8–7.4)
NEUTROPHILS NFR BLD AUTO: 76.3 % — SIGNIFICANT CHANGE UP (ref 43–77)
NRBC # BLD: 0 /100 WBCS — SIGNIFICANT CHANGE UP (ref 0–0)
PHOSPHATE SERPL-MCNC: 4.9 MG/DL — HIGH (ref 2.5–4.5)
PLATELET # BLD AUTO: 339 K/UL — SIGNIFICANT CHANGE UP (ref 150–400)
POTASSIUM SERPL-MCNC: 4.2 MMOL/L — SIGNIFICANT CHANGE UP (ref 3.5–5.3)
POTASSIUM SERPL-SCNC: 4.2 MMOL/L — SIGNIFICANT CHANGE UP (ref 3.5–5.3)
PROT SERPL-MCNC: 7.3 G/DL — SIGNIFICANT CHANGE UP (ref 6–8.3)
RBC # BLD: 3.69 M/UL — LOW (ref 4.2–5.8)
RBC # FLD: 15.1 % — HIGH (ref 10.3–14.5)
SODIUM SERPL-SCNC: 143 MMOL/L — SIGNIFICANT CHANGE UP (ref 135–145)
WBC # BLD: 13.31 K/UL — HIGH (ref 3.8–10.5)
WBC # FLD AUTO: 13.31 K/UL — HIGH (ref 3.8–10.5)

## 2020-09-05 PROCEDURE — 99233 SBSQ HOSP IP/OBS HIGH 50: CPT | Mod: GC

## 2020-09-05 RX ORDER — HYDROMORPHONE HYDROCHLORIDE 2 MG/ML
0.5 INJECTION INTRAMUSCULAR; INTRAVENOUS; SUBCUTANEOUS ONCE
Refills: 0 | Status: DISCONTINUED | OUTPATIENT
Start: 2020-09-05 | End: 2020-09-05

## 2020-09-05 RX ORDER — CLONAZEPAM 1 MG
0.25 TABLET ORAL ONCE
Refills: 0 | Status: DISCONTINUED | OUTPATIENT
Start: 2020-09-05 | End: 2020-09-05

## 2020-09-05 RX ADMIN — Medication 10 MILLIGRAM(S): at 23:54

## 2020-09-05 RX ADMIN — Medication 1 DROP(S): at 05:10

## 2020-09-05 RX ADMIN — SODIUM CHLORIDE 4 MILLILITER(S): 9 INJECTION INTRAMUSCULAR; INTRAVENOUS; SUBCUTANEOUS at 17:08

## 2020-09-05 RX ADMIN — HEPARIN SODIUM 5000 UNIT(S): 5000 INJECTION INTRAVENOUS; SUBCUTANEOUS at 14:17

## 2020-09-05 RX ADMIN — HYDROMORPHONE HYDROCHLORIDE 0.5 MILLIGRAM(S): 2 INJECTION INTRAMUSCULAR; INTRAVENOUS; SUBCUTANEOUS at 05:07

## 2020-09-05 RX ADMIN — Medication 0.25 MILLIGRAM(S): at 17:20

## 2020-09-05 RX ADMIN — Medication 1 DROP(S): at 23:54

## 2020-09-05 RX ADMIN — Medication 50 MILLIGRAM(S): at 05:07

## 2020-09-05 RX ADMIN — SODIUM CHLORIDE 4 MILLILITER(S): 9 INJECTION INTRAMUSCULAR; INTRAVENOUS; SUBCUTANEOUS at 05:37

## 2020-09-05 RX ADMIN — Medication 100 MILLIGRAM(S): at 21:19

## 2020-09-05 RX ADMIN — Medication 100 MILLIGRAM(S): at 14:17

## 2020-09-05 RX ADMIN — Medication 3 MILLILITER(S): at 05:37

## 2020-09-05 RX ADMIN — Medication 1 TABLET(S): at 05:06

## 2020-09-05 RX ADMIN — Medication 0.25 MILLIGRAM(S): at 00:53

## 2020-09-05 RX ADMIN — ISOSORBIDE DINITRATE 20 MILLIGRAM(S): 5 TABLET ORAL at 15:44

## 2020-09-05 RX ADMIN — ISOSORBIDE DINITRATE 20 MILLIGRAM(S): 5 TABLET ORAL at 08:55

## 2020-09-05 RX ADMIN — QUETIAPINE FUMARATE 50 MILLIGRAM(S): 200 TABLET, FILM COATED ORAL at 11:33

## 2020-09-05 RX ADMIN — QUETIAPINE FUMARATE 50 MILLIGRAM(S): 200 TABLET, FILM COATED ORAL at 17:20

## 2020-09-05 RX ADMIN — Medication 3 MILLILITER(S): at 00:01

## 2020-09-05 RX ADMIN — QUETIAPINE FUMARATE 50 MILLIGRAM(S): 200 TABLET, FILM COATED ORAL at 05:10

## 2020-09-05 RX ADMIN — Medication 1 TABLET(S): at 17:20

## 2020-09-05 RX ADMIN — Medication 3 MILLILITER(S): at 23:18

## 2020-09-05 RX ADMIN — Medication 3 MILLILITER(S): at 11:26

## 2020-09-05 RX ADMIN — Medication 1 DROP(S): at 11:32

## 2020-09-05 RX ADMIN — Medication 100 MILLIGRAM(S): at 05:06

## 2020-09-05 RX ADMIN — HEPARIN SODIUM 5000 UNIT(S): 5000 INJECTION INTRAVENOUS; SUBCUTANEOUS at 05:07

## 2020-09-05 RX ADMIN — Medication 0.25 MILLIGRAM(S): at 05:06

## 2020-09-05 RX ADMIN — HYDROMORPHONE HYDROCHLORIDE 0.5 MILLIGRAM(S): 2 INJECTION INTRAMUSCULAR; INTRAVENOUS; SUBCUTANEOUS at 06:15

## 2020-09-05 RX ADMIN — Medication 1 DROP(S): at 17:20

## 2020-09-05 RX ADMIN — HEPARIN SODIUM 5000 UNIT(S): 5000 INJECTION INTRAVENOUS; SUBCUTANEOUS at 21:20

## 2020-09-05 RX ADMIN — HYDROMORPHONE HYDROCHLORIDE 0.5 MILLIGRAM(S): 2 INJECTION INTRAMUSCULAR; INTRAVENOUS; SUBCUTANEOUS at 21:20

## 2020-09-05 RX ADMIN — FENTANYL CITRATE 1 PATCH: 50 INJECTION INTRAVENOUS at 07:00

## 2020-09-05 RX ADMIN — POLYETHYLENE GLYCOL 3350 17 GRAM(S): 17 POWDER, FOR SOLUTION ORAL at 11:32

## 2020-09-05 RX ADMIN — Medication 50 MILLIGRAM(S): at 14:18

## 2020-09-05 RX ADMIN — Medication 50 MILLIGRAM(S): at 21:19

## 2020-09-05 RX ADMIN — Medication 3 MILLILITER(S): at 17:07

## 2020-09-05 RX ADMIN — FAMOTIDINE 20 MILLIGRAM(S): 10 INJECTION INTRAVENOUS at 11:32

## 2020-09-05 RX ADMIN — CHLORHEXIDINE GLUCONATE 1 APPLICATION(S): 213 SOLUTION TOPICAL at 05:10

## 2020-09-05 RX ADMIN — Medication 81 MILLIGRAM(S): at 11:33

## 2020-09-05 RX ADMIN — HYDROMORPHONE HYDROCHLORIDE 0.5 MILLIGRAM(S): 2 INJECTION INTRAMUSCULAR; INTRAVENOUS; SUBCUTANEOUS at 01:00

## 2020-09-05 RX ADMIN — FENTANYL CITRATE 1 PATCH: 50 INJECTION INTRAVENOUS at 20:33

## 2020-09-05 RX ADMIN — HYDROMORPHONE HYDROCHLORIDE 0.5 MILLIGRAM(S): 2 INJECTION INTRAMUSCULAR; INTRAVENOUS; SUBCUTANEOUS at 21:50

## 2020-09-05 RX ADMIN — HYDROMORPHONE HYDROCHLORIDE 0.5 MILLIGRAM(S): 2 INJECTION INTRAMUSCULAR; INTRAVENOUS; SUBCUTANEOUS at 00:21

## 2020-09-05 RX ADMIN — ISOSORBIDE DINITRATE 20 MILLIGRAM(S): 5 TABLET ORAL at 23:54

## 2020-09-05 NOTE — PROGRESS NOTE ADULT - SUBJECTIVE AND OBJECTIVE BOX
Subjective: Patient seen and examined. No new events except as noted.     REVIEW OF SYSTEMS:  Unable to obtain       MEDICATIONS:  MEDICATIONS  (STANDING):  albuterol/ipratropium for Nebulization. 3 milliLiter(s) Nebulizer every 6 hours  artificial  tears Solution 1 Drop(s) Both EYES every 6 hours  aspirin  chewable 81 milliGRAM(s) Enteral Tube daily  bisacodyl Suppository 10 milliGRAM(s) Rectal at bedtime  chlorhexidine 4% Liquid 1 Application(s) Topical <User Schedule>  clonazePAM  Tablet 0.25 milliGRAM(s) Oral every 12 hours  dextrose 5%. 1000 milliLiter(s) (50 mL/Hr) IV Continuous <Continuous>  dextrose 50% Injectable 12.5 Gram(s) IV Push once  dextrose 50% Injectable 25 Gram(s) IV Push once  dextrose 50% Injectable 25 Gram(s) IV Push once  famotidine    Tablet 20 milliGRAM(s) Oral daily  fentaNYL   Patch  50 MICROgram(s)/Hr 1 Patch Transdermal every 72 hours  heparin   Injectable 5000 Unit(s) SubCutaneous every 8 hours  hydrALAZINE 100 milliGRAM(s) Oral three times a day  isosorbide   dinitrate Tablet (ISORDIL) 20 milliGRAM(s) Enteral Tube <User Schedule>  lactobacillus acidophilus 1 Tablet(s) Oral two times a day  metoprolol tartrate 50 milliGRAM(s) Oral three times a day  nystatin    Suspension 367163 Unit(s) Oral every 6 hours  polyethylene glycol 3350 17 Gram(s) Oral daily  QUEtiapine 50 milliGRAM(s) Oral every 6 hours  sodium chloride 7% Inhalation 4 milliLiter(s) Inhalation every 12 hours      PHYSICAL EXAM:  T(C): 36.7 (09-05-20 @ 20:46), Max: 36.9 (09-05-20 @ 14:04)  HR: 75 (09-05-20 @ 20:52) (62 - 99)  BP: 148/93 (09-05-20 @ 20:46) (136/80 - 156/82)  RR: 20 (09-05-20 @ 20:52) (20 - 20)  SpO2: 100% (09-05-20 @ 20:52) (96% - 100%)  Wt(kg): --  I&O's Summary    04 Sep 2020 07:01  -  05 Sep 2020 07:00  --------------------------------------------------------  IN: 1790 mL / OUT: 1350 mL / NET: 440 mL    05 Sep 2020 07:01  -  05 Sep 2020 22:05  --------------------------------------------------------  IN: 1390 mL / OUT: 950 mL / NET: 440 mL          Appearance: sleepy , +trach   HEENT:   Dry  oral mucosa,  Lymphatic: No lymphadenopathy  Cardiovascular: Normal S1 S2, No JVD, No murmurs, No edema  Respiratory: Ventilated   Psychiatry: A & O x 0  Gastrointestinal:  Soft, Non-tender, +PEG   Skin: No rashes, No ecchymoses, No cyanosis	  Mental status- No acute distress, EO to stim  -CN- Pupils R 4mm NR, L 2mm sluggish, EOMI, tongue midline, face symmetric  +cough/gag  C briskly, two fingers/thumbs up on RUE, distally AG, wiggles toes on RLE      LABS:    CARDIAC MARKERS:                                9.9    13.31 )-----------( 339      ( 05 Sep 2020 10:42 )             32.1     09-05    143  |  106  |  39<H>  ----------------------------<  133<H>  4.2   |  24  |  2.16<H>    Ca    9.3      05 Sep 2020 10:42  Phos  4.9     09-05  Mg     2.3     09-05    TPro  7.3  /  Alb  3.4  /  TBili  0.1<L>  /  DBili  x   /  AST  23  /  ALT  25  /  AlkPhos  70  09-05    proBNP:   Lipid Profile:   HgA1c:   TSH:             TELEMETRY: 	    ECG:  	  RADIOLOGY:   DIAGNOSTIC TESTING:  [ ] Echocardiogram:  [ ]  Catheterization:  [ ] Stress Test:    OTHER:

## 2020-09-05 NOTE — PROGRESS NOTE ADULT - ASSESSMENT
60-year-old male with a history of A-Fib on warfarin who presents with cardiac arrest at work with downtime of about 25 minutes prior to ROSC. S/p therapeutic hypothermia. 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 Now with resolved neurogenic/cardiogenic shock. Course complicated by GI bleed s/p PPI gtt, bleeding from scott s/p clot irrigation, prolonged respiratory failure s/p trach (8/24) and PEG (8/26). Currently with A-Fib with RVR and C diff colitis.  Also developed gross hematuria - Urology consulted +catheter with clot (irrigated and exchanged) suspected 2/2 traumatic catheterization. Additionally course complicated by fever and Pseudomonas PNA (s/p Rx) then developed C diff (now with resolved leukocytosis and improved stooling, now soft per report); remains on enteral Vanco    8/29: Received to [facility]  8/30: Less Agitated overnight, Afebrile, WBC decreasing, CR elevated but Stable, Tolerating TC all day yesterday, will Attempt TC ATC tonight and ABG in am, F/u Bcx from 8/30. Rectal tube dc'd.  Speech eval for AAC device and will F/u.  8/31: will attempt TC around the clock again tonight. Stopped amiodarone as per cards.  Will be cathed if cleared by ID and renal.  9/1: Elevated BP overnight, BP stable today, afebrile. Lethargic earlier today on exam; ABG Stable; Head CT done- no new ICH, ventricles enlarged ? Hydrocephalus, Spoke to Max from [facility] who will see- although felt no intervention required at this time. Tolerating TC ATC, will attempt cuffless trach tomorrow. C diff Iso discontinued as discussed w/ Inf control.  9/2: still with secretions, will downsize when improved. Will cath as outpatient. Decreased opioids  9/3: PICC dc'd, Trach downsized to #7 cuffless Portex. stomach distended  9/4: Occasionally Restless, afebrile. Tolerating cuffless trach, phonating w/ digital occlusion.  Spoke to speech for PMV re-eval soon than 9/6.  Elevated BP, Hydralazine increased.- will monitor. 60-year-old male with a history of A-Fib on warfarin who presents with cardiac arrest at work with downtime of about 25 minutes prior to ROSC. S/p therapeutic hypothermia. 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 Now with resolved neurogenic/cardiogenic shock. Course complicated by GI bleed s/p PPI gtt, bleeding from scott s/p clot irrigation, prolonged respiratory failure s/p trach (8/24) and PEG (8/26). Currently with A-Fib with RVR and C diff colitis.  Also developed gross hematuria - Urology consulted +catheter with clot (irrigated and exchanged) suspected 2/2 traumatic catheterization. Additionally course complicated by fever and Pseudomonas PNA (s/p Rx) then developed C diff (now with resolved leukocytosis and improved stooling, now soft per report); remains on enteral Vanco    8/29: Received to [facility]  8/30: Less Agitated overnight, Afebrile, WBC decreasing, CR elevated but Stable, Tolerating TC all day yesterday, will Attempt TC ATC tonight and ABG in am, F/u Bcx from 8/30. Rectal tube dc'd.  Speech eval for AAC device and will F/u.  8/31: will attempt TC around the clock again tonight. Stopped amiodarone as per cards.  Will be cathed if cleared by ID and renal.  9/1: Elevated BP overnight, BP stable today, afebrile. Lethargic earlier today on exam; ABG Stable; Head CT done- no new ICH, ventricles enlarged ? Hydrocephalus, Spoke to Max from NSX who will see- although felt no intervention required at this time. Tolerating TC ATC, will attempt cuffless trach tomorrow. C diff Iso discontinued as discussed w/ Inf control.  9/2: still with secretions, will downsize when improved. Will cath as outpatient. Decreased opioids  9/3: PICC dc'd, Trach downsized to #7 cuffless Portex. stomach distended  9/4: Occasionally Restless, afebrile. Tolerating cuffless trach, phonating w/ digital occlusion.  Spoke to speech for PMV re-eval soon than 9/6.  Elevated BP, Hydralazine increased.- will monitor.  9/5: trach dislodged, ENT changed to #8 cuffless Shiley.

## 2020-09-05 NOTE — PROGRESS NOTE ADULT - SUBJECTIVE AND OBJECTIVE BOX
ENT ISSUE/POD:    HPI: 61 y/o male s/p tracheostomy #8 [facility], POD 12. Pt seen and examined at bedside. No acute events overnight. Pt tolerating TC for the past 6 days. ENT was called for trach eval. They are not able to pass suction catheter through trach tube.         PAST MEDICAL & SURGICAL HISTORY:  On warfarin for atrial fibrillation  No significant past surgical history    Allergies    No Known Allergies    Intolerances      MEDICATIONS  (STANDING):  albuterol/ipratropium for Nebulization. 3 milliLiter(s) Nebulizer every 6 hours  artificial  tears Solution 1 Drop(s) Both EYES every 6 hours  aspirin  chewable 81 milliGRAM(s) Enteral Tube daily  bisacodyl Suppository 10 milliGRAM(s) Rectal at bedtime  chlorhexidine 4% Liquid 1 Application(s) Topical <User Schedule>  clonazePAM  Tablet 0.25 milliGRAM(s) Oral every 12 hours  dextrose 5%. 1000 milliLiter(s) (50 mL/Hr) IV Continuous <Continuous>  dextrose 50% Injectable 12.5 Gram(s) IV Push once  dextrose 50% Injectable 25 Gram(s) IV Push once  dextrose 50% Injectable 25 Gram(s) IV Push once  famotidine    Tablet 20 milliGRAM(s) Oral daily  fentaNYL   Patch  50 MICROgram(s)/Hr 1 Patch Transdermal every 72 hours  heparin   Injectable 5000 Unit(s) SubCutaneous every 8 hours  hydrALAZINE 100 milliGRAM(s) Oral three times a day  isosorbide   dinitrate Tablet (ISORDIL) 20 milliGRAM(s) Enteral Tube <User Schedule>  lactobacillus acidophilus 1 Tablet(s) Oral two times a day  metoprolol tartrate 50 milliGRAM(s) Oral three times a day  nystatin    Suspension 852638 Unit(s) Oral every 6 hours  polyethylene glycol 3350 17 Gram(s) Oral daily  QUEtiapine 50 milliGRAM(s) Oral every 6 hours  sodium chloride 7% Inhalation 4 milliLiter(s) Inhalation every 12 hours    MEDICATIONS  (PRN):  acetaminophen   Tablet .. 650 milliGRAM(s) Oral every 6 hours PRN Temp greater or equal to 38C (100.4F), Mild Pain (1 - 3)  dextrose 40% Gel 15 Gram(s) Oral once PRN Blood Glucose LESS THAN 70 milliGRAM(s)/deciliter  glucagon  Injectable 1 milliGRAM(s) IntraMuscular once PRN Glucose LESS THAN 70 milligrams/deciliter  HYDROmorphone  Injectable 0.5 milliGRAM(s) IV Push every 8 hours PRN breakthrough pain      Social History: see consult    Family history: see consult    ROS:   ENT: all negative except as noted in HPI   Pulm: denies SOB, cough, hemoptysis  Neuro: denies numbness/tingling, loss of sensation  Endo: denies heat/cold intolerance, excessive sweating      Vital Signs Last 24 Hrs  T(C): 36.4 (05 Sep 2020 04:25), Max: 37 (04 Sep 2020 20:04)  T(F): 97.6 (05 Sep 2020 04:25), Max: 98.6 (04 Sep 2020 20:04)  HR: 86 (05 Sep 2020 12:04) (65 - 102)  BP: 152/90 (05 Sep 2020 04:25) (136/80 - 174/112)  BP(mean): --  RR: 20 (05 Sep 2020 12:04) (20 - 22)  SpO2: 98% (05 Sep 2020 12:04) (95% - 100%)                          9.9    13.31 )-----------( 339      ( 05 Sep 2020 10:42 )             32.1    09-05    143  |  106  |  39<H>  ----------------------------<  133<H>  4.2   |  24  |  2.16<H>    Ca    9.3      05 Sep 2020 10:42  Phos  4.9     09-05  Mg     2.3     09-05    TPro  7.3  /  Alb  3.4  /  TBili  0.1<L>  /  DBili  x   /  AST  23  /  ALT  25  /  AlkPhos  70  09-05       PHYSICAL EXAM:  Skin: No rashes, bruises, or lesions  Head: Normocephalic, Atraumatic  Face: no edema, erythema, or fluctuance. Parotid glands soft without mass  Eyes: no scleral injection  Nose: Nares bilaterally patent, no discharge  Mouth: No Stridor / Drooling / Trismus.  Mucosa moist, tongue/uvula midline, oropharynx clear  Neck: #7 portex uncuffed in place. Scant serosanguineous drainage. No bleeding noted from stoma, No lymphadenopathy, trachea midline, no masses  Lymphatic: No lymphadenopathy  Resp: on trach collar, breathing easily  Neuro: unable to assess due to patient's condition    Rsiks and benefits discussed with pt. Then, he was placed in a supine position with neck extended. #7 portex uncuffed trach tube was removed and replaced with with a #8 shiley uncuffed. No bleeding. Clear secretions suctioned from stoma. Pt tolerated the procedure well without complications.

## 2020-09-05 NOTE — PROGRESS NOTE ADULT - ASSESSMENT
61yo male S/P trach change from a #7 portex uncuffed to #8 shiley uncuffed. Pt tolerated the procedure well without complications.

## 2020-09-05 NOTE — PROGRESS NOTE ADULT - PROBLEM SELECTOR PLAN 1
- continue 30% trach collar  - Trached 8/24 with ENT  -Trach Care/ Pulmonary toileting   -Continue Duoneb and hypertonic saline, Mucomyst, chest PT  - Trach collar 30% around the clock  - downsized to #7 cuffless Portex - continue 30% trach collar  - Trached 8/24 with ENT  -Trach Care/ Pulmonary toileting   -Continue Duoneb and hypertonic saline, Mucomyst, chest PT  - Trach collar 30% around the clock  - downsized to #7 cuffless Portex, then changed to #8 cuffless Shiley

## 2020-09-05 NOTE — PROGRESS NOTE ADULT - ATTENDING COMMENTS
60 M with a history of A-Fib on warfarin who presents with cardiac arrest at work with downtime of about 25 minutes prior to ROSC. S/p therapeutic hypothermia. 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 Now with resolved neurogenic/cardiogenic shock. Course complicated by GI bleed s/p PPI gtt, bleeding from Umaña s/p clot irrigation, prolonged respiratory failure s/p trach (8/24) and PEG (8/26). s/p treatment for c. diff colitis.    1. SAH with negative angiogram  - Moving RUE/RLE, but without movement on left  - No further neurosurgical intervention at this time    2. Acute Encephalopathy - improving  - Continue fentanyl patch, clonazepam, and quetiapine  - Hydromorphone and tramadol prn  - Will titrate down medications as able while maintaining patient safety. Patient continues to be more alert and appropriately interactive    3. Cardiovascular - known Afib and new acute systolic heart failure in setting of cardiac arrest  - Possible stunned myocardium due to cardiac arrest vs. SAH  - Will need eventual cardiac cath to evaluate for ischemia - now planned for as outpatient per Dr. Deleon  - Continue afterload/preload reduction with hydralazine and isosorbide dinitrate  - For afib will continue metoprolol and monitor HR. Amio stopped due to prior pauses noted.  - Hold off on therapeutic a/c given SAH    4. Acute hypoxemic respiratory failure s/p tracheostomy:  - Patient downsized to cuffless trach 9/3 but had issues with trach coming out - replaced with size 8 cuffless shiley 9/5  - Continue Duo nebs and hypertonic saline, chest PT  - Patient is able to phonate with finger occlusion of trach - will ask for PMV eval. Patient is hypophonic but without air trapping today    5. Oropharyngeal dysphagia:  - PEG feeds as tolerated  - Famotidine for GI ppx    6. C diff colitis:  - Completed PO vanco  - Monitor leukocytosis  - rectal tube d/c'd    7. Pseudomonas aeruginosa pneumonia/colonization:  - S/p prolonged course of cefepime until 8/25  - Hold off on systemic antibiotics at this time  - If develops increased secretions, may benefit from inhaled tobramycin    8. JALEN, possible CKD:  - Strict I/O's, trend kidney function and lytes  - renal function stable - likely with CKD III    9. Dispo:  - Full code, overall prognosis guarded depending on neurological recovery  - Patient is a candidate for acute rehab  - will need to clarify insurance issues.

## 2020-09-05 NOTE — PROGRESS NOTE ADULT - SUBJECTIVE AND OBJECTIVE BOX
Patient is a 60y old  Male who presents with a chief complaint of cardiac arrest (04 Sep 2020 17:31)    HPI:  61YO male on coumadin for afib s/p cardiac arrest at work, down 25 minutes prior to ROSC. Pupils were R fixed and dilated, left fixed at the time, no gag reflex, post-CA cooling protocol was initiated down to 35 deg. Intubated and put on Nimbex drip. Also on Propofol, fentanyl, levophed. R groin minerva placed. Also put on heparin post-CA. 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 Course also c/b GIB, put on protonix drip. Prior to xfer was started on 3% at 30cc/hr. (09 Aug 2020 14:30)    Interval Events:    REVIEW OF SYSTEMS:  [ ] Positive  [ ] All other systems negative  [ ] Unable to assess ROS because ________    Vital Signs Last 24 Hrs  T(C): 36.4 (09-05-20 @ 04:25), Max: 37 (09-04-20 @ 20:04)  T(F): 97.6 (09-05-20 @ 04:25), Max: 98.6 (09-04-20 @ 20:04)  HR: 89 (09-05-20 @ 05:37) (65 - 102)  BP: 152/90 (09-05-20 @ 04:25) (128/73 - 174/112)  RR: 20 (09-05-20 @ 04:45) (20 - 22)  SpO2: 97% (09-05-20 @ 05:37) (95% - 100%)    PHYSICAL EXAM:  HEENT:   [ ]Tracheostomy:  [ ]Pupils equal  [ ]No oral lesions  [ ]Abnormal    SKIN  [ ] No Rash  [ ] Abnormal  [ ] pressure    CARDIAC  [ ]Regular  [ ]Abnormal    PULMONARY  [ ]Bilateral Clear Breath Sounds  [ ]Normal Excursion  [ ]Abnormal    GI  [ ]PEG      [ ] +BS		              [ ]Soft, nondistended, nontender	  [ ]Abnormal    MUSCULOSKELETAL                                   [ ]Bedbound                 [ ]Abnormal    [ ]Ambulatory/OOB to chair                           EXTREMITIES                                         [ ]Normal  [ ]Edema                           NEUROLOGIC  [ ] Normal, non focal  [ ] Focal findings:    PSYCHIATRIC  [ ]Alert and appropriate  [ ] Sedated	 [ ]Agitated    :  Leeroy: [ ] Yes, if yes: Date of Placement:                   [  ] No    LINES: Central Lines [ ] Yes, if yes: Date of Placement                                     [  ] No    HOSPITAL MEDICATIONS:  MEDICATIONS  (STANDING):  albuterol/ipratropium for Nebulization. 3 milliLiter(s) Nebulizer every 6 hours  artificial  tears Solution 1 Drop(s) Both EYES every 6 hours  aspirin  chewable 81 milliGRAM(s) Enteral Tube daily  bisacodyl Suppository 10 milliGRAM(s) Rectal at bedtime  chlorhexidine 4% Liquid 1 Application(s) Topical <User Schedule>  clonazePAM  Tablet 0.25 milliGRAM(s) Oral every 12 hours  dextrose 5%. 1000 milliLiter(s) (50 mL/Hr) IV Continuous <Continuous>  dextrose 50% Injectable 12.5 Gram(s) IV Push once  dextrose 50% Injectable 25 Gram(s) IV Push once  dextrose 50% Injectable 25 Gram(s) IV Push once  famotidine    Tablet 20 milliGRAM(s) Oral daily  fentaNYL   Patch  50 MICROgram(s)/Hr 1 Patch Transdermal every 72 hours  heparin   Injectable 5000 Unit(s) SubCutaneous every 8 hours  hydrALAZINE 100 milliGRAM(s) Oral three times a day  isosorbide   dinitrate Tablet (ISORDIL) 20 milliGRAM(s) Enteral Tube <User Schedule>  lactobacillus acidophilus 1 Tablet(s) Oral two times a day  metoprolol tartrate 50 milliGRAM(s) Oral three times a day  nystatin    Suspension 334666 Unit(s) Oral every 6 hours  polyethylene glycol 3350 17 Gram(s) Oral daily  QUEtiapine 50 milliGRAM(s) Oral every 6 hours  sodium chloride 7% Inhalation 4 milliLiter(s) Inhalation every 12 hours    MEDICATIONS  (PRN):  acetaminophen   Tablet .. 650 milliGRAM(s) Oral every 6 hours PRN Temp greater or equal to 38C (100.4F), Mild Pain (1 - 3)  dextrose 40% Gel 15 Gram(s) Oral once PRN Blood Glucose LESS THAN 70 milliGRAM(s)/deciliter  glucagon  Injectable 1 milliGRAM(s) IntraMuscular once PRN Glucose LESS THAN 70 milligrams/deciliter  HYDROmorphone  Injectable 0.5 milliGRAM(s) IV Push every 8 hours PRN breakthrough pain      LABS:                        10.2   9.51  )-----------( 341      ( 04 Sep 2020 06:36 )             34.9     09-04    144  |  106  |  38<H>  ----------------------------<  109<H>  4.2   |  23  |  2.14<H>    Ca    9.7      04 Sep 2020 06:36  Phos  5.1     09-04  Mg     2.2     09-04    TPro  7.4  /  Alb  3.5  /  TBili  0.2  /  DBili  x   /  AST  29  /  ALT  29  /  AlkPhos  75  09-04        Radha Shearer, [facility]  [id number] Patient is a 60y old  Male who presents with a chief complaint of cardiac arrest (04 Sep 2020 17:31)    HPI:  61YO male on coumadin for afib s/p cardiac arrest at work, down 25 minutes prior to ROSC. Pupils were R fixed and dilated, left fixed at the time, no gag reflex, post-CA cooling protocol was initiated down to 35 deg. Intubated and put on Nimbex drip. Also on Propofol, fentanyl, levophed. R groin minerva placed. Also put on heparin post-CA. 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 Course also c/b GIB, put on protonix drip. Prior to xfer was started on 3% at 30cc/hr. (09 Aug 2020 14:30)    Interval Events: Some agitation overnight    REVIEW OF SYSTEMS:  [ ] Positive  [x ] All other systems negative  [ ] Unable to assess ROS because ________    Vital Signs Last 24 Hrs  T(C): 36.4 (09-05-20 @ 04:25), Max: 37 (09-04-20 @ 20:04)  T(F): 97.6 (09-05-20 @ 04:25), Max: 98.6 (09-04-20 @ 20:04)  HR: 89 (09-05-20 @ 05:37) (65 - 102)  BP: 152/90 (09-05-20 @ 04:25) (128/73 - 174/112)  RR: 20 (09-05-20 @ 04:45) (20 - 22)  SpO2: 97% (09-05-20 @ 05:37) (95% - 100%)  PHYSICAL EXAM:    HEENT:   [x]Tracheostomy: #8 cuffless Shiley  [ ]Pupils equal; Right eye ptosis   [ ]No oral lesions  [x]Abnormal: pupils unequal    SKIN  [x]No Rash  [ ] Abnormal  [ ] pressure    CARDIAC  [ ]Regular  [x]Abnormal- irregularly irregular     PULMONARY  [x]Bilateral Clear Breath Sounds, upper airway with rhonchi  [ ]Normal Excursion  [ ]Abnormal    GI  [x]PEG      [x] +BS		              [ ]Soft, nondistended, nontender	  [x]Abnormal- mild distention, tympanic    MUSCULOSKELETAL                                   [x]Bedbound                 [x]Abnormal- moves RUE/RLE; unable to move L side s/p SAH this admission   [ ]Ambulatory/OOB to chair                           EXTREMITIES                                         []Normal  [x]Edema- trace bilateral L/E edema                           NEUROLOGIC  [ ] Normal, non focal  [x] Focal findings: right eye with ptosis, awake, open eyes, sluggish tracking of L eye, able to squeeze R hand, able to move R toes    PSYCHIATRIC  [x]Alert and appropriate  [ ] Sedated	 [ ]Agitated    :  [place]ma[place]: [ ] Yes, if yes: Date of Placement:                   [x] No: condom catheter     LINES: Central Lines [] Yes, if yes: Date of Placement                                     [  ] No    HOSPITAL MEDICATIONS:  MEDICATIONS  (STANDING):  albuterol/ipratropium for Nebulization. 3 milliLiter(s) Nebulizer every 6 hours  artificial  tears Solution 1 Drop(s) Both EYES every 6 hours  aspirin  chewable 81 milliGRAM(s) Enteral Tube daily  bisacodyl Suppository 10 milliGRAM(s) Rectal at bedtime  chlorhexidine 4% Liquid 1 Application(s) Topical <User Schedule>  clonazePAM  Tablet 0.25 milliGRAM(s) Oral every 12 hours  dextrose 5%. 1000 milliLiter(s) (50 mL/Hr) IV Continuous <Continuous>  dextrose 50% Injectable 12.5 Gram(s) IV Push once  dextrose 50% Injectable 25 Gram(s) IV Push once  dextrose 50% Injectable 25 Gram(s) IV Push once  famotidine    Tablet 20 milliGRAM(s) Oral daily  fentaNYL   Patch  50 MICROgram(s)/Hr 1 Patch Transdermal every 72 hours  heparin   Injectable 5000 Unit(s) SubCutaneous every 8 hours  hydrALAZINE 100 milliGRAM(s) Oral three times a day  isosorbide   dinitrate Tablet (ISORDIL) 20 milliGRAM(s) Enteral Tube <User Schedule>  lactobacillus acidophilus 1 Tablet(s) Oral two times a day  metoprolol tartrate 50 milliGRAM(s) Oral three times a day  nystatin    Suspension 538326 Unit(s) Oral every 6 hours  polyethylene glycol 3350 17 Gram(s) Oral daily  QUEtiapine 50 milliGRAM(s) Oral every 6 hours  sodium chloride 7% Inhalation 4 milliLiter(s) Inhalation every 12 hours    MEDICATIONS  (PRN):  acetaminophen   Tablet .. 650 milliGRAM(s) Oral every 6 hours PRN Temp greater or equal to 38C (100.4F), Mild Pain (1 - 3)  dextrose 40% Gel 15 Gram(s) Oral once PRN Blood Glucose LESS THAN 70 milliGRAM(s)/deciliter  glucagon  Injectable 1 milliGRAM(s) Intramuscular once PRN Glucose LESS THAN 70 milligrams/deciliter  Hydromorphone  Injectable 0.5 milliGRAM(s) IV Push every 8 hours PRN breakthrough pain      LABS:                        10.2   9.51  )-----------( 341      ( 04 Sep 2020 06:36 )             34.9     09-04    144  |  106  |  38<H>  ----------------------------<  109<H>  4.2   |  23  |  2.14<H>    Ca    9.7      04 Sep 2020 06:36  Phos  5.1     09-04  Mg     2.2     09-04    TPro  7.4  /  Alb  3.5  /  TBili  0.2  /  DBili  x   /  AST  29  /  ALT  29  /  AlkPhos  75  09-04      Radha Shearer, [facility]  [id number]

## 2020-09-05 NOTE — PROGRESS NOTE ADULT - PROBLEM SELECTOR PLAN 4
Possible stunned myocardium due to cardiac arrest vs. SAH  - Will need cardiac cath to evaluate for ischemia when cleared by ID and renal  - Continue afterload/preload reduction with hydralazine and isosorbide dinitrate  - Will obtain Cath as outpatient Possible stunned myocardium due to cardiac arrest vs. SAH  - Will need cardiac cath to evaluate for ischemia when cleared by ID and renal  - Continue afterload/preload reduction with hydralazine and isosorbide dinitrate, increase as tolerated for sbp~140  - Will obtain Cath as outpatient

## 2020-09-06 LAB
ALBUMIN SERPL ELPH-MCNC: 3.4 G/DL — SIGNIFICANT CHANGE UP (ref 3.3–5)
ALP SERPL-CCNC: 69 U/L — SIGNIFICANT CHANGE UP (ref 40–120)
ALT FLD-CCNC: 24 U/L — SIGNIFICANT CHANGE UP (ref 10–45)
ANION GAP SERPL CALC-SCNC: 15 MMOL/L — SIGNIFICANT CHANGE UP (ref 5–17)
AST SERPL-CCNC: 24 U/L — SIGNIFICANT CHANGE UP (ref 10–40)
BASOPHILS # BLD AUTO: 0.06 K/UL — SIGNIFICANT CHANGE UP (ref 0–0.2)
BASOPHILS NFR BLD AUTO: 0.6 % — SIGNIFICANT CHANGE UP (ref 0–2)
BILIRUB SERPL-MCNC: 0.2 MG/DL — SIGNIFICANT CHANGE UP (ref 0.2–1.2)
BUN SERPL-MCNC: 41 MG/DL — HIGH (ref 7–23)
CALCIUM SERPL-MCNC: 9.8 MG/DL — SIGNIFICANT CHANGE UP (ref 8.4–10.5)
CHLORIDE SERPL-SCNC: 105 MMOL/L — SIGNIFICANT CHANGE UP (ref 96–108)
CO2 SERPL-SCNC: 23 MMOL/L — SIGNIFICANT CHANGE UP (ref 22–31)
CREAT SERPL-MCNC: 2.48 MG/DL — HIGH (ref 0.5–1.3)
EOSINOPHIL # BLD AUTO: 0.33 K/UL — SIGNIFICANT CHANGE UP (ref 0–0.5)
EOSINOPHIL NFR BLD AUTO: 3.4 % — SIGNIFICANT CHANGE UP (ref 0–6)
GLUCOSE BLDC GLUCOMTR-MCNC: 108 MG/DL — HIGH (ref 70–99)
GLUCOSE BLDC GLUCOMTR-MCNC: 130 MG/DL — HIGH (ref 70–99)
GLUCOSE BLDC GLUCOMTR-MCNC: 130 MG/DL — HIGH (ref 70–99)
GLUCOSE SERPL-MCNC: 119 MG/DL — HIGH (ref 70–99)
HCT VFR BLD CALC: 33.7 % — LOW (ref 39–50)
HGB BLD-MCNC: 10 G/DL — LOW (ref 13–17)
IMM GRANULOCYTES NFR BLD AUTO: 1.1 % — SIGNIFICANT CHANGE UP (ref 0–1.5)
LYMPHOCYTES # BLD AUTO: 1.13 K/UL — SIGNIFICANT CHANGE UP (ref 1–3.3)
LYMPHOCYTES # BLD AUTO: 11.8 % — LOW (ref 13–44)
MAGNESIUM SERPL-MCNC: 2.5 MG/DL — SIGNIFICANT CHANGE UP (ref 1.6–2.6)
MCHC RBC-ENTMCNC: 26.8 PG — LOW (ref 27–34)
MCHC RBC-ENTMCNC: 29.7 GM/DL — LOW (ref 32–36)
MCV RBC AUTO: 90.3 FL — SIGNIFICANT CHANGE UP (ref 80–100)
MONOCYTES # BLD AUTO: 0.89 K/UL — SIGNIFICANT CHANGE UP (ref 0–0.9)
MONOCYTES NFR BLD AUTO: 9.3 % — SIGNIFICANT CHANGE UP (ref 2–14)
NEUTROPHILS # BLD AUTO: 7.09 K/UL — SIGNIFICANT CHANGE UP (ref 1.8–7.4)
NEUTROPHILS NFR BLD AUTO: 73.8 % — SIGNIFICANT CHANGE UP (ref 43–77)
NRBC # BLD: 0 /100 WBCS — SIGNIFICANT CHANGE UP (ref 0–0)
PHOSPHATE SERPL-MCNC: 5.1 MG/DL — HIGH (ref 2.5–4.5)
PLATELET # BLD AUTO: 302 K/UL — SIGNIFICANT CHANGE UP (ref 150–400)
POTASSIUM SERPL-MCNC: 4.6 MMOL/L — SIGNIFICANT CHANGE UP (ref 3.5–5.3)
POTASSIUM SERPL-SCNC: 4.6 MMOL/L — SIGNIFICANT CHANGE UP (ref 3.5–5.3)
PROT SERPL-MCNC: 7.1 G/DL — SIGNIFICANT CHANGE UP (ref 6–8.3)
RBC # BLD: 3.73 M/UL — LOW (ref 4.2–5.8)
RBC # FLD: 15.4 % — HIGH (ref 10.3–14.5)
SARS-COV-2 RNA SPEC QL NAA+PROBE: SIGNIFICANT CHANGE UP
SODIUM SERPL-SCNC: 143 MMOL/L — SIGNIFICANT CHANGE UP (ref 135–145)
WBC # BLD: 9.61 K/UL — SIGNIFICANT CHANGE UP (ref 3.8–10.5)
WBC # FLD AUTO: 9.61 K/UL — SIGNIFICANT CHANGE UP (ref 3.8–10.5)

## 2020-09-06 PROCEDURE — 99233 SBSQ HOSP IP/OBS HIGH 50: CPT | Mod: GC

## 2020-09-06 RX ORDER — AMANTADINE HCL 100 MG
100 CAPSULE ORAL
Refills: 0 | Status: DISCONTINUED | OUTPATIENT
Start: 2020-09-06 | End: 2020-09-06

## 2020-09-06 RX ORDER — FENTANYL CITRATE 50 UG/ML
1 INJECTION INTRAVENOUS
Refills: 0 | Status: DISCONTINUED | OUTPATIENT
Start: 2020-09-06 | End: 2020-09-12

## 2020-09-06 RX ORDER — QUETIAPINE FUMARATE 200 MG/1
50 TABLET, FILM COATED ORAL
Refills: 0 | Status: DISCONTINUED | OUTPATIENT
Start: 2020-09-06 | End: 2020-09-11

## 2020-09-06 RX ORDER — AMANTADINE HCL 100 MG
100 CAPSULE ORAL
Refills: 0 | Status: DISCONTINUED | OUTPATIENT
Start: 2020-09-06 | End: 2020-10-14

## 2020-09-06 RX ORDER — HYDROMORPHONE HYDROCHLORIDE 2 MG/ML
0.25 INJECTION INTRAMUSCULAR; INTRAVENOUS; SUBCUTANEOUS EVERY 12 HOURS
Refills: 0 | Status: DISCONTINUED | OUTPATIENT
Start: 2020-09-06 | End: 2020-09-06

## 2020-09-06 RX ORDER — LIDOCAINE 4 G/100G
1 CREAM TOPICAL DAILY
Refills: 0 | Status: DISCONTINUED | OUTPATIENT
Start: 2020-09-06 | End: 2020-10-14

## 2020-09-06 RX ORDER — ACETAMINOPHEN 500 MG
1000 TABLET ORAL ONCE
Refills: 0 | Status: COMPLETED | OUTPATIENT
Start: 2020-09-06 | End: 2020-09-06

## 2020-09-06 RX ORDER — QUETIAPINE FUMARATE 200 MG/1
50 TABLET, FILM COATED ORAL EVERY 12 HOURS
Refills: 0 | Status: DISCONTINUED | OUTPATIENT
Start: 2020-09-06 | End: 2020-09-10

## 2020-09-06 RX ORDER — QUETIAPINE FUMARATE 200 MG/1
50 TABLET, FILM COATED ORAL EVERY 12 HOURS
Refills: 0 | Status: DISCONTINUED | OUTPATIENT
Start: 2020-09-06 | End: 2020-09-06

## 2020-09-06 RX ADMIN — LIDOCAINE 1 PATCH: 4 CREAM TOPICAL at 18:09

## 2020-09-06 RX ADMIN — FENTANYL CITRATE 1 PATCH: 50 INJECTION INTRAVENOUS at 07:00

## 2020-09-06 RX ADMIN — ISOSORBIDE DINITRATE 20 MILLIGRAM(S): 5 TABLET ORAL at 23:45

## 2020-09-06 RX ADMIN — Medication 3 MILLILITER(S): at 05:09

## 2020-09-06 RX ADMIN — HYDROMORPHONE HYDROCHLORIDE 0.25 MILLIGRAM(S): 2 INJECTION INTRAMUSCULAR; INTRAVENOUS; SUBCUTANEOUS at 12:12

## 2020-09-06 RX ADMIN — HEPARIN SODIUM 5000 UNIT(S): 5000 INJECTION INTRAVENOUS; SUBCUTANEOUS at 05:05

## 2020-09-06 RX ADMIN — QUETIAPINE FUMARATE 50 MILLIGRAM(S): 200 TABLET, FILM COATED ORAL at 11:58

## 2020-09-06 RX ADMIN — HYDROMORPHONE HYDROCHLORIDE 0.5 MILLIGRAM(S): 2 INJECTION INTRAMUSCULAR; INTRAVENOUS; SUBCUTANEOUS at 05:21

## 2020-09-06 RX ADMIN — Medication 3 MILLILITER(S): at 23:12

## 2020-09-06 RX ADMIN — QUETIAPINE FUMARATE 50 MILLIGRAM(S): 200 TABLET, FILM COATED ORAL at 05:06

## 2020-09-06 RX ADMIN — Medication 1 DROP(S): at 11:59

## 2020-09-06 RX ADMIN — LIDOCAINE 1 PATCH: 4 CREAM TOPICAL at 19:41

## 2020-09-06 RX ADMIN — FENTANYL CITRATE 1 PATCH: 50 INJECTION INTRAVENOUS at 19:40

## 2020-09-06 RX ADMIN — FENTANYL CITRATE 1 PATCH: 50 INJECTION INTRAVENOUS at 12:12

## 2020-09-06 RX ADMIN — QUETIAPINE FUMARATE 50 MILLIGRAM(S): 200 TABLET, FILM COATED ORAL at 00:02

## 2020-09-06 RX ADMIN — Medication 81 MILLIGRAM(S): at 11:59

## 2020-09-06 RX ADMIN — ISOSORBIDE DINITRATE 20 MILLIGRAM(S): 5 TABLET ORAL at 15:03

## 2020-09-06 RX ADMIN — Medication 1 TABLET(S): at 17:01

## 2020-09-06 RX ADMIN — Medication 10 MILLIGRAM(S): at 22:16

## 2020-09-06 RX ADMIN — Medication 50 MILLIGRAM(S): at 22:17

## 2020-09-06 RX ADMIN — HYDROMORPHONE HYDROCHLORIDE 0.25 MILLIGRAM(S): 2 INJECTION INTRAMUSCULAR; INTRAVENOUS; SUBCUTANEOUS at 12:20

## 2020-09-06 RX ADMIN — Medication 100 MILLIGRAM(S): at 05:05

## 2020-09-06 RX ADMIN — Medication 0.25 MILLIGRAM(S): at 04:17

## 2020-09-06 RX ADMIN — Medication 3 MILLILITER(S): at 11:44

## 2020-09-06 RX ADMIN — Medication 100 MILLIGRAM(S): at 22:17

## 2020-09-06 RX ADMIN — CHLORHEXIDINE GLUCONATE 1 APPLICATION(S): 213 SOLUTION TOPICAL at 05:06

## 2020-09-06 RX ADMIN — QUETIAPINE FUMARATE 50 MILLIGRAM(S): 200 TABLET, FILM COATED ORAL at 19:42

## 2020-09-06 RX ADMIN — Medication 1 TABLET(S): at 05:06

## 2020-09-06 RX ADMIN — Medication 3 MILLILITER(S): at 18:15

## 2020-09-06 RX ADMIN — HEPARIN SODIUM 5000 UNIT(S): 5000 INJECTION INTRAVENOUS; SUBCUTANEOUS at 22:17

## 2020-09-06 RX ADMIN — Medication 10 MILLIGRAM(S): at 12:00

## 2020-09-06 RX ADMIN — POLYETHYLENE GLYCOL 3350 17 GRAM(S): 17 POWDER, FOR SOLUTION ORAL at 12:00

## 2020-09-06 RX ADMIN — FAMOTIDINE 20 MILLIGRAM(S): 10 INJECTION INTRAVENOUS at 11:59

## 2020-09-06 RX ADMIN — Medication 100 MILLIGRAM(S): at 13:20

## 2020-09-06 RX ADMIN — HEPARIN SODIUM 5000 UNIT(S): 5000 INJECTION INTRAVENOUS; SUBCUTANEOUS at 13:21

## 2020-09-06 RX ADMIN — Medication 1 DROP(S): at 23:45

## 2020-09-06 RX ADMIN — QUETIAPINE FUMARATE 50 MILLIGRAM(S): 200 TABLET, FILM COATED ORAL at 23:47

## 2020-09-06 RX ADMIN — HYDROMORPHONE HYDROCHLORIDE 0.5 MILLIGRAM(S): 2 INJECTION INTRAMUSCULAR; INTRAVENOUS; SUBCUTANEOUS at 06:17

## 2020-09-06 RX ADMIN — SODIUM CHLORIDE 4 MILLILITER(S): 9 INJECTION INTRAMUSCULAR; INTRAVENOUS; SUBCUTANEOUS at 05:09

## 2020-09-06 RX ADMIN — Medication 400 MILLIGRAM(S): at 17:40

## 2020-09-06 RX ADMIN — Medication 1 DROP(S): at 05:05

## 2020-09-06 RX ADMIN — FENTANYL CITRATE 1 PATCH: 50 INJECTION INTRAVENOUS at 18:38

## 2020-09-06 RX ADMIN — SODIUM CHLORIDE 4 MILLILITER(S): 9 INJECTION INTRAMUSCULAR; INTRAVENOUS; SUBCUTANEOUS at 18:15

## 2020-09-06 RX ADMIN — Medication 50 MILLIGRAM(S): at 05:05

## 2020-09-06 RX ADMIN — Medication 1 DROP(S): at 17:01

## 2020-09-06 RX ADMIN — Medication 50 MILLIGRAM(S): at 13:21

## 2020-09-06 RX ADMIN — FENTANYL CITRATE 1 PATCH: 50 INJECTION INTRAVENOUS at 19:47

## 2020-09-06 RX ADMIN — ISOSORBIDE DINITRATE 20 MILLIGRAM(S): 5 TABLET ORAL at 08:37

## 2020-09-06 NOTE — PROGRESS NOTE ADULT - SUBJECTIVE AND OBJECTIVE BOX
Patient is a 60y old  Male who presents with a chief complaint of cardiac arrest (04 Sep 2020 17:31)    HPI:  59YO male on coumadin for afib s/p cardiac arrest at work, down 25 minutes prior to ROSC. Pupils were R fixed and dilated, left fixed at the time, no gag reflex, post-CA cooling protocol was initiated down to 35 deg. Intubated and put on Nimbex drip. Also on Propofol, fentanyl, levophed. R groin minerva placed. Also put on heparin post-CA. 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 Course also c/b GIB, put on protonix drip. Prior to xfer was started on 3% at 30cc/hr. (09 Aug 2020 14:30)    Interval Events:    REVIEW OF SYSTEMS:  [ ] Positive  [ ] All other systems negative  [ ] Unable to assess ROS because ________    Vital Signs Last 24 Hrs  T(C): 36.7 (09-06-20 @ 04:05), Max: 36.9 (09-05-20 @ 14:04)  T(F): 98.1 (09-06-20 @ 04:05), Max: 98.4 (09-05-20 @ 14:04)  HR: 89 (09-06-20 @ 05:21) (62 - 102)  BP: 128/79 (09-06-20 @ 06:00) (128/79 - 156/82)  RR: 20 (09-06-20 @ 04:25) (20 - 20)  SpO2: 100% (09-06-20 @ 05:21) (97% - 100%)    PHYSICAL EXAM:  HEENT:   [ ]Tracheostomy:  [ ]Pupils equal  [ ]No oral lesions  [ ]Abnormal    SKIN  [ ] No Rash  [ ] Abnormal  [ ] pressure    CARDIAC  [ ]Regular  [ ]Abnormal    PULMONARY  [ ]Bilateral Clear Breath Sounds  [ ]Normal Excursion  [ ]Abnormal    GI  [ ]PEG      [ ] +BS		              [ ]Soft, nondistended, nontender	  [ ]Abnormal    MUSCULOSKELETAL                                   [ ]Bedbound                 [ ]Abnormal    [ ]Ambulatory/OOB to chair                           EXTREMITIES                                         [ ]Normal  [ ]Edema                           NEUROLOGIC  [ ] Normal, non focal  [ ] Focal findings:    PSYCHIATRIC  [ ]Alert and appropriate  [ ] Sedated	 [ ]Agitated    :  Leeroy: [ ] Yes, if yes: Date of Placement:                   [  ] No    LINES: Central Lines [ ] Yes, if yes: Date of Placement                                     [  ] No    HOSPITAL MEDICATIONS:  MEDICATIONS  (STANDING):  albuterol/ipratropium for Nebulization. 3 milliLiter(s) Nebulizer every 6 hours  artificial  tears Solution 1 Drop(s) Both EYES every 6 hours  aspirin  chewable 81 milliGRAM(s) Enteral Tube daily  bisacodyl Suppository 10 milliGRAM(s) Rectal at bedtime  chlorhexidine 4% Liquid 1 Application(s) Topical <User Schedule>  clonazePAM  Tablet 0.25 milliGRAM(s) Oral every 12 hours  dextrose 5%. 1000 milliLiter(s) (50 mL/Hr) IV Continuous <Continuous>  dextrose 50% Injectable 12.5 Gram(s) IV Push once  dextrose 50% Injectable 25 Gram(s) IV Push once  dextrose 50% Injectable 25 Gram(s) IV Push once  famotidine    Tablet 20 milliGRAM(s) Oral daily  fentaNYL   Patch  50 MICROgram(s)/Hr 1 Patch Transdermal every 72 hours  heparin   Injectable 5000 Unit(s) SubCutaneous every 8 hours  hydrALAZINE 100 milliGRAM(s) Oral three times a day  isosorbide   dinitrate Tablet (ISORDIL) 20 milliGRAM(s) Enteral Tube <User Schedule>  lactobacillus acidophilus 1 Tablet(s) Oral two times a day  metoprolol tartrate 50 milliGRAM(s) Oral three times a day  nystatin    Suspension 177289 Unit(s) Oral every 6 hours  polyethylene glycol 3350 17 Gram(s) Oral daily  QUEtiapine 50 milliGRAM(s) Oral every 6 hours  sodium chloride 7% Inhalation 4 milliLiter(s) Inhalation every 12 hours    MEDICATIONS  (PRN):  acetaminophen   Tablet .. 650 milliGRAM(s) Oral every 6 hours PRN Temp greater or equal to 38C (100.4F), Mild Pain (1 - 3)  dextrose 40% Gel 15 Gram(s) Oral once PRN Blood Glucose LESS THAN 70 milliGRAM(s)/deciliter  glucagon  Injectable 1 milliGRAM(s) IntraMuscular once PRN Glucose LESS THAN 70 milligrams/deciliter  HYDROmorphone  Injectable 0.5 milliGRAM(s) IV Push every 8 hours PRN breakthrough pain      LABS:                        9.9    13.31 )-----------( 339      ( 05 Sep 2020 10:42 )             32.1     09-05    143  |  106  |  39<H>  ----------------------------<  133<H>  4.2   |  24  |  2.16<H>    Ca    9.3      05 Sep 2020 10:42  Phos  4.9     09-05  Mg     2.3     09-05    TPro  7.3  /  Alb  3.4  /  TBili  0.1<L>  /  DBili  x   /  AST  23  /  ALT  25  /  AlkPhos  70  09-05    Radha Shearer, -[facility]  [id number] Patient is a 60y old  Male who presents with a chief complaint of cardiac arrest (04 Sep 2020 17:31)    HPI:  61YO male on coumadin for afib s/p cardiac arrest at work, down 25 minutes prior to ROSC. Pupils were R fixed and dilated, left fixed at the time, no gag reflex, post-CA cooling protocol was initiated down to 35 deg. Intubated and put on Nimbex drip. Also on Propofol, fentanyl, levophed. R groin minerva placed. Also put on heparin post-CA. 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 Course also c/b GIB, put on protonix drip. Prior to xfer was started on 3% at 30cc/hr. (09 Aug 2020 14:30)    Interval Events: restless last night. BM on 9/4    REVIEW OF SYSTEMS:  [ ] Positive  [ x] All other systems negative  [ ] Unable to assess ROS because ________    Vital Signs Last 24 Hrs  T(C): 36.7 (09-06-20 @ 04:05), Max: 36.9 (09-05-20 @ 14:04)  T(F): 98.1 (09-06-20 @ 04:05), Max: 98.4 (09-05-20 @ 14:04)  HR: 89 (09-06-20 @ 05:21) (62 - 102)  BP: 128/79 (09-06-20 @ 06:00) (128/79 - 156/82)  RR: 20 (09-06-20 @ 04:25) (20 - 20)  SpO2: 100% (09-06-20 @ 05:21) (97% - 100%)    PHYSICAL EXAM:    HEENT:   [x]Tracheostomy: #8 cuffless Shiley  [ ]Pupils equal; Right eye ptosis   [ ]No oral lesions  [x]Abnormal: pupils unequal    SKIN  [x]No Rash  [ ] Abnormal  [ ] pressure    CARDIAC  [ ]Regular  [x]Abnormal- irregularly irregular     PULMONARY  [x]Bilateral Clear Breath Sounds, upper airway with rhonchi  [ ]Normal Excursion  [ ]Abnormal    GI  [x]PEG      [x] +BS		              [ ]Soft, nondistended, nontender	  [x]Abnormal- mild distention, tympanic    MUSCULOSKELETAL                                   [x]Bedbound                 [x]Abnormal- moves RUE/RLE; unable to move L side s/p SAH this admission   [ ]Ambulatory/OOB to chair                           EXTREMITIES                                         []Normal  [x]Edema- trace bilateral L/E edema                           NEUROLOGIC  [ ] Normal, non focal  [x] Focal findings: right eye with ptosis, awake, opens eyes, sluggish tracking of L eye, able to squeeze R hand, able to move R toes. Follows commands, answers questions apprpriately.    PSYCHIATRIC  [x]Alert and appropriate  [ ] Sedated	 [ ]Agitated    :  Umaña: [ ] Yes, if yes: Date of Placement:                   [x] No: condom catheter     LINES: Central Lines [] Yes, if yes: Date of Placement                                     [x] No    HOSPITAL MEDICATIONS:  MEDICATIONS  (STANDING):  albuterol/ipratropium for Nebulization. 3 milliLiter(s) Nebulizer every 6 hours  artificial  tears Solution 1 Drop(s) Both EYES every 6 hours  aspirin  chewable 81 milliGRAM(s) Enteral Tube daily  bisacodyl Suppository 10 milliGRAM(s) Rectal at bedtime  chlorhexidine 4% Liquid 1 Application(s) Topical <User Schedule>  clonazePAM  Tablet 0.25 milliGRAM(s) Oral every 12 hours  dextrose 5%. 1000 milliLiter(s) (50 mL/Hr) IV Continuous <Continuous>  dextrose 50% Injectable 12.5 Gram(s) IV Push once  dextrose 50% Injectable 25 Gram(s) IV Push once  dextrose 50% Injectable 25 Gram(s) IV Push once  famotidine    Tablet 20 milliGRAM(s) Oral daily  fentaNYL   Patch  50 MICROgram(s)/Hr 1 Patch Transdermal every 72 hours  heparin   Injectable 5000 Unit(s) SubCutaneous every 8 hours  hydrALAZINE 100 milliGRAM(s) Oral three times a day  isosorbide   dinitrate Tablet (ISORDIL) 20 milliGRAM(s) Enteral Tube <User Schedule>  lactobacillus acidophilus 1 Tablet(s) Oral two times a day  metoprolol tartrate 50 milliGRAM(s) Oral three times a day  nystatin    Suspension 122784 Unit(s) Oral every 6 hours  polyethylene glycol 3350 17 Gram(s) Oral daily  QUEtiapine 50 milliGRAM(s) Oral every 6 hours  sodium chloride 7% Inhalation 4 milliLiter(s) Inhalation every 12 hours    MEDICATIONS  (PRN):  acetaminophen   Tablet .. 650 milliGRAM(s) Oral every 6 hours PRN Temp greater or equal to 38C (100.4F), Mild Pain (1 - 3)  dextrose 40% Gel 15 Gram(s) Oral once PRN Blood Glucose LESS THAN 70 milliGRAM(s)/deciliter  glucagon  Injectable 1 milliGRAM(s) IntraMuscular once PRN Glucose LESS THAN 70 milligrams/deciliter  HYDROmorphone  Injectable 0.5 milliGRAM(s) IV Push every 8 hours PRN breakthrough pain      LABS:                        9.9    13.31 )-----------( 339      ( 05 Sep 2020 10:42 )             32.1     09-05    143  |  106  |  39<H>  ----------------------------<  133<H>  4.2   |  24  |  2.16<H>    Ca    9.3      05 Sep 2020 10:42  Phos  4.9     09-05  Mg     2.3     09-05    TPro  7.3  /  Alb  3.4  /  TBili  0.1<L>  /  DBili  x   /  AST  23  /  ALT  25  /  AlkPhos  70  09-05    Radha Shearer, -[facility]  [id number]

## 2020-09-06 NOTE — PROGRESS NOTE ADULT - ASSESSMENT
60-year-old male with a history of A-Fib on warfarin who presents with cardiac arrest at work with downtime of about 25 minutes prior to ROSC. S/p therapeutic hypothermia. 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 Now with resolved neurogenic/cardiogenic shock. Course complicated by GI bleed s/p PPI gtt, bleeding from scott s/p clot irrigation, prolonged respiratory failure s/p trach (8/24) and PEG (8/26). Currently with A-Fib with RVR and C diff colitis.  Also developed gross hematuria - Urology consulted +catheter with clot (irrigated and exchanged) suspected 2/2 traumatic catheterization. Additionally course complicated by fever and Pseudomonas PNA (s/p Rx) then developed C diff (now with resolved leukocytosis and improved stooling, now soft per report); remains on enteral Vanco    8/29: Received to [facility]  8/30: Less Agitated overnight, Afebrile, WBC decreasing, CR elevated but Stable, Tolerating TC all day yesterday, will Attempt TC ATC tonight and ABG in am, F/u Bcx from 8/30. Rectal tube dc'd.  Speech eval for AAC device and will F/u.  8/31: will attempt TC around the clock again tonight. Stopped amiodarone as per cards.  Will be cathed if cleared by ID and renal.  9/1: Elevated BP overnight, BP stable today, afebrile. Lethargic earlier today on exam; ABG Stable; Head CT done- no new ICH, ventricles enlarged ? Hydrocephalus, Spoke to Max from NSX who will see- although felt no intervention required at this time. Tolerating TC ATC, will attempt cuffless trach tomorrow. C diff Iso discontinued as discussed w/ Inf control.  9/2: still with secretions, will downsize when improved. Will cath as outpatient. Decreased opioids  9/3: PICC dc'd, Trach downsized to #7 cuffless Portex. stomach distended  9/4: Occasionally Restless, afebrile. Tolerating cuffless trach, phonating w/ digital occlusion.  Spoke to speech for PMV re-eval soon than 9/6.  Elevated BP, Hydralazine increased.- will monitor.  9/5: trach dislodged, ENT changed to #8 cuffless Shiley. 60-year-old male with a history of A-Fib on warfarin who presents with cardiac arrest at work with downtime of about 25 minutes prior to ROSC. S/p therapeutic hypothermia. 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 Now with resolved neurogenic/cardiogenic shock. Course complicated by GI bleed s/p PPI gtt, bleeding from scott s/p clot irrigation, prolonged respiratory failure s/p trach (8/24) and PEG (8/26). Currently with A-Fib with RVR and C diff colitis.  Also developed gross hematuria - Urology consulted +catheter with clot (irrigated and exchanged) suspected 2/2 traumatic catheterization. Additionally course complicated by fever and Pseudomonas PNA (s/p Rx) then developed C diff (now with resolved leukocytosis and improved stooling, now soft per report); remains on enteral Vanco    8/29: Received to [facility]  8/30: Less Agitated overnight, Afebrile, WBC decreasing, CR elevated but Stable, Tolerating TC all day yesterday, will Attempt TC ATC tonight and ABG in am, F/u Bcx from 8/30. Rectal tube dc'd.  Speech eval for AAC device and will F/u.  8/31: will attempt TC around the clock again tonight. Stopped amiodarone as per cards.  Will be cathed if cleared by ID and renal.  9/1: Elevated BP overnight, BP stable today, afebrile. Lethargic earlier today on exam; ABG Stable; Head CT done- no new ICH, ventricles enlarged ? Hydrocephalus, Spoke to Max from [facility]X who will see- although felt no intervention required at this time. Tolerating TC ATC, will attempt cuffless trach tomorrow. C diff Iso discontinued as discussed w/ Inf control.  9/2: still with secretions, will downsize when improved. Will cath as outpatient. Decreased opioids  9/3: PICC dc'd, Trach downsized to #7 cuffless Portex. stomach distended  9/4: Occasionally Restless, afebrile. Tolerating cuffless trach, phonating w/ digital occlusion.  Spoke to speech for PMV re-eval soon than 9/6.  Elevated BP, Hydralazine increased.- will monitor.  9/5: trach dislodged, ENT changed to #8 cuffless Shiley. OOB to chair  9/6: OOB to chair today, gave dulcolax suppository

## 2020-09-06 NOTE — PROGRESS NOTE ADULT - SUBJECTIVE AND OBJECTIVE BOX
Subjective: Patient seen and examined. No new events except as noted.     REVIEW OF SYSTEMS:  Unable to obtain       MEDICATIONS:  MEDICATIONS  (STANDING):  albuterol/ipratropium for Nebulization. 3 milliLiter(s) Nebulizer every 6 hours  artificial  tears Solution 1 Drop(s) Both EYES every 6 hours  aspirin  chewable 81 milliGRAM(s) Enteral Tube daily  bisacodyl Suppository 10 milliGRAM(s) Rectal at bedtime  chlorhexidine 4% Liquid 1 Application(s) Topical <User Schedule>  clonazePAM  Tablet 0.25 milliGRAM(s) Oral every 12 hours  dextrose 5%. 1000 milliLiter(s) (50 mL/Hr) IV Continuous <Continuous>  dextrose 50% Injectable 12.5 Gram(s) IV Push once  dextrose 50% Injectable 25 Gram(s) IV Push once  dextrose 50% Injectable 25 Gram(s) IV Push once  famotidine    Tablet 20 milliGRAM(s) Oral daily  fentaNYL   Patch  50 MICROgram(s)/Hr 1 Patch Transdermal every 72 hours  heparin   Injectable 5000 Unit(s) SubCutaneous every 8 hours  hydrALAZINE 100 milliGRAM(s) Oral three times a day  isosorbide   dinitrate Tablet (ISORDIL) 20 milliGRAM(s) Enteral Tube <User Schedule>  lactobacillus acidophilus 1 Tablet(s) Oral two times a day  metoprolol tartrate 50 milliGRAM(s) Oral three times a day  nystatin    Suspension 804765 Unit(s) Oral every 6 hours  polyethylene glycol 3350 17 Gram(s) Oral daily  QUEtiapine 50 milliGRAM(s) Oral every 6 hours  sodium chloride 7% Inhalation 4 milliLiter(s) Inhalation every 12 hours      PHYSICAL EXAM:  T(C): 36.7 (09-06-20 @ 04:05), Max: 36.9 (09-05-20 @ 14:04)  HR: 71 (09-06-20 @ 09:04) (62 - 102)  BP: 128/79 (09-06-20 @ 06:00) (128/79 - 156/82)  RR: 20 (09-06-20 @ 09:04) (20 - 20)  SpO2: 98% (09-06-20 @ 09:04) (97% - 100%)  Wt(kg): --  I&O's Summary    05 Sep 2020 07:01  -  06 Sep 2020 07:00  --------------------------------------------------------  IN: 2410 mL / OUT: 1750 mL / NET: 660 mL              Appearance: sleepy , +trach   HEENT:   Dry  oral mucosa,  Lymphatic: No lymphadenopathy  Cardiovascular: Normal S1 S2, No JVD, No murmurs, No edema  Respiratory: Ventilated   Psychiatry: A & O x 0  Gastrointestinal:  Soft, Non-tender, +PEG   Skin: No rashes, No ecchymoses, No cyanosis	  Mental status- No acute distress, EO to stim  -CN- Pupils R 4mm NR, L 2mm sluggish, EOMI, tongue midline, face symmetric  +cough/gag  C briskly, two fingers/thumbs up on RUE, distally AG, wiggles toes on RLE      LABS:    CARDIAC MARKERS:                                10.0   9.61  )-----------( 302      ( 06 Sep 2020 07:19 )             33.7     09-06    143  |  105  |  41<H>  ----------------------------<  119<H>  4.6   |  23  |  2.48<H>    Ca    9.8      06 Sep 2020 07:16  Phos  5.1     09-06  Mg     2.5     09-06    TPro  7.1  /  Alb  3.4  /  TBili  0.2  /  DBili  x   /  AST  24  /  ALT  24  /  AlkPhos  69  09-06    proBNP:   Lipid Profile:   HgA1c:   TSH:             TELEMETRY: 	    ECG:  	  RADIOLOGY:   DIAGNOSTIC TESTING:  [ ] Echocardiogram:  [ ]  Catheterization:  [ ] Stress Test:    OTHER:

## 2020-09-06 NOTE — PROGRESS NOTE ADULT - ATTENDING COMMENTS
As above. Agitated overnight but calm this morning. Trach was dislodged yesterday and replaced with #8 cuffless Shiley by ENT. No issues with new trach.

## 2020-09-06 NOTE — PROGRESS NOTE ADULT - PROBLEM SELECTOR PLAN 1
- continue 30% trach collar  - Trached 8/24 with ENT  -Trach Care/ Pulmonary toileting   -Continue Duoneb and hypertonic saline, Mucomyst, chest PT  - Trach collar 30% around the clock  - downsized to #7 cuffless Portex, then changed to #8 cuffless Shiley

## 2020-09-06 NOTE — PROGRESS NOTE ADULT - PROBLEM SELECTOR PLAN 4
Possible stunned myocardium due to cardiac arrest vs. SAH  - Will need cardiac cath to evaluate for ischemia as outpatient  - Continue afterload/preload reduction with hydralazine and isosorbide dinitrate, increase as tolerated for sbp~140  - Will obtain Cath as outpatient

## 2020-09-07 PROCEDURE — 99233 SBSQ HOSP IP/OBS HIGH 50: CPT | Mod: GC

## 2020-09-07 RX ADMIN — SODIUM CHLORIDE 4 MILLILITER(S): 9 INJECTION INTRAMUSCULAR; INTRAVENOUS; SUBCUTANEOUS at 17:36

## 2020-09-07 RX ADMIN — Medication 100 MILLIGRAM(S): at 06:00

## 2020-09-07 RX ADMIN — Medication 1 TABLET(S): at 06:01

## 2020-09-07 RX ADMIN — HEPARIN SODIUM 5000 UNIT(S): 5000 INJECTION INTRAVENOUS; SUBCUTANEOUS at 13:10

## 2020-09-07 RX ADMIN — ISOSORBIDE DINITRATE 20 MILLIGRAM(S): 5 TABLET ORAL at 10:04

## 2020-09-07 RX ADMIN — Medication 100 MILLIGRAM(S): at 12:15

## 2020-09-07 RX ADMIN — QUETIAPINE FUMARATE 50 MILLIGRAM(S): 200 TABLET, FILM COATED ORAL at 12:10

## 2020-09-07 RX ADMIN — Medication 50 MILLIGRAM(S): at 06:03

## 2020-09-07 RX ADMIN — Medication 50 MILLIGRAM(S): at 13:11

## 2020-09-07 RX ADMIN — SODIUM CHLORIDE 4 MILLILITER(S): 9 INJECTION INTRAMUSCULAR; INTRAVENOUS; SUBCUTANEOUS at 05:29

## 2020-09-07 RX ADMIN — FENTANYL CITRATE 1 PATCH: 50 INJECTION INTRAVENOUS at 19:27

## 2020-09-07 RX ADMIN — ISOSORBIDE DINITRATE 20 MILLIGRAM(S): 5 TABLET ORAL at 17:05

## 2020-09-07 RX ADMIN — Medication 1 DROP(S): at 06:00

## 2020-09-07 RX ADMIN — Medication 100 MILLIGRAM(S): at 13:11

## 2020-09-07 RX ADMIN — CHLORHEXIDINE GLUCONATE 1 APPLICATION(S): 213 SOLUTION TOPICAL at 06:00

## 2020-09-07 RX ADMIN — Medication 3 MILLILITER(S): at 23:40

## 2020-09-07 RX ADMIN — Medication 81 MILLIGRAM(S): at 11:29

## 2020-09-07 RX ADMIN — HEPARIN SODIUM 5000 UNIT(S): 5000 INJECTION INTRAVENOUS; SUBCUTANEOUS at 06:00

## 2020-09-07 RX ADMIN — FAMOTIDINE 20 MILLIGRAM(S): 10 INJECTION INTRAVENOUS at 11:26

## 2020-09-07 RX ADMIN — FENTANYL CITRATE 1 PATCH: 50 INJECTION INTRAVENOUS at 07:00

## 2020-09-07 RX ADMIN — QUETIAPINE FUMARATE 50 MILLIGRAM(S): 200 TABLET, FILM COATED ORAL at 21:26

## 2020-09-07 RX ADMIN — Medication 1 DROP(S): at 23:30

## 2020-09-07 RX ADMIN — Medication 1 DROP(S): at 17:05

## 2020-09-07 RX ADMIN — LIDOCAINE 1 PATCH: 4 CREAM TOPICAL at 23:08

## 2020-09-07 RX ADMIN — HEPARIN SODIUM 5000 UNIT(S): 5000 INJECTION INTRAVENOUS; SUBCUTANEOUS at 21:26

## 2020-09-07 RX ADMIN — Medication 3 MILLILITER(S): at 10:40

## 2020-09-07 RX ADMIN — Medication 100 MILLIGRAM(S): at 21:26

## 2020-09-07 RX ADMIN — Medication 3 MILLILITER(S): at 05:29

## 2020-09-07 RX ADMIN — Medication 0.5 MILLIGRAM(S): at 07:47

## 2020-09-07 RX ADMIN — ISOSORBIDE DINITRATE 20 MILLIGRAM(S): 5 TABLET ORAL at 23:30

## 2020-09-07 RX ADMIN — LIDOCAINE 1 PATCH: 4 CREAM TOPICAL at 11:27

## 2020-09-07 RX ADMIN — QUETIAPINE FUMARATE 50 MILLIGRAM(S): 200 TABLET, FILM COATED ORAL at 06:33

## 2020-09-07 RX ADMIN — POLYETHYLENE GLYCOL 3350 17 GRAM(S): 17 POWDER, FOR SOLUTION ORAL at 11:28

## 2020-09-07 RX ADMIN — Medication 50 MILLIGRAM(S): at 21:26

## 2020-09-07 RX ADMIN — LIDOCAINE 1 PATCH: 4 CREAM TOPICAL at 19:28

## 2020-09-07 RX ADMIN — Medication 3 MILLILITER(S): at 17:36

## 2020-09-07 RX ADMIN — Medication 1 TABLET(S): at 17:05

## 2020-09-07 RX ADMIN — Medication 100 MILLIGRAM(S): at 10:04

## 2020-09-07 RX ADMIN — Medication 1 DROP(S): at 11:25

## 2020-09-07 NOTE — PROGRESS NOTE ADULT - ATTENDING COMMENTS
As above. Continues to have episodes of agitation overnight. Seroquel standing plus PRN. Remainder of plan as above.

## 2020-09-07 NOTE — PROGRESS NOTE ADULT - ASSESSMENT
60-year-old male with a history of A-Fib on warfarin who presents with cardiac arrest at work with downtime of about 25 minutes prior to ROSC. S/p therapeutic hypothermia. 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 Now with resolved neurogenic/cardiogenic shock. Course complicated by GI bleed s/p PPI gtt, bleeding from scott s/p clot irrigation, prolonged respiratory failure s/p trach (8/24) and PEG (8/26). Currently with A-Fib with RVR and C diff colitis.  Also developed gross hematuria - Urology consulted +catheter with clot (irrigated and exchanged) suspected 2/2 traumatic catheterization. Additionally course complicated by fever and Pseudomonas PNA (s/p Rx) then developed C diff (now with resolved leukocytosis and improved stooling, now soft per report); remains on enteral Vanco    8/29: Received to [facility]  8/30: Less Agitated overnight, Afebrile, WBC decreasing, CR elevated but Stable, Tolerating TC all day yesterday, will Attempt TC ATC tonight and ABG in am, F/u Bcx from 8/30. Rectal tube dc'd.  Speech eval for AAC device and will F/u.  8/31: will attempt TC around the clock again tonight. Stopped amiodarone as per cards.  Will be cathed if cleared by ID and renal.  9/1: Elevated BP overnight, BP stable today, afebrile. Lethargic earlier today on exam; ABG Stable; Head CT done- no new ICH, ventricles enlarged ? Hydrocephalus, Spoke to Max from [facility] who will see- although felt no intervention required at this time. Tolerating TC ATC, will attempt cuffless trach tomorrow. C diff Iso discontinued as discussed w/ Inf control.  9/2: still with secretions, will downsize when improved. Will cath as outpatient. Decreased opioids  9/3: PICC dc'd, Trach downsized to #7 cuffless Portex. stomach distended  9/4: Occasionally Restless, afebrile. Tolerating cuffless trach, phonating w/ digital occlusion.  Spoke to speech for PMV re-eval soon than 9/6.  Elevated BP, Hydralazine increased.- will monitor.  9/5: trach dislodged, ENT changed to #8 cuffless Shiley. OOB to chair  9/6: OOB to chair today, gave dulcolax suppository 60-year-old male with a history of A-Fib on warfarin who presents with cardiac arrest at work with downtime of about 25 minutes prior to ROSC. S/p therapeutic hypothermia. 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 Now with resolved neurogenic/cardiogenic shock. Course complicated by GI bleed s/p PPI gtt, bleeding from scott s/p clot irrigation, prolonged respiratory failure s/p trach (8/24) and PEG (8/26). Currently with A-Fib with RVR and C diff colitis.  Also developed gross hematuria - Urology consulted +catheter with clot (irrigated and exchanged) suspected 2/2 traumatic catheterization. Additionally course complicated by fever and Pseudomonas PNA (s/p Rx) then developed C diff (now with resolved leukocytosis and improved stooling, now soft per report); remains on enteral Vanco    8/29: Received to [facility]  8/30: Less Agitated overnight, Afebrile, WBC decreasing, CR elevated but Stable, Tolerating TC all day yesterday, will Attempt TC ATC tonight and ABG in am, F/u Bcx from 8/30. Rectal tube dc'd.  Speech eval for AAC device and will F/u.  8/31: will attempt TC around the clock again tonight. Stopped amiodarone as per cards.  Will be cathed if cleared by ID and renal.  9/1: Elevated BP overnight, BP stable today, afebrile. Lethargic earlier today on exam; ABG Stable; Head CT done- no new ICH, ventricles enlarged ? Hydrocephalus, Spoke to Max from [facility] who will see- although felt no intervention required at this time. Tolerating TC ATC, will attempt cuffless trach tomorrow. C diff Iso discontinued as discussed w/ Inf control.  9/2: still with secretions, will downsize when improved. Will cath as outpatient. Decreased opioids  9/3: PICC dc'd, Trach downsized to #7 cuffless Portex. stomach distended  9/4: Occasionally Restless, afebrile. Tolerating cuffless trach, phonating w/ digital occlusion.  Spoke to speech for PMV re-eval soon than 9/6.  Elevated BP, Hydralazine increased.- will monitor.  9/5: trach dislodged, ENT changed to #8 cuffless Shiley. OOB to chair  9/6: OOB to chair today, gave dulcolax suppository   9/7: agitated while in chair today, gave Seroquel.  Evaluated by cardiology. 60-year-old male with a history of A-Fib on warfarin who presents with cardiac arrest at work with downtime of about 25 minutes prior to ROSC. S/p therapeutic hypothermia. 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 Now with resolved neurogenic/cardiogenic shock. Course complicated by GI bleed s/p PPI gtt, bleeding from scott s/p clot irrigation, prolonged respiratory failure s/p trach (8/24) and PEG (8/26). Currently with A-Fib with RVR and C diff colitis.  Also developed gross hematuria - Urology consulted +catheter with clot (irrigated and exchanged) suspected 2/2 traumatic catheterization. Additionally course complicated by fever and Pseudomonas PNA (s/p Rx) then developed C diff (now with resolved leukocytosis and improved stooling, now soft per report); remains on enteral Vanco    8/29: Received to [facility]  8/30: Less Agitated overnight, Afebrile, WBC decreasing, CR elevated but Stable, Tolerating TC all day yesterday, will Attempt TC ATC tonight and ABG in am, F/u Bcx from 8/30. Rectal tube dc'd.  Speech eval for AAC device and will F/u.  8/31: will attempt TC around the clock again tonight. Stopped amiodarone as per cards.  Will be cathed if cleared by ID and renal.  9/1: Elevated BP overnight, BP stable today, afebrile. Lethargic earlier today on exam; ABG Stable; Head CT done- no new ICH, ventricles enlarged ? Hydrocephalus, Spoke to Max from [facility] who will see- although felt no intervention required at this time. Tolerating TC ATC, will attempt cuffless trach tomorrow. C diff Iso discontinued as discussed w/ Inf control.  9/2: still with secretions, will downsize when improved. Will cath as outpatient. Decreased opioids  9/3: PICC dc'd, Trach downsized to #7 cuffless Portex. stomach distended  9/4: Occasionally Restless, afebrile. Tolerating cuffless trach, phonating w/ digital occlusion.  Spoke to speech for PMV re-eval soon than 9/6.  Elevated BP, Hydralazine increased.- will monitor.  9/5: trach dislodged, ENT changed to #8 cuffless Shiley. OOB to chair  9/6: OOB to chair today, gave dulcolax suppository   9/7: agitated while in chair today, gave Seroquel.  Producing thick, yellow secretions. Afebrile. Managing secretions with duoneb and hyper-sal 7%. Evaluated by cardiology. 60-year-old male with a history of A-Fib on warfarin who presents with cardiac arrest at work with downtime of about 25 minutes prior to ROSC. S/p therapeutic hypothermia. 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 Now with resolved neurogenic/cardiogenic shock. Course complicated by GI bleed s/p PPI gtt, bleeding from scott s/p clot irrigation, prolonged respiratory failure s/p trach (8/24) and PEG (8/26). Currently with A-Fib with RVR and C diff colitis.  Also developed gross hematuria - Urology consulted +catheter with clot (irrigated and exchanged) suspected 2/2 traumatic catheterization. Additionally course complicated by fever and Pseudomonas PNA (s/p Rx) then developed C diff (now with resolved leukocytosis and improved stooling, now soft per report); remains on enteral Vanco    8/29: Received to [facility]  8/30: Less Agitated overnight, Afebrile, WBC decreasing, CR elevated but Stable, Tolerating TC all day yesterday, will Attempt TC ATC tonight and ABG in am, F/u Bcx from 8/30. Rectal tube dc'd.  Speech eval for AAC device and will F/u.  8/31: will attempt TC around the clock again tonight. Stopped amiodarone as per cards.  Will be cathed if cleared by ID and renal.  9/1: Elevated BP overnight, BP stable today, afebrile. Lethargic earlier today on exam; ABG Stable; Head CT done- no new ICH, ventricles enlarged ? Hydrocephalus, Spoke to Max from [facility] who will see- although felt no intervention required at this time. Tolerating TC ATC, will attempt cuffless trach tomorrow. C diff Iso discontinued as discussed w/ Inf control.  9/2: still with secretions, will downsize when improved. Will cath as outpatient. Decreased opioids  9/3: PICC dc'd, Trach downsized to #7 cuffless Portex. stomach distended  9/4: Occasionally Restless, afebrile. Tolerating cuffless trach, phonating w/ digital occlusion.  Spoke to speech for PMV re-eval soon than 9/6.  Elevated BP, Hydralazine increased.- will monitor.  9/5: trach dislodged, ENT changed to #8 cuffless Shiley. OOB to chair  9/6: OOB to chair today, gave dulcolax suppository   9/7: agitated while in chair today, gave Seroquel.  Producing thick, yellow secretions. Afebrile. Managing secretions with duoneb and hyper-sal 7%. 60-year-old male with a history of A-Fib on warfarin who presents with cardiac arrest at work with downtime of about 25 minutes prior to ROSC. S/p therapeutic hypothermia. 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 Now with resolved neurogenic/cardiogenic shock. Course complicated by GI bleed s/p PPI gtt, bleeding from scott s/p clot irrigation, prolonged respiratory failure s/p trach (8/24) and PEG (8/26). Currently with A-Fib with RVR and C diff colitis.  Also developed gross hematuria - Urology consulted +catheter with clot (irrigated and exchanged) suspected 2/2 traumatic catheterization. Additionally course complicated by fever and Pseudomonas PNA (s/p Rx) then developed C diff (now with resolved leukocytosis and improved stooling, now soft per report); remains on enteral Vanco    8/29: Received to [facility]  8/30: Less Agitated overnight, Afebrile, WBC decreasing, CR elevated but Stable, Tolerating TC all day yesterday, will Attempt TC ATC tonight and ABG in am, F/u Bcx from 8/30. Rectal tube dc'd.  Speech eval for AAC device and will F/u.  8/31: will attempt TC around the clock again tonight. Stopped amiodarone as per cards.  Will be cathed if cleared by ID and renal.  9/1: Elevated BP overnight, BP stable today, afebrile. Lethargic earlier today on exam; ABG Stable; Head CT done- no new ICH, ventricles enlarged ? Hydrocephalus, Spoke to Max from [facility] who will see- although felt no intervention required at this time. Tolerating TC ATC, will attempt cuffless trach tomorrow. C diff Iso discontinued as discussed w/ Inf control.  9/2: still with secretions, will downsize when improved. Will cath as outpatient. Decreased opioids  9/3: PICC dc'd, Trach downsized to #7 cuffless Portex. stomach distended  9/4: Occasionally Restless, afebrile. Tolerating cuffless trach, phonating w/ digital occlusion.  Spoke to speech for PMV re-eval soon than 9/6.  Elevated BP, Hydralazine increased.- will monitor.  9/5: trach dislodged, ENT changed to #8 cuffless Shiley. OOB to chair  9/6: OOB to chair today, gave dulcolax suppository   9/7: agitated while in chair today, gave Seroquel.  Producing thick, yellow secretions. Afebrile. Managing secretions with duoneb and hyper-sal 7%/Chest PT

## 2020-09-07 NOTE — PROGRESS NOTE ADULT - SUBJECTIVE AND OBJECTIVE BOX
Patient is a 60y old  Male who presents with a chief complaint of cardiac arrest (04 Sep 2020 17:31)      Interval Events:    REVIEW OF SYSTEMS:  [ ] Positive  [ ] All other systems negative  [ ] Unable to assess ROS because ________    Vital Signs Last 24 Hrs  T(C): 36.3 (09-07-20 @ 04:28), Max: 36.7 (09-06-20 @ 21:17)  T(F): 97.3 (09-07-20 @ 04:28), Max: 98.1 (09-06-20 @ 21:17)  HR: 80 (09-07-20 @ 05:51) (66 - 101)  BP: 148/71 (09-07-20 @ 04:28) (131/86 - 148/71)  RR: 20 (09-07-20 @ 04:45) (20 - 22)  SpO2: 100% (09-07-20 @ 05:51) (94% - 100%)PHYSICAL EXAM:  HEENT:   [ ]Tracheostomy:  [ ]Pupils equal  [ ]No oral lesions  [ ]Abnormal        SKIN  [ ]No Rash  [ ] Abnormal  [ ] pressure    CARDIAC  [ ]Regular  [ ]Abnormal    PULMONARY  [ ]Bilateral Clear Breath Sounds  [ ]Normal Excursion  [ ]Abnormal    GI  [ ]PEG      [ ] +BS		              [ ]Soft, nondistended, nontender	  [ ]Abnormal    MUSCULOSKELETAL                                   [ ]Bedbound                 [ ]Abnormal    [ ]Ambulatory/OOB to chair                           EXTREMITIES                                         [ ]Normal  [ ]Edema                           NEUROLOGIC  [ ] Normal, non focal  [ ] Focal findings:    PSYCHIATRIC  [ ]Alert and appropriate  [ ] Sedated	 [ ]Agitated    :  Umaña: [ ] Yes, if yes: Date of Placement:                   [  ] No    LINES: Central Lines [ ] Yes, if yes: Date of Placement                                     [  ] No    HOSPITAL MEDICATIONS:  MEDICATIONS  (STANDING):  albuterol/ipratropium for Nebulization. 3 milliLiter(s) Nebulizer every 6 hours  amantadine Syrup 100 milliGRAM(s) Oral <User Schedule>  artificial  tears Solution 1 Drop(s) Both EYES every 6 hours  aspirin  chewable 81 milliGRAM(s) Enteral Tube daily  bisacodyl Suppository 10 milliGRAM(s) Rectal at bedtime  chlorhexidine 4% Liquid 1 Application(s) Topical <User Schedule>  dextrose 5%. 1000 milliLiter(s) (50 mL/Hr) IV Continuous <Continuous>  dextrose 50% Injectable 12.5 Gram(s) IV Push once  dextrose 50% Injectable 25 Gram(s) IV Push once  dextrose 50% Injectable 25 Gram(s) IV Push once  famotidine    Tablet 20 milliGRAM(s) Oral daily  fentaNYL   Patch  75 MICROgram(s)/Hr 1 Patch Transdermal every 72 hours  heparin   Injectable 5000 Unit(s) SubCutaneous every 8 hours  hydrALAZINE 100 milliGRAM(s) Oral three times a day  isosorbide   dinitrate Tablet (ISORDIL) 20 milliGRAM(s) Enteral Tube <User Schedule>  lactobacillus acidophilus 1 Tablet(s) Oral two times a day  lidocaine   Patch 1 Patch Transdermal daily  LORazepam   Injectable 0.5 milliGRAM(s) IV Push once  metoprolol tartrate 50 milliGRAM(s) Oral three times a day  nystatin    Suspension 288349 Unit(s) Oral every 6 hours  polyethylene glycol 3350 17 Gram(s) Oral daily  QUEtiapine 50 milliGRAM(s) Oral <User Schedule>  sodium chloride 7% Inhalation 4 milliLiter(s) Inhalation every 12 hours    MEDICATIONS  (PRN):  dextrose 40% Gel 15 Gram(s) Oral once PRN Blood Glucose LESS THAN 70 milliGRAM(s)/deciliter  glucagon  Injectable 1 milliGRAM(s) IntraMuscular once PRN Glucose LESS THAN 70 milligrams/deciliter  QUEtiapine 50 milliGRAM(s) Oral every 12 hours PRN agitiation      LABS:                        10.0   9.61  )-----------( 302      ( 06 Sep 2020 07:19 )             33.7     09-06    143  |  105  |  41<H>  ----------------------------<  119<H>  4.6   |  23  |  2.48<H>    Ca    9.8      06 Sep 2020 07:16  Phos  5.1     09-06  Mg     2.5     09-06    TPro  7.1  /  Alb  3.4  /  TBili  0.2  /  DBili  x   /  AST  24  /  ALT  24  /  AlkPhos  69  09-06            CAPILLARY BLOOD GLUCOSE    MICROBIOLOGY:     RADIOLOGY:  [ ] Reviewed and interpreted by me Patient is a 60y old  Male who presents with a chief complaint of cardiac arrest (04 Sep 2020 17:31)      Interval Events: agitated overnight.     REVIEW OF SYSTEMS:  [ ] Positive  [x] All other systems negative  [ ] Unable to assess ROS because ________    Vital Signs Last 24 Hrs  T(C): 36.3 (09-07-20 @ 04:28), Max: 36.7 (09-06-20 @ 21:17)  T(F): 97.3 (09-07-20 @ 04:28), Max: 98.1 (09-06-20 @ 21:17)  HR: 80 (09-07-20 @ 05:51) (66 - 101)  BP: 148/71 (09-07-20 @ 04:28) (131/86 - 148/71)  RR: 20 (09-07-20 @ 04:45) (20 - 22)  SpO2: 100% (09-07-20 @ 05:51) (94% - 100%)     PHYSICAL EXAM:    HEENT:   [x]Tracheostomy: #8 Shiley cuffless   [ ]Pupils equal  [ ]No oral lesions  [x]Abnormal: pupils unequal     SKIN  [x]No Rash  [ ] Abnormal  [ ] pressure    CARDIAC  [ ]Regular  [x]Abnormal: irregularly irregular     PULMONARY  [x]Bilateral Clear Breath Sounds, upper airway mild rhonchi   [ ]Normal Excursion  [ ]Abnormal    GI  [x]PEG      [x] +BS		              [ ]Soft, nondistended, nontender	  [x]Abnormal: mild distention, nontender     MUSCULOSKELETAL                                   [x]Bedbound                 [x]Abnormal: moves RUE/RLE; unable to move L side s/p SAH this admission    [ ]Ambulatory/OOB to chair                           EXTREMITIES                                         [ ]Normal  [x]Edema: trace bilateral LE                           NEUROLOGIC  [ ] Normal, non focal  [x] Focal findings: awake, opens eyes, able to squeeze R hand, able to move R toes. Follows commands, answers questions appropriately    PSYCHIATRIC  [x]Alert and appropriate  [ ] Sedated	 [ ]Agitated    :  Umaña: [ ] Yes, if yes: Date of Placement:                   [x] No Condom Cath     LINES: Central Lines [ ] Yes, if yes: Date of Placement                                     [x] No    HOSPITAL MEDICATIONS:  MEDICATIONS  (STANDING):  albuterol/ipratropium for Nebulization. 3 milliLiter(s) Nebulizer every 6 hours  amantadine Syrup 100 milliGRAM(s) Oral <User Schedule>  artificial  tears Solution 1 Drop(s) Both EYES every 6 hours  aspirin  chewable 81 milliGRAM(s) Enteral Tube daily  bisacodyl Suppository 10 milliGRAM(s) Rectal at bedtime  chlorhexidine 4% Liquid 1 Application(s) Topical <User Schedule>  dextrose 5%. 1000 milliLiter(s) (50 mL/Hr) IV Continuous <Continuous>  dextrose 50% Injectable 12.5 Gram(s) IV Push once  dextrose 50% Injectable 25 Gram(s) IV Push once  dextrose 50% Injectable 25 Gram(s) IV Push once  famotidine    Tablet 20 milliGRAM(s) Oral daily  fentaNYL   Patch  75 MICROgram(s)/Hr 1 Patch Transdermal every 72 hours  heparin   Injectable 5000 Unit(s) SubCutaneous every 8 hours  hydrALAZINE 100 milliGRAM(s) Oral three times a day  isosorbide   dinitrate Tablet (ISORDIL) 20 milliGRAM(s) Enteral Tube <User Schedule>  lactobacillus acidophilus 1 Tablet(s) Oral two times a day  lidocaine   Patch 1 Patch Transdermal daily  LORazepam   Injectable 0.5 milliGRAM(s) IV Push once  metoprolol tartrate 50 milliGRAM(s) Oral three times a day  nystatin    Suspension 926288 Unit(s) Oral every 6 hours  polyethylene glycol 3350 17 Gram(s) Oral daily  QUEtiapine 50 milliGRAM(s) Oral <User Schedule>  sodium chloride 7% Inhalation 4 milliLiter(s) Inhalation every 12 hours    MEDICATIONS  (PRN):  dextrose 40% Gel 15 Gram(s) Oral once PRN Blood Glucose LESS THAN 70 milliGRAM(s)/deciliter  glucagon  Injectable 1 milliGRAM(s) IntraMuscular once PRN Glucose LESS THAN 70 milligrams/deciliter  QUEtiapine 50 milliGRAM(s) Oral every 12 hours PRN agitiation      LABS:                        10.0   9.61  )-----------( 302      ( 06 Sep 2020 07:19 )             33.7     09-06    143  |  105  |  41<H>  ----------------------------<  119<H>  4.6   |  23  |  2.48<H>    Ca    9.8      06 Sep 2020 07:16  Phos  5.1     09-06  Mg     2.5     09-06    TPro  7.1  /  Alb  3.4  /  TBili  0.2  /  DBili  x   /  AST  24  /  ALT  24  /  AlkPhos  69  09-06            CAPILLARY BLOOD GLUCOSE    MICROBIOLOGY:     RADIOLOGY:  [ ] Reviewed and interpreted by me Patient is a 60y old  Male who presents with a chief complaint of cardiac arrest (04 Sep 2020 17:31)      Interval Events: agitated overnight and this am, attempts to pull at Line     REVIEW OF SYSTEMS:  [ ] Positive  [x] All other systems negative  [ ] Unable to assess ROS because ________    Vital Signs Last 24 Hrs  T(C): 36.3 (09-07-20 @ 04:28), Max: 36.7 (09-06-20 @ 21:17)  T(F): 97.3 (09-07-20 @ 04:28), Max: 98.1 (09-06-20 @ 21:17)  HR: 80 (09-07-20 @ 05:51) (66 - 101)  BP: 148/71 (09-07-20 @ 04:28) (131/86 - 148/71)  RR: 20 (09-07-20 @ 04:45) (20 - 22)  SpO2: 100% (09-07-20 @ 05:51) (94% - 100%)     PHYSICAL EXAM:    HEENT:   [x]Tracheostomy: #8 Shiley cuffless   [ ]Pupils equal  [ ]No oral lesions  [x]Abnormal: pupils unequal     SKIN  [x]No Rash  [ ] Abnormal  [ ] pressure    CARDIAC  [ ]Regular  [x]Abnormal: irregularly irregular     PULMONARY  [x]Bilateral Clear Breath Sounds, upper airway mild rhonchi   [ ]Normal Excursion  [ ]Abnormal    GI  [x]PEG      [x] +BS		              [ ]Soft, nondistended, nontender	  [x]Abnormal: mild distention, nontender     MUSCULOSKELETAL                                   [x]Bedbound                 [x]Abnormal: moves RUE/RLE; unable to move L side s/p SAH this admission    [ ]Ambulatory/OOB to chair                           EXTREMITIES                                         [ ]Normal  [x]Edema: trace bilateral LE                           NEUROLOGIC  [ ] Normal, non focal  [x] Focal findings: awake, opens eyes, able to squeeze R hand, able to move R toes. Follows commands, answers questions appropriately    PSYCHIATRIC  []Alert and appropriate  [ ] Sedated	 [ x]Agitated- mildly s/p seroquel and Ativan x 1 dose     :  Umaña: [ ] Yes, if yes: Date of Placement:                   [x] No Condom Cath     LINES: Central Lines [ ] Yes, if yes: Date of Placement                                     [x] No    HOSPITAL MEDICATIONS:  MEDICATIONS  (STANDING):  albuterol/ipratropium for Nebulization. 3 milliLiter(s) Nebulizer every 6 hours  amantadine Syrup 100 milliGRAM(s) Oral <User Schedule>  artificial  tears Solution 1 Drop(s) Both EYES every 6 hours  aspirin  chewable 81 milliGRAM(s) Enteral Tube daily  bisacodyl Suppository 10 milliGRAM(s) Rectal at bedtime  chlorhexidine 4% Liquid 1 Application(s) Topical <User Schedule>  dextrose 5%. 1000 milliLiter(s) (50 mL/Hr) IV Continuous <Continuous>  dextrose 50% Injectable 12.5 Gram(s) IV Push once  dextrose 50% Injectable 25 Gram(s) IV Push once  dextrose 50% Injectable 25 Gram(s) IV Push once  famotidine    Tablet 20 milliGRAM(s) Oral daily  fentaNYL   Patch  75 MICROgram(s)/Hr 1 Patch Transdermal every 72 hours  heparin   Injectable 5000 Unit(s) SubCutaneous every 8 hours  hydrALAZINE 100 milliGRAM(s) Oral three times a day  isosorbide   dinitrate Tablet (ISORDIL) 20 milliGRAM(s) Enteral Tube <User Schedule>  lactobacillus acidophilus 1 Tablet(s) Oral two times a day  lidocaine   Patch 1 Patch Transdermal daily  LORazepam   Injectable 0.5 milliGRAM(s) IV Push once  metoprolol tartrate 50 milliGRAM(s) Oral three times a day  nystatin    Suspension 711774 Unit(s) Oral every 6 hours  polyethylene glycol 3350 17 Gram(s) Oral daily  QUEtiapine 50 milliGRAM(s) Oral <User Schedule>  sodium chloride 7% Inhalation 4 milliLiter(s) Inhalation every 12 hours    MEDICATIONS  (PRN):  dextrose 40% Gel 15 Gram(s) Oral once PRN Blood Glucose LESS THAN 70 milliGRAM(s)/deciliter  glucagon  Injectable 1 milliGRAM(s) IntraMuscular once PRN Glucose LESS THAN 70 milligrams/deciliter  QUEtiapine 50 milliGRAM(s) Oral every 12 hours PRN agitiation      LABS:                        10.0   9.61  )-----------( 302      ( 06 Sep 2020 07:19 )             33.7     09-06    143  |  105  |  41<H>  ----------------------------<  119<H>  4.6   |  23  |  2.48<H>    Ca    9.8      06 Sep 2020 07:16  Phos  5.1     09-06  Mg     2.5     09-06    TPro  7.1  /  Alb  3.4  /  TBili  0.2  /  DBili  x   /  AST  24  /  ALT  24  /  AlkPhos  69  09-06            CAPILLARY BLOOD GLUCOSE    MICROBIOLOGY:     RADIOLOGY:  [ ] Reviewed and interpreted by me

## 2020-09-07 NOTE — PROGRESS NOTE ADULT - SUBJECTIVE AND OBJECTIVE BOX
Subjective: Patient seen and examined. No new events except as noted.     REVIEW OF SYSTEMS:  Unable to obtain         MEDICATIONS:  MEDICATIONS  (STANDING):  albuterol/ipratropium for Nebulization. 3 milliLiter(s) Nebulizer every 6 hours  amantadine Syrup 100 milliGRAM(s) Oral <User Schedule>  artificial  tears Solution 1 Drop(s) Both EYES every 6 hours  aspirin  chewable 81 milliGRAM(s) Enteral Tube daily  bisacodyl Suppository 10 milliGRAM(s) Rectal at bedtime  chlorhexidine 4% Liquid 1 Application(s) Topical <User Schedule>  dextrose 5%. 1000 milliLiter(s) (50 mL/Hr) IV Continuous <Continuous>  dextrose 50% Injectable 12.5 Gram(s) IV Push once  dextrose 50% Injectable 25 Gram(s) IV Push once  dextrose 50% Injectable 25 Gram(s) IV Push once  famotidine    Tablet 20 milliGRAM(s) Oral daily  fentaNYL   Patch  75 MICROgram(s)/Hr 1 Patch Transdermal every 72 hours  heparin   Injectable 5000 Unit(s) SubCutaneous every 8 hours  hydrALAZINE 100 milliGRAM(s) Oral three times a day  isosorbide   dinitrate Tablet (ISORDIL) 20 milliGRAM(s) Enteral Tube <User Schedule>  lactobacillus acidophilus 1 Tablet(s) Oral two times a day  lidocaine   Patch 1 Patch Transdermal daily  metoprolol tartrate 50 milliGRAM(s) Oral three times a day  nystatin    Suspension 332685 Unit(s) Oral every 6 hours  polyethylene glycol 3350 17 Gram(s) Oral daily  QUEtiapine 50 milliGRAM(s) Oral <User Schedule>  sodium chloride 7% Inhalation 4 milliLiter(s) Inhalation every 12 hours      PHYSICAL EXAM:  T(C): 36.3 (09-07-20 @ 04:28), Max: 36.7 (09-06-20 @ 21:17)  HR: 90 (09-07-20 @ 11:30) (66 - 101)  BP: 148/71 (09-07-20 @ 04:28) (131/86 - 148/71)  RR: 20 (09-07-20 @ 11:30) (20 - 22)  SpO2: 99% (09-07-20 @ 11:30) (95% - 100%)  Wt(kg): --  I&O's Summary    06 Sep 2020 07:01  -  07 Sep 2020 07:00  --------------------------------------------------------  IN: 2580 mL / OUT: 250 mL / NET: 2330 mL            Appearance: sleepy , +trach   HEENT:   Dry  oral mucosa,  Lymphatic: No lymphadenopathy  Cardiovascular: Normal S1 S2, No JVD, No murmurs, No edema  Respiratory: Ventilated   Psychiatry: A & O x 0  Gastrointestinal:  Soft, Non-tender, +PEG   Skin: No rashes, No ecchymoses, No cyanosis	  Mental status- No acute distress, EO to stim  -CN- Pupils R 4mm NR, L 2mm sluggish, EOMI, tongue midline, face symmetric  +cough/gag  C briskly, two fingers/thumbs up on RUE, distally AG, wiggles toes on RLE    LABS:    CARDIAC MARKERS:                                10.0   9.61  )-----------( 302      ( 06 Sep 2020 07:19 )             33.7     09-06    143  |  105  |  41<H>  ----------------------------<  119<H>  4.6   |  23  |  2.48<H>    Ca    9.8      06 Sep 2020 07:16  Phos  5.1     09-06  Mg     2.5     09-06    TPro  7.1  /  Alb  3.4  /  TBili  0.2  /  DBili  x   /  AST  24  /  ALT  24  /  AlkPhos  69  09-06    proBNP:   Lipid Profile:   HgA1c:   TSH:             TELEMETRY: 	    ECG:  	  RADIOLOGY:   DIAGNOSTIC TESTING:  [ ] Echocardiogram:  [ ]  Catheterization:  [ ] Stress Test:    OTHER:

## 2020-09-08 LAB
ALBUMIN SERPL ELPH-MCNC: 3.6 G/DL — SIGNIFICANT CHANGE UP (ref 3.3–5)
ALP SERPL-CCNC: 70 U/L — SIGNIFICANT CHANGE UP (ref 40–120)
ALT FLD-CCNC: 18 U/L — SIGNIFICANT CHANGE UP (ref 10–45)
ANION GAP SERPL CALC-SCNC: 15 MMOL/L — SIGNIFICANT CHANGE UP (ref 5–17)
APPEARANCE UR: CLEAR — SIGNIFICANT CHANGE UP
AST SERPL-CCNC: 22 U/L — SIGNIFICANT CHANGE UP (ref 10–40)
BACTERIA # UR AUTO: ABNORMAL
BASOPHILS # BLD AUTO: 0 K/UL — SIGNIFICANT CHANGE UP (ref 0–0.2)
BASOPHILS NFR BLD AUTO: 0 % — SIGNIFICANT CHANGE UP (ref 0–2)
BILIRUB SERPL-MCNC: 0.2 MG/DL — SIGNIFICANT CHANGE UP (ref 0.2–1.2)
BILIRUB UR-MCNC: NEGATIVE — SIGNIFICANT CHANGE UP
BUN SERPL-MCNC: 49 MG/DL — HIGH (ref 7–23)
CALCIUM SERPL-MCNC: 9.6 MG/DL — SIGNIFICANT CHANGE UP (ref 8.4–10.5)
CHLORIDE SERPL-SCNC: 105 MMOL/L — SIGNIFICANT CHANGE UP (ref 96–108)
CO2 SERPL-SCNC: 23 MMOL/L — SIGNIFICANT CHANGE UP (ref 22–31)
COLOR SPEC: SIGNIFICANT CHANGE UP
CREAT SERPL-MCNC: 2.91 MG/DL — HIGH (ref 0.5–1.3)
DIFF PNL FLD: ABNORMAL
EOSINOPHIL # BLD AUTO: 0 K/UL — SIGNIFICANT CHANGE UP (ref 0–0.5)
EOSINOPHIL NFR BLD AUTO: 0 % — SIGNIFICANT CHANGE UP (ref 0–6)
EPI CELLS # UR: 1 /HPF — SIGNIFICANT CHANGE UP
GLUCOSE SERPL-MCNC: 145 MG/DL — HIGH (ref 70–99)
GLUCOSE UR QL: NEGATIVE — SIGNIFICANT CHANGE UP
GRAM STN FLD: SIGNIFICANT CHANGE UP
HCT VFR BLD CALC: 30.8 % — LOW (ref 39–50)
HCT VFR BLD CALC: 31.6 % — LOW (ref 39–50)
HGB BLD-MCNC: 9.5 G/DL — LOW (ref 13–17)
HGB BLD-MCNC: 9.6 G/DL — LOW (ref 13–17)
HYALINE CASTS # UR AUTO: 1 /LPF — SIGNIFICANT CHANGE UP (ref 0–2)
KETONES UR-MCNC: NEGATIVE — SIGNIFICANT CHANGE UP
LACTATE SERPL-SCNC: 2.5 MMOL/L — HIGH (ref 0.7–2)
LEUKOCYTE ESTERASE UR-ACNC: ABNORMAL
LYMPHOCYTES # BLD AUTO: 0.52 K/UL — LOW (ref 1–3.3)
LYMPHOCYTES # BLD AUTO: 1.7 % — LOW (ref 13–44)
MAGNESIUM SERPL-MCNC: 2.5 MG/DL — SIGNIFICANT CHANGE UP (ref 1.6–2.6)
MCHC RBC-ENTMCNC: 26.8 PG — LOW (ref 27–34)
MCHC RBC-ENTMCNC: 26.9 PG — LOW (ref 27–34)
MCHC RBC-ENTMCNC: 30.4 GM/DL — LOW (ref 32–36)
MCHC RBC-ENTMCNC: 30.8 GM/DL — LOW (ref 32–36)
MCV RBC AUTO: 87.3 FL — SIGNIFICANT CHANGE UP (ref 80–100)
MCV RBC AUTO: 88.3 FL — SIGNIFICANT CHANGE UP (ref 80–100)
MONOCYTES # BLD AUTO: 1.36 K/UL — HIGH (ref 0–0.9)
MONOCYTES NFR BLD AUTO: 4.4 % — SIGNIFICANT CHANGE UP (ref 2–14)
NEUTROPHILS # BLD AUTO: 28.93 K/UL — HIGH (ref 1.8–7.4)
NEUTROPHILS NFR BLD AUTO: 93.9 % — HIGH (ref 43–77)
NITRITE UR-MCNC: POSITIVE
NRBC # BLD: 0 /100 WBCS — SIGNIFICANT CHANGE UP (ref 0–0)
PH UR: 6 — SIGNIFICANT CHANGE UP (ref 5–8)
PHOSPHATE SERPL-MCNC: 4.9 MG/DL — HIGH (ref 2.5–4.5)
PLATELET # BLD AUTO: 279 K/UL — SIGNIFICANT CHANGE UP (ref 150–400)
PLATELET # BLD AUTO: 282 K/UL — SIGNIFICANT CHANGE UP (ref 150–400)
POTASSIUM SERPL-MCNC: 4.8 MMOL/L — SIGNIFICANT CHANGE UP (ref 3.5–5.3)
POTASSIUM SERPL-SCNC: 4.8 MMOL/L — SIGNIFICANT CHANGE UP (ref 3.5–5.3)
PROT SERPL-MCNC: 7.5 G/DL — SIGNIFICANT CHANGE UP (ref 6–8.3)
PROT UR-MCNC: ABNORMAL
RBC # BLD: 3.53 M/UL — LOW (ref 4.2–5.8)
RBC # BLD: 3.58 M/UL — LOW (ref 4.2–5.8)
RBC # FLD: 15.6 % — HIGH (ref 10.3–14.5)
RBC # FLD: 15.8 % — HIGH (ref 10.3–14.5)
RBC CASTS # UR COMP ASSIST: 5 /HPF — HIGH (ref 0–4)
SODIUM SERPL-SCNC: 143 MMOL/L — SIGNIFICANT CHANGE UP (ref 135–145)
SP GR SPEC: 1.01 — SIGNIFICANT CHANGE UP (ref 1.01–1.02)
SPECIMEN SOURCE: SIGNIFICANT CHANGE UP
UROBILINOGEN FLD QL: NEGATIVE — SIGNIFICANT CHANGE UP
WBC # BLD: 30.81 K/UL — HIGH (ref 3.8–10.5)
WBC # BLD: 34.74 K/UL — HIGH (ref 3.8–10.5)
WBC # FLD AUTO: 30.81 K/UL — HIGH (ref 3.8–10.5)
WBC # FLD AUTO: 34.74 K/UL — HIGH (ref 3.8–10.5)
WBC UR QL: 5 /HPF — SIGNIFICANT CHANGE UP (ref 0–5)

## 2020-09-08 PROCEDURE — 99233 SBSQ HOSP IP/OBS HIGH 50: CPT | Mod: GC

## 2020-09-08 PROCEDURE — 74176 CT ABD & PELVIS W/O CONTRAST: CPT | Mod: 26

## 2020-09-08 PROCEDURE — 71250 CT THORAX DX C-: CPT | Mod: 26

## 2020-09-08 PROCEDURE — 72100 X-RAY EXAM L-S SPINE 2/3 VWS: CPT | Mod: 26

## 2020-09-08 RX ORDER — VANCOMYCIN HCL 1 G
1500 VIAL (EA) INTRAVENOUS ONCE
Refills: 0 | Status: COMPLETED | OUTPATIENT
Start: 2020-09-08 | End: 2020-09-08

## 2020-09-08 RX ORDER — OLANZAPINE 15 MG/1
5 TABLET, FILM COATED ORAL ONCE
Refills: 0 | Status: DISCONTINUED | OUTPATIENT
Start: 2020-09-08 | End: 2020-09-08

## 2020-09-08 RX ORDER — SODIUM CHLORIDE 9 MG/ML
500 INJECTION INTRAMUSCULAR; INTRAVENOUS; SUBCUTANEOUS ONCE
Refills: 0 | Status: COMPLETED | OUTPATIENT
Start: 2020-09-08 | End: 2020-09-09

## 2020-09-08 RX ORDER — MEROPENEM 1 G/30ML
INJECTION INTRAVENOUS
Refills: 0 | Status: DISCONTINUED | OUTPATIENT
Start: 2020-09-08 | End: 2020-09-11

## 2020-09-08 RX ORDER — MEROPENEM 1 G/30ML
1000 INJECTION INTRAVENOUS ONCE
Refills: 0 | Status: COMPLETED | OUTPATIENT
Start: 2020-09-08 | End: 2020-09-08

## 2020-09-08 RX ORDER — HYDROMORPHONE HYDROCHLORIDE 2 MG/ML
0.5 INJECTION INTRAMUSCULAR; INTRAVENOUS; SUBCUTANEOUS ONCE
Refills: 0 | Status: DISCONTINUED | OUTPATIENT
Start: 2020-09-08 | End: 2020-09-08

## 2020-09-08 RX ORDER — ACETAMINOPHEN 500 MG
1000 TABLET ORAL ONCE
Refills: 0 | Status: COMPLETED | OUTPATIENT
Start: 2020-09-08 | End: 2020-09-08

## 2020-09-08 RX ORDER — SODIUM CHLORIDE 9 MG/ML
500 INJECTION INTRAMUSCULAR; INTRAVENOUS; SUBCUTANEOUS ONCE
Refills: 0 | Status: COMPLETED | OUTPATIENT
Start: 2020-09-08 | End: 2020-09-08

## 2020-09-08 RX ORDER — MEROPENEM 1 G/30ML
1000 INJECTION INTRAVENOUS EVERY 12 HOURS
Refills: 0 | Status: DISCONTINUED | OUTPATIENT
Start: 2020-09-09 | End: 2020-09-11

## 2020-09-08 RX ADMIN — QUETIAPINE FUMARATE 50 MILLIGRAM(S): 200 TABLET, FILM COATED ORAL at 04:52

## 2020-09-08 RX ADMIN — FENTANYL CITRATE 1 PATCH: 50 INJECTION INTRAVENOUS at 06:46

## 2020-09-08 RX ADMIN — Medication 100 MILLIGRAM(S): at 04:52

## 2020-09-08 RX ADMIN — Medication 3 MILLILITER(S): at 12:05

## 2020-09-08 RX ADMIN — Medication 1 TABLET(S): at 18:09

## 2020-09-08 RX ADMIN — SODIUM CHLORIDE 166.67 MILLILITER(S): 9 INJECTION INTRAMUSCULAR; INTRAVENOUS; SUBCUTANEOUS at 10:45

## 2020-09-08 RX ADMIN — QUETIAPINE FUMARATE 50 MILLIGRAM(S): 200 TABLET, FILM COATED ORAL at 21:09

## 2020-09-08 RX ADMIN — HYDROMORPHONE HYDROCHLORIDE 0.5 MILLIGRAM(S): 2 INJECTION INTRAMUSCULAR; INTRAVENOUS; SUBCUTANEOUS at 11:00

## 2020-09-08 RX ADMIN — LIDOCAINE 1 PATCH: 4 CREAM TOPICAL at 12:17

## 2020-09-08 RX ADMIN — HEPARIN SODIUM 5000 UNIT(S): 5000 INJECTION INTRAVENOUS; SUBCUTANEOUS at 21:10

## 2020-09-08 RX ADMIN — Medication 100 MILLIGRAM(S): at 08:30

## 2020-09-08 RX ADMIN — Medication 3 MILLILITER(S): at 17:46

## 2020-09-08 RX ADMIN — HEPARIN SODIUM 5000 UNIT(S): 5000 INJECTION INTRAVENOUS; SUBCUTANEOUS at 14:00

## 2020-09-08 RX ADMIN — Medication 1 DROP(S): at 04:53

## 2020-09-08 RX ADMIN — ISOSORBIDE DINITRATE 20 MILLIGRAM(S): 5 TABLET ORAL at 08:30

## 2020-09-08 RX ADMIN — SODIUM CHLORIDE 4 MILLILITER(S): 9 INJECTION INTRAMUSCULAR; INTRAVENOUS; SUBCUTANEOUS at 17:46

## 2020-09-08 RX ADMIN — FENTANYL CITRATE 1 PATCH: 50 INJECTION INTRAVENOUS at 18:05

## 2020-09-08 RX ADMIN — FAMOTIDINE 20 MILLIGRAM(S): 10 INJECTION INTRAVENOUS at 12:16

## 2020-09-08 RX ADMIN — Medication 1 TABLET(S): at 04:53

## 2020-09-08 RX ADMIN — Medication 100 MILLIGRAM(S): at 21:10

## 2020-09-08 RX ADMIN — HEPARIN SODIUM 5000 UNIT(S): 5000 INJECTION INTRAVENOUS; SUBCUTANEOUS at 04:53

## 2020-09-08 RX ADMIN — Medication 50 MILLIGRAM(S): at 21:10

## 2020-09-08 RX ADMIN — MEROPENEM 100 MILLIGRAM(S): 1 INJECTION INTRAVENOUS at 16:01

## 2020-09-08 RX ADMIN — POLYETHYLENE GLYCOL 3350 17 GRAM(S): 17 POWDER, FOR SOLUTION ORAL at 12:17

## 2020-09-08 RX ADMIN — Medication 50 MILLIGRAM(S): at 04:51

## 2020-09-08 RX ADMIN — Medication 81 MILLIGRAM(S): at 12:16

## 2020-09-08 RX ADMIN — SODIUM CHLORIDE 4 MILLILITER(S): 9 INJECTION INTRAMUSCULAR; INTRAVENOUS; SUBCUTANEOUS at 05:08

## 2020-09-08 RX ADMIN — Medication 100 MILLIGRAM(S): at 14:00

## 2020-09-08 RX ADMIN — QUETIAPINE FUMARATE 50 MILLIGRAM(S): 200 TABLET, FILM COATED ORAL at 08:31

## 2020-09-08 RX ADMIN — Medication 300 MILLIGRAM(S): at 18:09

## 2020-09-08 RX ADMIN — Medication 1 DROP(S): at 18:09

## 2020-09-08 RX ADMIN — HYDROMORPHONE HYDROCHLORIDE 0.5 MILLIGRAM(S): 2 INJECTION INTRAMUSCULAR; INTRAVENOUS; SUBCUTANEOUS at 10:45

## 2020-09-08 RX ADMIN — Medication 1 DROP(S): at 12:15

## 2020-09-08 RX ADMIN — Medication 100 MILLIGRAM(S): at 12:15

## 2020-09-08 RX ADMIN — CHLORHEXIDINE GLUCONATE 1 APPLICATION(S): 213 SOLUTION TOPICAL at 04:53

## 2020-09-08 RX ADMIN — Medication 50 MILLIGRAM(S): at 14:00

## 2020-09-08 RX ADMIN — Medication 400 MILLIGRAM(S): at 16:01

## 2020-09-08 RX ADMIN — LIDOCAINE 1 PATCH: 4 CREAM TOPICAL at 19:47

## 2020-09-08 RX ADMIN — Medication 10 MILLIGRAM(S): at 21:10

## 2020-09-08 RX ADMIN — Medication 3 MILLILITER(S): at 05:08

## 2020-09-08 NOTE — PROGRESS NOTE ADULT - ASSESSMENT
60 year old male s/p cardiac arrest c/b GIB and bleeding from scott with perimesencephalic (HH4 mF4) subarachnoid hemorrhage, CT showed R periclinoidal/perimesencephalic SAH, c/f aneurysm rupture, no hydrocephalus.  angio neg x2, trach/vent and peg. course also complicated by gross hematuria - Urology consulted +catheter with clot (irrigated and exchanged) suspected 2/2 traumatic catheterization. treated for pseudomonas HCAP. In addition, has developed c diff. pt noticed with abn renal function and requires coronary angiogram.      1- JALEN on ckd III likely   2- HTN   3- respiratory failure  4- s/p cardiac arrest  5- c diff   6- s/p cardiac arrest       JALEN in setting of infections suspected along with his cardiac arrest  cr high again in setting of bladder outlet obstruction and infection likely   cont O2 support    hydralazine 100 mg tid   metoprolol 50 mg tid   d/w RCU team earlier.

## 2020-09-08 NOTE — PROGRESS NOTE ADULT - ATTENDING COMMENTS
Patient seen and examined, agree with above  1. SAH with negative angiogram:  - Moving RUE/RLE, but without movement on left  - No further neurosurgical intervention at this time    2. Acute Encephalopathy - improving  - Continue fentanyl patch, clonazepam, and quetiapine  - Pain control - Hydromorphone prn  - Will titrate down medications as able while maintaining patient safety.     3. Afib and new acute systolic heart failure in setting of cardiac arrest  - Possible stunned myocardium due to cardiac arrest vs. SAH  - Will need eventual cardiac cath to evaluate for ischemia - now planned for as outpatient per Dr. Deleon  - Continue afterload/preload reduction with hydralazine and isosorbide dinitrate  - For afib will continue metoprolol and monitor HR. Amio stopped due to prior pauses noted.  - Hold off on therapeutic a/c given SAH    4. Acute hypoxemic respiratory failure s/p tracheostomy:  - Patient downsized to cuffless trach 9/3  - Continue Duo nebs and hypertonic saline, chest PT  - PMV eval.     5. Oropharyngeal dysphagia:  - PEG feeds, Famotidine for GI ppx    6. C diff colitis:  - Completed PO vanco    7. Leukocytosis - unclear etiology, repeat CBC     8. Back pain - lumbar/thoracic Xrays to eval.     9. Dispo:  - Full code, overall prognosis guarded depending on neurological recovery  - Patient is a candidate for acute rehab, pending clarification of insurance issues.     I was physically present for the key portions of the evaluation and management (E/M) service provided.  I agree with the above history, physical, and plan which I have reviewed and edited where appropriate.     35 minutes spent on total encounter; more than 50% of the visit was spent counseling and/or coordinating care by the attending physician.     Plan discussed with RCU team.

## 2020-09-08 NOTE — CHART NOTE - NSCHARTNOTEFT_GEN_A_CORE
Memo Carter    Severe Sepsis f/u note         Vital Signs Last 24 Hrs  T(C): 36.8 (08 Sep 2020 20:16), Max: 39.2 (08 Sep 2020 15:00)  T(F): 98.3 (08 Sep 2020 20:16), Max: 102.5 (08 Sep 2020 15:00)  HR: 83 (08 Sep 2020 22:27) (81 - 120)  BP: 169/81 (08 Sep 2020 20:16) (109/72 - 182/90)  BP(mean): --  RR: 19 (08 Sep 2020 22:27) (17 - 20)  SpO2: 100% (08 Sep 2020 22:27) (97% - 100%)  		  LUNGS:  [ ]Clear bilaterally [  ] Wheeze [  X] Rhonchi [  ] Rales [  ] Crackles; Other:  HEART: [ X ]RRR [  ] No murmur[  ]  Normal S1S2[  ] Tachycardia;  Other:  CAPILLARY REFiLL:  	Fingers: [ X ] less than 2 seconds [  ] more than 2 seconds                                           Toes: [  ]  less than 2 seconds [  ] more than 2 seconds   PERIPHERAL PULSES:  Radial: [  X] Palpable  [  ]  non-palpable                                         Dorsalis Pedis: [ X ] Palpable  [  ] non-palpable                                         Posterior Tibial: [ X ] Palpable  [  ] non-palpable                                          Other:  SKIN:   [  ]  Diaphoretic  [  ]  mottling  [  ]  Cold extremities  [X  ]  Warm [  ]  Dry                      Other:        Labs:  08 Sep 2020 07:04    143    |  105    |  49     ----------------------------<  145    4.8     |  23     |  2.91     Ca    9.6        08 Sep 2020 07:04  Phos  4.9       08 Sep 2020 07:04  Mg     2.5       08 Sep 2020 07:04    TPro  7.5    /  Alb  3.6    /  TBili  0.2    /  DBili  x      /  AST  22     /  ALT  18     /  AlkPhos  70     08 Sep 2020 07:04                          9.5    34.74 )-----------( 279      ( 08 Sep 2020 14:29 )             30.8       Lactate:    Plan (orders must be placed in EMR):     Severe sepsis due to UTI    [ X ]  Check Repeat Lactate   [ X ]  No change in current plan, continue Vanco and Meropenem, F/u blood cultures, and  urine culture   [  ]  Start Vasopressors:  [  ]  Repeat Fluid Bolus:  [  ] other:    Care Discussed with Consultants/Other Providers [ ] YES  [X ] NO     Memo Carter NP  [id number] Memo Carter    Severe Sepsis f/u note         Vital Signs Last 24 Hrs  T(C): 36.8 (08 Sep 2020 20:16), Max: 39.2 (08 Sep 2020 15:00)  T(F): 98.3 (08 Sep 2020 20:16), Max: 102.5 (08 Sep 2020 15:00)  HR: 83 (08 Sep 2020 22:27) (81 - 120)  BP: 169/81 (08 Sep 2020 20:16) (109/72 - 182/90)  BP(mean): --  RR: 19 (08 Sep 2020 22:27) (17 - 20)  SpO2: 100% (08 Sep 2020 22:27) (97% - 100%)  		  LUNGS:  [ ]Clear bilaterally [  ] Wheeze [  X] Rhonchi [  ] Rales [  ] Crackles; Other:  HEART: [ X ]RRR [  ] No murmur[  ]  Normal S1S2[  ] Tachycardia;  Other:  CAPILLARY REFiLL:  	Fingers: [ X ] less than 2 seconds [  ] more than 2 seconds                                           Toes: [  ]  less than 2 seconds [  ] more than 2 seconds   PERIPHERAL PULSES:  Radial: [  X] Palpable  [  ]  non-palpable                                         Dorsalis Pedis: [ X ] Palpable  [  ] non-palpable                                         Posterior Tibial: [ X ] Palpable  [  ] non-palpable                                          Other:  SKIN:   [  ]  Diaphoretic  [  ]  mottling  [  ]  Cold extremities  [X  ]  Warm [  ]  Dry                      Other:        Labs:  08 Sep 2020 07:04    143    |  105    |  49     ----------------------------<  145    4.8     |  23     |  2.91     Ca    9.6        08 Sep 2020 07:04  Phos  4.9       08 Sep 2020 07:04  Mg     2.5       08 Sep 2020 07:04    TPro  7.5    /  Alb  3.6    /  TBili  0.2    /  DBili  x      /  AST  22     /  ALT  18     /  AlkPhos  70     08 Sep 2020 07:04                          9.5    34.74 )-----------( 279      ( 08 Sep 2020 14:29 )             30.8       Lactate:    Plan (orders must be placed in EMR):     Severe sepsis due to UTI    [ X ]  Check Repeat Lactate   [ X ]  No change in current plan, s/p vanco X 1, continue Meropenem, F/u blood cultures, and  urine culture   [  ]  Start Vasopressors:  [  ]  Repeat Fluid Bolus:  [  ] other:    Care Discussed with Consultants/Other Providers [ ] YES  [X ] NO     Memo Carter NP  [id number]

## 2020-09-08 NOTE — PROGRESS NOTE ADULT - SUBJECTIVE AND OBJECTIVE BOX
[facility]   [phone]  RENAL FOLLOW UP NOTE  --------------------------------------------------------------------------------  Chief Complaint:    24 hour events/subjective:    seen earlier above events noted.       PAST HISTORY  --------------------------------------------------------------------------------  No significant changes to PMH, PSH, FHx, SHx, unless otherwise noted    ALLERGIES & MEDICATIONS  --------------------------------------------------------------------------------  Allergies    No Known Allergies    Intolerances      Standing Inpatient Medications  albuterol/ipratropium for Nebulization. 3 milliLiter(s) Nebulizer every 6 hours  amantadine Syrup 100 milliGRAM(s) Oral <User Schedule>  artificial  tears Solution 1 Drop(s) Both EYES every 6 hours  aspirin  chewable 81 milliGRAM(s) Enteral Tube daily  bisacodyl Suppository 10 milliGRAM(s) Rectal at bedtime  chlorhexidine 4% Liquid 1 Application(s) Topical <User Schedule>  dextrose 5%. 1000 milliLiter(s) IV Continuous <Continuous>  dextrose 50% Injectable 12.5 Gram(s) IV Push once  dextrose 50% Injectable 25 Gram(s) IV Push once  dextrose 50% Injectable 25 Gram(s) IV Push once  famotidine    Tablet 20 milliGRAM(s) Oral daily  fentaNYL   Patch  75 MICROgram(s)/Hr 1 Patch Transdermal every 72 hours  heparin   Injectable 5000 Unit(s) SubCutaneous every 8 hours  hydrALAZINE 100 milliGRAM(s) Oral three times a day  isosorbide   dinitrate Tablet (ISORDIL) 20 milliGRAM(s) Enteral Tube <User Schedule>  lactobacillus acidophilus 1 Tablet(s) Oral two times a day  lidocaine   Patch 1 Patch Transdermal daily  meropenem  IVPB      metoprolol tartrate 50 milliGRAM(s) Oral three times a day  polyethylene glycol 3350 17 Gram(s) Oral daily  QUEtiapine 50 milliGRAM(s) Oral <User Schedule>  sodium chloride 7% Inhalation 4 milliLiter(s) Inhalation every 12 hours    PRN Inpatient Medications  dextrose 40% Gel 15 Gram(s) Oral once PRN  glucagon  Injectable 1 milliGRAM(s) IntraMuscular once PRN  QUEtiapine 50 milliGRAM(s) Oral every 12 hours PRN      REVIEW OF SYSTEMS  --------------------------------------------------------------------------------    trach     VITALS/PHYSICAL EXAM  --------------------------------------------------------------------------------  T(C): 36.8 (09-08-20 @ 20:16), Max: 39.2 (09-08-20 @ 15:00)  HR: 97 (09-08-20 @ 20:39) (81 - 120)  BP: 169/81 (09-08-20 @ 20:16) (109/72 - 182/90)  RR: 18 (09-08-20 @ 20:39) (17 - 20)  SpO2: 100% (09-08-20 @ 20:39) (97% - 100%)  Wt(kg): --        09-07-20 @ 07:01  -  09-08-20 @ 07:00  --------------------------------------------------------  IN: 2410 mL / OUT: 700 mL / NET: 1710 mL    09-08-20 @ 07:01  -  09-08-20 @ 22:08  --------------------------------------------------------  IN: 2620 mL / OUT: 3700 mL / NET: -1080 mL      Physical Exam:  	      Gen: trach    	no jvd    	Pulm: decrease bs  no rales  + ronchi -  wheezing  	CV: RRR, S1S2; no rub  	Abd: +BS, soft, nontender/nondistended + peg   	UE: Warm, no cyanosis  no clubbing,  no edema  	LE: Warm, no cyanosis  no clubbing, no edema  	Skin: Warm, no decrease skin turgor       LABS/STUDIES  --------------------------------------------------------------------------------              9.5    34.74 >-----------<  279      [09-08-20 @ 14:29]              30.8     143  |  105  |  49  ----------------------------<  145      [09-08-20 @ 07:04]  4.8   |  23  |  2.91        Ca     9.6     [09-08-20 @ 07:04]      Mg     2.5     [09-08-20 @ 07:04]      Phos  4.9     [09-08-20 @ 07:04]    TPro  7.5  /  Alb  3.6  /  TBili  0.2  /  DBili  x   /  AST  22  /  ALT  18  /  AlkPhos  70  [09-08-20 @ 07:04]          Creatinine Trend:  SCr 2.91 [09-08 @ 07:04]  SCr 2.48 [09-06 @ 07:16]  SCr 2.16 [09-05 @ 10:42]  SCr 2.14 [09-04 @ 06:36]  SCr 2.24 [09-03 @ 07:15]              Urinalysis - [09-08-20 @ 15:20]      Color Light Yellow / Appearance Clear / SG 1.013 / pH 6.0      Gluc Negative / Ketone Negative  / Bili Negative / Urobili Negative       Blood Small / Protein 30 mg/dL / Leuk Est Large / Nitrite Positive      RBC 5 / WBC 5 / Hyaline 1 / Gran  / Sq Epi  / Non Sq Epi 1 / Bacteria Few      TSH 1.91      [08-09-20 @ 17:20]  Lipid: chol 148, , HDL 37, LDL 68      [08-09-20 @ 17:21]

## 2020-09-08 NOTE — PROGRESS NOTE ADULT - SUBJECTIVE AND OBJECTIVE BOX
Subjective: Patient seen and examined. No new events except as noted.     REVIEW OF SYSTEMS:    Unable to obtain     MEDICATIONS:  MEDICATIONS  (STANDING):  albuterol/ipratropium for Nebulization. 3 milliLiter(s) Nebulizer every 6 hours  amantadine Syrup 100 milliGRAM(s) Oral <User Schedule>  artificial  tears Solution 1 Drop(s) Both EYES every 6 hours  aspirin  chewable 81 milliGRAM(s) Enteral Tube daily  bisacodyl Suppository 10 milliGRAM(s) Rectal at bedtime  chlorhexidine 4% Liquid 1 Application(s) Topical <User Schedule>  dextrose 5%. 1000 milliLiter(s) (50 mL/Hr) IV Continuous <Continuous>  dextrose 50% Injectable 12.5 Gram(s) IV Push once  dextrose 50% Injectable 25 Gram(s) IV Push once  dextrose 50% Injectable 25 Gram(s) IV Push once  famotidine    Tablet 20 milliGRAM(s) Oral daily  fentaNYL   Patch  75 MICROgram(s)/Hr 1 Patch Transdermal every 72 hours  heparin   Injectable 5000 Unit(s) SubCutaneous every 8 hours  hydrALAZINE 100 milliGRAM(s) Oral three times a day  isosorbide   dinitrate Tablet (ISORDIL) 20 milliGRAM(s) Enteral Tube <User Schedule>  lactobacillus acidophilus 1 Tablet(s) Oral two times a day  lidocaine   Patch 1 Patch Transdermal daily  metoprolol tartrate 50 milliGRAM(s) Oral three times a day  nystatin    Suspension 243692 Unit(s) Oral every 6 hours  OLANZapine Injectable 5 milliGRAM(s) IntraMuscular once  polyethylene glycol 3350 17 Gram(s) Oral daily  QUEtiapine 50 milliGRAM(s) Oral <User Schedule>  sodium chloride 0.9% Bolus 500 milliLiter(s) IV Bolus once  sodium chloride 7% Inhalation 4 milliLiter(s) Inhalation every 12 hours      PHYSICAL EXAM:  T(C): 36.4 (09-08-20 @ 04:15), Max: 36.9 (09-07-20 @ 21:12)  HR: 98 (09-08-20 @ 08:34) (79 - 103)  BP: 161/98 (09-08-20 @ 04:15) (127/90 - 161/98)  RR: 20 (09-08-20 @ 08:34) (17 - 20)  SpO2: 98% (09-08-20 @ 08:34) (97% - 100%)  Wt(kg): --  I&O's Summary    07 Sep 2020 07:01  -  08 Sep 2020 07:00  --------------------------------------------------------  IN: 2410 mL / OUT: 700 mL / NET: 1710 mL            Appearance: sleepy , +trach   HEENT:   Dry  oral mucosa,  Lymphatic: No lymphadenopathy  Cardiovascular: Normal S1 S2, No JVD, No murmurs, No edema  Respiratory: Ventilated   Psychiatry: A & O x 0  Gastrointestinal:  Soft, Non-tender, +PEG   Skin: No rashes, No ecchymoses, No cyanosis	  Mental status- No acute distress, EO to stim  -CN- Pupils R 4mm NR, L 2mm sluggish, EOMI, tongue midline, face symmetric  +cough/gag  C briskly, two fingers/thumbs up on RUE, distally AG, wiggles toes on RLE      LABS:    CARDIAC MARKERS:                                9.6    30.81 )-----------( 282      ( 08 Sep 2020 07:04 )             31.6     09-08    143  |  105  |  49<H>  ----------------------------<  145<H>  4.8   |  23  |  2.91<H>    Ca    9.6      08 Sep 2020 07:04  Phos  4.9     09-08  Mg     2.5     09-08    TPro  7.5  /  Alb  3.6  /  TBili  0.2  /  DBili  x   /  AST  22  /  ALT  18  /  AlkPhos  70  09-08    proBNP:   Lipid Profile:   HgA1c:   TSH:             TELEMETRY: 	    ECG:  	  RADIOLOGY:   DIAGNOSTIC TESTING:  [ ] Echocardiogram:  [ ]  Catheterization:  [ ] Stress Test:    OTHER:

## 2020-09-08 NOTE — PROGRESS NOTE ADULT - SUBJECTIVE AND OBJECTIVE BOX
Patient is a 60y old  Male who presents with a chief complaint of cardiac arrest (04 Sep 2020 17:31)    HPI:  59YO male on coumadin for afib s/p cardiac arrest at work, down 25 minutes prior to ROSC. Pupils were R fixed and dilated, left fixed at the time, no gag reflex, post-CA cooling protocol was initiated down to 35 deg. Intubated and put on Nimbex drip. Also on Propofol, fentanyl, levophed. R groin minerva placed. Also put on heparin post-CA. 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 Course also c/b GIB, put on protonix drip. Prior to xfer was started on 3% at 30cc/hr. (09 Aug 2020 14:30)    Interval Events:    REVIEW OF SYSTEMS:  [ ] Positive  [ ] All other systems negative  [ ] Unable to assess ROS because ________    Vital Signs Last 24 Hrs  T(C): 36.4 (09-08-20 @ 04:15), Max: 36.9 (09-07-20 @ 21:12)  T(F): 97.6 (09-08-20 @ 04:15), Max: 98.4 (09-07-20 @ 21:12)  HR: 96 (09-08-20 @ 06:49) (79 - 103)  BP: 161/98 (09-08-20 @ 04:15) (127/90 - 161/98)  RR: 20 (09-08-20 @ 04:43) (17 - 20)  SpO2: 99% (09-08-20 @ 06:49) (97% - 100%)    PHYSICAL EXAM:  HEENT:   [ ]Tracheostomy:  [ ]Pupils equal  [ ]No oral lesions  [ ]Abnormal    SKIN  [ ] No Rash  [ ] Abnormal  [ ] pressure    CARDIAC  [ ]Regular  [ ]Abnormal    PULMONARY  [ ]Bilateral Clear Breath Sounds  [ ]Normal Excursion  [ ]Abnormal    GI  [ ]PEG      [ ] +BS		              [ ]Soft, nondistended, nontender	  [ ]Abnormal    MUSCULOSKELETAL                                   [ ]Bedbound                 [ ]Abnormal    [ ]Ambulatory/OOB to chair                           EXTREMITIES                                         [ ]Normal  [ ]Edema                           NEUROLOGIC  [ ] Normal, non focal  [ ] Focal findings:    PSYCHIATRIC  [ ]Alert and appropriate  [ ] Sedated	 [ ]Agitated    :  Leeroy: [ ] Yes, if yes: Date of Placement:                   [  ] No    LINES: Central Lines [ ] Yes, if yes: Date of Placement                                     [  ] No    HOSPITAL MEDICATIONS:  MEDICATIONS  (STANDING):  albuterol/ipratropium for Nebulization. 3 milliLiter(s) Nebulizer every 6 hours  amantadine Syrup 100 milliGRAM(s) Oral <User Schedule>  artificial  tears Solution 1 Drop(s) Both EYES every 6 hours  aspirin  chewable 81 milliGRAM(s) Enteral Tube daily  bisacodyl Suppository 10 milliGRAM(s) Rectal at bedtime  chlorhexidine 4% Liquid 1 Application(s) Topical <User Schedule>  dextrose 5%. 1000 milliLiter(s) (50 mL/Hr) IV Continuous <Continuous>  dextrose 50% Injectable 12.5 Gram(s) IV Push once  dextrose 50% Injectable 25 Gram(s) IV Push once  dextrose 50% Injectable 25 Gram(s) IV Push once  famotidine    Tablet 20 milliGRAM(s) Oral daily  fentaNYL   Patch  75 MICROgram(s)/Hr 1 Patch Transdermal every 72 hours  heparin   Injectable 5000 Unit(s) SubCutaneous every 8 hours  hydrALAZINE 100 milliGRAM(s) Oral three times a day  isosorbide   dinitrate Tablet (ISORDIL) 20 milliGRAM(s) Enteral Tube <User Schedule>  lactobacillus acidophilus 1 Tablet(s) Oral two times a day  lidocaine   Patch 1 Patch Transdermal daily  metoprolol tartrate 50 milliGRAM(s) Oral three times a day  nystatin    Suspension 265468 Unit(s) Oral every 6 hours  polyethylene glycol 3350 17 Gram(s) Oral daily  QUEtiapine 50 milliGRAM(s) Oral <User Schedule>  sodium chloride 7% Inhalation 4 milliLiter(s) Inhalation every 12 hours    MEDICATIONS  (PRN):  dextrose 40% Gel 15 Gram(s) Oral once PRN Blood Glucose LESS THAN 70 milliGRAM(s)/deciliter  glucagon  Injectable 1 milliGRAM(s) IntraMuscular once PRN Glucose LESS THAN 70 milligrams/deciliter  QUEtiapine 50 milliGRAM(s) Oral every 12 hours PRN agitiation      LABS:                        9.6    30.81 )-----------( 282      ( 08 Sep 2020 07:04 )             31.6                 Radha Shearer, [facility]  [id number] Patient is a 60y old  Male who presents with a chief complaint of cardiac arrest (04 Sep 2020 17:31)    HPI:  59YO male on coumadin for afib s/p cardiac arrest at work, down 25 minutes prior to ROSC. Pupils were R fixed and dilated, left fixed at the time, no gag reflex, post-CA cooling protocol was initiated down to 35 deg. Intubated and put on Nimbex drip. Also on Propofol, fentanyl, levophed. R groin minerva placed. Also put on heparin post-CA. 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 Course also c/b GIB, put on protonix drip. Prior to xfer was started on 3% at 30cc/hr. (09 Aug 2020 14:30)    Interval Events: No acute issues overnight    REVIEW OF SYSTEMS:  [ ] Positive; complaining of back pain  [ ] All other systems negative  [ ] Unable to assess ROS because ________    Vital Signs Last 24 Hrs  T(C): 36.4 (09-08-20 @ 04:15), Max: 36.9 (09-07-20 @ 21:12)  T(F): 97.6 (09-08-20 @ 04:15), Max: 98.4 (09-07-20 @ 21:12)  HR: 96 (09-08-20 @ 06:49) (79 - 103)  BP: 161/98 (09-08-20 @ 04:15) (127/90 - 161/98)  RR: 20 (09-08-20 @ 04:43) (17 - 20)  SpO2: 99% (09-08-20 @ 06:49) (97% - 100%)    PHYSICAL EXAM:    HEENT:   [x]Tracheostomy: #8 cuffless Shiley  [ ]Pupils equal; Right eye ptosis   [ ]No oral lesions  [x]Abnormal: pupils unequal, right eye with ptosis    SKIN  [x]No Rash  [ ] Abnormal  [ ] pressure    CARDIAC  [ ]Regular  [x]Abnormal- irregularly irregular     PULMONARY  [x]Bilateral Clear Breath Sounds, upper airway with rhonchi, clears with cough  [ ]Normal Excursion  [ ]Abnormal    GI  [x]PEG      [x] +BS		              [ ]Soft, distended, nontender	  [x]Abnormal- mild distention, tympanic    MUSCULOSKELETAL                                   []Bedbound                 [x]Abnormal- moves RUE/RLE; unable to move L side s/p SAH this admission   [ x]Ambulatory/OOB to chair                           EXTREMITIES                                         []Normal  [x]Edema- trace bilateral L/E edema                           NEUROLOGIC  [ ] Normal, non focal  [x] Focal findings: right eye with ptosis, awake, opens eyes, sluggish tracking of L eye, strong squeeze Right hand, able to move R toes. Follows commands, answers questions appropriately.  Has periods of agitation precipitated by back pain.    PSYCHIATRIC  [x]Alert and appropriate  [ ] Sedated	 [ ]Agitated    :  Umaña: [ ] Yes, if yes: Date of Placement:                   [x] No: condom catheter     LINES: Central Lines [] Yes, if yes: Date of Placement                                     [x] No    HOSPITAL MEDICATIONS:  MEDICATIONS  (STANDING):  albuterol/ipratropium for Nebulization. 3 milliLiter(s) Nebulizer every 6 hours  amantadine Syrup 100 milliGRAM(s) Oral <User Schedule>  artificial  tears Solution 1 Drop(s) Both EYES every 6 hours  aspirin  chewable 81 milliGRAM(s) Enteral Tube daily  bisacodyl Suppository 10 milliGRAM(s) Rectal at bedtime  chlorhexidine 4% Liquid 1 Application(s) Topical <User Schedule>  dextrose 5%. 1000 milliLiter(s) (50 mL/Hr) IV Continuous <Continuous>  dextrose 50% Injectable 12.5 Gram(s) IV Push once  dextrose 50% Injectable 25 Gram(s) IV Push once  dextrose 50% Injectable 25 Gram(s) IV Push once  famotidine    Tablet 20 milliGRAM(s) Oral daily  fentaNYL   Patch  75 MICROgram(s)/Hr 1 Patch Transdermal every 72 hours  heparin   Injectable 5000 Unit(s) SubCutaneous every 8 hours  hydrALAZINE 100 milliGRAM(s) Oral three times a day  isosorbide   dinitrate Tablet (ISORDIL) 20 milliGRAM(s) Enteral Tube <User Schedule>  lactobacillus acidophilus 1 Tablet(s) Oral two times a day  lidocaine   Patch 1 Patch Transdermal daily  metoprolol tartrate 50 milliGRAM(s) Oral three times a day  nystatin    Suspension 344947 Unit(s) Oral every 6 hours  polyethylene glycol 3350 17 Gram(s) Oral daily  QUEtiapine 50 milliGRAM(s) Oral <User Schedule>  sodium chloride 7% Inhalation 4 milliLiter(s) Inhalation every 12 hours    MEDICATIONS  (PRN):  dextrose 40% Gel 15 Gram(s) Oral once PRN Blood Glucose LESS THAN 70 milliGRAM(s)/deciliter  glucagon  Injectable 1 milliGRAM(s) IntraMuscular once PRN Glucose LESS THAN 70 milligrams/deciliter  QUEtiapine 50 milliGRAM(s) Oral every 12 hours PRN agitiation      LABS:                        9.6    30.81 )-----------( 282      ( 08 Sep 2020 07:04 )             31.6           Radha Shearer, -[facility]  [id number] Patient is a 60y old  Male who presents with a chief complaint of cardiac arrest (04 Sep 2020 17:31)    HPI:  61YO male on coumadin for afib s/p cardiac arrest at work, down 25 minutes prior to ROSC. Pupils were R fixed and dilated, left fixed at the time, no gag reflex, post-CA cooling protocol was initiated down to 35 deg. Intubated and put on Nimbex drip. Also on Propofol, fentanyl, levophed. R groin minerva placed. Also put on heparin post-CA. 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 Course also c/b GIB, put on protonix drip. Prior to xfer was started on 3% at 30cc/hr. (09 Aug 2020 14:30)    Interval Events: No acute issues overnight    REVIEW OF SYSTEMS:  [ x] Positive; complaining of back pain  [ ] All other systems negative  [ ] Unable to assess ROS because ________    Vital Signs Last 24 Hrs  T(C): 36.4 (09-08-20 @ 04:15), Max: 36.9 (09-07-20 @ 21:12)  T(F): 97.6 (09-08-20 @ 04:15), Max: 98.4 (09-07-20 @ 21:12)  HR: 96 (09-08-20 @ 06:49) (79 - 103)  BP: 161/98 (09-08-20 @ 04:15) (127/90 - 161/98)  RR: 20 (09-08-20 @ 04:43) (17 - 20)  SpO2: 99% (09-08-20 @ 06:49) (97% - 100%)    PHYSICAL EXAM:    HEENT:   [x]Tracheostomy: #8 cuffless Shiley  [ ]Pupils equal; Right eye ptosis   [ ]No oral lesions  [x]Abnormal: pupils unequal, right eye with ptosis    SKIN  [x]No Rash  [ ] Abnormal  [ ] pressure    CARDIAC  [ ]Regular  [x]Abnormal- irregularly irregular     PULMONARY  [x]Bilateral Clear Breath Sounds, upper airway with rhonchi, clears with cough  [ ]Normal Excursion  [ ]Abnormal    GI  [x]PEG      [x] +BS		              [ ]Soft, distended, nontender	  [x]Abnormal- mild distention, tympanic    MUSCULOSKELETAL                                   []Bedbound                 [x]Abnormal- moves RUE/RLE; unable to move L side s/p SAH this admission   [ x]Ambulatory/OOB to chair                           EXTREMITIES                                         []Normal  [x]Edema- trace bilateral L/E edema                           NEUROLOGIC  [ ] Normal, non focal  [x] Focal findings: right eye with ptosis, awake, opens eyes, sluggish tracking of L eye, strong squeeze Right hand, able to move R toes. Follows commands, answers questions appropriately.  Has periods of agitation precipitated by back pain.    PSYCHIATRIC  [x]Alert and appropriate  [ ] Sedated	 [ ]Agitated    :  Umaña: [ ] Yes, if yes: Date of Placement:                   [x] No: condom catheter     LINES: Central Lines [] Yes, if yes: Date of Placement                                     [x] No    HOSPITAL MEDICATIONS:  MEDICATIONS  (STANDING):  albuterol/ipratropium for Nebulization. 3 milliLiter(s) Nebulizer every 6 hours  amantadine Syrup 100 milliGRAM(s) Oral <User Schedule>  artificial  tears Solution 1 Drop(s) Both EYES every 6 hours  aspirin  chewable 81 milliGRAM(s) Enteral Tube daily  bisacodyl Suppository 10 milliGRAM(s) Rectal at bedtime  chlorhexidine 4% Liquid 1 Application(s) Topical <User Schedule>  dextrose 5%. 1000 milliLiter(s) (50 mL/Hr) IV Continuous <Continuous>  dextrose 50% Injectable 12.5 Gram(s) IV Push once  dextrose 50% Injectable 25 Gram(s) IV Push once  dextrose 50% Injectable 25 Gram(s) IV Push once  famotidine    Tablet 20 milliGRAM(s) Oral daily  fentaNYL   Patch  75 MICROgram(s)/Hr 1 Patch Transdermal every 72 hours  heparin   Injectable 5000 Unit(s) SubCutaneous every 8 hours  hydrALAZINE 100 milliGRAM(s) Oral three times a day  isosorbide   dinitrate Tablet (ISORDIL) 20 milliGRAM(s) Enteral Tube <User Schedule>  lactobacillus acidophilus 1 Tablet(s) Oral two times a day  lidocaine   Patch 1 Patch Transdermal daily  metoprolol tartrate 50 milliGRAM(s) Oral three times a day  nystatin    Suspension 235779 Unit(s) Oral every 6 hours  polyethylene glycol 3350 17 Gram(s) Oral daily  QUEtiapine 50 milliGRAM(s) Oral <User Schedule>  sodium chloride 7% Inhalation 4 milliLiter(s) Inhalation every 12 hours    MEDICATIONS  (PRN):  dextrose 40% Gel 15 Gram(s) Oral once PRN Blood Glucose LESS THAN 70 milliGRAM(s)/deciliter  glucagon  Injectable 1 milliGRAM(s) IntraMuscular once PRN Glucose LESS THAN 70 milligrams/deciliter  QUEtiapine 50 milliGRAM(s) Oral every 12 hours PRN agitiation      LABS:                        9.6    30.81 )-----------( 282      ( 08 Sep 2020 07:04 )             31.6           Radha Shearer, -[facility]  [id number]

## 2020-09-08 NOTE — PROGRESS NOTE ADULT - ASSESSMENT
60-year-old male with a history of A-Fib on warfarin who presents with cardiac arrest at work with downtime of about 25 minutes prior to ROSC. S/p therapeutic hypothermia. 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 Now with resolved neurogenic/cardiogenic shock. Course complicated by GI bleed s/p PPI gtt, bleeding from scott s/p clot irrigation, prolonged respiratory failure s/p trach (8/24) and PEG (8/26). Currently with A-Fib with RVR and C diff colitis.  Also developed gross hematuria - Urology consulted +catheter with clot (irrigated and exchanged) suspected 2/2 traumatic catheterization. Additionally course complicated by fever and Pseudomonas PNA (s/p Rx) then developed C diff (now with resolved leukocytosis and improved stooling, now soft per report); remains on enteral Vanco    8/29: Received to [facility]  8/30: Less Agitated overnight, Afebrile, WBC decreasing, CR elevated but Stable, Tolerating TC all day yesterday, will Attempt TC ATC tonight and ABG in am, F/u Bcx from 8/30. Rectal tube dc'd.  Speech eval for AAC device and will F/u.  8/31: will attempt TC around the clock again tonight. Stopped amiodarone as per cards.  Will be cathed if cleared by ID and renal.  9/1: Elevated BP overnight, BP stable today, afebrile. Lethargic earlier today on exam; ABG Stable; Head CT done- no new ICH, ventricles enlarged ? Hydrocephalus, Spoke to Max from [facility] who will see- although felt no intervention required at this time. Tolerating TC ATC, will attempt cuffless trach tomorrow. C diff Iso discontinued as discussed w/ Inf control.  9/2: still with secretions, will downsize when improved. Will cath as outpatient. Decreased opioids  9/3: PICC dc'd, Trach downsized to #7 cuffless Port[place]. stomach distended  9/4: Occasionally Restless, afebrile. Tolerating cuffless trach, phonating w/ digital occlusion.  Spoke to speech for PMV re-eval soon than 9/6.  Elevated BP, Hydralazine increased.- will monitor.  9/5: trach dislodged, ENT changed to #8 cuffless Shiley. OOB to chair  9/6: OOB to chair today, gave dulcolax suppository   9/7: agitated while in chair today, gave Seroquel.  Producing thick, yellow secretions. Afebrile. Managing secretions with duoneb and hyper-sal 7%/Chest PT 60-year-old male with a history of A-Fib on warfarin who presents with cardiac arrest at work with downtime of about 25 minutes prior to ROSC. S/p therapeutic hypothermia. 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 Now with resolved neurogenic/cardiogenic shock. Course complicated by GI bleed s/p PPI gtt, bleeding from scott s/p clot irrigation, prolonged respiratory failure s/p trach (8/24) and PEG (8/26). Currently with A-Fib with RVR and C diff colitis.  Also developed gross hematuria - Urology consulted +catheter with clot (irrigated and exchanged) suspected 2/2 traumatic catheterization. Additionally course complicated by fever and Pseudomonas PNA (s/p Rx) then developed C diff (now with resolved leukocytosis and improved stooling, now soft per report); remains on enteral Vanco    8/29: Received to [facility]  8/30: Less Agitated overnight, Afebrile, WBC decreasing, CR elevated but Stable, Tolerating TC all day yesterday, will Attempt TC ATC tonight and ABG in am, F/u Bcx from 8/30. Rectal tube dc'd.  Speech eval for AAC device and will F/u.  8/31: will attempt TC around the clock again tonight. Stopped amiodarone as per cards.  Will be cathed if cleared by ID and renal.  9/1: Elevated BP overnight, BP stable today, afebrile. Lethargic earlier today on exam; ABG Stable; Head CT done- no new ICH, ventricles enlarged ? Hydrocephalus, Spoke to Max from [facility] who will see- although felt no intervention required at this time. Tolerating TC ATC, will attempt cuffless trach tomorrow. C diff Iso discontinued as discussed w/ Inf control.  9/2: still with secretions, will downsize when improved. Will cath as outpatient. Decreased opioids  9/3: PICC dc'd, Trach downsized to #7 cuffless Portex. stomach distended  9/4: Occasionally Restless, afebrile. Tolerating cuffless trach, phonating w/ digital occlusion.  Spoke to speech for PMV re-eval soon than 9/6.  Elevated BP, Hydralazine increased.- will monitor.  9/5: trach dislodged, ENT changed to #8 cuffless Shiley. OOB to chair  9/6: OOB to chair today, gave dulcolax suppository   9/7: agitated while in chair today, gave Seroquel.  Producing thick, yellow secretions. Afebrile. Managing secretions with duoneb and hyper-sal 7%/Chest PT.  9/8: Had spurious leukocytosis. Afebrile, hemodynamically stable. Repeated cbc, sent Bcx. 60-year-old male with a history of A-Fib on warfarin who presents with cardiac arrest at work with downtime of about 25 minutes prior to ROSC. S/p therapeutic hypothermia. 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 Now with resolved neurogenic/cardiogenic shock. Course complicated by GI bleed s/p PPI gtt, bleeding from scott s/p clot irrigation, prolonged respiratory failure s/p trach (8/24) and PEG (8/26). Currently with A-Fib with RVR and C diff colitis.  Also developed gross hematuria - Urology consulted +catheter with clot (irrigated and exchanged) suspected 2/2 traumatic catheterization. Additionally course complicated by fever and Pseudomonas PNA (s/p Rx) then developed C diff (now with resolved leukocytosis and improved stooling, now soft per report); remains on enteral Vanco    8/29: Received to [facility]  8/30: Less Agitated overnight, Afebrile, WBC decreasing, CR elevated but Stable, Tolerating TC all day yesterday, will Attempt TC ATC tonight and ABG in am, F/u Bcx from 8/30. Rectal tube dc'd.  Speech eval for AAC device and will F/u.  8/31: will attempt TC around the clock again tonight. Stopped amiodarone as per cards.  Will be cathed if cleared by ID and renal.  9/1: Elevated BP overnight, BP stable today, afebrile. Lethargic earlier today on exam; ABG Stable; Head CT done- no new ICH, ventricles enlarged ? Hydrocephalus, Spoke to Max from [facility] who will see- although felt no intervention required at this time. Tolerating TC ATC, will attempt cuffless trach tomorrow. C diff Iso discontinued as discussed w/ Inf control.  9/2: still with secretions, will downsize when improved. Will cath as outpatient. Decreased opioids  9/3: PICC dc'd, Trach downsized to #7 cuffless Portex. stomach distended  9/4: Occasionally Restless, afebrile. Tolerating cuffless trach, phonating w/ digital occlusion.  Spoke to speech for PMV re-eval soon than 9/6.  Elevated BP, Hydralazine increased.- will monitor.  9/5: trach dislodged, ENT changed to #8 cuffless Shiley. OOB to chair  9/6: OOB to chair today, gave dulcolax suppository   9/7: agitated while in chair today, gave Seroquel.  Producing thick, yellow secretions. Afebrile. Managing secretions with duoneb and hyper-sal 7%/Chest PT.  9/8: Had spurious leukocytosis. Afebrile, hemodynamically stable. Repeated cbc, sent Bcx.  Spiked temp of 102.5, cbc rechecked which was consistent with leukocytosis. PAN cultured. Noted urinary retention of 2600ml urine, abdomen distended with some tenderness on palpation.  F/U CT abd/pelvis/chest.

## 2020-09-08 NOTE — PROGRESS NOTE ADULT - ASSESSMENT
Assessment:  Cardiac arrest, sah, negative findings on brain angio, treated for pneumonia, treated for cdif, needs eventual cardiac cath on hold for now. Had distended abdomen yesterday now improved. The patient does have BM this morning he is afebrile, reviewed cbc and noted that his white cell count has trended up today to 30K, etiology? reactive? prior imaging reported intraventricular hemorrhage lateral ventricle occipital horns could this finding be related to  leukocytosis?    Plan:  Trend CBC and repeat CBC in AM. nurse reports no bowel movement so low suspicion of active C Diff causing leukocytosis, he is afebrile   continue supportive care per [facility] team   reviewed case and findings as above with Alicia TOLEDO in [facility], she informed me  that cbc is being repeated and she has also ordered surveillance blood cultures   continue to observe off antibiotics

## 2020-09-08 NOTE — PROGRESS NOTE ADULT - PROBLEM SELECTOR PLAN 5
Completed Abx for Pseudomonas aeruginosa pneumonia/colonization:  - S/p prolonged course of cefepime until 8/14-8/20   - Hold off on systemic antibiotics at this time  - If develops increased secretions, may benefit from inhaled tobramycin  - Cdiff- Completed oral Vanco 8/29   - ID Appreciated Completed Abx for Pseudomonas aeruginosa pneumonia/colonization:  - S/p prolonged course of cefepime until 8/14-8/20   - If develops increased secretions, may benefit from inhaled tobramycin  - Cdiff- Completed oral Vanco 8/29   - Repeat cbc, bld. cx x2

## 2020-09-08 NOTE — PROGRESS NOTE ADULT - SUBJECTIVE AND OBJECTIVE BOX
CC: f/u for cdif, he completed his full course of treatment     S: the patient is resting in bed, the nurse reports no bowel movement on her shift so far     REVIEW OF SYSTEMS:  All other review of systems negative (Comprehensive ROS)    Antimicrobials Day #  :  nystatin    Suspension 544898 Unit(s) Oral every 6 hours    Other Medications Reviewed  Vital Signs Last 24 Hrs  T(C): 36.4 (08 Sep 2020 04:15), Max: 36.9 (07 Sep 2020 21:12)  T(F): 97.6 (08 Sep 2020 04:15), Max: 98.4 (07 Sep 2020 21:12)  HR: 98 (08 Sep 2020 08:34) (79 - 103)  BP: 161/98 (08 Sep 2020 04:15) (127/90 - 161/98)  BP(mean): --  RR: 20 (08 Sep 2020 08:34) (17 - 20)  SpO2: 98% (08 Sep 2020 08:34) (97% - 100%)    PHYSICAL EXAM:  General: no acute distress  Eyes:  anicteric, no conjunctival injection, no discharge  Oropharynx: no lesions or injection 	  Neck: trached  Lungs: course  to auscultation  Heart: regular rate and rhythm; no murmur, rubs or gallops  Abdomen: soft, nondistended, nontender, without mass or organomegaly peg ok  Skin: no lesions  Extremities: no clubbing, cyanosis, or edema  Neurologic: alert in bed     LAB RESULTS:                                   9.6    30.81 )-----------( 282      ( 08 Sep 2020 07:04 )             31.6   09-08    143  |  105  |  49<H>  ----------------------------<  145<H>  4.8   |  23  |  2.91<H>    Ca    9.6      08 Sep 2020 07:04  Phos  4.9     09-08  Mg     2.5     09-08    TPro  7.5  /  Alb  3.6  /  TBili  0.2  /  DBili  x   /  AST  22  /  ALT  18  /  AlkPhos  70  09-08              MICROBIOLOGY:  RECENT CULTURES:      RADIOLOGY REVIEWED:      < from: Xray Abdomen 1 View PORTABLE -Urgent (09.03.20 @ 15:48) >    EXAM:  XR ABDOMEN PORTABLE URGENT 1V                            PROCEDURE DATE:  09/03/2020            INTERPRETATION:  CLINICAL INFORMATION: Abdominal distention, recently treated for C. difficile.    TECHNIQUE: Single portable radiograph of the abdomen.    COMPARISON: Abdominal x-ray dated 8/25/2020.    FINDINGS:    Gas and stool is seen throughout the large bowel.  There are no dilated, air-filled loops of small bowel.  No acute bony pathology.    IMPRESSION:    Nonobstructive bowel gas pattern with moderate stool burden.        < end of copied text >

## 2020-09-09 LAB
ALBUMIN SERPL ELPH-MCNC: 3.1 G/DL — LOW (ref 3.3–5)
ALP SERPL-CCNC: 87 U/L — SIGNIFICANT CHANGE UP (ref 40–120)
ALT FLD-CCNC: 26 U/L — SIGNIFICANT CHANGE UP (ref 10–45)
ANION GAP SERPL CALC-SCNC: 14 MMOL/L — SIGNIFICANT CHANGE UP (ref 5–17)
AST SERPL-CCNC: 28 U/L — SIGNIFICANT CHANGE UP (ref 10–40)
BASOPHILS # BLD AUTO: 0.08 K/UL — SIGNIFICANT CHANGE UP (ref 0–0.2)
BASOPHILS NFR BLD AUTO: 0.3 % — SIGNIFICANT CHANGE UP (ref 0–2)
BILIRUB SERPL-MCNC: 0.2 MG/DL — SIGNIFICANT CHANGE UP (ref 0.2–1.2)
BUN SERPL-MCNC: 43 MG/DL — HIGH (ref 7–23)
CALCIUM SERPL-MCNC: 9.6 MG/DL — SIGNIFICANT CHANGE UP (ref 8.4–10.5)
CHLORIDE SERPL-SCNC: 104 MMOL/L — SIGNIFICANT CHANGE UP (ref 96–108)
CO2 SERPL-SCNC: 22 MMOL/L — SIGNIFICANT CHANGE UP (ref 22–31)
CREAT SERPL-MCNC: 2.75 MG/DL — HIGH (ref 0.5–1.3)
EOSINOPHIL # BLD AUTO: 0.15 K/UL — SIGNIFICANT CHANGE UP (ref 0–0.5)
EOSINOPHIL NFR BLD AUTO: 0.5 % — SIGNIFICANT CHANGE UP (ref 0–6)
GAS PNL BLDA: SIGNIFICANT CHANGE UP
GLUCOSE SERPL-MCNC: 141 MG/DL — HIGH (ref 70–99)
HCT VFR BLD CALC: 29.9 % — LOW (ref 39–50)
HGB BLD-MCNC: 9 G/DL — LOW (ref 13–17)
IMM GRANULOCYTES NFR BLD AUTO: 1.3 % — SIGNIFICANT CHANGE UP (ref 0–1.5)
LYMPHOCYTES # BLD AUTO: 1.4 K/UL — SIGNIFICANT CHANGE UP (ref 1–3.3)
LYMPHOCYTES # BLD AUTO: 4.5 % — LOW (ref 13–44)
MAGNESIUM SERPL-MCNC: 2.3 MG/DL — SIGNIFICANT CHANGE UP (ref 1.6–2.6)
MCHC RBC-ENTMCNC: 27.2 PG — SIGNIFICANT CHANGE UP (ref 27–34)
MCHC RBC-ENTMCNC: 30.1 GM/DL — LOW (ref 32–36)
MCV RBC AUTO: 90.3 FL — SIGNIFICANT CHANGE UP (ref 80–100)
MONOCYTES # BLD AUTO: 1.89 K/UL — HIGH (ref 0–0.9)
MONOCYTES NFR BLD AUTO: 6.1 % — SIGNIFICANT CHANGE UP (ref 2–14)
NEUTROPHILS # BLD AUTO: 27.25 K/UL — HIGH (ref 1.8–7.4)
NEUTROPHILS NFR BLD AUTO: 87.3 % — HIGH (ref 43–77)
NRBC # BLD: 0 /100 WBCS — SIGNIFICANT CHANGE UP (ref 0–0)
PHOSPHATE SERPL-MCNC: 4.5 MG/DL — SIGNIFICANT CHANGE UP (ref 2.5–4.5)
PLATELET # BLD AUTO: 238 K/UL — SIGNIFICANT CHANGE UP (ref 150–400)
POTASSIUM SERPL-MCNC: 4.5 MMOL/L — SIGNIFICANT CHANGE UP (ref 3.5–5.3)
POTASSIUM SERPL-SCNC: 4.5 MMOL/L — SIGNIFICANT CHANGE UP (ref 3.5–5.3)
PROCALCITONIN SERPL-MCNC: 0.43 NG/ML — HIGH (ref 0.02–0.1)
PROT SERPL-MCNC: 7 G/DL — SIGNIFICANT CHANGE UP (ref 6–8.3)
RBC # BLD: 3.31 M/UL — LOW (ref 4.2–5.8)
RBC # FLD: 16 % — HIGH (ref 10.3–14.5)
SODIUM SERPL-SCNC: 140 MMOL/L — SIGNIFICANT CHANGE UP (ref 135–145)
WBC # BLD: 31.19 K/UL — HIGH (ref 3.8–10.5)
WBC # FLD AUTO: 31.19 K/UL — HIGH (ref 3.8–10.5)

## 2020-09-09 PROCEDURE — 99233 SBSQ HOSP IP/OBS HIGH 50: CPT | Mod: GC

## 2020-09-09 RX ORDER — VANCOMYCIN HCL 1 G
125 VIAL (EA) INTRAVENOUS
Refills: 0 | Status: DISCONTINUED | OUTPATIENT
Start: 2020-09-09 | End: 2020-09-16

## 2020-09-09 RX ORDER — ACETAMINOPHEN 500 MG
1000 TABLET ORAL ONCE
Refills: 0 | Status: COMPLETED | OUTPATIENT
Start: 2020-09-09 | End: 2020-09-09

## 2020-09-09 RX ADMIN — Medication 50 MILLIGRAM(S): at 13:16

## 2020-09-09 RX ADMIN — Medication 3 MILLILITER(S): at 00:20

## 2020-09-09 RX ADMIN — FAMOTIDINE 20 MILLIGRAM(S): 10 INJECTION INTRAVENOUS at 11:58

## 2020-09-09 RX ADMIN — SODIUM CHLORIDE 166.67 MILLILITER(S): 9 INJECTION INTRAMUSCULAR; INTRAVENOUS; SUBCUTANEOUS at 00:35

## 2020-09-09 RX ADMIN — SODIUM CHLORIDE 4 MILLILITER(S): 9 INJECTION INTRAMUSCULAR; INTRAVENOUS; SUBCUTANEOUS at 05:17

## 2020-09-09 RX ADMIN — Medication 100 MILLIGRAM(S): at 13:15

## 2020-09-09 RX ADMIN — HEPARIN SODIUM 5000 UNIT(S): 5000 INJECTION INTRAVENOUS; SUBCUTANEOUS at 21:01

## 2020-09-09 RX ADMIN — HEPARIN SODIUM 5000 UNIT(S): 5000 INJECTION INTRAVENOUS; SUBCUTANEOUS at 06:00

## 2020-09-09 RX ADMIN — QUETIAPINE FUMARATE 50 MILLIGRAM(S): 200 TABLET, FILM COATED ORAL at 06:00

## 2020-09-09 RX ADMIN — FENTANYL CITRATE 1 PATCH: 50 INJECTION INTRAVENOUS at 12:00

## 2020-09-09 RX ADMIN — Medication 1000 MILLIGRAM(S): at 01:58

## 2020-09-09 RX ADMIN — FENTANYL CITRATE 1 PATCH: 50 INJECTION INTRAVENOUS at 18:14

## 2020-09-09 RX ADMIN — Medication 81 MILLIGRAM(S): at 11:58

## 2020-09-09 RX ADMIN — ISOSORBIDE DINITRATE 20 MILLIGRAM(S): 5 TABLET ORAL at 08:54

## 2020-09-09 RX ADMIN — Medication 100 MILLIGRAM(S): at 21:01

## 2020-09-09 RX ADMIN — SODIUM CHLORIDE 4 MILLILITER(S): 9 INJECTION INTRAMUSCULAR; INTRAVENOUS; SUBCUTANEOUS at 17:50

## 2020-09-09 RX ADMIN — Medication 400 MILLIGRAM(S): at 00:34

## 2020-09-09 RX ADMIN — Medication 1 TABLET(S): at 17:16

## 2020-09-09 RX ADMIN — ISOSORBIDE DINITRATE 20 MILLIGRAM(S): 5 TABLET ORAL at 00:34

## 2020-09-09 RX ADMIN — Medication 1 DROP(S): at 23:38

## 2020-09-09 RX ADMIN — FENTANYL CITRATE 1 PATCH: 50 INJECTION INTRAVENOUS at 17:32

## 2020-09-09 RX ADMIN — Medication 1 DROP(S): at 17:16

## 2020-09-09 RX ADMIN — LIDOCAINE 1 PATCH: 4 CREAM TOPICAL at 00:28

## 2020-09-09 RX ADMIN — Medication 10 MILLIGRAM(S): at 21:01

## 2020-09-09 RX ADMIN — Medication 3 MILLILITER(S): at 12:45

## 2020-09-09 RX ADMIN — Medication 125 MILLIGRAM(S): at 13:16

## 2020-09-09 RX ADMIN — Medication 1 TABLET(S): at 06:00

## 2020-09-09 RX ADMIN — Medication 100 MILLIGRAM(S): at 09:14

## 2020-09-09 RX ADMIN — Medication 3 MILLILITER(S): at 05:17

## 2020-09-09 RX ADMIN — Medication 125 MILLIGRAM(S): at 17:17

## 2020-09-09 RX ADMIN — Medication 1 DROP(S): at 00:35

## 2020-09-09 RX ADMIN — Medication 50 MILLIGRAM(S): at 06:00

## 2020-09-09 RX ADMIN — FENTANYL CITRATE 1 PATCH: 50 INJECTION INTRAVENOUS at 06:12

## 2020-09-09 RX ADMIN — ISOSORBIDE DINITRATE 20 MILLIGRAM(S): 5 TABLET ORAL at 23:42

## 2020-09-09 RX ADMIN — Medication 1 DROP(S): at 11:57

## 2020-09-09 RX ADMIN — Medication 3 MILLILITER(S): at 23:36

## 2020-09-09 RX ADMIN — MEROPENEM 100 MILLIGRAM(S): 1 INJECTION INTRAVENOUS at 17:33

## 2020-09-09 RX ADMIN — Medication 3 MILLILITER(S): at 17:50

## 2020-09-09 RX ADMIN — Medication 50 MILLIGRAM(S): at 21:01

## 2020-09-09 RX ADMIN — MEROPENEM 100 MILLIGRAM(S): 1 INJECTION INTRAVENOUS at 05:59

## 2020-09-09 RX ADMIN — HEPARIN SODIUM 5000 UNIT(S): 5000 INJECTION INTRAVENOUS; SUBCUTANEOUS at 13:15

## 2020-09-09 RX ADMIN — POLYETHYLENE GLYCOL 3350 17 GRAM(S): 17 POWDER, FOR SOLUTION ORAL at 11:58

## 2020-09-09 RX ADMIN — Medication 100 MILLIGRAM(S): at 11:58

## 2020-09-09 RX ADMIN — CHLORHEXIDINE GLUCONATE 1 APPLICATION(S): 213 SOLUTION TOPICAL at 06:00

## 2020-09-09 RX ADMIN — ISOSORBIDE DINITRATE 20 MILLIGRAM(S): 5 TABLET ORAL at 15:55

## 2020-09-09 RX ADMIN — FENTANYL CITRATE 1 PATCH: 50 INJECTION INTRAVENOUS at 19:51

## 2020-09-09 RX ADMIN — QUETIAPINE FUMARATE 50 MILLIGRAM(S): 200 TABLET, FILM COATED ORAL at 20:58

## 2020-09-09 RX ADMIN — Medication 100 MILLIGRAM(S): at 05:59

## 2020-09-09 RX ADMIN — LIDOCAINE 1 PATCH: 4 CREAM TOPICAL at 19:53

## 2020-09-09 RX ADMIN — LIDOCAINE 1 PATCH: 4 CREAM TOPICAL at 23:38

## 2020-09-09 RX ADMIN — LIDOCAINE 1 PATCH: 4 CREAM TOPICAL at 11:57

## 2020-09-09 RX ADMIN — Medication 1 DROP(S): at 05:59

## 2020-09-09 NOTE — PROGRESS NOTE ADULT - SUBJECTIVE AND OBJECTIVE BOX
CC: f/u for cdif, he completed his full course of treatment     S: the patient is trached, flow sheets reviewed and he was noted to have spiked a fever to 102.5    REVIEW OF SYSTEMS:  All other review of systems negative (Comprehensive ROS)    Antimicrobials Day #  :  nystatin    Suspension 369907 Unit(s) Oral every 6 hours    Vital Signs Last 24 Hrs  T(C): 37.2 (09 Sep 2020 06:10), Max: 39.2 (08 Sep 2020 15:00)  T(F): 98.9 (09 Sep 2020 06:10), Max: 102.5 (08 Sep 2020 15:00)  HR: 88 (09 Sep 2020 08:35) (83 - 120)  BP: 158/88 (09 Sep 2020 04:15) (109/72 - 169/81)  BP(mean): --  RR: 20 (09 Sep 2020 08:35) (18 - 20)  SpO2: 100% (09 Sep 2020 08:35) (96% - 100%)  PHYSICAL EXAM:  General: no acute distress  Eyes:  anicteric, no conjunctival injection, no discharge  Oropharynx: no lesions or injection 	  Neck: trached  Lungs: course  to auscultation  Heart: regular rate and rhythm; no murmur, rubs or gallops  Abdomen: soft, nondistended, nontender, without mass or organomegaly peg ok  Skin: no lesions  Extremities: no clubbing, cyanosis, or edema  Neurologic: alert in bed     LAB RESULTS:                                   9.0    31.19 )-----------( 238      ( 09 Sep 2020 06:51 )             29.9                           9.6    30.81 )-----------( 282      ( 08 Sep 2020 07:04 )             31.6     09-09    140  |  104  |  43<H>  ----------------------------<  141<H>  4.5   |  22  |  2.75<H>    Ca    9.6      09 Sep 2020 06:51  Phos  4.5     09-09  Mg     2.3     09-09    TPro  7.0  /  Alb  3.1<L>  /  TBili  0.2  /  DBili  x   /  AST  28  /  ALT  26  /  AlkPhos  87  09-09                MICROBIOLOGY:  RECENT CULTURES:      RADIOLOGY REVIEWED:      < from: Xray Abdomen 1 View PORTABLE -Urgent (09.03.20 @ 15:48) >    EXAM:  XR ABDOMEN PORTABLE URGENT 1V                            PROCEDURE DATE:  09/03/2020            INTERPRETATION:  CLINICAL INFORMATION: Abdominal distention, recently treated for C. difficile.    TECHNIQUE: Single portable radiograph of the abdomen.    COMPARISON: Abdominal x-ray dated 8/25/2020.    FINDINGS:    Gas and stool is seen throughout the large bowel.  There are no dilated, air-filled loops of small bowel.  No acute bony pathology.    IMPRESSION:    Nonobstructive bowel gas pattern with moderate stool burden.        < end of copied text >

## 2020-09-09 NOTE — CHART NOTE - NSCHARTNOTEFT_GEN_A_CORE
GUILLE DOLAN  60y Male    Called by RN to evaluate pt with fever of 101.2. Pt seen and evaluated.  Pt sleepy/ lethargic. + febrile.   Unable to obtain ROS due to mental status.     PAST MEDICAL & SURGICAL HISTORY:  On warfarin for atrial fibrillation  No significant past surgical history    Vital Signs Last 24 Hrs  T(C): 38.4 (09 Sep 2020 00:26), Max: 39.2 (08 Sep 2020 15:00)  T(F): 101.2 (09 Sep 2020 00:26), Max: 102.5 (08 Sep 2020 15:00)  HR: 93 (09 Sep 2020 00:26) (82 - 120)  BP: 144/97 (09 Sep 2020 00:26) (109/72 - 182/90)  BP(mean): --  RR: 20 (09 Sep 2020 00:10) (17 - 20)  SpO2: 100% (09 Sep 2020 00:26) (98% - 100%)    PE: General: Lethargic, sleepy   CV: S1, S2, irregular   Pulm: Rhonchi to ausculation, + trach     Abd: + PEG, Soft, NT, + BS x 4      Ext: + pedal pulses.     Event Summary:    60-year-old male with a history of A-Fib on warfarin who presents with cardiac arrest at work with downtime of about 25 minutes prior to ROSC. S/p therapeutic hypothermia. 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 Now with resolved neurogenic/cardiogenic shock. Course complicated by GI bleed s/p PPI gtt, bleeding from scott s/p clot irrigation, prolonged respiratory failure s/p trach (8/24) and PEG (8/26). Currently with A-Fib with RVR and C diff colitis.  Also developed gross hematuria - Urology consulted +catheter with clot (irrigated and exchanged) suspected 2/2 traumatic catheterization. Additionally course complicated by fever and Pseudomonas PNA (s/p Rx) then developed C diff now with fever due to severe sepsis from likely UIT    Fever secondary to severe sepsis due to UTI   + U/A and CT with cystitis   S/P Vanco X 1, continue meropenem  F/U blood culture and urine culture   Lactate 2.5: will give additional 500cc bolus  IV Tylenol for fever   Cooling measures   Will repeat CBC, lactate, pro calcitonin in AM  F/U primary team and ID in AM    Memo Carter NP  [id number] GUILLE DOLAN  60y Male    Called by RN to evaluate pt with fever of 101.2. Pt seen and evaluated.  Pt sleepy/ lethargic. + febrile.   Unable to obtain ROS due to mental status.     PAST MEDICAL & SURGICAL HISTORY:  On warfarin for atrial fibrillation  No significant past surgical history    Vital Signs Last 24 Hrs  T(C): 38.4 (09 Sep 2020 00:26), Max: 39.2 (08 Sep 2020 15:00)  T(F): 101.2 (09 Sep 2020 00:26), Max: 102.5 (08 Sep 2020 15:00)  HR: 93 (09 Sep 2020 00:26) (82 - 120)  BP: 144/97 (09 Sep 2020 00:26) (109/72 - 182/90)  BP(mean): --  RR: 20 (09 Sep 2020 00:10) (17 - 20)  SpO2: 100% (09 Sep 2020 00:26) (98% - 100%)    PE: General: Lethargic, sleepy   CV: S1, S2, irregular   Pulm: Rhonchi to ausculation, + trach     Abd: + PEG, Soft, NT, + BS x 4      Ext: + pedal pulses.     Event Summary:    60-year-old male with a history of A-Fib on warfarin who presents with cardiac arrest at work with downtime of about 25 minutes prior to ROSC. S/p therapeutic hypothermia. 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 Now with resolved neurogenic/cardiogenic shock. Course complicated by GI bleed s/p PPI gtt, bleeding from scott s/p clot irrigation, prolonged respiratory failure s/p trach (8/24) and PEG (8/26). Currently with A-Fib with RVR and C diff colitis.  Also developed gross hematuria - Urology consulted +catheter with clot (irrigated and exchanged) suspected 2/2 traumatic catheterization. Additionally course complicated by fever and Pseudomonas PNA (s/p Rx) then developed C diff now with fever due to severe sepsis from likely UIT    Fever secondary to severe sepsis due to UTI   + U/A and CT with cystitis   S/P Vanco X 1, continue meropenem  F/U blood culture and urine culture   Lactate 2.5: will give additional 500cc bolus over 3 hours , will limit IVF to only 500CC due to hx of CHF   IV Tylenol for fever   Cooling measures   Will repeat CBC, lactate, pro calcitonin in AM  F/U primary team and ID in AM    Memo Carter NP  [id number] GUILLE DOLAN  60y Male    Called by RN to evaluate pt with fever of 101.2. Pt seen and evaluated.  Pt sleepy/ lethargic. + febrile.   Unable to obtain ROS due to mental status.     PAST MEDICAL & SURGICAL HISTORY:  On warfarin for atrial fibrillation  No significant past surgical history    Vital Signs Last 24 Hrs  T(C): 38.4 (09 Sep 2020 00:26), Max: 39.2 (08 Sep 2020 15:00)  T(F): 101.2 (09 Sep 2020 00:26), Max: 102.5 (08 Sep 2020 15:00)  HR: 93 (09 Sep 2020 00:26) (82 - 120)  BP: 144/97 (09 Sep 2020 00:26) (109/72 - 182/90)  BP(mean): --  RR: 20 (09 Sep 2020 00:10) (17 - 20)  SpO2: 100% (09 Sep 2020 00:26) (98% - 100%)    < from: CT Chest No Cont (09.08.20 @ 17:32) >    race left pleural effusion. Cardiomegaly.  Bibasilar atelectasis  Diffuse wall thickening of the urinary bladder. With prostatomegaly, this may represent obstructive changes vs. cystitis. Correlated with urinalysis.    < end of copied text >    < from: CT Abdomen and Pelvis w/ Oral Cont (09.08.20 @ 17:33) >    Trace left pleural effusion. Cardiomegaly.  Bibasilar atelectasis  Diffuse wall thickening of the urinary bladder. With prostatomegaly, this may represent obstructive changes vs. cystitis. Correlated with urinalysis.    < end of copied text >      PE: General: Lethargic, sleepy   CV: S1, S2, irregular   Pulm: Rhonchi to ausculation, + trach     Abd: + PEG, Soft, NT, + BS x 4      Ext: + pedal pulses.     Event Summary:    60-year-old male with a history of A-Fib on warfarin who presents with cardiac arrest at work with downtime of about 25 minutes prior to ROSC. S/p therapeutic hypothermia. 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 Now with resolved neurogenic/cardiogenic shock. Course complicated by GI bleed s/p PPI gtt, bleeding from scott s/p clot irrigation, prolonged respiratory failure s/p trach (8/24) and PEG (8/26). Currently with A-Fib with RVR and C diff colitis.  Also developed gross hematuria - Urology consulted +catheter with clot (irrigated and exchanged) suspected 2/2 traumatic catheterization. Additionally course complicated by fever and Pseudomonas PNA (s/p Rx) then developed C diff now with fever due to severe sepsis from likely UIT    Fever secondary to severe sepsis due to UTI   + U/A and CT with cystitis   S/P Vanco X 1, continue meropenem  F/U blood culture and urine culture   Lactate 2.5: will give additional 500cc bolus over 3 hours , will limit IVF to only 500CC due to hx of CHF   IV Tylenol for fever   Cooling measures   Will repeat CBC, lactate, pro calcitonin in AM  F/U primary team and ID in AM    Memo Carter NP  [id number]

## 2020-09-09 NOTE — PROGRESS NOTE ADULT - SUBJECTIVE AND OBJECTIVE BOX
Patient is a 60y old  Male who presents with a chief complaint of cardiac arrest (04 Sep 2020 17:31)      Interval Events:    REVIEW OF SYSTEMS:  [ ] Positive  [ ] All other systems negative  [ ] Unable to assess ROS because ________    Vital Signs Last 24 Hrs  T(C): 37.2 (20 @ 06:10), Max: 39.2 (20 @ 15:00)  T(F): 98.9 (20 @ 06:10), Max: 102.5 (20 @ 15:00)  HR: 86 (20 @ 06:10) (83 - 120)  BP: 158/88 (20 @ 04:15) (109/72 - 182/90)  RR: 19 (20 @ 06:10) (18 - 20)  SpO2: 100% (20 @ 06:10) (96% - 100%)    PHYSICAL EXAM:  HEENT:   [ ]Tracheostomy:  [ ]Pupils equal  [ ]No oral lesions  [ ]Abnormal    SKIN  [ ]No Rash  [ ] Abnormal  [ ] pressure    CARDIAC  [ ]Regular  [ ]Abnormal    PULMONARY  [ ]Bilateral Clear Breath Sounds  [ ]Normal Excursion  [ ]Abnormal    GI  [ ]PEG      [ ] +BS		              [ ]Soft, nondistended, nontender	  [ ]Abnormal    MUSCULOSKELETAL                                   [ ]Bedbound                 [ ]Abnormal    [ ]Ambulatory/OOB to chair                           EXTREMITIES                                         [ ]Normal  [ ]Edema                           NEUROLOGIC  [ ] Normal, non focal  [ ] Focal findings:    PSYCHIATRIC  [ ]Alert and appropriate  [ ] Sedated	 [ ]Agitated    :  Umaña: [ ] Yes, if yes: Date of Placement:                   [  ] No    LINES: Central Lines [ ] Yes, if yes: Date of Placement                                     [  ] No    HOSPITAL MEDICATIONS:  MEDICATIONS  (STANDING):  albuterol/ipratropium for Nebulization. 3 milliLiter(s) Nebulizer every 6 hours  amantadine Syrup 100 milliGRAM(s) Oral <User Schedule>  artificial  tears Solution 1 Drop(s) Both EYES every 6 hours  aspirin  chewable 81 milliGRAM(s) Enteral Tube daily  bisacodyl Suppository 10 milliGRAM(s) Rectal at bedtime  chlorhexidine 4% Liquid 1 Application(s) Topical <User Schedule>  dextrose 5%. 1000 milliLiter(s) (50 mL/Hr) IV Continuous <Continuous>  dextrose 50% Injectable 12.5 Gram(s) IV Push once  dextrose 50% Injectable 25 Gram(s) IV Push once  dextrose 50% Injectable 25 Gram(s) IV Push once  famotidine    Tablet 20 milliGRAM(s) Oral daily  fentaNYL   Patch  75 MICROgram(s)/Hr 1 Patch Transdermal every 72 hours  heparin   Injectable 5000 Unit(s) SubCutaneous every 8 hours  hydrALAZINE 100 milliGRAM(s) Oral three times a day  isosorbide   dinitrate Tablet (ISORDIL) 20 milliGRAM(s) Enteral Tube <User Schedule>  lactobacillus acidophilus 1 Tablet(s) Oral two times a day  lidocaine   Patch 1 Patch Transdermal daily  meropenem  IVPB 1000 milliGRAM(s) IV Intermittent every 12 hours  meropenem  IVPB      metoprolol tartrate 50 milliGRAM(s) Oral three times a day  polyethylene glycol 3350 17 Gram(s) Oral daily  QUEtiapine 50 milliGRAM(s) Oral <User Schedule>  sodium chloride 7% Inhalation 4 milliLiter(s) Inhalation every 12 hours    MEDICATIONS  (PRN):  dextrose 40% Gel 15 Gram(s) Oral once PRN Blood Glucose LESS THAN 70 milliGRAM(s)/deciliter  glucagon  Injectable 1 milliGRAM(s) IntraMuscular once PRN Glucose LESS THAN 70 milligrams/deciliter  QUEtiapine 50 milliGRAM(s) Oral every 12 hours PRN agitiation      LABS:                        9.0    31.19 )-----------( 238      ( 09 Sep 2020 06:51 )             29.9     09-08    143  |  105  |  49<H>  ----------------------------<  145<H>  4.8   |  23  |  2.91<H>    Ca    9.6      08 Sep 2020 07:04  Phos  4.9     09-08  Mg     2.5     -08    TPro  7.5  /  Alb  3.6  /  TBili  0.2  /  DBili  x   /  AST  22  /  ALT  18  /  AlkPhos  70  09-08      Urinalysis Basic - ( 08 Sep 2020 15:20 )    Color: Light Yellow / Appearance: Clear / S.013 / pH: x  Gluc: x / Ketone: Negative  / Bili: Negative / Urobili: Negative   Blood: x / Protein: 30 mg/dL / Nitrite: Positive   Leuk Esterase: Large / RBC: 5 /hpf / WBC 5 /HPF   Sq Epi: x / Non Sq Epi: 1 /hpf / Bacteria: Few      Arterial Blood Gas:   @ 05:38  7.43/39/134/25/99/1.6  ABG lactate: --      CAPILLARY BLOOD GLUCOSE    MICROBIOLOGY:     RADIOLOGY:  [ ] Reviewed and interpreted by me Patient is a 60y old  Male who presents with a chief complaint of cardiac arrest (04 Sep 2020 17:31)      Interval Events: Fever of 101.2F over night    REVIEW OF SYSTEMS:  [ ] Positive  [X] All other systems negative  [ ] Unable to assess ROS because ____    Vital Signs Last 24 Hrs  T(C): 37.2 (09-09-20 @ 06:10), Max: 39.2 (09-08-20 @ 15:00)  T(F): 98.9 (09-09-20 @ 06:10), Max: 102.5 (09-08-20 @ 15:00)  HR: 86 (09-09-20 @ 06:10) (83 - 120)  BP: 158/88 (09-09-20 @ 04:15) (109/72 - 182/90)  RR: 19 (09-09-20 @ 06:10) (18 - 20)  SpO2: 100% (09-09-20 @ 06:10) (96% - 100%)    PHYSICAL EXAM:  HEENT:   [X]Tracheostomy:  #8 Cuffed Shiley  [X] Right eye closed, unable to lift eyelid; Left PERRL  [ ]No oral lesions  [ ]Abnormal    SKIN  [X] No Rash  [ ] Abnormal  [ ] pressure    CARDIAC  [X] Regular  [ ] Abnormal    PULMONARY  [X] Bilateral Clear Breath Sounds  [ ] Normal Excursion  [ ] Abnormal    GI  [X] PEG      [X] +BS		              [X] Soft, nondistended, nontender	  [ ]Abnormal    MUSCULOSKELETAL                                   [ ] Bedbound                 [ ] Abnormal    [ X] OOB to chair with assistance    EXTREMITIES                                         [X] Normal  [ ]Edema                           NEUROLOGIC  [ ] Normal, non focal  [X] Focal findings: Plegia of RUE/RLE; Alert and responsive; able to follow commands and respond to questions appropriately.    PSYCHIATRIC  [X] Alert  [ ] Sedated	 [ ]Agitated    :  Umaña: [ ] Yes, if yes: Date of Placement:                   [X] No    LINES: Central Lines [ ] Yes, if yes: Date of Placement                                     [  ] No    HOSPITAL MEDICATIONS:  MEDICATIONS  (STANDING):  albuterol/ipratropium for Nebulization. 3 milliLiter(s) Nebulizer every 6 hours  amantadine Syrup 100 milliGRAM(s) Oral <User Schedule>  artificial  tears Solution 1 Drop(s) Both EYES every 6 hours  aspirin  chewable 81 milliGRAM(s) Enteral Tube daily  bisacodyl Suppository 10 milliGRAM(s) Rectal at bedtime  chlorhexidine 4% Liquid 1 Application(s) Topical <User Schedule>  dextrose 5%. 1000 milliLiter(s) (50 mL/Hr) IV Continuous <Continuous>  dextrose 50% Injectable 12.5 Gram(s) IV Push once  dextrose 50% Injectable 25 Gram(s) IV Push once  dextrose 50% Injectable 25 Gram(s) IV Push once  famotidine    Tablet 20 milliGRAM(s) Oral daily  fentaNYL   Patch  75 MICROgram(s)/Hr 1 Patch Transdermal every 72 hours  heparin   Injectable 5000 Unit(s) SubCutaneous every 8 hours  hydrALAZINE 100 milliGRAM(s) Oral three times a day  isosorbide   dinitrate Tablet (ISORDIL) 20 milliGRAM(s) Enteral Tube <User Schedule>  lactobacillus acidophilus 1 Tablet(s) Oral two times a day  lidocaine   Patch 1 Patch Transdermal daily  meropenem  IVPB 1000 milliGRAM(s) IV Intermittent every 12 hours  meropenem  IVPB      metoprolol tartrate 50 milliGRAM(s) Oral three times a day  polyethylene glycol 3350 17 Gram(s) Oral daily  QUEtiapine 50 milliGRAM(s) Oral <User Schedule>  sodium chloride 7% Inhalation 4 milliLiter(s) Inhalation every 12 hours    MEDICATIONS  (PRN):  dextrose 40% Gel 15 Gram(s) Oral once PRN Blood Glucose LESS THAN 70 milliGRAM(s)/deciliter  glucagon  Injectable 1 milliGRAM(s) IntraMuscular once PRN Glucose LESS THAN 70 milligrams/deciliter  QUEtiapine 50 milliGRAM(s) Oral every 12 hours PRN agitiation      LABS:                        9.0    31.19 )-----------( 238      ( 09 Sep 2020 06:51 )             29.9     09-09    140  |  104  |  43<H>  ----------------------------<  141<H>  4.5   |  22  |  2.75<H>    Ca    9.6      09 Sep 2020 06:51  Phos  4.5     09-09  Mg     2.3     09-09    TPro  7.0  /  Alb  3.1<L>  /  TBili  0.2  /  DBili  x   /  AST  28  /  ALT  26  /  AlkPhos  87  09-09        Arterial Blood Gas:  09-09 @ 05:38  7.43/39/134/25/99/1.6  ABG lactate: --      CAPILLARY BLOOD GLUCOSE    MICROBIOLOGY:     RADIOLOGY:  [ ] Reviewed and interpreted by me

## 2020-09-09 NOTE — PROGRESS NOTE ADULT - ATTENDING COMMENTS
Patient seen and examined, agree with above  Spiked fever yesterday afternoon, noted to have distended abdomen and leukocytosis >30 k.   Started empirically on Meropenem and Vancox1 dose  1. Leukocytosis - unclear if related to urinary retention, acute cystitis  Appreciate ID follow up - will c/w Meropenem and Vanco PO for h/o recent C. diff infection.    2. SAH with negative angiogram:  - No further neurosurgical intervention at this time    3. Acute Encephalopathy - improving, less delirious  - Continue fentanyl patch, clonazepam, and quetiapine  - Pain control - Hydromorphone prn  - Will titrate down pain medications as able while maintaining patient safety.     4. Afib and new acute systolic heart failure in setting of cardiac arrest  - Likely stunned myocardium due to cardiac arrest vs. SAH  - Will need eventual cardiac cath to evaluate for ischemia - planned for as outpatient per Dr. Deleon  - Continue afterload/preload reduction with hydralazine and isosorbide dinitrate  - For afib will continue metoprolol and monitor HR. Amio stopped due to prior pauses noted.  - Hold off on therapeutic a/c given SAH    5. Acute hypoxemic respiratory failure s/p tracheostomy:  - Patient downsized to cuffless trach 9/3  - Continue Duo nebs and hypertonic saline, chest PT  - PMV eval- is phontaing over trach and tolerated brief finger occlusion at bedside.     6. Oropharyngeal dysphagia:  - PEG feeds, Famotidine for GI ppx    7. C diff colitis:  - Completed PO vanco    8. Dispo:  - Full code, overall prognosis guarded depending on neurological recovery  - Patient is a candidate for acute rehab, pending clarification of insurance issues.     I was physically present for the key portions of the evaluation and management (E/M) service provided.  I agree with the above history, physical, and plan which I have reviewed and edited where appropriate.     35 minutes spent on total encounter; more than 50% of the visit was spent counseling and/or coordinating care by the attending physician.

## 2020-09-09 NOTE — PROGRESS NOTE ADULT - ASSESSMENT
60 year old male s/p cardiac arrest c/b GIB and bleeding from scott with perimesencephalic (HH4 mF4) subarachnoid hemorrhage, CT showed R periclinoidal/perimesencephalic SAH, c/f aneurysm rupture, no hydrocephalus.  angio neg x2, trach/vent and peg. course also complicated by gross hematuria - Urology consulted +catheter with clot (irrigated and exchanged) suspected 2/2 traumatic catheterization. treated for pseudomonas HCAP. In addition, has developed c diff. pt noticed with abn renal function and requires coronary angiogram.      1- JALEN on ckd III likely   2- HTN   3- respiratory failure  4- s/p cardiac arrest  5- c diff   6- s/p cardiac arrest       JALEN in setting of infections suspected along with his cardiac arrest  cr high again in setting of bladder outlet obstruction and infection likely   intermittent cath as needed   cr seems to be stabilizing   cont O2 support    hydralazine 100 mg tid   metoprolol 50 mg tid   d/w RCU team earlier when seen

## 2020-09-09 NOTE — PROGRESS NOTE ADULT - ASSESSMENT
60-year-old male with a history of A-Fib on warfarin who presents with cardiac arrest at work with downtime of about 25 minutes prior to ROSC. S/p therapeutic hypothermia. 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 Now with resolved neurogenic/cardiogenic shock. Course complicated by GI bleed s/p PPI gtt, bleeding from scott s/p clot irrigation, prolonged respiratory failure s/p trach (8/24) and PEG (8/26). Currently with A-Fib with RVR and C diff colitis.  Also developed gross hematuria - Urology consulted +catheter with clot (irrigated and exchanged) suspected 2/2 traumatic catheterization. Additionally course complicated by fever and Pseudomonas PNA (s/p Rx) then developed C diff (now with resolved leukocytosis and improved stooling, now soft per report); remains on enteral Vanco    8/29: Received to RCU  8/30: Less Agitated overnight, Afebrile, WBC decreasing, CR elevated but Stable, Tolerating TC all day yesterday, will Attempt TC ATC tonight and ABG in am, F/u Bcx from 8/30. Rectal tube dc'd.  Speech eval for AAC device and will F/u.  8/31: will attempt TC around the clock again tonight. Stopped amiodarone as per cards.  Will be cathed if cleared by ID and renal.  9/1: Elevated BP overnight, BP stable today, afebrile. Lethargic earlier today on exam; ABG Stable; Head CT done- no new ICH, ventricles enlarged ? Hydrocephalus, Spoke to Max from NSX who will see- although felt no intervention required at this time. Tolerating TC ATC, will attempt cuffless trach tomorrow. C diff Iso discontinued as discussed w/ Inf control.  9/2: still with secretions, will downsize when improved. Will cath as outpatient. Decreased opioids  9/3: PICC dc'd, Trach downsized to #7 cuffless Portex. stomach distended  9/4: Occasionally Restless, afebrile. Tolerating cuffless trach, phonating w/ digital occlusion.  Spoke to speech for PMV re-eval soon than 9/6.  Elevated BP, Hydralazine increased.- will monitor.  9/5: trach dislodged, ENT changed to #8 cuffless Shiley. OOB to chair  9/6: OOB to chair today, gave dulcolax suppository   9/7: agitated while in chair today, gave Seroquel.  Producing thick, yellow secretions. Afebrile. Managing secretions with duoneb and hyper-sal 7%/Chest PT.  9/8: Had spurious leukocytosis. Afebrile, hemodynamically stable. Repeated cbc, sent Bcx.  Spiked temp of 102.5, cbc rechecked which was consistent with leukocytosis. PAN cultured. Noted urinary retention of 2600ml urine, abdomen distended with some tenderness on palpation.  F/U CT abd/pelvis/chest. 60-year-old male with a history of A-Fib on warfarin who presents with cardiac arrest at work with downtime of about 25 minutes prior to ROSC. S/p therapeutic hypothermia. 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 Now with resolved neurogenic/cardiogenic shock. Course complicated by GI bleed s/p PPI gtt, bleeding from scott s/p clot irrigation, prolonged respiratory failure s/p trach (8/24) and PEG (8/26). Currently with A-Fib with RVR and C diff colitis.  Also developed gross hematuria - Urology consulted +catheter with clot (irrigated and exchanged) suspected 2/2 traumatic catheterization. Additionally course complicated by fever and Pseudomonas PNA (s/p Rx) then developed C diff (now with resolved leukocytosis and improved stooling, now soft per report); remains on enteral Vanco    8/29: Received to RCU  8/30: Less Agitated overnight, Afebrile, WBC decreasing, CR elevated but Stable, Tolerating TC all day yesterday, will Attempt TC ATC tonight and ABG in am, F/u Bcx from 8/30. Rectal tube dc'd.  Speech eval for AAC device and will F/u.  8/31: will attempt TC around the clock again tonight. Stopped amiodarone as per cards.  Will be cathed if cleared by ID and renal.  9/1: Elevated BP overnight, BP stable today, afebrile. Lethargic earlier today on exam; ABG Stable; Head CT done- no new ICH, ventricles enlarged ? Hydrocephalus, Spoke to Max from NSX who will see- although felt no intervention required at this time. Tolerating TC ATC, will attempt cuffless trach tomorrow. C diff Iso discontinued as discussed w/ Inf control.  9/2: still with secretions, will downsize when improved. Will cath as outpatient. Decreased opioids  9/3: PICC dc'd, Trach downsized to #7 cuffless Portex. stomach distended  9/4: Occasionally Restless, afebrile. Tolerating cuffless trach, phonating w/ digital occlusion.  Spoke to speech for PMV re-eval soon than 9/6.  Elevated BP, Hydralazine increased.- will monitor.  9/5: trach dislodged, ENT changed to #8 cuffless Shiley. OOB to chair  9/6: OOB to chair today, gave dulcolax suppository   9/7: agitated while in chair today, gave Seroquel.  Producing thick, yellow secretions. Afebrile. Managing secretions with duoneb and hyper-sal 7%/Chest PT.  9/8: Had spurious leukocytosis. Afebrile, hemodynamically stable. Repeated cbc, sent Bcx.  Spiked temp of 102.5, cbc rechecked which was consistent with leukocytosis. PAN cultured. Noted urinary retention of 2600ml urine, abdomen distended with some tenderness on palpation.  F/U CT abd/pelvis/chest.  9/9: Patient appears well despite leukocytosis of 31. Sputum Cx growing gram neg rods. Remains on meropenem, added Vancoycin PO per ID for concern of recent Cdiff infection.

## 2020-09-09 NOTE — PROGRESS NOTE ADULT - SUBJECTIVE AND OBJECTIVE BOX
[facility]   [phone]  RENAL FOLLOW UP NOTE  --------------------------------------------------------------------------------  Chief Complaint:    24 hour events/subjective:    seen earlier.   trach collar   responsive and appears alert     PAST HISTORY  --------------------------------------------------------------------------------  No significant changes to PMH, PSH, FHx, SHx, unless otherwise noted    ALLERGIES & MEDICATIONS  --------------------------------------------------------------------------------  Allergies    No Known Allergies    Intolerances      Standing Inpatient Medications  albuterol/ipratropium for Nebulization. 3 milliLiter(s) Nebulizer every 6 hours  amantadine Syrup 100 milliGRAM(s) Oral <User Schedule>  artificial  tears Solution 1 Drop(s) Both EYES every 6 hours  aspirin  chewable 81 milliGRAM(s) Enteral Tube daily  bisacodyl Suppository 10 milliGRAM(s) Rectal at bedtime  chlorhexidine 4% Liquid 1 Application(s) Topical <User Schedule>  dextrose 5%. 1000 milliLiter(s) IV Continuous <Continuous>  dextrose 50% Injectable 12.5 Gram(s) IV Push once  dextrose 50% Injectable 25 Gram(s) IV Push once  dextrose 50% Injectable 25 Gram(s) IV Push once  famotidine    Tablet 20 milliGRAM(s) Oral daily  fentaNYL   Patch  75 MICROgram(s)/Hr 1 Patch Transdermal every 72 hours  heparin   Injectable 5000 Unit(s) SubCutaneous every 8 hours  hydrALAZINE 100 milliGRAM(s) Oral three times a day  isosorbide   dinitrate Tablet (ISORDIL) 20 milliGRAM(s) Enteral Tube <User Schedule>  lactobacillus acidophilus 1 Tablet(s) Oral two times a day  lidocaine   Patch 1 Patch Transdermal daily  meropenem  IVPB 1000 milliGRAM(s) IV Intermittent every 12 hours  meropenem  IVPB      metoprolol tartrate 50 milliGRAM(s) Oral three times a day  polyethylene glycol 3350 17 Gram(s) Oral daily  QUEtiapine 50 milliGRAM(s) Oral <User Schedule>  sodium chloride 7% Inhalation 4 milliLiter(s) Inhalation every 12 hours  vancomycin    Solution 125 milliGRAM(s) Enteral Tube two times a day    PRN Inpatient Medications  dextrose 40% Gel 15 Gram(s) Oral once PRN  glucagon  Injectable 1 milliGRAM(s) IntraMuscular once PRN  QUEtiapine 50 milliGRAM(s) Oral every 12 hours PRN      REVIEW OF SYSTEMS  --------------------------------------------------------------------------------        VITALS/PHYSICAL EXAM  --------------------------------------------------------------------------------  T(C): 37.1 (09-09-20 @ 13:21), Max: 38.4 (09-09-20 @ 00:26)  HR: 90 (09-09-20 @ 17:53) (80 - 98)  BP: 127/90 (09-09-20 @ 16:00) (127/90 - 169/81)  RR: 20 (09-09-20 @ 16:48) (18 - 20)  SpO2: 100% (09-09-20 @ 17:53) (96% - 100%)  Wt(kg): --        09-08-20 @ 07:01  -  09-09-20 @ 07:00  --------------------------------------------------------  IN: 4060 mL / OUT: 4800 mL / NET: -740 mL    09-09-20 @ 07:01  -  09-09-20 @ 20:09  --------------------------------------------------------  IN: 1410 mL / OUT: 2200 mL / NET: -790 mL      Physical Exam:  	    Gen: trach    	no jvd    	Pulm: decrease bs  no rales  + ronchi -  wheezing  	CV: RRR, S1S2; no rub  	Abd: +BS, soft, nontender/nondistended + peg   	UE: Warm, no cyanosis  no clubbing,  no edema  	LE: Warm, no cyanosis  no clubbing, no edema  	Skin: Warm, no decrease skin turgor       LABS/STUDIES  --------------------------------------------------------------------------------              9.0    31.19 >-----------<  238      [09-09-20 @ 06:51]              29.9     140  |  104  |  43  ----------------------------<  141      [09-09-20 @ 06:51]  4.5   |  22  |  2.75        Ca     9.6     [09-09-20 @ 06:51]      Mg     2.3     [09-09-20 @ 06:51]      Phos  4.5     [09-09-20 @ 06:51]    TPro  7.0  /  Alb  3.1  /  TBili  0.2  /  DBili  x   /  AST  28  /  ALT  26  /  AlkPhos  87  [09-09-20 @ 06:51]          Creatinine Trend:  SCr 2.75 [09-09 @ 06:51]  SCr 2.91 [09-08 @ 07:04]  SCr 2.48 [09-06 @ 07:16]  SCr 2.16 [09-05 @ 10:42]  SCr 2.14 [09-04 @ 06:36]              Urinalysis - [09-08-20 @ 15:20]      Color Light Yellow / Appearance Clear / SG 1.013 / pH 6.0      Gluc Negative / Ketone Negative  / Bili Negative / Urobili Negative       Blood Small / Protein 30 mg/dL / Leuk Est Large / Nitrite Positive      RBC 5 / WBC 5 / Hyaline 1 / Gran  / Sq Epi  / Non Sq Epi 1 / Bacteria Few      TSH 1.91      [08-09-20 @ 17:20]  Lipid: chol 148, , HDL 37, LDL 68      [08-09-20 @ 17:21]

## 2020-09-09 NOTE — PROGRESS NOTE ADULT - PROBLEM SELECTOR PLAN 1
- continue 30% trach collar  - Trached 8/24 with ENT  -Trach Care/ Pulmonary toileting   -Continue Duoneb and hypertonic saline, Mucomyst, chest PT  - Trach collar 30% around the clock  - downsized to #7 cuffless Portex, then changed to #8 cuffless Shiley - Trached 8/24 with ENT  -Continue Duoneb and hypertonic saline, Mucomyst, chest PT  - downsized to #7 cuffless Portex, then changed to #8 cuffless Shiley  - Tolerating continuous trach collar since 8/30

## 2020-09-09 NOTE — PROGRESS NOTE ADULT - ASSESSMENT
Assessment:  Cardiac arrest, sah, negative findings on brain angio, treated for pneumonia, treated for cdif, needs eventual cardiac cath on hold for now. Had distended abdomen yesterday now improved. The patient does have BM this morning he is afebrile, reviewed cbc and noted that his white cell count has trended up today to 30K, etiology? reactive? prior imaging reported intraventricular hemorrhage lateral ventricle occipital horns could this finding be related to leukocytosis   reviewed events from yesterday he was found to have urinary retention and and JALEN he is being followed by renal   the patient also had CT C/A/P done:  per report he was found bibasilar atelectasis, trace pleural effusion, cardiomegaly, diffuse wall thickening of urinary bladder, cystitis   wbc reviewed he has leukocytosis of 31 K, he was also started on merrem empirically, sputum gram stain sent and is reported as gram neg rods seen   not clear but perhaps leukocytosis is related to urinary retention?, pneumonia?     Plan:  ·	continue merrem empirically for now   ·	add PO vancomycin 125 mg po bid prophylaxis for C diff he was recently treated for C diff   ·	follow up cultures   ·	trend CBC  ·	continue supportive care per RCU, renal and cardiology   ·	reviewed and discussed above recommendations with RCU NP on floor

## 2020-09-09 NOTE — PROGRESS NOTE ADULT - PROBLEM SELECTOR PLAN 5
Completed Abx for Pseudomonas aeruginosa pneumonia/colonization:  - S/p prolonged course of cefepime until 8/14-8/20   - If develops increased secretions, may benefit from inhaled tobramycin  - Cdiff- Completed oral Vanco 8/29   - Repeat cbc, bld. cx x2

## 2020-09-10 LAB
-  AMIKACIN: SIGNIFICANT CHANGE UP
-  AMOXICILLIN/CLAVULANIC ACID: SIGNIFICANT CHANGE UP
-  AMPICILLIN/SULBACTAM: SIGNIFICANT CHANGE UP
-  AMPICILLIN: SIGNIFICANT CHANGE UP
-  AZTREONAM: SIGNIFICANT CHANGE UP
-  CEFAZOLIN: SIGNIFICANT CHANGE UP
-  CEFEPIME: SIGNIFICANT CHANGE UP
-  CEFOXITIN: SIGNIFICANT CHANGE UP
-  CEFTAZIDIME: SIGNIFICANT CHANGE UP
-  CEFTAZIDIME: SIGNIFICANT CHANGE UP
-  CEFTRIAXONE: SIGNIFICANT CHANGE UP
-  CIPROFLOXACIN: SIGNIFICANT CHANGE UP
-  ERTAPENEM: SIGNIFICANT CHANGE UP
-  GENTAMICIN: SIGNIFICANT CHANGE UP
-  GENTAMICIN: SIGNIFICANT CHANGE UP
-  IMIPENEM: SIGNIFICANT CHANGE UP
-  IMIPENEM: SIGNIFICANT CHANGE UP
-  LEVOFLOXACIN: SIGNIFICANT CHANGE UP
-  MEROPENEM: SIGNIFICANT CHANGE UP
-  PIPERACILLIN/TAZOBACTAM: SIGNIFICANT CHANGE UP
-  TOBRAMYCIN: SIGNIFICANT CHANGE UP
-  TOBRAMYCIN: SIGNIFICANT CHANGE UP
-  TRIMETHOPRIM/SULFAMETHOXAZOLE: SIGNIFICANT CHANGE UP
ANION GAP SERPL CALC-SCNC: 13 MMOL/L — SIGNIFICANT CHANGE UP (ref 5–17)
BASOPHILS # BLD AUTO: 0.07 K/UL — SIGNIFICANT CHANGE UP (ref 0–0.2)
BASOPHILS NFR BLD AUTO: 0.3 % — SIGNIFICANT CHANGE UP (ref 0–2)
BUN SERPL-MCNC: 36 MG/DL — HIGH (ref 7–23)
CALCIUM SERPL-MCNC: 9.6 MG/DL — SIGNIFICANT CHANGE UP (ref 8.4–10.5)
CHLORIDE SERPL-SCNC: 101 MMOL/L — SIGNIFICANT CHANGE UP (ref 96–108)
CO2 SERPL-SCNC: 26 MMOL/L — SIGNIFICANT CHANGE UP (ref 22–31)
CREAT SERPL-MCNC: 1.95 MG/DL — HIGH (ref 0.5–1.3)
CULTURE RESULTS: SIGNIFICANT CHANGE UP
CULTURE RESULTS: SIGNIFICANT CHANGE UP
EOSINOPHIL # BLD AUTO: 0.25 K/UL — SIGNIFICANT CHANGE UP (ref 0–0.5)
EOSINOPHIL NFR BLD AUTO: 1.1 % — SIGNIFICANT CHANGE UP (ref 0–6)
GLUCOSE SERPL-MCNC: 134 MG/DL — HIGH (ref 70–99)
HCT VFR BLD CALC: 32.4 % — LOW (ref 39–50)
HGB BLD-MCNC: 9.6 G/DL — LOW (ref 13–17)
IMM GRANULOCYTES NFR BLD AUTO: 0.9 % — SIGNIFICANT CHANGE UP (ref 0–1.5)
LYMPHOCYTES # BLD AUTO: 1.11 K/UL — SIGNIFICANT CHANGE UP (ref 1–3.3)
LYMPHOCYTES # BLD AUTO: 5 % — LOW (ref 13–44)
MAGNESIUM SERPL-MCNC: 2 MG/DL — SIGNIFICANT CHANGE UP (ref 1.6–2.6)
MCHC RBC-ENTMCNC: 26.5 PG — LOW (ref 27–34)
MCHC RBC-ENTMCNC: 29.6 GM/DL — LOW (ref 32–36)
MCV RBC AUTO: 89.5 FL — SIGNIFICANT CHANGE UP (ref 80–100)
METHOD TYPE: SIGNIFICANT CHANGE UP
MONOCYTES # BLD AUTO: 1.33 K/UL — HIGH (ref 0–0.9)
MONOCYTES NFR BLD AUTO: 6 % — SIGNIFICANT CHANGE UP (ref 2–14)
NEUTROPHILS # BLD AUTO: 19.15 K/UL — HIGH (ref 1.8–7.4)
NEUTROPHILS NFR BLD AUTO: 86.7 % — HIGH (ref 43–77)
NRBC # BLD: 0 /100 WBCS — SIGNIFICANT CHANGE UP (ref 0–0)
ORGANISM # SPEC MICROSCOPIC CNT: SIGNIFICANT CHANGE UP
PHOSPHATE SERPL-MCNC: 4.1 MG/DL — SIGNIFICANT CHANGE UP (ref 2.5–4.5)
PLATELET # BLD AUTO: 255 K/UL — SIGNIFICANT CHANGE UP (ref 150–400)
POTASSIUM SERPL-MCNC: 4.3 MMOL/L — SIGNIFICANT CHANGE UP (ref 3.5–5.3)
POTASSIUM SERPL-SCNC: 4.3 MMOL/L — SIGNIFICANT CHANGE UP (ref 3.5–5.3)
RBC # BLD: 3.62 M/UL — LOW (ref 4.2–5.8)
RBC # FLD: 15.8 % — HIGH (ref 10.3–14.5)
SODIUM SERPL-SCNC: 140 MMOL/L — SIGNIFICANT CHANGE UP (ref 135–145)
SPECIMEN SOURCE: SIGNIFICANT CHANGE UP
SPECIMEN SOURCE: SIGNIFICANT CHANGE UP
WBC # BLD: 22.11 K/UL — HIGH (ref 3.8–10.5)
WBC # FLD AUTO: 22.11 K/UL — HIGH (ref 3.8–10.5)

## 2020-09-10 PROCEDURE — 99233 SBSQ HOSP IP/OBS HIGH 50: CPT | Mod: GC

## 2020-09-10 RX ORDER — LIDOCAINE 4 G/100G
1 CREAM TOPICAL EVERY 24 HOURS
Refills: 0 | Status: DISCONTINUED | OUTPATIENT
Start: 2020-09-10 | End: 2020-10-14

## 2020-09-10 RX ORDER — POLYETHYLENE GLYCOL 3350 17 G/17G
17 POWDER, FOR SOLUTION ORAL DAILY
Refills: 0 | Status: DISCONTINUED | OUTPATIENT
Start: 2020-09-10 | End: 2020-10-14

## 2020-09-10 RX ORDER — LIDOCAINE 4 G/100G
1 CREAM TOPICAL EVERY 24 HOURS
Refills: 0 | Status: DISCONTINUED | OUTPATIENT
Start: 2020-09-10 | End: 2020-09-10

## 2020-09-10 RX ORDER — SODIUM CHLORIDE 9 MG/ML
3 INJECTION INTRAMUSCULAR; INTRAVENOUS; SUBCUTANEOUS EVERY 6 HOURS
Refills: 0 | Status: DISCONTINUED | OUTPATIENT
Start: 2020-09-10 | End: 2020-09-15

## 2020-09-10 RX ORDER — DOXAZOSIN MESYLATE 4 MG
2 TABLET ORAL AT BEDTIME
Refills: 0 | Status: DISCONTINUED | OUTPATIENT
Start: 2020-09-10 | End: 2020-09-11

## 2020-09-10 RX ADMIN — Medication 50 MILLIGRAM(S): at 21:32

## 2020-09-10 RX ADMIN — LIDOCAINE 1 PATCH: 4 CREAM TOPICAL at 19:19

## 2020-09-10 RX ADMIN — LIDOCAINE 1 PATCH: 4 CREAM TOPICAL at 21:33

## 2020-09-10 RX ADMIN — LIDOCAINE 1 PATCH: 4 CREAM TOPICAL at 11:47

## 2020-09-10 RX ADMIN — POLYETHYLENE GLYCOL 3350 17 GRAM(S): 17 POWDER, FOR SOLUTION ORAL at 11:47

## 2020-09-10 RX ADMIN — CHLORHEXIDINE GLUCONATE 1 APPLICATION(S): 213 SOLUTION TOPICAL at 06:07

## 2020-09-10 RX ADMIN — Medication 125 MILLIGRAM(S): at 17:20

## 2020-09-10 RX ADMIN — Medication 50 MILLIGRAM(S): at 06:06

## 2020-09-10 RX ADMIN — HEPARIN SODIUM 5000 UNIT(S): 5000 INJECTION INTRAVENOUS; SUBCUTANEOUS at 06:06

## 2020-09-10 RX ADMIN — ISOSORBIDE DINITRATE 20 MILLIGRAM(S): 5 TABLET ORAL at 08:57

## 2020-09-10 RX ADMIN — QUETIAPINE FUMARATE 50 MILLIGRAM(S): 200 TABLET, FILM COATED ORAL at 06:06

## 2020-09-10 RX ADMIN — Medication 1 DROP(S): at 17:20

## 2020-09-10 RX ADMIN — Medication 1 TABLET(S): at 17:20

## 2020-09-10 RX ADMIN — Medication 10 MILLIGRAM(S): at 21:32

## 2020-09-10 RX ADMIN — FENTANYL CITRATE 1 PATCH: 50 INJECTION INTRAVENOUS at 19:19

## 2020-09-10 RX ADMIN — Medication 1 TABLET(S): at 06:07

## 2020-09-10 RX ADMIN — ISOSORBIDE DINITRATE 20 MILLIGRAM(S): 5 TABLET ORAL at 23:51

## 2020-09-10 RX ADMIN — Medication 2 MILLIGRAM(S): at 21:33

## 2020-09-10 RX ADMIN — Medication 100 MILLIGRAM(S): at 06:05

## 2020-09-10 RX ADMIN — Medication 3 MILLILITER(S): at 05:32

## 2020-09-10 RX ADMIN — FENTANYL CITRATE 1 PATCH: 50 INJECTION INTRAVENOUS at 07:32

## 2020-09-10 RX ADMIN — Medication 81 MILLIGRAM(S): at 11:47

## 2020-09-10 RX ADMIN — HEPARIN SODIUM 5000 UNIT(S): 5000 INJECTION INTRAVENOUS; SUBCUTANEOUS at 21:32

## 2020-09-10 RX ADMIN — Medication 100 MILLIGRAM(S): at 08:57

## 2020-09-10 RX ADMIN — MEROPENEM 100 MILLIGRAM(S): 1 INJECTION INTRAVENOUS at 06:06

## 2020-09-10 RX ADMIN — SODIUM CHLORIDE 4 MILLILITER(S): 9 INJECTION INTRAMUSCULAR; INTRAVENOUS; SUBCUTANEOUS at 05:31

## 2020-09-10 RX ADMIN — MEROPENEM 100 MILLIGRAM(S): 1 INJECTION INTRAVENOUS at 17:23

## 2020-09-10 RX ADMIN — Medication 50 MILLIGRAM(S): at 13:34

## 2020-09-10 RX ADMIN — Medication 1 DROP(S): at 11:47

## 2020-09-10 RX ADMIN — ISOSORBIDE DINITRATE 20 MILLIGRAM(S): 5 TABLET ORAL at 17:23

## 2020-09-10 RX ADMIN — SODIUM CHLORIDE 3 MILLILITER(S): 9 INJECTION INTRAMUSCULAR; INTRAVENOUS; SUBCUTANEOUS at 23:28

## 2020-09-10 RX ADMIN — Medication 1 DROP(S): at 06:07

## 2020-09-10 RX ADMIN — Medication 100 MILLIGRAM(S): at 11:47

## 2020-09-10 RX ADMIN — Medication 100 MILLIGRAM(S): at 21:31

## 2020-09-10 RX ADMIN — Medication 3 MILLILITER(S): at 23:28

## 2020-09-10 RX ADMIN — FAMOTIDINE 20 MILLIGRAM(S): 10 INJECTION INTRAVENOUS at 11:47

## 2020-09-10 RX ADMIN — HEPARIN SODIUM 5000 UNIT(S): 5000 INJECTION INTRAVENOUS; SUBCUTANEOUS at 13:34

## 2020-09-10 RX ADMIN — Medication 3 MILLILITER(S): at 17:49

## 2020-09-10 RX ADMIN — Medication 3 MILLILITER(S): at 12:04

## 2020-09-10 RX ADMIN — QUETIAPINE FUMARATE 50 MILLIGRAM(S): 200 TABLET, FILM COATED ORAL at 21:34

## 2020-09-10 RX ADMIN — Medication 125 MILLIGRAM(S): at 06:05

## 2020-09-10 RX ADMIN — Medication 1 DROP(S): at 23:51

## 2020-09-10 RX ADMIN — Medication 100 MILLIGRAM(S): at 13:34

## 2020-09-10 RX ADMIN — SODIUM CHLORIDE 4 MILLILITER(S): 9 INJECTION INTRAMUSCULAR; INTRAVENOUS; SUBCUTANEOUS at 17:49

## 2020-09-10 RX ADMIN — LIDOCAINE 1 PATCH: 4 CREAM TOPICAL at 23:43

## 2020-09-10 RX ADMIN — QUETIAPINE FUMARATE 50 MILLIGRAM(S): 200 TABLET, FILM COATED ORAL at 18:36

## 2020-09-10 NOTE — PROGRESS NOTE ADULT - SUBJECTIVE AND OBJECTIVE BOX
Subjective: Patient seen and examined. No new events except as noted.     REVIEW OF SYSTEMS:  Unable to obtain       MEDICATIONS:  MEDICATIONS  (STANDING):  albuterol/ipratropium for Nebulization. 3 milliLiter(s) Nebulizer every 6 hours  amantadine Syrup 100 milliGRAM(s) Oral <User Schedule>  artificial  tears Solution 1 Drop(s) Both EYES every 6 hours  aspirin  chewable 81 milliGRAM(s) Enteral Tube daily  bisacodyl Suppository 10 milliGRAM(s) Rectal at bedtime  chlorhexidine 4% Liquid 1 Application(s) Topical <User Schedule>  dextrose 5%. 1000 milliLiter(s) (50 mL/Hr) IV Continuous <Continuous>  dextrose 50% Injectable 12.5 Gram(s) IV Push once  dextrose 50% Injectable 25 Gram(s) IV Push once  dextrose 50% Injectable 25 Gram(s) IV Push once  famotidine    Tablet 20 milliGRAM(s) Oral daily  fentaNYL   Patch  75 MICROgram(s)/Hr 1 Patch Transdermal every 72 hours  heparin   Injectable 5000 Unit(s) SubCutaneous every 8 hours  hydrALAZINE 100 milliGRAM(s) Oral three times a day  isosorbide   dinitrate Tablet (ISORDIL) 20 milliGRAM(s) Enteral Tube <User Schedule>  lactobacillus acidophilus 1 Tablet(s) Oral two times a day  lidocaine   Patch 1 Patch Transdermal daily  meropenem  IVPB 1000 milliGRAM(s) IV Intermittent every 12 hours  meropenem  IVPB      metoprolol tartrate 50 milliGRAM(s) Oral three times a day  polyethylene glycol 3350 17 Gram(s) Oral daily  QUEtiapine 50 milliGRAM(s) Oral <User Schedule>  sodium chloride 7% Inhalation 4 milliLiter(s) Inhalation every 12 hours  vancomycin    Solution 125 milliGRAM(s) Enteral Tube two times a day      PHYSICAL EXAM:  T(C): 37.4 (09-10-20 @ 04:06), Max: 37.4 (09-09-20 @ 20:46)  HR: 76 (09-10-20 @ 09:20) (76 - 110)  BP: 142/82 (09-10-20 @ 04:06) (127/90 - 142/82)  RR: 18 (09-10-20 @ 09:20) (18 - 20)  SpO2: 100% (09-10-20 @ 09:20) (100% - 100%)  Wt(kg): --  I&O's Summary    09 Sep 2020 07:01  -  10 Sep 2020 07:00  --------------------------------------------------------  IN: 2780 mL / OUT: 3775 mL / NET: -995 mL          Appearance: sleepy , +trach   HEENT:   Dry  oral mucosa,  Lymphatic: No lymphadenopathy  Cardiovascular: Normal S1 S2, No JVD, No murmurs, No edema  Respiratory: Ventilated   Psychiatry: A & O x 0  Gastrointestinal:  Soft, Non-tender, +PEG   Skin: No rashes, No ecchymoses, No cyanosis	  Mental status- No acute distress, EO to stim  -CN- Pupils R 4mm NR, L 2mm sluggish, EOMI, tongue midline, face symmetric  +cough/gag  C briskly, two fingers/thumbs up on RUE, distally AG, wiggles toes on RLE          LABS:    CARDIAC MARKERS:                                9.6    22.11 )-----------( 255      ( 10 Sep 2020 07:16 )             32.4     09-10    140  |  101  |  36<H>  ----------------------------<  134<H>  4.3   |  26  |  1.95<H>    Ca    9.6      10 Sep 2020 07:15  Phos  4.1     09-10  Mg     2.0     09-10    TPro  7.0  /  Alb  3.1<L>  /  TBili  0.2  /  DBili  x   /  AST  28  /  ALT  26  /  AlkPhos  87  09-09    proBNP:   Lipid Profile:   HgA1c:   TSH:           TELEMETRY: 	    ECG:  	  RADIOLOGY:   DIAGNOSTIC TESTING:  [ ] Echocardiogram:  [ ]  Catheterization:  [ ] Stress Test:    OTHER:

## 2020-09-10 NOTE — PROGRESS NOTE ADULT - ASSESSMENT
Assessment:  Cardiac arrest, sah, negative findings on brain angio, treated for pneumonia, treated for cdif, needs eventual cardiac cath on hold for now. Had distended abdomen yesterday now improved. The patient does have BM this morning he is afebrile, reviewed cbc and noted that his white cell count has trended up today to 30K, etiology? reactive? prior imaging reported intraventricular hemorrhage lateral ventricle occipital horns could this finding be related to leukocytosis   reviewed events from yesterday he was found to have urinary retention and and JALEN he is being followed by renal   the patient also had CT C/A/P done:  per report he was found bibasilar atelectasis, trace pleural effusion, cardiomegaly, diffuse wall thickening of urinary bladder, cystitis   wbc reviewed he has leukocytosis, the wbc have improved and is trending down   blood cultures reviewed and reported as no growth to date  respiratory culture is reported growing Pseudomonas and Providencia ss are pending  urine cx is reported growing Pseudomonas ss are pending   he is currently afebrile       Plan:  ·	continue merrem for treatment of possible pna and UTI day 3 of antibiotics duration to be determined based on response to antibiotics and wbc improvement   ·	continue PO vancomycin 125 mg po bid prophylaxis for C diff he was recently treated for C diff   ·	trend CBC  ·	continue supportive care per RCU, renal and cardiology   ·	reviewed and discussed above recommendations with RCU NP on floor

## 2020-09-10 NOTE — PROGRESS NOTE ADULT - ASSESSMENT
60-year-old male with a history of A-Fib on warfarin who presents with cardiac arrest at work with downtime of about 25 minutes prior to ROSC. S/p therapeutic hypothermia. 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 Now with resolved neurogenic/cardiogenic shock. Course complicated by GI bleed s/p PPI gtt, bleeding from scott s/p clot irrigation, prolonged respiratory failure s/p trach (8/24) and PEG (8/26). Currently with A-Fib with RVR and C diff colitis.  Also developed gross hematuria - Urology consulted +catheter with clot (irrigated and exchanged) suspected 2/2 traumatic catheterization. Additionally course complicated by fever and Pseudomonas PNA (s/p Rx) then developed C diff (now with resolved leukocytosis and improved stooling, now soft per report); remains on enteral Vanco    8/29: Received to RCU  8/30: Less Agitated overnight, Afebrile, WBC decreasing, CR elevated but Stable, Tolerating TC all day yesterday, will Attempt TC ATC tonight and ABG in am, F/u Bcx from 8/30. Rectal tube dc'd.  Speech eval for AAC device and will F/u.  8/31: will attempt TC around the clock again tonight. Stopped amiodarone as per cards.  Will be cathed if cleared by ID and renal.  9/1: Elevated BP overnight, BP stable today, afebrile. Lethargic earlier today on exam; ABG Stable; Head CT done- no new ICH, ventricles enlarged ? Hydrocephalus, Spoke to Max from NSX who will see- although felt no intervention required at this time. Tolerating TC ATC, will attempt cuffless trach tomorrow. C diff Iso discontinued as discussed w/ Inf control.  9/2: still with secretions, will downsize when improved. Will cath as outpatient. Decreased opioids  9/3: PICC dc'd, Trach downsized to #7 cuffless Portex. stomach distended  9/4: Occasionally Restless, afebrile. Tolerating cuffless trach, phonating w/ digital occlusion.  Spoke to speech for PMV re-eval soon than 9/6.  Elevated BP, Hydralazine increased.- will monitor.  9/5: trach dislodged, ENT changed to #8 cuffless Shiley. OOB to chair  9/6: OOB to chair today, gave dulcolax suppository   9/7: agitated while in chair today, gave Seroquel.  Producing thick, yellow secretions. Afebrile. Managing secretions with duoneb and hyper-sal 7%/Chest PT.  9/8: Had spurious leukocytosis. Afebrile, hemodynamically stable. Repeated cbc, sent Bcx.  Spiked temp of 102.5, cbc rechecked which was consistent with leukocytosis. PAN cultured. Noted urinary retention of 2600ml urine, abdomen distended with some tenderness on palpation.  F/U CT abd/pelvis/chest.  9/9: Patient appears well despite leukocytosis of 31. Sputum Cx growing gram neg rods. Remains on meropenem, added Vancoycin PO per ID for concern of recent Cdiff infection. 60-year-old male with a history of A-Fib on warfarin who presents with cardiac arrest at work with downtime of about 25 minutes prior to ROSC. S/p therapeutic hypothermia. 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 Now with resolved neurogenic/cardiogenic shock. Course complicated by GI bleed s/p PPI gtt, bleeding from scott s/p clot irrigation, prolonged respiratory failure s/p trach (8/24) and PEG (8/26). Currently with A-Fib with RVR and C diff colitis.  Also developed gross hematuria - Urology consulted +catheter with clot (irrigated and exchanged) suspected 2/2 traumatic catheterization. Additionally course complicated by fever and Pseudomonas PNA (s/p Rx) then developed C diff (now with resolved leukocytosis and improved stooling, now soft per report); remains on enteral Vanco    8/29: Received to RCU  8/30: Less Agitated overnight, Afebrile, WBC decreasing, CR elevated but Stable, Tolerating TC all day yesterday, will Attempt TC ATC tonight and ABG in am, F/u Bcx from 8/30. Rectal tube dc'd.  Speech eval for AAC device and will F/u.  8/31: will attempt TC around the clock again tonight. Stopped amiodarone as per cards.  Will be cathed if cleared by ID and renal.  9/1: Elevated BP overnight, BP stable today, afebrile. Lethargic earlier today on exam; ABG Stable; Head CT done- no new ICH, ventricles enlarged ? Hydrocephalus, Spoke to Max from NSX who will see- although felt no intervention required at this time. Tolerating TC ATC, will attempt cuffless trach tomorrow. C diff Iso discontinued as discussed w/ Inf control.  9/2: still with secretions, will downsize when improved. Will cath as outpatient. Decreased opioids  9/3: PICC dc'd, Trach downsized to #7 cuffless Portex. stomach distended  9/4: Occasionally Restless, afebrile. Tolerating cuffless trach, phonating w/ digital occlusion.  Spoke to speech for PMV re-eval soon than 9/6.  Elevated BP, Hydralazine increased.- will monitor.  9/5: trach dislodged, ENT changed to #8 cuffless Shiley. OOB to chair  9/6: OOB to chair today, gave dulcolax suppository   9/7: agitated while in chair today, gave Seroquel.  Producing thick, yellow secretions. Afebrile. Managing secretions with duoneb and hyper-sal 7%/Chest PT.  9/8: Had spurious leukocytosis. Afebrile, hemodynamically stable. Repeated cbc, sent Bcx.  Spiked temp of 102.5, cbc rechecked which was consistent with leukocytosis. PAN cultured. Noted urinary retention of 2600ml urine, abdomen distended with some tenderness on palpation.  F/U CT abd/pelvis/chest.  9/9: Patient appears well despite leukocytosis of 31. Sputum Cx growing gram neg rods. Remains on meropenem, added Vancoycin PO per ID for concern of recent Cdiff infection.  9/10: Afebrile over night. Leukocytosis decreasing. UCx growing pan-sensitive pseudomonas. Will continue meropenem and prophylactic vancomycin PO. Thick brown tinged sputum with suctioning, aggressive chest PT, nebs + hypersal.

## 2020-09-10 NOTE — PROGRESS NOTE ADULT - ATTENDING COMMENTS
Patient seen and examined, agree with above  Febrile 9/8, noted to have distended abdomen and leukocytosis >30 k.   Started empirically on Meropenem and Vancox1 dose.   Afebrile x24 hours.     1. Leukocytosis - improving, likely from urinary retention and acute cystitis  Appreciate ID follow up - will c/w Meropenem and Vanco PO for h/o recent C. diff infection.  Urine cultures: Pseudomonas, sensitive  Start cardura for urinary retention, c/w intermittent straight cath and monitor bladder scans    2. SAH with negative angiogram:  - No further neurosurgical intervention at this time    3. Acute Encephalopathy - improving, less delirious  - Continue fentanyl patch, clonazepam, and quetiapine  - Pain control - Hydromorphone prn  - cont to titrate down pain medications as able while maintaining patient safety.     4. Afib and new acute systolic heart failure in setting of cardiac arrest  - Likely stunned myocardium due to cardiac arrest vs. SAH  - Will need eventual cardiac cath to evaluate for ischemia - planned for as outpatient per Dr. Deleon  - Continue afterload/preload reduction with hydralazine and isosorbide dinitrate  - For afib will continue metoprolol and monitor HR. Amio stopped due to prior pauses noted.  - Hold off on therapeutic a/c given SAH    5. Acute hypoxemic respiratory failure s/p tracheostomy:  - Patient downsized to cuffless trach 9/3  - Continue Duo nebs and hypertonic saline, chest PT  - PMV eval- is phontaing over trach and tolerated brief finger occlusion at bedside.     6. Oropharyngeal dysphagia:  - PEG feeds, Famotidine for GI ppx    7. C diff colitis:  - Completed PO vanco    8. Dispo:  - Full code, overall prognosis guarded depending on neurological recovery  - Patient is a candidate for acute rehab, pending clarification of insurance issues.     I was physically present for the key portions of the evaluation and management (E/M) service provided.  I agree with the above history, physical, and plan which I have reviewed and edited where appropriate.     35 minutes spent on total encounter; more than 50% of the visit was spent counseling and/or coordinating care by the attending physician.      Plan discussed with RCU team.

## 2020-09-10 NOTE — PROGRESS NOTE ADULT - PROBLEM SELECTOR PLAN 6
Patient due for follow up appt A-Fib with RVR:  - DC'd Amiodarone (8/31)  - continue metoprolol to 50 mg q8h  - continue Hydralazine/imdur  - Hold off on therapeutic a/c given SAH  - SBP goal 100- 160  -TTE: Global LV dysfunction. EF 41%, Severe concentric LVH, flattening of interventricular septum.   -CTA chest - negative PE

## 2020-09-10 NOTE — PROGRESS NOTE ADULT - PROBLEM SELECTOR PLAN 1
- Trached 8/24 with ENT  -Continue Duoneb and hypertonic saline, Mucomyst, chest PT  - downsized to #7 cuffless Portex, then changed to #8 cuffless Shiley  - Tolerating continuous trach collar since 8/30

## 2020-09-10 NOTE — PROGRESS NOTE ADULT - PROBLEM SELECTOR PLAN 9
- HSQ  - Pepcid  - PT and OT evaluation - DVT PPx: Heparin SC Q12H  - GI PPx: Pepcid 20mg QD  - PT and OT evaluation

## 2020-09-10 NOTE — PROGRESS NOTE ADULT - SUBJECTIVE AND OBJECTIVE BOX
CC: f/u for cdif, he completed his full course of treatment     S: the patient is trached, he reports nothing     REVIEW OF SYSTEMS:  All other review of systems negative (Comprehensive ROS)    Antimicrobials Day #  :  nystatin    Suspension [id number] Unit(s) Oral every 6 hours    Vital Signs Last 24 Hrs  T(C): 37.4 (10 Sep 2020 04:06), Max: 37.4 (09 Sep 2020 20:46)  T(F): 99.3 (10 Sep 2020 04:06), Max: 99.3 (09 Sep 2020 20:46)  HR: 76 (10 Sep 2020 09:20) (76 - 110)  BP: 142/82 (10 Sep 2020 04:06) (127/90 - 142/82)  BP(mean): --  RR: 18 (10 Sep 2020 09:20) (18 - 20)  SpO2: 100% (10 Sep 2020 09:20) (100% - 100%)    PHYSICAL EXAM:  General: no acute distress  Eyes:  anicteric, no conjunctival injection, no discharge  Oropharynx: no lesions or injection 	  Neck: trached  Lungs: course  to auscultation  Heart: regular rate and rhythm; no murmur, rubs or gallops  Abdomen: soft, nondistended, nontender, without mass or organomegaly peg ok  Skin: no lesions  Extremities: no clubbing, cyanosis, or edema  Neurologic: lethargic    LAB RESULTS:                                   9.6    22.11 )-----------( 255      ( 10 Sep 2020 07:16 )             32.4                           9.0    31.19 )-----------( 238      ( 09 Sep 2020 06:51 )             29.9     09-10    140  |  101  |  36<H>  ----------------------------<  134<H>  4.3   |  26  |  1.95<H>    Ca    9.6      10 Sep 2020 07:15  Phos  4.1     09-10  Mg     2.0     09-10    TPro  7.0  /  Alb  3.1<L>  /  TBili  0.2  /  DBili  x   /  AST  28  /  ALT  26  /  AlkPhos  87  09-09                  MICROBIOLOGY:  RECENT CULTURES:      RADIOLOGY REVIEWED:      < from: Xray Abdomen 1 View PORTABLE -Urgent (09.03.20 @ 15:48) >    EXAM:  XR ABDOMEN PORTABLE URGENT 1V                            PROCEDURE DATE:  09/03/2020            INTERPRETATION:  CLINICAL INFORMATION: Abdominal distention, recently treated for C. difficile.    TECHNIQUE: Single portable radiograph of the abdomen.    COMPARISON: Abdominal x-ray dated 8/25/2020.    FINDINGS:    Gas and stool is seen throughout the large bowel.  There are no dilated, air-filled loops of small bowel.  No acute bony pathology.    IMPRESSION:    Nonobstructive bowel gas pattern with moderate stool burden.        < end of copied text >

## 2020-09-10 NOTE — PROGRESS NOTE ADULT - SUBJECTIVE AND OBJECTIVE BOX
Patient is a 60y old  Male who presents with a chief complaint of cardiac arrest (04 Sep 2020 17:31)      Interval Events:    REVIEW OF SYSTEMS:  [ ] Positive  [ ] All other systems negative  [ ] Unable to assess ROS because ________    Vital Signs Last 24 Hrs  T(C): 37.4 (09-10-20 @ 04:06), Max: 37.4 (20 @ 20:46)  T(F): 99.3 (09-10-20 @ 04:06), Max: 99.3 (20 @ 20:46)  HR: 100 (09-10-20 @ 05:32) (78 - 110)  BP: 142/82 (09-10-20 @ 04:06) (127/90 - 165/92)  RR: 20 (09-10-20 @ 04:51) (18 - 20)  SpO2: 100% (09-10-20 @ 05:32) (100% - 100%)    PHYSICAL EXAM:  HEENT:   [ ]Tracheostomy:  [ ]Pupils equal  [ ]No oral lesions  [ ]Abnormal    SKIN  [ ]No Rash  [ ] Abnormal  [ ] pressure    CARDIAC  [ ]Regular  [ ]Abnormal    PULMONARY  [ ]Bilateral Clear Breath Sounds  [ ]Normal Excursion  [ ]Abnormal    GI  [ ]PEG      [ ] +BS		              [ ]Soft, nondistended, nontender	  [ ]Abnormal    MUSCULOSKELETAL                                   [ ]Bedbound                 [ ]Abnormal    [ ]Ambulatory/OOB to chair                           EXTREMITIES                                         [ ]Normal  [ ]Edema                           NEUROLOGIC  [ ] Normal, non focal  [ ] Focal findings:    PSYCHIATRIC  [ ]Alert and appropriate  [ ] Sedated	 [ ]Agitated    :  Umaña: [ ] Yes, if yes: Date of Placement:                   [  ] No    LINES: Central Lines [ ] Yes, if yes: Date of Placement                                     [  ] No    HOSPITAL MEDICATIONS:  MEDICATIONS  (STANDING):  albuterol/ipratropium for Nebulization. 3 milliLiter(s) Nebulizer every 6 hours  amantadine Syrup 100 milliGRAM(s) Oral <User Schedule>  artificial  tears Solution 1 Drop(s) Both EYES every 6 hours  aspirin  chewable 81 milliGRAM(s) Enteral Tube daily  bisacodyl Suppository 10 milliGRAM(s) Rectal at bedtime  chlorhexidine 4% Liquid 1 Application(s) Topical <User Schedule>  dextrose 5%. 1000 milliLiter(s) (50 mL/Hr) IV Continuous <Continuous>  dextrose 50% Injectable 12.5 Gram(s) IV Push once  dextrose 50% Injectable 25 Gram(s) IV Push once  dextrose 50% Injectable 25 Gram(s) IV Push once  famotidine    Tablet 20 milliGRAM(s) Oral daily  fentaNYL   Patch  75 MICROgram(s)/Hr 1 Patch Transdermal every 72 hours  heparin   Injectable 5000 Unit(s) SubCutaneous every 8 hours  hydrALAZINE 100 milliGRAM(s) Oral three times a day  isosorbide   dinitrate Tablet (ISORDIL) 20 milliGRAM(s) Enteral Tube <User Schedule>  lactobacillus acidophilus 1 Tablet(s) Oral two times a day  lidocaine   Patch 1 Patch Transdermal daily  meropenem  IVPB 1000 milliGRAM(s) IV Intermittent every 12 hours  meropenem  IVPB      metoprolol tartrate 50 milliGRAM(s) Oral three times a day  polyethylene glycol 3350 17 Gram(s) Oral daily  QUEtiapine 50 milliGRAM(s) Oral <User Schedule>  sodium chloride 7% Inhalation 4 milliLiter(s) Inhalation every 12 hours  vancomycin    Solution 125 milliGRAM(s) Enteral Tube two times a day    MEDICATIONS  (PRN):  dextrose 40% Gel 15 Gram(s) Oral once PRN Blood Glucose LESS THAN 70 milliGRAM(s)/deciliter  glucagon  Injectable 1 milliGRAM(s) IntraMuscular once PRN Glucose LESS THAN 70 milligrams/deciliter  QUEtiapine 50 milliGRAM(s) Oral every 12 hours PRN agitiation      LABS:      Urinalysis Basic - ( 08 Sep 2020 15:20 )    Color: Light Yellow / Appearance: Clear / S.013 / pH: x  Gluc: x / Ketone: Negative  / Bili: Negative / Urobili: Negative   Blood: x / Protein: 30 mg/dL / Nitrite: Positive   Leuk Esterase: Large / RBC: 5 /hpf / WBC 5 /HPF   Sq Epi: x / Non Sq Epi: 1 /hpf / Bacteria: Few      Arterial Blood Gas:   @ 05:38  7.43/39/134/25/99/1.6  ABG lactate: --      CAPILLARY BLOOD GLUCOSE    MICROBIOLOGY:     RADIOLOGY:  [ ] Reviewed and interpreted by me Patient is a 60y old  Male who presents with a chief complaint of cardiac arrest (04 Sep 2020 17:31)      Interval Events: No fevers over night.    REVIEW OF SYSTEMS:  [X] Positive: +Phlegm with coughing,  right ankle pain  [ ] All other systems negative  [ ] Unable to assess ROS because ________    Vital Signs Last 24 Hrs  T(C): 37.4 (09-10-20 @ 04:06), Max: 37.4 (20 @ 20:46)  T(F): 99.3 (09-10-20 @ 04:06), Max: 99.3 (20 @ 20:46)  HR: 100 (09-10-20 @ 05:32) (78 - 110)  BP: 142/82 (09-10-20 @ 04:06) (127/90 - 165/92)  RR: 20 (09-10-20 @ 04:51) (18 - 20)  SpO2: 100% (09-10-20 @ 05:32) (100% - 100%)      PHYSICAL EXAM:  HEENT:   [X]Tracheostomy:  #8 Cuffed Shiley  [X] Right eye closed, unable to lift eyelid; Left PERRL  [ ]No oral lesions  [ ]Abnormal    SKIN  [X] No Rash  [ ] Abnormal  [ ] pressure    CARDIAC  [X] Regular  [ ] Abnormal    PULMONARY  [X] Bilateral Clear Breath Sounds  [ ] Normal Excursion  [ ] Abnormal    GI  [X] PEG      [X] +BS		              [X] Soft, nondistended, nontender	  [ ]Abnormal    MUSCULOSKELETAL                                   [ ] Bedbound                 [ ] Abnormal    [ X] OOB to chair with assistance    EXTREMITIES                                         [X] Normal  [ ]Edema                           NEUROLOGIC  [ ] Normal, non focal  [X] Focal findings: Plegia of RUE/RLE; Alert and responsive; able to follow commands and respond to questions appropriately.    PSYCHIATRIC  [X] Alert  [ ] Sedated	 [ ]Agitated    :  Umaña: [ ] Yes, if yes: Date of Placement:                   [X] No    LINES: Central Lines [ ] Yes, if yes: Date of Placement                                     [  ] No        HOSPITAL MEDICATIONS:  MEDICATIONS  (STANDING):  albuterol/ipratropium for Nebulization. 3 milliLiter(s) Nebulizer every 6 hours  amantadine Syrup 100 milliGRAM(s) Oral <User Schedule>  artificial  tears Solution 1 Drop(s) Both EYES every 6 hours  aspirin  chewable 81 milliGRAM(s) Enteral Tube daily  bisacodyl Suppository 10 milliGRAM(s) Rectal at bedtime  chlorhexidine 4% Liquid 1 Application(s) Topical <User Schedule>  dextrose 5%. 1000 milliLiter(s) (50 mL/Hr) IV Continuous <Continuous>  dextrose 50% Injectable 12.5 Gram(s) IV Push once  dextrose 50% Injectable 25 Gram(s) IV Push once  dextrose 50% Injectable 25 Gram(s) IV Push once  famotidine    Tablet 20 milliGRAM(s) Oral daily  fentaNYL   Patch  75 MICROgram(s)/Hr 1 Patch Transdermal every 72 hours  heparin   Injectable 5000 Unit(s) SubCutaneous every 8 hours  hydrALAZINE 100 milliGRAM(s) Oral three times a day  isosorbide   dinitrate Tablet (ISORDIL) 20 milliGRAM(s) Enteral Tube <User Schedule>  lactobacillus acidophilus 1 Tablet(s) Oral two times a day  lidocaine   Patch 1 Patch Transdermal daily  meropenem  IVPB 1000 milliGRAM(s) IV Intermittent every 12 hours  meropenem  IVPB      metoprolol tartrate 50 milliGRAM(s) Oral three times a day  polyethylene glycol 3350 17 Gram(s) Oral daily  QUEtiapine 50 milliGRAM(s) Oral <User Schedule>  sodium chloride 7% Inhalation 4 milliLiter(s) Inhalation every 12 hours  vancomycin    Solution 125 milliGRAM(s) Enteral Tube two times a day    MEDICATIONS  (PRN):  dextrose 40% Gel 15 Gram(s) Oral once PRN Blood Glucose LESS THAN 70 milliGRAM(s)/deciliter  glucagon  Injectable 1 milliGRAM(s) IntraMuscular once PRN Glucose LESS THAN 70 milligrams/deciliter  QUEtiapine 50 milliGRAM(s) Oral every 12 hours PRN agitiation      LABS:      Urinalysis Basic - ( 08 Sep 2020 15:20 )    Color: Light Yellow / Appearance: Clear / S.013 / pH: x  Gluc: x / Ketone: Negative  / Bili: Negative / Urobili: Negative   Blood: x / Protein: 30 mg/dL / Nitrite: Positive   Leuk Esterase: Large / RBC: 5 /hpf / WBC 5 /HPF   Sq Epi: x / Non Sq Epi: 1 /hpf / Bacteria: Few      Arterial Blood Gas:   @ 05:38  7.43/39/134/25/99/1.6  ABG lactate: --      CAPILLARY BLOOD GLUCOSE    MICROBIOLOGY:     RADIOLOGY:  [ ] Reviewed and interpreted by me

## 2020-09-10 NOTE — PROGRESS NOTE ADULT - PROBLEM SELECTOR PLAN 5
Completed Abx for Pseudomonas aeruginosa pneumonia/colonization:  - S/p prolonged course of cefepime until 8/14-8/20   - If develops increased secretions, may benefit from inhaled tobramycin  - Cdiff- Completed oral Vanco 8/29   - Repeat cbc, bld. cx x2 Completed Abx for Pseudomonas aeruginosa pneumonia/colonization:  - S/p prolonged course of cefepime until 8/14-8/20   - If develops increased secretions, may benefit from inhaled tobramycin  - C diff: Completed oral Vanco 8/29   - UTI: Febrile with UCx growing PSA. Meropenem 1GM Q12H and Vancomycin 125mg BID for Cdiff PPx

## 2020-09-11 DIAGNOSIS — R33.9 RETENTION OF URINE, UNSPECIFIED: ICD-10-CM

## 2020-09-11 LAB
ANION GAP SERPL CALC-SCNC: 16 MMOL/L — SIGNIFICANT CHANGE UP (ref 5–17)
BASOPHILS # BLD AUTO: 0.08 K/UL — SIGNIFICANT CHANGE UP (ref 0–0.2)
BASOPHILS NFR BLD AUTO: 0.5 % — SIGNIFICANT CHANGE UP (ref 0–2)
BUN SERPL-MCNC: 30 MG/DL — HIGH (ref 7–23)
CALCIUM SERPL-MCNC: 9.3 MG/DL — SIGNIFICANT CHANGE UP (ref 8.4–10.5)
CHLORIDE SERPL-SCNC: 100 MMOL/L — SIGNIFICANT CHANGE UP (ref 96–108)
CO2 SERPL-SCNC: 24 MMOL/L — SIGNIFICANT CHANGE UP (ref 22–31)
CREAT SERPL-MCNC: 1.66 MG/DL — HIGH (ref 0.5–1.3)
EOSINOPHIL # BLD AUTO: 0.15 K/UL — SIGNIFICANT CHANGE UP (ref 0–0.5)
EOSINOPHIL NFR BLD AUTO: 0.9 % — SIGNIFICANT CHANGE UP (ref 0–6)
GAS PNL BLDA: SIGNIFICANT CHANGE UP
GLUCOSE BLDC GLUCOMTR-MCNC: 143 MG/DL — HIGH (ref 70–99)
GLUCOSE SERPL-MCNC: 151 MG/DL — HIGH (ref 70–99)
HCT VFR BLD CALC: 29 % — LOW (ref 39–50)
HGB BLD-MCNC: 8.8 G/DL — LOW (ref 13–17)
IMM GRANULOCYTES NFR BLD AUTO: 0.8 % — SIGNIFICANT CHANGE UP (ref 0–1.5)
LYMPHOCYTES # BLD AUTO: 1.1 K/UL — SIGNIFICANT CHANGE UP (ref 1–3.3)
LYMPHOCYTES # BLD AUTO: 6.6 % — LOW (ref 13–44)
MAGNESIUM SERPL-MCNC: 1.7 MG/DL — SIGNIFICANT CHANGE UP (ref 1.6–2.6)
MCHC RBC-ENTMCNC: 26.6 PG — LOW (ref 27–34)
MCHC RBC-ENTMCNC: 30.3 GM/DL — LOW (ref 32–36)
MCV RBC AUTO: 87.6 FL — SIGNIFICANT CHANGE UP (ref 80–100)
MONOCYTES # BLD AUTO: 1.28 K/UL — HIGH (ref 0–0.9)
MONOCYTES NFR BLD AUTO: 7.7 % — SIGNIFICANT CHANGE UP (ref 2–14)
NEUTROPHILS # BLD AUTO: 13.81 K/UL — HIGH (ref 1.8–7.4)
NEUTROPHILS NFR BLD AUTO: 83.5 % — HIGH (ref 43–77)
NRBC # BLD: 0 /100 WBCS — SIGNIFICANT CHANGE UP (ref 0–0)
PHOSPHATE SERPL-MCNC: 3.6 MG/DL — SIGNIFICANT CHANGE UP (ref 2.5–4.5)
PLATELET # BLD AUTO: 241 K/UL — SIGNIFICANT CHANGE UP (ref 150–400)
POTASSIUM SERPL-MCNC: 4.1 MMOL/L — SIGNIFICANT CHANGE UP (ref 3.5–5.3)
POTASSIUM SERPL-SCNC: 4.1 MMOL/L — SIGNIFICANT CHANGE UP (ref 3.5–5.3)
RBC # BLD: 3.31 M/UL — LOW (ref 4.2–5.8)
RBC # FLD: 15.7 % — HIGH (ref 10.3–14.5)
SODIUM SERPL-SCNC: 140 MMOL/L — SIGNIFICANT CHANGE UP (ref 135–145)
WBC # BLD: 16.55 K/UL — HIGH (ref 3.8–10.5)
WBC # FLD AUTO: 16.55 K/UL — HIGH (ref 3.8–10.5)

## 2020-09-11 PROCEDURE — 93010 ELECTROCARDIOGRAM REPORT: CPT | Mod: 76

## 2020-09-11 PROCEDURE — 99233 SBSQ HOSP IP/OBS HIGH 50: CPT | Mod: GC

## 2020-09-11 RX ORDER — HYDROMORPHONE HYDROCHLORIDE 2 MG/ML
0.5 INJECTION INTRAMUSCULAR; INTRAVENOUS; SUBCUTANEOUS ONCE
Refills: 0 | Status: DISCONTINUED | OUTPATIENT
Start: 2020-09-11 | End: 2020-09-11

## 2020-09-11 RX ORDER — HALOPERIDOL DECANOATE 100 MG/ML
2 INJECTION INTRAMUSCULAR ONCE
Refills: 0 | Status: COMPLETED | OUTPATIENT
Start: 2020-09-11 | End: 2020-09-11

## 2020-09-11 RX ORDER — QUETIAPINE FUMARATE 200 MG/1
50 TABLET, FILM COATED ORAL EVERY 6 HOURS
Refills: 0 | Status: DISCONTINUED | OUTPATIENT
Start: 2020-09-11 | End: 2020-09-14

## 2020-09-11 RX ORDER — METOPROLOL TARTRATE 50 MG
75 TABLET ORAL EVERY 8 HOURS
Refills: 0 | Status: DISCONTINUED | OUTPATIENT
Start: 2020-09-11 | End: 2020-09-16

## 2020-09-11 RX ORDER — CEFEPIME 1 G/1
2000 INJECTION, POWDER, FOR SOLUTION INTRAMUSCULAR; INTRAVENOUS EVERY 12 HOURS
Refills: 0 | Status: COMPLETED | OUTPATIENT
Start: 2020-09-11 | End: 2020-09-16

## 2020-09-11 RX ORDER — QUETIAPINE FUMARATE 200 MG/1
25 TABLET, FILM COATED ORAL ONCE
Refills: 0 | Status: COMPLETED | OUTPATIENT
Start: 2020-09-11 | End: 2020-09-11

## 2020-09-11 RX ORDER — METOPROLOL TARTRATE 50 MG
25 TABLET ORAL ONCE
Refills: 0 | Status: COMPLETED | OUTPATIENT
Start: 2020-09-11 | End: 2020-09-11

## 2020-09-11 RX ORDER — ACETAMINOPHEN 500 MG
1000 TABLET ORAL ONCE
Refills: 0 | Status: DISCONTINUED | OUTPATIENT
Start: 2020-09-11 | End: 2020-09-11

## 2020-09-11 RX ORDER — DOXAZOSIN MESYLATE 4 MG
4 TABLET ORAL AT BEDTIME
Refills: 0 | Status: DISCONTINUED | OUTPATIENT
Start: 2020-09-11 | End: 2020-09-12

## 2020-09-11 RX ORDER — OLANZAPINE 15 MG/1
5 TABLET, FILM COATED ORAL ONCE
Refills: 0 | Status: COMPLETED | OUTPATIENT
Start: 2020-09-11 | End: 2020-09-11

## 2020-09-11 RX ADMIN — QUETIAPINE FUMARATE 25 MILLIGRAM(S): 200 TABLET, FILM COATED ORAL at 03:20

## 2020-09-11 RX ADMIN — HYDROMORPHONE HYDROCHLORIDE 0.5 MILLIGRAM(S): 2 INJECTION INTRAMUSCULAR; INTRAVENOUS; SUBCUTANEOUS at 06:48

## 2020-09-11 RX ADMIN — Medication 100 MILLIGRAM(S): at 10:10

## 2020-09-11 RX ADMIN — HYDROMORPHONE HYDROCHLORIDE 0.5 MILLIGRAM(S): 2 INJECTION INTRAMUSCULAR; INTRAVENOUS; SUBCUTANEOUS at 07:21

## 2020-09-11 RX ADMIN — HEPARIN SODIUM 5000 UNIT(S): 5000 INJECTION INTRAVENOUS; SUBCUTANEOUS at 05:04

## 2020-09-11 RX ADMIN — ISOSORBIDE DINITRATE 20 MILLIGRAM(S): 5 TABLET ORAL at 17:03

## 2020-09-11 RX ADMIN — Medication 1 DROP(S): at 17:03

## 2020-09-11 RX ADMIN — Medication 0.5 MILLIGRAM(S): at 06:44

## 2020-09-11 RX ADMIN — Medication 0.5 MILLIGRAM(S): at 21:58

## 2020-09-11 RX ADMIN — SODIUM CHLORIDE 3 MILLILITER(S): 9 INJECTION INTRAMUSCULAR; INTRAVENOUS; SUBCUTANEOUS at 17:34

## 2020-09-11 RX ADMIN — HEPARIN SODIUM 5000 UNIT(S): 5000 INJECTION INTRAVENOUS; SUBCUTANEOUS at 20:57

## 2020-09-11 RX ADMIN — Medication 25 MILLIGRAM(S): at 11:37

## 2020-09-11 RX ADMIN — Medication 1 DROP(S): at 11:37

## 2020-09-11 RX ADMIN — FENTANYL CITRATE 1 PATCH: 50 INJECTION INTRAVENOUS at 06:45

## 2020-09-11 RX ADMIN — POLYETHYLENE GLYCOL 3350 17 GRAM(S): 17 POWDER, FOR SOLUTION ORAL at 11:38

## 2020-09-11 RX ADMIN — LIDOCAINE 1 PATCH: 4 CREAM TOPICAL at 20:58

## 2020-09-11 RX ADMIN — Medication 4 MILLIGRAM(S): at 20:56

## 2020-09-11 RX ADMIN — Medication 125 MILLIGRAM(S): at 17:03

## 2020-09-11 RX ADMIN — HEPARIN SODIUM 5000 UNIT(S): 5000 INJECTION INTRAVENOUS; SUBCUTANEOUS at 13:43

## 2020-09-11 RX ADMIN — QUETIAPINE FUMARATE 50 MILLIGRAM(S): 200 TABLET, FILM COATED ORAL at 11:38

## 2020-09-11 RX ADMIN — LIDOCAINE 1 PATCH: 4 CREAM TOPICAL at 22:04

## 2020-09-11 RX ADMIN — SODIUM CHLORIDE 3 MILLILITER(S): 9 INJECTION INTRAMUSCULAR; INTRAVENOUS; SUBCUTANEOUS at 05:32

## 2020-09-11 RX ADMIN — FAMOTIDINE 20 MILLIGRAM(S): 10 INJECTION INTRAVENOUS at 11:37

## 2020-09-11 RX ADMIN — Medication 50 MILLIGRAM(S): at 05:02

## 2020-09-11 RX ADMIN — Medication 1 DROP(S): at 05:05

## 2020-09-11 RX ADMIN — Medication 125 MILLIGRAM(S): at 05:05

## 2020-09-11 RX ADMIN — CHLORHEXIDINE GLUCONATE 1 APPLICATION(S): 213 SOLUTION TOPICAL at 05:04

## 2020-09-11 RX ADMIN — Medication 3 MILLILITER(S): at 17:34

## 2020-09-11 RX ADMIN — Medication 10 MILLIGRAM(S): at 20:57

## 2020-09-11 RX ADMIN — Medication 3 MILLILITER(S): at 05:32

## 2020-09-11 RX ADMIN — Medication 100 MILLIGRAM(S): at 05:03

## 2020-09-11 RX ADMIN — Medication 75 MILLIGRAM(S): at 20:59

## 2020-09-11 RX ADMIN — HALOPERIDOL DECANOATE 2 MILLIGRAM(S): 100 INJECTION INTRAMUSCULAR at 05:31

## 2020-09-11 RX ADMIN — LIDOCAINE 1 PATCH: 4 CREAM TOPICAL at 10:06

## 2020-09-11 RX ADMIN — QUETIAPINE FUMARATE 50 MILLIGRAM(S): 200 TABLET, FILM COATED ORAL at 17:03

## 2020-09-11 RX ADMIN — Medication 1 TABLET(S): at 17:03

## 2020-09-11 RX ADMIN — LIDOCAINE 1 PATCH: 4 CREAM TOPICAL at 06:44

## 2020-09-11 RX ADMIN — Medication 81 MILLIGRAM(S): at 11:37

## 2020-09-11 RX ADMIN — CEFEPIME 100 MILLIGRAM(S): 1 INJECTION, POWDER, FOR SOLUTION INTRAMUSCULAR; INTRAVENOUS at 17:03

## 2020-09-11 RX ADMIN — ISOSORBIDE DINITRATE 20 MILLIGRAM(S): 5 TABLET ORAL at 10:19

## 2020-09-11 RX ADMIN — Medication 100 MILLIGRAM(S): at 13:47

## 2020-09-11 RX ADMIN — QUETIAPINE FUMARATE 50 MILLIGRAM(S): 200 TABLET, FILM COATED ORAL at 05:04

## 2020-09-11 RX ADMIN — MEROPENEM 100 MILLIGRAM(S): 1 INJECTION INTRAVENOUS at 06:44

## 2020-09-11 RX ADMIN — SODIUM CHLORIDE 3 MILLILITER(S): 9 INJECTION INTRAMUSCULAR; INTRAVENOUS; SUBCUTANEOUS at 11:51

## 2020-09-11 RX ADMIN — Medication 100 MILLIGRAM(S): at 20:58

## 2020-09-11 RX ADMIN — Medication 2 MILLIGRAM(S): at 10:10

## 2020-09-11 RX ADMIN — Medication 3 MILLILITER(S): at 11:50

## 2020-09-11 RX ADMIN — Medication 100 MILLIGRAM(S): at 11:44

## 2020-09-11 RX ADMIN — OLANZAPINE 5 MILLIGRAM(S): 15 TABLET, FILM COATED ORAL at 05:12

## 2020-09-11 RX ADMIN — Medication 50 MILLIGRAM(S): at 13:43

## 2020-09-11 RX ADMIN — LIDOCAINE 1 PATCH: 4 CREAM TOPICAL at 11:38

## 2020-09-11 RX ADMIN — Medication 1 TABLET(S): at 05:04

## 2020-09-11 NOTE — PROGRESS NOTE ADULT - PROBLEM SELECTOR PLAN 5
Completed Abx for Pseudomonas aeruginosa pneumonia/colonization:  - S/p prolonged course of cefepime until 8/14-8/20   - If develops increased secretions, may benefit from inhaled tobramycin  - C diff: Completed oral Vanco 8/29   - UTI: Febrile with UCx growing PSA. Meropenem 1GM Q12H and Vancomycin 125mg BID for Cdiff PPx Completed Abx for Pseudomonas aeruginosa pneumonia/colonization:  - S/p prolonged course of cefepime until 8/14-8/20   - If develops increased secretions, may benefit from inhaled tobramycin  - C diff: Completed oral Vanco 8/29   - UTI: Febrile with UCx growing PSA. Meropenem 1GM Q12H and Vancomycin 125mg BID for Cdiff PPx started 9/8 -->, Meropenem switched to Cefepime on 9/11  - ID appreciated.

## 2020-09-11 NOTE — PROGRESS NOTE ADULT - PROBLEM SELECTOR PLAN 8
- Trend Creatinine   - Monitor I/O's and electrolytes   - Appreciated renal input - Improved following bladder decompression fro Urinary retention

## 2020-09-11 NOTE — PROVIDER CONTACT NOTE (OTHER) - ACTION/TREATMENT ORDERED:
PA aware. PA assessed pt at bedside. Ordered for lopressor 25mg via PEG to be given. EKG complete. Will monitor pt closely.

## 2020-09-11 NOTE — PROGRESS NOTE ADULT - SUBJECTIVE AND OBJECTIVE BOX
Subjective: Patient seen and examined. No new events except as noted.   s/p RRT for agitation     REVIEW OF SYSTEMS:  Unable to obtain       MEDICATIONS:  MEDICATIONS  (STANDING):  albuterol/ipratropium for Nebulization. 3 milliLiter(s) Nebulizer every 6 hours  amantadine Syrup 100 milliGRAM(s) Oral <User Schedule>  artificial  tears Solution 1 Drop(s) Both EYES every 6 hours  aspirin  chewable 81 milliGRAM(s) Enteral Tube daily  bisacodyl Suppository 10 milliGRAM(s) Rectal at bedtime  cefepime   IVPB 2000 milliGRAM(s) IV Intermittent every 12 hours  chlorhexidine 4% Liquid 1 Application(s) Topical <User Schedule>  dextrose 5%. 1000 milliLiter(s) (50 mL/Hr) IV Continuous <Continuous>  dextrose 50% Injectable 12.5 Gram(s) IV Push once  dextrose 50% Injectable 25 Gram(s) IV Push once  dextrose 50% Injectable 25 Gram(s) IV Push once  doxazosin 2 milliGRAM(s) Oral at bedtime  famotidine    Tablet 20 milliGRAM(s) Oral daily  fentaNYL   Patch  75 MICROgram(s)/Hr 1 Patch Transdermal every 72 hours  heparin   Injectable 5000 Unit(s) SubCutaneous every 8 hours  hydrALAZINE 100 milliGRAM(s) Oral three times a day  isosorbide   dinitrate Tablet (ISORDIL) 20 milliGRAM(s) Enteral Tube <User Schedule>  lactobacillus acidophilus 1 Tablet(s) Oral two times a day  lidocaine   Patch 1 Patch Transdermal daily  lidocaine   Patch 1 Patch Transdermal every 24 hours  metoprolol tartrate 50 milliGRAM(s) Oral three times a day  QUEtiapine 50 milliGRAM(s) Oral every 6 hours  sodium chloride 3%  Inhalation 3 milliLiter(s) Inhalation every 6 hours  vancomycin    Solution 125 milliGRAM(s) Enteral Tube two times a day      PHYSICAL EXAM:  T(C): 37.1 (09-11-20 @ 04:05), Max: 37.4 (09-10-20 @ 21:50)  HR: 85 (09-11-20 @ 12:06) (68 - 130)  BP: 146/86 (09-11-20 @ 10:00) (144/93 - 176/84)  RR: 18 (09-11-20 @ 12:06) (18 - 21)  SpO2: 100% (09-11-20 @ 12:06) (98% - 100%)  Wt(kg): --  I&O's Summary    10 Sep 2020 07:01  -  11 Sep 2020 07:00  --------------------------------------------------------  IN: 2405 mL / OUT: 2950 mL / NET: -545 mL    11 Sep 2020 07:01  -  11 Sep 2020 12:12  --------------------------------------------------------  IN: 405 mL / OUT: 975 mL / NET: -570 mL          Appearance: sleepy , +trach   HEENT:   Dry  oral mucosa,  Lymphatic: No lymphadenopathy  Cardiovascular: Normal S1 S2, No JVD, No murmurs, No edema  Respiratory: Ventilated   Psychiatry: A & O x 0  Gastrointestinal:  Soft, Non-tender, +PEG   Skin: No rashes, No ecchymoses, No cyanosis	  Mental status- No acute distress, EO to stim  -CN- Pupils R 4mm NR, L 2mm sluggish, EOMI, tongue midline, face symmetric  +cough/gag  C briskly, two fingers/thumbs up on RUE, distally AG, wiggles toes on RLE    LABS:    CARDIAC MARKERS:                                8.8    16.55 )-----------( 241      ( 11 Sep 2020 06:53 )             29.0     09-11    140  |  100  |  30<H>  ----------------------------<  151<H>  4.1   |  24  |  1.66<H>    Ca    9.3      11 Sep 2020 06:53  Phos  3.6     09-11  Mg     1.7     09-11      proBNP:   Lipid Profile:   HgA1c:   TSH:             TELEMETRY: 	    ECG:  	  RADIOLOGY: < from: CT Abdomen and Pelvis w/ Oral Cont (09.08.20 @ 17:33) >    EXAM:  CT ABDOMEN AND PELVIS OC                          EXAM:  CT CHEST                            PROCEDURE DATE:  09/08/2020            INTERPRETATION:  CLINICAL INFORMATION: Fever and leukocytosis. Status post cardiac arrest, SAH, respiratory failure, trach/PEG, and C. difficile. Question infectious source.    COMPARISON: CT chest dated 8/11/2020. X-ray abdomen dated 9/3/2020.    PROCEDURE:  CT of the Chest, Abdomen and Pelvis was performed without intravenous contrast.  Intravenous contrast: None.  Oral contrast: Positive contrast was administered.  Sagittal and coronal reformats were performed.    FINDINGS:  CHEST:  LUNGS AND LARGE AIRWAYS: Tracheostomy cannula in appropriate position. No pulmonary nodules. Bibasilar atelectasis.  PLEURA: Trace right pleural effusion.  VESSELS: Aortic calcifications.  HEART: Cardiomegaly. No pericardial effusion.  MEDIASTINUM AND RAUL: No lymphadenopathy.  CHEST WALL AND LOWER NECK: Within normal limits.    ABDOMEN AND PELVIS:  LIVER: Within normal limits.  BILE DUCTS: Normal caliber.  GALLBLADDER: Within normal limits.  SPLEEN: Within normal limits.  PANCREAS: Within normal limits.  ADRENALS: Within normal limits.  KIDNEYS/URETERS: Within normal limits.    BLADDER: Air within the bladder likely secondary to prior instrumentation. Thickening of the bladder wall consistent with chronic changes of bladder outlet obstruction.  REPRODUCTIVE ORGANS: Prostate is enlarged.    BOWEL: Colonic diverticulosis. PEG tube in stomach. No evidence of colitis. No bowel obstruction. Appendix is normal.  PERITONEUM: No ascites.  VESSELS: Atherosclerotic changes.  RETROPERITONEUM/LYMPH NODES: No lymphadenopathy.  ABDOMINAL WALL: Lipoma in left lateral thigh with internal calcifications. Anterior abdominalwall injection sites. Nonspecific infiltration of the left anterior thigh soft tissues.  BONES: Degenerative changes.    IMPRESSION:  No acute abnormalities in the chest, abdomen, or pelvis.    Trace right pleural effusion.    Lipoma and left lateralthigh with internal calcifications.              YANDEL DIAS M.D., RADIOLOGY RESIDENT  This document has been electronically signed.  LILIA DIAZ M.D., ATTENDING RADIOLOGIST  This document has been electronically signed. Sep  9 2020 11:42AM                < end of copied text >    DIAGNOSTIC TESTING:  [ ] Echocardiogram:  [ ]  Catheterization:  [ ] Stress Test:    OTHER:

## 2020-09-11 NOTE — PROVIDER CONTACT NOTE (OTHER) - ACTION/TREATMENT ORDERED:
PA aware. PA assessed pt at bedside w/ NP. PA inserted new IV in foot as pt is difficult stick. Ativan administered per order. Will monitor pt closely. PA aware. PA assessed pt at bedside w/ NP. PA inserted new IV in foot as pt is difficult stick. Ativan IVP administered per order. R wrist restraints ordered; will apply & monitor pt closely.

## 2020-09-11 NOTE — PROGRESS NOTE ADULT - ASSESSMENT
59 yo male admitted early August with out of hospital arrest.  ER evaluation found Rt sided SAH, cerebral angio x 2 was negative.  Hypoxic encephalopathy following event.  S/P cefepime 8/14-8/20 for pneumonia.  S/P treatment for C Diff, course completed 8/29.  S/P trach 8/24 and Peg 8/26.  Fever to 102.7 on 9/8 led to meropenem and po vanco 125 BID being started.  WBC of 30,000 decreased to 15,000  CT of C/A/P without clear source of infection.  Pseudomonas in urine (pansensitive), pseudomonas/providencia in sputum,and sterile blood.  No signs of recurrent C Diff  Possible  source to sepsis , he requires q6 strait cath for large PVR  Suggest:  1.Continue meropenem, enteral cipro may be an option for  coverage  2. Continue po vanco, 125 BID for C Diff prophylaxis  3.? Behavior/Psych f/u for encephalopathy-delerium  4.Consider f/u head CT

## 2020-09-11 NOTE — CHART NOTE - NSCHARTNOTEFT_GEN_A_CORE
Case reviewed with pharmacy  Will substitute cefepime for meropenem, primarily targeting pseudomonas.  Would like to spare carbapenem use, cipro not a good option due to QT prolongation.  Cefepime may be less likely to induce pseudomonas resistance than meropenem.

## 2020-09-11 NOTE — PROVIDER CONTACT NOTE (OTHER) - SITUATION
Pt agitated; pulling at TC & PEG. Throwing sheets on to floor. attempting to climb over rail. Unable to reorient. PT stated "shut up" & clawed nails into RN. Situation s/p RRT in AM with night shift.

## 2020-09-11 NOTE — PROGRESS NOTE ADULT - SUBJECTIVE AND OBJECTIVE BOX
Patient is a 60y old  Male who presents with a chief complaint of Cardiac arrest (11 Sep 2020 07:59)      Interval Events:    REVIEW OF SYSTEMS:  [ ] Positive  [ ] All other systems negative  [ ] Unable to assess ROS because ________    Vital Signs Last 24 Hrs  T(C): 37.1 (09-11-20 @ 04:05), Max: 37.4 (09-10-20 @ 21:50)  T(F): 98.8 (09-11-20 @ 04:05), Max: 99.3 (09-10-20 @ 21:50)  HR: 85 (09-11-20 @ 12:06) (68 - 130)  BP: 146/86 (09-11-20 @ 10:00) (144/93 - 176/84)  RR: 18 (09-11-20 @ 12:06) (18 - 21)  SpO2: 100% (09-11-20 @ 12:06) (98% - 100%)    PHYSICAL EXAM:  HEENT:   [ ]Tracheostomy:  [ ]Pupils equal  [ ]No oral lesions  [ ]Abnormal    SKIN  [ ]No Rash  [ ] Abnormal  [ ] pressure    CARDIAC  [ ]Regular  [ ]Abnormal    PULMONARY  [ ]Bilateral Clear Breath Sounds  [ ]Normal Excursion  [ ]Abnormal    GI  [ ]PEG      [ ] +BS		              [ ]Soft, nondistended, nontender	  [ ]Abnormal    MUSCULOSKELETAL                                   [ ]Bedbound                 [ ]Abnormal    [ ]Ambulatory/OOB to chair                           EXTREMITIES                                         [ ]Normal  [ ]Edema                           NEUROLOGIC  [ ] Normal, non focal  [ ] Focal findings:    PSYCHIATRIC  [ ]Alert and appropriate  [ ] Sedated	 [ ]Agitated    :  Umaña: [ ] Yes, if yes: Date of Placement:                   [  ] No    LINES: Central Lines [ ] Yes, if yes: Date of Placement                                     [  ] No    HOSPITAL MEDICATIONS:  MEDICATIONS  (STANDING):  albuterol/ipratropium for Nebulization. 3 milliLiter(s) Nebulizer every 6 hours  amantadine Syrup 100 milliGRAM(s) Oral <User Schedule>  artificial  tears Solution 1 Drop(s) Both EYES every 6 hours  aspirin  chewable 81 milliGRAM(s) Enteral Tube daily  bisacodyl Suppository 10 milliGRAM(s) Rectal at bedtime  cefepime   IVPB 2000 milliGRAM(s) IV Intermittent every 12 hours  chlorhexidine 4% Liquid 1 Application(s) Topical <User Schedule>  dextrose 5%. 1000 milliLiter(s) (50 mL/Hr) IV Continuous <Continuous>  dextrose 50% Injectable 12.5 Gram(s) IV Push once  dextrose 50% Injectable 25 Gram(s) IV Push once  dextrose 50% Injectable 25 Gram(s) IV Push once  doxazosin 2 milliGRAM(s) Oral at bedtime  famotidine    Tablet 20 milliGRAM(s) Oral daily  fentaNYL   Patch  75 MICROgram(s)/Hr 1 Patch Transdermal every 72 hours  heparin   Injectable 5000 Unit(s) SubCutaneous every 8 hours  hydrALAZINE 100 milliGRAM(s) Oral three times a day  isosorbide   dinitrate Tablet (ISORDIL) 20 milliGRAM(s) Enteral Tube <User Schedule>  lactobacillus acidophilus 1 Tablet(s) Oral two times a day  lidocaine   Patch 1 Patch Transdermal daily  lidocaine   Patch 1 Patch Transdermal every 24 hours  metoprolol tartrate 50 milliGRAM(s) Oral three times a day  QUEtiapine 50 milliGRAM(s) Oral every 6 hours  sodium chloride 3%  Inhalation 3 milliLiter(s) Inhalation every 6 hours  vancomycin    Solution 125 milliGRAM(s) Enteral Tube two times a day    MEDICATIONS  (PRN):  dextrose 40% Gel 15 Gram(s) Oral once PRN Blood Glucose LESS THAN 70 milliGRAM(s)/deciliter  glucagon  Injectable 1 milliGRAM(s) IntraMuscular once PRN Glucose LESS THAN 70 milligrams/deciliter  polyethylene glycol 3350 17 Gram(s) Oral daily PRN Constipation      LABS:                        8.8    16.55 )-----------( 241      ( 11 Sep 2020 06:53 )             29.0     09-11    140  |  100  |  30<H>  ----------------------------<  151<H>  4.1   |  24  |  1.66<H>    Ca    9.3      11 Sep 2020 06:53  Phos  3.6     09-11  Mg     1.7     09-11          Arterial Blood Gas:  09-11 @ 08:14  7.50/35/108/27/98/3.9  ABG lactate: --      CAPILLARY BLOOD GLUCOSE    MICROBIOLOGY:     RADIOLOGY:  [ ] Reviewed and interpreted by me Patient is a 60y old  Male who presents with a chief complaint of Cardiac arrest (11 Sep 2020 07:59)      Interval Events: Patient very agitated this morning, RRT called as was not responding to sedative medication.    REVIEW OF SYSTEMS:  [ ] Positive  [ ] All other systems negative  [X] Unable to assess ROS because ____Delirium    Vital Signs Last 24 Hrs  T(C): 37.1 (09-11-20 @ 04:05), Max: 37.4 (09-10-20 @ 21:50)  T(F): 98.8 (09-11-20 @ 04:05), Max: 99.3 (09-10-20 @ 21:50)  HR: 85 (09-11-20 @ 12:06) (68 - 130)  BP: 146/86 (09-11-20 @ 10:00) (144/93 - 176/84)  RR: 18 (09-11-20 @ 12:06) (18 - 21)  SpO2: 100% (09-11-20 @ 12:06) (98% - 100%)        PHYSICAL EXAM:  HEENT:   [X]Tracheostomy:  #8 Cuffed Shiley  [X] Right eye closed, unable to lift eyelid; Left PERRL  [ ]No oral lesions  [ ]Abnormal    SKIN  [X] No Rash  [ ] Abnormal  [ ] pressure    CARDIAC  [X] Regular  [ ] Abnormal    PULMONARY  [X] Bilateral Clear Breath Sounds  [ ] Normal Excursion  [ ] Abnormal    GI  [X] PEG      [X] +BS		              [X] Soft, nondistended, nontender	  [ ]Abnormal    MUSCULOSKELETAL                                   [ ] Bedbound                 [ ] Abnormal    [ X] OOB to chair with assistance    EXTREMITIES                                         [X] Normal  [ ]Edema                           NEUROLOGIC  [ ] Normal, non focal  [X] Focal findings: Plegia of RUE/RLE; Alert and responsive; able to follow commands and respond to questions appropriately.    PSYCHIATRIC  [X] Alert  [ ] Sedated	 [ ]Agitated    :  Umaña: [ ] Yes, if yes: Date of Placement:                   [X] No    LINES: Central Lines [ ] Yes, if yes: Date of Placement                                     [  ] No    HOSPITAL MEDICATIONS:  MEDICATIONS  (STANDING):  albuterol/ipratropium for Nebulization. 3 milliLiter(s) Nebulizer every 6 hours  amantadine Syrup 100 milliGRAM(s) Oral <User Schedule>  artificial  tears Solution 1 Drop(s) Both EYES every 6 hours  aspirin  chewable 81 milliGRAM(s) Enteral Tube daily  bisacodyl Suppository 10 milliGRAM(s) Rectal at bedtime  cefepime   IVPB 2000 milliGRAM(s) IV Intermittent every 12 hours  chlorhexidine 4% Liquid 1 Application(s) Topical <User Schedule>  dextrose 5%. 1000 milliLiter(s) (50 mL/Hr) IV Continuous <Continuous>  dextrose 50% Injectable 12.5 Gram(s) IV Push once  dextrose 50% Injectable 25 Gram(s) IV Push once  dextrose 50% Injectable 25 Gram(s) IV Push once  doxazosin 2 milliGRAM(s) Oral at bedtime  famotidine    Tablet 20 milliGRAM(s) Oral daily  fentaNYL   Patch  75 MICROgram(s)/Hr 1 Patch Transdermal every 72 hours  heparin   Injectable 5000 Unit(s) SubCutaneous every 8 hours  hydrALAZINE 100 milliGRAM(s) Oral three times a day  isosorbide   dinitrate Tablet (ISORDIL) 20 milliGRAM(s) Enteral Tube <User Schedule>  lactobacillus acidophilus 1 Tablet(s) Oral two times a day  lidocaine   Patch 1 Patch Transdermal daily  lidocaine   Patch 1 Patch Transdermal every 24 hours  metoprolol tartrate 50 milliGRAM(s) Oral three times a day  QUEtiapine 50 milliGRAM(s) Oral every 6 hours  sodium chloride 3%  Inhalation 3 milliLiter(s) Inhalation every 6 hours  vancomycin    Solution 125 milliGRAM(s) Enteral Tube two times a day    MEDICATIONS  (PRN):  dextrose 40% Gel 15 Gram(s) Oral once PRN Blood Glucose LESS THAN 70 milliGRAM(s)/deciliter  glucagon  Injectable 1 milliGRAM(s) IntraMuscular once PRN Glucose LESS THAN 70 milligrams/deciliter  polyethylene glycol 3350 17 Gram(s) Oral daily PRN Constipation      LABS:                        8.8    16.55 )-----------( 241      ( 11 Sep 2020 06:53 )             29.0     09-11    140  |  100  |  30<H>  ----------------------------<  151<H>  4.1   |  24  |  1.66<H>    Ca    9.3      11 Sep 2020 06:53  Phos  3.6     09-11  Mg     1.7     09-11          Arterial Blood Gas:  09-11 @ 08:14  7.50/35/108/27/98/3.9  ABG lactate: --      CAPILLARY BLOOD GLUCOSE    MICROBIOLOGY:     RADIOLOGY:  [ ] Reviewed and interpreted by me Patient is a 60y old  Male who presents with a chief complaint of Cardiac arrest (11 Sep 2020 07:59)      Interval Events: Patient very agitated this morning, RRT called as was not responding to sedative medication.    REVIEW OF SYSTEMS:  [ ] Positive  [ ] All other systems negative  [X] Unable to assess ROS because ____Delirium    Vital Signs Last 24 Hrs  T(C): 37.1 (09-11-20 @ 04:05), Max: 37.4 (09-10-20 @ 21:50)  T(F): 98.8 (09-11-20 @ 04:05), Max: 99.3 (09-10-20 @ 21:50)  HR: 85 (09-11-20 @ 12:06) (68 - 130)  BP: 146/86 (09-11-20 @ 10:00) (144/93 - 176/84)  RR: 18 (09-11-20 @ 12:06) (18 - 21)  SpO2: 100% (09-11-20 @ 12:06) (98% - 100%)        PHYSICAL EXAM:  HEENT:   [X]Tracheostomy:  #8 Cuffless Shiley  [X] Right eye closed, unable to lift eyelid; Left PERRL  [ ]No oral lesions  [ ]Abnormal    SKIN  [X] No Rash  [ ] Abnormal  [ ] pressure    CARDIAC  [X] Regular  [ ] Abnormal    PULMONARY  [X] Bilateral Clear Breath Sounds  [ ] Normal Excursion  [ ] Abnormal    GI  [X] PEG      [X] +BS		              [X] Soft, nondistended, nontender	  [ ]Abnormal    MUSCULOSKELETAL                                   [ ] Bedbound                 [ ] Abnormal    [ X] OOB to chair with assistance    EXTREMITIES                                         [X] Normal  [ ]Edema                           NEUROLOGIC  [ ] Normal, non focal  [X] Focal findings: Plegia of RUE/RLE; Alert and responsive; able to follow commands and respond to questions appropriately.    PSYCHIATRIC  [X] Alert  [ ] Sedated	 [ ]Agitated    :  Umaña: [ ] Yes, if yes: Date of Placement:                   [X] No    LINES: Central Lines [ ] Yes, if yes: Date of Placement                                     [  ] No    HOSPITAL MEDICATIONS:  MEDICATIONS  (STANDING):  albuterol/ipratropium for Nebulization. 3 milliLiter(s) Nebulizer every 6 hours  amantadine Syrup 100 milliGRAM(s) Oral <User Schedule>  artificial  tears Solution 1 Drop(s) Both EYES every 6 hours  aspirin  chewable 81 milliGRAM(s) Enteral Tube daily  bisacodyl Suppository 10 milliGRAM(s) Rectal at bedtime  cefepime   IVPB 2000 milliGRAM(s) IV Intermittent every 12 hours  chlorhexidine 4% Liquid 1 Application(s) Topical <User Schedule>  dextrose 5%. 1000 milliLiter(s) (50 mL/Hr) IV Continuous <Continuous>  dextrose 50% Injectable 12.5 Gram(s) IV Push once  dextrose 50% Injectable 25 Gram(s) IV Push once  dextrose 50% Injectable 25 Gram(s) IV Push once  doxazosin 2 milliGRAM(s) Oral at bedtime  famotidine    Tablet 20 milliGRAM(s) Oral daily  fentaNYL   Patch  75 MICROgram(s)/Hr 1 Patch Transdermal every 72 hours  heparin   Injectable 5000 Unit(s) SubCutaneous every 8 hours  hydrALAZINE 100 milliGRAM(s) Oral three times a day  isosorbide   dinitrate Tablet (ISORDIL) 20 milliGRAM(s) Enteral Tube <User Schedule>  lactobacillus acidophilus 1 Tablet(s) Oral two times a day  lidocaine   Patch 1 Patch Transdermal daily  lidocaine   Patch 1 Patch Transdermal every 24 hours  metoprolol tartrate 50 milliGRAM(s) Oral three times a day  QUEtiapine 50 milliGRAM(s) Oral every 6 hours  sodium chloride 3%  Inhalation 3 milliLiter(s) Inhalation every 6 hours  vancomycin    Solution 125 milliGRAM(s) Enteral Tube two times a day    MEDICATIONS  (PRN):  dextrose 40% Gel 15 Gram(s) Oral once PRN Blood Glucose LESS THAN 70 milliGRAM(s)/deciliter  glucagon  Injectable 1 milliGRAM(s) IntraMuscular once PRN Glucose LESS THAN 70 milligrams/deciliter  polyethylene glycol 3350 17 Gram(s) Oral daily PRN Constipation      LABS:                        8.8    16.55 )-----------( 241      ( 11 Sep 2020 06:53 )             29.0     09-11    140  |  100  |  30<H>  ----------------------------<  151<H>  4.1   |  24  |  1.66<H>    Ca    9.3      11 Sep 2020 06:53  Phos  3.6     09-11  Mg     1.7     09-11          Arterial Blood Gas:  09-11 @ 08:14  7.50/35/108/27/98/3.9  ABG lactate: --      CAPILLARY BLOOD GLUCOSE    MICROBIOLOGY:     RADIOLOGY:  [ ] Reviewed and interpreted by me

## 2020-09-11 NOTE — PROGRESS NOTE ADULT - PROBLEM SELECTOR PLAN 9
- DVT PPx: Heparin SC Q12H  - GI PPx: Pepcid 20mg QD  - PT and OT evaluation - Intermittent straight catherization required, currently Q6H. However patient with large volumes and may need more frequent vs scott.  - Cardura started 9/10, increased to

## 2020-09-11 NOTE — CHART NOTE - TREATMENT: THE FOLLOWING DIET HAS BEEN RECOMMENDED
Diet, NPO with Tube Feed:   Tube Feeding Modality: Nasogastric  Vital AF (VITALAFRTH)  Total Volume for 24 Hours (mL): 1680  Continuous  Starting Tube Feed Rate {mL per Hour}: 10  Increase Tube Feed Rate by (mL): 10     Every 4 hours  Until Goal Tube Feed Rate (mL per Hour): 70  Tube Feed Duration (in Hours): 24  Tube Feed Start Time: 17:00  Supplement Feeding Modality:  Nasogastric  Probiotic Yogurt/Smoothie Cans or Servings Per Day:  2       Frequency:  Daily (08-21-20 @ 12:31) [active]

## 2020-09-11 NOTE — PROGRESS NOTE ADULT - PROBLEM SELECTOR PLAN 3
- Continue fentanyl patch, clonazepam, and quetiapine, wean down opioids - Continue fentanyl patch 75mcg  - Seroquel resumed at 50mg  Q6H for agitation control

## 2020-09-11 NOTE — PROGRESS NOTE ADULT - PROBLEM SELECTOR PLAN 6
A-Fib with RVR:  - DC'd Amiodarone (8/31)  - continue metoprolol to 50 mg q8h  - continue Hydralazine/imdur  - Hold off on therapeutic a/c given SAH  - SBP goal 100- 160  -TTE: Global LV dysfunction. EF 41%, Severe concentric LVH, flattening of interventricular septum.   -CTA chest - negative PE A-Fib with RVR:  - DC'd Amiodarone (8/31)  - - Hold off on therapeutic a/c given SAH  - SBP goal 100- 160  -TTE: Global LV dysfunction. EF 41%, Severe concentric LVH, flattening of interventricular septum.   - Metoprolol increased to 75mg Q8H fo better rate control

## 2020-09-11 NOTE — CHART NOTE - NSCHARTNOTEFT_GEN_A_CORE
Nutrition Chart Note.  Pt seen for: Nutrition follow-up on RCU    Source: EMR, Team; Pt trached/unable to speak    Chart reviewed, events noted. Pt is a 61 yo M with PMH: [place] on coumadin s/p cardiac arrest at work. Downtime 25 minutes prior to ROSC. 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 Was intubated at OSH, transferred to [facility] for further care. Course c/b GIB, put on Protonix drip. C.Diff positive; continues on antibiotics as ordered. Pt s/p angio 8/20; negative. S/P tracheostomy 8/24. C/b agitation, requiring propofol and fentanyl gtts (both discontinued). S/p PEG 8/26. Pt currently on TC 30% around the clock since 8/30, trach downsized; trach dislodged 9/5, ENT changed to #8 cuffless Shiley. RRT 9/11 (AM) for agitation.    Diet : NPO with Tube Feeds via PEG  Enteral /Parenteral Nutrition: Vital AF goal rate 70mL/hr x 24hrs. Provides total volume 1680mL formula, 2016kcals, 126g protein, 1363mL free H2O  - Continues to receive DanActive twice daily.   - RD observed Vital AF hung and running at goal rate of 70mL/hr.    Per flowsheets, Pt has received >85% EN provisions in the last 5 days.    Nutrition Events:  - Receiving 150mL free fluid q8hrs  - Rectal tube discontinued 8/31  - Was c Cdiff, Completed oral Vanco 8/29   - S/p PEG 8/26  - Receiving lactobacillus acidophilus   - Propofol discontinued 8/13    GI: c fecal incontinence, last BM noted 9/11 - bowel regimen ordered (dulcolax, miralax - PRN). Pt stil remains with noted abdominal distention  RN notes pt tolerating EN regimen well, no N+V, however pt with abdominal distention, GI following.      Current Weight: Will continue to monitor/trend weight status.  Weight (kg): 110.2 (08-12), 110.1 (08-26), 106.2 (09-02), 106.3 (09-09)    Pertinent Medications: MEDICATIONS  (STANDING):  albuterol/ipratropium for Nebulization. 3 milliLiter(s) Nebulizer every 6 hours  amantadine Syrup 100 milliGRAM(s) Oral <User Schedule>  artificial  tears Solution 1 Drop(s) Both EYES every 6 hours  aspirin  chewable 81 milliGRAM(s) Enteral Tube daily  bisacodyl Suppository 10 milliGRAM(s) Rectal at bedtime  cefepime   IVPB 2000 milliGRAM(s) IV Intermittent every 12 hours  chlorhexidine 4% Liquid 1 Application(s) Topical <User Schedule>  dextrose 5%. 1000 milliLiter(s) (50 mL/Hr) IV Continuous <Continuous>  dextrose 50% Injectable 12.5 Gram(s) IV Push once  dextrose 50% Injectable 25 Gram(s) IV Push once  dextrose 50% Injectable 25 Gram(s) IV Push once  doxazosin 2 milliGRAM(s) Oral at bedtime  famotidine    Tablet 20 milliGRAM(s) Oral daily  fentaNYL   Patch  75 MICROgram(s)/Hr 1 Patch Transdermal every 72 hours  heparin   Injectable 5000 Unit(s) SubCutaneous every 8 hours  hydrALAZINE 100 milliGRAM(s) Oral three times a day  isosorbide   dinitrate Tablet (ISORDIL) 20 milliGRAM(s) Enteral Tube <User Schedule>  lactobacillus acidophilus 1 Tablet(s) Oral two times a day  lidocaine   Patch 1 Patch Transdermal daily  lidocaine   Patch 1 Patch Transdermal every 24 hours  metoprolol tartrate 50 milliGRAM(s) Oral three times a day  QUEtiapine 50 milliGRAM(s) Oral every 6 hours  sodium chloride 3%  Inhalation 3 milliLiter(s) Inhalation every 6 hours  vancomycin    Solution 125 milliGRAM(s) Enteral Tube two times a day    MEDICATIONS  (PRN):  dextrose 40% Gel 15 Gram(s) Oral once PRN Blood Glucose LESS THAN 70 milliGRAM(s)/deciliter  glucagon  Injectable 1 milliGRAM(s) IntraMuscular once PRN Glucose LESS THAN 70 milligrams/deciliter  polyethylene glycol 3350 17 Gram(s) Oral daily PRN Constipation    Pertinent Labs:  09-11 Na140 mmol/L Glu 151 mg/dL<H> K+ 4.1 mmol/L Cr  1.66 mg/dL<H> BUN 30 mg/dL<H> 09-11 Phos 3.6 mg/dL 09-09 Alb 3.1 g/dL<L>    CAPILLARY BLOOD GLUCOSE  POCT Blood Glucose.: 143 mg/dL (11 Sep 2020 05:54)    Skin per nursing documentation: no pressure injuries noted  Edema: 1+ generalized; 2+ R+L foot/ankle/leg    Estimated Needs:   based on upper IBW 76.8kg:   Energy: (25-30kcal/kg): 1920-2304kcal  Protein: (1.4-1.6g protein/kg): 108-123g protein      Previous Nutrition Diagnosis: increased nutrient needs  Nutrition Diagnosis is: ongoing, with provision of EN feeds       New Nutrition Diagnosis: N/A      Interventions:   1. Continue Vital AF at goal rate 70ml/hr x 24 hrs. Provides total volume 1680mL formula, 2016kcals, 126g protein, 1363mL free H2O (meets 26kcal/kg & 1.6g protein/kg, based on upper IBW 76.8kg)  2. Continue Cuong Active twice daily.   3. Monitor GI tolerance. RD to remain available to adjust EN formulary, volume/rate PRN.     Monitoring and Evaluation:   Continue to monitor Nutritional intake, Tolerance to diet prescription, weights, labs, skin integrity  RD remains available upon request and will follow up per protocol Nutrition Chart Note.  Pt seen for: Nutrition follow-up on RCU    Source: EMR, Team; Pt trached/unable to speak    Chart reviewed, events noted. Pt is a 59 yo M with PMH: [place] on coumadin s/p cardiac arrest at work. Downtime 25 minutes prior to ROSC. 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 Was intubated at OSH, transferred to [facility] for further care. Course c/b GIB, put on Protonix drip. C.Diff positive; continues on antibiotics as ordered. Pt s/p angio 8/20; negative. S/P tracheostomy 8/24. C/b agitation, requiring propofol and fentanyl gtts (both discontinued). S/p PEG 8/26. Pt currently on TC 30% around the clock since 8/30, trach downsized; trach dislodged 9/5, ENT changed to #8 cuffless Shiley. RRT 9/11 (AM) for agitation.    Diet : NPO with Tube Feeds via PEG  Enteral /Parenteral Nutrition: Vital AF goal rate 70mL/hr x 24hrs. Provides total volume 1680mL formula, 2016kcals, 126g protein, 1363mL free H2O  - Continues to receive DanActive twice daily.   - RD observed Vital AF hung and running at goal rate of 70mL/hr.    Per flowsheets, Pt has received >85% EN provisions in the last 5 days.    Nutrition Events:  - Receiving 150mL free fluid q8hrs  - Rectal tube discontinued 8/31  - Was c Cdiff, Completed oral Vanco 8/29   - S/p PEG 8/26  - Receiving lactobacillus acidophilus   - Propofol discontinued 8/13    GI: c fecal incontinence, last BM noted 9/11 - bowel regimen ordered (dulcolax, miralax - PRN). Pt still remains with noted abdominal distention, CT of abdomen unremarkable; RN notes however, pt tolerating feeds well without N+V    Current Weight: Will continue to monitor/trend weight status.  Weight (kg): 110.2 (08-12), 110.1 (08-26), 106.2 (09-02), 106.3 (09-09)    Pertinent Medications: MEDICATIONS  (STANDING):  albuterol/ipratropium for Nebulization. 3 milliLiter(s) Nebulizer every 6 hours  amantadine Syrup 100 milliGRAM(s) Oral <User Schedule>  artificial  tears Solution 1 Drop(s) Both EYES every 6 hours  aspirin  chewable 81 milliGRAM(s) Enteral Tube daily  bisacodyl Suppository 10 milliGRAM(s) Rectal at bedtime  cefepime   IVPB 2000 milliGRAM(s) IV Intermittent every 12 hours  chlorhexidine 4% Liquid 1 Application(s) Topical <User Schedule>  dextrose 5%. 1000 milliLiter(s) (50 mL/Hr) IV Continuous <Continuous>  dextrose 50% Injectable 12.5 Gram(s) IV Push once  dextrose 50% Injectable 25 Gram(s) IV Push once  dextrose 50% Injectable 25 Gram(s) IV Push once  doxazosin 2 milliGRAM(s) Oral at bedtime  famotidine    Tablet 20 milliGRAM(s) Oral daily  fentaNYL   Patch  75 MICROgram(s)/Hr 1 Patch Transdermal every 72 hours  heparin   Injectable 5000 Unit(s) SubCutaneous every 8 hours  hydrALAZINE 100 milliGRAM(s) Oral three times a day  isosorbide   dinitrate Tablet (ISORDIL) 20 milliGRAM(s) Enteral Tube <User Schedule>  lactobacillus acidophilus 1 Tablet(s) Oral two times a day  lidocaine   Patch 1 Patch Transdermal daily  lidocaine   Patch 1 Patch Transdermal every 24 hours  metoprolol tartrate 50 milliGRAM(s) Oral three times a day  QUEtiapine 50 milliGRAM(s) Oral every 6 hours  sodium chloride 3%  Inhalation 3 milliLiter(s) Inhalation every 6 hours  vancomycin    Solution 125 milliGRAM(s) Enteral Tube two times a day    MEDICATIONS  (PRN):  dextrose 40% Gel 15 Gram(s) Oral once PRN Blood Glucose LESS THAN 70 milliGRAM(s)/deciliter  glucagon  Injectable 1 milliGRAM(s) IntraMuscular once PRN Glucose LESS THAN 70 milligrams/deciliter  polyethylene glycol 3350 17 Gram(s) Oral daily PRN Constipation    Pertinent Labs:  09-11 Na140 mmol/L Glu 151 mg/dL<H> K+ 4.1 mmol/L Cr  1.66 mg/dL<H> BUN 30 mg/dL<H> 09-11 Phos 3.6 mg/dL 09-09 Alb 3.1 g/dL<L>    CAPILLARY BLOOD GLUCOSE  POCT Blood Glucose.: 143 mg/dL (11 Sep 2020 05:54)    Skin per nursing documentation: no pressure injuries noted  Edema: 1+ generalized; 2+ R+L foot/ankle/leg    Estimated Needs:   based on upper IBW 76.8kg:   Energy: (25-30kcal/kg): 1920-2304kcal  Protein: (1.4-1.6g protein/kg): 108-123g protein      Previous Nutrition Diagnosis: increased nutrient needs  Nutrition Diagnosis is: ongoing, with provision of EN feeds       New Nutrition Diagnosis: N/A      Interventions:   1. Continue Vital AF at goal rate 70ml/hr x 24 hrs. Provides total volume 1680mL formula, 2016kcals, 126g protein, 1363mL free H2O (meets 26kcal/kg & 1.6g protein/kg, based on upper IBW 76.8kg)  2. Continue Cuong Active twice daily.   3. Monitor GI tolerance. RD to remain available to adjust EN formulary, volume/rate PRN.     Monitoring and Evaluation:   Continue to monitor Nutritional intake, Tolerance to diet prescription, weights, labs, skin integrity  RD remains available upon request and will follow up per protocol    Henny Strange, MS, RD, CDN  pager #[phone]

## 2020-09-11 NOTE — PROGRESS NOTE ADULT - SUBJECTIVE AND OBJECTIVE BOX
CC: f/u for fever and leukocytosis    Patient reports: he is restless and agitated,he has received multiple sedatives.He is tolerating TC,requiring ISC every 6 hours.    REVIEW OF SYSTEMS:  All other review of systems negative (Comprehensive ROS): limited by condition, minimal secretions, tolerating peg feeds, no diarrhea    Antimicrobials Day #  : day 4  meropenem  IVPB 1000 milliGRAM(s) IV Intermittent every 12 hours  meropenem  IVPB      vancomycin    Solution 125 milliGRAM(s) Enteral Tube two times a day    Other Medications Reviewed  MEDICATIONS  (STANDING):  albuterol/ipratropium for Nebulization. 3 milliLiter(s) Nebulizer every 6 hours  amantadine Syrup 100 milliGRAM(s) Oral <User Schedule>  artificial  tears Solution 1 Drop(s) Both EYES every 6 hours  aspirin  chewable 81 milliGRAM(s) Enteral Tube daily  bisacodyl Suppository 10 milliGRAM(s) Rectal at bedtime  chlorhexidine 4% Liquid 1 Application(s) Topical <User Schedule>  dextrose 5%. 1000 milliLiter(s) (50 mL/Hr) IV Continuous <Continuous>  dextrose 50% Injectable 12.5 Gram(s) IV Push once  dextrose 50% Injectable 25 Gram(s) IV Push once  dextrose 50% Injectable 25 Gram(s) IV Push once  doxazosin 2 milliGRAM(s) Oral at bedtime  famotidine    Tablet 20 milliGRAM(s) Oral daily  fentaNYL   Patch  75 MICROgram(s)/Hr 1 Patch Transdermal every 72 hours  heparin   Injectable 5000 Unit(s) SubCutaneous every 8 hours  hydrALAZINE 100 milliGRAM(s) Oral three times a day  isosorbide   dinitrate Tablet (ISORDIL) 20 milliGRAM(s) Enteral Tube <User Schedule>  lactobacillus acidophilus 1 Tablet(s) Oral two times a day  lidocaine   Patch 1 Patch Transdermal daily  lidocaine   Patch 1 Patch Transdermal every 24 hours  meropenem  IVPB 1000 milliGRAM(s) IV Intermittent every 12 hours  meropenem  IVPB      metoprolol tartrate 50 milliGRAM(s) Oral three times a day  QUEtiapine 50 milliGRAM(s) Oral <User Schedule>  sodium chloride 3%  Inhalation 3 milliLiter(s) Inhalation every 6 hours  vancomycin    Solution 125 milliGRAM(s) Enteral Tube two times a day    MEDICATIONS  (PRN):  dextrose 40% Gel 15 Gram(s) Oral once PRN Blood Glucose LESS THAN 70 milliGRAM(s)/deciliter  glucagon  Injectable 1 milliGRAM(s) IntraMuscular once PRN Glucose LESS THAN 70 milligrams/deciliter  polyethylene glycol 3350 17 Gram(s) Oral daily PRN Constipation    T(F): 98.8 (09-11-20 @ 04:05), Max: 99.3 (09-10-20 @ 21:50)  HR: 83 (09-11-20 @ 04:34)  BP: 151/92 (09-11-20 @ 04:05)  RR: 20 (09-11-20 @ 04:34)  SpO2: 100% (09-11-20 @ 04:34)  Wt(kg): --    PHYSICAL EXAM:  General: awake, restless and agitated, ? hallucinating  Eyes:  anicteric, no conjunctival injection, no discharge  Oropharynx: no lesions or injection 	  Neck: supple,trach collar  Lungs: clear to auscultation  Heart: regular rate and rhythm; no murmur, rubs or gallops  Abdomen: soft, mild distension, nontender, peg  Skin: no lesions  Extremities: no clubbing, cyanosis, or edema  Neurologic: restless and agitated  : no catheter  LAB RESULTS:                        8.8    16.55 )-----------( 241      ( 11 Sep 2020 06:53 )             29.0     09-11    140  |  100  |  30<H>  ----------------------------<  151<H>  4.1   |  24  |  1.66<H>    Ca    9.3      11 Sep 2020 06:53  Phos  3.6     09-11  Mg     1.7     09-11          MICROBIOLOGY:  RECENT CULTURES:  09-08 @ 21:02 .Urine Catheterized Pseudomonas aeruginosa    >100,000 CFU/ml Pseudomonas aeruginosa pansensitive      09-08 @ 18:11 .Sputum Sputum Pseudomonas aeruginosa  Providencia stuartii    Moderate Pseudomonas aeruginosa  Moderate Providencia stuartii  Normal Respiratory Lucrecia absent    No polymorphonuclear leukocytes per low power field  Rare Squamous epithelial cells per low power field  Numerous Gram Negative Rods per oil power field    09-08 @ 15:30 .Blood Blood     No growth to date.      09-08 @ 15:26 .Blood Blood     No growth to date.          RADIOLOGY REVIEWED:  < from: CT Abdomen and Pelvis w/ Oral Cont (09.08.20 @ 17:33) >  IMPRESSION:  No acute abnormalities in the chest, abdomen, or pelvis.    Trace right pleural effusion.    Lipoma and left lateralthigh with internal calcifications.      < from: CT Head No Cont (09.01.20 @ 14:11) >  IMPRESSION:    Redemonstration interventricular hemorrhage in lateral ventricle occipital horns, slightly increased size of lateral and third ventricles, developing hydrocephalus not excluded.    Continued resolution prior subarachnoid hemorrhage, no new hemorrhage or shift. Redemonstration volume loss and microvascular disease with evolving ischemic changes as on MRI in right medial temporal lobe, right posterior limb internal capsule, thalamocapsular junction, thalamus and cerebral peduncle. If symptoms persist consider follow-up head CT or MR if no contraindications.    < end of copied text >

## 2020-09-11 NOTE — PROGRESS NOTE ADULT - ATTENDING COMMENTS
Patient seen and examined, agree with above  Febrile 9/8, noted to have distended abdomen and leukocytosis >30 k.   Started empirically on Meropenem and Vancox1 dose.   Afebrile x24 hours.     1. Leukocytosis - improving, likely from urinary retention and acute cystitis  Appreciate ID follow up - will c/w Meropenem and Vanco PO for h/o recent C. diff infection.  Urine cultures: Pseudomonas, sensitive  Cardura for urinary retention, c/w intermittent straight cath and monitor bladder scans    2. SAH with negative angiogram:  - No further neurosurgical intervention at this time    3. Acute Encephalopathy - improving, less delirious  - Continue fentanyl patch, clonazepam, and quetiapine  - Pain control - Hydromorphone prn  - cont to titrate down pain medications as able while maintaining patient safety.     4. Afib and new acute systolic heart failure in setting of cardiac arrest  - Likely stunned myocardium due to cardiac arrest vs. SAH  - Will need eventual cardiac cath to evaluate for ischemia - planned for as outpatient per Dr. Deleon  - Continue afterload/preload reduction with hydralazine and isosorbide dinitrate  - For afib will continue metoprolol and monitor HR. Amio stopped due to prior pauses noted.  - Hold off on therapeutic a/c given SAH    5. Acute hypoxemic respiratory failure s/p tracheostomy:  - Patient downsized to cuffless trach 9/3  - Continue Duo nebs and hypertonic saline, chest PT  - PMV eval- is phontaing over trach and tolerated brief finger occlusion at bedside.     6. Oropharyngeal dysphagia:  - PEG feeds, Famotidine for GI ppx    7. C diff colitis:  - Completed PO vanco    8. Dispo:  - Full code, overall prognosis guarded depending on neurological recovery  - Patient is a candidate for acute rehab, pending clarification of insurance issues.     I was physically present for the key portions of the evaluation and management (E/M) service provided.  I agree with the above history, physical, and plan which I have reviewed and edited where appropriate.     35 minutes spent on total encounter; more than 50% of the visit was spent counseling and/or coordinating care by the attending physician.      Plan discussed with RCU team.

## 2020-09-11 NOTE — PHARMACOTHERAPY INTERVENTION NOTE - COMMENTS
Clarified patient is off diuretics (last dose 8/14). CXR showing pulmonary edema. Recommend resuming diuretics and repleting K+.
Confirmed home medications with patient's son, updated in Outpatient Medication Review.
GUILLE DOLAN, 60y Male with urine culture positive for Pseudomonas and sputum culture positive for Pseudomonas and Providencia, was on meropenem to cover both agents.    Recommendation(s):  1) Since both organisms found to be sensitive to cefepime, would consider D/C meropenem and start cefepime 2 grams IV Q12H based on his renal fxn  2) Would hold of on quinolones since patient on continuous tube feeds (PO quinolones will chelate) and on multiple QTc prolonging agents    With kind regards,  Shekhar Barcenas, MichaelD  Infectious Diseases Clinical Pharmacist  .
Per Dr. Serrano approve to discontinue nimodipine and Keppra for negative aneurysmal SAH at this time
Spoke with prescribers that as per RN patient has been receiving 100 mcg IV push Fentanyl almost hourly. Team approve to start the fentanyl drip to manage discomfort on the ventilator

## 2020-09-11 NOTE — RAPID RESPONSE TEAM SUMMARY - NSSITUATIONBACKGROUNDRRT_GEN_ALL_CORE
60-year-old male with a history of A-Fib on warfarin who presents with cardiac arrest at work with downtime of about 25 minutes prior to ROSC. S/p therapeutic hypothermia. 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 Now with resolved neurogenic/cardiogenic shock. Course complicated by GI bleed s/p PPI gtt, bleeding from scott s/p clot irrigation, prolonged respiratory failure s/p trach (8/24) and PEG (8/26). Currently with C diff colitis c/b gross hematuria. Course further course complicated Pseudomonas PNA (s/p Rx).     RRT called AM fo 9/11 for aggitation. Pt administered Ativan 0.5 with resolution of aggitation. RN at bedside confired that pt's mental status and behavior at baseline. ABG ordered to assses possible hypercarbia.     For f/u:  - F/u ABG to assess ventilation    Plan discussed with primary team.

## 2020-09-11 NOTE — PROGRESS NOTE ADULT - ASSESSMENT
60-year-old male with a history of A-Fib on warfarin who presents with cardiac arrest at work with downtime of about 25 minutes prior to ROSC. S/p therapeutic hypothermia. 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 Now with resolved neurogenic/cardiogenic shock. Course complicated by GI bleed s/p PPI gtt, bleeding from scott s/p clot irrigation, prolonged respiratory failure s/p trach (8/24) and PEG (8/26). Currently with A-Fib with RVR and C diff colitis.  Also developed gross hematuria - Urology consulted +catheter with clot (irrigated and exchanged) suspected 2/2 traumatic catheterization. Additionally course complicated by fever and Pseudomonas PNA (s/p Rx) then developed C diff (now with resolved leukocytosis and improved stooling, now soft per report); remains on enteral Vanco    8/29: Received to [facility]  8/30: Less Agitated overnight, Afebrile, WBC decreasing, CR elevated but Stable, Tolerating TC all day yesterday, will Attempt TC ATC tonight and ABG in am, F/u Bcx from 8/30. Rectal tube dc'd.  Speech eval for AAC device and will F/u.  8/31: will attempt TC around the clock again tonight. Stopped amiodarone as per cards.  Will be cathed if cleared by ID and renal.  9/1: Elevated BP overnight, BP stable today, afebrile. Lethargic earlier today on exam; ABG Stable; Head CT done- no new ICH, ventricles enlarged ? Hydrocephalus, Spoke to Max from [facility] who will see- although felt no intervention required at this time. Tolerating TC ATC, will attempt cuffless trach tomorrow. C diff Iso discontinued as discussed w/ Inf control.  9/2: still with secretions, will downsize when improved. Will cath as outpatient. Decreased opioids  9/3: PICC dc'd, Trach downsized to #7 cuffless Portex. stomach distended  9/4: Occasionally Restless, afebrile. Tolerating cuffless trach, phonating w/ digital occlusion.  Spoke to speech for PMV re-eval soon than 9/6.  Elevated BP, Hydralazine increased.- will monitor.  9/5: trach dislodged, ENT changed to #8 cuffless Shiley. OOB to chair  9/6: OOB to chair today, gave dulcolax suppository   9/7: agitated while in chair today, gave Seroquel.  Producing thick, yellow secretions. Afebrile. Managing secretions with duoneb and hyper-sal 7%/Chest PT.  9/8: Had spurious leukocytosis. Afebrile, hemodynamically stable. Repeated cbc, sent Bcx.  Spiked temp of 102.5, cbc rechecked which was consistent with leukocytosis. PAN cultured. Noted urinary retention of 2600ml urine, abdomen distended with some tenderness on palpation.  F/U CT abd/pelvis/chest.  9/9: Patient appears well despite leukocytosis of 31. Sputum Cx growing gram neg rods. Remains on meropenem, added Vancoycin PO per ID for concern of recent Cdiff infection.  9/10: Afebrile over night. Leukocytosis decreasing. UCx growing pan-sensitive pseudomonas. Will continue meropenem and prophylactic vancomycin PO. Thick brown tinged sputum with suctioning, aggressive chest PT, nebs + hypersal. 60-year-old male with a history of A-Fib on warfarin who presents with cardiac arrest at work with downtime of about 25 minutes prior to ROSC. S/p therapeutic hypothermia. 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 Now with resolved neurogenic/cardiogenic shock. Course complicated by GI bleed s/p PPI gtt, bleeding from scott s/p clot irrigation, prolonged respiratory failure s/p trach (8/24) and PEG (8/26). Currently with A-Fib with RVR and C diff colitis.  Also developed gross hematuria - Urology consulted +catheter with clot (irrigated and exchanged) suspected 2/2 traumatic catheterization. Additionally course complicated by fever and Pseudomonas PNA (s/p Rx) then developed C diff (now with resolved leukocytosis and improved stooling, now soft per report); remains on enteral Vanco    8/29: Received to [facility]  8/30: Less Agitated overnight, Afebrile, WBC decreasing, CR elevated but Stable, Tolerating TC all day yesterday, will Attempt TC ATC tonight and ABG in am, F/u Bcx from 8/30. Rectal tube dc'd.  Speech eval for AAC device and will F/u.  8/31: will attempt TC around the clock again tonight. Stopped amiodarone as per cards.  Will be cathed if cleared by ID and renal.  9/1: Elevated BP overnight, BP stable today, afebrile. Lethargic earlier today on exam; ABG Stable; Head CT done- no new ICH, ventricles enlarged ? Hydrocephalus, Spoke to Max from [facility] who will see- although felt no intervention required at this time. Tolerating TC ATC, will attempt cuffless trach tomorrow. C diff Iso discontinued as discussed w/ Inf control.  9/2: still with secretions, will downsize when improved. Will cath as outpatient. Decreased opioids  9/3: PICC dc'd, Trach downsized to #7 cuffless Portex. stomach distended  9/4: Occasionally Restless, afebrile. Tolerating cuffless trach, phonating w/ digital occlusion.  Spoke to speech for PMV re-eval soon than 9/6.  Elevated BP, Hydralazine increased.- will monitor.  9/5: trach dislodged, ENT changed to #8 cuffless Shiley. OOB to chair  9/6: OOB to chair today, gave dulcolax suppository   9/7: agitated while in chair today, gave Seroquel.  Producing thick, yellow secretions. Afebrile. Managing secretions with duoneb and hyper-sal 7%/Chest PT.  9/8: Had spurious leukocytosis. Afebrile, hemodynamically stable. Repeated cbc, sent Bcx.  Spiked temp of 102.5, cbc rechecked which was consistent with leukocytosis. PAN cultured. Noted urinary retention of 2600ml urine, abdomen distended with some tenderness on palpation.  F/U CT abd/pelvis/chest.  9/9: Patient appears well despite leukocytosis of 31. Sputum Cx growing gram neg rods. Remains on meropenem, added Vancoycin PO per ID for concern of recent Cdiff infection.  9/10: Afebrile over night. Leukocytosis decreasing. UCx growing pan-sensitive pseudomonas. Will continue meropenem and prophylactic vancomycin PO. Thick brown tinged sputum with suctioning, aggressive chest PT, nebs + hypersal.  9/11: Patient with episode of agitation and delirium early this morning. Seemingly did not respond to any anxiolytic or sedative meds given. Patient denies having any pain, states that he's "In the [place]". Thorough exam revealed IV access was not working. A new IV line was placed in RLE and 2mg IV ativan given which calmed the patient. Leukocytosis continues to improve on ABx. Noted to have -120s, EKG confirms AFIBB. Metoprolol increased.

## 2020-09-11 NOTE — CHART NOTE - NSCHARTNOTEFT_GEN_A_CORE
Asked to evaluate patient for agitation  60 year old male with hx afib on coumadin and hx of cardiac arrest  Resp failure s/p trach  Vitals t 98.5 p 103 rr 22 bp 139/79  Awake, responsive   fingerstick 98  Lungs mostly clear  Heart S1S2  Abd soft + BS  ext 2 + pulses  Labs - most recent labs reviewed  Last CXR reviewed  Chart notes reviewed  As per nurses that know patient, he is usually very agitated  A/p agitation  received ativan in the past  will give another 0.5 mg IVP X 1  will f/u  Pamela TOLEDO

## 2020-09-12 LAB
ANION GAP SERPL CALC-SCNC: 13 MMOL/L — SIGNIFICANT CHANGE UP (ref 5–17)
BASOPHILS # BLD AUTO: 0.11 K/UL — SIGNIFICANT CHANGE UP (ref 0–0.2)
BASOPHILS NFR BLD AUTO: 0.8 % — SIGNIFICANT CHANGE UP (ref 0–2)
BUN SERPL-MCNC: 27 MG/DL — HIGH (ref 7–23)
CALCIUM SERPL-MCNC: 9.5 MG/DL — SIGNIFICANT CHANGE UP (ref 8.4–10.5)
CHLORIDE SERPL-SCNC: 98 MMOL/L — SIGNIFICANT CHANGE UP (ref 96–108)
CO2 SERPL-SCNC: 26 MMOL/L — SIGNIFICANT CHANGE UP (ref 22–31)
CREAT SERPL-MCNC: 1.54 MG/DL — HIGH (ref 0.5–1.3)
EOSINOPHIL # BLD AUTO: 0.48 K/UL — SIGNIFICANT CHANGE UP (ref 0–0.5)
EOSINOPHIL NFR BLD AUTO: 3.5 % — SIGNIFICANT CHANGE UP (ref 0–6)
GLUCOSE BLDC GLUCOMTR-MCNC: 128 MG/DL — HIGH (ref 70–99)
GLUCOSE SERPL-MCNC: 117 MG/DL — HIGH (ref 70–99)
HCT VFR BLD CALC: 30 % — LOW (ref 39–50)
HGB BLD-MCNC: 9 G/DL — LOW (ref 13–17)
IMM GRANULOCYTES NFR BLD AUTO: 1 % — SIGNIFICANT CHANGE UP (ref 0–1.5)
LYMPHOCYTES # BLD AUTO: 1.71 K/UL — SIGNIFICANT CHANGE UP (ref 1–3.3)
LYMPHOCYTES # BLD AUTO: 12.3 % — LOW (ref 13–44)
MAGNESIUM SERPL-MCNC: 1.8 MG/DL — SIGNIFICANT CHANGE UP (ref 1.6–2.6)
MCHC RBC-ENTMCNC: 26.7 PG — LOW (ref 27–34)
MCHC RBC-ENTMCNC: 30 GM/DL — LOW (ref 32–36)
MCV RBC AUTO: 89 FL — SIGNIFICANT CHANGE UP (ref 80–100)
MONOCYTES # BLD AUTO: 1.24 K/UL — HIGH (ref 0–0.9)
MONOCYTES NFR BLD AUTO: 8.9 % — SIGNIFICANT CHANGE UP (ref 2–14)
NEUTROPHILS # BLD AUTO: 10.19 K/UL — HIGH (ref 1.8–7.4)
NEUTROPHILS NFR BLD AUTO: 73.5 % — SIGNIFICANT CHANGE UP (ref 43–77)
NRBC # BLD: 0 /100 WBCS — SIGNIFICANT CHANGE UP (ref 0–0)
PHOSPHATE SERPL-MCNC: 3.7 MG/DL — SIGNIFICANT CHANGE UP (ref 2.5–4.5)
PLATELET # BLD AUTO: 259 K/UL — SIGNIFICANT CHANGE UP (ref 150–400)
POTASSIUM SERPL-MCNC: 4.5 MMOL/L — SIGNIFICANT CHANGE UP (ref 3.5–5.3)
POTASSIUM SERPL-SCNC: 4.5 MMOL/L — SIGNIFICANT CHANGE UP (ref 3.5–5.3)
RBC # BLD: 3.37 M/UL — LOW (ref 4.2–5.8)
RBC # FLD: 15.5 % — HIGH (ref 10.3–14.5)
SODIUM SERPL-SCNC: 137 MMOL/L — SIGNIFICANT CHANGE UP (ref 135–145)
WBC # BLD: 13.87 K/UL — HIGH (ref 3.8–10.5)
WBC # FLD AUTO: 13.87 K/UL — HIGH (ref 3.8–10.5)

## 2020-09-12 PROCEDURE — 99233 SBSQ HOSP IP/OBS HIGH 50: CPT | Mod: GC

## 2020-09-12 RX ORDER — DOXAZOSIN MESYLATE 4 MG
8 TABLET ORAL AT BEDTIME
Refills: 0 | Status: DISCONTINUED | OUTPATIENT
Start: 2020-09-12 | End: 2020-10-14

## 2020-09-12 RX ORDER — FENTANYL CITRATE 50 UG/ML
1 INJECTION INTRAVENOUS
Refills: 0 | Status: DISCONTINUED | OUTPATIENT
Start: 2020-09-12 | End: 2020-09-18

## 2020-09-12 RX ADMIN — SODIUM CHLORIDE 3 MILLILITER(S): 9 INJECTION INTRAMUSCULAR; INTRAVENOUS; SUBCUTANEOUS at 17:41

## 2020-09-12 RX ADMIN — ISOSORBIDE DINITRATE 20 MILLIGRAM(S): 5 TABLET ORAL at 08:00

## 2020-09-12 RX ADMIN — Medication 75 MILLIGRAM(S): at 21:49

## 2020-09-12 RX ADMIN — Medication 1 TABLET(S): at 06:00

## 2020-09-12 RX ADMIN — SODIUM CHLORIDE 3 MILLILITER(S): 9 INJECTION INTRAMUSCULAR; INTRAVENOUS; SUBCUTANEOUS at 11:59

## 2020-09-12 RX ADMIN — LIDOCAINE 1 PATCH: 4 CREAM TOPICAL at 20:07

## 2020-09-12 RX ADMIN — Medication 100 MILLIGRAM(S): at 12:17

## 2020-09-12 RX ADMIN — LIDOCAINE 1 PATCH: 4 CREAM TOPICAL at 07:28

## 2020-09-12 RX ADMIN — QUETIAPINE FUMARATE 50 MILLIGRAM(S): 200 TABLET, FILM COATED ORAL at 06:00

## 2020-09-12 RX ADMIN — Medication 100 MILLIGRAM(S): at 06:00

## 2020-09-12 RX ADMIN — SODIUM CHLORIDE 3 MILLILITER(S): 9 INJECTION INTRAMUSCULAR; INTRAVENOUS; SUBCUTANEOUS at 05:17

## 2020-09-12 RX ADMIN — Medication 1 TABLET(S): at 19:05

## 2020-09-12 RX ADMIN — Medication 10 MILLIGRAM(S): at 21:51

## 2020-09-12 RX ADMIN — LIDOCAINE 1 PATCH: 4 CREAM TOPICAL at 21:51

## 2020-09-12 RX ADMIN — Medication 3 MILLILITER(S): at 00:15

## 2020-09-12 RX ADMIN — SODIUM CHLORIDE 3 MILLILITER(S): 9 INJECTION INTRAMUSCULAR; INTRAVENOUS; SUBCUTANEOUS at 00:15

## 2020-09-12 RX ADMIN — Medication 100 MILLIGRAM(S): at 08:00

## 2020-09-12 RX ADMIN — Medication 75 MILLIGRAM(S): at 14:13

## 2020-09-12 RX ADMIN — Medication 125 MILLIGRAM(S): at 06:00

## 2020-09-12 RX ADMIN — FAMOTIDINE 20 MILLIGRAM(S): 10 INJECTION INTRAVENOUS at 12:17

## 2020-09-12 RX ADMIN — Medication 100 MILLIGRAM(S): at 14:14

## 2020-09-12 RX ADMIN — Medication 100 MILLIGRAM(S): at 21:50

## 2020-09-12 RX ADMIN — ISOSORBIDE DINITRATE 20 MILLIGRAM(S): 5 TABLET ORAL at 19:05

## 2020-09-12 RX ADMIN — CEFEPIME 100 MILLIGRAM(S): 1 INJECTION, POWDER, FOR SOLUTION INTRAMUSCULAR; INTRAVENOUS at 18:50

## 2020-09-12 RX ADMIN — HEPARIN SODIUM 5000 UNIT(S): 5000 INJECTION INTRAVENOUS; SUBCUTANEOUS at 06:00

## 2020-09-12 RX ADMIN — ISOSORBIDE DINITRATE 20 MILLIGRAM(S): 5 TABLET ORAL at 00:00

## 2020-09-12 RX ADMIN — Medication 3 MILLILITER(S): at 17:41

## 2020-09-12 RX ADMIN — LIDOCAINE 1 PATCH: 4 CREAM TOPICAL at 08:28

## 2020-09-12 RX ADMIN — CEFEPIME 100 MILLIGRAM(S): 1 INJECTION, POWDER, FOR SOLUTION INTRAMUSCULAR; INTRAVENOUS at 06:00

## 2020-09-12 RX ADMIN — QUETIAPINE FUMARATE 50 MILLIGRAM(S): 200 TABLET, FILM COATED ORAL at 00:00

## 2020-09-12 RX ADMIN — FENTANYL CITRATE 1 PATCH: 50 INJECTION INTRAVENOUS at 07:15

## 2020-09-12 RX ADMIN — QUETIAPINE FUMARATE 50 MILLIGRAM(S): 200 TABLET, FILM COATED ORAL at 19:04

## 2020-09-12 RX ADMIN — Medication 1 DROP(S): at 18:51

## 2020-09-12 RX ADMIN — Medication 3 MILLILITER(S): at 11:59

## 2020-09-12 RX ADMIN — HEPARIN SODIUM 5000 UNIT(S): 5000 INJECTION INTRAVENOUS; SUBCUTANEOUS at 21:52

## 2020-09-12 RX ADMIN — Medication 125 MILLIGRAM(S): at 19:05

## 2020-09-12 RX ADMIN — Medication 75 MILLIGRAM(S): at 06:00

## 2020-09-12 RX ADMIN — Medication 1 DROP(S): at 12:17

## 2020-09-12 RX ADMIN — QUETIAPINE FUMARATE 50 MILLIGRAM(S): 200 TABLET, FILM COATED ORAL at 12:29

## 2020-09-12 RX ADMIN — CHLORHEXIDINE GLUCONATE 1 APPLICATION(S): 213 SOLUTION TOPICAL at 06:00

## 2020-09-12 RX ADMIN — FENTANYL CITRATE 1 PATCH: 50 INJECTION INTRAVENOUS at 20:03

## 2020-09-12 RX ADMIN — Medication 1 DROP(S): at 06:00

## 2020-09-12 RX ADMIN — HEPARIN SODIUM 5000 UNIT(S): 5000 INJECTION INTRAVENOUS; SUBCUTANEOUS at 14:14

## 2020-09-12 RX ADMIN — Medication 8 MILLIGRAM(S): at 21:50

## 2020-09-12 RX ADMIN — Medication 1 DROP(S): at 00:00

## 2020-09-12 RX ADMIN — LIDOCAINE 1 PATCH: 4 CREAM TOPICAL at 12:29

## 2020-09-12 RX ADMIN — Medication 3 MILLILITER(S): at 05:14

## 2020-09-12 RX ADMIN — FENTANYL CITRATE 1 PATCH: 50 INJECTION INTRAVENOUS at 19:55

## 2020-09-12 RX ADMIN — Medication 81 MILLIGRAM(S): at 12:17

## 2020-09-12 NOTE — PROGRESS NOTE ADULT - SUBJECTIVE AND OBJECTIVE BOX
Subjective: Patient seen and examined. No new events except as noted.     REVIEW OF SYSTEMS:  Unable to obtain       MEDICATIONS:  MEDICATIONS  (STANDING):  albuterol/ipratropium for Nebulization. 3 milliLiter(s) Nebulizer every 6 hours  amantadine Syrup 100 milliGRAM(s) Oral <User Schedule>  artificial  tears Solution 1 Drop(s) Both EYES every 6 hours  aspirin  chewable 81 milliGRAM(s) Enteral Tube daily  bisacodyl Suppository 10 milliGRAM(s) Rectal at bedtime  cefepime   IVPB 2000 milliGRAM(s) IV Intermittent every 12 hours  chlorhexidine 4% Liquid 1 Application(s) Topical <User Schedule>  dextrose 5%. 1000 milliLiter(s) (50 mL/Hr) IV Continuous <Continuous>  dextrose 50% Injectable 12.5 Gram(s) IV Push once  dextrose 50% Injectable 25 Gram(s) IV Push once  dextrose 50% Injectable 25 Gram(s) IV Push once  doxazosin 8 milliGRAM(s) Oral at bedtime  famotidine    Tablet 20 milliGRAM(s) Oral daily  fentaNYL   Patch  75 MICROgram(s)/Hr 1 Patch Transdermal every 72 hours  heparin   Injectable 5000 Unit(s) SubCutaneous every 8 hours  hydrALAZINE 100 milliGRAM(s) Oral three times a day  isosorbide   dinitrate Tablet (ISORDIL) 20 milliGRAM(s) Enteral Tube <User Schedule>  lactobacillus acidophilus 1 Tablet(s) Oral two times a day  lidocaine   Patch 1 Patch Transdermal daily  lidocaine   Patch 1 Patch Transdermal every 24 hours  metoprolol tartrate 75 milliGRAM(s) Oral every 8 hours  QUEtiapine 50 milliGRAM(s) Oral every 6 hours  sodium chloride 3%  Inhalation 3 milliLiter(s) Inhalation every 6 hours  vancomycin    Solution 125 milliGRAM(s) Enteral Tube two times a day      PHYSICAL EXAM:  T(C): 36.7 (09-12-20 @ 19:46), Max: 36.7 (09-12-20 @ 14:43)  HR: 102 (09-12-20 @ 21:18) (79 - 102)  BP: 112/59 (09-12-20 @ 19:46) (112/59 - 122/82)  RR: 20 (09-12-20 @ 21:18) (18 - 24)  SpO2: 100% (09-12-20 @ 21:18) (98% - 100%)  Wt(kg): --  I&O's Summary    11 Sep 2020 07:01  -  12 Sep 2020 07:00  --------------------------------------------------------  IN: 2365 mL / OUT: 2825 mL / NET: -460 mL    12 Sep 2020 07:01  -  12 Sep 2020 22:10  --------------------------------------------------------  IN: 1140 mL / OUT: 1050 mL / NET: 90 mL          Appearance: sleepy , +trach   HEENT:   Dry  oral mucosa,  Lymphatic: No lymphadenopathy  Cardiovascular: Normal S1 S2, No JVD, No murmurs, No edema  Respiratory: Ventilated   Psychiatry: A & O x 0  Gastrointestinal:  Soft, Non-tender, +PEG   Skin: No rashes, No ecchymoses, No cyanosis	  Mental status- No acute distress, EO to stim  -CN- Pupils R 4mm NR, L 2mm sluggish, EOMI, tongue midline, face symmetric  +cough/gag  C briskly, two fingers/thumbs up on RUE, distally AG, wiggles toes on RLE    LABS:    CARDIAC MARKERS:                                9.0    13.87 )-----------( 259      ( 12 Sep 2020 07:03 )             30.0     09-12    137  |  98  |  27<H>  ----------------------------<  117<H>  4.5   |  26  |  1.54<H>    Ca    9.5      12 Sep 2020 07:03  Phos  3.7     09-12  Mg     1.8     09-12      proBNP:   Lipid Profile:   HgA1c:   TSH:             TELEMETRY: 	    ECG:  	  RADIOLOGY:   DIAGNOSTIC TESTING:  [ ] Echocardiogram:  [ ]  Catheterization:  [ ] Stress Test:    OTHER:

## 2020-09-12 NOTE — PROGRESS NOTE ADULT - SUBJECTIVE AND OBJECTIVE BOX
CC: f/u for fever and leukocytosis    Patient is trached     REVIEW OF SYSTEMS:  All other review of systems negative (Comprehensive ROS): limited by condition, minimal secretions, tolerating peg feeds, no diarrhea    Antimicrobials Day #  : day 5  meropenem  IVPB 1000 milliGRAM(s) IV Intermittent every 12 hours  meropenem  IVPB      vancomycin    Solution 125 milliGRAM(s) Enteral Tube two times a day    Other Medications Reviewed  MEDICATIONS  (STANDING):  albuterol/ipratropium for Nebulization. 3 milliLiter(s) Nebulizer every 6 hours  amantadine Syrup 100 milliGRAM(s) Oral <User Schedule>  artificial  tears Solution 1 Drop(s) Both EYES every 6 hours  aspirin  chewable 81 milliGRAM(s) Enteral Tube daily  bisacodyl Suppository 10 milliGRAM(s) Rectal at bedtime  chlorhexidine 4% Liquid 1 Application(s) Topical <User Schedule>  dextrose 5%. 1000 milliLiter(s) (50 mL/Hr) IV Continuous <Continuous>  dextrose 50% Injectable 12.5 Gram(s) IV Push once  dextrose 50% Injectable 25 Gram(s) IV Push once  dextrose 50% Injectable 25 Gram(s) IV Push once  doxazosin 2 milliGRAM(s) Oral at bedtime  famotidine    Tablet 20 milliGRAM(s) Oral daily  fentaNYL   Patch  75 MICROgram(s)/Hr 1 Patch Transdermal every 72 hours  heparin   Injectable 5000 Unit(s) SubCutaneous every 8 hours  hydrALAZINE 100 milliGRAM(s) Oral three times a day  isosorbide   dinitrate Tablet (ISORDIL) 20 milliGRAM(s) Enteral Tube <User Schedule>  lactobacillus acidophilus 1 Tablet(s) Oral two times a day  lidocaine   Patch 1 Patch Transdermal daily  lidocaine   Patch 1 Patch Transdermal every 24 hours  meropenem  IVPB 1000 milliGRAM(s) IV Intermittent every 12 hours  meropenem  IVPB      metoprolol tartrate 50 milliGRAM(s) Oral three times a day  QUEtiapine 50 milliGRAM(s) Oral <User Schedule>  sodium chloride 3%  Inhalation 3 milliLiter(s) Inhalation every 6 hours  vancomycin    Solution 125 milliGRAM(s) Enteral Tube two times a day    MEDICATIONS  (PRN):  dextrose 40% Gel 15 Gram(s) Oral once PRN Blood Glucose LESS THAN 70 milliGRAM(s)/deciliter  glucagon  Injectable 1 milliGRAM(s) IntraMuscular once PRN Glucose LESS THAN 70 milligrams/deciliter  polyethylene glycol 3350 17 Gram(s) Oral daily PRN Constipation    Vital Signs Last 24 Hrs  T(C): 36.7 (12 Sep 2020 14:43), Max: 36.7 (12 Sep 2020 14:43)  T(F): 98.1 (12 Sep 2020 14:43), Max: 98.1 (12 Sep 2020 14:43)  HR: 80 (12 Sep 2020 14:43) (80 - 112)  BP: 122/82 (12 Sep 2020 14:43) (122/82 - 135/82)  BP(mean): --  RR: 18 (12 Sep 2020 14:43) (18 - 24)  SpO2: 100% (12 Sep 2020 14:43) (98% - 100%)    PHYSICAL EXAM:  General: awake, restless  Eyes:  anicteric, no conjunctival injection, no discharge  Oropharynx: no lesions or injection 	  Neck: supple,trach collar  Lungs: clear to auscultation  Heart: regular rate and rhythm; no murmur, rubs or gallops  Abdomen: soft, mild distension, nontender, peg  Skin: no lesions  Extremities: no clubbing, cyanosis, or edema  Neurologic: restless and agitated  : no catheter  LAB RESULTS:                                   9.0    13.87 )-----------( 259      ( 12 Sep 2020 07:03 )             30.0             09-12    137  |  98  |  27<H>  ----------------------------<  117<H>  4.5   |  26  |  1.54<H>    Ca    9.5      12 Sep 2020 07:03  Phos  3.7     09-12  Mg     1.8     09-12              MICROBIOLOGY:  RECENT CULTURES:  09-08 @ 21:02 .Urine Catheterized Pseudomonas aeruginosa    >100,000 CFU/ml Pseudomonas aeruginosa pansensitive      09-08 @ 18:11 .Sputum Sputum Pseudomonas aeruginosa  Providencia stuartii    Moderate Pseudomonas aeruginosa  Moderate Providencia stuartii  Normal Respiratory Lucrecia absent    No polymorphonuclear leukocytes per low power field  Rare Squamous epithelial cells per low power field  Numerous Gram Negative Rods per oil power field    09-08 @ 15:30 .Blood Blood     No growth to date.      09-08 @ 15:26 .Blood Blood     No growth to date.          RADIOLOGY REVIEWED:  < from: CT Abdomen and Pelvis w/ Oral Cont (09.08.20 @ 17:33) >  IMPRESSION:  No acute abnormalities in the chest, abdomen, or pelvis.    Trace right pleural effusion.    Lipoma and left lateralthigh with internal calcifications.      < from: CT Head No Cont (09.01.20 @ 14:11) >  IMPRESSION:    Redemonstration interventricular hemorrhage in lateral ventricle occipital horns, slightly increased size of lateral and third ventricles, developing hydrocephalus not excluded.    Continued resolution prior subarachnoid hemorrhage, no new hemorrhage or shift. Redemonstration volume loss and microvascular disease with evolving ischemic changes as on MRI in right medial temporal lobe, right posterior limb internal capsule, thalamocapsular junction, thalamus and cerebral peduncle. If symptoms persist consider follow-up head CT or MR if no contraindications.    < end of copied text >

## 2020-09-12 NOTE — PROGRESS NOTE ADULT - ASSESSMENT
60-year-old male with a history of A-Fib on warfarin who presents with cardiac arrest at work with downtime of about 25 minutes prior to ROSC. S/p therapeutic hypothermia. 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 Now with resolved neurogenic/cardiogenic shock. Course complicated by GI bleed s/p PPI gtt, bleeding from scott s/p clot irrigation, prolonged respiratory failure s/p trach (8/24) and PEG (8/26). Currently with A-Fib with RVR and C diff colitis.  Also developed gross hematuria - Urology consulted +catheter with clot (irrigated and exchanged) suspected 2/2 traumatic catheterization. Additionally course complicated by fever and Pseudomonas PNA (s/p Rx) then developed C diff (now with resolved leukocytosis and improved stooling, now soft per report); remains on enteral Vanco    8/29: Received to [facility]  8/30: Less Agitated overnight, Afebrile, WBC decreasing, CR elevated but Stable, Tolerating TC all day yesterday, will Attempt TC ATC tonight and ABG in am, F/u Bcx from 8/30. Rectal tube dc'd.  Speech eval for AAC device and will F/u.  8/31: will attempt TC around the clock again tonight. Stopped amiodarone as per cards.  Will be cathed if cleared by ID and renal.  9/1: Elevated BP overnight, BP stable today, afebrile. Lethargic earlier today on exam; ABG Stable; Head CT done- no new ICH, ventricles enlarged ? Hydrocephalus, Spoke to Max from [facility] who will see- although felt no intervention required at this time. Tolerating TC ATC, will attempt cuffless trach tomorrow. C diff Iso discontinued as discussed w/ Inf control.  9/2: still with secretions, will downsize when improved. Will cath as outpatient. Decreased opioids  9/3: PICC dc'd, Trach downsized to #7 cuffless Portex. stomach distended  9/4: Occasionally Restless, afebrile. Tolerating cuffless trach, phonating w/ digital occlusion.  Spoke to speech for PMV re-eval soon than 9/6.  Elevated BP, Hydralazine increased.- will monitor.  9/5: trach dislodged, ENT changed to #8 cuffless Shiley. OOB to chair  9/6: OOB to chair today, gave dulcolax suppository   9/7: agitated while in chair today, gave Seroquel.  Producing thick, yellow secretions. Afebrile. Managing secretions with duoneb and hyper-sal 7%/Chest PT.  9/8: Had spurious leukocytosis. Afebrile, hemodynamically stable. Repeated cbc, sent Bcx.  Spiked temp of 102.5, cbc rechecked which was consistent with leukocytosis. PAN cultured. Noted urinary retention of 2600ml urine, abdomen distended with some tenderness on palpation.  F/U CT abd/pelvis/chest.  9/9: Patient appears well despite leukocytosis of 31. Sputum Cx growing gram neg rods. Remains on meropenem, added Vancoycin PO per ID for concern of recent Cdiff infection.  9/10: Afebrile over night. Leukocytosis decreasing. UCx growing pan-sensitive pseudomonas. Will continue meropenem and prophylactic vancomycin PO. Thick brown tinged sputum with suctioning, aggressive chest PT, nebs + hypersal.  9/11: Patient with episode of agitation and delirium early this morning. Seemingly did not respond to any anxiolytic or sedative meds given. Patient denies having any pain, states that he's "In the [place]". Thorough exam revealed IV access was not working. A new IV line was placed in RLE and 2mg IV ativan given which calmed the patient. Leukocytosis continues to improve on ABx. Noted to have -120s, EKG confirms AFIBB. Metoprolol increased.  9/12: 60-year-old male with a history of A-Fib on warfarin who presents with cardiac arrest at work with downtime of about 25 minutes prior to ROSC. S/p therapeutic hypothermia. 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 Now with resolved neurogenic/cardiogenic shock. Course complicated by GI bleed s/p PPI gtt, bleeding from scott s/p clot irrigation, prolonged respiratory failure s/p trach (8/24) and PEG (8/26). Currently with A-Fib with RVR and C diff colitis.  Also developed gross hematuria - Urology consulted +catheter with clot (irrigated and exchanged) suspected 2/2 traumatic catheterization. Additionally course complicated by fever and Pseudomonas PNA (s/p Rx) then developed C diff (now with resolved leukocytosis and improved stooling, now soft per report); remains on enteral Vanco    8/29: Received to [facility]  8/30: Less Agitated overnight, Afebrile, WBC decreasing, CR elevated but Stable, Tolerating TC all day yesterday, will Attempt TC ATC tonight and ABG in am, F/u Bcx from 8/30. Rectal tube dc'd.  Speech eval for AAC device and will F/u.  8/31: will attempt TC around the clock again tonight. Stopped amiodarone as per cards.  Will be cathed if cleared by ID and renal.  9/1: Elevated BP overnight, BP stable today, afebrile. Lethargic earlier today on exam; ABG Stable; Head CT done- no new ICH, ventricles enlarged ? Hydrocephalus, Spoke to Max from [facility] who will see- although felt no intervention required at this time. Tolerating TC ATC, will attempt cuffless trach tomorrow. C diff Iso discontinued as discussed w/ Inf control.  9/2: still with secretions, will downsize when improved. Will cath as outpatient. Decreased opioids  9/3: PICC dc'd, Trach downsized to #7 cuffless Portex. stomach distended  9/4: Occasionally Restless, afebrile. Tolerating cuffless trach, phonating w/ digital occlusion.  Spoke to speech for PMV re-eval soon than 9/6.  Elevated BP, Hydralazine increased.- will monitor.  9/5: trach dislodged, ENT changed to #8 cuffless Shiley. OOB to chair  9/6: OOB to chair today, gave dulcolax suppository   9/7: agitated while in chair today, gave Seroquel.  Producing thick, yellow secretions. Afebrile. Managing secretions with duoneb and hyper-sal 7%/Chest PT.  9/8: Had spurious leukocytosis. Afebrile, hemodynamically stable. Repeated cbc, sent Bcx.  Spiked temp of 102.5, cbc rechecked which was consistent with leukocytosis. PAN cultured. Noted urinary retention of 2600ml urine, abdomen distended with some tenderness on palpation.  F/U CT abd/pelvis/chest.  9/9: Patient appears well despite leukocytosis of 31. Sputum Cx growing gram neg rods. Remains on meropenem, added Vancoycin PO per ID for concern of recent Cdiff infection.  9/10: Afebrile over night. Leukocytosis decreasing. UCx growing pan-sensitive pseudomonas. Will continue meropenem and prophylactic vancomycin PO. Thick brown tinged sputum with suctioning, aggressive chest PT, nebs + hypersal.  9/11: Patient with episode of agitation and delirium early this morning. Seemingly did not respond to any anxiolytic or sedative meds given. Patient denies having any pain, states that he's "In the [place]". Thorough exam revealed IV access was not working. A new IV line was placed in RLE and 2mg IV ativan given which calmed the patient. Leukocytosis continues to improve on ABx. Noted to have -120s, EKG confirms AFIBB. Metoprolol increased.  9/12: Less Agitated today, improving CR and Leukocytosis on Abx. Still with Urinary retention, increased cardura. 60-year-old male with a history of A-Fib on warfarin who presents with cardiac arrest at work with downtime of about 25 minutes prior to ROSC. S/p therapeutic hypothermia. 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 Now with resolved neurogenic/cardiogenic shock. Course complicated by GI bleed s/p PPI gtt, bleeding from scott s/p clot irrigation, prolonged respiratory failure s/p trach (8/24) and PEG (8/26). Currently with A-Fib with RVR and C diff colitis.  Also developed gross hematuria - Urology consulted +catheter with clot (irrigated and exchanged) suspected 2/2 traumatic catheterization. Additionally course complicated by fever and Pseudomonas PNA (s/p Rx) then developed C diff (now with resolved leukocytosis and improved stooling, now soft per report); remains on enteral Vanco    8/29: Received to [facility]  8/30: Less Agitated overnight, Afebrile, WBC decreasing, CR elevated but Stable, Tolerating TC all day yesterday, will Attempt TC ATC tonight and ABG in am, F/u Bcx from 8/30. Rectal tube dc'd.  Speech eval for AAC device and will F/u.  8/31: will attempt TC around the clock again tonight. Stopped amiodarone as per cards.  Will be cathed if cleared by ID and renal.  9/1: Elevated BP overnight, BP stable today, afebrile. Lethargic earlier today on exam; ABG Stable; Head CT done- no new ICH, ventricles enlarged ? Hydrocephalus, Spoke to Max from [facility] who will see- although felt no intervention required at this time. Tolerating TC ATC, will attempt cuffless trach tomorrow. C diff Iso discontinued as discussed w/ Inf control.  9/2: still with secretions, will downsize when improved. Will cath as outpatient. Decreased opioids  9/3: PICC dc'd, Trach downsized to #7 cuffless Portex. stomach distended  9/4: Occasionally Restless, afebrile. Tolerating cuffless trach, phonating w/ digital occlusion.  Spoke to speech for PMV re-eval soon than 9/6.  Elevated BP, Hydralazine increased.- will monitor.  9/5: trach dislodged, ENT changed to #8 cuffless Shiley. OOB to chair  9/6: OOB to chair today, gave dulcolax suppository   9/7: agitated while in chair today, gave Seroquel.  Producing thick, yellow secretions. Afebrile. Managing secretions with duoneb and hyper-sal 7%/Chest PT.  9/8: Had spurious leukocytosis. Afebrile, hemodynamically stable. Repeated cbc, sent Bcx.  Spiked temp of 102.5, cbc rechecked which was consistent with leukocytosis. PAN cultured. Noted urinary retention of 2600ml urine, abdomen distended with some tenderness on palpation.  F/U CT abd/pelvis/chest.  9/9: Patient appears well despite leukocytosis of 31. Sputum Cx growing gram neg rods. Remains on meropenem, added Vancoycin PO per ID for concern of recent Cdiff infection.  9/10: Afebrile over night. Leukocytosis decreasing. UCx growing pan-sensitive pseudomonas. Will continue meropenem and prophylactic vancomycin PO. Thick brown tinged sputum with suctioning, aggressive chest PT, nebs + hypersal.  9/11: Patient with episode of agitation and delirium early this morning. Seemingly did not respond to any anxiolytic or sedative meds given. Patient denies having any pain, states that he's "In the [place]". Thorough exam revealed IV access was not working. A new IV line was placed in RLE and 2mg IV ativan given which calmed the patient. Leukocytosis continues to improve on ABx. Noted to have -120s, EKG confirms AFIBB. Metoprolol increased.  9/12: Less Agitated today, improving CR and Leukocytosis on Cefepime; Still with Urinary retention and requiring prn straight cath- cardura increased.

## 2020-09-12 NOTE — PROGRESS NOTE ADULT - PROBLEM SELECTOR PLAN 9
- Intermittent straight catherization required, currently Q6H. However patient with large volumes and may need more frequent vs scott.  - Cardura started 9/10, increased to

## 2020-09-12 NOTE — PROGRESS NOTE ADULT - ASSESSMENT
59 yo male admitted early August with out of hospital arrest.  ER evaluation found Rt sided SAH, cerebral angio x 2 was negative.  Hypoxic encephalopathy following event.  S/P cefepime 8/14-8/20 for pneumonia.  S/P treatment for C Diff, course completed 8/29.  S/P trach 8/24 and Peg 8/26.  Fever to 102.7 on 9/8 led to meropenem and po vanco 125 BID being started.  WBC of 30,000 decreased to 15,000  CT of C/A/P without clear source of infection.  Pseudomonas in urine (pansensitive), pseudomonas/providencia in sputum,and sterile blood.  No signs of recurrent C Diff  Possible  source to sepsis , he requires q6 strait cath for large PVR  Flow sheets reviewed and he remains afebrile   white count continues to trend down, he is clinically responding to antibiotic treatment    Plan   Day 4 of 10 antibiotics was on Merrem reviewed orders and now he has been changed to Cefepime   ·	Continue Cefepime to continue to treat Pseudomonas in the urine and also found to have Pseudomonas/Providencia  in the sputum   ·	Continue po vanco, 125 BID for C Diff prophylaxis  ·	Continue supportive care as per pulmonary team in [facility]

## 2020-09-12 NOTE — PROGRESS NOTE ADULT - SUBJECTIVE AND OBJECTIVE BOX
Patient is a 60y old  Male who presents with a chief complaint of Cardiac arrest (11 Sep 2020 07:59)      Interval Events:    REVIEW OF SYSTEMS:  [ ] Positive  [ ] All other systems negative  [ ] Unable to assess ROS because ________    Vital Signs Last 24 Hrs  T(C): 36.6 (09-11-20 @ 19:26), Max: 36.8 (09-11-20 @ 13:38)  T(F): 97.9 (09-11-20 @ 19:26), Max: 98.3 (09-11-20 @ 13:38)  HR: 100 (09-11-20 @ 20:55) (81 - 115)  BP: 135/82 (09-11-20 @ 19:26) (118/77 - 135/82)  RR: 21 (09-11-20 @ 20:55) (18 - 21)  SpO2: 100% (09-11-20 @ 20:55) (100% - 100%)PHYSICAL EXAM:  HEENT:   [ ]Tracheostomy:  [ ]Pupils equal  [ ]No oral lesions  [ ]Abnormal        SKIN  [ ]No Rash  [ ] Abnormal  [ ] pressure    CARDIAC  [ ]Regular  [ ]Abnormal    PULMONARY  [ ]Bilateral Clear Breath Sounds  [ ]Normal Excursion  [ ]Abnormal    GI  [ ]PEG      [ ] +BS		              [ ]Soft, nondistended, nontender	  [ ]Abnormal    MUSCULOSKELETAL                                   [ ]Bedbound                 [ ]Abnormal    [ ]Ambulatory/OOB to chair                           EXTREMITIES                                         [ ]Normal  [ ]Edema                           NEUROLOGIC  [ ] Normal, non focal  [ ] Focal findings:    PSYCHIATRIC  [ ]Alert and appropriate  [ ] Sedated	 [ ]Agitated    :  Umaña: [ ] Yes, if yes: Date of Placement:                   [  ] No    LINES: Central Lines [ ] Yes, if yes: Date of Placement                                     [  ] No    HOSPITAL MEDICATIONS:  MEDICATIONS  (STANDING):  albuterol/ipratropium for Nebulization. 3 milliLiter(s) Nebulizer every 6 hours  amantadine Syrup 100 milliGRAM(s) Oral <User Schedule>  artificial  tears Solution 1 Drop(s) Both EYES every 6 hours  aspirin  chewable 81 milliGRAM(s) Enteral Tube daily  bisacodyl Suppository 10 milliGRAM(s) Rectal at bedtime  cefepime   IVPB 2000 milliGRAM(s) IV Intermittent every 12 hours  chlorhexidine 4% Liquid 1 Application(s) Topical <User Schedule>  dextrose 5%. 1000 milliLiter(s) (50 mL/Hr) IV Continuous <Continuous>  dextrose 50% Injectable 12.5 Gram(s) IV Push once  dextrose 50% Injectable 25 Gram(s) IV Push once  dextrose 50% Injectable 25 Gram(s) IV Push once  doxazosin 4 milliGRAM(s) Oral at bedtime  famotidine    Tablet 20 milliGRAM(s) Oral daily  fentaNYL   Patch  75 MICROgram(s)/Hr 1 Patch Transdermal every 72 hours  heparin   Injectable 5000 Unit(s) SubCutaneous every 8 hours  hydrALAZINE 100 milliGRAM(s) Oral three times a day  isosorbide   dinitrate Tablet (ISORDIL) 20 milliGRAM(s) Enteral Tube <User Schedule>  lactobacillus acidophilus 1 Tablet(s) Oral two times a day  lidocaine   Patch 1 Patch Transdermal daily  lidocaine   Patch 1 Patch Transdermal every 24 hours  metoprolol tartrate 75 milliGRAM(s) Oral every 8 hours  QUEtiapine 50 milliGRAM(s) Oral every 6 hours  sodium chloride 3%  Inhalation 3 milliLiter(s) Inhalation every 6 hours  vancomycin    Solution 125 milliGRAM(s) Enteral Tube two times a day    MEDICATIONS  (PRN):  dextrose 40% Gel 15 Gram(s) Oral once PRN Blood Glucose LESS THAN 70 milliGRAM(s)/deciliter  glucagon  Injectable 1 milliGRAM(s) IntraMuscular once PRN Glucose LESS THAN 70 milligrams/deciliter  polyethylene glycol 3350 17 Gram(s) Oral daily PRN Constipation      LABS:                        9.0    13.87 )-----------( 259      ( 12 Sep 2020 07:03 )             30.0     09-12    137  |  98  |  27<H>  ----------------------------<  117<H>  4.5   |  26  |  1.54<H>    Ca    9.5      12 Sep 2020 07:03  Phos  3.7     09-12  Mg     1.8     09-12          Arterial Blood Gas:  09-11 @ 08:14  7.50/35/108/27/98/3.9  ABG lactate: --      CAPILLARY BLOOD GLUCOSE    MICROBIOLOGY:     RADIOLOGY:  [ ] Reviewed and interpreted by me     Patient is a 60y old  Male who presents with a chief complaint of Cardiac arrest (11 Sep 2020 07:59)      Interval Events: Less Agitated overnight     REVIEW OF SYSTEMS:  [ ] Positive  [x ] All other systems negative  [ ] Unable to assess ROS because ________    Vital Signs Last 24 Hrs  T(C): 36.6 (09-11-20 @ 19:26), Max: 36.8 (09-11-20 @ 13:38)  T(F): 97.9 (09-11-20 @ 19:26), Max: 98.3 (09-11-20 @ 13:38)  HR: 100 (09-11-20 @ 20:55) (81 - 115)  BP: 135/82 (09-11-20 @ 19:26) (118/77 - 135/82)  RR: 21 (09-11-20 @ 20:55) (18 - 21)  SpO2: 100% (09-11-20 @ 20:55) (100% - 100%)    PHYSICAL EXAM:    HEENT:   [x]Tracheostomy: #8 Shiley cuffless   [ ]Pupils equal  [ ]No oral lesions  [x]Abnormal: Rt eye closed l     SKIN  [x]No Rash  [ ] Abnormal  [ ] pressure    CARDIAC  [ ]Regular  [x]Abnormal: irregularly irregular     PULMONARY  [x]Bilateral Clear Breath Sounds   [ ]Normal Excursion  [ ]Abnormal    GI  [x]PEG      [x] +BS		              [x  ]Soft, nondistended, nontender	  []Abnormal: mild distention, nontender     MUSCULOSKELETAL                                   [x]Bedbound                 [x]Abnormal: moves RUE/RLE; unable to move L side s/p SAH this admission    [ ]Ambulatory/OOB to chair                           EXTREMITIES                                         [x ]Normal  []Edema:                         NEUROLOGIC  [ ] Normal, non focal  [x] Focal findings: awake, opens L eye, Less agitated, Follows commands, answers questions appropriately    PSYCHIATRIC  []Alert and appropriate  [ ] Sedated	 [x ]Agitated - occasionally     :  Umaña: [ ] Yes, if yes: Date of Placement:                   [ ] No Condom Cath     LINES: Central Lines [ ] Yes, if yes: Date of Placement                                     [x] No      HOSPITAL MEDICATIONS:  MEDICATIONS  (STANDING):  albuterol/ipratropium for Nebulization. 3 milliLiter(s) Nebulizer every 6 hours  amantadine Syrup 100 milliGRAM(s) Oral <User Schedule>  artificial  tears Solution 1 Drop(s) Both EYES every 6 hours  aspirin  chewable 81 milliGRAM(s) Enteral Tube daily  bisacodyl Suppository 10 milliGRAM(s) Rectal at bedtime  cefepime   IVPB 2000 milliGRAM(s) IV Intermittent every 12 hours  chlorhexidine 4% Liquid 1 Application(s) Topical <User Schedule>  dextrose 5%. 1000 milliLiter(s) (50 mL/Hr) IV Continuous <Continuous>  dextrose 50% Injectable 12.5 Gram(s) IV Push once  dextrose 50% Injectable 25 Gram(s) IV Push once  dextrose 50% Injectable 25 Gram(s) IV Push once  doxazosin 4 milliGRAM(s) Oral at bedtime  famotidine    Tablet 20 milliGRAM(s) Oral daily  fentaNYL   Patch  75 MICROgram(s)/Hr 1 Patch Transdermal every 72 hours  heparin   Injectable 5000 Unit(s) SubCutaneous every 8 hours  hydrALAZINE 100 milliGRAM(s) Oral three times a day  isosorbide   dinitrate Tablet (ISORDIL) 20 milliGRAM(s) Enteral Tube <User Schedule>  lactobacillus acidophilus 1 Tablet(s) Oral two times a day  lidocaine   Patch 1 Patch Transdermal daily  lidocaine   Patch 1 Patch Transdermal every 24 hours  metoprolol tartrate 75 milliGRAM(s) Oral every 8 hours  QUEtiapine 50 milliGRAM(s) Oral every 6 hours  sodium chloride 3%  Inhalation 3 milliLiter(s) Inhalation every 6 hours  vancomycin    Solution 125 milliGRAM(s) Enteral Tube two times a day    MEDICATIONS  (PRN):  dextrose 40% Gel 15 Gram(s) Oral once PRN Blood Glucose LESS THAN 70 milliGRAM(s)/deciliter  glucagon  Injectable 1 milliGRAM(s) IntraMuscular once PRN Glucose LESS THAN 70 milligrams/deciliter  polyethylene glycol 3350 17 Gram(s) Oral daily PRN Constipation      LABS:                        9.0    13.87 )-----------( 259      ( 12 Sep 2020 07:03 )             30.0     09-12    137  |  98  |  27<H>  ----------------------------<  117<H>  4.5   |  26  |  1.54<H>    Ca    9.5      12 Sep 2020 07:03  Phos  3.7     09-12  Mg     1.8     09-12          Arterial Blood Gas:  09-11 @ 08:14  7.50/35/108/27/98/3.9  ABG lactate: --      CAPILLARY BLOOD GLUCOSE    MICROBIOLOGY:     RADIOLOGY:  [ ] Reviewed and interpreted by me

## 2020-09-12 NOTE — PROGRESS NOTE ADULT - PROBLEM SELECTOR PLAN 5
Completed Abx for Pseudomonas aeruginosa pneumonia/colonization:  - S/p prolonged course of cefepime until 8/14-8/20   - If develops increased secretions, may benefit from inhaled tobramycin  - C diff: Completed oral Vanco 8/29   - UTI: Febrile with UCx growing PSA. Meropenem 1GM Q12H and Vancomycin 125mg BID for Cdiff PPx started 9/8 -->, Meropenem switched to Cefepime on 9/11  - ID appreciated.

## 2020-09-12 NOTE — PROGRESS NOTE ADULT - ATTENDING COMMENTS
Patient with episodes of agitation that has responded to increase in seroquil. WBC urinary infection improved.

## 2020-09-12 NOTE — PROGRESS NOTE ADULT - PROBLEM SELECTOR PLAN 6
A-Fib with RVR:  - DC'd Amiodarone (8/31)  - - Hold off on therapeutic a/c given SAH  - SBP goal 100- 160  -TTE: Global LV dysfunction. EF 41%, Severe concentric LVH, flattening of interventricular septum.   - Metoprolol increased to 75mg Q8H fo better rate control

## 2020-09-13 LAB
ANION GAP SERPL CALC-SCNC: 12 MMOL/L — SIGNIFICANT CHANGE UP (ref 5–17)
BASOPHILS # BLD AUTO: 0.08 K/UL — SIGNIFICANT CHANGE UP (ref 0–0.2)
BASOPHILS NFR BLD AUTO: 0.8 % — SIGNIFICANT CHANGE UP (ref 0–2)
BUN SERPL-MCNC: 27 MG/DL — HIGH (ref 7–23)
CALCIUM SERPL-MCNC: 9.3 MG/DL — SIGNIFICANT CHANGE UP (ref 8.4–10.5)
CHLORIDE SERPL-SCNC: 98 MMOL/L — SIGNIFICANT CHANGE UP (ref 96–108)
CO2 SERPL-SCNC: 27 MMOL/L — SIGNIFICANT CHANGE UP (ref 22–31)
CREAT SERPL-MCNC: 1.43 MG/DL — HIGH (ref 0.5–1.3)
CULTURE RESULTS: SIGNIFICANT CHANGE UP
CULTURE RESULTS: SIGNIFICANT CHANGE UP
EOSINOPHIL # BLD AUTO: 0.46 K/UL — SIGNIFICANT CHANGE UP (ref 0–0.5)
EOSINOPHIL NFR BLD AUTO: 4.4 % — SIGNIFICANT CHANGE UP (ref 0–6)
GLUCOSE SERPL-MCNC: 128 MG/DL — HIGH (ref 70–99)
HCT VFR BLD CALC: 29.6 % — LOW (ref 39–50)
HGB BLD-MCNC: 8.7 G/DL — LOW (ref 13–17)
IMM GRANULOCYTES NFR BLD AUTO: 1.9 % — HIGH (ref 0–1.5)
LYMPHOCYTES # BLD AUTO: 1.37 K/UL — SIGNIFICANT CHANGE UP (ref 1–3.3)
LYMPHOCYTES # BLD AUTO: 13.2 % — SIGNIFICANT CHANGE UP (ref 13–44)
MAGNESIUM SERPL-MCNC: 1.7 MG/DL — SIGNIFICANT CHANGE UP (ref 1.6–2.6)
MCHC RBC-ENTMCNC: 26.1 PG — LOW (ref 27–34)
MCHC RBC-ENTMCNC: 29.4 GM/DL — LOW (ref 32–36)
MCV RBC AUTO: 88.9 FL — SIGNIFICANT CHANGE UP (ref 80–100)
MONOCYTES # BLD AUTO: 0.67 K/UL — SIGNIFICANT CHANGE UP (ref 0–0.9)
MONOCYTES NFR BLD AUTO: 6.5 % — SIGNIFICANT CHANGE UP (ref 2–14)
NEUTROPHILS # BLD AUTO: 7.6 K/UL — HIGH (ref 1.8–7.4)
NEUTROPHILS NFR BLD AUTO: 73.2 % — SIGNIFICANT CHANGE UP (ref 43–77)
NRBC # BLD: 0 /100 WBCS — SIGNIFICANT CHANGE UP (ref 0–0)
PHOSPHATE SERPL-MCNC: 3.9 MG/DL — SIGNIFICANT CHANGE UP (ref 2.5–4.5)
PLATELET # BLD AUTO: 251 K/UL — SIGNIFICANT CHANGE UP (ref 150–400)
POTASSIUM SERPL-MCNC: 4.6 MMOL/L — SIGNIFICANT CHANGE UP (ref 3.5–5.3)
POTASSIUM SERPL-SCNC: 4.6 MMOL/L — SIGNIFICANT CHANGE UP (ref 3.5–5.3)
RBC # BLD: 3.33 M/UL — LOW (ref 4.2–5.8)
RBC # FLD: 15.1 % — HIGH (ref 10.3–14.5)
SODIUM SERPL-SCNC: 137 MMOL/L — SIGNIFICANT CHANGE UP (ref 135–145)
SPECIMEN SOURCE: SIGNIFICANT CHANGE UP
SPECIMEN SOURCE: SIGNIFICANT CHANGE UP
WBC # BLD: 10.38 K/UL — SIGNIFICANT CHANGE UP (ref 3.8–10.5)
WBC # FLD AUTO: 10.38 K/UL — SIGNIFICANT CHANGE UP (ref 3.8–10.5)

## 2020-09-13 PROCEDURE — 99233 SBSQ HOSP IP/OBS HIGH 50: CPT | Mod: GC

## 2020-09-13 RX ADMIN — ISOSORBIDE DINITRATE 20 MILLIGRAM(S): 5 TABLET ORAL at 00:05

## 2020-09-13 RX ADMIN — SODIUM CHLORIDE 3 MILLILITER(S): 9 INJECTION INTRAMUSCULAR; INTRAVENOUS; SUBCUTANEOUS at 05:47

## 2020-09-13 RX ADMIN — Medication 8 MILLIGRAM(S): at 21:35

## 2020-09-13 RX ADMIN — LIDOCAINE 1 PATCH: 4 CREAM TOPICAL at 13:13

## 2020-09-13 RX ADMIN — Medication 100 MILLIGRAM(S): at 05:23

## 2020-09-13 RX ADMIN — FENTANYL CITRATE 1 PATCH: 50 INJECTION INTRAVENOUS at 21:53

## 2020-09-13 RX ADMIN — SODIUM CHLORIDE 3 MILLILITER(S): 9 INJECTION INTRAMUSCULAR; INTRAVENOUS; SUBCUTANEOUS at 17:48

## 2020-09-13 RX ADMIN — Medication 10 MILLIGRAM(S): at 21:34

## 2020-09-13 RX ADMIN — LIDOCAINE 1 PATCH: 4 CREAM TOPICAL at 12:33

## 2020-09-13 RX ADMIN — FENTANYL CITRATE 1 PATCH: 50 INJECTION INTRAVENOUS at 08:21

## 2020-09-13 RX ADMIN — FAMOTIDINE 20 MILLIGRAM(S): 10 INJECTION INTRAVENOUS at 12:38

## 2020-09-13 RX ADMIN — Medication 3 MILLILITER(S): at 11:49

## 2020-09-13 RX ADMIN — Medication 3 MILLILITER(S): at 00:45

## 2020-09-13 RX ADMIN — Medication 75 MILLIGRAM(S): at 21:36

## 2020-09-13 RX ADMIN — CHLORHEXIDINE GLUCONATE 1 APPLICATION(S): 213 SOLUTION TOPICAL at 05:22

## 2020-09-13 RX ADMIN — Medication 100 MILLIGRAM(S): at 15:58

## 2020-09-13 RX ADMIN — Medication 75 MILLIGRAM(S): at 05:24

## 2020-09-13 RX ADMIN — QUETIAPINE FUMARATE 50 MILLIGRAM(S): 200 TABLET, FILM COATED ORAL at 17:38

## 2020-09-13 RX ADMIN — HEPARIN SODIUM 5000 UNIT(S): 5000 INJECTION INTRAVENOUS; SUBCUTANEOUS at 13:15

## 2020-09-13 RX ADMIN — SODIUM CHLORIDE 3 MILLILITER(S): 9 INJECTION INTRAMUSCULAR; INTRAVENOUS; SUBCUTANEOUS at 23:48

## 2020-09-13 RX ADMIN — Medication 125 MILLIGRAM(S): at 05:25

## 2020-09-13 RX ADMIN — Medication 100 MILLIGRAM(S): at 12:38

## 2020-09-13 RX ADMIN — SODIUM CHLORIDE 3 MILLILITER(S): 9 INJECTION INTRAMUSCULAR; INTRAVENOUS; SUBCUTANEOUS at 11:49

## 2020-09-13 RX ADMIN — Medication 81 MILLIGRAM(S): at 12:38

## 2020-09-13 RX ADMIN — Medication 1 TABLET(S): at 17:37

## 2020-09-13 RX ADMIN — ISOSORBIDE DINITRATE 20 MILLIGRAM(S): 5 TABLET ORAL at 08:20

## 2020-09-13 RX ADMIN — CEFEPIME 100 MILLIGRAM(S): 1 INJECTION, POWDER, FOR SOLUTION INTRAMUSCULAR; INTRAVENOUS at 17:45

## 2020-09-13 RX ADMIN — Medication 125 MILLIGRAM(S): at 17:40

## 2020-09-13 RX ADMIN — Medication 1 DROP(S): at 23:06

## 2020-09-13 RX ADMIN — Medication 100 MILLIGRAM(S): at 21:36

## 2020-09-13 RX ADMIN — Medication 1 DROP(S): at 17:36

## 2020-09-13 RX ADMIN — Medication 3 MILLILITER(S): at 05:46

## 2020-09-13 RX ADMIN — Medication 100 MILLIGRAM(S): at 08:16

## 2020-09-13 RX ADMIN — QUETIAPINE FUMARATE 50 MILLIGRAM(S): 200 TABLET, FILM COATED ORAL at 00:05

## 2020-09-13 RX ADMIN — Medication 75 MILLIGRAM(S): at 13:16

## 2020-09-13 RX ADMIN — LIDOCAINE 1 PATCH: 4 CREAM TOPICAL at 21:37

## 2020-09-13 RX ADMIN — QUETIAPINE FUMARATE 50 MILLIGRAM(S): 200 TABLET, FILM COATED ORAL at 12:38

## 2020-09-13 RX ADMIN — Medication 3 MILLILITER(S): at 17:48

## 2020-09-13 RX ADMIN — HEPARIN SODIUM 5000 UNIT(S): 5000 INJECTION INTRAVENOUS; SUBCUTANEOUS at 05:22

## 2020-09-13 RX ADMIN — Medication 1 TABLET(S): at 05:24

## 2020-09-13 RX ADMIN — LIDOCAINE 1 PATCH: 4 CREAM TOPICAL at 08:21

## 2020-09-13 RX ADMIN — Medication 3 MILLILITER(S): at 23:48

## 2020-09-13 RX ADMIN — Medication 1 DROP(S): at 00:05

## 2020-09-13 RX ADMIN — SODIUM CHLORIDE 3 MILLILITER(S): 9 INJECTION INTRAMUSCULAR; INTRAVENOUS; SUBCUTANEOUS at 00:45

## 2020-09-13 RX ADMIN — ISOSORBIDE DINITRATE 20 MILLIGRAM(S): 5 TABLET ORAL at 23:07

## 2020-09-13 RX ADMIN — CEFEPIME 100 MILLIGRAM(S): 1 INJECTION, POWDER, FOR SOLUTION INTRAMUSCULAR; INTRAVENOUS at 05:21

## 2020-09-13 RX ADMIN — QUETIAPINE FUMARATE 50 MILLIGRAM(S): 200 TABLET, FILM COATED ORAL at 05:25

## 2020-09-13 RX ADMIN — Medication 1 DROP(S): at 05:20

## 2020-09-13 RX ADMIN — QUETIAPINE FUMARATE 50 MILLIGRAM(S): 200 TABLET, FILM COATED ORAL at 23:07

## 2020-09-13 RX ADMIN — ISOSORBIDE DINITRATE 20 MILLIGRAM(S): 5 TABLET ORAL at 17:37

## 2020-09-13 RX ADMIN — Medication 1 DROP(S): at 12:38

## 2020-09-13 RX ADMIN — LIDOCAINE 1 PATCH: 4 CREAM TOPICAL at 12:39

## 2020-09-13 RX ADMIN — HEPARIN SODIUM 5000 UNIT(S): 5000 INJECTION INTRAVENOUS; SUBCUTANEOUS at 21:35

## 2020-09-13 NOTE — PROGRESS NOTE ADULT - ASSESSMENT
59 yo male admitted early August with out of hospital arrest.  ER evaluation found Rt sided SAH, cerebral angio x 2 was negative.  Hypoxic encephalopathy following event.  S/P cefepime 8/14-8/20 for pneumonia.  S/P treatment for C Diff, course completed 8/29.  S/P trach 8/24 and Peg 8/26.  Fever to 102.7 on 9/8 led to meropenem and po vanco 125 BID being started.  CT of C/A/P without clear source of infection.  Pseudomonas in urine (pansensitive), pseudomonas/providencia in sputum,and sterile blood.  No signs of recurrent C Diff  Possible  source to sepsis , he requires q6 strait cath for large PVR  Flow sheets reviewed and he remains afebrile   WBC continues to trend down     Plan   Day 5 of 10 antibiotics was on Merrem reviewed orders and now he has been changed to Cefepime   ·	Continue Cefepime for UTI and pneumonia   ·	Continue po vanco, 125 BID for C Diff prophylaxis  ·	Continue supportive care as per pulmonary team in [facility]

## 2020-09-13 NOTE — SPEAKING VALVE EVALUATION - OBSERVATIONS
Patient seen for Passy-[place]elyn speaking valve evaluation. Patient maintained on 30% FIO2 via trach collar. As per NP Josué and RN Al pt more responsive and improvement in overall medical state. Pt indicated that he can not see anything during this assessment, which was noted during previous intervention for AAC and communicated to RN. D/w NP and MD this date. Plan for Neuro.

## 2020-09-13 NOTE — SPEAKING VALVE EVALUATION - COMMENTS
Continued:   s/p clot irrigation by urology; suspected 2/2 traumatic catheterization. 8/21: Episode of respiratory distress overnight; desaturated 85%, mucus plug/ambu bagged. Pt w/ prolonged respiratory failure s/p trach (#8 DCT Tracheostomy)-Trach  secured with sutures x4 and Umbilical Tie- on 8/24 and PEG on 8/26. Noted on echo to have acute LV systolic heart failure, possible stunned myocardium due to cardiac arrest vs. SAH. Currently, he is awake and alert, moving RUE/RLE spontaneously, following simple commands. (+) A-Fib with RVR. Being treated for C diff colitis. Noted pt w/ trach change to #7 cuffless Portex on 9/3 however due to it becoming dislodged trach was changed to #8 cuffless Shiley by ENT on 9/5.

## 2020-09-13 NOTE — SPEAKING VALVE EVALUATION - DIAGNOSTIC IMPRESSIONS
Patient was seated in an upright position in bed following suctioning by JOSAFAT Lizarraga. (+) Effortful cough. He presented on a #8 Shiley Cuffless 30% Fi02 via trach collar. Vitals at baseline were WNL.; HR 90 BPM, 02 100%, and RR 21 BPM. Vocal quality was breathy w/ leak speech at baseline. Scant clear secretions noted prior to suctioning. Digital occlusion was attempted via SLP's gloved finger on hub of trach, which was met with immediate cough response. Back pressure was present. Pt expressed that he had a hard time breathing during this attempt, x3. Similar findings w./ each attempt. Pt is NOT APPROPRIATE for PMV placement given above rationale.     Pt was educated extensively on rationale and usage of PMV not being appropriate at this time. Mendez Lizarraga.

## 2020-09-13 NOTE — PROGRESS NOTE ADULT - SUBJECTIVE AND OBJECTIVE BOX
CC: f/u for fever and leukocytosis    Patient is trached     REVIEW OF SYSTEMS:  All other review of systems negative (Comprehensive ROS): limited by condition, minimal secretions, tolerating peg feeds, no diarrhea    Antimicrobials Day #  : day 6  meropenem  IVPB 1000 milliGRAM(s) IV Intermittent every 12 hours  meropenem  IVPB      vancomycin    Solution 125 milliGRAM(s) Enteral Tube two times a day    Other Medications Reviewed  MEDICATIONS  (STANDING):  albuterol/ipratropium for Nebulization. 3 milliLiter(s) Nebulizer every 6 hours  amantadine Syrup 100 milliGRAM(s) Oral <User Schedule>  artificial  tears Solution 1 Drop(s) Both EYES every 6 hours  aspirin  chewable 81 milliGRAM(s) Enteral Tube daily  bisacodyl Suppository 10 milliGRAM(s) Rectal at bedtime  chlorhexidine 4% Liquid 1 Application(s) Topical <User Schedule>  dextrose 5%. 1000 milliLiter(s) (50 mL/Hr) IV Continuous <Continuous>  dextrose 50% Injectable 12.5 Gram(s) IV Push once  dextrose 50% Injectable 25 Gram(s) IV Push once  dextrose 50% Injectable 25 Gram(s) IV Push once  doxazosin 2 milliGRAM(s) Oral at bedtime  famotidine    Tablet 20 milliGRAM(s) Oral daily  fentaNYL   Patch  75 MICROgram(s)/Hr 1 Patch Transdermal every 72 hours  heparin   Injectable 5000 Unit(s) SubCutaneous every 8 hours  hydrALAZINE 100 milliGRAM(s) Oral three times a day  isosorbide   dinitrate Tablet (ISORDIL) 20 milliGRAM(s) Enteral Tube <User Schedule>  lactobacillus acidophilus 1 Tablet(s) Oral two times a day  lidocaine   Patch 1 Patch Transdermal daily  lidocaine   Patch 1 Patch Transdermal every 24 hours  meropenem  IVPB 1000 milliGRAM(s) IV Intermittent every 12 hours  meropenem  IVPB      metoprolol tartrate 50 milliGRAM(s) Oral three times a day  QUEtiapine 50 milliGRAM(s) Oral <User Schedule>  sodium chloride 3%  Inhalation 3 milliLiter(s) Inhalation every 6 hours  vancomycin    Solution 125 milliGRAM(s) Enteral Tube two times a day    MEDICATIONS  (PRN):  dextrose 40% Gel 15 Gram(s) Oral once PRN Blood Glucose LESS THAN 70 milliGRAM(s)/deciliter  glucagon  Injectable 1 milliGRAM(s) IntraMuscular once PRN Glucose LESS THAN 70 milligrams/deciliter  polyethylene glycol 3350 17 Gram(s) Oral daily PRN Constipation    Vital Signs Last 24 Hrs  T(C): 36.7 (13 Sep 2020 13:19), Max: 36.9 (13 Sep 2020 04:00)  T(F): 98 (13 Sep 2020 13:19), Max: 98.5 (13 Sep 2020 08:15)  HR: 89 (13 Sep 2020 13:19) (65 - 102)  BP: 113/86 (13 Sep 2020 13:19) (109/56 - 145/76)  BP(mean): --  RR: 18 (13 Sep 2020 13:19) (18 - 21)  SpO2: 100% (13 Sep 2020 13:19) (95% - 100%)  PHYSICAL EXAM:  General: awake, restless  Eyes:  anicteric, no conjunctival injection, no discharge  Oropharynx: no lesions or injection 	  Neck: supple,trach collar  Lungs: clear to auscultation  Heart: regular rate and rhythm; no murmur, rubs or gallops  Abdomen: soft, mild distension, nontender, peg  Skin: no lesions  Extremities: no clubbing, cyanosis, or edema  Neurologic: restless and agitated  : no catheter  LAB RESULTS:                                   8.7    10.38 )-----------( 251      ( 13 Sep 2020 06:47 )             29.6                           9.0    13.87 )-----------( 259      ( 12 Sep 2020 07:03 )             30.0             09-13    137  |  98  |  27<H>  ----------------------------<  128<H>  4.6   |  27  |  1.43<H>    Ca    9.3      13 Sep 2020 06:46  Phos  3.9     09-13  Mg     1.7     09-13                  MICROBIOLOGY:  RECENT CULTURES:  09-08 @ 21:02 .Urine Catheterized Pseudomonas aeruginosa    >100,000 CFU/ml Pseudomonas aeruginosa pansensitive      09-08 @ 18:11 .Sputum Sputum Pseudomonas aeruginosa  Providencia stuartii    Moderate Pseudomonas aeruginosa  Moderate Providencia stuartii  Normal Respiratory Lucrecia absent    No polymorphonuclear leukocytes per low power field  Rare Squamous epithelial cells per low power field  Numerous Gram Negative Rods per oil power field    09-08 @ 15:30 .Blood Blood     No growth to date.      09-08 @ 15:26 .Blood Blood     No growth to date.          RADIOLOGY REVIEWED:  < from: CT Abdomen and Pelvis w/ Oral Cont (09.08.20 @ 17:33) >  IMPRESSION:  No acute abnormalities in the chest, abdomen, or pelvis.    Trace right pleural effusion.    Lipoma and left lateralthigh with internal calcifications.      < from: CT Head No Cont (09.01.20 @ 14:11) >  IMPRESSION:    Redemonstration interventricular hemorrhage in lateral ventricle occipital horns, slightly increased size of lateral and third ventricles, developing hydrocephalus not excluded.    Continued resolution prior subarachnoid hemorrhage, no new hemorrhage or shift. Redemonstration volume loss and microvascular disease with evolving ischemic changes as on MRI in right medial temporal lobe, right posterior limb internal capsule, thalamocapsular junction, thalamus and cerebral peduncle. If symptoms persist consider follow-up head CT or MR if no contraindications.    < end of copied text >

## 2020-09-13 NOTE — PROGRESS NOTE ADULT - ASSESSMENT
60-year-old male with a history of A-Fib on warfarin who presents with cardiac arrest at work with downtime of about 25 minutes prior to ROSC. S/p therapeutic hypothermia. 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 Now with resolved neurogenic/cardiogenic shock. Course complicated by GI bleed s/p PPI gtt, bleeding from scott s/p clot irrigation, prolonged respiratory failure s/p trach (8/24) and PEG (8/26). Currently with A-Fib with RVR and C diff colitis.  Also developed gross hematuria - Urology consulted +catheter with clot (irrigated and exchanged) suspected 2/2 traumatic catheterization. Additionally course complicated by fever and Pseudomonas PNA (s/p Rx) then developed C diff (now with resolved leukocytosis and improved stooling, now soft per report); remains on enteral Vanco    8/29: Received to [facility]  8/30: Less Agitated overnight, Afebrile, WBC decreasing, CR elevated but Stable, Tolerating TC all day yesterday, will Attempt TC ATC tonight and ABG in am, F/u Bcx from 8/30. Rectal tube dc'd.  Speech eval for AAC device and will F/u.  8/31: will attempt TC around the clock again tonight. Stopped amiodarone as per cards.  Will be cathed if cleared by ID and renal.  9/1: Elevated BP overnight, BP stable today, afebrile. Lethargic earlier today on exam; ABG Stable; Head CT done- no new ICH, ventricles enlarged ? Hydrocephalus, Spoke to Max from [facility] who will see- although felt no intervention required at this time. Tolerating TC ATC, will attempt cuffless trach tomorrow. C diff Iso discontinued as discussed w/ Inf control.  9/2: still with secretions, will downsize when improved. Will cath as outpatient. Decreased opioids  9/3: PICC dc'd, Trach downsized to #7 cuffless Portex. stomach distended  9/4: Occasionally Restless, afebrile. Tolerating cuffless trach, phonating w/ digital occlusion.  Spoke to speech for PMV re-eval soon than 9/6.  Elevated BP, Hydralazine increased.- will monitor.  9/5: trach dislodged, ENT changed to #8 cuffless Shiley. OOB to chair  9/6: OOB to chair today, gave dulcolax suppository   9/7: agitated while in chair today, gave Seroquel.  Producing thick, yellow secretions. Afebrile. Managing secretions with duoneb and hyper-sal 7%/Chest PT.  9/8: Had spurious leukocytosis. Afebrile, hemodynamically stable. Repeated cbc, sent Bcx.  Spiked temp of 102.5, cbc rechecked which was consistent with leukocytosis. PAN cultured. Noted urinary retention of 2600ml urine, abdomen distended with some tenderness on palpation.  F/U CT abd/pelvis/chest.  9/9: Patient appears well despite leukocytosis of 31. Sputum Cx growing gram neg rods. Remains on meropenem, added Vancoycin PO per ID for concern of recent Cdiff infection.  9/10: Afebrile over night. Leukocytosis decreasing. UCx growing pan-sensitive pseudomonas. Will continue meropenem and prophylactic vancomycin PO. Thick brown tinged sputum with suctioning, aggressive chest PT, nebs + hypersal.  9/11: Patient with episode of agitation and delirium early this morning. Seemingly did not respond to any anxiolytic or sedative meds given. Patient denies having any pain, states that he's "In the [place]". Thorough exam revealed IV access was not working. A new IV line was placed in RLE and 2mg IV ativan given which calmed the patient. Leukocytosis continues to improve on ABx. Noted to have -120s, EKG confirms AFIBB. Metoprolol increased.  9/12: Less Agitated today, improving CR and Leukocytosis on Cefepime; Still with Urinary retention and requiring prn straight cath- cardura increased. 60-year-old male with a history of A-Fib on warfarin who presents with cardiac arrest at work with downtime of about 25 minutes prior to ROSC. S/p therapeutic hypothermia. 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 Now with resolved neurogenic/cardiogenic shock. Course complicated by GI bleed s/p PPI gtt, bleeding from scott s/p clot irrigation, prolonged respiratory failure s/p trach (8/24) and PEG (8/26). Currently with A-Fib with RVR and C diff colitis.  Also developed gross hematuria - Urology consulted +catheter with clot (irrigated and exchanged) suspected 2/2 traumatic catheterization. Additionally course complicated by fever and Pseudomonas PNA (s/p Rx) then developed C diff (now with resolved leukocytosis and improved stooling, now soft per report); remains on enteral Vanco    8/29: Received to [facility]  8/30: Less Agitated overnight, Afebrile, WBC decreasing, CR elevated but Stable, Tolerating TC all day yesterday, will Attempt TC ATC tonight and ABG in am, F/u Bcx from 8/30. Rectal tube dc'd.  Speech eval for AAC device and will F/u.  8/31: will attempt TC around the clock again tonight. Stopped amiodarone as per cards.  Will be cathed if cleared by ID and renal.  9/1: Elevated BP overnight, BP stable today, afebrile. Lethargic earlier today on exam; ABG Stable; Head CT done- no new ICH, ventricles enlarged ? Hydrocephalus, Spoke to Max from [facility] who will see- although felt no intervention required at this time. Tolerating TC ATC, will attempt cuffless trach tomorrow. C diff Iso discontinued as discussed w/ Inf control.  9/2: still with secretions, will downsize when improved. Will cath as outpatient. Decreased opioids  9/3: PICC dc'd, Trach downsized to #7 cuffless Portex. stomach distended  9/4: Occasionally Restless, afebrile. Tolerating cuffless trach, phonating w/ digital occlusion.  Spoke to speech for PMV re-eval soon than 9/6.  Elevated BP, Hydralazine increased.- will monitor.  9/5: trach dislodged, ENT changed to #8 cuffless Shiley. OOB to chair  9/6: OOB to chair today, gave dulcolax suppository   9/7: agitated while in chair today, gave Seroquel.  Producing thick, yellow secretions. Afebrile. Managing secretions with duoneb and hyper-sal 7%/Chest PT.  9/8: Had spurious leukocytosis. Afebrile, hemodynamically stable. Repeated cbc, sent Bcx.  Spiked temp of 102.5, cbc rechecked which was consistent with leukocytosis. PAN cultured. Noted urinary retention of 2600ml urine, abdomen distended with some tenderness on palpation.  F/U CT abd/pelvis/chest.  9/9: Patient appears well despite leukocytosis of 31. Sputum Cx growing gram neg rods. Remains on meropenem, added Vancoycin PO per ID for concern of recent Cdiff infection.  9/10: Afebrile over night. Leukocytosis decreasing. UCx growing pan-sensitive pseudomonas. Will continue meropenem and prophylactic vancomycin PO. Thick brown tinged sputum with suctioning, aggressive chest PT, nebs + hypersal.  9/11: Patient with episode of agitation and delirium early this morning. Seemingly did not respond to any anxiolytic or sedative meds given. Patient denies having any pain, states that he's "In the [place]". Thorough exam revealed IV access was not working. A new IV line was placed in RLE and 2mg IV ativan given which calmed the patient. Leukocytosis continues to improve on ABx. Noted to have -120s, EKG confirms AFIBB. Metoprolol increased.  9/12: Less Agitated today, improving CR and Leukocytosis on Cefepime; Still with Urinary retention and requiring prn straight cath- cardura increased.   9/13: 60-year-old male with a history of A-Fib on warfarin who presents with cardiac arrest at work with downtime of about 25 minutes prior to ROSC. S/p therapeutic hypothermia. 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 Now with resolved neurogenic/cardiogenic shock. Course complicated by GI bleed s/p PPI gtt, bleeding from scott s/p clot irrigation, prolonged respiratory failure s/p trach (8/24) and PEG (8/26). Currently with A-Fib with RVR and C diff colitis.  Also developed gross hematuria - Urology consulted +catheter with clot (irrigated and exchanged) suspected 2/2 traumatic catheterization. Additionally course complicated by fever and Pseudomonas PNA (s/p Rx) then developed C diff (now with resolved leukocytosis and improved stooling, now soft per report); remains on enteral Vanco    8/29: Received to [facility]  8/30: Less Agitated overnight, Afebrile, WBC decreasing, CR elevated but Stable, Tolerating TC all day yesterday, will Attempt TC ATC tonight and ABG in am, F/u Bcx from 8/30. Rectal tube dc'd.  Speech eval for AAC device and will F/u.  8/31: will attempt TC around the clock again tonight. Stopped amiodarone as per cards.  Will be cathed if cleared by ID and renal.  9/1: Elevated BP overnight, BP stable today, afebrile. Lethargic earlier today on exam; ABG Stable; Head CT done- no new ICH, ventricles enlarged ? Hydrocephalus, Spoke to Max from [facility] who will see- although felt no intervention required at this time. Tolerating TC ATC, will attempt cuffless trach tomorrow. C diff Iso discontinued as discussed w/ Inf control.  9/2: still with secretions, will downsize when improved. Will cath as outpatient. Decreased opioids  9/3: PICC dc'd, Trach downsized to #7 cuffless Portex. stomach distended  9/4: Occasionally Restless, afebrile. Tolerating cuffless trach, phonating w/ digital occlusion.  Spoke to speech for PMV re-eval soon than 9/6.  Elevated BP, Hydralazine increased.- will monitor.  9/5: trach dislodged, ENT changed to #8 cuffless Shiley. OOB to chair  9/6: OOB to chair today, gave dulcolax suppository   9/7: agitated while in chair today, gave Seroquel.  Producing thick, yellow secretions. Afebrile. Managing secretions with duoneb and hyper-sal 7%/Chest PT.  9/8: Had spurious leukocytosis. Afebrile, hemodynamically stable. Repeated cbc, sent Bcx.  Spiked temp of 102.5, cbc rechecked which was consistent with leukocytosis. PAN cultured. Noted urinary retention of 2600ml urine, abdomen distended with some tenderness on palpation.  F/U CT abd/pelvis/chest.  9/9: Patient appears well despite leukocytosis of 31. Sputum Cx growing gram neg rods. Remains on meropenem, added Vancoycin PO per ID for concern of recent Cdiff infection.  9/10: Afebrile over night. Leukocytosis decreasing. UCx growing pan-sensitive pseudomonas. Will continue meropenem and prophylactic vancomycin PO. Thick brown tinged sputum with suctioning, aggressive chest PT, nebs + hypersal.  9/11: Patient with episode of agitation and delirium early this morning. Seemingly did not respond to any anxiolytic or sedative meds given. Patient denies having any pain, states that he's "In the [place]". Thorough exam revealed IV access was not working. A new IV line was placed in RLE and 2mg IV ativan given which calmed the patient. Leukocytosis continues to improve on ABx. Noted to have -120s, EKG confirms AFIBB. Metoprolol increased.  9/12: Less Agitated today, improving CR and Leukocytosis on Cefepime; Still with Urinary retention and requiring prn straight cath- cardura increased.   9/13: No new events

## 2020-09-13 NOTE — PROGRESS NOTE ADULT - SUBJECTIVE AND OBJECTIVE BOX
Subjective: Patient seen and examined. No new events except as noted.     REVIEW OF SYSTEMS:  Unable to obtain       MEDICATIONS:  MEDICATIONS  (STANDING):  albuterol/ipratropium for Nebulization. 3 milliLiter(s) Nebulizer every 6 hours  amantadine Syrup 100 milliGRAM(s) Oral <User Schedule>  artificial  tears Solution 1 Drop(s) Both EYES every 6 hours  aspirin  chewable 81 milliGRAM(s) Enteral Tube daily  bisacodyl Suppository 10 milliGRAM(s) Rectal at bedtime  cefepime   IVPB 2000 milliGRAM(s) IV Intermittent every 12 hours  chlorhexidine 4% Liquid 1 Application(s) Topical <User Schedule>  dextrose 5%. 1000 milliLiter(s) (50 mL/Hr) IV Continuous <Continuous>  dextrose 50% Injectable 12.5 Gram(s) IV Push once  dextrose 50% Injectable 25 Gram(s) IV Push once  dextrose 50% Injectable 25 Gram(s) IV Push once  doxazosin 8 milliGRAM(s) Oral at bedtime  famotidine    Tablet 20 milliGRAM(s) Oral daily  fentaNYL   Patch  75 MICROgram(s)/Hr 1 Patch Transdermal every 72 hours  heparin   Injectable 5000 Unit(s) SubCutaneous every 8 hours  hydrALAZINE 100 milliGRAM(s) Oral three times a day  isosorbide   dinitrate Tablet (ISORDIL) 20 milliGRAM(s) Enteral Tube <User Schedule>  lactobacillus acidophilus 1 Tablet(s) Oral two times a day  lidocaine   Patch 1 Patch Transdermal every 24 hours  lidocaine   Patch 1 Patch Transdermal daily  metoprolol tartrate 75 milliGRAM(s) Oral every 8 hours  QUEtiapine 50 milliGRAM(s) Oral every 6 hours  sodium chloride 3%  Inhalation 3 milliLiter(s) Inhalation every 6 hours  vancomycin    Solution 125 milliGRAM(s) Enteral Tube two times a day      PHYSICAL EXAM:  T(C): 36.9 (09-13-20 @ 08:15), Max: 36.9 (09-13-20 @ 04:00)  HR: 76 (09-13-20 @ 08:37) (65 - 102)  BP: 145/76 (09-13-20 @ 08:15) (109/56 - 145/76)  RR: 20 (09-13-20 @ 08:37) (18 - 24)  SpO2: 95% (09-13-20 @ 08:37) (95% - 100%)  Wt(kg): --  I&O's Summary    12 Sep 2020 07:01  -  13 Sep 2020 07:00  --------------------------------------------------------  IN: 2050 mL / OUT: 2150 mL / NET: -100 mL          Appearance: sleepy , +trach   HEENT:   Dry  oral mucosa,  Lymphatic: No lymphadenopathy  Cardiovascular: Normal S1 S2, No JVD, No murmurs, No edema  Respiratory: Ventilated   Psychiatry: A & O x 0  Gastrointestinal:  Soft, Non-tender, +PEG   Skin: No rashes, No ecchymoses, No cyanosis	  Mental status- No acute distress, EO to stim  -CN- Pupils R 4mm NR, L 2mm sluggish, EOMI, tongue midline, face symmetric  +cough/gag  C briskly, two fingers/thumbs up on RUE, distally AG, wiggles toes on RLE          LABS:    CARDIAC MARKERS:                                8.7    10.38 )-----------( 251      ( 13 Sep 2020 06:47 )             29.6     09-13    137  |  98  |  27<H>  ----------------------------<  128<H>  4.6   |  27  |  1.43<H>    Ca    9.3      13 Sep 2020 06:46  Phos  3.9     09-13  Mg     1.7     09-13      proBNP:   Lipid Profile:   HgA1c:   TSH:             TELEMETRY: 	    ECG:  	  RADIOLOGY:   DIAGNOSTIC TESTING:  [ ] Echocardiogram:  [ ]  Catheterization:  [ ] Stress Test:    OTHER:

## 2020-09-13 NOTE — PROGRESS NOTE ADULT - SUBJECTIVE AND OBJECTIVE BOX
Patient is a 60y old  Male who presents with a chief complaint of Cardiac arrest (11 Sep 2020 07:59)      Interval Events:    REVIEW OF SYSTEMS:  [ ] Positive  [ ] All other systems negative  [ ] Unable to assess ROS because ________    Vital Signs Last 24 Hrs  T(C): 36.9 (09-13-20 @ 04:00), Max: 36.9 (09-13-20 @ 04:00)  T(F): 98.4 (09-13-20 @ 04:00), Max: 98.4 (09-13-20 @ 04:00)  HR: 65 (09-13-20 @ 05:48) (65 - 102)  BP: 109/56 (09-13-20 @ 04:00) (109/56 - 122/82)  RR: 18 (09-13-20 @ 04:46) (18 - 24)  SpO2: 100% (09-13-20 @ 05:48) (98% - 100%)PHYSICAL EXAM:  HEENT:   [ ]Tracheostomy:  [ ]Pupils equal  [ ]No oral lesions  [ ]Abnormal        SKIN  [ ]No Rash  [ ] Abnormal  [ ] pressure    CARDIAC  [ ]Regular  [ ]Abnormal    PULMONARY  [ ]Bilateral Clear Breath Sounds  [ ]Normal Excursion  [ ]Abnormal    GI  [ ]PEG      [ ] +BS		              [ ]Soft, nondistended, nontender	  [ ]Abnormal    MUSCULOSKELETAL                                   [ ]Bedbound                 [ ]Abnormal    [ ]Ambulatory/OOB to chair                           EXTREMITIES                                         [ ]Normal  [ ]Edema                           NEUROLOGIC  [ ] Normal, non focal  [ ] Focal findings:    PSYCHIATRIC  [ ]Alert and appropriate  [ ] Sedated	 [ ]Agitated    :  Umaña: [ ] Yes, if yes: Date of Placement:                   [  ] No    LINES: Central Lines [ ] Yes, if yes: Date of Placement                                     [  ] No    HOSPITAL MEDICATIONS:  MEDICATIONS  (STANDING):  albuterol/ipratropium for Nebulization. 3 milliLiter(s) Nebulizer every 6 hours  amantadine Syrup 100 milliGRAM(s) Oral <User Schedule>  artificial  tears Solution 1 Drop(s) Both EYES every 6 hours  aspirin  chewable 81 milliGRAM(s) Enteral Tube daily  bisacodyl Suppository 10 milliGRAM(s) Rectal at bedtime  cefepime   IVPB 2000 milliGRAM(s) IV Intermittent every 12 hours  chlorhexidine 4% Liquid 1 Application(s) Topical <User Schedule>  dextrose 5%. 1000 milliLiter(s) (50 mL/Hr) IV Continuous <Continuous>  dextrose 50% Injectable 12.5 Gram(s) IV Push once  dextrose 50% Injectable 25 Gram(s) IV Push once  dextrose 50% Injectable 25 Gram(s) IV Push once  doxazosin 8 milliGRAM(s) Oral at bedtime  famotidine    Tablet 20 milliGRAM(s) Oral daily  fentaNYL   Patch  75 MICROgram(s)/Hr 1 Patch Transdermal every 72 hours  heparin   Injectable 5000 Unit(s) SubCutaneous every 8 hours  hydrALAZINE 100 milliGRAM(s) Oral three times a day  isosorbide   dinitrate Tablet (ISORDIL) 20 milliGRAM(s) Enteral Tube <User Schedule>  lactobacillus acidophilus 1 Tablet(s) Oral two times a day  lidocaine   Patch 1 Patch Transdermal every 24 hours  lidocaine   Patch 1 Patch Transdermal daily  metoprolol tartrate 75 milliGRAM(s) Oral every 8 hours  QUEtiapine 50 milliGRAM(s) Oral every 6 hours  sodium chloride 3%  Inhalation 3 milliLiter(s) Inhalation every 6 hours  vancomycin    Solution 125 milliGRAM(s) Enteral Tube two times a day    MEDICATIONS  (PRN):  dextrose 40% Gel 15 Gram(s) Oral once PRN Blood Glucose LESS THAN 70 milliGRAM(s)/deciliter  glucagon  Injectable 1 milliGRAM(s) IntraMuscular once PRN Glucose LESS THAN 70 milligrams/deciliter  polyethylene glycol 3350 17 Gram(s) Oral daily PRN Constipation      LABS:                        8.7    10.38 )-----------( 251      ( 13 Sep 2020 06:47 )             29.6     09-13    137  |  98  |  27<H>  ----------------------------<  128<H>  4.6   |  27  |  1.43<H>    Ca    9.3      13 Sep 2020 06:46  Phos  3.9     09-13  Mg     1.7     09-13          Arterial Blood Gas:  09-11 @ 08:14  7.50/35/108/27/98/3.9  ABG lactate: --      CAPILLARY BLOOD GLUCOSE    MICROBIOLOGY:     RADIOLOGY:  [ ] Reviewed and interpreted by me     Patient is a 60y old  Male who presents with a chief complaint of Cardiac arrest (11 Sep 2020 07:59)      Interval Events:  Remains Less Agitated    REVIEW OF SYSTEMS:  [ ] Positive  [x] All other systems negative  [ ] Unable to assess ROS because ________    Vital Signs Last 24 Hrs  T(C): 36.9 (09-13-20 @ 04:00), Max: 36.9 (09-13-20 @ 04:00)  T(F): 98.4 (09-13-20 @ 04:00), Max: 98.4 (09-13-20 @ 04:00)  HR: 65 (09-13-20 @ 05:48) (65 - 102)  BP: 109/56 (09-13-20 @ 04:00) (109/56 - 122/82)  RR: 18 (09-13-20 @ 04:46) (18 - 24)  SpO2: 100% (09-13-20 @ 05:48) (98% - 100%)    PHYSICAL EXAM:    HEENT:   [x]Tracheostomy: #8 Shiley cuffless   [ ]Pupils equal  [ ]No oral lesions  [x]Abnormal: Rt eye closed l     SKIN  [x]No Rash  [ ] Abnormal  [ ] pressure    CARDIAC  [ ]Regular  [x]Abnormal: irregularly irregular     PULMONARY  [x]Bilateral Clear Breath Sounds   [ ]Normal Excursion  [ ]Abnormal    GI  [x]PEG      [x] +BS		              [x  ]Soft, nondistended, nontender	  []Abnormal: mild distention, nontender     MUSCULOSKELETAL                                   [x]Bedbound                 [x]Abnormal: moves RUE/RLE; unable to move L side s/p SAH this admission    [ ]Ambulatory/OOB to chair                           EXTREMITIES                                         [x ]Normal  []Edema:                         NEUROLOGIC  [ ] Normal, non focal  [x] Focal findings: awake, opens L eye, Less agitated, Follows commands, answers questions appropriately    PSYCHIATRIC  []Alert and appropriate  [ ] Sedated	 [x ]Agitated - occasionally     :  Umaña: [ ] Yes, if yes: Date of Placement:                   [ ] No Condom Cath     LINES: Central Lines [ ] Yes, if yes: Date of Placement                                     [x] No          HOSPITAL MEDICATIONS:  MEDICATIONS  (STANDING):  albuterol/ipratropium for Nebulization. 3 milliLiter(s) Nebulizer every 6 hours  amantadine Syrup 100 milliGRAM(s) Oral <User Schedule>  artificial  tears Solution 1 Drop(s) Both EYES every 6 hours  aspirin  chewable 81 milliGRAM(s) Enteral Tube daily  bisacodyl Suppository 10 milliGRAM(s) Rectal at bedtime  cefepime   IVPB 2000 milliGRAM(s) IV Intermittent every 12 hours  chlorhexidine 4% Liquid 1 Application(s) Topical <User Schedule>  dextrose 5%. 1000 milliLiter(s) (50 mL/Hr) IV Continuous <Continuous>  dextrose 50% Injectable 12.5 Gram(s) IV Push once  dextrose 50% Injectable 25 Gram(s) IV Push once  dextrose 50% Injectable 25 Gram(s) IV Push once  doxazosin 8 milliGRAM(s) Oral at bedtime  famotidine    Tablet 20 milliGRAM(s) Oral daily  fentaNYL   Patch  75 MICROgram(s)/Hr 1 Patch Transdermal every 72 hours  heparin   Injectable 5000 Unit(s) SubCutaneous every 8 hours  hydrALAZINE 100 milliGRAM(s) Oral three times a day  isosorbide   dinitrate Tablet (ISORDIL) 20 milliGRAM(s) Enteral Tube <User Schedule>  lactobacillus acidophilus 1 Tablet(s) Oral two times a day  lidocaine   Patch 1 Patch Transdermal every 24 hours  lidocaine   Patch 1 Patch Transdermal daily  metoprolol tartrate 75 milliGRAM(s) Oral every 8 hours  QUEtiapine 50 milliGRAM(s) Oral every 6 hours  sodium chloride 3%  Inhalation 3 milliLiter(s) Inhalation every 6 hours  vancomycin    Solution 125 milliGRAM(s) Enteral Tube two times a day    MEDICATIONS  (PRN):  dextrose 40% Gel 15 Gram(s) Oral once PRN Blood Glucose LESS THAN 70 milliGRAM(s)/deciliter  glucagon  Injectable 1 milliGRAM(s) IntraMuscular once PRN Glucose LESS THAN 70 milligrams/deciliter  polyethylene glycol 3350 17 Gram(s) Oral daily PRN Constipation      LABS:                        8.7    10.38 )-----------( 251      ( 13 Sep 2020 06:47 )             29.6     09-13    137  |  98  |  27<H>  ----------------------------<  128<H>  4.6   |  27  |  1.43<H>    Ca    9.3      13 Sep 2020 06:46  Phos  3.9     09-13  Mg     1.7     09-13          Arterial Blood Gas:  09-11 @ 08:14  7.50/35/108/27/98/3.9  ABG lactate: --      CAPILLARY BLOOD GLUCOSE    MICROBIOLOGY:     RADIOLOGY:  [ ] Reviewed and interpreted by me

## 2020-09-14 LAB
ANION GAP SERPL CALC-SCNC: 12 MMOL/L — SIGNIFICANT CHANGE UP (ref 5–17)
BUN SERPL-MCNC: 26 MG/DL — HIGH (ref 7–23)
CALCIUM SERPL-MCNC: 10 MG/DL — SIGNIFICANT CHANGE UP (ref 8.4–10.5)
CHLORIDE SERPL-SCNC: 96 MMOL/L — SIGNIFICANT CHANGE UP (ref 96–108)
CO2 SERPL-SCNC: 27 MMOL/L — SIGNIFICANT CHANGE UP (ref 22–31)
CREAT SERPL-MCNC: 1.44 MG/DL — HIGH (ref 0.5–1.3)
GLUCOSE SERPL-MCNC: 112 MG/DL — HIGH (ref 70–99)
HCT VFR BLD CALC: 33.1 % — LOW (ref 39–50)
HGB BLD-MCNC: 9.9 G/DL — LOW (ref 13–17)
MAGNESIUM SERPL-MCNC: 1.7 MG/DL — SIGNIFICANT CHANGE UP (ref 1.6–2.6)
MCHC RBC-ENTMCNC: 26.5 PG — LOW (ref 27–34)
MCHC RBC-ENTMCNC: 29.9 GM/DL — LOW (ref 32–36)
MCV RBC AUTO: 88.5 FL — SIGNIFICANT CHANGE UP (ref 80–100)
PHOSPHATE SERPL-MCNC: 4.6 MG/DL — HIGH (ref 2.5–4.5)
PLATELET # BLD AUTO: 295 K/UL — SIGNIFICANT CHANGE UP (ref 150–400)
POTASSIUM SERPL-MCNC: 4.8 MMOL/L — SIGNIFICANT CHANGE UP (ref 3.5–5.3)
POTASSIUM SERPL-SCNC: 4.8 MMOL/L — SIGNIFICANT CHANGE UP (ref 3.5–5.3)
RBC # BLD: 3.74 M/UL — LOW (ref 4.2–5.8)
RBC # FLD: 15 % — HIGH (ref 10.3–14.5)
SODIUM SERPL-SCNC: 135 MMOL/L — SIGNIFICANT CHANGE UP (ref 135–145)
WBC # BLD: 12.16 K/UL — HIGH (ref 3.8–10.5)
WBC # FLD AUTO: 12.16 K/UL — HIGH (ref 3.8–10.5)

## 2020-09-14 PROCEDURE — 99233 SBSQ HOSP IP/OBS HIGH 50: CPT

## 2020-09-14 RX ADMIN — SODIUM CHLORIDE 3 MILLILITER(S): 9 INJECTION INTRAMUSCULAR; INTRAVENOUS; SUBCUTANEOUS at 05:44

## 2020-09-14 RX ADMIN — FENTANYL CITRATE 1 PATCH: 50 INJECTION INTRAVENOUS at 07:00

## 2020-09-14 RX ADMIN — CHLORHEXIDINE GLUCONATE 1 APPLICATION(S): 213 SOLUTION TOPICAL at 05:36

## 2020-09-14 RX ADMIN — Medication 3 MILLILITER(S): at 23:41

## 2020-09-14 RX ADMIN — Medication 100 MILLIGRAM(S): at 09:01

## 2020-09-14 RX ADMIN — Medication 3 MILLILITER(S): at 11:19

## 2020-09-14 RX ADMIN — HEPARIN SODIUM 5000 UNIT(S): 5000 INJECTION INTRAVENOUS; SUBCUTANEOUS at 22:15

## 2020-09-14 RX ADMIN — Medication 1 TABLET(S): at 17:08

## 2020-09-14 RX ADMIN — ISOSORBIDE DINITRATE 20 MILLIGRAM(S): 5 TABLET ORAL at 09:01

## 2020-09-14 RX ADMIN — Medication 1 DROP(S): at 23:47

## 2020-09-14 RX ADMIN — Medication 0.5 MILLIGRAM(S): at 05:34

## 2020-09-14 RX ADMIN — Medication 3 MILLILITER(S): at 17:15

## 2020-09-14 RX ADMIN — SODIUM CHLORIDE 3 MILLILITER(S): 9 INJECTION INTRAMUSCULAR; INTRAVENOUS; SUBCUTANEOUS at 23:41

## 2020-09-14 RX ADMIN — ISOSORBIDE DINITRATE 20 MILLIGRAM(S): 5 TABLET ORAL at 23:47

## 2020-09-14 RX ADMIN — Medication 125 MILLIGRAM(S): at 05:39

## 2020-09-14 RX ADMIN — QUETIAPINE FUMARATE 50 MILLIGRAM(S): 200 TABLET, FILM COATED ORAL at 17:09

## 2020-09-14 RX ADMIN — Medication 1 DROP(S): at 17:08

## 2020-09-14 RX ADMIN — LIDOCAINE 1 PATCH: 4 CREAM TOPICAL at 20:54

## 2020-09-14 RX ADMIN — Medication 81 MILLIGRAM(S): at 12:20

## 2020-09-14 RX ADMIN — FENTANYL CITRATE 1 PATCH: 50 INJECTION INTRAVENOUS at 20:53

## 2020-09-14 RX ADMIN — Medication 100 MILLIGRAM(S): at 05:37

## 2020-09-14 RX ADMIN — Medication 3 MILLILITER(S): at 05:44

## 2020-09-14 RX ADMIN — LIDOCAINE 1 PATCH: 4 CREAM TOPICAL at 23:48

## 2020-09-14 RX ADMIN — Medication 8 MILLIGRAM(S): at 22:15

## 2020-09-14 RX ADMIN — SODIUM CHLORIDE 3 MILLILITER(S): 9 INJECTION INTRAMUSCULAR; INTRAVENOUS; SUBCUTANEOUS at 17:15

## 2020-09-14 RX ADMIN — Medication 10 MILLIGRAM(S): at 22:15

## 2020-09-14 RX ADMIN — ISOSORBIDE DINITRATE 20 MILLIGRAM(S): 5 TABLET ORAL at 17:06

## 2020-09-14 RX ADMIN — Medication 1 DROP(S): at 12:20

## 2020-09-14 RX ADMIN — FAMOTIDINE 20 MILLIGRAM(S): 10 INJECTION INTRAVENOUS at 12:20

## 2020-09-14 RX ADMIN — Medication 100 MILLIGRAM(S): at 13:42

## 2020-09-14 RX ADMIN — CEFEPIME 100 MILLIGRAM(S): 1 INJECTION, POWDER, FOR SOLUTION INTRAMUSCULAR; INTRAVENOUS at 17:06

## 2020-09-14 RX ADMIN — Medication 1 DROP(S): at 05:36

## 2020-09-14 RX ADMIN — Medication 100 MILLIGRAM(S): at 13:43

## 2020-09-14 RX ADMIN — LIDOCAINE 1 PATCH: 4 CREAM TOPICAL at 12:21

## 2020-09-14 RX ADMIN — Medication 125 MILLIGRAM(S): at 17:06

## 2020-09-14 RX ADMIN — QUETIAPINE FUMARATE 50 MILLIGRAM(S): 200 TABLET, FILM COATED ORAL at 12:25

## 2020-09-14 RX ADMIN — Medication 100 MILLIGRAM(S): at 22:16

## 2020-09-14 RX ADMIN — HEPARIN SODIUM 5000 UNIT(S): 5000 INJECTION INTRAVENOUS; SUBCUTANEOUS at 13:43

## 2020-09-14 RX ADMIN — SODIUM CHLORIDE 3 MILLILITER(S): 9 INJECTION INTRAMUSCULAR; INTRAVENOUS; SUBCUTANEOUS at 11:19

## 2020-09-14 RX ADMIN — CEFEPIME 100 MILLIGRAM(S): 1 INJECTION, POWDER, FOR SOLUTION INTRAMUSCULAR; INTRAVENOUS at 05:42

## 2020-09-14 RX ADMIN — Medication 75 MILLIGRAM(S): at 05:38

## 2020-09-14 RX ADMIN — Medication 75 MILLIGRAM(S): at 13:44

## 2020-09-14 RX ADMIN — Medication 75 MILLIGRAM(S): at 22:17

## 2020-09-14 RX ADMIN — HEPARIN SODIUM 5000 UNIT(S): 5000 INJECTION INTRAVENOUS; SUBCUTANEOUS at 05:37

## 2020-09-14 RX ADMIN — Medication 1 TABLET(S): at 05:38

## 2020-09-14 NOTE — PROGRESS NOTE ADULT - PROBLEM SELECTOR PLAN 1
- Trached 8/24 with ENT  -Continue Duoneb and hypertonic saline, Mucomyst, chest PT  - downsized to #7 cuffless Portex, then changed to #8 cuffless Shiley  - Tolerating continuous trach collar since 8/30 - Trached 8/24 with ENT  -Continue Duoneb and hypertonic saline, Mucomyst, chest PT  -  Trach further down-sized from #8 to #6 CFS Portex  - Tolerating continuous trach collar since 8/30

## 2020-09-14 NOTE — PROGRESS NOTE ADULT - PROBLEM SELECTOR PLAN 5
Completed Abx for Pseudomonas aeruginosa pneumonia/colonization:  - S/p prolonged course of cefepime until 8/14-8/20   - If develops increased secretions, may benefit from inhaled tobramycin  - C diff: Completed oral Vanco 8/29   - UTI: Febrile with UCx growing PSA. Meropenem 1GM Q12H and Vancomycin 125mg BID for Cdiff PPx started 9/8 -->, Meropenem switched to Cefepime on 9/11  - ID appreciated. Completed Abx for Pseudomonas aeruginosa pneumonia/colonization:  - S/p prolonged course of cefepime until 8/14-8/20   - If develops increased secretions, may benefit from inhaled tobramycin  - C diff: Completed oral Vanco 8/29   - UTI: Febrile with UCx growing PSA. Meropenem 1GM Q12H and Vancomycin 125mg BID for Cdiff PPx started 9/8 -->, Meropenem switched to Cefepime on 9/11, to complete 7-10 day course on 9/15 or 9/18 respectively.  - ID appreciated.

## 2020-09-14 NOTE — PROGRESS NOTE ADULT - SUBJECTIVE AND OBJECTIVE BOX
Patient is a 60y old  Male who presents with a chief complaint of Cardiac arrest (11 Sep 2020 07:59)      Interval Events:    REVIEW OF SYSTEMS:  [ ] Positive  [ ] All other systems negative  [ ] Unable to assess ROS because ________    Vital Signs Last 24 Hrs  T(C): 37.1 (09-14-20 @ 04:00), Max: 37.1 (09-14-20 @ 04:00)  T(F): 98.8 (09-14-20 @ 04:00), Max: 98.8 (09-14-20 @ 04:00)  HR: 79 (09-14-20 @ 05:45) (75 - 95)  BP: 136/73 (09-14-20 @ 04:00) (113/86 - 148/77)  RR: 20 (09-14-20 @ 05:01) (18 - 21)  SpO2: 100% (09-14-20 @ 05:45) (95% - 100%)    PHYSICAL EXAM:  HEENT:   [ ]Tracheostomy:  [ ]Pupils equal  [ ]No oral lesions  [ ]Abnormal    SKIN  [ ]No Rash  [ ] Abnormal  [ ] pressure    CARDIAC  [ ]Regular  [ ]Abnormal    PULMONARY  [ ]Bilateral Clear Breath Sounds  [ ]Normal Excursion  [ ]Abnormal    GI  [ ]PEG      [ ] +BS		              [ ]Soft, nondistended, nontender	  [ ]Abnormal    MUSCULOSKELETAL                                   [ ]Bedbound                 [ ]Abnormal    [ ]Ambulatory/OOB to chair                           EXTREMITIES                                         [ ]Normal  [ ]Edema                           NEUROLOGIC  [ ] Normal, non focal  [ ] Focal findings:    PSYCHIATRIC  [ ]Alert and appropriate  [ ] Sedated	 [ ]Agitated    :  Umaña: [ ] Yes, if yes: Date of Placement:                   [  ] No    LINES: Central Lines [ ] Yes, if yes: Date of Placement                                     [  ] No    HOSPITAL MEDICATIONS:  MEDICATIONS  (STANDING):  albuterol/ipratropium for Nebulization. 3 milliLiter(s) Nebulizer every 6 hours  amantadine Syrup 100 milliGRAM(s) Oral <User Schedule>  artificial  tears Solution 1 Drop(s) Both EYES every 6 hours  aspirin  chewable 81 milliGRAM(s) Enteral Tube daily  bisacodyl Suppository 10 milliGRAM(s) Rectal at bedtime  cefepime   IVPB 2000 milliGRAM(s) IV Intermittent every 12 hours  chlorhexidine 4% Liquid 1 Application(s) Topical <User Schedule>  dextrose 5%. 1000 milliLiter(s) (50 mL/Hr) IV Continuous <Continuous>  dextrose 50% Injectable 12.5 Gram(s) IV Push once  dextrose 50% Injectable 25 Gram(s) IV Push once  dextrose 50% Injectable 25 Gram(s) IV Push once  doxazosin 8 milliGRAM(s) Oral at bedtime  famotidine    Tablet 20 milliGRAM(s) Oral daily  fentaNYL   Patch  75 MICROgram(s)/Hr 1 Patch Transdermal every 72 hours  heparin   Injectable 5000 Unit(s) SubCutaneous every 8 hours  hydrALAZINE 100 milliGRAM(s) Oral three times a day  isosorbide   dinitrate Tablet (ISORDIL) 20 milliGRAM(s) Enteral Tube <User Schedule>  lactobacillus acidophilus 1 Tablet(s) Oral two times a day  lidocaine   Patch 1 Patch Transdermal daily  lidocaine   Patch 1 Patch Transdermal every 24 hours  metoprolol tartrate 75 milliGRAM(s) Oral every 8 hours  QUEtiapine 50 milliGRAM(s) Oral every 6 hours  sodium chloride 3%  Inhalation 3 milliLiter(s) Inhalation every 6 hours  vancomycin    Solution 125 milliGRAM(s) Enteral Tube two times a day    MEDICATIONS  (PRN):  dextrose 40% Gel 15 Gram(s) Oral once PRN Blood Glucose LESS THAN 70 milliGRAM(s)/deciliter  glucagon  Injectable 1 milliGRAM(s) IntraMuscular once PRN Glucose LESS THAN 70 milligrams/deciliter  polyethylene glycol 3350 17 Gram(s) Oral daily PRN Constipation      LABS:                        9.9    12.16 )-----------( 295      ( 14 Sep 2020 06:30 )             33.1     09-14    135  |  96  |  26<H>  ----------------------------<  112<H>  4.8   |  27  |  1.44<H>    Ca    10.0      14 Sep 2020 06:29  Phos  4.6     09-14  Mg     1.7     09-14              CAPILLARY BLOOD GLUCOSE    MICROBIOLOGY:     RADIOLOGY:  [ ] Reviewed and interpreted by me     Patient is a 60y old  Male who presents with a chief complaint of Cardiac arrest (11 Sep 2020 07:59)      Interval Events: No events reported over night    REVIEW OF SYSTEMS:  [ ] Positive  [X] All other systems negative  [ ] Unable to assess ROS because ________    Vital Signs Last 24 Hrs  T(C): 37.1 (09-14-20 @ 04:00), Max: 37.1 (09-14-20 @ 04:00)  T(F): 98.8 (09-14-20 @ 04:00), Max: 98.8 (09-14-20 @ 04:00)  HR: 79 (09-14-20 @ 05:45) (75 - 95)  BP: 136/73 (09-14-20 @ 04:00) (113/86 - 148/77)  RR: 20 (09-14-20 @ 05:01) (18 - 21)  SpO2: 100% (09-14-20 @ 05:45) (95% - 100%)        PHYSICAL EXAM:  HEENT:   [X]Tracheostomy:  large stoma; #6 Cuffless Portex  [X] Right eye closed, unable to lift eyelid; Left PERRL  [ ]No oral lesions  [ ]Abnormal    SKIN  [X] No Rash  [ ] Abnormal  [ ] pressure    CARDIAC  [X] Regular  [ ] Abnormal    PULMONARY  [X] Bilateral Clear Breath Sounds  [ ] Normal Excursion  [ ] Abnormal    GI  [X] PEG      [X] +BS		              [X] Soft, nondistended, nontender	  [ ]Abnormal    MUSCULOSKELETAL                                   [ ] Bedbound                 [ ] Abnormal    [ X] OOB to chair with assistance    EXTREMITIES                                         [X] Normal  [ ]Edema                           NEUROLOGIC  [ ] Normal, non focal  [X] Focal findings: Plegia of RUE/RLE; Alert and responsive; able to follow commands and respond to questions appropriately sometimes    PSYCHIATRIC  [X] Alert  [ ] Sedated	 [ ]Agitated    :  Umaña: [ ] Yes, if yes: Date of Placement:                   [X] No    LINES: Central Lines [ ] Yes, if yes: Date of Placement                                     [  ] No        HOSPITAL MEDICATIONS:  MEDICATIONS  (STANDING):  albuterol/ipratropium for Nebulization. 3 milliLiter(s) Nebulizer every 6 hours  amantadine Syrup 100 milliGRAM(s) Oral <User Schedule>  artificial  tears Solution 1 Drop(s) Both EYES every 6 hours  aspirin  chewable 81 milliGRAM(s) Enteral Tube daily  bisacodyl Suppository 10 milliGRAM(s) Rectal at bedtime  cefepime   IVPB 2000 milliGRAM(s) IV Intermittent every 12 hours  chlorhexidine 4% Liquid 1 Application(s) Topical <User Schedule>  dextrose 5%. 1000 milliLiter(s) (50 mL/Hr) IV Continuous <Continuous>  dextrose 50% Injectable 12.5 Gram(s) IV Push once  dextrose 50% Injectable 25 Gram(s) IV Push once  dextrose 50% Injectable 25 Gram(s) IV Push once  doxazosin 8 milliGRAM(s) Oral at bedtime  famotidine    Tablet 20 milliGRAM(s) Oral daily  fentaNYL   Patch  75 MICROgram(s)/Hr 1 Patch Transdermal every 72 hours  heparin   Injectable 5000 Unit(s) SubCutaneous every 8 hours  hydrALAZINE 100 milliGRAM(s) Oral three times a day  isosorbide   dinitrate Tablet (ISORDIL) 20 milliGRAM(s) Enteral Tube <User Schedule>  lactobacillus acidophilus 1 Tablet(s) Oral two times a day  lidocaine   Patch 1 Patch Transdermal daily  lidocaine   Patch 1 Patch Transdermal every 24 hours  metoprolol tartrate 75 milliGRAM(s) Oral every 8 hours  QUEtiapine 50 milliGRAM(s) Oral every 6 hours  sodium chloride 3%  Inhalation 3 milliLiter(s) Inhalation every 6 hours  vancomycin    Solution 125 milliGRAM(s) Enteral Tube two times a day    MEDICATIONS  (PRN):  dextrose 40% Gel 15 Gram(s) Oral once PRN Blood Glucose LESS THAN 70 milliGRAM(s)/deciliter  glucagon  Injectable 1 milliGRAM(s) IntraMuscular once PRN Glucose LESS THAN 70 milligrams/deciliter  polyethylene glycol 3350 17 Gram(s) Oral daily PRN Constipation      LABS:                        9.9    12.16 )-----------( 295      ( 14 Sep 2020 06:30 )             33.1     09-14    135  |  96  |  26<H>  ----------------------------<  112<H>  4.8   |  27  |  1.44<H>    Ca    10.0      14 Sep 2020 06:29  Phos  4.6     09-14  Mg     1.7     09-14              CAPILLARY BLOOD GLUCOSE    MICROBIOLOGY:     RADIOLOGY:  [ ] Reviewed and interpreted by me

## 2020-09-14 NOTE — PROGRESS NOTE ADULT - SUBJECTIVE AND OBJECTIVE BOX
CC: f/u for fever post SAH/arrest    Patient reports: he is restless, requires sedation for agitation.He requires ISC on a regular basis.    REVIEW OF SYSTEMS:  All other review of systems negative (Comprehensive ROS): limited by condition    Antimicrobials Day #  :day 6  cefepime   IVPB 2000 milliGRAM(s) IV Intermittent every 12 hours  vancomycin    Solution 125 milliGRAM(s) Enteral Tube two times a day    Other Medications Reviewed  MEDICATIONS  (STANDING):  albuterol/ipratropium for Nebulization. 3 milliLiter(s) Nebulizer every 6 hours  amantadine Syrup 100 milliGRAM(s) Oral <User Schedule>  artificial  tears Solution 1 Drop(s) Both EYES every 6 hours  aspirin  chewable 81 milliGRAM(s) Enteral Tube daily  bisacodyl Suppository 10 milliGRAM(s) Rectal at bedtime  cefepime   IVPB 2000 milliGRAM(s) IV Intermittent every 12 hours  chlorhexidine 4% Liquid 1 Application(s) Topical <User Schedule>  dextrose 5%. 1000 milliLiter(s) (50 mL/Hr) IV Continuous <Continuous>  dextrose 50% Injectable 12.5 Gram(s) IV Push once  dextrose 50% Injectable 25 Gram(s) IV Push once  dextrose 50% Injectable 25 Gram(s) IV Push once  doxazosin 8 milliGRAM(s) Oral at bedtime  famotidine    Tablet 20 milliGRAM(s) Oral daily  fentaNYL   Patch  75 MICROgram(s)/Hr 1 Patch Transdermal every 72 hours  heparin   Injectable 5000 Unit(s) SubCutaneous every 8 hours  hydrALAZINE 100 milliGRAM(s) Oral three times a day  isosorbide   dinitrate Tablet (ISORDIL) 20 milliGRAM(s) Enteral Tube <User Schedule>  lactobacillus acidophilus 1 Tablet(s) Oral two times a day  lidocaine   Patch 1 Patch Transdermal every 24 hours  lidocaine   Patch 1 Patch Transdermal daily  metoprolol tartrate 75 milliGRAM(s) Oral every 8 hours  QUEtiapine 50 milliGRAM(s) Oral every 6 hours  sodium chloride 3%  Inhalation 3 milliLiter(s) Inhalation every 6 hours  vancomycin    Solution 125 milliGRAM(s) Enteral Tube two times a day    T(F): 98.8 (09-14-20 @ 04:00), Max: 98.8 (09-14-20 @ 04:00)  HR: 84 (09-14-20 @ 08:12)  BP: 136/73 (09-14-20 @ 04:00)  RR: 20 (09-14-20 @ 08:12)  SpO2: 97% (09-14-20 @ 08:12)  Wt(kg): --    PHYSICAL EXAM:  General: awake, , no acute distress, poorly interactive  Eyes:  anicteric, no conjunctival injection, no discharge  Oropharynx: no lesions or injection 	  Neck: supple, trach  Lungs: clear to auscultation  Heart: regular rate and rhythm; no murmur, rubs or gallops  Abdomen: soft, nondistended, nontender, peg  Skin: no lesions  Extremities: no clubbing, cyanosis, or edema  Neurologic: awake, poorly interactive.   :ISC  LAB RESULTS:                        9.9    12.16 )-----------( 295      ( 14 Sep 2020 06:30 )             33.1     09-14    135  |  96  |  26<H>  ----------------------------<  112<H>  4.8   |  27  |  1.44<H>    Ca    10.0      14 Sep 2020 06:29  Phos  4.6     09-14  Mg     1.7     09-14          MICROBIOLOGY:  RECENT CULTURES:      RADIOLOGY REVIEWED:    < from: CT Abdomen and Pelvis w/ Oral Cont (09.08.20 @ 17:33) >  IMPRESSION:  No acute abnormalities in the chest, abdomen, or pelvis.    Trace right pleural effusion.    Lipoma and left lateralthigh with internal calcifications.    < end of copied text >  < from: CT Head No Cont (09.01.20 @ 14:11) >    IMPRESSION:    Redemonstration interventricular hemorrhage in lateral ventricle occipital horns, slightly increased size of lateral and third ventricles, developing hydrocephalus not excluded.    Continued resolution prior subarachnoid hemorrhage, no new hemorrhage or shift. Redemonstration volume loss and microvascular disease with evolving ischemic changes as on MRI in right medial temporal lobe, right posterior limb internal capsule, thalamocapsular junction, thalamus and cerebral peduncle. If symptoms persist consider follow-up head CT or MR if no contraindications.    < end of copied text >

## 2020-09-14 NOTE — PROGRESS NOTE ADULT - ASSESSMENT
60-year-old male with a history of A-Fib on warfarin who presents with cardiac arrest at work with downtime of about 25 minutes prior to ROSC. S/p therapeutic hypothermia. 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 Now with resolved neurogenic/cardiogenic shock. Course complicated by GI bleed s/p PPI gtt, bleeding from scott s/p clot irrigation, prolonged respiratory failure s/p trach (8/24) and PEG (8/26). Currently with A-Fib with RVR and C diff colitis.  Also developed gross hematuria - Urology consulted +catheter with clot (irrigated and exchanged) suspected 2/2 traumatic catheterization. Additionally course complicated by fever and Pseudomonas PNA (s/p Rx) then developed C diff (now with resolved leukocytosis and improved stooling, now soft per report); remains on enteral Vanco    8/29: Received to [facility]  8/30: Less Agitated overnight, Afebrile, WBC decreasing, CR elevated but Stable, Tolerating TC all day yesterday, will Attempt TC ATC tonight and ABG in am, F/u Bcx from 8/30. Rectal tube dc'd.  Speech eval for AAC device and will F/u.  8/31: will attempt TC around the clock again tonight. Stopped amiodarone as per cards.  Will be cathed if cleared by ID and renal.  9/1: Elevated BP overnight, BP stable today, afebrile. Lethargic earlier today on exam; ABG Stable; Head CT done- no new ICH, ventricles enlarged ? Hydrocephalus, Spoke to Max from [facility] who will see- although felt no intervention required at this time. Tolerating TC ATC, will attempt cuffless trach tomorrow. C diff Iso discontinued as discussed w/ Inf control.  9/2: still with secretions, will downsize when improved. Will cath as outpatient. Decreased opioids  9/3: PICC dc'd, Trach downsized to #7 cuffless Portex. stomach distended  9/4: Occasionally Restless, afebrile. Tolerating cuffless trach, phonating w/ digital occlusion.  Spoke to speech for PMV re-eval soon than 9/6.  Elevated BP, Hydralazine increased.- will monitor.  9/5: trach dislodged, ENT changed to #8 cuffless Shiley. OOB to chair  9/6: OOB to chair today, gave dulcolax suppository   9/7: agitated while in chair today, gave Seroquel.  Producing thick, yellow secretions. Afebrile. Managing secretions with duoneb and hyper-sal 7%/Chest PT.  9/8: Had spurious leukocytosis. Afebrile, hemodynamically stable. Repeated cbc, sent Bcx.  Spiked temp of 102.5, cbc rechecked which was consistent with leukocytosis. PAN cultured. Noted urinary retention of 2600ml urine, abdomen distended with some tenderness on palpation.  F/U CT abd/pelvis/chest.  9/9: Patient appears well despite leukocytosis of 31. Sputum Cx growing gram neg rods. Remains on meropenem, added Vancoycin PO per ID for concern of recent Cdiff infection.  9/10: Afebrile over night. Leukocytosis decreasing. UCx growing pan-sensitive pseudomonas. Will continue meropenem and prophylactic vancomycin PO. Thick brown tinged sputum with suctioning, aggressive chest PT, nebs + hypersal.  9/11: Patient with episode of agitation and delirium early this morning. Seemingly did not respond to any anxiolytic or sedative meds given. Patient denies having any pain, states that he's "In the [place]". Thorough exam revealed IV access was not working. A new IV line was placed in RLE and 2mg IV ativan given which calmed the patient. Leukocytosis continues to improve on ABx. Noted to have -120s, EKG confirms AFIBB. Metoprolol increased.  9/12: Less Agitated today, improving CR and Leukocytosis on Cefepime; Still with Urinary retention and requiring prn straight cath- cardura increased.   9/13: No new events 60-year-old male with a history of A-Fib on warfarin who presents with cardiac arrest at work with downtime of about 25 minutes prior to ROSC. S/p therapeutic hypothermia. 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 Now with resolved neurogenic/cardiogenic shock. Course complicated by GI bleed s/p PPI gtt, bleeding from scott s/p clot irrigation, prolonged respiratory failure s/p trach (8/24) and PEG (8/26). Currently with A-Fib with RVR and C diff colitis.  Also developed gross hematuria - Urology consulted +catheter with clot (irrigated and exchanged) suspected 2/2 traumatic catheterization. Additionally course complicated by fever and Pseudomonas PNA (s/p Rx) then developed C diff (now with resolved leukocytosis and improved stooling, now soft per report); remains on enteral Vanco    8/29: Received to [facility]  8/30: Less Agitated overnight, Afebrile, WBC decreasing, CR elevated but Stable, Tolerating TC all day yesterday, will Attempt TC ATC tonight and ABG in am, F/u Bcx from 8/30. Rectal tube dc'd.  Speech eval for AAC device and will F/u.  8/31: will attempt TC around the clock again tonight. Stopped amiodarone as per cards.  Will be cathed if cleared by ID and renal.  9/1: Elevated BP overnight, BP stable today, afebrile. Lethargic earlier today on exam; ABG Stable; Head CT done- no new ICH, ventricles enlarged ? Hydrocephalus, Spoke to Max from [facility]X who will see- although felt no intervention required at this time. Tolerating TC ATC, will attempt cuffless trach tomorrow. C diff Iso discontinued as discussed w/ Inf control.  9/2: still with secretions, will downsize when improved. Will cath as outpatient. Decreased opioids  9/3: PICC dc'd, Trach downsized to #7 cuffless Portex. stomach distended  9/4: Occasionally Restless, afebrile. Tolerating cuffless trach, phonating w/ digital occlusion.  Spoke to speech for PMV re-eval soon than 9/6.  Elevated BP, Hydralazine increased.- will monitor.  9/5: trach dislodged, ENT changed to #8 cuffless Shiley. OOB to chair  9/6: OOB to chair today, gave dulcolax suppository   9/7: agitated while in chair today, gave Seroquel.  Producing thick, yellow secretions. Afebrile. Managing secretions with duoneb and hyper-sal 7%/Chest PT.  9/8: Had spurious leukocytosis. Afebrile, hemodynamically stable. Repeated cbc, sent Bcx.  Spiked temp of 102.5, cbc rechecked which was consistent with leukocytosis. PAN cultured. Noted urinary retention of 2600ml urine, abdomen distended with some tenderness on palpation.  F/U CT abd/pelvis/chest.  9/9: Patient appears well despite leukocytosis of 31. Sputum Cx growing gram neg rods. Remains on meropenem, added Vancoycin PO per ID for concern of recent Cdiff infection.  9/10: Afebrile over night. Leukocytosis decreasing. UCx growing pan-sensitive pseudomonas. Will continue meropenem and prophylactic vancomycin PO. Thick brown tinged sputum with suctioning, aggressive chest PT, nebs + hypersal.  9/11: Patient with episode of agitation and delirium early this morning. Seemingly did not respond to any anxiolytic or sedative meds given. Patient denies having any pain, states that he's "In the [place]". Thorough exam revealed IV access was not working. A new IV line was placed in RLE and 2mg IV ativan given which calmed the patient. Leukocytosis continues to improve on ABx. Noted to have -120s, EKG confirms AFIBB. Metoprolol increased.  9/12: Less Agitated today, improving CR and Leukocytosis on Cefepime; Still with Urinary retention and requiring prn straight cath- cardura increased.   9/13: No new events  9/14: Stable. Trach down-sized to #6 Cuffless Portex, obturator used. No complications, tolerated procedure well. Will complete 7-10 day course per Attending discretion.

## 2020-09-14 NOTE — PROGRESS NOTE ADULT - PROBLEM SELECTOR PLAN 3
- Continue fentanyl patch 75mcg  - Seroquel resumed at 50mg  Q6H for agitation control - Continue fentanyl patch 75mcg  - Seroquel resumed at 50mg  Q6H for agitation control reduced to Q8H on 9/14 as patient somnolent during the day

## 2020-09-14 NOTE — PROGRESS NOTE ADULT - ATTENDING COMMENTS
Doing well on TC.  Discuss with ENT regarding downsizing trach.   Continue antibiotics.  Follow up with cardiology - eventual cath as outpatient.  Awaiting dispo planning to acute rehab.

## 2020-09-14 NOTE — PROGRESS NOTE ADULT - SUBJECTIVE AND OBJECTIVE BOX
Subjective: Patient seen and examined. No new events except as noted.     REVIEW OF SYSTEMS:  Unable to obtain       MEDICATIONS:  MEDICATIONS  (STANDING):  albuterol/ipratropium for Nebulization. 3 milliLiter(s) Nebulizer every 6 hours  amantadine Syrup 100 milliGRAM(s) Oral <User Schedule>  artificial  tears Solution 1 Drop(s) Both EYES every 6 hours  aspirin  chewable 81 milliGRAM(s) Enteral Tube daily  bisacodyl Suppository 10 milliGRAM(s) Rectal at bedtime  cefepime   IVPB 2000 milliGRAM(s) IV Intermittent every 12 hours  chlorhexidine 4% Liquid 1 Application(s) Topical <User Schedule>  dextrose 5%. 1000 milliLiter(s) (50 mL/Hr) IV Continuous <Continuous>  dextrose 50% Injectable 12.5 Gram(s) IV Push once  dextrose 50% Injectable 25 Gram(s) IV Push once  dextrose 50% Injectable 25 Gram(s) IV Push once  doxazosin 8 milliGRAM(s) Oral at bedtime  famotidine    Tablet 20 milliGRAM(s) Oral daily  fentaNYL   Patch  75 MICROgram(s)/Hr 1 Patch Transdermal every 72 hours  heparin   Injectable 5000 Unit(s) SubCutaneous every 8 hours  hydrALAZINE 100 milliGRAM(s) Oral three times a day  isosorbide   dinitrate Tablet (ISORDIL) 20 milliGRAM(s) Enteral Tube <User Schedule>  lactobacillus acidophilus 1 Tablet(s) Oral two times a day  lidocaine   Patch 1 Patch Transdermal every 24 hours  lidocaine   Patch 1 Patch Transdermal daily  metoprolol tartrate 75 milliGRAM(s) Oral every 8 hours  QUEtiapine 50 milliGRAM(s) Oral every 6 hours  sodium chloride 3%  Inhalation 3 milliLiter(s) Inhalation every 6 hours  vancomycin    Solution 125 milliGRAM(s) Enteral Tube two times a day      PHYSICAL EXAM:  T(C): 37.1 (09-14-20 @ 04:00), Max: 37.1 (09-14-20 @ 04:00)  HR: 84 (09-14-20 @ 08:12) (77 - 95)  BP: 136/73 (09-14-20 @ 04:00) (113/86 - 148/77)  RR: 20 (09-14-20 @ 08:12) (18 - 21)  SpO2: 97% (09-14-20 @ 08:12) (96% - 100%)  Wt(kg): --  I&O's Summary    13 Sep 2020 07:01  -  14 Sep 2020 07:00  --------------------------------------------------------  IN: 1569 mL / OUT: 1800 mL / NET: -231 mL            Appearance: sleepy , +trach   HEENT:   Dry  oral mucosa,  Lymphatic: No lymphadenopathy  Cardiovascular: Normal S1 S2, No JVD, No murmurs, No edema  Respiratory: Ventilated   Psychiatry: A & O x 0  Gastrointestinal:  Soft, Non-tender, +PEG   Skin: No rashes, No ecchymoses, No cyanosis	  Mental status- No acute distress, EO to stim  -CN- Pupils R 4mm NR, L 2mm sluggish, EOMI, tongue midline, face symmetric  +cough/gag  C briskly, two fingers/thumbs up on RUE, distally AG, wiggles toes on RLE        LABS:    CARDIAC MARKERS:                                9.9    12.16 )-----------( 295      ( 14 Sep 2020 06:30 )             33.1     09-14    135  |  96  |  26<H>  ----------------------------<  112<H>  4.8   |  27  |  1.44<H>    Ca    10.0      14 Sep 2020 06:29  Phos  4.6     09-14  Mg     1.7     09-14      proBNP:   Lipid Profile:   HgA1c:   TSH:             TELEMETRY: 	    ECG:  	  RADIOLOGY:   DIAGNOSTIC TESTING:  [ ] Echocardiogram:  [ ]  Catheterization:  [ ] Stress Test:    OTHER:

## 2020-09-15 LAB
ANION GAP SERPL CALC-SCNC: 7 MMOL/L — SIGNIFICANT CHANGE UP (ref 5–17)
BASOPHILS # BLD AUTO: 0.11 K/UL — SIGNIFICANT CHANGE UP (ref 0–0.2)
BASOPHILS NFR BLD AUTO: 1.1 % — SIGNIFICANT CHANGE UP (ref 0–2)
BUN SERPL-MCNC: 26 MG/DL — HIGH (ref 7–23)
CALCIUM SERPL-MCNC: 9.2 MG/DL — SIGNIFICANT CHANGE UP (ref 8.4–10.5)
CHLORIDE SERPL-SCNC: 95 MMOL/L — LOW (ref 96–108)
CO2 SERPL-SCNC: 26 MMOL/L — SIGNIFICANT CHANGE UP (ref 22–31)
CREAT SERPL-MCNC: 1.46 MG/DL — HIGH (ref 0.5–1.3)
EOSINOPHIL # BLD AUTO: 0.49 K/UL — SIGNIFICANT CHANGE UP (ref 0–0.5)
EOSINOPHIL NFR BLD AUTO: 5 % — SIGNIFICANT CHANGE UP (ref 0–6)
GLUCOSE BLDC GLUCOMTR-MCNC: 122 MG/DL — HIGH (ref 70–99)
GLUCOSE SERPL-MCNC: 229 MG/DL — HIGH (ref 70–99)
HCT VFR BLD CALC: 29.1 % — LOW (ref 39–50)
HGB BLD-MCNC: 8.8 G/DL — LOW (ref 13–17)
IMM GRANULOCYTES NFR BLD AUTO: 3.5 % — HIGH (ref 0–1.5)
LYMPHOCYTES # BLD AUTO: 1.39 K/UL — SIGNIFICANT CHANGE UP (ref 1–3.3)
LYMPHOCYTES # BLD AUTO: 14.1 % — SIGNIFICANT CHANGE UP (ref 13–44)
MAGNESIUM SERPL-MCNC: 1.7 MG/DL — SIGNIFICANT CHANGE UP (ref 1.6–2.6)
MCHC RBC-ENTMCNC: 26.5 PG — LOW (ref 27–34)
MCHC RBC-ENTMCNC: 30.2 GM/DL — LOW (ref 32–36)
MCV RBC AUTO: 87.7 FL — SIGNIFICANT CHANGE UP (ref 80–100)
MONOCYTES # BLD AUTO: 0.76 K/UL — SIGNIFICANT CHANGE UP (ref 0–0.9)
MONOCYTES NFR BLD AUTO: 7.7 % — SIGNIFICANT CHANGE UP (ref 2–14)
NEUTROPHILS # BLD AUTO: 6.78 K/UL — SIGNIFICANT CHANGE UP (ref 1.8–7.4)
NEUTROPHILS NFR BLD AUTO: 68.6 % — SIGNIFICANT CHANGE UP (ref 43–77)
NRBC # BLD: 0 /100 WBCS — SIGNIFICANT CHANGE UP (ref 0–0)
PHOSPHATE SERPL-MCNC: 4.5 MG/DL — SIGNIFICANT CHANGE UP (ref 2.5–4.5)
PLATELET # BLD AUTO: 314 K/UL — SIGNIFICANT CHANGE UP (ref 150–400)
POTASSIUM SERPL-MCNC: 4.5 MMOL/L — SIGNIFICANT CHANGE UP (ref 3.5–5.3)
POTASSIUM SERPL-SCNC: 4.5 MMOL/L — SIGNIFICANT CHANGE UP (ref 3.5–5.3)
RBC # BLD: 3.32 M/UL — LOW (ref 4.2–5.8)
RBC # FLD: 14.9 % — HIGH (ref 10.3–14.5)
SODIUM SERPL-SCNC: 128 MMOL/L — LOW (ref 135–145)
WBC # BLD: 9.88 K/UL — SIGNIFICANT CHANGE UP (ref 3.8–10.5)
WBC # FLD AUTO: 9.88 K/UL — SIGNIFICANT CHANGE UP (ref 3.8–10.5)

## 2020-09-15 PROCEDURE — 99233 SBSQ HOSP IP/OBS HIGH 50: CPT

## 2020-09-15 PROCEDURE — 99223 1ST HOSP IP/OBS HIGH 75: CPT

## 2020-09-15 RX ADMIN — SODIUM CHLORIDE 3 MILLILITER(S): 9 INJECTION INTRAMUSCULAR; INTRAVENOUS; SUBCUTANEOUS at 05:12

## 2020-09-15 RX ADMIN — LIDOCAINE 1 PATCH: 4 CREAM TOPICAL at 13:27

## 2020-09-15 RX ADMIN — Medication 75 MILLIGRAM(S): at 21:17

## 2020-09-15 RX ADMIN — Medication 75 MILLIGRAM(S): at 13:29

## 2020-09-15 RX ADMIN — ISOSORBIDE DINITRATE 20 MILLIGRAM(S): 5 TABLET ORAL at 10:46

## 2020-09-15 RX ADMIN — Medication 81 MILLIGRAM(S): at 12:12

## 2020-09-15 RX ADMIN — Medication 1 DROP(S): at 17:26

## 2020-09-15 RX ADMIN — Medication 100 MILLIGRAM(S): at 08:30

## 2020-09-15 RX ADMIN — ISOSORBIDE DINITRATE 20 MILLIGRAM(S): 5 TABLET ORAL at 23:23

## 2020-09-15 RX ADMIN — FENTANYL CITRATE 1 PATCH: 50 INJECTION INTRAVENOUS at 19:56

## 2020-09-15 RX ADMIN — POLYETHYLENE GLYCOL 3350 17 GRAM(S): 17 POWDER, FOR SOLUTION ORAL at 05:25

## 2020-09-15 RX ADMIN — FENTANYL CITRATE 1 PATCH: 50 INJECTION INTRAVENOUS at 21:15

## 2020-09-15 RX ADMIN — Medication 125 MILLIGRAM(S): at 05:24

## 2020-09-15 RX ADMIN — LIDOCAINE 1 PATCH: 4 CREAM TOPICAL at 21:16

## 2020-09-15 RX ADMIN — Medication 3 MILLILITER(S): at 11:41

## 2020-09-15 RX ADMIN — Medication 1 DROP(S): at 05:22

## 2020-09-15 RX ADMIN — Medication 1 DROP(S): at 23:24

## 2020-09-15 RX ADMIN — Medication 1 TABLET(S): at 05:24

## 2020-09-15 RX ADMIN — Medication 125 MILLIGRAM(S): at 17:27

## 2020-09-15 RX ADMIN — ISOSORBIDE DINITRATE 20 MILLIGRAM(S): 5 TABLET ORAL at 17:27

## 2020-09-15 RX ADMIN — HEPARIN SODIUM 5000 UNIT(S): 5000 INJECTION INTRAVENOUS; SUBCUTANEOUS at 05:23

## 2020-09-15 RX ADMIN — HEPARIN SODIUM 5000 UNIT(S): 5000 INJECTION INTRAVENOUS; SUBCUTANEOUS at 21:17

## 2020-09-15 RX ADMIN — CHLORHEXIDINE GLUCONATE 1 APPLICATION(S): 213 SOLUTION TOPICAL at 05:22

## 2020-09-15 RX ADMIN — Medication 10 MILLIGRAM(S): at 21:16

## 2020-09-15 RX ADMIN — LIDOCAINE 1 PATCH: 4 CREAM TOPICAL at 19:57

## 2020-09-15 RX ADMIN — Medication 8 MILLIGRAM(S): at 21:16

## 2020-09-15 RX ADMIN — Medication 100 MILLIGRAM(S): at 21:17

## 2020-09-15 RX ADMIN — FENTANYL CITRATE 1 PATCH: 50 INJECTION INTRAVENOUS at 06:58

## 2020-09-15 RX ADMIN — Medication 100 MILLIGRAM(S): at 12:11

## 2020-09-15 RX ADMIN — FAMOTIDINE 20 MILLIGRAM(S): 10 INJECTION INTRAVENOUS at 12:12

## 2020-09-15 RX ADMIN — Medication 3 MILLILITER(S): at 17:23

## 2020-09-15 RX ADMIN — CEFEPIME 100 MILLIGRAM(S): 1 INJECTION, POWDER, FOR SOLUTION INTRAMUSCULAR; INTRAVENOUS at 17:26

## 2020-09-15 RX ADMIN — Medication 100 MILLIGRAM(S): at 05:23

## 2020-09-15 RX ADMIN — FENTANYL CITRATE 1 PATCH: 50 INJECTION INTRAVENOUS at 21:18

## 2020-09-15 RX ADMIN — Medication 1 TABLET(S): at 17:26

## 2020-09-15 RX ADMIN — Medication 100 MILLIGRAM(S): at 13:28

## 2020-09-15 RX ADMIN — SODIUM CHLORIDE 3 MILLILITER(S): 9 INJECTION INTRAMUSCULAR; INTRAVENOUS; SUBCUTANEOUS at 11:41

## 2020-09-15 RX ADMIN — CEFEPIME 100 MILLIGRAM(S): 1 INJECTION, POWDER, FOR SOLUTION INTRAMUSCULAR; INTRAVENOUS at 05:26

## 2020-09-15 RX ADMIN — Medication 3 MILLILITER(S): at 05:12

## 2020-09-15 RX ADMIN — Medication 75 MILLIGRAM(S): at 05:25

## 2020-09-15 RX ADMIN — Medication 1 DROP(S): at 13:28

## 2020-09-15 RX ADMIN — HEPARIN SODIUM 5000 UNIT(S): 5000 INJECTION INTRAVENOUS; SUBCUTANEOUS at 13:28

## 2020-09-15 RX ADMIN — LIDOCAINE 1 PATCH: 4 CREAM TOPICAL at 06:58

## 2020-09-15 RX ADMIN — Medication 3 MILLILITER(S): at 23:30

## 2020-09-15 NOTE — CONSULT NOTE ADULT - PROVIDER SPECIALTY LIST ADULT
Pulmonology
Gastroenterology
Infectious Disease
Nephrology
Ophthalmology
Rehab Medicine
Urology
ENT
Cardiology

## 2020-09-15 NOTE — PROGRESS NOTE ADULT - SUBJECTIVE AND OBJECTIVE BOX
Patient is a 60y old  Male who presents with a chief complaint of Arrest (14 Sep 2020 08:38)      Interval Events:    REVIEW OF SYSTEMS:  [ ] Positive  [ ] All other systems negative  [ ] Unable to assess ROS because ________    Vital Signs Last 24 Hrs  T(C): 36.6 (09-15-20 @ 03:51), Max: 36.7 (09-14-20 @ 13:09)  T(F): 97.9 (09-15-20 @ 03:51), Max: 98.1 (09-14-20 @ 22:05)  HR: 70 (09-15-20 @ 05:12) (70 - 93)  BP: 138/92 (09-15-20 @ 03:51) (135/91 - 148/100)  RR: 20 (09-15-20 @ 04:21) (12 - 20)  SpO2: 100% (09-15-20 @ 05:12) (96% - 100%)    PHYSICAL EXAM:  HEENT:   [ ]Tracheostomy:  [ ]Pupils equal  [ ]No oral lesions  [ ]Abnormal    SKIN  [ ]No Rash  [ ] Abnormal  [ ] pressure    CARDIAC  [ ]Regular  [ ]Abnormal    PULMONARY  [ ]Bilateral Clear Breath Sounds  [ ]Normal Excursion  [ ]Abnormal    GI  [ ]PEG      [ ] +BS		              [ ]Soft, nondistended, nontender	  [ ]Abnormal    MUSCULOSKELETAL                                   [ ]Bedbound                 [ ]Abnormal    [ ]Ambulatory/OOB to chair                           EXTREMITIES                                         [ ]Normal  [ ]Edema                           NEUROLOGIC  [ ] Normal, non focal  [ ] Focal findings:    PSYCHIATRIC  [ ]Alert and appropriate  [ ] Sedated	 [ ]Agitated    :  [place]: [ ] Yes, if yes: Date of Placement:                   [  ] No    LINES: Central Lines [ ] Yes, if yes: Date of Placement                                     [  ] No    HOSPITAL MEDICATIONS:  MEDICATIONS  (STANDING):  albuterol/ipratropium for Nebulization. 3 milliLiter(s) Nebulizer every 6 hours  amantadine Syrup 100 milliGRAM(s) Oral <User Schedule>  artificial  tears Solution 1 Drop(s) Both EYES every 6 hours  aspirin  chewable 81 milliGRAM(s) Enteral Tube daily  bisacodyl Suppository 10 milliGRAM(s) Rectal at bedtime  cefepime   IVPB 2000 milliGRAM(s) IV Intermittent every 12 hours  chlorhexidine 4% Liquid 1 Application(s) Topical <User Schedule>  dextrose 5%. 1000 milliLiter(s) (50 mL/Hr) IV Continuous <Continuous>  dextrose 50% Injectable 12.5 Gram(s) IV Push once  dextrose 50% Injectable 25 Gram(s) IV Push once  dextrose 50% Injectable 25 Gram(s) IV Push once  doxazosin 8 milliGRAM(s) Oral at bedtime  famotidine    Tablet 20 milliGRAM(s) Oral daily  fentaNYL   Patch  75 MICROgram(s)/Hr 1 Patch Transdermal every 72 hours  heparin   Injectable 5000 Unit(s) SubCutaneous every 8 hours  hydrALAZINE 100 milliGRAM(s) Oral three times a day  isosorbide   dinitrate Tablet (ISORDIL) 20 milliGRAM(s) Enteral Tube <User Schedule>  lactobacillus acidophilus 1 Tablet(s) Oral two times a day  lidocaine   Patch 1 Patch Transdermal every 24 hours  lidocaine   Patch 1 Patch Transdermal daily  metoprolol tartrate 75 milliGRAM(s) Oral every 8 hours  QUEtiapine 50 milliGRAM(s) Oral every 8 hours  sodium chloride 3%  Inhalation 3 milliLiter(s) Inhalation every 6 hours  vancomycin    Solution 125 milliGRAM(s) Enteral Tube two times a day    MEDICATIONS  (PRN):  dextrose 40% Gel 15 Gram(s) Oral once PRN Blood Glucose LESS THAN 70 milliGRAM(s)/deciliter  glucagon  Injectable 1 milliGRAM(s) IntraMuscular once PRN Glucose LESS THAN 70 milligrams/deciliter  polyethylene glycol 3350 17 Gram(s) Oral daily PRN Constipation      LABS:                        8.8    9.88  )-----------( 314      ( 15 Sep 2020 07:03 )             29.1     09-15    128<L>  |  95<L>  |  26<H>  ----------------------------<  229<H>  4.5   |  26  |  1.46<H>    Ca    9.2      15 Sep 2020 07:03  Phos  4.5     09-15  Mg     1.7     09-15              CAPILLARY BLOOD GLUCOSE    MICROBIOLOGY:     RADIOLOGY:  [ ] Reviewed and interpreted by me     Patient is a 60y old  Male who presents with a chief complaint of Arrest (14 Sep 2020 08:38)      Interval Events: No events reported over night    REVIEW OF SYSTEMS:  [X] Positive: Poor vision/vision loss  [ ] All other systems negative  [ ] Unable to assess ROS because ____    Vital Signs Last 24 Hrs  T(C): 36.6 (09-15-20 @ 03:51), Max: 36.7 (09-14-20 @ 13:09)  T(F): 97.9 (09-15-20 @ 03:51), Max: 98.1 (09-14-20 @ 22:05)  HR: 70 (09-15-20 @ 05:12) (70 - 93)  BP: 138/92 (09-15-20 @ 03:51) (135/91 - 148/100)  RR: 20 (09-15-20 @ 04:21) (12 - 20)  SpO2: 100% (09-15-20 @ 05:12) (96% - 100%)      PHYSICAL EXAM:  HEENT:   [X]Tracheostomy:  large stoma; #6 Cuffless Portex  [X] Right eye ptosis, Dilated fixed right pupil; Left PERRL  [ ]No oral lesions  [ ]Abnormal    SKIN  [X] No Rash  [ ] Abnormal  [ ] pressure    CARDIAC  [X] Regular  [ ] Abnormal    PULMONARY  [X] Bilateral Clear Breath Sounds  [ ] Normal Excursion  [ ] Abnormal    GI  [X] PEG      [X] +BS		              [X] Soft, nondistended, nontender	  [ ]Abnormal    MUSCULOSKELETAL                                   [ ] Bedbound                 [ ] Abnormal    [ X] OOB to chair with assistance    EXTREMITIES                                         [X] Normal  [ ]Edema                           NEUROLOGIC  [ ] Normal, non focal  [X] Focal findings: Plegia of RUE/RLE; Alert and responsive; able to follow commands and respond to questions appropriately sometimes    PSYCHIATRIC  [X] Alert  [ ] Sedated	 [ ]Agitated    :  [place]: [ ] Yes, if yes: Date of Placement:                   [X] No    LINES: Central Lines [ ] Yes, if yes: Date of Placement                                     [  ] No        HOSPITAL MEDICATIONS:  MEDICATIONS  (STANDING):  albuterol/ipratropium for Nebulization. 3 milliLiter(s) Nebulizer every 6 hours  amantadine Syrup 100 milliGRAM(s) Oral <User Schedule>  artificial  tears Solution 1 Drop(s) Both EYES every 6 hours  aspirin  chewable 81 milliGRAM(s) Enteral Tube daily  bisacodyl Suppository 10 milliGRAM(s) Rectal at bedtime  cefepime   IVPB 2000 milliGRAM(s) IV Intermittent every 12 hours  chlorhexidine 4% Liquid 1 Application(s) Topical <User Schedule>  dextrose 5%. 1000 milliLiter(s) (50 mL/Hr) IV Continuous <Continuous>  dextrose 50% Injectable 12.5 Gram(s) IV Push once  dextrose 50% Injectable 25 Gram(s) IV Push once  dextrose 50% Injectable 25 Gram(s) IV Push once  doxazosin 8 milliGRAM(s) Oral at bedtime  famotidine    Tablet 20 milliGRAM(s) Oral daily  fentaNYL   Patch  75 MICROgram(s)/Hr 1 Patch Transdermal every 72 hours  heparin   Injectable 5000 Unit(s) SubCutaneous every 8 hours  hydrALAZINE 100 milliGRAM(s) Oral three times a day  isosorbide   dinitrate Tablet (ISORDIL) 20 milliGRAM(s) Enteral Tube <User Schedule>  lactobacillus acidophilus 1 Tablet(s) Oral two times a day  lidocaine   Patch 1 Patch Transdermal every 24 hours  lidocaine   Patch 1 Patch Transdermal daily  metoprolol tartrate 75 milliGRAM(s) Oral every 8 hours  QUEtiapine 50 milliGRAM(s) Oral every 8 hours  sodium chloride 3%  Inhalation 3 milliLiter(s) Inhalation every 6 hours  vancomycin    Solution 125 milliGRAM(s) Enteral Tube two times a day    MEDICATIONS  (PRN):  dextrose 40% Gel 15 Gram(s) Oral once PRN Blood Glucose LESS THAN 70 milliGRAM(s)/deciliter  glucagon  Injectable 1 milliGRAM(s) IntraMuscular once PRN Glucose LESS THAN 70 milligrams/deciliter  polyethylene glycol 3350 17 Gram(s) Oral daily PRN Constipation      LABS:                        8.8    9.88  )-----------( 314      ( 15 Sep 2020 07:03 )             29.1     09-15    128<L>  |  95<L>  |  26<H>  ----------------------------<  229<H>  4.5   |  26  |  1.46<H>    Ca    9.2      15 Sep 2020 07:03  Phos  4.5     09-15  Mg     1.7     09-15              CAPILLARY BLOOD GLUCOSE    MICROBIOLOGY:     RADIOLOGY:  [ ] Reviewed and interpreted by me

## 2020-09-15 NOTE — CHART NOTE - NSCHARTNOTEFT_GEN_A_CORE
Nutrition Follow-up  Patient seen for: nutrition follow-up on [facility]    Source: comprehensive chart review, ( )     Hospital course as per chart: Pt is a 61 yo male with PMH of afib s/p cardiac arrest, admitted 8/9. Found to have R SAH of unclear etiology with cerebral angiogram on 8/10 negative for aneurysm. Now with resolved neurogenic/cardiogenic shock. Course complicated by GI bleed s/p PPI gtt, bleeding from [place] s/p clot irrigation, prolonged respiratory failure s/p trach (8/24) and PEG (8/26). Currently with afib with RVR and C diff colitis. Transferred to [facility] on 8/29. Tolerating continuous trach collar since 8/30. Chart reviewed, events noted. Trach downsized to #6 Cuffless Portex yesterday (9/14).     Diet: NPO with tube feed  Enteral Nutrition: Vital AF via PEG bolus feeds 420 ml 4 feeds daily with Probiotic Yogurt/Smoothie 2 daily    Patient reports ( )    Current Weight/% Weight Change: no new weights to assess  Will continue to monitor and trend weights.     Pertinent Medications: MEDICATIONS  (STANDING):  albuterol/ipratropium for Nebulization. 3 milliLiter(s) Nebulizer every 6 hours  amantadine Syrup 100 milliGRAM(s) Oral <User Schedule>  artificial  tears Solution 1 Drop(s) Both EYES every 6 hours  aspirin  chewable 81 milliGRAM(s) Enteral Tube daily  bisacodyl Suppository 10 milliGRAM(s) Rectal at bedtime  cefepime   IVPB 2000 milliGRAM(s) IV Intermittent every 12 hours  chlorhexidine 4% Liquid 1 Application(s) Topical <User Schedule>  dextrose 5%. 1000 milliLiter(s) (50 mL/Hr) IV Continuous <Continuous>  dextrose 50% Injectable 12.5 Gram(s) IV Push once  dextrose 50% Injectable 25 Gram(s) IV Push once  dextrose 50% Injectable 25 Gram(s) IV Push once  doxazosin 8 milliGRAM(s) Oral at bedtime  famotidine    Tablet 20 milliGRAM(s) Oral daily  fentaNYL   Patch  75 MICROgram(s)/Hr 1 Patch Transdermal every 72 hours  heparin   Injectable 5000 Unit(s) SubCutaneous every 8 hours  hydrALAZINE 100 milliGRAM(s) Oral three times a day  isosorbide   dinitrate Tablet (ISORDIL) 20 milliGRAM(s) Enteral Tube <User Schedule>  lactobacillus acidophilus 1 Tablet(s) Oral two times a day  lidocaine   Patch 1 Patch Transdermal every 24 hours  lidocaine   Patch 1 Patch Transdermal daily  metoprolol tartrate 75 milliGRAM(s) Oral every 8 hours  QUEtiapine 50 milliGRAM(s) Oral every 8 hours  sodium chloride 3%  Inhalation 3 milliLiter(s) Inhalation every 6 hours  vancomycin    Solution 125 milliGRAM(s) Enteral Tube two times a day    MEDICATIONS  (PRN):  dextrose 40% Gel 15 Gram(s) Oral once PRN Blood Glucose LESS THAN 70 milliGRAM(s)/deciliter  glucagon  Injectable 1 milliGRAM(s) IntraMuscular once PRN Glucose LESS THAN 70 milligrams/deciliter  polyethylene glycol 3350 17 Gram(s) Oral daily PRN Constipation    Pertinent Labs:  09-15 Na128 mmol/L<L> Glu 229 mg/dL<H> K+ 4.5 mmol/L Cr  1.46 mg/dL<H> BUN 26 mg/dL<H> 09-15 Phos 4.5 mg/dL 09-09 Alb 3.1 g/dL<L>    Skin: no pressure injuries per flowsheets   Edema: 1+ generalized edema as per flowsheets    Estimated Needs: no change since previous assessment  Based on upper IBW 76.8kg:   Energy: (25-30kcal/kg): 1920-2304kcal  Protein: (1.4-1.6g protein/kg): 108-123g protein    Previous Nutrition Diagnosis: Increased nutrient needs - diagnosis ongoing, being addressed with enteral nutrition     New Nutrition Diagnosis: [ ] not applicable    [ ] Inadequate Protein Energy Intake [ ]Inadequate Oral Intake [ ] Excessive Energy Intake     [ ] Underweight [ ] Increased Nutrient Needs [ ] Overweight/Obesity     [ ] Altered GI Function [ ] Unintended Weight Loss [ ] Food & Nutrition Related Knowledge Deficit[ ] Limited Adherence to nutrition related recommendations [ ] Malnutrition  [ ] other: Free text    Recommendations:  1)     Monitoring and Evaluation: Monitor enteral nutrition intake and tolerance, weight, labs, skin, GI status    RD remains available upon request and will continue to follow-up per protocol.   Nasrin Vaughn MS RD CDN pager #[phone] Nutrition Follow-up  Patient seen for: nutrition follow-up on [facility]    Source: EMR    Hospital course as per chart: Pt is a 59 yo male with PMH of afib s/p cardiac arrest, admitted 8/9. Found to have R SAH of unclear etiology with cerebral angiogram on 8/10 negative for aneurysm. Now with resolved neurogenic/cardiogenic shock. Course complicated by GI bleed s/p PPI gtt, bleeding from [place] s/p clot irrigation, prolonged respiratory failure s/p trach (8/24) and PEG (8/26). Currently with afib with RVR and C diff colitis. Transferred to [facility] on 8/29. Tolerating continuous trach collar since 8/30. Chart reviewed, events noted. Trach downsized to #6 Cuffless Portex yesterday (9/14).     Diet: NPO with tube feed  Enteral Nutrition: Vital AF via PEG bolus feeds 420 ml 4 feeds daily with Probiotic Yogurt/Smoothie 2 daily (changed from continuous feeds to bolus feeds yesterday 9/14).     Patient visited, confused, trached. Tube feed bottles at bedside - Vital AF 1.2. Per flowsheets, pt received a 200 ml bolus yesterday (9/14) and 2 bolus feeds (300 ml + 400 ml) today (9/15). Noted pt with fecal incontinence. Last BM yesterday (9/14) per flowsheets.     Current Weight/% Weight Change: no new weights to assess  Will continue to monitor and trend weights.     Pertinent Medications: MEDICATIONS  (STANDING):  albuterol/ipratropium for Nebulization. 3 milliLiter(s) Nebulizer every 6 hours  amantadine Syrup 100 milliGRAM(s) Oral <User Schedule>  artificial  tears Solution 1 Drop(s) Both EYES every 6 hours  aspirin  chewable 81 milliGRAM(s) Enteral Tube daily  bisacodyl Suppository 10 milliGRAM(s) Rectal at bedtime  cefepime   IVPB 2000 milliGRAM(s) IV Intermittent every 12 hours  chlorhexidine 4% Liquid 1 Application(s) Topical <User Schedule>  dextrose 5%. 1000 milliLiter(s) (50 mL/Hr) IV Continuous <Continuous>  dextrose 50% Injectable 12.5 Gram(s) IV Push once  dextrose 50% Injectable 25 Gram(s) IV Push once  dextrose 50% Injectable 25 Gram(s) IV Push once  doxazosin 8 milliGRAM(s) Oral at bedtime  famotidine    Tablet 20 milliGRAM(s) Oral daily  fentaNYL   Patch  75 MICROgram(s)/Hr 1 Patch Transdermal every 72 hours  heparin   Injectable 5000 Unit(s) SubCutaneous every 8 hours  hydrALAZINE 100 milliGRAM(s) Oral three times a day  isosorbide   dinitrate Tablet (ISORDIL) 20 milliGRAM(s) Enteral Tube <User Schedule>  lactobacillus acidophilus 1 Tablet(s) Oral two times a day  lidocaine   Patch 1 Patch Transdermal every 24 hours  lidocaine   Patch 1 Patch Transdermal daily  metoprolol tartrate 75 milliGRAM(s) Oral every 8 hours  QUEtiapine 50 milliGRAM(s) Oral every 8 hours  sodium chloride 3%  Inhalation 3 milliLiter(s) Inhalation every 6 hours  vancomycin    Solution 125 milliGRAM(s) Enteral Tube two times a day    MEDICATIONS  (PRN):  dextrose 40% Gel 15 Gram(s) Oral once PRN Blood Glucose LESS THAN 70 milliGRAM(s)/deciliter  glucagon  Injectable 1 milliGRAM(s) IntraMuscular once PRN Glucose LESS THAN 70 milligrams/deciliter  polyethylene glycol 3350 17 Gram(s) Oral daily PRN Constipation    Pertinent Labs:  09-15 Na128 mmol/L<L> Glu 229 mg/dL<H> K+ 4.5 mmol/L Cr  1.46 mg/dL<H> BUN 26 mg/dL<H> 09-15 Phos 4.5 mg/dL 09-09 Alb 3.1 g/dL<L>    Skin: no pressure injuries per flowsheets   Edema: 1+ generalized edema as per flowsheets    Estimated Needs: no change since previous assessment  Based on upper IBW 76.8kg   Energy: (25-30kcal/kg): 1920-2304kcal  Protein: (1.4-1.6g protein/kg): 108-123g protein    Previous Nutrition Diagnosis: Increased nutrient needs - diagnosis ongoing, being addressed with enteral nutrition     New Nutrition Diagnosis: not applicable    Recommendations:  1) Continue current enteral nutrition regimen: Vital AF via PEG bolus feeds 420 ml 4 feeds daily  Provides 1680 ml total volume, 2016 kcal, 126 g protein and 1362 ml free water. Provides 26 kcal/kg, 1.6 g protein based on upper IBW of 76.8 kg. Meets >100% RDIs. Defer additional free water to team.   2) Continue Probiotic Yogurt/Smoothie 2 daily as per team    Monitoring and Evaluation: Monitor enteral nutrition intake and tolerance, weight, labs, skin, GI status    RD remains available upon request and will continue to follow-up per protocol.   Nasrin Vaughn MS RD CDN pager #[phone] Nutrition Follow-up  Patient seen for: nutrition follow-up on [facility]    Source: EMR, RN    Hospital course as per chart: Pt is a 59 yo male with PMH of afib s/p cardiac arrest, admitted 8/9. Found to have R SAH of unclear etiology with cerebral angiogram on 8/10 negative for aneurysm. Now with resolved neurogenic/cardiogenic shock. Course complicated by GI bleed s/p PPI gtt, bleeding from [place] s/p clot irrigation, prolonged respiratory failure s/p trach (8/24) and PEG (8/26). Currently with afib with RVR and C diff colitis. Transferred to [facility] on 8/29. Tolerating continuous trach collar since 8/30. Chart reviewed, events noted. Trach downsized to #6 Cuffless Portex yesterday (9/14).     Diet: NPO with tube feed  Enteral Nutrition: Vital AF via PEG bolus feeds 420 ml 4 feeds daily with Probiotic Yogurt/Smoothie 2 daily (changed from continuous feeds to bolus feeds yesterday 9/14).     Patient visited, confused, trached. Tube feed bottles at bedside - Vital AF 1.2. Per flowsheets, pt received a 200 ml bolus yesterday (9/14) and 2 bolus feeds (300 ml + 400 ml) today (9/15). Per RN, pt tolerating with no acute GI distress. Noted pt with fecal incontinence. Last BM yesterday (9/14) per flowsheets.     Current Weight/% Weight Change: no new weights to assess  Will continue to monitor and trend weights.     Pertinent Medications: MEDICATIONS  (STANDING):  albuterol/ipratropium for Nebulization. 3 milliLiter(s) Nebulizer every 6 hours  amantadine Syrup 100 milliGRAM(s) Oral <User Schedule>  artificial  tears Solution 1 Drop(s) Both EYES every 6 hours  aspirin  chewable 81 milliGRAM(s) Enteral Tube daily  bisacodyl Suppository 10 milliGRAM(s) Rectal at bedtime  cefepime   IVPB 2000 milliGRAM(s) IV Intermittent every 12 hours  chlorhexidine 4% Liquid 1 Application(s) Topical <User Schedule>  dextrose 5%. 1000 milliLiter(s) (50 mL/Hr) IV Continuous <Continuous>  dextrose 50% Injectable 12.5 Gram(s) IV Push once  dextrose 50% Injectable 25 Gram(s) IV Push once  dextrose 50% Injectable 25 Gram(s) IV Push once  doxazosin 8 milliGRAM(s) Oral at bedtime  famotidine    Tablet 20 milliGRAM(s) Oral daily  fentaNYL   Patch  75 MICROgram(s)/Hr 1 Patch Transdermal every 72 hours  heparin   Injectable 5000 Unit(s) SubCutaneous every 8 hours  hydrALAZINE 100 milliGRAM(s) Oral three times a day  isosorbide   dinitrate Tablet (ISORDIL) 20 milliGRAM(s) Enteral Tube <User Schedule>  lactobacillus acidophilus 1 Tablet(s) Oral two times a day  lidocaine   Patch 1 Patch Transdermal every 24 hours  lidocaine   Patch 1 Patch Transdermal daily  metoprolol tartrate 75 milliGRAM(s) Oral every 8 hours  QUEtiapine 50 milliGRAM(s) Oral every 8 hours  sodium chloride 3%  Inhalation 3 milliLiter(s) Inhalation every 6 hours  vancomycin    Solution 125 milliGRAM(s) Enteral Tube two times a day    MEDICATIONS  (PRN):  dextrose 40% Gel 15 Gram(s) Oral once PRN Blood Glucose LESS THAN 70 milliGRAM(s)/deciliter  glucagon  Injectable 1 milliGRAM(s) IntraMuscular once PRN Glucose LESS THAN 70 milligrams/deciliter  polyethylene glycol 3350 17 Gram(s) Oral daily PRN Constipation    Pertinent Labs:  09-15 Na128 mmol/L<L> Glu 229 mg/dL<H> K+ 4.5 mmol/L Cr  1.46 mg/dL<H> BUN 26 mg/dL<H> 09-15 Phos 4.5 mg/dL 09-09 Alb 3.1 g/dL<L>    Skin: no pressure injuries per flowsheets   Edema: 1+ generalized edema as per flowsheets    Estimated Needs: no change since previous assessment  Based on upper IBW 76.8kg   Energy: (25-30kcal/kg): 1920-2304kcal  Protein: (1.4-1.6g protein/kg): 108-123g protein    Previous Nutrition Diagnosis: Increased nutrient needs - diagnosis ongoing, being addressed with enteral nutrition     New Nutrition Diagnosis: not applicable    Recommendations:  1) Continue current enteral nutrition regimen - advancing bolus feeds of Vital AF as tolerated to goal of 420 ml 4 feeds daily  Provides 1680 ml total volume, 2016 kcal, 126 g protein and 1362 ml free water. Provides 26 kcal/kg, 1.6 g protein based on upper IBW of 76.8 kg. Meets >100% RDIs. Defer additional free water to team.   Monitor tolerance to bolus feeds. If pt with intolerance to feeds, consider changing to more concentrated formula. RD remains available to adjust tube feed recommendations as needed.   2) Continue Probiotic Yogurt/Smoothie 2 daily as per team    Monitoring and Evaluation: Monitor enteral nutrition intake and tolerance, weight, labs, skin, GI status    RD remains available upon request and will continue to follow-up per protocol.   Nasrin Vaughn MS RD CDN pager #[phone]

## 2020-09-15 NOTE — CONSULT NOTE ADULT - SUBJECTIVE AND OBJECTIVE BOX
[facility] DEPARTMENT OF OPHTHALMOLOGY - INITIAL ADULT CONSULT  -----------------------------------------------------------------------------------------------------------------  Oscar Spann MD PGY-III  Pager: [phone]/[facility]: [phone]  -----------------------------------------------------------------------------------------------------------------    HPI:  61YO male on coumadin for afib s/p cardiac arrest at work, down 25 minutes prior to ROSC. Pupils were R fixed and dilated, left fixed at the time, no gag reflex, post-CA cooling protocol was initiated down to 35 deg. Intubated and put on Nimbex drip. Also on Propofol, fentanyl, levophed. R groin minerva placed. Also put on heparin post-CA. 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 Course also c/b GIB, put on protonix drip. Prior to xfer was started on 3% at 30cc/hr. (09 Aug 2020 14:30)    Interval History: Pt reporting significant vision loss, now saying he can not see anything. denies having these problems before the arrest. does not know when it occurred  exactly    PAST MEDICAL & SURGICAL HISTORY:  On warfarin for atrial fibrillation    No significant past surgical history      Past Ocular History: denies    FAMILY HISTORY:  No pertinent family history in first degree relatives      Social History: denies    Ophthalmic Medications: denies    MEDICATIONS  (STANDING):  albuterol/ipratropium for Nebulization. 3 milliLiter(s) Nebulizer every 6 hours  amantadine Syrup 100 milliGRAM(s) Oral <User Schedule>  artificial  tears Solution 1 Drop(s) Both EYES every 6 hours  aspirin  chewable 81 milliGRAM(s) Enteral Tube daily  bisacodyl Suppository 10 milliGRAM(s) Rectal at bedtime  cefepime   IVPB 2000 milliGRAM(s) IV Intermittent every 12 hours  chlorhexidine 4% Liquid 1 Application(s) Topical <User Schedule>  dextrose 5%. 1000 milliLiter(s) (50 mL/Hr) IV Continuous <Continuous>  dextrose 50% Injectable 12.5 Gram(s) IV Push once  dextrose 50% Injectable 25 Gram(s) IV Push once  dextrose 50% Injectable 25 Gram(s) IV Push once  doxazosin 8 milliGRAM(s) Oral at bedtime  famotidine    Tablet 20 milliGRAM(s) Oral daily  fentaNYL   Patch  75 MICROgram(s)/Hr 1 Patch Transdermal every 72 hours  heparin   Injectable 5000 Unit(s) SubCutaneous every 8 hours  hydrALAZINE 100 milliGRAM(s) Oral three times a day  isosorbide   dinitrate Tablet (ISORDIL) 20 milliGRAM(s) Enteral Tube <User Schedule>  lactobacillus acidophilus 1 Tablet(s) Oral two times a day  lidocaine   Patch 1 Patch Transdermal every 24 hours  lidocaine   Patch 1 Patch Transdermal daily  metoprolol tartrate 75 milliGRAM(s) Oral every 8 hours  QUEtiapine 50 milliGRAM(s) Oral every 8 hours  vancomycin    Solution 125 milliGRAM(s) Enteral Tube two times a day    MEDICATIONS  (PRN):  dextrose 40% Gel 15 Gram(s) Oral once PRN Blood Glucose LESS THAN 70 milliGRAM(s)/deciliter  glucagon  Injectable 1 milliGRAM(s) IntraMuscular once PRN Glucose LESS THAN 70 milligrams/deciliter  polyethylene glycol 3350 17 Gram(s) Oral daily PRN Constipation    Allergies & Intolerances:     Review of Systems:  Difficult to obtain due to patient's difficulty communicating with his     VITALS: T(C): 36.8 (09-15-20 @ 11:54)  T(F): 98.2 (09-15-20 @ 11:54), Max: 98.2 (09-15-20 @ 11:54)  HR: 68 (09-15-20 @ 18:00) (66 - 93)  BP: 138/78 (09-15-20 @ 18:00) (122/90 - 148/100)  RR:  (12 - 20)  SpO2:  (96% - 100%)  Wt(kg): --  General: AAO x 3, appropriate mood and affect    Ophthalmology Exam:  Visual acuity (cc): CF OU  Pupils: R pupil 4.5mm in dark, 4.5mm in light; L pupil 3mm in dark, 2.5mm in light  Ttono: 17 OD 20 OS  Extraocular movements (EOMs): intact abduction, limited adduction, infraduction, and supraduction OD full OS  Confrontational Visual Field (CVF): left hemianopsia to hand motion OU  Color Plates: AVILA 2/2 vision OU    Pen Light Exam (PLE)  External: Flat OU  Lids/Lashes/Lacrimal Ducts: Flat OU    Sclera/Conjunctiva: W+Q OU  Cornea: Cl OU  Anterior Chamber: D+F OU    Iris: Flat OU  Lens:  NS OU    Fundus Exam: dilated with 1% tropicamide and 2.5% phenylephrine  Approval obtained from primary team for dilation  Patient aware that pupils can remained dilated for at least 4-6 hours  Exam performed with 20D lens    Vitreous: wnl OU  Disc, cup/disc: sharp and pink, 0.8 OD 0.9 OS  Macula: wnl OU  Vessels: nasal sclerotic arterial vessel, rest wnl OD wnl OS  Periphery: wnl OU    Labs/Imaging:  ***  < from: MR Head w/wo IV Cont (08.11.20 @ 17:50) >    EXAM:  MR BRAIN [facility]                            PROCEDURE DATE:  08/11/2020            INTERPRETATION:  Contrast-enhanced MRI of the brain and cervical spine    CLINICAL INDICATION: SAH. Normal CTA and digital subtraction angiography.    TECHNIQUE: Multiplanar, multisequence MR images of the brain and cervical spine were obtained before and after the intravenous administration of 10 cc of Gadavist. 0 cc were discarded.    COMPARISON: CT brain 8/9/2020 and 8/10/2020. CTA brain 8/9/2020.    FINDINGS:    MRI BRAIN:    Redemonstration of subarachnoid hemorrhage in the right aspect of the suprasellar cistern and within the right sylvian fissure.    Scattered sulcal subarachnoid hemorrhage is redemonstrated.    Small hemorrhage layering in the occipital horns is similar.. Ventricles similar in size. No hydrocephalus.    There is restricted diffusion within the region of the right basal ganglia, posterior limb of the right internal capsule, and anterior right temporal lobe, likely representing an acute right MCA territory infarct.    Chronic left occipital infarct. Mild white matter microvascular ischemic disease. Signal voids are seen within the major intracranial vessels consistent with their patency.    No abnormal parenchymal or leptomeningeal enhancement.    Air-fluid levels and mucosal thickening in the paranasal sinuses. Minimal bilateral mastoid air cell effusions.    MRI CERVICAL SPINE:    Vertebral body height, marrow signal homogeneity, and facet alignment are maintained throughout the visualized spinal segments. Straightening of the normal cervical lordosis. Mild multilevel disc space narrowing. Cervicomedullary junction unremarkable.    No prevertebral or paravertebral edema.  No abnormal enhancement in the cervical region.    No spinal cord compression or abnormal intrinsic cord signal.    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    C2-C3: No acquired spinal canal stenosis. No neural foraminal narrowing.    C3-C4: Broad-based disc protrusion reaches the cord. Bilateral uncinate hypertrophy resulting in moderate bilateral neural foraminal narrowing.    C4-C5: Broad-based disc protrusion asymmetric to the left nearly reaches the cord. Bilateral uncinate hypertrophy resulting in moderate left and moderate to severe right neural foraminal narrowing.    C5-C6: Broad-based disc protrusion asymmetric to the left and left uncinate hypertrophy. Deformation of the left ventral thecal sac and effacement of the left lateral recess. Moderate left neural foraminal narrowing.    C6-C7: Broad-based disc protrusion deforms the ventral thecal sac. Left uncinate hypertrophy. Mild to moderate left neural foraminal narrowing.    C7-T1: Left facet hypertrophy. No acquired spinal canal stenosis or neural foraminal narrowing.    IMPRESSION:    MRI BRAIN:    Restricted There is restricted diffusion within the region of the right basal ganglia, posterior limb of the right internal capsule, and anterior right temporal lobe, likely representing an acute right MCA territory infarct.    Similar cisternal and sulcal subarachnoid hemorrhage, given differences in modality.    Similar small hemorrhage layering in the occipital horns. No hydrocephalus.    No abnormal parenchymal or leptomeningeal enhancement.    MRI CERVICAL SPINE:    Multilevel degenerative changes superimposed upon congenital spinal canal stenosis.    No abnormal enhancement in the cervical region.    Dr. Kirk discussed these findings with neurosurgical physician's assistant Tahir Henry on 8/11/2020 6:36 PM with read back.                  IVANNA KIRK M.D., ATTENDING RADIOLOGIST  This document has been electronically signed. Aug 11 2020  6:38PM                < end of copied text >  < from: IR Neuro (08.20.20 @ 11:01) >    EXAM:  IR PROCEDURE NEURO                                PROCEDURE DATE:  08/20/2020          INTERPRETATION:  Pre-procedure diagnosis: Subarachnoid hemorrhage    Post-procedure diagnosis:  Normal cerebral angiogram    Procedure: Cerebral angiography    Interventionalist:  Abe Morris MD    Fellow: Timbo Loredo MD    Assistant:  Bren PLATA    Anesthesiologist: Henny Kent MD , Jovanna Edwards CRNA    Contrast: Omnipaque 240, 114 cc    Radiation Dose:  A: 20.7 min, 1668 mGy B: 11.4 min,  348.5 mGy  Total: 32.0 min, 2016 mGy    Indications:  The patient is a 60-year-old male on Coumadin for atrial fibrillation who suffered a cardiac arrest were and was subsequently resuscitated. His pupils were fixed and dilated at that time and hypothermia was initiated.  He was intubated placed on Nimbex, Versed, propofol and fentanyl at [facility]. A noncontrast head CT demonstrated subarachnoid hemorrhage predominantly in the right suprasellar cistern extending into the right crural cistern. He was transferred to [facility] for further evaluation and management. He underwent cerebral angiogram on 8/10/2020 which was negative for aneurysm. He has since made a clinical recovery to the point where he was following commands. The patient now presents for repeat cerebral angiography to again evaluate for underlying vascular lesion. The risks, benefits, alternatives, complications and personnel associated with the procedure was discussed with the patient's family in great detail. They request that we proceed.    Procedure: The patient was brought into the angiography suite and positioned on the table supine.  Both femoral regions were prepped and draped in the standard sterile fashion. The skin overlying the right femoral artery was infiltrated with lidocaine and accessed using a micropuncture kit and modified Seldinger technique. The baseline activated clotting time was 143 seconds. A 5 Irish long sheath was inserted and attached to heparinized flush. A 5 Irish Cordis Vert diagnostic catheter over an angled Glidewire was navigated into the right internal and external carotid artery and angiography of the cranial and extracranial circulation was performed.  A rotational angiogram was performed ofthe right internal carotid circulation. Three-dimensional reconstructed images were evaluated on an independent [employer] workstation. The catheter was then navigated into the right subclavian artery and angiography of the cervical circulation was performed. The catheter was then navigated into the right thyrocervical trunk and angiography of the cervical circulation was performed. The catheter was then navigated into the right vertebral artery and angiography of the cervical and intracranial circulation was performed. A rotational angiogram was performed of the right internal carotid circulation. Three-dimensional reconstructed images were evaluated on an independent [employer] workstation. The catheter was then navigated into the left internal and external carotid artery and angiography of the cranial and extracranial circulation was performed. The catheter was navigated into the left vertebral artery and angiography of the intracranial circulation was performed. The catheter was navigated into the left subclavian artery and angiography of the cervical circulation was performed. The catheter was then navigated into the left thyrocervical trunk and angiography of the cervical circulation was performed. The catheter was then navigated into the left costocervical trunk and angiography of the cervical circulation was performed.    All catheters and guidewires were removed and hemostasis was obtained with manual compression, Quickclot and the Safeguard dressing.    Findings:    Right internal and external carotid artery including 3-D rotational:  There is normal transit of contrast through the arterial, capillary and venous phases. Anterior and posterior communicating arteries were briefly opacified.  There is no evidence of intracranial aneurysm, arteriovenous malformation or arteriovenous shunting. Multiple superficial cortical veins are identified. The superior sagittal sinus drains into the left transverse and sigmoid sinuses. The basal vein of Cristofer is identified.  The internal cerebral vein drains into the great vein of Mack and the straight sinus. The sylvian venous system drains into the cavernous and inferior petrosal sinus. There is normal transit of contrast through the right external carotid circulation without evidence of arteriovenous shunting or dural fistula.    Right subclavian artery: There is normal transit of contrast through the arterial, capillary and venous phases. Anterograde flow is identified into the right common carotid artery, right vertebral artery, right internal mammary artery, and right costocervical and thyrocervical trunks. There is no evidence of aneurysm, arteriovenous definition or fistula.    Right thyrocervical trunk: There is normal transit of contrast through the arterial, capillary and venous phases. There is no evidence of aneurysm, arteriovenous definition or fistula.    Right vertebral artery including 3-D rotational:  There is normal transit of contrast through the arterial, capillary and venous phases. There is minimal retrograde reflux into the contralateral vertebral artery and washout is visualized.  The basilar artery terminates in the right posterior cerebral artery. The left P1 segment was not opacified. Posterior communicating arteries were not opacified  There is no evidence of intracranial aneurysm, arteriovenous malformation or arteriovenous shunting. The posterior circulation drains into the straight sinus and both transverse and sigmoid sinuses.    Left internal and external carotid artery:  There is normal transit of contrast through the arterial, capillary and venous phases. There is brief opacification across the anterior communicating complex. A fetal origin posterior cerebral artery is visualized.  There is no evidence of intracranial aneurysm, arteriovenous malformation or arteriovenous shunting. Multiple superficial cortical veins are identified. The superior sagittal sinus drains into both transverse and sigmoid sinuses. The vein of Darren is identified. The basal vein of Cristofer is identified.  The internal cerebral vein drains into the great vein of Mack and the straight sinus. The sylvian venous system drains into the superior petrosal sinus. There is normal transit of contrast through the left external carotid circulation without evidence of arteriovenous shunting or dural fistula.    Left vertebral artery: The left vertebral artery arises from the aortic arch. There is normal transit of contrast through the arterial, capillary and venous phases. There is minimal retrograde reflux into the contralateral vertebral artery and significant washout is visualized.  The basilar artery is only faintly opacified. A mild stenosis is identified in the distal left vertebral artery. There is no evidence of intracranialaneurysm, arteriovenous malformation or arteriovenous shunting. The posterior circulation drains into the straight sinus and both transverse and sigmoid sinuses.    Left thyrocervical trunk: There is normal transit of contrast through the arterial, capillary and venous phases. There is no evidence of aneurysm, arteriovenous definition or fistula.    Left costocervical trunk: There is normal transit of contrast through the arterial, capillary and venous phases. The deep cervical arteries identified. There is no evidence of aneurysm, arteriovenous definition or fistula.      Impression: Normal cerebral angiogram                ABE MORRIS M.D., NEUROSURGERY  This document has been electronically signed. Aug 20 2020  3:00PM                < end of copied text >   [facility] DEPARTMENT OF OPHTHALMOLOGY - INITIAL ADULT CONSULT  -----------------------------------------------------------------------------------------------------------------  Oscar Spann MD PGY-III  Pager: [phone]/[facility]: [phone]  -----------------------------------------------------------------------------------------------------------------    HPI:  59YO male on coumadin for afib s/p cardiac arrest at work, down 25 minutes prior to ROSC. Pupils were R fixed and dilated, left fixed at the time, no gag reflex, post-CA cooling protocol was initiated down to 35 deg. Intubated and put on Nimbex drip. Also on Propofol, fentanyl, levophed. R groin minerva placed. Also put on heparin post-CA. 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 Course also c/b GIB, put on protonix drip. Prior to xfer was started on 3% at 30cc/hr. (09 Aug 2020 14:30)    Interval History: Pt reporting significant vision loss, now saying he can not see anything. denies having these problems before the arrest. does not know when it occurred exactly.  No eye pain, no redness, no pain with eye movement.    PAST MEDICAL & SURGICAL HISTORY:  On warfarin for atrial fibrillation    No significant past surgical history      Past Ocular History: denies    FAMILY HISTORY:  No pertinent family history in first degree relatives, no glaucoma      Social History: denies, no smoker    Ophthalmic Medications: denies    MEDICATIONS  (STANDING):  albuterol/ipratropium for Nebulization. 3 milliLiter(s) Nebulizer every 6 hours  amantadine Syrup 100 milliGRAM(s) Oral <User Schedule>  artificial  tears Solution 1 Drop(s) Both EYES every 6 hours  aspirin  chewable 81 milliGRAM(s) Enteral Tube daily  bisacodyl Suppository 10 milliGRAM(s) Rectal at bedtime  cefepime   IVPB 2000 milliGRAM(s) IV Intermittent every 12 hours  chlorhexidine 4% Liquid 1 Application(s) Topical <User Schedule>  dextrose 5%. 1000 milliLiter(s) (50 mL/Hr) IV Continuous <Continuous>  dextrose 50% Injectable 12.5 Gram(s) IV Push once  dextrose 50% Injectable 25 Gram(s) IV Push once  dextrose 50% Injectable 25 Gram(s) IV Push once  doxazosin 8 milliGRAM(s) Oral at bedtime  famotidine    Tablet 20 milliGRAM(s) Oral daily  fentaNYL   Patch  75 MICROgram(s)/Hr 1 Patch Transdermal every 72 hours  heparin   Injectable 5000 Unit(s) SubCutaneous every 8 hours  hydrALAZINE 100 milliGRAM(s) Oral three times a day  isosorbide   dinitrate Tablet (ISORDIL) 20 milliGRAM(s) Enteral Tube <User Schedule>  lactobacillus acidophilus 1 Tablet(s) Oral two times a day  lidocaine   Patch 1 Patch Transdermal every 24 hours  lidocaine   Patch 1 Patch Transdermal daily  metoprolol tartrate 75 milliGRAM(s) Oral every 8 hours  QUEtiapine 50 milliGRAM(s) Oral every 8 hours  vancomycin    Solution 125 milliGRAM(s) Enteral Tube two times a day    MEDICATIONS  (PRN):  dextrose 40% Gel 15 Gram(s) Oral once PRN Blood Glucose LESS THAN 70 milliGRAM(s)/deciliter  glucagon  Injectable 1 milliGRAM(s) IntraMuscular once PRN Glucose LESS THAN 70 milligrams/deciliter  polyethylene glycol 3350 17 Gram(s) Oral daily PRN Constipation    Allergies & Intolerances: NKDA    Review of Systems:  Difficult to obtain due to patient's difficulty communicating with his     VITALS: T(C): 36.8 (09-15-20 @ 11:54)  T(F): 98.2 (09-15-20 @ 11:54), Max: 98.2 (09-15-20 @ 11:54)  HR: 68 (09-15-20 @ 18:00) (66 - 93)  BP: 138/78 (09-15-20 @ 18:00) (122/90 - 148/100)  RR:  (12 - 20)  SpO2:  (96% - 100%)  Wt(kg): --  General: AAO x 3, appropriate mood and affect    Ophthalmology Exam:  Visual acuity (cc): CF OU  Pupils: R pupil 4.5mm in dark, 4.5mm in light; L pupil 3mm in dark, 2.5mm in light  Ttono: 17 OD 20 OS  Extraocular movements (EOMs): intact abduction, 10% adduction, infraduction, and supraduction OD full OS  Confrontational Visual Field (CVF): left hemianopsia to hand motion OU  Color Plates: AVILA 2/2 vision OU    Pen Light Exam (PLE)  External: Flat OU  Lids/Lashes/Lacrimal Ducts: Flat OU    Sclera/Conjunctiva: W+Q OU  Cornea: Cl OU  Anterior Chamber: D+F OU    Iris: Flat OU  Lens:  NS OU    Fundus Exam: dilated with 1% tropicamide and 2.5% phenylephrine  Approval obtained from primary team for dilation  Patient aware that pupils can remained dilated for at least 4-6 hours  Exam performed with 20D lens    Vitreous: wnl OU  Disc, cup/disc: sharp and pink, 0.8 OD 0.9 OS  Macula: wnl OU  Vessels: nasal sclerotic arterial vessel, rest wnl OD wnl OS  Periphery: wnl OU    Labs/Imaging:  ***  < from: MR Head w/wo IV Cont (08.11.20 @ 17:50) >    EXAM:  MR BRAIN [facility]                            PROCEDURE DATE:  08/11/2020            INTERPRETATION:  Contrast-enhanced MRI of the brain and cervical spine    CLINICAL INDICATION: SAH. Normal CTA and digital subtraction angiography.    TECHNIQUE: Multiplanar, multisequence MR images of the brain and cervical spine were obtained before and after the intravenous administration of 10 cc of Gadavist. 0 cc were discarded.    COMPARISON: CT brain 8/9/2020 and 8/10/2020. CTA brain 8/9/2020.    FINDINGS:    MRI BRAIN:    Redemonstration of subarachnoid hemorrhage in the right aspect of the suprasellar cistern and within the right sylvian fissure.    Scattered sulcal subarachnoid hemorrhage is redemonstrated.    Small hemorrhage layering in the occipital horns is similar.. Ventricles similar in size. No hydrocephalus.    There is restricted diffusion within the region of the right basal ganglia, posterior limb of the right internal capsule, and anterior right temporal lobe, likely representing an acute right MCA territory infarct.    Chronic left occipital infarct. Mild white matter microvascular ischemic disease. Signal voids are seen within the major intracranial vessels consistent with their patency.    No abnormal parenchymal or leptomeningeal enhancement.    Air-fluid levels and mucosal thickening in the paranasal sinuses. Minimal bilateral mastoid air cell effusions.    MRI CERVICAL SPINE:    Vertebral body height, marrow signal homogeneity, and facet alignment are maintained throughout the visualized spinal segments. Straightening of the normal cervical lordosis. Mild multilevel disc space narrowing. Cervicomedullary junction unremarkable.    No prevertebral or paravertebral edema.  No abnormal enhancement in the cervical region.    No spinal cord compression or abnormal intrinsic cord signal.    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    C2-C3: No acquired spinal canal stenosis. No neural foraminal narrowing.    C3-C4: Broad-based disc protrusion reaches the cord. Bilateral uncinate hypertrophy resulting in moderate bilateral neural foraminal narrowing.    C4-C5: Broad-based disc protrusion asymmetric to the left nearly reaches the cord. Bilateral uncinate hypertrophy resulting in moderate left and moderate to severe right neural foraminal narrowing.    C5-C6: Broad-based disc protrusion asymmetric to the left and left uncinate hypertrophy. Deformation of the left ventral thecal sac and effacement of the left lateral recess. Moderate left neural foraminal narrowing.    C6-C7: Broad-based disc protrusion deforms the ventral thecal sac. Left uncinate hypertrophy. Mild to moderate left neural foraminal narrowing.    C7-T1: Left facet hypertrophy. No acquired spinal canal stenosis or neural foraminal narrowing.    IMPRESSION:    MRI BRAIN:    Restricted There is restricted diffusion within the region of the right basal ganglia, posterior limb of the right internal capsule, and anterior right temporal lobe, likely representing an acute right MCA territory infarct.    Similar cisternal and sulcal subarachnoid hemorrhage, given differences in modality.    Similar small hemorrhage layering in the occipital horns. No hydrocephalus.    No abnormal parenchymal or leptomeningeal enhancement.    MRI CERVICAL SPINE:    Multilevel degenerative changes superimposed upon congenital spinal canal stenosis.    No abnormal enhancement in the cervical region.    Dr. Kirk discussed these findings with neurosurgical physician's assistant Tahir Henry on 8/11/2020 6:36 PM with read back.                  IVANNA KIRK M.D., ATTENDING RADIOLOGIST  This document has been electronically signed. Aug 11 2020  6:38PM                < end of copied text >  < from: IR Neuro (08.20.20 @ 11:01) >    EXAM:  IR PROCEDURE NEURO                                PROCEDURE DATE:  08/20/2020          INTERPRETATION:  Pre-procedure diagnosis: Subarachnoid hemorrhage    Post-procedure diagnosis:  Normal cerebral angiogram    Procedure: Cerebral angiography    Interventionalist:  Abe Morris MD    Fellow: Timbo Loredo MD    Assistant:  Bren PLATA    Anesthesiologist: Henny Kent MD , Jovanna Edwards CRNA    Contrast: Omnipaque 240, 114 cc    Radiation Dose:  A: 20.7 min, 1668 mGy B: 11.4 min,  348.5 mGy  Total: 32.0 min, 2016 mGy    Indications:  The patient is a 60-year-old male on Coumadin for atrial fibrillation who suffered a cardiac arrest were and was subsequently resuscitated. His pupils were fixed and dilated at that time and hypothermia was initiated.  He was intubated placed on Nimbex, Versed, propofol and fentanyl at [facility]. A noncontrast head CT demonstrated subarachnoid hemorrhage predominantly in the right suprasellar cistern extending into the right crural cistern. He was transferred to [facility] for further evaluation and management. He underwent cerebral angiogram on 8/10/2020 which was negative for aneurysm. He has since made a clinical recovery to the point where he was following commands. The patient now presents for repeat cerebral angiography to again evaluate for underlying vascular lesion. The risks, benefits, alternatives, complications and personnel associated with the procedure was discussed with the patient's family in great detail. They request that we proceed.    Procedure: The patient was brought into the angiography suite and positioned on the table supine.  Both femoral regions were prepped and draped in the standard sterile fashion. The skin overlying the right femoral artery was infiltrated with lidocaine and accessed using a micropuncture kit and modified Seldinger technique. The baseline activated clotting time was 143 seconds. A 5 [place]bourgish long sheath was inserted and attached to heparinized flush. A 5 [place] Cordis Vert diagnostic catheter over an angled Glidewire was navigated into the right internal and external carotid artery and angiography of the cranial and extracranial circulation was performed.  A rotational angiogram was performed ofthe right internal carotid circulation. Three-dimensional reconstructed images were evaluated on an independent [employer] X[employer] workstation. The catheter was then navigated into the right subclavian artery and angiography of the cervical circulation was performed. The catheter was then navigated into the right thyrocervical trunk and angiography of the cervical circulation was performed. The catheter was then navigated into the right vertebral artery and angiography of the cervical and intracranial circulation was performed. A rotational angiogram was performed of the right internal carotid circulation. Three-dimensional reconstructed images were evaluated on an independent [employer] workstation. The catheter was then navigated into the left internal and external carotid artery and angiography of the cranial and extracranial circulation was performed. The catheter was navigated into the left vertebral artery and angiography of the intracranial circulation was performed. The catheter was navigated into the left subclavian artery and angiography of the cervical circulation was performed. The catheter was then navigated into the left thyrocervical trunk and angiography of the cervical circulation was performed. The catheter was then navigated into the left costocervical trunk and angiography of the cervical circulation was performed.    All catheters and guidewires were removed and hemostasis was obtained with manual compression, Quickclot and the Safeguard dressing.    Findings:    Right internal and external carotid artery including 3-D rotational:  There is normal transit of contrast through the arterial, capillary and venous phases. Anterior and posterior communicating arteries were briefly opacified.  There is no evidence of intracranial aneurysm, arteriovenous malformation or arteriovenous shunting. Multiple superficial cortical veins are identified. The superior sagittal sinus drains into the left transverse and sigmoid sinuses. The basal vein of Cristofer is identified.  The internal cerebral vein drains into the great vein of Mack and the straight sinus. The sylvian venous system drains into the cavernous and inferior petrosal sinus. There is normal transit of contrast through the right external carotid circulation without evidence of arteriovenous shunting or dural fistula.    Right subclavian artery: There is normal transit of contrast through the arterial, capillary and venous phases. Anterograde flow is identified into the right common carotid artery, right vertebral artery, right internal mammary artery, and right costocervical and thyrocervical trunks. There is no evidence of aneurysm, arteriovenous definition or fistula.    Right thyrocervical trunk: There is normal transit of contrast through the arterial, capillary and venous phases. There is no evidence of aneurysm, arteriovenous definition or fistula.    Right vertebral artery including 3-D rotational:  There is normal transit of contrast through the arterial, capillary and venous phases. There is minimal retrograde reflux into the contralateral vertebral artery and washout is visualized.  The basilar artery terminates in the right posterior cerebral artery. The left P1 segment was not opacified. Posterior communicating arteries were not opacified  There is no evidence of intracranial aneurysm, arteriovenous malformation or arteriovenous shunting. The posterior circulation drains into the straight sinus and both transverse and sigmoid sinuses.    Left internal and external carotid artery:  There is normal transit of contrast through the arterial, capillary and venous phases. There is brief opacification across the anterior communicating complex. A fetal origin posterior cerebral artery is visualized.  There is no evidence of intracranial aneurysm, arteriovenous malformation or arteriovenous shunting. Multiple superficial cortical veins are identified. The superior sagittal sinus drains into both transverse and sigmoid sinuses. The vein of Darren is identified. The basal vein of Cristofer is identified.  The internal cerebral vein drains into the great vein of Mack and the straight sinus. The sylvian venous system drains into the superior petrosal sinus. There is normal transit of contrast through the left external carotid circulation without evidence of arteriovenous shunting or dural fistula.    Left vertebral artery: The left vertebral artery arises from the aortic arch. There is normal transit of contrast through the arterial, capillary and venous phases. There is minimal retrograde reflux into the contralateral vertebral artery and significant washout is visualized.  The basilar artery is only faintly opacified. A mild stenosis is identified in the distal left vertebral artery. There is no evidence of intracranialaneurysm, arteriovenous malformation or arteriovenous shunting. The posterior circulation drains into the straight sinus and both transverse and sigmoid sinuses.    Left thyrocervical trunk: There is normal transit of contrast through the arterial, capillary and venous phases. There is no evidence of aneurysm, arteriovenous definition or fistula.    Left costocervical trunk: There is normal transit of contrast through the arterial, capillary and venous phases. The deep cervical arteries identified. There is no evidence of aneurysm, arteriovenous definition or fistula.      Impression: Normal cerebral angiogram                ABE MORRIS M.D., NEUROSURGERY  This document has been electronically signed. Aug 20 2020  3:00PM                < end of copied text >

## 2020-09-15 NOTE — PROGRESS NOTE ADULT - SUBJECTIVE AND OBJECTIVE BOX
Subjective: Patient seen and examined. No new events except as noted.     REVIEW OF SYSTEMS:    Unable to obtain       MEDICATIONS:  MEDICATIONS  (STANDING):  albuterol/ipratropium for Nebulization. 3 milliLiter(s) Nebulizer every 6 hours  amantadine Syrup 100 milliGRAM(s) Oral <User Schedule>  artificial  tears Solution 1 Drop(s) Both EYES every 6 hours  aspirin  chewable 81 milliGRAM(s) Enteral Tube daily  bisacodyl Suppository 10 milliGRAM(s) Rectal at bedtime  cefepime   IVPB 2000 milliGRAM(s) IV Intermittent every 12 hours  chlorhexidine 4% Liquid 1 Application(s) Topical <User Schedule>  dextrose 5%. 1000 milliLiter(s) (50 mL/Hr) IV Continuous <Continuous>  dextrose 50% Injectable 12.5 Gram(s) IV Push once  dextrose 50% Injectable 25 Gram(s) IV Push once  dextrose 50% Injectable 25 Gram(s) IV Push once  doxazosin 8 milliGRAM(s) Oral at bedtime  famotidine    Tablet 20 milliGRAM(s) Oral daily  fentaNYL   Patch  75 MICROgram(s)/Hr 1 Patch Transdermal every 72 hours  heparin   Injectable 5000 Unit(s) SubCutaneous every 8 hours  hydrALAZINE 100 milliGRAM(s) Oral three times a day  isosorbide   dinitrate Tablet (ISORDIL) 20 milliGRAM(s) Enteral Tube <User Schedule>  lactobacillus acidophilus 1 Tablet(s) Oral two times a day  lidocaine   Patch 1 Patch Transdermal every 24 hours  lidocaine   Patch 1 Patch Transdermal daily  metoprolol tartrate 75 milliGRAM(s) Oral every 8 hours  QUEtiapine 50 milliGRAM(s) Oral every 8 hours  vancomycin    Solution 125 milliGRAM(s) Enteral Tube two times a day      PHYSICAL EXAM:  T(C): 36.7 (09-15-20 @ 20:47), Max: 36.8 (09-15-20 @ 11:54)  HR: 77 (09-15-20 @ 20:47) (66 - 93)  BP: 132/93 (09-15-20 @ 20:47) (122/90 - 148/100)  RR: 20 (09-15-20 @ 20:47) (12 - 21)  SpO2: 100% (09-15-20 @ 20:47) (98% - 100%)  Wt(kg): --  I&O's Summary    14 Sep 2020 07:01  -  15 Sep 2020 07:00  --------------------------------------------------------  IN: 2340 mL / OUT: 1650 mL / NET: 690 mL    15 Sep 2020 07:01  -  15 Sep 2020 21:43  --------------------------------------------------------  IN: 1340 mL / OUT: 1300 mL / NET: 40 mL        Appearance: sleepy , +trach   HEENT:   Dry  oral mucosa,  Lymphatic: No lymphadenopathy  Cardiovascular: Normal S1 S2, No JVD, No murmurs, No edema  Respiratory: Ventilated   Psychiatry: A & O x 0  Gastrointestinal:  Soft, Non-tender, +PEG   Skin: No rashes, No ecchymoses, No cyanosis	  Mental status- No acute distress, EO to stim  -CN- Pupils R 4mm NR, L 2mm sluggish, EOMI, tongue midline, face symmetric  +cough/gag  C briskly, two fingers/thumbs up on RUE, distally AG, wiggles toes on RLE    LABS:    CARDIAC MARKERS:                                8.8    9.88  )-----------( 314      ( 15 Sep 2020 07:03 )             29.1     09-15    128<L>  |  95<L>  |  26<H>  ----------------------------<  229<H>  4.5   |  26  |  1.46<H>    Ca    9.2      15 Sep 2020 07:03  Phos  4.5     09-15  Mg     1.7     09-15      proBNP:   Lipid Profile:   HgA1c:   TSH:             TELEMETRY: 	    ECG:  	  RADIOLOGY:   DIAGNOSTIC TESTING:  [ ] Echocardiogram:  [ ]  Catheterization:  [ ] Stress Test:    OTHER:

## 2020-09-15 NOTE — PROGRESS NOTE ADULT - ATTENDING COMMENTS
Agree with plan as above.    Pt doing well today. Is OOB to chair. Tolerating trach collar since 8/30. Trach downsized to cuffless Portex 6 yesterday. Continue with airway clearance therapy. Phonating well, will re-attempt PSV trial.     Pt with minimal LLE movement, able to twitch leg if concentrates. No LUE movement, normal RUE/RLL ROM. Ophthalmology consult placed, as pt now c/o significant vision loss since hospital admission.     Will c/w Cefepime through 9/15 for PSA UTI, with Vanco PO for CDiff ppx.    Pt also with new CHFrEF on this admission. Cardiology recs appreciated. Will need O/P cardiac cath. Hx recent AFlutter/Afib in the setting of sepsis s/p amiodarone+BB therapy, now off amiodarone 2/2 bradycardia. No A/C 2/2 recent SAH and GIB, now on DVT ppx with HSQ.     Dispo planning in progress. Plan for acute rehab pending insurance coverage.

## 2020-09-15 NOTE — PROGRESS NOTE ADULT - ASSESSMENT
61 yo male admitted early August with out of hospital arrest.  ER evaluation found Rt sided SAH, cerebral angio x 2 was negative.  Hypoxic encephalopathy following event.  S/P cefepime 8/14-8/20 for pneumonia.  S/P treatment for C Diff, course completed 8/29.  S/P trach 8/24 and Peg 8/26.  Fever to 102.7 on 9/8 led to meropenem and po vanco 125 BID being started.  WBC of 30,000 has decreased to 10,000  CT of C/A/P on 9/8  without clear source of infection.  Pseudomonas in urine (pansensitive), pseudomonas/providencia in sputum,and sterile blood.  No signs of recurrent C Diff  Possible  source to sepsis , he requires q6 strait cath for large PVR  Head CT last week with evolving ICH  Suggest:  1.Continue  cefepime, day 7, will favor 7-10 days total for suspected septic event, ?  in origin.  2. Continue po vanco, 125 BID for C Diff prophylaxis untill antibiotics have been completed  3.? Behavior/Psych f/u for encephalopathy-delerium, an ongoing issue  4.Supportive care per RICU  4.Supportive care per RICU  4.Consider f/u head CT

## 2020-09-15 NOTE — PROGRESS NOTE ADULT - SUBJECTIVE AND OBJECTIVE BOX
CC: f/u for fever    Patient reports: he is less restless, comfortable on TC    REVIEW OF SYSTEMS:  All other review of systems negative (Comprehensive ROS): limited by condition.He is tolerating enteral feeds, comfortable on trach collar    Antimicrobials Day #  :day 7  cefepime   IVPB 2000 milliGRAM(s) IV Intermittent every 12 hours  vancomycin    Solution 125 milliGRAM(s) Enteral Tube two times a day    Other Medications Reviewed    MEDICATIONS  (STANDING):  albuterol/ipratropium for Nebulization. 3 milliLiter(s) Nebulizer every 6 hours  amantadine Syrup 100 milliGRAM(s) Oral <User Schedule>  artificial  tears Solution 1 Drop(s) Both EYES every 6 hours  aspirin  chewable 81 milliGRAM(s) Enteral Tube daily  bisacodyl Suppository 10 milliGRAM(s) Rectal at bedtime  cefepime   IVPB 2000 milliGRAM(s) IV Intermittent every 12 hours  chlorhexidine 4% Liquid 1 Application(s) Topical <User Schedule>  dextrose 5%. 1000 milliLiter(s) (50 mL/Hr) IV Continuous <Continuous>  dextrose 50% Injectable 12.5 Gram(s) IV Push once  dextrose 50% Injectable 25 Gram(s) IV Push once  dextrose 50% Injectable 25 Gram(s) IV Push once  doxazosin 8 milliGRAM(s) Oral at bedtime  famotidine    Tablet 20 milliGRAM(s) Oral daily  fentaNYL   Patch  75 MICROgram(s)/Hr 1 Patch Transdermal every 72 hours  heparin   Injectable 5000 Unit(s) SubCutaneous every 8 hours  hydrALAZINE 100 milliGRAM(s) Oral three times a day  isosorbide   dinitrate Tablet (ISORDIL) 20 milliGRAM(s) Enteral Tube <User Schedule>  lactobacillus acidophilus 1 Tablet(s) Oral two times a day  lidocaine   Patch 1 Patch Transdermal every 24 hours  lidocaine   Patch 1 Patch Transdermal daily  metoprolol tartrate 75 milliGRAM(s) Oral every 8 hours  QUEtiapine 50 milliGRAM(s) Oral every 8 hours  sodium chloride 3%  Inhalation 3 milliLiter(s) Inhalation every 6 hours  vancomycin    Solution 125 milliGRAM(s) Enteral Tube two times a day  T(F): 97.9 (09-15-20 @ 03:51), Max: 98.1 (09-14-20 @ 22:05)  HR: 87 (09-15-20 @ 09:03)  BP: 138/92 (09-15-20 @ 03:51)  RR: 20 (09-15-20 @ 09:03)  SpO2: 99% (09-15-20 @ 09:03)  Wt(kg): --    PHYSICAL EXAM:  General: awake,, no acute distress  Eyes:  anicteric, no conjunctival injection, no discharge  Oropharynx: no lesions or injection 	  Neck: supple, trach collar  Lungs: clear to auscultation  Heart: regular rate and rhythm; no murmur, rubs or gallops  Abdomen: soft, nondistended, nontender, peg  Skin: no lesions  Extremities: no clubbing, cyanosis, + edema  Neurologic: awake but marginally interactive    LAB RESULTS:                        8.8    9.88  )-----------( 314      ( 15 Sep 2020 07:03 )             29.1     09-15    128<L>  |  95<L>  |  26<H>  ----------------------------<  229<H>  4.5   |  26  |  1.46<H>    Ca    9.2      15 Sep 2020 07:03  Phos  4.5     09-15  Mg     1.7     09-15          MICROBIOLOGY:  RECENT CULTURES:      RADIOLOGY REVIEWED:

## 2020-09-15 NOTE — PROGRESS NOTE ADULT - PROBLEM SELECTOR PLAN 3
- Continue fentanyl patch 75mcg  - Seroquel resumed at 50mg  Q6H for agitation control reduced to Q8H on 9/14 as patient somnolent during the day

## 2020-09-15 NOTE — PROGRESS NOTE ADULT - PROBLEM SELECTOR PLAN 8
- Improved following bladder decompression fro Urinary retention - Improved following bladder decompression fro Urinary retention  - Hyponatremia: Free water discontinued.

## 2020-09-15 NOTE — PROGRESS NOTE ADULT - ASSESSMENT
60-year-old male with a history of A-Fib on warfarin who presents with cardiac arrest at work with downtime of about 25 minutes prior to ROSC. S/p therapeutic hypothermia. 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 Now with resolved neurogenic/cardiogenic shock. Course complicated by GI bleed s/p PPI gtt, bleeding from scott s/p clot irrigation, prolonged respiratory failure s/p trach (8/24) and PEG (8/26). Currently with A-Fib with RVR and C diff colitis.  Also developed gross hematuria - Urology consulted +catheter with clot (irrigated and exchanged) suspected 2/2 traumatic catheterization. Additionally course complicated by fever and Pseudomonas PNA (s/p Rx) then developed C diff (now with resolved leukocytosis and improved stooling, now soft per report); remains on enteral Vanco    8/29: Received to RCU  8/30: Less Agitated overnight, Afebrile, WBC decreasing, CR elevated but Stable, Tolerating TC all day yesterday, will Attempt TC ATC tonight and ABG in am, F/u Bcx from 8/30. Rectal tube dc'd.  Speech eval for AAC device and will F/u.  8/31: will attempt TC around the clock again tonight. Stopped amiodarone as per cards.  Will be cathed if cleared by ID and renal.  9/1: Elevated BP overnight, BP stable today, afebrile. Lethargic earlier today on exam; ABG Stable; Head CT done- no new ICH, ventricles enlarged ? Hydrocephalus, Spoke to Max from NSX who will see- although felt no intervention required at this time. Tolerating TC ATC, will attempt cuffless trach tomorrow. C diff Iso discontinued as discussed w/ Inf control.  9/2: still with secretions, will downsize when improved. Will cath as outpatient. Decreased opioids  9/3: PICC dc'd, Trach downsized to #7 cuffless Portex. stomach distended  9/4: Occasionally Restless, afebrile. Tolerating cuffless trach, phonating w/ digital occlusion.  Spoke to speech for PMV re-eval soon than 9/6.  Elevated BP, Hydralazine increased.- will monitor.  9/5: trach dislodged, ENT changed to #8 cuffless Shiley. OOB to chair  9/6: OOB to chair today, gave dulcolax suppository   9/7: agitated while in chair today, gave Seroquel.  Producing thick, yellow secretions. Afebrile. Managing secretions with duoneb and hyper-sal 7%/Chest PT.  9/8: Had spurious leukocytosis. Afebrile, hemodynamically stable. Repeated cbc, sent Bcx.  Spiked temp of 102.5, cbc rechecked which was consistent with leukocytosis. PAN cultured. Noted urinary retention of 2600ml urine, abdomen distended with some tenderness on palpation.  F/U CT abd/pelvis/chest.  9/9: Patient appears well despite leukocytosis of 31. Sputum Cx growing gram neg rods. Remains on meropenem, added Vancoycin PO per ID for concern of recent Cdiff infection.  9/10: Afebrile over night. Leukocytosis decreasing. UCx growing pan-sensitive pseudomonas. Will continue meropenem and prophylactic vancomycin PO. Thick brown tinged sputum with suctioning, aggressive chest PT, nebs + hypersal.  9/11: Patient with episode of agitation and delirium early this morning. Seemingly did not respond to any anxiolytic or sedative meds given. Patient denies having any pain, states that he's "In the [place]". Thorough exam revealed IV access was not working. A new IV line was placed in RLE and 2mg IV ativan given which calmed the patient. Leukocytosis continues to improve on ABx. Noted to have -120s, EKG confirms AFIBB. Metoprolol increased.  9/12: Less Agitated today, improving CR and Leukocytosis on Cefepime; Still with Urinary retention and requiring prn straight cath- cardura increased.   9/13: No new events  9/14: Stable. Trach down-sized to #6 Cuffless Portex, obturator used. No complications, tolerated procedure well. Will complete 7-10 day course per Attending discretion. 60-year-old male with a history of A-Fib on warfarin who presents with cardiac arrest at work with downtime of about 25 minutes prior to ROSC. S/p therapeutic hypothermia. 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 Now with resolved neurogenic/cardiogenic shock. Course complicated by GI bleed s/p PPI gtt, bleeding from scott s/p clot irrigation, prolonged respiratory failure s/p trach (8/24) and PEG (8/26). Currently with A-Fib with RVR and C diff colitis.  Also developed gross hematuria - Urology consulted +catheter with clot (irrigated and exchanged) suspected 2/2 traumatic catheterization. Additionally course complicated by fever and Pseudomonas PNA (s/p Rx) then developed C diff (now with resolved leukocytosis and improved stooling, now soft per report); remains on enteral Vanco    8/29: Received to RCU  8/30: Less Agitated overnight, Afebrile, WBC decreasing, CR elevated but Stable, Tolerating TC all day yesterday, will Attempt TC ATC tonight and ABG in am, F/u Bcx from 8/30. Rectal tube dc'd.  Speech eval for AAC device and will F/u.  8/31: will attempt TC around the clock again tonight. Stopped amiodarone as per cards.  Will be cathed if cleared by ID and renal.  9/1: Elevated BP overnight, BP stable today, afebrile. Lethargic earlier today on exam; ABG Stable; Head CT done- no new ICH, ventricles enlarged ? Hydrocephalus, Spoke to Max from NSX who will see- although felt no intervention required at this time. Tolerating TC ATC, will attempt cuffless trach tomorrow. C diff Iso discontinued as discussed w/ Inf control.  9/2: still with secretions, will downsize when improved. Will cath as outpatient. Decreased opioids  9/3: PICC dc'd, Trach downsized to #7 cuffless Portex. stomach distended  9/4: Occasionally Restless, afebrile. Tolerating cuffless trach, phonating w/ digital occlusion.  Spoke to speech for PMV re-eval soon than 9/6.  Elevated BP, Hydralazine increased.- will monitor.  9/5: trach dislodged, ENT changed to #8 cuffless Shiley. OOB to chair  9/6: OOB to chair today, gave dulcolax suppository   9/7: agitated while in chair today, gave Seroquel.  Producing thick, yellow secretions. Afebrile. Managing secretions with duoneb and hyper-sal 7%/Chest PT.  9/8: Had spurious leukocytosis. Afebrile, hemodynamically stable. Repeated cbc, sent Bcx.  Spiked temp of 102.5, cbc rechecked which was consistent with leukocytosis. PAN cultured. Noted urinary retention of 2600ml urine, abdomen distended with some tenderness on palpation.  F/U CT abd/pelvis/chest.  9/9: Patient appears well despite leukocytosis of 31. Sputum Cx growing gram neg rods. Remains on meropenem, added Vancoycin PO per ID for concern of recent Cdiff infection.  9/10: Afebrile over night. Leukocytosis decreasing. UCx growing pan-sensitive pseudomonas. Will continue meropenem and prophylactic vancomycin PO. Thick brown tinged sputum with suctioning, aggressive chest PT, nebs + hypersal.  9/11: Patient with episode of agitation and delirium early this morning. Seemingly did not respond to any anxiolytic or sedative meds given. Patient denies having any pain, states that he's "In the [place]". Thorough exam revealed IV access was not working. A new IV line was placed in RLE and 2mg IV ativan given which calmed the patient. Leukocytosis continues to improve on ABx. Noted to have -120s, EKG confirms AFIBB. Metoprolol increased.  9/12: Less Agitated today, improving CR and Leukocytosis on Cefepime; Still with Urinary retention and requiring prn straight cath- cardura increased.   9/13: No new events  9/14: Stable. Trach down-sized to #6 Cuffless Portex, obturator used. No complications, tolerated procedure well. Will complete 7-10 day course per Attending discretion.  9/15: Patient complaining of not being able to see, poor vision. States he sees shadows and unable ot identify objects with his left eye. Right remains with ptosis, fixed and dilated pupil. Hyponatremia noted, free water discontinued.  For PMV re-assessment tomorrow. Discharge planning in progress as trying to first obtain insurance.

## 2020-09-15 NOTE — PROGRESS NOTE ADULT - PROBLEM SELECTOR PLAN 1
- Trached 8/24 with ENT  -Continue Duoneb and hypertonic saline, Mucomyst, chest PT  -  Trach further down-sized from #8 to #6 CFS Portex  - Tolerating continuous trach collar since 8/30 - Trached 8/24 with ENT  - Continue Duoneb and hypertonic saline, chest PT  -  Trach further down-sized from #8 to #6 CFS Portex  - Tolerating continuous trach collar since 8/30

## 2020-09-15 NOTE — CONSULT NOTE ADULT - CONSULT REASON
C diff
Cardiac Arrest   Afib
Dysphagia
Hematuria
JALEN requiring iv contrast for coronary angio
S/P SAH
decreased vision
trach
RCU eval

## 2020-09-15 NOTE — PROGRESS NOTE ADULT - ASSESSMENT
59 YO male on coumadin for afib s/p cardiac arrest at work, down 25 minutes prior to ROSC. Pupils were R fixed and dilated, left fixed at the time, no gag reflex, post-CA cooling protocol was initiated down to 35 deg. Intubated and put on Nimbex drip. Also on Propofol, fentanyl, levophed. R groin minreva placed. Also put on heparin post-CA. 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 Course also c/b GIB, put on protonix drip. Prior to xfer was started on 3% at 30cc/hr.   Has been in Afib HR 90-110s with frequent ectopy.

## 2020-09-15 NOTE — PROGRESS NOTE ADULT - PROBLEM SELECTOR PLAN 5
Completed Abx for Pseudomonas aeruginosa pneumonia/colonization:  - S/p prolonged course of cefepime until 8/14-8/20   - If develops increased secretions, may benefit from inhaled tobramycin  - C diff: Completed oral Vanco 8/29   - UTI: Febrile with UCx growing PSA. Meropenem 1GM Q12H and Vancomycin 125mg BID for Cdiff PPx started 9/8 -->, Meropenem switched to Cefepime on 9/11, to complete 7-10 day course on 9/15 or 9/18 respectively.  - ID appreciated.

## 2020-09-15 NOTE — CONSULT NOTE ADULT - CONSULT REQUESTED DATE/TIME
03-Sep-2020 11:08
09-Aug-2020 18:14
09-Aug-2020 19:10
15-Sep-2020 18:14
21-Aug-2020 09:06
24-Aug-2020 17:15
28-Aug-2020 14:55
28-Aug-2020 22:24
27-Aug-2020 12:00

## 2020-09-15 NOTE — CONSULT NOTE ADULT - ATTENDING COMMENTS
Pt seen/examined.  Case discussed with housestaff/PA team.  Agree with above note history, physical and assessment/plan.
c.difficile colitis, with abdominal distention,nt    plan  abdominal x-ray        monitor wbc ,bms          consider holding ng tube feeds until x-ray done
I have interviewed and examined the patient and reviewed the residents note including the history, exam, assessment, and plan.  I agree with the residents assessment and plan.    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  Pt also had a sclerosed arterial vessel OD which may suggest a BRAO in the past that may be contributing as well.  Will discuss with neuro-ophtho  Findings and plan discussed with patient and primary team  Can patch the right eye for comfort      Karma Gilmore MD
Pt seen and examined with team at time of service, I was physically present for the key portions for evaluation and management (E/M) service provided, and preformed key portions of the procedure. Agree with above. Plan discussed with primary team.  Risks, benefits and alternatives of tracheostomy discussed with family including but not limited to bleeding possibly massive to injury to great vessels, infection, injury to lungs, esophagus, difficulty swallowing, inability to speak, scarring in the trachea that can obstruct the trachea, airway compromise, mucous plugging/ need for very close tracheal care as well as surgery in a critically ill patient.
Advanced care planning was discussed with patient and family.  Advanced care planning forms were reviewed and discussed as appropriate.  Differential diagnosis and plan of care discussed with patient after the evaluation.   Pain assessed and judicious use of narcotics when appropriate was discussed.  Importance of Fall prevention discussed.  Counseling on Smoking and Alcohol cessation was offered when appropriate.  Counseling on Diet, exercise, and medication compliance was done.

## 2020-09-16 LAB
ANION GAP SERPL CALC-SCNC: 12 MMOL/L — SIGNIFICANT CHANGE UP (ref 5–17)
BASOPHILS # BLD AUTO: 0.1 K/UL — SIGNIFICANT CHANGE UP (ref 0–0.2)
BASOPHILS NFR BLD AUTO: 1.2 % — SIGNIFICANT CHANGE UP (ref 0–2)
BUN SERPL-MCNC: 30 MG/DL — HIGH (ref 7–23)
CALCIUM SERPL-MCNC: 9.6 MG/DL — SIGNIFICANT CHANGE UP (ref 8.4–10.5)
CHLORIDE SERPL-SCNC: 94 MMOL/L — LOW (ref 96–108)
CO2 SERPL-SCNC: 25 MMOL/L — SIGNIFICANT CHANGE UP (ref 22–31)
CREAT SERPL-MCNC: 1.41 MG/DL — HIGH (ref 0.5–1.3)
EOSINOPHIL # BLD AUTO: 0.37 K/UL — SIGNIFICANT CHANGE UP (ref 0–0.5)
EOSINOPHIL NFR BLD AUTO: 4.4 % — SIGNIFICANT CHANGE UP (ref 0–6)
GLUCOSE BLDC GLUCOMTR-MCNC: 91 MG/DL — SIGNIFICANT CHANGE UP (ref 70–99)
GLUCOSE SERPL-MCNC: 92 MG/DL — SIGNIFICANT CHANGE UP (ref 70–99)
HCT VFR BLD CALC: 32 % — LOW (ref 39–50)
HGB BLD-MCNC: 9.7 G/DL — LOW (ref 13–17)
IMM GRANULOCYTES NFR BLD AUTO: 4.7 % — HIGH (ref 0–1.5)
LYMPHOCYTES # BLD AUTO: 1.75 K/UL — SIGNIFICANT CHANGE UP (ref 1–3.3)
LYMPHOCYTES # BLD AUTO: 20.7 % — SIGNIFICANT CHANGE UP (ref 13–44)
MAGNESIUM SERPL-MCNC: 1.9 MG/DL — SIGNIFICANT CHANGE UP (ref 1.6–2.6)
MCHC RBC-ENTMCNC: 26.4 PG — LOW (ref 27–34)
MCHC RBC-ENTMCNC: 30.3 GM/DL — LOW (ref 32–36)
MCV RBC AUTO: 87.2 FL — SIGNIFICANT CHANGE UP (ref 80–100)
MONOCYTES # BLD AUTO: 0.79 K/UL — SIGNIFICANT CHANGE UP (ref 0–0.9)
MONOCYTES NFR BLD AUTO: 9.3 % — SIGNIFICANT CHANGE UP (ref 2–14)
NEUTROPHILS # BLD AUTO: 5.05 K/UL — SIGNIFICANT CHANGE UP (ref 1.8–7.4)
NEUTROPHILS NFR BLD AUTO: 59.7 % — SIGNIFICANT CHANGE UP (ref 43–77)
NRBC # BLD: 0 /100 WBCS — SIGNIFICANT CHANGE UP (ref 0–0)
PHOSPHATE SERPL-MCNC: 4 MG/DL — SIGNIFICANT CHANGE UP (ref 2.5–4.5)
PLATELET # BLD AUTO: 342 K/UL — SIGNIFICANT CHANGE UP (ref 150–400)
POTASSIUM SERPL-MCNC: 4.7 MMOL/L — SIGNIFICANT CHANGE UP (ref 3.5–5.3)
POTASSIUM SERPL-SCNC: 4.7 MMOL/L — SIGNIFICANT CHANGE UP (ref 3.5–5.3)
RBC # BLD: 3.67 M/UL — LOW (ref 4.2–5.8)
RBC # FLD: 15 % — HIGH (ref 10.3–14.5)
SODIUM SERPL-SCNC: 131 MMOL/L — LOW (ref 135–145)
WBC # BLD: 8.46 K/UL — SIGNIFICANT CHANGE UP (ref 3.8–10.5)
WBC # FLD AUTO: 8.46 K/UL — SIGNIFICANT CHANGE UP (ref 3.8–10.5)

## 2020-09-16 PROCEDURE — 99233 SBSQ HOSP IP/OBS HIGH 50: CPT

## 2020-09-16 RX ORDER — MAGNESIUM SULFATE 500 MG/ML
2 VIAL (ML) INJECTION ONCE
Refills: 0 | Status: COMPLETED | OUTPATIENT
Start: 2020-09-16 | End: 2020-09-16

## 2020-09-16 RX ORDER — METOPROLOL TARTRATE 50 MG
50 TABLET ORAL
Refills: 0 | Status: DISCONTINUED | OUTPATIENT
Start: 2020-09-17 | End: 2020-10-01

## 2020-09-16 RX ORDER — METOPROLOL TARTRATE 50 MG
75 TABLET ORAL EVERY 12 HOURS
Refills: 0 | Status: DISCONTINUED | OUTPATIENT
Start: 2020-09-16 | End: 2020-09-16

## 2020-09-16 RX ADMIN — Medication 100 MILLIGRAM(S): at 12:10

## 2020-09-16 RX ADMIN — Medication 10 MILLIGRAM(S): at 22:01

## 2020-09-16 RX ADMIN — Medication 125 MILLIGRAM(S): at 06:09

## 2020-09-16 RX ADMIN — HEPARIN SODIUM 5000 UNIT(S): 5000 INJECTION INTRAVENOUS; SUBCUTANEOUS at 13:35

## 2020-09-16 RX ADMIN — HEPARIN SODIUM 5000 UNIT(S): 5000 INJECTION INTRAVENOUS; SUBCUTANEOUS at 22:01

## 2020-09-16 RX ADMIN — Medication 1 TABLET(S): at 17:33

## 2020-09-16 RX ADMIN — Medication 1 DROP(S): at 06:10

## 2020-09-16 RX ADMIN — Medication 100 MILLIGRAM(S): at 08:34

## 2020-09-16 RX ADMIN — Medication 1 DROP(S): at 23:29

## 2020-09-16 RX ADMIN — Medication 75 MILLIGRAM(S): at 06:09

## 2020-09-16 RX ADMIN — Medication 3 MILLILITER(S): at 18:22

## 2020-09-16 RX ADMIN — CHLORHEXIDINE GLUCONATE 1 APPLICATION(S): 213 SOLUTION TOPICAL at 06:10

## 2020-09-16 RX ADMIN — CEFEPIME 100 MILLIGRAM(S): 1 INJECTION, POWDER, FOR SOLUTION INTRAMUSCULAR; INTRAVENOUS at 07:00

## 2020-09-16 RX ADMIN — Medication 3 MILLILITER(S): at 11:54

## 2020-09-16 RX ADMIN — FAMOTIDINE 20 MILLIGRAM(S): 10 INJECTION INTRAVENOUS at 12:11

## 2020-09-16 RX ADMIN — Medication 3 MILLILITER(S): at 23:57

## 2020-09-16 RX ADMIN — Medication 1 DROP(S): at 17:36

## 2020-09-16 RX ADMIN — LIDOCAINE 1 PATCH: 4 CREAM TOPICAL at 12:12

## 2020-09-16 RX ADMIN — Medication 1 TABLET(S): at 06:08

## 2020-09-16 RX ADMIN — ISOSORBIDE DINITRATE 20 MILLIGRAM(S): 5 TABLET ORAL at 09:59

## 2020-09-16 RX ADMIN — Medication 81 MILLIGRAM(S): at 12:11

## 2020-09-16 RX ADMIN — FENTANYL CITRATE 1 PATCH: 50 INJECTION INTRAVENOUS at 19:06

## 2020-09-16 RX ADMIN — Medication 100 MILLIGRAM(S): at 06:09

## 2020-09-16 RX ADMIN — Medication 50 GRAM(S): at 09:58

## 2020-09-16 RX ADMIN — Medication 3 MILLILITER(S): at 06:11

## 2020-09-16 RX ADMIN — ISOSORBIDE DINITRATE 20 MILLIGRAM(S): 5 TABLET ORAL at 17:34

## 2020-09-16 RX ADMIN — ISOSORBIDE DINITRATE 20 MILLIGRAM(S): 5 TABLET ORAL at 23:28

## 2020-09-16 RX ADMIN — Medication 100 MILLIGRAM(S): at 22:05

## 2020-09-16 RX ADMIN — LIDOCAINE 1 PATCH: 4 CREAM TOPICAL at 19:49

## 2020-09-16 RX ADMIN — Medication 8 MILLIGRAM(S): at 22:01

## 2020-09-16 RX ADMIN — Medication 1 MILLIGRAM(S): at 13:20

## 2020-09-16 RX ADMIN — LIDOCAINE 1 PATCH: 4 CREAM TOPICAL at 22:01

## 2020-09-16 RX ADMIN — HEPARIN SODIUM 5000 UNIT(S): 5000 INJECTION INTRAVENOUS; SUBCUTANEOUS at 06:09

## 2020-09-16 RX ADMIN — LIDOCAINE 1 PATCH: 4 CREAM TOPICAL at 01:18

## 2020-09-16 NOTE — PROGRESS NOTE ADULT - SUBJECTIVE AND OBJECTIVE BOX
CC: f/u for post arrest fever    Patient reports: he is comfortable on TC, he is more alert, follows simple commands    REVIEW OF SYSTEMS:  All other review of systems negative (Comprehensive ROS)    Antimicrobials Day #  :off, s/p 1 week of cefepime and po vanco    Other Medications Reviewed  MEDICATIONS  (STANDING):  albuterol/ipratropium for Nebulization. 3 milliLiter(s) Nebulizer every 6 hours  amantadine Syrup 100 milliGRAM(s) Oral <User Schedule>  artificial  tears Solution 1 Drop(s) Both EYES every 6 hours  aspirin  chewable 81 milliGRAM(s) Enteral Tube daily  bisacodyl Suppository 10 milliGRAM(s) Rectal at bedtime  chlorhexidine 4% Liquid 1 Application(s) Topical <User Schedule>  dextrose 5%. 1000 milliLiter(s) (50 mL/Hr) IV Continuous <Continuous>  dextrose 50% Injectable 12.5 Gram(s) IV Push once  dextrose 50% Injectable 25 Gram(s) IV Push once  dextrose 50% Injectable 25 Gram(s) IV Push once  doxazosin 8 milliGRAM(s) Oral at bedtime  famotidine    Tablet 20 milliGRAM(s) Oral daily  fentaNYL   Patch  75 MICROgram(s)/Hr 1 Patch Transdermal every 72 hours  heparin   Injectable 5000 Unit(s) SubCutaneous every 8 hours  hydrALAZINE 100 milliGRAM(s) Oral three times a day  isosorbide   dinitrate Tablet (ISORDIL) 20 milliGRAM(s) Enteral Tube <User Schedule>  lactobacillus acidophilus 1 Tablet(s) Oral two times a day  lidocaine   Patch 1 Patch Transdermal every 24 hours  lidocaine   Patch 1 Patch Transdermal daily  magnesium sulfate  IVPB 2 Gram(s) IV Intermittent once  metoprolol tartrate 75 milliGRAM(s) Oral every 8 hours  QUEtiapine 50 milliGRAM(s) Oral every 8 hours    T(F): 98.1 (09-16-20 @ 06:00), Max: 98.2 (09-15-20 @ 11:54)  HR: 59 (09-16-20 @ 08:25)  BP: 135/90 (09-16-20 @ 06:00)  RR: 21 (09-16-20 @ 08:25)  SpO2: 97% (09-16-20 @ 08:25)  Wt(kg): --    PHYSICAL EXAM:  General: alert, no acute distress  Eyes:  anicteric, no conjunctival injection, no discharge  Oropharynx: no lesions or injection 	  Neck: supple, trach  Lungs: clear to auscultation  Heart: regular rate and rhythm; no murmur, rubs or gallops  Abdomen: soft, nondistended, nontender, peg  Skin: no lesions  Extremities: no clubbing, cyanosis, + edema  Neurologic: alert, oriented, moves all extremities,     LAB RESULTS:                        9.7    8.46  )-----------( 342      ( 16 Sep 2020 06:47 )             32.0     09-16    131<L>  |  94<L>  |  30<H>  ----------------------------<  92  4.7   |  25  |  1.41<H>    Ca    9.6      16 Sep 2020 06:47  Phos  4.0     09-16  Mg     1.9     09-16          MICROBIOLOGY:  RECENT CULTURES:      RADIOLOGY REVIEWED:    rad< from: CT Abdomen and Pelvis w/ Oral Cont (09.08.20 @ 17:33) >  IMPRESSION:  No acute abnormalities in the chest, abdomen, or pelvis.    Trace right pleural effusion.    Lipoma and left lateralthigh with internal calcifications.

## 2020-09-16 NOTE — PROGRESS NOTE ADULT - PROBLEM SELECTOR PLAN 8
- Improved following bladder decompression fro Urinary retention  - Hyponatremia: Free water discontinued.

## 2020-09-16 NOTE — PROGRESS NOTE ADULT - ASSESSMENT
60-year-old male with a history of A-Fib on warfarin who presents with cardiac arrest at work with downtime of about 25 minutes prior to ROSC. S/p therapeutic hypothermia. 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 Now with resolved neurogenic/cardiogenic shock. Course complicated by GI bleed s/p PPI gtt, bleeding from scott s/p clot irrigation, prolonged respiratory failure s/p trach (8/24) and PEG (8/26). Currently with A-Fib with RVR and C diff colitis.  Also developed gross hematuria - Urology consulted +catheter with clot (irrigated and exchanged) suspected 2/2 traumatic catheterization. Additionally course complicated by fever and Pseudomonas PNA (s/p Rx) then developed C diff (now with resolved leukocytosis and improved stooling, now soft per report); remains on enteral Vanco    8/29: Received to RCU  8/30: Less Agitated overnight, Afebrile, WBC decreasing, CR elevated but Stable, Tolerating TC all day yesterday, will Attempt TC ATC tonight and ABG in am, F/u Bcx from 8/30. Rectal tube dc'd.  Speech eval for AAC device and will F/u.  8/31: will attempt TC around the clock again tonight. Stopped amiodarone as per cards.  Will be cathed if cleared by ID and renal.  9/1: Elevated BP overnight, BP stable today, afebrile. Lethargic earlier today on exam; ABG Stable; Head CT done- no new ICH, ventricles enlarged ? Hydrocephalus, Spoke to Max from NSX who will see- although felt no intervention required at this time. Tolerating TC ATC, will attempt cuffless trach tomorrow. C diff Iso discontinued as discussed w/ Inf control.  9/2: still with secretions, will downsize when improved. Will cath as outpatient. Decreased opioids  9/3: PICC dc'd, Trach downsized to #7 cuffless Portex. stomach distended  9/4: Occasionally Restless, afebrile. Tolerating cuffless trach, phonating w/ digital occlusion.  Spoke to speech for PMV re-eval soon than 9/6.  Elevated BP, Hydralazine increased.- will monitor.  9/5: trach dislodged, ENT changed to #8 cuffless Shiley. OOB to chair  9/6: OOB to chair today, gave dulcolax suppository   9/7: agitated while in chair today, gave Seroquel.  Producing thick, yellow secretions. Afebrile. Managing secretions with duoneb and hyper-sal 7%/Chest PT.  9/8: Had spurious leukocytosis. Afebrile, hemodynamically stable. Repeated cbc, sent Bcx.  Spiked temp of 102.5, cbc rechecked which was consistent with leukocytosis. PAN cultured. Noted urinary retention of 2600ml urine, abdomen distended with some tenderness on palpation.  F/U CT abd/pelvis/chest.  9/9: Patient appears well despite leukocytosis of 31. Sputum Cx growing gram neg rods. Remains on meropenem, added Vancoycin PO per ID for concern of recent Cdiff infection.  9/10: Afebrile over night. Leukocytosis decreasing. UCx growing pan-sensitive pseudomonas. Will continue meropenem and prophylactic vancomycin PO. Thick brown tinged sputum with suctioning, aggressive chest PT, nebs + hypersal.  9/11: Patient with episode of agitation and delirium early this morning. Seemingly did not respond to any anxiolytic or sedative meds given. Patient denies having any pain, states that he's "In the [place]". Thorough exam revealed IV access was not working. A new IV line was placed in RLE and 2mg IV ativan given which calmed the patient. Leukocytosis continues to improve on ABx. Noted to have -120s, EKG confirms AFIBB. Metoprolol increased.  9/12: Less Agitated today, improving CR and Leukocytosis on Cefepime; Still with Urinary retention and requiring prn straight cath- cardura increased.   9/13: No new events  9/14: Stable. Trach down-sized to #6 Cuffless Portex, obturator used. No complications, tolerated procedure well. Will complete 7-10 day course per Attending discretion.  9/15: Patient complaining of not being able to see, poor vision. States he sees shadows and unable ot identify objects with his left eye. Right remains with ptosis, fixed and dilated pupil. Hyponatremia noted, free water discontinued.  For PMV re-assessment tomorrow. Discharge planning in progress as trying to first obtain insurance.

## 2020-09-16 NOTE — PROVIDER CONTACT NOTE (OTHER) - RECOMMENDATIONS
ativan 1mg ivp x1
inj ativan 0.5mg ivpx1
fentanyl patch increased to 75mcg IV Tylenol 1000mg and lidocaine patch  ordered
Clonazepam 0.25mg via peg
EKG and cardiac enzymes done, KCl supplemented.
Restraints/Medications needed to control pt behavior for safety of self & others
Scheduled lopressor given early
bolus, or pressors

## 2020-09-16 NOTE — PROGRESS NOTE ADULT - PROBLEM SELECTOR PLAN 1
- Trached 8/24 with ENT  - Continue Duoneb and hypertonic saline, chest PT  -  Trach further down-sized from #8 to #6 CFS Portex  - Tolerating continuous trach collar since 8/30

## 2020-09-16 NOTE — PROGRESS NOTE ADULT - PROBLEM SELECTOR PLAN 9
- Intermittent straight catherization required, currently Q6H. However patient with large volumes and may need more frequent vs scott.  - Cardura started 9/10, increased to - Intermittent straight catherization required, currently Q6H. However patient with large volumes and may need more frequent vs [place].  - Cardura started 9/10, increased to

## 2020-09-16 NOTE — PROGRESS NOTE ADULT - ASSESSMENT
61 yo male with A Fib  admitted early August with out of hospital arrest.  ER evaluation found Rt sided SAH, cerebral angio x 2 was negative.  Hypoxic encephalopathy following event.This has improved.  S/P cefepime 8/14-8/20 for pneumonia.  S/P treatment for C Diff, course completed 8/29.  S/P trach 8/24 and Peg 8/26.  Fever to 102.7 on 9/8 led to meropenem and po vanco 125 BID being started.  WBC of 30,000 has decreased to less than 10,000  CT of C/A/P on 9/8  without clear source of infection.  Pseudomonas in urine (pansensitive), pseudomonas/providencia in sputum,and sterile blood.  No signs of recurrent C Diff  Possible  source to sepsis , he requires q6 strait cath for large PVR  Head CT last week with evolving ICH  He has completed 7 days of pseudomonal coverage and po vanco for possible C diff prophylaxis  Will monitor for signs of recurrent infection  Suggest:  1.Respiratory support per pulmonary  2.S/P antibiotic course, follow off antibiotics  3.Cardiology f/u appreciated.  4.ISC for retention.

## 2020-09-16 NOTE — PROGRESS NOTE ADULT - SUBJECTIVE AND OBJECTIVE BOX
Patient is a 60y old  Male who presents with a chief complaint of arrest/collapse (15 Sep 2020 10:43)    HPI:  59YO male on coumadin for afib s/p cardiac arrest at work, down 25 minutes prior to ROSC. Pupils were R fixed and dilated, left fixed at the time, no gag reflex, post-CA cooling protocol was initiated down to 35 deg. Intubated and put on Nimbex drip. Also on Propofol, fentanyl, levophed. R groin minerva placed. Also put on heparin post-CA. 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 Course also c/b GIB, put on protonix drip. Prior to xfer was started on 3% at 30cc/hr. (09 Aug 2020 14:30)    Interval Events:    REVIEW OF SYSTEMS:  [ ] Positive  [ ] All other systems negative  [ ] Unable to assess ROS because ________    Vital Signs Last 24 Hrs  T(C): 36.7 (09-16-20 @ 06:00), Max: 36.8 (09-15-20 @ 11:54)  T(F): 98.1 (09-16-20 @ 06:00), Max: 98.2 (09-15-20 @ 11:54)  HR: 64 (09-16-20 @ 06:12) (55 - 88)  BP: 135/90 (09-16-20 @ 06:00) (108/67 - 148/88)  RR: 18 (09-16-20 @ 06:00) (18 - 21)  SpO2: 100% (09-16-20 @ 06:12) (98% - 100%)    PHYSICAL EXAM:  HEENT:   [ ]Tracheostomy:  [ ]Pupils equal  [ ]No oral lesions  [ ]Abnormal    SKIN  [ ] No Rash  [ ] Abnormal  [ ] pressure    CARDIAC  [ ]Regular  [ ]Abnormal    PULMONARY  [ ]Bilateral Clear Breath Sounds  [ ]Normal Excursion  [ ]Abnormal    GI  [ ]PEG      [ ] +BS		              [ ]Soft, nondistended, nontender	  [ ]Abnormal    MUSCULOSKELETAL                                   [ ]Bedbound                 [ ]Abnormal    [ ]Ambulatory/OOB to chair                           EXTREMITIES                                         [ ]Normal  [ ]Edema                           NEUROLOGIC  [ ] Normal, non focal  [ ] Focal findings:    PSYCHIATRIC  [ ]Alert and appropriate  [ ] Sedated	 [ ]Agitated    :  Leeroy: [ ] Yes, if yes: Date of Placement:                   [  ] No    LINES: Central Lines [ ] Yes, if yes: Date of Placement                                     [  ] No    HOSPITAL MEDICATIONS:  MEDICATIONS  (STANDING):  albuterol/ipratropium for Nebulization. 3 milliLiter(s) Nebulizer every 6 hours  amantadine Syrup 100 milliGRAM(s) Oral <User Schedule>  artificial  tears Solution 1 Drop(s) Both EYES every 6 hours  aspirin  chewable 81 milliGRAM(s) Enteral Tube daily  bisacodyl Suppository 10 milliGRAM(s) Rectal at bedtime  cefepime   IVPB 2000 milliGRAM(s) IV Intermittent every 12 hours  chlorhexidine 4% Liquid 1 Application(s) Topical <User Schedule>  dextrose 5%. 1000 milliLiter(s) (50 mL/Hr) IV Continuous <Continuous>  dextrose 50% Injectable 12.5 Gram(s) IV Push once  dextrose 50% Injectable 25 Gram(s) IV Push once  dextrose 50% Injectable 25 Gram(s) IV Push once  doxazosin 8 milliGRAM(s) Oral at bedtime  famotidine    Tablet 20 milliGRAM(s) Oral daily  fentaNYL   Patch  75 MICROgram(s)/Hr 1 Patch Transdermal every 72 hours  heparin   Injectable 5000 Unit(s) SubCutaneous every 8 hours  hydrALAZINE 100 milliGRAM(s) Oral three times a day  isosorbide   dinitrate Tablet (ISORDIL) 20 milliGRAM(s) Enteral Tube <User Schedule>  lactobacillus acidophilus 1 Tablet(s) Oral two times a day  lidocaine   Patch 1 Patch Transdermal daily  lidocaine   Patch 1 Patch Transdermal every 24 hours  metoprolol tartrate 75 milliGRAM(s) Oral every 8 hours  QUEtiapine 50 milliGRAM(s) Oral every 8 hours  vancomycin    Solution 125 milliGRAM(s) Enteral Tube two times a day    MEDICATIONS  (PRN):  dextrose 40% Gel 15 Gram(s) Oral once PRN Blood Glucose LESS THAN 70 milliGRAM(s)/deciliter  glucagon  Injectable 1 milliGRAM(s) IntraMuscular once PRN Glucose LESS THAN 70 milligrams/deciliter  polyethylene glycol 3350 17 Gram(s) Oral daily PRN Constipation      LABS:                        9.7    8.46  )-----------( 342      ( 16 Sep 2020 06:47 )             32.0     09-16    131<L>  |  94<L>  |  30<H>  ----------------------------<  92  4.7   |  25  |  1.41<H>    Ca    9.6      16 Sep 2020 06:47  Phos  4.0     09-16  Mg     1.9     09-16      Radha Shearer, [facility]  [id number] Patient is a 60y old  Male who presents with a chief complaint of arrest/collapse (15 Sep 2020 10:43)    HPI:  61YO male on coumadin for afib s/p cardiac arrest at work, down 25 minutes prior to ROSC. Pupils were R fixed and dilated, left fixed at the time, no gag reflex, post-CA cooling protocol was initiated down to 35 deg. Intubated and put on Nimbex drip. Also on Propofol, fentanyl, levophed. R groin minerva placed. Also put on heparin post-CA. 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 Course also c/b GIB, put on protonix drip. Prior to xfer was started on 3% at 30cc/hr. (09 Aug 2020 14:30)    Interval Events: No issues overnight    REVIEW OF SYSTEMS:  [ ] Positive  [x ] All other systems negative; phonates well, able to make needs known, No complaints  [ ] Unable to assess ROS because ________    Vital Signs Last 24 Hrs  T(C): 36.7 (09-16-20 @ 06:00), Max: 36.8 (09-15-20 @ 11:54)  T(F): 98.1 (09-16-20 @ 06:00), Max: 98.2 (09-15-20 @ 11:54)  HR: 64 (09-16-20 @ 06:12) (55 - 88)  BP: 135/90 (09-16-20 @ 06:00) (108/67 - 148/88)  RR: 18 (09-16-20 @ 06:00) (18 - 21)  SpO2: 100% (09-16-20 @ 06:12) (98% - 100%)    PHYSICAL EXAM:  HEENT:   [X]Tracheostomy:  large stoma; #6 Cuffless Portex  [X] Right eye ptosis, Dilated fixed right pupil; Left PERRL  [ ]No oral lesions  [ ]Abnormal    SKIN  [X] No Rash  [ ] Abnormal  [ ] pressure    CARDIAC  [X] Regular  [ ] Abnormal    PULMONARY  [X] Bilateral Clear Breath Sounds  [ ] Normal Excursion  [ ] Abnormal    GI  [X] PEG      [X] +BS		              [X] Soft, nondistended, nontender	  [ ]Abnormal    MUSCULOSKELETAL                                   [ ] Bedbound                 [ ] Abnormal    [ X] OOB to chair with assistance    EXTREMITIES                                         [X] Normal  [ ]Edema                           NEUROLOGIC  [ ] Normal, non focal  [X] Focal findings: Plegia of RUE/RLE; Alert and responsive; able to follow commands and respond to questions appropriately sometimes    PSYCHIATRIC  [X] Alert and appropriate  [ ] Sedated	 [ ]Agitated    :  Umaña: [ ] Yes, if yes: Date of Placement:                   [X] No    LINES: Central Lines [ ] Yes, if yes: Date of Placement                                     [x] No    HOSPITAL MEDICATIONS:  MEDICATIONS  (STANDING):  albuterol/ipratropium for Nebulization. 3 milliLiter(s) Nebulizer every 6 hours  amantadine Syrup 100 milliGRAM(s) Oral <User Schedule>  artificial  tears Solution 1 Drop(s) Both EYES every 6 hours  aspirin  chewable 81 milliGRAM(s) Enteral Tube daily  bisacodyl Suppository 10 milliGRAM(s) Rectal at bedtime  cefepime   IVPB 2000 milliGRAM(s) IV Intermittent every 12 hours  chlorhexidine 4% Liquid 1 Application(s) Topical <User Schedule>  dextrose 5%. 1000 milliLiter(s) (50 mL/Hr) IV Continuous <Continuous>  dextrose 50% Injectable 12.5 Gram(s) IV Push once  dextrose 50% Injectable 25 Gram(s) IV Push once  dextrose 50% Injectable 25 Gram(s) IV Push once  doxazosin 8 milliGRAM(s) Oral at bedtime  famotidine    Tablet 20 milliGRAM(s) Oral daily  fentaNYL   Patch  75 MICROgram(s)/Hr 1 Patch Transdermal every 72 hours  heparin   Injectable 5000 Unit(s) SubCutaneous every 8 hours  hydrALAZINE 100 milliGRAM(s) Oral three times a day  isosorbide   dinitrate Tablet (ISORDIL) 20 milliGRAM(s) Enteral Tube <User Schedule>  lactobacillus acidophilus 1 Tablet(s) Oral two times a day  lidocaine   Patch 1 Patch Transdermal daily  lidocaine   Patch 1 Patch Transdermal every 24 hours  metoprolol tartrate 75 milliGRAM(s) Oral every 8 hours  QUEtiapine 50 milliGRAM(s) Oral every 8 hours  vancomycin    Solution 125 milliGRAM(s) Enteral Tube two times a day    MEDICATIONS  (PRN):  dextrose 40% Gel 15 Gram(s) Oral once PRN Blood Glucose LESS THAN 70 milliGRAM(s)/deciliter  glucagon  Injectable 1 milliGRAM(s) IntraMuscular once PRN Glucose LESS THAN 70 milligrams/deciliter  polyethylene glycol 3350 17 Gram(s) Oral daily PRN Constipation      LABS:                        9.7    8.46  )-----------( 342      ( 16 Sep 2020 06:47 )             32.0     09-16    131<L>  |  94<L>  |  30<H>  ----------------------------<  92  4.7   |  25  |  1.41<H>    Ca    9.6      16 Sep 2020 06:47  Phos  4.0     09-16  Mg     1.9     09-16      Radha Shearer, -[facility]  [id number]

## 2020-09-16 NOTE — PROGRESS NOTE ADULT - SUBJECTIVE AND OBJECTIVE BOX
Subjective: Patient seen and examined. No new events except as noted.     REVIEW OF SYSTEMS:  Unable to obtain       MEDICATIONS:  MEDICATIONS  (STANDING):  albuterol/ipratropium for Nebulization. 3 milliLiter(s) Nebulizer every 6 hours  amantadine Syrup 100 milliGRAM(s) Oral <User Schedule>  artificial  tears Solution 1 Drop(s) Both EYES every 6 hours  aspirin  chewable 81 milliGRAM(s) Enteral Tube daily  bisacodyl Suppository 10 milliGRAM(s) Rectal at bedtime  chlorhexidine 4% Liquid 1 Application(s) Topical <User Schedule>  dextrose 5%. 1000 milliLiter(s) (50 mL/Hr) IV Continuous <Continuous>  dextrose 50% Injectable 12.5 Gram(s) IV Push once  dextrose 50% Injectable 25 Gram(s) IV Push once  dextrose 50% Injectable 25 Gram(s) IV Push once  doxazosin 8 milliGRAM(s) Oral at bedtime  famotidine    Tablet 20 milliGRAM(s) Oral daily  fentaNYL   Patch  75 MICROgram(s)/Hr 1 Patch Transdermal every 72 hours  heparin   Injectable 5000 Unit(s) SubCutaneous every 8 hours  hydrALAZINE 100 milliGRAM(s) Oral three times a day  isosorbide   dinitrate Tablet (ISORDIL) 20 milliGRAM(s) Enteral Tube <User Schedule>  lactobacillus acidophilus 1 Tablet(s) Oral two times a day  lidocaine   Patch 1 Patch Transdermal every 24 hours  lidocaine   Patch 1 Patch Transdermal daily  metoprolol tartrate 75 milliGRAM(s) Oral every 8 hours  QUEtiapine 50 milliGRAM(s) Oral every 8 hours      PHYSICAL EXAM:  T(C): 36.7 (09-16-20 @ 06:00), Max: 36.8 (09-15-20 @ 11:54)  HR: 59 (09-16-20 @ 08:25) (55 - 88)  BP: 135/90 (09-16-20 @ 06:00) (108/67 - 148/88)  RR: 21 (09-16-20 @ 08:25) (18 - 21)  SpO2: 97% (09-16-20 @ 08:25) (97% - 100%)  Wt(kg): --  I&O's Summary    15 Sep 2020 07:01  -  16 Sep 2020 07:00  --------------------------------------------------------  IN: 2350 mL / OUT: 3050 mL / NET: -700 mL          Appearance: sleepy , +trach   HEENT:   Dry  oral mucosa,  Lymphatic: No lymphadenopathy  Cardiovascular: Normal S1 S2, No JVD, No murmurs, No edema  Respiratory: Ventilated   Psychiatry: A & O x 0  Gastrointestinal:  Soft, Non-tender, +PEG   Skin: No rashes, No ecchymoses, No cyanosis	  Mental status- No acute distress, EO to stim  -CN- Pupils R 4mm NR, L 2mm sluggish, EOMI, tongue midline, face symmetric  +cough/gag  C briskly, two fingers/thumbs up on RUE, distally AG, wiggles toes on RLE        LABS:    CARDIAC MARKERS:                                9.7    8.46  )-----------( 342      ( 16 Sep 2020 06:47 )             32.0     09-16    131<L>  |  94<L>  |  30<H>  ----------------------------<  92  4.7   |  25  |  1.41<H>    Ca    9.6      16 Sep 2020 06:47  Phos  4.0     09-16  Mg     1.9     09-16      proBNP:   Lipid Profile:   HgA1c:   TSH:             TELEMETRY: 	    ECG:  	  RADIOLOGY:   DIAGNOSTIC TESTING:  [ ] Echocardiogram:  [ ]  Catheterization:  [ ] Stress Test:    OTHER:

## 2020-09-16 NOTE — PROGRESS NOTE ADULT - ATTENDING COMMENTS
Agree with plan as above.    Pt continues to do well today. Tolerating trach collar since 8/30. Trach downsized to cuffless Portex 6 9/14. Continue with airway clearance therapy. Phonating well, will re-attempt PSV trial today. Pt completed Cefepime through 9/15 for PSA UTI, with Vanco PO for CDiff ppx. Monitoring conservatively for now.     Pt with increasing LLE movement, able to twitch leg if concentrates. No LUE movement, normal RUE/RLL ROM. Ophthalmology consult for new vision loss appreciated, will follow-up.     Pt also with new CHFrEF on this admission. Cardiology recs appreciated. Will need O/P cardiac cath. Hx recent AFlutter/Afib in the setting of sepsis s/p amiodarone+BB therapy, now off amiodarone 2/2 bradycardia. No A/C 2/2 recent SAH and GIB, now on DVT ppx with HSQ.     Dispo planning in progress. Plan for acute rehab pending insurance coverage.

## 2020-09-17 LAB
ANION GAP SERPL CALC-SCNC: 11 MMOL/L — SIGNIFICANT CHANGE UP (ref 5–17)
BASOPHILS # BLD AUTO: 0.12 K/UL — SIGNIFICANT CHANGE UP (ref 0–0.2)
BASOPHILS NFR BLD AUTO: 1.3 % — SIGNIFICANT CHANGE UP (ref 0–2)
BUN SERPL-MCNC: 33 MG/DL — HIGH (ref 7–23)
CALCIUM SERPL-MCNC: 9.8 MG/DL — SIGNIFICANT CHANGE UP (ref 8.4–10.5)
CHLORIDE SERPL-SCNC: 96 MMOL/L — SIGNIFICANT CHANGE UP (ref 96–108)
CO2 SERPL-SCNC: 25 MMOL/L — SIGNIFICANT CHANGE UP (ref 22–31)
CREAT SERPL-MCNC: 1.45 MG/DL — HIGH (ref 0.5–1.3)
EOSINOPHIL # BLD AUTO: 0.43 K/UL — SIGNIFICANT CHANGE UP (ref 0–0.5)
EOSINOPHIL NFR BLD AUTO: 4.7 % — SIGNIFICANT CHANGE UP (ref 0–6)
GLUCOSE SERPL-MCNC: 109 MG/DL — HIGH (ref 70–99)
HCT VFR BLD CALC: 31 % — LOW (ref 39–50)
HGB BLD-MCNC: 9.3 G/DL — LOW (ref 13–17)
IMM GRANULOCYTES NFR BLD AUTO: 4.2 % — HIGH (ref 0–1.5)
LYMPHOCYTES # BLD AUTO: 1.44 K/UL — SIGNIFICANT CHANGE UP (ref 1–3.3)
LYMPHOCYTES # BLD AUTO: 15.9 % — SIGNIFICANT CHANGE UP (ref 13–44)
MAGNESIUM SERPL-MCNC: 2 MG/DL — SIGNIFICANT CHANGE UP (ref 1.6–2.6)
MCHC RBC-ENTMCNC: 26.4 PG — LOW (ref 27–34)
MCHC RBC-ENTMCNC: 30 GM/DL — LOW (ref 32–36)
MCV RBC AUTO: 88.1 FL — SIGNIFICANT CHANGE UP (ref 80–100)
MONOCYTES # BLD AUTO: 0.73 K/UL — SIGNIFICANT CHANGE UP (ref 0–0.9)
MONOCYTES NFR BLD AUTO: 8.1 % — SIGNIFICANT CHANGE UP (ref 2–14)
NEUTROPHILS # BLD AUTO: 5.96 K/UL — SIGNIFICANT CHANGE UP (ref 1.8–7.4)
NEUTROPHILS NFR BLD AUTO: 65.8 % — SIGNIFICANT CHANGE UP (ref 43–77)
NRBC # BLD: 0 /100 WBCS — SIGNIFICANT CHANGE UP (ref 0–0)
PHOSPHATE SERPL-MCNC: 3.8 MG/DL — SIGNIFICANT CHANGE UP (ref 2.5–4.5)
PLATELET # BLD AUTO: 350 K/UL — SIGNIFICANT CHANGE UP (ref 150–400)
POTASSIUM SERPL-MCNC: 5 MMOL/L — SIGNIFICANT CHANGE UP (ref 3.5–5.3)
POTASSIUM SERPL-SCNC: 5 MMOL/L — SIGNIFICANT CHANGE UP (ref 3.5–5.3)
RBC # BLD: 3.52 M/UL — LOW (ref 4.2–5.8)
RBC # FLD: 14.9 % — HIGH (ref 10.3–14.5)
SODIUM SERPL-SCNC: 132 MMOL/L — LOW (ref 135–145)
WBC # BLD: 9.06 K/UL — SIGNIFICANT CHANGE UP (ref 3.8–10.5)
WBC # FLD AUTO: 9.06 K/UL — SIGNIFICANT CHANGE UP (ref 3.8–10.5)

## 2020-09-17 PROCEDURE — 99233 SBSQ HOSP IP/OBS HIGH 50: CPT

## 2020-09-17 RX ORDER — ATORVASTATIN CALCIUM 80 MG/1
80 TABLET, FILM COATED ORAL AT BEDTIME
Refills: 0 | Status: DISCONTINUED | OUTPATIENT
Start: 2020-09-17 | End: 2020-09-24

## 2020-09-17 RX ADMIN — LIDOCAINE 1 PATCH: 4 CREAM TOPICAL at 12:15

## 2020-09-17 RX ADMIN — LIDOCAINE 1 PATCH: 4 CREAM TOPICAL at 21:16

## 2020-09-17 RX ADMIN — Medication 1 TABLET(S): at 17:15

## 2020-09-17 RX ADMIN — Medication 1 MILLIGRAM(S): at 04:30

## 2020-09-17 RX ADMIN — CHLORHEXIDINE GLUCONATE 1 APPLICATION(S): 213 SOLUTION TOPICAL at 05:04

## 2020-09-17 RX ADMIN — Medication 1 DROP(S): at 05:03

## 2020-09-17 RX ADMIN — LIDOCAINE 1 PATCH: 4 CREAM TOPICAL at 01:57

## 2020-09-17 RX ADMIN — Medication 3 MILLILITER(S): at 17:41

## 2020-09-17 RX ADMIN — Medication 100 MILLIGRAM(S): at 05:03

## 2020-09-17 RX ADMIN — HEPARIN SODIUM 5000 UNIT(S): 5000 INJECTION INTRAVENOUS; SUBCUTANEOUS at 21:15

## 2020-09-17 RX ADMIN — Medication 10 MILLIGRAM(S): at 21:15

## 2020-09-17 RX ADMIN — Medication 1 TABLET(S): at 05:03

## 2020-09-17 RX ADMIN — LIDOCAINE 1 PATCH: 4 CREAM TOPICAL at 23:19

## 2020-09-17 RX ADMIN — Medication 1 DROP(S): at 17:14

## 2020-09-17 RX ADMIN — FENTANYL CITRATE 1 PATCH: 50 INJECTION INTRAVENOUS at 06:03

## 2020-09-17 RX ADMIN — HEPARIN SODIUM 5000 UNIT(S): 5000 INJECTION INTRAVENOUS; SUBCUTANEOUS at 05:04

## 2020-09-17 RX ADMIN — ISOSORBIDE DINITRATE 20 MILLIGRAM(S): 5 TABLET ORAL at 23:18

## 2020-09-17 RX ADMIN — LIDOCAINE 1 PATCH: 4 CREAM TOPICAL at 06:03

## 2020-09-17 RX ADMIN — Medication 50 MILLIGRAM(S): at 08:13

## 2020-09-17 RX ADMIN — ISOSORBIDE DINITRATE 20 MILLIGRAM(S): 5 TABLET ORAL at 17:15

## 2020-09-17 RX ADMIN — Medication 0.5 MILLIGRAM(S): at 04:15

## 2020-09-17 RX ADMIN — Medication 100 MILLIGRAM(S): at 21:15

## 2020-09-17 RX ADMIN — HEPARIN SODIUM 5000 UNIT(S): 5000 INJECTION INTRAVENOUS; SUBCUTANEOUS at 13:03

## 2020-09-17 RX ADMIN — Medication 100 MILLIGRAM(S): at 13:02

## 2020-09-17 RX ADMIN — FENTANYL CITRATE 1 PATCH: 50 INJECTION INTRAVENOUS at 18:53

## 2020-09-17 RX ADMIN — Medication 1 DROP(S): at 23:18

## 2020-09-17 RX ADMIN — Medication 8 MILLIGRAM(S): at 21:15

## 2020-09-17 RX ADMIN — ATORVASTATIN CALCIUM 80 MILLIGRAM(S): 80 TABLET, FILM COATED ORAL at 22:34

## 2020-09-17 RX ADMIN — Medication 3 MILLILITER(S): at 05:44

## 2020-09-17 RX ADMIN — LIDOCAINE 1 PATCH: 4 CREAM TOPICAL at 18:54

## 2020-09-17 NOTE — CHART NOTE - NSCHARTNOTEFT_GEN_A_CORE
Mr. Vieyra is a 60-year-old male with a history of A-Fib on warfarin who presents with cardiac arrest at work with downtime of about 25 minutes prior to ROSC. He is s/p cooling to 35 degrees. He required mechanical ventilation, neuromuscular blockade, sedation, and pressors for neurogenic/cardiogenic shock. 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 Cerebral angiogram without evidence of aneurysm. ICU course complicated by GI bleed s/p PPI gtt and bleeding from scott s/p clot irrigation by urology; suspected 2/2 traumatic catheterization. 8/21: Episode of respiratory distress overnight; desaturated 85%, mucus plug/ambu bagged. Pt w/ prolonged respiratory failure s/p trach (#8 DCT Tracheostomy)-Trach  secured with sutures x4 and Umbilical Tie- on 8/24 and PEG on 8/26. Noted on echo to have acute LV systolic heart failure, possible stunned myocardium due to cardiac arrest vs. SAH. Currently, he is awake and alert, moving RUE/RLE spontaneously, following simple commands. (+) A-Fib with RVR. Being treated for C diff colitis. Noted pt w/ trach change to #7 cuffless Portex on 9/3 however due to it becoming dislodged trach was changed to #8 cuffless Shiley by ENT on 9/5.    Chart reviewed. D/w RN Speedy and NP Alicia prior to and following intervention. As per nursing, pt restless however awake/alert.     Pt seen for PMV placement.     Patient seen for passy-jenn speaking valve re-assessment. Patient maintained on 30% FIO2 8L via trach collar. Low pressure valve placed on the hub of the trach.  Patient was dysphonic. Vocal quality improved as trial progressed. Patient tolerated valve. VSS stable throughout assessment. No signs of respiratoy distress. No increased work of breathing. No subjective complaints. No signs of air trapping. Valve removed after 15 minutes. Interactive discussion held w/ RN regarding guidelines to use of PMV.     All questions answered. Sign posted at [facility] for PMV use.     Will follow up on 9/20 to ensure tolerance.     Kristine Mendoza MS CCC-SLP pager [phone]

## 2020-09-17 NOTE — CHART NOTE - NSCHARTNOTEFT_GEN_A_CORE
Medicine PA Agitation Note    Notified by RN about patient being acutely agitated. Patient is A&O x0 at baseline, and is unable to be re-oriented. Patient poses a risk to both themselves and staff attempting to get out of bed and hit nursing staff.    Plan:  #Acute Agitation  Ativan 1.5 mg IVP x 1 STAT given  Fall precautions  Bed alarms  Will endorse to AM team    Alexandrea Mc PA-C  Department of Medicine  Spectralink [phone]

## 2020-09-17 NOTE — PROGRESS NOTE ADULT - PROBLEM SELECTOR PLAN 3
- Continue fentanyl patch 75mcg  - Seroquel resumed at 50mg  Q6H for agitation control reduced to Q8H on 9/14 as patient somnolent during the day - Continue fentanyl patch 75mcg  - Seroquel resumed at 50mg Q6H for agitation control reduced to Q8H on 9/14 as patient somnolent during the day

## 2020-09-17 NOTE — PROGRESS NOTE ADULT - SUBJECTIVE AND OBJECTIVE BOX
Patient is a 60y old  Male who presents with a chief complaint of arrest (16 Sep 2020 09:44)    HPI:  59YO male on coumadin for afib s/p cardiac arrest at work, down 25 minutes prior to ROSC. Pupils were R fixed and dilated, left fixed at the time, no gag reflex, post-CA cooling protocol was initiated down to 35 deg. Intubated and put on Nimbex drip. Also on Propofol, fentanyl, levophed. R groin minerva placed. Also put on heparin post-CA. 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 Course also c/b GIB, put on protonix drip. Prior to xfer was started on 3% at 30cc/hr. (09 Aug 2020 14:30)    Interval Events:    REVIEW OF SYSTEMS:  [ ] Positive  [ ] All other systems negative  [ ] Unable to assess ROS because ________    Vital Signs Last 24 Hrs  T(C): 37 (09-17-20 @ 05:01), Max: 37 (09-17-20 @ 05:01)  T(F): 98.6 (09-17-20 @ 05:01), Max: 98.6 (09-17-20 @ 05:01)  HR: 74 (09-17-20 @ 06:14) (46 - 95)  BP: 150/96 (09-17-20 @ 05:01) (106/76 - 150/96)  RR: 19 (09-17-20 @ 06:14) (16 - 22)  SpO2: 98% (09-17-20 @ 06:14) (96% - 100%)    PHYSICAL EXAM:  HEENT:   [ ]Tracheostomy:  [ ]Pupils equal  [ ]No oral lesions  [ ]Abnormal    SKIN  [ ] No Rash  [ ] Abnormal  [ ] pressure    CARDIAC  [ ]Regular  [ ]Abnormal    PULMONARY  [ ]Bilateral Clear Breath Sounds  [ ]Normal Excursion  [ ]Abnormal    GI  [ ]PEG      [ ] +BS		              [ ]Soft, nondistended, nontender	  [ ]Abnormal    MUSCULOSKELETAL                                   [ ]Bedbound                 [ ]Abnormal    [ ]Ambulatory/OOB to chair                           EXTREMITIES                                         [ ]Normal  [ ]Edema                           NEUROLOGIC  [ ] Normal, non focal  [ ] Focal findings:    PSYCHIATRIC  [ ]Alert and appropriate  [ ] Sedated	 [ ]Agitated    :  Leeroy: [ ] Yes, if yes: Date of Placement:                   [  ] No    LINES: Central Lines [ ] Yes, if yes: Date of Placement                                     [  ] No    HOSPITAL MEDICATIONS:  MEDICATIONS  (STANDING):  albuterol/ipratropium for Nebulization. 3 milliLiter(s) Nebulizer every 6 hours  amantadine Syrup 100 milliGRAM(s) Oral <User Schedule>  artificial  tears Solution 1 Drop(s) Both EYES every 6 hours  aspirin  chewable 81 milliGRAM(s) Enteral Tube daily  bisacodyl Suppository 10 milliGRAM(s) Rectal at bedtime  chlorhexidine 4% Liquid 1 Application(s) Topical <User Schedule>  dextrose 5%. 1000 milliLiter(s) (50 mL/Hr) IV Continuous <Continuous>  dextrose 50% Injectable 12.5 Gram(s) IV Push once  dextrose 50% Injectable 25 Gram(s) IV Push once  dextrose 50% Injectable 25 Gram(s) IV Push once  doxazosin 8 milliGRAM(s) Oral at bedtime  famotidine    Tablet 20 milliGRAM(s) Oral daily  fentaNYL   Patch  75 MICROgram(s)/Hr 1 Patch Transdermal every 72 hours  heparin   Injectable 5000 Unit(s) SubCutaneous every 8 hours  hydrALAZINE 100 milliGRAM(s) Oral three times a day  isosorbide   dinitrate Tablet (ISORDIL) 20 milliGRAM(s) Enteral Tube <User Schedule>  lactobacillus acidophilus 1 Tablet(s) Oral two times a day  lidocaine   Patch 1 Patch Transdermal every 24 hours  lidocaine   Patch 1 Patch Transdermal daily  metoprolol tartrate 50 milliGRAM(s) Oral <User Schedule>  QUEtiapine 50 milliGRAM(s) Oral every 8 hours    MEDICATIONS  (PRN):  dextrose 40% Gel 15 Gram(s) Oral once PRN Blood Glucose LESS THAN 70 milliGRAM(s)/deciliter  glucagon  Injectable 1 milliGRAM(s) IntraMuscular once PRN Glucose LESS THAN 70 milligrams/deciliter  polyethylene glycol 3350 17 Gram(s) Oral daily PRN Constipation      LABS:                        9.3    9.06  )-----------( 350      ( 17 Sep 2020 06:51 )             31.0     09-17    132<L>  |  96  |  33<H>  ----------------------------<  109<H>  5.0   |  25  |  1.45<H>    Ca    9.8      17 Sep 2020 06:47  Phos  3.8     09-17  Mg     2.0     09-17      Radha Shearer, [facility]  [id number] Patient is a 60y old  Male who presents with a chief complaint of arrest (16 Sep 2020 09:44)    HPI:  61YO male on coumadin for afib s/p cardiac arrest at work, down 25 minutes prior to ROSC. Pupils were R fixed and dilated, left fixed at the time, no gag reflex, post-CA cooling protocol was initiated down to 35 deg. Intubated and put on Nimbex drip. Also on Propofol, fentanyl, levophed. R groin minerva placed. Also put on heparin post-CA. 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 Course also c/b GIB, put on protonix drip. Prior to xfer was started on 3% at 30cc/hr. (09 Aug 2020 14:30)    Interval Events: Agitation over night requiring IV Ativan with good results    REVIEW OF SYSTEMS:  [ ] Positive  [x] All other systems negative patient with no complaints  [ ] Unable to assess ROS because ________    Vital Signs Last 24 Hrs  T(C): 37 (09-17-20 @ 05:01), Max: 37 (09-17-20 @ 05:01)  T(F): 98.6 (09-17-20 @ 05:01), Max: 98.6 (09-17-20 @ 05:01)  HR: 74 (09-17-20 @ 06:14) (46 - 95)  BP: 150/96 (09-17-20 @ 05:01) (106/76 - 150/96)  RR: 19 (09-17-20 @ 06:14) (16 - 22)  SpO2: 98% (09-17-20 @ 06:14) (96% - 100%)    PHYSICAL EXAM:  HEENT:   [X]Tracheostomy:  large stoma; #6 Cuffless Portex.  [X] Right eye ptosis, Dilated fixed right pupil; Left PERRL  [ ]No oral lesions  [ ]Abnormal    SKIN  [X] No Rash  [ ] Abnormal  [ ] pressure    CARDIAC  [X] Regular  [ ] Abnormal    PULMONARY  [X] Bilateral Clear Breath Sounds  [ ] Normal Excursion  [ ] Abnormal    GI  [X] PEG      [X] +BS		              [X] Soft, nondistended, nontender	  [ ]Abnormal    MUSCULOSKELETAL                                   [ ] Bedbound                 [ ] Abnormal    [ X] OOB to chair with assistance    EXTREMITIES                                         [X] Normal  [ ]Edema                           NEUROLOGIC  [ ] Normal, non focal  [X] Focal findings: Plegia of RUE/RLE; Alert and responsive; able to follow commands and respond to questions appropriately sometimes    PSYCHIATRIC  [X] Alert and appropriate  [ ] Sedated	 [ ]Agitated    :  Umaña: [ ] Yes, if yes: Date of Placement:                   [X] No    LINES: Central Lines [ ] Yes, if yes: Date of Placement                                     [x] No    HOSPITAL MEDICATIONS:  MEDICATIONS  (STANDING):  albuterol/ipratropium for Nebulization. 3 milliLiter(s) Nebulizer every 6 hours  amantadine Syrup 100 milliGRAM(s) Oral <User Schedule>  artificial  tears Solution 1 Drop(s) Both EYES every 6 hours  aspirin  chewable 81 milliGRAM(s) Enteral Tube daily  bisacodyl Suppository 10 milliGRAM(s) Rectal at bedtime  chlorhexidine 4% Liquid 1 Application(s) Topical <User Schedule>  dextrose 5%. 1000 milliLiter(s) (50 mL/Hr) IV Continuous <Continuous>  dextrose 50% Injectable 12.5 Gram(s) IV Push once  dextrose 50% Injectable 25 Gram(s) IV Push once  dextrose 50% Injectable 25 Gram(s) IV Push once  doxazosin 8 milliGRAM(s) Oral at bedtime  famotidine    Tablet 20 milliGRAM(s) Oral daily  fentaNYL   Patch  75 MICROgram(s)/Hr 1 Patch Transdermal every 72 hours  heparin   Injectable 5000 Unit(s) SubCutaneous every 8 hours  hydrALAZINE 100 milliGRAM(s) Oral three times a day  isosorbide   dinitrate Tablet (ISORDIL) 20 milliGRAM(s) Enteral Tube <User Schedule>  lactobacillus acidophilus 1 Tablet(s) Oral two times a day  lidocaine   Patch 1 Patch Transdermal every 24 hours  lidocaine   Patch 1 Patch Transdermal daily  metoprolol tartrate 50 milliGRAM(s) Oral <User Schedule>  QUEtiapine 50 milliGRAM(s) Oral every 8 hours    MEDICATIONS  (PRN):  dextrose 40% Gel 15 Gram(s) Oral once PRN Blood Glucose LESS THAN 70 milliGRAM(s)/deciliter  glucagon  Injectable 1 milliGRAM(s) IntraMuscular once PRN Glucose LESS THAN 70 milligrams/deciliter  polyethylene glycol 3350 17 Gram(s) Oral daily PRN Constipation      LABS:                        9.3    9.06  )-----------( 350      ( 17 Sep 2020 06:51 )             31.0     09-17    132<L>  |  96  |  33<H>  ----------------------------<  109<H>  5.0   |  25  |  1.45<H>    Ca    9.8      17 Sep 2020 06:47  Phos  3.8     09-17  Mg     2.0     09-17      Radha Shearer, [facility]  [id number]

## 2020-09-17 NOTE — PROGRESS NOTE ADULT - ASSESSMENT
60-year-old male with a history of A-Fib on warfarin who presents with cardiac arrest at work with downtime of about 25 minutes prior to ROSC. S/p therapeutic hypothermia. 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 Now with resolved neurogenic/cardiogenic shock. Course complicated by GI bleed s/p PPI gtt, bleeding from scott s/p clot irrigation, prolonged respiratory failure s/p trach (8/24) and PEG (8/26). Currently with A-Fib with RVR and C diff colitis.  Also developed gross hematuria - Urology consulted +catheter with clot (irrigated and exchanged) suspected 2/2 traumatic catheterization. Additionally course complicated by fever and Pseudomonas PNA (s/p Rx) then developed C diff (now with resolved leukocytosis and improved stooling, now soft per report); remains on enteral Vanco    8/29: Received to [facility]  8/30: Less Agitated overnight, Afebrile, WBC decreasing, CR elevated but Stable, Tolerating TC all day yesterday, will Attempt TC ATC tonight and ABG in am, F/u Bcx from 8/30. Rectal tube dc'd.  Speech eval for AAC device and will F/u.  8/31: will attempt TC around the clock again tonight. Stopped amiodarone as per cards.  Will be cathed if cleared by ID and renal.  9/1: Elevated BP overnight, BP stable today, afebrile. Lethargic earlier today on exam; ABG Stable; Head CT done- no new ICH, ventricles enlarged ? Hydrocephalus, Spoke to Max from [facility] who will see- although felt no intervention required at this time. Tolerating TC ATC, will attempt cuffless trach tomorrow. C diff Iso discontinued as discussed w/ Inf control.  9/2: still with secretions, will downsize when improved. Will cath as outpatient. Decreased opioids  9/3: PICC dc'd, Trach downsized to #7 cuffless Portex. stomach distended  9/4: Occasionally Restless, afebrile. Tolerating cuffless trach, phonating w/ digital occlusion.  Spoke to speech for PMV re-eval soon than 9/6.  Elevated BP, Hydralazine increased.- will monitor.  9/5: trach dislodged, ENT changed to #8 cuffless Shiley. OOB to chair  9/6: OOB to chair today, gave dulcolax suppository   9/7: agitated while in chair today, gave Seroquel.  Producing thick, yellow secretions. Afebrile. Managing secretions with duoneb and hyper-sal 7%/Chest PT.  9/8: Had spurious leukocytosis. Afebrile, hemodynamically stable. Repeated cbc, sent Bcx.  Spiked temp of 102.5, cbc rechecked which was consistent with leukocytosis. PAN cultured. Noted urinary retention of 2600ml urine, abdomen distended with some tenderness on palpation.  F/U CT abd/pelvis/chest.  9/9: Patient appears well despite leukocytosis of 31. Sputum Cx growing gram neg rods. Remains on meropenem, added Vancoycin PO per ID for concern of recent Cdiff infection.  9/10: Afebrile over night. Leukocytosis decreasing. UCx growing pan-sensitive pseudomonas. Will continue meropenem and prophylactic vancomycin PO. Thick brown tinged sputum with suctioning, aggressive chest PT, nebs + hypersal.  9/11: Patient with episode of agitation and delirium early this morning. Seemingly did not respond to any anxiolytic or sedative meds given. Patient denies having any pain, states that he's "In the [place]". Thorough exam revealed IV access was not working. A new IV line was placed in RLE and 2mg IV ativan given which calmed the patient. Leukocytosis continues to improve on ABx. Noted to have -120s, EKG confirms AFIBB. Metoprolol increased.  9/12: Less Agitated today, improving CR and Leukocytosis on Cefepime; Still with Urinary retention and requiring prn straight cath- cardura increased.   9/13: No new events  9/14: Stable. Trach down-sized to #6 Cuffless Portex, obturator used. No complications, tolerated procedure well. Will complete 7-10 day course per Attending discretion.  9/15: Patient complaining of not being able to see, poor vision. States he sees shadows and unable ot identify objects with his left eye. Right remains with ptosis, fixed and dilated pupil. Hyponatremia noted, free water discontinued.  For PMV re-assessment tomorrow. Discharge planning in progress as trying to first obtain insurance.  9/16: called speech for re-eval of PMV. 1 episode of agitation. Gave Ativan 1mg ivp x1. 60-year-old male with a history of A-Fib on warfarin who presents with cardiac arrest at work with downtime of about 25 minutes prior to ROSC. S/p therapeutic hypothermia. 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 Now with resolved neurogenic/cardiogenic shock. Course complicated by GI bleed s/p PPI gtt, bleeding from scott s/p clot irrigation, prolonged respiratory failure s/p trach (8/24) and PEG (8/26). Currently with A-Fib with RVR and C diff colitis.  Also developed gross hematuria - Urology consulted +catheter with clot (irrigated and exchanged) suspected 2/2 traumatic catheterization. Additionally course complicated by fever and Pseudomonas PNA (s/p Rx) then developed C diff (now with resolved leukocytosis and improved stooling, now soft per report); remains on enteral Vanco    8/29: Received to [facility]  8/30: Less Agitated overnight, Afebrile, WBC decreasing, CR elevated but Stable, Tolerating TC all day yesterday, will Attempt TC ATC tonight and ABG in am, F/u Bcx from 8/30. Rectal tube dc'd.  Speech eval for AAC device and will F/u.  8/31: will attempt TC around the clock again tonight. Stopped amiodarone as per cards.  Will be cathed if cleared by ID and renal.  9/1: Elevated BP overnight, BP stable today, afebrile. Lethargic earlier today on exam; ABG Stable; Head CT done- no new ICH, ventricles enlarged ? Hydrocephalus, Spoke to Max from [facility] who will see- although felt no intervention required at this time. Tolerating TC ATC, will attempt cuffless trach tomorrow. C diff Iso discontinued as discussed w/ Inf control.  9/2: still with secretions, will downsize when improved. Will cath as outpatient. Decreased opioids  9/3: PICC dc'd, Trach downsized to #7 cuffless Portex. stomach distended  9/4: Occasionally Restless, afebrile. Tolerating cuffless trach, phonating w/ digital occlusion.  Spoke to speech for PMV re-eval soon than 9/6.  Elevated BP, Hydralazine increased.- will monitor.  9/5: trach dislodged, ENT changed to #8 cuffless Shiley. OOB to chair  9/6: OOB to chair today, gave dulcolax suppository   9/7: agitated while in chair today, gave Seroquel.  Producing thick, yellow secretions. Afebrile. Managing secretions with duoneb and hyper-sal 7%/Chest PT.  9/8: Had spurious leukocytosis. Afebrile, hemodynamically stable. Repeated cbc, sent Bcx.  Spiked temp of 102.5, cbc rechecked which was consistent with leukocytosis. PAN cultured. Noted urinary retention of 2600ml urine, abdomen distended with some tenderness on palpation.  F/U CT abd/pelvis/chest.  9/9: Patient appears well despite leukocytosis of 31. Sputum Cx growing gram neg rods. Remains on meropenem, added Vancoycin PO per ID for concern of recent Cdiff infection.  9/10: Afebrile over night. Leukocytosis decreasing. UCx growing pan-sensitive pseudomonas. Will continue meropenem and prophylactic vancomycin PO. Thick brown tinged sputum with suctioning, aggressive chest PT, nebs + hypersal.  9/11: Patient with episode of agitation and delirium early this morning. Seemingly did not respond to any anxiolytic or sedative meds given. Patient denies having any pain, states that he's "In the [place]". Thorough exam revealed IV access was not working. A new IV line was placed in RLE and 2mg IV ativan given which calmed the patient. Leukocytosis continues to improve on ABx. Noted to have -120s, EKG confirms AFIBB. Metoprolol increased.  9/12: Less Agitated today, improving CR and Leukocytosis on Cefepime; Still with Urinary retention and requiring prn straight cath- cardura increased.   9/13: No new events  9/14: Stable. Trach down-sized to #6 Cuffless Portex, obturator used. No complications, tolerated procedure well. Will complete 7-10 day course per Attending discretion.  9/15: Patient complaining of not being able to see, poor vision. States he sees shadows and unable ot identify objects with his left eye. Right remains with ptosis, fixed and dilated pupil. Hyponatremia noted, free water discontinued.  For PMV re-assessment tomorrow. Discharge planning in progress as trying to first obtain insurance.  9/16: called speech for re-eval of PMV. 1 episode of agitation. Gave Ativan 1mg ivp x1.  9/17: Tolerated PMV with speech

## 2020-09-17 NOTE — PROGRESS NOTE ADULT - ATTENDING COMMENTS
Agree with plan as above.    Pt continues to do well today, dangling with PT today with plan for OOB to chair. Tolerating trach collar since 8/30. Trach downsized to cuffless Portex 6 on 9/14. Continue with airway clearance therapy. Phonating well with use of PSV all morning. Pt completed Cefepime through 9/15 for PSA UTI, with Vanco PO for CDiff ppx. Monitoring conservatively for now, no new clinical s/s acute infectious process.     Pt also with new CHFrEF on this admission. Cardiology recs appreciated. Hx recent AFlutter/Afib in the setting of sepsis s/p amiodarone+BB therapy. Off amiodarone 2/2 bradycardia with no further noted arrhythmias. No A/C 2/2 recent SAH and GIB, now on DVT ppx with HSQ. Full dose statin added to regimen today. c/w ASA+BB+Isordil+Hydralazine for medical mgmt. Will likely need O/P cardiac cath.    Dispo planning in progress. Plan for acute rehab pending insurance coverage.

## 2020-09-17 NOTE — PROGRESS NOTE ADULT - PROBLEM SELECTOR PLAN 9
- Intermittent straight catherization required, currently Q6H. However patient with large volumes and may need more frequent vs Umaña.  - Cardura started 9/10, increased to

## 2020-09-17 NOTE — PROGRESS NOTE ADULT - SUBJECTIVE AND OBJECTIVE BOX
Subjective: Patient seen and examined. No new events except as noted.     REVIEW OF SYSTEMS:    Unable to obtain       MEDICATIONS:  MEDICATIONS  (STANDING):  albuterol/ipratropium for Nebulization. 3 milliLiter(s) Nebulizer every 6 hours  amantadine Syrup 100 milliGRAM(s) Oral <User Schedule>  artificial  tears Solution 1 Drop(s) Both EYES every 6 hours  aspirin  chewable 81 milliGRAM(s) Enteral Tube daily  atorvastatin 80 milliGRAM(s) Oral at bedtime  bisacodyl Suppository 10 milliGRAM(s) Rectal at bedtime  chlorhexidine 4% Liquid 1 Application(s) Topical <User Schedule>  dextrose 5%. 1000 milliLiter(s) (50 mL/Hr) IV Continuous <Continuous>  dextrose 50% Injectable 12.5 Gram(s) IV Push once  dextrose 50% Injectable 25 Gram(s) IV Push once  dextrose 50% Injectable 25 Gram(s) IV Push once  doxazosin 8 milliGRAM(s) Oral at bedtime  famotidine    Tablet 20 milliGRAM(s) Oral daily  fentaNYL   Patch  75 MICROgram(s)/Hr 1 Patch Transdermal every 72 hours  heparin   Injectable 5000 Unit(s) SubCutaneous every 8 hours  hydrALAZINE 100 milliGRAM(s) Oral three times a day  isosorbide   dinitrate Tablet (ISORDIL) 20 milliGRAM(s) Enteral Tube <User Schedule>  lactobacillus acidophilus 1 Tablet(s) Oral two times a day  lidocaine   Patch 1 Patch Transdermal daily  lidocaine   Patch 1 Patch Transdermal every 24 hours  metoprolol tartrate 50 milliGRAM(s) Oral <User Schedule>  QUEtiapine 50 milliGRAM(s) Oral every 8 hours      PHYSICAL EXAM:  T(C): 37 (09-17-20 @ 05:01), Max: 37 (09-17-20 @ 05:01)  HR: 60 (09-17-20 @ 08:43) (46 - 95)  BP: 150/96 (09-17-20 @ 05:01) (106/76 - 150/96)  RR: 20 (09-17-20 @ 08:43) (16 - 22)  SpO2: 98% (09-17-20 @ 08:43) (96% - 100%)  Wt(kg): --  I&O's Summary    16 Sep 2020 07:01  -  17 Sep 2020 07:00  --------------------------------------------------------  IN: 2260 mL / OUT: 1900 mL / NET: 360 mL            Appearance: sleepy , +trach   HEENT:   Dry  oral mucosa,  Lymphatic: No lymphadenopathy  Cardiovascular: Normal S1 S2, No JVD, No murmurs, No edema  Respiratory: Ventilated   Psychiatry: A & O x 0  Gastrointestinal:  Soft, Non-tender, +PEG   Skin: No rashes, No ecchymoses, No cyanosis	  Mental status- No acute distress, EO to stim  -CN- Pupils R 4mm NR, L 2mm sluggish, EOMI, tongue midline, face symmetric  +cough/gag  C briskly, two fingers/thumbs up on RUE, distally AG, wiggles toes on RLE        LABS:    CARDIAC MARKERS:                                9.3    9.06  )-----------( 350      ( 17 Sep 2020 06:51 )             31.0     09-17    132<L>  |  96  |  33<H>  ----------------------------<  109<H>  5.0   |  25  |  1.45<H>    Ca    9.8      17 Sep 2020 06:47  Phos  3.8     09-17  Mg     2.0     09-17      proBNP:   Lipid Profile:   HgA1c:   TSH:             TELEMETRY: 	    ECG:  	  RADIOLOGY:   DIAGNOSTIC TESTING:  [ ] Echocardiogram:  [ ]  Catheterization:  [ ] Stress Test:    OTHER:

## 2020-09-17 NOTE — PROGRESS NOTE ADULT - PROBLEM SELECTOR PLAN 1
- Trached 8/24 with ENT  - Continue Duoneb and hypertonic saline, chest PT  -  Trach further down-sized from #8 to #6 CFS Portex  - Tolerating continuous trach collar since 8/30 - Trached 8/24 with ENT  - Continue Duoneb and hypertonic saline, chest PT  -  Trach further down-sized from #8 to #6 CFS Portex  - Tolerating continuous trach collar since 8/30  - PMV with sppech

## 2020-09-18 LAB
ANION GAP SERPL CALC-SCNC: 14 MMOL/L — SIGNIFICANT CHANGE UP (ref 5–17)
BASOPHILS # BLD AUTO: 0.09 K/UL — SIGNIFICANT CHANGE UP (ref 0–0.2)
BASOPHILS NFR BLD AUTO: 1.2 % — SIGNIFICANT CHANGE UP (ref 0–2)
BUN SERPL-MCNC: 30 MG/DL — HIGH (ref 7–23)
CALCIUM SERPL-MCNC: 10.1 MG/DL — SIGNIFICANT CHANGE UP (ref 8.4–10.5)
CHLORIDE SERPL-SCNC: 98 MMOL/L — SIGNIFICANT CHANGE UP (ref 96–108)
CO2 SERPL-SCNC: 21 MMOL/L — LOW (ref 22–31)
CREAT SERPL-MCNC: 1.32 MG/DL — HIGH (ref 0.5–1.3)
EOSINOPHIL # BLD AUTO: 0.39 K/UL — SIGNIFICANT CHANGE UP (ref 0–0.5)
EOSINOPHIL NFR BLD AUTO: 5 % — SIGNIFICANT CHANGE UP (ref 0–6)
GLUCOSE SERPL-MCNC: 121 MG/DL — HIGH (ref 70–99)
HCT VFR BLD CALC: 32.4 % — LOW (ref 39–50)
HGB BLD-MCNC: 10 G/DL — LOW (ref 13–17)
IMM GRANULOCYTES NFR BLD AUTO: 4 % — HIGH (ref 0–1.5)
LYMPHOCYTES # BLD AUTO: 1.56 K/UL — SIGNIFICANT CHANGE UP (ref 1–3.3)
LYMPHOCYTES # BLD AUTO: 20 % — SIGNIFICANT CHANGE UP (ref 13–44)
MAGNESIUM SERPL-MCNC: 2 MG/DL — SIGNIFICANT CHANGE UP (ref 1.6–2.6)
MCHC RBC-ENTMCNC: 27 PG — SIGNIFICANT CHANGE UP (ref 27–34)
MCHC RBC-ENTMCNC: 30.9 GM/DL — LOW (ref 32–36)
MCV RBC AUTO: 87.3 FL — SIGNIFICANT CHANGE UP (ref 80–100)
MONOCYTES # BLD AUTO: 0.56 K/UL — SIGNIFICANT CHANGE UP (ref 0–0.9)
MONOCYTES NFR BLD AUTO: 7.2 % — SIGNIFICANT CHANGE UP (ref 2–14)
NEUTROPHILS # BLD AUTO: 4.89 K/UL — SIGNIFICANT CHANGE UP (ref 1.8–7.4)
NEUTROPHILS NFR BLD AUTO: 62.6 % — SIGNIFICANT CHANGE UP (ref 43–77)
NRBC # BLD: 0 /100 WBCS — SIGNIFICANT CHANGE UP (ref 0–0)
PHOSPHATE SERPL-MCNC: 3.9 MG/DL — SIGNIFICANT CHANGE UP (ref 2.5–4.5)
PLATELET # BLD AUTO: 366 K/UL — SIGNIFICANT CHANGE UP (ref 150–400)
POTASSIUM SERPL-MCNC: 4.7 MMOL/L — SIGNIFICANT CHANGE UP (ref 3.5–5.3)
POTASSIUM SERPL-SCNC: 4.7 MMOL/L — SIGNIFICANT CHANGE UP (ref 3.5–5.3)
RBC # BLD: 3.71 M/UL — LOW (ref 4.2–5.8)
RBC # FLD: 15.1 % — HIGH (ref 10.3–14.5)
SODIUM SERPL-SCNC: 133 MMOL/L — LOW (ref 135–145)
WBC # BLD: 7.8 K/UL — SIGNIFICANT CHANGE UP (ref 3.8–10.5)
WBC # FLD AUTO: 7.8 K/UL — SIGNIFICANT CHANGE UP (ref 3.8–10.5)

## 2020-09-18 PROCEDURE — 99233 SBSQ HOSP IP/OBS HIGH 50: CPT

## 2020-09-18 RX ORDER — QUETIAPINE FUMARATE 200 MG/1
75 TABLET, FILM COATED ORAL
Refills: 0 | Status: DISCONTINUED | OUTPATIENT
Start: 2020-09-18 | End: 2020-10-14

## 2020-09-18 RX ORDER — FENTANYL CITRATE 50 UG/ML
1 INJECTION INTRAVENOUS
Refills: 0 | Status: DISCONTINUED | OUTPATIENT
Start: 2020-09-18 | End: 2020-09-19

## 2020-09-18 RX ORDER — QUETIAPINE FUMARATE 200 MG/1
75 TABLET, FILM COATED ORAL
Refills: 0 | Status: DISCONTINUED | OUTPATIENT
Start: 2020-09-18 | End: 2020-09-18

## 2020-09-18 RX ORDER — QUETIAPINE FUMARATE 200 MG/1
50 TABLET, FILM COATED ORAL
Refills: 0 | Status: DISCONTINUED | OUTPATIENT
Start: 2020-09-18 | End: 2020-10-14

## 2020-09-18 RX ADMIN — Medication 100 MILLIGRAM(S): at 21:36

## 2020-09-18 RX ADMIN — HEPARIN SODIUM 5000 UNIT(S): 5000 INJECTION INTRAVENOUS; SUBCUTANEOUS at 13:00

## 2020-09-18 RX ADMIN — QUETIAPINE FUMARATE 50 MILLIGRAM(S): 200 TABLET, FILM COATED ORAL at 13:00

## 2020-09-18 RX ADMIN — Medication 3 MILLILITER(S): at 23:28

## 2020-09-18 RX ADMIN — Medication 1 DROP(S): at 05:03

## 2020-09-18 RX ADMIN — ISOSORBIDE DINITRATE 20 MILLIGRAM(S): 5 TABLET ORAL at 23:17

## 2020-09-18 RX ADMIN — Medication 1 TABLET(S): at 18:18

## 2020-09-18 RX ADMIN — Medication 3 MILLILITER(S): at 11:50

## 2020-09-18 RX ADMIN — Medication 50 MILLIGRAM(S): at 21:34

## 2020-09-18 RX ADMIN — Medication 100 MILLIGRAM(S): at 05:03

## 2020-09-18 RX ADMIN — FAMOTIDINE 20 MILLIGRAM(S): 10 INJECTION INTRAVENOUS at 12:59

## 2020-09-18 RX ADMIN — Medication 10 MILLIGRAM(S): at 21:38

## 2020-09-18 RX ADMIN — Medication 3 MILLILITER(S): at 05:36

## 2020-09-18 RX ADMIN — Medication 3 MILLILITER(S): at 17:39

## 2020-09-18 RX ADMIN — Medication 1 DROP(S): at 23:17

## 2020-09-18 RX ADMIN — Medication 1 TABLET(S): at 05:03

## 2020-09-18 RX ADMIN — Medication 50 MILLIGRAM(S): at 08:40

## 2020-09-18 RX ADMIN — Medication 100 MILLIGRAM(S): at 12:59

## 2020-09-18 RX ADMIN — Medication 1 DROP(S): at 18:18

## 2020-09-18 RX ADMIN — QUETIAPINE FUMARATE 75 MILLIGRAM(S): 200 TABLET, FILM COATED ORAL at 21:37

## 2020-09-18 RX ADMIN — FENTANYL CITRATE 1 PATCH: 50 INJECTION INTRAVENOUS at 21:41

## 2020-09-18 RX ADMIN — CHLORHEXIDINE GLUCONATE 1 APPLICATION(S): 213 SOLUTION TOPICAL at 05:03

## 2020-09-18 RX ADMIN — LIDOCAINE 1 PATCH: 4 CREAM TOPICAL at 21:35

## 2020-09-18 RX ADMIN — ATORVASTATIN CALCIUM 80 MILLIGRAM(S): 80 TABLET, FILM COATED ORAL at 21:36

## 2020-09-18 RX ADMIN — Medication 1 DROP(S): at 12:59

## 2020-09-18 RX ADMIN — Medication 81 MILLIGRAM(S): at 13:00

## 2020-09-18 RX ADMIN — ISOSORBIDE DINITRATE 20 MILLIGRAM(S): 5 TABLET ORAL at 18:17

## 2020-09-18 RX ADMIN — HEPARIN SODIUM 5000 UNIT(S): 5000 INJECTION INTRAVENOUS; SUBCUTANEOUS at 21:36

## 2020-09-18 RX ADMIN — Medication 8 MILLIGRAM(S): at 21:38

## 2020-09-18 RX ADMIN — LIDOCAINE 1 PATCH: 4 CREAM TOPICAL at 06:21

## 2020-09-18 RX ADMIN — Medication 100 MILLIGRAM(S): at 13:00

## 2020-09-18 RX ADMIN — HEPARIN SODIUM 5000 UNIT(S): 5000 INJECTION INTRAVENOUS; SUBCUTANEOUS at 05:02

## 2020-09-18 RX ADMIN — ISOSORBIDE DINITRATE 20 MILLIGRAM(S): 5 TABLET ORAL at 08:40

## 2020-09-18 RX ADMIN — FENTANYL CITRATE 1 PATCH: 50 INJECTION INTRAVENOUS at 06:20

## 2020-09-18 RX ADMIN — LIDOCAINE 1 PATCH: 4 CREAM TOPICAL at 13:00

## 2020-09-18 RX ADMIN — Medication 100 MILLIGRAM(S): at 08:40

## 2020-09-18 RX ADMIN — Medication 3 MILLILITER(S): at 00:03

## 2020-09-18 NOTE — PROGRESS NOTE ADULT - PROBLEM SELECTOR PLAN 2
-S/p angio neg x 2 ,PBD 20  -CT Head: perimesencephalic SAH   -CTA Head: no aneurysm noted  -Conventional angiogram negative  -MRI neuroaxis: restricted diffusion in R BG, posterior limb of R IC, and anterior right temporal lobe  -VEEG negative   - No Further neurosurgical intervention at this time

## 2020-09-18 NOTE — PROGRESS NOTE ADULT - SUBJECTIVE AND OBJECTIVE BOX
Subjective: Patient seen and examined. No new events except as noted.     REVIEW OF SYSTEMS:    CONSTITUTIONAL: +  weakness, fevers or chills  EYES/ENT: No visual changes;  No vertigo or throat pain   NECK: No pain or stiffness  RESPIRATORY: No cough, wheezing, hemoptysis; No shortness of breath  CARDIOVASCULAR: No chest pain or palpitations  GASTROINTESTINAL: No abdominal or epigastric pain. No nausea, vomiting, or hematemesis; No diarrhea or constipation. No melena or hematochezia.  GENITOURINARY: No dysuria, frequency or hematuria  NEUROLOGICAL: No numbness or weakness  SKIN: No itching, burning, rashes, or lesions   All other review of systems is negative unless indicated above.    MEDICATIONS:  MEDICATIONS  (STANDING):  albuterol/ipratropium for Nebulization. 3 milliLiter(s) Nebulizer every 6 hours  amantadine Syrup 100 milliGRAM(s) Oral <User Schedule>  artificial  tears Solution 1 Drop(s) Both EYES every 6 hours  aspirin  chewable 81 milliGRAM(s) Enteral Tube daily  atorvastatin 80 milliGRAM(s) Oral at bedtime  bisacodyl Suppository 10 milliGRAM(s) Rectal at bedtime  chlorhexidine 4% Liquid 1 Application(s) Topical <User Schedule>  dextrose 5%. 1000 milliLiter(s) (50 mL/Hr) IV Continuous <Continuous>  dextrose 50% Injectable 12.5 Gram(s) IV Push once  dextrose 50% Injectable 25 Gram(s) IV Push once  dextrose 50% Injectable 25 Gram(s) IV Push once  doxazosin 8 milliGRAM(s) Oral at bedtime  famotidine    Tablet 20 milliGRAM(s) Oral daily  fentaNYL   Patch  75 MICROgram(s)/Hr 1 Patch Transdermal every 72 hours  heparin   Injectable 5000 Unit(s) SubCutaneous every 8 hours  hydrALAZINE 100 milliGRAM(s) Oral three times a day  isosorbide   dinitrate Tablet (ISORDIL) 20 milliGRAM(s) Enteral Tube <User Schedule>  lactobacillus acidophilus 1 Tablet(s) Oral two times a day  lidocaine   Patch 1 Patch Transdermal every 24 hours  lidocaine   Patch 1 Patch Transdermal daily  metoprolol tartrate 50 milliGRAM(s) Oral <User Schedule>  QUEtiapine 50 milliGRAM(s) Oral <User Schedule>  QUEtiapine 75 milliGRAM(s) Oral <User Schedule>      PHYSICAL EXAM:  T(C): 36.7 (09-18-20 @ 08:28), Max: 37.1 (09-17-20 @ 13:10)  HR: 69 (09-18-20 @ 11:51) (54 - 80)  BP: 123/81 (09-18-20 @ 08:28) (123/81 - 139/88)  RR: 20 (09-18-20 @ 11:48) (17 - 22)  SpO2: 98% (09-18-20 @ 11:51) (96% - 100%)  Wt(kg): --  I&O's Summary    17 Sep 2020 07:01  -  18 Sep 2020 07:00  --------------------------------------------------------  IN: 2100 mL / OUT: 2950 mL / NET: -850 mL          Appearance: Normal	  HEENT:   Normal oral mucosa, PERRL, EOMI	  Lymphatic: No lymphadenopathy , no edema  Cardiovascular: Normal S1 S2, No JVD, No murmurs , Peripheral pulses palpable 2+ bilaterally  Respiratory: Lungs clear to auscultation, normal effort 	  Gastrointestinal:  Soft, Non-tender, + BS	  Skin: No rashes, No ecchymoses, No cyanosis, warm to touch  Musculoskeletal: Normal range of motion, normal strength  Psychiatry:  Mood & affect appropriate  Ext: No edema      LABS:    CARDIAC MARKERS:                                10.0   7.80  )-----------( 366      ( 18 Sep 2020 07:18 )             32.4     09-18    133<L>  |  98  |  30<H>  ----------------------------<  121<H>  4.7   |  21<L>  |  1.32<H>    Ca    10.1      18 Sep 2020 07:18  Phos  3.9     09-18  Mg     2.0     09-18      proBNP:   Lipid Profile:   HgA1c:   TSH:             TELEMETRY: 	    ECG:  	  RADIOLOGY:   DIAGNOSTIC TESTING:  [ ] Echocardiogram:  [ ]  Catheterization:  [ ] Stress Test:    OTHER:

## 2020-09-18 NOTE — PROGRESS NOTE ADULT - ATTENDING COMMENTS
Agree with plan as above.    Pt tolerating trach collar since 8/30. Trach downsized to cuffless Portex 6 on 9/14. Continue with airway clearance therapy. Phonating well with use of PSV all morning. Pt completed Cefepime through 9/15 for PSA UTI, with Vanco PO for CDiff ppx. Monitoring conservatively for now, no new clinical s/s acute infectious process. Pt also with new CHFrEF on this admission. Cardiology recs appreciated. Hx recent AFlutter/Afib in the setting of sepsis s/p amiodarone+BB therapy. Off amiodarone 2/2 bradycardia with no further noted arrhythmias. No A/C 2/2 recent SAH and GIB, now on DVT ppx with HSQ. Full dose statin added to regimen today. c/w ASA+BB+Isordil+Hydralazine for medical mgmt. Will likely need O/P cardiac cath. Pt also with episode of hyperactive delirium overnight, responds well with prn Ativan. Will increase qhs Seroquel and c/w prn PO Ativan if indicated.     Dispo planning in progress. Plan for acute rehab pending insurance coverage.

## 2020-09-18 NOTE — PROGRESS NOTE ADULT - PROBLEM SELECTOR PLAN 9
- Intermittent straight catherization required, currently Q6H  - However patient with large volumes and may need more frequent vs [place]ma[place].  - Continue Cardura

## 2020-09-18 NOTE — PROGRESS NOTE ADULT - PROBLEM SELECTOR PLAN 8
- Improved following bladder decompression for Urinary retention  - Continue to Straight Cath q 6 hrs PRN   - Hyponatremia: Free water discontinued

## 2020-09-18 NOTE — CHART NOTE - NSCHARTNOTEFT_GEN_A_CORE
Nutrition Follow-up  Patient seen for: nutrition follow-up on [facility]    Source: EMR, Team; Pt trached/unable to speak    Chart reviewed, events noted. Per Chart: Pt is a 59 yo male with PMH of afib s/p cardiac arrest, admitted 8/9. Found to have R SAH of unclear etiology with cerebral angiogram on 8/10 negative for aneurysm. Now with resolved neurogenic/cardiogenic shock. Course complicated by GI bleed s/p PPI gtt, bleeding from [place] s/p clot irrigation, prolonged respiratory failure s/p trach (8/24) and PEG (8/26). Currently with afib with RVR and C diff colitis. Transferred to [facility] on 8/29. Tolerating continuous trach collar since 8/30. Trach downsized to #6 Cuffless (9/14). Tolerated PMV 9/17    Diet : NPO with Tube Feeds via PEG  Enteral /Parenteral Nutrition: Vital AF bolus feeds 420mL q6hrs. Provides 1680mL formula, 2016kcals, 126g protein, 1363mL free H2O  - Also receiving Probiotic Yogurt/Smoothie x2 daily  - RD observed Vital AF hung; pt not receiving bolus at time of RD visit.    Noted Pt switched from continuous feeds to bolus feeds on 9/14, titrated up to goal rate which was reached 9/15. Per flowsheets, Pt has received >95% EN provisions in the last 3 days. Per chart, noted to be tolerating PEG feeds.     GI: c fecal incontinence, last BM noted 9/17 - bowel regimen ordered (dulcolax, miralax - PRN).     Current Weight: Wt loss noted - likely 2/2 adequate protein/calorie intake with BMI >30; likely with excessive intake PTA. Will continue to monitor/trend weight status.  Weight (kg): 110.2 (08-12), 110.1 (08-26), 106.2 (09-02), 106.3 (09-09), 101.1 (09-16)    Pertinent Medications: MEDICATIONS  (STANDING):  albuterol/ipratropium for Nebulization. 3 milliLiter(s) Nebulizer every 6 hours  amantadine Syrup 100 milliGRAM(s) Oral <User Schedule>  artificial  tears Solution 1 Drop(s) Both EYES every 6 hours  aspirin  chewable 81 milliGRAM(s) Enteral Tube daily  atorvastatin 80 milliGRAM(s) Oral at bedtime  bisacodyl Suppository 10 milliGRAM(s) Rectal at bedtime  chlorhexidine 4% Liquid 1 Application(s) Topical <User Schedule>  dextrose 5%. 1000 milliLiter(s) (50 mL/Hr) IV Continuous <Continuous>  dextrose 50% Injectable 12.5 Gram(s) IV Push once  dextrose 50% Injectable 25 Gram(s) IV Push once  dextrose 50% Injectable 25 Gram(s) IV Push once  doxazosin 8 milliGRAM(s) Oral at bedtime  famotidine    Tablet 20 milliGRAM(s) Oral daily  fentaNYL   Patch  75 MICROgram(s)/Hr 1 Patch Transdermal every 72 hours  heparin   Injectable 5000 Unit(s) SubCutaneous every 8 hours  hydrALAZINE 100 milliGRAM(s) Oral three times a day  isosorbide   dinitrate Tablet (ISORDIL) 20 milliGRAM(s) Enteral Tube <User Schedule>  lactobacillus acidophilus 1 Tablet(s) Oral two times a day  lidocaine   Patch 1 Patch Transdermal every 24 hours  lidocaine   Patch 1 Patch Transdermal daily  metoprolol tartrate 50 milliGRAM(s) Oral <User Schedule>  QUEtiapine 75 milliGRAM(s) Oral <User Schedule>  QUEtiapine 50 milliGRAM(s) Oral <User Schedule>    MEDICATIONS  (PRN):  dextrose 40% Gel 15 Gram(s) Oral once PRN Blood Glucose LESS THAN 70 milliGRAM(s)/deciliter  glucagon  Injectable 1 milliGRAM(s) IntraMuscular once PRN Glucose LESS THAN 70 milligrams/deciliter  LORazepam     Tablet 1 milliGRAM(s) Oral every 8 hours PRN Agitation  polyethylene glycol 3350 17 Gram(s) Oral daily PRN Constipation    Pertinent Labs:  09-18 Na133 mmol/L<L> Glu 121 mg/dL<H> K+ 4.7 mmol/L Cr  1.32 mg/dL<H> BUN 30 mg/dL<H> 09-18 Phos 3.9 mg/dL      Skin per nursing documentation: no pressure injuries noted  Edema: 1+ generalized edema    Estimated Needs: no change since previous assessment  Based on upper IBW 76.8kg   Energy: (25-30kcal/kg): 1920-2304kcal  Protein: (1.4-1.6g protein/kg): 108-123g protein    Previous Nutrition Diagnosis: Increased nutrient needs - diagnosis ongoing, being addressed with enteral nutrition     New Nutrition Diagnosis: N/a    Recommendations:  1. Continue Vital AF bolus feeds of 420mL q6hrs. Provides 1680mL formula, 2016kcals, 126g protein and 1362mL free water. (meets 26 kcal/kg, 1.6 g protein based on upper IBW of 76.8 kg) Meets >100% RDIs.   2. Defer additional free water to team. RD remains available for EN provisions PRN  3. Continue Probiotic Yogurt/Smoothie 2 daily as per team       Monitoring and Evaluation:   Continue to monitor Nutritional intake, Tolerance to diet prescription, weights, labs, skin integrity  RD remains available upon request and will follow up per protocol    Henny Strange, MS, RD, CDN  pager #[phone]

## 2020-09-18 NOTE — PROGRESS NOTE ADULT - PROBLEM SELECTOR PLAN 10
- DVT PPx: Heparin SC Q12H  - GI PPx: Pepcid 20mg QD  - PT and OT evaluation - DVT PPx: Heparin SC Q12H  - GI PPx: Pepcid 20mg QD

## 2020-09-18 NOTE — PROGRESS NOTE ADULT - SUBJECTIVE AND OBJECTIVE BOX
Patient is a 60y old  Male who presents with a chief complaint of arrest (16 Sep 2020 09:44)      Interval Events: No events reported overnight     REVIEW OF SYSTEMS:  [ ] Positive  [ ] All other systems negative  [ ] Unable to assess ROS because     Vital Signs Last 24 Hrs  T(C): 36.8 (09-18-20 @ 04:43), Max: 37.1 (09-17-20 @ 13:10)  T(F): 98.3 (09-18-20 @ 04:43), Max: 98.8 (09-17-20 @ 13:10)  HR: 73 (09-18-20 @ 05:37) (54 - 80)  BP: 138/89 (09-18-20 @ 04:43) (125/68 - 139/88)  RR: 20 (09-18-20 @ 04:43) (17 - 22)  SpO2: 100% (09-18-20 @ 05:37) (96% - 100%)    PHYSICAL EXAM:  HEENT:   [ ]Tracheostomy:  [ ]Pupils equal  [ ]No oral lesions  [ ]Abnormal    SKIN  [ ]No Rash  [ ] Abnormal  [ ] pressure    CARDIAC  [ ]Regular  [ ]Abnormal    PULMONARY  [ ]Bilateral Clear Breath Sounds  [ ]Normal Excursion  [ ]Abnormal    GI  [ ]PEG      [ ] +BS		              [ ]Soft, nondistended, nontender	  [ ]Abnormal    MUSCULOSKELETAL                                   [ ]Bedbound                 [ ]Abnormal    [ ]Ambulatory/OOB to chair                           EXTREMITIES                                         [ ]Normal  [ ]Edema                           NEUROLOGIC  [ ] Normal, non focal  [ ] Focal findings:    PSYCHIATRIC  [ ]Alert and appropriate  [ ] Sedated	 [ ]Agitated    :  [place]: [ ] Yes, if yes: Date of Placement:                   [  ] No    LINES: Central Lines [ ] Yes, if yes: Date of Placement                                     [  ] No    HOSPITAL MEDICATIONS:  MEDICATIONS  (STANDING):  albuterol/ipratropium for Nebulization. 3 milliLiter(s) Nebulizer every 6 hours  amantadine Syrup 100 milliGRAM(s) Oral <User Schedule>  artificial  tears Solution 1 Drop(s) Both EYES every 6 hours  aspirin  chewable 81 milliGRAM(s) Enteral Tube daily  atorvastatin 80 milliGRAM(s) Oral at bedtime  bisacodyl Suppository 10 milliGRAM(s) Rectal at bedtime  chlorhexidine 4% Liquid 1 Application(s) Topical <User Schedule>  dextrose 5%. 1000 milliLiter(s) (50 mL/Hr) IV Continuous <Continuous>  dextrose 50% Injectable 25 Gram(s) IV Push once  dextrose 50% Injectable 12.5 Gram(s) IV Push once  dextrose 50% Injectable 25 Gram(s) IV Push once  doxazosin 8 milliGRAM(s) Oral at bedtime  famotidine    Tablet 20 milliGRAM(s) Oral daily  fentaNYL   Patch  75 MICROgram(s)/Hr 1 Patch Transdermal every 72 hours  heparin   Injectable 5000 Unit(s) SubCutaneous every 8 hours  hydrALAZINE 100 milliGRAM(s) Oral three times a day  isosorbide   dinitrate Tablet (ISORDIL) 20 milliGRAM(s) Enteral Tube <User Schedule>  lactobacillus acidophilus 1 Tablet(s) Oral two times a day  lidocaine   Patch 1 Patch Transdermal daily  lidocaine   Patch 1 Patch Transdermal every 24 hours  metoprolol tartrate 50 milliGRAM(s) Oral <User Schedule>  QUEtiapine 50 milliGRAM(s) Oral every 8 hours    MEDICATIONS  (PRN):  dextrose 40% Gel 15 Gram(s) Oral once PRN Blood Glucose LESS THAN 70 milliGRAM(s)/deciliter  glucagon  Injectable 1 milliGRAM(s) IntraMuscular once PRN Glucose LESS THAN 70 milligrams/deciliter  polyethylene glycol 3350 17 Gram(s) Oral daily PRN Constipation      LABS:                        9.3    9.06  )-----------( 350      ( 17 Sep 2020 06:51 )             31.0     09-17    132<L>  |  96  |  33<H>  ----------------------------<  109<H>  5.0   |  25  |  1.45<H>    Ca    9.8      17 Sep 2020 06:47  Phos  3.8     09-17  Mg     2.0     09-17              CAPILLARY BLOOD GLUCOSE    MICROBIOLOGY:     RADIOLOGY:  [ ] Reviewed and interpreted by me     Patient is a 60y old  Male who presents with a chief complaint of arrest (16 Sep 2020 09:44)      Interval Events: No events reported overnight     REVIEW OF SYSTEMS:  [ ] Positive  [x] All other systems negative  [ ] Unable to assess ROS because     Vital Signs Last 24 Hrs  T(C): 36.8 (09-18-20 @ 04:43), Max: 37.1 (09-17-20 @ 13:10)  T(F): 98.3 (09-18-20 @ 04:43), Max: 98.8 (09-17-20 @ 13:10)  HR: 73 (09-18-20 @ 05:37) (54 - 80)  BP: 138/89 (09-18-20 @ 04:43) (125/68 - 139/88)  RR: 20 (09-18-20 @ 04:43) (17 - 22)  SpO2: 100% (09-18-20 @ 05:37) (96% - 100%)    PHYSICAL EXAM:  HEENT:   [x]Tracheostomy: # 6 Cuffless Portex   [x]Pupils: Right eye ptosis, Dilated fixed right pupil; Left PERRL  [ ]No oral lesions  [ ]Abnormal    SKIN  [x]No Rash  [ ] Abnormal  [ ] pressure    CARDIAC  [x]Regular  [ ]Abnormal    PULMONARY  [x]Bilateral Clear Breath Sounds  [ ]Normal Excursion  [ ]Abnormal    GI  [x]PEG      [x] +BS		              [x]Soft, nondistended, nontender	  [ ]Abnormal    MUSCULOSKELETAL                                   [ ]Bedbound                 [ ]Abnormal    [x]OOB to chair                           EXTREMITIES                                         [x]Normal  [ ]Edema                           NEUROLOGIC  [ ] Normal, non focal  [x] Focal findings: Plegia of RUE/RLE; Alert and responsive; able to follow commands and respond to questions       PSYCHIATRIC  [x]Alert and appropriate  [ ] Sedated	 [ ]Agitated    :  [place]: [ ] Yes, if yes: Date of Placement:                   [x] No; Straight cath atc     LINES: Central Lines [ ] Yes, if yes: Date of Placement                                     [x] No    HOSPITAL MEDICATIONS:  MEDICATIONS  (STANDING):  albuterol/ipratropium for Nebulization. 3 milliLiter(s) Nebulizer every 6 hours  amantadine Syrup 100 milliGRAM(s) Oral <User Schedule>  artificial  tears Solution 1 Drop(s) Both EYES every 6 hours  aspirin  chewable 81 milliGRAM(s) Enteral Tube daily  atorvastatin 80 milliGRAM(s) Oral at bedtime  bisacodyl Suppository 10 milliGRAM(s) Rectal at bedtime  chlorhexidine 4% Liquid 1 Application(s) Topical <User Schedule>  dextrose 5%. 1000 milliLiter(s) (50 mL/Hr) IV Continuous <Continuous>  dextrose 50% Injectable 25 Gram(s) IV Push once  dextrose 50% Injectable 12.5 Gram(s) IV Push once  dextrose 50% Injectable 25 Gram(s) IV Push once  doxazosin 8 milliGRAM(s) Oral at bedtime  famotidine    Tablet 20 milliGRAM(s) Oral daily  fentaNYL   Patch  75 MICROgram(s)/Hr 1 Patch Transdermal every 72 hours  heparin   Injectable 5000 Unit(s) SubCutaneous every 8 hours  hydrALAZINE 100 milliGRAM(s) Oral three times a day  isosorbide   dinitrate Tablet (ISORDIL) 20 milliGRAM(s) Enteral Tube <User Schedule>  lactobacillus acidophilus 1 Tablet(s) Oral two times a day  lidocaine   Patch 1 Patch Transdermal daily  lidocaine   Patch 1 Patch Transdermal every 24 hours  metoprolol tartrate 50 milliGRAM(s) Oral <User Schedule>  QUEtiapine 50 milliGRAM(s) Oral every 8 hours    MEDICATIONS  (PRN):  dextrose 40% Gel 15 Gram(s) Oral once PRN Blood Glucose LESS THAN 70 milliGRAM(s)/deciliter  glucagon  Injectable 1 milliGRAM(s) IntraMuscular once PRN Glucose LESS THAN 70 milligrams/deciliter  polyethylene glycol 3350 17 Gram(s) Oral daily PRN Constipation      LABS:                        9.3    9.06  )-----------( 350      ( 17 Sep 2020 06:51 )             31.0     09-17    132<L>  |  96  |  33<H>  ----------------------------<  109<H>  5.0   |  25  |  1.45<H>    Ca    9.8      17 Sep 2020 06:47  Phos  3.8     09-17  Mg     2.0     09-17              CAPILLARY BLOOD GLUCOSE    MICROBIOLOGY:     RADIOLOGY:  [ ] Reviewed and interpreted by me

## 2020-09-18 NOTE — PROGRESS NOTE ADULT - PROBLEM SELECTOR PLAN 3
- Continue fentanyl patch 75mcg  - Continue Seroquel 50 mg q 8 hrs - Continue fentanyl patch 75mcg  - Continue Seroquel 50 mg during the day; evening dose increased to 75 mg   - Will add PRN Ativan via PEG for agitation not treated by standing seroquel

## 2020-09-18 NOTE — PROGRESS NOTE ADULT - SUBJECTIVE AND OBJECTIVE BOX
CC: f/u for C Diff and post arrest infections    Patient reports: he is alert, non verbal, on TC, follows simple commands    REVIEW OF SYSTEMS:  All other review of systems negative (Comprehensive ROS): limited by exam    Antimicrobials Day #  :    Other Medications Reviewed  MEDICATIONS  (STANDING):  albuterol/ipratropium for Nebulization. 3 milliLiter(s) Nebulizer every 6 hours  amantadine Syrup 100 milliGRAM(s) Oral <User Schedule>  artificial  tears Solution 1 Drop(s) Both EYES every 6 hours  aspirin  chewable 81 milliGRAM(s) Enteral Tube daily  atorvastatin 80 milliGRAM(s) Oral at bedtime  bisacodyl Suppository 10 milliGRAM(s) Rectal at bedtime  chlorhexidine 4% Liquid 1 Application(s) Topical <User Schedule>  dextrose 5%. 1000 milliLiter(s) (50 mL/Hr) IV Continuous <Continuous>  dextrose 50% Injectable 12.5 Gram(s) IV Push once  dextrose 50% Injectable 25 Gram(s) IV Push once  dextrose 50% Injectable 25 Gram(s) IV Push once  doxazosin 8 milliGRAM(s) Oral at bedtime  famotidine    Tablet 20 milliGRAM(s) Oral daily  fentaNYL   Patch  75 MICROgram(s)/Hr 1 Patch Transdermal every 72 hours  heparin   Injectable 5000 Unit(s) SubCutaneous every 8 hours  hydrALAZINE 100 milliGRAM(s) Oral three times a day  isosorbide   dinitrate Tablet (ISORDIL) 20 milliGRAM(s) Enteral Tube <User Schedule>  lactobacillus acidophilus 1 Tablet(s) Oral two times a day  lidocaine   Patch 1 Patch Transdermal every 24 hours  lidocaine   Patch 1 Patch Transdermal daily  metoprolol tartrate 50 milliGRAM(s) Oral <User Schedule>  QUEtiapine 50 milliGRAM(s) Oral every 8 hours    T(F): 98.1 (09-18-20 @ 08:28), Max: 98.8 (09-17-20 @ 13:10)  HR: 75 (09-18-20 @ 08:42)  BP: 123/81 (09-18-20 @ 08:28)  RR: 21 (09-18-20 @ 08:42)  SpO2: 99% (09-18-20 @ 08:42)  Wt(kg): --    PHYSICAL EXAM:  General: alert, no acute distress  Eyes:  anicteric, no conjunctival injection, no discharge  Oropharynx: no lesions	  Neck: supple, trach  Lungs: few ronchi  Heart: regular rate and rhythm; no murmur, rubs or gallops  Abdomen: soft, nondistended, nontender, peg  Skin: no lesions  Extremities: no clubbing, cyanosis, or edema  Neurologic: alert,left side is week   ISC    LAB RESULTS:                        10.0   7.80  )-----------( 366      ( 18 Sep 2020 07:18 )             32.4     09-18    133<L>  |  98  |  30<H>  ----------------------------<  121<H>  4.7   |  21<L>  |  1.32<H>    Ca    10.1      18 Sep 2020 07:18  Phos  3.9     09-18  Mg     2.0     09-18          MICROBIOLOGY:  RECENT CULTURES:      RADIOLOGY REVIEWED:

## 2020-09-18 NOTE — PROGRESS NOTE ADULT - ASSESSMENT
60-year-old male with a history of A-Fib on warfarin who presents with cardiac arrest at work with downtime of about 25 minutes prior to ROSC. S/p therapeutic hypothermia. 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 Now with resolved neurogenic/cardiogenic shock. Course complicated by GI bleed s/p PPI gtt, bleeding from scott s/p clot irrigation, prolonged respiratory failure s/p trach (8/24) and PEG (8/26). Currently with A-Fib with RVR and C diff colitis.  Also developed gross hematuria - Urology consulted +catheter with clot (irrigated and exchanged) suspected 2/2 traumatic catheterization. Additionally course complicated by fever and Pseudomonas PNA (s/p Rx) then developed C diff (now with resolved leukocytosis and improved stooling, now soft per report); remains on enteral Vanco    8/29: Received to [facility]  8/30: Less Agitated overnight, Afebrile, WBC decreasing, CR elevated but Stable, Tolerating TC all day yesterday, will Attempt TC ATC tonight and ABG in am, F/u Bcx from 8/30. Rectal tube dc'd.  Speech eval for AAC device and will F/u.  8/31: will attempt TC around the clock again tonight. Stopped amiodarone as per cards.  Will be cathed if cleared by ID and renal.  9/1: Elevated BP overnight, BP stable today, afebrile. Lethargic earlier today on exam; ABG Stable; Head CT done- no new ICH, ventricles enlarged ? Hydrocephalus, Spoke to Max from [facility] who will see- although felt no intervention required at this time. Tolerating TC ATC, will attempt cuffless trach tomorrow. C diff Iso discontinued as discussed w/ Inf control.  9/2: still with secretions, will downsize when improved. Will cath as outpatient. Decreased opioids  9/3: PICC dc'd, Trach downsized to #7 cuffless Portex. stomach distended  9/4: Occasionally Restless, afebrile. Tolerating cuffless trach, phonating w/ digital occlusion.  Spoke to speech for PMV re-eval soon than 9/6.  Elevated BP, Hydralazine increased.- will monitor.  9/5: trach dislodged, ENT changed to #8 cuffless Shiley. OOB to chair  9/6: OOB to chair today, gave dulcolax suppository   9/7: agitated while in chair today, gave Seroquel.  Producing thick, yellow secretions. Afebrile. Managing secretions with duoneb and hyper-sal 7%/Chest PT.  9/8: Had spurious leukocytosis. Afebrile, hemodynamically stable. Repeated cbc, sent Bcx.  Spiked temp of 102.5, cbc rechecked which was consistent with leukocytosis. PAN cultured. Noted urinary retention of 2600ml urine, abdomen distended with some tenderness on palpation.  F/U CT abd/pelvis/chest.  9/9: Patient appears well despite leukocytosis of 31. Sputum Cx growing gram neg rods. Remains on meropenem, added Vancoycin PO per ID for concern of recent Cdiff infection.  9/10: Afebrile over night. Leukocytosis decreasing. UCx growing pan-sensitive pseudomonas. Will continue meropenem and prophylactic vancomycin PO. Thick brown tinged sputum with suctioning, aggressive chest PT, nebs + hypersal.  9/11: Patient with episode of agitation and delirium early this morning. Seemingly did not respond to any anxiolytic or sedative meds given. Patient denies having any pain, states that he's "In the [place]". Thorough exam revealed IV access was not working. A new IV line was placed in RLE and 2mg IV ativan given which calmed the patient. Leukocytosis continues to improve on ABx. Noted to have -120s, EKG confirms AFIBB. Metoprolol increased.  9/12: Less Agitated today, improving CR and Leukocytosis on Cefepime; Still with Urinary retention and requiring prn straight cath- cardura increased.   9/13: No new events  9/14: Stable. Trach down-sized to #6 Cuffless Portex, obturator used. No complications, tolerated procedure well. Will complete 7-10 day course per Attending discretion.  9/15: Patient complaining of not being able to see, poor vision. States he sees shadows and unable ot identify objects with his left eye. Right remains with ptosis, fixed and dilated pupil. Hyponatremia noted, free water discontinued.  For PMV re-assessment tomorrow. Discharge planning in progress as trying to first obtain insurance.  9/16: called speech for re-eval of PMV. 1 episode of agitation. Gave Ativan 1mg ivp x1.  9/17: Tolerated PMV with speech  9/18: No events reported overnight 60-year-old male with a history of A-Fib on warfarin who presents with cardiac arrest at work with downtime of about 25 minutes prior to ROSC. S/p therapeutic hypothermia. 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 Now with resolved neurogenic/cardiogenic shock. Course complicated by GI bleed s/p PPI gtt, bleeding from scott s/p clot irrigation, prolonged respiratory failure s/p trach (8/24) and PEG (8/26). Currently with A-Fib with RVR and C diff colitis.  Also developed gross hematuria - Urology consulted +catheter with clot (irrigated and exchanged) suspected 2/2 traumatic catheterization. Additionally course complicated by fever and Pseudomonas PNA (s/p Rx) then developed C diff (now with resolved leukocytosis and improved stooling, now soft per report); remains on enteral Vanco    8/29: Received to [facility]  8/30: Less Agitated overnight, Afebrile, WBC decreasing, CR elevated but Stable, Tolerating TC all day yesterday, will Attempt TC ATC tonight and ABG in am, F/u Bcx from 8/30. Rectal tube dc'd.  Speech eval for AAC device and will F/u.  8/31: will attempt TC around the clock again tonight. Stopped amiodarone as per cards.  Will be cathed if cleared by ID and renal.  9/1: Elevated BP overnight, BP stable today, afebrile. Lethargic earlier today on exam; ABG Stable; Head CT done- no new ICH, ventricles enlarged ? Hydrocephalus, Spoke to Max from [facility] who will see- although felt no intervention required at this time. Tolerating TC ATC, will attempt cuffless trach tomorrow. C diff Iso discontinued as discussed w/ Inf control.  9/2: still with secretions, will downsize when improved. Will cath as outpatient. Decreased opioids  9/3: PICC dc'd, Trach downsized to #7 cuffless Portex. stomach distended  9/4: Occasionally Restless, afebrile. Tolerating cuffless trach, phonating w/ digital occlusion.  Spoke to speech for PMV re-eval soon than 9/6.  Elevated BP, Hydralazine increased.- will monitor.  9/5: trach dislodged, ENT changed to #8 cuffless Shiley. OOB to chair  9/6: OOB to chair today, gave dulcolax suppository   9/7: agitated while in chair today, gave Seroquel.  Producing thick, yellow secretions. Afebrile. Managing secretions with duoneb and hyper-sal 7%/Chest PT.  9/8: Had spurious leukocytosis. Afebrile, hemodynamically stable. Repeated cbc, sent Bcx.  Spiked temp of 102.5, cbc rechecked which was consistent with leukocytosis. PAN cultured. Noted urinary retention of 2600ml urine, abdomen distended with some tenderness on palpation.  F/U CT abd/pelvis/chest.  9/9: Patient appears well despite leukocytosis of 31. Sputum Cx growing gram neg rods. Remains on meropenem, added Vancoycin PO per ID for concern of recent Cdiff infection.  9/10: Afebrile over night. Leukocytosis decreasing. UCx growing pan-sensitive pseudomonas. Will continue meropenem and prophylactic vancomycin PO. Thick brown tinged sputum with suctioning, aggressive chest PT, nebs + hypersal.  9/11: Patient with episode of agitation and delirium early this morning. Seemingly did not respond to any anxiolytic or sedative meds given. Patient denies having any pain, states that he's "In the [place]". Thorough exam revealed IV access was not working. A new IV line was placed in RLE and 2mg IV ativan given which calmed the patient. Leukocytosis continues to improve on ABx. Noted to have -120s, EKG confirms AFIBB. Metoprolol increased.  9/12: Less Agitated today, improving CR and Leukocytosis on Cefepime; Still with Urinary retention and requiring prn straight cath- cardura increased.   9/13: No new events  9/14: Stable. Trach down-sized to #6 Cuffless Portex, obturator used. No complications, tolerated procedure well. Will complete 7-10 day course per Attending discretion.  9/15: Patient complaining of not being able to see, poor vision. States he sees shadows and unable ot identify objects with his left eye. Right remains with ptosis, fixed and dilated pupil. Hyponatremia noted, free water discontinued.  For PMV re-assessment tomorrow. Discharge planning in progress as trying to first obtain insurance.  9/16: called speech for re-eval of PMV. 1 episode of agitation. Gave Ativan 1mg ivp x1.  9/17: Tolerated PMV with speech  9/18: Patients agitation appears to be worse in the evening, [facility] requesting his agitation medication regimen be optimized prior to discharge. Will continue Seroquel 50 mg during the day/waking hours to prevent drowsiness and increase evening dose to 75mg as well as add prn ativan for agitation not treated by standing seroquel

## 2020-09-18 NOTE — PROGRESS NOTE ADULT - PROBLEM SELECTOR PLAN 5
Completed Abx for Pseudomonas aeruginosa pneumonia/colonization:  - S/p prolonged course of cefepime until 8/14-8/20   - If develops increased secretions, may benefit from inhaled tobramycin  - C diff: Completed oral Vanco 8/29 and PPX Dose completed on 9/16   - UTI: Pseudomonas S/p Cefepime

## 2020-09-18 NOTE — PROGRESS NOTE ADULT - PROBLEM SELECTOR PLAN 6
- A-Fib with RVR:  - DC'd Amiodarone (8/31)  - Hold off on therapeutic a/c given SAH  - SBP goal 100- 160  -TTE: Global LV dysfunction. EF 41%, Severe concentric LVH, flattening of interventricular septum.   - Metoprolol 50 mg q 2x / per day

## 2020-09-18 NOTE — PROGRESS NOTE ADULT - ASSESSMENT
59 yo male with A Fib  admitted early August with out of hospital arrest.  ER evaluation found Rt sided SAH, cerebral angio x 2 was negative.  Hypoxic encephalopathy following event.This has improved.  S/P cefepime 8/14-8/20 for pneumonia.  S/P treatment for C Diff, course completed 8/29.  S/P trach 8/24 and Peg 8/26.  Fever to 102.7 on 9/8 led to meropenem and po vanco 125 BID being started.  WBC of 30,000 has decreased to less than 10,000  CT of C/A/P on 9/8  without clear source of infection.  Pseudomonas in urine (pansensitive), pseudomonas/providencia in sputum,and sterile blood.  No signs of recurrent C Diff  Possible  source to sepsis , he requires q6 strait cath for large PVR  Head CT last week with evolving ICH  He has completed 7 days of pseudomonal coverageon 9/15  and po vanco for possible C diff prophylaxis  No signs of recurrent infection  Suggest:  1.Respiratory support per pulmonary  2.S/P antibiotic course, follow off antibiotics  3.Cardiology f/u appreciated.  4.ISC for retention.  5.No additional ID w/u planned, we will stop actively following, please call if ID issues arise.

## 2020-09-18 NOTE — PROGRESS NOTE ADULT - PROBLEM SELECTOR PLAN 1
- Trached 8/24 with ENT  - Continue Duoneb and hypertonic saline, chest PT  - Trach further down-sized from #8 to #6 CFS Portex  - Tolerating continuous trach collar since 8/30  - PMV with speech - Trached 8/24 with ENT  - Continue Duoneb and Chest PT  - Trach further down-sized from #8 to #6 CFS Portex  - Tolerating continuous trach collar since 8/30  - PMV with speech

## 2020-09-18 NOTE — PROGRESS NOTE ADULT - PROBLEM SELECTOR PLAN 4
- Possible stunned myocardium due to cardiac arrest vs. SAH  - Will need cardiac cath to evaluate for ischemia as outpatient  - Continue afterload/preload reduction with hydralazine and isosorbide dinitrate, increase as tolerated for sbp~140  - Will obtain Cath as outpatient

## 2020-09-19 LAB
ANION GAP SERPL CALC-SCNC: 14 MMOL/L — SIGNIFICANT CHANGE UP (ref 5–17)
BUN SERPL-MCNC: 30 MG/DL — HIGH (ref 7–23)
CALCIUM SERPL-MCNC: 10.2 MG/DL — SIGNIFICANT CHANGE UP (ref 8.4–10.5)
CHLORIDE SERPL-SCNC: 96 MMOL/L — SIGNIFICANT CHANGE UP (ref 96–108)
CO2 SERPL-SCNC: 22 MMOL/L — SIGNIFICANT CHANGE UP (ref 22–31)
CREAT SERPL-MCNC: 1.28 MG/DL — SIGNIFICANT CHANGE UP (ref 0.5–1.3)
GLUCOSE SERPL-MCNC: 96 MG/DL — SIGNIFICANT CHANGE UP (ref 70–99)
HCT VFR BLD CALC: 34.3 % — LOW (ref 39–50)
HGB BLD-MCNC: 10.6 G/DL — LOW (ref 13–17)
MAGNESIUM SERPL-MCNC: 1.9 MG/DL — SIGNIFICANT CHANGE UP (ref 1.6–2.6)
MCHC RBC-ENTMCNC: 27 PG — SIGNIFICANT CHANGE UP (ref 27–34)
MCHC RBC-ENTMCNC: 30.9 GM/DL — LOW (ref 32–36)
MCV RBC AUTO: 87.3 FL — SIGNIFICANT CHANGE UP (ref 80–100)
NRBC # BLD: 0 /100 WBCS — SIGNIFICANT CHANGE UP (ref 0–0)
PHOSPHATE SERPL-MCNC: 4.2 MG/DL — SIGNIFICANT CHANGE UP (ref 2.5–4.5)
PLATELET # BLD AUTO: 378 K/UL — SIGNIFICANT CHANGE UP (ref 150–400)
POTASSIUM SERPL-MCNC: 5.1 MMOL/L — SIGNIFICANT CHANGE UP (ref 3.5–5.3)
POTASSIUM SERPL-SCNC: 5.1 MMOL/L — SIGNIFICANT CHANGE UP (ref 3.5–5.3)
RBC # BLD: 3.93 M/UL — LOW (ref 4.2–5.8)
RBC # FLD: 15.5 % — HIGH (ref 10.3–14.5)
SODIUM SERPL-SCNC: 132 MMOL/L — LOW (ref 135–145)
WBC # BLD: 8.14 K/UL — SIGNIFICANT CHANGE UP (ref 3.8–10.5)
WBC # FLD AUTO: 8.14 K/UL — SIGNIFICANT CHANGE UP (ref 3.8–10.5)

## 2020-09-19 PROCEDURE — 99232 SBSQ HOSP IP/OBS MODERATE 35: CPT

## 2020-09-19 RX ORDER — FENTANYL CITRATE 50 UG/ML
1 INJECTION INTRAVENOUS
Refills: 0 | Status: DISCONTINUED | OUTPATIENT
Start: 2020-09-19 | End: 2020-09-24

## 2020-09-19 RX ADMIN — Medication 50 MILLIGRAM(S): at 22:02

## 2020-09-19 RX ADMIN — Medication 1 TABLET(S): at 18:04

## 2020-09-19 RX ADMIN — Medication 100 MILLIGRAM(S): at 05:33

## 2020-09-19 RX ADMIN — ISOSORBIDE DINITRATE 20 MILLIGRAM(S): 5 TABLET ORAL at 18:04

## 2020-09-19 RX ADMIN — Medication 1 DROP(S): at 13:32

## 2020-09-19 RX ADMIN — HEPARIN SODIUM 5000 UNIT(S): 5000 INJECTION INTRAVENOUS; SUBCUTANEOUS at 13:33

## 2020-09-19 RX ADMIN — Medication 1 DROP(S): at 18:04

## 2020-09-19 RX ADMIN — Medication 100 MILLIGRAM(S): at 22:05

## 2020-09-19 RX ADMIN — ISOSORBIDE DINITRATE 20 MILLIGRAM(S): 5 TABLET ORAL at 23:49

## 2020-09-19 RX ADMIN — ATORVASTATIN CALCIUM 80 MILLIGRAM(S): 80 TABLET, FILM COATED ORAL at 22:15

## 2020-09-19 RX ADMIN — HEPARIN SODIUM 5000 UNIT(S): 5000 INJECTION INTRAVENOUS; SUBCUTANEOUS at 05:32

## 2020-09-19 RX ADMIN — Medication 100 MILLIGRAM(S): at 13:32

## 2020-09-19 RX ADMIN — ISOSORBIDE DINITRATE 20 MILLIGRAM(S): 5 TABLET ORAL at 08:55

## 2020-09-19 RX ADMIN — QUETIAPINE FUMARATE 75 MILLIGRAM(S): 200 TABLET, FILM COATED ORAL at 22:10

## 2020-09-19 RX ADMIN — QUETIAPINE FUMARATE 50 MILLIGRAM(S): 200 TABLET, FILM COATED ORAL at 13:33

## 2020-09-19 RX ADMIN — Medication 50 MILLIGRAM(S): at 08:55

## 2020-09-19 RX ADMIN — FAMOTIDINE 20 MILLIGRAM(S): 10 INJECTION INTRAVENOUS at 13:33

## 2020-09-19 RX ADMIN — Medication 100 MILLIGRAM(S): at 13:33

## 2020-09-19 RX ADMIN — QUETIAPINE FUMARATE 50 MILLIGRAM(S): 200 TABLET, FILM COATED ORAL at 05:32

## 2020-09-19 RX ADMIN — HEPARIN SODIUM 5000 UNIT(S): 5000 INJECTION INTRAVENOUS; SUBCUTANEOUS at 22:04

## 2020-09-19 RX ADMIN — Medication 1 DROP(S): at 05:32

## 2020-09-19 RX ADMIN — CHLORHEXIDINE GLUCONATE 1 APPLICATION(S): 213 SOLUTION TOPICAL at 05:34

## 2020-09-19 RX ADMIN — Medication 8 MILLIGRAM(S): at 22:03

## 2020-09-19 RX ADMIN — Medication 100 MILLIGRAM(S): at 08:55

## 2020-09-19 RX ADMIN — LIDOCAINE 1 PATCH: 4 CREAM TOPICAL at 21:57

## 2020-09-19 RX ADMIN — Medication 3 MILLILITER(S): at 05:28

## 2020-09-19 RX ADMIN — LIDOCAINE 1 PATCH: 4 CREAM TOPICAL at 13:33

## 2020-09-19 RX ADMIN — LIDOCAINE 1 PATCH: 4 CREAM TOPICAL at 22:09

## 2020-09-19 RX ADMIN — Medication 10 MILLIGRAM(S): at 22:04

## 2020-09-19 RX ADMIN — Medication 81 MILLIGRAM(S): at 13:33

## 2020-09-19 RX ADMIN — Medication 1 DROP(S): at 23:49

## 2020-09-19 RX ADMIN — Medication 1 TABLET(S): at 05:33

## 2020-09-19 NOTE — PROGRESS NOTE ADULT - PROBLEM SELECTOR PLAN 6
- A-Fib with RVR:  - DC'd Amiodarone (8/31)  - Hold off on therapeutic a/c given SAH  - SBP goal 100- 160  -TTE: Global LV dysfunction. EF 41%, Severe concentric LVH, flattening of interventricular septum.   - Metoprolol 50 mg q 2x / per day - A-Fib with RVR:  - DC'd Amiodarone (8/31)  - Hold off on therapeutic a/c given SAH  - SBP goal 100- 160  -TTE: Global LV dysfunction. EF 41%, Severe concentric LVH, flattening of interventricular septum.   - Metoprolol 50 mg BID

## 2020-09-19 NOTE — PROGRESS NOTE ADULT - PROBLEM SELECTOR PLAN 3
- Continue fentanyl patch 75mcg  - Continue Seroquel 50 mg during the day; evening dose increased to 75 mg   - Will add PRN Ativan via PEG for agitation not treated by standing seroquel - fentanyl patch 75mcg  reduced to 50mg on 9/19 in attempt to reduce daytime somnolence.  - Continue Seroquel 50 mg during the day; evening dose increased to 75 mg   - Will add PRN Ativan via PEG for agitation not treated by standing seroquel

## 2020-09-19 NOTE — PROGRESS NOTE ADULT - SUBJECTIVE AND OBJECTIVE BOX
Patient is a 60y old  Male who presents with a chief complaint of Arrest (18 Sep 2020 09:19)      Interval Events:    REVIEW OF SYSTEMS:  [ ] Positive  [ ] All other systems negative  [ ] Unable to assess ROS because ________    Vital Signs Last 24 Hrs  T(C): 36.9 (09-19-20 @ 04:00), Max: 36.9 (09-19-20 @ 04:00)  T(F): 98.4 (09-19-20 @ 04:00), Max: 98.4 (09-19-20 @ 04:00)  HR: 78 (09-19-20 @ 05:29) (53 - 78)  BP: 141/80 (09-19-20 @ 04:00) (123/81 - 155/98)  RR: 21 (09-19-20 @ 04:41) (18 - 21)  SpO2: 100% (09-19-20 @ 05:29) (98% - 100%)    PHYSICAL EXAM:  HEENT:   [ ]Tracheostomy:  [ ]Pupils equal  [ ]No oral lesions  [ ]Abnormal    SKIN  [ ]No Rash  [ ] Abnormal  [ ] pressure    CARDIAC  [ ]Regular  [ ]Abnormal    PULMONARY  [ ]Bilateral Clear Breath Sounds  [ ]Normal Excursion  [ ]Abnormal    GI  [ ]PEG      [ ] +BS		              [ ]Soft, nondistended, nontender	  [ ]Abnormal    MUSCULOSKELETAL                                   [ ]Bedbound                 [ ]Abnormal    [ ]Ambulatory/OOB to chair                           EXTREMITIES                                         [ ]Normal  [ ]Edema                           NEUROLOGIC  [ ] Normal, non focal  [ ] Focal findings:    PSYCHIATRIC  [ ]Alert and appropriate  [ ] Sedated	 [ ]Agitated    :  Umaña: [ ] Yes, if yes: Date of Placement:                   [  ] No    LINES: Central Lines [ ] Yes, if yes: Date of Placement                                     [  ] No    HOSPITAL MEDICATIONS:  MEDICATIONS  (STANDING):  albuterol/ipratropium for Nebulization. 3 milliLiter(s) Nebulizer every 6 hours  amantadine Syrup 100 milliGRAM(s) Oral <User Schedule>  artificial  tears Solution 1 Drop(s) Both EYES every 6 hours  aspirin  chewable 81 milliGRAM(s) Enteral Tube daily  atorvastatin 80 milliGRAM(s) Oral at bedtime  bisacodyl Suppository 10 milliGRAM(s) Rectal at bedtime  chlorhexidine 4% Liquid 1 Application(s) Topical <User Schedule>  dextrose 5%. 1000 milliLiter(s) (50 mL/Hr) IV Continuous <Continuous>  dextrose 50% Injectable 12.5 Gram(s) IV Push once  dextrose 50% Injectable 25 Gram(s) IV Push once  dextrose 50% Injectable 25 Gram(s) IV Push once  doxazosin 8 milliGRAM(s) Oral at bedtime  famotidine    Tablet 20 milliGRAM(s) Oral daily  fentaNYL   Patch  75 MICROgram(s)/Hr 1 Patch Transdermal every 72 hours  heparin   Injectable 5000 Unit(s) SubCutaneous every 8 hours  hydrALAZINE 100 milliGRAM(s) Oral three times a day  isosorbide   dinitrate Tablet (ISORDIL) 20 milliGRAM(s) Enteral Tube <User Schedule>  lactobacillus acidophilus 1 Tablet(s) Oral two times a day  lidocaine   Patch 1 Patch Transdermal every 24 hours  lidocaine   Patch 1 Patch Transdermal daily  metoprolol tartrate 50 milliGRAM(s) Oral <User Schedule>  QUEtiapine 50 milliGRAM(s) Oral <User Schedule>  QUEtiapine 75 milliGRAM(s) Oral <User Schedule>    MEDICATIONS  (PRN):  dextrose 40% Gel 15 Gram(s) Oral once PRN Blood Glucose LESS THAN 70 milliGRAM(s)/deciliter  glucagon  Injectable 1 milliGRAM(s) IntraMuscular once PRN Glucose LESS THAN 70 milligrams/deciliter  LORazepam     Tablet 1 milliGRAM(s) Oral every 8 hours PRN Agitation  polyethylene glycol 3350 17 Gram(s) Oral daily PRN Constipation      LABS:                        10.0   7.80  )-----------( 366      ( 18 Sep 2020 07:18 )             32.4     09-18    133<L>  |  98  |  30<H>  ----------------------------<  121<H>  4.7   |  21<L>  |  1.32<H>    Ca    10.1      18 Sep 2020 07:18  Phos  3.9     09-18  Mg     2.0     09-18              CAPILLARY BLOOD GLUCOSE    MICROBIOLOGY:     RADIOLOGY:  [ ] Reviewed and interpreted by me     Patient is a 60y old  Male who presents with a chief complaint of Arrest........s/p SAH, trach/PEG, UTI      Interval Events: No events reported over night    REVIEW OF SYSTEMS:  [ ] Positive  [ ] All other systems negative  [ ] Unable to assess ROS because ________    Vital Signs Last 24 Hrs  T(C): 36.9 (09-19-20 @ 04:00), Max: 36.9 (09-19-20 @ 04:00)  T(F): 98.4 (09-19-20 @ 04:00), Max: 98.4 (09-19-20 @ 04:00)  HR: 78 (09-19-20 @ 05:29) (53 - 78)  BP: 141/80 (09-19-20 @ 04:00) (123/81 - 155/98)  RR: 21 (09-19-20 @ 04:41) (18 - 21)  SpO2: 100% (09-19-20 @ 05:29) (98% - 100%)      PHYSICAL EXAM:  HEENT:   [X]Tracheostomy:  large stoma; #6 Cuffless Portex  [X] Right eye ptosis, Dilated fixed right pupil; Left PERRL  [ ]No oral lesions  [ ]Abnormal    SKIN  [X] No Rash  [ ] Abnormal  [ ] pressure    CARDIAC  [X] Regular  [ ] Abnormal    PULMONARY  [X] Bilateral Clear Breath Sounds  [ ] Normal Excursion  [ ] Abnormal    GI  [X] PEG      [X] +BS		              [X] Soft, nondistended, nontender	  [ ]Abnormal    MUSCULOSKELETAL                                   [ ] Bedbound                 [ ] Abnormal    [ X] OOB to chair with assistance    EXTREMITIES                                         [X] Normal  [ ]Edema                           NEUROLOGIC  [ ] Normal, non focal  [X] Focal findings: Plegia of LUE, paresis of LLE; Alert and responsive; able to follow commands and respond to questions appropriately sometimes    PSYCHIATRIC  [X] Alert  [ ] Sedated	 [ ]Agitated    :  Umaña: [ ] Yes, if yes: Date of Placement:                   [X] No    LINES: Central Lines [ ] Yes, if yes: Date of Placement                                     [  ] No      HOSPITAL MEDICATIONS:  MEDICATIONS  (STANDING):  albuterol/ipratropium for Nebulization. 3 milliLiter(s) Nebulizer every 6 hours  amantadine Syrup 100 milliGRAM(s) Oral <User Schedule>  artificial  tears Solution 1 Drop(s) Both EYES every 6 hours  aspirin  chewable 81 milliGRAM(s) Enteral Tube daily  atorvastatin 80 milliGRAM(s) Oral at bedtime  bisacodyl Suppository 10 milliGRAM(s) Rectal at bedtime  chlorhexidine 4% Liquid 1 Application(s) Topical <User Schedule>  dextrose 5%. 1000 milliLiter(s) (50 mL/Hr) IV Continuous <Continuous>  dextrose 50% Injectable 12.5 Gram(s) IV Push once  dextrose 50% Injectable 25 Gram(s) IV Push once  dextrose 50% Injectable 25 Gram(s) IV Push once  doxazosin 8 milliGRAM(s) Oral at bedtime  famotidine    Tablet 20 milliGRAM(s) Oral daily  fentaNYL   Patch  75 MICROgram(s)/Hr 1 Patch Transdermal every 72 hours  heparin   Injectable 5000 Unit(s) SubCutaneous every 8 hours  hydrALAZINE 100 milliGRAM(s) Oral three times a day  isosorbide   dinitrate Tablet (ISORDIL) 20 milliGRAM(s) Enteral Tube <User Schedule>  lactobacillus acidophilus 1 Tablet(s) Oral two times a day  lidocaine   Patch 1 Patch Transdermal every 24 hours  lidocaine   Patch 1 Patch Transdermal daily  metoprolol tartrate 50 milliGRAM(s) Oral <User Schedule>  QUEtiapine 50 milliGRAM(s) Oral <User Schedule>  QUEtiapine 75 milliGRAM(s) Oral <User Schedule>    MEDICATIONS  (PRN):  dextrose 40% Gel 15 Gram(s) Oral once PRN Blood Glucose LESS THAN 70 milliGRAM(s)/deciliter  glucagon  Injectable 1 milliGRAM(s) IntraMuscular once PRN Glucose LESS THAN 70 milligrams/deciliter  LORazepam     Tablet 1 milliGRAM(s) Oral every 8 hours PRN Agitation  polyethylene glycol 3350 17 Gram(s) Oral daily PRN Constipation      LABS:                        10.0   7.80  )-----------( 366      ( 18 Sep 2020 07:18 )             32.4     09-18    133<L>  |  98  |  30<H>  ----------------------------<  121<H>  4.7   |  21<L>  |  1.32<H>    Ca    10.1      18 Sep 2020 07:18  Phos  3.9     09-18  Mg     2.0     09-18              CAPILLARY BLOOD GLUCOSE    MICROBIOLOGY:     RADIOLOGY:  [ ] Reviewed and interpreted by me

## 2020-09-19 NOTE — PROGRESS NOTE ADULT - ATTENDING COMMENTS
Agree with plan as above.    Pt tolerating trach collar since 8/30. Trach downsized to cuffless Portex 6 on 9/14. Continue with airway clearance therapy. Phonating well with use of PSV all morning. Pt completed Cefepime through 9/15 for PSA UTI, with Vanco PO for CDiff ppx. Monitoring conservatively for now, no new clinical s/s acute infectious process. Pt also with new CHFrEF on this admission. Cardiology recs appreciated. Hx recent AFlutter/Afib in the setting of sepsis s/p amiodarone+BB therapy. Off amiodarone 2/2 bradycardia with no further noted arrhythmias. No A/C 2/2 recent SAH and GIB, now on DVT ppx with HSQ. Full dose statin added to regimen today. c/w ASA+BB+Isordil+Hydralazine for medical mgmt. Will likely need O/P cardiac cath. Pt also with episode of hyperactive delirium overnight, responds well with prn Ativan. Will increase qhs Seroquel and c/w prn PO Ativan if indicated.     Dispo planning in progress. Plan for acute rehab pending insurance coverage. as above.  multifactorial resp failure-s/p SAH, CHF--tolerating trach collar since 8/30. Trach downsized to cuffless Portex 6 on 9/14. Continue with airway clearance therapy. Phonating well with use of PSV all morning.   ID-Pt completed Cefepime through 9/15 for PSA UTI, with Vanco PO for CDiff ppx. Monitoring conservatively for now, no new clinical s/s acute infectious process.   CHF- new CHFrEF on this admission. Cardiology recs appreciated. Hx recent AFlutter/Afib in the setting of sepsis s/p amiodarone+BB therapy. Off amiodarone 2/2 bradycardia with no further noted arrhythmias. No A/C 2/2 recent SAH and GIB,   DVT ppx with HSQ. Full dose statin added to regimen today. c/w ASA+BB+Isordil+Hydralazine for medical mgmt. Will likely need O/P cardiac cath. Neuro-also with episode of hyperactive delirium overnight, responds well with prn Ativan. Will increase qhs Seroquel and c/w prn PO Ativan if indicated. RESOLVED.                                     Dispo planning in progress. Plan for acute rehab pending insurance coverage.  Rob Cabello MD-Pulmonary                       [phone]

## 2020-09-19 NOTE — PROGRESS NOTE ADULT - PROBLEM SELECTOR PLAN 1
- Trached 8/24 with ENT  - Continue Duoneb and Chest PT  - Trach further down-sized from #8 to #6 CFS Portex  - Tolerating continuous trach collar since 8/30  - PMV with speech

## 2020-09-19 NOTE — PROGRESS NOTE ADULT - PROBLEM SELECTOR PLAN 9
- Intermittent straight catherization required, currently Q6H  - However patient with large volumes and may need more frequent vs Umaña.  - Continue Cardura - Intermittent straight catherization required, currently Q6H  - However patient with large volumes and may need more frequent vs Umaña.  - Continue Cardura 8mg HS

## 2020-09-20 LAB — SARS-COV-2 RNA SPEC QL NAA+PROBE: SIGNIFICANT CHANGE UP

## 2020-09-20 PROCEDURE — 99232 SBSQ HOSP IP/OBS MODERATE 35: CPT

## 2020-09-20 RX ADMIN — Medication 8 MILLIGRAM(S): at 21:30

## 2020-09-20 RX ADMIN — HEPARIN SODIUM 5000 UNIT(S): 5000 INJECTION INTRAVENOUS; SUBCUTANEOUS at 13:44

## 2020-09-20 RX ADMIN — ISOSORBIDE DINITRATE 20 MILLIGRAM(S): 5 TABLET ORAL at 23:24

## 2020-09-20 RX ADMIN — Medication 50 MILLIGRAM(S): at 08:57

## 2020-09-20 RX ADMIN — LIDOCAINE 1 PATCH: 4 CREAM TOPICAL at 19:28

## 2020-09-20 RX ADMIN — Medication 1 MILLIGRAM(S): at 21:29

## 2020-09-20 RX ADMIN — Medication 1 DROP(S): at 23:24

## 2020-09-20 RX ADMIN — FENTANYL CITRATE 1 PATCH: 50 INJECTION INTRAVENOUS at 21:29

## 2020-09-20 RX ADMIN — QUETIAPINE FUMARATE 50 MILLIGRAM(S): 200 TABLET, FILM COATED ORAL at 13:45

## 2020-09-20 RX ADMIN — Medication 1 DROP(S): at 17:27

## 2020-09-20 RX ADMIN — Medication 1 TABLET(S): at 17:27

## 2020-09-20 RX ADMIN — Medication 100 MILLIGRAM(S): at 05:35

## 2020-09-20 RX ADMIN — Medication 100 MILLIGRAM(S): at 12:08

## 2020-09-20 RX ADMIN — ISOSORBIDE DINITRATE 20 MILLIGRAM(S): 5 TABLET ORAL at 17:27

## 2020-09-20 RX ADMIN — Medication 100 MILLIGRAM(S): at 21:28

## 2020-09-20 RX ADMIN — Medication 50 MILLIGRAM(S): at 19:27

## 2020-09-20 RX ADMIN — Medication 100 MILLIGRAM(S): at 08:44

## 2020-09-20 RX ADMIN — LIDOCAINE 1 PATCH: 4 CREAM TOPICAL at 12:09

## 2020-09-20 RX ADMIN — Medication 10 MILLIGRAM(S): at 21:29

## 2020-09-20 RX ADMIN — QUETIAPINE FUMARATE 75 MILLIGRAM(S): 200 TABLET, FILM COATED ORAL at 21:29

## 2020-09-20 RX ADMIN — ATORVASTATIN CALCIUM 80 MILLIGRAM(S): 80 TABLET, FILM COATED ORAL at 21:29

## 2020-09-20 RX ADMIN — HEPARIN SODIUM 5000 UNIT(S): 5000 INJECTION INTRAVENOUS; SUBCUTANEOUS at 21:30

## 2020-09-20 RX ADMIN — Medication 100 MILLIGRAM(S): at 13:44

## 2020-09-20 RX ADMIN — ISOSORBIDE DINITRATE 20 MILLIGRAM(S): 5 TABLET ORAL at 08:44

## 2020-09-20 RX ADMIN — CHLORHEXIDINE GLUCONATE 1 APPLICATION(S): 213 SOLUTION TOPICAL at 05:30

## 2020-09-20 RX ADMIN — Medication 81 MILLIGRAM(S): at 12:09

## 2020-09-20 RX ADMIN — Medication 1 DROP(S): at 12:09

## 2020-09-20 RX ADMIN — Medication 1 DROP(S): at 05:27

## 2020-09-20 RX ADMIN — HEPARIN SODIUM 5000 UNIT(S): 5000 INJECTION INTRAVENOUS; SUBCUTANEOUS at 05:30

## 2020-09-20 RX ADMIN — QUETIAPINE FUMARATE 50 MILLIGRAM(S): 200 TABLET, FILM COATED ORAL at 05:28

## 2020-09-20 RX ADMIN — FAMOTIDINE 20 MILLIGRAM(S): 10 INJECTION INTRAVENOUS at 12:09

## 2020-09-20 RX ADMIN — LIDOCAINE 1 PATCH: 4 CREAM TOPICAL at 21:30

## 2020-09-20 RX ADMIN — LIDOCAINE 1 PATCH: 4 CREAM TOPICAL at 23:21

## 2020-09-20 RX ADMIN — Medication 1 TABLET(S): at 05:30

## 2020-09-20 NOTE — PROGRESS NOTE ADULT - PROBLEM SELECTOR PLAN 9
- Intermittent straight catherization required, currently Q6H  - However patient with large volumes and may need more frequent vs Umaña.  - Continue Cardura 8mg HS

## 2020-09-20 NOTE — PROGRESS NOTE ADULT - ASSESSMENT
60-year-old male with a history of A-Fib on warfarin who presents with cardiac arrest at work with downtime of about 25 minutes prior to ROSC. S/p therapeutic hypothermia. 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 Now with resolved neurogenic/cardiogenic shock. Course complicated by GI bleed s/p PPI gtt, bleeding from scott s/p clot irrigation, prolonged respiratory failure s/p trach (8/24) and PEG (8/26). Currently with A-Fib with RVR and C diff colitis.  Also developed gross hematuria - Urology consulted +catheter with clot (irrigated and exchanged) suspected 2/2 traumatic catheterization. Additionally course complicated by fever and Pseudomonas PNA (s/p Rx) then developed C diff (now with resolved leukocytosis and improved stooling, now soft per report); remains on enteral Vanco    8/29: Received to [facility]  8/30: Less Agitated overnight, Afebrile, WBC decreasing, CR elevated but Stable, Tolerating TC all day yesterday, will Attempt TC ATC tonight and ABG in am, F/u Bcx from 8/30. Rectal tube dc'd.  Speech eval for AAC device and will F/u.  8/31: will attempt TC around the clock again tonight. Stopped amiodarone as per cards.  Will be cathed if cleared by ID and renal.  9/1: Elevated BP overnight, BP stable today, afebrile. Lethargic earlier today on exam; ABG Stable; Head CT done- no new ICH, ventricles enlarged ? Hydrocephalus, Spoke to Max from [facility] who will see- although felt no intervention required at this time. Tolerating TC ATC, will attempt cuffless trach tomorrow. C diff Iso discontinued as discussed w/ Inf control.  9/2: still with secretions, will downsize when improved. Will cath as outpatient. Decreased opioids  9/3: PICC dc'd, Trach downsized to #7 cuffless Portex. stomach distended  9/4: Occasionally Restless, afebrile. Tolerating cuffless trach, phonating w/ digital occlusion.  Spoke to speech for PMV re-eval soon than 9/6.  Elevated BP, Hydralazine increased.- will monitor.  9/5: trach dislodged, ENT changed to #8 cuffless Shiley. OOB to chair  9/6: OOB to chair today, gave dulcolax suppository   9/7: agitated while in chair today, gave Seroquel.  Producing thick, yellow secretions. Afebrile. Managing secretions with duoneb and hyper-sal 7%/Chest PT.  9/8: Had spurious leukocytosis. Afebrile, hemodynamically stable. Repeated cbc, sent Bcx.  Spiked temp of 102.5, cbc rechecked which was consistent with leukocytosis. PAN cultured. Noted urinary retention of 2600ml urine, abdomen distended with some tenderness on palpation.  F/U CT abd/pelvis/chest.  9/9: Patient appears well despite leukocytosis of 31. Sputum Cx growing gram neg rods. Remains on meropenem, added Vancoycin PO per ID for concern of recent Cdiff infection.  9/10: Afebrile over night. Leukocytosis decreasing. UCx growing pan-sensitive pseudomonas. Will continue meropenem and prophylactic vancomycin PO. Thick brown tinged sputum with suctioning, aggressive chest PT, nebs + hypersal.  9/11: Patient with episode of agitation and delirium early this morning. Seemingly did not respond to any anxiolytic or sedative meds given. Patient denies having any pain, states that he's "In the [place]". Thorough exam revealed IV access was not working. A new IV line was placed in RLE and 2mg IV ativan given which calmed the patient. Leukocytosis continues to improve on ABx. Noted to have -120s, EKG confirms AFIBB. Metoprolol increased.  9/12: Less Agitated today, improving CR and Leukocytosis on Cefepime; Still with Urinary retention and requiring prn straight cath- cardura increased.   9/13: No new events  9/14: Stable. Trach down-sized to #6 Cuffless Portex, obturator used. No complications, tolerated procedure well. Will complete 7-10 day course per Attending discretion.  9/15: Patient complaining of not being able to see, poor vision. States he sees shadows and unable ot identify objects with his left eye. Right remains with ptosis, fixed and dilated pupil. Hyponatremia noted, free water discontinued.  For PMV re-assessment tomorrow. Discharge planning in progress as trying to first obtain insurance.  9/16: called speech for re-eval of PMV. 1 episode of agitation. Gave Ativan 1mg ivp x1.  9/17: Tolerated PMV with speech  9/18: Patients agitation appears to be worse in the evening, [facility] requesting his agitation medication regimen be optimized prior to discharge. Will continue Seroquel 50 mg during the day/waking hours to prevent drowsiness and increase evening dose to 75mg as well as add prn ativan for agitation not treated by standing seroquel.  9/19: Patient somnolent during daytime but arousable, alert and appropriately responsive. Brother updated at bedside. Will decrease fentanyl patch given recent adjustments to seroquel.   60-year-old male with a history of A-Fib on warfarin who presents with cardiac arrest at work with downtime of about 25 minutes prior to ROSC. S/p therapeutic hypothermia. 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 Now with resolved neurogenic/cardiogenic shock. Course complicated by GI bleed s/p PPI gtt, bleeding from scott s/p clot irrigation, prolonged respiratory failure s/p trach (8/24) and PEG (8/26). Currently with A-Fib with RVR and C diff colitis.  Also developed gross hematuria - Urology consulted +catheter with clot (irrigated and exchanged) suspected 2/2 traumatic catheterization. Additionally course complicated by fever and Pseudomonas PNA (s/p Rx) then developed C diff (now with resolved leukocytosis and improved stooling, now soft per report); remains on enteral Vanco    8/29: Received to [facility]  8/30: Less Agitated overnight, Afebrile, WBC decreasing, CR elevated but Stable, Tolerating TC all day yesterday, will Attempt TC ATC tonight and ABG in am, F/u Bcx from 8/30. Rectal tube dc'd.  Speech eval for AAC device and will F/u.  8/31: will attempt TC around the clock again tonight. Stopped amiodarone as per cards.  Will be cathed if cleared by ID and renal.  9/1: Elevated BP overnight, BP stable today, afebrile. Lethargic earlier today on exam; ABG Stable; Head CT done- no new ICH, ventricles enlarged ? Hydrocephalus, Spoke to Max from [facility] who will see- although felt no intervention required at this time. Tolerating TC ATC, will attempt cuffless trach tomorrow. C diff Iso discontinued as discussed w/ Inf control.  9/2: still with secretions, will downsize when improved. Will cath as outpatient. Decreased opioids  9/3: PICC dc'd, Trach downsized to #7 cuffless Portex. stomach distended  9/4: Occasionally Restless, afebrile. Tolerating cuffless trach, phonating w/ digital occlusion.  Spoke to speech for PMV re-eval soon than 9/6.  Elevated BP, Hydralazine increased.- will monitor.  9/5: trach dislodged, ENT changed to #8 cuffless Shiley. OOB to chair  9/6: OOB to chair today, gave dulcolax suppository   9/7: agitated while in chair today, gave Seroquel.  Producing thick, yellow secretions. Afebrile. Managing secretions with duoneb and hyper-sal 7%/Chest PT.  9/8: Had spurious leukocytosis. Afebrile, hemodynamically stable. Repeated cbc, sent Bcx.  Spiked temp of 102.5, cbc rechecked which was consistent with leukocytosis. PAN cultured. Noted urinary retention of 2600ml urine, abdomen distended with some tenderness on palpation.  F/U CT abd/pelvis/chest.  9/9: Patient appears well despite leukocytosis of 31. Sputum Cx growing gram neg rods. Remains on meropenem, added Vancoycin PO per ID for concern of recent Cdiff infection.  9/10: Afebrile over night. Leukocytosis decreasing. UCx growing pan-sensitive pseudomonas. Will continue meropenem and prophylactic vancomycin PO. Thick brown tinged sputum with suctioning, aggressive chest PT, nebs + hypersal.  9/11: Patient with episode of agitation and delirium early this morning. Seemingly did not respond to any anxiolytic or sedative meds given. Patient denies having any pain, states that he's "In the [place]". Thorough exam revealed IV access was not working. A new IV line was placed in RLE and 2mg IV ativan given which calmed the patient. Leukocytosis continues to improve on ABx. Noted to have -120s, EKG confirms AFIBB. Metoprolol increased.  9/12: Less Agitated today, improving CR and Leukocytosis on Cefepime; Still with Urinary retention and requiring prn straight cath- cardura increased.   9/13: No new events  9/14: Stable. Trach down-sized to #6 Cuffless Portex, obturator used. No complications, tolerated procedure well. Will complete 7-10 day course per Attending discretion.  9/15: Patient complaining of not being able to see, poor vision. States he sees shadows and unable ot identify objects with his left eye. Right remains with ptosis, fixed and dilated pupil. Hyponatremia noted, free water discontinued.  For PMV re-assessment tomorrow. Discharge planning in progress as trying to first obtain insurance.  9/16: called speech for re-eval of PMV. 1 episode of agitation. Gave Ativan 1mg ivp x1.  9/17: Tolerated PMV with speech  9/18: Patients agitation appears to be worse in the evening, [facility] requesting his agitation medication regimen be optimized prior to discharge. Will continue Seroquel 50 mg during the day/waking hours to prevent drowsiness and increase evening dose to 75mg as well as add prn ativan for agitation not treated by standing seroquel.  9/19: Patient somnolent during daytime but arousable, alert and appropriately responsive. Brother updated at bedside. Will decrease fentanyl patch given recent adjustments to seroquel.  9/20: No new events.

## 2020-09-20 NOTE — PROGRESS NOTE ADULT - SUBJECTIVE AND OBJECTIVE BOX
Patient is a 60y old  Male who presents with a chief complaint of Arrest (18 Sep 2020 09:19)      Interval Events:    REVIEW OF SYSTEMS:  [ ] Positive  [ ] All other systems negative  [ ] Unable to assess ROS because ________    Vital Signs Last 24 Hrs  T(C): 36.7 (09-20-20 @ 05:00), Max: 36.7 (09-19-20 @ 21:12)  T(F): 98.1 (09-20-20 @ 05:00), Max: 98.1 (09-19-20 @ 21:12)  HR: 59 (09-20-20 @ 05:00) (57 - 76)  BP: 130/75 (09-20-20 @ 05:00) (115/71 - 136/87)  RR: 20 (09-20-20 @ 05:00) (12 - 20)  SpO2: 96% (09-20-20 @ 05:00) (96% - 100%)    PHYSICAL EXAM:  HEENT:   [ ]Tracheostomy:  [ ]Pupils equal  [ ]No oral lesions  [ ]Abnormal    SKIN  [ ]No Rash  [ ] Abnormal  [ ] pressure    CARDIAC  [ ]Regular  [ ]Abnormal    PULMONARY  [ ]Bilateral Clear Breath Sounds  [ ]Normal Excursion  [ ]Abnormal    GI  [ ]PEG      [ ] +BS		              [ ]Soft, nondistended, nontender	  [ ]Abnormal    MUSCULOSKELETAL                                   [ ]Bedbound                 [ ]Abnormal    [ ]Ambulatory/OOB to chair                           EXTREMITIES                                         [ ]Normal  [ ]Edema                           NEUROLOGIC  [ ] Normal, non focal  [ ] Focal findings:    PSYCHIATRIC  [ ]Alert and appropriate  [ ] Sedated	 [ ]Agitated    :  Uma[place]: [ ] Yes, if yes: Date of Placement:                   [  ] No    LINES: Central Lines [ ] Yes, if yes: Date of Placement                                     [  ] No    HOSPITAL MEDICATIONS:  MEDICATIONS  (STANDING):  amantadine Syrup 100 milliGRAM(s) Oral <User Schedule>  artificial  tears Solution 1 Drop(s) Both EYES every 6 hours  aspirin  chewable 81 milliGRAM(s) Enteral Tube daily  atorvastatin 80 milliGRAM(s) Oral at bedtime  bisacodyl Suppository 10 milliGRAM(s) Rectal at bedtime  chlorhexidine 4% Liquid 1 Application(s) Topical <User Schedule>  dextrose 5%. 1000 milliLiter(s) (50 mL/Hr) IV Continuous <Continuous>  dextrose 50% Injectable 12.5 Gram(s) IV Push once  dextrose 50% Injectable 25 Gram(s) IV Push once  dextrose 50% Injectable 25 Gram(s) IV Push once  doxazosin 8 milliGRAM(s) Oral at bedtime  famotidine    Tablet 20 milliGRAM(s) Oral daily  fentaNYL   Patch  50 MICROgram(s)/Hr 1 Patch Transdermal every 72 hours  heparin   Injectable 5000 Unit(s) SubCutaneous every 8 hours  hydrALAZINE 100 milliGRAM(s) Oral three times a day  isosorbide   dinitrate Tablet (ISORDIL) 20 milliGRAM(s) Enteral Tube <User Schedule>  lactobacillus acidophilus 1 Tablet(s) Oral two times a day  lidocaine   Patch 1 Patch Transdermal daily  lidocaine   Patch 1 Patch Transdermal every 24 hours  metoprolol tartrate 50 milliGRAM(s) Oral <User Schedule>  QUEtiapine 50 milliGRAM(s) Oral <User Schedule>  QUEtiapine 75 milliGRAM(s) Oral <User Schedule>    MEDICATIONS  (PRN):  dextrose 40% Gel 15 Gram(s) Oral once PRN Blood Glucose LESS THAN 70 milliGRAM(s)/deciliter  glucagon  Injectable 1 milliGRAM(s) IntraMuscular once PRN Glucose LESS THAN 70 milligrams/deciliter  LORazepam     Tablet 1 milliGRAM(s) Oral every 8 hours PRN Agitation  polyethylene glycol 3350 17 Gram(s) Oral daily PRN Constipation      LABS:                        10.6   8.14  )-----------( 378      ( 19 Sep 2020 07:11 )             34.3     09-19    132<L>  |  96  |  30<H>  ----------------------------<  96  5.1   |  22  |  1.28    Ca    10.2      19 Sep 2020 07:07  Phos  4.2     09-19  Mg     1.9     09-19              CAPILLARY BLOOD GLUCOSE    MICROBIOLOGY:     RADIOLOGY:  [ ] Reviewed and interpreted by me     Patient is a 60y old  Male who presents with a chief complaint of Arrest.......s/p  SAH, respiratory failure, s/p trach/PEG, post UTI treatment...      Interval Events: No events over night    REVIEW OF SYSTEMS:  [ ] Positive  [X] All other systems negative  [ ] Unable to assess ROS because ________    Vital Signs Last 24 Hrs  T(C): 36.7 (09-20-20 @ 05:00), Max: 36.7 (09-19-20 @ 21:12)  T(F): 98.1 (09-20-20 @ 05:00), Max: 98.1 (09-19-20 @ 21:12)  HR: 59 (09-20-20 @ 05:00) (57 - 76)  BP: 130/75 (09-20-20 @ 05:00) (115/71 - 136/87)  RR: 20 (09-20-20 @ 05:00) (12 - 20)  SpO2: 96% (09-20-20 @ 05:00) (96% - 100%)      PHYSICAL EXAM:  HEENT:   [X]Tracheostomy:  large stoma; #6 Cuffless Portex  [X] Right eye ptosis, Dilated fixed right pupil; Left PERRL  [ ]No oral lesions  [ ]Abnormal    SKIN  [X] No Rash  [ ] Abnormal  [ ] pressure    CARDIAC  [X] Regular  [ ] Abnormal    PULMONARY  [X] Bilateral Clear Breath Sounds  [ ] Normal Excursion  [ ] Abnormal    GI  [X] PEG      [X] +BS		              [X] Soft, nondistended, nontender	  [ ]Abnormal    MUSCULOSKELETAL                                   [ ] Bedbound                 [ ] Abnormal    [ X] OOB to chair with assistance    EXTREMITIES                                         [X] Normal  [ ]Edema                           NEUROLOGIC  [ ] Normal, non focal  [X] Focal findings: Plegia of LUE, paresis of LLE; Alert and responsive; able to follow commands and respond to questions appropriately sometimes    PSYCHIATRIC  [X] Alert  [ ] Sedated	 [ ]Agitated    :  Umaña: [ ] Yes, if yes: Date of Placement:                   [X] No    LINES: Central Lines [ ] Yes, if yes: Date of Placement                                     [  ] No          HOSPITAL MEDICATIONS:  MEDICATIONS  (STANDING):  amantadine Syrup 100 milliGRAM(s) Oral <User Schedule>  artificial  tears Solution 1 Drop(s) Both EYES every 6 hours  aspirin  chewable 81 milliGRAM(s) Enteral Tube daily  atorvastatin 80 milliGRAM(s) Oral at bedtime  bisacodyl Suppository 10 milliGRAM(s) Rectal at bedtime  chlorhexidine 4% Liquid 1 Application(s) Topical <User Schedule>  dextrose 5%. 1000 milliLiter(s) (50 mL/Hr) IV Continuous <Continuous>  dextrose 50% Injectable 12.5 Gram(s) IV Push once  dextrose 50% Injectable 25 Gram(s) IV Push once  dextrose 50% Injectable 25 Gram(s) IV Push once  doxazosin 8 milliGRAM(s) Oral at bedtime  famotidine    Tablet 20 milliGRAM(s) Oral daily  fentaNYL   Patch  50 MICROgram(s)/Hr 1 Patch Transdermal every 72 hours  heparin   Injectable 5000 Unit(s) SubCutaneous every 8 hours  hydrALAZINE 100 milliGRAM(s) Oral three times a day  isosorbide   dinitrate Tablet (ISORDIL) 20 milliGRAM(s) Enteral Tube <User Schedule>  lactobacillus acidophilus 1 Tablet(s) Oral two times a day  lidocaine   Patch 1 Patch Transdermal daily  lidocaine   Patch 1 Patch Transdermal every 24 hours  metoprolol tartrate 50 milliGRAM(s) Oral <User Schedule>  QUEtiapine 50 milliGRAM(s) Oral <User Schedule>  QUEtiapine 75 milliGRAM(s) Oral <User Schedule>    MEDICATIONS  (PRN):  dextrose 40% Gel 15 Gram(s) Oral once PRN Blood Glucose LESS THAN 70 milliGRAM(s)/deciliter  glucagon  Injectable 1 milliGRAM(s) IntraMuscular once PRN Glucose LESS THAN 70 milligrams/deciliter  LORazepam     Tablet 1 milliGRAM(s) Oral every 8 hours PRN Agitation  polyethylene glycol 3350 17 Gram(s) Oral daily PRN Constipation      LABS:                        10.6   8.14  )-----------( 378      ( 19 Sep 2020 07:11 )             34.3     09-19    132<L>  |  96  |  30<H>  ----------------------------<  96  5.1   |  22  |  1.28    Ca    10.2      19 Sep 2020 07:07  Phos  4.2     09-19  Mg     1.9     09-19              CAPILLARY BLOOD GLUCOSE    MICROBIOLOGY:     RADIOLOGY:  [ ] Reviewed and interpreted by me     Patient is a 60y old  Male who presents with a chief complaint of Arrest.......s/p  SAH, respiratory failure, s/p trach/PEG, post UTI treatment...      Interval Events: No events over night    REVIEW OF SYSTEMS:  [ ] Positive  [X] All other systems negative  [ ] Unable to assess ROS because ________    Vital Signs Last 24 Hrs  T(C): 36.7 (09-20-20 @ 05:00), Max: 36.7 (09-19-20 @ 21:12)  T(F): 98.1 (09-20-20 @ 05:00), Max: 98.1 (09-19-20 @ 21:12)  HR: 59 (09-20-20 @ 05:00) (57 - 76)  BP: 130/75 (09-20-20 @ 05:00) (115/71 - 136/87)  RR: 20 (09-20-20 @ 05:00) (12 - 20)  SpO2: 96% (09-20-20 @ 05:00) (96% - 100%)      PHYSICAL EXAM:  HEENT:   [X]Tracheostomy:  large stoma; #6 Cuffless Portex  [X] Right eye ptosis, Dilated fixed right pupil; Left PERRL  [ ]No oral lesions  [ ]Abnormal    SKIN  [X] No Rash  [ ] Abnormal  [ ] pressure    CARDIAC  [X] Regular  [ ] Abnormal    PULMONARY  [X] Bilateral Clear Breath Sounds  [ ] Normal Excursion  [ ] Abnormal    GI  [X] PEG      [X] +BS		              [X] Soft, nondistended, nontender	  [ ]Abnormal    MUSCULOSKELETAL                                   [ ] Bedbound                 [ ] Abnormal    [ X] OOB to chair with assistance    EXTREMITIES                                         [X] Normal  [ ]Edema                           NEUROLOGIC  [ ] Normal, non focal  [X] Focal findings: Plegia of LUE, paresis of LLE; Alert and responsive; able to follow commands and respond to questions appropriately sometimes    PSYCHIATRIC  [X] Alert  [ ] Sedated	 [ ]Agitated    :  Umaña: [ ] Yes, if yes: Date of Placement:                   [X] No    LINES: Central Lines [ ] Yes, if yes: Date of Placement                                     [X] No      HOSPITAL MEDICATIONS:  MEDICATIONS  (STANDING):  amantadine Syrup 100 milliGRAM(s) Oral <User Schedule>  artificial  tears Solution 1 Drop(s) Both EYES every 6 hours  aspirin  chewable 81 milliGRAM(s) Enteral Tube daily  atorvastatin 80 milliGRAM(s) Oral at bedtime  bisacodyl Suppository 10 milliGRAM(s) Rectal at bedtime  chlorhexidine 4% Liquid 1 Application(s) Topical <User Schedule>  dextrose 5%. 1000 milliLiter(s) (50 mL/Hr) IV Continuous <Continuous>  dextrose 50% Injectable 12.5 Gram(s) IV Push once  dextrose 50% Injectable 25 Gram(s) IV Push once  dextrose 50% Injectable 25 Gram(s) IV Push once  doxazosin 8 milliGRAM(s) Oral at bedtime  famotidine    Tablet 20 milliGRAM(s) Oral daily  fentaNYL   Patch  50 MICROgram(s)/Hr 1 Patch Transdermal every 72 hours  heparin   Injectable 5000 Unit(s) SubCutaneous every 8 hours  hydrALAZINE 100 milliGRAM(s) Oral three times a day  isosorbide   dinitrate Tablet (ISORDIL) 20 milliGRAM(s) Enteral Tube <User Schedule>  lactobacillus acidophilus 1 Tablet(s) Oral two times a day  lidocaine   Patch 1 Patch Transdermal daily  lidocaine   Patch 1 Patch Transdermal every 24 hours  metoprolol tartrate 50 milliGRAM(s) Oral <User Schedule>  QUEtiapine 50 milliGRAM(s) Oral <User Schedule>  QUEtiapine 75 milliGRAM(s) Oral <User Schedule>    MEDICATIONS  (PRN):  dextrose 40% Gel 15 Gram(s) Oral once PRN Blood Glucose LESS THAN 70 milliGRAM(s)/deciliter  glucagon  Injectable 1 milliGRAM(s) IntraMuscular once PRN Glucose LESS THAN 70 milligrams/deciliter  LORazepam     Tablet 1 milliGRAM(s) Oral every 8 hours PRN Agitation  polyethylene glycol 3350 17 Gram(s) Oral daily PRN Constipation      LABS:                    CAPILLARY BLOOD GLUCOSE    MICROBIOLOGY:     RADIOLOGY:  [ ] Reviewed and interpreted by me

## 2020-09-20 NOTE — PROGRESS NOTE ADULT - PROBLEM SELECTOR PLAN 6
- A-Fib with RVR:  - DC'd Amiodarone (8/31)  - Hold off on therapeutic a/c given SAH  - SBP goal 100- 160  -TTE: Global LV dysfunction. EF 41%, Severe concentric LVH, flattening of interventricular septum.   - Metoprolol 50 mg BID

## 2020-09-20 NOTE — PROGRESS NOTE ADULT - ATTENDING COMMENTS
as above.  multifactorial resp failure-s/p SAH, CHF--tolerating trach collar since 8/30. Trach downsized to cuffless Portex 6 on 9/14. Continue with airway clearance therapy. Phonating well with use of PSV all morning.   ID-Pt completed Cefepime through 9/15 for PSA UTI, with Vanco PO for CDiff ppx. Monitoring conservatively for now, no new clinical s/s acute infectious process.   CHF- new CHFrEF on this admission. Cardiology recs appreciated. Hx recent AFlutter/Afib in the setting of sepsis s/p amiodarone+BB therapy. Off amiodarone 2/2 bradycardia with no further noted arrhythmias. No A/C 2/2 recent SAH and GIB,   DVT ppx with HSQ. Full dose statin added to regimen today. c/w ASA+BB+Isordil+Hydralazine for medical mgmt. Will likely need O/P cardiac cath. Neuro-also with episode of hyperactive delirium overnight, responds well with prn Ativan. Will increase qhs Seroquel and c/w prn PO Ativan if indicated. RESOLVED.                                     Dispo planning in progress. Plan for acute rehab pending insurance coverage.  Rob Cabello MD-Pulmonary                       [phone] as above-no changes-moving left leg now!  multifactorial resp failure-s/p SAH, CHF--tolerating trach collar since 8/30. Trach downsized to cuffless Portex 6 on 9/14. Continue with airway clearance therapy. Phonating well with use of PSV all morning.   ID-Pt completed Cefepime through 9/15 for PSA UTI, with Vanco PO for CDiff ppx. Monitoring conservatively for now, no new clinical s/s acute infectious process.   CHF- new CHFrEF on this admission. Cardiology recs appreciated. Hx recent AFlutter/Afib in the setting of sepsis s/p amiodarone+BB therapy. Off amiodarone 2/2 bradycardia with no further noted arrhythmias. No A/C 2/2 recent SAH and GIB,   DVT ppx with HSQ. Full dose statin added to regimen today. c/w ASA+BB+Isordil+Hydralazine for medical mgmt. Will likely need O/P cardiac cath. Neuro-also with episode of hyperactive delirium overnight, responds well with prn Ativan. Will increase qhs Seroquel and c/w prn PO Ativan if indicated. RESOLVED.                                     Dispo planning in progress. Plan for acute rehab pending insurance coverage.  Rob Cabelol MD-Pulmonary                       [phone]

## 2020-09-20 NOTE — PROGRESS NOTE ADULT - PROBLEM SELECTOR PLAN 3
- fentanyl patch 75mcg  reduced to 50mg on 9/19 in attempt to reduce daytime somnolence.  - Continue Seroquel 50 mg during the day; evening dose increased to 75 mg   - Will add PRN Ativan via PEG for agitation not treated by standing seroquel

## 2020-09-20 NOTE — PROGRESS NOTE ADULT - SUBJECTIVE AND OBJECTIVE BOX
Subjective: Patient seen and examined. No new events except as noted.     REVIEW OF SYSTEMS:  Unable to obtain       MEDICATIONS:  MEDICATIONS  (STANDING):  amantadine Syrup 100 milliGRAM(s) Oral <User Schedule>  artificial  tears Solution 1 Drop(s) Both EYES every 6 hours  aspirin  chewable 81 milliGRAM(s) Enteral Tube daily  atorvastatin 80 milliGRAM(s) Oral at bedtime  bisacodyl Suppository 10 milliGRAM(s) Rectal at bedtime  chlorhexidine 4% Liquid 1 Application(s) Topical <User Schedule>  dextrose 5%. 1000 milliLiter(s) (50 mL/Hr) IV Continuous <Continuous>  dextrose 50% Injectable 12.5 Gram(s) IV Push once  dextrose 50% Injectable 25 Gram(s) IV Push once  dextrose 50% Injectable 25 Gram(s) IV Push once  doxazosin 8 milliGRAM(s) Oral at bedtime  famotidine    Tablet 20 milliGRAM(s) Oral daily  fentaNYL   Patch  50 MICROgram(s)/Hr 1 Patch Transdermal every 72 hours  heparin   Injectable 5000 Unit(s) SubCutaneous every 8 hours  hydrALAZINE 100 milliGRAM(s) Oral three times a day  isosorbide   dinitrate Tablet (ISORDIL) 20 milliGRAM(s) Enteral Tube <User Schedule>  lactobacillus acidophilus 1 Tablet(s) Oral two times a day  lidocaine   Patch 1 Patch Transdermal every 24 hours  lidocaine   Patch 1 Patch Transdermal daily  metoprolol tartrate 50 milliGRAM(s) Oral <User Schedule>  QUEtiapine 50 milliGRAM(s) Oral <User Schedule>  QUEtiapine 75 milliGRAM(s) Oral <User Schedule>      PHYSICAL EXAM:  T(C): 36.5 (09-20-20 @ 13:42), Max: 36.7 (09-20-20 @ 05:00)  HR: 62 (09-20-20 @ 20:31) (59 - 71)  BP: 124/81 (09-20-20 @ 19:24) (115/72 - 144/80)  RR: 20 (09-20-20 @ 20:31) (20 - 22)  SpO2: 100% (09-20-20 @ 20:31) (96% - 100%)  Wt(kg): --  I&O's Summary    19 Sep 2020 07:01  -  20 Sep 2020 07:00  --------------------------------------------------------  IN: 840 mL / OUT: 1550 mL / NET: -710 mL    20 Sep 2020 07:01  -  20 Sep 2020 21:16  --------------------------------------------------------  IN: 1115 mL / OUT: 1250 mL / NET: -135 mL          Appearance: sleepy , +trach   HEENT:   Dry  oral mucosa,  Lymphatic: No lymphadenopathy  Cardiovascular: Normal S1 S2, No JVD, No murmurs, No edema  Respiratory: Ventilated   Psychiatry: A & O x 0  Gastrointestinal:  Soft, Non-tender, +PEG   Skin: No rashes, No ecchymoses, No cyanosis	  Mental status- No acute distress, EO to stim  -CN- Pupils R 4mm NR, L 2mm sluggish, EOMI, tongue midline, face symmetric  +cough/gag  C briskly, two fingers/thumbs up on RUE, distally AG, wiggles toes on RLE    LABS:    CARDIAC MARKERS:                                10.6   8.14  )-----------( 378      ( 19 Sep 2020 07:11 )             34.3     09-19    132<L>  |  96  |  30<H>  ----------------------------<  96  5.1   |  22  |  1.28    Ca    10.2      19 Sep 2020 07:07  Phos  4.2     09-19  Mg     1.9     09-19      proBNP:   Lipid Profile:   HgA1c:   TSH:             TELEMETRY: 	    ECG:  	  RADIOLOGY:   DIAGNOSTIC TESTING:  [ ] Echocardiogram:  [ ]  Catheterization:  [ ] Stress Test:    OTHER:

## 2020-09-21 PROCEDURE — 99233 SBSQ HOSP IP/OBS HIGH 50: CPT

## 2020-09-21 RX ADMIN — CHLORHEXIDINE GLUCONATE 1 APPLICATION(S): 213 SOLUTION TOPICAL at 05:02

## 2020-09-21 RX ADMIN — QUETIAPINE FUMARATE 50 MILLIGRAM(S): 200 TABLET, FILM COATED ORAL at 13:23

## 2020-09-21 RX ADMIN — Medication 1 DROP(S): at 05:01

## 2020-09-21 RX ADMIN — Medication 1 TABLET(S): at 05:01

## 2020-09-21 RX ADMIN — ISOSORBIDE DINITRATE 20 MILLIGRAM(S): 5 TABLET ORAL at 23:27

## 2020-09-21 RX ADMIN — LIDOCAINE 1 PATCH: 4 CREAM TOPICAL at 23:23

## 2020-09-21 RX ADMIN — LIDOCAINE 1 PATCH: 4 CREAM TOPICAL at 19:35

## 2020-09-21 RX ADMIN — LIDOCAINE 1 PATCH: 4 CREAM TOPICAL at 23:22

## 2020-09-21 RX ADMIN — Medication 100 MILLIGRAM(S): at 11:52

## 2020-09-21 RX ADMIN — Medication 1 DROP(S): at 17:13

## 2020-09-21 RX ADMIN — Medication 81 MILLIGRAM(S): at 11:54

## 2020-09-21 RX ADMIN — Medication 8 MILLIGRAM(S): at 21:59

## 2020-09-21 RX ADMIN — ATORVASTATIN CALCIUM 80 MILLIGRAM(S): 80 TABLET, FILM COATED ORAL at 21:59

## 2020-09-21 RX ADMIN — Medication 1 MILLIGRAM(S): at 19:25

## 2020-09-21 RX ADMIN — QUETIAPINE FUMARATE 75 MILLIGRAM(S): 200 TABLET, FILM COATED ORAL at 21:59

## 2020-09-21 RX ADMIN — HEPARIN SODIUM 5000 UNIT(S): 5000 INJECTION INTRAVENOUS; SUBCUTANEOUS at 21:59

## 2020-09-21 RX ADMIN — Medication 1 TABLET(S): at 17:13

## 2020-09-21 RX ADMIN — Medication 100 MILLIGRAM(S): at 05:01

## 2020-09-21 RX ADMIN — HEPARIN SODIUM 5000 UNIT(S): 5000 INJECTION INTRAVENOUS; SUBCUTANEOUS at 05:01

## 2020-09-21 RX ADMIN — FENTANYL CITRATE 1 PATCH: 50 INJECTION INTRAVENOUS at 19:33

## 2020-09-21 RX ADMIN — HEPARIN SODIUM 5000 UNIT(S): 5000 INJECTION INTRAVENOUS; SUBCUTANEOUS at 13:23

## 2020-09-21 RX ADMIN — ISOSORBIDE DINITRATE 20 MILLIGRAM(S): 5 TABLET ORAL at 08:58

## 2020-09-21 RX ADMIN — FAMOTIDINE 20 MILLIGRAM(S): 10 INJECTION INTRAVENOUS at 11:52

## 2020-09-21 RX ADMIN — Medication 10 MILLIGRAM(S): at 23:23

## 2020-09-21 RX ADMIN — Medication 50 MILLIGRAM(S): at 09:01

## 2020-09-21 RX ADMIN — LIDOCAINE 1 PATCH: 4 CREAM TOPICAL at 11:54

## 2020-09-21 RX ADMIN — QUETIAPINE FUMARATE 50 MILLIGRAM(S): 200 TABLET, FILM COATED ORAL at 04:49

## 2020-09-21 RX ADMIN — Medication 1 DROP(S): at 11:54

## 2020-09-21 RX ADMIN — POLYETHYLENE GLYCOL 3350 17 GRAM(S): 17 POWDER, FOR SOLUTION ORAL at 23:25

## 2020-09-21 RX ADMIN — ISOSORBIDE DINITRATE 20 MILLIGRAM(S): 5 TABLET ORAL at 17:13

## 2020-09-21 RX ADMIN — Medication 100 MILLIGRAM(S): at 08:58

## 2020-09-21 RX ADMIN — Medication 1 DROP(S): at 23:25

## 2020-09-21 RX ADMIN — Medication 50 MILLIGRAM(S): at 20:56

## 2020-09-21 RX ADMIN — Medication 100 MILLIGRAM(S): at 21:59

## 2020-09-21 RX ADMIN — Medication 100 MILLIGRAM(S): at 13:23

## 2020-09-21 NOTE — PROGRESS NOTE ADULT - ASSESSMENT
60-year-old male with a history of A-Fib on warfarin who presents with cardiac arrest at work with downtime of about 25 minutes prior to ROSC. S/p therapeutic hypothermia. 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 Now with resolved neurogenic/cardiogenic shock. Course complicated by GI bleed s/p PPI gtt, bleeding from scott s/p clot irrigation, prolonged respiratory failure s/p trach (8/24) and PEG (8/26). Currently with A-Fib with RVR and C diff colitis.  Also developed gross hematuria - Urology consulted +catheter with clot (irrigated and exchanged) suspected 2/2 traumatic catheterization. Additionally course complicated by fever and Pseudomonas PNA (s/p Rx) then developed C diff (now with resolved leukocytosis and improved stooling, now soft per report); remains on enteral Vanco    8/29: Received to [facility]  8/30: Less Agitated overnight, Afebrile, WBC decreasing, CR elevated but Stable, Tolerating TC all day yesterday, will Attempt TC ATC tonight and ABG in am, F/u Bcx from 8/30. Rectal tube dc'd.  Speech eval for AAC device and will F/u.  8/31: will attempt TC around the clock again tonight. Stopped amiodarone as per cards.  Will be cathed if cleared by ID and renal.  9/1: Elevated BP overnight, BP stable today, afebrile. Lethargic earlier today on exam; ABG Stable; Head CT done- no new ICH, ventricles enlarged ? Hydrocephalus, Spoke to Max from [facility] who will see- although felt no intervention required at this time. Tolerating TC ATC, will attempt cuffless trach tomorrow. C diff Iso discontinued as discussed w/ Inf control.  9/2: still with secretions, will downsize when improved. Will cath as outpatient. Decreased opioids  9/3: PICC dc'd, Trach downsized to #7 cuffless Portex. stomach distended  9/4: Occasionally Restless, afebrile. Tolerating cuffless trach, phonating w/ digital occlusion.  Spoke to speech for PMV re-eval soon than 9/6.  Elevated BP, Hydralazine increased.- will monitor.  9/5: trach dislodged, ENT changed to #8 cuffless Shiley. OOB to chair  9/6: OOB to chair today, gave dulcolax suppository   9/7: agitated while in chair today, gave Seroquel.  Producing thick, yellow secretions. Afebrile. Managing secretions with duoneb and hyper-sal 7%/Chest PT.  9/8: Had spurious leukocytosis. Afebrile, hemodynamically stable. Repeated cbc, sent Bcx.  Spiked temp of 102.5, cbc rechecked which was consistent with leukocytosis. PAN cultured. Noted urinary retention of 2600ml urine, abdomen distended with some tenderness on palpation.  F/U CT abd/pelvis/chest.  9/9: Patient appears well despite leukocytosis of 31. Sputum Cx growing gram neg rods. Remains on meropenem, added Vancoycin PO per ID for concern of recent Cdiff infection.  9/10: Afebrile over night. Leukocytosis decreasing. UCx growing pan-sensitive pseudomonas. Will continue meropenem and prophylactic vancomycin PO. Thick brown tinged sputum with suctioning, aggressive chest PT, nebs + hypersal.  9/11: Patient with episode of agitation and delirium early this morning. Seemingly did not respond to any anxiolytic or sedative meds given. Patient denies having any pain, states that he's "In the [place]". Thorough exam revealed IV access was not working. A new IV line was placed in RLE and 2mg IV ativan given which calmed the patient. Leukocytosis continues to improve on ABx. Noted to have -120s, EKG confirms AFIBB. Metoprolol increased.  9/12: Less Agitated today, improving CR and Leukocytosis on Cefepime; Still with Urinary retention and requiring prn straight cath- cardura increased.   9/13: No new events  9/14: Stable. Trach down-sized to #6 Cuffless Portex, obturator used. No complications, tolerated procedure well. Will complete 7-10 day course per Attending discretion.  9/15: Patient complaining of not being able to see, poor vision. States he sees shadows and unable ot identify objects with his left eye. Right remains with ptosis, fixed and dilated pupil. Hyponatremia noted, free water discontinued.  For PMV re-assessment tomorrow. Discharge planning in progress as trying to first obtain insurance.  9/16: called speech for re-eval of PMV. 1 episode of agitation. Gave Ativan 1mg ivp x1.  9/17: Tolerated PMV with speech  9/18: Patients agitation appears to be worse in the evening, [facility] requesting his agitation medication regimen be optimized prior to discharge. Will continue Seroquel 50 mg during the day/waking hours to prevent drowsiness and increase evening dose to 75mg as well as add prn ativan for agitation not treated by standing seroquel.  9/19: Patient somnolent during daytime but arousable, alert and appropriately responsive. Brother updated at bedside. Will decrease fentanyl patch given recent adjustments to seroquel.  9/20: No new events.   60-year-old male with a history of A-Fib on warfarin who presents with cardiac arrest at work with downtime of about 25 minutes prior to ROSC. S/p therapeutic hypothermia. 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 Now with resolved neurogenic/cardiogenic shock. Course complicated by GI bleed s/p PPI gtt, bleeding from scott s/p clot irrigation, prolonged respiratory failure s/p trach (8/24) and PEG (8/26). Currently with A-Fib with RVR and C diff colitis.  Also developed gross hematuria - Urology consulted +catheter with clot (irrigated and exchanged) suspected 2/2 traumatic catheterization. Additionally course complicated by fever and Pseudomonas PNA (s/p Rx) then developed C diff (now with resolved leukocytosis and improved stooling, now soft per report); remains on enteral Vanco    8/29: Received to [facility]  8/30: Less Agitated overnight, Afebrile, WBC decreasing, CR elevated but Stable, Tolerating TC all day yesterday, will Attempt TC ATC tonight and ABG in am, F/u Bcx from 8/30. Rectal tube dc'd.  Speech eval for AAC device and will F/u.  8/31: will attempt TC around the clock again tonight. Stopped amiodarone as per cards.  Will be cathed if cleared by ID and renal.  9/1: Elevated BP overnight, BP stable today, afebrile. Lethargic earlier today on exam; ABG Stable; Head CT done- no new ICH, ventricles enlarged ? Hydrocephalus, Spoke to Max from [facility] who will see- although felt no intervention required at this time. Tolerating TC ATC, will attempt cuffless trach tomorrow. C diff Iso discontinued as discussed w/ Inf control.  9/2: still with secretions, will downsize when improved. Will cath as outpatient. Decreased opioids  9/3: PICC dc'd, Trach downsized to #7 cuffless Portex. stomach distended  9/4: Occasionally Restless, afebrile. Tolerating cuffless trach, phonating w/ digital occlusion.  Spoke to speech for PMV re-eval soon than 9/6.  Elevated BP, Hydralazine increased.- will monitor.  9/5: trach dislodged, ENT changed to #8 cuffless Shiley. OOB to chair  9/6: OOB to chair today, gave dulcolax suppository   9/7: agitated while in chair today, gave Seroquel.  Producing thick, yellow secretions. Afebrile. Managing secretions with duoneb and hyper-sal 7%/Chest PT.  9/8: Had spurious leukocytosis. Afebrile, hemodynamically stable. Repeated cbc, sent Bcx.  Spiked temp of 102.5, cbc rechecked which was consistent with leukocytosis. PAN cultured. Noted urinary retention of 2600ml urine, abdomen distended with some tenderness on palpation.  F/U CT abd/pelvis/chest.  9/9: Patient appears well despite leukocytosis of 31. Sputum Cx growing gram neg rods. Remains on meropenem, added Vancoycin PO per ID for concern of recent Cdiff infection.  9/10: Afebrile over night. Leukocytosis decreasing. UCx growing pan-sensitive pseudomonas. Will continue meropenem and prophylactic vancomycin PO. Thick brown tinged sputum with suctioning, aggressive chest PT, nebs + hypersal.  9/11: Patient with episode of agitation and delirium early this morning. Seemingly did not respond to any anxiolytic or sedative meds given. Patient denies having any pain, states that he's "In the [place]". Thorough exam revealed IV access was not working. A new IV line was placed in RLE and 2mg IV ativan given which calmed the patient. Leukocytosis continues to improve on ABx. Noted to have -120s, EKG confirms AFIBB. Metoprolol increased.  9/12: Less Agitated today, improving CR and Leukocytosis on Cefepime; Still with Urinary retention and requiring prn straight cath- cardura increased.   9/13: No new events  9/14: Stable. Trach down-sized to #6 Cuffless Portex, obturator used. No complications, tolerated procedure well. Will complete 7-10 day course per Attending discretion.  9/15: Patient complaining of not being able to see, poor vision. States he sees shadows and unable ot identify objects with his left eye. Right remains with ptosis, fixed and dilated pupil. Hyponatremia noted, free water discontinued.  For PMV re-assessment tomorrow. Discharge planning in progress as trying to first obtain insurance.  9/16: called speech for re-eval of PMV. 1 episode of agitation. Gave Ativan 1mg ivp x1.  9/17: Tolerated PMV with speech  9/18: Patients agitation appears to be worse in the evening, [facility] requesting his agitation medication regimen be optimized prior to discharge. Will continue Seroquel 50 mg during the day/waking hours to prevent drowsiness and increase evening dose to 75mg as well as add prn ativan for agitation not treated by standing seroquel.  9/19: Patient somnolent during daytime but arousable, alert and appropriately responsive. Brother updated at bedside. Will decrease fentanyl patch given recent adjustments to seroquel.  9/20: No new events.  9/21: No overnight events; Speech eval for swallowing re-assessment pending: DCP pending Insurance issues

## 2020-09-21 NOTE — PROGRESS NOTE ADULT - ATTENDING COMMENTS
Pt is a 59 yo M with h/o A-Fib, s/p cardiac arrest at work with downtime of about 25 minutes. Upon hospital work up pt found to have R perimesencephalic SAH of unclear etiology with cerebral angiogram on 8/10 negative for aneurysm. s/p therapeutic hypothermia. Pt with prolonged and complicated hospitalization s/p neurogenic/cardiogenic shock, GIB, bleeding from [place] s/p clot irrigation, prolonged respiratory failure s/p trach (8/24) and PEG (8/26), Pseudomonas PNA and C.diff colitis.    Resp: O2 via TC  CVS: Cont current antiHTN meds/ Eventual Cardio work up  Heme: Cont Asa and DVT prophylaxis with sq Heparin  FEN: Speech/Swallow evaluation for po diet/ Enteral feeds via PEG  Neuro: Cont Amantadine/ Fentanyl patch decreased, cont Seroquel and would perfer to minimize Benzos  Aggressive PT/OT

## 2020-09-21 NOTE — PROGRESS NOTE ADULT - SUBJECTIVE AND OBJECTIVE BOX
Patient tolerating therapies- Mod-Max Assist with Bed mobility and Max Assist with Transfers  Complaining of inability to open right eye.   No CP, no SOB, other ROS neg    MEDICATIONS  (STANDING):  amantadine Syrup 100 milliGRAM(s) Oral <User Schedule>  artificial  tears Solution 1 Drop(s) Both EYES every 6 hours  aspirin  chewable 81 milliGRAM(s) Enteral Tube daily  atorvastatin 80 milliGRAM(s) Oral at bedtime  bisacodyl Suppository 10 milliGRAM(s) Rectal at bedtime  chlorhexidine 4% Liquid 1 Application(s) Topical <User Schedule>  dextrose 5%. 1000 milliLiter(s) (50 mL/Hr) IV Continuous <Continuous>  dextrose 50% Injectable 12.5 Gram(s) IV Push once  dextrose 50% Injectable 25 Gram(s) IV Push once  dextrose 50% Injectable 25 Gram(s) IV Push once  doxazosin 8 milliGRAM(s) Oral at bedtime  famotidine    Tablet 20 milliGRAM(s) Oral daily  fentaNYL   Patch  50 MICROgram(s)/Hr 1 Patch Transdermal every 72 hours  heparin   Injectable 5000 Unit(s) SubCutaneous every 8 hours  hydrALAZINE 100 milliGRAM(s) Oral three times a day  isosorbide   dinitrate Tablet (ISORDIL) 20 milliGRAM(s) Enteral Tube <User Schedule>  lactobacillus acidophilus 1 Tablet(s) Oral two times a day  lidocaine   Patch 1 Patch Transdermal every 24 hours  lidocaine   Patch 1 Patch Transdermal daily  metoprolol tartrate 50 milliGRAM(s) Oral <User Schedule>  QUEtiapine 50 milliGRAM(s) Oral <User Schedule>  QUEtiapine 75 milliGRAM(s) Oral <User Schedule>    MEDICATIONS  (PRN):  dextrose 40% Gel 15 Gram(s) Oral once PRN Blood Glucose LESS THAN 70 milliGRAM(s)/deciliter  glucagon  Injectable 1 milliGRAM(s) IntraMuscular once PRN Glucose LESS THAN 70 milligrams/deciliter  LORazepam     Tablet 1 milliGRAM(s) Oral every 8 hours PRN Agitation  polyethylene glycol 3350 17 Gram(s) Oral daily PRN Constipation    Vital Signs Last 24 Hrs  T(C): 36.7 (21 Sep 2020 13:49), Max: 36.8 (20 Sep 2020 21:40)  T(F): 98 (21 Sep 2020 13:49), Max: 98.2 (20 Sep 2020 21:40)  HR: 63 (21 Sep 2020 13:49) (52 - 71)  BP: 134/74 (21 Sep 2020 13:49) (124/81 - 160/64)  RR: 18 (21 Sep 2020 13:49) (18 - 21)  SpO2: 98% (21 Sep 2020 13:49) (97% - 100%)    PHYSICAL EXAM  Constitutional - NAD  HEENT - NCAT, EOMI  Neck - Supple, No limited ROM, Tracheostomy in place  Abdomen - Soft, NTND, PEG tube in place  Extremities - No C/C/E, No calf tenderness   Neurologic Exam -                    Cognitive - Awake, Alert, AAO to self, place, situation     Communication - Able to speak with hoarse voice tracheostomy capped     Cranial Nerves - Right pupil: 4mm, irregular, sluggish, Left pupil 2mm, reactive, Right eyelid droop noted, Left facial droop noted     Motor -   LUE Strength 0/5, ROM limited by weakness  LLE Strength 3/5 proximally, 1/5 distally, ROM limited by weakness  RUE Strength 5/5, ROM WFL  RLE Strength 5/5, ROM WFL  Sensory - Intact to LT     Reflexes - Babinski downgoing on right, mute on Left DTR Intact, Dent's positive on the left  Psychiatric - Slightly agitated, Affect WNL    Labs    9/19/20 Hgb 10.6, Sodium 132, BUN 30    Impression:  60 year old Male with PMH of A-Fib on warfarin with functional deficits secondary to diagnosis of cardiac arrest at work with downtime of about 25 minutes prior to ROSC, found to have R perimesencephalic SAH of unclear etiology with cerebral angiogram on 8/10 negative for aneurysm. Now with resolved neurogenic/cardiogenic shock. Course complicated by GI bleed s/p PPI gtt, bleeding from [place] s/p clot irrigation, prolonged respiratory failure s/p trach (8/24) and PEG (8/26), and C.diff colitis    Plan:  PT- ROM, Bed Mob, Transfers, Amb w AD and bracing as needed  OT- ADLs, bracing  SLP- Dysphagia eval and treat  Prec- Falls, Cardiac, Pulm  DVT Prophylaxis- On Heparin  Skin- Turn q2 h  Dispo- Acute TBI Rehab when medically stable

## 2020-09-21 NOTE — PROGRESS NOTE ADULT - SUBJECTIVE AND OBJECTIVE BOX
Subjective: Patient seen and examined. No new events except as noted.     REVIEW OF SYSTEMS:  Unable to obtain         MEDICATIONS:  MEDICATIONS  (STANDING):  amantadine Syrup 100 milliGRAM(s) Oral <User Schedule>  artificial  tears Solution 1 Drop(s) Both EYES every 6 hours  aspirin  chewable 81 milliGRAM(s) Enteral Tube daily  atorvastatin 80 milliGRAM(s) Oral at bedtime  bisacodyl Suppository 10 milliGRAM(s) Rectal at bedtime  chlorhexidine 4% Liquid 1 Application(s) Topical <User Schedule>  dextrose 5%. 1000 milliLiter(s) (50 mL/Hr) IV Continuous <Continuous>  dextrose 50% Injectable 12.5 Gram(s) IV Push once  dextrose 50% Injectable 25 Gram(s) IV Push once  dextrose 50% Injectable 25 Gram(s) IV Push once  doxazosin 8 milliGRAM(s) Oral at bedtime  famotidine    Tablet 20 milliGRAM(s) Oral daily  fentaNYL   Patch  50 MICROgram(s)/Hr 1 Patch Transdermal every 72 hours  heparin   Injectable 5000 Unit(s) SubCutaneous every 8 hours  hydrALAZINE 100 milliGRAM(s) Oral three times a day  isosorbide   dinitrate Tablet (ISORDIL) 20 milliGRAM(s) Enteral Tube <User Schedule>  lactobacillus acidophilus 1 Tablet(s) Oral two times a day  lidocaine   Patch 1 Patch Transdermal every 24 hours  lidocaine   Patch 1 Patch Transdermal daily  metoprolol tartrate 50 milliGRAM(s) Oral <User Schedule>  QUEtiapine 75 milliGRAM(s) Oral <User Schedule>  QUEtiapine 50 milliGRAM(s) Oral <User Schedule>      PHYSICAL EXAM:  T(C): 36.7 (09-21-20 @ 04:07), Max: 36.8 (09-20-20 @ 21:40)  HR: 60 (09-21-20 @ 08:41) (52 - 71)  BP: 160/64 (09-21-20 @ 04:07) (124/81 - 160/64)  RR: 20 (09-21-20 @ 08:41) (18 - 21)  SpO2: 99% (09-21-20 @ 08:41) (97% - 100%)  Wt(kg): --  I&O's Summary    20 Sep 2020 07:01  -  21 Sep 2020 07:00  --------------------------------------------------------  IN: 2115 mL / OUT: 1850 mL / NET: 265 mL            Appearance: sleepy , +trach   HEENT:   Dry  oral mucosa,  Lymphatic: No lymphadenopathy  Cardiovascular: Normal S1 S2, No JVD, No murmurs, No edema  Respiratory: Ventilated   Psychiatry: A & O x 0  Gastrointestinal:  Soft, Non-tender, +PEG   Skin: No rashes, No ecchymoses, No cyanosis	  Mental status- No acute distress, EO to stim  -CN- Pupils R 4mm NR, L 2mm sluggish, EOMI, tongue midline, face symmetric  +cough/gag  C briskly, two fingers/thumbs up on RUE, distally AG, wiggles toes on RLE      LABS:    CARDIAC MARKERS:                  proBNP:   Lipid Profile:   HgA1c:   TSH:             TELEMETRY: 	    ECG:  	  RADIOLOGY:   DIAGNOSTIC TESTING:  [ ] Echocardiogram:  [ ]  Catheterization:  [ ] Stress Test:    OTHER:

## 2020-09-21 NOTE — PROGRESS NOTE ADULT - SUBJECTIVE AND OBJECTIVE BOX
Patient is a 60y old  Male who presents with a chief complaint of Arrest (18 Sep 2020 09:19)      Interval Events:    REVIEW OF SYSTEMS:  [ ] Positive  [ ] All other systems negative  [ ] Unable to assess ROS because ________    Vital Signs Last 24 Hrs  T(C): 36.7 (09-21-20 @ 04:07), Max: 36.8 (09-20-20 @ 21:40)  T(F): 98.1 (09-21-20 @ 04:07), Max: 98.2 (09-20-20 @ 21:40)  HR: 66 (09-21-20 @ 04:25) (52 - 71)  BP: 160/64 (09-21-20 @ 04:07) (116/72 - 160/64)  RR: 20 (09-21-20 @ 04:25) (18 - 22)  SpO2: 98% (09-21-20 @ 04:25) (97% - 100%)PHYSICAL EXAM:  HEENT:   [ ]Tracheostomy:  [ ]Pupils equal  [ ]No oral lesions  [ ]Abnormal        SKIN  [ ]No Rash  [ ] Abnormal  [ ] pressure    CARDIAC  [ ]Regular  [ ]Abnormal    PULMONARY  [ ]Bilateral Clear Breath Sounds  [ ]Normal Excursion  [ ]Abnormal    GI  [ ]PEG      [ ] +BS		              [ ]Soft, nondistended, nontender	  [ ]Abnormal    MUSCULOSKELETAL                                   [ ]Bedbound                 [ ]Abnormal    [ ]Ambulatory/OOB to chair                           EXTREMITIES                                         [ ]Normal  [ ]Edema                           NEUROLOGIC  [ ] Normal, non focal  [ ] Focal findings:    PSYCHIATRIC  [ ]Alert and appropriate  [ ] Sedated	 [ ]Agitated    :  Umaña: [ ] Yes, if yes: Date of Placement:                   [  ] No    LINES: Central Lines [ ] Yes, if yes: Date of Placement                                     [  ] No    HOSPITAL MEDICATIONS:  MEDICATIONS  (STANDING):  amantadine Syrup 100 milliGRAM(s) Oral <User Schedule>  artificial  tears Solution 1 Drop(s) Both EYES every 6 hours  aspirin  chewable 81 milliGRAM(s) Enteral Tube daily  atorvastatin 80 milliGRAM(s) Oral at bedtime  bisacodyl Suppository 10 milliGRAM(s) Rectal at bedtime  chlorhexidine 4% Liquid 1 Application(s) Topical <User Schedule>  dextrose 5%. 1000 milliLiter(s) (50 mL/Hr) IV Continuous <Continuous>  dextrose 50% Injectable 12.5 Gram(s) IV Push once  dextrose 50% Injectable 25 Gram(s) IV Push once  dextrose 50% Injectable 25 Gram(s) IV Push once  doxazosin 8 milliGRAM(s) Oral at bedtime  famotidine    Tablet 20 milliGRAM(s) Oral daily  fentaNYL   Patch  50 MICROgram(s)/Hr 1 Patch Transdermal every 72 hours  heparin   Injectable 5000 Unit(s) SubCutaneous every 8 hours  hydrALAZINE 100 milliGRAM(s) Oral three times a day  isosorbide   dinitrate Tablet (ISORDIL) 20 milliGRAM(s) Enteral Tube <User Schedule>  lactobacillus acidophilus 1 Tablet(s) Oral two times a day  lidocaine   Patch 1 Patch Transdermal every 24 hours  lidocaine   Patch 1 Patch Transdermal daily  metoprolol tartrate 50 milliGRAM(s) Oral <User Schedule>  QUEtiapine 75 milliGRAM(s) Oral <User Schedule>  QUEtiapine 50 milliGRAM(s) Oral <User Schedule>    MEDICATIONS  (PRN):  dextrose 40% Gel 15 Gram(s) Oral once PRN Blood Glucose LESS THAN 70 milliGRAM(s)/deciliter  glucagon  Injectable 1 milliGRAM(s) IntraMuscular once PRN Glucose LESS THAN 70 milligrams/deciliter  LORazepam     Tablet 1 milliGRAM(s) Oral every 8 hours PRN Agitation  polyethylene glycol 3350 17 Gram(s) Oral daily PRN Constipation      LABS:                  CAPILLARY BLOOD GLUCOSE    MICROBIOLOGY:     RADIOLOGY:  [ ] Reviewed and interpreted by me     Patient is a 60y old  Male who presents with a chief complaint of Arrest (18 Sep 2020 09:19)      Interval Events:  No events     REVIEW OF SYSTEMS:  [ ] Positive  [x ] All other systems negative  [ ] Unable to assess ROS because ________    Vital Signs Last 24 Hrs  T(C): 36.7 (09-21-20 @ 04:07), Max: 36.8 (09-20-20 @ 21:40)  T(F): 98.1 (09-21-20 @ 04:07), Max: 98.2 (09-20-20 @ 21:40)  HR: 66 (09-21-20 @ 04:25) (52 - 71)  BP: 160/64 (09-21-20 @ 04:07) (116/72 - 160/64)  RR: 20 (09-21-20 @ 04:25) (18 - 22)  SpO2: 98% (09-21-20 @ 04:25) (97% - 100%)    PHYSICAL EXAM:     HEENT:   [x]Tracheostomy: # 6 Cuffless Portex   [x]Pupils: Right eye ptosis,   [ ]No oral lesions  [ ]Abnormal    SKIN  [x]No Rash  [ ] Abnormal  [ ] pressure    CARDIAC  [x]Regular  [ ]Abnormal    PULMONARY  [x]Bilateral Clear Breath Sounds  [ ]Normal Excursion  [ ]Abnormal    GI  [x]PEG      [x] +BS		              [x]Soft, nondistended, nontender	  [ ]Abnormal    MUSCULOSKELETAL                                   [ ]Bedbound                 [ ]Abnormal    [x]OOB to chair                           EXTREMITIES                                         [x]Normal  [ ]Edema                           NEUROLOGIC  [ ] Normal, non focal  [x] Focal findings:  Alert, Alert, responsive;  able to follow commands and respond to questions appropiately, Plegia RUE/RLE s/p SAH     PSYCHIATRIC  [x]Alert and appropriate  [ ] Sedated	 [ ]Agitated    :  Umaña: [ ] Yes, if yes: Date of Placement:                   [x] No; Straight cath     LINES: Central Lines [ ] Yes, if yes: Date of Placement                                     [x] No      HOSPITAL MEDICATIONS:  MEDICATIONS  (STANDING):  amantadine Syrup 100 milliGRAM(s) Oral <User Schedule>  artificial  tears Solution 1 Drop(s) Both EYES every 6 hours  aspirin  chewable 81 milliGRAM(s) Enteral Tube daily  atorvastatin 80 milliGRAM(s) Oral at bedtime  bisacodyl Suppository 10 milliGRAM(s) Rectal at bedtime  chlorhexidine 4% Liquid 1 Application(s) Topical <User Schedule>  dextrose 5%. 1000 milliLiter(s) (50 mL/Hr) IV Continuous <Continuous>  dextrose 50% Injectable 12.5 Gram(s) IV Push once  dextrose 50% Injectable 25 Gram(s) IV Push once  dextrose 50% Injectable 25 Gram(s) IV Push once  doxazosin 8 milliGRAM(s) Oral at bedtime  famotidine    Tablet 20 milliGRAM(s) Oral daily  fentaNYL   Patch  50 MICROgram(s)/Hr 1 Patch Transdermal every 72 hours  heparin   Injectable 5000 Unit(s) SubCutaneous every 8 hours  hydrALAZINE 100 milliGRAM(s) Oral three times a day  isosorbide   dinitrate Tablet (ISORDIL) 20 milliGRAM(s) Enteral Tube <User Schedule>  lactobacillus acidophilus 1 Tablet(s) Oral two times a day  lidocaine   Patch 1 Patch Transdermal every 24 hours  lidocaine   Patch 1 Patch Transdermal daily  metoprolol tartrate 50 milliGRAM(s) Oral <User Schedule>  QUEtiapine 75 milliGRAM(s) Oral <User Schedule>  QUEtiapine 50 milliGRAM(s) Oral <User Schedule>    MEDICATIONS  (PRN):  dextrose 40% Gel 15 Gram(s) Oral once PRN Blood Glucose LESS THAN 70 milliGRAM(s)/deciliter  glucagon  Injectable 1 milliGRAM(s) IntraMuscular once PRN Glucose LESS THAN 70 milligrams/deciliter  LORazepam     Tablet 1 milliGRAM(s) Oral every 8 hours PRN Agitation  polyethylene glycol 3350 17 Gram(s) Oral daily PRN Constipation      LABS:                  CAPILLARY BLOOD GLUCOSE    MICROBIOLOGY:     RADIOLOGY:  [ ] Reviewed and interpreted by me     Patient is a 60y old  Male who presents with a chief complaint of Arrest (18 Sep 2020 09:19)      Interval Events:  No events     REVIEW OF SYSTEMS:  [ ] Positive  [x ] All other systems negative  [ ] Unable to assess ROS because ________    Vital Signs Last 24 Hrs  T(C): 36.7 (09-21-20 @ 04:07), Max: 36.8 (09-20-20 @ 21:40)  T(F): 98.1 (09-21-20 @ 04:07), Max: 98.2 (09-20-20 @ 21:40)  HR: 66 (09-21-20 @ 04:25) (52 - 71)  BP: 160/64 (09-21-20 @ 04:07) (116/72 - 160/64)  RR: 20 (09-21-20 @ 04:25) (18 - 22)  SpO2: 98% (09-21-20 @ 04:25) (97% - 100%)    PHYSICAL EXAM:     HEENT:   [x]Tracheostomy: # 6 Cuffless Portex   [x]Pupils: Right eye ptosis,   [ ]No oral lesions  [ ]Abnormal    SKIN  [x]No Rash  [ ] Abnormal  [ ] pressure    CARDIAC  [x]Regular  [ ]Abnormal    PULMONARY  [x]Bilateral Clear Breath Sounds  [ ]Normal Excursion  [ ]Abnormal    GI  [x]PEG      [x] +BS		              [x]Soft, nondistended, nontender	  [ ]Abnormal    MUSCULOSKELETAL                                   [ ]Bedbound                 [ ]Abnormal    [x]OOB to chair                           EXTREMITIES                                         [x]Normal  [ ]Edema                           NEUROLOGIC  [ ] Normal, non focal  [x] Focal findings:   Alert, responsive;  able to follow commands, and respond to questions appropriately Plegia RUE/RLE s/p SAH     PSYCHIATRIC  [x]Alert and appropriate  [ ] Sedated	 [ ]Agitated    :  Umaña: [ ] Yes, if yes: Date of Placement:                   [x] No; Straight cath     LINES: Central Lines [ ] Yes, if yes: Date of Placement                                     [x] No      HOSPITAL MEDICATIONS:  MEDICATIONS  (STANDING):  amantadine Syrup 100 milliGRAM(s) Oral <User Schedule>  artificial  tears Solution 1 Drop(s) Both EYES every 6 hours  aspirin  chewable 81 milliGRAM(s) Enteral Tube daily  atorvastatin 80 milliGRAM(s) Oral at bedtime  bisacodyl Suppository 10 milliGRAM(s) Rectal at bedtime  chlorhexidine 4% Liquid 1 Application(s) Topical <User Schedule>  dextrose 5%. 1000 milliLiter(s) (50 mL/Hr) IV Continuous <Continuous>  dextrose 50% Injectable 12.5 Gram(s) IV Push once  dextrose 50% Injectable 25 Gram(s) IV Push once  dextrose 50% Injectable 25 Gram(s) IV Push once  doxazosin 8 milliGRAM(s) Oral at bedtime  famotidine    Tablet 20 milliGRAM(s) Oral daily  fentaNYL   Patch  50 MICROgram(s)/Hr 1 Patch Transdermal every 72 hours  heparin   Injectable 5000 Unit(s) SubCutaneous every 8 hours  hydrALAZINE 100 milliGRAM(s) Oral three times a day  isosorbide   dinitrate Tablet (ISORDIL) 20 milliGRAM(s) Enteral Tube <User Schedule>  lactobacillus acidophilus 1 Tablet(s) Oral two times a day  lidocaine   Patch 1 Patch Transdermal every 24 hours  lidocaine   Patch 1 Patch Transdermal daily  metoprolol tartrate 50 milliGRAM(s) Oral <User Schedule>  QUEtiapine 75 milliGRAM(s) Oral <User Schedule>  QUEtiapine 50 milliGRAM(s) Oral <User Schedule>    MEDICATIONS  (PRN):  dextrose 40% Gel 15 Gram(s) Oral once PRN Blood Glucose LESS THAN 70 milliGRAM(s)/deciliter  glucagon  Injectable 1 milliGRAM(s) IntraMuscular once PRN Glucose LESS THAN 70 milligrams/deciliter  LORazepam     Tablet 1 milliGRAM(s) Oral every 8 hours PRN Agitation  polyethylene glycol 3350 17 Gram(s) Oral daily PRN Constipation      LABS:                  CAPILLARY BLOOD GLUCOSE    MICROBIOLOGY:     RADIOLOGY:  [ ] Reviewed and interpreted by me     Patient is a 60y old  Male who presents with a chief complaint of Arrest (18 Sep 2020 09:19)      Interval Events:  No events     REVIEW OF SYSTEMS:  [ ] Positive  [x ] All other systems negative  [ ] Unable to assess ROS because ________    Vital Signs Last 24 Hrs  T(C): 36.7 (09-21-20 @ 04:07), Max: 36.8 (09-20-20 @ 21:40)  T(F): 98.1 (09-21-20 @ 04:07), Max: 98.2 (09-20-20 @ 21:40)  HR: 66 (09-21-20 @ 04:25) (52 - 71)  BP: 160/64 (09-21-20 @ 04:07) (116/72 - 160/64)  RR: 20 (09-21-20 @ 04:25) (18 - 22)  SpO2: 98% (09-21-20 @ 04:25) (97% - 100%)    PHYSICAL EXAM:     HEENT:   [x]Tracheostomy: # 6 Cuffless Portex   [x]Pupils: Right eye ptosis,   [ ]No oral lesions  [ ]Abnormal    SKIN  [x]No Rash  [ ] Abnormal  [ ] pressure    CARDIAC  [x]Regular  [ ]Abnormal    PULMONARY  [x]Bilateral Clear Breath Sounds  [ ]Normal Excursion  [ ]Abnormal    GI  [x]PEG      [x] +BS		              [x]Soft, nondistended, nontender	  [ ]Abnormal    MUSCULOSKELETAL                                   [ ]Bedbound                 [ ]Abnormal    [x]OOB to chair                           EXTREMITIES                                         [x]Normal  [ ]Edema                           NEUROLOGIC  [ ] Normal, non focal  [x] Focal findings:   Alert, responsive;  able to follow commands, and respond to questions appropriately Plegia LUE/LLE s/p SAH     PSYCHIATRIC  [x]Alert and appropriate  [ ] Sedated	 [ ]Agitated    :  Umaña: [ ] Yes, if yes: Date of Placement:                   [x] No; Straight cath     LINES: Central Lines [ ] Yes, if yes: Date of Placement                                     [x] No      HOSPITAL MEDICATIONS:  MEDICATIONS  (STANDING):  amantadine Syrup 100 milliGRAM(s) Oral <User Schedule>  artificial  tears Solution 1 Drop(s) Both EYES every 6 hours  aspirin  chewable 81 milliGRAM(s) Enteral Tube daily  atorvastatin 80 milliGRAM(s) Oral at bedtime  bisacodyl Suppository 10 milliGRAM(s) Rectal at bedtime  chlorhexidine 4% Liquid 1 Application(s) Topical <User Schedule>  dextrose 5%. 1000 milliLiter(s) (50 mL/Hr) IV Continuous <Continuous>  dextrose 50% Injectable 12.5 Gram(s) IV Push once  dextrose 50% Injectable 25 Gram(s) IV Push once  dextrose 50% Injectable 25 Gram(s) IV Push once  doxazosin 8 milliGRAM(s) Oral at bedtime  famotidine    Tablet 20 milliGRAM(s) Oral daily  fentaNYL   Patch  50 MICROgram(s)/Hr 1 Patch Transdermal every 72 hours  heparin   Injectable 5000 Unit(s) SubCutaneous every 8 hours  hydrALAZINE 100 milliGRAM(s) Oral three times a day  isosorbide   dinitrate Tablet (ISORDIL) 20 milliGRAM(s) Enteral Tube <User Schedule>  lactobacillus acidophilus 1 Tablet(s) Oral two times a day  lidocaine   Patch 1 Patch Transdermal every 24 hours  lidocaine   Patch 1 Patch Transdermal daily  metoprolol tartrate 50 milliGRAM(s) Oral <User Schedule>  QUEtiapine 75 milliGRAM(s) Oral <User Schedule>  QUEtiapine 50 milliGRAM(s) Oral <User Schedule>    MEDICATIONS  (PRN):  dextrose 40% Gel 15 Gram(s) Oral once PRN Blood Glucose LESS THAN 70 milliGRAM(s)/deciliter  glucagon  Injectable 1 milliGRAM(s) IntraMuscular once PRN Glucose LESS THAN 70 milligrams/deciliter  LORazepam     Tablet 1 milliGRAM(s) Oral every 8 hours PRN Agitation  polyethylene glycol 3350 17 Gram(s) Oral daily PRN Constipation      LABS:                  CAPILLARY BLOOD GLUCOSE    MICROBIOLOGY:     RADIOLOGY:  [ ] Reviewed and interpreted by me

## 2020-09-22 PROCEDURE — 31575 DIAGNOSTIC LARYNGOSCOPY: CPT | Mod: 59

## 2020-09-22 PROCEDURE — 99233 SBSQ HOSP IP/OBS HIGH 50: CPT

## 2020-09-22 PROCEDURE — 31615 TRCHEOBRNCHSC EST TRACHS INC: CPT

## 2020-09-22 RX ADMIN — LIDOCAINE 1 PATCH: 4 CREAM TOPICAL at 11:28

## 2020-09-22 RX ADMIN — ISOSORBIDE DINITRATE 20 MILLIGRAM(S): 5 TABLET ORAL at 08:24

## 2020-09-22 RX ADMIN — QUETIAPINE FUMARATE 50 MILLIGRAM(S): 200 TABLET, FILM COATED ORAL at 06:20

## 2020-09-22 RX ADMIN — Medication 100 MILLIGRAM(S): at 21:38

## 2020-09-22 RX ADMIN — Medication 50 MILLIGRAM(S): at 20:04

## 2020-09-22 RX ADMIN — LIDOCAINE 1 PATCH: 4 CREAM TOPICAL at 21:39

## 2020-09-22 RX ADMIN — QUETIAPINE FUMARATE 75 MILLIGRAM(S): 200 TABLET, FILM COATED ORAL at 21:41

## 2020-09-22 RX ADMIN — ISOSORBIDE DINITRATE 20 MILLIGRAM(S): 5 TABLET ORAL at 17:44

## 2020-09-22 RX ADMIN — HEPARIN SODIUM 5000 UNIT(S): 5000 INJECTION INTRAVENOUS; SUBCUTANEOUS at 14:51

## 2020-09-22 RX ADMIN — Medication 1 DROP(S): at 06:20

## 2020-09-22 RX ADMIN — QUETIAPINE FUMARATE 50 MILLIGRAM(S): 200 TABLET, FILM COATED ORAL at 14:51

## 2020-09-22 RX ADMIN — Medication 1 DROP(S): at 11:31

## 2020-09-22 RX ADMIN — FENTANYL CITRATE 1 PATCH: 50 INJECTION INTRAVENOUS at 20:45

## 2020-09-22 RX ADMIN — HEPARIN SODIUM 5000 UNIT(S): 5000 INJECTION INTRAVENOUS; SUBCUTANEOUS at 21:39

## 2020-09-22 RX ADMIN — FENTANYL CITRATE 1 PATCH: 50 INJECTION INTRAVENOUS at 07:27

## 2020-09-22 RX ADMIN — Medication 81 MILLIGRAM(S): at 11:39

## 2020-09-22 RX ADMIN — Medication 100 MILLIGRAM(S): at 11:28

## 2020-09-22 RX ADMIN — Medication 100 MILLIGRAM(S): at 06:22

## 2020-09-22 RX ADMIN — Medication 100 MILLIGRAM(S): at 08:25

## 2020-09-22 RX ADMIN — Medication 1 DROP(S): at 17:43

## 2020-09-22 RX ADMIN — Medication 1 TABLET(S): at 17:43

## 2020-09-22 RX ADMIN — CHLORHEXIDINE GLUCONATE 1 APPLICATION(S): 213 SOLUTION TOPICAL at 06:22

## 2020-09-22 RX ADMIN — LIDOCAINE 1 PATCH: 4 CREAM TOPICAL at 17:43

## 2020-09-22 RX ADMIN — Medication 8 MILLIGRAM(S): at 21:38

## 2020-09-22 RX ADMIN — LIDOCAINE 1 PATCH: 4 CREAM TOPICAL at 07:27

## 2020-09-22 RX ADMIN — Medication 1 TABLET(S): at 06:20

## 2020-09-22 RX ADMIN — Medication 10 MILLIGRAM(S): at 21:38

## 2020-09-22 RX ADMIN — Medication 50 MILLIGRAM(S): at 08:25

## 2020-09-22 RX ADMIN — HEPARIN SODIUM 5000 UNIT(S): 5000 INJECTION INTRAVENOUS; SUBCUTANEOUS at 06:22

## 2020-09-22 RX ADMIN — FAMOTIDINE 20 MILLIGRAM(S): 10 INJECTION INTRAVENOUS at 11:37

## 2020-09-22 RX ADMIN — ATORVASTATIN CALCIUM 80 MILLIGRAM(S): 80 TABLET, FILM COATED ORAL at 21:38

## 2020-09-22 NOTE — CHART NOTE - NSCHARTNOTEFT_GEN_A_CORE
Mr. Vieyra is a 60-year-old male with a history of A-Fib on warfarin who presents with cardiac arrest at work with downtime of about 25 minutes prior to ROSC. He is s/p cooling to 35 degrees. He required mechanical ventilation, neuromuscular blockade, sedation, and pressors for neurogenic/cardiogenic shock. 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 Cerebral angiogram without evidence of aneurysm. ICU course complicated by GI bleed s/p PPI gtt and bleeding from scott s/p clot irrigation by urology; suspected 2/2 traumatic catheterization. 8/21: Episode of respiratory distress overnight; desaturated 85%, mucus plug/ambu bagged. Pt w/ prolonged respiratory failure s/p trach (#8 DCT Tracheostomy)-Trach  secured with sutures x4 and Umbilical Tie- on 8/24 and PEG on 8/26. Noted on echo to have acute LV systolic heart failure, possible stunned myocardium due to cardiac arrest vs. SAH. Currently, he is awake and alert, moving RUE/RLE spontaneously, following simple commands. (+) A-Fib with RVR. Being treated for C diff colitis. Noted pt w/ trach change to #7 cuffless Portex on 9/3 however due to it becoming dislodged trach was changed to #8 cuffless Shiley by ENT on 9/5. Trach down-sized to #6 Cuffless Portex on 9/14. Cleared for PMV by SLP on 9/17. 9/15: Patient complaining of not being able to see, poor vision. States he sees shadows and unable ot identify objects with his left eye. Right remains with ptosis, fixed and dilated pupil. PM&R rx acute TBI rehab. Pt with agitiation throughout hospital course. [facility] requesting his agitation medication regimen be optimized prior to discharge. S&S for swallow reassessment.      RCU team reporting pt was noted with significant coughing and + secretions with use of PMV this AM. Per RCU team pt has otherwise been tolerating valve without difficulty since clearance for valve on 9/17. Pt denies difficulty breathing during this episode. Pt seen for re-assessment of tolerance to Passy [place] Speaking Valve.    Pt received in bed. + 30% SpO2 via trach collar. Alert and awake for exam. + leak speech noted. + left sided weakness and R eye ptosis vs other noted. Mr. Vieyra is a 60-year-old male with a history of A-Fib on warfarin who presents with cardiac arrest at work with downtime of about 25 minutes prior to ROSC. He is s/p cooling to 35 degrees. He required mechanical ventilation, neuromuscular blockade, sedation, and pressors for neurogenic/cardiogenic shock. 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 Cerebral angiogram without evidence of aneurysm. ICU course complicated by GI bleed s/p PPI gtt and bleeding from scott s/p clot irrigation by urology; suspected 2/2 traumatic catheterization. 8/21: Episode of respiratory distress overnight; desaturated 85%, mucus plug/ambu bagged. Pt w/ prolonged respiratory failure s/p trach (#8 DCT Tracheostomy)-Trach  secured with sutures x4 and Umbilical Tie- on 8/24 and PEG on 8/26. Noted on echo to have acute LV systolic heart failure, possible stunned myocardium due to cardiac arrest vs. SAH. Currently, he is awake and alert, moving RUE/RLE spontaneously, following simple commands. (+) A-Fib with RVR. Being treated for C diff colitis. Noted pt w/ trach change to #7 cuffless Portex on 9/3 however due to it becoming dislodged trach was changed to #8 cuffless Shiley by ENT on 9/5. Trach down-sized to #6 Cuffless Portex on 9/14. Cleared for PMV by SLP on 9/17. 9/15: Patient complaining of not being able to see, poor vision. States he sees shadows and unable ot identify objects with his left eye. Right remains with ptosis, fixed and dilated pupil. PM&R rx acute TBI rehab. Pt with agitiation throughout hospital course. [facility] requesting his agitation medication regimen be optimized prior to discharge. S&S for swallow reassessment.      RCU team reporting pt was noted with significant coughing and + secretions with use of PMV this AM. Per RCU team pt has otherwise been tolerating valve without difficulty since clearance for valve on 9/17. Pt denies difficulty breathing during this episode. Pt seen for re-assessment of tolerance to Passy [place] Speaking Valve.    Pt received in bed. Alert and awake for exam. + Leak speech noted. + Left sided weakness and R eye ptosis vs other noted. Patient maintained on 30% FIO2 via trach collar. Low pressure valve placed on the hub of the trach. Vocal quality WFL, intermittently wet. VSS stable throughout assessment. However, ~15 minutes after valve placement pt reporting need to cough which was followed by significant coughing. Valve removed and thick, clear secretions noted at the trach site. Pt with report of "difficulty getting air out" during coughing episode. VSS. Valve replaced on hub of trach. After ~20 minutes pt with another coughing episode with SpO2 93-94%. Coughing subsided and pt with no report of respiratory distress. No increased work of breathing. No subjective complaints. No signs of air trapping. Valve removed after 35 minutes.     Given change in status from previous assessment on 9/17 as well as pt with subjective complaints of difficulty breathing with use of valve, would defer PMV use at this time. PMV removed from the bedside. Would recommend ENT consult to assess laryngeal structures and r/o laryngeal pathology. JOSAFAT Nicole and patient made aware. Will defer swallow assessment pending tolerance to PMV for possible optimization of swallow function.    Purposeful proactive rounding reinforced and 5 Ps addressed. Pt left in no distress.       Recommendations:  Defer PMV use at this time.   ENT consult.  This service to follow up re: PMV post ENT assessment.        Lazara Centeno MA, CCC/SLP  Speech Language Pathologist  [phone]

## 2020-09-22 NOTE — PROGRESS NOTE ADULT - PROBLEM SELECTOR PLAN 7
S/p Peg 8/26 by GI   Tolerating Enteral Feeds; Dysphagia eval pending   GI ppx with Pepcid 20mg daily

## 2020-09-22 NOTE — PROGRESS NOTE ADULT - SUBJECTIVE AND OBJECTIVE BOX
Patient is a 60y old  Male who presents with a chief complaint of Cardiac arrest (21 Sep 2020 13:52)      Interval Events: No events reported overnight; Patient scheduled for Dysphagia Evaluation today     REVIEW OF SYSTEMS:  [ ] Positive  [x] All other systems negative  [ ] Unable to assess ROS because ________    Vital Signs Last 24 Hrs  T(C): 36.7 (09-22-20 @ 04:17), Max: 36.8 (09-21-20 @ 19:41)  T(F): 98 (09-22-20 @ 04:17), Max: 98.2 (09-21-20 @ 19:41)  HR: 57 (09-22-20 @ 08:27) (55 - 71)  BP: 145/92 (09-22-20 @ 04:17) (133/74 - 145/92)  RR: 18 (09-22-20 @ 08:27) (18 - 19)  SpO2: 94% (09-22-20 @ 08:27) (94% - 100%)    PHYSICAL EXAM:  HEENT:   [x]Tracheostomy: # 6 Cuffless Portex   [x]Pupils: Right eye ptosis, Dilated fixed right pupil; Left PERRL  [ ]No oral lesions  [ ]Abnormal    SKIN  [x]No Rash  [ ] Abnormal  [ ] pressure    CARDIAC  [x]Regular  [ ]Abnormal    PULMONARY  [x]Bilateral Clear Breath Sounds  [ ]Normal Excursion  [ ]Abnormal    GI  [x]PEG      [x] +BS		              [x]Soft, nondistended, nontender	  [ ]Abnormal    MUSCULOSKELETAL                                   [ ]Bedbound                 [ ]Abnormal    [x]OOB to chair                           EXTREMITIES                                         [x]Normal  [ ]Edema                           NEUROLOGIC  [ ] Normal, non focal  [x] Focal findings: Plegia of RUE/RLE; Alert and responsive; able to follow commands and respond to questions       PSYCHIATRIC  [x]Alert and appropriate  [ ] Sedated	 [ ]Agitated    :  Umaña: [ ] Yes, if yes: Date of Placement:                   [x] No; Straight cath atc for Urinary Retention     LINES: Central Lines [ ] Yes, if yes: Date of Placement                                     [x] No    HOSPITAL MEDICATIONS:  MEDICATIONS  (STANDING):  amantadine Syrup 100 milliGRAM(s) Oral <User Schedule>  artificial  tears Solution 1 Drop(s) Both EYES every 6 hours  aspirin  chewable 81 milliGRAM(s) Enteral Tube daily  atorvastatin 80 milliGRAM(s) Oral at bedtime  bisacodyl Suppository 10 milliGRAM(s) Rectal at bedtime  chlorhexidine 4% Liquid 1 Application(s) Topical <User Schedule>  dextrose 5%. 1000 milliLiter(s) (50 mL/Hr) IV Continuous <Continuous>  dextrose 50% Injectable 12.5 Gram(s) IV Push once  dextrose 50% Injectable 25 Gram(s) IV Push once  dextrose 50% Injectable 25 Gram(s) IV Push once  doxazosin 8 milliGRAM(s) Oral at bedtime  famotidine    Tablet 20 milliGRAM(s) Oral daily  fentaNYL   Patch  50 MICROgram(s)/Hr 1 Patch Transdermal every 72 hours  heparin   Injectable 5000 Unit(s) SubCutaneous every 8 hours  hydrALAZINE 100 milliGRAM(s) Oral three times a day  isosorbide   dinitrate Tablet (ISORDIL) 20 milliGRAM(s) Enteral Tube <User Schedule>  lactobacillus acidophilus 1 Tablet(s) Oral two times a day  lidocaine   Patch 1 Patch Transdermal every 24 hours  lidocaine   Patch 1 Patch Transdermal daily  metoprolol tartrate 50 milliGRAM(s) Oral <User Schedule>  QUEtiapine 50 milliGRAM(s) Oral <User Schedule>  QUEtiapine 75 milliGRAM(s) Oral <User Schedule>    MEDICATIONS  (PRN):  dextrose 40% Gel 15 Gram(s) Oral once PRN Blood Glucose LESS THAN 70 milliGRAM(s)/deciliter  glucagon  Injectable 1 milliGRAM(s) IntraMuscular once PRN Glucose LESS THAN 70 milligrams/deciliter  LORazepam     Tablet 1 milliGRAM(s) Oral every 8 hours PRN Agitation  polyethylene glycol 3350 17 Gram(s) Oral daily PRN Constipation      LABS:                  CAPILLARY BLOOD GLUCOSE    MICROBIOLOGY:     RADIOLOGY:  [ ] Reviewed and interpreted by me

## 2020-09-22 NOTE — PROGRESS NOTE ADULT - PROBLEM SELECTOR PLAN 1
Tracheoscope to evalute airway Laryngoscope normal  Tracheoscopy reviewed with Dr. Davis from pulmonary Laryngoscope normal  Tracheoscopy reviewed with Dr. Davis from pulmonary  No further ENT intervention at this time. Any further issues, don't hesitate to call

## 2020-09-22 NOTE — PROGRESS NOTE ADULT - SUBJECTIVE AND OBJECTIVE BOX
Subjective: Patient seen and examined. No new events except as noted.     REVIEW OF SYSTEMS:  Unable to obtain       MEDICATIONS:  MEDICATIONS  (STANDING):  amantadine Syrup 100 milliGRAM(s) Oral <User Schedule>  artificial  tears Solution 1 Drop(s) Both EYES every 6 hours  aspirin  chewable 81 milliGRAM(s) Enteral Tube daily  atorvastatin 80 milliGRAM(s) Oral at bedtime  bisacodyl Suppository 10 milliGRAM(s) Rectal at bedtime  chlorhexidine 4% Liquid 1 Application(s) Topical <User Schedule>  dextrose 5%. 1000 milliLiter(s) (50 mL/Hr) IV Continuous <Continuous>  dextrose 50% Injectable 12.5 Gram(s) IV Push once  dextrose 50% Injectable 25 Gram(s) IV Push once  dextrose 50% Injectable 25 Gram(s) IV Push once  doxazosin 8 milliGRAM(s) Oral at bedtime  famotidine    Tablet 20 milliGRAM(s) Oral daily  fentaNYL   Patch  50 MICROgram(s)/Hr 1 Patch Transdermal every 72 hours  heparin   Injectable 5000 Unit(s) SubCutaneous every 8 hours  hydrALAZINE 100 milliGRAM(s) Oral three times a day  isosorbide   dinitrate Tablet (ISORDIL) 20 milliGRAM(s) Enteral Tube <User Schedule>  lactobacillus acidophilus 1 Tablet(s) Oral two times a day  lidocaine   Patch 1 Patch Transdermal every 24 hours  lidocaine   Patch 1 Patch Transdermal daily  metoprolol tartrate 50 milliGRAM(s) Oral <User Schedule>  QUEtiapine 50 milliGRAM(s) Oral <User Schedule>  QUEtiapine 75 milliGRAM(s) Oral <User Schedule>      PHYSICAL EXAM:  T(C): 36.7 (09-22-20 @ 04:17), Max: 36.8 (09-21-20 @ 19:41)  HR: 58 (09-22-20 @ 12:07) (55 - 71)  BP: 145/92 (09-22-20 @ 04:17) (133/74 - 145/92)  RR: 18 (09-22-20 @ 12:07) (18 - 19)  SpO2: 95% (09-22-20 @ 12:07) (94% - 100%)  Wt(kg): --  I&O's Summary    21 Sep 2020 07:01  -  22 Sep 2020 07:00  --------------------------------------------------------  IN: 1950 mL / OUT: 3125 mL / NET: -1175 mL            Appearance: sleepy , +trach   HEENT:   Dry  oral mucosa,  Lymphatic: No lymphadenopathy  Cardiovascular: Normal S1 S2, No JVD, No murmurs, No edema  Respiratory: Ventilated   Psychiatry: A & O x 0  Gastrointestinal:  Soft, Non-tender, +PEG   Skin: No rashes, No ecchymoses, No cyanosis	  Mental status- No acute distress, EO to stim  -CN- Pupils R 4mm NR, L 2mm sluggish, EOMI, tongue midline, face symmetric  +cough/gag  C briskly, two fingers/thumbs up on RUE, distally AG, wiggles toes on RLE        LABS:    CARDIAC MARKERS:                  proBNP:   Lipid Profile:   HgA1c:   TSH:             TELEMETRY: 	    ECG:  	  RADIOLOGY:   DIAGNOSTIC TESTING:  [ ] Echocardiogram:  [ ]  Catheterization:  [ ] Stress Test:    OTHER:

## 2020-09-22 NOTE — SWALLOW BEDSIDE ASSESSMENT ADULT - ADDITIONAL RECOMMENDATIONS
Maintain good oral hygiene.  This service to schedule FEES v MBS pending pt tolerance to PMV for possible optimization of swallow function.

## 2020-09-22 NOTE — PROGRESS NOTE ADULT - PROBLEM SELECTOR PLAN 3
Continue Fentanyl Patch   Continue Seroquel 50 mg morning / afternoon   Continue Seroquel evening dose 75 mg   Continue PRN Ativan via PEG for agitation not treated by standing seroquel

## 2020-09-22 NOTE — PROGRESS NOTE ADULT - PROBLEM SELECTOR PLAN 1
Patient S/p Tracheostomy on 8/24 with ENT  Tolerating continuous trach collar since 8/30  Tracheostomy Downsized to # 6 Cuffless Portex on 9/3   Continue Chest PT and Suctioning PRN       - PMV with speech Patient S/p Tracheostomy on 8/24 with ENT  Tolerating continuous trach collar since 8/30  Tracheostomy Downsized to # 6 Cuffless Portex   Continue Chest PT and Suctioning PRN   Continue PMV Trials

## 2020-09-22 NOTE — PROGRESS NOTE ADULT - SUBJECTIVE AND OBJECTIVE BOX
ENT ISSUE/POD: ? Air trapping noted by S&S after removing PMV valve    HPI:  60 year old male with PMHx of Afib on coumadin s/p cardiac arrest at work, downtime 25 minutes prior to ROSC. CT at that time showed owed R perimesencephalic SAH of unclear etiology, no hydrocephalus.  Tracheostomy was performed by ENT on August 24, 2020 and a Shiley #8 cuffed trach was placed. Pt. was downsized on 9/3 with a Portex #7 cuffless trach which was dislodged on 9/5 and replaced with a Shiley #8 cuffless trach.  Pt. was downsized again on 9/14 where a Portex #6 cuffless trach was placed. Pt. was cleared for a PMV on 9/17 and was noticed by speech to have a "whoosh of air" when the valve was removed. Pts. nurse states he has moderate amount of secretions which he coughs up on his own. Pt. denies SOB.           PAST MEDICAL & SURGICAL HISTORY:  On warfarin for atrial fibrillation    No significant past surgical history      Allergies    No Known Allergies    Intolerances      MEDICATIONS  (STANDING):  amantadine Syrup 100 milliGRAM(s) Oral <User Schedule>  artificial  tears Solution 1 Drop(s) Both EYES every 6 hours  aspirin  chewable 81 milliGRAM(s) Enteral Tube daily  atorvastatin 80 milliGRAM(s) Oral at bedtime  bisacodyl Suppository 10 milliGRAM(s) Rectal at bedtime  chlorhexidine 4% Liquid 1 Application(s) Topical <User Schedule>  dextrose 5%. 1000 milliLiter(s) (50 mL/Hr) IV Continuous <Continuous>  dextrose 50% Injectable 12.5 Gram(s) IV Push once  dextrose 50% Injectable 25 Gram(s) IV Push once  dextrose 50% Injectable 25 Gram(s) IV Push once  doxazosin 8 milliGRAM(s) Oral at bedtime  famotidine    Tablet 20 milliGRAM(s) Oral daily  fentaNYL   Patch  50 MICROgram(s)/Hr 1 Patch Transdermal every 72 hours  heparin   Injectable 5000 Unit(s) SubCutaneous every 8 hours  hydrALAZINE 100 milliGRAM(s) Oral three times a day  isosorbide   dinitrate Tablet (ISORDIL) 20 milliGRAM(s) Enteral Tube <User Schedule>  lactobacillus acidophilus 1 Tablet(s) Oral two times a day  lidocaine   Patch 1 Patch Transdermal every 24 hours  lidocaine   Patch 1 Patch Transdermal daily  metoprolol tartrate 50 milliGRAM(s) Oral <User Schedule>  QUEtiapine 50 milliGRAM(s) Oral <User Schedule>  QUEtiapine 75 milliGRAM(s) Oral <User Schedule>    MEDICATIONS  (PRN):  dextrose 40% Gel 15 Gram(s) Oral once PRN Blood Glucose LESS THAN 70 milliGRAM(s)/deciliter  glucagon  Injectable 1 milliGRAM(s) IntraMuscular once PRN Glucose LESS THAN 70 milligrams/deciliter  LORazepam     Tablet 1 milliGRAM(s) Oral every 8 hours PRN Agitation  polyethylene glycol 3350 17 Gram(s) Oral daily PRN Constipation      Social History: see consult note    Family history: see consult note    ROS:   ENT: all negative except as noted in HPI   Pulm: denies SOB, cough, hemoptysis  Neuro: denies numbness/tingling, loss of sensation  Endo: denies heat/cold intolerance, excessive sweating      Vital Signs Last 24 Hrs  T(C): 36.7 (22 Sep 2020 04:17), Max: 36.8 (21 Sep 2020 19:41)  T(F): 98 (22 Sep 2020 04:17), Max: 98.2 (21 Sep 2020 19:41)  HR: 58 (22 Sep 2020 12:07) (55 - 71)  BP: 145/92 (22 Sep 2020 04:17) (133/74 - 145/92)  BP(mean): --  RR: 18 (22 Sep 2020 12:07) (18 - 19)  SpO2: 95% (22 Sep 2020 12:07) (94% - 100%)              PHYSICAL EXAM:  Gen: NAD, sitting comfortably in chair, trach collar in place  Skin: No rashes, bruises, or lesions  Head: Normocephalic, Atraumatic  Face: no edema, erythema, or fluctuance. Parotid glands soft without mass  Eyes: Right eye closed, Left eye exopthalmos  Nose: Nares bilaterally patent, no discharge  Mouth: No Stridor / Drooling / Trismus.  Mucosa moist, tongue/uvula midline, oropharynx clear  Neck: Flat, supple, no lymphadenopathy, trachea midline, no masses, Portex #6 cuffless trach in place  Lymphatic: No lymphadenopathy  Resp: trach collar in place  Neuro: facial nerve intact, no facial droop    Tracheoscope: Performed at bedside. Scope was passed to the kayleigh with no abnomalities seen. No secretions or bleeding noted.     ENT ISSUE/POD: ? Air trapping noted by S&S after removing PMV valve for coughing    HPI:  60 year old male with PMHx of Afib on coumadin s/p cardiac arrest at work, downtime 25 minutes prior to ROSC. CT at that time showed owed R perimesencephalic SAH of unclear etiology, no hydrocephalus.  Tracheostomy was performed by ENT on August 24, 2020 and a Shiley #8 cuffed trach was placed. Pt. was downsized on 9/3 with a Portex #7 cuffless trach which was dislodged on 9/5 and replaced with a Shiley #8 cuffless trach.  Pt. was downsized again on 9/14 where a Portex #6 cuffless trach was placed. Pt. was cleared for a PMV on 9/17 and was noticed by speech to have a "whoosh of air" when the valve was removed for c/o coughing. Pts. nurse states he has moderate amount of secretions which he coughs up on his own. Pt. denies SOB.           PAST MEDICAL & SURGICAL HISTORY:  On warfarin for atrial fibrillation    No significant past surgical history      Allergies    No Known Allergies    Intolerances      MEDICATIONS  (STANDING):  amantadine Syrup 100 milliGRAM(s) Oral <User Schedule>  artificial  tears Solution 1 Drop(s) Both EYES every 6 hours  aspirin  chewable 81 milliGRAM(s) Enteral Tube daily  atorvastatin 80 milliGRAM(s) Oral at bedtime  bisacodyl Suppository 10 milliGRAM(s) Rectal at bedtime  chlorhexidine 4% Liquid 1 Application(s) Topical <User Schedule>  dextrose 5%. 1000 milliLiter(s) (50 mL/Hr) IV Continuous <Continuous>  dextrose 50% Injectable 12.5 Gram(s) IV Push once  dextrose 50% Injectable 25 Gram(s) IV Push once  dextrose 50% Injectable 25 Gram(s) IV Push once  doxazosin 8 milliGRAM(s) Oral at bedtime  famotidine    Tablet 20 milliGRAM(s) Oral daily  fentaNYL   Patch  50 MICROgram(s)/Hr 1 Patch Transdermal every 72 hours  heparin   Injectable 5000 Unit(s) SubCutaneous every 8 hours  hydrALAZINE 100 milliGRAM(s) Oral three times a day  isosorbide   dinitrate Tablet (ISORDIL) 20 milliGRAM(s) Enteral Tube <User Schedule>  lactobacillus acidophilus 1 Tablet(s) Oral two times a day  lidocaine   Patch 1 Patch Transdermal every 24 hours  lidocaine   Patch 1 Patch Transdermal daily  metoprolol tartrate 50 milliGRAM(s) Oral <User Schedule>  QUEtiapine 50 milliGRAM(s) Oral <User Schedule>  QUEtiapine 75 milliGRAM(s) Oral <User Schedule>    MEDICATIONS  (PRN):  dextrose 40% Gel 15 Gram(s) Oral once PRN Blood Glucose LESS THAN 70 milliGRAM(s)/deciliter  glucagon  Injectable 1 milliGRAM(s) IntraMuscular once PRN Glucose LESS THAN 70 milligrams/deciliter  LORazepam     Tablet 1 milliGRAM(s) Oral every 8 hours PRN Agitation  polyethylene glycol 3350 17 Gram(s) Oral daily PRN Constipation      Social History: see consult note    Family history: see consult note    ROS:   ENT: all negative except as noted in HPI   Pulm: denies SOB, cough, hemoptysis  Neuro: denies numbness/tingling, loss of sensation  Endo: denies heat/cold intolerance, excessive sweating      Vital Signs Last 24 Hrs  T(C): 36.7 (22 Sep 2020 04:17), Max: 36.8 (21 Sep 2020 19:41)  T(F): 98 (22 Sep 2020 04:17), Max: 98.2 (21 Sep 2020 19:41)  HR: 58 (22 Sep 2020 12:07) (55 - 71)  BP: 145/92 (22 Sep 2020 04:17) (133/74 - 145/92)  BP(mean): --  RR: 18 (22 Sep 2020 12:07) (18 - 19)  SpO2: 95% (22 Sep 2020 12:07) (94% - 100%)              PHYSICAL EXAM:  Gen: NAD, sitting comfortably in chair, trach collar in place  Skin: No rashes, bruises, or lesions  Head: Normocephalic, Atraumatic  Face: no edema, erythema, or fluctuance. Parotid glands soft without mass  Eyes: Right eye closed, Left eye exopthalmos  Nose: Nares bilaterally patent, no discharge  Mouth: No Stridor / Drooling / Trismus.  Mucosa moist, tongue/uvula midline, oropharynx clear  Neck: Flat, supple, no lymphadenopathy, trachea midline, no masses, Portex #6 cuffless trach in place  Lymphatic: No lymphadenopathy  Resp: trach collar in place  Neuro: facial nerve intact, no facial droop    Reason for Laryngoscopy:     Patient was unable to cooperate with mirror.  Nasopharynx, oropharynx, and hypopharynx clear, no bleeding. Tongue base, posterior pharyngeal wall, vallecula, epiglottis, and subglottis appear normal. No erythema, edema, pooling of secretions, masses or lesions. Airway patent, no foreign body visualized. No glottic/supraglottic edema. True vocal cords, arytenoids, vestibular folds, ventricles, pyriform sinuses, and aryepiglottic folds appear normal bilaterally. Vocal cords mobile with good contact b/l. Tracheoscope revealed a 3-4 mm. smooth area likely of granulation tissue on proximal left mainstem bronchus.

## 2020-09-22 NOTE — PROGRESS NOTE ADULT - ATTENDING COMMENTS
Pt is a 59 yo M with h/o A-Fib, s/p cardiac arrest at work with downtime of about 25 minutes. Upon hospital work up pt found to have R perimesencephalic SAH of unclear etiology with cerebral angiogram on 8/10 negative for aneurysm. s/p therapeutic hypothermia. Pt with prolonged and complicated hospitalization s/p neurogenic/cardiogenic shock, GIB, bleeding from [place] s/p clot irrigation, prolonged respiratory failure s/p trach (8/24) and PEG (8/26), Pseudomonas PNA and C.diff colitis.    Resp: O2 via TC  CVS: Cont current antiHTN meds/ Eventual Cardio work up  Heme: Cont Asa and DVT prophylaxis with sq Heparin  FEN: Speech/Swallow evaluation noted; No PMV and NPO/ Enteral feeds via PEG  Neuro: Cont Amantadine/ Fentanyl patch decreased, cont Seroquel and would prefer to minimize Benzos  Aggressive PT/OT.

## 2020-09-22 NOTE — PROGRESS NOTE ADULT - ATTENDING COMMENTS
no evidence of upper airway obstruction or subglottic stenosis. no etiology for air trapping noted. recommend cont PMV trials and work towards capping/decannulation as tolerated. Noted smooth left mainstem bronchial lesion, suspect granulation tissue but defer to pulm for further w/u. please call ent if any further issues.

## 2020-09-22 NOTE — SWALLOW BEDSIDE ASSESSMENT ADULT - SWALLOW EVAL: DIAGNOSIS
Subjective PO trials contraindicated given + tracheostomy tube putting pt at risk for silent aspiration. Would recommend objective swallow assessment to r/o aspiration and determine least restrictive diet.

## 2020-09-22 NOTE — PROGRESS NOTE ADULT - ASSESSMENT
Patient 60-year-old male with a history of A-Fib on warfarin who presented with cardiac arrest at work with downtime of about 25 minutes prior to ROSC. Patient S/p therapeutic hypothermia. 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 Now with resolved neurogenic/cardiogenic shock. Course complicated by GI bleed s/p PPI gtt, bleeding from [place]ma[place] s/p clot irrigation, prolonged respiratory failure s/p trach (8/24) and PEG (8/26). Patients with Afib with RVR during admission; previously on amiodarone since discontinued now rate controlled with Lopressor. Patient with Urinary retention during Hospitalization likely 2/2 neurogenic bladder; Pt developed traumatic hematuria; requiring urology to perform clot evacuation. Patient now on Cardura. Patient now requiring Straight Catheterization ATC. Patients hospital course complicated by Pseudomonas PNA and C.Diff. Patient S/p Cefepime  and enteral vanco. Patient was weaned to TC ATC during admission and tracheostomy was downsized to # 6 Cuffless Portex.  Patient with agitation restlessness during admission requiring Seroquel and ativan prn.     9/22: Patient scheduled to have Bedside Dysphagia evaluation today

## 2020-09-22 NOTE — PROGRESS NOTE ADULT - PROBLEM SELECTOR PLAN 4
Possible stunned myocardium due to cardiac arrest vs. SAH  TTE: Global LV dysfunction. EF 41%, Severe concentric LVH, flattening of interventricular septum  Will need cardiac cath to evaluate for ischemia as outpatient  Continue afterload/preload reduction with hydralazine and isosorbide dinitrate

## 2020-09-22 NOTE — SWALLOW BEDSIDE ASSESSMENT ADULT - SLP GENERAL OBSERVATIONS
Pt received in bed. + trach collar. SpO2 98%. PMV placed for this assessment. + voicing. + Left sided weakness. + volitional, cued dry swallow accompanied by lingual pumping prior to trigger of pharyngeal swallow.

## 2020-09-22 NOTE — PROGRESS NOTE ADULT - PROBLEM SELECTOR PLAN 8
Improved following bladder decompression for Urinary retention  Continue to Straight Cath q 6 hrs PRN   Hyponatremia: Free water discontinued

## 2020-09-22 NOTE — SWALLOW BEDSIDE ASSESSMENT ADULT - SLP PERTINENT HISTORY OF CURRENT PROBLEM
Mr. Vieyra is a 60-year-old male with a history of A-Fib on warfarin who presents with cardiac arrest at work with downtime of about 25 minutes prior to ROSC. He is s/p cooling to 35 degrees. He required mechanical ventilation, neuromuscular blockade, sedation, and pressors for neurogenic/cardiogenic shock. 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 Cerebral angiogram without evidence of aneurysm. ICU course complicated by GI bleed s/p PPI gtt and bleeding from [place]ott s/p clot irrigation by urology; suspected 2/2 traumatic catheterization. 8/21: Episode of respiratory distress overnight; desaturated 85%, mucus plug/ambu bagged. Pt w/ prolonged respiratory failure s/p trach (#8 DCT Tracheostomy)-Trach  secured with sutures x4 and Umbilical Tie- on 8/24 and PEG on 8/26.

## 2020-09-22 NOTE — PROGRESS NOTE ADULT - PROBLEM SELECTOR PLAN 9
Straight catherization required, currently Q6H  Patient will need Umaña on Discharge   Continue Cardura 8mg HS

## 2020-09-22 NOTE — PROGRESS NOTE ADULT - PROBLEM SELECTOR PLAN 5
Completed Abx for Pseudomonas aeruginosa pneumonia/colonization:  S/p prolonged course of cefepime until 8/14-8/20   If develops increased secretions, may benefit from inhaled tobramycin  C diff: Completed oral Vanco 8/29 and PPX Dose completed on 9/16   UTI: Pseudomonas S/p Cefepime Completed Abx for Pseudomonas aeruginosa pneumonia/colonization:  S/p prolonged course of cefepime until 8/14-8/20   If develops increased secretions, may benefit from inhaled tobramycin  C diff: Completed oral Vanco 8/29 and PPX Dose completed on 9/16   UTI: Pseudomonas S/p Cefepime  Currently remains off abx

## 2020-09-22 NOTE — PROGRESS NOTE ADULT - PROBLEM SELECTOR PLAN 2
Patient S/p Angio; neg x 2  CT Head: perimesencephalic SAH   CTA Head: no aneurysm noted  MRI neuroaxis: restricted diffusion in R BG, posterior limb of R IC, and anterior right temporal lobe  VEEG negative   No Further neurosurgical intervention at this time

## 2020-09-22 NOTE — PROGRESS NOTE ADULT - ASSESSMENT
60 year old male with PMHx of Afib on coumadin s/p cardiac arrest at work, downtime 25 minutes prior to ROSC. CT at that time showed  R perimesencephalic SAH of unclear etiology, no hydrocephalus. Pt. underwent a tracheostomy on August 24, 2020 for respiratory failure where a Shiley #8 tracheostomy tube was placed. Pt. had multiple trachs replaced (downsizing, dislodging) and eventually had a Portex #6 cuffless placed and speech applied a PMV to evaluate for   speaking.  Pt. tolerated the PMV but they noticed ?air trapping when the valve was removed. Tracheoscope performed to the kayleigh with no abnormalities  seen. 60 year old male with PMHx of Afib on coumadin s/p cardiac arrest at work, downtime 25 minutes prior to ROSC. CT at that time showed  R perimesencephalic SAH of unclear etiology, no hydrocephalus. Pt. underwent a tracheostomy on August 24, 2020 for respiratory failure where a Shiley #8 tracheostomy tube was placed. Pt. had multiple trachs replaced (downsizing, dislodging) and eventually had a Portex #6 cuffless placed and speech applied a PMV to evaluate for   speaking.  Pt. tolerated the PMV until today when he started coughing. When PMV removed, speech  noticed ?air trapping/ release when the valve was removed. Laryngoscope was normal with no abnormalities seen. Tracheoscope revealed a 3-4 mm. smooth area likely of granulation tissue on proximal left mainstem bronchus. Dr. Sutton reviewed the findings with Dr. Davis from pulmonary.

## 2020-09-22 NOTE — SWALLOW BEDSIDE ASSESSMENT ADULT - COMMENTS
Hx cont: Noted on echo to have acute LV systolic heart failure, possible stunned myocardium due to cardiac arrest vs. SAH. Currently, he is awake and alert, moving RUE/RLE spontaneously, following simple commands. (+) A-Fib with RVR. Being treated for C diff colitis. Noted pt w/ trach change to #7 cuffless Portex on 9/3 however due to it becoming dislodged trach was changed to #8 cuffless Shiley by ENT on 9/5. Trach down-sized to #6 Cuffless Portex on 9/14. Cleared for PMV by SLP on 9/17. 9/15: Patient complaining of not being able to see, poor vision. States he sees shadows and unable ot identify objects with his left eye. Right remains with ptosis, fixed and dilated pupil. PM&R rx acute TBI rehab. Pt with agitiation throughout hospital course. [facility] requesting his agitation medication regimen be optimized prior to discharge. S&S for swallow reassessment.

## 2020-09-23 LAB
ANION GAP SERPL CALC-SCNC: 13 MMOL/L — SIGNIFICANT CHANGE UP (ref 5–17)
BUN SERPL-MCNC: 32 MG/DL — HIGH (ref 7–23)
CALCIUM SERPL-MCNC: 10.4 MG/DL — SIGNIFICANT CHANGE UP (ref 8.4–10.5)
CHLORIDE SERPL-SCNC: 98 MMOL/L — SIGNIFICANT CHANGE UP (ref 96–108)
CO2 SERPL-SCNC: 24 MMOL/L — SIGNIFICANT CHANGE UP (ref 22–31)
CREAT SERPL-MCNC: 1.35 MG/DL — HIGH (ref 0.5–1.3)
GLUCOSE SERPL-MCNC: 89 MG/DL — SIGNIFICANT CHANGE UP (ref 70–99)
HCT VFR BLD CALC: 37 % — LOW (ref 39–50)
HGB BLD-MCNC: 11.4 G/DL — LOW (ref 13–17)
MAGNESIUM SERPL-MCNC: 1.7 MG/DL — SIGNIFICANT CHANGE UP (ref 1.6–2.6)
MCHC RBC-ENTMCNC: 27 PG — SIGNIFICANT CHANGE UP (ref 27–34)
MCHC RBC-ENTMCNC: 30.8 GM/DL — LOW (ref 32–36)
MCV RBC AUTO: 87.5 FL — SIGNIFICANT CHANGE UP (ref 80–100)
NRBC # BLD: 0 /100 WBCS — SIGNIFICANT CHANGE UP (ref 0–0)
PHOSPHATE SERPL-MCNC: 4.2 MG/DL — SIGNIFICANT CHANGE UP (ref 2.5–4.5)
PLATELET # BLD AUTO: 396 K/UL — SIGNIFICANT CHANGE UP (ref 150–400)
POTASSIUM SERPL-MCNC: 4.8 MMOL/L — SIGNIFICANT CHANGE UP (ref 3.5–5.3)
POTASSIUM SERPL-SCNC: 4.8 MMOL/L — SIGNIFICANT CHANGE UP (ref 3.5–5.3)
RBC # BLD: 4.23 M/UL — SIGNIFICANT CHANGE UP (ref 4.2–5.8)
RBC # FLD: 15.9 % — HIGH (ref 10.3–14.5)
SODIUM SERPL-SCNC: 135 MMOL/L — SIGNIFICANT CHANGE UP (ref 135–145)
WBC # BLD: 5.41 K/UL — SIGNIFICANT CHANGE UP (ref 3.8–10.5)
WBC # FLD AUTO: 5.41 K/UL — SIGNIFICANT CHANGE UP (ref 3.8–10.5)

## 2020-09-23 PROCEDURE — 99233 SBSQ HOSP IP/OBS HIGH 50: CPT

## 2020-09-23 PROCEDURE — 93010 ELECTROCARDIOGRAM REPORT: CPT

## 2020-09-23 RX ADMIN — ISOSORBIDE DINITRATE 20 MILLIGRAM(S): 5 TABLET ORAL at 08:48

## 2020-09-23 RX ADMIN — HEPARIN SODIUM 5000 UNIT(S): 5000 INJECTION INTRAVENOUS; SUBCUTANEOUS at 22:41

## 2020-09-23 RX ADMIN — Medication 100 MILLIGRAM(S): at 08:48

## 2020-09-23 RX ADMIN — HEPARIN SODIUM 5000 UNIT(S): 5000 INJECTION INTRAVENOUS; SUBCUTANEOUS at 13:47

## 2020-09-23 RX ADMIN — LIDOCAINE 1 PATCH: 4 CREAM TOPICAL at 07:49

## 2020-09-23 RX ADMIN — FENTANYL CITRATE 1 PATCH: 50 INJECTION INTRAVENOUS at 07:50

## 2020-09-23 RX ADMIN — ISOSORBIDE DINITRATE 20 MILLIGRAM(S): 5 TABLET ORAL at 23:18

## 2020-09-23 RX ADMIN — Medication 1 DROP(S): at 23:17

## 2020-09-23 RX ADMIN — Medication 81 MILLIGRAM(S): at 13:47

## 2020-09-23 RX ADMIN — LIDOCAINE 1 PATCH: 4 CREAM TOPICAL at 22:36

## 2020-09-23 RX ADMIN — Medication 1 DROP(S): at 13:47

## 2020-09-23 RX ADMIN — FAMOTIDINE 20 MILLIGRAM(S): 10 INJECTION INTRAVENOUS at 13:47

## 2020-09-23 RX ADMIN — LIDOCAINE 1 PATCH: 4 CREAM TOPICAL at 13:48

## 2020-09-23 RX ADMIN — FENTANYL CITRATE 1 PATCH: 50 INJECTION INTRAVENOUS at 22:40

## 2020-09-23 RX ADMIN — LIDOCAINE 1 PATCH: 4 CREAM TOPICAL at 22:41

## 2020-09-23 RX ADMIN — QUETIAPINE FUMARATE 50 MILLIGRAM(S): 200 TABLET, FILM COATED ORAL at 13:47

## 2020-09-23 RX ADMIN — ISOSORBIDE DINITRATE 20 MILLIGRAM(S): 5 TABLET ORAL at 00:15

## 2020-09-23 RX ADMIN — FENTANYL CITRATE 1 PATCH: 50 INJECTION INTRAVENOUS at 22:36

## 2020-09-23 RX ADMIN — Medication 1 DROP(S): at 18:20

## 2020-09-23 RX ADMIN — QUETIAPINE FUMARATE 50 MILLIGRAM(S): 200 TABLET, FILM COATED ORAL at 05:54

## 2020-09-23 RX ADMIN — ISOSORBIDE DINITRATE 20 MILLIGRAM(S): 5 TABLET ORAL at 18:20

## 2020-09-23 RX ADMIN — CHLORHEXIDINE GLUCONATE 1 APPLICATION(S): 213 SOLUTION TOPICAL at 05:54

## 2020-09-23 RX ADMIN — Medication 50 MILLIGRAM(S): at 08:48

## 2020-09-23 RX ADMIN — Medication 1 DROP(S): at 05:55

## 2020-09-23 RX ADMIN — Medication 100 MILLIGRAM(S): at 22:42

## 2020-09-23 RX ADMIN — Medication 10 MILLIGRAM(S): at 22:40

## 2020-09-23 RX ADMIN — ATORVASTATIN CALCIUM 80 MILLIGRAM(S): 80 TABLET, FILM COATED ORAL at 22:41

## 2020-09-23 RX ADMIN — LIDOCAINE 1 PATCH: 4 CREAM TOPICAL at 09:50

## 2020-09-23 RX ADMIN — Medication 1 TABLET(S): at 05:54

## 2020-09-23 RX ADMIN — HEPARIN SODIUM 5000 UNIT(S): 5000 INJECTION INTRAVENOUS; SUBCUTANEOUS at 05:54

## 2020-09-23 RX ADMIN — Medication 8 MILLIGRAM(S): at 22:41

## 2020-09-23 RX ADMIN — Medication 1 DROP(S): at 00:16

## 2020-09-23 RX ADMIN — Medication 100 MILLIGRAM(S): at 13:48

## 2020-09-23 RX ADMIN — Medication 1 TABLET(S): at 18:20

## 2020-09-23 RX ADMIN — Medication 100 MILLIGRAM(S): at 05:54

## 2020-09-23 RX ADMIN — QUETIAPINE FUMARATE 75 MILLIGRAM(S): 200 TABLET, FILM COATED ORAL at 22:42

## 2020-09-23 RX ADMIN — Medication 100 MILLIGRAM(S): at 13:47

## 2020-09-23 NOTE — PROGRESS NOTE ADULT - SUBJECTIVE AND OBJECTIVE BOX
Patient is a 60y old  Male who presents with a chief complaint of Cardiac arrest (21 Sep 2020 13:52)      Interval Events: Patient with air trapping yesterday with PMV; Tracheoscopy performed at bedside no abnormalities noted     REVIEW OF SYSTEMS:  [ ] Positive  [x] All other systems negative  [ ] Unable to assess ROS because ________    Vital Signs Last 24 Hrs  T(C): 36.7 (09-23-20 @ 08:44), Max: 36.8 (09-23-20 @ 04:00)  T(F): 98.1 (09-23-20 @ 08:44), Max: 98.2 (09-23-20 @ 04:00)  HR: 66 (09-23-20 @ 09:44) (52 - 67)  BP: 140/76 (09-23-20 @ 08:44) (101/66 - 140/76)  RR: 20 (09-23-20 @ 09:44) (18 - 20)  SpO2: 98% (09-23-20 @ 09:44) (95% - 99%)    PHYSICAL EXAM:  HEENT:   [x]Tracheostomy: # 6 Cuffless Portex   [x]Pupils: Right eye ptosis, Dilated fixed right pupil; Left PERRL  [ ]No oral lesions  [ ]Abnormal    SKIN  [x]No Rash  [ ] Abnormal  [ ] pressure    CARDIAC  [x]Regular  [ ]Abnormal    PULMONARY  [x]Bilateral Clear Breath Sounds  [ ]Normal Excursion  [ ]Abnormal    GI  [x]PEG      [x] +BS		              [x]Soft, nondistended, nontender	  [ ]Abnormal    MUSCULOSKELETAL                                   [ ]Bedbound                 [ ]Abnormal    [x]OOB to chair                           EXTREMITIES                                         [x]Normal  [ ]Edema                           NEUROLOGIC  [ ] Normal, non focal  [x] Focal findings: Plegia of RUE/RLE; Alert and responsive; able to follow commands and respond to questions       PSYCHIATRIC  [x]Alert and appropriate  [ ] Sedated	 [ ]Agitated    :  Umaña: [ ] Yes, if yes: Date of Placement:                   [x] No; Straight cath atc for Urinary Retention     LINES: Central Lines [ ] Yes, if yes: Date of Placement                                     [x] No  HOSPITAL MEDICATIONS:  MEDICATIONS  (STANDING):  amantadine Syrup 100 milliGRAM(s) Oral <User Schedule>  artificial  tears Solution 1 Drop(s) Both EYES every 6 hours  aspirin  chewable 81 milliGRAM(s) Enteral Tube daily  atorvastatin 80 milliGRAM(s) Oral at bedtime  bisacodyl Suppository 10 milliGRAM(s) Rectal at bedtime  chlorhexidine 4% Liquid 1 Application(s) Topical <User Schedule>  dextrose 5%. 1000 milliLiter(s) (50 mL/Hr) IV Continuous <Continuous>  dextrose 50% Injectable 12.5 Gram(s) IV Push once  dextrose 50% Injectable 25 Gram(s) IV Push once  dextrose 50% Injectable 25 Gram(s) IV Push once  doxazosin 8 milliGRAM(s) Oral at bedtime  famotidine    Tablet 20 milliGRAM(s) Oral daily  fentaNYL   Patch  50 MICROgram(s)/Hr 1 Patch Transdermal every 72 hours  heparin   Injectable 5000 Unit(s) SubCutaneous every 8 hours  hydrALAZINE 100 milliGRAM(s) Oral three times a day  isosorbide   dinitrate Tablet (ISORDIL) 20 milliGRAM(s) Enteral Tube <User Schedule>  lactobacillus acidophilus 1 Tablet(s) Oral two times a day  lidocaine   Patch 1 Patch Transdermal every 24 hours  lidocaine   Patch 1 Patch Transdermal daily  metoprolol tartrate 50 milliGRAM(s) Oral <User Schedule>  QUEtiapine 50 milliGRAM(s) Oral <User Schedule>  QUEtiapine 75 milliGRAM(s) Oral <User Schedule>    MEDICATIONS  (PRN):  dextrose 40% Gel 15 Gram(s) Oral once PRN Blood Glucose LESS THAN 70 milliGRAM(s)/deciliter  glucagon  Injectable 1 milliGRAM(s) IntraMuscular once PRN Glucose LESS THAN 70 milligrams/deciliter  LORazepam     Tablet 1 milliGRAM(s) Oral every 8 hours PRN Agitation  polyethylene glycol 3350 17 Gram(s) Oral daily PRN Constipation      LABS:                        11.4   5.41  )-----------( 396      ( 23 Sep 2020 06:46 )             37.0     09-23    135  |  98  |  32<H>  ----------------------------<  89  4.8   |  24  |  1.35<H>    Ca    10.4      23 Sep 2020 06:43  Phos  4.2     09-23  Mg     1.7     09-23              CAPILLARY BLOOD GLUCOSE    MICROBIOLOGY:     RADIOLOGY:  [ ] Reviewed and interpreted by me     Patient is a 60y old  Male who presents with a chief complaint of Cardiac arrest (21 Sep 2020 13:52)      Interval Events: Patient with air trapping yesterday with PMV; Tracheoscopy performed at bedside no abnormalities noted     REVIEW OF SYSTEMS:  [ ] Positive  [x] All other systems negative  [ ] Unable to assess ROS because ________    Vital Signs Last 24 Hrs  T(C): 36.7 (09-23-20 @ 08:44), Max: 36.8 (09-23-20 @ 04:00)  T(F): 98.1 (09-23-20 @ 08:44), Max: 98.2 (09-23-20 @ 04:00)  HR: 66 (09-23-20 @ 09:44) (52 - 67)  BP: 140/76 (09-23-20 @ 08:44) (101/66 - 140/76)  RR: 20 (09-23-20 @ 09:44) (18 - 20)  SpO2: 98% (09-23-20 @ 09:44) (95% - 99%)    PHYSICAL EXAM:  HEENT:   [x]Tracheostomy: # 6 Cuffless Portex   [x]Pupils: Right eye ptosis, Dilated fixed right pupil; Left PERRL  [ ]No oral lesions  [ ]Abnormal    SKIN  [x]No Rash  [ ] Abnormal  [ ] pressure    CARDIAC  [x]Regular  [ ]Abnormal    PULMONARY  [x]Bilateral Clear Breath Sounds  [ ]Normal Excursion  [ ]Abnormal    GI  [x]PEG      [x] +BS		              [x]Soft, nondistended, nontender	  [ ]Abnormal    MUSCULOSKELETAL                                   [ ]Bedbound                 [ ]Abnormal    [x]OOB to chair                           EXTREMITIES                                         [x]Normal  [ ]Edema                           NEUROLOGIC  [ ] Normal, non focal  [x] Focal findings: Able to follow commands and respond to questions ; Hemiparesis of Left upper and Left Lower extremity       PSYCHIATRIC  [x]Alert and appropriate  [ ] Sedated	 [ ]Agitated    :  Umaña: [ ] Yes, if yes: Date of Placement:                   [x] No; Straight cath atc for Urinary Retention     LINES: Central Lines [ ] Yes, if yes: Date of Placement                                     [x] No  HOSPITAL MEDICATIONS:  MEDICATIONS  (STANDING):  amantadine Syrup 100 milliGRAM(s) Oral <User Schedule>  artificial  tears Solution 1 Drop(s) Both EYES every 6 hours  aspirin  chewable 81 milliGRAM(s) Enteral Tube daily  atorvastatin 80 milliGRAM(s) Oral at bedtime  bisacodyl Suppository 10 milliGRAM(s) Rectal at bedtime  chlorhexidine 4% Liquid 1 Application(s) Topical <User Schedule>  dextrose 5%. 1000 milliLiter(s) (50 mL/Hr) IV Continuous <Continuous>  dextrose 50% Injectable 12.5 Gram(s) IV Push once  dextrose 50% Injectable 25 Gram(s) IV Push once  dextrose 50% Injectable 25 Gram(s) IV Push once  doxazosin 8 milliGRAM(s) Oral at bedtime  famotidine    Tablet 20 milliGRAM(s) Oral daily  fentaNYL   Patch  50 MICROgram(s)/Hr 1 Patch Transdermal every 72 hours  heparin   Injectable 5000 Unit(s) SubCutaneous every 8 hours  hydrALAZINE 100 milliGRAM(s) Oral three times a day  isosorbide   dinitrate Tablet (ISORDIL) 20 milliGRAM(s) Enteral Tube <User Schedule>  lactobacillus acidophilus 1 Tablet(s) Oral two times a day  lidocaine   Patch 1 Patch Transdermal every 24 hours  lidocaine   Patch 1 Patch Transdermal daily  metoprolol tartrate 50 milliGRAM(s) Oral <User Schedule>  QUEtiapine 50 milliGRAM(s) Oral <User Schedule>  QUEtiapine 75 milliGRAM(s) Oral <User Schedule>    MEDICATIONS  (PRN):  dextrose 40% Gel 15 Gram(s) Oral once PRN Blood Glucose LESS THAN 70 milliGRAM(s)/deciliter  glucagon  Injectable 1 milliGRAM(s) IntraMuscular once PRN Glucose LESS THAN 70 milligrams/deciliter  LORazepam     Tablet 1 milliGRAM(s) Oral every 8 hours PRN Agitation  polyethylene glycol 3350 17 Gram(s) Oral daily PRN Constipation      LABS:                        11.4   5.41  )-----------( 396      ( 23 Sep 2020 06:46 )             37.0     09-23    135  |  98  |  32<H>  ----------------------------<  89  4.8   |  24  |  1.35<H>    Ca    10.4      23 Sep 2020 06:43  Phos  4.2     09-23  Mg     1.7     09-23              CAPILLARY BLOOD GLUCOSE    MICROBIOLOGY:     RADIOLOGY:  [ ] Reviewed and interpreted by me

## 2020-09-23 NOTE — PROGRESS NOTE ADULT - PROBLEM SELECTOR PLAN 5
Completed Abx for Pseudomonas aeruginosa pneumonia/colonization:  S/p prolonged course of cefepime until 8/14-8/20   If develops increased secretions, may benefit from inhaled tobramycin  C diff: Completed oral Vanco 8/29 and PPX Dose completed on 9/16   UTI: Pseudomonas S/p Cefepime  Currently remains off abx

## 2020-09-23 NOTE — PROGRESS NOTE ADULT - PROBLEM SELECTOR PLAN 9
Patient still remains with Large Volume Urinary retention   Will increase Straight Catheterization to q 4hrs  If remains with Large Volume Urinary Retention will require Umaña   Patient will need Umaña on Discharge   Continue Cardura 8mg HS

## 2020-09-23 NOTE — PROGRESS NOTE ADULT - PROBLEM SELECTOR PLAN 1
Patient S/p Tracheostomy on 8/24 with ENT  Tolerating continuous trach collar since 8/30  Tracheostomy Downsized to # 6 Cuffless Portex   Continue Chest PT and Suctioning PRN   PMV Trials currently held 2/2 Air trapping on 9/22

## 2020-09-23 NOTE — CHART NOTE - NSCHARTNOTEFT_GEN_A_CORE
Nutrition Follow-up  Patient seen for: nutrition follow-up on [facility]    Source: comprehensive chart review, ( )     Hospital course as per chart: Pt is a 59 yo male with PMH of afib s/p cardiac arrest, admitted 8/9. Found to have R SAH of unclear etiology with cerebral angiogram on 8/10 negative for aneurysm. Now with resolved neurogenic/cardiogenic shock. Course complicated by GI bleed s/p PPI gtt, bleeding from Umaña s/p clot irrigation, prolonged respiratory failure s/p trach (8/24) and PEG (8/26). Course complicated by afib with RVR and C diff colitis. Transferred to [facility] on 8/29. Tolerating continuous trach collar since 8/30. Chart reviewed, events noted. Trach downsized to #6 Cuffless Portex 9/14. Seen by Speech yesterday, Dysphagia eval held due to air trapping. Recommended to continue NPO.     Diet: NPO with Tube Feed  Enteral Nutrition: Vital AF bolus feeds 420 ml q6 hours (total 4 feeds daily) + Probiotic Yogurt/Smoothie Cans 2 daily    Enteral Nutrition provision as per flowsheets:   9/22: 1680 ml (meeting 100% enteral nutrition goal)  9/21: 1680 ml (meeting 100% enteral nutrition goal)  9/20: 1260 ml (meeting 75% enteral nutrition goal)    Patient confused, trached. Noted with fecal incontinence - last BM today (9/23) per flowsheets.    Current Weight: 216 pounds (9/23 bed) - wt loss noted - likely in setting of adequate protein/calorie intake with BMI >30; likely with excessive intake PTA. Will continue to monitor/trend weights.    Pertinent Medications: MEDICATIONS  (STANDING):  amantadine Syrup 100 milliGRAM(s) Oral <User Schedule>  artificial  tears Solution 1 Drop(s) Both EYES every 6 hours  aspirin  chewable 81 milliGRAM(s) Enteral Tube daily  atorvastatin 80 milliGRAM(s) Oral at bedtime  bisacodyl Suppository 10 milliGRAM(s) Rectal at bedtime  chlorhexidine 4% Liquid 1 Application(s) Topical <User Schedule>  dextrose 5%. 1000 milliLiter(s) (50 mL/Hr) IV Continuous <Continuous>  dextrose 50% Injectable 12.5 Gram(s) IV Push once  dextrose 50% Injectable 25 Gram(s) IV Push once  dextrose 50% Injectable 25 Gram(s) IV Push once  doxazosin 8 milliGRAM(s) Oral at bedtime  famotidine    Tablet 20 milliGRAM(s) Oral daily  fentaNYL   Patch  50 MICROgram(s)/Hr 1 Patch Transdermal every 72 hours  heparin   Injectable 5000 Unit(s) SubCutaneous every 8 hours  hydrALAZINE 100 milliGRAM(s) Oral three times a day  isosorbide   dinitrate Tablet (ISORDIL) 20 milliGRAM(s) Enteral Tube <User Schedule>  lactobacillus acidophilus 1 Tablet(s) Oral two times a day  lidocaine   Patch 1 Patch Transdermal every 24 hours  lidocaine   Patch 1 Patch Transdermal daily  metoprolol tartrate 50 milliGRAM(s) Oral <User Schedule>  QUEtiapine 50 milliGRAM(s) Oral <User Schedule>  QUEtiapine 75 milliGRAM(s) Oral <User Schedule>    MEDICATIONS  (PRN):  dextrose 40% Gel 15 Gram(s) Oral once PRN Blood Glucose LESS THAN 70 milliGRAM(s)/deciliter  glucagon  Injectable 1 milliGRAM(s) IntraMuscular once PRN Glucose LESS THAN 70 milligrams/deciliter  LORazepam     Tablet 1 milliGRAM(s) Oral every 8 hours PRN Agitation  polyethylene glycol 3350 17 Gram(s) Oral daily PRN Constipation    Pertinent Labs:  09-23 Na135 mmol/L Glu 89 mg/dL K+ 4.8 mmol/L Cr  1.35 mg/dL<H> BUN 32 mg/dL<H> 09-23 Phos 4.2 mg/dL    Skin: no pressure injuries per flowsheets   Edema: no edema per flowsheets     Estimated Needs: no change since previous assessment  Based on upper IBW 76.8kg   Energy: (25-30kcal/kg): 1920-2304kcal  Protein: (1.4-1.6g protein/kg): 108-123g protein    Previous Nutrition Diagnosis: Increased nutrient needs - diagnosis ongoing, being addressed with enteral nutrition     New Nutrition Diagnosis: [ ] not applicable    [ ] Inadequate Protein Energy Intake [ ]Inadequate Oral Intake [ ] Excessive Energy Intake     [ ] Underweight [ ] Increased Nutrient Needs [ ] Overweight/Obesity     [ ] Altered GI Function [ ] Unintended Weight Loss [ ] Food & Nutrition Related Knowledge Deficit[ ] Limited Adherence to nutrition related recommendations [ ] Malnutrition  [ ] other: Free text    Recommendations:  1)     Monitoring and Evaluation: Monitor enteral nutrition provision and tolerance, weight, labs, skin, GI status, diet     RD remains available upon request and will continue to follow-up per protocol.   Nasrin Vaughn MS RD CDN pager #[phone] Nutrition Follow-up  Patient seen for: nutrition follow-up on [facility]    Source: comprehensive chart review, RN     Hospital course as per chart: Pt is a 59 yo male with PMH of afib s/p cardiac arrest, admitted 8/9. Found to have R SAH of unclear etiology with cerebral angiogram on 8/10 negative for aneurysm. Now with resolved neurogenic/cardiogenic shock. Course complicated by GI bleed s/p PPI gtt, bleeding from Umaña s/p clot irrigation, prolonged respiratory failure s/p trach (8/24) and PEG (8/26). Course complicated by afib with RVR and C diff colitis. Transferred to [facility] on 8/29. Tolerating continuous trach collar since 8/30. Chart reviewed, events noted. Trach downsized to #6 Cuffless Portex 9/14. Seen by Speech yesterday, Dysphagia eval held due to air trapping. Recommended to continue NPO.     Diet: NPO with Tube Feed  Enteral Nutrition: Vital AF bolus feeds 420 ml q6 hours (total 4 feeds daily) + Probiotic Yogurt/Smoothie Cans 2 daily    Enteral Nutrition provision as per flowsheets:   9/22: 1680 ml (meeting 100% enteral nutrition goal)  9/21: 1680 ml (meeting 100% enteral nutrition goal)  9/20: 1260 ml (meeting 75% enteral nutrition goal)    Patient confused, trached. Sleeping at time of visit. Observed Vital AF hung at bedside, not currently receiving bolus. RN reports pt tolerating tube feeds, no emesis/diarrhea. Noted with fecal incontinence - last BM today (9/23) per flowsheets.    Current Weight: 216 pounds (9/23 bed) - wt loss noted - likely in setting of adequate protein/calorie intake with BMI >30; likely with excessive intake PTA. Will continue to monitor/trend weights.    Pertinent Medications: MEDICATIONS  (STANDING):  amantadine Syrup 100 milliGRAM(s) Oral <User Schedule>  artificial  tears Solution 1 Drop(s) Both EYES every 6 hours  aspirin  chewable 81 milliGRAM(s) Enteral Tube daily  atorvastatin 80 milliGRAM(s) Oral at bedtime  bisacodyl Suppository 10 milliGRAM(s) Rectal at bedtime  chlorhexidine 4% Liquid 1 Application(s) Topical <User Schedule>  dextrose 5%. 1000 milliLiter(s) (50 mL/Hr) IV Continuous <Continuous>  dextrose 50% Injectable 12.5 Gram(s) IV Push once  dextrose 50% Injectable 25 Gram(s) IV Push once  dextrose 50% Injectable 25 Gram(s) IV Push once  doxazosin 8 milliGRAM(s) Oral at bedtime  famotidine    Tablet 20 milliGRAM(s) Oral daily  fentaNYL   Patch  50 MICROgram(s)/Hr 1 Patch Transdermal every 72 hours  heparin   Injectable 5000 Unit(s) SubCutaneous every 8 hours  hydrALAZINE 100 milliGRAM(s) Oral three times a day  isosorbide   dinitrate Tablet (ISORDIL) 20 milliGRAM(s) Enteral Tube <User Schedule>  lactobacillus acidophilus 1 Tablet(s) Oral two times a day  lidocaine   Patch 1 Patch Transdermal every 24 hours  lidocaine   Patch 1 Patch Transdermal daily  metoprolol tartrate 50 milliGRAM(s) Oral <User Schedule>  QUEtiapine 50 milliGRAM(s) Oral <User Schedule>  QUEtiapine 75 milliGRAM(s) Oral <User Schedule>    MEDICATIONS  (PRN):  dextrose 40% Gel 15 Gram(s) Oral once PRN Blood Glucose LESS THAN 70 milliGRAM(s)/deciliter  glucagon  Injectable 1 milliGRAM(s) IntraMuscular once PRN Glucose LESS THAN 70 milligrams/deciliter  LORazepam     Tablet 1 milliGRAM(s) Oral every 8 hours PRN Agitation  polyethylene glycol 3350 17 Gram(s) Oral daily PRN Constipation    Pertinent Labs:  09-23 Na135 mmol/L Glu 89 mg/dL K+ 4.8 mmol/L Cr  1.35 mg/dL<H> BUN 32 mg/dL<H> 09-23 Phos 4.2 mg/dL    Skin: no pressure injuries per flowsheets   Edema: no edema per flowsheets     Estimated Needs: no change since previous assessment  Based on upper IBW 76.8kg   Energy: (25-30kcal/kg): 1920-2304kcal  Protein: (1.4-1.6g protein/kg): 108-123g protein    Previous Nutrition Diagnosis: Increased nutrient needs - diagnosis ongoing, being addressed with enteral nutrition     New Nutrition Diagnosis: not applicable    Recommendations:  1) Continue current enteral nutrition regimen: Vital AF bolus feeds 420 ml q6 hours (total 4 feeds daily) + Probiotic Yogurt/Smoothie Cans 2 daily  Regimen provides 1680 ml total volume, 2016 kcal, 126 g protein and 1362 ml free water. Provides 26 kcal/kg, 1.6 g protein based on upper IBW of 76.8 kg. Meets >100% RDIs. Defer additional free water to team.    RD remains available to adjust TF regimen as needed.    Monitoring and Evaluation: Monitor enteral nutrition provision and tolerance, weight, labs, skin, GI status, diet     RD remains available upon request and will continue to follow-up per protocol.   Nasrin Vaughn MS RD CDN pager #[phone]

## 2020-09-23 NOTE — PROGRESS NOTE ADULT - PROBLEM SELECTOR PLAN 8
Improved following bladder decompression for Urinary retention  Continue to Straight Cath q 6 hrs PRN   Hyponatremia: Free water discontinued Improved following bladder decompression for Urinary retention  Straight Cath ATC  Hyponatremia: Free water discontinued

## 2020-09-23 NOTE — PROGRESS NOTE ADULT - SUBJECTIVE AND OBJECTIVE BOX
Subjective: Patient seen and examined. No new events except as noted.     REVIEW OF SYSTEMS:    Unable to obtain       MEDICATIONS:  MEDICATIONS  (STANDING):  amantadine Syrup 100 milliGRAM(s) Oral <User Schedule>  artificial  tears Solution 1 Drop(s) Both EYES every 6 hours  aspirin  chewable 81 milliGRAM(s) Enteral Tube daily  atorvastatin 80 milliGRAM(s) Oral at bedtime  bisacodyl Suppository 10 milliGRAM(s) Rectal at bedtime  chlorhexidine 4% Liquid 1 Application(s) Topical <User Schedule>  dextrose 5%. 1000 milliLiter(s) (50 mL/Hr) IV Continuous <Continuous>  dextrose 50% Injectable 12.5 Gram(s) IV Push once  dextrose 50% Injectable 25 Gram(s) IV Push once  dextrose 50% Injectable 25 Gram(s) IV Push once  doxazosin 8 milliGRAM(s) Oral at bedtime  famotidine    Tablet 20 milliGRAM(s) Oral daily  fentaNYL   Patch  50 MICROgram(s)/Hr 1 Patch Transdermal every 72 hours  heparin   Injectable 5000 Unit(s) SubCutaneous every 8 hours  hydrALAZINE 100 milliGRAM(s) Oral three times a day  isosorbide   dinitrate Tablet (ISORDIL) 20 milliGRAM(s) Enteral Tube <User Schedule>  lactobacillus acidophilus 1 Tablet(s) Oral two times a day  lidocaine   Patch 1 Patch Transdermal daily  lidocaine   Patch 1 Patch Transdermal every 24 hours  metoprolol tartrate 50 milliGRAM(s) Oral <User Schedule>  QUEtiapine 50 milliGRAM(s) Oral <User Schedule>  QUEtiapine 75 milliGRAM(s) Oral <User Schedule>      PHYSICAL EXAM:  T(C): 36.7 (09-23-20 @ 08:44), Max: 36.8 (09-23-20 @ 04:00)  HR: 66 (09-23-20 @ 09:44) (52 - 67)  BP: 140/76 (09-23-20 @ 08:44) (101/66 - 140/76)  RR: 20 (09-23-20 @ 09:44) (18 - 20)  SpO2: 98% (09-23-20 @ 09:44) (95% - 99%)  Wt(kg): --  I&O's Summary    22 Sep 2020 07:01  -  23 Sep 2020 07:00  --------------------------------------------------------  IN: 1040 mL / OUT: 2050 mL / NET: -1010 mL          Appearance: sleepy , +trach   HEENT:   Dry  oral mucosa,  Lymphatic: No lymphadenopathy  Cardiovascular: Normal S1 S2, No JVD, No murmurs, No edema  Respiratory: Ventilated   Psychiatry: A & O x 0  Gastrointestinal:  Soft, Non-tender, +PEG   Skin: No rashes, No ecchymoses, No cyanosis	  Mental status- No acute distress, EO to stim  -CN- Pupils R 4mm NR, L 2mm sluggish, EOMI, tongue midline, face symmetric  +cough/gag  C briskly, two fingers/thumbs up on RUE, distally AG, wiggles toes on RLE        LABS:    CARDIAC MARKERS:                                11.4   5.41  )-----------( 396      ( 23 Sep 2020 06:46 )             37.0     09-23    135  |  98  |  32<H>  ----------------------------<  89  4.8   |  24  |  1.35<H>    Ca    10.4      23 Sep 2020 06:43  Phos  4.2     09-23  Mg     1.7     09-23      proBNP:   Lipid Profile:   HgA1c:   TSH:             TELEMETRY: 	    ECG:  	  RADIOLOGY:   DIAGNOSTIC TESTING:  [ ] Echocardiogram:  [ ]  Catheterization:  [ ] Stress Test:    OTHER:

## 2020-09-23 NOTE — PROGRESS NOTE ADULT - ATTENDING COMMENTS
Pt is a 61 yo M with h/o A-Fib, s/p cardiac arrest at work with downtime of about 25 minutes. Upon hospital work up pt found to have R perimesencephalic SAH of unclear etiology with cerebral angiogram on 8/10 negative for aneurysm. s/p therapeutic hypothermia. Pt with prolonged and complicated hospitalization s/p neurogenic/cardiogenic shock, GIB, bleeding from Umaña s/p clot irrigation, prolonged respiratory failure s/p trach (8/24) and PEG (8/26), Pseudomonas PNA and C.diff colitis.    Resp: O2 via TC/ ENT consult noted  CVS: Cont current antiHTN meds/ Eventual Cardio work up  Heme: Cont Asa and DVT prophylaxis with sq Heparin  FEN: Speech/Swallow evaluation noted; No PMV and NPO; further recommendations as per Speech/Swallow f/u/ Enteral feeds via PEG  Neuro: Cont Amantadine/ Cont Fentanyl patch, Seroquel and would prefer to minimize Benzos  Aggressive PT/OT/ OOB->chair Pt is a 61 yo M with h/o A-Fib, s/p cardiac arrest at work with downtime of about 25 minutes. Upon hospital work up pt found to have R perimesencephalic SAH of unclear etiology with cerebral angiogram on 8/10 negative for aneurysm. s/p therapeutic hypothermia. Pt with prolonged and complicated hospitalization s/p neurogenic/cardiogenic shock, GIB, bleeding from Umaña s/p clot irrigation, prolonged respiratory failure s/p trach (8/24) and PEG (8/26), Pseudomonas PNA and C.diff colitis.    Resp: O2 via TC/ ENT consult noted  CVS: Cont current antiHTN meds/ Eventual Cardio work up  Heme: Cont Asa and DVT prophylaxis with sq Heparin  FEN: Speech/Swallow evaluation noted; No PMV and NPO; further recommendations as per Speech/Swallow f/u/ Enteral feeds via PEG  Renal/: May need to increase the frequency of straight cath  Neuro: Cont Amantadine/ Cont Fentanyl patch, Seroquel and would prefer to minimize Benzos  Aggressive PT/OT/ OOB->chair

## 2020-09-23 NOTE — PROGRESS NOTE ADULT - ASSESSMENT
Patient 60-year-old male with a history of A-Fib on warfarin who presented with cardiac arrest at work with downtime of about 25 minutes prior to ROSC. Patient S/p therapeutic hypothermia. 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 Now with resolved neurogenic/cardiogenic shock. Course complicated by GI bleed s/p PPI gtt, bleeding from Umaña s/p clot irrigation, prolonged respiratory failure s/p trach (8/24) and PEG (8/26). Patients with Afib with RVR during admission; previously on amiodarone since discontinued now rate controlled with Lopressor. Patient with Urinary retention during Hospitalization likely 2/2 neurogenic bladder; Pt developed traumatic hematuria; requiring urology to perform clot evacuation. Patient now on Cardura. Patient now requiring Straight Catheterization ATC. Patients hospital course complicated by Pseudomonas PNA and C.Diff. Patient S/p Cefepime  and enteral vanco. Patient was weaned to TC ATC during admission and tracheostomy was downsized to # 6 Cuffless Portex.  Patient with agitation restlessness during admission requiring Seroquel and ativan prn.     9/23: Patient noted to have Airtrapping with PMV yesterday. ENT performed bedside tracheoscopy; revealed a 3-4 mm. smooth area likely of granulation tissue on proximal left mainstem bronchus but patent airway which would not be a cause for patients airtrapping. Patient in NAD with no complaints this morning.   Patient still remains with large Volume Urinary Retention will increase Straight Catheterization to q 4 hrs

## 2020-09-23 NOTE — PROGRESS NOTE ADULT - PROBLEM SELECTOR PLAN 7
S/p Peg 8/26 by GI   Tolerating Enteral Feeds  Patient seen by Speech yesterday Dysphagia eval held due to air trapping with PMV Awaiting follow up with Speech Pathology for Possible MBS   GI ppx with Pepcid 20mg daily

## 2020-09-24 LAB
ANION GAP SERPL CALC-SCNC: 11 MMOL/L — SIGNIFICANT CHANGE UP (ref 5–17)
BUN SERPL-MCNC: 31 MG/DL — HIGH (ref 7–23)
CALCIUM SERPL-MCNC: 10 MG/DL — SIGNIFICANT CHANGE UP (ref 8.4–10.5)
CHLORIDE SERPL-SCNC: 99 MMOL/L — SIGNIFICANT CHANGE UP (ref 96–108)
CO2 SERPL-SCNC: 23 MMOL/L — SIGNIFICANT CHANGE UP (ref 22–31)
CREAT SERPL-MCNC: 1.36 MG/DL — HIGH (ref 0.5–1.3)
GLUCOSE SERPL-MCNC: 106 MG/DL — HIGH (ref 70–99)
HCT VFR BLD CALC: 35.3 % — LOW (ref 39–50)
HGB BLD-MCNC: 10.9 G/DL — LOW (ref 13–17)
MCHC RBC-ENTMCNC: 27 PG — SIGNIFICANT CHANGE UP (ref 27–34)
MCHC RBC-ENTMCNC: 30.9 GM/DL — LOW (ref 32–36)
MCV RBC AUTO: 87.4 FL — SIGNIFICANT CHANGE UP (ref 80–100)
NRBC # BLD: 0 /100 WBCS — SIGNIFICANT CHANGE UP (ref 0–0)
PLATELET # BLD AUTO: 364 K/UL — SIGNIFICANT CHANGE UP (ref 150–400)
POTASSIUM SERPL-MCNC: 4.8 MMOL/L — SIGNIFICANT CHANGE UP (ref 3.5–5.3)
POTASSIUM SERPL-SCNC: 4.8 MMOL/L — SIGNIFICANT CHANGE UP (ref 3.5–5.3)
RBC # BLD: 4.04 M/UL — LOW (ref 4.2–5.8)
RBC # FLD: 15.9 % — HIGH (ref 10.3–14.5)
SODIUM SERPL-SCNC: 133 MMOL/L — LOW (ref 135–145)
WBC # BLD: 6.28 K/UL — SIGNIFICANT CHANGE UP (ref 3.8–10.5)
WBC # FLD AUTO: 6.28 K/UL — SIGNIFICANT CHANGE UP (ref 3.8–10.5)

## 2020-09-24 PROCEDURE — 99233 SBSQ HOSP IP/OBS HIGH 50: CPT

## 2020-09-24 RX ORDER — FAMOTIDINE 10 MG/ML
20 INJECTION INTRAVENOUS DAILY
Refills: 0 | Status: DISCONTINUED | OUTPATIENT
Start: 2020-09-24 | End: 2020-10-13

## 2020-09-24 RX ORDER — HYDRALAZINE HCL 50 MG
100 TABLET ORAL THREE TIMES A DAY
Refills: 0 | Status: DISCONTINUED | OUTPATIENT
Start: 2020-09-24 | End: 2020-10-13

## 2020-09-24 RX ORDER — ATORVASTATIN CALCIUM 80 MG/1
80 TABLET, FILM COATED ORAL AT BEDTIME
Refills: 0 | Status: DISCONTINUED | OUTPATIENT
Start: 2020-09-24 | End: 2020-10-13

## 2020-09-24 RX ORDER — FENTANYL CITRATE 50 UG/ML
1 INJECTION INTRAVENOUS
Refills: 0 | Status: DISCONTINUED | OUTPATIENT
Start: 2020-09-24 | End: 2020-09-26

## 2020-09-24 RX ORDER — SODIUM CHLORIDE 9 MG/ML
1 INJECTION INTRAMUSCULAR; INTRAVENOUS; SUBCUTANEOUS EVERY 8 HOURS
Refills: 0 | Status: DISCONTINUED | OUTPATIENT
Start: 2020-09-24 | End: 2020-09-28

## 2020-09-24 RX ADMIN — LIDOCAINE 1 PATCH: 4 CREAM TOPICAL at 11:51

## 2020-09-24 RX ADMIN — LIDOCAINE 1 PATCH: 4 CREAM TOPICAL at 16:16

## 2020-09-24 RX ADMIN — ISOSORBIDE DINITRATE 20 MILLIGRAM(S): 5 TABLET ORAL at 16:14

## 2020-09-24 RX ADMIN — Medication 1 DROP(S): at 06:04

## 2020-09-24 RX ADMIN — FAMOTIDINE 20 MILLIGRAM(S): 10 INJECTION INTRAVENOUS at 11:51

## 2020-09-24 RX ADMIN — Medication 1 TABLET(S): at 17:39

## 2020-09-24 RX ADMIN — Medication 81 MILLIGRAM(S): at 11:51

## 2020-09-24 RX ADMIN — Medication 50 MILLIGRAM(S): at 10:59

## 2020-09-24 RX ADMIN — LIDOCAINE 1 PATCH: 4 CREAM TOPICAL at 11:00

## 2020-09-24 RX ADMIN — HEPARIN SODIUM 5000 UNIT(S): 5000 INJECTION INTRAVENOUS; SUBCUTANEOUS at 13:37

## 2020-09-24 RX ADMIN — Medication 50 MILLIGRAM(S): at 23:16

## 2020-09-24 RX ADMIN — QUETIAPINE FUMARATE 50 MILLIGRAM(S): 200 TABLET, FILM COATED ORAL at 06:03

## 2020-09-24 RX ADMIN — LIDOCAINE 1 PATCH: 4 CREAM TOPICAL at 08:44

## 2020-09-24 RX ADMIN — SODIUM CHLORIDE 1 GRAM(S): 9 INJECTION INTRAMUSCULAR; INTRAVENOUS; SUBCUTANEOUS at 13:37

## 2020-09-24 RX ADMIN — FENTANYL CITRATE 1 PATCH: 50 INJECTION INTRAVENOUS at 16:13

## 2020-09-24 RX ADMIN — ISOSORBIDE DINITRATE 20 MILLIGRAM(S): 5 TABLET ORAL at 08:48

## 2020-09-24 RX ADMIN — Medication 100 MILLIGRAM(S): at 23:16

## 2020-09-24 RX ADMIN — Medication 100 MILLIGRAM(S): at 13:37

## 2020-09-24 RX ADMIN — QUETIAPINE FUMARATE 75 MILLIGRAM(S): 200 TABLET, FILM COATED ORAL at 23:13

## 2020-09-24 RX ADMIN — SODIUM CHLORIDE 1 GRAM(S): 9 INJECTION INTRAMUSCULAR; INTRAVENOUS; SUBCUTANEOUS at 23:10

## 2020-09-24 RX ADMIN — LIDOCAINE 1 PATCH: 4 CREAM TOPICAL at 23:15

## 2020-09-24 RX ADMIN — QUETIAPINE FUMARATE 50 MILLIGRAM(S): 200 TABLET, FILM COATED ORAL at 13:37

## 2020-09-24 RX ADMIN — Medication 100 MILLIGRAM(S): at 06:03

## 2020-09-24 RX ADMIN — FENTANYL CITRATE 1 PATCH: 50 INJECTION INTRAVENOUS at 08:43

## 2020-09-24 RX ADMIN — Medication 8 MILLIGRAM(S): at 23:11

## 2020-09-24 RX ADMIN — Medication 1 DROP(S): at 23:10

## 2020-09-24 RX ADMIN — Medication 100 MILLIGRAM(S): at 08:48

## 2020-09-24 RX ADMIN — Medication 100 MILLIGRAM(S): at 11:51

## 2020-09-24 RX ADMIN — ATORVASTATIN CALCIUM 80 MILLIGRAM(S): 80 TABLET, FILM COATED ORAL at 23:11

## 2020-09-24 RX ADMIN — CHLORHEXIDINE GLUCONATE 1 APPLICATION(S): 213 SOLUTION TOPICAL at 06:04

## 2020-09-24 RX ADMIN — Medication 1 TABLET(S): at 06:04

## 2020-09-24 RX ADMIN — HEPARIN SODIUM 5000 UNIT(S): 5000 INJECTION INTRAVENOUS; SUBCUTANEOUS at 06:04

## 2020-09-24 RX ADMIN — Medication 1 DROP(S): at 17:39

## 2020-09-24 RX ADMIN — HEPARIN SODIUM 5000 UNIT(S): 5000 INJECTION INTRAVENOUS; SUBCUTANEOUS at 23:10

## 2020-09-24 RX ADMIN — LIDOCAINE 1 PATCH: 4 CREAM TOPICAL at 23:50

## 2020-09-24 RX ADMIN — Medication 1 DROP(S): at 11:51

## 2020-09-24 RX ADMIN — LIDOCAINE 1 PATCH: 4 CREAM TOPICAL at 02:10

## 2020-09-24 RX ADMIN — ISOSORBIDE DINITRATE 20 MILLIGRAM(S): 5 TABLET ORAL at 23:11

## 2020-09-24 NOTE — PROGRESS NOTE ADULT - PROBLEM SELECTOR PLAN 1
Patient S/p Tracheostomy on 8/24 with ENT  Tolerating continuous trach collar since 8/30  Tracheostomy Downsized to # 6 Cuffless Portex   Continue Chest PT and Suctioning PRN   PMV Trials resumed with Speech Pathologist

## 2020-09-24 NOTE — PROGRESS NOTE ADULT - SUBJECTIVE AND OBJECTIVE BOX
Patient is a 60y old  Male who presents with a chief complaint of Cardiac arrest (21 Sep 2020 13:52)      Interval Events: No events reported overnight      REVIEW OF SYSTEMS:  [ ] Positive  [x] All other systems negative  [ ] Unable to assess ROS because ________    Vital Signs Last 24 Hrs  T(C): 36.7 (09-24-20 @ 05:14), Max: 36.9 (09-23-20 @ 21:50)  T(F): 98.1 (09-24-20 @ 05:14), Max: 98.4 (09-23-20 @ 21:50)  HR: 54 (09-24-20 @ 05:14) (51 - 69)  BP: 152/82 (09-24-20 @ 05:14) (124/84 - 152/82)  RR: 19 (09-24-20 @ 05:14) (18 - 22)  SpO2: 99% (09-24-20 @ 05:14) (97% - 100%)    PHYSICAL EXAM:  HEENT:   [x]Tracheostomy: # 6 Cuffless Portex   [x]Pupils: Right eye ptosis, Dilated fixed right pupil; Left PERRL  [ ]No oral lesions  [ ]Abnormal    SKIN  [x]No Rash  [ ] Abnormal  [ ] pressure    CARDIAC  [x]Regular  [ ]Abnormal    PULMONARY  [x]Bilateral Clear Breath Sounds  [ ]Normal Excursion  [ ]Abnormal    GI  [x]PEG      [x] +BS		              [x]Soft, nondistended, nontender	  [ ]Abnormal    MUSCULOSKELETAL                                   [ ]Bedbound                 [ ]Abnormal    [x]OOB to chair                           EXTREMITIES                                         [x]Normal  [ ]Edema                           NEUROLOGIC  [ ] Normal, non focal  [x] Focal findings: Able to follow commands and respond to questions ; Hemiparesis of Left upper and Left Lower extremity       PSYCHIATRIC  [x]Alert and appropriate  [ ] Sedated	 [ ]Agitated    :  Umaña: [ ] Yes, if yes: Date of Placement:                   [x] No; Straight cath atc for Urinary Retention     LINES: Central Lines [ ] Yes, if yes: Date of Placement                                     [x] No    HOSPITAL MEDICATIONS:  MEDICATIONS  (STANDING):  amantadine Syrup 100 milliGRAM(s) Oral <User Schedule>  artificial  tears Solution 1 Drop(s) Both EYES every 6 hours  aspirin  chewable 81 milliGRAM(s) Enteral Tube daily  atorvastatin 80 milliGRAM(s) Oral at bedtime  bisacodyl Suppository 10 milliGRAM(s) Rectal at bedtime  chlorhexidine 4% Liquid 1 Application(s) Topical <User Schedule>  dextrose 5%. 1000 milliLiter(s) (50 mL/Hr) IV Continuous <Continuous>  dextrose 50% Injectable 12.5 Gram(s) IV Push once  dextrose 50% Injectable 25 Gram(s) IV Push once  dextrose 50% Injectable 25 Gram(s) IV Push once  doxazosin 8 milliGRAM(s) Oral at bedtime  famotidine    Tablet 20 milliGRAM(s) Oral daily  fentaNYL   Patch  50 MICROgram(s)/Hr 1 Patch Transdermal every 72 hours  heparin   Injectable 5000 Unit(s) SubCutaneous every 8 hours  hydrALAZINE 100 milliGRAM(s) Oral three times a day  isosorbide   dinitrate Tablet (ISORDIL) 20 milliGRAM(s) Enteral Tube <User Schedule>  lactobacillus acidophilus 1 Tablet(s) Oral two times a day  lidocaine   Patch 1 Patch Transdermal every 24 hours  lidocaine   Patch 1 Patch Transdermal daily  metoprolol tartrate 50 milliGRAM(s) Oral <User Schedule>  QUEtiapine 50 milliGRAM(s) Oral <User Schedule>  QUEtiapine 75 milliGRAM(s) Oral <User Schedule>    MEDICATIONS  (PRN):  dextrose 40% Gel 15 Gram(s) Oral once PRN Blood Glucose LESS THAN 70 milliGRAM(s)/deciliter  glucagon  Injectable 1 milliGRAM(s) IntraMuscular once PRN Glucose LESS THAN 70 milligrams/deciliter  LORazepam     Tablet 1 milliGRAM(s) Oral every 8 hours PRN Agitation  polyethylene glycol 3350 17 Gram(s) Oral daily PRN Constipation      LABS:                        10.9   6.28  )-----------( 364      ( 24 Sep 2020 07:01 )             35.3     09-24    133<L>  |  99  |  31<H>  ----------------------------<  106<H>  4.8   |  23  |  1.36<H>    Ca    10.0      24 Sep 2020 07:01  Phos  4.2     09-23  Mg     1.7     09-23              CAPILLARY BLOOD GLUCOSE    MICROBIOLOGY:     RADIOLOGY:  [ ] Reviewed and interpreted by me

## 2020-09-24 NOTE — PROGRESS NOTE ADULT - PROBLEM SELECTOR PLAN 7
S/p Peg 8/26 by GI   If patient tolerates progressive PMV Trials without air trapping will attempt objective swallow study   GI ppx with Pepcid 20mg daily

## 2020-09-24 NOTE — PROGRESS NOTE ADULT - ASSESSMENT
Patient 60-year-old male with a history of A-Fib on warfarin who presented with cardiac arrest at work with downtime of about 25 minutes prior to ROSC. Patient S/p therapeutic hypothermia. 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 Now with resolved neurogenic/cardiogenic shock. Course complicated by GI bleed s/p PPI gtt, bleeding from Umaña s/p clot irrigation, prolonged respiratory failure s/p trach (8/24) and PEG (8/26). Patients with Afib with RVR during admission; previously on amiodarone since discontinued now rate controlled with Lopressor. Patient with Urinary retention during Hospitalization likely 2/2 neurogenic bladder; Pt developed traumatic hematuria; requiring urology to perform clot evacuation. Patient now on Cardura. Patient now requiring Straight Catheterization ATC. Patients hospital course complicated by Pseudomonas PNA and C.Diff. Patient S/p Cefepime  and enteral vanco. Patient was weaned to TC ATC during admission and tracheostomy was downsized to # 6 Cuffless Portex.  Patient with agitation restlessness during admission requiring Seroquel and ativan prn.     9/24: Patients Straight Catheterization increased to q 4 hrs yesterday in setting of large volume urinary retention. Patient still remains with JALEN will continue to monitor if continues to increase will need to place Umaña Catheter. Will add Sodium 1 gram q 8 hrs. Speech will attempt short 15-20 min PMV trials if tolerates without air trapping will consider Objective swallow study.

## 2020-09-24 NOTE — CHART NOTE - NSCHARTNOTEFT_GEN_A_CORE
Mr. Vieyra is a 60-year-old male with a history of A-Fib on warfarin who presents with cardiac arrest at work with downtime of about 25 minutes prior to ROSC. He is s/p cooling to 35 degrees. He required mechanical ventilation, neuromuscular blockade, sedation, and pressors for neurogenic/cardiogenic shock. 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 Cerebral angiogram without evidence of aneurysm. ICU course complicated by GI bleed s/p PPI gtt and bleeding from scott s/p clot irrigation by urology; suspected 2/2 traumatic catheterization. 8/21: Episode of respiratory distress overnight; desaturated 85%, mucus plug/ambu bagged. Pt w/ prolonged respiratory failure s/p trach (#8 DCT Tracheostomy)-Trach  secured with sutures x4 and Umbilical Tie- on 8/24 and PEG on 8/26. Noted on echo to have acute LV systolic heart failure, possible stunned myocardium due to cardiac arrest vs. SAH. Currently, he is awake and alert, moving RUE/RLE spontaneously, following simple commands. (+) A-Fib with RVR. Being treated for C diff colitis. Noted pt w/ trach change to #7 cuffless Portex on 9/3 however due to it becoming dislodged trach was changed to #8 cuffless Shiley by ENT on 9/5. Trach down-sized to #6 Cuffless Portex on 9/14. Cleared for PMV by SLP on 9/17. 9/15: Patient complaining of not being able to see, poor vision. States he sees shadows and unable ot identify objects with his left eye. Right remains with ptosis, fixed and dilated pupil. PM&R rx acute TBI rehab. Pt with agitation throughout hospital course. [facility] requesting his agitation medication regimen be optimized prior to discharge. S&S for swallow reassessment.      Patient seen for passy-jenn speaking valve follow up to ensure tolerance of PMV. Patient maintained on 30% FIO2 via trach collar. Observed pt with mild wet vocal quality, which cleared following throat clearing. Suction was not needed. Low pressure valve placed on the hub of the trach. Patient was initially hypophonic however as trial progressed improvement in quality. VSS stable throughout assessment. No signs of respiratory distress. No increased work of breathing. No subjective complaints. No signs of air trapping. Valve removed after minutes 10 minutes as pt returned to sleep state despite external stimulation. Nurse, JOSAFAT Ramos, indicated pt with increased lethargy this date. NP Lucila aware of this tx. Purposeful proactive rounding reinforced and 5 Ps addressed. Pt left in no distress.       Recommendations:  PMV 15-20 minutes at a time, with 100% supervision to ensure tolerance, throughout the day   Will follow up on 9/27 to determine appropriateness for instrumental assessment, will call team for order if appropriate  Please see sheets posted at [facility] for PMV use and when to remove       Kristine Mendoza MS CCC-SLP pager [phone]  Speech Language Pathologist  [id number]

## 2020-09-24 NOTE — PROGRESS NOTE ADULT - PROBLEM SELECTOR PLAN 9
Straight Catheterization increased to q 4hrs due to large volume retention   If JALEN continues to worsen; patient will require Umaña   Patient will need Umaña on Discharge   Continue Cardura 8 mg HS

## 2020-09-24 NOTE — PROGRESS NOTE ADULT - PROBLEM SELECTOR PLAN 8
Patient remains with mild JALEN   Continue to monitor BMP   Hyponatremia: Sodium Chloride 1 gram q 8 hrs added

## 2020-09-24 NOTE — PROGRESS NOTE ADULT - ATTENDING COMMENTS
Pt is a 59 yo M with h/o A-Fib, s/p cardiac arrest at work with downtime of about 25 minutes. Upon hospital work up pt found to have R perimesencephalic SAH of unclear etiology with cerebral angiogram on 8/10 negative for aneurysm. s/p therapeutic hypothermia. Pt with prolonged and complicated hospitalization s/p neurogenic/cardiogenic shock, GIB, bleeding from Umaña s/p clot irrigation, prolonged respiratory failure s/p trach (8/24) and PEG (8/26), Pseudomonas PNA and C.diff colitis.    Resp: O2 via TC/ ENT consult noted  CVS: Cont current antiHTN meds/ Eventual Cardio work up  Heme: Cont Asa and DVT prophylaxis with sq Heparin/ ? restarting AC for A fib since SAH was in early August  FEN: Speech/Swallow evaluation noted; No PMV and NPO; further recommendations as per Speech/Swallow f/u/ Enteral feeds via PEG/ Pt mildly hyponatremic may start Na tabs; follow Na  Renal/: Straight cath increased to q4 hrs  Neuro: Cont Amantadine/ Cont Fentanyl patch, Seroquel and would prefer to minimize Benzos  Aggressive PT/OT/ OOB->chair.

## 2020-09-25 LAB
ANION GAP SERPL CALC-SCNC: 13 MMOL/L — SIGNIFICANT CHANGE UP (ref 5–17)
BUN SERPL-MCNC: 30 MG/DL — HIGH (ref 7–23)
CALCIUM SERPL-MCNC: 10.3 MG/DL — SIGNIFICANT CHANGE UP (ref 8.4–10.5)
CHLORIDE SERPL-SCNC: 98 MMOL/L — SIGNIFICANT CHANGE UP (ref 96–108)
CO2 SERPL-SCNC: 23 MMOL/L — SIGNIFICANT CHANGE UP (ref 22–31)
CREAT SERPL-MCNC: 1.25 MG/DL — SIGNIFICANT CHANGE UP (ref 0.5–1.3)
GLUCOSE SERPL-MCNC: 95 MG/DL — SIGNIFICANT CHANGE UP (ref 70–99)
POTASSIUM SERPL-MCNC: 4.5 MMOL/L — SIGNIFICANT CHANGE UP (ref 3.5–5.3)
POTASSIUM SERPL-SCNC: 4.5 MMOL/L — SIGNIFICANT CHANGE UP (ref 3.5–5.3)
SODIUM SERPL-SCNC: 134 MMOL/L — LOW (ref 135–145)

## 2020-09-25 PROCEDURE — 99233 SBSQ HOSP IP/OBS HIGH 50: CPT

## 2020-09-25 RX ADMIN — SODIUM CHLORIDE 1 GRAM(S): 9 INJECTION INTRAMUSCULAR; INTRAVENOUS; SUBCUTANEOUS at 06:08

## 2020-09-25 RX ADMIN — Medication 50 MILLIGRAM(S): at 21:27

## 2020-09-25 RX ADMIN — Medication 1 TABLET(S): at 06:08

## 2020-09-25 RX ADMIN — HEPARIN SODIUM 5000 UNIT(S): 5000 INJECTION INTRAVENOUS; SUBCUTANEOUS at 21:28

## 2020-09-25 RX ADMIN — LIDOCAINE 1 PATCH: 4 CREAM TOPICAL at 12:27

## 2020-09-25 RX ADMIN — Medication 1 TABLET(S): at 17:41

## 2020-09-25 RX ADMIN — Medication 1 DROP(S): at 17:41

## 2020-09-25 RX ADMIN — HEPARIN SODIUM 5000 UNIT(S): 5000 INJECTION INTRAVENOUS; SUBCUTANEOUS at 06:09

## 2020-09-25 RX ADMIN — Medication 1 DROP(S): at 12:28

## 2020-09-25 RX ADMIN — Medication 1 DROP(S): at 06:09

## 2020-09-25 RX ADMIN — QUETIAPINE FUMARATE 50 MILLIGRAM(S): 200 TABLET, FILM COATED ORAL at 06:08

## 2020-09-25 RX ADMIN — FENTANYL CITRATE 1 PATCH: 50 INJECTION INTRAVENOUS at 23:48

## 2020-09-25 RX ADMIN — FENTANYL CITRATE 1 PATCH: 50 INJECTION INTRAVENOUS at 06:06

## 2020-09-25 RX ADMIN — Medication 100 MILLIGRAM(S): at 15:31

## 2020-09-25 RX ADMIN — LIDOCAINE 1 PATCH: 4 CREAM TOPICAL at 12:30

## 2020-09-25 RX ADMIN — Medication 8 MILLIGRAM(S): at 21:29

## 2020-09-25 RX ADMIN — Medication 50 MILLIGRAM(S): at 09:08

## 2020-09-25 RX ADMIN — ISOSORBIDE DINITRATE 20 MILLIGRAM(S): 5 TABLET ORAL at 09:08

## 2020-09-25 RX ADMIN — ISOSORBIDE DINITRATE 20 MILLIGRAM(S): 5 TABLET ORAL at 23:54

## 2020-09-25 RX ADMIN — QUETIAPINE FUMARATE 75 MILLIGRAM(S): 200 TABLET, FILM COATED ORAL at 21:30

## 2020-09-25 RX ADMIN — Medication 100 MILLIGRAM(S): at 12:28

## 2020-09-25 RX ADMIN — Medication 10 MILLIGRAM(S): at 21:28

## 2020-09-25 RX ADMIN — ATORVASTATIN CALCIUM 80 MILLIGRAM(S): 80 TABLET, FILM COATED ORAL at 21:28

## 2020-09-25 RX ADMIN — ISOSORBIDE DINITRATE 20 MILLIGRAM(S): 5 TABLET ORAL at 17:41

## 2020-09-25 RX ADMIN — QUETIAPINE FUMARATE 50 MILLIGRAM(S): 200 TABLET, FILM COATED ORAL at 15:31

## 2020-09-25 RX ADMIN — Medication 1 DROP(S): at 23:54

## 2020-09-25 RX ADMIN — Medication 100 MILLIGRAM(S): at 21:28

## 2020-09-25 RX ADMIN — Medication 100 MILLIGRAM(S): at 06:08

## 2020-09-25 RX ADMIN — LIDOCAINE 1 PATCH: 4 CREAM TOPICAL at 23:48

## 2020-09-25 RX ADMIN — SODIUM CHLORIDE 1 GRAM(S): 9 INJECTION INTRAMUSCULAR; INTRAVENOUS; SUBCUTANEOUS at 14:00

## 2020-09-25 RX ADMIN — HEPARIN SODIUM 5000 UNIT(S): 5000 INJECTION INTRAVENOUS; SUBCUTANEOUS at 15:31

## 2020-09-25 RX ADMIN — Medication 100 MILLIGRAM(S): at 09:07

## 2020-09-25 RX ADMIN — LIDOCAINE 1 PATCH: 4 CREAM TOPICAL at 21:27

## 2020-09-25 RX ADMIN — Medication 81 MILLIGRAM(S): at 12:28

## 2020-09-25 RX ADMIN — SODIUM CHLORIDE 1 GRAM(S): 9 INJECTION INTRAMUSCULAR; INTRAVENOUS; SUBCUTANEOUS at 21:28

## 2020-09-25 RX ADMIN — CHLORHEXIDINE GLUCONATE 1 APPLICATION(S): 213 SOLUTION TOPICAL at 06:08

## 2020-09-25 RX ADMIN — FAMOTIDINE 20 MILLIGRAM(S): 10 INJECTION INTRAVENOUS at 12:28

## 2020-09-25 NOTE — PROGRESS NOTE ADULT - ASSESSMENT
Patient 60-year-old male with a history of A-Fib on warfarin who presented with cardiac arrest at work with downtime of about 25 minutes prior to ROSC. Patient S/p therapeutic hypothermia. 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 Now with resolved neurogenic/cardiogenic shock. Course complicated by GI bleed s/p PPI gtt, bleeding from Umaña s/p clot irrigation, prolonged respiratory failure s/p trach (8/24) and PEG (8/26). Patients with Afib with RVR during admission; previously on amiodarone since discontinued now rate controlled with Lopressor. Patient with Urinary retention during Hospitalization likely 2/2 neurogenic bladder; Pt developed traumatic hematuria; requiring urology to perform clot evacuation. Patient now on Cardura. Patient now requiring Straight Catheterization ATC. Patients hospital course complicated by Pseudomonas PNA and C.Diff. Patient S/p Cefepime  and enteral vanco. Patient was weaned to TC ATC during admission and tracheostomy was downsized to # 6 Cuffless Portex.  Patient with agitation restlessness during admission requiring Seroquel and ativan prn.     9/25: Large Volume Urinary retention improved with 4 hrs straight catheterization for large volume urinary retention. Speech continues will attempt short 15-20 min PMV trials if tolerates without air trapping will consider Objective swallow study. Patient 60-year-old male with a history of A-Fib on warfarin who presented with cardiac arrest at work with downtime of about 25 minutes prior to ROSC. Patient S/p therapeutic hypothermia. 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 Now with resolved neurogenic/cardiogenic shock. Course complicated by GI bleed s/p PPI gtt, bleeding from Umaña s/p clot irrigation, prolonged respiratory failure s/p trach (8/24) and PEG (8/26). Patients with Afib with RVR during admission; previously on amiodarone since discontinued now rate controlled with Lopressor. Patient with Urinary retention during Hospitalization likely 2/2 neurogenic bladder; Pt developed traumatic hematuria; requiring urology to perform clot evacuation. Patient now on Cardura. Patient now requiring Straight Catheterization ATC. Patients hospital course complicated by Pseudomonas PNA and C.Diff. Patient S/p Cefepime  and enteral vanco. Patient was weaned to TC ATC during admission and tracheostomy was downsized to # 6 Cuffless Portex.  Patient with agitation restlessness during admission requiring Seroquel and ativan prn.     9/25: Large Volume Urinary retention improved with q 4 hrs straight catheterization. Speech continues to attempt short 15-20 min PMV trials if continues to tolerates without air trapping will consider Objective swallow study next week.

## 2020-09-25 NOTE — PROGRESS NOTE ADULT - SUBJECTIVE AND OBJECTIVE BOX
Patient is a 60y old  Male who presents with a chief complaint of Cardiac arrest (21 Sep 2020 13:52)      Interval Events: No events reported overnight     REVIEW OF SYSTEMS:  [ ] Positive  [x] All other systems negative  [ ] Unable to assess ROS because ________    Vital Signs Last 24 Hrs  T(C): 36.7 (09-25-20 @ 04:17), Max: 36.7 (09-24-20 @ 20:48)  T(F): 98.1 (09-25-20 @ 04:17), Max: 98.1 (09-24-20 @ 20:48)  HR: 70 (09-25-20 @ 05:25) (60 - 73)  BP: 155/91 (09-25-20 @ 04:17) (111/69 - 158/76)  RR: 18 (09-25-20 @ 05:25) (18 - 20)  SpO2: 97% (09-25-20 @ 05:25) (97% - 100%)    PHYSICAL EXAM:  HEENT:   [x]Tracheostomy: # 6 Cuffless Portex   [x]Pupils: Right eye ptosis, Dilated fixed right pupil; Left PERRL  [ ]No oral lesions  [ ]Abnormal    SKIN  [x]No Rash  [ ] Abnormal  [ ] pressure    CARDIAC  [x]Regular  [ ]Abnormal    PULMONARY  [x]Bilateral Clear Breath Sounds  [ ]Normal Excursion  [ ]Abnormal    GI  [x]PEG      [x] +BS		              [x]Soft, nondistended, nontender	  [ ]Abnormal    MUSCULOSKELETAL                                   [ ]Bedbound                 [ ]Abnormal    [x]OOB to chair                           EXTREMITIES                                         [x]Normal  [ ]Edema                           NEUROLOGIC  [ ] Normal, non focal  [x] Focal findings: Able to follow commands and respond to questions ; Hemiparesis of Left upper and Left Lower extremity       PSYCHIATRIC  [x]Alert and appropriate  [ ] Sedated	 [ ]Agitated    :  Umaña: [ ] Yes, if yes: Date of Placement:                   [x] No; Straight cath atc for Urinary Retention     LINES: Central Lines [ ] Yes, if yes: Date of Placement                                     [x] No    HOSPITAL MEDICATIONS:  MEDICATIONS  (STANDING):  amantadine Syrup 100 milliGRAM(s) Oral <User Schedule>  artificial  tears Solution 1 Drop(s) Both EYES every 6 hours  aspirin  chewable 81 milliGRAM(s) Enteral Tube daily  atorvastatin 80 milliGRAM(s) Oral at bedtime  bisacodyl Suppository 10 milliGRAM(s) Rectal at bedtime  chlorhexidine 4% Liquid 1 Application(s) Topical <User Schedule>  dextrose 5%. 1000 milliLiter(s) (50 mL/Hr) IV Continuous <Continuous>  dextrose 50% Injectable 12.5 Gram(s) IV Push once  dextrose 50% Injectable 25 Gram(s) IV Push once  dextrose 50% Injectable 25 Gram(s) IV Push once  doxazosin 8 milliGRAM(s) Oral at bedtime  famotidine    Tablet 20 milliGRAM(s) Oral daily  fentaNYL   Patch  50 MICROgram(s)/Hr 1 Patch Transdermal every 72 hours  heparin   Injectable 5000 Unit(s) SubCutaneous every 8 hours  hydrALAZINE 100 milliGRAM(s) Oral three times a day  isosorbide   dinitrate Tablet (ISORDIL) 20 milliGRAM(s) Enteral Tube <User Schedule>  lactobacillus acidophilus 1 Tablet(s) Oral two times a day  lidocaine   Patch 1 Patch Transdermal every 24 hours  lidocaine   Patch 1 Patch Transdermal daily  metoprolol tartrate 50 milliGRAM(s) Oral <User Schedule>  QUEtiapine 50 milliGRAM(s) Oral <User Schedule>  QUEtiapine 75 milliGRAM(s) Oral <User Schedule>  sodium chloride 1 Gram(s) Oral every 8 hours    MEDICATIONS  (PRN):  dextrose 40% Gel 15 Gram(s) Oral once PRN Blood Glucose LESS THAN 70 milliGRAM(s)/deciliter  glucagon  Injectable 1 milliGRAM(s) IntraMuscular once PRN Glucose LESS THAN 70 milligrams/deciliter  LORazepam     Tablet 1 milliGRAM(s) Oral every 8 hours PRN Agitation  polyethylene glycol 3350 17 Gram(s) Oral daily PRN Constipation      LABS:                        10.9   6.28  )-----------( 364      ( 24 Sep 2020 07:01 )             35.3     09-25    134<L>  |  98  |  30<H>  ----------------------------<  95  4.5   |  23  |  1.25    Ca    10.3      25 Sep 2020 06:47              CAPILLARY BLOOD GLUCOSE    MICROBIOLOGY:     RADIOLOGY:  [ ] Reviewed and interpreted by me

## 2020-09-25 NOTE — PROGRESS NOTE ADULT - PROBLEM SELECTOR PLAN 5
Completed Abx for Pseudomonas aeruginosa pneumonia/colonization:  S/p prolonged course of cefepime until 8/14-8/20   If develops increased secretions, may benefit from inhaled tobramycin  C diff: Completed oral Vanco 8/29 and PPX Dose completed on 9/16   UTI: Pseudomonas S/p Cefepime  Currently remains off abx Completed Abx for Pseudomonas aeruginosa pneumonia/colonization:  S/p prolonged course of cefepime until 8/14-8/20   If develops increased secretions, may benefit from inhaled tobramycin  C diff: Completed oral Vanco 8/29 and PPX Dose completed on 9/16   UTI: Pseudomonas S/p Cefepime  Currently remains off abx, Monitor Temperature curve

## 2020-09-25 NOTE — PROGRESS NOTE ADULT - PROBLEM SELECTOR PLAN 1
Patient S/p Tracheostomy on 8/24 with ENT  Tolerating continuous trach collar since 8/30  Tracheostomy Downsized to # 6 Cuffless Portex   Continue Chest PT and Suctioning PRN   PMV Trials resumed with Speech Pathologist Patient S/p Tracheostomy on 8/24 with ENT  Tolerating continuous trach collar since 8/30  Tracheostomy Downsized to # 6 Cuffless Portex   Continue Chest PT and Suctioning PRN   PMV Trials resumed with Speech Pathologist; Monitor for Airtrapping

## 2020-09-25 NOTE — PROGRESS NOTE ADULT - ATTENDING COMMENTS
Pt is a 61 yo M with h/o A-Fib, s/p cardiac arrest at work with downtime of about 25 minutes. Upon hospital work up pt found to have R perimesencephalic SAH of unclear etiology with cerebral angiogram on 8/10 negative for aneurysm. s/p therapeutic hypothermia. Pt with prolonged and complicated hospitalization s/p neurogenic/cardiogenic shock, GIB, bleeding from Umaña s/p clot irrigation, prolonged respiratory failure s/p trach (8/24) and PEG (8/26), Pseudomonas PNA and C.diff colitis.    Resp: O2 via TC/ ENT consult noted  CVS: Cont current antiHTN meds/ Eventual Cardio work up  Heme: Cont Asa and DVT prophylaxis with sq Heparin/ ? restarting AC for A fib since SAH was in early August  FEN: Speech/Swallow f/u noted; started on short trials of PMV/ Enteral feeds via PEG/ Pt mildly hyponatremic may start Na tabs; follow Na  Renal/: Straight cath increased to q4 hrs/ Cr slightly decreased  Neuro: Cont Amantadine/ Cont Fentanyl patch, Seroquel and would prefer to minimize Benzos  Aggressive PT/OT/ OOB->chair.  Social: Next week begin dc planning

## 2020-09-25 NOTE — PROGRESS NOTE ADULT - PROBLEM SELECTOR PLAN 8
Patient remains with mild JALEN   Continue to monitor BMP   Hyponatremia: Sodium Chloride 1 gram q 8 hrs added JALEN Improved with increased frequency of Straight Catheterization   Continue to monitor BMP   Hyponatremia: Continue Sodium Chloride 1 gram q 8 hrs

## 2020-09-25 NOTE — PROGRESS NOTE ADULT - PROBLEM SELECTOR PLAN 7
S/p Peg 8/26 by GI   If patient tolerates progressive PMV Trials without air trapping will attempt objective swallow study   GI ppx with Pepcid 20mg daily S/p Peg 8/26 by GI   If patient tolerates progressive PMV Trials without air trapping will attempt possible objective swallow study early next week

## 2020-09-25 NOTE — PROGRESS NOTE ADULT - PROBLEM SELECTOR PLAN 9
Straight Catheterization increased to q 4hrs due to large volume retention   If JALEN continues to worsen; patient will require Umaña   Patient will need Umaña on Discharge   Continue Cardura 8 mg HS Continue Straight Catheterization q 4hrs due to large volume retention   Patient will need Umaña on Discharge   Continue Cardura 8 mg HS

## 2020-09-26 LAB
ANION GAP SERPL CALC-SCNC: 11 MMOL/L — SIGNIFICANT CHANGE UP (ref 5–17)
BUN SERPL-MCNC: 27 MG/DL — HIGH (ref 7–23)
CALCIUM SERPL-MCNC: 10.2 MG/DL — SIGNIFICANT CHANGE UP (ref 8.4–10.5)
CHLORIDE SERPL-SCNC: 97 MMOL/L — SIGNIFICANT CHANGE UP (ref 96–108)
CO2 SERPL-SCNC: 24 MMOL/L — SIGNIFICANT CHANGE UP (ref 22–31)
CREAT SERPL-MCNC: 1.3 MG/DL — SIGNIFICANT CHANGE UP (ref 0.5–1.3)
GLUCOSE SERPL-MCNC: 106 MG/DL — HIGH (ref 70–99)
POTASSIUM SERPL-MCNC: 4.4 MMOL/L — SIGNIFICANT CHANGE UP (ref 3.5–5.3)
POTASSIUM SERPL-SCNC: 4.4 MMOL/L — SIGNIFICANT CHANGE UP (ref 3.5–5.3)
SODIUM SERPL-SCNC: 132 MMOL/L — LOW (ref 135–145)

## 2020-09-26 PROCEDURE — 93010 ELECTROCARDIOGRAM REPORT: CPT

## 2020-09-26 PROCEDURE — 99233 SBSQ HOSP IP/OBS HIGH 50: CPT | Mod: GC

## 2020-09-26 RX ORDER — FENTANYL CITRATE 50 UG/ML
1 INJECTION INTRAVENOUS
Refills: 0 | Status: DISCONTINUED | OUTPATIENT
Start: 2020-09-26 | End: 2020-10-01

## 2020-09-26 RX ADMIN — CHLORHEXIDINE GLUCONATE 1 APPLICATION(S): 213 SOLUTION TOPICAL at 06:16

## 2020-09-26 RX ADMIN — Medication 100 MILLIGRAM(S): at 09:02

## 2020-09-26 RX ADMIN — Medication 81 MILLIGRAM(S): at 12:24

## 2020-09-26 RX ADMIN — Medication 1 MILLIGRAM(S): at 12:25

## 2020-09-26 RX ADMIN — Medication 1 DROP(S): at 07:25

## 2020-09-26 RX ADMIN — Medication 100 MILLIGRAM(S): at 06:14

## 2020-09-26 RX ADMIN — SODIUM CHLORIDE 1 GRAM(S): 9 INJECTION INTRAMUSCULAR; INTRAVENOUS; SUBCUTANEOUS at 22:22

## 2020-09-26 RX ADMIN — ISOSORBIDE DINITRATE 20 MILLIGRAM(S): 5 TABLET ORAL at 09:02

## 2020-09-26 RX ADMIN — Medication 100 MILLIGRAM(S): at 13:53

## 2020-09-26 RX ADMIN — FENTANYL CITRATE 1 PATCH: 50 INJECTION INTRAVENOUS at 06:25

## 2020-09-26 RX ADMIN — FENTANYL CITRATE 1 PATCH: 50 INJECTION INTRAVENOUS at 19:50

## 2020-09-26 RX ADMIN — LIDOCAINE 1 PATCH: 4 CREAM TOPICAL at 19:48

## 2020-09-26 RX ADMIN — HEPARIN SODIUM 5000 UNIT(S): 5000 INJECTION INTRAVENOUS; SUBCUTANEOUS at 06:13

## 2020-09-26 RX ADMIN — QUETIAPINE FUMARATE 50 MILLIGRAM(S): 200 TABLET, FILM COATED ORAL at 13:53

## 2020-09-26 RX ADMIN — Medication 1 TABLET(S): at 17:49

## 2020-09-26 RX ADMIN — LIDOCAINE 1 PATCH: 4 CREAM TOPICAL at 12:26

## 2020-09-26 RX ADMIN — QUETIAPINE FUMARATE 75 MILLIGRAM(S): 200 TABLET, FILM COATED ORAL at 22:22

## 2020-09-26 RX ADMIN — HEPARIN SODIUM 5000 UNIT(S): 5000 INJECTION INTRAVENOUS; SUBCUTANEOUS at 13:54

## 2020-09-26 RX ADMIN — Medication 1 DROP(S): at 17:49

## 2020-09-26 RX ADMIN — Medication 100 MILLIGRAM(S): at 12:24

## 2020-09-26 RX ADMIN — SODIUM CHLORIDE 1 GRAM(S): 9 INJECTION INTRAMUSCULAR; INTRAVENOUS; SUBCUTANEOUS at 14:00

## 2020-09-26 RX ADMIN — Medication 50 MILLIGRAM(S): at 09:02

## 2020-09-26 RX ADMIN — FAMOTIDINE 20 MILLIGRAM(S): 10 INJECTION INTRAVENOUS at 12:25

## 2020-09-26 RX ADMIN — FENTANYL CITRATE 1 PATCH: 50 INJECTION INTRAVENOUS at 13:53

## 2020-09-26 RX ADMIN — Medication 8 MILLIGRAM(S): at 22:22

## 2020-09-26 RX ADMIN — HEPARIN SODIUM 5000 UNIT(S): 5000 INJECTION INTRAVENOUS; SUBCUTANEOUS at 22:22

## 2020-09-26 RX ADMIN — Medication 1 DROP(S): at 12:27

## 2020-09-26 RX ADMIN — ISOSORBIDE DINITRATE 20 MILLIGRAM(S): 5 TABLET ORAL at 17:49

## 2020-09-26 RX ADMIN — ATORVASTATIN CALCIUM 80 MILLIGRAM(S): 80 TABLET, FILM COATED ORAL at 22:22

## 2020-09-26 RX ADMIN — LIDOCAINE 1 PATCH: 4 CREAM TOPICAL at 13:53

## 2020-09-26 RX ADMIN — LIDOCAINE 1 PATCH: 4 CREAM TOPICAL at 22:23

## 2020-09-26 RX ADMIN — QUETIAPINE FUMARATE 50 MILLIGRAM(S): 200 TABLET, FILM COATED ORAL at 06:15

## 2020-09-26 RX ADMIN — Medication 1 TABLET(S): at 06:15

## 2020-09-26 RX ADMIN — SODIUM CHLORIDE 1 GRAM(S): 9 INJECTION INTRAMUSCULAR; INTRAVENOUS; SUBCUTANEOUS at 06:14

## 2020-09-26 NOTE — PROGRESS NOTE ADULT - ATTENDING COMMENTS
Patient doing well. Still having periods of agitation and confusion requiring redirection and ativan prn. Will reduce fentanyl patch. Application for insurance in progress and once obtained can apply for rehab facilities.

## 2020-09-26 NOTE — PROGRESS NOTE ADULT - PROBLEM SELECTOR PLAN 9
Continue Straight Catheterization q 4hrs due to large volume retention   Patient will need Umaña on Discharge   Continue Cardura 8 mg HS

## 2020-09-26 NOTE — PROGRESS NOTE ADULT - PROBLEM SELECTOR PLAN 7
S/p Peg 8/26 by GI   If patient tolerates progressive PMV Trials without air trapping will attempt possible objective swallow study early next week

## 2020-09-26 NOTE — PROGRESS NOTE ADULT - PROBLEM SELECTOR PLAN 8
JALEN Improved with increased frequency of Straight Catheterization   Continue to monitor BMP   Hyponatremia: Continue Sodium Chloride 1 gram q 8 hrs

## 2020-09-26 NOTE — PROGRESS NOTE ADULT - PROBLEM SELECTOR PLAN 5
Completed Abx for Pseudomonas aeruginosa pneumonia/colonization:  S/p prolonged course of cefepime until 8/14-8/20   If develops increased secretions, may benefit from inhaled tobramycin  C diff: Completed oral Vanco 8/29 and PPX Dose completed on 9/16   UTI: Pseudomonas S/p Cefepime  Currently remains off abx, Monitor Temperature curve

## 2020-09-26 NOTE — PROGRESS NOTE ADULT - PROBLEM SELECTOR PLAN 1
Patient S/p Tracheostomy on 8/24 with ENT  Tolerating continuous trach collar since 8/30  Tracheostomy Downsized to # 6 Cuffless Portex   Continue Chest PT and Suctioning PRN   PMV Trials resumed with Speech Pathologist; Monitor for [employer]

## 2020-09-26 NOTE — PROGRESS NOTE ADULT - PROBLEM SELECTOR PLAN 3
Continue Fentanyl Patch   Continue Seroquel 50 mg morning / afternoon   Continue Seroquel evening dose 75 mg   Continue PRN Ativan via PEG for agitation not treated by standing Seroquel Fentanyl Patch reduced to 25,cg Q72H on 9/26  Continue Seroquel 50 mg morning / afternoon   Continue Seroquel evening dose 75 mg   Continue PRN Ativan via PEG for agitation not treated by standing Seroquel

## 2020-09-26 NOTE — PROGRESS NOTE ADULT - SUBJECTIVE AND OBJECTIVE BOX
Patient is a 60y old  Male who presents with a chief complaint of Cardiac arrest (21 Sep 2020 13:52)      Interval Events:    REVIEW OF SYSTEMS:  [ ] Positive  [ ] All other systems negative  [ ] Unable to assess ROS because ________    Vital Signs Last 24 Hrs  T(C): 36.9 (09-26-20 @ 04:11), Max: 37.5 (09-25-20 @ 09:10)  T(F): 98.4 (09-26-20 @ 04:11), Max: 99.5 (09-25-20 @ 09:10)  HR: 67 (09-26-20 @ 05:21) (51 - 68)  BP: 142/95 (09-26-20 @ 04:11) (125/60 - 154/85)  RR: 19 (09-26-20 @ 05:21) (16 - 19)  SpO2: 98% (09-26-20 @ 05:21) (96% - 100%)    PHYSICAL EXAM:  HEENT:   [ ]Tracheostomy:  [ ]Pupils equal  [ ]No oral lesions  [ ]Abnormal    SKIN  [ ]No Rash  [ ] Abnormal  [ ] pressure    CARDIAC  [ ]Regular  [ ]Abnormal    PULMONARY  [ ]Bilateral Clear Breath Sounds  [ ]Normal Excursion  [ ]Abnormal    GI  [ ]PEG      [ ] +BS		              [ ]Soft, nondistended, nontender	  [ ]Abnormal    MUSCULOSKELETAL                                   [ ]Bedbound                 [ ]Abnormal    [ ]Ambulatory/OOB to chair                           EXTREMITIES                                         [ ]Normal  [ ]Edema                           NEUROLOGIC  [ ] Normal, non focal  [ ] Focal findings:    PSYCHIATRIC  [ ]Alert and appropriate  [ ] Sedated	 [ ]Agitated    :  Umaña: [ ] Yes, if yes: Date of Placement:                   [  ] No    LINES: Central Lines [ ] Yes, if yes: Date of Placement                                     [  ] No    HOSPITAL MEDICATIONS:  MEDICATIONS  (STANDING):  amantadine Syrup 100 milliGRAM(s) Oral <User Schedule>  artificial  tears Solution 1 Drop(s) Both EYES every 6 hours  aspirin  chewable 81 milliGRAM(s) Enteral Tube daily  atorvastatin 80 milliGRAM(s) Oral at bedtime  bisacodyl Suppository 10 milliGRAM(s) Rectal at bedtime  chlorhexidine 4% Liquid 1 Application(s) Topical <User Schedule>  dextrose 5%. 1000 milliLiter(s) (50 mL/Hr) IV Continuous <Continuous>  dextrose 50% Injectable 12.5 Gram(s) IV Push once  dextrose 50% Injectable 25 Gram(s) IV Push once  dextrose 50% Injectable 25 Gram(s) IV Push once  doxazosin 8 milliGRAM(s) Oral at bedtime  famotidine    Tablet 20 milliGRAM(s) Oral daily  fentaNYL   Patch  50 MICROgram(s)/Hr 1 Patch Transdermal every 72 hours  heparin   Injectable 5000 Unit(s) SubCutaneous every 8 hours  hydrALAZINE 100 milliGRAM(s) Oral three times a day  isosorbide   dinitrate Tablet (ISORDIL) 20 milliGRAM(s) Enteral Tube <User Schedule>  lactobacillus acidophilus 1 Tablet(s) Oral two times a day  lidocaine   Patch 1 Patch Transdermal every 24 hours  lidocaine   Patch 1 Patch Transdermal daily  metoprolol tartrate 50 milliGRAM(s) Oral <User Schedule>  QUEtiapine 50 milliGRAM(s) Oral <User Schedule>  QUEtiapine 75 milliGRAM(s) Oral <User Schedule>  sodium chloride 1 Gram(s) Oral every 8 hours    MEDICATIONS  (PRN):  dextrose 40% Gel 15 Gram(s) Oral once PRN Blood Glucose LESS THAN 70 milliGRAM(s)/deciliter  glucagon  Injectable 1 milliGRAM(s) IntraMuscular once PRN Glucose LESS THAN 70 milligrams/deciliter  LORazepam     Tablet 1 milliGRAM(s) Oral every 8 hours PRN Agitation  polyethylene glycol 3350 17 Gram(s) Oral daily PRN Constipation      LABS:    09-25    134<L>  |  98  |  30<H>  ----------------------------<  95  4.5   |  23  |  1.25    Ca    10.3      25 Sep 2020 06:47              CAPILLARY BLOOD GLUCOSE    MICROBIOLOGY:     RADIOLOGY:  [ ] Reviewed and interpreted by me     Patient is a 60y old  Male who presents with a chief complaint of Cardiac arrest......s/p SAH, s/p trach/PEG, tolerating TC ATC; awaiting insurance  for rehab placement.      Interval Events: no events reported over night    REVIEW OF SYSTEMS:  [ ] Positive  [X] All other systems negative  [ ] Unable to assess ROS because ________    Vital Signs Last 24 Hrs  T(C): 36.9 (09-26-20 @ 04:11), Max: 37.5 (09-25-20 @ 09:10)  T(F): 98.4 (09-26-20 @ 04:11), Max: 99.5 (09-25-20 @ 09:10)  HR: 67 (09-26-20 @ 05:21) (51 - 68)  BP: 142/95 (09-26-20 @ 04:11) (125/60 - 154/85)  RR: 19 (09-26-20 @ 05:21) (16 - 19)  SpO2: 98% (09-26-20 @ 05:21) (96% - 100%)        PHYSICAL EXAM:  HEENT:   [X]Tracheostomy:  large healing stoma; #6 Cuffless Portex  [X] Right eye ptosis, Dilated fixed right pupil; Left PERRL  [X] No oral lesions  [ ] Abnormal    SKIN  [X] No Rash  [ ] Abnormal  [ ] pressure    CARDIAC  [X] Regular  [ ] Abnormal    PULMONARY  [ ] Bilateral Clear Breath sounds  [ ] Normal Excursion  [X] Abnormal: mild ronchi anteriorly B/L    GI  [X] PEG      [X] +BS		              [X] Soft, nondistended, nontender	  [ ]Abnormal    MUSCULOSKELETAL                                   [ ] Bedbound                 [ ] Abnormal    [X] OOB to chair with assistance    EXTREMITIES                                         [X] Normal  [ ]Edema                           NEUROLOGIC  [ ] Normal, non focal  [X] Focal findings: Alert and responsive; able to follow commands and respond to questions appropriately sometimes; moves RUE/RLE; has some proximal muscle movement of LUE/LLE and can move limbs towards midline on command; no movement of LUE/LLE distal limbs.    PSYCHIATRIC  [X] Alert  [ ] Sedated	 [ ]Agitated    :  Umaña: [ ] Yes, if yes: Date of Placement:                   [X] No    LINES: Central Lines [ ] Yes, if yes: Date of Placement                                     [X] No      HOSPITAL MEDICATIONS:  MEDICATIONS  (STANDING):  amantadine Syrup 100 milliGRAM(s) Oral <User Schedule>  artificial  tears Solution 1 Drop(s) Both EYES every 6 hours  aspirin  chewable 81 milliGRAM(s) Enteral Tube daily  atorvastatin 80 milliGRAM(s) Oral at bedtime  bisacodyl Suppository 10 milliGRAM(s) Rectal at bedtime  chlorhexidine 4% Liquid 1 Application(s) Topical <User Schedule>  dextrose 5%. 1000 milliLiter(s) (50 mL/Hr) IV Continuous <Continuous>  dextrose 50% Injectable 12.5 Gram(s) IV Push once  dextrose 50% Injectable 25 Gram(s) IV Push once  dextrose 50% Injectable 25 Gram(s) IV Push once  doxazosin 8 milliGRAM(s) Oral at bedtime  famotidine    Tablet 20 milliGRAM(s) Oral daily  fentaNYL   Patch  50 MICROgram(s)/Hr 1 Patch Transdermal every 72 hours  heparin   Injectable 5000 Unit(s) SubCutaneous every 8 hours  hydrALAZINE 100 milliGRAM(s) Oral three times a day  isosorbide   dinitrate Tablet (ISORDIL) 20 milliGRAM(s) Enteral Tube <User Schedule>  lactobacillus acidophilus 1 Tablet(s) Oral two times a day  lidocaine   Patch 1 Patch Transdermal every 24 hours  lidocaine   Patch 1 Patch Transdermal daily  metoprolol tartrate 50 milliGRAM(s) Oral <User Schedule>  QUEtiapine 50 milliGRAM(s) Oral <User Schedule>  QUEtiapine 75 milliGRAM(s) Oral <User Schedule>  sodium chloride 1 Gram(s) Oral every 8 hours    MEDICATIONS  (PRN):  dextrose 40% Gel 15 Gram(s) Oral once PRN Blood Glucose LESS THAN 70 milliGRAM(s)/deciliter  glucagon  Injectable 1 milliGRAM(s) IntraMuscular once PRN Glucose LESS THAN 70 milligrams/deciliter  LORazepam     Tablet 1 milliGRAM(s) Oral every 8 hours PRN Agitation  polyethylene glycol 3350 17 Gram(s) Oral daily PRN Constipation      LABS:        CAPILLARY BLOOD GLUCOSE    MICROBIOLOGY:     RADIOLOGY:  [ ] Reviewed and interpreted by me

## 2020-09-26 NOTE — PROGRESS NOTE ADULT - ASSESSMENT
Patient 60-year-old male with a history of A-Fib on warfarin who presented with cardiac arrest at work with downtime of about 25 minutes prior to ROSC. Patient S/p therapeutic hypothermia. 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 Now with resolved neurogenic/cardiogenic shock. Course complicated by GI bleed s/p PPI gtt, bleeding from Umaña s/p clot irrigation, prolonged respiratory failure s/p trach (8/24) and PEG (8/26). Patients with Afib with RVR during admission; previously on amiodarone since discontinued now rate controlled with Lopressor. Patient with Urinary retention during Hospitalization likely 2/2 neurogenic bladder; Pt developed traumatic hematuria; requiring urology to perform clot evacuation. Patient now on Cardura. Patient now requiring Straight Catheterization ATC. Patients hospital course complicated by Pseudomonas PNA and C.Diff. Patient S/p Cefepime  and enteral vanco. Patient was weaned to TC ATC during admission and tracheostomy was downsized to # 6 Cuffless Portex.  Patient with agitation restlessness during admission requiring Seroquel and ativan prn.     9/25: Large Volume Urinary retention improved with q 4 hrs straight catheterization. Speech continues to attempt short 15-20 min PMV trials if continues to tolerates without air trapping will consider Objective swallow study next week. Patient 60-year-old male with a history of A-Fib on warfarin who presented with cardiac arrest at work with downtime of about 25 minutes prior to ROSC. Patient S/p therapeutic hypothermia. 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 Now with resolved neurogenic/cardiogenic shock. Course complicated by GI bleed s/p PPI gtt, bleeding from Uma[place] s/p clot irrigation, prolonged respiratory failure s/p trach (8/24) and PEG (8/26). Patients with Afib with RVR during admission; previously on amiodarone since discontinued now rate controlled with Lopressor. Patient with Urinary retention during Hospitalization likely 2/2 neurogenic bladder; Pt developed traumatic hematuria; requiring urology to perform clot evacuation. Patient now on Cardura. Patient now requiring Straight Catheterization ATC. Patients hospital course complicated by Pseudomonas PNA and C.Diff. Patient S/p Cefepime  and enteral vanco. Patient was weaned to TC ATC during admission and tracheostomy was downsized to # 6 Cuffless Portex.  Patient with agitation restlessness during admission requiring Seroquel and ativan prn.     9/25: Large Volume Urinary retention improved with q 4 hrs straight catheterization. Speech continues to attempt short 15-20 min PMV trials if continues to tolerates without air trapping will consider Objective swallow study next week.   9/26: Patient doing well. Still having periods of agitation and confusion requiring redirection and ativan prn. Will reduce fentanyl patch. Application for insurance in progress and once obtained can apply for rehab facilities.

## 2020-09-26 NOTE — CHART NOTE - NSCHARTNOTEFT_GEN_A_CORE
Notified by RN pt was bradycardic to 38-39 while sleeping. HR went back right after pt wake up. Pt was bradycardic to 50s during the day. Pt seen and evaluated at bedside. Denies CP, SOB, dizziness, n/v, HA or any pain.     Vital Signs Last 24 Hrs  T(C): 36.6 (09-26-20 @ 20:04), Max: 36.9 (09-26-20 @ 04:11)  T(F): 97.8 (09-26-20 @ 20:04), Max: 98.4 (09-26-20 @ 04:11)  HR: 57 (09-26-20 @ 21:07) (49 - 76)  BP: 128/80 (09-26-20 @ 20:21) (96/62 - 158/90)  RR: 8 (09-26-20 @ 21:07) (8 - 20)  SpO2: 100% (09-26-20 @ 21:07) (98% - 100%)     Daily   I&O's Summary    25 Sep 2020 07:01  -  26 Sep 2020 07:00  --------------------------------------------------------  IN: 2420 mL / OUT: 650 mL / NET: 1770 mL    26 Sep 2020 07:01  -  26 Sep 2020 22:09  --------------------------------------------------------  IN: 1060 mL / OUT: 700 mL / NET: 360 mL    CAPILLARY BLOOD GLUCOSE    09-26    132<L>  |  97  |  27<H>  ----------------------------<  106<H>  4.4   |  24  |  1.30    Ca    10.2      26 Sep 2020 06:55      PHYSICAL EXAM:  GENERAL: NAD, lying comfortably on bed  Respiratory: trach collar  HEART: irregular, no murmurs    Assessment/Plan: HPI:  Patient 60-year-old male with a history of A-Fib on warfarin who presented with cardiac arrest at work with downtime of about 25 minutes prior to ROSC. Patient S/p therapeutic hypothermia. 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 Now with resolved neurogenic/cardiogenic shock. Course complicated by GI bleed s/p PPI gtt, bleeding from Umaña s/p clot irrigation, prolonged respiratory failure s/p trach (8/24) and PEG (8/26). Patients with Afib with RVR during admission; previously on amiodarone since discontinued now rate controlled with Lopressor. Patient was weaned to TC ATC during admission and tracheostomy was downsized to # 6 Cuffless Portex.      Now pt had bradycardia down to 39 while asleep. HR went back up right away after pt woke up from sleep. Asymptomatic.     # bradycardia   - atropine and R2 pads at bedside   - hold av radha blocker (evening dose lopressor held)  - EKG shows afib w/ slow ventricular response w/ HR 55, No ischemic changed on EKG. No ST elevation or depression, TWI in lead I, aVL, V4, V5, V6 but also found from previous EKG, Pt has no CP  - continue to monitor closely   - consider tele monitor   - will endorse to RCU team in am    Emily Jacobo PA-c  [id number]

## 2020-09-27 LAB — SARS-COV-2 RNA SPEC QL NAA+PROBE: SIGNIFICANT CHANGE UP

## 2020-09-27 PROCEDURE — 99232 SBSQ HOSP IP/OBS MODERATE 35: CPT | Mod: GC

## 2020-09-27 RX ADMIN — Medication 8 MILLIGRAM(S): at 22:17

## 2020-09-27 RX ADMIN — ISOSORBIDE DINITRATE 20 MILLIGRAM(S): 5 TABLET ORAL at 23:59

## 2020-09-27 RX ADMIN — LIDOCAINE 1 PATCH: 4 CREAM TOPICAL at 10:30

## 2020-09-27 RX ADMIN — FENTANYL CITRATE 1 PATCH: 50 INJECTION INTRAVENOUS at 07:15

## 2020-09-27 RX ADMIN — ISOSORBIDE DINITRATE 20 MILLIGRAM(S): 5 TABLET ORAL at 00:28

## 2020-09-27 RX ADMIN — Medication 81 MILLIGRAM(S): at 11:22

## 2020-09-27 RX ADMIN — SODIUM CHLORIDE 1 GRAM(S): 9 INJECTION INTRAMUSCULAR; INTRAVENOUS; SUBCUTANEOUS at 05:17

## 2020-09-27 RX ADMIN — SODIUM CHLORIDE 1 GRAM(S): 9 INJECTION INTRAMUSCULAR; INTRAVENOUS; SUBCUTANEOUS at 13:44

## 2020-09-27 RX ADMIN — SODIUM CHLORIDE 1 GRAM(S): 9 INJECTION INTRAMUSCULAR; INTRAVENOUS; SUBCUTANEOUS at 22:17

## 2020-09-27 RX ADMIN — Medication 1 DROP(S): at 12:14

## 2020-09-27 RX ADMIN — Medication 100 MILLIGRAM(S): at 05:17

## 2020-09-27 RX ADMIN — Medication 1 TABLET(S): at 17:22

## 2020-09-27 RX ADMIN — Medication 1 DROP(S): at 23:59

## 2020-09-27 RX ADMIN — Medication 100 MILLIGRAM(S): at 08:34

## 2020-09-27 RX ADMIN — LIDOCAINE 1 PATCH: 4 CREAM TOPICAL at 23:44

## 2020-09-27 RX ADMIN — LIDOCAINE 1 PATCH: 4 CREAM TOPICAL at 00:23

## 2020-09-27 RX ADMIN — Medication 100 MILLIGRAM(S): at 11:21

## 2020-09-27 RX ADMIN — HEPARIN SODIUM 5000 UNIT(S): 5000 INJECTION INTRAVENOUS; SUBCUTANEOUS at 22:17

## 2020-09-27 RX ADMIN — ATORVASTATIN CALCIUM 80 MILLIGRAM(S): 80 TABLET, FILM COATED ORAL at 22:11

## 2020-09-27 RX ADMIN — HEPARIN SODIUM 5000 UNIT(S): 5000 INJECTION INTRAVENOUS; SUBCUTANEOUS at 05:18

## 2020-09-27 RX ADMIN — CHLORHEXIDINE GLUCONATE 1 APPLICATION(S): 213 SOLUTION TOPICAL at 05:18

## 2020-09-27 RX ADMIN — QUETIAPINE FUMARATE 50 MILLIGRAM(S): 200 TABLET, FILM COATED ORAL at 05:17

## 2020-09-27 RX ADMIN — QUETIAPINE FUMARATE 75 MILLIGRAM(S): 200 TABLET, FILM COATED ORAL at 22:16

## 2020-09-27 RX ADMIN — LIDOCAINE 1 PATCH: 4 CREAM TOPICAL at 12:14

## 2020-09-27 RX ADMIN — Medication 1 DROP(S): at 00:28

## 2020-09-27 RX ADMIN — FENTANYL CITRATE 1 PATCH: 50 INJECTION INTRAVENOUS at 19:22

## 2020-09-27 RX ADMIN — Medication 1 TABLET(S): at 05:17

## 2020-09-27 RX ADMIN — Medication 100 MILLIGRAM(S): at 22:17

## 2020-09-27 RX ADMIN — HEPARIN SODIUM 5000 UNIT(S): 5000 INJECTION INTRAVENOUS; SUBCUTANEOUS at 13:43

## 2020-09-27 RX ADMIN — Medication 100 MILLIGRAM(S): at 13:44

## 2020-09-27 RX ADMIN — QUETIAPINE FUMARATE 50 MILLIGRAM(S): 200 TABLET, FILM COATED ORAL at 13:44

## 2020-09-27 RX ADMIN — FAMOTIDINE 20 MILLIGRAM(S): 10 INJECTION INTRAVENOUS at 12:15

## 2020-09-27 RX ADMIN — LIDOCAINE 1 PATCH: 4 CREAM TOPICAL at 22:17

## 2020-09-27 RX ADMIN — ISOSORBIDE DINITRATE 20 MILLIGRAM(S): 5 TABLET ORAL at 08:34

## 2020-09-27 RX ADMIN — Medication 1 DROP(S): at 05:18

## 2020-09-27 RX ADMIN — LIDOCAINE 1 PATCH: 4 CREAM TOPICAL at 19:23

## 2020-09-27 RX ADMIN — ISOSORBIDE DINITRATE 20 MILLIGRAM(S): 5 TABLET ORAL at 17:21

## 2020-09-27 RX ADMIN — Medication 1 DROP(S): at 17:22

## 2020-09-27 RX ADMIN — LIDOCAINE 1 PATCH: 4 CREAM TOPICAL at 07:15

## 2020-09-27 RX ADMIN — Medication 50 MILLIGRAM(S): at 19:52

## 2020-09-27 NOTE — PROGRESS NOTE ADULT - SUBJECTIVE AND OBJECTIVE BOX
Patient is a 60y old  Male who presents with a chief complaint of Cardiac arrest (21 Sep 2020 13:52)      Interval Events:    REVIEW OF SYSTEMS:  [ ] Positive  [ ] All other systems negative  [ ] Unable to assess ROS because ________    Vital Signs Last 24 Hrs  T(C): 36.4 (09-27-20 @ 04:51), Max: 36.7 (09-27-20 @ 00:28)  T(F): 97.6 (09-27-20 @ 04:51), Max: 98.1 (09-27-20 @ 00:28)  HR: 62 (09-27-20 @ 04:51) (49 - 76)  BP: 135/88 (09-27-20 @ 04:51) (96/62 - 158/90)  RR: 18 (09-27-20 @ 04:51) (18 - 20)  SpO2: 97% (09-27-20 @ 04:51) (97% - 100%)    PHYSICAL EXAM:  HEENT:   [ ]Tracheostomy:  [ ]Pupils equal  [ ]No oral lesions  [ ]Abnormal    SKIN  [ ]No Rash  [ ] Abnormal  [ ] pressure    CARDIAC  [ ]Regular  [ ]Abnormal    PULMONARY  [ ]Bilateral Clear Breath Sounds  [ ]Normal Excursion  [ ]Abnormal    GI  [ ]PEG      [ ] +BS		              [ ]Soft, nondistended, nontender	  [ ]Abnormal    MUSCULOSKELETAL                                   [ ]Bedbound                 [ ]Abnormal    [ ]Ambulatory/OOB to chair                           EXTREMITIES                                         [ ]Normal  [ ]Edema                           NEUROLOGIC  [ ] Normal, non focal  [ ] Focal findings:    PSYCHIATRIC  [ ]Alert and appropriate  [ ] Sedated	 [ ]Agitated    :  Umaña: [ ] Yes, if yes: Date of Placement:                   [  ] No    LINES: Central Lines [ ] Yes, if yes: Date of Placement                                     [  ] No    HOSPITAL MEDICATIONS:  MEDICATIONS  (STANDING):  amantadine Syrup 100 milliGRAM(s) Oral <User Schedule>  artificial  tears Solution 1 Drop(s) Both EYES every 6 hours  aspirin  chewable 81 milliGRAM(s) Enteral Tube daily  atorvastatin 80 milliGRAM(s) Oral at bedtime  bisacodyl Suppository 10 milliGRAM(s) Rectal at bedtime  chlorhexidine 4% Liquid 1 Application(s) Topical <User Schedule>  dextrose 5%. 1000 milliLiter(s) (50 mL/Hr) IV Continuous <Continuous>  dextrose 50% Injectable 25 Gram(s) IV Push once  dextrose 50% Injectable 25 Gram(s) IV Push once  dextrose 50% Injectable 12.5 Gram(s) IV Push once  doxazosin 8 milliGRAM(s) Oral at bedtime  famotidine    Tablet 20 milliGRAM(s) Oral daily  fentaNYL   Patch  12 MICROgram(s)/Hr 1 Patch Transdermal every 72 hours  heparin   Injectable 5000 Unit(s) SubCutaneous every 8 hours  hydrALAZINE 100 milliGRAM(s) Oral three times a day  isosorbide   dinitrate Tablet (ISORDIL) 20 milliGRAM(s) Enteral Tube <User Schedule>  lactobacillus acidophilus 1 Tablet(s) Oral two times a day  lidocaine   Patch 1 Patch Transdermal every 24 hours  lidocaine   Patch 1 Patch Transdermal daily  metoprolol tartrate 50 milliGRAM(s) Oral <User Schedule>  QUEtiapine 75 milliGRAM(s) Oral <User Schedule>  QUEtiapine 50 milliGRAM(s) Oral <User Schedule>  sodium chloride 1 Gram(s) Oral every 8 hours    MEDICATIONS  (PRN):  dextrose 40% Gel 15 Gram(s) Oral once PRN Blood Glucose LESS THAN 70 milliGRAM(s)/deciliter  glucagon  Injectable 1 milliGRAM(s) IntraMuscular once PRN Glucose LESS THAN 70 milligrams/deciliter  LORazepam     Tablet 1 milliGRAM(s) Oral every 8 hours PRN Agitation  polyethylene glycol 3350 17 Gram(s) Oral daily PRN Constipation      LABS:    09-26    132<L>  |  97  |  27<H>  ----------------------------<  106<H>  4.4   |  24  |  1.30    Ca    10.2      26 Sep 2020 06:55              CAPILLARY BLOOD GLUCOSE    MICROBIOLOGY:     RADIOLOGY:  [ ] Reviewed and interpreted by me     Patient is a 60y old  Male who presents with a chief complaint of Cardiac arrest (21 Sep 2020 13:52)      Interval Events: No events over night    REVIEW OF SYSTEMS:  [ ] Positive  [X] All other systems negative  [ ] Unable to assess ROS because ____    Vital Signs Last 24 Hrs  T(C): 36.4 (09-27-20 @ 04:51), Max: 36.7 (09-27-20 @ 00:28)  T(F): 97.6 (09-27-20 @ 04:51), Max: 98.1 (09-27-20 @ 00:28)  HR: 62 (09-27-20 @ 04:51) (49 - 76)  BP: 135/88 (09-27-20 @ 04:51) (96/62 - 158/90)  RR: 18 (09-27-20 @ 04:51) (18 - 20)  SpO2: 97% (09-27-20 @ 04:51) (97% - 100%)      PHYSICAL EXAM:  HEENT:   [X]Tracheostomy:  large healing stoma; #6 Cuffless Portex  [X] Right eye ptosis, Dilated fixed right pupil; Left PERRL  [X] No oral lesions  [ ] Abnormal    SKIN  [X] No Rash  [ ] Abnormal  [ ] pressure    CARDIAC  [X] Regular  [ ] Abnormal    PULMONARY  [X] Bilateral Clear Breath sounds  [ ] Normal Excursion  [ ] Abnormal:     GI  [X] PEG      [X] +BS		              [X] Soft, nondistended, nontender	  [ ]Abnormal    MUSCULOSKELETAL                                   [ ] Bedbound                 [ ] Abnormal    [X] OOB to chair with assistance    EXTREMITIES                                         [X] Normal  [ ]Edema                           NEUROLOGIC  [ ] Normal, non focal  [X] Focal findings: Alert and responsive; able to follow commands and respond to questions appropriately sometimes; moves RUE/RLE; has some proximal muscle movement of LUE/LLE and can move limbs towards midline on command; no movement of LUE/LLE distal limbs.    PSYCHIATRIC  [X] Alert  [ ] Sedated	 [ ]Agitated    :  Uma[place]: [ ] Yes, if yes: Date of Placement:                   [X] No    LINES: Central Lines [ ] Yes, if yes: Date of Placement                                     [X] No        HOSPITAL MEDICATIONS:  MEDICATIONS  (STANDING):  amantadine Syrup 100 milliGRAM(s) Oral <User Schedule>  artificial  tears Solution 1 Drop(s) Both EYES every 6 hours  aspirin  chewable 81 milliGRAM(s) Enteral Tube daily  atorvastatin 80 milliGRAM(s) Oral at bedtime  bisacodyl Suppository 10 milliGRAM(s) Rectal at bedtime  chlorhexidine 4% Liquid 1 Application(s) Topical <User Schedule>  dextrose 5%. 1000 milliLiter(s) (50 mL/Hr) IV Continuous <Continuous>  dextrose 50% Injectable 25 Gram(s) IV Push once  dextrose 50% Injectable 25 Gram(s) IV Push once  dextrose 50% Injectable 12.5 Gram(s) IV Push once  doxazosin 8 milliGRAM(s) Oral at bedtime  famotidine    Tablet 20 milliGRAM(s) Oral daily  fentaNYL   Patch  12 MICROgram(s)/Hr 1 Patch Transdermal every 72 hours  heparin   Injectable 5000 Unit(s) SubCutaneous every 8 hours  hydrALAZINE 100 milliGRAM(s) Oral three times a day  isosorbide   dinitrate Tablet (ISORDIL) 20 milliGRAM(s) Enteral Tube <User Schedule>  lactobacillus acidophilus 1 Tablet(s) Oral two times a day  lidocaine   Patch 1 Patch Transdermal every 24 hours  lidocaine   Patch 1 Patch Transdermal daily  metoprolol tartrate 50 milliGRAM(s) Oral <User Schedule>  QUEtiapine 75 milliGRAM(s) Oral <User Schedule>  QUEtiapine 50 milliGRAM(s) Oral <User Schedule>  sodium chloride 1 Gram(s) Oral every 8 hours    MEDICATIONS  (PRN):  dextrose 40% Gel 15 Gram(s) Oral once PRN Blood Glucose LESS THAN 70 milliGRAM(s)/deciliter  glucagon  Injectable 1 milliGRAM(s) IntraMuscular once PRN Glucose LESS THAN 70 milligrams/deciliter  LORazepam     Tablet 1 milliGRAM(s) Oral every 8 hours PRN Agitation  polyethylene glycol 3350 17 Gram(s) Oral daily PRN Constipation      LABS:    09-26    132<L>  |  97  |  27<H>  ----------------------------<  106<H>  4.4   |  24  |  1.30    Ca    10.2      26 Sep 2020 06:55              CAPILLARY BLOOD GLUCOSE    MICROBIOLOGY:     RADIOLOGY:  [ ] Reviewed and interpreted by me

## 2020-09-27 NOTE — PROGRESS NOTE ADULT - ATTENDING COMMENTS
9/26: Patient doing well. Still having periods of agitation and confusion requiring redirection and ativan prn. Will reduce fentanyl patch. Application for insurance in progress and once obtained can apply for rehab facilities.  9/27: No new events. Patient remains medically stable.

## 2020-09-27 NOTE — PROGRESS NOTE ADULT - PROBLEM SELECTOR PLAN 8
JALEN Improved with increased frequency of Straight Catheterization   Continue to monitor BMP   Hyponatremia: Continue Sodium Chloride 1 gram q 8 hrs JALEN resolved with increased frequency of Straight Catheterization   Continue to monitor BMP   Hyponatremia: Continue Sodium Chloride 1 gram q 8 hrs

## 2020-09-27 NOTE — PROGRESS NOTE ADULT - ASSESSMENT
Patient 60-year-old male with a history of A-Fib on warfarin who presented with cardiac arrest at work with downtime of about 25 minutes prior to ROSC. Patient S/p therapeutic hypothermia. 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 Now with resolved neurogenic/cardiogenic shock. Course complicated by GI bleed s/p PPI gtt, bleeding from Umaña s/p clot irrigation, prolonged respiratory failure s/p trach (8/24) and PEG (8/26). Patients with Afib with RVR during admission; previously on amiodarone since discontinued now rate controlled with Lopressor. Patient with Urinary retention during Hospitalization likely 2/2 neurogenic bladder; Pt developed traumatic hematuria; requiring urology to perform clot evacuation. Patient now on Cardura. Patient now requiring Straight Catheterization ATC. Patients hospital course complicated by Pseudomonas PNA and C.Diff. Patient S/p Cefepime  and enteral vanco. Patient was weaned to TC ATC during admission and tracheostomy was downsized to # 6 Cuffless Portex.  Patient with agitation restlessness during admission requiring Seroquel and ativan prn.     9/25: Large Volume Urinary retention improved with q 4 hrs straight catheterization. Speech continues to attempt short 15-20 min PMV trials if continues to tolerates without air trapping will consider Objective swallow study next week.   9/26: Patient doing well. Still having periods of agitation and confusion requiring redirection and ativan prn. Will reduce fentanyl patch. Application for insurance in progress and once obtained can apply for rehab facilities. Patient 60-year-old male with a history of A-Fib on warfarin who presented with cardiac arrest at work with downtime of about 25 minutes prior to ROSC. Patient S/p therapeutic hypothermia. 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 Now with resolved neurogenic/cardiogenic shock. Course complicated by GI bleed s/p PPI gtt, bleeding from Umaña s/p clot irrigation, prolonged respiratory failure s/p trach (8/24) and PEG (8/26). Patients with Afib with RVR during admission; previously on amiodarone since discontinued now rate controlled with Lopressor. Patient with Urinary retention during Hospitalization likely 2/2 neurogenic bladder; Pt developed traumatic hematuria; requiring urology to perform clot evacuation. Patient now on Cardura. Patient now requiring Straight Catheterization ATC. Patients hospital course complicated by Pseudomonas PNA and C.Diff. Patient S/p Cefepime  and enteral vanco. Patient was weaned to TC ATC during admission and tracheostomy was downsized to # 6 Cuffless Portex.  Patient with agitation restlessness during admission requiring Seroquel and ativan prn.     9/25: Large Volume Urinary retention improved with q 4 hrs straight catheterization. Speech continues to attempt short 15-20 min PMV trials if continues to tolerates without air trapping will consider Objective swallow study next week.   9/26: Patient doing well. Still having periods of agitation and confusion requiring redirection and ativan prn. Will reduce fentanyl patch. Application for insurance in progress and once obtained can apply for rehab facilities.  9/27: No new events. Patient remains medically stable.

## 2020-09-27 NOTE — CHART NOTE - NSCHARTNOTEFT_GEN_A_CORE
Mr. Vieyra is a 60-year-old male with a history of A-Fib on warfarin who presents with cardiac arrest at work with downtime of about 25 minutes prior to ROSC. He is s/p cooling to 35 degrees. He required mechanical ventilation, neuromuscular blockade, sedation, and pressors for neurogenic/cardiogenic shock. 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 Cerebral angiogram without evidence of aneurysm. ICU course complicated by GI bleed s/p PPI gtt and bleeding from scott s/p clot irrigation by urology; suspected 2/2 traumatic catheterization. 8/21: Episode of respiratory distress overnight; desaturated 85%, mucus plug/ambu bagged. Pt w/ prolonged respiratory failure s/p trach (#8 DCT Tracheostomy)-Trach  secured with sutures x4 and Umbilical Tie- on 8/24 and PEG on 8/26. Noted on echo to have acute LV systolic heart failure, possible stunned myocardium due to cardiac arrest vs. SAH. Currently, he is awake and alert, moving RUE/RLE spontaneously, following simple commands. (+) A-Fib with RVR. Being treated for C diff colitis. Noted pt w/ trach change to #7 cuffless Portex on 9/3 however due to it becoming dislodged trach was changed to #8 cuffless Shiley by ENT on 9/5. Trach down-sized to #6 Cuffless Portex on 9/14. Cleared for PMV by SLP on 9/17. 9/15: Patient complaining of not being able to see, poor vision. States he sees shadows and unable ot identify objects with his left eye. Right remains with ptosis, fixed and dilated pupil. PM&R rx acute TBI rehab.     Patient seen for monitor of [place] speaking valve to ensure tolerance. Patient maintained on 30% FIO2 via trach collar. Observed pt with mild wet vocal quality, which cleared following throat clearing. Suction was not needed. Low pressure valve placed on the hub of the trach. Vocal quality, remained clear and strong throughout. VSS stable throughout assessment. No signs of respiratory distress. No increased work of breathing. No subjective complaints. No signs of air trapping. Valve remained in place w/ RN Justin aware. D/w BONI Santacruz, plan for Bedside swallow evaluation on 9/28 and subsequent MBSS if pt appears appropriate.   Purposeful proactive rounding reinforced and 5 Ps addressed. Pt left in no distress.       Recommendations:  PMV with 100% supervision to ensure tolerance, throughout the day   BEDSIDE SWALLOW EVALUATION ORDER plan for 9/28  Will follow up on 9/28 to determine appropriateness for instrumental assessment, will request order from team if appropriate    Please see sheets posted at [facility] for PMV use and when to remove       Kristine Mendoza MS CCC-SLP pager [phone]  Speech Language Pathologist  [id number]

## 2020-09-27 NOTE — PROGRESS NOTE ADULT - PROBLEM SELECTOR PLAN 3
Fentanyl Patch reduced to 25,cg Q72H on 9/26  Continue Seroquel 50 mg morning / afternoon   Continue Seroquel evening dose 75 mg   Continue PRN Ativan via PEG for agitation not treated by standing Seroquel Fentanyl Patch reduced to 12mcg Q72H on 9/26  Continue Seroquel 50 mg morning / afternoon   Continue Seroquel evening dose 75 mg   Continue PRN Ativan via PEG for agitation not treated by standing Seroquel

## 2020-09-28 LAB
ANION GAP SERPL CALC-SCNC: 12 MMOL/L — SIGNIFICANT CHANGE UP (ref 5–17)
BUN SERPL-MCNC: 25 MG/DL — HIGH (ref 7–23)
CALCIUM SERPL-MCNC: 10.2 MG/DL — SIGNIFICANT CHANGE UP (ref 8.4–10.5)
CHLORIDE SERPL-SCNC: 99 MMOL/L — SIGNIFICANT CHANGE UP (ref 96–108)
CO2 SERPL-SCNC: 22 MMOL/L — SIGNIFICANT CHANGE UP (ref 22–31)
CREAT SERPL-MCNC: 1.14 MG/DL — SIGNIFICANT CHANGE UP (ref 0.5–1.3)
GLUCOSE SERPL-MCNC: 116 MG/DL — HIGH (ref 70–99)
HCT VFR BLD CALC: 37.1 % — LOW (ref 39–50)
HGB BLD-MCNC: 11.5 G/DL — LOW (ref 13–17)
MAGNESIUM SERPL-MCNC: 1.7 MG/DL — SIGNIFICANT CHANGE UP (ref 1.6–2.6)
MCHC RBC-ENTMCNC: 26.9 PG — LOW (ref 27–34)
MCHC RBC-ENTMCNC: 31 GM/DL — LOW (ref 32–36)
MCV RBC AUTO: 86.7 FL — SIGNIFICANT CHANGE UP (ref 80–100)
NRBC # BLD: 0 /100 WBCS — SIGNIFICANT CHANGE UP (ref 0–0)
PHOSPHATE SERPL-MCNC: 4 MG/DL — SIGNIFICANT CHANGE UP (ref 2.5–4.5)
PLATELET # BLD AUTO: 340 K/UL — SIGNIFICANT CHANGE UP (ref 150–400)
POTASSIUM SERPL-MCNC: 4.5 MMOL/L — SIGNIFICANT CHANGE UP (ref 3.5–5.3)
POTASSIUM SERPL-SCNC: 4.5 MMOL/L — SIGNIFICANT CHANGE UP (ref 3.5–5.3)
RBC # BLD: 4.28 M/UL — SIGNIFICANT CHANGE UP (ref 4.2–5.8)
RBC # FLD: 15.8 % — HIGH (ref 10.3–14.5)
SODIUM SERPL-SCNC: 133 MMOL/L — LOW (ref 135–145)
WBC # BLD: 5.61 K/UL — SIGNIFICANT CHANGE UP (ref 3.8–10.5)
WBC # FLD AUTO: 5.61 K/UL — SIGNIFICANT CHANGE UP (ref 3.8–10.5)

## 2020-09-28 PROCEDURE — 99233 SBSQ HOSP IP/OBS HIGH 50: CPT | Mod: GC

## 2020-09-28 PROCEDURE — 74230 X-RAY XM SWLNG FUNCJ C+: CPT | Mod: 26

## 2020-09-28 RX ORDER — SODIUM CHLORIDE 9 MG/ML
1 INJECTION INTRAMUSCULAR; INTRAVENOUS; SUBCUTANEOUS EVERY 6 HOURS
Refills: 0 | Status: DISCONTINUED | OUTPATIENT
Start: 2020-09-28 | End: 2020-10-14

## 2020-09-28 RX ADMIN — Medication 1 TABLET(S): at 05:18

## 2020-09-28 RX ADMIN — QUETIAPINE FUMARATE 50 MILLIGRAM(S): 200 TABLET, FILM COATED ORAL at 13:09

## 2020-09-28 RX ADMIN — Medication 100 MILLIGRAM(S): at 21:46

## 2020-09-28 RX ADMIN — FAMOTIDINE 20 MILLIGRAM(S): 10 INJECTION INTRAVENOUS at 12:31

## 2020-09-28 RX ADMIN — Medication 50 MILLIGRAM(S): at 20:13

## 2020-09-28 RX ADMIN — Medication 81 MILLIGRAM(S): at 12:30

## 2020-09-28 RX ADMIN — Medication 1 TABLET(S): at 17:12

## 2020-09-28 RX ADMIN — SODIUM CHLORIDE 1 GRAM(S): 9 INJECTION INTRAMUSCULAR; INTRAVENOUS; SUBCUTANEOUS at 17:11

## 2020-09-28 RX ADMIN — SODIUM CHLORIDE 1 GRAM(S): 9 INJECTION INTRAMUSCULAR; INTRAVENOUS; SUBCUTANEOUS at 05:18

## 2020-09-28 RX ADMIN — Medication 1 DROP(S): at 17:12

## 2020-09-28 RX ADMIN — Medication 1 MILLIGRAM(S): at 22:38

## 2020-09-28 RX ADMIN — LIDOCAINE 1 PATCH: 4 CREAM TOPICAL at 07:15

## 2020-09-28 RX ADMIN — HEPARIN SODIUM 5000 UNIT(S): 5000 INJECTION INTRAVENOUS; SUBCUTANEOUS at 13:09

## 2020-09-28 RX ADMIN — LIDOCAINE 1 PATCH: 4 CREAM TOPICAL at 21:46

## 2020-09-28 RX ADMIN — Medication 100 MILLIGRAM(S): at 05:18

## 2020-09-28 RX ADMIN — ISOSORBIDE DINITRATE 20 MILLIGRAM(S): 5 TABLET ORAL at 08:40

## 2020-09-28 RX ADMIN — Medication 10 MILLIGRAM(S): at 21:47

## 2020-09-28 RX ADMIN — Medication 100 MILLIGRAM(S): at 12:30

## 2020-09-28 RX ADMIN — ISOSORBIDE DINITRATE 20 MILLIGRAM(S): 5 TABLET ORAL at 17:11

## 2020-09-28 RX ADMIN — HEPARIN SODIUM 5000 UNIT(S): 5000 INJECTION INTRAVENOUS; SUBCUTANEOUS at 21:47

## 2020-09-28 RX ADMIN — Medication 100 MILLIGRAM(S): at 08:40

## 2020-09-28 RX ADMIN — Medication 8 MILLIGRAM(S): at 21:47

## 2020-09-28 RX ADMIN — LIDOCAINE 1 PATCH: 4 CREAM TOPICAL at 12:29

## 2020-09-28 RX ADMIN — HEPARIN SODIUM 5000 UNIT(S): 5000 INJECTION INTRAVENOUS; SUBCUTANEOUS at 05:19

## 2020-09-28 RX ADMIN — Medication 1 DROP(S): at 12:30

## 2020-09-28 RX ADMIN — Medication 1 DROP(S): at 05:18

## 2020-09-28 RX ADMIN — QUETIAPINE FUMARATE 75 MILLIGRAM(S): 200 TABLET, FILM COATED ORAL at 21:46

## 2020-09-28 RX ADMIN — CHLORHEXIDINE GLUCONATE 1 APPLICATION(S): 213 SOLUTION TOPICAL at 05:19

## 2020-09-28 RX ADMIN — QUETIAPINE FUMARATE 50 MILLIGRAM(S): 200 TABLET, FILM COATED ORAL at 05:18

## 2020-09-28 RX ADMIN — FENTANYL CITRATE 1 PATCH: 50 INJECTION INTRAVENOUS at 07:15

## 2020-09-28 RX ADMIN — LIDOCAINE 1 PATCH: 4 CREAM TOPICAL at 10:45

## 2020-09-28 RX ADMIN — SODIUM CHLORIDE 1 GRAM(S): 9 INJECTION INTRAMUSCULAR; INTRAVENOUS; SUBCUTANEOUS at 12:30

## 2020-09-28 RX ADMIN — FENTANYL CITRATE 1 PATCH: 50 INJECTION INTRAVENOUS at 20:44

## 2020-09-28 RX ADMIN — ATORVASTATIN CALCIUM 80 MILLIGRAM(S): 80 TABLET, FILM COATED ORAL at 21:47

## 2020-09-28 RX ADMIN — Medication 100 MILLIGRAM(S): at 13:10

## 2020-09-28 RX ADMIN — LIDOCAINE 1 PATCH: 4 CREAM TOPICAL at 20:13

## 2020-09-28 NOTE — PROGRESS NOTE ADULT - SUBJECTIVE AND OBJECTIVE BOX
Patient is a 60y old  Male who presents with a chief complaint of Cardiac arrest (21 Sep 2020 13:52)      Interval Events: No events reported overnight; Patient scheduled for follow up with speech today for bedside evaluation     REVIEW OF SYSTEMS:  [ ] Positive  [ ] All other systems negative  [ ] Unable to assess ROS because ________    Vital Signs Last 24 Hrs  T(C): 36.8 (09-28-20 @ 04:26), Max: 37 (09-27-20 @ 23:46)  T(F): 98.2 (09-28-20 @ 04:26), Max: 98.6 (09-27-20 @ 23:46)  HR: 60 (09-28-20 @ 04:26) (54 - 70)  BP: 155/93 (09-28-20 @ 04:26) (120/77 - 158/81)  RR: 20 (09-28-20 @ 04:26) (18 - 70)  SpO2: 97% (09-28-20 @ 04:26) (97% - 100%)    PHYSICAL EXAM:  HEENT:   [ ]Tracheostomy:  [ ]Pupils equal  [ ]No oral lesions  [ ]Abnormal    SKIN  [ ]No Rash  [ ] Abnormal  [ ] pressure    CARDIAC  [ ]Regular  [ ]Abnormal    PULMONARY  [ ]Bilateral Clear Breath Sounds  [ ]Normal Excursion  [ ]Abnormal    GI  [ ]PEG      [ ] +BS		              [ ]Soft, nondistended, nontender	  [ ]Abnormal    MUSCULOSKELETAL                                   [ ]Bedbound                 [ ]Abnormal    [ ]Ambulatory/OOB to chair                           EXTREMITIES                                         [ ]Normal  [ ]Edema                           NEUROLOGIC  [ ] Normal, non focal  [ ] Focal findings:    PSYCHIATRIC  [ ]Alert and appropriate  [ ] Sedated	 [ ]Agitated    :  Umaña: [ ] Yes, if yes: Date of Placement:                   [  ] No    LINES: Central Lines [ ] Yes, if yes: Date of Placement                                     [  ] No    HOSPITAL MEDICATIONS:  MEDICATIONS  (STANDING):  amantadine Syrup 100 milliGRAM(s) Oral <User Schedule>  artificial  tears Solution 1 Drop(s) Both EYES every 6 hours  aspirin  chewable 81 milliGRAM(s) Enteral Tube daily  atorvastatin 80 milliGRAM(s) Oral at bedtime  bisacodyl Suppository 10 milliGRAM(s) Rectal at bedtime  chlorhexidine 4% Liquid 1 Application(s) Topical <User Schedule>  dextrose 5%. 1000 milliLiter(s) (50 mL/Hr) IV Continuous <Continuous>  dextrose 50% Injectable 12.5 Gram(s) IV Push once  dextrose 50% Injectable 25 Gram(s) IV Push once  dextrose 50% Injectable 25 Gram(s) IV Push once  doxazosin 8 milliGRAM(s) Oral at bedtime  famotidine    Tablet 20 milliGRAM(s) Oral daily  fentaNYL   Patch  12 MICROgram(s)/Hr 1 Patch Transdermal every 72 hours  heparin   Injectable 5000 Unit(s) SubCutaneous every 8 hours  hydrALAZINE 100 milliGRAM(s) Oral three times a day  isosorbide   dinitrate Tablet (ISORDIL) 20 milliGRAM(s) Enteral Tube <User Schedule>  lactobacillus acidophilus 1 Tablet(s) Oral two times a day  lidocaine   Patch 1 Patch Transdermal every 24 hours  lidocaine   Patch 1 Patch Transdermal daily  metoprolol tartrate 50 milliGRAM(s) Oral <User Schedule>  QUEtiapine 50 milliGRAM(s) Oral <User Schedule>  QUEtiapine 75 milliGRAM(s) Oral <User Schedule>  sodium chloride 1 Gram(s) Oral every 6 hours    MEDICATIONS  (PRN):  dextrose 40% Gel 15 Gram(s) Oral once PRN Blood Glucose LESS THAN 70 milliGRAM(s)/deciliter  glucagon  Injectable 1 milliGRAM(s) IntraMuscular once PRN Glucose LESS THAN 70 milligrams/deciliter  LORazepam     Tablet 1 milliGRAM(s) Oral every 8 hours PRN Agitation  polyethylene glycol 3350 17 Gram(s) Oral daily PRN Constipation      LABS:                        11.5   5.61  )-----------( 340      ( 28 Sep 2020 06:37 )             37.1     09-28    133<L>  |  99  |  25<H>  ----------------------------<  116<H>  4.5   |  22  |  1.14    Ca    10.2      28 Sep 2020 06:31  Phos  4.0     09-28  Mg     1.7     09-28              CAPILLARY BLOOD GLUCOSE    MICROBIOLOGY:     RADIOLOGY:  [ ] Reviewed and interpreted by me     Patient is a 60y old  Male who presents with a chief complaint of Cardiac arrest (21 Sep 2020 13:52)      Interval Events: No events reported overnight; Patient scheduled for follow up with speech today for bedside evaluation     REVIEW OF SYSTEMS:  [ ] Positive  [x] All other systems negative  [ ] Unable to assess ROS because ________    Vital Signs Last 24 Hrs  T(C): 36.8 (09-28-20 @ 04:26), Max: 37 (09-27-20 @ 23:46)  T(F): 98.2 (09-28-20 @ 04:26), Max: 98.6 (09-27-20 @ 23:46)  HR: 60 (09-28-20 @ 04:26) (54 - 70)  BP: 155/93 (09-28-20 @ 04:26) (120/77 - 158/81)  RR: 20 (09-28-20 @ 04:26) (18 - 70)  SpO2: 97% (09-28-20 @ 04:26) (97% - 100%)    PHYSICAL EXAM:  HEENT:   [x]Tracheostomy: # 6 Cuffless Portex   [x]Pupils: Right eye ptosis, Dilated fixed right pupil; Left PERRL  [ ]No oral lesions  [ ]Abnormal    SKIN  [x]No Rash  [ ] Abnormal  [ ] pressure    CARDIAC  [x]Regular  [ ]Abnormal    PULMONARY  [x]Bilateral Clear Breath Sounds  [ ]Normal Excursion  [ ]Abnormal    GI  [x]PEG      [x] +BS		              [x]Soft, nondistended, nontender	  [ ]Abnormal    MUSCULOSKELETAL                                   [ ]Bedbound                 [ ]Abnormal    [x]OOB to chair                           EXTREMITIES                                         [x]Normal  [ ]Edema                           NEUROLOGIC  [ ] Normal, non focal  [x] Focal findings: Able to follow commands and respond to questions appropriately ; Hemiparesis of Left upper and Left Lower extremity       PSYCHIATRIC  [x]Alert and appropriate  [ ] Sedated	 [ ]Agitated    :  Umaña: [ ] Yes, if yes: Date of Placement:                   [x] No; Straight cath atc for Urinary Retention     LINES: Central Lines [ ] Yes, if yes: Date of Placement                                     [x] No    HOSPITAL MEDICATIONS:  MEDICATIONS  (STANDING):  amantadine Syrup 100 milliGRAM(s) Oral <User Schedule>  artificial  tears Solution 1 Drop(s) Both EYES every 6 hours  aspirin  chewable 81 milliGRAM(s) Enteral Tube daily  atorvastatin 80 milliGRAM(s) Oral at bedtime  bisacodyl Suppository 10 milliGRAM(s) Rectal at bedtime  chlorhexidine 4% Liquid 1 Application(s) Topical <User Schedule>  dextrose 5%. 1000 milliLiter(s) (50 mL/Hr) IV Continuous <Continuous>  dextrose 50% Injectable 12.5 Gram(s) IV Push once  dextrose 50% Injectable 25 Gram(s) IV Push once  dextrose 50% Injectable 25 Gram(s) IV Push once  doxazosin 8 milliGRAM(s) Oral at bedtime  famotidine    Tablet 20 milliGRAM(s) Oral daily  fentaNYL   Patch  12 MICROgram(s)/Hr 1 Patch Transdermal every 72 hours  heparin   Injectable 5000 Unit(s) SubCutaneous every 8 hours  hydrALAZINE 100 milliGRAM(s) Oral three times a day  isosorbide   dinitrate Tablet (ISORDIL) 20 milliGRAM(s) Enteral Tube <User Schedule>  lactobacillus acidophilus 1 Tablet(s) Oral two times a day  lidocaine   Patch 1 Patch Transdermal every 24 hours  lidocaine   Patch 1 Patch Transdermal daily  metoprolol tartrate 50 milliGRAM(s) Oral <User Schedule>  QUEtiapine 50 milliGRAM(s) Oral <User Schedule>  QUEtiapine 75 milliGRAM(s) Oral <User Schedule>  sodium chloride 1 Gram(s) Oral every 6 hours    MEDICATIONS  (PRN):  dextrose 40% Gel 15 Gram(s) Oral once PRN Blood Glucose LESS THAN 70 milliGRAM(s)/deciliter  glucagon  Injectable 1 milliGRAM(s) IntraMuscular once PRN Glucose LESS THAN 70 milligrams/deciliter  LORazepam     Tablet 1 milliGRAM(s) Oral every 8 hours PRN Agitation  polyethylene glycol 3350 17 Gram(s) Oral daily PRN Constipation      LABS:                        11.5   5.61  )-----------( 340      ( 28 Sep 2020 06:37 )             37.1     09-28    133<L>  |  99  |  25<H>  ----------------------------<  116<H>  4.5   |  22  |  1.14    Ca    10.2      28 Sep 2020 06:31  Phos  4.0     09-28  Mg     1.7     09-28              CAPILLARY BLOOD GLUCOSE    MICROBIOLOGY:     RADIOLOGY:  [ ] Reviewed and interpreted by me

## 2020-09-28 NOTE — PROGRESS NOTE ADULT - PROBLEM SELECTOR PLAN 2
Patient S/p Angio; neg x 2  CT Head: perimesencephalic SAH   CTA Head: no aneurysm noted, VEEG: Negative   MRI neuroaxis: restricted diffusion in R BG, posterior limb of R IC, and anterior right temporal lobe  No Further neurosurgical intervention at this time

## 2020-09-28 NOTE — SWALLOW VFSS/MBS ASSESSMENT ADULT - ROSENBEK'S PENETRATION ASPIRATION SCALE
(8) contrast passes glottis, visible subglottic residue remains, absent patient response (aspiration)/no cough response, pt was instructed to cough (7) contrast passes glottis, visible subglottic residue remains despite patient’s response (aspiration) (3) contrast remains above the vocal cords, visible residue remains (penetration)

## 2020-09-28 NOTE — SWALLOW VFSS/MBS ASSESSMENT ADULT - ORAL PREP COMMENTS
Adequate ability to complete labia seal for cup drinking and generate negative pressure for bolus extraction. No anterior loss present. Adequate ability to strip the bolus from the tsp and complete labia seal for cup drinking.  No anterior loss present. Adequate ability to strip the bolus from the tsp. No anterior loss present.

## 2020-09-28 NOTE — CHART NOTE - NSCHARTNOTEFT_GEN_A_CORE
Nutrition Follow-up  Patient seen for: nutrition follow-up on RCU    Source: comprehensive chart review, RN     Chart reviewed, events noted. Per chart: Pt is a 59 yo male with PMH of afib s/p cardiac arrest, admitted 8/9. Found to have R SAH of unclear etiology with cerebral angiogram on 8/10 negative for aneurysm. Now with resolved neurogenic/cardiogenic shock. Course complicated by GI bleed s/p PPI gtt, bleeding from Umaña s/p clot irrigation, prolonged respiratory failure s/p trach (8/24) and PEG (8/26). Course complicated by afib with RVR and C diff colitis. Transferred to RCU on 8/29. Tolerating continuous TC since 8/30. Trach downsized to #6 Cuffless Portex 9/14. Cleared for PMV by SLP on 9/17. Plan for MBS later today, 9/28.    Diet : NPO with Tube Feeds via PEG  Enteral /Parenteral Nutrition: Vital AF bolus feeds of 420mL q6hrs. Provides 1680mL formula, 2016 kcal, 126 g protein and 1362 ml free water  - Probiotic Yogurt/Smoothie x2 daily  - RD observed Vital AF hung; not infusing at this time as pt not currently receiving bolus    Per flowsheets, Pt has received ~90% EN provisions in the last 5 days (9/23-9/27). Pt noted to be tolerating EN regimen at goal.     GI: c fecal incontinence, last BM noted 9/27 - bowel regimen ordered (dulcolax, miralax - PRN)    Current Weight: Wt loss noted - likely 2/2 adequate protein/calorie intake with BMI >30; likely with excessive intake PTA. Will continue to monitor/trend weight status.  Weight (kg): 110.2 (08-12), 110.1 (08-26), 106.2 (09-02), 106.3 (09-09), 101.1 (09-16), 98 (9-23)      Pertinent Medications: MEDICATIONS  (STANDING):  amantadine Syrup 100 milliGRAM(s) Oral <User Schedule>  artificial  tears Solution 1 Drop(s) Both EYES every 6 hours  aspirin  chewable 81 milliGRAM(s) Enteral Tube daily  atorvastatin 80 milliGRAM(s) Oral at bedtime  bisacodyl Suppository 10 milliGRAM(s) Rectal at bedtime  chlorhexidine 4% Liquid 1 Application(s) Topical <User Schedule>  dextrose 5%. 1000 milliLiter(s) (50 mL/Hr) IV Continuous <Continuous>  dextrose 50% Injectable 25 Gram(s) IV Push once  dextrose 50% Injectable 12.5 Gram(s) IV Push once  dextrose 50% Injectable 25 Gram(s) IV Push once  doxazosin 8 milliGRAM(s) Oral at bedtime  famotidine    Tablet 20 milliGRAM(s) Oral daily  fentaNYL   Patch  12 MICROgram(s)/Hr 1 Patch Transdermal every 72 hours  heparin   Injectable 5000 Unit(s) SubCutaneous every 8 hours  hydrALAZINE 100 milliGRAM(s) Oral three times a day  isosorbide   dinitrate Tablet (ISORDIL) 20 milliGRAM(s) Enteral Tube <User Schedule>  lactobacillus acidophilus 1 Tablet(s) Oral two times a day  lidocaine   Patch 1 Patch Transdermal daily  lidocaine   Patch 1 Patch Transdermal every 24 hours  metoprolol tartrate 50 milliGRAM(s) Oral <User Schedule>  QUEtiapine 50 milliGRAM(s) Oral <User Schedule>  QUEtiapine 75 milliGRAM(s) Oral <User Schedule>  sodium chloride 1 Gram(s) Oral every 6 hours    MEDICATIONS  (PRN):  dextrose 40% Gel 15 Gram(s) Oral once PRN Blood Glucose LESS THAN 70 milliGRAM(s)/deciliter  glucagon  Injectable 1 milliGRAM(s) IntraMuscular once PRN Glucose LESS THAN 70 milligrams/deciliter  LORazepam     Tablet 1 milliGRAM(s) Oral every 8 hours PRN Agitation  polyethylene glycol 3350 17 Gram(s) Oral daily PRN Constipation    Pertinent Labs:  09-28 Na133 mmol/L<L> Glu 116 mg/dL<H> K+ 4.5 mmol/L Cr  1.14 mg/dL BUN 25 mg/dL<H> 09-28 Phos 4.0 mg/dL      Skin per nursing documentation: no pressure injuries noted  Edema: none indicated at this time    Estimated Needs:   Based on upper IBW 76.8kg   Energy: (25-30kcal/kg): 1920-2304kcal  Protein: (1.4-1.6g protein/kg): 108-123g protein      Previous Nutrition Diagnosis: Increased nutrient needs - diagnosis ongoing, being addressed with enteral nutrition     New Nutrition Diagnosis: N/A      Interventions:   1. Continue current EN regimen: Vital AF bolus feeds 420 ml q6 hours (total 4 feeds daily) + Probiotic Yogurt/Smoothie Cans 2 daily. Provides 1680 ml total volume, 2016 kcal, 126 g protein and 1362 ml free water (provides 26 kcal/kg, 1.6 g protein based on upper IBW of 76.8 kg)  - If Pt passes MBS and able to consume PO, recommend DASH diet with textures/consistencies deferred to SLP/Team.  2. RD remains available to adjust TF regimen as needed. Defer free water flushes to team.     Monitoring and Evaluation:   Continue to monitor Nutritional intake, Tolerance to diet prescription, weights, labs, skin integrity  RD remains available upon request and will follow up per protocol    Henny Strange, MS, RD, CDN  pager #[phone]

## 2020-09-28 NOTE — PROGRESS NOTE ADULT - PROBLEM SELECTOR PLAN 3
Fentanyl Patch reduced to 12mcg Q72H on 9/26  Continue Seroquel 50 mg morning / afternoon   Continue Seroquel evening dose 75 mg   Continue PRN Ativan via PEG for agitation not treated by standing Seroquel

## 2020-09-28 NOTE — PROGRESS NOTE ADULT - ATTENDING COMMENTS
Pt seen and examined.   1. Acute resp failure with hypoxia:  - Stable on TC ATC, tolerating PMV  - Cont pulm toilet/ trach care   2. ID:  - Completed antibiotic courses for bacterial PNA/ UTI and C diff diarrhea   - Cont to monitor temp curve/ WBC  3. Cardiac arrest/ HFrEF/ A fib  - Cont to optimize medical management  - Outpt cath   - Rate controlled on lopressor. No AC d/t SAH  4. Oropharyngeal dysphagia:  - Tolerating PEG feeds  - Plan for MBS today  5. JALEN:  - Resolved, hyponatremia improving   6. Gen:  - Prognosis guarded.

## 2020-09-28 NOTE — SWALLOW VFSS/MBS ASSESSMENT ADULT - ORAL PHASE COMMENTS
Oral phase c/b adequate orientation/reception. Reduced control of bolus leading to premature spillover in the oropharynx leading to spillover into the hyopharynx. Oral phase c/b adequate orientation/reception. Reduced control of bolus leading to premature spillover in the oropharynx with spillover to the hypopharynx. Oral phase c/b adequate orientation/reception. Reduced control of bolus leading to premature spillover in the oropharynx to the level of the vestibule.

## 2020-09-28 NOTE — SWALLOW VFSS/MBS ASSESSMENT ADULT - ORAL PHASE
Laryngeal penetration before swallow - silent/Uncontrolled bolus / spillover in wilbert-pharynx/Uncontrolled bolus / spillover in hypopharynx Uncontrolled bolus / spillover in wilbert-pharynx/Uncontrolled bolus / spillover in hypopharynx Uncontrolled bolus / spillover in wilbert-pharynx

## 2020-09-28 NOTE — PROGRESS NOTE ADULT - ASSESSMENT
Patient 60-year-old male with a history of A-Fib on warfarin who presented with cardiac arrest at work with downtime of about 25 minutes prior to ROSC. Patient S/p therapeutic hypothermia. 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 Now with resolved neurogenic/cardiogenic shock. Course complicated by GI bleed s/p PPI gtt, bleeding from Umaña s/p clot irrigation, prolonged respiratory failure s/p trach (8/24) and PEG (8/26). Patients with Afib with RVR during admission; previously on amiodarone since discontinued now rate controlled with Lopressor. Patient with Urinary retention during Hospitalization likely 2/2 neurogenic bladder; Pt developed traumatic hematuria; requiring urology to perform clot evacuation. Patient now on Cardura. Patient now requiring Straight Catheterization ATC. Patients hospital course complicated by Pseudomonas PNA and C.Diff. Patient S/p Cefepime  and enteral vanco. Patient was weaned to TC ATC during admission and tracheostomy was downsized to # 6 Cuffless Portex.  Patient with agitation restlessness during admission requiring Seroquel and ativan prn.     9/28: No events reported overnight, Patient seen by Speech Pathology this morning will have MBS Performed this afternoon. Patient still awaiting insurance to be reinstated beginning of October.

## 2020-09-28 NOTE — SWALLOW VFSS/MBS ASSESSMENT ADULT - PHARYNGEAL PHASE COMMENTS
Pharyngeal phase c/b delay in the swallow trigger to the level of the valleculae leading to spillover to the pyriform sinuses. Base of tongue/BoT retraction and pharyngeal constriction were mildly reduced and resulted in mildly reduced pharyngeal bolus propulsion. Mild-moderate vallecular residue present.  Hyolaryngeal elevation and excursion were reduced. Adequate epiglottic inversion. Adequate relaxation of UES. Observed pt with aspiration during large sips via cup, via straw, and x1 via small sips.     Aspiration was inconsistent and suspected to be related to incoordination of breathe-swallow pattern as aspiration occurred during the swallow. Pharyngeal phase c/b delay in the swallow trigger to the level of the valleculae leading to spillover to the pyriform sinuses. Base of tongue/BoT retraction and pharyngeal constriction were reduced and resulted in mildly reduced pharyngeal bolus propulsion. Trace to mild vallecular residue present. Hyolaryngeal elevation and excursion were mildly reduced. Adequate epiglottic inversion. Adequate relaxation of UES. Deep Penetration via cup x1. GROSS ASPIRATION via tsp with cough response during the swallow was captured. Incoordination of breathe-swallow pattern suspected to be negatively impacting swallow function leading to inconsistent aspiration during the swallow. Pharyngeal phase c/b delay in the swallow trigger to the level of the valleculae. Base of tongue/BoT retraction and pharyngeal constriction were reduced and resulted in mildly reduced pharyngeal bolus propulsion. Trace-mild vallecular residue present.  Hyolaryngeal elevation and excursion were reduced. Adequate epiglottic inversion. Adequate relaxation of UES. Observed penetration during the swallow, no response.     Given current presentation of swallow function c/b inconsistent aspiration episodes via differing modalities, rate, and amount and incoordination of breathe-swallow pattern additional trials were not presented given the HIGH risk of aspiration.     To note, high shoulder girdle and motion artifact obscured viewing throughout this study.

## 2020-09-28 NOTE — PROGRESS NOTE ADULT - PROBLEM SELECTOR PLAN 8
JALEN resolved with increased frequency of Straight Catheterization   Hyponatremia: Sodium Chloride tabs increased to 1 gram q 6 hrs

## 2020-09-28 NOTE — SWALLOW VFSS/MBS ASSESSMENT ADULT - DIAGNOSTIC IMPRESSIONS
Oropharyngeal swallow skills c/b premature spillage to the valleculae with frequent spillover to the pyriform sinuses in large amounts. Inconsistent aspiration episodes were observed with varying consistencies despite rate, amount, and modality. Incoordination of breathe-swallow pattern is suspected to be negatively impacting swallow function as aspirated episodes were most often seen during the swallow.

## 2020-09-28 NOTE — SWALLOW VFSS/MBS ASSESSMENT ADULT - BEHAVIORAL MODIFICATIONS
To note, high shoulder girdle and motion artifact obscured viewing throughout this study.  PMV in place   Pt was initially instructed to take 'small sips' as if he was drinking 'a cup of hot tea'.   He was then instructed to take 'normal sips like he was at home' which presented as larger amounts.   Utilized straw as pt indicated he uses that 'some times'.

## 2020-09-28 NOTE — PROGRESS NOTE ADULT - PROBLEM SELECTOR PLAN 1
Patient S/p Tracheostomy on 8/24 with ENT  Tolerating continuous trach collar since 8/30  Tracheostomy Downsized to # 6 Cuffless Portex   Tracheoscopy 9/24:  3-4 mm area of likely granulation tissue on proximal left mainstem bronchus without evidence of air way obstruction   Continue Chest PT and Suctioning PRN   PMV Trials resumed ; Monitor for Airtrapping

## 2020-09-29 LAB
ANION GAP SERPL CALC-SCNC: 12 MMOL/L — SIGNIFICANT CHANGE UP (ref 5–17)
BUN SERPL-MCNC: 25 MG/DL — HIGH (ref 7–23)
CALCIUM SERPL-MCNC: 10.3 MG/DL — SIGNIFICANT CHANGE UP (ref 8.4–10.5)
CHLORIDE SERPL-SCNC: 100 MMOL/L — SIGNIFICANT CHANGE UP (ref 96–108)
CO2 SERPL-SCNC: 22 MMOL/L — SIGNIFICANT CHANGE UP (ref 22–31)
CREAT SERPL-MCNC: 1.17 MG/DL — SIGNIFICANT CHANGE UP (ref 0.5–1.3)
GLUCOSE SERPL-MCNC: 80 MG/DL — SIGNIFICANT CHANGE UP (ref 70–99)
POTASSIUM SERPL-MCNC: 4.6 MMOL/L — SIGNIFICANT CHANGE UP (ref 3.5–5.3)
POTASSIUM SERPL-SCNC: 4.6 MMOL/L — SIGNIFICANT CHANGE UP (ref 3.5–5.3)
SODIUM SERPL-SCNC: 134 MMOL/L — LOW (ref 135–145)

## 2020-09-29 PROCEDURE — 99233 SBSQ HOSP IP/OBS HIGH 50: CPT | Mod: GC

## 2020-09-29 RX ADMIN — QUETIAPINE FUMARATE 75 MILLIGRAM(S): 200 TABLET, FILM COATED ORAL at 21:20

## 2020-09-29 RX ADMIN — SODIUM CHLORIDE 1 GRAM(S): 9 INJECTION INTRAMUSCULAR; INTRAVENOUS; SUBCUTANEOUS at 00:07

## 2020-09-29 RX ADMIN — FAMOTIDINE 20 MILLIGRAM(S): 10 INJECTION INTRAVENOUS at 12:30

## 2020-09-29 RX ADMIN — Medication 1 TABLET(S): at 17:08

## 2020-09-29 RX ADMIN — ISOSORBIDE DINITRATE 20 MILLIGRAM(S): 5 TABLET ORAL at 23:07

## 2020-09-29 RX ADMIN — FENTANYL CITRATE 1 PATCH: 50 INJECTION INTRAVENOUS at 14:31

## 2020-09-29 RX ADMIN — LIDOCAINE 1 PATCH: 4 CREAM TOPICAL at 00:04

## 2020-09-29 RX ADMIN — Medication 1 DROP(S): at 12:38

## 2020-09-29 RX ADMIN — Medication 100 MILLIGRAM(S): at 14:33

## 2020-09-29 RX ADMIN — SODIUM CHLORIDE 1 GRAM(S): 9 INJECTION INTRAMUSCULAR; INTRAVENOUS; SUBCUTANEOUS at 12:30

## 2020-09-29 RX ADMIN — LIDOCAINE 1 PATCH: 4 CREAM TOPICAL at 10:30

## 2020-09-29 RX ADMIN — QUETIAPINE FUMARATE 50 MILLIGRAM(S): 200 TABLET, FILM COATED ORAL at 14:32

## 2020-09-29 RX ADMIN — ISOSORBIDE DINITRATE 20 MILLIGRAM(S): 5 TABLET ORAL at 09:33

## 2020-09-29 RX ADMIN — Medication 1 DROP(S): at 23:07

## 2020-09-29 RX ADMIN — FENTANYL CITRATE 1 PATCH: 50 INJECTION INTRAVENOUS at 14:32

## 2020-09-29 RX ADMIN — SODIUM CHLORIDE 1 GRAM(S): 9 INJECTION INTRAMUSCULAR; INTRAVENOUS; SUBCUTANEOUS at 17:07

## 2020-09-29 RX ADMIN — Medication 1 DROP(S): at 00:05

## 2020-09-29 RX ADMIN — LIDOCAINE 1 PATCH: 4 CREAM TOPICAL at 08:34

## 2020-09-29 RX ADMIN — FENTANYL CITRATE 1 PATCH: 50 INJECTION INTRAVENOUS at 08:33

## 2020-09-29 RX ADMIN — Medication 100 MILLIGRAM(S): at 21:19

## 2020-09-29 RX ADMIN — Medication 100 MILLIGRAM(S): at 12:30

## 2020-09-29 RX ADMIN — HEPARIN SODIUM 5000 UNIT(S): 5000 INJECTION INTRAVENOUS; SUBCUTANEOUS at 14:33

## 2020-09-29 RX ADMIN — Medication 100 MILLIGRAM(S): at 05:26

## 2020-09-29 RX ADMIN — SODIUM CHLORIDE 1 GRAM(S): 9 INJECTION INTRAMUSCULAR; INTRAVENOUS; SUBCUTANEOUS at 05:27

## 2020-09-29 RX ADMIN — QUETIAPINE FUMARATE 50 MILLIGRAM(S): 200 TABLET, FILM COATED ORAL at 05:27

## 2020-09-29 RX ADMIN — HEPARIN SODIUM 5000 UNIT(S): 5000 INJECTION INTRAVENOUS; SUBCUTANEOUS at 05:27

## 2020-09-29 RX ADMIN — LIDOCAINE 1 PATCH: 4 CREAM TOPICAL at 22:20

## 2020-09-29 RX ADMIN — Medication 10 MILLIGRAM(S): at 21:20

## 2020-09-29 RX ADMIN — ISOSORBIDE DINITRATE 20 MILLIGRAM(S): 5 TABLET ORAL at 16:52

## 2020-09-29 RX ADMIN — Medication 8 MILLIGRAM(S): at 21:20

## 2020-09-29 RX ADMIN — Medication 1 DROP(S): at 05:28

## 2020-09-29 RX ADMIN — FENTANYL CITRATE 1 PATCH: 50 INJECTION INTRAVENOUS at 20:09

## 2020-09-29 RX ADMIN — Medication 100 MILLIGRAM(S): at 08:24

## 2020-09-29 RX ADMIN — ATORVASTATIN CALCIUM 80 MILLIGRAM(S): 80 TABLET, FILM COATED ORAL at 21:20

## 2020-09-29 RX ADMIN — Medication 1 TABLET(S): at 05:27

## 2020-09-29 RX ADMIN — Medication 50 MILLIGRAM(S): at 20:09

## 2020-09-29 RX ADMIN — Medication 81 MILLIGRAM(S): at 12:31

## 2020-09-29 RX ADMIN — HEPARIN SODIUM 5000 UNIT(S): 5000 INJECTION INTRAVENOUS; SUBCUTANEOUS at 21:20

## 2020-09-29 RX ADMIN — SODIUM CHLORIDE 1 GRAM(S): 9 INJECTION INTRAMUSCULAR; INTRAVENOUS; SUBCUTANEOUS at 23:07

## 2020-09-29 RX ADMIN — LIDOCAINE 1 PATCH: 4 CREAM TOPICAL at 21:20

## 2020-09-29 RX ADMIN — CHLORHEXIDINE GLUCONATE 1 APPLICATION(S): 213 SOLUTION TOPICAL at 05:27

## 2020-09-29 RX ADMIN — Medication 1 DROP(S): at 18:29

## 2020-09-29 RX ADMIN — LIDOCAINE 1 PATCH: 4 CREAM TOPICAL at 12:30

## 2020-09-29 RX ADMIN — ISOSORBIDE DINITRATE 20 MILLIGRAM(S): 5 TABLET ORAL at 00:09

## 2020-09-29 NOTE — PROGRESS NOTE ADULT - SUBJECTIVE AND OBJECTIVE BOX
Patient is a 60y old  Male who presents with a chief complaint of Cardiac arrest (21 Sep 2020 13:52)    Interval Events: No events reported overnight     REVIEW OF SYSTEMS:  [ ] Positive  [x] All other systems negative  [ ] Unable to assess ROS because ________    Vital Signs Last 24 Hrs  T(C): 36.7 (09-29-20 @ 04:16), Max: 36.8 (09-28-20 @ 08:30)  T(F): 98.1 (09-29-20 @ 04:16), Max: 98.3 (09-28-20 @ 08:30)  HR: 57 (09-29-20 @ 04:16) (54 - 81)  BP: 143/88 (09-29-20 @ 04:16) (118/75 - 175/75)  RR: 17 (09-29-20 @ 04:16) (15 - 20)  SpO2: 100% (09-29-20 @ 04:16) (97% - 100%)    PHYSICAL EXAM:  HEENT:   [x]Tracheostomy: # 6 Cuffless Portex   [x]Pupils: Right eye ptosis, Dilated fixed right pupil; Left PERRL  [ ]No oral lesions  [ ]Abnormal    SKIN  [x]No Rash  [ ] Abnormal  [ ] pressure    CARDIAC  [x]Regular  [ ]Abnormal    PULMONARY  [x]Bilateral Clear Breath Sounds  [ ]Normal Excursion  [ ]Abnormal    GI  [x]PEG      [x] +BS		              [x]Soft, nondistended, nontender	  [ ]Abnormal    MUSCULOSKELETAL                                   [ ]Bedbound                 [ ]Abnormal    [x]OOB to chair                           EXTREMITIES                                         [x]Normal  [ ]Edema                           NEUROLOGIC  [ ] Normal, non focal  [x] Focal findings: Able to follow commands and respond to questions appropriately ; Hemiparesis of Left upper and Left Lower extremity       PSYCHIATRIC  [x]Alert and appropriate  [ ] Sedated	 [ ]Agitated    :  Umaña: [ ] Yes, if yes: Date of Placement:                   [x] No; Straight cath atc for Urinary Retention     LINES: Central Lines [ ] Yes, if yes: Date of Placement                                     [x] No  HOSPITAL MEDICATIONS:  MEDICATIONS  (STANDING):  amantadine Syrup 100 milliGRAM(s) Oral <User Schedule>  artificial  tears Solution 1 Drop(s) Both EYES every 6 hours  aspirin  chewable 81 milliGRAM(s) Enteral Tube daily  atorvastatin 80 milliGRAM(s) Oral at bedtime  bisacodyl Suppository 10 milliGRAM(s) Rectal at bedtime  chlorhexidine 4% Liquid 1 Application(s) Topical <User Schedule>  dextrose 5%. 1000 milliLiter(s) (50 mL/Hr) IV Continuous <Continuous>  dextrose 50% Injectable 12.5 Gram(s) IV Push once  dextrose 50% Injectable 25 Gram(s) IV Push once  dextrose 50% Injectable 25 Gram(s) IV Push once  doxazosin 8 milliGRAM(s) Oral at bedtime  famotidine    Tablet 20 milliGRAM(s) Oral daily  fentaNYL   Patch  12 MICROgram(s)/Hr 1 Patch Transdermal every 72 hours  heparin   Injectable 5000 Unit(s) SubCutaneous every 8 hours  hydrALAZINE 100 milliGRAM(s) Oral three times a day  isosorbide   dinitrate Tablet (ISORDIL) 20 milliGRAM(s) Enteral Tube <User Schedule>  lactobacillus acidophilus 1 Tablet(s) Oral two times a day  lidocaine   Patch 1 Patch Transdermal daily  lidocaine   Patch 1 Patch Transdermal every 24 hours  metoprolol tartrate 50 milliGRAM(s) Oral <User Schedule>  QUEtiapine 50 milliGRAM(s) Oral <User Schedule>  QUEtiapine 75 milliGRAM(s) Oral <User Schedule>  sodium chloride 1 Gram(s) Oral every 6 hours    MEDICATIONS  (PRN):  dextrose 40% Gel 15 Gram(s) Oral once PRN Blood Glucose LESS THAN 70 milliGRAM(s)/deciliter  glucagon  Injectable 1 milliGRAM(s) IntraMuscular once PRN Glucose LESS THAN 70 milligrams/deciliter  LORazepam     Tablet 1 milliGRAM(s) Oral every 8 hours PRN Agitation  polyethylene glycol 3350 17 Gram(s) Oral daily PRN Constipation      LABS:                        11.5   5.61  )-----------( 340      ( 28 Sep 2020 06:37 )             37.1     09-29    134<L>  |  100  |  25<H>  ----------------------------<  80  4.6   |  22  |  1.17    Ca    10.3      29 Sep 2020 07:11  Phos  4.0     09-28  Mg     1.7     09-28              CAPILLARY BLOOD GLUCOSE    MICROBIOLOGY:     RADIOLOGY:  [ ] Reviewed and interpreted by me

## 2020-09-29 NOTE — PROGRESS NOTE ADULT - ATTENDING COMMENTS
Pt seen and examined.   1. Acute resp failure with hypoxia:  - Stable on TC ATC, tolerating PMV  - Cont pulm toilet/ trach care   2. ID:  - Completed antibiotic courses for bacterial PNA/ UTI and C diff diarrhea   - Cont to monitor temp curve/ WBC  3. Cardiac arrest/ HFrEF/ A fib  - Cont to optimize medical management  - Outpt cath   - Rate controlled on lopressor. No AC d/t SAH  4. Oropharyngeal dysphagia:  - Failed MBS  - Cont PEG feeds  5. JALEN:  - Resolved, hyponatremia improving   6. Gen:  - Prognosis guarded.

## 2020-09-29 NOTE — GOALS OF CARE CONVERSATION - ADVANCED CARE PLANNING - CONVERSATION DETAILS
LMSW met with patient at bedside as patient now able to effectively communicate and mental status is improved. Patient A&Ox4 and reports that he would like to name his brother, Jonny Vieyra [phone] as his primary agent. Patient reports that he would like to name his son, Rufino Vieyra [phone], as his alternate agent. Social Work to remain available.

## 2020-09-29 NOTE — PROGRESS NOTE ADULT - PROBLEM SELECTOR PLAN 7
S/p Peg 8/26 by GI   MBS 9/28: Inconsistent Aspiration episodes with varying consistencies   Continue Non-Oral means of Nutrition

## 2020-09-29 NOTE — PROGRESS NOTE ADULT - PROBLEM SELECTOR PLAN 8
JALEN resolved with increased frequency of Straight Catheterization   Hyponatremia: Sodium Chloride tabs 1 gram q 6 hrs  Continue to Monitor BMP

## 2020-09-29 NOTE — PROGRESS NOTE ADULT - ASSESSMENT
Patient 60-year-old male with a history of A-Fib on warfarin who presented with cardiac arrest at work with downtime of about 25 minutes prior to ROSC. Patient S/p therapeutic hypothermia. 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 Now with resolved neurogenic/cardiogenic shock. Course complicated by GI bleed s/p PPI gtt, bleeding from Umaña s/p clot irrigation, prolonged respiratory failure s/p trach (8/24) and PEG (8/26). Patients with Afib with RVR during admission; previously on amiodarone since discontinued now rate controlled with Lopressor. Patient with Urinary retention during Hospitalization likely 2/2 neurogenic bladder; Pt developed traumatic hematuria; requiring urology to perform clot evacuation. Patient now on Cardura. Patient now requiring Straight Catheterization ATC. Patients hospital course complicated by Pseudomonas PNA and C.Diff. Patient S/p Cefepime  and enteral vanco. Patient was weaned to TC ATC during admission and tracheostomy was downsized to # 6 Cuffless Portex.  Patient with agitation restlessness during admission requiring Seroquel and ativan prn. Patient had MBS performed on 9/28 with signs of aspiration noted.    9/29: No events reported overnight, Patient had MBS performed on 9/28 but unfortunately was noted to have aspiration; speech recommending to continue with Non-oral form of nutrition. Patient still awaiting insurance to be reinstated in the beginning of October.

## 2020-09-30 PROCEDURE — 99232 SBSQ HOSP IP/OBS MODERATE 35: CPT | Mod: GC

## 2020-09-30 RX ADMIN — LIDOCAINE 1 PATCH: 4 CREAM TOPICAL at 12:48

## 2020-09-30 RX ADMIN — Medication 100 MILLIGRAM(S): at 13:44

## 2020-09-30 RX ADMIN — QUETIAPINE FUMARATE 50 MILLIGRAM(S): 200 TABLET, FILM COATED ORAL at 05:04

## 2020-09-30 RX ADMIN — Medication 1 APPLICATION(S): at 17:11

## 2020-09-30 RX ADMIN — Medication 10 MILLIGRAM(S): at 21:24

## 2020-09-30 RX ADMIN — Medication 100 MILLIGRAM(S): at 21:20

## 2020-09-30 RX ADMIN — Medication 50 MILLIGRAM(S): at 08:55

## 2020-09-30 RX ADMIN — Medication 100 MILLIGRAM(S): at 08:53

## 2020-09-30 RX ADMIN — Medication 1 DROP(S): at 23:05

## 2020-09-30 RX ADMIN — SODIUM CHLORIDE 1 GRAM(S): 9 INJECTION INTRAMUSCULAR; INTRAVENOUS; SUBCUTANEOUS at 17:12

## 2020-09-30 RX ADMIN — QUETIAPINE FUMARATE 50 MILLIGRAM(S): 200 TABLET, FILM COATED ORAL at 13:44

## 2020-09-30 RX ADMIN — Medication 1 DROP(S): at 17:11

## 2020-09-30 RX ADMIN — Medication 1 APPLICATION(S): at 21:20

## 2020-09-30 RX ADMIN — Medication 81 MILLIGRAM(S): at 12:48

## 2020-09-30 RX ADMIN — SODIUM CHLORIDE 1 GRAM(S): 9 INJECTION INTRAMUSCULAR; INTRAVENOUS; SUBCUTANEOUS at 12:48

## 2020-09-30 RX ADMIN — ISOSORBIDE DINITRATE 20 MILLIGRAM(S): 5 TABLET ORAL at 17:10

## 2020-09-30 RX ADMIN — Medication 1 TABLET(S): at 05:04

## 2020-09-30 RX ADMIN — LIDOCAINE 1 PATCH: 4 CREAM TOPICAL at 23:12

## 2020-09-30 RX ADMIN — FAMOTIDINE 20 MILLIGRAM(S): 10 INJECTION INTRAVENOUS at 12:48

## 2020-09-30 RX ADMIN — LIDOCAINE 1 PATCH: 4 CREAM TOPICAL at 19:23

## 2020-09-30 RX ADMIN — Medication 1 TABLET(S): at 17:11

## 2020-09-30 RX ADMIN — Medication 8 MILLIGRAM(S): at 21:23

## 2020-09-30 RX ADMIN — HEPARIN SODIUM 5000 UNIT(S): 5000 INJECTION INTRAVENOUS; SUBCUTANEOUS at 05:04

## 2020-09-30 RX ADMIN — Medication 1 DROP(S): at 12:48

## 2020-09-30 RX ADMIN — Medication 100 MILLIGRAM(S): at 12:48

## 2020-09-30 RX ADMIN — ATORVASTATIN CALCIUM 80 MILLIGRAM(S): 80 TABLET, FILM COATED ORAL at 21:24

## 2020-09-30 RX ADMIN — SODIUM CHLORIDE 1 GRAM(S): 9 INJECTION INTRAMUSCULAR; INTRAVENOUS; SUBCUTANEOUS at 05:04

## 2020-09-30 RX ADMIN — FENTANYL CITRATE 1 PATCH: 50 INJECTION INTRAVENOUS at 19:23

## 2020-09-30 RX ADMIN — HEPARIN SODIUM 5000 UNIT(S): 5000 INJECTION INTRAVENOUS; SUBCUTANEOUS at 21:24

## 2020-09-30 RX ADMIN — QUETIAPINE FUMARATE 75 MILLIGRAM(S): 200 TABLET, FILM COATED ORAL at 21:20

## 2020-09-30 RX ADMIN — ISOSORBIDE DINITRATE 20 MILLIGRAM(S): 5 TABLET ORAL at 08:53

## 2020-09-30 RX ADMIN — Medication 100 MILLIGRAM(S): at 05:04

## 2020-09-30 RX ADMIN — HEPARIN SODIUM 5000 UNIT(S): 5000 INJECTION INTRAVENOUS; SUBCUTANEOUS at 13:44

## 2020-09-30 RX ADMIN — Medication 50 MILLIGRAM(S): at 19:33

## 2020-09-30 RX ADMIN — SODIUM CHLORIDE 1 GRAM(S): 9 INJECTION INTRAMUSCULAR; INTRAVENOUS; SUBCUTANEOUS at 23:05

## 2020-09-30 RX ADMIN — CHLORHEXIDINE GLUCONATE 1 APPLICATION(S): 213 SOLUTION TOPICAL at 05:05

## 2020-09-30 RX ADMIN — Medication 1 DROP(S): at 05:04

## 2020-09-30 RX ADMIN — LIDOCAINE 1 PATCH: 4 CREAM TOPICAL at 21:19

## 2020-09-30 NOTE — PROGRESS NOTE ADULT - SUBJECTIVE AND OBJECTIVE BOX
Patient is a 60y old  Male who presents with a chief complaint of Cardiac arrest (21 Sep 2020 13:52)      Interval Events: No events reported overnight     REVIEW OF SYSTEMS:  [ ] Positive  [x] All other systems negative  [ ] Unable to assess ROS because ________    Vital Signs Last 24 Hrs  T(C): 36.7 (09-30-20 @ 04:03), Max: 37.1 (09-29-20 @ 14:00)  T(F): 98 (09-30-20 @ 04:03), Max: 98.8 (09-29-20 @ 14:00)  HR: 71 (09-30-20 @ 08:45) (54 - 77)  BP: 160/70 (09-30-20 @ 08:45) (118/71 - 166/103)  RR: 20 (09-30-20 @ 04:51) (18 - 20)  SpO2: 100% (09-30-20 @ 04:51) (98% - 100%)    PHYSICAL EXAM:  HEENT:   [x]Tracheostomy: # 6 Cuffless Portex   [x]Pupils: Right eye ptosis, Dilated fixed right pupil; Left PERRL  [ ]No oral lesions  [ ]Abnormal    SKIN  [x]No Rash  [ ] Abnormal  [ ] pressure    CARDIAC  [x]Regular  [ ]Abnormal    PULMONARY  [x]Bilateral Clear Breath Sounds  [ ]Normal Excursion  [ ]Abnormal    GI  [x]PEG      [x] +BS		              [x]Soft, nondistended, nontender	  [ ]Abnormal    MUSCULOSKELETAL                                   [ ]Bedbound                 [ ]Abnormal    [x]OOB to chair                           EXTREMITIES                                         [x]Normal  [ ]Edema                           NEUROLOGIC  [ ] Normal, non focal  [x] Focal findings: Able to follow commands and respond to questions appropriately ; Hemiparesis of Left upper and Left Lower extremity       PSYCHIATRIC  [x]Alert and appropriate  [ ] Sedated	 [ ]Agitated    :  Umaña: [ ] Yes, if yes: Date of Placement:                   [x] No; Straight cath atc for Urinary Retention     LINES: Central Lines [ ] Yes, if yes: Date of Placement                                     [x] No    HOSPITAL MEDICATIONS:  MEDICATIONS  (STANDING):  amantadine Syrup 100 milliGRAM(s) Oral <User Schedule>  artificial  tears Solution 1 Drop(s) Both EYES every 6 hours  aspirin  chewable 81 milliGRAM(s) Enteral Tube daily  atorvastatin 80 milliGRAM(s) Oral at bedtime  bisacodyl Suppository 10 milliGRAM(s) Rectal at bedtime  chlorhexidine 4% Liquid 1 Application(s) Topical <User Schedule>  dextrose 5%. 1000 milliLiter(s) (50 mL/Hr) IV Continuous <Continuous>  dextrose 50% Injectable 12.5 Gram(s) IV Push once  dextrose 50% Injectable 25 Gram(s) IV Push once  dextrose 50% Injectable 25 Gram(s) IV Push once  doxazosin 8 milliGRAM(s) Oral at bedtime  famotidine    Tablet 20 milliGRAM(s) Oral daily  fentaNYL   Patch  12 MICROgram(s)/Hr 1 Patch Transdermal every 72 hours  heparin   Injectable 5000 Unit(s) SubCutaneous every 8 hours  hydrALAZINE 100 milliGRAM(s) Oral three times a day  isosorbide   dinitrate Tablet (ISORDIL) 20 milliGRAM(s) Enteral Tube <User Schedule>  lactobacillus acidophilus 1 Tablet(s) Oral two times a day  lidocaine   Patch 1 Patch Transdermal every 24 hours  lidocaine   Patch 1 Patch Transdermal daily  metoprolol tartrate 50 milliGRAM(s) Oral <User Schedule>  QUEtiapine 50 milliGRAM(s) Oral <User Schedule>  QUEtiapine 75 milliGRAM(s) Oral <User Schedule>  sodium chloride 1 Gram(s) Oral every 6 hours    MEDICATIONS  (PRN):  dextrose 40% Gel 15 Gram(s) Oral once PRN Blood Glucose LESS THAN 70 milliGRAM(s)/deciliter  glucagon  Injectable 1 milliGRAM(s) IntraMuscular once PRN Glucose LESS THAN 70 milligrams/deciliter  LORazepam     Tablet 1 milliGRAM(s) Oral every 8 hours PRN Agitation  polyethylene glycol 3350 17 Gram(s) Oral daily PRN Constipation      LABS:    09-29    134<L>  |  100  |  25<H>  ----------------------------<  80  4.6   |  22  |  1.17    Ca    10.3      29 Sep 2020 07:11              CAPILLARY BLOOD GLUCOSE    MICROBIOLOGY:     RADIOLOGY:  [ ] Reviewed and interpreted by me     Patient is a 60y old  Male who presents with a chief complaint of Cardiac arrest (21 Sep 2020 13:52)      Interval Events: No events reported overnight     REVIEW OF SYSTEMS:  [ ] Positive  [x] All other systems negative  [ ] Unable to assess ROS because ________    Vital Signs Last 24 Hrs  T(C): 36.7 (09-30-20 @ 04:03), Max: 37.1 (09-29-20 @ 14:00)  T(F): 98 (09-30-20 @ 04:03), Max: 98.8 (09-29-20 @ 14:00)  HR: 71 (09-30-20 @ 08:45) (54 - 77)  BP: 160/70 (09-30-20 @ 08:45) (118/71 - 166/103)  RR: 20 (09-30-20 @ 04:51) (18 - 20)  SpO2: 100% (09-30-20 @ 04:51) (98% - 100%)    PHYSICAL EXAM:  HEENT:   [x]Tracheostomy: # 6 Cuffless Portex   [x]Pupils: Right eye ptosis, Dilated fixed right pupil; Left PERRL, Erythema Rt eye conjunctiva no discharge   [ ]No oral lesions  [ ]Abnormal    SKIN  [x]No Rash  [ ] Abnormal  [ ] pressure    CARDIAC  [x]Regular  [ ]Abnormal    PULMONARY  [x]Bilateral Clear Breath Sounds  [ ]Normal Excursion  [ ]Abnormal    GI  [x]PEG      [x] +BS		              [x]Soft, nondistended, nontender	  [ ]Abnormal    MUSCULOSKELETAL                                   [ ]Bedbound                 [ ]Abnormal    [x]OOB to chair                           EXTREMITIES                                         [x]Normal  [ ]Edema                           NEUROLOGIC  [ ] Normal, non focal  [x] Focal findings: Able to follow commands and respond to questions appropriately ; Hemiparesis of Left upper and Left Lower extremity       PSYCHIATRIC  [x]Alert and appropriate  [ ] Sedated	 [ ]Agitated    :  Umaña: [ ] Yes, if yes: Date of Placement:                   [x] No; Straight cath atc for Urinary Retention     LINES: Central Lines [ ] Yes, if yes: Date of Placement                                     [x] No    HOSPITAL MEDICATIONS:  MEDICATIONS  (STANDING):  amantadine Syrup 100 milliGRAM(s) Oral <User Schedule>  artificial  tears Solution 1 Drop(s) Both EYES every 6 hours  aspirin  chewable 81 milliGRAM(s) Enteral Tube daily  atorvastatin 80 milliGRAM(s) Oral at bedtime  bisacodyl Suppository 10 milliGRAM(s) Rectal at bedtime  chlorhexidine 4% Liquid 1 Application(s) Topical <User Schedule>  dextrose 5%. 1000 milliLiter(s) (50 mL/Hr) IV Continuous <Continuous>  dextrose 50% Injectable 12.5 Gram(s) IV Push once  dextrose 50% Injectable 25 Gram(s) IV Push once  dextrose 50% Injectable 25 Gram(s) IV Push once  doxazosin 8 milliGRAM(s) Oral at bedtime  famotidine    Tablet 20 milliGRAM(s) Oral daily  fentaNYL   Patch  12 MICROgram(s)/Hr 1 Patch Transdermal every 72 hours  heparin   Injectable 5000 Unit(s) SubCutaneous every 8 hours  hydrALAZINE 100 milliGRAM(s) Oral three times a day  isosorbide   dinitrate Tablet (ISORDIL) 20 milliGRAM(s) Enteral Tube <User Schedule>  lactobacillus acidophilus 1 Tablet(s) Oral two times a day  lidocaine   Patch 1 Patch Transdermal every 24 hours  lidocaine   Patch 1 Patch Transdermal daily  metoprolol tartrate 50 milliGRAM(s) Oral <User Schedule>  QUEtiapine 50 milliGRAM(s) Oral <User Schedule>  QUEtiapine 75 milliGRAM(s) Oral <User Schedule>  sodium chloride 1 Gram(s) Oral every 6 hours    MEDICATIONS  (PRN):  dextrose 40% Gel 15 Gram(s) Oral once PRN Blood Glucose LESS THAN 70 milliGRAM(s)/deciliter  glucagon  Injectable 1 milliGRAM(s) IntraMuscular once PRN Glucose LESS THAN 70 milligrams/deciliter  LORazepam     Tablet 1 milliGRAM(s) Oral every 8 hours PRN Agitation  polyethylene glycol 3350 17 Gram(s) Oral daily PRN Constipation      LABS:    09-29    134<L>  |  100  |  25<H>  ----------------------------<  80  4.6   |  22  |  1.17    Ca    10.3      29 Sep 2020 07:11              CAPILLARY BLOOD GLUCOSE    MICROBIOLOGY:     RADIOLOGY:  [ ] Reviewed and interpreted by me

## 2020-09-30 NOTE — PROGRESS NOTE ADULT - ASSESSMENT
Patient 60-year-old male with a history of A-Fib on warfarin who presented with cardiac arrest at work with downtime of about 25 minutes prior to ROSC. Patient S/p therapeutic hypothermia. 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 Now with resolved neurogenic/cardiogenic shock. Course complicated by GI bleed s/p PPI gtt, bleeding from Umaña s/p clot irrigation, prolonged respiratory failure s/p trach (8/24) and PEG (8/26). Patients with Afib with RVR during admission; previously on amiodarone since discontinued now rate controlled with Lopressor. Patient with Urinary retention during Hospitalization likely 2/2 neurogenic bladder; Pt developed traumatic hematuria; requiring urology to perform clot evacuation. Patient now on Cardura. Patient now requiring Straight Catheterization ATC. Patients hospital course complicated by Pseudomonas PNA and C.Diff. Patient S/p Cefepime  and enteral vanco. Patient was weaned to TC ATC during admission and tracheostomy was downsized to # 6 Cuffless Portex.  Patient with agitation restlessness during admission requiring Seroquel and ativan prn. Patient had MBS performed on 9/28 with signs of aspiration noted.    9/30: Patient c/o of RT eye irritation overnight, no evidence of discharge will initiate Lacrilube.

## 2020-10-01 LAB
ANION GAP SERPL CALC-SCNC: 9 MMOL/L — SIGNIFICANT CHANGE UP (ref 5–17)
BUN SERPL-MCNC: 25 MG/DL — HIGH (ref 7–23)
CALCIUM SERPL-MCNC: 9.8 MG/DL — SIGNIFICANT CHANGE UP (ref 8.4–10.5)
CHLORIDE SERPL-SCNC: 102 MMOL/L — SIGNIFICANT CHANGE UP (ref 96–108)
CO2 SERPL-SCNC: 23 MMOL/L — SIGNIFICANT CHANGE UP (ref 22–31)
CREAT SERPL-MCNC: 1.14 MG/DL — SIGNIFICANT CHANGE UP (ref 0.5–1.3)
GLUCOSE SERPL-MCNC: 90 MG/DL — SIGNIFICANT CHANGE UP (ref 70–99)
POTASSIUM SERPL-MCNC: 4.3 MMOL/L — SIGNIFICANT CHANGE UP (ref 3.5–5.3)
POTASSIUM SERPL-SCNC: 4.3 MMOL/L — SIGNIFICANT CHANGE UP (ref 3.5–5.3)
SODIUM SERPL-SCNC: 134 MMOL/L — LOW (ref 135–145)

## 2020-10-01 PROCEDURE — 99232 SBSQ HOSP IP/OBS MODERATE 35: CPT | Mod: GC

## 2020-10-01 RX ORDER — FENTANYL CITRATE 50 UG/ML
1 INJECTION INTRAVENOUS
Refills: 0 | Status: DISCONTINUED | OUTPATIENT
Start: 2020-10-01 | End: 2020-10-07

## 2020-10-01 RX ORDER — METOPROLOL TARTRATE 50 MG
25 TABLET ORAL
Refills: 0 | Status: DISCONTINUED | OUTPATIENT
Start: 2020-10-01 | End: 2020-10-02

## 2020-10-01 RX ADMIN — Medication 100 MILLIGRAM(S): at 11:53

## 2020-10-01 RX ADMIN — ATORVASTATIN CALCIUM 80 MILLIGRAM(S): 80 TABLET, FILM COATED ORAL at 21:30

## 2020-10-01 RX ADMIN — Medication 10 MILLIGRAM(S): at 21:30

## 2020-10-01 RX ADMIN — Medication 100 MILLIGRAM(S): at 21:29

## 2020-10-01 RX ADMIN — LIDOCAINE 1 PATCH: 4 CREAM TOPICAL at 22:33

## 2020-10-01 RX ADMIN — ISOSORBIDE DINITRATE 20 MILLIGRAM(S): 5 TABLET ORAL at 08:57

## 2020-10-01 RX ADMIN — FENTANYL CITRATE 1 PATCH: 50 INJECTION INTRAVENOUS at 12:28

## 2020-10-01 RX ADMIN — FENTANYL CITRATE 1 PATCH: 50 INJECTION INTRAVENOUS at 19:02

## 2020-10-01 RX ADMIN — Medication 100 MILLIGRAM(S): at 05:05

## 2020-10-01 RX ADMIN — FAMOTIDINE 20 MILLIGRAM(S): 10 INJECTION INTRAVENOUS at 11:54

## 2020-10-01 RX ADMIN — SODIUM CHLORIDE 1 GRAM(S): 9 INJECTION INTRAMUSCULAR; INTRAVENOUS; SUBCUTANEOUS at 17:56

## 2020-10-01 RX ADMIN — LIDOCAINE 1 PATCH: 4 CREAM TOPICAL at 19:03

## 2020-10-01 RX ADMIN — Medication 1 TABLET(S): at 05:05

## 2020-10-01 RX ADMIN — Medication 1 DROP(S): at 17:56

## 2020-10-01 RX ADMIN — QUETIAPINE FUMARATE 50 MILLIGRAM(S): 200 TABLET, FILM COATED ORAL at 13:02

## 2020-10-01 RX ADMIN — Medication 1 DROP(S): at 05:04

## 2020-10-01 RX ADMIN — HEPARIN SODIUM 5000 UNIT(S): 5000 INJECTION INTRAVENOUS; SUBCUTANEOUS at 13:01

## 2020-10-01 RX ADMIN — ISOSORBIDE DINITRATE 20 MILLIGRAM(S): 5 TABLET ORAL at 16:59

## 2020-10-01 RX ADMIN — Medication 1 TABLET(S): at 17:56

## 2020-10-01 RX ADMIN — Medication 100 MILLIGRAM(S): at 13:01

## 2020-10-01 RX ADMIN — Medication 100 MILLIGRAM(S): at 08:57

## 2020-10-01 RX ADMIN — Medication 1 DROP(S): at 23:19

## 2020-10-01 RX ADMIN — CHLORHEXIDINE GLUCONATE 1 APPLICATION(S): 213 SOLUTION TOPICAL at 05:05

## 2020-10-01 RX ADMIN — LIDOCAINE 1 PATCH: 4 CREAM TOPICAL at 06:37

## 2020-10-01 RX ADMIN — ISOSORBIDE DINITRATE 20 MILLIGRAM(S): 5 TABLET ORAL at 23:19

## 2020-10-01 RX ADMIN — QUETIAPINE FUMARATE 75 MILLIGRAM(S): 200 TABLET, FILM COATED ORAL at 21:29

## 2020-10-01 RX ADMIN — HEPARIN SODIUM 5000 UNIT(S): 5000 INJECTION INTRAVENOUS; SUBCUTANEOUS at 21:30

## 2020-10-01 RX ADMIN — Medication 81 MILLIGRAM(S): at 11:54

## 2020-10-01 RX ADMIN — Medication 1 APPLICATION(S): at 13:02

## 2020-10-01 RX ADMIN — LIDOCAINE 1 PATCH: 4 CREAM TOPICAL at 21:30

## 2020-10-01 RX ADMIN — HEPARIN SODIUM 5000 UNIT(S): 5000 INJECTION INTRAVENOUS; SUBCUTANEOUS at 05:05

## 2020-10-01 RX ADMIN — Medication 1 APPLICATION(S): at 21:29

## 2020-10-01 RX ADMIN — QUETIAPINE FUMARATE 50 MILLIGRAM(S): 200 TABLET, FILM COATED ORAL at 05:05

## 2020-10-01 RX ADMIN — SODIUM CHLORIDE 1 GRAM(S): 9 INJECTION INTRAMUSCULAR; INTRAVENOUS; SUBCUTANEOUS at 05:05

## 2020-10-01 RX ADMIN — Medication 1 DROP(S): at 11:53

## 2020-10-01 RX ADMIN — SODIUM CHLORIDE 1 GRAM(S): 9 INJECTION INTRAMUSCULAR; INTRAVENOUS; SUBCUTANEOUS at 23:18

## 2020-10-01 RX ADMIN — FENTANYL CITRATE 1 PATCH: 50 INJECTION INTRAVENOUS at 06:36

## 2020-10-01 RX ADMIN — Medication 1 APPLICATION(S): at 05:05

## 2020-10-01 RX ADMIN — LIDOCAINE 1 PATCH: 4 CREAM TOPICAL at 11:54

## 2020-10-01 RX ADMIN — Medication 8 MILLIGRAM(S): at 21:30

## 2020-10-01 RX ADMIN — SODIUM CHLORIDE 1 GRAM(S): 9 INJECTION INTRAMUSCULAR; INTRAVENOUS; SUBCUTANEOUS at 11:54

## 2020-10-01 NOTE — PROGRESS NOTE ADULT - ASSESSMENT
Patient 60-year-old male with a history of A-Fib on warfarin who presented with cardiac arrest at work with downtime of about 25 minutes prior to ROSC. Patient S/p therapeutic hypothermia. 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 Now with resolved neurogenic/cardiogenic shock. Course complicated by GI bleed s/p PPI gtt, bleeding from Umaña s/p clot irrigation, prolonged respiratory failure s/p trach (8/24) and PEG (8/26). Patients with Afib with RVR during admission; previously on amiodarone since discontinued now rate controlled with Lopressor. Patient with Urinary retention during Hospitalization likely 2/2 neurogenic bladder; Pt developed traumatic hematuria; requiring urology to perform clot evacuation. Patient now on Cardura. Patient now requiring Straight Catheterization ATC. Patients hospital course complicated by Pseudomonas PNA and C.Diff. Patient S/p Cefepime  and enteral vanco. Patient was weaned to TC ATC during admission and tracheostomy was downsized to # 6 Cuffless Portex.  Patient with agitation restlessness during admission requiring Seroquel and ativan prn. Patient had MBS performed on 9/28 with signs of aspiration noted.    10/1: Patient c/o of RT eye irritation again this morning and requested opthalmology come back and see him; called they will see him tomorrow morning. Patient with bradycardia will decrease lopressor to 25 mg bid.

## 2020-10-01 NOTE — PROGRESS NOTE ADULT - SUBJECTIVE AND OBJECTIVE BOX
Patient is a 60y old  Male who presents with a chief complaint of Cardiac arrest (21 Sep 2020 13:52)      Interval Events: No events reported overnight     REVIEW OF SYSTEMS:  [ ] Positive  [x] All other systems negative  [ ] Unable to assess ROS because ________    Vital Signs Last 24 Hrs  T(C): 36.8 (10-01-20 @ 04:14), Max: 36.8 (10-01-20 @ 04:14)  T(F): 98.2 (10-01-20 @ 04:14), Max: 98.2 (10-01-20 @ 04:14)  HR: 51 (10-01-20 @ 09:00) (51 - 70)  BP: 119/82 (10-01-20 @ 09:00) (93/60 - 153/80)  RR: 18 (10-01-20 @ 09:00) (16 - 20)  SpO2: 100% (10-01-20 @ 09:00) (99% - 100%)    PHYSICAL EXAM:  HEENT:   [x]Tracheostomy: # 6 Cuffless Portex   [x]Pupils: Right eye ptosis, Dilated fixed right pupil; Left PERRL  [ ]No oral lesions  [ ]Abnormal    SKIN  [x]No Rash  [ ] Abnormal  [ ] pressure    CARDIAC  [x]Regular  [ ]Abnormal    PULMONARY  [x]Bilateral Clear Breath Sounds  [ ]Normal Excursion  [ ]Abnormal    GI  [x]PEG      [x] +BS		              [x]Soft, nondistended, nontender	  [ ]Abnormal    MUSCULOSKELETAL                                   [ ]Bedbound                 [ ]Abnormal    [x]OOB to chair                           EXTREMITIES                                         [x]Normal  [ ]Edema                           NEUROLOGIC  [ ] Normal, non focal  [x] Focal findings: Able to follow commands and respond to questions appropriately ; Hemiparesis of Left upper and Left Lower extremity       PSYCHIATRIC  [x]Alert and appropriate  [ ] Sedated	 [ ]Agitated    :  Umaña: [ ] Yes, if yes: Date of Placement:                   [x] No; Straight cath atc for Urinary Retention     LINES: Central Lines [ ] Yes, if yes: Date of Placement                                     [x] No    HOSPITAL MEDICATIONS:  MEDICATIONS  (STANDING):  amantadine Syrup 100 milliGRAM(s) Oral <User Schedule>  artificial  tears Solution 1 Drop(s) Both EYES every 6 hours  aspirin  chewable 81 milliGRAM(s) Enteral Tube daily  atorvastatin 80 milliGRAM(s) Oral at bedtime  bisacodyl Suppository 10 milliGRAM(s) Rectal at bedtime  chlorhexidine 4% Liquid 1 Application(s) Topical <User Schedule>  dextrose 5%. 1000 milliLiter(s) (50 mL/Hr) IV Continuous <Continuous>  dextrose 50% Injectable 12.5 Gram(s) IV Push once  dextrose 50% Injectable 25 Gram(s) IV Push once  dextrose 50% Injectable 25 Gram(s) IV Push once  doxazosin 8 milliGRAM(s) Oral at bedtime  famotidine    Tablet 20 milliGRAM(s) Oral daily  heparin   Injectable 5000 Unit(s) SubCutaneous every 8 hours  hydrALAZINE 100 milliGRAM(s) Oral three times a day  isosorbide   dinitrate Tablet (ISORDIL) 20 milliGRAM(s) Enteral Tube <User Schedule>  lactobacillus acidophilus 1 Tablet(s) Oral two times a day  lidocaine   Patch 1 Patch Transdermal every 24 hours  lidocaine   Patch 1 Patch Transdermal daily  metoprolol tartrate 50 milliGRAM(s) Oral <User Schedule>  petrolatum Ophthalmic Ointment 1 Application(s) Both EYES every 8 hours  QUEtiapine 75 milliGRAM(s) Oral <User Schedule>  QUEtiapine 50 milliGRAM(s) Oral <User Schedule>  sodium chloride 1 Gram(s) Oral every 6 hours    MEDICATIONS  (PRN):  dextrose 40% Gel 15 Gram(s) Oral once PRN Blood Glucose LESS THAN 70 milliGRAM(s)/deciliter  glucagon  Injectable 1 milliGRAM(s) IntraMuscular once PRN Glucose LESS THAN 70 milligrams/deciliter  LORazepam     Tablet 1 milliGRAM(s) Oral every 8 hours PRN Agitation  polyethylene glycol 3350 17 Gram(s) Oral daily PRN Constipation      LABS:    10-01    134<L>  |  102  |  25<H>  ----------------------------<  90  4.3   |  23  |  1.14    Ca    9.8      01 Oct 2020 06:38              CAPILLARY BLOOD GLUCOSE    MICROBIOLOGY:     RADIOLOGY:  [ ] Reviewed and interpreted by me

## 2020-10-01 NOTE — PROGRESS NOTE ADULT - PROBLEM SELECTOR PLAN 6
A-Fib with RVR  DC'd Amiodarone (8/31)  Lopressor decreased to 25 mg BID   Hold off on therapeutic a/c given SAH

## 2020-10-01 NOTE — PROGRESS NOTE ADULT - ATTENDING COMMENTS
Pt seen and examined.   1. Acute resp failure with hypoxia:  - Stable on TC ATC, tolerating PMV  - Cont pulm toilet/ trach care   2. ID:  - Completed antibiotic courses for bacterial PNA/ UTI and C diff diarrhea   - Cont to monitor temp curve/ WBC  3. Cardiac arrest/ HFrEF/ A fib  - Cont to optimize medical management  - Outpt cath   - Rate controlled on lopressor. No AC d/t SAH  4. Oropharyngeal dysphagia:  - Failed MBS  - Cont PEG feeds  5. JALEN:  - Resolved, hyponatremia improving   6. Gen:  - Prognosis guarded.  - Plan for d-c to [facility] once insurance issues resolved

## 2020-10-02 DIAGNOSIS — Z71.89 OTHER SPECIFIED COUNSELING: ICD-10-CM

## 2020-10-02 PROCEDURE — 99232 SBSQ HOSP IP/OBS MODERATE 35: CPT | Mod: GC

## 2020-10-02 RX ORDER — METOPROLOL TARTRATE 50 MG
12.5 TABLET ORAL
Refills: 0 | Status: DISCONTINUED | OUTPATIENT
Start: 2020-10-02 | End: 2020-10-13

## 2020-10-02 RX ADMIN — ISOSORBIDE DINITRATE 20 MILLIGRAM(S): 5 TABLET ORAL at 08:26

## 2020-10-02 RX ADMIN — QUETIAPINE FUMARATE 50 MILLIGRAM(S): 200 TABLET, FILM COATED ORAL at 05:48

## 2020-10-02 RX ADMIN — HEPARIN SODIUM 5000 UNIT(S): 5000 INJECTION INTRAVENOUS; SUBCUTANEOUS at 05:48

## 2020-10-02 RX ADMIN — HEPARIN SODIUM 5000 UNIT(S): 5000 INJECTION INTRAVENOUS; SUBCUTANEOUS at 21:32

## 2020-10-02 RX ADMIN — Medication 1 DROP(S): at 05:47

## 2020-10-02 RX ADMIN — Medication 1 DROP(S): at 23:47

## 2020-10-02 RX ADMIN — ATORVASTATIN CALCIUM 80 MILLIGRAM(S): 80 TABLET, FILM COATED ORAL at 21:32

## 2020-10-02 RX ADMIN — Medication 100 MILLIGRAM(S): at 14:23

## 2020-10-02 RX ADMIN — SODIUM CHLORIDE 1 GRAM(S): 9 INJECTION INTRAMUSCULAR; INTRAVENOUS; SUBCUTANEOUS at 14:23

## 2020-10-02 RX ADMIN — QUETIAPINE FUMARATE 50 MILLIGRAM(S): 200 TABLET, FILM COATED ORAL at 14:24

## 2020-10-02 RX ADMIN — SODIUM CHLORIDE 1 GRAM(S): 9 INJECTION INTRAMUSCULAR; INTRAVENOUS; SUBCUTANEOUS at 21:31

## 2020-10-02 RX ADMIN — LIDOCAINE 1 PATCH: 4 CREAM TOPICAL at 18:39

## 2020-10-02 RX ADMIN — Medication 1 APPLICATION(S): at 21:32

## 2020-10-02 RX ADMIN — Medication 100 MILLIGRAM(S): at 08:26

## 2020-10-02 RX ADMIN — Medication 1 DROP(S): at 18:40

## 2020-10-02 RX ADMIN — Medication 100 MILLIGRAM(S): at 11:57

## 2020-10-02 RX ADMIN — FAMOTIDINE 20 MILLIGRAM(S): 10 INJECTION INTRAVENOUS at 11:58

## 2020-10-02 RX ADMIN — ISOSORBIDE DINITRATE 20 MILLIGRAM(S): 5 TABLET ORAL at 23:47

## 2020-10-02 RX ADMIN — CHLORHEXIDINE GLUCONATE 1 APPLICATION(S): 213 SOLUTION TOPICAL at 05:48

## 2020-10-02 RX ADMIN — LIDOCAINE 1 PATCH: 4 CREAM TOPICAL at 12:25

## 2020-10-02 RX ADMIN — Medication 1 APPLICATION(S): at 14:24

## 2020-10-02 RX ADMIN — Medication 1 DROP(S): at 11:57

## 2020-10-02 RX ADMIN — Medication 100 MILLIGRAM(S): at 05:48

## 2020-10-02 RX ADMIN — Medication 1 TABLET(S): at 05:48

## 2020-10-02 RX ADMIN — Medication 81 MILLIGRAM(S): at 11:57

## 2020-10-02 RX ADMIN — Medication 1 TABLET(S): at 18:39

## 2020-10-02 RX ADMIN — Medication 8 MILLIGRAM(S): at 21:31

## 2020-10-02 RX ADMIN — QUETIAPINE FUMARATE 75 MILLIGRAM(S): 200 TABLET, FILM COATED ORAL at 21:31

## 2020-10-02 RX ADMIN — Medication 25 MILLIGRAM(S): at 08:26

## 2020-10-02 RX ADMIN — LIDOCAINE 1 PATCH: 4 CREAM TOPICAL at 23:42

## 2020-10-02 RX ADMIN — ISOSORBIDE DINITRATE 20 MILLIGRAM(S): 5 TABLET ORAL at 18:39

## 2020-10-02 RX ADMIN — Medication 1 APPLICATION(S): at 05:48

## 2020-10-02 RX ADMIN — Medication 100 MILLIGRAM(S): at 21:32

## 2020-10-02 RX ADMIN — Medication 10 MILLIGRAM(S): at 21:31

## 2020-10-02 RX ADMIN — FENTANYL CITRATE 1 PATCH: 50 INJECTION INTRAVENOUS at 05:49

## 2020-10-02 RX ADMIN — SODIUM CHLORIDE 1 GRAM(S): 9 INJECTION INTRAMUSCULAR; INTRAVENOUS; SUBCUTANEOUS at 05:48

## 2020-10-02 RX ADMIN — FENTANYL CITRATE 1 PATCH: 50 INJECTION INTRAVENOUS at 18:38

## 2020-10-02 RX ADMIN — LIDOCAINE 1 PATCH: 4 CREAM TOPICAL at 05:49

## 2020-10-02 RX ADMIN — HEPARIN SODIUM 5000 UNIT(S): 5000 INJECTION INTRAVENOUS; SUBCUTANEOUS at 14:23

## 2020-10-02 RX ADMIN — Medication 12.5 MILLIGRAM(S): at 21:31

## 2020-10-02 RX ADMIN — LIDOCAINE 1 PATCH: 4 CREAM TOPICAL at 21:31

## 2020-10-02 NOTE — PROGRESS NOTE ADULT - ATTENDING COMMENTS
Pt seen and examined.   1. Acute resp failure with hypoxia:  - Stable on TC ATC, tolerating PMV  - Cont pulm toilet/ trach care   2. ID:  - Completed antibiotic courses for bacterial PNA/ UTI and C diff diarrhea   - Cont to monitor temp curve/ WBC  3. Cardiac arrest/ HFrEF/ A fib  - Cont to optimize medical management  - Outpt cath   - Rate controlled on lopressor. No AC d/t SAH  4. Oropharyngeal dysphagia:  - Failed MBS  - Cont PEG feeds  5. JALEN:  - Resolved, hyponatremia improving   6. Corneal abrasion:  - Cont care per ophthalmology recs   7. Gen:  - Prognosis guarded.  - Plan for d-c to [facility] once insurance issues resolved.

## 2020-10-02 NOTE — PROGRESS NOTE ADULT - ASSESSMENT
Patient 60-year-old male with a history of A-Fib on warfarin who presented with cardiac arrest at work with downtime of about 25 minutes prior to ROSC. Patient S/p therapeutic hypothermia. 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 Now with resolved neurogenic/cardiogenic shock. Course complicated by GI bleed s/p PPI gtt, bleeding from Umaña s/p clot irrigation, prolonged respiratory failure s/p trach (8/24) and PEG (8/26). Patients with Afib with RVR during admission; previously on amiodarone since discontinued now rate controlled with Lopressor. Patient with Urinary retention during Hospitalization likely 2/2 neurogenic bladder; Pt developed traumatic hematuria; requiring urology to perform clot evacuation. Patient now on Cardura. Patient now requiring Straight Catheterization ATC. Patients hospital course complicated by Pseudomonas PNA and C.Diff. Patient S/p Cefepime  and enteral vanco. Patient was weaned to TC ATC during admission and tracheostomy was downsized to # 6 Cuffless Portex.  Patient with agitation restlessness during admission requiring Seroquel and ativan prn. Patient had MBS performed on 9/28 with signs of aspiration noted.    10/2: Patient seen by opthalmology this morning; found to have linear Rt eye abrasion placed on Oxafloxacin and erythromycin gtts. Patient remains with bradycardia will decrease lopressor to 12.5 mg bid.

## 2020-10-02 NOTE — PROGRESS NOTE ADULT - ASSESSMENT
Assessment and Recommendations:  60y male w/ pmhx/ochx of A-Fib on warfarin who presents with cardiac arrest at work with downtime of about 25 minutes prior to ROSC. S/p therapeutic hypothermia. 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 Now with resolved neurogenic/cardiogenic shock. Course complicated by GI bleed s/p PPI gtt, bleeding from scott s/p clot irrigation, prolonged respiratory failure s/p trach (8/24) and PEG (8/26). Currently with A-Fib with RVR and C diff colitis. Ophtho recalled as patient reported eye irritation of the right eye, found to have small surface abrasions    1. Corneal Abrasion of R eye  - small linear abrasions  - recommend ofloxacin antibiotic eye drop four times a day to R eye  - recommend erythromycin ophthalmic ointment three times a day to R eye   - recommend follow-up while in rehab in 1 week for abrasion    2. 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  Anisocoria and dilated pupil OD has been present since admission.  Unable to assess ptosis or EOMs until recently as pt was intubated/sedated and poor mental status.    - imaging reviewed with Dr. Nisha Campbell, neuro-radiology, and findings from recent events involve the cerebral peduncle may explain findings of the right eye.  No evidence of PCOM aneurysm or mass on multiple angiograms.  - Findings discussed with pt and primary team    3. 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  Pt also had cardiac arrest and may have had a PION that may also be contributing to the decreased vision OU.    Findings and plan discussed with patient and primary team  Guarded prognosis for visual recovery  Previously discussed with neuro-ophtho attending, Dr. Coto who agrees with outpatient follow-up.      Outpatient follow-up: Patient should follow-up with his/her ophthalmologist or with [facility] Department of Ophthalmology within 1 week of after discharge at:    [address]  [place] [address]  [phone]    Oscar Spann MD, PGY-III  Pager: [phone]/[facility]: [phone]  Also available on [employer] Teams

## 2020-10-02 NOTE — PROGRESS NOTE ADULT - PROBLEM SELECTOR PLAN 10
Patients insurance was approved   SW working with family regarding choices; if no accepted facilities will send [employer] referral

## 2020-10-02 NOTE — PROGRESS NOTE ADULT - PROBLEM SELECTOR PLAN 3
Cont Fentanyl Patch 12mcg Q72H   Continue Seroquel 50 mg morning / afternoon   Continue Seroquel evening dose 75 mg   Continue PRN Ativan via PEG for agitation not treated by standing Seroquel

## 2020-10-02 NOTE — PROGRESS NOTE ADULT - SUBJECTIVE AND OBJECTIVE BOX
Patient is a 60y old  Male who presents with a chief complaint of Cardiac arrest (21 Sep 2020 13:52)      Interval Events: No events reported overnight     REVIEW OF SYSTEMS:  [ ] Positive  [x] All other systems negative  [ ] Unable to assess ROS because ________    Vital Signs Last 24 Hrs  T(C): 36.7 (10-02-20 @ 04:24), Max: 36.8 (10-01-20 @ 13:51)  T(F): 98.1 (10-02-20 @ 04:24), Max: 98.2 (10-01-20 @ 13:51)  HR: 61 (10-02-20 @ 06:15) (51 - 71)  BP: 134/90 (10-02-20 @ 04:24) (119/82 - 143/80)  RR: 17 (10-02-20 @ 06:15) (17 - 20)  SpO2: 100% (10-02-20 @ 06:15) (96% - 100%)    PHYSICAL EXAM:  HEENT:   [x]Tracheostomy: # 6 Cuffless Portex   [x]Pupils: Right eye ptosis, Dilated fixed right pupil; Left PERRL, Rt Eye sclera irritation   [ ]No oral lesions  [ ]Abnormal    SKIN  [x]No Rash  [ ] Abnormal  [ ] pressure    CARDIAC  [x]Regular  [ ]Abnormal    PULMONARY  [x]Bilateral Clear Breath Sounds  [ ]Normal Excursion  [ ]Abnormal    GI  [x]PEG      [x] +BS		              [x]Soft, nondistended, nontender	  [ ]Abnormal    MUSCULOSKELETAL                                   [ ]Bedbound                 [ ]Abnormal    [x]OOB to chair                           EXTREMITIES                                         [x]Normal  [ ]Edema                           NEUROLOGIC  [ ] Normal, non focal  [x] Focal findings: Able to follow commands and respond to questions appropriately ; Hemiparesis of Left upper and Left Lower extremity       PSYCHIATRIC  [x]Alert and appropriate  [ ] Sedated	 [ ]Agitated    :  Umaña: [ ] Yes, if yes: Date of Placement:                   [x] No; Straight cath atc for Urinary Retention     LINES: Central Lines [ ] Yes, if yes: Date of Placement                                     [x] No    HOSPITAL MEDICATIONS:  MEDICATIONS  (STANDING):  amantadine Syrup 100 milliGRAM(s) Oral <User Schedule>  artificial  tears Solution 1 Drop(s) Both EYES every 6 hours  aspirin  chewable 81 milliGRAM(s) Enteral Tube daily  atorvastatin 80 milliGRAM(s) Oral at bedtime  bisacodyl Suppository 10 milliGRAM(s) Rectal at bedtime  chlorhexidine 4% Liquid 1 Application(s) Topical <User Schedule>  dextrose 5%. 1000 milliLiter(s) (50 mL/Hr) IV Continuous <Continuous>  dextrose 50% Injectable 12.5 Gram(s) IV Push once  dextrose 50% Injectable 25 Gram(s) IV Push once  dextrose 50% Injectable 25 Gram(s) IV Push once  doxazosin 8 milliGRAM(s) Oral at bedtime  erythromycin   Ointment 1 Application(s) Right EYE every 8 hours  famotidine    Tablet 20 milliGRAM(s) Oral daily  fentaNYL   Patch  12 MICROgram(s)/Hr 1 Patch Transdermal every 72 hours  heparin   Injectable 5000 Unit(s) SubCutaneous every 8 hours  hydrALAZINE 100 milliGRAM(s) Oral three times a day  isosorbide   dinitrate Tablet (ISORDIL) 20 milliGRAM(s) Enteral Tube <User Schedule>  lactobacillus acidophilus 1 Tablet(s) Oral two times a day  lidocaine   Patch 1 Patch Transdermal every 24 hours  lidocaine   Patch 1 Patch Transdermal daily  metoprolol tartrate 25 milliGRAM(s) Oral <User Schedule>  ofloxacin 0.3% Solution 1 Drop(s) Right EYE four times a day  petrolatum Ophthalmic Ointment 1 Application(s) Both EYES every 8 hours  QUEtiapine 75 milliGRAM(s) Oral <User Schedule>  QUEtiapine 50 milliGRAM(s) Oral <User Schedule>  sodium chloride 1 Gram(s) Oral every 6 hours    MEDICATIONS  (PRN):  dextrose 40% Gel 15 Gram(s) Oral once PRN Blood Glucose LESS THAN 70 milliGRAM(s)/deciliter  glucagon  Injectable 1 milliGRAM(s) IntraMuscular once PRN Glucose LESS THAN 70 milligrams/deciliter  LORazepam     Tablet 1 milliGRAM(s) Oral every 8 hours PRN Agitation  polyethylene glycol 3350 17 Gram(s) Oral daily PRN Constipation      LABS:    10-01    134<L>  |  102  |  25<H>  ----------------------------<  90  4.3   |  23  |  1.14    Ca    9.8      01 Oct 2020 06:38              CAPILLARY BLOOD GLUCOSE    MICROBIOLOGY:     RADIOLOGY:  [ ] Reviewed and interpreted by me

## 2020-10-02 NOTE — PROGRESS NOTE ADULT - SUBJECTIVE AND OBJECTIVE BOX
[facility] DEPARTMENT OF OPHTHALMOLOGY  ------------------------------------------------------------------------------  Oscar Spann MD PGY-III  Pager: [phone]/[facility]: [phone]  ------------------------------------------------------------------------------    Interval History: Patient complaining of eye irritation for a few days, on and off. THinks he may have scractched his right eye while sleeping the other night and thus has irritation with blinking.    MEDICATIONS  (STANDING):  amantadine Syrup 100 milliGRAM(s) Oral <User Schedule>  artificial  tears Solution 1 Drop(s) Both EYES every 6 hours  aspirin  chewable 81 milliGRAM(s) Enteral Tube daily  atorvastatin 80 milliGRAM(s) Oral at bedtime  bisacodyl Suppository 10 milliGRAM(s) Rectal at bedtime  chlorhexidine 4% Liquid 1 Application(s) Topical <User Schedule>  dextrose 5%. 1000 milliLiter(s) (50 mL/Hr) IV Continuous <Continuous>  dextrose 50% Injectable 25 Gram(s) IV Push once  dextrose 50% Injectable 25 Gram(s) IV Push once  dextrose 50% Injectable 12.5 Gram(s) IV Push once  doxazosin 8 milliGRAM(s) Oral at bedtime  famotidine    Tablet 20 milliGRAM(s) Oral daily  fentaNYL   Patch  12 MICROgram(s)/Hr 1 Patch Transdermal every 72 hours  heparin   Injectable 5000 Unit(s) SubCutaneous every 8 hours  hydrALAZINE 100 milliGRAM(s) Oral three times a day  isosorbide   dinitrate Tablet (ISORDIL) 20 milliGRAM(s) Enteral Tube <User Schedule>  lactobacillus acidophilus 1 Tablet(s) Oral two times a day  lidocaine   Patch 1 Patch Transdermal every 24 hours  lidocaine   Patch 1 Patch Transdermal daily  metoprolol tartrate 25 milliGRAM(s) Oral <User Schedule>  petrolatum Ophthalmic Ointment 1 Application(s) Both EYES every 8 hours  QUEtiapine 50 milliGRAM(s) Oral <User Schedule>  QUEtiapine 75 milliGRAM(s) Oral <User Schedule>  sodium chloride 1 Gram(s) Oral every 6 hours    MEDICATIONS  (PRN):  dextrose 40% Gel 15 Gram(s) Oral once PRN Blood Glucose LESS THAN 70 milliGRAM(s)/deciliter  glucagon  Injectable 1 milliGRAM(s) IntraMuscular once PRN Glucose LESS THAN 70 milligrams/deciliter  LORazepam     Tablet 1 milliGRAM(s) Oral every 8 hours PRN Agitation  polyethylene glycol 3350 17 Gram(s) Oral daily PRN Constipation      VITALS: T(C): 36.7 (10-02-20 @ 04:24)  T(F): 98.1 (10-02-20 @ 04:24), Max: 98.2 (10-01-20 @ 13:51)  HR: 58 (10-02-20 @ 04:52) (51 - 71)  BP: 134/90 (10-02-20 @ 04:24) (119/82 - 143/80)  RR:  (17 - 20)  SpO2:  (96% - 100%)  Wt(kg): --  General: AAO x 3, appropriate mood and affect    Ophthalmology Exam:  Visual acuity (sc): CF OU  Pupils: R pupil 4.5mm in dark, 4.5mm in light; L pupil 3mm in dark, 2.5mm in light  Ttono: 22 OD 23 OS  Extraocular movements (EOMs): intact abduction, 10% adduction, infraduction, and supraduction OD full OS  Confrontational Visual Field (CVF): left hemianopsia to hand motion OU  Color Plates: AVILA 2/2 vision OU    Pen Light Exam (PLE)  External: Flat OU  Lids/Lashes/Lacrimal Ducts: Flat OU    Sclera/Conjunctiva: W+Q OU  Cornea: 3 linear vertical epithelial defects, ~1-2mm each Cl OS  Anterior Chamber: D+F OU    Iris: Flat OU  Lens:  NS OU

## 2020-10-02 NOTE — CHART NOTE - NSCHARTNOTEFT_GEN_A_CORE
Nutrition Follow-up  Patient seen for: nutrition follow-up on [facility]    Source: EMR, Team     Chart reviewed, events noted. Per chart: Pt is a 59 yo male with PMH of afib s/p cardiac arrest, admitted 8/9. Found to have R SAH of unclear etiology with cerebral angiogram on 8/10 negative for aneurysm. Now with resolved neurogenic/cardiogenic shock. Course complicated by GI bleed s/p PPI gtt, bleeding from Umaña s/p clot irrigation, prolonged respiratory failure s/p trach (8/24) and PEG (8/26). Course complicated by afib with RVR and C diff colitis. Transferred to [facility] on 8/29. Tolerating continuous TC since 8/30. Trach downsized to #6 Cuffless Portex 9/14. Tolerating PMV. MBS performed on 9/28 with signs of aspiration noted.    Diet : NPO with Tube Feeds via PEG  Enteral /Parenteral Nutrition: Vital AF bolus feeds of 420mL q6hrs. Provides 1680mL formula, 2016 kcal, 126 g protein and 1362 ml free water  - Probiotic Yogurt/Smoothie x2 daily  - RD observed Vital AF hung; not infusing at this time as pt not currently receiving bolus     Per flowsheets, Pt has received 100% EN provisions in the last 5 days (9/27-10/1). Pt continues to tolerate EN regimen at goal.     GI: c fecal incontinence, last BM noted 10/2 - bowel regimen ordered (dulcolax, miralax - PRN)    Current Weight: Wt loss noted during hospital course - likely 2/2 adequate protein/calorie intake with BMI >30; likely with excessive intake PTA. Will continue to monitor/trend weight status.  Weight (kg): 110.2 (08-12), 110.1 (08-26), 106.2 (09-02), 106.3 (09-09), 101.1 (09-16), 98 (9-23), 100.3 (9-30)    Pertinent Medications: MEDICATIONS  (STANDING):  amantadine Syrup 100 milliGRAM(s) Oral <User Schedule>  artificial  tears Solution 1 Drop(s) Both EYES every 6 hours  aspirin  chewable 81 milliGRAM(s) Enteral Tube daily  atorvastatin 80 milliGRAM(s) Oral at bedtime  bisacodyl Suppository 10 milliGRAM(s) Rectal at bedtime  chlorhexidine 4% Liquid 1 Application(s) Topical <User Schedule>  dextrose 5%. 1000 milliLiter(s) (50 mL/Hr) IV Continuous <Continuous>  dextrose 50% Injectable 12.5 Gram(s) IV Push once  dextrose 50% Injectable 25 Gram(s) IV Push once  dextrose 50% Injectable 25 Gram(s) IV Push once  doxazosin 8 milliGRAM(s) Oral at bedtime  erythromycin   Ointment 1 Application(s) Right EYE every 8 hours  famotidine    Tablet 20 milliGRAM(s) Oral daily  fentaNYL   Patch  12 MICROgram(s)/Hr 1 Patch Transdermal every 72 hours  heparin   Injectable 5000 Unit(s) SubCutaneous every 8 hours  hydrALAZINE 100 milliGRAM(s) Oral three times a day  isosorbide   dinitrate Tablet (ISORDIL) 20 milliGRAM(s) Enteral Tube <User Schedule>  lactobacillus acidophilus 1 Tablet(s) Oral two times a day  lidocaine   Patch 1 Patch Transdermal every 24 hours  lidocaine   Patch 1 Patch Transdermal daily  metoprolol tartrate 12.5 milliGRAM(s) Oral <User Schedule>  ofloxacin 0.3% Solution 1 Drop(s) Right EYE four times a day  petrolatum Ophthalmic Ointment 1 Application(s) Both EYES every 8 hours  QUEtiapine 50 milliGRAM(s) Oral <User Schedule>  QUEtiapine 75 milliGRAM(s) Oral <User Schedule>  sodium chloride 1 Gram(s) Oral every 6 hours    MEDICATIONS  (PRN):  dextrose 40% Gel 15 Gram(s) Oral once PRN Blood Glucose LESS THAN 70 milliGRAM(s)/deciliter  glucagon  Injectable 1 milliGRAM(s) IntraMuscular once PRN Glucose LESS THAN 70 milligrams/deciliter  LORazepam     Tablet 1 milliGRAM(s) Oral every 8 hours PRN Agitation  polyethylene glycol 3350 17 Gram(s) Oral daily PRN Constipation    Pertinent Labs:  10-01 Na134 mmol/L<L> Glu 90 mg/dL K+ 4.3 mmol/L Cr  1.14 mg/dL BUN 25 mg/dL<H> 09-28 Phos 4.0 mg/dL    Skin per nursing documentation: no pressure injuries noted  Edema: none indicated at this time    Estimated Needs:   Based on upper IBW 76.8kg   Energy: (25-30kcal/kg): 1920-2304kcal  Protein: (1.4-1.6g protein/kg): 108-123g protein    Previous Nutrition Diagnosis: Increased nutrient needs - diagnosis ongoing, being addressed with enteral nutrition     New Nutrition Diagnosis: N/A      Interventions:   1. Continue current EN regimen: Vital AF bolus feeds 420 ml q6 hours (total 4 feeds daily) + Probiotic Yogurt/Smoothie Cans 2 daily. Provides 1680 ml total volume, 2016 kcal, 126 g protein and 1362 ml free water (provides 26 kcal/kg, 1.6 g protein based on upper IBW of 76.8 kg)  2. RD remains available to adjust TF regimen as needed. Defer free water flushes to team.       Monitoring and Evaluation:   Continue to monitor Nutritional intake, Tolerance to diet prescription, weights, labs, skin integrity  RD remains available upon request and will follow up per protocol    Henny Strange, MS, RD, CDN  pager #[phone]

## 2020-10-02 NOTE — PROGRESS NOTE ADULT - PROBLEM SELECTOR PLAN 6
A-Fib with RVR  DC'd Amiodarone (8/31)  Lopressor decreased to 12.5 mg BID   Hold off on therapeutic a/c given SAH

## 2020-10-03 LAB
ANION GAP SERPL CALC-SCNC: 13 MMOL/L — SIGNIFICANT CHANGE UP (ref 5–17)
BUN SERPL-MCNC: 22 MG/DL — SIGNIFICANT CHANGE UP (ref 7–23)
CALCIUM SERPL-MCNC: 9.6 MG/DL — SIGNIFICANT CHANGE UP (ref 8.4–10.5)
CHLORIDE SERPL-SCNC: 100 MMOL/L — SIGNIFICANT CHANGE UP (ref 96–108)
CO2 SERPL-SCNC: 21 MMOL/L — LOW (ref 22–31)
CREAT SERPL-MCNC: 1.1 MG/DL — SIGNIFICANT CHANGE UP (ref 0.5–1.3)
GLUCOSE SERPL-MCNC: 91 MG/DL — SIGNIFICANT CHANGE UP (ref 70–99)
HCT VFR BLD CALC: 36 % — LOW (ref 39–50)
HGB BLD-MCNC: 11.4 G/DL — LOW (ref 13–17)
MCHC RBC-ENTMCNC: 27.8 PG — SIGNIFICANT CHANGE UP (ref 27–34)
MCHC RBC-ENTMCNC: 31.7 GM/DL — LOW (ref 32–36)
MCV RBC AUTO: 87.8 FL — SIGNIFICANT CHANGE UP (ref 80–100)
NRBC # BLD: 0 /100 WBCS — SIGNIFICANT CHANGE UP (ref 0–0)
PLATELET # BLD AUTO: 256 K/UL — SIGNIFICANT CHANGE UP (ref 150–400)
POTASSIUM SERPL-MCNC: 4.1 MMOL/L — SIGNIFICANT CHANGE UP (ref 3.5–5.3)
POTASSIUM SERPL-SCNC: 4.1 MMOL/L — SIGNIFICANT CHANGE UP (ref 3.5–5.3)
RBC # BLD: 4.1 M/UL — LOW (ref 4.2–5.8)
RBC # FLD: 16.2 % — HIGH (ref 10.3–14.5)
SODIUM SERPL-SCNC: 134 MMOL/L — LOW (ref 135–145)
WBC # BLD: 5.03 K/UL — SIGNIFICANT CHANGE UP (ref 3.8–10.5)
WBC # FLD AUTO: 5.03 K/UL — SIGNIFICANT CHANGE UP (ref 3.8–10.5)

## 2020-10-03 PROCEDURE — 99232 SBSQ HOSP IP/OBS MODERATE 35: CPT

## 2020-10-03 RX ADMIN — Medication 1 DROP(S): at 19:05

## 2020-10-03 RX ADMIN — SODIUM CHLORIDE 1 GRAM(S): 9 INJECTION INTRAMUSCULAR; INTRAVENOUS; SUBCUTANEOUS at 16:10

## 2020-10-03 RX ADMIN — LIDOCAINE 1 PATCH: 4 CREAM TOPICAL at 21:35

## 2020-10-03 RX ADMIN — Medication 8 MILLIGRAM(S): at 21:36

## 2020-10-03 RX ADMIN — QUETIAPINE FUMARATE 75 MILLIGRAM(S): 200 TABLET, FILM COATED ORAL at 21:35

## 2020-10-03 RX ADMIN — LIDOCAINE 1 PATCH: 4 CREAM TOPICAL at 11:34

## 2020-10-03 RX ADMIN — FENTANYL CITRATE 1 PATCH: 50 INJECTION INTRAVENOUS at 08:48

## 2020-10-03 RX ADMIN — LIDOCAINE 1 PATCH: 4 CREAM TOPICAL at 08:46

## 2020-10-03 RX ADMIN — ISOSORBIDE DINITRATE 20 MILLIGRAM(S): 5 TABLET ORAL at 16:10

## 2020-10-03 RX ADMIN — HEPARIN SODIUM 5000 UNIT(S): 5000 INJECTION INTRAVENOUS; SUBCUTANEOUS at 21:36

## 2020-10-03 RX ADMIN — LIDOCAINE 1 PATCH: 4 CREAM TOPICAL at 23:30

## 2020-10-03 RX ADMIN — Medication 1 DROP(S): at 08:49

## 2020-10-03 RX ADMIN — ISOSORBIDE DINITRATE 20 MILLIGRAM(S): 5 TABLET ORAL at 23:32

## 2020-10-03 RX ADMIN — ISOSORBIDE DINITRATE 20 MILLIGRAM(S): 5 TABLET ORAL at 08:41

## 2020-10-03 RX ADMIN — FAMOTIDINE 20 MILLIGRAM(S): 10 INJECTION INTRAVENOUS at 08:49

## 2020-10-03 RX ADMIN — Medication 1 TABLET(S): at 16:10

## 2020-10-03 RX ADMIN — ATORVASTATIN CALCIUM 80 MILLIGRAM(S): 80 TABLET, FILM COATED ORAL at 21:35

## 2020-10-03 RX ADMIN — Medication 100 MILLIGRAM(S): at 13:46

## 2020-10-03 RX ADMIN — QUETIAPINE FUMARATE 50 MILLIGRAM(S): 200 TABLET, FILM COATED ORAL at 13:46

## 2020-10-03 RX ADMIN — Medication 1 APPLICATION(S): at 13:46

## 2020-10-03 RX ADMIN — Medication 1 APPLICATION(S): at 21:37

## 2020-10-03 RX ADMIN — SODIUM CHLORIDE 1 GRAM(S): 9 INJECTION INTRAMUSCULAR; INTRAVENOUS; SUBCUTANEOUS at 21:35

## 2020-10-03 RX ADMIN — SODIUM CHLORIDE 1 GRAM(S): 9 INJECTION INTRAMUSCULAR; INTRAVENOUS; SUBCUTANEOUS at 08:49

## 2020-10-03 RX ADMIN — Medication 1 APPLICATION(S): at 05:55

## 2020-10-03 RX ADMIN — Medication 12.5 MILLIGRAM(S): at 08:43

## 2020-10-03 RX ADMIN — HEPARIN SODIUM 5000 UNIT(S): 5000 INJECTION INTRAVENOUS; SUBCUTANEOUS at 05:56

## 2020-10-03 RX ADMIN — LIDOCAINE 1 PATCH: 4 CREAM TOPICAL at 23:22

## 2020-10-03 RX ADMIN — QUETIAPINE FUMARATE 50 MILLIGRAM(S): 200 TABLET, FILM COATED ORAL at 05:55

## 2020-10-03 RX ADMIN — LIDOCAINE 1 PATCH: 4 CREAM TOPICAL at 08:47

## 2020-10-03 RX ADMIN — FENTANYL CITRATE 1 PATCH: 50 INJECTION INTRAVENOUS at 19:07

## 2020-10-03 RX ADMIN — Medication 100 MILLIGRAM(S): at 21:36

## 2020-10-03 RX ADMIN — Medication 1 DROP(S): at 05:55

## 2020-10-03 RX ADMIN — Medication 12.5 MILLIGRAM(S): at 19:51

## 2020-10-03 RX ADMIN — Medication 1 TABLET(S): at 05:55

## 2020-10-03 RX ADMIN — Medication 100 MILLIGRAM(S): at 05:55

## 2020-10-03 RX ADMIN — Medication 81 MILLIGRAM(S): at 11:34

## 2020-10-03 RX ADMIN — Medication 100 MILLIGRAM(S): at 08:41

## 2020-10-03 RX ADMIN — Medication 100 MILLIGRAM(S): at 11:34

## 2020-10-03 RX ADMIN — CHLORHEXIDINE GLUCONATE 1 APPLICATION(S): 213 SOLUTION TOPICAL at 06:30

## 2020-10-03 RX ADMIN — SODIUM CHLORIDE 1 GRAM(S): 9 INJECTION INTRAMUSCULAR; INTRAVENOUS; SUBCUTANEOUS at 02:50

## 2020-10-03 RX ADMIN — HEPARIN SODIUM 5000 UNIT(S): 5000 INJECTION INTRAVENOUS; SUBCUTANEOUS at 13:45

## 2020-10-03 NOTE — PROGRESS NOTE ADULT - SUBJECTIVE AND OBJECTIVE BOX
Patient is a 60y old  Male who presents with a chief complaint of Cardiac arrest (21 Sep 2020 13:52)      Interval Events:    REVIEW OF SYSTEMS:  [ ] Positive  [ ] All other systems negative  [ ] Unable to assess ROS because ________    Vital Signs Last 24 Hrs  T(C): 36.9 (10-03-20 @ 04:02), Max: 36.9 (10-03-20 @ 04:02)  T(F): 98.4 (10-03-20 @ 04:02), Max: 98.4 (10-03-20 @ 04:02)  HR: 61 (10-03-20 @ 04:09) (61 - 73)  BP: 134/96 (10-03-20 @ 04:02) (100/63 - 168/87)  RR: 22 (10-03-20 @ 04:09) (18 - 416)  SpO2: 100% (10-03-20 @ 04:09) (97% - 100%)PHYSICAL EXAM:  HEENT:   [ ]Tracheostomy:  [ ]Pupils equal  [ ]No oral lesions  [ ]Abnormal        SKIN  [ ]No Rash  [ ] Abnormal  [ ] pressure    CARDIAC  [ ]Regular  [ ]Abnormal    PULMONARY  [ ]Bilateral Clear Breath Sounds  [ ]Normal Excursion  [ ]Abnormal    GI  [ ]PEG      [ ] +BS		              [ ]Soft, nondistended, nontender	  [ ]Abnormal    MUSCULOSKELETAL                                   [ ]Bedbound                 [ ]Abnormal    [ ]Ambulatory/OOB to chair                           EXTREMITIES                                         [ ]Normal  [ ]Edema                           NEUROLOGIC  [ ] Normal, non focal  [ ] Focal findings:    PSYCHIATRIC  [ ]Alert and appropriate  [ ] Sedated	 [ ]Agitated    :  Umaña: [ ] Yes, if yes: Date of Placement:                   [  ] No    LINES: Central Lines [ ] Yes, if yes: Date of Placement                                     [  ] No    HOSPITAL MEDICATIONS:  MEDICATIONS  (STANDING):  amantadine Syrup 100 milliGRAM(s) Oral <User Schedule>  artificial  tears Solution 1 Drop(s) Both EYES every 6 hours  aspirin  chewable 81 milliGRAM(s) Enteral Tube daily  atorvastatin 80 milliGRAM(s) Oral at bedtime  bisacodyl Suppository 10 milliGRAM(s) Rectal at bedtime  chlorhexidine 4% Liquid 1 Application(s) Topical <User Schedule>  dextrose 5%. 1000 milliLiter(s) (50 mL/Hr) IV Continuous <Continuous>  dextrose 50% Injectable 12.5 Gram(s) IV Push once  dextrose 50% Injectable 25 Gram(s) IV Push once  dextrose 50% Injectable 25 Gram(s) IV Push once  doxazosin 8 milliGRAM(s) Oral at bedtime  erythromycin   Ointment 1 Application(s) Right EYE every 8 hours  famotidine    Tablet 20 milliGRAM(s) Oral daily  fentaNYL   Patch  12 MICROgram(s)/Hr 1 Patch Transdermal every 72 hours  heparin   Injectable 5000 Unit(s) SubCutaneous every 8 hours  hydrALAZINE 100 milliGRAM(s) Oral three times a day  isosorbide   dinitrate Tablet (ISORDIL) 20 milliGRAM(s) Enteral Tube <User Schedule>  lactobacillus acidophilus 1 Tablet(s) Oral two times a day  lidocaine   Patch 1 Patch Transdermal daily  lidocaine   Patch 1 Patch Transdermal every 24 hours  metoprolol tartrate 12.5 milliGRAM(s) Oral <User Schedule>  ofloxacin 0.3% Solution 1 Drop(s) Right EYE four times a day  petrolatum Ophthalmic Ointment 1 Application(s) Both EYES every 8 hours  QUEtiapine 50 milliGRAM(s) Oral <User Schedule>  QUEtiapine 75 milliGRAM(s) Oral <User Schedule>  sodium chloride 1 Gram(s) Oral every 6 hours    MEDICATIONS  (PRN):  dextrose 40% Gel 15 Gram(s) Oral once PRN Blood Glucose LESS THAN 70 milliGRAM(s)/deciliter  glucagon  Injectable 1 milliGRAM(s) IntraMuscular once PRN Glucose LESS THAN 70 milligrams/deciliter  LORazepam     Tablet 1 milliGRAM(s) Oral every 8 hours PRN Agitation  polyethylene glycol 3350 17 Gram(s) Oral daily PRN Constipation      LABS:                  CAPILLARY BLOOD GLUCOSE    MICROBIOLOGY:     RADIOLOGY:  [ ] Reviewed and interpreted by me     Patient is a 60y old  Male who presents with a chief complaint of Cardiac arrest (21 Sep 2020 13:52)  Follow up for R eye abrasion w/ vision changes     Interval Events: No events overnight     REVIEW OF SYSTEMS:  [ ] Positive  [xAll other systems negative  [ ] Unable to assess ROS because ________    Vital Signs Last 24 Hrs  T(C): 36.9 (10-03-20 @ 04:02), Max: 36.9 (10-03-20 @ 04:02)  T(F): 98.4 (10-03-20 @ 04:02), Max: 98.4 (10-03-20 @ 04:02)  HR: 61 (10-03-20 @ 04:09) (61 - 73)  BP: 134/96 (10-03-20 @ 04:02) (100/63 - 168/87)  RR: 22 (10-03-20 @ 04:09) (18 - 416)  SpO2: 100% (10-03-20 @ 04:09) (97% - 100%)    PHYSICAL EXAM:    HEENT:   [x]Tracheostomy: # 6 Cuffless Portex   [x]Pupils: Right eye ptosis, Dilated fixed right pupil; R eye corneal abrasion; gtts applied, ophthal following  [ ]No oral lesions  [ ]Abnormal    SKIN  [x]No Rash  [ ] Abnormal  [ ] pressure    CARDIAC  [x]Regular  [ ]Abnormal    PULMONARY  [x]Bilateral Clear Breath Sounds  [ ]Normal Excursion  [ ]Abnormal    GI  [x]PEG      [x] +BS		              [x]Soft, nondistended, nontender	  [ ]Abnormal    MUSCULOSKELETAL                                   [ ]Bedbound                 [ ]Abnormal    [x]OOB to chair                           EXTREMITIES                                         [x]Normal  [ ]Edema                           NEUROLOGIC  [ ] Normal, non focal  [x] Focal findings: Able to follow commands and respond to questions appropriately ; Hemiparesis of Left upper and Left Lower extremity       PSYCHIATRIC  [x]Alert and appropriate  [ ] Sedated	 [ ]Agitated    :  Uma[place]: [ ] Yes, if yes: Date of Placement:                   [x] No; Straight cath atc for Urinary Retention     LINES: Central Lines [ ] Yes, if yes: Date of Placement                                     [x] No    HOSPITAL MEDICATIONS:  MEDICATIONS  (STANDING):  amantadine Syrup 100 milliGRAM(s) Oral <User Schedule>  artificial  tears Solution 1 Drop(s) Both EYES every 6 hours  aspirin  chewable 81 milliGRAM(s) Enteral Tube daily  atorvastatin 80 milliGRAM(s) Oral at bedtime  bisacodyl Suppository 10 milliGRAM(s) Rectal at bedtime  chlorhexidine 4% Liquid 1 Application(s) Topical <User Schedule>  dextrose 5%. 1000 milliLiter(s) (50 mL/Hr) IV Continuous <Continuous>  dextrose 50% Injectable 12.5 Gram(s) IV Push once  dextrose 50% Injectable 25 Gram(s) IV Push once  dextrose 50% Injectable 25 Gram(s) IV Push once  doxazosin 8 milliGRAM(s) Oral at bedtime  erythromycin   Ointment 1 Application(s) Right EYE every 8 hours  famotidine    Tablet 20 milliGRAM(s) Oral daily  fentaNYL   Patch  12 MICROgram(s)/Hr 1 Patch Transdermal every 72 hours  heparin   Injectable 5000 Unit(s) SubCutaneous every 8 hours  hydrALAZINE 100 milliGRAM(s) Oral three times a day  isosorbide   dinitrate Tablet (ISORDIL) 20 milliGRAM(s) Enteral Tube <User Schedule>  lactobacillus acidophilus 1 Tablet(s) Oral two times a day  lidocaine   Patch 1 Patch Transdermal daily  lidocaine   Patch 1 Patch Transdermal every 24 hours  metoprolol tartrate 12.5 milliGRAM(s) Oral <User Schedule>  ofloxacin 0.3% Solution 1 Drop(s) Right EYE four times a day  petrolatum Ophthalmic Ointment 1 Application(s) Both EYES every 8 hours  QUEtiapine 50 milliGRAM(s) Oral <User Schedule>  QUEtiapine 75 milliGRAM(s) Oral <User Schedule>  sodium chloride 1 Gram(s) Oral every 6 hours    MEDICATIONS  (PRN):  dextrose 40% Gel 15 Gram(s) Oral once PRN Blood Glucose LESS THAN 70 milliGRAM(s)/deciliter  glucagon  Injectable 1 milliGRAM(s) IntraMuscular once PRN Glucose LESS THAN 70 milligrams/deciliter  LORazepam     Tablet 1 milliGRAM(s) Oral every 8 hours PRN Agitation  polyethylene glycol 3350 17 Gram(s) Oral daily PRN Constipation      LABS:                  CAPILLARY BLOOD GLUCOSE    MICROBIOLOGY:     RADIOLOGY:  [ ] Reviewed and interpreted by me     Patient is a 60y old  Male who presents with a chief complaint of Cardiac arrest (21 Sep 2020 13:52)  Follow up for R eye abrasion w/ vision changes     Interval Events: No events overnight     REVIEW OF SYSTEMS:  [ ] Positive  [xAll other systems negative  [ ] Unable to assess ROS because ________    Vital Signs Last 24 Hrs  T(C): 36.9 (10-03-20 @ 04:02), Max: 36.9 (10-03-20 @ 04:02)  T(F): 98.4 (10-03-20 @ 04:02), Max: 98.4 (10-03-20 @ 04:02)  HR: 61 (10-03-20 @ 04:09) (61 - 73)  BP: 134/96 (10-03-20 @ 04:02) (100/63 - 168/87)  RR: 22 (10-03-20 @ 04:09) (18 - 416)  SpO2: 100% (10-03-20 @ 04:09) (97% - 100%)    PHYSICAL EXAM:    HEENT:   [x]Tracheostomy: # 6 Cuffless Portex   [x]Pupils: Right eye ptosis, Dilated fixed right pupil; R eye corneal abrasion; gtts applied, ophthal following  [ ]No oral lesions  [ ]Abnormal    SKIN  [x]No Rash  [ ] Abnormal  [ ] pressure    CARDIAC  [x]Regular  [ ]Abnormal    PULMONARY  [x]Bilateral Clear Breath Sounds  [ ]Normal Excursion  [ ]Abnormal    GI  [x]PEG      [x] +BS		              [x]Soft, nondistended, nontender	  [ ]Abnormal    MUSCULOSKELETAL                                   [ ]Bedbound                 [ ]Abnormal    [x]OOB to chair                           EXTREMITIES                                         [x]Normal  [ ]Edema                           NEUROLOGIC  [ ] Normal, non focal  [x] Focal findings: Awake, L eye open, Rt eye Ptosis; Able to follow commands; respond to questions appropriately ; Hemiparesis LUE/LLE     PSYCHIATRIC  [x]Alert and appropriate  [ ] Sedated	 [ ]Agitated    :  Uma[place]: [ ] Yes, if yes: Date of Placement:                   [x] No; Straight cath ATC for Urinary Retention     LINES: Central Lines [ ] Yes, if yes: Date of Placement                                     [x] No    HOSPITAL MEDICATIONS:  MEDICATIONS  (STANDING):  amantadine Syrup 100 milliGRAM(s) Oral <User Schedule>  artificial  tears Solution 1 Drop(s) Both EYES every 6 hours  aspirin  chewable 81 milliGRAM(s) Enteral Tube daily  atorvastatin 80 milliGRAM(s) Oral at bedtime  bisacodyl Suppository 10 milliGRAM(s) Rectal at bedtime  chlorhexidine 4% Liquid 1 Application(s) Topical <User Schedule>  dextrose 5%. 1000 milliLiter(s) (50 mL/Hr) IV Continuous <Continuous>  dextrose 50% Injectable 12.5 Gram(s) IV Push once  dextrose 50% Injectable 25 Gram(s) IV Push once  dextrose 50% Injectable 25 Gram(s) IV Push once  doxazosin 8 milliGRAM(s) Oral at bedtime  erythromycin   Ointment 1 Application(s) Right EYE every 8 hours  famotidine    Tablet 20 milliGRAM(s) Oral daily  fentaNYL   Patch  12 MICROgram(s)/Hr 1 Patch Transdermal every 72 hours  heparin   Injectable 5000 Unit(s) SubCutaneous every 8 hours  hydrALAZINE 100 milliGRAM(s) Oral three times a day  isosorbide   dinitrate Tablet (ISORDIL) 20 milliGRAM(s) Enteral Tube <User Schedule>  lactobacillus acidophilus 1 Tablet(s) Oral two times a day  lidocaine   Patch 1 Patch Transdermal daily  lidocaine   Patch 1 Patch Transdermal every 24 hours  metoprolol tartrate 12.5 milliGRAM(s) Oral <User Schedule>  ofloxacin 0.3% Solution 1 Drop(s) Right EYE four times a day  petrolatum Ophthalmic Ointment 1 Application(s) Both EYES every 8 hours  QUEtiapine 50 milliGRAM(s) Oral <User Schedule>  QUEtiapine 75 milliGRAM(s) Oral <User Schedule>  sodium chloride 1 Gram(s) Oral every 6 hours    MEDICATIONS  (PRN):  dextrose 40% Gel 15 Gram(s) Oral once PRN Blood Glucose LESS THAN 70 milliGRAM(s)/deciliter  glucagon  Injectable 1 milliGRAM(s) IntraMuscular once PRN Glucose LESS THAN 70 milligrams/deciliter  LORazepam     Tablet 1 milliGRAM(s) Oral every 8 hours PRN Agitation  polyethylene glycol 3350 17 Gram(s) Oral daily PRN Constipation      LABS:                  CAPILLARY BLOOD GLUCOSE    MICROBIOLOGY:     RADIOLOGY:  [ ] Reviewed and interpreted by me     Patient is a 60y old  Male who presents with a chief complaint of Cardiac arrest (21 Sep 2020 13:52)  Follow up for R eye abrasion w/ vision changes     Interval Events: No events overnight     REVIEW OF SYSTEMS:  [ ] Positive  [xAll other systems negative  [ ] Unable to assess ROS because ________    Vital Signs Last 24 Hrs  T(C): 36.9 (10-03-20 @ 04:02), Max: 36.9 (10-03-20 @ 04:02)  T(F): 98.4 (10-03-20 @ 04:02), Max: 98.4 (10-03-20 @ 04:02)  HR: 61 (10-03-20 @ 04:09) (61 - 73)  BP: 134/96 (10-03-20 @ 04:02) (100/63 - 168/87)  RR: 22 (10-03-20 @ 04:09) (18 - 416)  SpO2: 100% (10-03-20 @ 04:09) (97% - 100%)    PHYSICAL EXAM:    HEENT:   [x]Tracheostomy: # 6 Cuffless Portex   [x]Pupils: Right eye ptosis, Dilated fixed right pupil; R eye corneal abrasion; gtts applied, ophthal following  [ ]No oral lesions  [ ]Abnormal    SKIN  [x]No Rash  [ ] Abnormal  [ ] pressure    CARDIAC  []Regular  [x ]Abnormal- irreg; irreg     PULMONARY  [x]Bilateral Clear Breath Sounds  [ ]Normal Excursion  [ ]Abnormal    GI  [x]PEG      [x] +BS		              [x]Soft, nondistended, nontender	  [ ]Abnormal    MUSCULOSKELETAL                                   [ ]Bedbound                 [ ]Abnormal    [x]OOB to chair                           EXTREMITIES                                         [x]Normal  [ ]Edema                           NEUROLOGIC  [ ] Normal, non focal  [x] Focal findings: Awake, L eye open, Rt eye Ptosis; Able to follow commands; respond to questions appropriately ; Hemiparesis LUE/LLE     PSYCHIATRIC  [x]Alert and appropriate  [ ] Sedated	 [ ]Agitated    :  Uma[place]: [ ] Yes, if yes: Date of Placement:                   [x] No; Straight cath ATC for Urinary Retention     LINES: Central Lines [ ] Yes, if yes: Date of Placement                                     [x] No    HOSPITAL MEDICATIONS:  MEDICATIONS  (STANDING):  amantadine Syrup 100 milliGRAM(s) Oral <User Schedule>  artificial  tears Solution 1 Drop(s) Both EYES every 6 hours  aspirin  chewable 81 milliGRAM(s) Enteral Tube daily  atorvastatin 80 milliGRAM(s) Oral at bedtime  bisacodyl Suppository 10 milliGRAM(s) Rectal at bedtime  chlorhexidine 4% Liquid 1 Application(s) Topical <User Schedule>  dextrose 5%. 1000 milliLiter(s) (50 mL/Hr) IV Continuous <Continuous>  dextrose 50% Injectable 12.5 Gram(s) IV Push once  dextrose 50% Injectable 25 Gram(s) IV Push once  dextrose 50% Injectable 25 Gram(s) IV Push once  doxazosin 8 milliGRAM(s) Oral at bedtime  erythromycin   Ointment 1 Application(s) Right EYE every 8 hours  famotidine    Tablet 20 milliGRAM(s) Oral daily  fentaNYL   Patch  12 MICROgram(s)/Hr 1 Patch Transdermal every 72 hours  heparin   Injectable 5000 Unit(s) SubCutaneous every 8 hours  hydrALAZINE 100 milliGRAM(s) Oral three times a day  isosorbide   dinitrate Tablet (ISORDIL) 20 milliGRAM(s) Enteral Tube <User Schedule>  lactobacillus acidophilus 1 Tablet(s) Oral two times a day  lidocaine   Patch 1 Patch Transdermal daily  lidocaine   Patch 1 Patch Transdermal every 24 hours  metoprolol tartrate 12.5 milliGRAM(s) Oral <User Schedule>  ofloxacin 0.3% Solution 1 Drop(s) Right EYE four times a day  petrolatum Ophthalmic Ointment 1 Application(s) Both EYES every 8 hours  QUEtiapine 50 milliGRAM(s) Oral <User Schedule>  QUEtiapine 75 milliGRAM(s) Oral <User Schedule>  sodium chloride 1 Gram(s) Oral every 6 hours    MEDICATIONS  (PRN):  dextrose 40% Gel 15 Gram(s) Oral once PRN Blood Glucose LESS THAN 70 milliGRAM(s)/deciliter  glucagon  Injectable 1 milliGRAM(s) IntraMuscular once PRN Glucose LESS THAN 70 milligrams/deciliter  LORazepam     Tablet 1 milliGRAM(s) Oral every 8 hours PRN Agitation  polyethylene glycol 3350 17 Gram(s) Oral daily PRN Constipation      LABS:                  CAPILLARY BLOOD GLUCOSE    MICROBIOLOGY:     RADIOLOGY:  [ ] Reviewed and interpreted by me

## 2020-10-03 NOTE — PROGRESS NOTE ADULT - ATTENDING COMMENTS
Pt seen and examined.   1. Acute resp failure with hypoxia:  - Stable on TC ATC, tolerating PMV  - Cont pulm toilet/ trach care   2. ID:  - Completed antibiotic courses for bacterial PNA/ UTI and C diff diarrhea   - Cont to monitor temp curve/ WBC  3. Cardiac arrest/ HFrEF/ A fib  - Cont to optimize medical management  - Outpt cath   - Rate controlled on lopressor. No AC d/t SAH  4. Oropharyngeal dysphagia:  - Failed MBS  - Cont PEG feeds  5. JALEN:  - Resolved, hyponatremia improving   6. Corneal abrasion:  - Cont care per ophthalmology recs   7. Gen:  - Prognosis guarded.  - Plan for d-c to [facility] once insurance issues resolved. as above-   1. Acute resp failure with hypoxia:   Stable on TC ATC, tolerating PMV-    Cont pulm toilet/ trach care   2. ID:  - Completed antibiotic courses for bacterial PNA/ UTI and C diff diarrhea      - Cont to monitor temp curve/ WBC  3. Cardiac arrest/ HFrEF/ A fib     Cont to optimize medical management   - Outpt cath      - Rate controlled on lopressor. No AC d/t SAH  4. Oropharyngeal dysphagia:   - Failed MBS     - Cont PEG feeds  5. JALEN:   - Resolved, hyponatremia improving   6. Corneal abrasion:   - Cont care per ophthalmology recs   7. Gen:   - Prognosis guarded.        - Plan for d-c to [facility] once insurance issues resolved.  Rob Cabello MD-Pulmonary   [phone]

## 2020-10-03 NOTE — PROVIDER CONTACT NOTE (OTHER) - SITUATION
on assessment unable to suction pt. lavage done , RT Sherrie made aware, pt assessed by RT. now difficulty passing suction cath.

## 2020-10-03 NOTE — PROGRESS NOTE ADULT - ASSESSMENT
Patient 60-year-old male with a history of A-Fib on warfarin who presented with cardiac arrest at work with downtime of about 25 minutes prior to ROSC. Patient S/p therapeutic hypothermia. 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 Now with resolved neurogenic/cardiogenic shock. Course complicated by GI bleed s/p PPI gtt, bleeding from Umaña s/p clot irrigation, prolonged respiratory failure s/p trach (8/24) and PEG (8/26). Patients with Afib with RVR during admission; previously on amiodarone since discontinued now rate controlled with Lopressor. Patient with Urinary retention during Hospitalization likely 2/2 neurogenic bladder; Pt developed traumatic hematuria; requiring urology to perform clot evacuation. Patient now on Cardura. Patient now requiring Straight Catheterization ATC. Patients hospital course complicated by Pseudomonas PNA and C.Diff. Patient S/p Cefepime  and enteral vanco. Patient was weaned to TC ATC during admission and tracheostomy was downsized to # 6 Cuffless [place]ex.  Patient with agitation restlessness during admission requiring Seroquel and ativan prn. Patient had MBS performed on 9/28 with signs of aspiration noted.    10/2: Patient seen by opthalmology this morning; found to have linear Rt eye abrasion placed on Oxafloxacin and erythromycin gtts. Patient remains with bradycardia will decrease lopressor to 12.5 mg bid. Patient 60-year-old male with a history of A-Fib on warfarin who presented with cardiac arrest at work with downtime of about 25 minutes prior to ROSC. Patient S/p therapeutic hypothermia. 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 Now with resolved neurogenic/cardiogenic shock. Course complicated by GI bleed s/p PPI gtt, bleeding from Umaña s/p clot irrigation, prolonged respiratory failure s/p trach (8/24) and PEG (8/26). Patients with Afib with RVR during admission; previously on amiodarone since discontinued now rate controlled with Lopressor. Patient with Urinary retention during Hospitalization likely 2/2 neurogenic bladder; Pt developed traumatic hematuria; requiring urology to perform clot evacuation. Patient now on Cardura. Patient now requiring Straight Catheterization ATC. Patients hospital course complicated by Pseudomonas PNA and C.Diff. Patient S/p Cefepime  and enteral vanco. Patient was weaned to TC ATC during admission and tracheostomy was downsized to # 6 Cuffless Portex.  Patient with agitation restlessness during admission requiring Seroquel and ativan prn. Patient had MBS performed on 9/28 with signs of aspiration noted.    10/2: Patient seen by opthalmology this morning; found to have linear Rt eye abrasion placed on Oxafloxacin and erythromycin gtts. Patient remains with bradycardia will decrease lopressor to 12.5 mg bid.  10/3-no changes Patient 60-year-old male with a history of A-Fib on warfarin who presented with cardiac arrest at work with downtime of about 25 minutes prior to ROSC. Patient S/p therapeutic hypothermia. 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 Now with resolved neurogenic/cardiogenic shock. Course complicated by GI bleed s/p PPI gtt, bleeding from Umaña s/p clot irrigation, prolonged respiratory failure s/p trach (8/24) and PEG (8/26). Patients with Afib with RVR during admission; previously on amiodarone since discontinued now rate controlled with Lopressor. Patient with Urinary retention during Hospitalization likely 2/2 neurogenic bladder; Pt developed traumatic hematuria; requiring urology to perform clot evacuation. Patient now on Cardura. Patient now requiring Straight Catheterization ATC. Patients hospital course complicated by Pseudomonas PNA and C.Diff. Patient S/p Cefepime  and enteral vanco. Patient was weaned to TC ATC during admission and tracheostomy was downsized to # 6 Cuffless Portex.  Patient with agitation restlessness during admission requiring Seroquel and ativan prn. Patient had MBS performed on 9/28 with signs of aspiration noted.    10/2: Patient seen by opthalmology this morning; found to have linear Rt eye abrasion placed on Oxafloxacin and erythromycin gtts. Patient remains with bradycardia will decrease lopressor to 12.5 mg bid.  10/3-no changes overnight; receiving gtts to Rt eye w/ unchanged vison, per ophthal patient w/ Guarded prognosis for visual recovery Patient 60-year-old male with a history of A-Fib on warfarin who presented with cardiac arrest at work with downtime of about 25 minutes prior to ROSC. Patient S/p therapeutic hypothermia. 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 Now with resolved neurogenic/cardiogenic shock. Course complicated by GI bleed s/p PPI gtt, bleeding from Uma[place] s/p clot irrigation, prolonged respiratory failure s/p trach (8/24) and PEG (8/26). Patients with Afib with RVR during admission; previously on amiodarone since discontinued now rate controlled with Lopressor. Patient with Urinary retention during Hospitalization likely 2/2 neurogenic bladder; Pt developed traumatic hematuria; requiring urology to perform clot evacuation. Patient now on Cardura. Patient now requiring Straight Catheterization ATC. Patients hospital course complicated by Pseudomonas PNA and C.Diff. Patient S/p Cefepime  and enteral vanco. Patient was weaned to TC ATC during admission and tracheostomy was downsized to # 6 Cuffless Portex.  Patient with agitation restlessness during admission requiring Seroquel and ativan prn. Patient had MBS performed on 9/28 with signs of aspiration noted.    10/2: Patient seen by opthalmology this morning; found to have linear Rt eye abrasion placed on Oxafloxacin and erythromycin gtts. Patient remains with bradycardia will decrease lopressor to 12.5 mg bid.  10/3-no changes overnight; receiving gtts to Rt eye for corneal abrasion w/ unchanged vison, per ophtho patient w/ Guarded prognosis for visual recovery.

## 2020-10-04 LAB — SARS-COV-2 RNA SPEC QL NAA+PROBE: SIGNIFICANT CHANGE UP

## 2020-10-04 PROCEDURE — 99232 SBSQ HOSP IP/OBS MODERATE 35: CPT

## 2020-10-04 RX ADMIN — Medication 1 TABLET(S): at 05:05

## 2020-10-04 RX ADMIN — FENTANYL CITRATE 1 PATCH: 50 INJECTION INTRAVENOUS at 13:00

## 2020-10-04 RX ADMIN — Medication 81 MILLIGRAM(S): at 12:17

## 2020-10-04 RX ADMIN — Medication 1 DROP(S): at 12:16

## 2020-10-04 RX ADMIN — CHLORHEXIDINE GLUCONATE 1 APPLICATION(S): 213 SOLUTION TOPICAL at 08:44

## 2020-10-04 RX ADMIN — Medication 100 MILLIGRAM(S): at 05:05

## 2020-10-04 RX ADMIN — Medication 100 MILLIGRAM(S): at 13:44

## 2020-10-04 RX ADMIN — Medication 100 MILLIGRAM(S): at 08:43

## 2020-10-04 RX ADMIN — Medication 1 APPLICATION(S): at 13:45

## 2020-10-04 RX ADMIN — SODIUM CHLORIDE 1 GRAM(S): 9 INJECTION INTRAMUSCULAR; INTRAVENOUS; SUBCUTANEOUS at 02:11

## 2020-10-04 RX ADMIN — LIDOCAINE 1 PATCH: 4 CREAM TOPICAL at 07:04

## 2020-10-04 RX ADMIN — Medication 12.5 MILLIGRAM(S): at 19:08

## 2020-10-04 RX ADMIN — HEPARIN SODIUM 5000 UNIT(S): 5000 INJECTION INTRAVENOUS; SUBCUTANEOUS at 13:45

## 2020-10-04 RX ADMIN — FENTANYL CITRATE 1 PATCH: 50 INJECTION INTRAVENOUS at 13:44

## 2020-10-04 RX ADMIN — FENTANYL CITRATE 1 PATCH: 50 INJECTION INTRAVENOUS at 20:27

## 2020-10-04 RX ADMIN — QUETIAPINE FUMARATE 50 MILLIGRAM(S): 200 TABLET, FILM COATED ORAL at 04:51

## 2020-10-04 RX ADMIN — Medication 1 TABLET(S): at 19:07

## 2020-10-04 RX ADMIN — Medication 12.5 MILLIGRAM(S): at 08:43

## 2020-10-04 RX ADMIN — FAMOTIDINE 20 MILLIGRAM(S): 10 INJECTION INTRAVENOUS at 12:16

## 2020-10-04 RX ADMIN — Medication 8 MILLIGRAM(S): at 21:36

## 2020-10-04 RX ADMIN — Medication 100 MILLIGRAM(S): at 12:15

## 2020-10-04 RX ADMIN — FENTANYL CITRATE 1 PATCH: 50 INJECTION INTRAVENOUS at 07:04

## 2020-10-04 RX ADMIN — HEPARIN SODIUM 5000 UNIT(S): 5000 INJECTION INTRAVENOUS; SUBCUTANEOUS at 21:38

## 2020-10-04 RX ADMIN — Medication 10 MILLIGRAM(S): at 21:38

## 2020-10-04 RX ADMIN — QUETIAPINE FUMARATE 50 MILLIGRAM(S): 200 TABLET, FILM COATED ORAL at 13:45

## 2020-10-04 RX ADMIN — LIDOCAINE 1 PATCH: 4 CREAM TOPICAL at 12:17

## 2020-10-04 RX ADMIN — LIDOCAINE 1 PATCH: 4 CREAM TOPICAL at 20:28

## 2020-10-04 RX ADMIN — Medication 1 APPLICATION(S): at 21:41

## 2020-10-04 RX ADMIN — ISOSORBIDE DINITRATE 20 MILLIGRAM(S): 5 TABLET ORAL at 17:53

## 2020-10-04 RX ADMIN — HEPARIN SODIUM 5000 UNIT(S): 5000 INJECTION INTRAVENOUS; SUBCUTANEOUS at 05:05

## 2020-10-04 RX ADMIN — SODIUM CHLORIDE 1 GRAM(S): 9 INJECTION INTRAMUSCULAR; INTRAVENOUS; SUBCUTANEOUS at 17:55

## 2020-10-04 RX ADMIN — SODIUM CHLORIDE 1 GRAM(S): 9 INJECTION INTRAMUSCULAR; INTRAVENOUS; SUBCUTANEOUS at 21:37

## 2020-10-04 RX ADMIN — SODIUM CHLORIDE 1 GRAM(S): 9 INJECTION INTRAMUSCULAR; INTRAVENOUS; SUBCUTANEOUS at 12:16

## 2020-10-04 RX ADMIN — QUETIAPINE FUMARATE 75 MILLIGRAM(S): 200 TABLET, FILM COATED ORAL at 21:37

## 2020-10-04 RX ADMIN — ATORVASTATIN CALCIUM 80 MILLIGRAM(S): 80 TABLET, FILM COATED ORAL at 21:37

## 2020-10-04 RX ADMIN — ISOSORBIDE DINITRATE 20 MILLIGRAM(S): 5 TABLET ORAL at 09:00

## 2020-10-04 RX ADMIN — LIDOCAINE 1 PATCH: 4 CREAM TOPICAL at 21:35

## 2020-10-04 RX ADMIN — Medication 100 MILLIGRAM(S): at 21:40

## 2020-10-04 NOTE — PROGRESS NOTE ADULT - ASSESSMENT
Patient 60-year-old male with a history of A-Fib on warfarin who presented with cardiac arrest at work with downtime of about 25 minutes prior to ROSC. Patient S/p therapeutic hypothermia. 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 Now with resolved neurogenic/cardiogenic shock. Course complicated by GI bleed s/p PPI gtt, bleeding from Umaña s/p clot irrigation, prolonged respiratory failure s/p trach (8/24) and PEG (8/26). Patients with Afib with RVR during admission; previously on amiodarone since discontinued now rate controlled with Lopressor. Patient with Urinary retention during Hospitalization likely 2/2 neurogenic bladder; Pt developed traumatic hematuria; requiring urology to perform clot evacuation. Patient now on Cardura. Patient now requiring Straight Catheterization ATC. Patients hospital course complicated by Pseudomonas PNA and C.Diff. Patient S/p Cefepime  and enteral vanco. Patient was weaned to TC ATC during admission and tracheostomy was downsized to # 6 Cuffless Portex.  Patient with agitation restlessness during admission requiring Seroquel and ativan prn. Patient had MBS performed on 9/28 with signs of aspiration noted.    10/2: Patient seen by opthalmology this morning; found to have linear Rt eye abrasion placed on Oxafloxacin and erythromycin gtts. Patient remains with bradycardia will decrease lopressor to 12.5 mg bid.  10/3-no changes overnight; receiving gtts to Rt eye for corneal abrasion w/ unchanged vison, per ophtho patient w/ Guarded prognosis for visual recovery. Patient 60-year-old male with a history of A-Fib on warfarin who presented with cardiac arrest at work with downtime of about 25 minutes prior to ROSC. Patient S/p therapeutic hypothermia. 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 Now with resolved neurogenic/cardiogenic shock. Course complicated by GI bleed s/p PPI gtt, bleeding from Umaña s/p clot irrigation, prolonged respiratory failure s/p trach (8/24) and PEG (8/26). Patients with Afib with RVR during admission; previously on amiodarone since discontinued now rate controlled with Lopressor. Patient with Urinary retention during Hospitalization likely 2/2 neurogenic bladder; Pt developed traumatic hematuria; requiring urology to perform clot evacuation. Patient now on Cardura. Patient now requiring Straight Catheterization ATC. Patients hospital course complicated by Pseudomonas PNA and C.Diff. Patient S/p Cefepime  and enteral vanco. Patient was weaned to TC ATC during admission and tracheostomy was downsized to # 6 Cuffless Portex.  Patient with agitation restlessness during admission requiring Seroquel and ativan prn. Patient had MBS performed on 9/28 with signs of aspiration noted.    10/2: Patient seen by opthalmology this morning; found to have linear Rt eye abrasion placed on Oxafloxacin and erythromycin gtts. Patient remains with bradycardia will decrease lopressor to 12.5 mg bid.  10/3-no changes overnight; receiving gtts to Rt eye for corneal abrasion w/ unchanged vison, per ophtho patient w/ Guarded prognosis for visual recovery.  10/4-no events

## 2020-10-04 NOTE — PROGRESS NOTE ADULT - ATTENDING COMMENTS
as above-   1. Acute resp failure with hypoxia:   Stable on TC ATC, tolerating PMV-    Cont pulm toilet/ trach care   2. ID:  - Completed antibiotic courses for bacterial PNA/ UTI and C diff diarrhea      - Cont to monitor temp curve/ WBC  3. Cardiac arrest/ HFrEF/ A fib     Cont to optimize medical management   - Outpt cath      - Rate controlled on lopressor. No AC d/t SAH  4. Oropharyngeal dysphagia:   - Failed MBS     - Cont PEG feeds  5. JALEN:   - Resolved, hyponatremia improving   6. Corneal abrasion:   - Cont care per ophthalmology recs   7. Gen:   - Prognosis guarded.        - Plan for d-c to [facility] once insurance issues resolved.  Rob Cabello MD-Pulmonary   [phone] as above- some trach clogging ( I was able to clear)  1. Acute resp failure with hypoxia:   Stable on TC ATC, tolerating PMV-    Cont pulm toilet/ trach care   2. ID:  - Completed antibiotic courses for bacterial PNA/ UTI and C diff diarrhea      - Cont to monitor temp curve/ WBC  3. Cardiac arrest/ HFrEF/ A fib     Cont to optimize medical management   - Outpt cath      - Rate controlled on lopressor. No AC d/t SAH  4. Oropharyngeal dysphagia:   - Failed MBS     - Cont PEG feeds  5. JALEN:   - Resolved, hyponatremia improving   6. Corneal abrasion:   - Cont care per ophthalmology recs   7. Gen:   - Prognosis guarded.        - Plan for d-c to [facility] once insurance issues resolved.  Rob Cabello MD-Pulmonary   [phone]

## 2020-10-04 NOTE — PROGRESS NOTE ADULT - SUBJECTIVE AND OBJECTIVE BOX
Patient is a 60y old  Male who presents with a chief complaint of Cardiac arrest (21 Sep 2020 13:52)  Follow up for     Interval Events:    REVIEW OF SYSTEMS:  [ ] Positive  [ ] All other systems negative  [ ] Unable to assess ROS because ________    Vital Signs Last 24 Hrs  T(C): 36.6 (10-04-20 @ 04:23), Max: 36.9 (10-03-20 @ 23:30)  T(F): 97.9 (10-04-20 @ 04:23), Max: 98.4 (10-03-20 @ 23:30)  HR: 69 (10-04-20 @ 04:39) (62 - 73)  BP: 124/85 (10-04-20 @ 04:23) (124/85 - 166/85)  RR: 19 (10-04-20 @ 04:39) (16 - 19)  SpO2: 98% (10-04-20 @ 04:39) (98% - 100%)    PHYSICAL EXAM:  HEENT:   [ ]Tracheostomy:  [ ]Pupils equal  [ ]No oral lesions  [ ]Abnormal        SKIN  [ ]No Rash  [ ] Abnormal  [ ] pressure    CARDIAC  [ ]Regular  [ ]Abnormal    PULMONARY  [ ]Bilateral Clear Breath Sounds  [ ]Normal Excursion  [ ]Abnormal    GI  [ ]PEG      [ ] +BS		              [ ]Soft, nondistended, nontender	  [ ]Abnormal    MUSCULOSKELETAL                                   [ ]Bedbound                 [ ]Abnormal    [ ]Ambulatory/OOB to chair                           EXTREMITIES                                         [ ]Normal  [ ]Edema                           NEUROLOGIC  [ ] Normal, non focal  [ ] Focal findings:    PSYCHIATRIC  [ ]Alert and appropriate  [ ] Sedated	 [ ]Agitated    :  Umaña: [ ] Yes, if yes: Date of Placement:                   [  ] No    LINES: Central Lines [ ] Yes, if yes: Date of Placement                                     [  ] No    HOSPITAL MEDICATIONS:  MEDICATIONS  (STANDING):  amantadine Syrup 100 milliGRAM(s) Oral <User Schedule>  artificial  tears Solution 1 Drop(s) Both EYES every 6 hours  aspirin  chewable 81 milliGRAM(s) Enteral Tube daily  atorvastatin 80 milliGRAM(s) Oral at bedtime  bisacodyl Suppository 10 milliGRAM(s) Rectal at bedtime  chlorhexidine 4% Liquid 1 Application(s) Topical <User Schedule>  dextrose 5%. 1000 milliLiter(s) (50 mL/Hr) IV Continuous <Continuous>  dextrose 50% Injectable 25 Gram(s) IV Push once  dextrose 50% Injectable 25 Gram(s) IV Push once  dextrose 50% Injectable 12.5 Gram(s) IV Push once  doxazosin 8 milliGRAM(s) Oral at bedtime  erythromycin   Ointment 1 Application(s) Right EYE every 8 hours  famotidine    Tablet 20 milliGRAM(s) Oral daily  fentaNYL   Patch  12 MICROgram(s)/Hr 1 Patch Transdermal every 72 hours  heparin   Injectable 5000 Unit(s) SubCutaneous every 8 hours  hydrALAZINE 100 milliGRAM(s) Oral three times a day  isosorbide   dinitrate Tablet (ISORDIL) 20 milliGRAM(s) Enteral Tube <User Schedule>  lactobacillus acidophilus 1 Tablet(s) Oral two times a day  lidocaine   Patch 1 Patch Transdermal daily  lidocaine   Patch 1 Patch Transdermal every 24 hours  metoprolol tartrate 12.5 milliGRAM(s) Oral <User Schedule>  ofloxacin 0.3% Solution 1 Drop(s) Right EYE four times a day  petrolatum Ophthalmic Ointment 1 Application(s) Both EYES every 8 hours  QUEtiapine 50 milliGRAM(s) Oral <User Schedule>  QUEtiapine 75 milliGRAM(s) Oral <User Schedule>  sodium chloride 1 Gram(s) Oral every 6 hours    MEDICATIONS  (PRN):  dextrose 40% Gel 15 Gram(s) Oral once PRN Blood Glucose LESS THAN 70 milliGRAM(s)/deciliter  glucagon  Injectable 1 milliGRAM(s) IntraMuscular once PRN Glucose LESS THAN 70 milligrams/deciliter  LORazepam     Tablet 1 milliGRAM(s) Oral every 8 hours PRN Agitation  polyethylene glycol 3350 17 Gram(s) Oral daily PRN Constipation      LABS:                        11.4   5.03  )-----------( 256      ( 03 Oct 2020 07:11 )             36.0     10-03    134<L>  |  100  |  22  ----------------------------<  91  4.1   |  21<L>  |  1.10    Ca    9.6      03 Oct 2020 07:10              CAPILLARY BLOOD GLUCOSE    MICROBIOLOGY:     RADIOLOGY:  [ ] Reviewed and interpreted by me     Patient is a 60y old  Male who presents with a chief complaint of Cardiac arrest (21 Sep 2020 13:52)  Follow up for Vision changes     Interval Events: No events     REVIEW OF SYSTEMS:  [ ] Positive  [ x] All other systems negative  [ ] Unable to assess ROS because ________    Vital Signs Last 24 Hrs  T(C): 36.6 (10-04-20 @ 04:23), Max: 36.9 (10-03-20 @ 23:30)  T(F): 97.9 (10-04-20 @ 04:23), Max: 98.4 (10-03-20 @ 23:30)  HR: 69 (10-04-20 @ 04:39) (62 - 73)  BP: 124/85 (10-04-20 @ 04:23) (124/85 - 166/85)  RR: 19 (10-04-20 @ 04:39) (16 - 19)  SpO2: 98% (10-04-20 @ 04:39) (98% - 100%)    PHYSICAL EXAM:    HEENT:   [x]Tracheostomy: # 6 Cuffless Portex   [x]Pupils: Right eye ptosis, Dilated fixed right pupil; R eye corneal abrasion; gtts applied, ophthal following  [ ]No oral lesions  [ ]Abnormal    SKIN  [x]No Rash  [ ] Abnormal  [ ] pressure    CARDIAC  []Regular  [x ]Abnormal- irreg; irreg     PULMONARY  [x]Bilateral Clear Breath Sounds  [ ]Normal Excursion  [ ]Abnormal    GI  [x]PEG      [x] +BS		              [x]Soft, nondistended, nontender	  [ ]Abnormal    MUSCULOSKELETAL                                   [ ]Bedbound                 [ ]Abnormal    [x]OOB to chair                           EXTREMITIES                                         [x]Normal  [ ]Edema                           NEUROLOGIC  [ ] Normal, non focal  [x] Focal findings: Awake, L eye open, Rt eye Ptosis; Able to follow commands; respond to questions appropriately ; Hemiparesis LUE/LLE     PSYCHIATRIC  [x]Alert and appropriate  [ ] Sedated	 [ ]Agitated    :  Uma[place]: [ ] Yes, if yes: Date of Placement:                   [x] No; Straight cath ATC for Urinary Retention     LINES: Central Lines [ ] Yes, if yes: Date of Placement                                     [x] No      HOSPITAL MEDICATIONS:  MEDICATIONS  (STANDING):  amantadine Syrup 100 milliGRAM(s) Oral <User Schedule>  artificial  tears Solution 1 Drop(s) Both EYES every 6 hours  aspirin  chewable 81 milliGRAM(s) Enteral Tube daily  atorvastatin 80 milliGRAM(s) Oral at bedtime  bisacodyl Suppository 10 milliGRAM(s) Rectal at bedtime  chlorhexidine 4% Liquid 1 Application(s) Topical <User Schedule>  dextrose 5%. 1000 milliLiter(s) (50 mL/Hr) IV Continuous <Continuous>  dextrose 50% Injectable 25 Gram(s) IV Push once  dextrose 50% Injectable 25 Gram(s) IV Push once  dextrose 50% Injectable 12.5 Gram(s) IV Push once  doxazosin 8 milliGRAM(s) Oral at bedtime  erythromycin   Ointment 1 Application(s) Right EYE every 8 hours  famotidine    Tablet 20 milliGRAM(s) Oral daily  fentaNYL   Patch  12 MICROgram(s)/Hr 1 Patch Transdermal every 72 hours  heparin   Injectable 5000 Unit(s) SubCutaneous every 8 hours  hydrALAZINE 100 milliGRAM(s) Oral three times a day  isosorbide   dinitrate Tablet (ISORDIL) 20 milliGRAM(s) Enteral Tube <User Schedule>  lactobacillus acidophilus 1 Tablet(s) Oral two times a day  lidocaine   Patch 1 Patch Transdermal daily  lidocaine   Patch 1 Patch Transdermal every 24 hours  metoprolol tartrate 12.5 milliGRAM(s) Oral <User Schedule>  ofloxacin 0.3% Solution 1 Drop(s) Right EYE four times a day  petrolatum Ophthalmic Ointment 1 Application(s) Both EYES every 8 hours  QUEtiapine 50 milliGRAM(s) Oral <User Schedule>  QUEtiapine 75 milliGRAM(s) Oral <User Schedule>  sodium chloride 1 Gram(s) Oral every 6 hours    MEDICATIONS  (PRN):  dextrose 40% Gel 15 Gram(s) Oral once PRN Blood Glucose LESS THAN 70 milliGRAM(s)/deciliter  glucagon  Injectable 1 milliGRAM(s) IntraMuscular once PRN Glucose LESS THAN 70 milligrams/deciliter  LORazepam     Tablet 1 milliGRAM(s) Oral every 8 hours PRN Agitation  polyethylene glycol 3350 17 Gram(s) Oral daily PRN Constipation      LABS:                        11.4   5.03  )-----------( 256      ( 03 Oct 2020 07:11 )             36.0     10-03    134<L>  |  100  |  22  ----------------------------<  91  4.1   |  21<L>  |  1.10    Ca    9.6      03 Oct 2020 07:10              CAPILLARY BLOOD GLUCOSE    MICROBIOLOGY:     RADIOLOGY:  [ ] Reviewed and interpreted by me

## 2020-10-05 PROCEDURE — 99233 SBSQ HOSP IP/OBS HIGH 50: CPT | Mod: GC

## 2020-10-05 RX ADMIN — QUETIAPINE FUMARATE 50 MILLIGRAM(S): 200 TABLET, FILM COATED ORAL at 13:53

## 2020-10-05 RX ADMIN — Medication 1 APPLICATION(S): at 05:36

## 2020-10-05 RX ADMIN — Medication 8 MILLIGRAM(S): at 21:39

## 2020-10-05 RX ADMIN — SODIUM CHLORIDE 1 GRAM(S): 9 INJECTION INTRAMUSCULAR; INTRAVENOUS; SUBCUTANEOUS at 08:29

## 2020-10-05 RX ADMIN — Medication 1 DROP(S): at 17:42

## 2020-10-05 RX ADMIN — Medication 100 MILLIGRAM(S): at 12:32

## 2020-10-05 RX ADMIN — QUETIAPINE FUMARATE 75 MILLIGRAM(S): 200 TABLET, FILM COATED ORAL at 21:38

## 2020-10-05 RX ADMIN — ISOSORBIDE DINITRATE 20 MILLIGRAM(S): 5 TABLET ORAL at 08:29

## 2020-10-05 RX ADMIN — HEPARIN SODIUM 5000 UNIT(S): 5000 INJECTION INTRAVENOUS; SUBCUTANEOUS at 21:39

## 2020-10-05 RX ADMIN — Medication 1 DROP(S): at 23:49

## 2020-10-05 RX ADMIN — FENTANYL CITRATE 1 PATCH: 50 INJECTION INTRAVENOUS at 20:26

## 2020-10-05 RX ADMIN — ATORVASTATIN CALCIUM 80 MILLIGRAM(S): 80 TABLET, FILM COATED ORAL at 21:38

## 2020-10-05 RX ADMIN — Medication 1 APPLICATION(S): at 13:52

## 2020-10-05 RX ADMIN — Medication 12.5 MILLIGRAM(S): at 08:29

## 2020-10-05 RX ADMIN — LIDOCAINE 1 PATCH: 4 CREAM TOPICAL at 12:33

## 2020-10-05 RX ADMIN — LIDOCAINE 1 PATCH: 4 CREAM TOPICAL at 20:27

## 2020-10-05 RX ADMIN — SODIUM CHLORIDE 1 GRAM(S): 9 INJECTION INTRAMUSCULAR; INTRAVENOUS; SUBCUTANEOUS at 03:08

## 2020-10-05 RX ADMIN — ISOSORBIDE DINITRATE 20 MILLIGRAM(S): 5 TABLET ORAL at 23:49

## 2020-10-05 RX ADMIN — LIDOCAINE 1 PATCH: 4 CREAM TOPICAL at 23:45

## 2020-10-05 RX ADMIN — HEPARIN SODIUM 5000 UNIT(S): 5000 INJECTION INTRAVENOUS; SUBCUTANEOUS at 05:36

## 2020-10-05 RX ADMIN — Medication 1 DROP(S): at 05:36

## 2020-10-05 RX ADMIN — Medication 81 MILLIGRAM(S): at 12:34

## 2020-10-05 RX ADMIN — SODIUM CHLORIDE 1 GRAM(S): 9 INJECTION INTRAMUSCULAR; INTRAVENOUS; SUBCUTANEOUS at 23:49

## 2020-10-05 RX ADMIN — Medication 100 MILLIGRAM(S): at 13:53

## 2020-10-05 RX ADMIN — Medication 100 MILLIGRAM(S): at 08:32

## 2020-10-05 RX ADMIN — Medication 1 APPLICATION(S): at 21:39

## 2020-10-05 RX ADMIN — LIDOCAINE 1 PATCH: 4 CREAM TOPICAL at 21:37

## 2020-10-05 RX ADMIN — Medication 1 DROP(S): at 00:29

## 2020-10-05 RX ADMIN — Medication 1 DROP(S): at 12:33

## 2020-10-05 RX ADMIN — LIDOCAINE 1 PATCH: 4 CREAM TOPICAL at 00:00

## 2020-10-05 RX ADMIN — ISOSORBIDE DINITRATE 20 MILLIGRAM(S): 5 TABLET ORAL at 17:42

## 2020-10-05 RX ADMIN — QUETIAPINE FUMARATE 50 MILLIGRAM(S): 200 TABLET, FILM COATED ORAL at 05:36

## 2020-10-05 RX ADMIN — Medication 1 TABLET(S): at 17:42

## 2020-10-05 RX ADMIN — Medication 10 MILLIGRAM(S): at 21:38

## 2020-10-05 RX ADMIN — Medication 100 MILLIGRAM(S): at 21:38

## 2020-10-05 RX ADMIN — ISOSORBIDE DINITRATE 20 MILLIGRAM(S): 5 TABLET ORAL at 00:29

## 2020-10-05 RX ADMIN — Medication 100 MILLIGRAM(S): at 05:35

## 2020-10-05 RX ADMIN — Medication 1 TABLET(S): at 05:37

## 2020-10-05 RX ADMIN — FAMOTIDINE 20 MILLIGRAM(S): 10 INJECTION INTRAVENOUS at 12:34

## 2020-10-05 RX ADMIN — SODIUM CHLORIDE 1 GRAM(S): 9 INJECTION INTRAMUSCULAR; INTRAVENOUS; SUBCUTANEOUS at 17:42

## 2020-10-05 RX ADMIN — Medication 12.5 MILLIGRAM(S): at 20:28

## 2020-10-05 RX ADMIN — HEPARIN SODIUM 5000 UNIT(S): 5000 INJECTION INTRAVENOUS; SUBCUTANEOUS at 13:52

## 2020-10-05 RX ADMIN — CHLORHEXIDINE GLUCONATE 1 APPLICATION(S): 213 SOLUTION TOPICAL at 12:35

## 2020-10-05 NOTE — PROGRESS NOTE ADULT - ASSESSMENT
Patient 60-year-old male with a history of A-Fib on warfarin who presented with cardiac arrest at work with downtime of about 25 minutes prior to ROSC. Patient S/p therapeutic hypothermia. 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 Now with resolved neurogenic/cardiogenic shock. Course complicated by GI bleed s/p PPI gtt, bleeding from Umaña s/p clot irrigation, prolonged respiratory failure s/p trach (8/24) and PEG (8/26). Patients with Afib with RVR during admission; previously on amiodarone since discontinued now rate controlled with Lopressor. Patient with Urinary retention during Hospitalization likely 2/2 neurogenic bladder; Pt developed traumatic hematuria; requiring urology to perform clot evacuation. Patient now on Cardura. Patient now requiring Straight Catheterization ATC. Patients hospital course complicated by Pseudomonas PNA and C.Diff. Patient S/p Cefepime  and enteral vanco. Patient was weaned to TC ATC during admission and tracheostomy was downsized to # 6 Cuffless Portex.  Patient with agitation restlessness during admission requiring Seroquel and ativan prn. Patient had MBS performed on 9/28 with signs of aspiration noted.    10/5: No new events

## 2020-10-05 NOTE — PROGRESS NOTE ADULT - PROBLEM SELECTOR PLAN 10
Patient still having issues with insurance no long term benefit   Family still without plan after acute rehab therefore he will not qualify   Long term benefit will not be available until November   TONYA continues to work with family for alternate dc plan

## 2020-10-05 NOTE — PROGRESS NOTE ADULT - ATTENDING COMMENTS
Agree with plan as outlined above. Pt seen and examined at bedside. History, laboratory data, and imaging personally reviewed.     Pt with chronic respiratory failure with trach dependence. Clinically doing very well and tolerating trach collar since 8/30 with PMV. Trach last downsized to cuffless Portex 6 on 9/14. Will continue with airway clearance therapy and trach care. Neurologically, pt improving very well. Is able to ambulate down michele with walker. Failed MBS ib 9/28, will c/w PEG tube feeds and speech/swallow therapy.    Pt completed Cefepime through 9/15 for PSA UTI, with Vanco PO for CDiff ppx. Monitoring conservatively for now, no new clinical s/s acute infectious process. Pt also with new CHFrEF on this admission. Cardiology recs appreciated. Hx recent AFlutter/Afib in the setting of sepsis s/p amiodarone+BB therapy. Off amiodarone 2/2 bradycardia with no further noted arrhythmias. Rate control with Lopressor. No A/C 2/2 recent SAH and GIB, now on DVT ppx with HSQ. c/w ASA+BB+Isordil+Hydralazine for medical mgmt. Will likely need O/P cardiac cath. Right eye abrasion treatment with erythromycin+ oxafloxin drops.    Dispo planning in progress. Plan for rehab pending resolution of insurance coverage.

## 2020-10-05 NOTE — PROGRESS NOTE ADULT - SUBJECTIVE AND OBJECTIVE BOX
Patient is a 60y old  Male who presents with a chief complaint of Cardiac arrest (21 Sep 2020 13:52)      Interval Events: No events reported overnight     REVIEW OF SYSTEMS:  [ ] Positive  [ ] All other systems negative  [ ] Unable to assess ROS because ________    Vital Signs Last 24 Hrs  T(C): 36.7 (10-05-20 @ 03:45), Max: 36.8 (10-04-20 @ 13:56)  T(F): 98.1 (10-05-20 @ 03:45), Max: 98.2 (10-04-20 @ 13:56)  HR: 68 (10-05-20 @ 04:44) (50 - 79)  BP: 146/98 (10-05-20 @ 03:45) (134/81 - 183/67)  RR: 19 (10-05-20 @ 04:44) (16 - 19)  SpO2: 100% (10-05-20 @ 04:44) (98% - 100%)    PHYSICAL EXAM:  HEENT:   [ ]Tracheostomy:  [ ]Pupils equal  [ ]No oral lesions  [ ]Abnormal    SKIN  [ ]No Rash  [ ] Abnormal  [ ] pressure    CARDIAC  [ ]Regular  [ ]Abnormal    PULMONARY  [ ]Bilateral Clear Breath Sounds  [ ]Normal Excursion  [ ]Abnormal    GI  [ ]PEG      [ ] +BS		              [ ]Soft, nondistended, nontender	  [ ]Abnormal    MUSCULOSKELETAL                                   [ ]Bedbound                 [ ]Abnormal    [ ]Ambulatory/OOB to chair                           EXTREMITIES                                         [ ]Normal  [ ]Edema                           NEUROLOGIC  [ ] Normal, non focal  [ ] Focal findings:    PSYCHIATRIC  [ ]Alert and appropriate  [ ] Sedated	 [ ]Agitated    :  Umaña: [ ] Yes, if yes: Date of Placement:                   [  ] No    LINES: Central Lines [ ] Yes, if yes: Date of Placement                                     [  ] No    HOSPITAL MEDICATIONS:  MEDICATIONS  (STANDING):  amantadine Syrup 100 milliGRAM(s) Oral <User Schedule>  artificial  tears Solution 1 Drop(s) Both EYES every 6 hours  aspirin  chewable 81 milliGRAM(s) Enteral Tube daily  atorvastatin 80 milliGRAM(s) Oral at bedtime  bisacodyl Suppository 10 milliGRAM(s) Rectal at bedtime  chlorhexidine 4% Liquid 1 Application(s) Topical <User Schedule>  dextrose 5%. 1000 milliLiter(s) (50 mL/Hr) IV Continuous <Continuous>  dextrose 50% Injectable 12.5 Gram(s) IV Push once  dextrose 50% Injectable 25 Gram(s) IV Push once  dextrose 50% Injectable 25 Gram(s) IV Push once  doxazosin 8 milliGRAM(s) Oral at bedtime  erythromycin   Ointment 1 Application(s) Right EYE every 8 hours  famotidine    Tablet 20 milliGRAM(s) Oral daily  fentaNYL   Patch  12 MICROgram(s)/Hr 1 Patch Transdermal every 72 hours  heparin   Injectable 5000 Unit(s) SubCutaneous every 8 hours  hydrALAZINE 100 milliGRAM(s) Oral three times a day  isosorbide   dinitrate Tablet (ISORDIL) 20 milliGRAM(s) Enteral Tube <User Schedule>  lactobacillus acidophilus 1 Tablet(s) Oral two times a day  lidocaine   Patch 1 Patch Transdermal every 24 hours  lidocaine   Patch 1 Patch Transdermal daily  metoprolol tartrate 12.5 milliGRAM(s) Oral <User Schedule>  ofloxacin 0.3% Solution 1 Drop(s) Right EYE four times a day  petrolatum Ophthalmic Ointment 1 Application(s) Both EYES every 8 hours  QUEtiapine 50 milliGRAM(s) Oral <User Schedule>  QUEtiapine 75 milliGRAM(s) Oral <User Schedule>  sodium chloride 1 Gram(s) Oral every 6 hours    MEDICATIONS  (PRN):  dextrose 40% Gel 15 Gram(s) Oral once PRN Blood Glucose LESS THAN 70 milliGRAM(s)/deciliter  glucagon  Injectable 1 milliGRAM(s) IntraMuscular once PRN Glucose LESS THAN 70 milligrams/deciliter  LORazepam     Tablet 1 milliGRAM(s) Oral every 8 hours PRN Agitation  polyethylene glycol 3350 17 Gram(s) Oral daily PRN Constipation      LABS:                  CAPILLARY BLOOD GLUCOSE    MICROBIOLOGY:     RADIOLOGY:  [ ] Reviewed and interpreted by me     Patient is a 60y old  Male who presents with a chief complaint of Cardiac arrest (21 Sep 2020 13:52)      Interval Events: No events reported overnight     REVIEW OF SYSTEMS:  [ ] Positive  [x] All other systems negative  [ ] Unable to assess ROS because ________    Vital Signs Last 24 Hrs  T(C): 36.7 (10-05-20 @ 03:45), Max: 36.8 (10-04-20 @ 13:56)  T(F): 98.1 (10-05-20 @ 03:45), Max: 98.2 (10-04-20 @ 13:56)  HR: 68 (10-05-20 @ 04:44) (50 - 79)  BP: 146/98 (10-05-20 @ 03:45) (134/81 - 183/67)  RR: 19 (10-05-20 @ 04:44) (16 - 19)  SpO2: 100% (10-05-20 @ 04:44) (98% - 100%)    PHYSICAL EXAM:  HEENT:   [x]Tracheostomy: # 6 Cuffless Portex   [x]Pupils: Right eye ptosis, Dilated fixed right pupil; Left PERRL, Rt Eye sclera irritation   [ ]No oral lesions  [ ]Abnormal    SKIN  [x]No Rash  [ ] Abnormal  [ ] pressure    CARDIAC  [x]Regular  [ ]Abnormal    PULMONARY  [x]Bilateral Clear Breath Sounds  [ ]Normal Excursion  [ ]Abnormal    GI  [x]PEG      [x] +BS		              [x]Soft, nondistended, nontender	  [ ]Abnormal    MUSCULOSKELETAL                                   [ ]Bedbound                 [ ]Abnormal    [x]OOB to chair                           EXTREMITIES                                         [x]Normal  [ ]Edema                           NEUROLOGIC  [ ] Normal, non focal  [x] Focal findings: Able to follow commands and respond to questions appropriately ; Hemiparesis of Left upper and Left Lower extremity       PSYCHIATRIC  [x]Alert and appropriate  [ ] Sedated	 [ ]Agitated    :  Umaña: [ ] Yes, if yes: Date of Placement:                   [x] No; Straight cath atc for Urinary Retention     LINES: Central Lines [ ] Yes, if yes: Date of Placement                                     [x] No    HOSPITAL MEDICATIONS:  MEDICATIONS  (STANDING):  amantadine Syrup 100 milliGRAM(s) Oral <User Schedule>  artificial  tears Solution 1 Drop(s) Both EYES every 6 hours  aspirin  chewable 81 milliGRAM(s) Enteral Tube daily  atorvastatin 80 milliGRAM(s) Oral at bedtime  bisacodyl Suppository 10 milliGRAM(s) Rectal at bedtime  chlorhexidine 4% Liquid 1 Application(s) Topical <User Schedule>  dextrose 5%. 1000 milliLiter(s) (50 mL/Hr) IV Continuous <Continuous>  dextrose 50% Injectable 12.5 Gram(s) IV Push once  dextrose 50% Injectable 25 Gram(s) IV Push once  dextrose 50% Injectable 25 Gram(s) IV Push once  doxazosin 8 milliGRAM(s) Oral at bedtime  erythromycin   Ointment 1 Application(s) Right EYE every 8 hours  famotidine    Tablet 20 milliGRAM(s) Oral daily  fentaNYL   Patch  12 MICROgram(s)/Hr 1 Patch Transdermal every 72 hours  heparin   Injectable 5000 Unit(s) SubCutaneous every 8 hours  hydrALAZINE 100 milliGRAM(s) Oral three times a day  isosorbide   dinitrate Tablet (ISORDIL) 20 milliGRAM(s) Enteral Tube <User Schedule>  lactobacillus acidophilus 1 Tablet(s) Oral two times a day  lidocaine   Patch 1 Patch Transdermal every 24 hours  lidocaine   Patch 1 Patch Transdermal daily  metoprolol tartrate 12.5 milliGRAM(s) Oral <User Schedule>  ofloxacin 0.3% Solution 1 Drop(s) Right EYE four times a day  petrolatum Ophthalmic Ointment 1 Application(s) Both EYES every 8 hours  QUEtiapine 50 milliGRAM(s) Oral <User Schedule>  QUEtiapine 75 milliGRAM(s) Oral <User Schedule>  sodium chloride 1 Gram(s) Oral every 6 hours    MEDICATIONS  (PRN):  dextrose 40% Gel 15 Gram(s) Oral once PRN Blood Glucose LESS THAN 70 milliGRAM(s)/deciliter  glucagon  Injectable 1 milliGRAM(s) IntraMuscular once PRN Glucose LESS THAN 70 milligrams/deciliter  LORazepam     Tablet 1 milliGRAM(s) Oral every 8 hours PRN Agitation  polyethylene glycol 3350 17 Gram(s) Oral daily PRN Constipation      LABS:                  CAPILLARY BLOOD GLUCOSE    MICROBIOLOGY:     RADIOLOGY:  [ ] Reviewed and interpreted by me

## 2020-10-06 LAB
ANION GAP SERPL CALC-SCNC: 11 MMOL/L — SIGNIFICANT CHANGE UP (ref 5–17)
BUN SERPL-MCNC: 21 MG/DL — SIGNIFICANT CHANGE UP (ref 7–23)
CALCIUM SERPL-MCNC: 9.7 MG/DL — SIGNIFICANT CHANGE UP (ref 8.4–10.5)
CHLORIDE SERPL-SCNC: 101 MMOL/L — SIGNIFICANT CHANGE UP (ref 96–108)
CO2 SERPL-SCNC: 23 MMOL/L — SIGNIFICANT CHANGE UP (ref 22–31)
CREAT SERPL-MCNC: 1.05 MG/DL — SIGNIFICANT CHANGE UP (ref 0.5–1.3)
GLUCOSE SERPL-MCNC: 85 MG/DL — SIGNIFICANT CHANGE UP (ref 70–99)
HCT VFR BLD CALC: 37.4 % — LOW (ref 39–50)
HGB BLD-MCNC: 11.8 G/DL — LOW (ref 13–17)
MCHC RBC-ENTMCNC: 27.8 PG — SIGNIFICANT CHANGE UP (ref 27–34)
MCHC RBC-ENTMCNC: 31.6 GM/DL — LOW (ref 32–36)
MCV RBC AUTO: 88 FL — SIGNIFICANT CHANGE UP (ref 80–100)
NRBC # BLD: 0 /100 WBCS — SIGNIFICANT CHANGE UP (ref 0–0)
PLATELET # BLD AUTO: 238 K/UL — SIGNIFICANT CHANGE UP (ref 150–400)
POTASSIUM SERPL-MCNC: 4 MMOL/L — SIGNIFICANT CHANGE UP (ref 3.5–5.3)
POTASSIUM SERPL-SCNC: 4 MMOL/L — SIGNIFICANT CHANGE UP (ref 3.5–5.3)
RBC # BLD: 4.25 M/UL — SIGNIFICANT CHANGE UP (ref 4.2–5.8)
RBC # FLD: 15.8 % — HIGH (ref 10.3–14.5)
SODIUM SERPL-SCNC: 135 MMOL/L — SIGNIFICANT CHANGE UP (ref 135–145)
WBC # BLD: 6.09 K/UL — SIGNIFICANT CHANGE UP (ref 3.8–10.5)
WBC # FLD AUTO: 6.09 K/UL — SIGNIFICANT CHANGE UP (ref 3.8–10.5)

## 2020-10-06 PROCEDURE — 99233 SBSQ HOSP IP/OBS HIGH 50: CPT | Mod: GC

## 2020-10-06 RX ORDER — IPRATROPIUM/ALBUTEROL SULFATE 18-103MCG
3 AEROSOL WITH ADAPTER (GRAM) INHALATION EVERY 6 HOURS
Refills: 0 | Status: DISCONTINUED | OUTPATIENT
Start: 2020-10-06 | End: 2020-10-14

## 2020-10-06 RX ORDER — AMLODIPINE BESYLATE 2.5 MG/1
5 TABLET ORAL DAILY
Refills: 0 | Status: DISCONTINUED | OUTPATIENT
Start: 2020-10-06 | End: 2020-10-06

## 2020-10-06 RX ORDER — AMLODIPINE BESYLATE 2.5 MG/1
5 TABLET ORAL DAILY
Refills: 0 | Status: DISCONTINUED | OUTPATIENT
Start: 2020-10-06 | End: 2020-10-07

## 2020-10-06 RX ORDER — SENNA PLUS 8.6 MG/1
2 TABLET ORAL AT BEDTIME
Refills: 0 | Status: DISCONTINUED | OUTPATIENT
Start: 2020-10-06 | End: 2020-10-13

## 2020-10-06 RX ADMIN — Medication 1 DROP(S): at 23:53

## 2020-10-06 RX ADMIN — SODIUM CHLORIDE 1 GRAM(S): 9 INJECTION INTRAMUSCULAR; INTRAVENOUS; SUBCUTANEOUS at 17:58

## 2020-10-06 RX ADMIN — Medication 100 MILLIGRAM(S): at 21:17

## 2020-10-06 RX ADMIN — Medication 1 DROP(S): at 11:24

## 2020-10-06 RX ADMIN — HEPARIN SODIUM 5000 UNIT(S): 5000 INJECTION INTRAVENOUS; SUBCUTANEOUS at 13:05

## 2020-10-06 RX ADMIN — FENTANYL CITRATE 1 PATCH: 50 INJECTION INTRAVENOUS at 07:00

## 2020-10-06 RX ADMIN — Medication 81 MILLIGRAM(S): at 11:22

## 2020-10-06 RX ADMIN — SENNA PLUS 2 TABLET(S): 8.6 TABLET ORAL at 21:17

## 2020-10-06 RX ADMIN — Medication 3 MILLILITER(S): at 12:22

## 2020-10-06 RX ADMIN — Medication 100 MILLIGRAM(S): at 07:51

## 2020-10-06 RX ADMIN — Medication 1 DROP(S): at 05:53

## 2020-10-06 RX ADMIN — POLYETHYLENE GLYCOL 3350 17 GRAM(S): 17 POWDER, FOR SOLUTION ORAL at 11:22

## 2020-10-06 RX ADMIN — Medication 12.5 MILLIGRAM(S): at 19:19

## 2020-10-06 RX ADMIN — Medication 100 MILLIGRAM(S): at 11:22

## 2020-10-06 RX ADMIN — Medication 1 APPLICATION(S): at 13:05

## 2020-10-06 RX ADMIN — Medication 100 MILLIGRAM(S): at 05:53

## 2020-10-06 RX ADMIN — ISOSORBIDE DINITRATE 20 MILLIGRAM(S): 5 TABLET ORAL at 07:51

## 2020-10-06 RX ADMIN — QUETIAPINE FUMARATE 50 MILLIGRAM(S): 200 TABLET, FILM COATED ORAL at 05:53

## 2020-10-06 RX ADMIN — Medication 3 MILLILITER(S): at 17:25

## 2020-10-06 RX ADMIN — Medication 1 APPLICATION(S): at 21:18

## 2020-10-06 RX ADMIN — Medication 8 MILLIGRAM(S): at 21:17

## 2020-10-06 RX ADMIN — LIDOCAINE 1 PATCH: 4 CREAM TOPICAL at 07:54

## 2020-10-06 RX ADMIN — Medication 10 MILLIGRAM(S): at 21:17

## 2020-10-06 RX ADMIN — Medication 1 TABLET(S): at 17:58

## 2020-10-06 RX ADMIN — FENTANYL CITRATE 1 PATCH: 50 INJECTION INTRAVENOUS at 19:22

## 2020-10-06 RX ADMIN — SODIUM CHLORIDE 1 GRAM(S): 9 INJECTION INTRAMUSCULAR; INTRAVENOUS; SUBCUTANEOUS at 11:22

## 2020-10-06 RX ADMIN — ISOSORBIDE DINITRATE 20 MILLIGRAM(S): 5 TABLET ORAL at 23:53

## 2020-10-06 RX ADMIN — HEPARIN SODIUM 5000 UNIT(S): 5000 INJECTION INTRAVENOUS; SUBCUTANEOUS at 05:53

## 2020-10-06 RX ADMIN — QUETIAPINE FUMARATE 50 MILLIGRAM(S): 200 TABLET, FILM COATED ORAL at 13:05

## 2020-10-06 RX ADMIN — FAMOTIDINE 20 MILLIGRAM(S): 10 INJECTION INTRAVENOUS at 11:22

## 2020-10-06 RX ADMIN — ATORVASTATIN CALCIUM 80 MILLIGRAM(S): 80 TABLET, FILM COATED ORAL at 21:17

## 2020-10-06 RX ADMIN — LIDOCAINE 1 PATCH: 4 CREAM TOPICAL at 21:18

## 2020-10-06 RX ADMIN — AMLODIPINE BESYLATE 5 MILLIGRAM(S): 2.5 TABLET ORAL at 13:05

## 2020-10-06 RX ADMIN — SODIUM CHLORIDE 1 GRAM(S): 9 INJECTION INTRAMUSCULAR; INTRAVENOUS; SUBCUTANEOUS at 05:53

## 2020-10-06 RX ADMIN — HEPARIN SODIUM 5000 UNIT(S): 5000 INJECTION INTRAVENOUS; SUBCUTANEOUS at 21:18

## 2020-10-06 RX ADMIN — CHLORHEXIDINE GLUCONATE 1 APPLICATION(S): 213 SOLUTION TOPICAL at 05:54

## 2020-10-06 RX ADMIN — SODIUM CHLORIDE 1 GRAM(S): 9 INJECTION INTRAMUSCULAR; INTRAVENOUS; SUBCUTANEOUS at 23:53

## 2020-10-06 RX ADMIN — Medication 1 DROP(S): at 17:59

## 2020-10-06 RX ADMIN — Medication 100 MILLIGRAM(S): at 13:05

## 2020-10-06 RX ADMIN — ISOSORBIDE DINITRATE 20 MILLIGRAM(S): 5 TABLET ORAL at 17:58

## 2020-10-06 RX ADMIN — Medication 1 TABLET(S): at 05:53

## 2020-10-06 RX ADMIN — Medication 12.5 MILLIGRAM(S): at 07:51

## 2020-10-06 RX ADMIN — QUETIAPINE FUMARATE 75 MILLIGRAM(S): 200 TABLET, FILM COATED ORAL at 21:17

## 2020-10-06 RX ADMIN — Medication 1 APPLICATION(S): at 05:55

## 2020-10-06 NOTE — PROGRESS NOTE ADULT - ASSESSMENT
Patient 60-year-old male with a history of A-Fib on warfarin who presented with cardiac arrest at work with downtime of about 25 minutes prior to ROSC. Patient S/p therapeutic hypothermia. 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 Now with resolved neurogenic/cardiogenic shock. Course complicated by GI bleed s/p PPI gtt, bleeding from Umaña s/p clot irrigation, prolonged respiratory failure s/p trach (8/24) and PEG (8/26). Patients with Afib with RVR during admission; previously on amiodarone since discontinued now rate controlled with Lopressor. Patient with Urinary retention during Hospitalization likely 2/2 neurogenic bladder; Pt developed traumatic hematuria; requiring urology to perform clot evacuation. Patient now on Cardura. Patient now requiring Straight Catheterization ATC. Patients hospital course complicated by Pseudomonas PNA and C.Diff. Patient S/p Cefepime  and enteral vanco. Patient was weaned to TC ATC during admission and tracheostomy was downsized to # 6 Cuffless Portex.  Patient with agitation restlessness during admission requiring Seroquel and ativan prn. Patient had MBS performed on 9/28 with signs of aspiration noted.    10/6: Patient with elevated BP Yesterday; will add Norvasc 5 mg QD. Patient with constipation will add senna.

## 2020-10-06 NOTE — PROGRESS NOTE ADULT - SUBJECTIVE AND OBJECTIVE BOX
Patient is a 60y old  Male who presents with a chief complaint of Cardiac arrest (21 Sep 2020 13:52)      Interval Events: No events reported overnight     REVIEW OF SYSTEMS:  [ ] Positive  [x] All other systems negative  [ ] Unable to assess ROS because ________    Vital Signs Last 24 Hrs  T(C): 36.7 (10-06-20 @ 04:41), Max: 37 (10-05-20 @ 13:40)  T(F): 98.1 (10-06-20 @ 04:41), Max: 98.6 (10-05-20 @ 13:40)  HR: 59 (10-06-20 @ 08:22) (59 - 74)  BP: 160/72 (10-06-20 @ 07:48) (128/79 - 160/72)  RR: 18 (10-06-20 @ 08:22) (18 - 22)  SpO2: 100% (10-06-20 @ 08:22) (100% - 100%)    PHYSICAL EXAM:  HEENT:   [x]Tracheostomy: # 6 Cuffless Portex   [x]Pupils: Right eye ptosis, Dilated fixed right pupil; Left PERRL, Rt Eye sclera irritation   [ ]No oral lesions  [ ]Abnormal    SKIN  [x]No Rash  [ ] Abnormal  [ ] pressure    CARDIAC  [x]Regular  [ ]Abnormal    PULMONARY  [x]Bilateral Clear Breath Sounds  [ ]Normal Excursion  [ ]Abnormal    GI  [x]PEG      [x] +BS		              [x]Soft, nondistended, nontender	  [ ]Abnormal    MUSCULOSKELETAL                                   [ ]Bedbound                 [ ]Abnormal    [x]OOB to chair                           EXTREMITIES                                         [x]Normal  [ ]Edema                           NEUROLOGIC  [ ] Normal, non focal  [x] Focal findings: Able to follow commands and respond to questions appropriately ; Hemiparesis of Left upper and Left Lower extremity       PSYCHIATRIC  [x]Alert and appropriate  [ ] Sedated	 [ ]Agitated    :  Umaña: [ ] Yes, if yes: Date of Placement:                   [x] No; Straight cath atc for Urinary Retention     LINES: Central Lines [ ] Yes, if yes: Date of Placement                                     [x] No    HOSPITAL MEDICATIONS:  MEDICATIONS  (STANDING):  amantadine Syrup 100 milliGRAM(s) Oral <User Schedule>  artificial  tears Solution 1 Drop(s) Both EYES every 6 hours  aspirin  chewable 81 milliGRAM(s) Enteral Tube daily  atorvastatin 80 milliGRAM(s) Oral at bedtime  bisacodyl Suppository 10 milliGRAM(s) Rectal at bedtime  chlorhexidine 4% Liquid 1 Application(s) Topical <User Schedule>  dextrose 5%. 1000 milliLiter(s) (50 mL/Hr) IV Continuous <Continuous>  dextrose 50% Injectable 12.5 Gram(s) IV Push once  dextrose 50% Injectable 25 Gram(s) IV Push once  dextrose 50% Injectable 25 Gram(s) IV Push once  doxazosin 8 milliGRAM(s) Oral at bedtime  erythromycin   Ointment 1 Application(s) Right EYE every 8 hours  famotidine    Tablet 20 milliGRAM(s) Oral daily  fentaNYL   Patch  12 MICROgram(s)/Hr 1 Patch Transdermal every 72 hours  heparin   Injectable 5000 Unit(s) SubCutaneous every 8 hours  hydrALAZINE 100 milliGRAM(s) Oral three times a day  isosorbide   dinitrate Tablet (ISORDIL) 20 milliGRAM(s) Enteral Tube <User Schedule>  lactobacillus acidophilus 1 Tablet(s) Oral two times a day  lidocaine   Patch 1 Patch Transdermal daily  lidocaine   Patch 1 Patch Transdermal every 24 hours  metoprolol tartrate 12.5 milliGRAM(s) Oral <User Schedule>  ofloxacin 0.3% Solution 1 Drop(s) Right EYE four times a day  petrolatum Ophthalmic Ointment 1 Application(s) Both EYES every 8 hours  QUEtiapine 75 milliGRAM(s) Oral <User Schedule>  QUEtiapine 50 milliGRAM(s) Oral <User Schedule>  sodium chloride 1 Gram(s) Oral every 6 hours    MEDICATIONS  (PRN):  dextrose 40% Gel 15 Gram(s) Oral once PRN Blood Glucose LESS THAN 70 milliGRAM(s)/deciliter  glucagon  Injectable 1 milliGRAM(s) IntraMuscular once PRN Glucose LESS THAN 70 milligrams/deciliter  LORazepam     Tablet 1 milliGRAM(s) Oral every 8 hours PRN Agitation  polyethylene glycol 3350 17 Gram(s) Oral daily PRN Constipation      LABS:                        11.8   6.09  )-----------( 238      ( 06 Oct 2020 06:28 )             37.4     10-06    135  |  101  |  21  ----------------------------<  85  4.0   |  23  |  1.05    Ca    9.7      06 Oct 2020 06:28              CAPILLARY BLOOD GLUCOSE    MICROBIOLOGY:     RADIOLOGY:  [ ] Reviewed and interpreted by me

## 2020-10-06 NOTE — PROGRESS NOTE ADULT - ATTENDING COMMENTS
Agree with plan as outlined above. Pt seen and examined at bedside. History, laboratory data, and imaging personally reviewed.     Pt with chronic respiratory failure with trach dependence. Clinically doing very well and tolerating trach collar since 8/30 with PMV. Trach last downsized to cuffless Portex 6 on 9/14. Will continue with airway clearance therapy and trach care. Neurologically, pt improving daily and is now able to move down michele with walk assist device (unable to move left foot). Failed MBS on 9/28, will c/w PEG tube feeds and speech/swallow therapy.    Pt completed Cefepime through 9/15 for PSA UTI, with Vanco PO for CDiff ppx. Monitoring conservatively for now, no new clinical s/s acute infectious process. Pt also with new CHFrEF on this admission. Cardiology recs appreciated. Hx recent AFlutter/Afib in the setting of sepsis s/p amiodarone+BB therapy. Off amiodarone 2/2 bradycardia with no further noted arrhythmias. Rate control with Lopressor. Pt with slightly high BP after decreasing Lopressor for bradycardia last week. Will add Norvasc to regimen today. No A/C 2/2 recent SAH and GIB, now on DVT ppx with HSQ. c/w ASA+BB+Isordil+Hydralazine for medical mgmt. Will likely need O/P cardiac cath. Right eye abrasion treatment with erythromycin+ oxafloxin drops, pt still unable to see out of left eye but appears to have some improvement in vision of right eye.    Dispo planning in progress. Plan for rehab pending resolution of insurance coverage.

## 2020-10-06 NOTE — PROGRESS NOTE ADULT - PROBLEM SELECTOR PLAN 3
Cont Fentanyl Patch 12mcg Q 72H   Continue Seroquel 50 mg morning / afternoon   Continue Seroquel evening dose 75 mg   Continue PRN Ativan via PEG for agitation not treated by standing Seroquel

## 2020-10-06 NOTE — PROGRESS NOTE ADULT - PROBLEM SELECTOR PLAN 4
Possible stunned myocardium due to cardiac arrest vs. SAH  TTE: Global LV dysfunction. EF 41%, Severe concentric LVH, flattening of interventricular septum  Will need cardiac cath to evaluate for ischemia as outpatient  Continue afterload/preload reduction with hydralazine and isosorbide dinitrate  Norvasc 5 mg added for uncontrolled HTN

## 2020-10-07 PROCEDURE — 99233 SBSQ HOSP IP/OBS HIGH 50: CPT | Mod: GC

## 2020-10-07 RX ORDER — AMLODIPINE BESYLATE 2.5 MG/1
10 TABLET ORAL DAILY
Refills: 0 | Status: DISCONTINUED | OUTPATIENT
Start: 2020-10-08 | End: 2020-10-13

## 2020-10-07 RX ORDER — AMLODIPINE BESYLATE 2.5 MG/1
5 TABLET ORAL ONCE
Refills: 0 | Status: COMPLETED | OUTPATIENT
Start: 2020-10-07 | End: 2020-10-07

## 2020-10-07 RX ADMIN — Medication 1 DROP(S): at 13:01

## 2020-10-07 RX ADMIN — Medication 100 MILLIGRAM(S): at 22:53

## 2020-10-07 RX ADMIN — SODIUM CHLORIDE 1 GRAM(S): 9 INJECTION INTRAMUSCULAR; INTRAVENOUS; SUBCUTANEOUS at 18:07

## 2020-10-07 RX ADMIN — SENNA PLUS 2 TABLET(S): 8.6 TABLET ORAL at 22:54

## 2020-10-07 RX ADMIN — Medication 100 MILLIGRAM(S): at 13:34

## 2020-10-07 RX ADMIN — QUETIAPINE FUMARATE 50 MILLIGRAM(S): 200 TABLET, FILM COATED ORAL at 13:35

## 2020-10-07 RX ADMIN — ISOSORBIDE DINITRATE 20 MILLIGRAM(S): 5 TABLET ORAL at 18:07

## 2020-10-07 RX ADMIN — Medication 1 DROP(S): at 05:20

## 2020-10-07 RX ADMIN — FENTANYL CITRATE 1 PATCH: 50 INJECTION INTRAVENOUS at 15:59

## 2020-10-07 RX ADMIN — Medication 1 APPLICATION(S): at 22:53

## 2020-10-07 RX ADMIN — LIDOCAINE 1 PATCH: 4 CREAM TOPICAL at 13:14

## 2020-10-07 RX ADMIN — Medication 12.5 MILLIGRAM(S): at 22:53

## 2020-10-07 RX ADMIN — Medication 3 MILLILITER(S): at 00:02

## 2020-10-07 RX ADMIN — Medication 10 MILLIGRAM(S): at 22:53

## 2020-10-07 RX ADMIN — QUETIAPINE FUMARATE 50 MILLIGRAM(S): 200 TABLET, FILM COATED ORAL at 05:20

## 2020-10-07 RX ADMIN — Medication 100 MILLIGRAM(S): at 13:00

## 2020-10-07 RX ADMIN — Medication 1 APPLICATION(S): at 05:20

## 2020-10-07 RX ADMIN — ATORVASTATIN CALCIUM 80 MILLIGRAM(S): 80 TABLET, FILM COATED ORAL at 22:53

## 2020-10-07 RX ADMIN — HEPARIN SODIUM 5000 UNIT(S): 5000 INJECTION INTRAVENOUS; SUBCUTANEOUS at 05:20

## 2020-10-07 RX ADMIN — Medication 1 TABLET(S): at 18:08

## 2020-10-07 RX ADMIN — LIDOCAINE 1 PATCH: 4 CREAM TOPICAL at 22:55

## 2020-10-07 RX ADMIN — Medication 100 MILLIGRAM(S): at 08:38

## 2020-10-07 RX ADMIN — Medication 3 MILLILITER(S): at 05:12

## 2020-10-07 RX ADMIN — AMLODIPINE BESYLATE 5 MILLIGRAM(S): 2.5 TABLET ORAL at 05:20

## 2020-10-07 RX ADMIN — SODIUM CHLORIDE 1 GRAM(S): 9 INJECTION INTRAMUSCULAR; INTRAVENOUS; SUBCUTANEOUS at 05:20

## 2020-10-07 RX ADMIN — Medication 1 APPLICATION(S): at 13:35

## 2020-10-07 RX ADMIN — FAMOTIDINE 20 MILLIGRAM(S): 10 INJECTION INTRAVENOUS at 13:01

## 2020-10-07 RX ADMIN — HEPARIN SODIUM 5000 UNIT(S): 5000 INJECTION INTRAVENOUS; SUBCUTANEOUS at 22:53

## 2020-10-07 RX ADMIN — Medication 12.5 MILLIGRAM(S): at 08:39

## 2020-10-07 RX ADMIN — SODIUM CHLORIDE 1 GRAM(S): 9 INJECTION INTRAMUSCULAR; INTRAVENOUS; SUBCUTANEOUS at 13:35

## 2020-10-07 RX ADMIN — Medication 100 MILLIGRAM(S): at 05:20

## 2020-10-07 RX ADMIN — Medication 1 TABLET(S): at 05:20

## 2020-10-07 RX ADMIN — CHLORHEXIDINE GLUCONATE 1 APPLICATION(S): 213 SOLUTION TOPICAL at 05:21

## 2020-10-07 RX ADMIN — AMLODIPINE BESYLATE 5 MILLIGRAM(S): 2.5 TABLET ORAL at 13:14

## 2020-10-07 RX ADMIN — ISOSORBIDE DINITRATE 20 MILLIGRAM(S): 5 TABLET ORAL at 08:38

## 2020-10-07 RX ADMIN — Medication 3 MILLILITER(S): at 12:19

## 2020-10-07 RX ADMIN — QUETIAPINE FUMARATE 75 MILLIGRAM(S): 200 TABLET, FILM COATED ORAL at 22:53

## 2020-10-07 RX ADMIN — HEPARIN SODIUM 5000 UNIT(S): 5000 INJECTION INTRAVENOUS; SUBCUTANEOUS at 13:34

## 2020-10-07 RX ADMIN — FENTANYL CITRATE 1 PATCH: 50 INJECTION INTRAVENOUS at 08:36

## 2020-10-07 RX ADMIN — Medication 1 DROP(S): at 18:08

## 2020-10-07 RX ADMIN — Medication 3 MILLILITER(S): at 17:29

## 2020-10-07 RX ADMIN — Medication 8 MILLIGRAM(S): at 22:53

## 2020-10-07 RX ADMIN — Medication 81 MILLIGRAM(S): at 13:01

## 2020-10-07 NOTE — PROGRESS NOTE ADULT - PROBLEM SELECTOR PLAN 6
A-Fib with RVR  DC'd Amiodarone (8/31)  Lopressor decreased to 12.5 mg BID   Hold off on therapeutic a/c given SAH A-Fib with RVR  DC'd Amiodarone (8/31)  Lopressor decreased to 12.5 mg BID secondary to bradycardia  Hold off on therapeutic a/c given SAH

## 2020-10-07 NOTE — PROGRESS NOTE ADULT - ATTENDING COMMENTS
Agree with plan as outlined above. Pt seen and examined at bedside. History, laboratory data, and imaging personally reviewed.     Pt with chronic respiratory failure with trach dependence. Clinically doing very well and tolerating trach collar since 8/30 with PMV. Trach last downsized to cuffless Portex 6 on 9/14. Will continue with airway clearance therapy and trach care. Neurologically, pt improving daily and is now able to move down michele with walk assist device (unable to move left foot). Failed MBS on 9/28, will c/w PEG tube feeds and speech/swallow therapy.    Pt completed Cefepime through 9/15 for PSA UTI, with Vanco PO for CDiff ppx. Monitoring conservatively for now, no new clinical s/s acute infectious process. Pt also with new CHFrEF on this admission. Cardiology recs appreciated. Hx recent AFlutter/Afib in the setting of sepsis s/p amiodarone+BB therapy. Off amiodarone 2/2 bradycardia. Rate control with Lopressor. Pt with slightly high BP after decreasing Lopressor for bradycardia last week. Will increase Norvasc to regimen today. No A/C 2/2 recent SAH and GIB, now on DVT ppx with HSQ. c/w ASA+BB+Isordil+Hydralazine for medical mgmt. Will likely need O/P cardiac cath. Right eye abrasion treatment with erythromycin+ oxafloxin drops, pt still unable to see out of left eye but appears to have some improvement in vision of right eye.    Dispo planning in progress. Plan for rehab pending resolution of insurance coverage.

## 2020-10-07 NOTE — CHART NOTE - NSCHARTNOTEFT_GEN_A_CORE
Nutrition Follow Up Note    Source: EMR, RN    Chart reviewed, events noted. Per chart: Pt is a 61 yo male with PMH of afib s/p cardiac arrest, admitted 8/9. Found to have R SAH of unclear etiology with cerebral angiogram on 8/10 negative for aneurysm. Now with resolved neurogenic/cardiogenic shock. Course complicated by GI bleed s/p PPI gtt, bleeding from Umaña s/p clot irrigation, prolonged respiratory failure s/p trach (8/24) and PEG (8/26). Course complicated by afib with RVR and C diff colitis. Transferred to [facility] on 8/29. Tolerating continuous TC since 8/30. Trach downsized to #6 Cuffless Portex 9/14. Tolerating PMV. MBS performed on 9/28 with signs of aspiration noted. 10/6: Patient with elevated BP Yesterday; will add Norvasc 5 mg QD.     Diet : NPO with Tube Feeds via PEG  Enteral Nutrition: Vital AF bolus feeds of 420mL q6hrs. Provides 1680mL formula, 2016 kcal, 126 g protein and 1362 ml free water  - Probiotic Yogurt/Smoothie x2 daily     Per flowsheets, Pt has received 100% EN provisions in the last 5 days. RN reports tolerating well. Last BM 10/3, pt constipated, bowel regimen augmented yesterday. Bowel regimen: senna, Miralax, dulcolax.    Current Weight: Wt loss noted during hospital course - likely 2/2 adequate protein/calorie intake with BMI >30; likely with excessive intake PTA. Will continue to monitor/trend weight status.  Weight (kg): 110.2 (08-12), 110.1 (08-26), 106.2 (09-02), 106.3 (09-09), 101.1 (09-16), 98 (9-23), 100.3 (9-30)    Pertinent Medications: MEDICATIONS  (STANDING):  albuterol/ipratropium for Nebulization. 3 milliLiter(s) Nebulizer every 6 hours  amantadine Syrup 100 milliGRAM(s) Oral <User Schedule>  amLODIPine   Tablet 5 milliGRAM(s) Oral daily  artificial  tears Solution 1 Drop(s) Both EYES every 6 hours  aspirin  chewable 81 milliGRAM(s) Enteral Tube daily  atorvastatin 80 milliGRAM(s) Oral at bedtime  bisacodyl Suppository 10 milliGRAM(s) Rectal at bedtime  chlorhexidine 4% Liquid 1 Application(s) Topical <User Schedule>  dextrose 5%. 1000 milliLiter(s) (50 mL/Hr) IV Continuous <Continuous>  dextrose 50% Injectable 25 Gram(s) IV Push once  dextrose 50% Injectable 25 Gram(s) IV Push once  dextrose 50% Injectable 12.5 Gram(s) IV Push once  doxazosin 8 milliGRAM(s) Oral at bedtime  erythromycin   Ointment 1 Application(s) Right EYE every 8 hours  famotidine    Tablet 20 milliGRAM(s) Oral daily  fentaNYL   Patch  12 MICROgram(s)/Hr 1 Patch Transdermal every 72 hours  heparin   Injectable 5000 Unit(s) SubCutaneous every 8 hours  hydrALAZINE 100 milliGRAM(s) Oral three times a day  isosorbide   dinitrate Tablet (ISORDIL) 20 milliGRAM(s) Enteral Tube <User Schedule>  lactobacillus acidophilus 1 Tablet(s) Oral two times a day  lidocaine   Patch 1 Patch Transdermal every 24 hours  lidocaine   Patch 1 Patch Transdermal daily  metoprolol tartrate 12.5 milliGRAM(s) Oral <User Schedule>  ofloxacin 0.3% Solution 1 Drop(s) Right EYE four times a day  petrolatum Ophthalmic Ointment 1 Application(s) Both EYES every 8 hours  QUEtiapine 50 milliGRAM(s) Oral <User Schedule>  QUEtiapine 75 milliGRAM(s) Oral <User Schedule>  senna 2 Tablet(s) Oral at bedtime  sodium chloride 1 Gram(s) Oral every 6 hours    MEDICATIONS  (PRN):  dextrose 40% Gel 15 Gram(s) Oral once PRN Blood Glucose LESS THAN 70 milliGRAM(s)/deciliter  glucagon  Injectable 1 milliGRAM(s) IntraMuscular once PRN Glucose LESS THAN 70 milligrams/deciliter  LORazepam     Tablet 1 milliGRAM(s) Oral every 8 hours PRN Agitation  polyethylene glycol 3350 17 Gram(s) Oral daily PRN Constipation    Pertinent Labs: none to address    Skin per nursing documentation: no pressure injuries  Edema per nursing documentation: none noted    Estimated Needs:   Based on upper IBW 76.8kg   Energy: (25-30kcal/kg): 1920-2304kcal  Protein: (1.4-1.6g protein/kg): 108-123g protein    Previous Nutrition Diagnosis: Increased nutrient needs - diagnosis ongoing, being addressed with enteral nutrition     New Nutrition Diagnosis: N/A    Interventions:   1. Continue current EN regimen: Vital AF bolus feeds 420 ml q6 hours (total 4 feeds daily) + Probiotic Yogurt/Smoothie Cans 2 daily. Provides 1680 ml total volume, 2016 kcal, 126 g protein and 1362 ml free water (provides 26 kcal/kg, 1.6 g protein based on upper IBW of 76.8 kg)  2. Monitor BMs - pt without stooling in 4 days    Continue monitoring and evaluating:   - Labs: blood glucose, electrolytes, renal indices  - GI function and BMs   - Nutritional intake, weight trends, skin integrity    RD remains available PRN and will follow up per protocol.  Zulma Whitten RD CDN    Pager# [phone]

## 2020-10-07 NOTE — PROGRESS NOTE ADULT - SUBJECTIVE AND OBJECTIVE BOX
Patient is a 60y old  Male who presents with a chief complaint of Cardiac arrest (21 Sep 2020 13:52)      Interval Events:    REVIEW OF SYSTEMS:  [ ] Positive  [ ] All other systems negative  [ ] Unable to assess ROS because ________    Vital Signs Last 24 Hrs  T(C): 37.1 (10-07-20 @ 05:13), Max: 37.1 (10-07-20 @ 05:13)  T(F): 98.7 (10-07-20 @ 05:13), Max: 98.7 (10-07-20 @ 05:13)  HR: 66 (10-07-20 @ 05:13) (54 - 72)  BP: 165/85 (10-07-20 @ 05:13) (123/76 - 165/85)  RR: 17 (10-07-20 @ 04:48) (17 - 18)  SpO2: 100% (10-07-20 @ 05:13) (95% - 100%)    PHYSICAL EXAM:  HEENT:   [ ]Tracheostomy:  [ ]Pupils equal  [ ]No oral lesions  [ ]Abnormal    SKIN  [ ]No Rash  [ ] Abnormal  [ ] pressure    CARDIAC  [ ]Regular  [ ]Abnormal    PULMONARY  [ ]Bilateral Clear Breath Sounds  [ ]Normal Excursion  [ ]Abnormal    GI  [ ]PEG      [ ] +BS		              [ ]Soft, nondistended, nontender	  [ ]Abnormal    MUSCULOSKELETAL                                   [ ]Bedbound                 [ ]Abnormal    [ ]Ambulatory/OOB to chair                           EXTREMITIES                                         [ ]Normal  [ ]Edema                           NEUROLOGIC  [ ] Normal, non focal  [ ] Focal findings:    PSYCHIATRIC  [ ]Alert and appropriate  [ ] Sedated	 [ ]Agitated    :  Umaña: [ ] Yes, if yes: Date of Placement:                   [  ] No    LINES: Central Lines [ ] Yes, if yes: Date of Placement                                     [  ] No    HOSPITAL MEDICATIONS:  MEDICATIONS  (STANDING):  albuterol/ipratropium for Nebulization. 3 milliLiter(s) Nebulizer every 6 hours  amantadine Syrup 100 milliGRAM(s) Oral <User Schedule>  amLODIPine   Tablet 5 milliGRAM(s) Oral daily  artificial  tears Solution 1 Drop(s) Both EYES every 6 hours  aspirin  chewable 81 milliGRAM(s) Enteral Tube daily  atorvastatin 80 milliGRAM(s) Oral at bedtime  bisacodyl Suppository 10 milliGRAM(s) Rectal at bedtime  chlorhexidine 4% Liquid 1 Application(s) Topical <User Schedule>  dextrose 5%. 1000 milliLiter(s) (50 mL/Hr) IV Continuous <Continuous>  dextrose 50% Injectable 25 Gram(s) IV Push once  dextrose 50% Injectable 12.5 Gram(s) IV Push once  dextrose 50% Injectable 25 Gram(s) IV Push once  doxazosin 8 milliGRAM(s) Oral at bedtime  erythromycin   Ointment 1 Application(s) Right EYE every 8 hours  famotidine    Tablet 20 milliGRAM(s) Oral daily  fentaNYL   Patch  12 MICROgram(s)/Hr 1 Patch Transdermal every 72 hours  heparin   Injectable 5000 Unit(s) SubCutaneous every 8 hours  hydrALAZINE 100 milliGRAM(s) Oral three times a day  isosorbide   dinitrate Tablet (ISORDIL) 20 milliGRAM(s) Enteral Tube <User Schedule>  lactobacillus acidophilus 1 Tablet(s) Oral two times a day  lidocaine   Patch 1 Patch Transdermal daily  lidocaine   Patch 1 Patch Transdermal every 24 hours  metoprolol tartrate 12.5 milliGRAM(s) Oral <User Schedule>  ofloxacin 0.3% Solution 1 Drop(s) Right EYE four times a day  petrolatum Ophthalmic Ointment 1 Application(s) Both EYES every 8 hours  QUEtiapine 50 milliGRAM(s) Oral <User Schedule>  QUEtiapine 75 milliGRAM(s) Oral <User Schedule>  senna 2 Tablet(s) Oral at bedtime  sodium chloride 1 Gram(s) Oral every 6 hours    MEDICATIONS  (PRN):  dextrose 40% Gel 15 Gram(s) Oral once PRN Blood Glucose LESS THAN 70 milliGRAM(s)/deciliter  glucagon  Injectable 1 milliGRAM(s) IntraMuscular once PRN Glucose LESS THAN 70 milligrams/deciliter  LORazepam     Tablet 1 milliGRAM(s) Oral every 8 hours PRN Agitation  polyethylene glycol 3350 17 Gram(s) Oral daily PRN Constipation      LABS:                        11.8   6.09  )-----------( 238      ( 06 Oct 2020 06:28 )             37.4     10-06    135  |  101  |  21  ----------------------------<  85  4.0   |  23  |  1.05    Ca    9.7      06 Oct 2020 06:28              CAPILLARY BLOOD GLUCOSE    MICROBIOLOGY:     RADIOLOGY:  [ ] Reviewed and interpreted by me     Patient is a 60y old  Male who presents with a chief complaint of Cardiac arrest (21 Sep 2020 13:52)      Interval Events: No events over night    REVIEW OF SYSTEMS:  [ ] Positive  [X] All other systems negative  [ ] Unable to assess ROS because ________    Vital Signs Last 24 Hrs  T(C): 37.1 (10-07-20 @ 05:13), Max: 37.1 (10-07-20 @ 05:13)  T(F): 98.7 (10-07-20 @ 05:13), Max: 98.7 (10-07-20 @ 05:13)  HR: 66 (10-07-20 @ 05:13) (54 - 72)  BP: 165/85 (10-07-20 @ 05:13) (123/76 - 165/85)  RR: 17 (10-07-20 @ 04:48) (17 - 18)  SpO2: 100% (10-07-20 @ 05:13) (95% - 100%)    PHYSICAL EXAM:  HEENT:   [x]Tracheostomy: # 6 Cuffless Portex   [x]Pupils: Dilated fixed right pupil; Left PERRL, Rt Eye sclera irritation   [ ]No oral lesions  [ ]Abnormal    SKIN  [x]No Rash  [ ] Abnormal  [ ] pressure    CARDIAC  [x]Regular  [ ]Abnormal    PULMONARY  [x]Bilateral Clear Breath Sounds  [ ]Normal Excursion  [ ]Abnormal    GI  [x]PEG      [x] +BS		              [x]Soft, nondistended, nontender	  [ ]Abnormal    MUSCULOSKELETAL                                   [ ]Bedbound                 [ ]Abnormal    [x]OOB to chair with assistance                       EXTREMITIES                                         [x]Normal  [ ]Edema                           NEUROLOGIC  [ ] Normal, non focal  [x] Focal findings: Able to follow commands and respond to questions appropriately ; Hemiparesis of Left upper and Left Lower extremity       PSYCHIATRIC  [x]Alert and appropriate  [ ] Sedated	 [ ]Agitated    :  Umaña: [ ] Yes, if yes: Date of Placement:                   [x] No; Straight cath atc for Urinary Retention     LINES: Central Lines [ ] Yes, if yes: Date of Placement                                     [x] No      HOSPITAL MEDICATIONS:  MEDICATIONS  (STANDING):  albuterol/ipratropium for Nebulization. 3 milliLiter(s) Nebulizer every 6 hours  amantadine Syrup 100 milliGRAM(s) Oral <User Schedule>  amLODIPine   Tablet 5 milliGRAM(s) Oral daily  artificial  tears Solution 1 Drop(s) Both EYES every 6 hours  aspirin  chewable 81 milliGRAM(s) Enteral Tube daily  atorvastatin 80 milliGRAM(s) Oral at bedtime  bisacodyl Suppository 10 milliGRAM(s) Rectal at bedtime  chlorhexidine 4% Liquid 1 Application(s) Topical <User Schedule>  dextrose 5%. 1000 milliLiter(s) (50 mL/Hr) IV Continuous <Continuous>  dextrose 50% Injectable 25 Gram(s) IV Push once  dextrose 50% Injectable 12.5 Gram(s) IV Push once  dextrose 50% Injectable 25 Gram(s) IV Push once  doxazosin 8 milliGRAM(s) Oral at bedtime  erythromycin   Ointment 1 Application(s) Right EYE every 8 hours  famotidine    Tablet 20 milliGRAM(s) Oral daily  fentaNYL   Patch  12 MICROgram(s)/Hr 1 Patch Transdermal every 72 hours  heparin   Injectable 5000 Unit(s) SubCutaneous every 8 hours  hydrALAZINE 100 milliGRAM(s) Oral three times a day  isosorbide   dinitrate Tablet (ISORDIL) 20 milliGRAM(s) Enteral Tube <User Schedule>  lactobacillus acidophilus 1 Tablet(s) Oral two times a day  lidocaine   Patch 1 Patch Transdermal daily  lidocaine   Patch 1 Patch Transdermal every 24 hours  metoprolol tartrate 12.5 milliGRAM(s) Oral <User Schedule>  ofloxacin 0.3% Solution 1 Drop(s) Right EYE four times a day  petrolatum Ophthalmic Ointment 1 Application(s) Both EYES every 8 hours  QUEtiapine 50 milliGRAM(s) Oral <User Schedule>  QUEtiapine 75 milliGRAM(s) Oral <User Schedule>  senna 2 Tablet(s) Oral at bedtime  sodium chloride 1 Gram(s) Oral every 6 hours    MEDICATIONS  (PRN):  dextrose 40% Gel 15 Gram(s) Oral once PRN Blood Glucose LESS THAN 70 milliGRAM(s)/deciliter  glucagon  Injectable 1 milliGRAM(s) IntraMuscular once PRN Glucose LESS THAN 70 milligrams/deciliter  LORazepam     Tablet 1 milliGRAM(s) Oral every 8 hours PRN Agitation  polyethylene glycol 3350 17 Gram(s) Oral daily PRN Constipation      LABS:                   CAPILLARY BLOOD GLUCOSE    MICROBIOLOGY:     RADIOLOGY:  [ ] Reviewed and interpreted by me

## 2020-10-07 NOTE — PROGRESS NOTE ADULT - PROBLEM SELECTOR PLAN 4
Possible stunned myocardium due to cardiac arrest vs. SAH  TTE: Global LV dysfunction. EF 41%, Severe concentric LVH, flattening of interventricular septum  Will need cardiac cath to evaluate for ischemia as outpatient  Continue afterload/preload reduction with hydralazine and isosorbide dinitrate  Norvasc 5 mg added for uncontrolled HTN Possible stunned myocardium due to cardiac arrest vs. SAH  TTE: Global LV dysfunction. EF 41%, Severe concentric LVH, flattening of interventricular septum  Will need cardiac cath to evaluate for ischemia as outpatient  Continue afterload/preload reduction with hydralazine and isosorbide dinitrate  Norvasc increased to 10mg QD

## 2020-10-07 NOTE — PROGRESS NOTE ADULT - PROBLEM SELECTOR PLAN 3
Cont Fentanyl Patch 12mcg Q 72H   Continue Seroquel 50 mg morning / afternoon   Continue Seroquel evening dose 75 mg   Continue PRN Ativan via PEG for agitation not treated by standing Seroquel Fentanyl Patch 12mcg Q 72H discontinued on 10/7  Continue Seroquel 50 mg morning / afternoon   Continue Seroquel evening dose 75 mg   Continue PRN Ativan via PEG for agitation not treated by standing Seroquel

## 2020-10-07 NOTE — PROGRESS NOTE ADULT - ASSESSMENT
Patient 60-year-old male with a history of A-Fib on warfarin who presented with cardiac arrest at work with downtime of about 25 minutes prior to ROSC. Patient S/p therapeutic hypothermia. 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 Now with resolved neurogenic/cardiogenic shock. Course complicated by GI bleed s/p PPI gtt, bleeding from Umaña s/p clot irrigation, prolonged respiratory failure s/p trach (8/24) and PEG (8/26). Patients with Afib with RVR during admission; previously on amiodarone since discontinued now rate controlled with Lopressor. Patient with Urinary retention during Hospitalization likely 2/2 neurogenic bladder; Pt developed traumatic hematuria; requiring urology to perform clot evacuation. Patient now on Cardura. Patient now requiring Straight Catheterization ATC. Patients hospital course complicated by Pseudomonas PNA and C.Diff. Patient S/p Cefepime  and enteral vanco. Patient was weaned to TC ATC during admission and tracheostomy was downsized to # 6 Cuffless Portex.  Patient with agitation restlessness during admission requiring Seroquel and ativan prn. Patient had MBS performed on 9/28 with signs of aspiration noted.    10/6: Patient with elevated BP Yesterday; will add Norvasc 5 mg QD. Patient with constipation will add senna. Patient 60-year-old male with a history of A-Fib on warfarin who presented with cardiac arrest at work with downtime of about 25 minutes prior to ROSC. Patient S/p therapeutic hypothermia. 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 Now with resolved neurogenic/cardiogenic shock. Course complicated by GI bleed s/p PPI gtt, bleeding from Umaña s/p clot irrigation, prolonged respiratory failure s/p trach (8/24) and PEG (8/26). Patients with Afib with RVR during admission; previously on amiodarone since discontinued now rate controlled with Lopressor. Patient with Urinary retention during Hospitalization likely 2/2 neurogenic bladder; Pt developed traumatic hematuria; requiring urology to perform clot evacuation. Patient now on Cardura. Patient now requiring Straight Catheterization ATC. Patients hospital course complicated by Pseudomonas PNA and C.Diff. Patient S/p Cefepime  and enteral vanco. Patient was weaned to TC ATC during admission and tracheostomy was downsized to # 6 Cuffless Portex.  Patient with agitation restlessness during admission requiring Seroquel and ativan prn. Patient had MBS performed on 9/28 with signs of aspiration noted.    10/6: Patient with elevated BP Yesterday; will add Norvasc 5 mg QD. Patient with constipation will add senna.    10/7: Amlodipine increased for better BP control. Fentanyl patch discontinued.

## 2020-10-08 LAB
ANION GAP SERPL CALC-SCNC: 13 MMOL/L — SIGNIFICANT CHANGE UP (ref 5–17)
BUN SERPL-MCNC: 19 MG/DL — SIGNIFICANT CHANGE UP (ref 7–23)
CALCIUM SERPL-MCNC: 9.8 MG/DL — SIGNIFICANT CHANGE UP (ref 8.4–10.5)
CHLORIDE SERPL-SCNC: 99 MMOL/L — SIGNIFICANT CHANGE UP (ref 96–108)
CO2 SERPL-SCNC: 22 MMOL/L — SIGNIFICANT CHANGE UP (ref 22–31)
CREAT SERPL-MCNC: 1 MG/DL — SIGNIFICANT CHANGE UP (ref 0.5–1.3)
GLUCOSE SERPL-MCNC: 122 MG/DL — HIGH (ref 70–99)
HCT VFR BLD CALC: 37.1 % — LOW (ref 39–50)
HGB BLD-MCNC: 11.7 G/DL — LOW (ref 13–17)
MCHC RBC-ENTMCNC: 27.5 PG — SIGNIFICANT CHANGE UP (ref 27–34)
MCHC RBC-ENTMCNC: 31.5 GM/DL — LOW (ref 32–36)
MCV RBC AUTO: 87.3 FL — SIGNIFICANT CHANGE UP (ref 80–100)
NRBC # BLD: 0 /100 WBCS — SIGNIFICANT CHANGE UP (ref 0–0)
PLATELET # BLD AUTO: 236 K/UL — SIGNIFICANT CHANGE UP (ref 150–400)
POTASSIUM SERPL-MCNC: 4.2 MMOL/L — SIGNIFICANT CHANGE UP (ref 3.5–5.3)
POTASSIUM SERPL-SCNC: 4.2 MMOL/L — SIGNIFICANT CHANGE UP (ref 3.5–5.3)
RBC # BLD: 4.25 M/UL — SIGNIFICANT CHANGE UP (ref 4.2–5.8)
RBC # FLD: 16.1 % — HIGH (ref 10.3–14.5)
SODIUM SERPL-SCNC: 134 MMOL/L — LOW (ref 135–145)
WBC # BLD: 6.15 K/UL — SIGNIFICANT CHANGE UP (ref 3.8–10.5)
WBC # FLD AUTO: 6.15 K/UL — SIGNIFICANT CHANGE UP (ref 3.8–10.5)

## 2020-10-08 PROCEDURE — 93010 ELECTROCARDIOGRAM REPORT: CPT

## 2020-10-08 PROCEDURE — 99233 SBSQ HOSP IP/OBS HIGH 50: CPT | Mod: GC

## 2020-10-08 RX ADMIN — Medication 1 DROP(S): at 00:25

## 2020-10-08 RX ADMIN — ISOSORBIDE DINITRATE 20 MILLIGRAM(S): 5 TABLET ORAL at 08:13

## 2020-10-08 RX ADMIN — Medication 3 MILLILITER(S): at 05:02

## 2020-10-08 RX ADMIN — Medication 1 APPLICATION(S): at 18:22

## 2020-10-08 RX ADMIN — Medication 3 MILLILITER(S): at 00:11

## 2020-10-08 RX ADMIN — Medication 100 MILLIGRAM(S): at 12:44

## 2020-10-08 RX ADMIN — SODIUM CHLORIDE 1 GRAM(S): 9 INJECTION INTRAMUSCULAR; INTRAVENOUS; SUBCUTANEOUS at 18:24

## 2020-10-08 RX ADMIN — ISOSORBIDE DINITRATE 20 MILLIGRAM(S): 5 TABLET ORAL at 00:27

## 2020-10-08 RX ADMIN — SENNA PLUS 2 TABLET(S): 8.6 TABLET ORAL at 22:20

## 2020-10-08 RX ADMIN — Medication 1 DROP(S): at 12:47

## 2020-10-08 RX ADMIN — Medication 12.5 MILLIGRAM(S): at 08:14

## 2020-10-08 RX ADMIN — LIDOCAINE 1 PATCH: 4 CREAM TOPICAL at 01:18

## 2020-10-08 RX ADMIN — Medication 3 MILLILITER(S): at 11:05

## 2020-10-08 RX ADMIN — Medication 100 MILLIGRAM(S): at 07:59

## 2020-10-08 RX ADMIN — LIDOCAINE 1 PATCH: 4 CREAM TOPICAL at 12:46

## 2020-10-08 RX ADMIN — Medication 1 DROP(S): at 05:55

## 2020-10-08 RX ADMIN — Medication 100 MILLIGRAM(S): at 18:21

## 2020-10-08 RX ADMIN — Medication 10 MILLIGRAM(S): at 22:22

## 2020-10-08 RX ADMIN — Medication 100 MILLIGRAM(S): at 22:21

## 2020-10-08 RX ADMIN — SODIUM CHLORIDE 1 GRAM(S): 9 INJECTION INTRAMUSCULAR; INTRAVENOUS; SUBCUTANEOUS at 12:46

## 2020-10-08 RX ADMIN — QUETIAPINE FUMARATE 75 MILLIGRAM(S): 200 TABLET, FILM COATED ORAL at 22:21

## 2020-10-08 RX ADMIN — QUETIAPINE FUMARATE 50 MILLIGRAM(S): 200 TABLET, FILM COATED ORAL at 05:55

## 2020-10-08 RX ADMIN — Medication 100 MILLIGRAM(S): at 05:55

## 2020-10-08 RX ADMIN — ISOSORBIDE DINITRATE 20 MILLIGRAM(S): 5 TABLET ORAL at 18:25

## 2020-10-08 RX ADMIN — Medication 1 TABLET(S): at 05:55

## 2020-10-08 RX ADMIN — Medication 1 TABLET(S): at 18:23

## 2020-10-08 RX ADMIN — Medication 1 APPLICATION(S): at 22:20

## 2020-10-08 RX ADMIN — Medication 8 MILLIGRAM(S): at 22:22

## 2020-10-08 RX ADMIN — HEPARIN SODIUM 5000 UNIT(S): 5000 INJECTION INTRAVENOUS; SUBCUTANEOUS at 22:20

## 2020-10-08 RX ADMIN — Medication 1 DROP(S): at 18:25

## 2020-10-08 RX ADMIN — QUETIAPINE FUMARATE 50 MILLIGRAM(S): 200 TABLET, FILM COATED ORAL at 18:22

## 2020-10-08 RX ADMIN — SODIUM CHLORIDE 1 GRAM(S): 9 INJECTION INTRAMUSCULAR; INTRAVENOUS; SUBCUTANEOUS at 00:27

## 2020-10-08 RX ADMIN — ATORVASTATIN CALCIUM 80 MILLIGRAM(S): 80 TABLET, FILM COATED ORAL at 22:20

## 2020-10-08 RX ADMIN — HEPARIN SODIUM 5000 UNIT(S): 5000 INJECTION INTRAVENOUS; SUBCUTANEOUS at 18:20

## 2020-10-08 RX ADMIN — Medication 1 APPLICATION(S): at 05:56

## 2020-10-08 RX ADMIN — LIDOCAINE 1 PATCH: 4 CREAM TOPICAL at 22:22

## 2020-10-08 RX ADMIN — LIDOCAINE 1 PATCH: 4 CREAM TOPICAL at 21:50

## 2020-10-08 RX ADMIN — FAMOTIDINE 20 MILLIGRAM(S): 10 INJECTION INTRAVENOUS at 12:45

## 2020-10-08 RX ADMIN — Medication 3 MILLILITER(S): at 17:35

## 2020-10-08 RX ADMIN — AMLODIPINE BESYLATE 10 MILLIGRAM(S): 2.5 TABLET ORAL at 05:56

## 2020-10-08 RX ADMIN — LIDOCAINE 1 PATCH: 4 CREAM TOPICAL at 12:49

## 2020-10-08 RX ADMIN — Medication 81 MILLIGRAM(S): at 12:45

## 2020-10-08 RX ADMIN — SODIUM CHLORIDE 1 GRAM(S): 9 INJECTION INTRAMUSCULAR; INTRAVENOUS; SUBCUTANEOUS at 05:55

## 2020-10-08 NOTE — PROGRESS NOTE ADULT - PROBLEM SELECTOR PLAN 6
A-Fib with RVR  DC'd Amiodarone (8/31)  Lopressor decreased to 12.5 mg BID secondary to bradycardia  Hold off on therapeutic a/c given SAH

## 2020-10-08 NOTE — PROGRESS NOTE ADULT - ASSESSMENT
Patient 60-year-old male with a history of A-Fib on warfarin who presented with cardiac arrest at work with downtime of about 25 minutes prior to ROSC. Patient S/p therapeutic hypothermia. 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 Now with resolved neurogenic/cardiogenic shock. Course complicated by GI bleed s/p PPI gtt, bleeding from Umaña s/p clot irrigation, prolonged respiratory failure s/p trach (8/24) and PEG (8/26). Patients with Afib with RVR during admission; previously on amiodarone since discontinued now rate controlled with Lopressor. Patient with Urinary retention during Hospitalization likely 2/2 neurogenic bladder; Pt developed traumatic hematuria; requiring urology to perform clot evacuation. Patient now on Cardura. Patient now requiring Straight Catheterization ATC. Patients hospital course complicated by Pseudomonas PNA and C.Diff. Patient S/p Cefepime  and enteral vanco. Patient was weaned to TC ATC during admission and tracheostomy was downsized to # 6 Cuffless Portex.  Patient with agitation restlessness during admission requiring Seroquel and ativan prn. Patient had MBS performed on 9/28 with signs of aspiration noted.    10/8: Patient with improved bp in setting of increased Norvasc dosing.

## 2020-10-08 NOTE — PROGRESS NOTE ADULT - PROBLEM SELECTOR PLAN 1
Patient S/p Tracheostomy on 8/24 with ENT  Tolerating continuous trach collar since 8/30  Tracheostomy Downsized to # 6 Cuffless Portex   Tracheoscopy 9/24:  3-4 mm area of likely granulation tissue on proximal left mainstem bronchus without evidence of air way obstruction   Continue Chest PT and Suctioning PRN   Continue PMV; Trials Monitor for [employer]irt[employer]

## 2020-10-08 NOTE — PROGRESS NOTE ADULT - SUBJECTIVE AND OBJECTIVE BOX
Authored by Flora Gao MD, PGY1  PATIENT:  GUILLE DOLAN  [id number]    CHIEF COMPLAINT:  Patient is a 60y old  Male who presents with a chief complaint of Cardiac arrest (21 Sep 2020 13:52)      INTERVAL HISTORY OVERNIGHT EVENTS: Patient transferred from RCU to medicine floors. Patient with ongoing urinary retention, likely 2/2 neurogenic bladder, with q8 bladder scans and straight cath. Patient with #6 cuff trach collar. Patient able to ambulate, pending d/c in setting of ongoing insurance issues.       REVIEW OF SYSTEMS:    Constitutional:     [ ] negative [ ] fevers [ ] chills [ ] weight loss [ ] weight gain  HEENT:                  [ ] negative [ ] dry eyes [ ] eye irritation [ ] postnasal drip [ ] nasal congestion  CV:                         [ ] negative  [ ] chest pain [ ] orthopnea [ ] palpitations [ ] murmur  Resp:                     [ ] negative [ ] cough [ ] shortness of breath [ ] dyspnea [ ] wheezing [ ] sputum [ ] hemoptysis  GI:                          [ ] negative [ ] nausea [ ] vomiting [ ] diarrhea [ ] constipation [ ] abd pain [ ] dysphagia   :                        [ ] negative [ ] dysuria [ ] nocturia [ ] hematuria [ ] increased urinary frequency  Musculoskeletal: [ ] negative [ ] back pain [ ] myalgias [ ] arthralgias [ ] fracture  Skin:                       [ ] negative [ ] rash [ ] itch  Neurological:        [ ] negative [ ] headache [ ] dizziness [ ] syncope [ ] weakness [ ] numbness  Psychiatric:           [ ] negative [ ] anxiety [ ] depression  Endocrine:            [ ] negative [ ] diabetes [ ] thyroid problem  Heme/Lymph:      [ ] negative [ ] anemia [ ] bleeding problem  Allergic/Immune: [ ] negative [ ] itchy eyes [ ] nasal discharge [ ] hives [ ] angioedema    [x ] All other systems negative  [ ] Unable to assess ROS because ________.    MEDICATIONS:  MEDICATIONS  (STANDING):  albuterol/ipratropium for Nebulization. 3 milliLiter(s) Nebulizer every 6 hours  amantadine Syrup 100 milliGRAM(s) Oral <User Schedule>  amLODIPine   Tablet 10 milliGRAM(s) Oral daily  artificial  tears Solution 1 Drop(s) Both EYES every 6 hours  aspirin  chewable 81 milliGRAM(s) Enteral Tube daily  atorvastatin 80 milliGRAM(s) Oral at bedtime  bisacodyl Suppository 10 milliGRAM(s) Rectal at bedtime  chlorhexidine 4% Liquid 1 Application(s) Topical <User Schedule>  dextrose 5%. 1000 milliLiter(s) (50 mL/Hr) IV Continuous <Continuous>  dextrose 50% Injectable 12.5 Gram(s) IV Push once  dextrose 50% Injectable 25 Gram(s) IV Push once  dextrose 50% Injectable 25 Gram(s) IV Push once  doxazosin 8 milliGRAM(s) Oral at bedtime  erythromycin   Ointment 1 Application(s) Right EYE every 8 hours  famotidine    Tablet 20 milliGRAM(s) Oral daily  heparin   Injectable 5000 Unit(s) SubCutaneous every 8 hours  hydrALAZINE 100 milliGRAM(s) Oral three times a day  isosorbide   dinitrate Tablet (ISORDIL) 20 milliGRAM(s) Enteral Tube <User Schedule>  lactobacillus acidophilus 1 Tablet(s) Oral two times a day  lidocaine   Patch 1 Patch Transdermal daily  lidocaine   Patch 1 Patch Transdermal every 24 hours  metoprolol tartrate 12.5 milliGRAM(s) Oral <User Schedule>  ofloxacin 0.3% Solution 1 Drop(s) Right EYE four times a day  petrolatum Ophthalmic Ointment 1 Application(s) Both EYES every 8 hours  QUEtiapine 75 milliGRAM(s) Oral <User Schedule>  QUEtiapine 50 milliGRAM(s) Oral <User Schedule>  senna 2 Tablet(s) Oral at bedtime  sodium chloride 1 Gram(s) Oral every 6 hours    MEDICATIONS  (PRN):  dextrose 40% Gel 15 Gram(s) Oral once PRN Blood Glucose LESS THAN 70 milliGRAM(s)/deciliter  glucagon  Injectable 1 milliGRAM(s) IntraMuscular once PRN Glucose LESS THAN 70 milligrams/deciliter  LORazepam     Tablet 1 milliGRAM(s) Oral every 8 hours PRN Agitation  polyethylene glycol 3350 17 Gram(s) Oral daily PRN Constipation      ALLERGIES:  Allergies    No Known Allergies    Intolerances        OBJECTIVE:  ICU Vital Signs Last 24 Hrs  T(C): 36.7 (08 Oct 2020 04:13), Max: 36.8 (07 Oct 2020 21:38)  T(F): 98.1 (08 Oct 2020 04:13), Max: 98.2 (07 Oct 2020 21:38)  HR: 67 (08 Oct 2020 11:07) (50 - 78)  BP: 147/88 (08 Oct 2020 04:13) (143/82 - 154/94)  BP(mean): --  ABP: --  ABP(mean): --  RR: 18 (08 Oct 2020 09:01) (17 - 20)  SpO2: 98% (08 Oct 2020 11:07) (98% - 100%)        CAPILLARY BLOOD GLUCOSE        CAPILLARY BLOOD GLUCOSE        I&O's Summary    07 Oct 2020 07:01  -  08 Oct 2020 07:00  --------------------------------------------------------  IN: 1520 mL / OUT: 650 mL / NET: 870 mL      PHYSICAL EXAMINATION:  General: WN/WD NAD  HEENT: Tracheostomy: # 6 Cuffless Portex, right eye ptosis, dlated fixed right pupil, right eye with scleral irritation  Neurology: A&Ox3, nonfocal, WEI x 4  Respiratory: CTA B/L, normal respiratory effort. Trach collar.   CV: RRR, S1S2, no murmurs, rubs or gallops  Abdominal: With PEG tube. Soft, NT, ND +BS.   Extremities: Able to follow commands and respond to questions appropriately; hemiparesis of left upper and lower extremities (improving)       LABS:                          11.7   6.15  )-----------( 236      ( 08 Oct 2020 07:10 )             37.1     10-08    134<L>  |  99  |  19  ----------------------------<  122<H>  4.2   |  22  |  1.00    Ca    9.8      08 Oct 2020 07:10                    TELEMETRY:     EKG:     IMAGING:       Authored by Flora Gao MD, PGY1  PATIENT:  GUILLE DOLAN  [id number]    CHIEF COMPLAINT:  Patient is a 60y old  Male who presents with a chief complaint of Cardiac arrest (21 Sep 2020 13:52)      INTERVAL HISTORY OVERNIGHT EVENTS: Patient transferred from RCU to medicine floors. Patient with ongoing urinary retention, likely 2/2 neurogenic bladder, with q8 bladder scans and straight cath. Patient with #6 cuff trach collar. Patient able to ambulate, pending d/c in setting of ongoing insurance issues.       REVIEW OF SYSTEMS:    Constitutional:     [ ] negative [ ] fevers [ ] chills [ ] weight loss [ ] weight gain  HEENT:                  [ ] negative [ ] dry eyes [ ] eye irritation [ ] postnasal drip [ ] nasal congestion  CV:                         [ ] negative  [ ] chest pain [ ] orthopnea [ ] palpitations [ ] murmur  Resp:                     [ ] negative [ ] cough [ ] shortness of breath [ ] dyspnea [ ] wheezing [ ] sputum [ ] hemoptysis  GI:                          [ ] negative [ ] nausea [ ] vomiting [ ] diarrhea [ ] constipation [ ] abd pain [ ] dysphagia   :                        [ ] negative [ ] dysuria [ ] nocturia [ ] hematuria [ ] increased urinary frequency  Musculoskeletal: [ ] negative [ ] back pain [ ] myalgias [ ] arthralgias [ ] fracture  Skin:                       [ ] negative [ ] rash [ ] itch  Neurological:        [ ] negative [ ] headache [ ] dizziness [ ] syncope [ ] weakness [ ] numbness  Psychiatric:           [ ] negative [ ] anxiety [ ] depression  Endocrine:            [ ] negative [ ] diabetes [ ] thyroid problem  Heme/Lymph:      [ ] negative [ ] anemia [ ] bleeding problem  Allergic/Immune: [ ] negative [ ] itchy eyes [ ] nasal discharge [ ] hives [ ] angioedema    [x ] All other systems negative  [ ] Unable to assess ROS because ________.    MEDICATIONS:  MEDICATIONS  (STANDING):  albuterol/ipratropium for Nebulization. 3 milliLiter(s) Nebulizer every 6 hours  amantadine Syrup 100 milliGRAM(s) Oral <User Schedule>  amLODIPine   Tablet 10 milliGRAM(s) Oral daily  artificial  tears Solution 1 Drop(s) Both EYES every 6 hours  aspirin  chewable 81 milliGRAM(s) Enteral Tube daily  atorvastatin 80 milliGRAM(s) Oral at bedtime  bisacodyl Suppository 10 milliGRAM(s) Rectal at bedtime  chlorhexidine 4% Liquid 1 Application(s) Topical <User Schedule>  dextrose 5%. 1000 milliLiter(s) (50 mL/Hr) IV Continuous <Continuous>  dextrose 50% Injectable 12.5 Gram(s) IV Push once  dextrose 50% Injectable 25 Gram(s) IV Push once  dextrose 50% Injectable 25 Gram(s) IV Push once  doxazosin 8 milliGRAM(s) Oral at bedtime  erythromycin   Ointment 1 Application(s) Right EYE every 8 hours  famotidine    Tablet 20 milliGRAM(s) Oral daily  heparin   Injectable 5000 Unit(s) SubCutaneous every 8 hours  hydrALAZINE 100 milliGRAM(s) Oral three times a day  isosorbide   dinitrate Tablet (ISORDIL) 20 milliGRAM(s) Enteral Tube <User Schedule>  lactobacillus acidophilus 1 Tablet(s) Oral two times a day  lidocaine   Patch 1 Patch Transdermal daily  lidocaine   Patch 1 Patch Transdermal every 24 hours  metoprolol tartrate 12.5 milliGRAM(s) Oral <User Schedule>  ofloxacin 0.3% Solution 1 Drop(s) Right EYE four times a day  petrolatum Ophthalmic Ointment 1 Application(s) Both EYES every 8 hours  QUEtiapine 75 milliGRAM(s) Oral <User Schedule>  QUEtiapine 50 milliGRAM(s) Oral <User Schedule>  senna 2 Tablet(s) Oral at bedtime  sodium chloride 1 Gram(s) Oral every 6 hours    MEDICATIONS  (PRN):  dextrose 40% Gel 15 Gram(s) Oral once PRN Blood Glucose LESS THAN 70 milliGRAM(s)/deciliter  glucagon  Injectable 1 milliGRAM(s) IntraMuscular once PRN Glucose LESS THAN 70 milligrams/deciliter  LORazepam     Tablet 1 milliGRAM(s) Oral every 8 hours PRN Agitation  polyethylene glycol 3350 17 Gram(s) Oral daily PRN Constipation      ALLERGIES:  Allergies    No Known Allergies    Intolerances        OBJECTIVE:  ICU Vital Signs Last 24 Hrs  T(C): 36.7 (08 Oct 2020 04:13), Max: 36.8 (07 Oct 2020 21:38)  T(F): 98.1 (08 Oct 2020 04:13), Max: 98.2 (07 Oct 2020 21:38)  HR: 67 (08 Oct 2020 11:07) (50 - 78)  BP: 147/88 (08 Oct 2020 04:13) (143/82 - 154/94)  BP(mean): --  ABP: --  ABP(mean): --  RR: 18 (08 Oct 2020 09:01) (17 - 20)  SpO2: 98% (08 Oct 2020 11:07) (98% - 100%)        CAPILLARY BLOOD GLUCOSE        CAPILLARY BLOOD GLUCOSE        I&O's Summary    07 Oct 2020 07:01  -  08 Oct 2020 07:00  --------------------------------------------------------  IN: 1520 mL / OUT: 650 mL / NET: 870 mL      PHYSICAL EXAMINATION:  General: WN/WD NAD  HEENT: Patient c/o b/l vision loss since admission. Tracheostomy: # 6 Cuffless Portex, right eye ptosis, dilated fixed right pupil, right eye with scleral irritation  Neurology: A&Ox3, nonfocal, WEI x 4  Respiratory: CTA B/L, normal respiratory effort. Trach collar.   CV: RRR, S1S2, no murmurs, rubs or gallops  Abdominal: With PEG tube, site clean, dry and intact. Soft, NT, ND +BS.   Extremities: Able to follow commands and respond to questions appropriately; hemiparesis of left upper and lower extremities (improving), 5/5 strength in right upper and lower extremities      LABS:                          11.7   6.15  )-----------( 236      ( 08 Oct 2020 07:10 )             37.1     10-08    134<L>  |  99  |  19  ----------------------------<  122<H>  4.2   |  22  |  1.00    Ca    9.8      08 Oct 2020 07:10                    TELEMETRY:     EKG:     IMAGING:

## 2020-10-08 NOTE — PROGRESS NOTE ADULT - ATTENDING COMMENTS
-Patient seen/examined on 10/8/20. Case/plan discussed with the intern and resident as reviewed/edited by me above and in any comments below.  -DC planning. Supportive care.

## 2020-10-08 NOTE — PROGRESS NOTE ADULT - PROBLEM SELECTOR PLAN 4
Possible stunned myocardium due to cardiac arrest vs. SAH  TTE: Global LV dysfunction. EF 41%, Severe concentric LVH, flattening of interventricular septum  Will need cardiac cath to evaluate for ischemia as outpatient  Continue afterload/preload reduction with hydralazine and isosorbide dinitrate  Norvasc increased to 10mg QD with improved BP

## 2020-10-08 NOTE — PROGRESS NOTE ADULT - PROBLEM SELECTOR PLAN 10
Patient still having issues with insurance no long term benefit   Family still without plan after acute rehab therefore he will not qualify   Medicaid approved but Long term benefit will not be available until November

## 2020-10-08 NOTE — PROGRESS NOTE ADULT - PROBLEM SELECTOR PLAN 2
Patient S/p Angio; neg x 2  CT Head: perimesencephalic SAH   CTA Head: no aneurysm noted, VEEG: Negative   MRI neuroaxis: restricted diffusion in R BG, posterior limb of R IC, and anterior right temporal lobe  No Further neurosurgical intervention at this time  Residual left sided deficits, improving Patient S/p Angio; neg x 2  CT Head: perimesencephalic SAH   CTA Head: no aneurysm noted, VEEG: Negative   MRI neuroaxis: restricted diffusion in R BG, posterior limb of R IC, and anterior right temporal lobe  No Further neurosurgical intervention at this time  Residual left sided deficits, improving. Patient with vision loss, pending optho consult?

## 2020-10-08 NOTE — PROGRESS NOTE ADULT - PROBLEM SELECTOR PLAN 9
Continue Straight Catheterization q 4hrs due to large volume retention   Patient will need Umaña on Discharge   Continue Cardura 8 mg HS Continue Straight Catheterization ATC  Frequency decreased from q 4 hrs to q 8 hrs; Monitor for large volume retention  Patient will need Umaña on Discharge   Continue Cardura 8 mg HS

## 2020-10-08 NOTE — PROGRESS NOTE ADULT - ASSESSMENT
60-year-old man, only known prior history was Afib on coumadin, presented initially on August 9, 2020 after cardiac arrest. Patient was at work, and had downtime of 25 minutes before ROSC, patient treated with therapeutic hypothermia. Patient with prolonged and complicated course (see below), transferred from RCU to medicine floors with persistent neurogenic bladder, trach collar, stable and awaiting discharge to [facility] pending insurance clearance.   Neuro: found to have R perimesencephalic SAH, unclear etiology, with some left upper and lower extremity deficits; patient's cerebral angiogram on 8/10 negative for aneurysm.   CV: Patient's course c/b neurogenic/cardiogenic shock, now resolved. Patient with afib on coumadin, stopped during hospital stay as patient with SAH. Patient with Afib with RVR,  amiodarone discontinued, now rate controlled on Lopressor. BP also controlled with isordil, hydralazine, and Norvasc.   Respiratory: respiratory failure requiring intubated, now s/p tach (8/24), downsized to #6 cuffless portex.   GI: GI bleed, s/p PPI gtt, no surgical intervention. Patient with PEG tube (8/26), MBS (9/28) revealed aspiration so could no be advanced to PO intake.   : Hematuria from Scott catheter trauma, s/p clot irrigation, now resolved. Neurogenic bladder resulting in urinary retention, still needs straight cath [id number], likely will need to be discharged on scott catheter. Patient on cardura.   ID: Patient had pseudomonas PNA, UTI, Cdiff; now s/p all antibiotic treatment, all resolved.   Psych: Patient with agitation restlessness during admission requiring Seroquel and ativan prn.

## 2020-10-08 NOTE — PROGRESS NOTE ADULT - PROBLEM SELECTOR PLAN 9
Continue Straight Catheterization ATC  Frequency decreased from q 4 hrs to q 8 hrs; Monitor for large volume retention  Patient will need Umaña on Discharge   Continue Cardura 8 mg HS

## 2020-10-08 NOTE — PROGRESS NOTE ADULT - NSHPATTENDINGPLANDISCUSS_GEN_ALL_CORE
NSCU team
RCU team
NSCU team
RCU Team
rcu
RCU team
RCU team
team
RCU Team
rcu
RCU Team
team

## 2020-10-08 NOTE — PROGRESS NOTE ADULT - PROBLEM SELECTOR PLAN 3
Fentanyl Patch 12mcg Q 72H discontinued on 10/7; No signs of agitation   Continue Seroquel 50 mg morning / afternoon   Continue Seroquel evening dose 75 mg   Continue PRN Ativan via PEG for agitation not treated by standing Seroquel

## 2020-10-08 NOTE — PROGRESS NOTE ADULT - ATTENDING COMMENTS
Agree with plan as outlined above. Pt seen and examined at bedside. History, laboratory data, and imaging personally reviewed.     Pt with chronic respiratory failure with trach dependence. Clinically doing very well and tolerating trach collar since 8/30 with PMV intermittently. Trach last downsized to cuffless Portex 6 on 9/14. Will continue with airway clearance therapy and trach care. Neurologically, pt improving daily and is now able to move down michele with walk assist device (unable to move left foot). Failed MBS on 9/28, will c/w PEG tube feeds and speech/swallow therapy.    Pt completed Cefepime through 9/15 for PSA UTI, with Vanco PO for CDiff ppx. Monitoring conservatively for now, no new clinical s/s acute infectious process. Pt also with new CHFrEF on this admission. Cardiology recs appreciated. Hx recent AFlutter/Afib in the setting of sepsis s/p amiodarone+BB therapy. Off amiodarone 2/2 bradycardia. Rate control with Lopressor. Pt with slightly high BP after decreasing Lopressor for bradycardia last week improved on addition of Norvasc. No full A/C 2/2 recent SAH and GIB, now on DVT ppx with HSQ. c/w ASA+BB+Isordil+Hydralazine for medical mgmt. Will likely need O/P cardiac cath. Right eye abrasion treatment with erythromycin+ oxafloxin drops, pt still unable to see out of left eye but appears to have some improvement in vision of right eye.    Dispo planning in progress. Plan for rehab pending resolution of insurance coverage.

## 2020-10-08 NOTE — PROGRESS NOTE ADULT - SUBJECTIVE AND OBJECTIVE BOX
Patient is a 60y old  Male who presents with a chief complaint of Cardiac arrest (21 Sep 2020 13:52)      Interval Events: No events reported overnight     REVIEW OF SYSTEMS:  [ ] Positive  [x] All other systems negative  [ ] Unable to assess ROS because ________    Vital Signs Last 24 Hrs  T(C): 36.7 (10-08-20 @ 04:13), Max: 36.8 (10-07-20 @ 21:38)  T(F): 98.1 (10-08-20 @ 04:13), Max: 98.2 (10-07-20 @ 21:38)  HR: 68 (10-08-20 @ 05:03) (50 - 78)  BP: 147/88 (10-08-20 @ 04:13) (135/90 - 154/94)  RR: 18 (10-08-20 @ 04:50) (17 - 20)  SpO2: 98% (10-08-20 @ 05:03) (98% - 100%)    PHYSICAL EXAM:  HEENT:   [x]Tracheostomy: # 6 Cuffless Portex   [x]Pupils: Right eye ptosis, Dilated fixed right pupil; Left PERRL, Rt Eye sclera irritation   [ ]No oral lesions  [ ]Abnormal    SKIN  [x]No Rash  [ ] Abnormal  [ ] pressure    CARDIAC  [x]Regular  [ ]Abnormal    PULMONARY  [x]Bilateral Clear Breath Sounds  [ ]Normal Excursion  [ ]Abnormal    GI  [x]PEG      [x] +BS		              [x]Soft, nondistended, nontender	  [ ]Abnormal    MUSCULOSKELETAL                                   [ ]Bedbound                 [ ]Abnormal    [x]OOB to chair                           EXTREMITIES                                         [x]Normal  [ ]Edema                           NEUROLOGIC  [ ] Normal, non focal  [x] Focal findings: Able to follow commands and respond to questions appropriately ; Hemiparesis of Left upper and Left Lower extremity ( Improving)        PSYCHIATRIC  [x]Alert and appropriate  [ ] Sedated	 [ ]Agitated    :  Umaña: [ ] Yes, if yes: Date of Placement:                   [x] No; Straight cath atc for Urinary Retention     LINES: Central Lines [ ] Yes, if yes: Date of Placement                                     [x] No    HOSPITAL MEDICATIONS:  MEDICATIONS  (STANDING):  albuterol/ipratropium for Nebulization. 3 milliLiter(s) Nebulizer every 6 hours  amantadine Syrup 100 milliGRAM(s) Oral <User Schedule>  amLODIPine   Tablet 10 milliGRAM(s) Oral daily  artificial  tears Solution 1 Drop(s) Both EYES every 6 hours  aspirin  chewable 81 milliGRAM(s) Enteral Tube daily  atorvastatin 80 milliGRAM(s) Oral at bedtime  bisacodyl Suppository 10 milliGRAM(s) Rectal at bedtime  chlorhexidine 4% Liquid 1 Application(s) Topical <User Schedule>  dextrose 5%. 1000 milliLiter(s) (50 mL/Hr) IV Continuous <Continuous>  dextrose 50% Injectable 12.5 Gram(s) IV Push once  dextrose 50% Injectable 25 Gram(s) IV Push once  dextrose 50% Injectable 25 Gram(s) IV Push once  doxazosin 8 milliGRAM(s) Oral at bedtime  erythromycin   Ointment 1 Application(s) Right EYE every 8 hours  famotidine    Tablet 20 milliGRAM(s) Oral daily  heparin   Injectable 5000 Unit(s) SubCutaneous every 8 hours  hydrALAZINE 100 milliGRAM(s) Oral three times a day  isosorbide   dinitrate Tablet (ISORDIL) 20 milliGRAM(s) Enteral Tube <User Schedule>  lactobacillus acidophilus 1 Tablet(s) Oral two times a day  lidocaine   Patch 1 Patch Transdermal every 24 hours  lidocaine   Patch 1 Patch Transdermal daily  metoprolol tartrate 12.5 milliGRAM(s) Oral <User Schedule>  ofloxacin 0.3% Solution 1 Drop(s) Right EYE four times a day  petrolatum Ophthalmic Ointment 1 Application(s) Both EYES every 8 hours  QUEtiapine 50 milliGRAM(s) Oral <User Schedule>  QUEtiapine 75 milliGRAM(s) Oral <User Schedule>  senna 2 Tablet(s) Oral at bedtime  sodium chloride 1 Gram(s) Oral every 6 hours    MEDICATIONS  (PRN):  dextrose 40% Gel 15 Gram(s) Oral once PRN Blood Glucose LESS THAN 70 milliGRAM(s)/deciliter  glucagon  Injectable 1 milliGRAM(s) IntraMuscular once PRN Glucose LESS THAN 70 milligrams/deciliter  LORazepam     Tablet 1 milliGRAM(s) Oral every 8 hours PRN Agitation  polyethylene glycol 3350 17 Gram(s) Oral daily PRN Constipation      LABS:                        11.7   6.15  )-----------( 236      ( 08 Oct 2020 07:10 )             37.1     10-08    134<L>  |  99  |  19  ----------------------------<  122<H>  4.2   |  22  |  1.00    Ca    9.8      08 Oct 2020 07:10              CAPILLARY BLOOD GLUCOSE    MICROBIOLOGY:     RADIOLOGY:  [ ] Reviewed and interpreted by me

## 2020-10-09 LAB
ALBUMIN SERPL ELPH-MCNC: 4 G/DL — SIGNIFICANT CHANGE UP (ref 3.3–5)
ALP SERPL-CCNC: 86 U/L — SIGNIFICANT CHANGE UP (ref 40–120)
ALT FLD-CCNC: 19 U/L — SIGNIFICANT CHANGE UP (ref 10–45)
ANION GAP SERPL CALC-SCNC: 11 MMOL/L — SIGNIFICANT CHANGE UP (ref 5–17)
AST SERPL-CCNC: 19 U/L — SIGNIFICANT CHANGE UP (ref 10–40)
BILIRUB SERPL-MCNC: 0.3 MG/DL — SIGNIFICANT CHANGE UP (ref 0.2–1.2)
BUN SERPL-MCNC: 20 MG/DL — SIGNIFICANT CHANGE UP (ref 7–23)
CALCIUM SERPL-MCNC: 9.6 MG/DL — SIGNIFICANT CHANGE UP (ref 8.4–10.5)
CHLORIDE SERPL-SCNC: 100 MMOL/L — SIGNIFICANT CHANGE UP (ref 96–108)
CO2 SERPL-SCNC: 25 MMOL/L — SIGNIFICANT CHANGE UP (ref 22–31)
CREAT SERPL-MCNC: 1.12 MG/DL — SIGNIFICANT CHANGE UP (ref 0.5–1.3)
GLUCOSE SERPL-MCNC: 84 MG/DL — SIGNIFICANT CHANGE UP (ref 70–99)
HCT VFR BLD CALC: 37.2 % — LOW (ref 39–50)
HGB BLD-MCNC: 11.9 G/DL — LOW (ref 13–17)
MCHC RBC-ENTMCNC: 28.1 PG — SIGNIFICANT CHANGE UP (ref 27–34)
MCHC RBC-ENTMCNC: 32 GM/DL — SIGNIFICANT CHANGE UP (ref 32–36)
MCV RBC AUTO: 87.7 FL — SIGNIFICANT CHANGE UP (ref 80–100)
NRBC # BLD: 0 /100 WBCS — SIGNIFICANT CHANGE UP (ref 0–0)
PLATELET # BLD AUTO: 225 K/UL — SIGNIFICANT CHANGE UP (ref 150–400)
POTASSIUM SERPL-MCNC: 3.9 MMOL/L — SIGNIFICANT CHANGE UP (ref 3.5–5.3)
POTASSIUM SERPL-SCNC: 3.9 MMOL/L — SIGNIFICANT CHANGE UP (ref 3.5–5.3)
PROT SERPL-MCNC: 7.4 G/DL — SIGNIFICANT CHANGE UP (ref 6–8.3)
RBC # BLD: 4.24 M/UL — SIGNIFICANT CHANGE UP (ref 4.2–5.8)
RBC # FLD: 16.1 % — HIGH (ref 10.3–14.5)
SODIUM SERPL-SCNC: 136 MMOL/L — SIGNIFICANT CHANGE UP (ref 135–145)
WBC # BLD: 6.23 K/UL — SIGNIFICANT CHANGE UP (ref 3.8–10.5)
WBC # FLD AUTO: 6.23 K/UL — SIGNIFICANT CHANGE UP (ref 3.8–10.5)

## 2020-10-09 PROCEDURE — 99233 SBSQ HOSP IP/OBS HIGH 50: CPT | Mod: GC

## 2020-10-09 RX ORDER — METOPROLOL TARTRATE 50 MG
12.5 TABLET ORAL ONCE
Refills: 0 | Status: COMPLETED | OUTPATIENT
Start: 2020-10-09 | End: 2020-10-09

## 2020-10-09 RX ADMIN — CHLORHEXIDINE GLUCONATE 1 APPLICATION(S): 213 SOLUTION TOPICAL at 05:55

## 2020-10-09 RX ADMIN — Medication 3 MILLILITER(S): at 00:12

## 2020-10-09 RX ADMIN — Medication 10 MILLIGRAM(S): at 21:32

## 2020-10-09 RX ADMIN — Medication 100 MILLIGRAM(S): at 05:54

## 2020-10-09 RX ADMIN — SODIUM CHLORIDE 1 GRAM(S): 9 INJECTION INTRAMUSCULAR; INTRAVENOUS; SUBCUTANEOUS at 11:51

## 2020-10-09 RX ADMIN — QUETIAPINE FUMARATE 50 MILLIGRAM(S): 200 TABLET, FILM COATED ORAL at 14:10

## 2020-10-09 RX ADMIN — Medication 1 TABLET(S): at 05:54

## 2020-10-09 RX ADMIN — Medication 1 DROP(S): at 00:23

## 2020-10-09 RX ADMIN — Medication 3 MILLILITER(S): at 17:28

## 2020-10-09 RX ADMIN — HEPARIN SODIUM 5000 UNIT(S): 5000 INJECTION INTRAVENOUS; SUBCUTANEOUS at 21:31

## 2020-10-09 RX ADMIN — Medication 100 MILLIGRAM(S): at 21:32

## 2020-10-09 RX ADMIN — Medication 12.5 MILLIGRAM(S): at 08:32

## 2020-10-09 RX ADMIN — LIDOCAINE 1 PATCH: 4 CREAM TOPICAL at 08:03

## 2020-10-09 RX ADMIN — SODIUM CHLORIDE 1 GRAM(S): 9 INJECTION INTRAMUSCULAR; INTRAVENOUS; SUBCUTANEOUS at 23:54

## 2020-10-09 RX ADMIN — Medication 1 DROP(S): at 11:52

## 2020-10-09 RX ADMIN — Medication 1 APPLICATION(S): at 21:33

## 2020-10-09 RX ADMIN — ISOSORBIDE DINITRATE 20 MILLIGRAM(S): 5 TABLET ORAL at 08:32

## 2020-10-09 RX ADMIN — SODIUM CHLORIDE 1 GRAM(S): 9 INJECTION INTRAMUSCULAR; INTRAVENOUS; SUBCUTANEOUS at 17:08

## 2020-10-09 RX ADMIN — LIDOCAINE 1 PATCH: 4 CREAM TOPICAL at 20:15

## 2020-10-09 RX ADMIN — Medication 12.5 MILLIGRAM(S): at 03:28

## 2020-10-09 RX ADMIN — Medication 100 MILLIGRAM(S): at 14:10

## 2020-10-09 RX ADMIN — Medication 12.5 MILLIGRAM(S): at 20:16

## 2020-10-09 RX ADMIN — Medication 1 APPLICATION(S): at 13:40

## 2020-10-09 RX ADMIN — Medication 81 MILLIGRAM(S): at 11:51

## 2020-10-09 RX ADMIN — FAMOTIDINE 20 MILLIGRAM(S): 10 INJECTION INTRAVENOUS at 11:51

## 2020-10-09 RX ADMIN — ISOSORBIDE DINITRATE 20 MILLIGRAM(S): 5 TABLET ORAL at 23:59

## 2020-10-09 RX ADMIN — Medication 100 MILLIGRAM(S): at 08:32

## 2020-10-09 RX ADMIN — Medication 3 MILLILITER(S): at 23:25

## 2020-10-09 RX ADMIN — Medication 1 DROP(S): at 05:55

## 2020-10-09 RX ADMIN — LIDOCAINE 1 PATCH: 4 CREAM TOPICAL at 10:03

## 2020-10-09 RX ADMIN — SODIUM CHLORIDE 1 GRAM(S): 9 INJECTION INTRAMUSCULAR; INTRAVENOUS; SUBCUTANEOUS at 05:57

## 2020-10-09 RX ADMIN — AMLODIPINE BESYLATE 10 MILLIGRAM(S): 2.5 TABLET ORAL at 05:54

## 2020-10-09 RX ADMIN — HEPARIN SODIUM 5000 UNIT(S): 5000 INJECTION INTRAVENOUS; SUBCUTANEOUS at 05:54

## 2020-10-09 RX ADMIN — LIDOCAINE 1 PATCH: 4 CREAM TOPICAL at 00:25

## 2020-10-09 RX ADMIN — Medication 3 MILLILITER(S): at 11:46

## 2020-10-09 RX ADMIN — Medication 8 MILLIGRAM(S): at 21:32

## 2020-10-09 RX ADMIN — ATORVASTATIN CALCIUM 80 MILLIGRAM(S): 80 TABLET, FILM COATED ORAL at 21:32

## 2020-10-09 RX ADMIN — Medication 1 DROP(S): at 17:08

## 2020-10-09 RX ADMIN — Medication 3 MILLILITER(S): at 05:51

## 2020-10-09 RX ADMIN — HEPARIN SODIUM 5000 UNIT(S): 5000 INJECTION INTRAVENOUS; SUBCUTANEOUS at 13:40

## 2020-10-09 RX ADMIN — SODIUM CHLORIDE 1 GRAM(S): 9 INJECTION INTRAMUSCULAR; INTRAVENOUS; SUBCUTANEOUS at 00:23

## 2020-10-09 RX ADMIN — QUETIAPINE FUMARATE 75 MILLIGRAM(S): 200 TABLET, FILM COATED ORAL at 21:32

## 2020-10-09 RX ADMIN — SENNA PLUS 2 TABLET(S): 8.6 TABLET ORAL at 21:32

## 2020-10-09 RX ADMIN — Medication 1 TABLET(S): at 17:08

## 2020-10-09 RX ADMIN — ISOSORBIDE DINITRATE 20 MILLIGRAM(S): 5 TABLET ORAL at 17:08

## 2020-10-09 RX ADMIN — Medication 1 APPLICATION(S): at 05:57

## 2020-10-09 RX ADMIN — QUETIAPINE FUMARATE 50 MILLIGRAM(S): 200 TABLET, FILM COATED ORAL at 05:54

## 2020-10-09 RX ADMIN — ISOSORBIDE DINITRATE 20 MILLIGRAM(S): 5 TABLET ORAL at 00:24

## 2020-10-09 RX ADMIN — Medication 100 MILLIGRAM(S): at 11:51

## 2020-10-09 NOTE — PROGRESS NOTE ADULT - ASSESSMENT
Assessment and Recommendations:  60y male w/ pmhx/ochx of A-Fib on warfarin who presents with cardiac arrest at work with downtime of about 25 minutes prior to ROSC. S/p therapeutic hypothermia. 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 Now with resolved neurogenic/cardiogenic shock. Course complicated by GI bleed s/p PPI gtt, bleeding from scott s/p clot irrigation, prolonged respiratory failure s/p trach (8/24) and PEG (8/26). Currently with A-Fib with RVR and C diff colitis. Ophtho recalled as patient reported eye irritation of the right eye, found to have small surface abrasions    1. Corneal Abrasion of R eye  - small linear abrasions notably improved from last week  - continue ofloxacin antibiotic eye drop four times a day to R eye  - continue erythromycin ophthalmic ointment three times a day to R eye   - recommend follow-up in 1 week in symptoms return, otherwise in 7-10 days may transition to artificial tears four times a day to both eyes, and artificial tear ointment twice a day    2. 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  Anisocoria and dilated pupil OD has been present since admission.  Unable to assess ptosis or EOMs until recently as pt was intubated/sedated and poor mental status.    - imaging reviewed with Dr. Nisha Campbell, neuro-radiology, and findings from recent events involve the cerebral peduncle may explain findings of the right eye.  No evidence of PCOM aneurysm or mass on multiple angiograms.  - Findings discussed with pt and primary team    3. 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  Pt also had cardiac arrest and may have had a PION that may also be contributing to the decreased vision OU.    Findings and plan discussed with patient and primary team  Guarded prognosis for visual recovery  Previously discussed with neuro-ophtho attending, Dr. Coto who agrees with outpatient follow-up.      Outpatient follow-up: Patient should follow-up with his/her ophthalmologist or with [facility] Department of Ophthalmology within 1 week of after discharge at:    [address]  [place] [address]  [phone]    Oscar Spann MD, PGY-III  Pager: [phone]/[facility]: [id number]  Also available on [employer] Teams

## 2020-10-09 NOTE — PROGRESS NOTE ADULT - SUBJECTIVE AND OBJECTIVE BOX
[facility] DEPARTMENT OF OPHTHALMOLOGY  ------------------------------------------------------------------------------  Oscar Spann MD PGY-III  Pager: [phone]/[facility]: [id number]  ------------------------------------------------------------------------------    Interval History: Reports improved pain/fbs, and vision of both eyes.    MEDICATIONS  (STANDING):  albuterol/ipratropium for Nebulization. 3 milliLiter(s) Nebulizer every 6 hours  amantadine Syrup 100 milliGRAM(s) Oral <User Schedule>  amLODIPine   Tablet 10 milliGRAM(s) Oral daily  artificial  tears Solution 1 Drop(s) Both EYES every 6 hours  aspirin  chewable 81 milliGRAM(s) Enteral Tube daily  atorvastatin 80 milliGRAM(s) Oral at bedtime  bisacodyl Suppository 10 milliGRAM(s) Rectal at bedtime  chlorhexidine 4% Liquid 1 Application(s) Topical <User Schedule>  dextrose 5%. 1000 milliLiter(s) (50 mL/Hr) IV Continuous <Continuous>  dextrose 50% Injectable 12.5 Gram(s) IV Push once  dextrose 50% Injectable 25 Gram(s) IV Push once  dextrose 50% Injectable 25 Gram(s) IV Push once  doxazosin 8 milliGRAM(s) Oral at bedtime  erythromycin   Ointment 1 Application(s) Right EYE every 8 hours  famotidine    Tablet 20 milliGRAM(s) Oral daily  heparin   Injectable 5000 Unit(s) SubCutaneous every 8 hours  hydrALAZINE 100 milliGRAM(s) Oral three times a day  isosorbide   dinitrate Tablet (ISORDIL) 20 milliGRAM(s) Enteral Tube <User Schedule>  lactobacillus acidophilus 1 Tablet(s) Oral two times a day  lidocaine   Patch 1 Patch Transdermal every 24 hours  lidocaine   Patch 1 Patch Transdermal daily  metoprolol tartrate 12.5 milliGRAM(s) Oral <User Schedule>  ofloxacin 0.3% Solution 1 Drop(s) Right EYE four times a day  petrolatum Ophthalmic Ointment 1 Application(s) Both EYES every 8 hours  QUEtiapine 50 milliGRAM(s) Oral <User Schedule>  QUEtiapine 75 milliGRAM(s) Oral <User Schedule>  senna 2 Tablet(s) Oral at bedtime  sodium chloride 1 Gram(s) Oral every 6 hours    MEDICATIONS  (PRN):  dextrose 40% Gel 15 Gram(s) Oral once PRN Blood Glucose LESS THAN 70 milliGRAM(s)/deciliter  glucagon  Injectable 1 milliGRAM(s) IntraMuscular once PRN Glucose LESS THAN 70 milligrams/deciliter  LORazepam     Tablet 1 milliGRAM(s) Oral every 8 hours PRN Agitation  polyethylene glycol 3350 17 Gram(s) Oral daily PRN Constipation      VITALS: T(C): 36.6 (10-09-20 @ 04:29)  T(F): 97.8 (10-09-20 @ 04:29), Max: 98.2 (10-08-20 @ 14:00)  HR: 75 (10-09-20 @ 08:29) (57 - 135)  BP: 122/77 (10-09-20 @ 08:29) (122/77 - 160/80)  RR:  (18 - 20)  SpO2:  (96% - 100%)  Wt(kg): --  General: AAO x 3, appropriate mood and affect    Ophthalmology Exam:  Visual acuity (sc): CF OU  Pupils: R pupil 4mm in dark, 4mm in light; L pupil 3mm in dark, 2.5mm in light  Ttono: 14 OD 18 OS  Extraocular movements (EOMs): intact abduction, 50% adduction, infraduction, and supraduction OD full OS  Confrontational Visual Field (CVF): left hemianopsia to hand motion OU  Color Plates: AVILA 2/2 vision OU    Pen Light Exam (PLE)  External: Flat OU  Lids/Lashes/Lacrimal Ducts: Flat OU    Sclera/Conjunctiva: W+Q OU  Cornea: improvement of 3 linear vertical epithelial defects, now <0.5mm each OD Cl OS  Anterior Chamber: D+F OU    Iris: Flat OU  Lens:  NS OU

## 2020-10-09 NOTE — PROGRESS NOTE ADULT - SUBJECTIVE AND OBJECTIVE BOX
Authored by Flora Gao MD, PGY1  PATIENT:  GUILLE DOLAN  [id number]    CHIEF COMPLAINT:  Patient is a 60y old  Male who presents with a chief complaint of cardiac arrest (08 Oct 2020 15:06)      INTERVAL HISTORY OVERNIGHT EVENTS: Patient tachycardic to 130s, metoprolol previously held d/t HR 58, metoprolol dose given, pulse decresed to 83. EKG ordered, benign. Patient placed on continuous pulse ox by NF.       REVIEW OF SYSTEMS:    Constitutional:     [ ] negative [ ] fevers [ ] chills [ ] weight loss [ ] weight gain  HEENT:                  [ ] negative [ ] dry eyes [ ] eye irritation [ ] postnasal drip [ ] nasal congestion  CV:                         [ ] negative  [ ] chest pain [ ] orthopnea [ ] palpitations [ ] murmur  Resp:                     [ ] negative [ ] cough [ ] shortness of breath [ ] dyspnea [ ] wheezing [ ] sputum [ ] hemoptysis  GI:                          [ ] negative [ ] nausea [ ] vomiting [ ] diarrhea [ ] constipation [ ] abd pain [ ] dysphagia   :                        [ ] negative [ ] dysuria [ ] nocturia [ ] hematuria [ ] increased urinary frequency  Musculoskeletal: [ ] negative [ ] back pain [ ] myalgias [ ] arthralgias [ ] fracture  Skin:                       [ ] negative [ ] rash [ ] itch  Neurological:        [ ] negative [ ] headache [ ] dizziness [ ] syncope [ ] weakness [ ] numbness  Psychiatric:           [ ] negative [ ] anxiety [ ] depression  Endocrine:            [ ] negative [ ] diabetes [ ] thyroid problem  Heme/Lymph:      [ ] negative [ ] anemia [ ] bleeding problem  Allergic/Immune: [ ] negative [ ] itchy eyes [ ] nasal discharge [ ] hives [ ] angioedema    [ ] All other systems negative  [ ] Unable to assess ROS because ________.    MEDICATIONS:  MEDICATIONS  (STANDING):  albuterol/ipratropium for Nebulization. 3 milliLiter(s) Nebulizer every 6 hours  amantadine Syrup 100 milliGRAM(s) Oral <User Schedule>  amLODIPine   Tablet 10 milliGRAM(s) Oral daily  artificial  tears Solution 1 Drop(s) Both EYES every 6 hours  aspirin  chewable 81 milliGRAM(s) Enteral Tube daily  atorvastatin 80 milliGRAM(s) Oral at bedtime  bisacodyl Suppository 10 milliGRAM(s) Rectal at bedtime  chlorhexidine 4% Liquid 1 Application(s) Topical <User Schedule>  dextrose 5%. 1000 milliLiter(s) (50 mL/Hr) IV Continuous <Continuous>  dextrose 50% Injectable 12.5 Gram(s) IV Push once  dextrose 50% Injectable 25 Gram(s) IV Push once  dextrose 50% Injectable 25 Gram(s) IV Push once  doxazosin 8 milliGRAM(s) Oral at bedtime  erythromycin   Ointment 1 Application(s) Right EYE every 8 hours  famotidine    Tablet 20 milliGRAM(s) Oral daily  heparin   Injectable 5000 Unit(s) SubCutaneous every 8 hours  hydrALAZINE 100 milliGRAM(s) Oral three times a day  isosorbide   dinitrate Tablet (ISORDIL) 20 milliGRAM(s) Enteral Tube <User Schedule>  lactobacillus acidophilus 1 Tablet(s) Oral two times a day  lidocaine   Patch 1 Patch Transdermal daily  lidocaine   Patch 1 Patch Transdermal every 24 hours  metoprolol tartrate 12.5 milliGRAM(s) Oral <User Schedule>  ofloxacin 0.3% Solution 1 Drop(s) Right EYE four times a day  petrolatum Ophthalmic Ointment 1 Application(s) Both EYES every 8 hours  QUEtiapine 75 milliGRAM(s) Oral <User Schedule>  QUEtiapine 50 milliGRAM(s) Oral <User Schedule>  senna 2 Tablet(s) Oral at bedtime  sodium chloride 1 Gram(s) Oral every 6 hours    MEDICATIONS  (PRN):  dextrose 40% Gel 15 Gram(s) Oral once PRN Blood Glucose LESS THAN 70 milliGRAM(s)/deciliter  glucagon  Injectable 1 milliGRAM(s) IntraMuscular once PRN Glucose LESS THAN 70 milligrams/deciliter  LORazepam     Tablet 1 milliGRAM(s) Oral every 8 hours PRN Agitation  polyethylene glycol 3350 17 Gram(s) Oral daily PRN Constipation      ALLERGIES:  Allergies    No Known Allergies    Intolerances        OBJECTIVE:  ICU Vital Signs Last 24 Hrs  T(C): 36.6 (09 Oct 2020 04:29), Max: 36.8 (08 Oct 2020 14:00)  T(F): 97.8 (09 Oct 2020 04:29), Max: 98.2 (08 Oct 2020 14:00)  HR: 75 (09 Oct 2020 08:29) (57 - 135)  BP: 122/77 (09 Oct 2020 08:29) (122/77 - 160/80)  BP(mean): --  ABP: --  ABP(mean): --  RR: 18 (09 Oct 2020 05:52) (18 - 20)  SpO2: 98% (09 Oct 2020 05:54) (96% - 100%)            CAPILLARY BLOOD GLUCOSE        I&O's Summary    08 Oct 2020 07:01  -  09 Oct 2020 07:00  --------------------------------------------------------  IN: 1980 mL / OUT: 750 mL / NET: 1230 mL      PHYSICAL EXAMINATION:  General: WN/WD NAD  HEENT: Patient c/o b/l vision loss since admission. Tracheostomy: # 6 Cuffless Portex, right eye ptosis, dilated fixed right pupil, right eye with scleral irritation  Neurology: A&Ox3, nonfocal, WEI x 4  Respiratory: CTA B/L, normal respiratory effort. Trach collar.   CV: RRR, S1S2, no murmurs, rubs or gallops  Abdominal: With PEG tube, site clean, dry and intact. Soft, NT, ND +BS.   Extremities: Able to follow commands and respond to questions appropriately; hemiparesis of left upper and lower extremities (improving), 5/5 strength in right upper and lower extremities    LABS:                          11.9   6.23  )-----------( 225      ( 09 Oct 2020 07:29 )             37.2     10-09    136  |  100  |  20  ----------------------------<  84  3.9   |  25  |  1.12    Ca    9.6      09 Oct 2020 07:29    TPro  7.4  /  Alb  4.0  /  TBili  0.3  /  DBili  x   /  AST  19  /  ALT  19  /  AlkPhos  86  10-09    LIVER FUNCTIONS - ( 09 Oct 2020 07:29 )  Alb: 4.0 g/dL / Pro: 7.4 g/dL / ALK PHOS: 86 U/L / ALT: 19 U/L / AST: 19 U/L / GGT: x                       TELEMETRY:     EKG:     IMAGING:       Authored by Flora Gao MD, PGY1  PATIENT:  GUILLE DOLAN  [id number]    CHIEF COMPLAINT:  Patient is a 60y old  Male who presents with a chief complaint of cardiac arrest (08 Oct 2020 15:06)      INTERVAL HISTORY OVERNIGHT EVENTS: Patient tachycardic to 130s, metoprolol previously held d/t HR 58, metoprolol dose given, pulse decresed to 83. EKG ordered, benign. Patient placed on continuous pulse ox by NF. This morning, patient with no complaints.       REVIEW OF SYSTEMS:    Constitutional:     [ ] negative [ ] fevers [ ] chills [ ] weight loss [ ] weight gain  HEENT:                  [ ] negative [ ] dry eyes [ ] eye irritation [ ] postnasal drip [ ] nasal congestion  CV:                         [ ] negative  [ ] chest pain [ ] orthopnea [ ] palpitations [ ] murmur  Resp:                     [ ] negative [ ] cough [ ] shortness of breath [ ] dyspnea [ ] wheezing [ ] sputum [ ] hemoptysis  GI:                          [ ] negative [ ] nausea [ ] vomiting [ ] diarrhea [ ] constipation [ ] abd pain [ ] dysphagia   :                        [ ] negative [ ] dysuria [ ] nocturia [ ] hematuria [ ] increased urinary frequency  Musculoskeletal: [ ] negative [ ] back pain [ ] myalgias [ ] arthralgias [ ] fracture  Skin:                       [ ] negative [ ] rash [ ] itch  Neurological:        [ ] negative [ ] headache [ ] dizziness [ ] syncope [ ] weakness [ ] numbness  Psychiatric:           [ ] negative [ ] anxiety [ ] depression  Endocrine:            [ ] negative [ ] diabetes [ ] thyroid problem  Heme/Lymph:      [ ] negative [ ] anemia [ ] bleeding problem  Allergic/Immune: [ ] negative [ ] itchy eyes [ ] nasal discharge [ ] hives [ ] angioedema    [x] All other systems negative  [ ] Unable to assess ROS because ________.    MEDICATIONS:  MEDICATIONS  (STANDING):  albuterol/ipratropium for Nebulization. 3 milliLiter(s) Nebulizer every 6 hours  amantadine Syrup 100 milliGRAM(s) Oral <User Schedule>  amLODIPine   Tablet 10 milliGRAM(s) Oral daily  artificial  tears Solution 1 Drop(s) Both EYES every 6 hours  aspirin  chewable 81 milliGRAM(s) Enteral Tube daily  atorvastatin 80 milliGRAM(s) Oral at bedtime  bisacodyl Suppository 10 milliGRAM(s) Rectal at bedtime  chlorhexidine 4% Liquid 1 Application(s) Topical <User Schedule>  dextrose 5%. 1000 milliLiter(s) (50 mL/Hr) IV Continuous <Continuous>  dextrose 50% Injectable 12.5 Gram(s) IV Push once  dextrose 50% Injectable 25 Gram(s) IV Push once  dextrose 50% Injectable 25 Gram(s) IV Push once  doxazosin 8 milliGRAM(s) Oral at bedtime  erythromycin   Ointment 1 Application(s) Right EYE every 8 hours  famotidine    Tablet 20 milliGRAM(s) Oral daily  heparin   Injectable 5000 Unit(s) SubCutaneous every 8 hours  hydrALAZINE 100 milliGRAM(s) Oral three times a day  isosorbide   dinitrate Tablet (ISORDIL) 20 milliGRAM(s) Enteral Tube <User Schedule>  lactobacillus acidophilus 1 Tablet(s) Oral two times a day  lidocaine   Patch 1 Patch Transdermal daily  lidocaine   Patch 1 Patch Transdermal every 24 hours  metoprolol tartrate 12.5 milliGRAM(s) Oral <User Schedule>  ofloxacin 0.3% Solution 1 Drop(s) Right EYE four times a day  petrolatum Ophthalmic Ointment 1 Application(s) Both EYES every 8 hours  QUEtiapine 75 milliGRAM(s) Oral <User Schedule>  QUEtiapine 50 milliGRAM(s) Oral <User Schedule>  senna 2 Tablet(s) Oral at bedtime  sodium chloride 1 Gram(s) Oral every 6 hours    MEDICATIONS  (PRN):  dextrose 40% Gel 15 Gram(s) Oral once PRN Blood Glucose LESS THAN 70 milliGRAM(s)/deciliter  glucagon  Injectable 1 milliGRAM(s) IntraMuscular once PRN Glucose LESS THAN 70 milligrams/deciliter  LORazepam     Tablet 1 milliGRAM(s) Oral every 8 hours PRN Agitation  polyethylene glycol 3350 17 Gram(s) Oral daily PRN Constipation      ALLERGIES:  Allergies    No Known Allergies    Intolerances        OBJECTIVE:  ICU Vital Signs Last 24 Hrs  T(C): 36.6 (09 Oct 2020 04:29), Max: 36.8 (08 Oct 2020 14:00)  T(F): 97.8 (09 Oct 2020 04:29), Max: 98.2 (08 Oct 2020 14:00)  HR: 75 (09 Oct 2020 08:29) (57 - 135)  BP: 122/77 (09 Oct 2020 08:29) (122/77 - 160/80)  BP(mean): --  ABP: --  ABP(mean): --  RR: 18 (09 Oct 2020 05:52) (18 - 20)  SpO2: 98% (09 Oct 2020 05:54) (96% - 100%)            CAPILLARY BLOOD GLUCOSE        I&O's Summary    08 Oct 2020 07:01  -  09 Oct 2020 07:00  --------------------------------------------------------  IN: 1980 mL / OUT: 750 mL / NET: 1230 mL      PHYSICAL EXAMINATION:  General: WN/WD NAD  HEENT: Patient c/o b/l vision loss since admission. Tracheostomy: # 6 Cuffless Portex, right eye ptosis, dilated fixed right pupil, right eye with scleral irritation  Neurology: A&Ox3, nonfocal, WEI x 4  Respiratory: CTA B/L, normal respiratory effort. Trach collar.   CV: RRR, S1S2, no murmurs, rubs or gallops  Abdominal: With PEG tube, site clean, dry and intact. Soft, NT, ND +BS.   Extremities: Able to follow commands and respond to questions appropriately; hemiparesis of left upper and lower extremities (improving), 5/5 strength in right upper and lower extremities    LABS:                          11.9   6.23  )-----------( 225      ( 09 Oct 2020 07:29 )             37.2     10-09    136  |  100  |  20  ----------------------------<  84  3.9   |  25  |  1.12    Ca    9.6      09 Oct 2020 07:29    TPro  7.4  /  Alb  4.0  /  TBili  0.3  /  DBili  x   /  AST  19  /  ALT  19  /  AlkPhos  86  10-09    LIVER FUNCTIONS - ( 09 Oct 2020 07:29 )  Alb: 4.0 g/dL / Pro: 7.4 g/dL / ALK PHOS: 86 U/L / ALT: 19 U/L / AST: 19 U/L / GGT: x                       TELEMETRY:     EKG:     IMAGING:

## 2020-10-09 NOTE — PROGRESS NOTE ADULT - PROBLEM SELECTOR PLAN 2
Patient S/p Angio; neg x 2  CT Head: perimesencephalic SAH   CTA Head: no aneurysm noted, VEEG: Negative   MRI neuroaxis: restricted diffusion in R BG, posterior limb of R IC, and anterior right temporal lobe  No Further neurosurgical intervention at this time  Residual left sided deficits, improving. Patient with vision loss, johnnie had seen, recommended inpatient med regimen and outpatient f/u, may touch base again Patient S/p Angio; neg x 2  CT Head: perimesencephalic SAH   CTA Head: no aneurysm noted, VEEG: Negative   MRI neuroaxis: restricted diffusion in R BG, posterior limb of R IC, and anterior right temporal lobe  No Further neurosurgical intervention at this time  Residual left sided deficits, improving. Patient with vision loss, optho to re-evaluate today

## 2020-10-09 NOTE — PROGRESS NOTE ADULT - ASSESSMENT
60-year-old man, only known prior history was Afib on coumadin, presented initially on August 9, 2020 after cardiac arrest. Patient was at work, and had downtime of 25 minutes before ROSC, patient treated with therapeutic hypothermia. Patient with prolonged and complicated course (see below), transferred from RCU to medicine floors with persistent neurogenic bladder, trach collar, stable and awaiting discharge to [facility] pending insurance clearance.   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 Patient's cerebral angiogram on 8/10 negative for aneurysm.   CV: Patient's course c/b neurogenic/cardiogenic shock, now resolved. Patient with afib on coumadin, stopped during hospital stay as patient with SAH. Patient with Afib with RVR,  amiodarone discontinued, now rate controlled on Lopressor. BP also controlled with isordil, hydralazine, and Norvasc.   Respiratory: respiratory failure requiring intubated, now s/p tach (8/24), downsized to #6 cuffless portex.   GI: GI bleed, s/p PPI gtt, no surgical intervention. Patient with PEG tube (8/26), MBS (9/28) revealed aspiration so could no be advanced to PO intake.   : Hematuria from Scott catheter trauma, s/p clot irrigation, now resolved. Neurogenic bladder resulting in urinary retention, still needs straight cath [id number], likely will need to be discharged on scott catheter. Patient on cardura.   ID: Patient had pseudomonas PNA, UTI, Cdiff; now s/p all antibiotic treatment, all resolved.   Psych: Patient with agitation restlessness during admission requiring Seroquel and ativan prn.

## 2020-10-09 NOTE — PROGRESS NOTE ADULT - ATTENDING COMMENTS
sitting in recliner  +trach collar  +PEG  strong cough  good phonation  desire to eat  suggest swallow re-eval to assess safety of oral intake

## 2020-10-10 LAB
ANION GAP SERPL CALC-SCNC: 11 MMOL/L — SIGNIFICANT CHANGE UP (ref 5–17)
BUN SERPL-MCNC: 19 MG/DL — SIGNIFICANT CHANGE UP (ref 7–23)
CALCIUM SERPL-MCNC: 9.6 MG/DL — SIGNIFICANT CHANGE UP (ref 8.4–10.5)
CHLORIDE SERPL-SCNC: 100 MMOL/L — SIGNIFICANT CHANGE UP (ref 96–108)
CO2 SERPL-SCNC: 25 MMOL/L — SIGNIFICANT CHANGE UP (ref 22–31)
CREAT SERPL-MCNC: 1.13 MG/DL — SIGNIFICANT CHANGE UP (ref 0.5–1.3)
GLUCOSE SERPL-MCNC: 85 MG/DL — SIGNIFICANT CHANGE UP (ref 70–99)
HCT VFR BLD CALC: 37.5 % — LOW (ref 39–50)
HGB BLD-MCNC: 12.1 G/DL — LOW (ref 13–17)
MCHC RBC-ENTMCNC: 28.3 PG — SIGNIFICANT CHANGE UP (ref 27–34)
MCHC RBC-ENTMCNC: 32.3 GM/DL — SIGNIFICANT CHANGE UP (ref 32–36)
MCV RBC AUTO: 87.8 FL — SIGNIFICANT CHANGE UP (ref 80–100)
NRBC # BLD: 0 /100 WBCS — SIGNIFICANT CHANGE UP (ref 0–0)
PLATELET # BLD AUTO: 221 K/UL — SIGNIFICANT CHANGE UP (ref 150–400)
POTASSIUM SERPL-MCNC: 4.2 MMOL/L — SIGNIFICANT CHANGE UP (ref 3.5–5.3)
POTASSIUM SERPL-SCNC: 4.2 MMOL/L — SIGNIFICANT CHANGE UP (ref 3.5–5.3)
RBC # BLD: 4.27 M/UL — SIGNIFICANT CHANGE UP (ref 4.2–5.8)
RBC # FLD: 16 % — HIGH (ref 10.3–14.5)
SODIUM SERPL-SCNC: 136 MMOL/L — SIGNIFICANT CHANGE UP (ref 135–145)
WBC # BLD: 6.56 K/UL — SIGNIFICANT CHANGE UP (ref 3.8–10.5)
WBC # FLD AUTO: 6.56 K/UL — SIGNIFICANT CHANGE UP (ref 3.8–10.5)

## 2020-10-10 PROCEDURE — 99233 SBSQ HOSP IP/OBS HIGH 50: CPT | Mod: GC

## 2020-10-10 RX ADMIN — Medication 1 DROP(S): at 12:33

## 2020-10-10 RX ADMIN — Medication 100 MILLIGRAM(S): at 12:33

## 2020-10-10 RX ADMIN — Medication 12.5 MILLIGRAM(S): at 08:26

## 2020-10-10 RX ADMIN — LIDOCAINE 1 PATCH: 4 CREAM TOPICAL at 21:28

## 2020-10-10 RX ADMIN — Medication 1 APPLICATION(S): at 21:15

## 2020-10-10 RX ADMIN — SENNA PLUS 2 TABLET(S): 8.6 TABLET ORAL at 21:15

## 2020-10-10 RX ADMIN — QUETIAPINE FUMARATE 50 MILLIGRAM(S): 200 TABLET, FILM COATED ORAL at 13:40

## 2020-10-10 RX ADMIN — Medication 1 DROP(S): at 17:34

## 2020-10-10 RX ADMIN — HEPARIN SODIUM 5000 UNIT(S): 5000 INJECTION INTRAVENOUS; SUBCUTANEOUS at 13:36

## 2020-10-10 RX ADMIN — Medication 8 MILLIGRAM(S): at 21:11

## 2020-10-10 RX ADMIN — HEPARIN SODIUM 5000 UNIT(S): 5000 INJECTION INTRAVENOUS; SUBCUTANEOUS at 06:10

## 2020-10-10 RX ADMIN — SODIUM CHLORIDE 1 GRAM(S): 9 INJECTION INTRAMUSCULAR; INTRAVENOUS; SUBCUTANEOUS at 06:09

## 2020-10-10 RX ADMIN — SODIUM CHLORIDE 1 GRAM(S): 9 INJECTION INTRAMUSCULAR; INTRAVENOUS; SUBCUTANEOUS at 12:33

## 2020-10-10 RX ADMIN — Medication 100 MILLIGRAM(S): at 08:26

## 2020-10-10 RX ADMIN — HEPARIN SODIUM 5000 UNIT(S): 5000 INJECTION INTRAVENOUS; SUBCUTANEOUS at 21:14

## 2020-10-10 RX ADMIN — Medication 100 MILLIGRAM(S): at 21:12

## 2020-10-10 RX ADMIN — Medication 100 MILLIGRAM(S): at 13:40

## 2020-10-10 RX ADMIN — ISOSORBIDE DINITRATE 20 MILLIGRAM(S): 5 TABLET ORAL at 08:26

## 2020-10-10 RX ADMIN — Medication 1 DROP(S): at 06:10

## 2020-10-10 RX ADMIN — QUETIAPINE FUMARATE 50 MILLIGRAM(S): 200 TABLET, FILM COATED ORAL at 06:09

## 2020-10-10 RX ADMIN — Medication 3 MILLILITER(S): at 23:35

## 2020-10-10 RX ADMIN — FAMOTIDINE 20 MILLIGRAM(S): 10 INJECTION INTRAVENOUS at 12:33

## 2020-10-10 RX ADMIN — ISOSORBIDE DINITRATE 20 MILLIGRAM(S): 5 TABLET ORAL at 17:33

## 2020-10-10 RX ADMIN — Medication 10 MILLIGRAM(S): at 21:15

## 2020-10-10 RX ADMIN — Medication 1 TABLET(S): at 17:34

## 2020-10-10 RX ADMIN — QUETIAPINE FUMARATE 75 MILLIGRAM(S): 200 TABLET, FILM COATED ORAL at 21:11

## 2020-10-10 RX ADMIN — Medication 12.5 MILLIGRAM(S): at 19:55

## 2020-10-10 RX ADMIN — Medication 1 TABLET(S): at 06:09

## 2020-10-10 RX ADMIN — CHLORHEXIDINE GLUCONATE 1 APPLICATION(S): 213 SOLUTION TOPICAL at 06:12

## 2020-10-10 RX ADMIN — AMLODIPINE BESYLATE 10 MILLIGRAM(S): 2.5 TABLET ORAL at 06:09

## 2020-10-10 RX ADMIN — Medication 81 MILLIGRAM(S): at 12:33

## 2020-10-10 RX ADMIN — Medication 1 APPLICATION(S): at 06:11

## 2020-10-10 RX ADMIN — Medication 3 MILLILITER(S): at 11:39

## 2020-10-10 RX ADMIN — Medication 3 MILLILITER(S): at 17:07

## 2020-10-10 RX ADMIN — Medication 3 MILLILITER(S): at 05:35

## 2020-10-10 RX ADMIN — Medication 100 MILLIGRAM(S): at 06:09

## 2020-10-10 RX ADMIN — SODIUM CHLORIDE 1 GRAM(S): 9 INJECTION INTRAMUSCULAR; INTRAVENOUS; SUBCUTANEOUS at 17:35

## 2020-10-10 RX ADMIN — ATORVASTATIN CALCIUM 80 MILLIGRAM(S): 80 TABLET, FILM COATED ORAL at 21:11

## 2020-10-10 NOTE — PROGRESS NOTE ADULT - PROBLEM SELECTOR PLAN 2
Patient S/p Angio; neg x 2  CT Head: perimesencephalic SAH   CTA Head: no aneurysm noted, VEEG: Negative   MRI neuroaxis: restricted diffusion in R BG, posterior limb of R IC, and anterior right temporal lobe  No Further neurosurgical intervention at this time  Residual left sided deficits, improving. Patient with vision loss, appreciate optho recs

## 2020-10-10 NOTE — PROGRESS NOTE ADULT - ASSESSMENT
60-year-old man, only known prior history was Afib on coumadin, presented initially on August 9, 2020 after cardiac arrest. Patient was at work, and had downtime of 25 minutes before ROSC, patient treated with therapeutic hypothermia. Patient with prolonged and complicated course (see below), transferred from RCU to medicine floors with persistent neurogenic bladder, trach collar, stable and awaiting discharge to [facility] pending insurance clearance.   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 Patient's cerebral angiogram on 8/10 negative for aneurysm.   CV: Patient's course c/b neurogenic/cardiogenic shock, now resolved. Patient with afib on coumadin, stopped during hospital stay as patient with SAH. Patient with Afib with RVR,  amiodarone discontinued, now rate controlled on Lopressor. BP also controlled with isordil, hydralazine, and Norvasc.   Respiratory: respiratory failure requiring intubated, now s/p tach (8/24), downsized to #6 cuffless portex.   GI: GI bleed, s/p PPI gtt, no surgical intervention. Patient with PEG tube (8/26), MBS (9/28) revealed aspiration so could no be advanced to PO intake.   : Hematuria from Scott catheter trauma, s/p clot irrigation, now resolved. Neurogenic bladder resulting in urinary retention, still needs straight cath [id number], likely will need to be discharged on scott catheter. Patient on cardura.   ID: Patient had pseudomonas PNA, UTI, Cdiff; now s/p all antibiotic treatment, all resolved.   Psych: Patient with agitation restlessness during admission requiring Seroquel and ativan prn.

## 2020-10-10 NOTE — PROGRESS NOTE ADULT - SUBJECTIVE AND OBJECTIVE BOX
Authored by Flora Gao MD, PGY1  PATIENT:  GUILLE DOLAN  [id number]    CHIEF COMPLAINT:  Patient is a 60y old  Male who presents with a chief complaint of MI (09 Oct 2020 08:36)      INTERVAL HISTORY OVERNIGHT EVENTS: FROILAN overnight.       REVIEW OF SYSTEMS:    Constitutional:     [ ] negative [ ] fevers [ ] chills [ ] weight loss [ ] weight gain  HEENT:                  [ ] negative [ ] dry eyes [ ] eye irritation [ ] postnasal drip [ ] nasal congestion  CV:                         [ ] negative  [ ] chest pain [ ] orthopnea [ ] palpitations [ ] murmur  Resp:                     [ ] negative [ ] cough [ ] shortness of breath [ ] dyspnea [ ] wheezing [ ] sputum [ ] hemoptysis  GI:                          [ ] negative [ ] nausea [ ] vomiting [ ] diarrhea [ ] constipation [ ] abd pain [ ] dysphagia   :                        [ ] negative [ ] dysuria [ ] nocturia [ ] hematuria [ ] increased urinary frequency  Musculoskeletal: [ ] negative [ ] back pain [ ] myalgias [ ] arthralgias [ ] fracture  Skin:                       [ ] negative [ ] rash [ ] itch  Neurological:        [ ] negative [ ] headache [ ] dizziness [ ] syncope [ ] weakness [ ] numbness  Psychiatric:           [ ] negative [ ] anxiety [ ] depression  Endocrine:            [ ] negative [ ] diabetes [ ] thyroid problem  Heme/Lymph:      [ ] negative [ ] anemia [ ] bleeding problem  Allergic/Immune: [ ] negative [ ] itchy eyes [ ] nasal discharge [ ] hives [ ] angioedema    [ ] All other systems negative  [ ] Unable to assess ROS because ________.    MEDICATIONS:  MEDICATIONS  (STANDING):  albuterol/ipratropium for Nebulization. 3 milliLiter(s) Nebulizer every 6 hours  amantadine Syrup 100 milliGRAM(s) Oral <User Schedule>  amLODIPine   Tablet 10 milliGRAM(s) Oral daily  artificial  tears Solution 1 Drop(s) Both EYES every 6 hours  aspirin  chewable 81 milliGRAM(s) Enteral Tube daily  atorvastatin 80 milliGRAM(s) Oral at bedtime  bisacodyl Suppository 10 milliGRAM(s) Rectal at bedtime  chlorhexidine 4% Liquid 1 Application(s) Topical <User Schedule>  dextrose 5%. 1000 milliLiter(s) (50 mL/Hr) IV Continuous <Continuous>  dextrose 50% Injectable 12.5 Gram(s) IV Push once  dextrose 50% Injectable 25 Gram(s) IV Push once  dextrose 50% Injectable 25 Gram(s) IV Push once  doxazosin 8 milliGRAM(s) Oral at bedtime  erythromycin   Ointment 1 Application(s) Right EYE every 8 hours  famotidine    Tablet 20 milliGRAM(s) Oral daily  heparin   Injectable 5000 Unit(s) SubCutaneous every 8 hours  hydrALAZINE 100 milliGRAM(s) Oral three times a day  isosorbide   dinitrate Tablet (ISORDIL) 20 milliGRAM(s) Enteral Tube <User Schedule>  lactobacillus acidophilus 1 Tablet(s) Oral two times a day  lidocaine   Patch 1 Patch Transdermal every 24 hours  lidocaine   Patch 1 Patch Transdermal daily  metoprolol tartrate 12.5 milliGRAM(s) Oral <User Schedule>  ofloxacin 0.3% Solution 1 Drop(s) Right EYE four times a day  petrolatum Ophthalmic Ointment 1 Application(s) Both EYES every 8 hours  QUEtiapine 50 milliGRAM(s) Oral <User Schedule>  QUEtiapine 75 milliGRAM(s) Oral <User Schedule>  senna 2 Tablet(s) Oral at bedtime  sodium chloride 1 Gram(s) Oral every 6 hours    MEDICATIONS  (PRN):  dextrose 40% Gel 15 Gram(s) Oral once PRN Blood Glucose LESS THAN 70 milliGRAM(s)/deciliter  glucagon  Injectable 1 milliGRAM(s) IntraMuscular once PRN Glucose LESS THAN 70 milligrams/deciliter  LORazepam     Tablet 1 milliGRAM(s) Oral every 8 hours PRN Agitation  polyethylene glycol 3350 17 Gram(s) Oral daily PRN Constipation      ALLERGIES:  Allergies    No Known Allergies    Intolerances        OBJECTIVE:  ICU Vital Signs Last 24 Hrs  T(C): 36.9 (10 Oct 2020 05:04), Max: 36.9 (09 Oct 2020 13:30)  T(F): 98.5 (10 Oct 2020 05:04), Max: 98.5 (09 Oct 2020 13:30)  HR: 86 (10 Oct 2020 05:36) (63 - 88)  BP: 140/90 (10 Oct 2020 05:04) (123/89 - 158/99)  BP(mean): --  ABP: --  ABP(mean): --  RR: 18 (10 Oct 2020 05:04) (18 - 20)  SpO2: 100% (10 Oct 2020 05:36) (95% - 100%)          CAPILLARY BLOOD GLUCOSE        I&O's Summary    09 Oct 2020 07:01  -  10 Oct 2020 07:00  --------------------------------------------------------  IN: 2265 mL / OUT: 1950 mL / NET: 315 mL    PHYSICAL EXAMINATION:  General: WN/WD NAD  HEENT: Patient c/o b/l vision loss since admission. Tracheostomy: # 6 Cuffless Portex, right eye ptosis, dilated fixed right pupil, right eye with scleral irritation  Neurology: A&Ox3, nonfocal, WEI x 4  Respiratory: CTA B/L, normal respiratory effort. Trach collar.   CV: RRR, S1S2, no murmurs, rubs or gallops  Abdominal: With PEG tube, site clean, dry and intact. Soft, NT, ND +BS.   Extremities: Able to follow commands and respond to questions appropriately; hemiparesis of left upper and lower extremities (improving), 5/5 strength in right upper and lower extremities    LABS:                          12.1   6.56  )-----------( 221      ( 10 Oct 2020 06:50 )             37.5     10-10    136  |  100  |  19  ----------------------------<  85  4.2   |  25  |  1.13    Ca    9.6      10 Oct 2020 06:50    TPro  7.4  /  Alb  4.0  /  TBili  0.3  /  DBili  x   /  AST  19  /  ALT  19  /  AlkPhos  86  10-09    LIVER FUNCTIONS - ( 09 Oct 2020 07:29 )  Alb: 4.0 g/dL / Pro: 7.4 g/dL / ALK PHOS: 86 U/L / ALT: 19 U/L / AST: 19 U/L / GGT: x                       TELEMETRY:     EKG:     IMAGING:       Authored by Flora Gao MD, PGY1  PATIENT:  GUILLE DOLAN  [id number]    CHIEF COMPLAINT:  Patient is a 60y old  Male who presents with a chief complaint of MI (09 Oct 2020 08:36)      INTERVAL HISTORY OVERNIGHT EVENTS: FROILAN overnight. Patient has no acute complaints. No plans for tach decanulation, S&S will do MBS Monday.       REVIEW OF SYSTEMS:    Constitutional:     [ ] negative [ ] fevers [ ] chills [ ] weight loss [ ] weight gain  HEENT:                  [ ] negative [ ] dry eyes [ ] eye irritation [ ] postnasal drip [ ] nasal congestion  CV:                         [ ] negative  [ ] chest pain [ ] orthopnea [ ] palpitations [ ] murmur  Resp:                     [ ] negative [ ] cough [ ] shortness of breath [ ] dyspnea [ ] wheezing [ ] sputum [ ] hemoptysis  GI:                          [ ] negative [ ] nausea [ ] vomiting [ ] diarrhea [ ] constipation [ ] abd pain [ ] dysphagia   :                        [ ] negative [ ] dysuria [ ] nocturia [ ] hematuria [ ] increased urinary frequency  Musculoskeletal: [ ] negative [ ] back pain [ ] myalgias [ ] arthralgias [ ] fracture  Skin:                       [ ] negative [ ] rash [ ] itch  Neurological:        [ ] negative [ ] headache [ ] dizziness [ ] syncope [ ] weakness [ ] numbness  Psychiatric:           [ ] negative [ ] anxiety [ ] depression  Endocrine:            [ ] negative [ ] diabetes [ ] thyroid problem  Heme/Lymph:      [ ] negative [ ] anemia [ ] bleeding problem  Allergic/Immune: [ ] negative [ ] itchy eyes [ ] nasal discharge [ ] hives [ ] angioedema    [ x] All other systems negative  [ ] Unable to assess ROS because ________.    MEDICATIONS:  MEDICATIONS  (STANDING):  albuterol/ipratropium for Nebulization. 3 milliLiter(s) Nebulizer every 6 hours  amantadine Syrup 100 milliGRAM(s) Oral <User Schedule>  amLODIPine   Tablet 10 milliGRAM(s) Oral daily  artificial  tears Solution 1 Drop(s) Both EYES every 6 hours  aspirin  chewable 81 milliGRAM(s) Enteral Tube daily  atorvastatin 80 milliGRAM(s) Oral at bedtime  bisacodyl Suppository 10 milliGRAM(s) Rectal at bedtime  chlorhexidine 4% Liquid 1 Application(s) Topical <User Schedule>  dextrose 5%. 1000 milliLiter(s) (50 mL/Hr) IV Continuous <Continuous>  dextrose 50% Injectable 12.5 Gram(s) IV Push once  dextrose 50% Injectable 25 Gram(s) IV Push once  dextrose 50% Injectable 25 Gram(s) IV Push once  doxazosin 8 milliGRAM(s) Oral at bedtime  erythromycin   Ointment 1 Application(s) Right EYE every 8 hours  famotidine    Tablet 20 milliGRAM(s) Oral daily  heparin   Injectable 5000 Unit(s) SubCutaneous every 8 hours  hydrALAZINE 100 milliGRAM(s) Oral three times a day  isosorbide   dinitrate Tablet (ISORDIL) 20 milliGRAM(s) Enteral Tube <User Schedule>  lactobacillus acidophilus 1 Tablet(s) Oral two times a day  lidocaine   Patch 1 Patch Transdermal every 24 hours  lidocaine   Patch 1 Patch Transdermal daily  metoprolol tartrate 12.5 milliGRAM(s) Oral <User Schedule>  ofloxacin 0.3% Solution 1 Drop(s) Right EYE four times a day  petrolatum Ophthalmic Ointment 1 Application(s) Both EYES every 8 hours  QUEtiapine 50 milliGRAM(s) Oral <User Schedule>  QUEtiapine 75 milliGRAM(s) Oral <User Schedule>  senna 2 Tablet(s) Oral at bedtime  sodium chloride 1 Gram(s) Oral every 6 hours    MEDICATIONS  (PRN):  dextrose 40% Gel 15 Gram(s) Oral once PRN Blood Glucose LESS THAN 70 milliGRAM(s)/deciliter  glucagon  Injectable 1 milliGRAM(s) IntraMuscular once PRN Glucose LESS THAN 70 milligrams/deciliter  LORazepam     Tablet 1 milliGRAM(s) Oral every 8 hours PRN Agitation  polyethylene glycol 3350 17 Gram(s) Oral daily PRN Constipation      ALLERGIES:  Allergies    No Known Allergies    Intolerances        OBJECTIVE:  ICU Vital Signs Last 24 Hrs  T(C): 36.9 (10 Oct 2020 05:04), Max: 36.9 (09 Oct 2020 13:30)  T(F): 98.5 (10 Oct 2020 05:04), Max: 98.5 (09 Oct 2020 13:30)  HR: 86 (10 Oct 2020 05:36) (63 - 88)  BP: 140/90 (10 Oct 2020 05:04) (123/89 - 158/99)  BP(mean): --  ABP: --  ABP(mean): --  RR: 18 (10 Oct 2020 05:04) (18 - 20)  SpO2: 100% (10 Oct 2020 05:36) (95% - 100%)          CAPILLARY BLOOD GLUCOSE        I&O's Summary    09 Oct 2020 07:01  -  10 Oct 2020 07:00  --------------------------------------------------------  IN: 2265 mL / OUT: 1950 mL / NET: 315 mL    PHYSICAL EXAMINATION:  General: WN/WD NAD  HEENT: Patient c/o b/l vision loss since admission. Tracheostomy: # 6 Cuffless Portex, right eye ptosis, dilated fixed right pupil, right eye with scleral irritation  Neurology: A&Ox3, nonfocal, WEI x 4  Respiratory: CTA B/L, normal respiratory effort. Trach collar.   CV: RRR, S1S2, no murmurs, rubs or gallops  Abdominal: With PEG tube, site clean, dry and intact. Soft, NT, ND +BS.   Extremities: Able to follow commands and respond to questions appropriately; hemiparesis of left upper and lower extremities (improving), 5/5 strength in right upper and lower extremities    LABS:                          12.1   6.56  )-----------( 221      ( 10 Oct 2020 06:50 )             37.5     10-10    136  |  100  |  19  ----------------------------<  85  4.2   |  25  |  1.13    Ca    9.6      10 Oct 2020 06:50    TPro  7.4  /  Alb  4.0  /  TBili  0.3  /  DBili  x   /  AST  19  /  ALT  19  /  AlkPhos  86  10-09    LIVER FUNCTIONS - ( 09 Oct 2020 07:29 )  Alb: 4.0 g/dL / Pro: 7.4 g/dL / ALK PHOS: 86 U/L / ALT: 19 U/L / AST: 19 U/L / GGT: x                       TELEMETRY:     EKG:     IMAGING:

## 2020-10-10 NOTE — PROGRESS NOTE ADULT - PROBLEM SELECTOR PLAN 1
Patient S/p Tracheostomy on 8/24 with ENT  Tolerating continuous trach collar since 8/30  Tracheostomy Downsized to # 6 Cuffless Portex   Tracheoscopy 9/24:  3-4 mm area of likely granulation tissue on proximal left mainstem bronchus without evidence of air way obstruction   -Will discuss with pulm for plans of decanulation. Would like to retrial MBS, which can be done with trach, but patient may have better chance of passing MBS without trach.   Continue Chest PT and Suctioning PRN   Continue PMV; Trials Monitor for [employer] Patient S/p Tracheostomy on 8/24 with ENT  Tolerating continuous trach collar since 8/30  Tracheostomy Downsized to # 6 Cuffless Portex   Tracheoscopy 9/24:  3-4 mm area of likely granulation tissue on proximal left mainstem bronchus without evidence of air way obstruction   -Will discuss with pulm for plans of decanulation. Would like to retrial MBS, which can be done with trach, but patient may have better chance of passing MBS without trach. (Update: no plans for decanulation within the next few days, some granulation tissue, not ready yet)  Continue Chest PT and Suctioning PRN   Continue PMV; Trials Monitor for [employer]

## 2020-10-11 LAB
ANION GAP SERPL CALC-SCNC: 12 MMOL/L — SIGNIFICANT CHANGE UP (ref 5–17)
BUN SERPL-MCNC: 21 MG/DL — SIGNIFICANT CHANGE UP (ref 7–23)
CALCIUM SERPL-MCNC: 10.1 MG/DL — SIGNIFICANT CHANGE UP (ref 8.4–10.5)
CHLORIDE SERPL-SCNC: 101 MMOL/L — SIGNIFICANT CHANGE UP (ref 96–108)
CO2 SERPL-SCNC: 21 MMOL/L — LOW (ref 22–31)
CREAT SERPL-MCNC: 1.08 MG/DL — SIGNIFICANT CHANGE UP (ref 0.5–1.3)
GLUCOSE SERPL-MCNC: 79 MG/DL — SIGNIFICANT CHANGE UP (ref 70–99)
HCT VFR BLD CALC: 37.9 % — LOW (ref 39–50)
HGB BLD-MCNC: 12.2 G/DL — LOW (ref 13–17)
MCHC RBC-ENTMCNC: 28.5 PG — SIGNIFICANT CHANGE UP (ref 27–34)
MCHC RBC-ENTMCNC: 32.2 GM/DL — SIGNIFICANT CHANGE UP (ref 32–36)
MCV RBC AUTO: 88.6 FL — SIGNIFICANT CHANGE UP (ref 80–100)
NRBC # BLD: 0 /100 WBCS — SIGNIFICANT CHANGE UP (ref 0–0)
PLATELET # BLD AUTO: 220 K/UL — SIGNIFICANT CHANGE UP (ref 150–400)
POTASSIUM SERPL-MCNC: 4 MMOL/L — SIGNIFICANT CHANGE UP (ref 3.5–5.3)
POTASSIUM SERPL-SCNC: 4 MMOL/L — SIGNIFICANT CHANGE UP (ref 3.5–5.3)
RBC # BLD: 4.28 M/UL — SIGNIFICANT CHANGE UP (ref 4.2–5.8)
RBC # FLD: 16.1 % — HIGH (ref 10.3–14.5)
SODIUM SERPL-SCNC: 134 MMOL/L — LOW (ref 135–145)
WBC # BLD: 6.09 K/UL — SIGNIFICANT CHANGE UP (ref 3.8–10.5)
WBC # FLD AUTO: 6.09 K/UL — SIGNIFICANT CHANGE UP (ref 3.8–10.5)

## 2020-10-11 PROCEDURE — 99233 SBSQ HOSP IP/OBS HIGH 50: CPT | Mod: GC

## 2020-10-11 RX ADMIN — Medication 1 DROP(S): at 12:58

## 2020-10-11 RX ADMIN — Medication 81 MILLIGRAM(S): at 12:58

## 2020-10-11 RX ADMIN — Medication 1 DROP(S): at 05:51

## 2020-10-11 RX ADMIN — SODIUM CHLORIDE 1 GRAM(S): 9 INJECTION INTRAMUSCULAR; INTRAVENOUS; SUBCUTANEOUS at 00:28

## 2020-10-11 RX ADMIN — Medication 12.5 MILLIGRAM(S): at 07:59

## 2020-10-11 RX ADMIN — SENNA PLUS 2 TABLET(S): 8.6 TABLET ORAL at 21:05

## 2020-10-11 RX ADMIN — Medication 100 MILLIGRAM(S): at 12:58

## 2020-10-11 RX ADMIN — HEPARIN SODIUM 5000 UNIT(S): 5000 INJECTION INTRAVENOUS; SUBCUTANEOUS at 21:08

## 2020-10-11 RX ADMIN — QUETIAPINE FUMARATE 50 MILLIGRAM(S): 200 TABLET, FILM COATED ORAL at 13:29

## 2020-10-11 RX ADMIN — LIDOCAINE 1 PATCH: 4 CREAM TOPICAL at 08:01

## 2020-10-11 RX ADMIN — SODIUM CHLORIDE 1 GRAM(S): 9 INJECTION INTRAMUSCULAR; INTRAVENOUS; SUBCUTANEOUS at 05:59

## 2020-10-11 RX ADMIN — Medication 1 APPLICATION(S): at 21:07

## 2020-10-11 RX ADMIN — Medication 3 MILLILITER(S): at 11:25

## 2020-10-11 RX ADMIN — Medication 100 MILLIGRAM(S): at 13:30

## 2020-10-11 RX ADMIN — Medication 10 MILLIGRAM(S): at 21:07

## 2020-10-11 RX ADMIN — Medication 8 MILLIGRAM(S): at 21:07

## 2020-10-11 RX ADMIN — QUETIAPINE FUMARATE 50 MILLIGRAM(S): 200 TABLET, FILM COATED ORAL at 05:50

## 2020-10-11 RX ADMIN — ISOSORBIDE DINITRATE 20 MILLIGRAM(S): 5 TABLET ORAL at 00:27

## 2020-10-11 RX ADMIN — Medication 1 DROP(S): at 00:30

## 2020-10-11 RX ADMIN — SODIUM CHLORIDE 1 GRAM(S): 9 INJECTION INTRAMUSCULAR; INTRAVENOUS; SUBCUTANEOUS at 17:27

## 2020-10-11 RX ADMIN — LIDOCAINE 1 PATCH: 4 CREAM TOPICAL at 21:09

## 2020-10-11 RX ADMIN — Medication 1 APPLICATION(S): at 05:50

## 2020-10-11 RX ADMIN — Medication 3 MILLILITER(S): at 05:33

## 2020-10-11 RX ADMIN — ISOSORBIDE DINITRATE 20 MILLIGRAM(S): 5 TABLET ORAL at 07:59

## 2020-10-11 RX ADMIN — AMLODIPINE BESYLATE 10 MILLIGRAM(S): 2.5 TABLET ORAL at 05:50

## 2020-10-11 RX ADMIN — Medication 100 MILLIGRAM(S): at 21:06

## 2020-10-11 RX ADMIN — Medication 3 MILLILITER(S): at 16:35

## 2020-10-11 RX ADMIN — FAMOTIDINE 20 MILLIGRAM(S): 10 INJECTION INTRAVENOUS at 12:59

## 2020-10-11 RX ADMIN — HEPARIN SODIUM 5000 UNIT(S): 5000 INJECTION INTRAVENOUS; SUBCUTANEOUS at 13:26

## 2020-10-11 RX ADMIN — Medication 1 DROP(S): at 17:21

## 2020-10-11 RX ADMIN — Medication 12.5 MILLIGRAM(S): at 19:41

## 2020-10-11 RX ADMIN — Medication 1 TABLET(S): at 17:21

## 2020-10-11 RX ADMIN — ISOSORBIDE DINITRATE 20 MILLIGRAM(S): 5 TABLET ORAL at 18:48

## 2020-10-11 RX ADMIN — QUETIAPINE FUMARATE 75 MILLIGRAM(S): 200 TABLET, FILM COATED ORAL at 21:06

## 2020-10-11 RX ADMIN — Medication 1 TABLET(S): at 05:50

## 2020-10-11 RX ADMIN — Medication 100 MILLIGRAM(S): at 07:59

## 2020-10-11 RX ADMIN — Medication 100 MILLIGRAM(S): at 05:48

## 2020-10-11 RX ADMIN — ATORVASTATIN CALCIUM 80 MILLIGRAM(S): 80 TABLET, FILM COATED ORAL at 21:05

## 2020-10-11 RX ADMIN — CHLORHEXIDINE GLUCONATE 1 APPLICATION(S): 213 SOLUTION TOPICAL at 05:49

## 2020-10-11 RX ADMIN — SODIUM CHLORIDE 1 GRAM(S): 9 INJECTION INTRAMUSCULAR; INTRAVENOUS; SUBCUTANEOUS at 19:26

## 2020-10-11 RX ADMIN — HEPARIN SODIUM 5000 UNIT(S): 5000 INJECTION INTRAVENOUS; SUBCUTANEOUS at 05:49

## 2020-10-11 NOTE — PROGRESS NOTE ADULT - PROBLEM SELECTOR PLAN 1
Patient S/p Tracheostomy on 8/24 with ENT  Tolerating continuous trach collar since 8/30  Tracheostomy Downsized to # 6 Cuffless Portex   Tracheoscopy 9/24:  3-4 mm area of likely granulation tissue on proximal left mainstem bronchus without evidence of air way obstruction   -Will discuss with pulm for plans of decanulation. Would like to retrial MBS, which can be done with trach, but patient may have better chance of passing MBS without trach. (Update: no plans for decanulation within the next few days, some granulation tissue, not ready yet)  Continue Chest PT and Suctioning PRN   Continue PMV; Trials Monitor for [employer] Patient S/p Tracheostomy on 8/24 with ENT  Tolerating continuous trach collar since 8/30  Tracheostomy Downsized to # 6 Cuffless Portex   Tracheoscopy 9/24:  3-4 mm area of likely granulation tissue on proximal left mainstem bronchus without evidence of air way obstruction   -Will discuss with pulm for plans of decanulation. Would like to retrial MBS, which can be done with trach, but patient may have better chance of passing MBS without trach. (Update: no plans for decanulation within the next few days, some granulation tissue, not ready yet)  Continue Chest PT and Suctioning PRN   Continue PMV; Trials Monitor for [employer]  - Tracheostomy tube spontaneously displaced; dressing placed. Continue to monitor vitals q4h

## 2020-10-11 NOTE — PROGRESS NOTE ADULT - PROBLEM SELECTOR PLAN 7
S/p Peg 8/26 by GI   MBS 9/28: Inconsistent Aspiration episodes with varying consistencies   Continue Non-Oral means of Nutrition  -Speech and swallow reconsulted 10/9, recommend MBS, however stated chances of passing study improved if trach decannulated.

## 2020-10-11 NOTE — PROVIDER CONTACT NOTE (OTHER) - BACKGROUND
Admitted for subdural hemorrhage. PMH of afib
Admitted for subdural hemorrhage. PMH of afib
Admitted with non traumatic subdural hemorrhage
Admitted with non traumatic subdural hemorrhage
Cardiac arrest, non-traumatic subdural hemorrhage.
Patient normally in Afib, had cardiac arrest and was down for 25 mins prior to ROSC. CT showed SAH.
Pt s/p cardiac and SAH, elevated WBC,
non traumatic subdural hemorrhage
patient admitted status post ICH
patient admitted with non-traumatic subdural hemorrhage
Patient with a known history of AFIB, S/P SAH

## 2020-10-11 NOTE — PROGRESS NOTE ADULT - SUBJECTIVE AND OBJECTIVE BOX
PROGRESS NOTE:   Authored by Jan Carson MD PGY-2  Pager [phone] [facility], [address] [facility]   Please page [phone] after 7PM    Patient is a 60y old  Male who presents with a chief complaint of MI (10 Oct 2020 08:28)      SUBJECTIVE / OVERNIGHT EVENTS:    ADDITIONAL REVIEW OF SYSTEMS:    MEDICATIONS  (STANDING):  albuterol/ipratropium for Nebulization. 3 milliLiter(s) Nebulizer every 6 hours  amantadine Syrup 100 milliGRAM(s) Oral <User Schedule>  amLODIPine   Tablet 10 milliGRAM(s) Oral daily  artificial  tears Solution 1 Drop(s) Both EYES every 6 hours  aspirin  chewable 81 milliGRAM(s) Enteral Tube daily  atorvastatin 80 milliGRAM(s) Oral at bedtime  bisacodyl Suppository 10 milliGRAM(s) Rectal at bedtime  chlorhexidine 4% Liquid 1 Application(s) Topical <User Schedule>  dextrose 5%. 1000 milliLiter(s) (50 mL/Hr) IV Continuous <Continuous>  dextrose 50% Injectable 12.5 Gram(s) IV Push once  dextrose 50% Injectable 25 Gram(s) IV Push once  dextrose 50% Injectable 25 Gram(s) IV Push once  doxazosin 8 milliGRAM(s) Oral at bedtime  erythromycin   Ointment 1 Application(s) Right EYE every 8 hours  famotidine    Tablet 20 milliGRAM(s) Oral daily  heparin   Injectable 5000 Unit(s) SubCutaneous every 8 hours  hydrALAZINE 100 milliGRAM(s) Oral three times a day  isosorbide   dinitrate Tablet (ISORDIL) 20 milliGRAM(s) Enteral Tube <User Schedule>  lactobacillus acidophilus 1 Tablet(s) Oral two times a day  lidocaine   Patch 1 Patch Transdermal every 24 hours  lidocaine   Patch 1 Patch Transdermal daily  metoprolol tartrate 12.5 milliGRAM(s) Oral <User Schedule>  ofloxacin 0.3% Solution 1 Drop(s) Right EYE four times a day  petrolatum Ophthalmic Ointment 1 Application(s) Both EYES every 8 hours  QUEtiapine 50 milliGRAM(s) Oral <User Schedule>  QUEtiapine 75 milliGRAM(s) Oral <User Schedule>  senna 2 Tablet(s) Oral at bedtime  sodium chloride 1 Gram(s) Oral every 6 hours    MEDICATIONS  (PRN):  dextrose 40% Gel 15 Gram(s) Oral once PRN Blood Glucose LESS THAN 70 milliGRAM(s)/deciliter  glucagon  Injectable 1 milliGRAM(s) IntraMuscular once PRN Glucose LESS THAN 70 milligrams/deciliter  LORazepam     Tablet 1 milliGRAM(s) Oral every 8 hours PRN Agitation  polyethylene glycol 3350 17 Gram(s) Oral daily PRN Constipation      CAPILLARY BLOOD GLUCOSE        I&O's Summary    10 Oct 2020 07:01  -  11 Oct 2020 07:00  --------------------------------------------------------  IN: 1620 mL / OUT: 1400 mL / NET: 220 mL        PHYSICAL EXAM:  Vital Signs Last 24 Hrs  T(C): 36.8 (11 Oct 2020 02:00), Max: 36.9 (10 Oct 2020 20:38)  T(F): 98.2 (11 Oct 2020 02:00), Max: 98.4 (10 Oct 2020 20:38)  HR: 74 (11 Oct 2020 05:35) (64 - 79)  BP: 101/71 (11 Oct 2020 02:00) (101/71 - 150/91)  BP(mean): --  RR: 17 (11 Oct 2020 05:34) (17 - 20)  SpO2: 100% (11 Oct 2020 05:35) (97% - 100%)    CONSTITUTIONAL: NAD, well-developed  RESPIRATORY: Normal respiratory effort; lungs are clear to auscultation bilaterally  CARDIOVASCULAR: Regular rate and rhythm, normal S1 and S2, no murmur/rub/gallop; No lower extremity edema; Peripheral pulses are 2+ bilaterally  ABDOMEN: Nontender to palpation, normoactive bowel sounds, no rebound/guarding  MUSCULOSKELETAL: no clubbing or cyanosis of digits; no joint swelling or tenderness to palpation  PSYCH: A+O to person, place, and time; affect appropriate    LABS:                        12.1   6.56  )-----------( 221      ( 10 Oct 2020 06:50 )             37.5     10-10    136  |  100  |  19  ----------------------------<  85  4.2   |  25  |  1.13    Ca    9.6      10 Oct 2020 06:50    TPro  7.4  /  Alb  4.0  /  TBili  0.3  /  DBili  x   /  AST  19  /  ALT  19  /  AlkPhos  86  10-09                RADIOLOGY & ADDITIONAL TESTS:  Results Reviewed:   Imaging Personally Reviewed:  Electrocardiogram Personally Reviewed:    COORDINATION OF CARE:  Care Discussed with Consultants/Other Providers [Y/N]:  Prior or Outpatient Records Reviewed [Y/N]:   PROGRESS NOTE:   Authored by Jan Carson MD PGY-2  Pager [phone] [facility], [address] [facility]   Please page [phone] after 7PM    Patient is a 60y old  Male who presents with a chief complaint of MI (10 Oct 2020 08:28)      SUBJECTIVE / OVERNIGHT EVENTS:  Patient slept well overnight. Currently denies fever, chills, nausea, vomiting, constipation, and diarrhea.    ADDITIONAL REVIEW OF SYSTEMS:    MEDICATIONS  (STANDING):  albuterol/ipratropium for Nebulization. 3 milliLiter(s) Nebulizer every 6 hours  amantadine Syrup 100 milliGRAM(s) Oral <User Schedule>  amLODIPine   Tablet 10 milliGRAM(s) Oral daily  artificial  tears Solution 1 Drop(s) Both EYES every 6 hours  aspirin  chewable 81 milliGRAM(s) Enteral Tube daily  atorvastatin 80 milliGRAM(s) Oral at bedtime  bisacodyl Suppository 10 milliGRAM(s) Rectal at bedtime  chlorhexidine 4% Liquid 1 Application(s) Topical <User Schedule>  dextrose 5%. 1000 milliLiter(s) (50 mL/Hr) IV Continuous <Continuous>  dextrose 50% Injectable 12.5 Gram(s) IV Push once  dextrose 50% Injectable 25 Gram(s) IV Push once  dextrose 50% Injectable 25 Gram(s) IV Push once  doxazosin 8 milliGRAM(s) Oral at bedtime  erythromycin   Ointment 1 Application(s) Right EYE every 8 hours  famotidine    Tablet 20 milliGRAM(s) Oral daily  heparin   Injectable 5000 Unit(s) SubCutaneous every 8 hours  hydrALAZINE 100 milliGRAM(s) Oral three times a day  isosorbide   dinitrate Tablet (ISORDIL) 20 milliGRAM(s) Enteral Tube <User Schedule>  lactobacillus acidophilus 1 Tablet(s) Oral two times a day  lidocaine   Patch 1 Patch Transdermal every 24 hours  lidocaine   Patch 1 Patch Transdermal daily  metoprolol tartrate 12.5 milliGRAM(s) Oral <User Schedule>  ofloxacin 0.3% Solution 1 Drop(s) Right EYE four times a day  petrolatum Ophthalmic Ointment 1 Application(s) Both EYES every 8 hours  QUEtiapine 50 milliGRAM(s) Oral <User Schedule>  QUEtiapine 75 milliGRAM(s) Oral <User Schedule>  senna 2 Tablet(s) Oral at bedtime  sodium chloride 1 Gram(s) Oral every 6 hours    MEDICATIONS  (PRN):  dextrose 40% Gel 15 Gram(s) Oral once PRN Blood Glucose LESS THAN 70 milliGRAM(s)/deciliter  glucagon  Injectable 1 milliGRAM(s) IntraMuscular once PRN Glucose LESS THAN 70 milligrams/deciliter  LORazepam     Tablet 1 milliGRAM(s) Oral every 8 hours PRN Agitation  polyethylene glycol 3350 17 Gram(s) Oral daily PRN Constipation    I&O's Summary    10 Oct 2020 07:01  -  11 Oct 2020 07:00  --------------------------------------------------------  IN: 1620 mL / OUT: 1400 mL / NET: 220 mL    PHYSICAL EXAM:  Vital Signs Last 24 Hrs  T(C): 36.8 (11 Oct 2020 02:00), Max: 36.9 (10 Oct 2020 20:38)  T(F): 98.2 (11 Oct 2020 02:00), Max: 98.4 (10 Oct 2020 20:38)  HR: 74 (11 Oct 2020 05:35) (64 - 79)  BP: 101/71 (11 Oct 2020 02:00) (101/71 - 150/91)  BP(mean): --  RR: 17 (11 Oct 2020 05:34) (17 - 20)  SpO2: 100% (11 Oct 2020 05:35) (97% - 100%)    General: WN/WD NAD  HEENT: Patient c/o b/l vision loss since admission. Tracheostomy: # 6 Cuffless Portex, right eye ptosis, dilated fixed right pupil, right eye with scleral irritation  Neurology: A&Ox3, nonfocal, WEI x 4  Respiratory: CTA B/L, normal respiratory effort. Trach collar.   CV: RRR, S1S2, no murmurs, rubs or gallops  Abdominal: With PEG tube, site clean, dry and intact. Soft, NT, ND +BS.   Extremities: Able to follow commands and respond to questions appropriately; hemiparesis of left upper and lower extremities (improving), 5/5 strength in right upper and lower extremities    LABS:             12.2   6.09  )-----------( 220      ( 11 Oct 2020 07:00 )             37.9     11 Oct 2020 07:00    134    |  101    |  21     ----------------------------<  79     4.0     |  21     |  1.08     Ca    10.1       11 Oct 2020 07:00      RADIOLOGY & ADDITIONAL TESTS:  Results Reviewed:   Imaging Personally Reviewed:  Electrocardiogram Personally Reviewed:    COORDINATION OF CARE:  Care Discussed with Consultants/Other Providers [Y/N]:  Prior or Outpatient Records Reviewed [Y/N]:

## 2020-10-11 NOTE — PROVIDER CONTACT NOTE (OTHER) - SITUATION
Rounding of patient noted trach out. PARIS Sanders tried inserting another trach in but was not able.

## 2020-10-11 NOTE — PROVIDER CONTACT NOTE (OTHER) - ASSESSMENT
Pt has been urinating via condom catheter and incontinent count throughout shift.
no s/s of acute distress noted. no change in mental status, 02 sats 100%
Awake alert  very restless climbing over side rails  safety measures reinforced
Patient intubated, on Precedex and Fentanyl gtt. MAP >65
Patient right eye is red and swollen. Patient is complaining that he feels like there is something in his eye.
Patient's saturation on room air %.  Patient is on continuous pulse oximetry.
Pt alert; restless & agitated. Confusion noted. PT is hallucinating; grabbing the air & mumbling. PT stated he is in [place] the [place]. VS WDL except pt tachycardic.
Pt alert; restless & agitated. Confusion noted. VS WDL except tachy. hx of afib noted.
Pt awake alert pt is trach  very restless
Pt calm without respiratory distress. Pt rectal temperature 101.2
patient hypotensive and bradycardic. precedex off, fentanyl drip at 1mcg
Patient in no apparent distress. HR fluctuating between high and low. BP/O2 stable

## 2020-10-11 NOTE — CHART NOTE - NSCHARTNOTEFT_GEN_A_CORE
Tracheostomy tube spontaneously displaced. RCU evaluated patient who is phonating well; unable to place a guidewire through the tracheostomy. A dressing was placed over the tracheostomy site. Continue to monitor without replacing tracheostomy tube.    Jan Carson MD  Internal Medicine PGY-2  [phone] / [phone]

## 2020-10-11 NOTE — PROGRESS NOTE ADULT - ASSESSMENT
60-year-old man, only known prior history was Afib on coumadin, presented initially on August 9, 2020 after cardiac arrest. Patient was at work, and had downtime of 25 minutes before ROSC, patient treated with therapeutic hypothermia. Patient with prolonged and complicated course (see below), transferred from RCU to medicine floors with persistent neurogenic bladder, trach collar, stable and awaiting discharge to [facility] pending insurance clearance.   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 Patient's cerebral angiogram on 8/10 negative for aneurysm.   CV: Patient's course c/b neurogenic/cardiogenic shock, now resolved. Patient with afib on coumadin, stopped during hospital stay as patient with SAH. Patient with Afib with RVR,  amiodarone discontinued, now rate controlled on Lopressor. BP also controlled with isordil, hydralazine, and Norvasc.   Respiratory: respiratory failure requiring intubated, now s/p tach (8/24), downsized to #6 cuffless portex.   GI: GI bleed, s/p PPI gtt, no surgical intervention. Patient with PEG tube (8/26), MBS (9/28) revealed aspiration so could no be advanced to PO intake.   : Hematuria from Scott catheter trauma, s/p clot irrigation, now resolved. Neurogenic bladder resulting in urinary retention, still needs straight cath [id number], likely will need to be discharged on scott catheter. Patient on cardura.   ID: Patient had pseudomonas PNA, UTI, Cdiff; now s/p all antibiotic treatment, all resolved.   Psych: Patient with agitation restlessness during admission requiring Seroquel and ativan prn.    60M h/o Afib on coumadin, presented initially on August 9, 2020 after cardiac arrest. Patient was at work, and had downtime of 25 minutes before ROSC, patient treated with therapeutic hypothermia. Patient with prolonged and complicated course (see below), transferred from RCU to medicine floors with persistent neurogenic bladder, trach collar, stable and awaiting discharge to [facility] pending insurance clearance.   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 Patient's cerebral angiogram on 8/10 negative for aneurysm.   CV: Patient's course c/b neurogenic/cardiogenic shock, now resolved. Patient with afib on coumadin, stopped during hospital stay as patient with SAH. Patient with Afib with RVR,  amiodarone discontinued, now rate controlled on Lopressor. BP also controlled with isordil, hydralazine, and Norvasc.   Respiratory: respiratory failure requiring intubated, now s/p tach (8/24), downsized to #6 cuffless portex.   GI: GI bleed, s/p PPI gtt, no surgical intervention. Patient with PEG tube (8/26), MBS (9/28) revealed aspiration so could no be advanced to PO intake.   : Hematuria from Scott catheter trauma, s/p clot irrigation, now resolved. Neurogenic bladder resulting in urinary retention, still needs straight cath [id number], likely will need to be discharged on scott catheter. Patient on cardura.   ID: Patient had pseudomonas PNA, UTI, Cdiff; now s/p all antibiotic treatment, all resolved.   Psych: Patient with agitation restlessness during admission requiring Seroquel and ativan prn.

## 2020-10-12 LAB
ANION GAP SERPL CALC-SCNC: 11 MMOL/L — SIGNIFICANT CHANGE UP (ref 5–17)
BUN SERPL-MCNC: 20 MG/DL — SIGNIFICANT CHANGE UP (ref 7–23)
CALCIUM SERPL-MCNC: 9.6 MG/DL — SIGNIFICANT CHANGE UP (ref 8.4–10.5)
CHLORIDE SERPL-SCNC: 102 MMOL/L — SIGNIFICANT CHANGE UP (ref 96–108)
CO2 SERPL-SCNC: 22 MMOL/L — SIGNIFICANT CHANGE UP (ref 22–31)
CREAT SERPL-MCNC: 1.03 MG/DL — SIGNIFICANT CHANGE UP (ref 0.5–1.3)
GLUCOSE SERPL-MCNC: 109 MG/DL — HIGH (ref 70–99)
HCT VFR BLD CALC: 37.7 % — LOW (ref 39–50)
HGB BLD-MCNC: 12 G/DL — LOW (ref 13–17)
MAGNESIUM SERPL-MCNC: 1.8 MG/DL — SIGNIFICANT CHANGE UP (ref 1.6–2.6)
MCHC RBC-ENTMCNC: 28 PG — SIGNIFICANT CHANGE UP (ref 27–34)
MCHC RBC-ENTMCNC: 31.8 GM/DL — LOW (ref 32–36)
MCV RBC AUTO: 88.1 FL — SIGNIFICANT CHANGE UP (ref 80–100)
NRBC # BLD: 0 /100 WBCS — SIGNIFICANT CHANGE UP (ref 0–0)
PHOSPHATE SERPL-MCNC: 3.5 MG/DL — SIGNIFICANT CHANGE UP (ref 2.5–4.5)
PLATELET # BLD AUTO: 229 K/UL — SIGNIFICANT CHANGE UP (ref 150–400)
POTASSIUM SERPL-MCNC: 3.8 MMOL/L — SIGNIFICANT CHANGE UP (ref 3.5–5.3)
POTASSIUM SERPL-SCNC: 3.8 MMOL/L — SIGNIFICANT CHANGE UP (ref 3.5–5.3)
RBC # BLD: 4.28 M/UL — SIGNIFICANT CHANGE UP (ref 4.2–5.8)
RBC # FLD: 15.9 % — HIGH (ref 10.3–14.5)
SODIUM SERPL-SCNC: 135 MMOL/L — SIGNIFICANT CHANGE UP (ref 135–145)
WBC # BLD: 7.08 K/UL — SIGNIFICANT CHANGE UP (ref 3.8–10.5)
WBC # FLD AUTO: 7.08 K/UL — SIGNIFICANT CHANGE UP (ref 3.8–10.5)

## 2020-10-12 PROCEDURE — 74230 X-RAY XM SWLNG FUNCJ C+: CPT | Mod: 26

## 2020-10-12 PROCEDURE — 99232 SBSQ HOSP IP/OBS MODERATE 35: CPT

## 2020-10-12 PROCEDURE — 99233 SBSQ HOSP IP/OBS HIGH 50: CPT | Mod: GC

## 2020-10-12 RX ADMIN — Medication 100 MILLIGRAM(S): at 05:32

## 2020-10-12 RX ADMIN — Medication 1 DROP(S): at 05:31

## 2020-10-12 RX ADMIN — Medication 10 MILLIGRAM(S): at 21:36

## 2020-10-12 RX ADMIN — LIDOCAINE 1 PATCH: 4 CREAM TOPICAL at 21:37

## 2020-10-12 RX ADMIN — Medication 12.5 MILLIGRAM(S): at 21:36

## 2020-10-12 RX ADMIN — Medication 1 DROP(S): at 12:35

## 2020-10-12 RX ADMIN — Medication 1 TABLET(S): at 05:33

## 2020-10-12 RX ADMIN — Medication 1 APPLICATION(S): at 05:34

## 2020-10-12 RX ADMIN — Medication 100 MILLIGRAM(S): at 12:36

## 2020-10-12 RX ADMIN — ISOSORBIDE DINITRATE 20 MILLIGRAM(S): 5 TABLET ORAL at 15:12

## 2020-10-12 RX ADMIN — Medication 8 MILLIGRAM(S): at 21:37

## 2020-10-12 RX ADMIN — Medication 100 MILLIGRAM(S): at 21:37

## 2020-10-12 RX ADMIN — SENNA PLUS 2 TABLET(S): 8.6 TABLET ORAL at 21:38

## 2020-10-12 RX ADMIN — ATORVASTATIN CALCIUM 80 MILLIGRAM(S): 80 TABLET, FILM COATED ORAL at 21:36

## 2020-10-12 RX ADMIN — SODIUM CHLORIDE 1 GRAM(S): 9 INJECTION INTRAMUSCULAR; INTRAVENOUS; SUBCUTANEOUS at 05:44

## 2020-10-12 RX ADMIN — Medication 81 MILLIGRAM(S): at 12:36

## 2020-10-12 RX ADMIN — CHLORHEXIDINE GLUCONATE 1 APPLICATION(S): 213 SOLUTION TOPICAL at 05:30

## 2020-10-12 RX ADMIN — ISOSORBIDE DINITRATE 20 MILLIGRAM(S): 5 TABLET ORAL at 02:30

## 2020-10-12 RX ADMIN — QUETIAPINE FUMARATE 50 MILLIGRAM(S): 200 TABLET, FILM COATED ORAL at 15:11

## 2020-10-12 RX ADMIN — HEPARIN SODIUM 5000 UNIT(S): 5000 INJECTION INTRAVENOUS; SUBCUTANEOUS at 05:30

## 2020-10-12 RX ADMIN — AMLODIPINE BESYLATE 10 MILLIGRAM(S): 2.5 TABLET ORAL at 05:31

## 2020-10-12 RX ADMIN — LIDOCAINE 1 PATCH: 4 CREAM TOPICAL at 10:00

## 2020-10-12 RX ADMIN — Medication 1 DROP(S): at 17:25

## 2020-10-12 RX ADMIN — Medication 1 APPLICATION(S): at 15:11

## 2020-10-12 RX ADMIN — Medication 100 MILLIGRAM(S): at 15:11

## 2020-10-12 RX ADMIN — Medication 1 TABLET(S): at 17:26

## 2020-10-12 RX ADMIN — LIDOCAINE 1 PATCH: 4 CREAM TOPICAL at 07:57

## 2020-10-12 RX ADMIN — SODIUM CHLORIDE 1 GRAM(S): 9 INJECTION INTRAMUSCULAR; INTRAVENOUS; SUBCUTANEOUS at 02:31

## 2020-10-12 RX ADMIN — QUETIAPINE FUMARATE 50 MILLIGRAM(S): 200 TABLET, FILM COATED ORAL at 05:31

## 2020-10-12 RX ADMIN — FAMOTIDINE 20 MILLIGRAM(S): 10 INJECTION INTRAVENOUS at 12:36

## 2020-10-12 RX ADMIN — HEPARIN SODIUM 5000 UNIT(S): 5000 INJECTION INTRAVENOUS; SUBCUTANEOUS at 15:11

## 2020-10-12 RX ADMIN — SODIUM CHLORIDE 1 GRAM(S): 9 INJECTION INTRAMUSCULAR; INTRAVENOUS; SUBCUTANEOUS at 17:26

## 2020-10-12 RX ADMIN — QUETIAPINE FUMARATE 75 MILLIGRAM(S): 200 TABLET, FILM COATED ORAL at 21:34

## 2020-10-12 RX ADMIN — Medication 3 MILLILITER(S): at 12:52

## 2020-10-12 RX ADMIN — Medication 1 APPLICATION(S): at 21:52

## 2020-10-12 RX ADMIN — SODIUM CHLORIDE 1 GRAM(S): 9 INJECTION INTRAMUSCULAR; INTRAVENOUS; SUBCUTANEOUS at 12:35

## 2020-10-12 RX ADMIN — Medication 1 DROP(S): at 02:31

## 2020-10-12 RX ADMIN — HEPARIN SODIUM 5000 UNIT(S): 5000 INJECTION INTRAVENOUS; SUBCUTANEOUS at 21:35

## 2020-10-12 NOTE — SWALLOW VFSS/MBS ASSESSMENT ADULT - ROSENBEK'S PENETRATION ASPIRATION SCALE
(1) no aspiration, contrast does not enter airway Score of (2) with R head turn and single yamel sips via straw/(5) contrast contacts vocal cords, visible residue remains (penetration) (5) contrast contacts vocal cords, visible residue remains (penetration) Vestibular residue clear with cough elicited via verbal cue. Pt with PA score of (2) with L head turn and single yamel sips via straw/(5) contrast contacts vocal cords, visible residue remains (penetration)

## 2020-10-12 NOTE — PROGRESS NOTE ADULT - PROBLEM SELECTOR PLAN 1
Patient S/p Tracheostomy on 8/24 with ENT  Tolerating continuous trach collar since 8/30  Tracheostomy Downsized to # 6 Cuffless Portex   Tracheoscopy 9/24:  3-4 mm area of likely granulation tissue on proximal left mainstem bronchus without evidence of air way obstruction   -Will discuss with pulm for plans of decanulation. Would like to retrial MBS, which can be done with trach, but patient may have better chance of passing MBS without trach. (Update: no plans for decanulation within the next few days, some granulation tissue, not ready yet)  Continue Chest PT and Suctioning PRN   Continue PMV; Trials Monitor for Airtrapping  - Tracheostomy tube spontaneously displaced; dressing placed. Continue to monitor vitals q4h -Patient S/p Tracheostomy on 8/24 with ENT, s/p self decannulation yesterday 10/11.   -has been stable on RA  Tracheoscopy 9/24:  3-4 mm area of likely granulation tissue on proximal left mainstem bronchus without evidence of air way obstruction   granulation tissue, not ready yet)  - Tracheostomy tube spontaneously displaced; dressing placed. Continue to monitor vitals q4h  -consulted ENT to check trach site; pulmonary contacted and recommended contacting ENT

## 2020-10-12 NOTE — SWALLOW VFSS/MBS ASSESSMENT ADULT - LARYNGEAL PENETRATION DURING THE SWALLOW - COUGH
Trace/Descends to level of the vocal folds; without complete retrieval Mild/To the level of the vocal folds, without retrieval min over the arytenoids; incomplete retrieval/Mild

## 2020-10-12 NOTE — PROGRESS NOTE ADULT - ASSESSMENT
60M h/o Afib on coumadin, presented 8/9/20 s/p cardiac arrest. Pt was at work, and had downtime of 25 minutes before ROSC, patient treated with therapeutic hypothermia. Found to have Rt mesencephalic SAH of unclear etiology with cerebral Angiogram on 8/10 negative for aneurysm., prolonged respiratory failure s/p trach 8/24, pt now decannulated with well-healing stoma. Pt comfortable on room air w strong voice

## 2020-10-12 NOTE — SWALLOW VFSS/MBS ASSESSMENT ADULT - SUCCESSFUL STRATEGIES TRIALED DURING PROCEDURE
head turn to the left/Trace penetration over the arytenoids with full retrieval; and for single straw sips trace penetration fully retrieved with use of Left head turn during single straw sips

## 2020-10-12 NOTE — SWALLOW VFSS/MBS ASSESSMENT ADULT - ORAL PHASE
Uncontrolled bolus / spillover in hypopharynx/Maximal spillage to AE folds Uncontrolled bolus / spillover in hypopharynx/Maximal spillage to pyriform sinus Uncontrolled bolus / spillover in hypopharynx/Laryngeal penetration before swallow - silent/Moderate spillage to AE folds mild over arytenoids Trace spillage over the arytenoids/Incomplete tongue to palate contact/Laryngeal penetration before swallow - silent/Uncontrolled bolus / spillover in hypopharynx Uncontrolled bolus / spillover in hypopharynx/moderate spillage to the arytenoids; moderate penetration over the laryngeal surface of the epiglottis and over the arytenoids/Laryngeal penetration before swallow - silent Laryngeal penetration before swallow - silent/max spillage to the pyriforms; tr-min silent penetration over the arytenoids/Incomplete tongue to palate contact/Uncontrolled bolus / spillover in hypopharynx

## 2020-10-12 NOTE — SWALLOW VFSS/MBS ASSESSMENT ADULT - UNSUCCESSFUL STRATEGIES TRIALED DURING PROCEDURE
+ penetration over arytenoids with trace residue noted along laryngeal surface epiglottis/chin tuck head turn to the left/Not effective to fully protect airway chin tuck/does not improve airway protection left head turn did not improve airway protection for nectar thick liquids

## 2020-10-12 NOTE — SWALLOW VFSS/MBS ASSESSMENT ADULT - MODE OF PRESENTATION
spoon spoon/w/ L head turn spoon/fed by clinician fed by clinician/spoon spoon/self fed/fed by clinician/straw fed by clinician

## 2020-10-12 NOTE — SWALLOW VFSS/MBS ASSESSMENT ADULT - DELAYED INITIATION OF THE PHARYNGEAL SWALLOW (IN SECONDS)
Triggers at level of AE folds Trigger at level of pyriforms Triggers at level of arytenoids Triggers at level of the pyriforms

## 2020-10-12 NOTE — SWALLOW VFSS/MBS ASSESSMENT ADULT - SLP PERTINENT HISTORY OF CURRENT PROBLEM
Mr. Vieyra is a 60-year-old male with a history of A-Fib on warfarin who presents with cardiac arrest at work with downtime of about 25 minutes prior to ROSC. He is s/p cooling to 35 degrees. He required mechanical ventilation, neuromuscular blockade, sedation, and pressors for neurogenic/cardiogenic shock. 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 Cerebral angiogram without evidence of aneurysm. ICU course complicated by GI bleed s/p PPI gtt and bleeding from scott s/p clot irrigation by urology; suspected 2/2 traumatic catheterization. 8/21: Episode of respiratory distress overnight; desaturated 85%, mucus plug/ambu bagged. Pt w/ prolonged respiratory failure s/p trach (#8 DCT Tracheostomy)-Trach  secured with sutures x4 and Umbilical Tie- on 8/24 and PEG on 8/26.
Mr. Vieyra is a 60-year-old male with a history of A-Fib on warfarin who presents with cardiac arrest at work with downtime of about 25 minutes prior to ROSC. He is s/p cooling to 35 degrees. He required mechanical ventilation, neuromuscular blockade, sedation, and pressors for neurogenic/cardiogenic shock. 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 Cerebral angiogram without evidence of aneurysm. ICU course complicated by GI bleed s/p PPI gtt and bleeding from scott s/p clot irrigation by urology; suspected 2/2 traumatic catheterization. 8/21: Episode of respiratory distress overnight; desaturated 85%, mucus plug/ambu bagged. Pt w/ prolonged respiratory failure s/p trach (#8 DCT Tracheostomy)-Trach  secured with sutures x4 and Umbilical Tie- on 8/24 and PEG on 8/26.

## 2020-10-12 NOTE — SWALLOW VFSS/MBS ASSESSMENT ADULT - FEEDING ASSISTANCE
L side weakness prevents full use of UEs/dependent (1 to 1)/minimal assistance required minimal assistance required/dependent (1 to 1)/+visual deficits; +aspiration precautions; +Left UE paresis

## 2020-10-12 NOTE — SWALLOW VFSS/MBS ASSESSMENT ADULT - RECOMMENDED FEEDING/EATING TECHNIQUES
allow for swallow between intakes/Medications whole or crushed in applesauce/position upright (90 degrees)/small sips/bites/oral hygiene/maintain upright posture during/after eating for 30 mins/turn head left

## 2020-10-12 NOTE — PROGRESS NOTE ADULT - PROBLEM SELECTOR PLAN 2
Patient S/p Angio; neg x 2  CT Head: perimesencephalic SAH   CTA Head: no aneurysm noted, VEEG: Negative   MRI neuroaxis: restricted diffusion in R BG, posterior limb of R IC, and anterior right temporal lobe  No Further neurosurgical intervention at this time  Residual left sided deficits, improving. Patient with vision loss, appreciate optho recs STABLE   No Further neurosurgical intervention at this time  Residual left sided deficits, improving. Patient with vision loss, appreciate optho recs

## 2020-10-12 NOTE — SWALLOW VFSS/MBS ASSESSMENT ADULT - RECOMMENDED CONSISTENCY
Regular with honey thickened liquids VIA SINGLE STRAW SIPS WITH LEFT HEAD TURN Regular with honey thickened liquids VIA SINGLE STRAW SIPS WITH LEFT HEAD TURN    MD/team Please enter the following as provider to RN orders : 1) Full assist with meals for feeding and to assure strategy implementation, 2) Pt must be cued to utilize left head turn with intakes of honey thickened liquids 3) ALL liquids via single straw sips.  5) Provide small single bites and sips at slow rate 6) Aspiration precautions. Monitor for s/s aspiration/laryngeal penetration. If noted:  D/C p.o. intake, provide non-oral nutrition/hydration/meds Dysphagia 3, honey with use of LEFT HEAD TURN    MD/team Please enter the following as provider to RN orders : 1) MUST USE LEFT HEAD TURN for honey thickened liquids 2) Full assist to ensure consistent use of safe swallow strategy 3) Monitor for s/s aspiration/laryngeal penetration. If noted:  D/C p.o. intake, provide non-oral nutrition/hydration/meds

## 2020-10-12 NOTE — PROGRESS NOTE ADULT - PROBLEM SELECTOR PLAN 6
A-Fib with RVR  DC'd Amiodarone (8/31)  Lopressor decreased to 12.5 mg BID secondary to bradycardia  Hold off on therapeutic a/c given SAH STABLE   A-Fib with RVR  DC'd Amiodarone (8/31)  Lopressor decreased to 12.5 mg BID secondary to bradycardia  Hold off on therapeutic a/c given SAH

## 2020-10-12 NOTE — PROGRESS NOTE ADULT - PROBLEM SELECTOR PLAN 8
JALEN resolved with increased frequency of Straight Catheterization   Hyponatremia: Sodium Chloride tabs 1 gram q 6 hrs  Continue to Monitor BMP JALEN resolved with increased frequency of Straight Catheterization; patient now with indwelling scott catheter   Continue to Monitor BMP

## 2020-10-12 NOTE — PROGRESS NOTE ADULT - SUBJECTIVE AND OBJECTIVE BOX
ENT ISSUE/POD: s/p trach now decannulated    HPI: 60M h/o Afib on coumadin, presented 8/9/20 s/p cardiac arrest. Pt was at work, and had downtime of 25 minutes before ROSC, patient treated with therapeutic hypothermia. 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 Hospital course c/b Neurogenic/ cardiogenic shock, GI Bleed S/p PPI Gtt, bleeding from scott s/p clot irrigation, prolonged respiratory failure s/p trach 8/24 and PEG 8/26, Pseudomonas Pneumonia UTI and C.diff all has since resolved. Patient with improved neurological recovery now ambulating with walk assist and PT Remain with left sided hemiparesis. ENT called as pt self decannulated last now and is preparing for rehab. Pt has been tolerating room air since, feels comfortable, has strong voice, no coughing or difficulty breathing          PAST MEDICAL & SURGICAL HISTORY:  On warfarin for atrial fibrillation    No significant past surgical history      Allergies    No Known Allergies    Intolerances      MEDICATIONS  (STANDING):  albuterol/ipratropium for Nebulization. 3 milliLiter(s) Nebulizer every 6 hours  amantadine Syrup 100 milliGRAM(s) Oral <User Schedule>  amLODIPine   Tablet 10 milliGRAM(s) Oral daily  artificial  tears Solution 1 Drop(s) Both EYES every 6 hours  aspirin  chewable 81 milliGRAM(s) Enteral Tube daily  atorvastatin 80 milliGRAM(s) Oral at bedtime  bisacodyl Suppository 10 milliGRAM(s) Rectal at bedtime  chlorhexidine 4% Liquid 1 Application(s) Topical <User Schedule>  dextrose 5%. 1000 milliLiter(s) (50 mL/Hr) IV Continuous <Continuous>  dextrose 50% Injectable 12.5 Gram(s) IV Push once  dextrose 50% Injectable 25 Gram(s) IV Push once  dextrose 50% Injectable 25 Gram(s) IV Push once  doxazosin 8 milliGRAM(s) Oral at bedtime  erythromycin   Ointment 1 Application(s) Right EYE every 8 hours  famotidine    Tablet 20 milliGRAM(s) Oral daily  heparin   Injectable 5000 Unit(s) SubCutaneous every 8 hours  hydrALAZINE 100 milliGRAM(s) Oral three times a day  isosorbide   dinitrate Tablet (ISORDIL) 20 milliGRAM(s) Enteral Tube <User Schedule>  lactobacillus acidophilus 1 Tablet(s) Oral two times a day  lidocaine   Patch 1 Patch Transdermal every 24 hours  lidocaine   Patch 1 Patch Transdermal daily  metoprolol tartrate 12.5 milliGRAM(s) Oral <User Schedule>  ofloxacin 0.3% Solution 1 Drop(s) Right EYE four times a day  petrolatum Ophthalmic Ointment 1 Application(s) Both EYES every 8 hours  QUEtiapine 50 milliGRAM(s) Oral <User Schedule>  QUEtiapine 75 milliGRAM(s) Oral <User Schedule>  senna 2 Tablet(s) Oral at bedtime  sodium chloride 1 Gram(s) Oral every 6 hours    MEDICATIONS  (PRN):  dextrose 40% Gel 15 Gram(s) Oral once PRN Blood Glucose LESS THAN 70 milliGRAM(s)/deciliter  glucagon  Injectable 1 milliGRAM(s) IntraMuscular once PRN Glucose LESS THAN 70 milligrams/deciliter  LORazepam     Tablet 1 milliGRAM(s) Oral every 8 hours PRN Agitation  polyethylene glycol 3350 17 Gram(s) Oral daily PRN Constipation      ROS:   ENT: all negative except as noted in HPI   Pulm: denies SOB, cough, hemoptysis  Neuro: denies numbness/tingling, loss of sensation  Endo: denies heat/cold intolerance, excessive sweating      Vital Signs Last 24 Hrs  T(C): 36.9 (12 Oct 2020 13:00), Max: 37 (11 Oct 2020 21:42)  T(F): 98.4 (12 Oct 2020 13:00), Max: 98.6 (11 Oct 2020 21:42)  HR: 74 (12 Oct 2020 13:00) (50 - 93)  BP: 158/95 (12 Oct 2020 13:00) (128/84 - 158/95)  BP(mean): --  RR: 18 (12 Oct 2020 13:00) (18 - 18)  SpO2: 99% (12 Oct 2020 13:00) (97% - 100%)                          12.0   7.08  )-----------( 229      ( 12 Oct 2020 07:13 )             37.7    10-12    135  |  102  |  20  ----------------------------<  109<H>  3.8   |  22  |  1.03    Ca    9.6      12 Oct 2020 07:13  Phos  3.5     10-12  Mg     1.8     10-12         PHYSICAL EXAM:  Gen: NAD, OOB in chair, strong voice  Skin: No rashes, bruises, or lesions  Head: Normocephalic, Atraumatic  Face: no edema, erythema, or fluctuance. Parotid glands soft without mass  Eyes: no scleral injection  Nose: Nares bilaterally patent, no discharge  Mouth: No Stridor / Drooling / Trismus.  Mucosa moist, tongue/uvula midline, oropharynx clear  Neck: Flat, supple, no lymphadenopathy, trachea midline, no masses, well healing stoma with small bit of granulation tissue, gauze dressing in place  Lymphatic: No lymphadenopathy  Resp: breathing easily, no stridor  Neuro: facial nerve intact, no facial droop

## 2020-10-12 NOTE — SWALLOW VFSS/MBS ASSESSMENT ADULT - NS SWALLOW VFSS REC ASPIR MON
pneumonia/upper respiratory infection/Monitor for s/s aspiration/laryngeal penetration. If noted:  D/C p.o. intake, provide non-oral nutrition/hydration/meds, and contact this service @ [phone]/change of breathing pattern/cough/gurgly voice/throat clearing/fever

## 2020-10-12 NOTE — PROGRESS NOTE ADULT - PROBLEM SELECTOR PLAN 1
Now decannulated  -Keep gauze dressing in place until completely healed over  -Pt to occlude stoma when speaking or coughing until completely healed  -May follow up out pt with ENT as needed call [phone]

## 2020-10-12 NOTE — PROGRESS NOTE ADULT - PROBLEM SELECTOR PLAN 7
S/p Peg 8/26 by GI   MBS 9/28: Inconsistent Aspiration episodes with varying consistencies   Continue Non-Oral means of Nutrition  -Speech and swallow reconsulted 10/9, recommend MBS, however stated chances of passing study improved if trach decannulated. S/p Peg 8/26 by GI  -MBS today; recommending regular diet with honey thickened liquids; will drink via straw as well as utilize left head turn for maximum swallowing capability   -discussed with S/S

## 2020-10-12 NOTE — SWALLOW VFSS/MBS ASSESSMENT ADULT - ADDITIONAL RECOMMENDATIONS
Will follow for speech and swallow intervention
Maintain good oral hygiene.    Follow up with diet monitor and restorative swallow therapy at acute rehab.

## 2020-10-12 NOTE — SWALLOW VFSS/MBS ASSESSMENT ADULT - DIAGNOSTIC IMPRESSIONS
61 yo M admitted for cardiac arrest, found to have SAH, course c/b GIB and respiratory distress requiring tracheostomy. Pt is now s/p decannulation since yesterday due to trach dislodged. Pt presents with an oropharyngeal dysphagia marked by reduced bolus formation and control with maximal spillage to AE folds and delayed swallow trigger resulting in silent penetration before the swallow and without retrieval for thickened liquids. A left head turn in conjunction with single straw sips is effective to protect the airway for honey thickened liquids. Oropharyngeal swallow is functional for soft and hard solids.    Disorders: reduced lingual strength/ROM/Rate of motion, reduced BOT to posterior pharyngeal wall contact, delay in trigger of the swallow reflex, reduced hyo-laryngeal excursion, reduced supraglottic sensation, reduced subglottic sensation. 59 yo M admitted for cardiac arrest, found to have SAH, course c/b GIB and respiratory distress requiring tracheostomy. Pt is now s/p decannulation since yesterday due to trach dislodged. Pt presents with an oropharyngeal dysphagia marked by reduced bolus formation and control with maximal spillage to AE folds and delayed swallow trigger resulting in silent penetration before the swallow and without retrieval for thickened liquids. A left head turn in conjunction with single straw sips is effective to protect the airway for honey thickened liquids. Oropharyngeal swallow is grossly functional for purees as well as soft and hard solids.    Disorders: reduced lingual strength/ROM/Rate of motion, reduced BOT to posterior pharyngeal wall contact, delay in trigger of the swallow reflex, reduced hyo-laryngeal excursion, reduced supraglottic sensation, reduced subglottic sensation. 59 yo M admitted for cardiac arrest, found to have SAH, course c/b GIB and respiratory distress requiring tracheostomy. Pt is now s/p decannulation since yesterday due to trach dislodged. Pt presents with an oropharyngeal dysphagia. Oral prep, transit and clearance is mildly prolonged yet functional for purees and solids. There is premature spillage of thickened liquids and a delayed pharyngeal trigger which results in deep silent penetration with incomplete retrieval for both honey and nectar thick textures. Use of Left head turn improves airway protection for honey thickened liquids via straw. There is no evidence of laryngeal penetration/aspiration on purees and solids.    Disorders: reduced lingual strength/ROM/Rate of motion, reduced BOT to posterior pharyngeal wall contact, delay in trigger of the swallow reflex, reduced hyo-laryngeal excursion, reduced supraglottic sensation.

## 2020-10-12 NOTE — PROGRESS NOTE ADULT - PROBLEM SELECTOR PLAN 5
Completed Abx for Pseudomonas aeruginosa pneumonia/colonization:  S/p prolonged course of cefepime until 8/14-8/20   If develops increased secretions, may benefit from inhaled tobramycin  C diff: Completed oral Vanco 8/29 and PPX Dose completed on 9/16   UTI: Pseudomonas S/p Cefepime  Currently remains off abx, Monitor Temperature curve Completed Abx for Pseudomonas aeruginosa pneumonia/colonization:  S/p prolonged course of cefepime until 8/14-8/20   C diff: Completed oral Vanco 8/29 and PPX Dose completed on 9/16   UTI: Pseudomonas S/p Cefepime  Currently remains off abx, Monitor Temperature curve. no current leukocytosis

## 2020-10-12 NOTE — PROGRESS NOTE ADULT - SUBJECTIVE AND OBJECTIVE BOX
Patient is a 60y old  Male who presents with a chief complaint of MI (11 Oct 2020 07:23)      SUBJECTIVE / OVERNIGHT EVENTS:    MEDICATIONS  (STANDING):  albuterol/ipratropium for Nebulization. 3 milliLiter(s) Nebulizer every 6 hours  amantadine Syrup 100 milliGRAM(s) Oral <User Schedule>  amLODIPine   Tablet 10 milliGRAM(s) Oral daily  artificial  tears Solution 1 Drop(s) Both EYES every 6 hours  aspirin  chewable 81 milliGRAM(s) Enteral Tube daily  atorvastatin 80 milliGRAM(s) Oral at bedtime  bisacodyl Suppository 10 milliGRAM(s) Rectal at bedtime  chlorhexidine 4% Liquid 1 Application(s) Topical <User Schedule>  dextrose 5%. 1000 milliLiter(s) (50 mL/Hr) IV Continuous <Continuous>  dextrose 50% Injectable 12.5 Gram(s) IV Push once  dextrose 50% Injectable 25 Gram(s) IV Push once  dextrose 50% Injectable 25 Gram(s) IV Push once  doxazosin 8 milliGRAM(s) Oral at bedtime  erythromycin   Ointment 1 Application(s) Right EYE every 8 hours  famotidine    Tablet 20 milliGRAM(s) Oral daily  heparin   Injectable 5000 Unit(s) SubCutaneous every 8 hours  hydrALAZINE 100 milliGRAM(s) Oral three times a day  isosorbide   dinitrate Tablet (ISORDIL) 20 milliGRAM(s) Enteral Tube <User Schedule>  lactobacillus acidophilus 1 Tablet(s) Oral two times a day  lidocaine   Patch 1 Patch Transdermal daily  lidocaine   Patch 1 Patch Transdermal every 24 hours  metoprolol tartrate 12.5 milliGRAM(s) Oral <User Schedule>  ofloxacin 0.3% Solution 1 Drop(s) Right EYE four times a day  petrolatum Ophthalmic Ointment 1 Application(s) Both EYES every 8 hours  QUEtiapine 50 milliGRAM(s) Oral <User Schedule>  QUEtiapine 75 milliGRAM(s) Oral <User Schedule>  senna 2 Tablet(s) Oral at bedtime  sodium chloride 1 Gram(s) Oral every 6 hours    MEDICATIONS  (PRN):  dextrose 40% Gel 15 Gram(s) Oral once PRN Blood Glucose LESS THAN 70 milliGRAM(s)/deciliter  glucagon  Injectable 1 milliGRAM(s) IntraMuscular once PRN Glucose LESS THAN 70 milligrams/deciliter  LORazepam     Tablet 1 milliGRAM(s) Oral every 8 hours PRN Agitation  polyethylene glycol 3350 17 Gram(s) Oral daily PRN Constipation      CAPILLARY BLOOD GLUCOSE        I&O's Summary    11 Oct 2020 07:01  -  12 Oct 2020 07:00  --------------------------------------------------------  IN: 1740 mL / OUT: 750 mL / NET: 990 mL        PHYSICAL EXAM:  Vital Signs Last 24 Hrs  T(C): 36.8 (10-12-20 @ 05:05)  T(F): 98.3 (10-12-20 @ 05:05), Max: 98.6 (10-11-20 @ 21:42)  HR: 80 (10-12-20 @ 05:05) (50 - 83)  BP: 129/88 (10-12-20 @ 05:05)  BP(mean): --  RR: 18 (10-12-20 @ 05:05) (18 - 18)  SpO2: 100% (10-12-20 @ 05:05) (97% - 100%)  Wt(kg): --    10-11 @ 07:01  -  10-12 @ 07:00  --------------------------------------------------------  IN: 1740 mL / OUT: 750 mL / NET: 990 mL      Constitutional: NAD, awake and alert  EYES: EOMI  ENT:  Normal Hearing, no tonsillar exudates   Neck: Soft and supple , no thyromegaly   Respiratory: Breath sounds are clear bilaterally, No wheezing, rales or rhonchi  Cardiovascular: S1 and S2, regular rate and rhythm, no Murmurs, gallops or rubs, no JVD,    Gastrointestinal: Bowel Sounds present, soft, nontender, nondistended, no guarding, no rebound  Extremities: No cyanosis or clubbing; warm to touch  Vascular: 2+ peripheral pulses lower ex  Neurological: No focal deficits, CN II-XII intact bilaterally, sensation to light touch intact in all extremities, gait intact. Pupils are equally reactive to light and symmetrical in size.   Musculoskeletal: 5/5 strength b/l upper and lower extremities; no joint swelling.  Skin: No rashes  Psych: no depression or anhedonia, AAOx3  HEME: no bruises, no nose bleeds      Personally reviewed imaging, labs, EKG  LABS:                        12.0   7.08  )-----------( 229      ( 12 Oct 2020 07:13 )             37.7     10-12    135  |  102  |  20  ----------------------------<  109<H>  3.8   |  22  |  1.03    Ca    9.6      12 Oct 2020 07:13  Phos  3.5     10-12  Mg     1.8     10-12                RADIOLOGY & ADDITIONAL TESTS:    Imaging Personally Reviewed:    Consultant(s) Notes Reviewed:      Care Discussed with Consultants/Other Providers:   Patient is a 60y old  Male who presents with a chief complaint of MI (11 Oct 2020 07:23)      SUBJECTIVE / OVERNIGHT EVENTS:  no acute events overnight. patient has self-decannulated his tracheostomy tube but has been stable on room air since. Denies any pain, SOB, chest palpitations, etc. Denies nausea and vomiting.     MEDICATIONS  (STANDING):  albuterol/ipratropium for Nebulization. 3 milliLiter(s) Nebulizer every 6 hours  amantadine Syrup 100 milliGRAM(s) Oral <User Schedule>  amLODIPine   Tablet 10 milliGRAM(s) Oral daily  artificial  tears Solution 1 Drop(s) Both EYES every 6 hours  aspirin  chewable 81 milliGRAM(s) Enteral Tube daily  atorvastatin 80 milliGRAM(s) Oral at bedtime  bisacodyl Suppository 10 milliGRAM(s) Rectal at bedtime  chlorhexidine 4% Liquid 1 Application(s) Topical <User Schedule>  dextrose 5%. 1000 milliLiter(s) (50 mL/Hr) IV Continuous <Continuous>  dextrose 50% Injectable 12.5 Gram(s) IV Push once  dextrose 50% Injectable 25 Gram(s) IV Push once  dextrose 50% Injectable 25 Gram(s) IV Push once  doxazosin 8 milliGRAM(s) Oral at bedtime  erythromycin   Ointment 1 Application(s) Right EYE every 8 hours  famotidine    Tablet 20 milliGRAM(s) Oral daily  heparin   Injectable 5000 Unit(s) SubCutaneous every 8 hours  hydrALAZINE 100 milliGRAM(s) Oral three times a day  isosorbide   dinitrate Tablet (ISORDIL) 20 milliGRAM(s) Enteral Tube <User Schedule>  lactobacillus acidophilus 1 Tablet(s) Oral two times a day  lidocaine   Patch 1 Patch Transdermal daily  lidocaine   Patch 1 Patch Transdermal every 24 hours  metoprolol tartrate 12.5 milliGRAM(s) Oral <User Schedule>  ofloxacin 0.3% Solution 1 Drop(s) Right EYE four times a day  petrolatum Ophthalmic Ointment 1 Application(s) Both EYES every 8 hours  QUEtiapine 50 milliGRAM(s) Oral <User Schedule>  QUEtiapine 75 milliGRAM(s) Oral <User Schedule>  senna 2 Tablet(s) Oral at bedtime  sodium chloride 1 Gram(s) Oral every 6 hours    MEDICATIONS  (PRN):  dextrose 40% Gel 15 Gram(s) Oral once PRN Blood Glucose LESS THAN 70 milliGRAM(s)/deciliter  glucagon  Injectable 1 milliGRAM(s) IntraMuscular once PRN Glucose LESS THAN 70 milligrams/deciliter  LORazepam     Tablet 1 milliGRAM(s) Oral every 8 hours PRN Agitation  polyethylene glycol 3350 17 Gram(s) Oral daily PRN Constipation      CAPILLARY BLOOD GLUCOSE        I&O's Summary    11 Oct 2020 07:01  -  12 Oct 2020 07:00  --------------------------------------------------------  IN: 1740 mL / OUT: 750 mL / NET: 990 mL        PHYSICAL EXAM:  Vital Signs Last 24 Hrs  T(C): 36.8 (10-12-20 @ 05:05)  T(F): 98.3 (10-12-20 @ 05:05), Max: 98.6 (10-11-20 @ 21:42)  HR: 80 (10-12-20 @ 05:05) (50 - 83)  BP: 129/88 (10-12-20 @ 05:05)  BP(mean): --  RR: 18 (10-12-20 @ 05:05) (18 - 18)  SpO2: 100% (10-12-20 @ 05:05) (97% - 100%)  Wt(kg): --    10-11 @ 07:01  -  10-12 @ 07:00  --------------------------------------------------------  IN: 1740 mL / OUT: 750 mL / NET: 990 mL      General: WN/WD NAD  HEENT: Patient c/o b/l vision loss since admission. Tracheostomy: # 6 Cuffless Portex, right eye ptosis, dilated fixed right pupil, right eye with scleral irritation  Neurology: A&Ox3, nonfocal, WEI x 4  Respiratory: CTA B/L, normal respiratory effort. Trach collar.   CV: RRR, S1S2, no murmurs, rubs or gallops  Abdominal: With PEG tube, site clean, dry and intact. Soft, NT, ND +BS.   Extremities: Able to follow commands and respond to questions appropriately; hemiparesis of left upper and lower extremities (improving), 5/5 strength in right upper and lower extremities      Personally reviewed imaging, labs, EKG  LABS:                        12.0   7.08  )-----------( 229      ( 12 Oct 2020 07:13 )             37.7     10-12    135  |  102  |  20  ----------------------------<  109<H>  3.8   |  22  |  1.03    Ca    9.6      12 Oct 2020 07:13  Phos  3.5     10-12  Mg     1.8     10-12

## 2020-10-12 NOTE — PROGRESS NOTE ADULT - ASSESSMENT
60M h/o Afib on coumadin, presented initially on August 9, 2020 after cardiac arrest. Patient was at work, and had downtime of 25 minutes before ROSC, patient treated with therapeutic hypothermia. Patient with prolonged and complicated course (see below), transferred from RCU to medicine floors with persistent neurogenic bladder, trach collar, stable and awaiting discharge to [facility] pending insurance clearance.   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 Patient's cerebral angiogram on 8/10 negative for aneurysm.   CV: Patient's course c/b neurogenic/cardiogenic shock, now resolved. Patient with afib on coumadin, stopped during hospital stay as patient with SAH. Patient with Afib with RVR,  amiodarone discontinued, now rate controlled on Lopressor. BP also controlled with isordil, hydralazine, and Norvasc.   Respiratory: respiratory failure requiring intubated, now s/p tach (8/24), downsized to #6 cuffless portex.   GI: GI bleed, s/p PPI gtt, no surgical intervention. Patient with PEG tube (8/26), MBS (9/28) revealed aspiration so could no be advanced to PO intake.   : Hematuria from Scott catheter trauma, s/p clot irrigation, now resolved. Neurogenic bladder resulting in urinary retention, still needs straight cath [id number], likely will need to be discharged on scott catheter. Patient on cardura.   ID: Patient had pseudomonas PNA, UTI, Cdiff; now s/p all antibiotic treatment, all resolved.   Psych: Patient with agitation restlessness during admission requiring Seroquel and ativan prn.

## 2020-10-12 NOTE — PROGRESS NOTE ADULT - PROBLEM SELECTOR PLAN 4
Possible stunned myocardium due to cardiac arrest vs. SAH  TTE: Global LV dysfunction. EF 41%, Severe concentric LVH, flattening of interventricular septum  Will need cardiac cath to evaluate for ischemia as outpatient  Continue afterload/preload reduction with hydralazine and isosorbide dinitrate  Norvasc increased to 10mg QD with improved BP STABLE   Possible stunned myocardium due to cardiac arrest vs. SAH  TTE: Global LV dysfunction. EF 41%, Severe concentric LVH, flattening of interventricular septum  Will need cardiac cath to evaluate for ischemia as outpatient  Continue afterload/preload reduction with hydralazine and isosorbide dinitrate  Norvasc increased to 10mg QD with improved BP

## 2020-10-12 NOTE — PROGRESS NOTE ADULT - PROBLEM SELECTOR PLAN 9
Continue Straight Catheterization ATC  Frequency decreased from q 4 hrs to q 8 hrs; Monitor for large volume retention  Patient will need Umaña on Discharge   Continue Cardura 8 mg HS -patient with indwelling scott catheter with adequate output   -will continue scott on discharge

## 2020-10-12 NOTE — SWALLOW VFSS/MBS ASSESSMENT ADULT - ORAL PHASE COMMENTS
+ mild residue on lingual surface/BOT, clears on re-swallow Mild lingual residue reduces to trace on re-swallow. Mild residue noted on lingual surface, clears on re-swallow min residue cleared on repeat swallow

## 2020-10-12 NOTE — SWALLOW VFSS/MBS ASSESSMENT ADULT - COMMENTS
Hx cont: Noted on echo to have acute LV systolic heart failure, possible stunned myocardium due to cardiac arrest vs. SAH. Currently, he is awake and alert, moving RUE/RLE spontaneously, following simple commands. (+) A-Fib with RVR. Being treated for C diff colitis. Noted pt w/ trach change to #7 cuffless Portex on 9/3 however due to it becoming dislodged trach was changed to #8 cuffless Shiley by ENT on 9/5. Trach down-sized to #6 Cuffless Portex on 9/14. Cleared for PMV by SLP on 9/17. 9/15: Patient complaining of not being able to see, poor vision. States he sees shadows and unable ot identify objects with his left eye. Right remains with ptosis, fixed and dilated pupil. PM&R rx acute TBI rehab. Pt with agitiation throughout hospital course. [facility] requesting his agitation medication regimen be optimized prior to discharge. S&S for swallow reassessment.
Hx cont: Noted on echo to have acute LV systolic heart failure, possible stunned myocardium due to cardiac arrest vs. SAH. Currently, he is awake and alert, moving RUE/RLE spontaneously, following simple commands. (+) A-Fib with RVR. Being treated for C diff colitis. Noted pt w/ trach change to #7 cuffless Portex on 9/3 however due to it becoming dislodged trach was changed to #8 cuffless Shiley by ENT on 9/5. Trach down-sized to #6 Cuffless Portex on 9/14. Cleared for PMV by SLP on 9/17. 9/15: Patient complaining of not being able to see, poor vision. States he sees shadows and unable ot identify objects with his left eye. Right remains with ptosis, fixed and dilated pupil. PM&R rx acute TBI rehab. Pt with agitiation throughout hospital course. [facility] requesting his agitation medication regimen be optimized prior to discharge. S&S for swallow reassessment.    Per chart event note 10/11, "Tracheostomy tube spontaneously displaced. RCU evaluated patient who is phonating well; unable to place a guidewire through the tracheostomy. A dressing was placed over the tracheostomy site. Continue to monitor without replacing tracheostomy tube."    MBS completed 9/28 rx'd NPO, continue PEG given maximal spillage to pyriforms and inconsistent aspiration episodes across consistencies.

## 2020-10-13 ENCOUNTER — TRANSCRIPTION ENCOUNTER (OUTPATIENT)
Age: 60
End: 2020-10-13

## 2020-10-13 LAB
ANION GAP SERPL CALC-SCNC: 18 MMOL/L — HIGH (ref 5–17)
BUN SERPL-MCNC: 24 MG/DL — HIGH (ref 7–23)
CALCIUM SERPL-MCNC: 10.3 MG/DL — SIGNIFICANT CHANGE UP (ref 8.4–10.5)
CHLORIDE SERPL-SCNC: 102 MMOL/L — SIGNIFICANT CHANGE UP (ref 96–108)
CO2 SERPL-SCNC: 20 MMOL/L — LOW (ref 22–31)
CREAT SERPL-MCNC: 1.21 MG/DL — SIGNIFICANT CHANGE UP (ref 0.5–1.3)
GLUCOSE SERPL-MCNC: 105 MG/DL — HIGH (ref 70–99)
HCT VFR BLD CALC: 39.1 % — SIGNIFICANT CHANGE UP (ref 39–50)
HGB BLD-MCNC: 12.5 G/DL — LOW (ref 13–17)
MAGNESIUM SERPL-MCNC: 1.9 MG/DL — SIGNIFICANT CHANGE UP (ref 1.6–2.6)
MCHC RBC-ENTMCNC: 28.4 PG — SIGNIFICANT CHANGE UP (ref 27–34)
MCHC RBC-ENTMCNC: 32 GM/DL — SIGNIFICANT CHANGE UP (ref 32–36)
MCV RBC AUTO: 88.9 FL — SIGNIFICANT CHANGE UP (ref 80–100)
NRBC # BLD: 0 /100 WBCS — SIGNIFICANT CHANGE UP (ref 0–0)
PHOSPHATE SERPL-MCNC: 4.2 MG/DL — SIGNIFICANT CHANGE UP (ref 2.5–4.5)
PLATELET # BLD AUTO: 242 K/UL — SIGNIFICANT CHANGE UP (ref 150–400)
POTASSIUM SERPL-MCNC: 3.8 MMOL/L — SIGNIFICANT CHANGE UP (ref 3.5–5.3)
POTASSIUM SERPL-SCNC: 3.8 MMOL/L — SIGNIFICANT CHANGE UP (ref 3.5–5.3)
RBC # BLD: 4.4 M/UL — SIGNIFICANT CHANGE UP (ref 4.2–5.8)
RBC # FLD: 15.9 % — HIGH (ref 10.3–14.5)
SARS-COV-2 RNA SPEC QL NAA+PROBE: SIGNIFICANT CHANGE UP
SODIUM SERPL-SCNC: 140 MMOL/L — SIGNIFICANT CHANGE UP (ref 135–145)
WBC # BLD: 7.88 K/UL — SIGNIFICANT CHANGE UP (ref 3.8–10.5)
WBC # FLD AUTO: 7.88 K/UL — SIGNIFICANT CHANGE UP (ref 3.8–10.5)

## 2020-10-13 PROCEDURE — 99233 SBSQ HOSP IP/OBS HIGH 50: CPT | Mod: GC

## 2020-10-13 RX ORDER — AMLODIPINE BESYLATE 2.5 MG/1
10 TABLET ORAL DAILY
Refills: 0 | Status: DISCONTINUED | OUTPATIENT
Start: 2020-10-13 | End: 2020-10-14

## 2020-10-13 RX ORDER — FAMOTIDINE 10 MG/ML
20 INJECTION INTRAVENOUS DAILY
Refills: 0 | Status: DISCONTINUED | OUTPATIENT
Start: 2020-10-13 | End: 2020-10-14

## 2020-10-13 RX ORDER — ATORVASTATIN CALCIUM 80 MG/1
80 TABLET, FILM COATED ORAL AT BEDTIME
Refills: 0 | Status: DISCONTINUED | OUTPATIENT
Start: 2020-10-13 | End: 2020-10-14

## 2020-10-13 RX ORDER — SENNA PLUS 8.6 MG/1
2 TABLET ORAL AT BEDTIME
Refills: 0 | Status: DISCONTINUED | OUTPATIENT
Start: 2020-10-13 | End: 2020-10-14

## 2020-10-13 RX ORDER — ASPIRIN/CALCIUM CARB/MAGNESIUM 324 MG
81 TABLET ORAL DAILY
Refills: 0 | Status: DISCONTINUED | OUTPATIENT
Start: 2020-10-13 | End: 2020-10-14

## 2020-10-13 RX ORDER — METOPROLOL TARTRATE 50 MG
12.5 TABLET ORAL
Refills: 0 | Status: DISCONTINUED | OUTPATIENT
Start: 2020-10-13 | End: 2020-10-14

## 2020-10-13 RX ORDER — HYDRALAZINE HCL 50 MG
100 TABLET ORAL THREE TIMES A DAY
Refills: 0 | Status: DISCONTINUED | OUTPATIENT
Start: 2020-10-13 | End: 2020-10-14

## 2020-10-13 RX ADMIN — Medication 3 MILLILITER(S): at 11:47

## 2020-10-13 RX ADMIN — SODIUM CHLORIDE 1 GRAM(S): 9 INJECTION INTRAMUSCULAR; INTRAVENOUS; SUBCUTANEOUS at 23:45

## 2020-10-13 RX ADMIN — HEPARIN SODIUM 5000 UNIT(S): 5000 INJECTION INTRAVENOUS; SUBCUTANEOUS at 21:33

## 2020-10-13 RX ADMIN — Medication 1 DROP(S): at 06:20

## 2020-10-13 RX ADMIN — Medication 1 DROP(S): at 13:52

## 2020-10-13 RX ADMIN — Medication 1 APPLICATION(S): at 21:32

## 2020-10-13 RX ADMIN — LIDOCAINE 1 PATCH: 4 CREAM TOPICAL at 07:28

## 2020-10-13 RX ADMIN — Medication 3 MILLILITER(S): at 23:19

## 2020-10-13 RX ADMIN — Medication 1 DROP(S): at 23:45

## 2020-10-13 RX ADMIN — QUETIAPINE FUMARATE 50 MILLIGRAM(S): 200 TABLET, FILM COATED ORAL at 06:19

## 2020-10-13 RX ADMIN — ISOSORBIDE DINITRATE 20 MILLIGRAM(S): 5 TABLET ORAL at 23:45

## 2020-10-13 RX ADMIN — Medication 3 MILLILITER(S): at 00:56

## 2020-10-13 RX ADMIN — Medication 10 MILLIGRAM(S): at 21:33

## 2020-10-13 RX ADMIN — Medication 100 MILLIGRAM(S): at 06:20

## 2020-10-13 RX ADMIN — ATORVASTATIN CALCIUM 80 MILLIGRAM(S): 80 TABLET, FILM COATED ORAL at 21:33

## 2020-10-13 RX ADMIN — AMLODIPINE BESYLATE 10 MILLIGRAM(S): 2.5 TABLET ORAL at 06:19

## 2020-10-13 RX ADMIN — QUETIAPINE FUMARATE 75 MILLIGRAM(S): 200 TABLET, FILM COATED ORAL at 21:33

## 2020-10-13 RX ADMIN — Medication 100 MILLIGRAM(S): at 21:32

## 2020-10-13 RX ADMIN — QUETIAPINE FUMARATE 50 MILLIGRAM(S): 200 TABLET, FILM COATED ORAL at 13:55

## 2020-10-13 RX ADMIN — SODIUM CHLORIDE 1 GRAM(S): 9 INJECTION INTRAMUSCULAR; INTRAVENOUS; SUBCUTANEOUS at 06:19

## 2020-10-13 RX ADMIN — Medication 1 APPLICATION(S): at 06:19

## 2020-10-13 RX ADMIN — Medication 1 APPLICATION(S): at 13:55

## 2020-10-13 RX ADMIN — ISOSORBIDE DINITRATE 20 MILLIGRAM(S): 5 TABLET ORAL at 00:07

## 2020-10-13 RX ADMIN — HEPARIN SODIUM 5000 UNIT(S): 5000 INJECTION INTRAVENOUS; SUBCUTANEOUS at 13:53

## 2020-10-13 RX ADMIN — CHLORHEXIDINE GLUCONATE 1 APPLICATION(S): 213 SOLUTION TOPICAL at 06:17

## 2020-10-13 RX ADMIN — Medication 1 TABLET(S): at 06:19

## 2020-10-13 RX ADMIN — SODIUM CHLORIDE 1 GRAM(S): 9 INJECTION INTRAMUSCULAR; INTRAVENOUS; SUBCUTANEOUS at 00:07

## 2020-10-13 RX ADMIN — Medication 1 TABLET(S): at 17:35

## 2020-10-13 RX ADMIN — Medication 1 DROP(S): at 17:35

## 2020-10-13 RX ADMIN — SODIUM CHLORIDE 1 GRAM(S): 9 INJECTION INTRAMUSCULAR; INTRAVENOUS; SUBCUTANEOUS at 13:53

## 2020-10-13 RX ADMIN — Medication 81 MILLIGRAM(S): at 13:52

## 2020-10-13 RX ADMIN — ISOSORBIDE DINITRATE 20 MILLIGRAM(S): 5 TABLET ORAL at 08:13

## 2020-10-13 RX ADMIN — Medication 12.5 MILLIGRAM(S): at 21:32

## 2020-10-13 RX ADMIN — Medication 12.5 MILLIGRAM(S): at 08:22

## 2020-10-13 RX ADMIN — SENNA PLUS 2 TABLET(S): 8.6 TABLET ORAL at 21:33

## 2020-10-13 RX ADMIN — Medication 8 MILLIGRAM(S): at 21:33

## 2020-10-13 RX ADMIN — Medication 100 MILLIGRAM(S): at 13:53

## 2020-10-13 RX ADMIN — Medication 3 MILLILITER(S): at 18:22

## 2020-10-13 RX ADMIN — ISOSORBIDE DINITRATE 20 MILLIGRAM(S): 5 TABLET ORAL at 17:35

## 2020-10-13 RX ADMIN — HEPARIN SODIUM 5000 UNIT(S): 5000 INJECTION INTRAVENOUS; SUBCUTANEOUS at 06:18

## 2020-10-13 RX ADMIN — SODIUM CHLORIDE 1 GRAM(S): 9 INJECTION INTRAMUSCULAR; INTRAVENOUS; SUBCUTANEOUS at 17:35

## 2020-10-13 RX ADMIN — Medication 100 MILLIGRAM(S): at 13:52

## 2020-10-13 RX ADMIN — FAMOTIDINE 20 MILLIGRAM(S): 10 INJECTION INTRAVENOUS at 13:53

## 2020-10-13 RX ADMIN — Medication 100 MILLIGRAM(S): at 09:34

## 2020-10-13 NOTE — PROGRESS NOTE ADULT - PROBLEM SELECTOR PLAN 6
STABLE   A-Fib with RVR  DC'd Amiodarone (8/31)  Lopressor decreased to 12.5 mg BID secondary to bradycardia  Hold off on therapeutic a/c given SAH

## 2020-10-13 NOTE — DISCHARGE NOTE PROVIDER - CARE PROVIDER_API CALL
Radha Alvarez)  Otolaryngology Facial Plastics  [address] [place] [address] [address]  [place] [address]  Phone: [phone]  Fax: [phone]  Follow Up Time: 2 weeks    Sandro Appiah  CARDIOVASCULAR DISEASE  [address] [place] [address]  Phone: [phone]  Fax: [phone]  Established Patient  Follow Up Time: 2 weeks   Radha Alvarez)  Otolaryngology Facial Plastics  [address] [place] [address] [address]  [place] [address]  Phone: [phone]  Fax: [phone]  Follow Up Time: 2 weeks    Sandro Appiah  CARDIOVASCULAR DISEASE  [address] [place] [address]  Phone: [phone]  Fax: [phone]  Established Patient  Follow Up Time: 1 week   Radha Alvarez)  Otolaryngology Facial Plastics  [address] [facility], [address]  [place] [address]  Phone: [phone]  Fax: [phone]  Follow Up Time: 2 weeks    Sandro Appiah  CARDIOVASCULAR DISEASE  [address] [place] [address]  Phone: [phone]  Fax: [phone]  [facility] Patient  Follow Up Time: 1 week    Karma Gilmore  OPHTHALMOLOGY  [address] [facility], [address]  [place] [address]  Phone: [phone]  Fax: [phone]  Follow Up Time: 1 week    Reinier Griggs  Urology  [address] [facility], [address]  [place] [address]  Phone: [phone]  Fax: [phone]  Follow Up Time: 1 month   Radha Alvarez)  Otolaryngology Facial Plastics  [address] [facility], [address]  [place] [address]  Phone: [phone]  Fax: [phone]  Follow Up Time: 2 weeks    Sandro Appiah  CARDIOVASCULAR DISEASE  [address] [place] [address]  Phone: [phone]  Fax: [phone]  Established Patient  Follow Up Time: 1 week    Karma Gilmore  OPHTHALMOLOGY  [address] [facility], [address]  [place] [address]  Phone: [phone]  Fax: [phone]  Follow Up Time: 1 week    Reinier Griggs  Urology  [address] [facility], [address]  [place] [address]  Phone: [phone]  Fax: [phone]  Follow Up Time: 1 month    Killian Reid)  Neurosurgery  [address] [place] [address]  Phone: [phone]  Fax: [phone]  Follow Up Time: 1 month

## 2020-10-13 NOTE — CHART NOTE - NSCHARTNOTEFT_GEN_A_CORE
Nutrition Follow-up  Patient seen for: nutrition follow-up    Source: comprehensive chart review, ( )     Hospital course as per chart: Pt is a 61 yo male with PMH of afib, who presented initially on 8/9 after cardiac arrest. Prolonged and complicated hospital course noted. Transferred from [facility]CU to medicine floors with persistent neurogenic bladder, trach collar, stable, and awaiting discharge to [facility]. Chart reviewed, events noted. S/p self decannulation 10/11. S/p MBS yesterday (10/12), recommended for Regular diet with honey thickened liquids.     Diet: Dysphagia 3, Soft, Honey Consistency Fluid    Patient reports ( )    Encouraged PO intake as tolerated ( ).     Education provided:     PO intake:  < 50% [ ] 50-75% [ ]   % [ ]  other :    Source for PO intake:     Current Weight/% Weight Change: no new weights to assess  Will continue to monitor and trend weights.     Pertinent Medications: MEDICATIONS  (STANDING):  albuterol/ipratropium for Nebulization. 3 milliLiter(s) Nebulizer every 6 hours  amantadine Syrup 100 milliGRAM(s) Oral <User Schedule>  amLODIPine   Tablet 10 milliGRAM(s) Oral daily  artificial  tears Solution 1 Drop(s) Both EYES every 6 hours  aspirin  chewable 81 milliGRAM(s) Enteral Tube daily  atorvastatin 80 milliGRAM(s) Oral at bedtime  bisacodyl Suppository 10 milliGRAM(s) Rectal at bedtime  chlorhexidine 4% Liquid 1 Application(s) Topical <User Schedule>  dextrose 5%. 1000 milliLiter(s) (50 mL/Hr) IV Continuous <Continuous>  dextrose 50% Injectable 12.5 Gram(s) IV Push once  dextrose 50% Injectable 25 Gram(s) IV Push once  dextrose 50% Injectable 25 Gram(s) IV Push once  doxazosin 8 milliGRAM(s) Oral at bedtime  erythromycin   Ointment 1 Application(s) Right EYE every 8 hours  famotidine    Tablet 20 milliGRAM(s) Oral daily  heparin   Injectable 5000 Unit(s) SubCutaneous every 8 hours  hydrALAZINE 100 milliGRAM(s) Oral three times a day  isosorbide   dinitrate Tablet (ISORDIL) 20 milliGRAM(s) Enteral Tube <User Schedule>  lactobacillus acidophilus 1 Tablet(s) Oral two times a day  lidocaine   Patch 1 Patch Transdermal every 24 hours  lidocaine   Patch 1 Patch Transdermal daily  metoprolol tartrate 12.5 milliGRAM(s) Oral <User Schedule>  ofloxacin 0.3% Solution 1 Drop(s) Right EYE four times a day  petrolatum Ophthalmic Ointment 1 Application(s) Both EYES every 8 hours  QUEtiapine 50 milliGRAM(s) Oral <User Schedule>  QUEtiapine 75 milliGRAM(s) Oral <User Schedule>  senna 2 Tablet(s) Oral at bedtime  sodium chloride 1 Gram(s) Oral every 6 hours    MEDICATIONS  (PRN):  dextrose 40% Gel 15 Gram(s) Oral once PRN Blood Glucose LESS THAN 70 milliGRAM(s)/deciliter  glucagon  Injectable 1 milliGRAM(s) IntraMuscular once PRN Glucose LESS THAN 70 milligrams/deciliter  LORazepam     Tablet 1 milliGRAM(s) Oral every 8 hours PRN Agitation  polyethylene glycol 3350 17 Gram(s) Oral daily PRN Constipation    Pertinent Labs:  10-13 Na140 mmol/L Glu 105 mg/dL<H> K+ 3.8 mmol/L Cr  1.21 mg/dL BUN 24 mg/dL<H> 10-13 Phos 4.2 mg/dL 10-09 Alb 4.0 g/dL    Skin: no pressure injuries per flowsheets   Edema: no edema per flowsheets     Estimated Needs:   Based on upper IBW 76.8kg   Energy: (25-30kcal/kg): 1920-2304kcal  Protein: (1.4-1.6g protein/kg): 108-123g protein    Previous Nutrition Diagnosis: Increased nutrient needs - diagnosis ongoing, now being addressed with PO diet     New Nutrition Diagnosis: [ ] not applicable    [ ] Inadequate Protein Energy Intake [ ]Inadequate Oral Intake [ ] Excessive Energy Intake     [ ] Underweight [ ] Increased Nutrient Needs [ ] Overweight/Obesity     [ ] Altered GI Function [ ] Unintended Weight Loss [ ] Food & Nutrition Related Knowledge Deficit[ ] Limited Adherence to nutrition related recommendations [ ] Malnutrition  [ ] other: Free text    Recommendations:  1)     Monitoring and Evaluation: Monitor PO intake, weight, labs, skin, GI status, diet     RD remains available upon request and will continue to follow-up per protocol.   Nasrin Vaughn MS RD CDN pager #[phone] Nutrition Follow-up  Patient seen for: nutrition follow-up    Source: comprehensive chart review, patient    Hospital course as per chart: Pt is a 59 yo male with PMH of afib, who presented initially on 8/9 after cardiac arrest. Prolonged and complicated hospital course noted. Transferred from [facility]CU to medicine floors with persistent neurogenic bladder, trach collar, stable, and awaiting discharge to [facility]. Chart reviewed, events noted. S/p self decannulation 10/11. S/p MBS yesterday (10/12), recommended for Regular diet with honey thickened liquids.     Diet: Dysphagia 3, Soft, Honey Consistency Fluid    Patient reports good appetite and PO intake, reports consuming "most" of breakfast this morning. Denies nausea/vomiting/diarrhea/constipation. Last BM yesterday (10/12) per flowsheets.     Encouraged continued good PO intake as tolerated. Pt amenable to trial of Honey-thick Mighty Shakes to optimize calorie and protein intake. Encouraged pt to focus on foods at mealtimes and drink supplement in-between meals. Obtained pt's food preferences, will honor as able to encourage intake. Pt with no nutrition-related questions or concerns at this time. Made aware RD remains available.     PO intake:  < 50% [ ] 50-75% [ ]   % [x]    Source for PO intake: patient    Current Weight/% Weight Change: no new weights to assess  Will continue to monitor and trend weights.     Pertinent Medications: MEDICATIONS  (STANDING):  albuterol/ipratropium for Nebulization. 3 milliLiter(s) Nebulizer every 6 hours  amantadine Syrup 100 milliGRAM(s) Oral <User Schedule>  amLODIPine   Tablet 10 milliGRAM(s) Oral daily  artificial  tears Solution 1 Drop(s) Both EYES every 6 hours  aspirin  chewable 81 milliGRAM(s) Enteral Tube daily  atorvastatin 80 milliGRAM(s) Oral at bedtime  bisacodyl Suppository 10 milliGRAM(s) Rectal at bedtime  chlorhexidine 4% Liquid 1 Application(s) Topical <User Schedule>  dextrose 5%. 1000 milliLiter(s) (50 mL/Hr) IV Continuous <Continuous>  dextrose 50% Injectable 12.5 Gram(s) IV Push once  dextrose 50% Injectable 25 Gram(s) IV Push once  dextrose 50% Injectable 25 Gram(s) IV Push once  doxazosin 8 milliGRAM(s) Oral at bedtime  erythromycin   Ointment 1 Application(s) Right EYE every 8 hours  famotidine    Tablet 20 milliGRAM(s) Oral daily  heparin   Injectable 5000 Unit(s) SubCutaneous every 8 hours  hydrALAZINE 100 milliGRAM(s) Oral three times a day  isosorbide   dinitrate Tablet (ISORDIL) 20 milliGRAM(s) Enteral Tube <User Schedule>  lactobacillus acidophilus 1 Tablet(s) Oral two times a day  lidocaine   Patch 1 Patch Transdermal every 24 hours  lidocaine   Patch 1 Patch Transdermal daily  metoprolol tartrate 12.5 milliGRAM(s) Oral <User Schedule>  ofloxacin 0.3% Solution 1 Drop(s) Right EYE four times a day  petrolatum Ophthalmic Ointment 1 Application(s) Both EYES every 8 hours  QUEtiapine 50 milliGRAM(s) Oral <User Schedule>  QUEtiapine 75 milliGRAM(s) Oral <User Schedule>  senna 2 Tablet(s) Oral at bedtime  sodium chloride 1 Gram(s) Oral every 6 hours    MEDICATIONS  (PRN):  dextrose 40% Gel 15 Gram(s) Oral once PRN Blood Glucose LESS THAN 70 milliGRAM(s)/deciliter  glucagon  Injectable 1 milliGRAM(s) IntraMuscular once PRN Glucose LESS THAN 70 milligrams/deciliter  LORazepam     Tablet 1 milliGRAM(s) Oral every 8 hours PRN Agitation  polyethylene glycol 3350 17 Gram(s) Oral daily PRN Constipation    Pertinent Labs:  10-13 Na140 mmol/L Glu 105 mg/dL<H> K+ 3.8 mmol/L Cr  1.21 mg/dL BUN 24 mg/dL<H> 10-13 Phos 4.2 mg/dL 10-09 Alb 4.0 g/dL    Skin: no pressure injuries per flowsheets   Edema: no edema per flowsheets     Estimated Needs:   Based on upper IBW 76.8kg   Energy: (25-30kcal/kg): 1920-2304kcal  Protein: (1.4-1.6g protein/kg): 108-123g protein    Previous Nutrition Diagnosis: Increased nutrient needs - diagnosis ongoing, now being addressed with PO diet; to be addressed with Mighty Shakes     New Nutrition Diagnosis: not applicable    Recommendations:  1) Continue current diet. Defer consistency/texture to Speech Pathologist/team. Monitor/adjust as needed.   2) RD to provide honey-thickened Mighty Shakes 2x/day to optimize intake.   3) Encouraged continued good PO intake as tolerated. Patient made aware RD remains available. RD to continue to obtain/honor food preferences as feasible.     Monitoring and Evaluation: Monitor PO intake, weight, labs, skin, GI status, diet     RD remains available upon request and will continue to follow-up per protocol.   Nasrin Vaughn MS NATALI CDN pager #[phone]

## 2020-10-13 NOTE — DISCHARGE NOTE PROVIDER - NSDCFUADDAPPT_GEN_ALL_CORE_FT
Dr. Pasha Deleon (Cardiology)  [address] [facility] [address]  [place], [place], [address]  Phone [phone]

## 2020-10-13 NOTE — PROGRESS NOTE ADULT - ASSESSMENT
60M h/o Afib on coumadin, presented initially on August 9, 2020 after cardiac arrest. Patient was at work, and had downtime of 25 minutes before ROSC, patient treated with therapeutic hypothermia. Patient with prolonged and complicated course (see below), transferred from [facility] to medicine floors with persistent neurogenic bladder, trach collar, stable and awaiting discharge to [facility] pending insurance clearance.   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 Patient's cerebral angiogram on 8/10 negative for aneurysm.   CV: Patient's course c/b neurogenic/cardiogenic shock, now resolved. Patient with afib on coumadin, stopped during hospital stay as patient with SAH. Patient with Afib with RVR,  amiodarone discontinued, now rate controlled on Lopressor. BP also controlled with isordil, hydralazine, and Norvasc.   Respiratory: respiratory failure requiring intubated, now s/p tach (8/24), downsized to #6 cuffless portex.   GI: GI bleed, s/p PPI gtt, no surgical intervention. Patient with PEG tube (8/26), MBS (9/28) revealed aspiration so could no be advanced to PO intake.   : Hematuria from Scott catheter trauma, s/p clot irrigation, now resolved. Neurogenic bladder resulting in urinary retention, still needs straight cath [id number], likely will need to be discharged on scott catheter. Patient on cardura.   ID: Patient had pseudomonas PNA, UTI, Cdiff; now s/p all antibiotic treatment, all resolved.   Psych: Patient with agitation restlessness during admission requiring Seroquel and ativan prn.    60M h/o Afib on coumadin, presented initially on August 9, 2020 after cardiac arrest. Patient was at work, and had downtime of 25 minutes before ROSC, patient treated with therapeutic hypothermia. Patient with prolonged and complicated course, transferred from RCU to medicine floors with persistent neurogenic bladder, trach collar, stable and awaiting discharge to [facility] pending insurance clearance.

## 2020-10-13 NOTE — PROGRESS NOTE ADULT - SUBJECTIVE AND OBJECTIVE BOX
Patient is a 60y old  Male who presents with a chief complaint of MI (11 Oct 2020 07:23)      SUBJECTIVE / OVERNIGHT EVENTS:    MEDICATIONS  (STANDING):  albuterol/ipratropium for Nebulization. 3 milliLiter(s) Nebulizer every 6 hours  amantadine Syrup 100 milliGRAM(s) Oral <User Schedule>  amLODIPine   Tablet 10 milliGRAM(s) Oral daily  artificial  tears Solution 1 Drop(s) Both EYES every 6 hours  aspirin  chewable 81 milliGRAM(s) Enteral Tube daily  atorvastatin 80 milliGRAM(s) Oral at bedtime  bisacodyl Suppository 10 milliGRAM(s) Rectal at bedtime  chlorhexidine 4% Liquid 1 Application(s) Topical <User Schedule>  dextrose 5%. 1000 milliLiter(s) (50 mL/Hr) IV Continuous <Continuous>  dextrose 50% Injectable 12.5 Gram(s) IV Push once  dextrose 50% Injectable 25 Gram(s) IV Push once  dextrose 50% Injectable 25 Gram(s) IV Push once  doxazosin 8 milliGRAM(s) Oral at bedtime  erythromycin   Ointment 1 Application(s) Right EYE every 8 hours  famotidine    Tablet 20 milliGRAM(s) Oral daily  heparin   Injectable 5000 Unit(s) SubCutaneous every 8 hours  hydrALAZINE 100 milliGRAM(s) Oral three times a day  isosorbide   dinitrate Tablet (ISORDIL) 20 milliGRAM(s) Enteral Tube <User Schedule>  lactobacillus acidophilus 1 Tablet(s) Oral two times a day  lidocaine   Patch 1 Patch Transdermal every 24 hours  lidocaine   Patch 1 Patch Transdermal daily  metoprolol tartrate 12.5 milliGRAM(s) Oral <User Schedule>  ofloxacin 0.3% Solution 1 Drop(s) Right EYE four times a day  petrolatum Ophthalmic Ointment 1 Application(s) Both EYES every 8 hours  QUEtiapine 50 milliGRAM(s) Oral <User Schedule>  QUEtiapine 75 milliGRAM(s) Oral <User Schedule>  senna 2 Tablet(s) Oral at bedtime  sodium chloride 1 Gram(s) Oral every 6 hours    MEDICATIONS  (PRN):  dextrose 40% Gel 15 Gram(s) Oral once PRN Blood Glucose LESS THAN 70 milliGRAM(s)/deciliter  glucagon  Injectable 1 milliGRAM(s) IntraMuscular once PRN Glucose LESS THAN 70 milligrams/deciliter  LORazepam     Tablet 1 milliGRAM(s) Oral every 8 hours PRN Agitation  polyethylene glycol 3350 17 Gram(s) Oral daily PRN Constipation      CAPILLARY BLOOD GLUCOSE        I&O's Summary    12 Oct 2020 07:01  -  13 Oct 2020 07:00  --------------------------------------------------------  IN: 520 mL / OUT: 700 mL / NET: -180 mL        PHYSICAL EXAM:  Vital Signs Last 24 Hrs  T(C): 36.5 (10-13-20 @ 06:07)  T(F): 97.7 (10-13-20 @ 06:07), Max: 98.9 (10-12-20 @ 22:38)  HR: 62 (10-13-20 @ 06:07) (62 - 93)  BP: 137/87 (10-13-20 @ 06:07)  BP(mean): --  RR: 18 (10-13-20 @ 06:07) (18 - 18)  SpO2: 99% (10-13-20 @ 06:07) (97% - 99%)  Wt(kg): --    10-12 @ 07:01  -  10-13 @ 07:00  --------------------------------------------------------  IN: 520 mL / OUT: 700 mL / NET: -180 mL      Constitutional: NAD, awake and alert  EYES: EOMI  ENT:  Normal Hearing, no tonsillar exudates   Neck: Soft and supple , no thyromegaly   Respiratory: Breath sounds are clear bilaterally, No wheezing, rales or rhonchi  Cardiovascular: S1 and S2, regular rate and rhythm, no Murmurs, gallops or rubs, no JVD,    Gastrointestinal: Bowel Sounds present, soft, nontender, nondistended, no guarding, no rebound  Extremities: No cyanosis or clubbing; warm to touch  Vascular: 2+ peripheral pulses lower ex  Neurological: No focal deficits, CN II-XII intact bilaterally, sensation to light touch intact in all extremities, gait intact. Pupils are equally reactive to light and symmetrical in size.   Musculoskeletal: 5/5 strength b/l upper and lower extremities; no joint swelling.  Skin: No rashes  Psych: no depression or anhedonia, AAOx3  HEME: no bruises, no nose bleeds      Personally reviewed imaging, labs, EKG  LABS:                        12.0   7.08  )-----------( 229      ( 12 Oct 2020 07:13 )             37.7     10-12    135  |  102  |  20  ----------------------------<  109<H>  3.8   |  22  |  1.03    Ca    9.6      12 Oct 2020 07:13  Phos  3.5     10-12  Mg     1.8     10-12                RADIOLOGY & ADDITIONAL TESTS:    Imaging Personally Reviewed:    Consultant(s) Notes Reviewed:      Care Discussed with Consultants/Other Providers:   Patient is a 60y old  Male who presents with a chief complaint of MI (11 Oct 2020 07:23)      SUBJECTIVE / OVERNIGHT EVENTS:  no acute events overnight, patient had a bowel movement yesterday and was seen by speech and swallow as well as ENT. S/S stated that he was able to continue regular diet with honey like consistency. has been tolerating PO diet. ENT said trach area healing well, f/u outpt. denies fever, nausea, chest pain, abdominal pain.     MEDICATIONS  (STANDING):  albuterol/ipratropium for Nebulization. 3 milliLiter(s) Nebulizer every 6 hours  amantadine Syrup 100 milliGRAM(s) Oral <User Schedule>  amLODIPine   Tablet 10 milliGRAM(s) Oral daily  artificial  tears Solution 1 Drop(s) Both EYES every 6 hours  aspirin  chewable 81 milliGRAM(s) Enteral Tube daily  atorvastatin 80 milliGRAM(s) Oral at bedtime  bisacodyl Suppository 10 milliGRAM(s) Rectal at bedtime  chlorhexidine 4% Liquid 1 Application(s) Topical <User Schedule>  dextrose 5%. 1000 milliLiter(s) (50 mL/Hr) IV Continuous <Continuous>  dextrose 50% Injectable 12.5 Gram(s) IV Push once  dextrose 50% Injectable 25 Gram(s) IV Push once  dextrose 50% Injectable 25 Gram(s) IV Push once  doxazosin 8 milliGRAM(s) Oral at bedtime  erythromycin   Ointment 1 Application(s) Right EYE every 8 hours  famotidine    Tablet 20 milliGRAM(s) Oral daily  heparin   Injectable 5000 Unit(s) SubCutaneous every 8 hours  hydrALAZINE 100 milliGRAM(s) Oral three times a day  isosorbide   dinitrate Tablet (ISORDIL) 20 milliGRAM(s) Enteral Tube <User Schedule>  lactobacillus acidophilus 1 Tablet(s) Oral two times a day  lidocaine   Patch 1 Patch Transdermal every 24 hours  lidocaine   Patch 1 Patch Transdermal daily  metoprolol tartrate 12.5 milliGRAM(s) Oral <User Schedule>  ofloxacin 0.3% Solution 1 Drop(s) Right EYE four times a day  petrolatum Ophthalmic Ointment 1 Application(s) Both EYES every 8 hours  QUEtiapine 50 milliGRAM(s) Oral <User Schedule>  QUEtiapine 75 milliGRAM(s) Oral <User Schedule>  senna 2 Tablet(s) Oral at bedtime  sodium chloride 1 Gram(s) Oral every 6 hours    MEDICATIONS  (PRN):  dextrose 40% Gel 15 Gram(s) Oral once PRN Blood Glucose LESS THAN 70 milliGRAM(s)/deciliter  glucagon  Injectable 1 milliGRAM(s) IntraMuscular once PRN Glucose LESS THAN 70 milligrams/deciliter  LORazepam     Tablet 1 milliGRAM(s) Oral every 8 hours PRN Agitation  polyethylene glycol 3350 17 Gram(s) Oral daily PRN Constipation      CAPILLARY BLOOD GLUCOSE        I&O's Summary    12 Oct 2020 07:01  -  13 Oct 2020 07:00  --------------------------------------------------------  IN: 520 mL / OUT: 700 mL / NET: -180 mL        PHYSICAL EXAM:  Vital Signs Last 24 Hrs  T(C): 36.5 (10-13-20 @ 06:07)  T(F): 97.7 (10-13-20 @ 06:07), Max: 98.9 (10-12-20 @ 22:38)  HR: 62 (10-13-20 @ 06:07) (62 - 93)  BP: 137/87 (10-13-20 @ 06:07)  BP(mean): --  RR: 18 (10-13-20 @ 06:07) (18 - 18)  SpO2: 99% (10-13-20 @ 06:07) (97% - 99%)  Wt(kg): --    10-12 @ 07:01  -  10-13 @ 07:00  --------------------------------------------------------  IN: 520 mL / OUT: 700 mL / NET: -180 mL      Constitutional: NAD, awake and alert  EYES: EOMI  ENT:  Normal Hearing, no tonsillar exudates   Neck: Soft and supple , no thyromegaly   Respiratory: Breath sounds are clear bilaterally, No wheezing, rales or rhonchi  Cardiovascular: S1 and S2, regular rate and rhythm, no Murmurs, gallops or rubs, no JVD,    Gastrointestinal: Bowel Sounds present, soft, nontender, nondistended, no guarding, no rebound  Extremities: No cyanosis or clubbing; warm to touch  Vascular: 2+ peripheral pulses lower ex  Neurological: No focal deficits, CN II-XII intact bilaterally, sensation to light touch intact in all extremities, gait intact. Pupils are equally reactive to light and symmetrical in size.   Musculoskeletal: 5/5 strength b/l upper and lower extremities; no joint swelling.  Skin: No rashes  Psych: no depression or anhedonia, AAOx3  HEME: no bruises, no nose bleeds      Personally reviewed imaging, labs, EKG  LABS:                        12.0   7.08  )-----------( 229      ( 12 Oct 2020 07:13 )             37.7     10-12    135  |  102  |  20  ----------------------------<  109<H>  3.8   |  22  |  1.03    Ca    9.6      12 Oct 2020 07:13  Phos  3.5     10-12  Mg     1.8     10-12                RADIOLOGY & ADDITIONAL TESTS:    Imaging Personally Reviewed:    Consultant(s) Notes Reviewed:      Care Discussed with Consultants/Other Providers:   Patient is a 60y old  Male who presents with a chief complaint of MI (11 Oct 2020 07:23)      SUBJECTIVE / OVERNIGHT EVENTS:  no acute events overnight, patient had a bowel movement yesterday and was seen by speech and swallow as well as ENT. S/S stated that he was able to continue regular diet with honey like consistency. has been tolerating PO diet. ENT said trach area healing well, f/u outpt. denies fever, nausea, chest pain, abdominal pain.     MEDICATIONS  (STANDING):  albuterol/ipratropium for Nebulization. 3 milliLiter(s) Nebulizer every 6 hours  amantadine Syrup 100 milliGRAM(s) Oral <User Schedule>  amLODIPine   Tablet 10 milliGRAM(s) Oral daily  artificial  tears Solution 1 Drop(s) Both EYES every 6 hours  aspirin  chewable 81 milliGRAM(s) Enteral Tube daily  atorvastatin 80 milliGRAM(s) Oral at bedtime  bisacodyl Suppository 10 milliGRAM(s) Rectal at bedtime  chlorhexidine 4% Liquid 1 Application(s) Topical <User Schedule>  dextrose 5%. 1000 milliLiter(s) (50 mL/Hr) IV Continuous <Continuous>  dextrose 50% Injectable 12.5 Gram(s) IV Push once  dextrose 50% Injectable 25 Gram(s) IV Push once  dextrose 50% Injectable 25 Gram(s) IV Push once  doxazosin 8 milliGRAM(s) Oral at bedtime  erythromycin   Ointment 1 Application(s) Right EYE every 8 hours  famotidine    Tablet 20 milliGRAM(s) Oral daily  heparin   Injectable 5000 Unit(s) SubCutaneous every 8 hours  hydrALAZINE 100 milliGRAM(s) Oral three times a day  isosorbide   dinitrate Tablet (ISORDIL) 20 milliGRAM(s) Enteral Tube <User Schedule>  lactobacillus acidophilus 1 Tablet(s) Oral two times a day  lidocaine   Patch 1 Patch Transdermal every 24 hours  lidocaine   Patch 1 Patch Transdermal daily  metoprolol tartrate 12.5 milliGRAM(s) Oral <User Schedule>  ofloxacin 0.3% Solution 1 Drop(s) Right EYE four times a day  petrolatum Ophthalmic Ointment 1 Application(s) Both EYES every 8 hours  QUEtiapine 50 milliGRAM(s) Oral <User Schedule>  QUEtiapine 75 milliGRAM(s) Oral <User Schedule>  senna 2 Tablet(s) Oral at bedtime  sodium chloride 1 Gram(s) Oral every 6 hours    MEDICATIONS  (PRN):  dextrose 40% Gel 15 Gram(s) Oral once PRN Blood Glucose LESS THAN 70 milliGRAM(s)/deciliter  glucagon  Injectable 1 milliGRAM(s) IntraMuscular once PRN Glucose LESS THAN 70 milligrams/deciliter  LORazepam     Tablet 1 milliGRAM(s) Oral every 8 hours PRN Agitation  polyethylene glycol 3350 17 Gram(s) Oral daily PRN Constipation      CAPILLARY BLOOD GLUCOSE        I&O's Summary    12 Oct 2020 07:01  -  13 Oct 2020 07:00  --------------------------------------------------------  IN: 520 mL / OUT: 700 mL / NET: -180 mL        PHYSICAL EXAM:  Vital Signs Last 24 Hrs  T(C): 36.5 (10-13-20 @ 06:07)  T(F): 97.7 (10-13-20 @ 06:07), Max: 98.9 (10-12-20 @ 22:38)  HR: 62 (10-13-20 @ 06:07) (62 - 93)  BP: 137/87 (10-13-20 @ 06:07)  BP(mean): --  RR: 18 (10-13-20 @ 06:07) (18 - 18)  SpO2: 99% (10-13-20 @ 06:07) (97% - 99%)  Wt(kg): --    10-12 @ 07:01  -  10-13 @ 07:00  --------------------------------------------------------  IN: 520 mL / OUT: 700 mL / NET: -180 mL      General: WN/WD NAD  HEENT: Patient c/o b/l vision loss since admission. Tracheostomy: # 6 Cuffless Port[place], right eye ptosis, dilated fixed right pupil, right eye with scleral irritation  Neurology: A&Ox3, nonfocal, WEI x 4  Respiratory: CTA B/L, normal respiratory effort. Trach collar.   CV: RRR, S1S2, no murmurs, rubs or gallops  Abdominal: With PEG tube, site clean, dry and intact. Soft, NT, ND +BS.   Extremities: Able to follow commands and respond to questions appropriately; hemiparesis of left upper and lower extremities (improving), 5/5 strength in right upper and lower extremities    Personally reviewed imaging, labs, EKG  LABS:                        12.0   7.08  )-----------( 229      ( 12 Oct 2020 07:13 )             37.7     10-12    135  |  102  |  20  ----------------------------<  109<H>  3.8   |  22  |  1.03    Ca    9.6      12 Oct 2020 07:13  Phos  3.5     10-12  Mg     1.8     10-12                RADIOLOGY & ADDITIONAL TESTS:    Imaging Personally Reviewed:    Consultant(s) Notes Reviewed:      Care Discussed with Consultants/Other Providers:

## 2020-10-13 NOTE — DISCHARGE NOTE PROVIDER - HOSPITAL COURSE
61YO male on coumadin for afib s/p cardiac arrest at work, down 25 minutes prior to ROSC. Pupils were R fixed and dilated, left fixed at the time, no gag reflex, post-CA cooling protocol was initiated down to 35 deg. Intubated and put on Nimbex drip. Also on Propofol, fentanyl, levophed. R groin minerva placed. Also put on heparin post-CA. 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 Course also c/b GIB, put on protonix drip. Prior to xfer was started on 3% at 30cc/hr. Exam improved on reeval off sedation, R side spont, L side nothing, L pupil 2 sluggish, R pupil 4 nonreactive, eo noxious. 60-year-old male with a history of A-Fib on warfarin who presents with cardiac arrest at work with downtime of about 25 minutes prior to ROSC on 8/9/20. S/p therapeutic hypothermia. 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 Now with resolved neurogenic/cardiogenic shock. Course complicated by GI bleed s/p PPI gtt, bleeding from scott s/p clot irrigation, prolonged respiratory failure s/p trach (8/24) and PEG (8/26). Currently with A-Fib with RVR and C diff colitis.  Also developed gross hematuria - Urology consulted +catheter with clot (irrigated and exchanged) suspected 2/2 traumatic catheterization. Additionally course complicated by fever and Pseudomonas PNA (s/p Rx) then developed C diff (now with resolved leukocytosis and improved stooling, now soft per report); s/p enteral vancomycin. Patient was initially admitted to NICU and then downgraded to RCU, then medical floors on 10/8/20. MBS performed initially on 9/28, then again on 10/12/20, patients diet advanced to regular with honey consistencies liquids. Patient self decannulated tracheostomy on 10/11/20 but remained oxygenating well on room air since then.     Patient has residual left sided weakness particularly in the left upper extremity. Pt course also complicated by vision complaints in which Opthalmology and recommended medications and outpatient follow up within one week of D/C of hospital. ENT also recommended f/u and f/u with Neurology is needed as well.     Patient was seen today and stable for discharge.    60-year-old male with a history of A-Fib on warfarin who presents with cardiac arrest at work with downtime of about 25 minutes prior to ROSC on 8/9/20. S/p therapeutic hypothermia. 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 Now with resolved neurogenic/cardiogenic shock. Course complicated by GI bleed s/p PPI gtt, bleeding from scott s/p clot irrigation, prolonged respiratory failure s/p trach (8/24) and PEG (8/26). Currently with A-Fib with RVR and C diff colitis.  Also developed gross hematuria - Urology consulted +catheter with clot (irrigated and exchanged) suspected 2/2 traumatic catheterization. Additionally course complicated by fever and Pseudomonas PNA (s/p Rx) then developed C diff (now with resolved leukocytosis and improved stooling, now soft per report); s/p enteral vancomycin. Patient was initially admitted to NICU and then downgraded to RCU, then medical floors on 10/8/20. MBS performed initially on 9/28, then again on 10/12/20, patients diet advanced to regular with honey consistencies liquids. Patient self decannulated tracheostomy on 10/11/20 but remained oxygenating well on room air since then.     Patient has residual left sided weakness particularly in the left upper extremity. Pt course also complicated by vision complaints in which Opthalmology and recommended medications and outpatient follow up within one week of D/C of hospital. ENT also recommended f/u and f/u with patient's Cardiologist also recommended as he was previously on Coumadin for AFib prior to coming in, but it was subsequently stopped due to GIB and SAH.     Patient was seen today and stable for discharge.    60-year-old male with a history of A-Fib on warfarin who presents with cardiac arrest at work with downtime of about 25 minutes prior to ROSC on 8/9/20. S/p therapeutic hypothermia. 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 Now with resolved neurogenic/cardiogenic shock. Course complicated by GI bleed s/p PPI gtt, bleeding from scott s/p clot irrigation, prolonged respiratory failure s/p trach (8/24) and PEG (8/26).  Also developed gross hematuria - Urology consulted +catheter with clot (irrigated and exchanged) suspected 2/2 traumatic catheterization. Additionally course complicated by fever and Pseudomonas PNA (s/p Rx) then developed C diff colitis which was successfully treated s/p enteral vancomycin. Patient was initially admitted to NICU and then downgraded to RCU, then medical floors on 10/8/20. MBS performed initially on 9/28, then again on 10/12/20, patients diet advanced to regular with honey consistencies liquids. Patient self decannulated tracheostomy on 10/11/20 but remained oxygenating well on room air since then.     Patient has residual left sided weakness particularly in the left upper extremity. Pt course also complicated by vision complaints in which Opthalmology and recommended medications and outpatient follow up within one week of D/C of hospital. ENT also recommended f/u and f/u with patient's Cardiologist also recommended as he was previously on Coumadin for AFib prior to coming in, but it was subsequently stopped due to GIB and SAH.     Patient was seen today and stable for discharge.

## 2020-10-13 NOTE — DISCHARGE NOTE PROVIDER - NSDCMRMEDTOKEN_GEN_ALL_CORE_FT
atorvastatin 20 mg oral tablet: 1 tab(s) orally once a day  digoxin 125 mcg (0.125 mg) oral tablet: 1 tab(s) orally once a day  enalapril 10 mg oral tablet: 1 tab(s) orally once a day  hydrALAZINE 25 mg oral tablet: 1 tab(s) orally 3 times a day  metFORMIN 500 mg oral tablet: 1 tab(s) orally with meals  metoprolol succinate 50 mg oral tablet, extended release: 1 tab(s) orally once a day  torsemide 20 mg oral tablet: 1 tab(s) orally once a day  warfarin 3 mg oral tablet: 2 tab(s) orally once a day   amantadine 50 mg/5 mL oral syrup: 10 milliliter(s) orally 2 times a day   amLODIPine 10 mg oral tablet: 1 tab(s) orally once a day  aspirin 81 mg oral tablet, chewable: 1 tab(s) orally once a day  atorvastatin 20 mg oral tablet: 1 tab(s) orally once a day  bisacodyl 10 mg rectal suppository: 1 suppository(ies) rectal once a day (at bedtime)  digoxin 125 mcg (0.125 mg) oral tablet: 1 tab(s) orally once a day  enalapril 10 mg oral tablet: 1 tab(s) orally once a day  erythromycin 0.5% ophthalmic ointment: 1 application to each affected eye every 8 hours  famotidine 20 mg oral tablet: 1 tab(s) orally once a day  hydrALAZINE 100 mg oral tablet: 1 tab(s) orally 3 times a day  metFORMIN 500 mg oral tablet: 1 tab(s) orally with meals  metoprolol succinate 50 mg oral tablet, extended release: 1 tab(s) orally once a day  ofloxacin 0.3% ophthalmic solution: 1 drop(s) to each affected eye 4 times a day  polyethylene glycol 3350 oral powder for reconstitution: 17 gram(s) orally once a day, As needed, Constipation  QUEtiapine 25 mg oral tablet: 3 tab(s) orally   QUEtiapine 50 mg oral tablet: 1 tab(s) orally   senna oral tablet: 2 tab(s) orally once a day (at bedtime)  sodium chloride 1 g oral tablet: 1 tab(s) orally every 6 hours  torsemide 20 mg oral tablet: 1 tab(s) orally once a day  warfarin 3 mg oral tablet: 2 tab(s) orally once a day   amantadine 50 mg/5 mL oral syrup: 10 milliliter(s) orally 2 times a day   amLODIPine 10 mg oral tablet: 1 tab(s) orally once a day  aspirin 81 mg oral tablet, chewable: 1 tab(s) orally once a day  atorvastatin 20 mg oral tablet: 1 tab(s) orally once a day  bisacodyl 10 mg rectal suppository: 1 suppository(ies) rectal once a day (at bedtime)  digoxin 125 mcg (0.125 mg) oral tablet: 1 tab(s) orally once a day  enalapril 10 mg oral tablet: 1 tab(s) orally once a day  erythromycin 0.5% ophthalmic ointment: 1 application to each affected eye every 8 hours  famotidine 20 mg oral tablet: 1 tab(s) orally once a day  hydrALAZINE 100 mg oral tablet: 1 tab(s) orally 3 times a day  metFORMIN 500 mg oral tablet: 1 tab(s) orally with meals  metoprolol succinate 50 mg oral tablet, extended release: 1 tab(s) orally once a day  ofloxacin 0.3% ophthalmic solution: 1 drop(s) to each affected eye 4 times a day  polyethylene glycol 3350 oral powder for reconstitution: 17 gram(s) orally once a day, As needed, Constipation  QUEtiapine 25 mg oral tablet: 3 tab(s) orally once a day  QUEtiapine 50 mg oral tablet: 1 tab(s) orally once a day (at bedtime)  senna oral tablet: 2 tab(s) orally once a day (at bedtime)  sodium chloride 1 g oral tablet: 1 tab(s) orally every 6 hours  torsemide 20 mg oral tablet: 1 tab(s) orally once a day  warfarin 3 mg oral tablet: 2 tab(s) orally once a day   amantadine 50 mg/5 mL oral syrup: 10 milliliter(s) orally 2 times a day   amLODIPine 10 mg oral tablet: 1 tab(s) orally once a day  aspirin 81 mg oral tablet, chewable: 1 tab(s) orally once a day  atorvastatin 20 mg oral tablet: 1 tab(s) orally once a day  bisacodyl 10 mg rectal suppository: 1 suppository(ies) rectal once a day (at bedtime)  digoxin 125 mcg (0.125 mg) oral tablet: 1 tab(s) orally once a day  enalapril 10 mg oral tablet: 1 tab(s) orally once a day  erythromycin 0.5% ophthalmic ointment: 1 application to each affected eye every 8 hours  famotidine 20 mg oral tablet: 1 tab(s) orally once a day  hydrALAZINE 100 mg oral tablet: 1 tab(s) orally 3 times a day  metFORMIN 500 mg oral tablet: 1 tab(s) orally with meals  metoprolol succinate 50 mg oral tablet, extended release: 1 tab(s) orally once a day  ofloxacin 0.3% ophthalmic solution: 1 drop(s) to each affected eye 4 times a day  polyethylene glycol 3350 oral powder for reconstitution: 17 gram(s) orally once a day, As needed, Constipation  QUEtiapine 25 mg oral tablet: 3 tab(s) orally once a day (in the morning)  QUEtiapine 50 mg oral tablet: 1 tab(s) orally once a day (at bedtime)  senna oral tablet: 2 tab(s) orally once a day (at bedtime)  sodium chloride 1 g oral tablet: 1 tab(s) orally every 6 hours  torsemide 20 mg oral tablet: 1 tab(s) orally once a day   amantadine 50 mg/5 mL oral syrup: 10 milliliter(s) orally 2 times a day   amLODIPine 10 mg oral tablet: 1 tab(s) orally once a day  aspirin 81 mg oral tablet, chewable: 1 tab(s) orally once a day  atorvastatin 20 mg oral tablet: 1 tab(s) orally once a day  bisacodyl 10 mg rectal suppository: 1 suppository(ies) rectal once a day (at bedtime)  enalapril 10 mg oral tablet: 1 tab(s) orally once a day  erythromycin 0.5% ophthalmic ointment: 1 application to each affected eye every 8 hours  famotidine 20 mg oral tablet: 1 tab(s) orally once a day  hydrALAZINE 100 mg oral tablet: 1 tab(s) orally 3 times a day  metFORMIN 500 mg oral tablet: 1 tab(s) orally with meals  Metoprolol Tartrate: 12.5 tab(s) orally 2 times a day  ofloxacin 0.3% ophthalmic solution: 1 drop(s) to each affected eye 4 times a day  polyethylene glycol 3350 oral powder for reconstitution: 17 gram(s) orally once a day, As needed, Constipation  QUEtiapine 25 mg oral tablet: 3 tab(s) orally once a day (in the morning)  QUEtiapine 50 mg oral tablet: 1 tab(s) orally once a day (at bedtime)  senna oral tablet: 2 tab(s) orally once a day (at bedtime)  sodium chloride 1 g oral tablet: 1 tab(s) orally every 6 hours   amantadine 50 mg/5 mL oral syrup: 10 milliliter(s) orally 2 times a day   amLODIPine 10 mg oral tablet: 1 tab(s) orally once a day  aspirin 81 mg oral tablet, chewable: 1 tab(s) orally once a day  atorvastatin 20 mg oral tablet: 1 tab(s) orally once a day  bisacodyl 10 mg rectal suppository: 1 suppository(ies) rectal once a day (at bedtime)  enalapril 10 mg oral tablet: 1 tab(s) orally once a day  erythromycin 0.5% ophthalmic ointment: 1 application to each affected eye every 8 hours  famotidine 20 mg oral tablet: 1 tab(s) orally once a day  hydrALAZINE 100 mg oral tablet: 1 tab(s) orally 3 times a day  metFORMIN 500 mg oral tablet: 1 tab(s) orally with meal once a day  Metoprolol Tartrate: 12.5 tab(s) orally 2 times a day  ofloxacin 0.3% ophthalmic solution: 1 drop(s) to each affected eye 4 times a day  polyethylene glycol 3350 oral powder for reconstitution: 17 gram(s) orally once a day, As needed, Constipation  QUEtiapine 25 mg oral tablet: 3 tab(s) orally once a day (in the evening)  QUEtiapine 50 mg oral tablet: 1 tab(s) orally 2 times a day  senna oral tablet: 2 tab(s) orally once a day (at bedtime)  sodium chloride 1 g oral tablet: 1 tab(s) orally every 6 hours

## 2020-10-13 NOTE — PROGRESS NOTE ADULT - PROBLEM SELECTOR PLAN 3
Fentanyl Patch 12mcg Q 72H discontinued on 10/7; No signs of agitation   Continue Seroquel 50 mg morning / afternoon   Continue Seroquel evening dose 75 mg   Continue PRN Ativan via PEG for agitation not treated by standing Seroquel RESOLVING  Fentanyl Patch 12mcg Q 72H discontinued on 10/7; No signs of agitation   Continue Seroquel 50 mg morning / afternoon   Continue Seroquel evening dose 75 mg   Continue PRN Ativan via PEG for agitation not treated by standing Seroquel

## 2020-10-13 NOTE — DISCHARGE NOTE PROVIDER - NSDCCPTREATMENT_GEN_ALL_CORE_FT
PRINCIPAL PROCEDURE  Procedure: Open tracheostomy  Findings and Treatment: -Keep gauze dressing in place until completely healed over  -Pt to occlude stoma when speaking or coughing until completely healed       PRINCIPAL PROCEDURE  Procedure: Open tracheostomy  Findings and Treatment: -Keep gauze dressing in place until completely healed over  -Pt to occlude stoma when speaking or coughing until completely healed      SECONDARY PROCEDURE  Procedure: Brief follow-up hospital care  Findings and Treatment: Follow up with your Cardiologist in 1-2 weeks.   Follow up with outpatient Otolaryngology within 1-2 weeks   Follow up with Opthalmology.

## 2020-10-13 NOTE — DISCHARGE NOTE PROVIDER - NSDCCPCAREPLAN_GEN_ALL_CORE_FT
PRINCIPAL DISCHARGE DIAGNOSIS  Diagnosis: Cardiac arrest  Assessment and Plan of Treatment: You came to the hospital for cardiac arrest. Return of spontaneous circulation was achieved. You were admitted to NSICU for further management and care due to neurogenic and cardiogenic shock. 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 It will be important to follow up with your doctor appointments as scheduled and return to the hospital if you have further complications with your heart.      SECONDARY DISCHARGE DIAGNOSES  Diagnosis: History of tracheostomy  Assessment and Plan of Treatment: The ENT doctors performed a procedure called a tracheostomy while you were in the hospital. It will be important to follow up with the ENT doctors as an outpatient.     PRINCIPAL DISCHARGE DIAGNOSIS  Diagnosis: Cardiac arrest  Assessment and Plan of Treatment: You came to the hospital for cardiac arrest. Return of spontaneous circulation was achieved. You were admitted to NSICU for further management and care due to neurogenic and cardiogenic shock. 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 It will be important to follow up with your doctor appointments as scheduled and return to the hospital if you have further complications with your heart.  Please see your Cardiologist within 1-2 weeks to decide if Coumadin should be resumed.      SECONDARY DISCHARGE DIAGNOSES  Diagnosis: History of tracheostomy  Assessment and Plan of Treatment: The ENT doctors performed a procedure called a tracheostomy while you were in the hospital. It will be important to follow up with the ENT doctors as an outpatient.

## 2020-10-13 NOTE — DISCHARGE NOTE PROVIDER - NSDCFUADDINST_GEN_ALL_CORE_FT
please provide appropriate care and hygiene to trach stoma and bladder scott. please provide appropriate care and hygiene to trach stoma and bladder scott.  please taper salt tablets as clinically appropriate

## 2020-10-13 NOTE — DISCHARGE NOTE PROVIDER - INSTRUCTIONS
dysphagia 3 soft, honey consistency fluid  aspiration precautions.  please consider swallow re-eval within 7-10 days

## 2020-10-13 NOTE — DISCHARGE NOTE PROVIDER - CARE PROVIDERS DIRECT ADDRESSES
,[email or web address] ,[email or web address]

## 2020-10-13 NOTE — DISCHARGE NOTE PROVIDER - NSFOLLOWUPCLINICS_GEN_ALL_ED_FT
An ENT Doctor  Otolaryngology (ENT)  .  [place]   Phone:   Fax:   Follow Up Time: 2 weeks     [facility]  Ophthalmology  [address] [place] [id number]  [place] [address]  Phone: [phone]  Fax:   Follow Up Time: 2 weeks

## 2020-10-13 NOTE — PROGRESS NOTE ADULT - PROBLEM SELECTOR PLAN 7
S/p Peg 8/26 by GI  -MBS today; recommending regular diet with honey thickened liquids; will drink via straw as well as utilize left head turn for maximum swallowing capability   -discussed with S/S STABLE  S/p Peg 8/26 by GI  -MBS today; recommending regular diet with honey thickened liquids; will drink via straw as well as utilize left head turn for maximum swallowing capability   -discussed with S/S

## 2020-10-13 NOTE — PROGRESS NOTE ADULT - PROBLEM SELECTOR PLAN 8
JALEN resolved with increased frequency of Straight Catheterization; patient now with indwelling scott catheter   Continue to Monitor BMP JALEN resolved with increased frequency of Straight Catheterization; patient now with -indwelling scott catheter   Continue to Monitor BMP

## 2020-10-13 NOTE — PROGRESS NOTE ADULT - PROBLEM SELECTOR PLAN 4
STABLE   Possible stunned myocardium due to cardiac arrest vs. SAH  TTE: Global LV dysfunction. EF 41%, Severe concentric LVH, flattening of interventricular septum  Will need cardiac cath to evaluate for ischemia as outpatient  Continue afterload/preload reduction with hydralazine and isosorbide dinitrate  Norvasc increased to 10mg QD with improved BP

## 2020-10-13 NOTE — PROGRESS NOTE ADULT - PROBLEM SELECTOR PLAN 2
STABLE   No Further neurosurgical intervention at this time  Residual left sided deficits, improving. Patient with vision loss, appreciate optho recs

## 2020-10-13 NOTE — PROGRESS NOTE ADULT - PROBLEM SELECTOR PLAN 1
-Patient S/p Tracheostomy on 8/24 with ENT, s/p self decannulation yesterday 10/11.   -has been stable on RA  Tracheoscopy 9/24:  3-4 mm area of likely granulation tissue on proximal left mainstem bronchus without evidence of air way obstruction   granulation tissue, not ready yet)  - Tracheostomy tube spontaneously displaced; dressing placed. Continue to monitor vitals q4h  -consulted ENT to check trach site; pulmonary contacted and recommended contacting ENT -Patient S/p Tracheostomy on 8/24 with ENT, s/p self decannulation 10/11.   -has been stable on RA  - Tracheostomy tube spontaneously displaced; dressing placed. Continue to monitor vitals q4h  -consulted ENT to check trach site; trach site healing well, patient should f/u outpt

## 2020-10-13 NOTE — PROGRESS NOTE ADULT - PROBLEM SELECTOR PLAN 5
Completed Abx for Pseudomonas aeruginosa pneumonia/colonization:  S/p prolonged course of cefepime until 8/14-8/20   C diff: Completed oral Vanco 8/29 and PPX Dose completed on 9/16   UTI: Pseudomonas S/p Cefepime  Currently remains off abx, Monitor Temperature curve. no current leukocytosis RESOLVED   Currently remains off abx, Monitor Temperature curve. no current leukocytosis

## 2020-10-13 NOTE — DISCHARGE NOTE PROVIDER - PROVIDER TOKENS
PROVIDER:[TOKEN:[[id number]],FOLLOWUP:[2 weeks]],PROVIDER:[TOKEN:[[id number]],FOLLOWUP:[2 weeks],ESTABLISHEDPATIENT:[T]] PROVIDER:[TOKEN:[[id number]],FOLLOWUP:[2 weeks]],PROVIDER:[TOKEN:[[id number]],FOLLOWUP:[1 week],ESTABLISHEDPATIENT:[T]] PROVIDER:[TOKEN:[[id number]],FOLLOWUP:[2 weeks]],PROVIDER:[TOKEN:[[id number]],FOLLOWUP:[1 week],ESTABLISHEDPATIENT:[T]],PROVIDER:[TOKEN:[[id number]],FOLLOWUP:[1 week]],PROVIDER:[TOKEN:[[id number]],FOLLOWUP:[1 month]] PROVIDER:[TOKEN:[[id number]],FOLLOWUP:[2 weeks]],PROVIDER:[TOKEN:[[id number]],FOLLOWUP:[1 week],ESTABLISHEDPATIENT:[T]],PROVIDER:[TOKEN:[[id number]],FOLLOWUP:[1 week]],PROVIDER:[TOKEN:[[id number]],FOLLOWUP:[1 month]],PROVIDER:[TOKEN:[[id number]],FOLLOWUP:[1 month]]

## 2020-10-13 NOTE — PROGRESS NOTE ADULT - PROBLEM SELECTOR PLAN 10
Patient still having issues with insurance no long term benefit   Family still without plan after acute rehab therefore he will not qualify   Medicaid approved but Long term benefit will not be available until November patient to go to subacute rehab pending insurance clearance   he is medically stable and optimized for d/c

## 2020-10-14 VITALS
DIASTOLIC BLOOD PRESSURE: 70 MMHG | SYSTOLIC BLOOD PRESSURE: 134 MMHG | RESPIRATION RATE: 18 BRPM | TEMPERATURE: 98 F | OXYGEN SATURATION: 100 % | HEART RATE: 70 BPM

## 2020-10-14 LAB
ANION GAP SERPL CALC-SCNC: 12 MMOL/L — SIGNIFICANT CHANGE UP (ref 5–17)
APTT BLD: 40.8 SEC — HIGH (ref 27.5–35.5)
BUN SERPL-MCNC: 21 MG/DL — SIGNIFICANT CHANGE UP (ref 7–23)
CALCIUM SERPL-MCNC: 9.5 MG/DL — SIGNIFICANT CHANGE UP (ref 8.4–10.5)
CHLORIDE SERPL-SCNC: 104 MMOL/L — SIGNIFICANT CHANGE UP (ref 96–108)
CO2 SERPL-SCNC: 23 MMOL/L — SIGNIFICANT CHANGE UP (ref 22–31)
CREAT SERPL-MCNC: 1.15 MG/DL — SIGNIFICANT CHANGE UP (ref 0.5–1.3)
GLUCOSE SERPL-MCNC: 101 MG/DL — HIGH (ref 70–99)
HCT VFR BLD CALC: 38.5 % — LOW (ref 39–50)
HGB BLD-MCNC: 12.1 G/DL — LOW (ref 13–17)
INR BLD: 1.15 RATIO — SIGNIFICANT CHANGE UP (ref 0.88–1.16)
MAGNESIUM SERPL-MCNC: 1.7 MG/DL — SIGNIFICANT CHANGE UP (ref 1.6–2.6)
MCHC RBC-ENTMCNC: 28.2 PG — SIGNIFICANT CHANGE UP (ref 27–34)
MCHC RBC-ENTMCNC: 31.4 GM/DL — LOW (ref 32–36)
MCV RBC AUTO: 89.7 FL — SIGNIFICANT CHANGE UP (ref 80–100)
NRBC # BLD: 0 /100 WBCS — SIGNIFICANT CHANGE UP (ref 0–0)
PHOSPHATE SERPL-MCNC: 4.3 MG/DL — SIGNIFICANT CHANGE UP (ref 2.5–4.5)
PLATELET # BLD AUTO: 236 K/UL — SIGNIFICANT CHANGE UP (ref 150–400)
POTASSIUM SERPL-MCNC: 4 MMOL/L — SIGNIFICANT CHANGE UP (ref 3.5–5.3)
POTASSIUM SERPL-SCNC: 4 MMOL/L — SIGNIFICANT CHANGE UP (ref 3.5–5.3)
PROTHROM AB SERPL-ACNC: 13.7 SEC — HIGH (ref 10.6–13.6)
RBC # BLD: 4.29 M/UL — SIGNIFICANT CHANGE UP (ref 4.2–5.8)
RBC # FLD: 16.2 % — HIGH (ref 10.3–14.5)
SODIUM SERPL-SCNC: 139 MMOL/L — SIGNIFICANT CHANGE UP (ref 135–145)
WBC # BLD: 7.66 K/UL — SIGNIFICANT CHANGE UP (ref 3.8–10.5)
WBC # FLD AUTO: 7.66 K/UL — SIGNIFICANT CHANGE UP (ref 3.8–10.5)

## 2020-10-14 PROCEDURE — 86901 BLOOD TYPING SEROLOGIC RH(D): CPT

## 2020-10-14 PROCEDURE — 82803 BLOOD GASES ANY COMBINATION: CPT

## 2020-10-14 PROCEDURE — 81001 URINALYSIS AUTO W/SCOPE: CPT

## 2020-10-14 PROCEDURE — 94002 VENT MGMT INPAT INIT DAY: CPT

## 2020-10-14 PROCEDURE — 82570 ASSAY OF URINE CREATININE: CPT

## 2020-10-14 PROCEDURE — 84439 ASSAY OF FREE THYROXINE: CPT

## 2020-10-14 PROCEDURE — 74176 CT ABD & PELVIS W/O CONTRAST: CPT

## 2020-10-14 PROCEDURE — 86803 HEPATITIS C AB TEST: CPT

## 2020-10-14 PROCEDURE — 72100 X-RAY EXAM L-S SPINE 2/3 VWS: CPT

## 2020-10-14 PROCEDURE — 84484 ASSAY OF TROPONIN QUANT: CPT

## 2020-10-14 PROCEDURE — C1751: CPT

## 2020-10-14 PROCEDURE — 93005 ELECTROCARDIOGRAM TRACING: CPT

## 2020-10-14 PROCEDURE — 70498 CT ANGIOGRAPHY NECK: CPT

## 2020-10-14 PROCEDURE — 86900 BLOOD TYPING SEROLOGIC ABO: CPT

## 2020-10-14 PROCEDURE — 83036 HEMOGLOBIN GLYCOSYLATED A1C: CPT

## 2020-10-14 PROCEDURE — 83605 ASSAY OF LACTIC ACID: CPT

## 2020-10-14 PROCEDURE — 94640 AIRWAY INHALATION TREATMENT: CPT

## 2020-10-14 PROCEDURE — 92526 ORAL FUNCTION THERAPY: CPT

## 2020-10-14 PROCEDURE — P9045: CPT

## 2020-10-14 PROCEDURE — 84443 ASSAY THYROID STIM HORMONE: CPT

## 2020-10-14 PROCEDURE — 72156 MRI NECK SPINE W/O & W/DYE: CPT

## 2020-10-14 PROCEDURE — C1894: CPT

## 2020-10-14 PROCEDURE — 93970 EXTREMITY STUDY: CPT

## 2020-10-14 PROCEDURE — 84145 PROCALCITONIN (PCT): CPT

## 2020-10-14 PROCEDURE — 97760 ORTHOTIC MGMT&TRAING 1ST ENC: CPT

## 2020-10-14 PROCEDURE — 80061 LIPID PANEL: CPT

## 2020-10-14 PROCEDURE — 94799 UNLISTED PULMONARY SVC/PX: CPT

## 2020-10-14 PROCEDURE — 84300 ASSAY OF URINE SODIUM: CPT

## 2020-10-14 PROCEDURE — 74018 RADEX ABDOMEN 1 VIEW: CPT

## 2020-10-14 PROCEDURE — 82330 ASSAY OF CALCIUM: CPT

## 2020-10-14 PROCEDURE — 87324 CLOSTRIDIUM AG IA: CPT

## 2020-10-14 PROCEDURE — 87040 BLOOD CULTURE FOR BACTERIA: CPT

## 2020-10-14 PROCEDURE — 95700 EEG CONT REC W/VID EEG TECH: CPT

## 2020-10-14 PROCEDURE — U0003: CPT

## 2020-10-14 PROCEDURE — 85610 PROTHROMBIN TIME: CPT

## 2020-10-14 PROCEDURE — C1887: CPT

## 2020-10-14 PROCEDURE — 97167 OT EVAL HIGH COMPLEX 60 MIN: CPT

## 2020-10-14 PROCEDURE — 94003 VENT MGMT INPAT SUBQ DAY: CPT

## 2020-10-14 PROCEDURE — A9585: CPT

## 2020-10-14 PROCEDURE — 87186 SC STD MICRODIL/AGAR DIL: CPT

## 2020-10-14 PROCEDURE — L8699: CPT

## 2020-10-14 PROCEDURE — 36226 PLACE CATH VERTEBRAL ART: CPT

## 2020-10-14 PROCEDURE — 36218 PLACE CATHETER IN ARTERY: CPT | Mod: 59

## 2020-10-14 PROCEDURE — 82436 ASSAY OF URINE CHLORIDE: CPT

## 2020-10-14 PROCEDURE — 87070 CULTURE OTHR SPECIMN AEROBIC: CPT

## 2020-10-14 PROCEDURE — 83690 ASSAY OF LIPASE: CPT

## 2020-10-14 PROCEDURE — 97116 GAIT TRAINING THERAPY: CPT

## 2020-10-14 PROCEDURE — 36224 PLACE CATH CAROTD ART: CPT

## 2020-10-14 PROCEDURE — 80053 COMPREHEN METABOLIC PANEL: CPT

## 2020-10-14 PROCEDURE — 36569 INSJ PICC 5 YR+ W/O IMAGING: CPT

## 2020-10-14 PROCEDURE — C1769: CPT

## 2020-10-14 PROCEDURE — 84295 ASSAY OF SERUM SODIUM: CPT

## 2020-10-14 PROCEDURE — 86850 RBC ANTIBODY SCREEN: CPT

## 2020-10-14 PROCEDURE — 97129 THER IVNTJ 1ST 15 MIN: CPT

## 2020-10-14 PROCEDURE — 85014 HEMATOCRIT: CPT

## 2020-10-14 PROCEDURE — 82962 GLUCOSE BLOOD TEST: CPT

## 2020-10-14 PROCEDURE — 97110 THERAPEUTIC EXERCISES: CPT

## 2020-10-14 PROCEDURE — 84540 ASSAY OF URINE/UREA-N: CPT

## 2020-10-14 PROCEDURE — 36227 PLACE CATH XTRNL CAROTID: CPT

## 2020-10-14 PROCEDURE — C9399: CPT

## 2020-10-14 PROCEDURE — 75774 ARTERY X-RAY EACH VESSEL: CPT | Mod: 59

## 2020-10-14 PROCEDURE — 82565 ASSAY OF CREATININE: CPT

## 2020-10-14 PROCEDURE — 74230 X-RAY XM SWLNG FUNCJ C+: CPT

## 2020-10-14 PROCEDURE — 87449 NOS EACH ORGANISM AG IA: CPT

## 2020-10-14 PROCEDURE — 70450 CT HEAD/BRAIN W/O DYE: CPT

## 2020-10-14 PROCEDURE — 71045 X-RAY EXAM CHEST 1 VIEW: CPT

## 2020-10-14 PROCEDURE — 85730 THROMBOPLASTIN TIME PARTIAL: CPT

## 2020-10-14 PROCEDURE — 92507 TX SP LANG VOICE COMM INDIV: CPT

## 2020-10-14 PROCEDURE — 87086 URINE CULTURE/COLONY COUNT: CPT

## 2020-10-14 PROCEDURE — 95714 VEEG EA 12-26 HR UNMNTR: CPT

## 2020-10-14 PROCEDURE — 82435 ASSAY OF BLOOD CHLORIDE: CPT

## 2020-10-14 PROCEDURE — 80048 BASIC METABOLIC PNL TOTAL CA: CPT

## 2020-10-14 PROCEDURE — 70553 MRI BRAIN STEM W/O & W/DYE: CPT

## 2020-10-14 PROCEDURE — 93306 TTE W/DOPPLER COMPLETE: CPT

## 2020-10-14 PROCEDURE — 97535 SELF CARE MNGMENT TRAINING: CPT

## 2020-10-14 PROCEDURE — 85025 COMPLETE CBC W/AUTO DIFF WBC: CPT

## 2020-10-14 PROCEDURE — 84100 ASSAY OF PHOSPHORUS: CPT

## 2020-10-14 PROCEDURE — 82947 ASSAY GLUCOSE BLOOD QUANT: CPT

## 2020-10-14 PROCEDURE — C1889: CPT

## 2020-10-14 PROCEDURE — 92597 ORAL SPEECH DEVICE EVAL: CPT

## 2020-10-14 PROCEDURE — 85018 HEMOGLOBIN: CPT

## 2020-10-14 PROCEDURE — 92523 SPEECH SOUND LANG COMPREHEN: CPT

## 2020-10-14 PROCEDURE — 95711 VEEG 2-12 HR UNMONITORED: CPT

## 2020-10-14 PROCEDURE — 71250 CT THORAX DX C-: CPT

## 2020-10-14 PROCEDURE — 83735 ASSAY OF MAGNESIUM: CPT

## 2020-10-14 PROCEDURE — 97530 THERAPEUTIC ACTIVITIES: CPT

## 2020-10-14 PROCEDURE — 99239 HOSP IP/OBS DSCHRG MGMT >30: CPT | Mod: GC

## 2020-10-14 PROCEDURE — 80076 HEPATIC FUNCTION PANEL: CPT

## 2020-10-14 PROCEDURE — 97163 PT EVAL HIGH COMPLEX 45 MIN: CPT

## 2020-10-14 PROCEDURE — 71275 CT ANGIOGRAPHY CHEST: CPT

## 2020-10-14 PROCEDURE — 85027 COMPLETE CBC AUTOMATED: CPT

## 2020-10-14 PROCEDURE — 83880 ASSAY OF NATRIURETIC PEPTIDE: CPT

## 2020-10-14 PROCEDURE — 36600 WITHDRAWAL OF ARTERIAL BLOOD: CPT

## 2020-10-14 PROCEDURE — 84132 ASSAY OF SERUM POTASSIUM: CPT

## 2020-10-14 PROCEDURE — 80177 DRUG SCRN QUAN LEVETIRACETAM: CPT

## 2020-10-14 PROCEDURE — 93888 INTRACRANIAL LIMITED STUDY: CPT

## 2020-10-14 PROCEDURE — 70496 CT ANGIOGRAPHY HEAD: CPT

## 2020-10-14 PROCEDURE — 92611 MOTION FLUOROSCOPY/SWALLOW: CPT

## 2020-10-14 PROCEDURE — 92610 EVALUATE SWALLOWING FUNCTION: CPT

## 2020-10-14 PROCEDURE — 97112 NEUROMUSCULAR REEDUCATION: CPT

## 2020-10-14 RX ORDER — POLYETHYLENE GLYCOL 3350 17 G/17G
17 POWDER, FOR SOLUTION ORAL
Qty: 0 | Refills: 0 | DISCHARGE
Start: 2020-10-14

## 2020-10-14 RX ORDER — SODIUM CHLORIDE 9 MG/ML
1 INJECTION INTRAMUSCULAR; INTRAVENOUS; SUBCUTANEOUS
Qty: 0 | Refills: 0 | DISCHARGE
Start: 2020-10-14

## 2020-10-14 RX ORDER — QUETIAPINE FUMARATE 200 MG/1
3 TABLET, FILM COATED ORAL
Qty: 0 | Refills: 0 | DISCHARGE
Start: 2020-10-14

## 2020-10-14 RX ORDER — ASPIRIN/CALCIUM CARB/MAGNESIUM 324 MG
1 TABLET ORAL
Qty: 0 | Refills: 0 | DISCHARGE
Start: 2020-10-14

## 2020-10-14 RX ORDER — METOPROLOL TARTRATE 50 MG
12.5 TABLET ORAL
Qty: 0 | Refills: 0 | DISCHARGE

## 2020-10-14 RX ORDER — AMANTADINE HCL 100 MG
10 CAPSULE ORAL
Qty: 0 | Refills: 0 | DISCHARGE
Start: 2020-10-14

## 2020-10-14 RX ORDER — HYDRALAZINE HCL 50 MG
1 TABLET ORAL
Qty: 0 | Refills: 0 | DISCHARGE
Start: 2020-10-14

## 2020-10-14 RX ORDER — DOXAZOSIN MESYLATE 4 MG
1 TABLET ORAL
Qty: 30 | Refills: 0
Start: 2020-10-14 | End: 2020-11-12

## 2020-10-14 RX ORDER — FAMOTIDINE 10 MG/ML
1 INJECTION INTRAVENOUS
Qty: 0 | Refills: 0 | DISCHARGE
Start: 2020-10-14

## 2020-10-14 RX ORDER — SENNA PLUS 8.6 MG/1
2 TABLET ORAL
Qty: 0 | Refills: 0 | DISCHARGE
Start: 2020-10-14

## 2020-10-14 RX ORDER — OFLOXACIN 0.3 %
1 DROPS OPHTHALMIC (EYE)
Qty: 0 | Refills: 0 | DISCHARGE
Start: 2020-10-14

## 2020-10-14 RX ORDER — QUETIAPINE FUMARATE 200 MG/1
1 TABLET, FILM COATED ORAL
Qty: 0 | Refills: 0 | DISCHARGE
Start: 2020-10-14

## 2020-10-14 RX ORDER — DIGOXIN 250 MCG
1 TABLET ORAL
Qty: 0 | Refills: 0 | DISCHARGE

## 2020-10-14 RX ORDER — ISOSORBIDE DINITRATE 5 MG/1
1 TABLET ORAL
Qty: 90 | Refills: 0
Start: 2020-10-14 | End: 2020-11-12

## 2020-10-14 RX ORDER — METOPROLOL TARTRATE 50 MG
1 TABLET ORAL
Qty: 0 | Refills: 0 | DISCHARGE

## 2020-10-14 RX ORDER — METFORMIN HYDROCHLORIDE 850 MG/1
1 TABLET ORAL
Qty: 0 | Refills: 0 | DISCHARGE

## 2020-10-14 RX ORDER — ERYTHROMYCIN BASE 5 MG/GRAM
1 OINTMENT (GRAM) OPHTHALMIC (EYE)
Qty: 0 | Refills: 0 | DISCHARGE
Start: 2020-10-14

## 2020-10-14 RX ORDER — WARFARIN SODIUM 2.5 MG/1
2 TABLET ORAL
Qty: 0 | Refills: 0 | DISCHARGE

## 2020-10-14 RX ORDER — HYDRALAZINE HCL 50 MG
1 TABLET ORAL
Qty: 0 | Refills: 0 | DISCHARGE

## 2020-10-14 RX ORDER — AMLODIPINE BESYLATE 2.5 MG/1
1 TABLET ORAL
Qty: 0 | Refills: 0 | DISCHARGE
Start: 2020-10-14

## 2020-10-14 RX ADMIN — Medication 100 MILLIGRAM(S): at 13:13

## 2020-10-14 RX ADMIN — QUETIAPINE FUMARATE 50 MILLIGRAM(S): 200 TABLET, FILM COATED ORAL at 13:15

## 2020-10-14 RX ADMIN — HEPARIN SODIUM 5000 UNIT(S): 5000 INJECTION INTRAVENOUS; SUBCUTANEOUS at 13:16

## 2020-10-14 RX ADMIN — CHLORHEXIDINE GLUCONATE 1 APPLICATION(S): 213 SOLUTION TOPICAL at 05:58

## 2020-10-14 RX ADMIN — Medication 100 MILLIGRAM(S): at 13:25

## 2020-10-14 RX ADMIN — QUETIAPINE FUMARATE 50 MILLIGRAM(S): 200 TABLET, FILM COATED ORAL at 05:54

## 2020-10-14 RX ADMIN — Medication 3 MILLILITER(S): at 11:49

## 2020-10-14 RX ADMIN — ISOSORBIDE DINITRATE 20 MILLIGRAM(S): 5 TABLET ORAL at 09:52

## 2020-10-14 RX ADMIN — Medication 100 MILLIGRAM(S): at 09:52

## 2020-10-14 RX ADMIN — AMLODIPINE BESYLATE 10 MILLIGRAM(S): 2.5 TABLET ORAL at 05:53

## 2020-10-14 RX ADMIN — SODIUM CHLORIDE 1 GRAM(S): 9 INJECTION INTRAMUSCULAR; INTRAVENOUS; SUBCUTANEOUS at 13:15

## 2020-10-14 RX ADMIN — Medication 1 DROP(S): at 13:13

## 2020-10-14 RX ADMIN — Medication 3 MILLILITER(S): at 05:47

## 2020-10-14 RX ADMIN — FAMOTIDINE 20 MILLIGRAM(S): 10 INJECTION INTRAVENOUS at 13:14

## 2020-10-14 RX ADMIN — Medication 12.5 MILLIGRAM(S): at 09:52

## 2020-10-14 RX ADMIN — SODIUM CHLORIDE 1 GRAM(S): 9 INJECTION INTRAMUSCULAR; INTRAVENOUS; SUBCUTANEOUS at 05:53

## 2020-10-14 RX ADMIN — Medication 1 DROP(S): at 05:54

## 2020-10-14 RX ADMIN — Medication 100 MILLIGRAM(S): at 05:53

## 2020-10-14 RX ADMIN — Medication 1 APPLICATION(S): at 13:16

## 2020-10-14 RX ADMIN — Medication 1 TABLET(S): at 05:53

## 2020-10-14 RX ADMIN — Medication 81 MILLIGRAM(S): at 13:13

## 2020-10-14 RX ADMIN — HEPARIN SODIUM 5000 UNIT(S): 5000 INJECTION INTRAVENOUS; SUBCUTANEOUS at 05:53

## 2020-10-14 NOTE — PROGRESS NOTE ADULT - PROBLEM SELECTOR PROBLEM 8
JALEN (acute kidney injury)

## 2020-10-14 NOTE — PROGRESS NOTE ADULT - PROBLEM SELECTOR PROBLEM 7
Oropharyngeal dysphagia

## 2020-10-14 NOTE — PROGRESS NOTE ADULT - SUBJECTIVE AND OBJECTIVE BOX
Patient is a 60y old  Male who presents with a chief complaint of cardiac arrest (13 Oct 2020 07:02)      SUBJECTIVE / OVERNIGHT EVENTS:    MEDICATIONS  (STANDING):  albuterol/ipratropium for Nebulization. 3 milliLiter(s) Nebulizer every 6 hours  amantadine Syrup 100 milliGRAM(s) Oral <User Schedule>  amLODIPine   Tablet 10 milliGRAM(s) Oral daily  artificial  tears Solution 1 Drop(s) Both EYES every 6 hours  aspirin  chewable 81 milliGRAM(s) Oral daily  atorvastatin 80 milliGRAM(s) Oral at bedtime  bisacodyl Suppository 10 milliGRAM(s) Rectal at bedtime  chlorhexidine 4% Liquid 1 Application(s) Topical <User Schedule>  dextrose 5%. 1000 milliLiter(s) (50 mL/Hr) IV Continuous <Continuous>  dextrose 50% Injectable 12.5 Gram(s) IV Push once  dextrose 50% Injectable 25 Gram(s) IV Push once  dextrose 50% Injectable 25 Gram(s) IV Push once  doxazosin 8 milliGRAM(s) Oral at bedtime  erythromycin   Ointment 1 Application(s) Right EYE every 8 hours  famotidine    Tablet 20 milliGRAM(s) Oral daily  heparin   Injectable 5000 Unit(s) SubCutaneous every 8 hours  hydrALAZINE 100 milliGRAM(s) Oral three times a day  isosorbide   dinitrate Tablet (ISORDIL) 20 milliGRAM(s) Enteral Tube <User Schedule>  lactobacillus acidophilus 1 Tablet(s) Oral two times a day  lidocaine   Patch 1 Patch Transdermal every 24 hours  lidocaine   Patch 1 Patch Transdermal daily  metoprolol tartrate 12.5 milliGRAM(s) Oral <User Schedule>  ofloxacin 0.3% Solution 1 Drop(s) Right EYE four times a day  petrolatum Ophthalmic Ointment 1 Application(s) Both EYES every 8 hours  QUEtiapine 50 milliGRAM(s) Oral <User Schedule>  QUEtiapine 75 milliGRAM(s) Oral <User Schedule>  senna 2 Tablet(s) Oral at bedtime  sodium chloride 1 Gram(s) Oral every 6 hours    MEDICATIONS  (PRN):  dextrose 40% Gel 15 Gram(s) Oral once PRN Blood Glucose LESS THAN 70 milliGRAM(s)/deciliter  glucagon  Injectable 1 milliGRAM(s) IntraMuscular once PRN Glucose LESS THAN 70 milligrams/deciliter  polyethylene glycol 3350 17 Gram(s) Oral daily PRN Constipation      CAPILLARY BLOOD GLUCOSE        I&O's Summary    13 Oct 2020 07:01  -  14 Oct 2020 07:00  --------------------------------------------------------  IN: 0 mL / OUT: 1150 mL / NET: -1150 mL        PHYSICAL EXAM:  Vital Signs Last 24 Hrs  T(C): 36.4 (10-14-20 @ 05:13)  T(F): 97.5 (10-14-20 @ 05:13), Max: 98.4 (10-13-20 @ 13:51)  HR: 80 (10-14-20 @ 05:13) (59 - 80)  BP: 144/84 (10-14-20 @ 05:13)  BP(mean): --  RR: 18 (10-14-20 @ 05:13) (18 - 18)  SpO2: 98% (10-14-20 @ 05:13) (97% - 100%)  Wt(kg): --    10-13 @ 07:01  -  10-14 @ 07:00  --------------------------------------------------------  IN: 0 mL / OUT: 1150 mL / NET: -1150 mL      Constitutional: NAD, awake and alert  EYES: EOMI  ENT:  Normal Hearing, no tonsillar exudates   Neck: Soft and supple , no thyromegaly   Respiratory: Breath sounds are clear bilaterally, No wheezing, rales or rhonchi  Cardiovascular: S1 and S2, regular rate and rhythm, no Murmurs, gallops or rubs, no JVD,    Gastrointestinal: Bowel Sounds present, soft, nontender, nondistended, no guarding, no rebound  Extremities: No cyanosis or clubbing; warm to touch  Vascular: 2+ peripheral pulses lower ex  Neurological: No focal deficits, CN II-XII intact bilaterally, sensation to light touch intact in all extremities, gait intact. Pupils are equally reactive to light and symmetrical in size.   Musculoskeletal: 5/5 strength b/l upper and lower extremities; no joint swelling.  Skin: No rashes  Psych: no depression or anhedonia, AAOx3  HEME: no bruises, no nose bleeds      Personally reviewed imaging, labs, EKG  LABS:                        12.5   7.88  )-----------( 242      ( 13 Oct 2020 07:26 )             39.1     10-13    140  |  102  |  24<H>  ----------------------------<  105<H>  3.8   |  20<L>  |  1.21    Ca    10.3      13 Oct 2020 07:26  Phos  4.2     10-13  Mg     1.9     10-13                RADIOLOGY & ADDITIONAL TESTS:    Imaging Personally Reviewed:    Consultant(s) Notes Reviewed:      Care Discussed with Consultants/Other Providers:   Patient is a 60y old  Male who presents with a chief complaint of cardiac arrest (13 Oct 2020 07:02)      SUBJECTIVE / OVERNIGHT EVENTS:  no acute events overnight. pt covid PCR from yesterday negative. patient has no complaints this AM and denies nausea, chest pain, fevers, chills.     MEDICATIONS  (STANDING):  albuterol/ipratropium for Nebulization. 3 milliLiter(s) Nebulizer every 6 hours  amantadine Syrup 100 milliGRAM(s) Oral <User Schedule>  amLODIPine   Tablet 10 milliGRAM(s) Oral daily  artificial  tears Solution 1 Drop(s) Both EYES every 6 hours  aspirin  chewable 81 milliGRAM(s) Oral daily  atorvastatin 80 milliGRAM(s) Oral at bedtime  bisacodyl Suppository 10 milliGRAM(s) Rectal at bedtime  chlorhexidine 4% Liquid 1 Application(s) Topical <User Schedule>  dextrose 5%. 1000 milliLiter(s) (50 mL/Hr) IV Continuous <Continuous>  dextrose 50% Injectable 12.5 Gram(s) IV Push once  dextrose 50% Injectable 25 Gram(s) IV Push once  dextrose 50% Injectable 25 Gram(s) IV Push once  doxazosin 8 milliGRAM(s) Oral at bedtime  erythromycin   Ointment 1 Application(s) Right EYE every 8 hours  famotidine    Tablet 20 milliGRAM(s) Oral daily  heparin   Injectable 5000 Unit(s) SubCutaneous every 8 hours  hydrALAZINE 100 milliGRAM(s) Oral three times a day  isosorbide   dinitrate Tablet (ISORDIL) 20 milliGRAM(s) Enteral Tube <User Schedule>  lactobacillus acidophilus 1 Tablet(s) Oral two times a day  lidocaine   Patch 1 Patch Transdermal every 24 hours  lidocaine   Patch 1 Patch Transdermal daily  metoprolol tartrate 12.5 milliGRAM(s) Oral <User Schedule>  ofloxacin 0.3% Solution 1 Drop(s) Right EYE four times a day  petrolatum Ophthalmic Ointment 1 Application(s) Both EYES every 8 hours  QUEtiapine 50 milliGRAM(s) Oral <User Schedule>  QUEtiapine 75 milliGRAM(s) Oral <User Schedule>  senna 2 Tablet(s) Oral at bedtime  sodium chloride 1 Gram(s) Oral every 6 hours    MEDICATIONS  (PRN):  dextrose 40% Gel 15 Gram(s) Oral once PRN Blood Glucose LESS THAN 70 milliGRAM(s)/deciliter  glucagon  Injectable 1 milliGRAM(s) IntraMuscular once PRN Glucose LESS THAN 70 milligrams/deciliter  polyethylene glycol 3350 17 Gram(s) Oral daily PRN Constipation      CAPILLARY BLOOD GLUCOSE        I&O's Summary    13 Oct 2020 07:01  -  14 Oct 2020 07:00  --------------------------------------------------------  IN: 0 mL / OUT: 1150 mL / NET: -1150 mL        PHYSICAL EXAM:  Vital Signs Last 24 Hrs  T(C): 36.4 (10-14-20 @ 05:13)  T(F): 97.5 (10-14-20 @ 05:13), Max: 98.4 (10-13-20 @ 13:51)  HR: 80 (10-14-20 @ 05:13) (59 - 80)  BP: 144/84 (10-14-20 @ 05:13)  BP(mean): --  RR: 18 (10-14-20 @ 05:13) (18 - 18)  SpO2: 98% (10-14-20 @ 05:13) (97% - 100%)  Wt(kg): --    10-13 @ 07:01  -  10-14 @ 07:00  --------------------------------------------------------  IN: 0 mL / OUT: 1150 mL / NET: -1150 mL      General: WN/WD NAD  HEENT: Patient c/o b/l vision loss since admission. Tracheostomy: # 6 Cuffless [place], right eye ptosis, dilated fixed right pupil, right eye with scleral irritation  Neurology: A&Ox3, nonfocal, WEI x 4  Respiratory: CTA B/L, normal respiratory effort. Trach collar.   CV: RRR, S1S2, no murmurs, rubs or gallops  Abdominal: With PEG tube, site clean, dry and intact. Soft, NT, ND +BS.   Extremities: Able to follow commands and respond to questions appropriately; hemiparesis of left upper and lower extremities (improving), 5/5 strength in right upper and lower extremities    Personally reviewed imaging, labs, EKG  LABS:                        12.5   7.88  )-----------( 242      ( 13 Oct 2020 07:26 )             39.1     10-13    140  |  102  |  24<H>  ----------------------------<  105<H>  3.8   |  20<L>  |  1.21    Ca    10.3      13 Oct 2020 07:26  Phos  4.2     10-13  Mg     1.9     10-13

## 2020-10-14 NOTE — PROGRESS NOTE ADULT - REASON FOR ADMISSION
Arrest
Cardiac arrest
cdif
Arrest
Cardiac arrest
MI, cardiac arrest
arrest
arrest/collapse
sah
MI
cardiac arrest

## 2020-10-14 NOTE — PROGRESS NOTE ADULT - PROBLEM SELECTOR PROBLEM 5
JALEN (acute kidney injury)
Infection
Infection
JALEN (acute kidney injury)
JALEN (acute kidney injury)
Infection
JALEN (acute kidney injury)
Infection
JALEN (acute kidney injury)
Infection

## 2020-10-14 NOTE — PROGRESS NOTE ADULT - PROBLEM SELECTOR PROBLEM 3
Other encephalopathy
Subarachnoid bleed
Other encephalopathy
Subarachnoid bleed
Other encephalopathy
Subarachnoid bleed
Other encephalopathy
Subarachnoid bleed
Other encephalopathy

## 2020-10-14 NOTE — PROGRESS NOTE ADULT - COVID-19 NEGATIVE LAB RESULT
COVID-19 ruled out

## 2020-10-14 NOTE — PROGRESS NOTE ADULT - PROBLEM SELECTOR PROBLEM 6
Chronic atrial fibrillation

## 2020-10-14 NOTE — PROGRESS NOTE ADULT - PROBLEM SELECTOR PROBLEM 10
Prophylactic measure
Goals of care, counseling/discussion
Prophylactic measure
Goals of care, counseling/discussion
Goals of care, counseling/discussion
Prophylactic measure

## 2020-10-14 NOTE — PROGRESS NOTE ADULT - PROBLEM SELECTOR PROBLEM 4
Heart failure
Heart failure
Sepsis
Sepsis
Heart failure
Sepsis
Heart failure
Sepsis
Heart failure
Sepsis
Heart failure

## 2020-10-14 NOTE — PROGRESS NOTE ADULT - PROBLEM SELECTOR PROBLEM 1
Respiratory failure requiring intubation
Respiratory failure requiring intubation
Cardiac arrest
Respiratory failure requiring intubation
Tracheostomy in place
Tracheostomy in place
Cardiac arrest
Respiratory failure requiring intubation
Tracheostomy in place
Respiratory failure requiring intubation
Respiratory failure requiring intubation
Tracheostomy in place
Respiratory failure requiring intubation
Cardiac arrest
Respiratory failure requiring intubation
Cardiac arrest
Respiratory failure requiring intubation
Respiratory failure requiring intubation
Cardiac arrest
Cardiac arrest
Respiratory failure requiring intubation

## 2020-10-14 NOTE — PROGRESS NOTE ADULT - PROVIDER SPECIALTY LIST ADULT
CCU
Cardiology
ENT
Gastroenterology
Infectious Disease
Internal Medicine
NSICU
Nephrology
Neurosurgery
Ophthalmology
Ophthalmology
Pulmonology
Urology
ENT
Infectious Disease
NSICU
Nephrology
Neurosurgery
Neurosurgery
Rehab Medicine
NSICU
Pulmonology
Cardiology
ENT
Cardiology
Pulmonology
Cardiology
Pulmonology

## 2020-10-14 NOTE — PROGRESS NOTE ADULT - PROBLEM SELECTOR PLAN 1
-Patient S/p Tracheostomy on 8/24 with ENT, s/p self decannulation 10/11.   -has been stable on RA  - Tracheostomy tube spontaneously displaced; dressing placed. Continue to monitor vitals q4h  -consulted ENT to check trach site; trach site healing well, patient should f/u outpt resolved   -Patient S/p Tracheostomy on 8/24 with ENT, s/p self decannulation 10/11.   -has been stable on RA  - Tracheostomy tube spontaneously displaced; dressing placed. Continue to monitor vitals q4h  -consulted ENT to check trach site; trach site healing well, patient should f/u outpt

## 2020-10-14 NOTE — PROGRESS NOTE ADULT - ATTENDING COMMENTS
partial recovery from cardiac arrest  trach stoma  neurogenic bladder requiring [place]  impaired ambulation  has recovered a fair amount without AC  no AC recommended by cardiology in-house given recent SAH and GI bleed.  he will be high risk for recurrence fo both if and when AC is resumed.  outpt cardio f/up in 1-2 weeks after discharge

## 2020-10-14 NOTE — PROGRESS NOTE ADULT - ASSESSMENT
60M h/o Afib on coumadin, presented initially on August 9, 2020 after cardiac arrest. Patient was at work, and had downtime of 25 minutes before ROSC, patient treated with therapeutic hypothermia. Patient with prolonged and complicated course, transferred from RCU to medicine floors with persistent neurogenic bladder, trach collar, stable and awaiting discharge to [facility] pending insurance clearance.

## 2020-10-14 NOTE — PROGRESS NOTE ADULT - PROBLEM SELECTOR PROBLEM 2
Subarachnoid bleed
Subarachnoid bleed
Afib
Subarachnoid bleed
Afib
Subarachnoid bleed
Afib
Subarachnoid bleed
Afib
Subarachnoid bleed
Subarachnoid bleed
Afib
Afib
Subarachnoid bleed

## 2020-10-14 NOTE — PROGRESS NOTE ADULT - PROBLEM SELECTOR PLAN 10
patient to go to subacute rehab pending insurance clearance   he is medically stable and optimized for d/c

## 2020-10-14 NOTE — PROGRESS NOTE ADULT - PROBLEM SELECTOR PLAN 7
STABLE  S/p Peg 8/26 by GI  -MBS today; recommending regular diet with honey thickened liquids; will drink via straw as well as utilize left head turn for maximum swallowing capability   -discussed with S/S

## 2020-10-14 NOTE — PROGRESS NOTE ADULT - PROBLEM SELECTOR PLAN 3
RESOLVING  Fentanyl Patch 12mcg Q 72H discontinued on 10/7; No signs of agitation   Continue Seroquel 50 mg morning / afternoon   Continue Seroquel evening dose 75 mg   Continue PRN Ativan via PEG for agitation not treated by standing Seroquel

## 2020-11-24 NOTE — PROGRESS NOTE ADULT - PROBLEM SELECTOR PLAN 3
Stable   Orders per NSCU team. s/p cerebral angiogram  s/p trach   For PEG. Acceptable cardiac risk to proceed Monitor lytes; replete as needed.  Maintain glycemic control between  mg/dL.

## 2021-01-23 NOTE — DIETITIAN INITIAL EVALUATION ADULT. - ENERGY INTAKE
Peak flow is okay.  Exam today is normal.  Occupational exposure?.  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  Will have him try an inhaler to see if this helps.  Explained to him how to use it.  Follow-up 3 to 4 weeks.   EN feeds, met goal rate 8/11

## 2022-04-13 NOTE — PROGRESS NOTE ADULT - ASSESSMENT
60M angio negative subarachnoid hemorrhage, cardiac arrest status post TTM   - Sedation: on Precedex, Fentanyl gtt - wean in favor of quetiapine (but must check QtC)  - Cefepime for PNA until 8/28  - Enteral Vanco for c. diff  - Planned for tracheostomy/PEG  - Vtach: amiodarone show

## 2022-04-14 NOTE — PROGRESS NOTE ADULT - PROBLEM SELECTOR PLAN 5
Patient calls again to check status of refill   Completed Abx for Pseudomonas aeruginosa pneumonia/colonization:  S/p prolonged course of cefepime until 8/14-8/20   If develops increased secretions, may benefit from inhaled tobramycin  C diff: Completed oral Vanco 8/29 and PPX Dose completed on 9/16   UTI: Pseudomonas S/p Cefepime  Currently remains off abx, Monitor Temperature curve

## 2022-06-14 NOTE — PROVIDER CONTACT NOTE (OTHER) - REASON
Urine Retention 1) Follow up with your doctor in 2-3 days  2) Return to the ED immediately for new or worsening symptoms   3) Please continue to take any home medications as prescribed  4) Your test results from your ED visit were discussed with you prior to discharge  5) You were provided with a copy of your test results  6) Please take Tylenol 650 mg every 4 hours as needed for pain. Please do not exceed more than 4,000mg of Tylenol in a day     You were evaluated in the ED after a fall at home. Your imaging do not show any new problems. If your urine culture is positive you will receive a phone call. Please apply ice to help prevent further swelling around your eye

## 2022-09-13 ENCOUNTER — OUTPATIENT (OUTPATIENT)
Dept: OUTPATIENT SERVICES | Facility: HOSPITAL | Age: 62
LOS: 1 days | End: 2022-09-13

## 2022-09-13 ENCOUNTER — INPATIENT (INPATIENT)
Facility: HOSPITAL | Age: 62
LOS: 8 days | Discharge: HOME | End: 2022-09-22
Admitting: EMERGENCY MEDICINE

## 2022-09-13 PROCEDURE — 70450 CT HEAD/BRAIN W/O DYE: CPT | Mod: 26

## 2022-09-13 PROCEDURE — 71045 X-RAY EXAM CHEST 1 VIEW: CPT | Mod: 26

## 2022-09-13 PROCEDURE — 93010 ELECTROCARDIOGRAM REPORT: CPT

## 2022-09-13 PROCEDURE — 99285 EMERGENCY DEPT VISIT HI MDM: CPT

## 2022-09-14 PROCEDURE — 93970 EXTREMITY STUDY: CPT | Mod: 26

## 2022-09-18 PROCEDURE — 99223 1ST HOSP IP/OBS HIGH 75: CPT

## 2022-09-18 PROCEDURE — 93306 TTE W/DOPPLER COMPLETE: CPT | Mod: 26

## 2022-09-19 PROCEDURE — 99233 SBSQ HOSP IP/OBS HIGH 50: CPT

## 2022-09-19 PROCEDURE — 93010 ELECTROCARDIOGRAM REPORT: CPT

## 2022-09-27 DIAGNOSIS — E78.5 HYPERLIPIDEMIA, UNSPECIFIED: ICD-10-CM

## 2022-09-27 DIAGNOSIS — J96.10 CHRONIC RESPIRATORY FAILURE, UNSPECIFIED WHETHER WITH HYPOXIA OR HYPERCAPNIA: ICD-10-CM

## 2022-09-27 DIAGNOSIS — Z86.74 PERSONAL HISTORY OF SUDDEN CARDIAC ARREST: ICD-10-CM

## 2022-09-27 DIAGNOSIS — E87.1 HYPO-OSMOLALITY AND HYPONATREMIA: ICD-10-CM

## 2022-09-27 DIAGNOSIS — I47.2 VENTRICULAR TACHYCARDIA: ICD-10-CM

## 2022-09-27 DIAGNOSIS — G47.33 OBSTRUCTIVE SLEEP APNEA (ADULT) (PEDIATRIC): ICD-10-CM

## 2022-09-27 DIAGNOSIS — Z79.84 LONG TERM (CURRENT) USE OF ORAL HYPOGLYCEMIC DRUGS: ICD-10-CM

## 2022-09-27 DIAGNOSIS — Z20.822 CONTACT WITH AND (SUSPECTED) EXPOSURE TO COVID-19: ICD-10-CM

## 2022-09-27 DIAGNOSIS — E66.9 OBESITY, UNSPECIFIED: ICD-10-CM

## 2022-09-27 DIAGNOSIS — G81.94 HEMIPLEGIA, UNSPECIFIED AFFECTING LEFT NONDOMINANT SIDE: ICD-10-CM

## 2022-09-27 DIAGNOSIS — I50.22 CHRONIC SYSTOLIC (CONGESTIVE) HEART FAILURE: ICD-10-CM

## 2022-09-27 DIAGNOSIS — I48.20 CHRONIC ATRIAL FIBRILLATION, UNSPECIFIED: ICD-10-CM

## 2022-09-27 DIAGNOSIS — R41.82 ALTERED MENTAL STATUS, UNSPECIFIED: ICD-10-CM

## 2022-09-27 DIAGNOSIS — I11.0 HYPERTENSIVE HEART DISEASE WITH HEART FAILURE: ICD-10-CM

## 2022-09-27 DIAGNOSIS — E11.9 TYPE 2 DIABETES MELLITUS WITHOUT COMPLICATIONS: ICD-10-CM

## 2022-09-27 DIAGNOSIS — J44.9 CHRONIC OBSTRUCTIVE PULMONARY DISEASE, UNSPECIFIED: ICD-10-CM

## 2022-09-27 DIAGNOSIS — Z91.14 PATIENT'S OTHER NONCOMPLIANCE WITH MEDICATION REGIMEN: ICD-10-CM

## 2022-09-27 DIAGNOSIS — I82.412 ACUTE EMBOLISM AND THROMBOSIS OF LEFT FEMORAL VEIN: ICD-10-CM

## 2022-09-27 DIAGNOSIS — Z91.81 HISTORY OF FALLING: ICD-10-CM

## 2022-10-04 ENCOUNTER — APPOINTMENT (OUTPATIENT)
Dept: CARDIOLOGY | Facility: CLINIC | Age: 62
End: 2022-10-04

## 2022-10-04 VITALS
HEIGHT: 71 IN | SYSTOLIC BLOOD PRESSURE: 120 MMHG | WEIGHT: 250 LBS | TEMPERATURE: 96.9 F | BODY MASS INDEX: 35 KG/M2 | OXYGEN SATURATION: 96 % | HEART RATE: 60 BPM | DIASTOLIC BLOOD PRESSURE: 68 MMHG

## 2022-10-04 DIAGNOSIS — Z78.9 OTHER SPECIFIED HEALTH STATUS: ICD-10-CM

## 2022-10-04 DIAGNOSIS — Z83.3 FAMILY HISTORY OF DIABETES MELLITUS: ICD-10-CM

## 2022-10-04 DIAGNOSIS — Z82.49 FAMILY HISTORY OF ISCHEMIC HEART DISEASE AND OTHER DISEASES OF THE CIRCULATORY SYSTEM: ICD-10-CM

## 2022-10-04 DIAGNOSIS — I82.90 ACUTE EMBOLISM AND THROMBOSIS OF UNSPECIFIED VEIN: ICD-10-CM

## 2022-10-04 DIAGNOSIS — I25.2 OLD MYOCARDIAL INFARCTION: ICD-10-CM

## 2022-10-04 DIAGNOSIS — F12.90 CANNABIS USE, UNSPECIFIED, UNCOMPLICATED: ICD-10-CM

## 2022-10-04 DIAGNOSIS — R53.1 WEAKNESS: ICD-10-CM

## 2022-10-04 DIAGNOSIS — J44.9 CHRONIC OBSTRUCTIVE PULMONARY DISEASE, UNSPECIFIED: ICD-10-CM

## 2022-10-04 PROCEDURE — 99214 OFFICE O/P EST MOD 30 MIN: CPT

## 2022-10-04 NOTE — REASON FOR VISIT
[FreeTextEntry1] : The patient was admitted to [facility] in August 2020 with a cardiac arrest.  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  The patient remained in [facility] for 2 months in 2020 and had prolonged respiratory failure and a PEG placement.  Both of these were removed.  The patient was discharged to a nursing facility where he resided for 2 years.  The patient came back to [facility] because he ran out of medications.  He was found to have a left femoral DVT and Eliquis was started.  He was found to be in atrial fibrillation with a controlled ventricular response.\par \par Transthoracic echocardiography was unremarkable.\par \par Cardiac arrest: CT coronary angiography is the best option to rule out proximal LAD disease or left main disease.  Basic metabolic panel has been arranged 1 week prior to CT coronary angiography.\par \par Atrial fibrillation: Rate is well controlled continue current pharmacologic therapy.

## 2022-10-04 NOTE — ASSESSMENT
[FreeTextEntry1] : The patient was admitted to [facility] in August 2020 with a cardiac arrest.  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  The patient remained in [facility] for 2 months in 2020 and had prolonged respiratory failure and a PEG placement.  Both of these were removed.  The patient was discharged to a nursing facility where he resided for 2 years.  The patient came back to [facility] because he ran out of medications.  He was found to have a left femoral DVT and Eliquis was started.  He was found to be in atrial fibrillation with a controlled ventricular response.\par \par Transthoracic echocardiography was unremarkable.\par \par Cardiac arrest: CT coronary angiography is the best option to rule out proximal LAD disease or left main disease.  Basic metabolic panel has been arranged 1 week prior to CT coronary angiography.\par \par Atrial fibrillation: Rate is well controlled continue current pharmacologic therapy.

## 2022-10-14 ENCOUNTER — RX ONLY (RX ONLY)
Age: 62
End: 2022-10-14

## 2022-10-14 ENCOUNTER — OFFICE (OUTPATIENT)
Dept: URBAN - METROPOLITAN AREA CLINIC 97 | Facility: CLINIC | Age: 62
Setting detail: OPHTHALMOLOGY
End: 2022-10-14
Payer: MEDICAID

## 2022-10-14 DIAGNOSIS — E11.9: ICD-10-CM

## 2022-10-14 DIAGNOSIS — H11.153: ICD-10-CM

## 2022-10-14 DIAGNOSIS — H52.4: ICD-10-CM

## 2022-10-14 DIAGNOSIS — H57.813: ICD-10-CM

## 2022-10-14 DIAGNOSIS — H26.9: ICD-10-CM

## 2022-10-14 DIAGNOSIS — H35.033: ICD-10-CM

## 2022-10-14 DIAGNOSIS — I63.50: ICD-10-CM

## 2022-10-14 DIAGNOSIS — H40.1131: ICD-10-CM

## 2022-10-14 PROCEDURE — 92004 COMPRE OPH EXAM NEW PT 1/>: CPT | Performed by: OPHTHALMOLOGY

## 2022-10-14 PROCEDURE — 92133 CPTRZD OPH DX IMG PST SGM ON: CPT | Performed by: OPHTHALMOLOGY

## 2022-10-14 PROCEDURE — 92015 DETERMINE REFRACTIVE STATE: CPT | Performed by: OPHTHALMOLOGY

## 2022-10-14 ASSESSMENT — REFRACTION_MANIFEST
OS_SPHERE: -2.25
OD_CYLINDER: +0.25
OS_VA2: UNABLE
OS_ADD: +3.00
OU_VA: 20/100-
OS_VA1: 20/125-
OD_ADD: +3.00
OD_VA1: 20/125-
OS_AXIS: 095
OD_SPHERE: -1.25
OS_CYLINDER: +0.50
OD_SPHERE: -1.25
OU_VA: 20/100-
OD_AXIS: 070
OS_AXIS: 095
OD_VA1: 20/125-
OD_AXIS: 070
OS_SPHERE: -2.25
OD_ADD: +3.00
OD_VA2: UNABLE
OS_VA1: 20/125-
OS_ADD: +3.00
OD_CYLINDER: +0.25
OS_VA2: UNABLE
OS_CYLINDER: +0.50
OD_VA2: UNABLE

## 2022-10-14 ASSESSMENT — REFRACTION_AUTOREFRACTION
OD_AXIS: 072
OS_CYLINDER: +0.50
OS_AXIS: 094
OS_SPHERE: -2.25
OD_SPHERE: -1.25
OD_CYLINDER: +0.25

## 2022-10-14 ASSESSMENT — KERATOMETRY
OS_K2POWER_DIOPTERS: 41.00
OD_AXISANGLE_DEGREES: 093
OS_AXISANGLE_DEGREES: 078
OD_K2POWER_DIOPTERS: 41.50
OD_K1POWER_DIOPTERS: 41.00
METHOD_AUTO_MANUAL: AUTO
OS_K1POWER_DIOPTERS: 40.50

## 2022-10-14 ASSESSMENT — SPHEQUIV_DERIVED
OD_SPHEQUIV: -1.125
OS_SPHEQUIV: -2
OS_SPHEQUIV: -2
OD_SPHEQUIV: -1.125
OS_SPHEQUIV: -2
OD_SPHEQUIV: -1.125

## 2022-10-14 ASSESSMENT — CONFRONTATIONAL VISUAL FIELD TEST (CVF)
OD_COMMENTS: HARD TO SEE
OS_COMMENTS: HARD TO SEE

## 2022-10-14 ASSESSMENT — AXIALLENGTH_DERIVED
OD_AL: 24.9283
OD_AL: 24.9283
OS_AL: 25.5281
OD_AL: 24.9283

## 2022-10-14 ASSESSMENT — VISUAL ACUITY
OD_BCVA: 20/200
OS_BCVA: 20/150

## 2022-11-02 ENCOUNTER — APPOINTMENT (OUTPATIENT)
Dept: ELECTROPHYSIOLOGY | Facility: CLINIC | Age: 62
End: 2022-11-02

## 2022-11-02 ENCOUNTER — NON-APPOINTMENT (OUTPATIENT)
Age: 62
End: 2022-11-02

## 2022-11-02 VITALS
HEART RATE: 61 BPM | OXYGEN SATURATION: 96 % | DIASTOLIC BLOOD PRESSURE: 100 MMHG | HEIGHT: 71 IN | TEMPERATURE: 97.1 F | BODY MASS INDEX: 35.7 KG/M2 | SYSTOLIC BLOOD PRESSURE: 170 MMHG | WEIGHT: 255 LBS

## 2022-11-02 PROCEDURE — 93000 ELECTROCARDIOGRAM COMPLETE: CPT

## 2022-11-02 PROCEDURE — 99204 OFFICE O/P NEW MOD 45 MIN: CPT

## 2022-11-02 PROCEDURE — 93242 EXT ECG>48HR<7D RECORDING: CPT | Mod: 59

## 2022-11-03 ENCOUNTER — NON-APPOINTMENT (OUTPATIENT)
Age: 62
End: 2022-11-03

## 2022-11-03 RX ORDER — HYDRALAZINE HYDROCHLORIDE 50 MG/1
50 TABLET ORAL 3 TIMES DAILY
Qty: 270 | Refills: 1 | Status: ACTIVE | COMMUNITY

## 2022-11-08 ENCOUNTER — APPOINTMENT (OUTPATIENT)
Dept: UROLOGY | Facility: CLINIC | Age: 62
End: 2022-11-08

## 2022-11-08 ENCOUNTER — NON-APPOINTMENT (OUTPATIENT)
Age: 62
End: 2022-11-08

## 2022-11-08 VITALS
WEIGHT: 260 LBS | HEART RATE: 75 BPM | DIASTOLIC BLOOD PRESSURE: 92 MMHG | TEMPERATURE: 97.5 F | SYSTOLIC BLOOD PRESSURE: 160 MMHG | HEIGHT: 61 IN | BODY MASS INDEX: 49.09 KG/M2

## 2022-11-08 DIAGNOSIS — R35.0 FREQUENCY OF MICTURITION: ICD-10-CM

## 2022-11-08 PROCEDURE — 99204 OFFICE O/P NEW MOD 45 MIN: CPT

## 2022-11-08 NOTE — ASSESSMENT
[FreeTextEntry1] : Elevated PSA:\par obtain MRI to evaluate\par \par BPH / LUTS:\par on doxazosin 2mg daily\par r/o uti\par f/u 2 weeks

## 2022-11-08 NOTE — LETTER BODY
[Dear  ___] : Dear  [unfilled], [Consult Letter:] : I had the pleasure of evaluating your patient, [unfilled]. [Please see my note below.] : Please see my note below. [Consult Closing:] : Thank you very much for allowing me to participate in the care of this patient.  If you have any questions, please do not hesitate to contact me. [Sincerely,] : Sincerely, [FreeTextEntry3] : Eugene Gracia, \par Genitourinary Medicine\par [facility]\par

## 2022-11-08 NOTE — PHYSICAL EXAM
[General Appearance - Well Nourished] : well nourished [Well Groomed] : well groomed [General Appearance - In No Acute Distress] : no acute distress [Edema] : no peripheral edema [Respiration, Rhythm And Depth] : normal respiratory rhythm and effort [Exaggerated Use Of Accessory Muscles For Inspiration] : no accessory muscle use [Abdomen Soft] : soft [Prostate Tenderness] : the prostate was not tender [No Prostate Nodules] : no prostate nodules [Prostate Size ___ gm] : prostate size [unfilled] gm [Normal Station and Gait] : the gait and station were normal for the patient's age [] : no rash [FreeTextEntry1] : L hemiparesis [Oriented To Time, Place, And Person] : oriented to person, place, and time [Affect] : the affect was normal [Mood] : the mood was normal [Not Anxious] : not anxious

## 2022-11-08 NOTE — HISTORY OF PRESENT ILLNESS
[FreeTextEntry1] : 63yo M hx of stroke Left sided hemiparesis, MI referred for elevated PSA \par He reports some urinary frequency but mainly bothered by nocturia 2-3x. \par \par PSA 27 on 10/2022

## 2022-11-10 LAB — BACTERIA UR CULT: NORMAL

## 2022-11-11 ENCOUNTER — NON-APPOINTMENT (OUTPATIENT)
Age: 62
End: 2022-11-11

## 2022-11-14 ENCOUNTER — NON-APPOINTMENT (OUTPATIENT)
Age: 62
End: 2022-11-14

## 2022-11-21 ENCOUNTER — APPOINTMENT (OUTPATIENT)
Dept: CARDIOLOGY | Facility: CLINIC | Age: 62
End: 2022-11-21

## 2022-11-21 PROCEDURE — 93244 EXT ECG>48HR<7D REV&INTERPJ: CPT

## 2022-11-21 NOTE — DISCUSSION/SUMMARY
[FreeTextEntry1] : This is a 62-year-old man who has been in atrial fibrillation for unknown duration.  He has had prior cardiac arrest unclear etiology.  He has had previous hemorrhagic CVA.  He was noted be in atrial fibrillation with controlled ventricular response.  His echocardiogram shows preserved left ventricular function.\par \par He still has issues with his blood pressure control.  He is anticoagulated with Eliquis.  The patient is also on metoprolol 25 mg twice daily.  We will get a monitor to assess extent of rate control and then decide on further treatment options.  It is unclear how long he has been in atrial fibrillation and whether he would maintain sinus rhythm this would be a consideration.  We may consider a MATT cardioversion after initiation of amiodarone.\par \par Plan: Follow-up discussion after reviewing the Zio patch monitor. [EKG obtained to assist in diagnosis and management of assessed problem(s)] : EKG obtained to assist in diagnosis and management of assessed problem(s)

## 2022-11-21 NOTE — CARDIOLOGY SUMMARY
[de-identified] : Atrial fibrillation \par Diffuse low voltage. \par  -Old inferior infarct  -Old anterior infarct. \par  \par

## 2022-11-21 NOTE — REVIEW OF SYSTEMS
[Fever] : no fever [Chest Discomfort] : no chest discomfort [Abdominal Pain] : no abdominal pain [Dizziness] : no dizziness [Easy Bleeding] : no tendency for easy bleeding

## 2022-11-21 NOTE — PHYSICAL EXAM
[Normal S1, S2] : normal S1, S2 [Clear Lung Fields] : clear lung fields [Non Tender] : non-tender [No Edema] : no edema [de-identified] : irreg [de-identified] : left sided hemiparesis

## 2022-11-21 NOTE — HISTORY OF PRESENT ILLNESS
[FreeTextEntry1] : \par \par Patient is a 62-year-old man who is seen in evaluation for his atrial fibrillation.\par \par He has a prior history of a cardiac arrest with August 2020 and was admitted to [facility].  The patient had a hemorrhagic CVA.  He has a long hospital course including prolonged respiratory failure and had a PEG placement.  He was discharged to a nursing facility where he resided for 2 years.  The patient had returned to [facility] with a left femoral DVT and he was started on Eliquis.  He was also noted to be in atrial fibrillation.\par \par Patient not aware of A. fib.  He has been on metoprolol, hydralazine, enalapril, Eliquis and amlodipine.\par \par His blood pressures has been elevated to the range of 160s to 170s over 90s to 100.\par \par He had an echocardiogram performed at [facility] 9/18/2022 that showed EF 65%.  He had moderate left ventricular perjury.  His A. fib was mildly dilated.  The left atrial volume index was 36.8 cc per metered square.  There was no mitral regurgitation.\par \par The patient is legally blind.\par \par

## 2022-12-05 ENCOUNTER — APPOINTMENT (OUTPATIENT)
Dept: ELECTROPHYSIOLOGY | Facility: CLINIC | Age: 62
End: 2022-12-05

## 2022-12-06 ENCOUNTER — APPOINTMENT (OUTPATIENT)
Dept: UROLOGY | Facility: CLINIC | Age: 62
End: 2022-12-06

## 2023-01-18 ENCOUNTER — OFFICE (OUTPATIENT)
Dept: URBAN - METROPOLITAN AREA CLINIC 38 | Facility: CLINIC | Age: 63
Setting detail: OPHTHALMOLOGY
End: 2023-01-18
Payer: MEDICAID

## 2023-01-18 DIAGNOSIS — H11.153: ICD-10-CM

## 2023-01-18 DIAGNOSIS — E13.319: ICD-10-CM

## 2023-01-18 DIAGNOSIS — I63.50: ICD-10-CM

## 2023-01-18 DIAGNOSIS — H26.9: ICD-10-CM

## 2023-01-18 DIAGNOSIS — H35.033: ICD-10-CM

## 2023-01-18 DIAGNOSIS — H52.4: ICD-10-CM

## 2023-01-18 DIAGNOSIS — H57.813: ICD-10-CM

## 2023-01-18 DIAGNOSIS — E11.3291: ICD-10-CM

## 2023-01-18 DIAGNOSIS — H40.1131: ICD-10-CM

## 2023-01-18 PROCEDURE — 92014 COMPRE OPH EXAM EST PT 1/>: CPT | Performed by: OPHTHALMOLOGY

## 2023-01-18 PROCEDURE — 92083 EXTENDED VISUAL FIELD XM: CPT | Performed by: OPHTHALMOLOGY

## 2023-01-18 PROCEDURE — 92015 DETERMINE REFRACTIVE STATE: CPT | Performed by: OPHTHALMOLOGY

## 2023-01-18 PROCEDURE — 76514 ECHO EXAM OF EYE THICKNESS: CPT | Performed by: OPHTHALMOLOGY

## 2023-01-18 PROCEDURE — 92250 FUNDUS PHOTOGRAPHY W/I&R: CPT | Performed by: OPHTHALMOLOGY

## 2023-01-18 ASSESSMENT — KERATOMETRY
OS_AXISANGLE_DEGREES: 78
OS_K2POWER_DIOPTERS: 41.75
OS_K1POWER_DIOPTERS: 41.00
OD_AXISANGLE_DEGREES: 88
OD_K2POWER_DIOPTERS: 41.75
METHOD_AUTO_MANUAL: AUTO
OD_K1POWER_DIOPTERS: 41.00

## 2023-01-18 ASSESSMENT — REFRACTION_MANIFEST
OD_VA1: 20/125-
OD_AXIS: 160
OD_CYLINDER: -0.25
OS_ADD: +3.00
OD_VA1: 20/125-
OS_AXIS: 095
OD_SPHERE: -1.25
OU_VA: 20/100-
OD_ADD: +3.00
OD_CYLINDER: +0.25
OS_CYLINDER: +0.50
OD_AXIS: 070
OD_SPHERE: -1.00
OS_VA1: 20/125-
OU_VA: 20/100-
OS_SPHERE: -2.25
OS_SPHERE: -1.75
OD_VA2: UNABLE
OD_VA2: UNABLE
OS_VA2: UNABLE
OS_AXIS: 005
OS_VA2: UNABLE
OD_ADD: +3.00
OS_ADD: +3.00
OS_VA1: 20/125-
OS_CYLINDER: -0.50

## 2023-01-18 ASSESSMENT — REFRACTION_AUTOREFRACTION
OD_CYLINDER: -0.50
OS_SPHERE: -2.25
OS_CYLINDER: -0.25
OS_AXIS: 167
OD_AXIS: 166
OD_SPHERE: -1.25

## 2023-01-18 ASSESSMENT — CONFRONTATIONAL VISUAL FIELD TEST (CVF)
OS_FINDINGS: FULL
OD_FINDINGS: FULL

## 2023-01-18 ASSESSMENT — SPHEQUIV_DERIVED
OD_SPHEQUIV: -1.5
OD_SPHEQUIV: -1.125
OD_SPHEQUIV: -1.125
OS_SPHEQUIV: -2
OS_SPHEQUIV: -2.375
OS_SPHEQUIV: -2

## 2023-01-18 ASSESSMENT — AXIALLENGTH_DERIVED
OS_AL: 25.2621
OD_AL: 24.8771
OD_AL: 25.0407
OS_AL: 25.4307
OS_AL: 25.2621
OD_AL: 24.8771

## 2023-01-18 ASSESSMENT — TONOMETRY
OD_IOP_MMHG: 17
OS_IOP_MMHG: 19

## 2023-01-18 ASSESSMENT — VISUAL ACUITY
OS_BCVA: 20/150
OD_BCVA: 20/500

## 2023-02-07 DIAGNOSIS — I82.91 CHRONIC EMBOLISM AND THROMBOSIS OF UNSPECIFIED VEIN: ICD-10-CM

## 2023-02-07 DIAGNOSIS — I48.20 CHRONIC ATRIAL FIBRILLATION, UNSPECIFIED: ICD-10-CM

## 2023-02-07 DIAGNOSIS — N39.0 URINARY TRACT INFECTION, SITE NOT SPECIFIED: ICD-10-CM

## 2023-02-07 DIAGNOSIS — R00.0 TACHYCARDIA, UNSPECIFIED: ICD-10-CM

## 2023-02-22 ENCOUNTER — RX ONLY (RX ONLY)
Age: 63
End: 2023-02-22

## 2023-02-22 ENCOUNTER — OFFICE (OUTPATIENT)
Dept: URBAN - METROPOLITAN AREA CLINIC 38 | Facility: CLINIC | Age: 63
Setting detail: OPHTHALMOLOGY
End: 2023-02-22
Payer: MEDICAID

## 2023-02-22 DIAGNOSIS — H40.1131: ICD-10-CM

## 2023-02-22 PROCEDURE — 99213 OFFICE O/P EST LOW 20 MIN: CPT | Performed by: OPHTHALMOLOGY

## 2023-02-22 ASSESSMENT — REFRACTION_MANIFEST
OD_ADD: +3.00
OS_VA1: 20/NI
OD_AXIS: 160
OS_CYLINDER: -0.50
OD_SPHERE: -1.00
OD_SPHERE: -1.00
OS_VA2: UNABLE
OD_CYLINDER: -0.25
OD_AXIS: 160
OD_CYLINDER: -0.25
OU_VA: 20/100-
OD_VA2: UNABLE
OS_SPHERE: -1.75
OS_CYLINDER: SPH
OD_ADD: +3.00
OS_ADD: +3.00
OS_SPHERE: -2.50
OD_VA1: 20/125-
OS_VA1: 20/125-
OD_VA1: 20/NI
OD_VA2: UNABLE
OS_AXIS: 005
OS_ADD: +3.00
OS_VA2: UNABLE

## 2023-02-22 ASSESSMENT — KERATOMETRY
OD_K2POWER_DIOPTERS: 41.25
METHOD_AUTO_MANUAL: AUTO
OD_AXISANGLE_DEGREES: 090
OS_K1POWER_DIOPTERS: 40.50
OS_AXISANGLE_DEGREES: 067
OD_K1POWER_DIOPTERS: 41.25
OS_K2POWER_DIOPTERS: 41.25

## 2023-02-22 ASSESSMENT — SPHEQUIV_DERIVED
OD_SPHEQUIV: -1.25
OD_SPHEQUIV: -1.125
OD_SPHEQUIV: -1.125
OS_SPHEQUIV: -2
OS_SPHEQUIV: -3.125

## 2023-02-22 ASSESSMENT — REFRACTION_CURRENTRX
OS_VPRISM_DIRECTION: SV
OD_SPHERE: -1.00
OD_VPRISM_DIRECTION: SV
OS_SPHERE: -1.75
OD_CYLINDER: -0.25
OD_OVR_VA: 20/
OS_CYLINDER: -0.50
OS_SPHERE: +1.25
OS_CYLINDER: -0.50
OS_OVR_VA: 20/
OS_OVR_VA: 20/
OS_VPRISM_DIRECTION: SV
OD_OVR_VA: 20/
OD_AXIS: 157
OD_SPHERE: +2.00
OD_AXIS: 154
OD_CYLINDER: -0.25
OS_AXIS: 002
OD_VPRISM_DIRECTION: SV
OS_AXIS: 006

## 2023-02-22 ASSESSMENT — AXIALLENGTH_DERIVED
OD_AL: 24.9283
OD_AL: 24.9828
OD_AL: 24.9283
OS_AL: 25.4744
OS_AL: 25.9959

## 2023-02-22 ASSESSMENT — VISUAL ACUITY
OS_BCVA: 20/150
OD_BCVA: 20/500

## 2023-02-22 ASSESSMENT — REFRACTION_AUTOREFRACTION
OS_AXIS: 092
OD_SPHERE: -1.00
OD_CYLINDER: -0.50
OS_SPHERE: -3.00
OD_AXIS: 156
OS_CYLINDER: -0.25

## 2023-02-22 ASSESSMENT — CONFRONTATIONAL VISUAL FIELD TEST (CVF)
OS_FINDINGS: FULL
OD_FINDINGS: FULL

## 2023-04-11 NOTE — PROGRESS NOTE ADULT - PROBLEM SELECTOR PLAN 7
Detail Level: Detailed Quality 110: Preventive Care And Screening: Influenza Immunization: Influenza Immunization Administered during Influenza season Quality 226: Preventive Care And Screening: Tobacco Use: Screening And Cessation Intervention: Patient screened for tobacco use and is an ex/non-smoker Quality 111:Pneumonia Vaccination Status For Older Adults: Pneumococcal vaccine (PPSV23) administered on or after patient’s 60th birthday and before the end of the measurement period S/p Peg 8/26 by GI   MBS 9/28: Inconsistent Aspiration episodes with varying consistencies   Continue Non-Oral means of Nutrition

## 2023-04-18 ENCOUNTER — NON-APPOINTMENT (OUTPATIENT)
Age: 63
End: 2023-04-18

## 2023-04-18 ENCOUNTER — APPOINTMENT (OUTPATIENT)
Dept: CT IMAGING | Facility: CLINIC | Age: 63
End: 2023-04-18
Payer: MEDICARE

## 2023-04-18 VITALS — WEIGHT: 250 LBS | BODY MASS INDEX: 35 KG/M2 | HEIGHT: 71 IN

## 2023-04-18 DIAGNOSIS — Z78.9 OTHER SPECIFIED HEALTH STATUS: ICD-10-CM

## 2023-04-18 PROCEDURE — 71271 CT THORAX LUNG CANCER SCR C-: CPT

## 2023-04-18 NOTE — HISTORY OF PRESENT ILLNESS
[Current] : Current [TextBox_13] : Mr. DOLAN is a 62 year old male with a history of CAD, HTN and asthma.\par \par He was called to review eligibility for Low-Dose CT lung cancer screening.  Reviewed and confirmed that the patient meets screening eligibility criteria:\par \par 62 years old \par \par Smoking Status:   Current Smoker\par \par Number of pack(s) per day: 1\par Number of years smoked: 25\par Number of pack years smokin\par \par No symptoms of lung cancer, including new cough, change in cough, hemoptysis, and unintentional weight loss.\par \par No personal history of lung cancer.  History of lung cancer in a first degree relative, his father.  No history of lung disease or occupational exposures. [PacksperDay] : 1 [N_Years] : 25 [PacksperYear] : 25

## 2023-05-16 ENCOUNTER — APPOINTMENT (OUTPATIENT)
Dept: UROLOGY | Facility: CLINIC | Age: 63
End: 2023-05-16
Payer: MEDICARE

## 2023-05-16 VITALS
HEART RATE: 87 BPM | OXYGEN SATURATION: 97 % | DIASTOLIC BLOOD PRESSURE: 111 MMHG | RESPIRATION RATE: 16 BRPM | WEIGHT: 250 LBS | TEMPERATURE: 98.5 F | SYSTOLIC BLOOD PRESSURE: 184 MMHG | HEIGHT: 71 IN | BODY MASS INDEX: 35 KG/M2

## 2023-05-16 PROCEDURE — 99214 OFFICE O/P EST MOD 30 MIN: CPT

## 2023-05-16 NOTE — LETTER BODY
[Dear  ___] : Dear  [unfilled], [Consult Letter:] : I had the pleasure of evaluating your patient, [unfilled]. [Please see my note below.] : Please see my note below. [Consult Closing:] : Thank you very much for allowing me to participate in the care of this patient.  If you have any questions, please do not hesitate to contact me. [Sincerely,] : Sincerely, [FreeTextEntry3] : Eugene Gracia, \par Genitourinary Medicine\par [facility]\par

## 2023-05-16 NOTE — ASSESSMENT
[FreeTextEntry1] : Elevated PSA:\par recheck PSA\par advised to wait few months due to recent UTI and cath placement\par will f/u with repeat PSA in 3 months and obtain MRI\par \par BPH / LUTS:\par on doxazosin 2mg daily\par check pvr next visit\par \par UTI:\par recheck culture - call if positive

## 2023-05-16 NOTE — HISTORY OF PRESENT ILLNESS
[FreeTextEntry1] : 63yo M hx of stroke Left sided hemiparesis, MI referred for elevated PSA \par Pt couldn't get MRI done for elevated PSA due to transportation issues\par \par He was in Hospital few weeks ago due to +UTI. \par He also had catheter placed x 3 days for UTI. Now states he is able to urinate w/o difficulty. \par \par PSA 27 on 10/2022

## 2023-05-16 NOTE — PHYSICAL EXAM
[General Appearance - Well Nourished] : well nourished [Well Groomed] : well groomed [General Appearance - In No Acute Distress] : no acute distress [Edema] : no peripheral edema [] : no respiratory distress [Respiration, Rhythm And Depth] : normal respiratory rhythm and effort [Exaggerated Use Of Accessory Muscles For Inspiration] : no accessory muscle use [Oriented To Time, Place, And Person] : oriented to person, place, and time [Affect] : the affect was normal [Mood] : the mood was normal [Not Anxious] : not anxious [Normal Station and Gait] : the gait and station were normal for the patient's age [FreeTextEntry1] : L hemiparesis

## 2023-05-17 ENCOUNTER — APPOINTMENT (OUTPATIENT)
Dept: CARDIOLOGY | Facility: CLINIC | Age: 63
End: 2023-05-17
Payer: MEDICARE

## 2023-05-17 VITALS
SYSTOLIC BLOOD PRESSURE: 148 MMHG | BODY MASS INDEX: 35.42 KG/M2 | WEIGHT: 253 LBS | RESPIRATION RATE: 16 BRPM | DIASTOLIC BLOOD PRESSURE: 88 MMHG | HEIGHT: 71 IN | HEART RATE: 98 BPM | OXYGEN SATURATION: 98 % | TEMPERATURE: 97 F

## 2023-05-17 PROCEDURE — 93282 PRGRMG EVAL IMPLANTABLE DFB: CPT

## 2023-05-17 PROCEDURE — 99024 POSTOP FOLLOW-UP VISIT: CPT

## 2023-05-17 RX ORDER — ATORVASTATIN CALCIUM 20 MG/1
20 TABLET, FILM COATED ORAL
Qty: 90 | Refills: 1 | Status: DISCONTINUED | COMMUNITY
End: 2023-05-17

## 2023-05-18 LAB — BACTERIA UR CULT: NORMAL

## 2023-05-19 ENCOUNTER — APPOINTMENT (OUTPATIENT)
Dept: CARDIOLOGY | Facility: CLINIC | Age: 63
End: 2023-05-19
Payer: MEDICARE

## 2023-05-19 VITALS
HEART RATE: 85 BPM | BODY MASS INDEX: 35.42 KG/M2 | OXYGEN SATURATION: 97 % | WEIGHT: 253 LBS | SYSTOLIC BLOOD PRESSURE: 144 MMHG | HEIGHT: 71 IN | DIASTOLIC BLOOD PRESSURE: 92 MMHG

## 2023-05-19 PROCEDURE — 99024 POSTOP FOLLOW-UP VISIT: CPT

## 2023-05-19 RX ORDER — APIXABAN 5 MG/1
5 TABLET, FILM COATED ORAL TWICE DAILY
Qty: 180 | Refills: 1 | Status: DISCONTINUED | COMMUNITY
End: 2023-05-19

## 2023-05-22 ENCOUNTER — APPOINTMENT (OUTPATIENT)
Dept: ELECTROPHYSIOLOGY | Facility: CLINIC | Age: 63
End: 2023-05-22
Payer: MEDICARE

## 2023-05-22 VITALS
DIASTOLIC BLOOD PRESSURE: 90 MMHG | WEIGHT: 253 LBS | BODY MASS INDEX: 35.42 KG/M2 | TEMPERATURE: 98.2 F | HEART RATE: 87 BPM | HEIGHT: 71 IN | OXYGEN SATURATION: 99 % | SYSTOLIC BLOOD PRESSURE: 166 MMHG

## 2023-05-22 PROCEDURE — 99024 POSTOP FOLLOW-UP VISIT: CPT

## 2023-05-25 NOTE — DISCUSSION/SUMMARY
[FreeTextEntry1] : This is a 62-year-old man with h/o DM, BPH, CVA, persistent Afib on AC, COPD, cardiac arrest, s/p ICD implant on May 5th that was complicated by bleeding and small open wound. Post operatively he presented to the ER with bleeding from the ICD surgical incision. He was advised to hold the Eliquis and follow up in the office. He was evaluated in the office on May 17th and was still experiencing bleeding from the site. There was an exposed suture at the inferior aspect of the incision. That suture was easily removed and the wound was cleaned aseptically. The small pea sized open area of the wound was stapled. He was given antibiotics and evaluated again on 5/19/2023. The wound was clean and healing well. The Eliquis was restarted. The course of antibiotics was completed this morning. The wound is healing well and the staples were easily removed by Dr. Amor. Wound check f/u in two weeks. Questions were answered and the patient verbalized understanding. The assessment, findings and plan were d/w Dr. Amor.

## 2023-05-25 NOTE — HISTORY OF PRESENT ILLNESS
[FreeTextEntry1] : This is a 62-year-old man with h/o DM, BPH, CVA, persistent Afib on AC, COPD, cardiac arrest, s/p ICD implant on May 5th that was complicated by bleeding and small open wound. Post operatively he presented to the ER with bleeding from the ICD surgical incision. He was advised to hold the Eliquis and follow up in the office. He was evaluated in the office on May 17th and was still experiencing bleeding from the site. There was an exposed suture at the inferior aspect of the incision. That suture was easily removed and the wound was cleaned aseptically. The small pea sized open area of the wound was stapled. He was given antibiotics and evaluated again on 5/19/2023. The wound was clean and healing well. The Eliquis was restarted. The course of antibiotics was completed this morning. He presents today for wound evaluation and removable of the staples.\par \par \par Previously\par He has a prior history of a cardiac arrest with August 2020 and was admitted to [facility].  The patient had a hemorrhagic CVA.  He has a long hospital course including prolonged respiratory failure and had a PEG placement.  He was discharged to a nursing facility where he resided for 2 years. The patient had returned to [facility] with a left femoral DVT and he was started on Eliquis.  He was also noted to be in atrial fibrillation.\par \par Patient not aware of Afib.  He has been on metoprolol, hydralazine, enalapril, Eliquis and amlodipine.\par \par His blood pressures has been elevated to the range of 160s to 170s over 90s to 100.\par \par He had an echocardiogram performed at [facility] 9/18/2022 that showed EF 65%.  He had moderate left ventricular perjury.  His A. fib was mildly dilated.  The left atrial volume index was 36.8 cc per metered square.  There was no mitral regurgitation.\par \par The patient is legally blind.\par \par

## 2023-05-25 NOTE — PHYSICAL EXAM
[Well Developed] : well developed [Well Nourished] : well nourished [No Acute Distress] : no acute distress [No Murmur] : no murmur [No Rub] : no rub [No Gallop] : no gallop [Clear Lung Fields] : clear lung fields [Good Air Entry] : good air entry [No Respiratory Distress] : no respiratory distress  [Soft] : abdomen soft [Non Tender] : non-tender [Normal Bowel Sounds] : normal bowel sounds [Abnormal Gait] : abnormal gait [No Edema] : no edema [No Cyanosis] : no cyanosis [Normal Speech] : normal speech [Alert and Oriented] : alert and oriented [de-identified] : irregularly irregular [de-identified] : L [de-identified] : L infraclavicular surgical incision clean and dry; staples intact. Small hematoma is improving.

## 2023-05-25 NOTE — END OF VISIT
[FreeTextEntry3] : The patient was seen with the NP.  We discussed the history, physical findings and plans.  Agree with the findings and recommendations.\par

## 2023-05-25 NOTE — REVIEW OF SYSTEMS
[Fever] : no fever [Chills] : no chills [Feeling Fatigued] : not feeling fatigued [SOB] : no shortness of breath [Dyspnea on exertion] : not dyspnea during exertion [Chest Discomfort] : no chest discomfort [Lower Ext Edema] : no extremity edema [Palpitations] : no palpitations [Syncope] : no syncope [Cough] : no cough [Wheezing] : no wheezing [Abdominal Pain] : no abdominal pain [Nausea] : no nausea [Vomiting] : no vomiting [Dizziness] : no dizziness [Weakness] : no weakness [de-identified] : surgical incision/pocket healing

## 2023-06-07 ENCOUNTER — APPOINTMENT (OUTPATIENT)
Dept: ELECTROPHYSIOLOGY | Facility: CLINIC | Age: 63
End: 2023-06-07
Payer: MEDICARE

## 2023-06-07 VITALS
SYSTOLIC BLOOD PRESSURE: 160 MMHG | HEART RATE: 61 BPM | WEIGHT: 244 LBS | HEIGHT: 71 IN | BODY MASS INDEX: 34.16 KG/M2 | DIASTOLIC BLOOD PRESSURE: 90 MMHG | OXYGEN SATURATION: 98 %

## 2023-06-07 PROCEDURE — 99213 OFFICE O/P EST LOW 20 MIN: CPT | Mod: 24

## 2023-06-07 RX ORDER — CEPHALEXIN 500 MG/1
500 TABLET ORAL
Qty: 15 | Refills: 0 | Status: DISCONTINUED | COMMUNITY
Start: 2023-05-17 | End: 2023-06-07

## 2023-06-13 ENCOUNTER — APPOINTMENT (OUTPATIENT)
Dept: CT IMAGING | Facility: CLINIC | Age: 63
End: 2023-06-13
Payer: MEDICARE

## 2023-06-13 PROCEDURE — 71250 CT THORAX DX C-: CPT | Mod: MH

## 2023-06-19 NOTE — REVIEW OF SYSTEMS
[Chest Discomfort] : no chest discomfort [Fever] : no fever [Abdominal Pain] : no abdominal pain [Dizziness] : no dizziness [Easy Bleeding] : no tendency for easy bleeding

## 2023-06-19 NOTE — PHYSICAL EXAM
[Normal S1, S2] : normal S1, S2 [Clear Lung Fields] : clear lung fields [Non Tender] : non-tender [No Edema] : no edema [de-identified] : irreg [de-identified] : left sided hemiparesis

## 2023-06-19 NOTE — HISTORY OF PRESENT ILLNESS
[FreeTextEntry1] : \par \par Patient is a 62-year-old man who is seen in evaluation for f/u s/p ICD implant\par \par Feeling better - denies SOB, palps, Chest paing. \par He has a prior history of a cardiac arrest with August 2020 and was admitted to [facility].  The patient had a hemorrhagic CVA.  He has a long hospital course including prolonged respiratory failure and had a PEG placement.  He was discharged to a nursing facility where he resided for 2 years.  The patient had returned to [facility] with a left femoral DVT and he was started on Eliquis.  He was also noted to be in atrial fibrillation.\par \par Patient not aware of A. fib.  He has been on metoprolol, hydralazine, enalapril, Eliquis and amlodipine.\par \par His blood pressures has been elevated to the range of 160s to 170s over 90s to 100.\par \par He had an echocardiogram performed at [facility] 9/18/2022 that showed EF 65%.  He had moderate left ventricular perjury.  His A. fib was mildly dilated.  The left atrial volume index was 36.8 cc per metered square.  There was no mitral regurgitation.\par \par The patient is legally blind.\par \par

## 2023-06-19 NOTE — DISCUSSION/SUMMARY
[FreeTextEntry1] : Wound healing well\par ICD interrogation - nl function\par Remote Monitoring\par Continue current meds\par \par \par \par This is a 62-year-old man who has been in atrial fibrillation for unknown duration.  He has had prior cardiac arrest unclear etiology.  He has had previous hemorrhagic CVA.  He was noted be in atrial fibrillation with controlled ventricular response.  His echocardiogram shows preserved left ventricular function.\par \par He still has issues with his blood pressure control.  He is anticoagulated with Eliquis.  The patient is also on metoprolol 25 mg twice daily.  We will get a monitor to assess extent of rate control and then decide on further treatment options.  It is unclear how long he has been in atrial fibrillation and whether he would maintain sinus rhythm this would be a consideration.  We may consider a MATT cardioversion after initiation of amiodarone.\par \par Plan: Follow-up discussion after reviewing the Zio patch monitor.

## 2023-07-06 ENCOUNTER — OUTPATIENT (OUTPATIENT)
Dept: OUTPATIENT SERVICES | Facility: HOSPITAL | Age: 63
LOS: 1 days | End: 2023-07-06

## 2023-07-06 DIAGNOSIS — R93.8 ABNORMAL FINDINGS ON DIAGNOSTIC IMAGING OF OTHER SPECIFIED BODY STRUCTURES: ICD-10-CM

## 2023-07-07 ENCOUNTER — APPOINTMENT (OUTPATIENT)
Dept: HEMATOLOGY ONCOLOGY | Facility: CLINIC | Age: 63
End: 2023-07-07
Payer: MEDICARE

## 2023-07-07 VITALS
HEIGHT: 71 IN | DIASTOLIC BLOOD PRESSURE: 86 MMHG | WEIGHT: 214 LBS | OXYGEN SATURATION: 95 % | TEMPERATURE: 96.8 F | SYSTOLIC BLOOD PRESSURE: 126 MMHG | BODY MASS INDEX: 29.96 KG/M2 | HEART RATE: 77 BPM

## 2023-07-07 PROCEDURE — 99204 OFFICE O/P NEW MOD 45 MIN: CPT

## 2023-07-07 NOTE — CONSULT LETTER
[Dear  ___] : Dear  [unfilled], [Consult Letter:] : I had the pleasure of evaluating your patient, [unfilled]. [Please see my note below.] : Please see my note below. [Consult Closing:] : Thank you very much for allowing me to participate in the care of this patient.  If you have any questions, please do not hesitate to contact me. [Sincerely,] : Sincerely, [FreeTextEntry2] : Dr. Amol Workman [FreeTextEntry3] : Dr. Gosia Russo\par

## 2023-07-07 NOTE — HISTORY OF PRESENT ILLNESS
[de-identified] : Referred by:  Dr. Amol Workman \par Diagnosis: Lung nodule \par \par Son (José Antonio Vieyra) - ph [phone]\par \par Mr. Vieyra presented at age 62 in July 2023 for evaluation of lung nodule.  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  Surgical hx - pacemaker, left and and left knee surgeries.\par \par The patient has been referred to medical oncology for evaluation of a lung nodule. He underwent CT chest for lung cancer screening on 4/18/23- which revealed a 5mm endobronchial nodule in the L main bronchus- indeterminate, repeat low dose CT chest in 3 months- LungRads 4A.  Repeat CT chest in June 2023 revealed a stable 5 mm lung nodule.  At this time the patient is feeling generally well and denies fevers, chills, chest pain, shortness of breath, cough, wheezing, nausea, vomiting, diarrhea.  He has had 20 pounds of weight loss in the past 2 months which has been intentional as he is eating less.  He reports a normal appetite.  He has residual left hemiparesis from a stroke back in 2020.  He is a current smoker but has cut down to 2 to 3 cigarettes/day.\par \par HCM: \par - COVID vaccination: Pfizer x2, 1 booster\par - Colonoscopy: None prior\par - Lung cancer screen: as above\par - DEXA: none prior\par \par SH: \par - Occupation: on disability since CVA\par - Living situation: Lives alone in [place]\par - Smoking/etoh/illicits:  Current smoker (> 40 yrs, 2 cigs/day), no etoh, marijuana regularly, no illicits \par - Exercise: Walks\par \par FH: \par - F - prostate - metastatic (77)\par - pat aunts - ? unknown primary

## 2023-07-07 NOTE — PHYSICAL EXAM
[Restricted in physically strenuous activity but ambulatory and able to carry out work of a light or sedentary nature] : Status 1- Restricted in physically strenuous activity but ambulatory and able to carry out work of a light or sedentary nature, e.g., light house work, office work [Normal] : affect appropriate [de-identified] : generally well appearing male  [de-identified] : L hemiparesis

## 2023-07-07 NOTE — RESULTS/DATA
[FreeTextEntry1] : Mr. Vieyra presented at age 62 in July 2023 for evaluation of lung nodule.  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  Surgical hx - pacemaker, left and and left knee surgeries.\par \par 5 mm lung nodule noted on CT chest for lung cancer screening.  \par The nodule was stable upon repeat imaging 2 months later.  \par At this time we will repeat imaging in 4 months to ensure stability of the lesion.\par If growing, will refer to thoracic for further evaluation.\par All patient questions answered at today's visit. Patient urged to call the office with any questions or concerns.\par

## 2023-07-25 ENCOUNTER — APPOINTMENT (OUTPATIENT)
Dept: UROLOGY | Facility: CLINIC | Age: 63
End: 2023-07-25
Payer: MEDICARE

## 2023-07-25 VITALS
BODY MASS INDEX: 29.96 KG/M2 | TEMPERATURE: 94.9 F | WEIGHT: 214 LBS | DIASTOLIC BLOOD PRESSURE: 73 MMHG | HEIGHT: 71 IN | SYSTOLIC BLOOD PRESSURE: 118 MMHG | HEART RATE: 55 BPM

## 2023-07-25 PROCEDURE — 51700 IRRIGATION OF BLADDER: CPT

## 2023-07-25 PROCEDURE — 99213 OFFICE O/P EST LOW 20 MIN: CPT | Mod: 25

## 2023-07-25 PROCEDURE — A4216: CPT | Mod: NC

## 2023-07-25 NOTE — ASSESSMENT
[FreeTextEntry1] : Urinary retention:\par passed TOV today\par will return in 2 weeks for pvr - will recheck urine culture\par \par BPH: c/w doxazosin 2mg daily\par \par Elevated PSA:\par recheck PSA\par advised to wait few months due to recent UTI and cath placement\par will f/u with repeat PSA in 3 months and obtain MRI\par

## 2023-07-25 NOTE — HISTORY OF PRESENT ILLNESS
[FreeTextEntry1] : 64yo M legally blind, hx of stroke Left sided hemiparesis, MI referred presents for f/u\par Pt was again in hospital due to ACID misfiring. He had acute renal failure. Cr 7.5\par Umaañ catheter was inserted. He was found to have +UTI ESBL and completed course of abx in hospital\par Currently with catheter.  He is on doxazosin 2mg daily\par \par PSA 27 on 10/2022\par Pt couldn't get MRI done for elevated PSA due to transportation issues

## 2023-07-26 ENCOUNTER — APPOINTMENT (OUTPATIENT)
Dept: CARDIOLOGY | Facility: CLINIC | Age: 63
End: 2023-07-26
Payer: MEDICARE

## 2023-07-26 VITALS
SYSTOLIC BLOOD PRESSURE: 110 MMHG | OXYGEN SATURATION: 98 % | HEART RATE: 64 BPM | BODY MASS INDEX: 31.78 KG/M2 | HEIGHT: 71 IN | DIASTOLIC BLOOD PRESSURE: 74 MMHG | WEIGHT: 227 LBS

## 2023-07-26 PROCEDURE — 93282 PRGRMG EVAL IMPLANTABLE DFB: CPT

## 2023-07-26 RX ORDER — METFORMIN HYDROCHLORIDE 500 MG/1
500 TABLET, COATED ORAL
Qty: 180 | Refills: 0 | Status: DISCONTINUED | COMMUNITY
End: 2023-07-26

## 2023-07-26 RX ORDER — ENALAPRIL MALEATE 10 MG/1
10 TABLET ORAL TWICE DAILY
Qty: 180 | Refills: 1 | Status: DISCONTINUED | COMMUNITY
End: 2023-07-26

## 2023-07-26 RX ORDER — METOPROLOL TARTRATE 50 MG/1
50 TABLET, FILM COATED ORAL
Qty: 180 | Refills: 3 | Status: DISCONTINUED | COMMUNITY
End: 2023-07-26

## 2023-07-26 RX ORDER — DOXAZOSIN 2 MG/1
2 TABLET ORAL DAILY
Qty: 90 | Refills: 1 | Status: DISCONTINUED | COMMUNITY
End: 2023-07-26

## 2023-07-26 RX ORDER — ISOSORBIDE MONONITRATE 30 MG/1
30 TABLET, EXTENDED RELEASE ORAL DAILY
Qty: 45 | Refills: 1 | Status: DISCONTINUED | COMMUNITY
End: 2023-07-26

## 2023-07-26 RX ORDER — PANTOPRAZOLE 40 MG/1
40 TABLET, DELAYED RELEASE ORAL DAILY
Qty: 90 | Refills: 0 | Status: ACTIVE | COMMUNITY

## 2023-08-03 RX ORDER — ISOSORBIDE MONONITRATE 30 MG/1
30 TABLET, EXTENDED RELEASE ORAL DAILY
Qty: 30 | Refills: 1 | Status: ACTIVE | COMMUNITY
Start: 2023-08-03 | End: 1900-01-01

## 2023-08-03 RX ORDER — ISOSORBIDE MONONITRATE 30 MG
30 TABLET, EXTENDED RELEASE 24 HR ORAL TWICE DAILY
Refills: 0 | Status: DISCONTINUED | COMMUNITY
End: 2023-08-03

## 2023-08-08 ENCOUNTER — APPOINTMENT (OUTPATIENT)
Dept: UROLOGY | Facility: CLINIC | Age: 63
End: 2023-08-08
Payer: MEDICARE

## 2023-08-08 VITALS
HEART RATE: 105 BPM | BODY MASS INDEX: 32.9 KG/M2 | TEMPERATURE: 97.3 F | WEIGHT: 235 LBS | HEIGHT: 71 IN | DIASTOLIC BLOOD PRESSURE: 108 MMHG | SYSTOLIC BLOOD PRESSURE: 171 MMHG

## 2023-08-08 PROCEDURE — 99214 OFFICE O/P EST MOD 30 MIN: CPT

## 2023-08-08 NOTE — ASSESSMENT
[FreeTextEntry1] : BPH: c/w doxazosin 2mg daily recheck culture   Elevated PSA: recheck PSA in 3 months and f/u will discuss getting MRI

## 2023-08-08 NOTE — HISTORY OF PRESENT ILLNESS
[FreeTextEntry1] : 64yo M legally blind, hx of stroke Left sided hemiparesis, MI referred presents for f/u He passed voiding trial 2 weeks ago, now urinating well He was diagnosed with UTI in hospital 7/2023 and needed scott placement for ?retention.   He is on doxazosin 2mg daily  pvr 8cc  PSA 27 on 10/2022 Pt couldn't get MRI done for elevated PSA due to transportation issues

## 2023-08-09 ENCOUNTER — APPOINTMENT (OUTPATIENT)
Dept: CARDIOLOGY | Facility: CLINIC | Age: 63
End: 2023-08-09
Payer: MEDICARE

## 2023-08-09 VITALS
OXYGEN SATURATION: 99 % | WEIGHT: 234 LBS | BODY MASS INDEX: 32.76 KG/M2 | DIASTOLIC BLOOD PRESSURE: 96 MMHG | HEIGHT: 71 IN | HEART RATE: 98 BPM | SYSTOLIC BLOOD PRESSURE: 130 MMHG

## 2023-08-09 PROCEDURE — 99213 OFFICE O/P EST LOW 20 MIN: CPT

## 2023-08-09 NOTE — ASSESSMENT
[FreeTextEntry1] : Atrial fibrillation: The patient is tolerating anticoagulant therapy without difficulty.  Cardiac arrest: Invasive coronary angiography revealed normal coronary arteries in May 2023.  ICD: Recent interrogation was unrevealing.

## 2023-08-11 LAB — BACTERIA UR CULT: ABNORMAL

## 2023-08-15 ENCOUNTER — APPOINTMENT (OUTPATIENT)
Dept: UROLOGY | Facility: CLINIC | Age: 63
End: 2023-08-15

## 2023-08-16 ENCOUNTER — APPOINTMENT (OUTPATIENT)
Dept: NEPHROLOGY | Facility: CLINIC | Age: 63
End: 2023-08-16
Payer: MEDICARE

## 2023-08-16 VITALS
HEIGHT: 71 IN | RESPIRATION RATE: 18 BRPM | SYSTOLIC BLOOD PRESSURE: 134 MMHG | WEIGHT: 240 LBS | HEART RATE: 96 BPM | BODY MASS INDEX: 33.6 KG/M2 | DIASTOLIC BLOOD PRESSURE: 78 MMHG | OXYGEN SATURATION: 98 %

## 2023-08-16 DIAGNOSIS — F17.210 NICOTINE DEPENDENCE, CIGARETTES, UNCOMPLICATED: ICD-10-CM

## 2023-08-16 DIAGNOSIS — E11.9 TYPE 2 DIABETES MELLITUS W/OUT COMPLICATIONS: ICD-10-CM

## 2023-08-16 PROCEDURE — 99407 BEHAV CHNG SMOKING > 10 MIN: CPT

## 2023-08-16 PROCEDURE — 99215 OFFICE O/P EST HI 40 MIN: CPT | Mod: 25

## 2023-08-16 RX ORDER — ATORVASTATIN CALCIUM 20 MG/1
20 TABLET, FILM COATED ORAL
Qty: 90 | Refills: 3 | Status: DISCONTINUED | COMMUNITY
End: 2023-08-16

## 2023-08-16 NOTE — HISTORY OF PRESENT ILLNESS
[FreeTextEntry1] : Patient is a 63 year old male with history of DM, HTN, urinary retention requiring scott, since removed, BPH, here for initial evaluation. CKD Cr runs normally 1.4; was 2.9 on discharge from [facility] last month July 2023; saw Dr Eugene Gracia with urology passed TOV; good urine output; on flomax 0.8mg and finasteride 5mg daily; still smoking 1 ppd

## 2023-08-16 NOTE — ASSESSMENT
[FreeTextEntry1] : 1) JALEN on CKD III 2) HTN 3) DM 4) BPH  Passed TOV with urology; scott out Labs being done next week with Dr Workman; sending patient with scripts Last Cr 2.9 baseline is around 1.4 Discussed smoking cessation > 7 min ;  Will RTC 3 mo

## 2023-08-17 ENCOUNTER — NON-APPOINTMENT (OUTPATIENT)
Age: 63
End: 2023-08-17

## 2023-08-17 ENCOUNTER — APPOINTMENT (OUTPATIENT)
Dept: CARDIOLOGY | Facility: CLINIC | Age: 63
End: 2023-08-17
Payer: MEDICARE

## 2023-08-17 PROCEDURE — 93296 REM INTERROG EVL PM/IDS: CPT

## 2023-08-17 PROCEDURE — 93295 DEV INTERROG REMOTE 1/2/MLT: CPT

## 2023-08-25 ENCOUNTER — OFFICE (OUTPATIENT)
Dept: URBAN - METROPOLITAN AREA CLINIC 38 | Facility: CLINIC | Age: 63
Setting detail: OPHTHALMOLOGY
End: 2023-08-25
Payer: MEDICARE

## 2023-08-25 DIAGNOSIS — H40.1131: ICD-10-CM

## 2023-08-25 PROCEDURE — 99213 OFFICE O/P EST LOW 20 MIN: CPT

## 2023-08-25 PROCEDURE — 92133 CPTRZD OPH DX IMG PST SGM ON: CPT

## 2023-08-25 ASSESSMENT — TONOMETRY
OD_IOP_MMHG: 10
OS_IOP_MMHG: 10
OD_IOP_MMHG: 17
OS_IOP_MMHG: 20

## 2023-08-25 ASSESSMENT — CONFRONTATIONAL VISUAL FIELD TEST (CVF)
OD_FINDINGS: FULL
OS_FINDINGS: FULL

## 2023-08-27 ASSESSMENT — SPHEQUIV_DERIVED
OD_SPHEQUIV: -1.125
OS_SPHEQUIV: -2.375
OD_SPHEQUIV: -1.125
OS_SPHEQUIV: -2

## 2023-08-27 ASSESSMENT — REFRACTION_MANIFEST
OD_VA1: 20/125-
OS_VA2: UNABLE
OD_CYLINDER: -0.25
OS_VA2: UNABLE
OS_VA1: 20/125-
OS_ADD: +3.00
OD_VA2: UNABLE
OS_VA1: 20/NI
OD_AXIS: 160
OD_SPHERE: -1.00
OS_SPHERE: -1.75
OD_CYLINDER: -0.25
OD_ADD: +3.00
OD_AXIS: 160
OU_VA: 20/100-
OS_AXIS: 005
OS_CYLINDER: SPH
OD_VA1: 20/NI
OS_ADD: +3.00
OD_ADD: +3.00
OS_SPHERE: -2.50
OD_SPHERE: -1.00
OS_CYLINDER: -0.50
OD_VA2: UNABLE

## 2023-08-27 ASSESSMENT — REFRACTION_AUTOREFRACTION
OD_SPHERE: -0.75
OS_SPHERE: -2.00
OS_CYLINDER: -0.75
OD_CYLINDER: SPH
OS_AXIS: 171

## 2023-08-27 ASSESSMENT — REFRACTION_CURRENTRX
OS_SPHERE: -1.75
OS_VPRISM_DIRECTION: SV
OD_AXIS: 154
OD_VPRISM_DIRECTION: SV
OS_VPRISM_DIRECTION: SV
OS_OVR_VA: 20/
OS_AXIS: 002
OD_OVR_VA: 20/
OD_CYLINDER: -0.25
OS_AXIS: 006
OS_CYLINDER: -0.50
OD_SPHERE: -1.00
OD_OVR_VA: 20/
OS_CYLINDER: -0.50
OD_SPHERE: +2.00
OD_VPRISM_DIRECTION: SV
OS_OVR_VA: 20/
OS_SPHERE: +1.25
OD_CYLINDER: -0.25
OD_AXIS: 157

## 2023-08-27 ASSESSMENT — KERATOMETRY
OD_K2POWER_DIOPTERS: 41.75
OS_AXISANGLE_DEGREES: 082
OS_K2POWER_DIOPTERS: 41.50
OD_AXISANGLE_DEGREES: 096
OS_K1POWER_DIOPTERS: 40.75
METHOD_AUTO_MANUAL: AUTO
OD_K1POWER_DIOPTERS: 41.25

## 2023-08-27 ASSESSMENT — VISUAL ACUITY
OS_BCVA: 20/250
OD_BCVA: 20/500

## 2023-08-27 ASSESSMENT — AXIALLENGTH_DERIVED
OS_AL: 25.3678
OD_AL: 24.8262
OD_AL: 24.8262
OS_AL: 25.5379

## 2023-08-29 ENCOUNTER — APPOINTMENT (OUTPATIENT)
Dept: UROLOGY | Facility: CLINIC | Age: 63
End: 2023-08-29
Payer: MEDICARE

## 2023-08-29 VITALS
WEIGHT: 223 LBS | SYSTOLIC BLOOD PRESSURE: 152 MMHG | HEART RATE: 45 BPM | BODY MASS INDEX: 31.22 KG/M2 | DIASTOLIC BLOOD PRESSURE: 88 MMHG | TEMPERATURE: 98.6 F | HEIGHT: 71 IN

## 2023-08-29 DIAGNOSIS — R35.1 BENIGN PROSTATIC HYPERPLASIA WITH LOWER URINARY TRACT SYMPMS: ICD-10-CM

## 2023-08-29 DIAGNOSIS — N40.1 BENIGN PROSTATIC HYPERPLASIA WITH LOWER URINARY TRACT SYMPMS: ICD-10-CM

## 2023-08-29 PROCEDURE — 99214 OFFICE O/P EST MOD 30 MIN: CPT

## 2023-08-29 RX ORDER — FINASTERIDE 5 MG/1
5 TABLET, FILM COATED ORAL
Qty: 90 | Refills: 1 | Status: ACTIVE | COMMUNITY
Start: 2023-07-17

## 2023-08-29 NOTE — HISTORY OF PRESENT ILLNESS
[FreeTextEntry1] : 62yo M legally blind, hx of stroke Left sided hemiparesis, MI presents for f/u He had another UTI. Finished 2 week course of abx. Currently on methenamine prophylaxis.  He was diagnosed with UTI in hospital 7/2023 and needed scott placement for ?retention.   He is on doxazosin 2mg daily  pvr 8cc last visit  PSA 27 on 10/2022 Pt couldn't get MRI done for elevated PSA due to transportation issues

## 2023-08-29 NOTE — ASSESSMENT
[FreeTextEntry1] : BPH: c/w flomax + finasteride recheck culture   Recurrent UTIs: c/w methenamine  Elevated PSA: check PSA in 3 months

## 2023-08-31 ENCOUNTER — NON-APPOINTMENT (OUTPATIENT)
Age: 63
End: 2023-08-31

## 2023-09-01 LAB — BACTERIA UR CULT: ABNORMAL

## 2023-09-15 ENCOUNTER — APPOINTMENT (OUTPATIENT)
Dept: ELECTROPHYSIOLOGY | Facility: CLINIC | Age: 63
End: 2023-09-15
Payer: MEDICARE

## 2023-09-15 VITALS
WEIGHT: 230 LBS | SYSTOLIC BLOOD PRESSURE: 182 MMHG | HEIGHT: 71 IN | BODY MASS INDEX: 32.2 KG/M2 | OXYGEN SATURATION: 98 % | DIASTOLIC BLOOD PRESSURE: 110 MMHG | HEART RATE: 60 BPM

## 2023-09-15 PROCEDURE — 99214 OFFICE O/P EST MOD 30 MIN: CPT

## 2023-09-15 RX ORDER — SULFAMETHOXAZOLE AND TRIMETHOPRIM 800; 160 MG/1; MG/1
800-160 TABLET ORAL TWICE DAILY
Qty: 28 | Refills: 0 | Status: DISCONTINUED | COMMUNITY
Start: 2023-08-11 | End: 2023-09-15

## 2023-09-15 RX ORDER — AMOXICILLIN AND CLAVULANATE POTASSIUM 875; 125 MG/1; MG/1
875-125 TABLET, COATED ORAL
Qty: 14 | Refills: 0 | Status: DISCONTINUED | COMMUNITY
Start: 2023-09-01 | End: 2023-09-15

## 2023-09-19 ENCOUNTER — APPOINTMENT (OUTPATIENT)
Dept: UROLOGY | Facility: CLINIC | Age: 63
End: 2023-09-19
Payer: MEDICARE

## 2023-09-19 VITALS
DIASTOLIC BLOOD PRESSURE: 127 MMHG | HEIGHT: 71 IN | WEIGHT: 230 LBS | SYSTOLIC BLOOD PRESSURE: 151 MMHG | BODY MASS INDEX: 32.2 KG/M2 | HEART RATE: 81 BPM | TEMPERATURE: 97.3 F

## 2023-09-19 PROCEDURE — 99213 OFFICE O/P EST LOW 20 MIN: CPT

## 2023-09-21 LAB — BACTERIA UR CULT: NORMAL

## 2023-10-26 ENCOUNTER — OUTPATIENT (OUTPATIENT)
Dept: OUTPATIENT SERVICES | Facility: HOSPITAL | Age: 63
LOS: 1 days | End: 2023-10-26

## 2023-10-26 DIAGNOSIS — R93.8 ABNORMAL FINDINGS ON DIAGNOSTIC IMAGING OF OTHER SPECIFIED BODY STRUCTURES: ICD-10-CM

## 2023-10-31 ENCOUNTER — RESULT REVIEW (OUTPATIENT)
Age: 63
End: 2023-10-31

## 2023-10-31 ENCOUNTER — APPOINTMENT (OUTPATIENT)
Dept: CT IMAGING | Facility: CLINIC | Age: 63
End: 2023-10-31
Payer: MEDICARE

## 2023-10-31 PROCEDURE — 71250 CT THORAX DX C-: CPT

## 2023-11-02 ENCOUNTER — APPOINTMENT (OUTPATIENT)
Dept: HEMATOLOGY ONCOLOGY | Facility: CLINIC | Age: 63
End: 2023-11-02
Payer: MEDICARE

## 2023-11-02 VITALS
DIASTOLIC BLOOD PRESSURE: 140 MMHG | OXYGEN SATURATION: 99 % | SYSTOLIC BLOOD PRESSURE: 174 MMHG | HEART RATE: 93 BPM | TEMPERATURE: 98.1 F

## 2023-11-02 PROCEDURE — 99214 OFFICE O/P EST MOD 30 MIN: CPT

## 2023-11-07 ENCOUNTER — APPOINTMENT (OUTPATIENT)
Dept: UROLOGY | Facility: CLINIC | Age: 63
End: 2023-11-07
Payer: MEDICARE

## 2023-11-07 VITALS
WEIGHT: 230 LBS | DIASTOLIC BLOOD PRESSURE: 108 MMHG | BODY MASS INDEX: 32.2 KG/M2 | HEIGHT: 71 IN | HEART RATE: 111 BPM | TEMPERATURE: 97.1 F | SYSTOLIC BLOOD PRESSURE: 153 MMHG

## 2023-11-07 DIAGNOSIS — N39.0 URINARY TRACT INFECTION, SITE NOT SPECIFIED: ICD-10-CM

## 2023-11-07 DIAGNOSIS — R97.20 ELEVATED PROSTATE, SPECIFIC ANTIGEN [PSA]: ICD-10-CM

## 2023-11-07 PROCEDURE — 99214 OFFICE O/P EST MOD 30 MIN: CPT

## 2023-11-07 RX ORDER — METHENAMINE HIPPURATE 1 G/1
1 TABLET ORAL
Qty: 90 | Refills: 0 | Status: ACTIVE | COMMUNITY
Start: 2023-08-11 | End: 1900-01-01

## 2023-11-08 ENCOUNTER — NON-APPOINTMENT (OUTPATIENT)
Age: 63
End: 2023-11-08

## 2023-11-16 ENCOUNTER — APPOINTMENT (OUTPATIENT)
Dept: NEPHROLOGY | Facility: CLINIC | Age: 63
End: 2023-11-16

## 2023-11-20 LAB — HBA1C MFR BLD HPLC: 6.6

## 2023-11-29 ENCOUNTER — RX ONLY (RX ONLY)
Age: 63
End: 2023-11-29

## 2023-11-29 ENCOUNTER — OFFICE (OUTPATIENT)
Dept: URBAN - METROPOLITAN AREA CLINIC 38 | Facility: CLINIC | Age: 63
Setting detail: OPHTHALMOLOGY
End: 2023-11-29
Payer: MEDICARE

## 2023-11-29 DIAGNOSIS — E11.3293: ICD-10-CM

## 2023-11-29 DIAGNOSIS — I63.50: ICD-10-CM

## 2023-11-29 DIAGNOSIS — H11.153: ICD-10-CM

## 2023-11-29 DIAGNOSIS — H25.13: ICD-10-CM

## 2023-11-29 DIAGNOSIS — H40.1131: ICD-10-CM

## 2023-11-29 DIAGNOSIS — H35.033: ICD-10-CM

## 2023-11-29 PROCEDURE — 99213 OFFICE O/P EST LOW 20 MIN: CPT

## 2023-11-29 PROCEDURE — 92083 EXTENDED VISUAL FIELD XM: CPT

## 2023-11-29 ASSESSMENT — CONFRONTATIONAL VISUAL FIELD TEST (CVF)
OS_FINDINGS: FULL
OD_FINDINGS: FULL

## 2023-12-02 ASSESSMENT — REFRACTION_MANIFEST
OS_ADD: +3.00
OD_VA1: 20/NI
OD_SPHERE: -1.00
OS_VA1: 20/125
OS_CYLINDER: -0.50
OD_CYLINDER: -0.25
OD_CYLINDER: -0.25
OS_CYLINDER: -0.50
OS_VA2: UNABLE
OD_VA1: 20/125-
OS_VA2: UNABLE
OS_SPHERE: -2.50
OD_VA2: UNABLE
OD_AXIS: 160
OU_VA: 20/100-
OD_ADD: +3.00
OS_SPHERE: -1.75
OS_CYLINDER: SPH
OS_AXIS: 005
OD_ADD: +3.00
OD_SPHERE: -1.00
OD_SPHERE: -1.00
OD_VA2: UNABLE
OS_ADD: +3.00
OS_VA1: 20/125-
OS_SPHERE: -1.75
OD_AXIS: 160
OD_CYLINDER: -0.25
OS_VA1: 20/NI
OS_AXIS: 005
OD_AXIS: 160
OD_VA1: 20/125

## 2023-12-02 ASSESSMENT — SPHEQUIV_DERIVED
OS_SPHEQUIV: -2
OS_SPHEQUIV: -2
OD_SPHEQUIV: -1.125
OS_SPHEQUIV: -2.375

## 2023-12-02 ASSESSMENT — AXIALLENGTH_DERIVED
OD_AL: 24.8262
OD_AL: 24.8262
OS_AL: 25.5379
OS_AL: 25.3678
OS_AL: 25.3678
OD_AL: 24.8262

## 2023-12-02 ASSESSMENT — REFRACTION_CURRENTRX
OS_VPRISM_DIRECTION: SV
OS_OVR_VA: 20/
OS_CYLINDER: -0.50
OD_AXIS: 154
OS_AXIS: 002
OD_OVR_VA: 20/
OD_OVR_VA: 20/
OD_CYLINDER: -0.25
OD_VPRISM_DIRECTION: SV
OS_SPHERE: +1.25
OS_CYLINDER: -0.50
OD_SPHERE: -1.00
OS_AXIS: 006
OD_VPRISM_DIRECTION: SV
OS_OVR_VA: 20/
OS_VPRISM_DIRECTION: SV
OD_SPHERE: +2.00
OD_AXIS: 157
OD_CYLINDER: -0.25
OS_SPHERE: -1.75

## 2023-12-02 ASSESSMENT — REFRACTION_AUTOREFRACTION
OS_CYLINDER: -0.75
OS_AXIS: 171
OS_SPHERE: -2.00
OD_CYLINDER: SPH
OD_SPHERE: -0.75

## 2023-12-02 ASSESSMENT — KERATOMETRY
METHOD_AUTO_MANUAL: AUTO
OD_AXISANGLE_DEGREES: 096
OS_K1POWER_DIOPTERS: 40.75
OS_K2POWER_DIOPTERS: 41.50
OD_K1POWER_DIOPTERS: 41.25
OD_K2POWER_DIOPTERS: 41.75
OS_AXISANGLE_DEGREES: 082

## 2023-12-05 ENCOUNTER — APPOINTMENT (OUTPATIENT)
Dept: UROLOGY | Facility: CLINIC | Age: 63
End: 2023-12-05

## 2023-12-20 ENCOUNTER — APPOINTMENT (OUTPATIENT)
Dept: CARDIOLOGY | Facility: CLINIC | Age: 63
End: 2023-12-20
Payer: MEDICARE

## 2023-12-20 VITALS
OXYGEN SATURATION: 99 % | HEART RATE: 66 BPM | WEIGHT: 229 LBS | DIASTOLIC BLOOD PRESSURE: 84 MMHG | BODY MASS INDEX: 32.06 KG/M2 | HEIGHT: 71 IN | SYSTOLIC BLOOD PRESSURE: 170 MMHG

## 2023-12-20 PROCEDURE — 93282 PRGRMG EVAL IMPLANTABLE DFB: CPT

## 2023-12-20 RX ORDER — AMLODIPINE BESYLATE 5 MG/1
5 TABLET ORAL DAILY
Qty: 90 | Refills: 1 | Status: DISCONTINUED | COMMUNITY
End: 2023-12-20

## 2023-12-23 NOTE — PROCEDURE
[No] : not [Atrial Fibrillation] : atrial fibrillation [ICD] : Implantable cardioverter-defibrillator [VVI] : VVI [Threshold Testing Performed] : Threshold testing was performed [Lead Imp:  ___ohms] : lead impedance was [unfilled] ohms [Sensing Amplitude ___mv] : sensing amplitude was [unfilled] mv [___V @] : [unfilled] V [___ ms] : [unfilled] ms [de-identified] : Medtronic [de-identified] : Cobalt [de-identified] : [id number] [de-identified] : 05/05/2023 [de-identified] : 40 [de-identified] : 12.8 YEARS [de-identified] : 0 Episdoes. Increased Optivol.  Pt has changed his diet and Optivol is beginning to drop in response to his lifestyle modification.  No HENDRICKSON, PND, orthopnea, or significant edema.  Trace b/l.    Continue to monitor.  If HF symptoms or uptrending fluid levels, will initiate diuretic.  Pt wishes to not start meds at this time.   F/U 3 months.

## 2023-12-29 ENCOUNTER — APPOINTMENT (OUTPATIENT)
Dept: ELECTROPHYSIOLOGY | Facility: CLINIC | Age: 63
End: 2023-12-29
Payer: MEDICARE

## 2023-12-29 VITALS — SYSTOLIC BLOOD PRESSURE: 110 MMHG | HEART RATE: 82 BPM | DIASTOLIC BLOOD PRESSURE: 60 MMHG | RESPIRATION RATE: 17 BRPM

## 2023-12-29 DIAGNOSIS — I49.3 VENTRICULAR PREMATURE DEPOLARIZATION: ICD-10-CM

## 2023-12-29 DIAGNOSIS — M21.372 FOOT DROP, LEFT FOOT: ICD-10-CM

## 2023-12-29 PROCEDURE — 99214 OFFICE O/P EST MOD 30 MIN: CPT

## 2023-12-29 RX ORDER — APIXABAN 5 MG/1
5 TABLET, FILM COATED ORAL
Refills: 0 | Status: ACTIVE | COMMUNITY

## 2023-12-29 RX ORDER — GLIPIZIDE 5 MG/1
5 TABLET ORAL DAILY
Refills: 0 | Status: ACTIVE | COMMUNITY
Start: 2023-07-17

## 2024-01-01 PROBLEM — M21.372 LEFT FOOT DROP: Status: ACTIVE | Noted: 2022-10-04

## 2024-01-01 NOTE — DISCUSSION/SUMMARY
[FreeTextEntry1] : The ICD was interrogated and no episodes of VT VF He is ventricular sensed 99.9%.  He is in persistent atrial fibrillation and at times has high ventricular rates.  VT VF therapy zone for greater than 188 bpm. Patient is on rate control medication with metoprolol succinate  mg/day He has had significant fluctuations in his blood pressure from 170 over 90s to 110/60. He needs further evaluation of his elevated blood pressures.  He is on multiple medications including diltiazem  mg daily, hydralazine 50 mg 3 times daily, isosorbide, metoprolol, finasteride and tamsulosin.  He will have follow-up blood pressure evaluation and a decision  whether his medications regimen needs to be altered.   Follow-up evaluation blood pressure   The patient follow-up with his primary care physician and cardiologist.  He will follow-up with electrophysiology in 6 months for his ICD device evaluation.    Remote monitoring and follow-up evaluation in office

## 2024-01-01 NOTE — PHYSICAL EXAM
[Normal S1, S2] : normal S1, S2 [Clear Lung Fields] : clear lung fields [Non Tender] : non-tender [No Edema] : no edema [de-identified] : irreg [de-identified] : left sided hemiparesis

## 2024-01-01 NOTE — HISTORY OF PRESENT ILLNESS
[FreeTextEntry1] : Patient  a 63-year-old man who is seen in follow-up evaluation regarding his ICD. He underwent implantation of an ICD single-chamber Medtronic on 5/5/2023. Prior history of NSVT and prior out of hospital cardiac arrest.  He has nonobstructive arteries and had an EF of 65%. Other medical problems include COPD, DAYANA on CPAP, hypertension, dyslipidemia, diabetes.  Prior history of left lower extremity DVT. He has had persistent atrial fibrillation and is on Eliquis.  In  the office today in exam room he fell over.  Patient did not lose consciousness he felt that his leg gave out.   Previous history: He has a prior history of a cardiac arrest with August 2020 and was admitted to [facility].  The patient had a hemorrhagic CVA.  He has a long hospital course including prolonged respiratory failure and had a PEG placement.  He was discharged to a nursing facility where he resided for 2 years.  The patient had returned to [facility] with a left femoral DVT and he was started on Eliquis.  He was also noted to be in atrial fibrillation.  Patient not aware of A. fib.  He has been on metoprolol, hydralazine, enalapril, Eliquis and amlodipine.  His blood pressures has been elevated to the range of 160s to 170s over 90s to 100.  He had an echocardiogram performed at [facility] 9/18/2022 that showed EF 65%.  He had moderate left ventricular perjury.  His A. fib was mildly dilated.  The left atrial volume index was 36.8 cc per metered square.  There was no mitral regurgitation.  The patient is legally blind.

## 2024-01-04 ENCOUNTER — NON-APPOINTMENT (OUTPATIENT)
Age: 64
End: 2024-01-04

## 2024-01-04 NOTE — PROGRESS NOTE ADULT - ASSESSMENT
[facility] - Emergency Dept  [facility] Medicine  History & Physical    Patient Name: Santa Rodriguez  MRN: [id number]  Patient Class: OP- Observation  Admission Date: 1/4/2024  Attending Physician: Juno Maier MD   Primary Care Provider: Saima Riley 59 YO male on coumadin for afib s/p cardiac arrest at work, down 25 minutes prior to ROSC. Pupils were R fixed and dilated, left fixed at the time, no gag reflex, post-CA cooling protocol was initiated down to 35 deg. Intubated and put on Nimbex drip. Also on Propofol, fentanyl, levophed. R groin minerva placed. Also put on heparin post-CA. 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 Course also c/b GIB, put on protonix drip. Prior to xfer was started on 3% at 30cc/hr.   Has been in Afib HR 90-110s with frequent ectopy. 01/04/24  1141   01/04/24  0600   01/04/Mono #     0.7       Mono %     1139                              Cesar Townsend MD  Department of Hospital Medicine  [facility] - Emergency Dept

## 2024-01-05 ENCOUNTER — APPOINTMENT (OUTPATIENT)
Dept: CARDIOLOGY | Facility: CLINIC | Age: 64
End: 2024-01-05
Payer: MEDICARE

## 2024-01-05 VITALS
OXYGEN SATURATION: 99 % | HEART RATE: 47 BPM | BODY MASS INDEX: 32.34 KG/M2 | HEIGHT: 71 IN | SYSTOLIC BLOOD PRESSURE: 156 MMHG | DIASTOLIC BLOOD PRESSURE: 84 MMHG | WEIGHT: 231 LBS

## 2024-01-05 PROCEDURE — 99214 OFFICE O/P EST MOD 30 MIN: CPT

## 2024-01-05 RX ORDER — DILTIAZEM HYDROCHLORIDE 240 MG/1
240 CAPSULE, EXTENDED RELEASE ORAL DAILY
Qty: 90 | Refills: 1 | Status: ACTIVE | COMMUNITY
Start: 2023-12-20 | End: 1900-01-01

## 2024-01-05 RX ORDER — TAMSULOSIN HYDROCHLORIDE 0.4 MG/1
0.4 CAPSULE ORAL
Qty: 60 | Refills: 0 | Status: ACTIVE | COMMUNITY
Start: 2024-01-05 | End: 1900-01-01

## 2024-01-05 NOTE — ASSESSMENT
[FreeTextEntry1] : Atrial fibrillation: Today's heart rate is 47.  He has fast heart rates on interrogation of his AICD.  Overall today we elected to increase his diltiazem extended release from 180 mg daily up to 240 mg daily.  Hypertension: Not well-controlled.  Diltiazem increased to 240 mg daily.  The patient will log his blood pressures at home and bring them to me at next visit.

## 2024-01-05 NOTE — REASON FOR VISIT
[FreeTextEntry1] : The patient is seen for follow-up of atrial fibrillation.  The patient walks with a cane.  He had a hemorrhagic CVA in the past.  He ambulates slowly.  There is no exertional chest discomfort or shortness of breath.  Since his CVA he has been put back on Eliquis and is tolerated for several years.

## 2024-01-29 ENCOUNTER — APPOINTMENT (OUTPATIENT)
Dept: CARDIOLOGY | Facility: CLINIC | Age: 64
End: 2024-01-29
Payer: MEDICARE

## 2024-01-29 VITALS
WEIGHT: 231 LBS | SYSTOLIC BLOOD PRESSURE: 138 MMHG | BODY MASS INDEX: 32.34 KG/M2 | OXYGEN SATURATION: 97 % | HEART RATE: 54 BPM | DIASTOLIC BLOOD PRESSURE: 80 MMHG | HEIGHT: 71 IN

## 2024-01-29 DIAGNOSIS — Z86.74 PERSONAL HISTORY OF SUDDEN CARDIAC ARREST: ICD-10-CM

## 2024-01-29 DIAGNOSIS — I63.9 CEREBRAL INFARCTION, UNSPECIFIED: ICD-10-CM

## 2024-01-29 PROCEDURE — 99214 OFFICE O/P EST MOD 30 MIN: CPT

## 2024-01-29 NOTE — ASSESSMENT
[FreeTextEntry1] : Invasive coronary angiography revealed normal coronary arteries on May 3, 2023  On May 1, 2023  transthoracic echocardiography revealed ejection fraction over 65%, moderate left ventricular hypertrophy, elevated left atrial filling pressures   Prolonged monitoring in December 2022 revealed 100% atrial fibrillation burden 5 runs of nonsustained ventricular tachycardia with the longest being 16 beats.   The average heart rate was 100 bpm.  Cardiac arrest: The patient is status post ICD.  The patient is scheduled for EP evaluation.  Patient will continue to have ICD checks.  Atrial fibrillation: The patient is being treated with anticoagulant therapy.  The patient's weight is over 60 kg and his age is under 80.  He is on the correct dose of anticoagulant therapy.  He is tolerated for quite some time.  Will continue.  Aspirin was discontinued.

## 2024-01-29 NOTE — REASON FOR VISIT
[FreeTextEntry1] :   The patient is seen to follow-up on atrial fibrillation.  Patient a 63-year-old man who is seen in follow-up evaluation regarding his ICD. He underwent implantation of an ICD single-chamber [employer] on 5/5/2023. Prior history of NSVT and prior out of hospital cardiac arrest. He has nonobstructive arteries and had an EF of 65%. Other medical problems include COPD, DAYANA on CPAP, hypertension, dyslipidemia, diabetes. Prior history of left lower extremity DVT. He has had persistent atrial fibrillation and is on Eliquis.  In the office today in exam room he fell over. Patient did not lose consciousness he felt that his leg gave out.   Previous history: He has a prior history of a cardiac arrest with August 2020 and was admitted to [facility]. The patient had a hemorrhagic CVA. He has a long hospital course including prolonged respiratory failure and had a PEG placement. He was discharged to a nursing facility where he resided for 2 years. The patient had returned to [facility] with a left femoral DVT and he was started on Eliquis. He was also noted to be in atrial fibrillation.  Patient not aware of A. fib. He has been on metoprolol, hydralazine, enalapril, Eliquis and amlodipine.  His blood pressures has been elevated to the range of 160s to 170s over 90s to 100.  He had an echocardiogram performed at [facility] 9/18/2022 that showed EF 65%. He had moderate left ventricular perjury. His A. fib was mildly dilated. The left atrial volume index was 36.8 cc per metered square. There was no mitral regurgitation.  The patient is legally blind.  Patient lost to substantial amount of weight and is ambulating fairly well without exertional chest discomfort or shortness of breath.

## 2024-03-05 ENCOUNTER — OFFICE (OUTPATIENT)
Dept: URBAN - METROPOLITAN AREA CLINIC 38 | Facility: CLINIC | Age: 64
Setting detail: OPHTHALMOLOGY
End: 2024-03-05
Payer: MEDICARE

## 2024-03-05 DIAGNOSIS — H40.1131: ICD-10-CM

## 2024-03-05 DIAGNOSIS — I63.50: ICD-10-CM

## 2024-03-05 DIAGNOSIS — H25.13: ICD-10-CM

## 2024-03-05 DIAGNOSIS — H11.153: ICD-10-CM

## 2024-03-05 PROCEDURE — 99213 OFFICE O/P EST LOW 20 MIN: CPT | Performed by: OPHTHALMOLOGY

## 2024-03-05 ASSESSMENT — REFRACTION_MANIFEST
OD_ADD: +3.00
OD_AXIS: 160
OD_CYLINDER: SPHERE
OD_CYLINDER: -0.25
OU_VA: 20/100-
OS_VA1: 20/125
OD_SPHERE: -1.00
OS_CYLINDER: SPH
OS_VA2: UNABLE
OS_AXIS: 005
OD_AXIS: 160
OD_VA1: 20/125
OS_SPHERE: -1.75
OD_SPHERE: -1.00
OD_VA1: 20/150
OS_VA1: 20/150
OD_VA2: UNABLE
OS_SPHERE: -2.50
OS_ADD: +3.00
OS_ADD: +3.00
OS_VA2: UNABLE
OS_SPHERE: -2.50
OS_CYLINDER: SPHERE
OD_SPHERE: -1.00
OD_CYLINDER: -0.25
OS_CYLINDER: -0.50
OS_VA1: 20/NI
OD_VA1: 20/NI
OD_VA2: UNABLE
OD_ADD: +3.00

## 2024-03-05 ASSESSMENT — REFRACTION_CURRENTRX
OS_AXIS: 006
OS_SPHERE: +1.25
OS_CYLINDER: -0.50
OS_VPRISM_DIRECTION: SV
OD_VPRISM_DIRECTION: SV
OS_OVR_VA: 20/
OD_AXIS: 157
OS_OVR_VA: 20/
OD_OVR_VA: 20/
OD_AXIS: 154
OD_SPHERE: -1.00
OD_SPHERE: +2.00
OD_CYLINDER: -0.25
OD_VPRISM_DIRECTION: SV
OS_CYLINDER: -0.50
OD_CYLINDER: -0.25
OS_AXIS: 002
OS_VPRISM_DIRECTION: SV
OS_SPHERE: -1.75
OD_OVR_VA: 20/

## 2024-03-05 ASSESSMENT — SPHEQUIV_DERIVED
OS_SPHEQUIV: -2
OD_SPHEQUIV: -1.125
OD_SPHEQUIV: -1.125

## 2024-03-21 NOTE — PATIENT PROFILE ADULT - NSPROMUTANXFEARADDRESSFT_GEN_A_NUR
Subjective   Fiona Brianne Stransky Buffington is a 12 y.o. female who presents for the following: Molluscum Contagiosum (Dad in with pt. LV: 1/11/24: Current treatment: Differin gel 2-3 times weekly.  Improvement, notes several lesions on abdomen and right axilla/flank.  Dad would like to have cryotherapy today. )    The following portions of the chart were reviewed this encounter and updated as appropriate:         Review of Systems: No other skin or systemic complaints.    Objective   Well appearing patient in no apparent distress; mood and affect are within normal limits.    A focused examination was performed including upper extremities, including the arms, hands, fingers, and fingernails, head, including the scalp, face, neck, nose, ears, eyelids, and lips, and chest, axillae, abdomen, back, and buttocks. All findings within normal limits unless otherwise noted below.    Right Flank, Right Upper Arm - Anterior  8 umbillicated papules on right inner arm, right chest, right abd. Scattered post-inflammatory erythema in areas of prior lesions        Assessment/Plan   Molluscum contagiosum  Right Upper Arm - Anterior; Right Flank    Many have resolved with differin, but still several remaining.   Advised that we can try LN2 today.    -Counseled on nature of condition. Discussed that molluscum is a common condition in children, caused by a poxvirus infection. -Recommended treatment today with LN2. Discussed risks/benefits of procedure including pain, redness, blistering, and dyspigmentation. Advised that multiple treatments may be needed. Patient/parent verbalized understanding and agreement with plan for procedure today.     Destr of lesion - Right Flank, Right Upper Arm - Anterior  Complexity: simple    Destruction method: cryotherapy    Informed consent: discussed and consent obtained    Lesion destroyed using liquid nitrogen: Yes    Cryotherapy cycles:  3  Outcome: patient tolerated procedure well with no  complications    Post-procedure details: wound care instructions given      Related Procedures  Follow Up In Dermatology - Established Patient          RTC 3-4 weeks     Scribe Attestation  By signing my name below, I, Yara Candelaria LPN , Scribe   attest that this documentation has been prepared under the direction and in the presence of Oziel Bowling MD.     Unable to assess .  Pt is unresponsive

## 2024-03-27 ENCOUNTER — OUTPATIENT (OUTPATIENT)
Dept: OUTPATIENT SERVICES | Facility: HOSPITAL | Age: 64
LOS: 1 days | End: 2024-03-27

## 2024-03-27 ENCOUNTER — APPOINTMENT (OUTPATIENT)
Dept: CT IMAGING | Facility: CLINIC | Age: 64
End: 2024-03-27
Payer: MEDICARE

## 2024-03-27 ENCOUNTER — RESULT REVIEW (OUTPATIENT)
Age: 64
End: 2024-03-27

## 2024-03-27 DIAGNOSIS — R93.8 ABNORMAL FINDINGS ON DIAGNOSTIC IMAGING OF OTHER SPECIFIED BODY STRUCTURES: ICD-10-CM

## 2024-03-27 PROCEDURE — 71250 CT THORAX DX C-: CPT

## 2024-03-28 NOTE — PROGRESS NOTE ADULT - ASSESSMENT
59 yo male admitted early August with out of hospital arrest.  ER evaluation found Rt sided SAH, cerebral angio x 2 was negative.  Hypoxic encephalopathy following event.  S/P cefepime 8/14-8/20 for pneumonia.  S/P treatment for C Diff, course completed 8/29.  S/P trach 8/24 and Peg 8/26.  Fever to 102.7 on 9/8 led to meropenem and po vanco 125 BID being started.  WBC of 30,000 has decreased to 12,000  CT of C/A/P on 9/8  without clear source of infection.  Pseudomonas in urine (pansensitive), pseudomonas/providencia in sputum,and sterile blood.  No signs of recurrent C Diff  Possible  source to sepsis , he requires q6 strait cath for large PVR  Head CT last week with evolving ICH  Suggest:  1.Continue  cefepime, day 6, will favor 7-10 days total for suspected septic event, ?  in origin.  2. Continue po vanco, 125 BID for C Diff prophylaxis untill antibiotics have been completed  3.? Behavior/Psych f/u for encephalopathy-delerium, an ongoing issues  4.Supportive care per RICU  4.Consider f/u head CT   Detail Level: Zone

## 2024-03-29 ENCOUNTER — APPOINTMENT (OUTPATIENT)
Dept: CARDIOLOGY | Facility: CLINIC | Age: 64
End: 2024-03-29
Payer: MEDICARE

## 2024-03-29 ENCOUNTER — NON-APPOINTMENT (OUTPATIENT)
Age: 64
End: 2024-03-29

## 2024-03-29 PROCEDURE — 93295 DEV INTERROG REMOTE 1/2/MLT: CPT

## 2024-03-29 PROCEDURE — 93296 REM INTERROG EVL PM/IDS: CPT

## 2024-04-03 ENCOUNTER — APPOINTMENT (OUTPATIENT)
Dept: HEMATOLOGY ONCOLOGY | Facility: CLINIC | Age: 64
End: 2024-04-03
Payer: MEDICARE

## 2024-04-03 VITALS
SYSTOLIC BLOOD PRESSURE: 197 MMHG | OXYGEN SATURATION: 97 % | TEMPERATURE: 98.3 F | BODY MASS INDEX: 29.82 KG/M2 | HEART RATE: 94 BPM | DIASTOLIC BLOOD PRESSURE: 156 MMHG | WEIGHT: 213 LBS | HEIGHT: 71 IN

## 2024-04-03 VITALS — SYSTOLIC BLOOD PRESSURE: 168 MMHG | DIASTOLIC BLOOD PRESSURE: 124 MMHG

## 2024-04-03 DIAGNOSIS — R91.1 SOLITARY PULMONARY NODULE: ICD-10-CM

## 2024-04-03 PROCEDURE — 99214 OFFICE O/P EST MOD 30 MIN: CPT

## 2024-04-03 NOTE — RESULTS/DATA
[FreeTextEntry1] : Mr. Vieyra initially presented at age 62 in July 2023 for evaluation of lung nodule.   The patient has a medical history of DM2, CVA w/ left hemiparesis (8/2020), HLD, DAYANA, HTN, NSVT (w/ pacer), pulmonary nodule.  Surgical hx - pacemaker, left and and left knee surgeries.  5 mm lung nodule noted on CT chest for lung cancer screening    The nodule was stable upon last imaging 10/2023 Await results of repeat CT (non contrast) 3/27/24 Will review results and plan for care w/ Dr. Russo If growing, will refer to thoracic for further evaluation. Strongly encouraged to resume anti-hypertensive medications today and continue close f/u w/ PCP/cardiology  All patient questions answered at today's visit. Patient urged to call the office with any questions or concerns.

## 2024-04-03 NOTE — HISTORY OF PRESENT ILLNESS
[de-identified] : Referred by:  Dr. Amol Workman  Diagnosis: Lung nodule   Son (José Antonio Vieyra) - ph [phone]  Mr. Vieyra initially presented at age 62 in July 2023 for evaluation of lung nodule.   The patient has a medical history of DM2, CVA w/ left hemiparesis (8/2020), HLD, DAYANA, HTN, NSVT (w/ pacer), pulmonary nodule.  Surgical hx - pacemaker, left and and left knee surgeries.  The patient was referred to medical oncology for evaluation of a lung nodule. He underwent CT chest for lung cancer screening on 4/18/23- which revealed a 5mm endobronchial nodule in the L main bronchus- indeterminate, repeat low dose CT chest in 3 months- LungRads 4A.  Repeat CT chest in June 2023 revealed a stable 5 mm lung nodule.  He denied fevers, chills, chest pain, shortness of breath, cough, wheezing, nausea, vomiting, diarrhea.  He reported 20 pounds of weight loss in the past 2 months which has been intentional as he is eating less.  He reported a normal appetite.  He has residual left hemiparesis from a stroke back in 2020.  He is a current smoker but has cut down to 2 to 3 cigarettes/day.  HCM:  - COVID vaccination: Pfizer x2, 1 booster - Colonoscopy: None prior - Lung cancer screen: as above - DEXA: none prior  SH:  - Occupation: on disability since CVA - Living situation: Lives alone in [facility] - Smoking/etoh/illicits:  Current smoker (> 40 yrs, 2 cigs/day), no etoh, marijuana regularly, no illicits  - Exercise: Walks  FH:  - F - prostate - metastatic (75) - pat aunts - ? unknown primary [de-identified] : Presents for follow up visit on 4/3/24 for lung nodule.  At today's visit Cam reports feeling generally well.  His BP is again elevated today --> he ran out of medication 2 days ago and has scripts waiting for  at [facility].  Results of CT chest 3/27/24 still pending.  He is happy w/ recent intentional weight loss.  He is smoking ~ 1/2 ppd. No recent CP, SOB, cough, F/Cs.

## 2024-04-03 NOTE — CONSULT LETTER
[Consult Letter:] : I had the pleasure of evaluating your patient, [unfilled]. [Dear  ___] : Dear  [unfilled], [Please see my note below.] : Please see my note below. [Sincerely,] : Sincerely, [Consult Closing:] : Thank you very much for allowing me to participate in the care of this patient.  If you have any questions, please do not hesitate to contact me. [FreeTextEntry2] : Dr. Amol Workman [FreeTextEntry3] : Dr. Gosia Russo\par

## 2024-04-03 NOTE — PHYSICAL EXAM
[Restricted in physically strenuous activity but ambulatory and able to carry out work of a light or sedentary nature] : Status 1- Restricted in physically strenuous activity but ambulatory and able to carry out work of a light or sedentary nature, e.g., light house work, office work [Normal] : affect appropriate [de-identified] : generally well appearing male; pleasant, cooperative, visually impaired, walks with lump using 4 pronged cane  [de-identified] : L hemiparesis

## 2024-04-11 ENCOUNTER — APPOINTMENT (OUTPATIENT)
Dept: CARDIOLOGY | Facility: CLINIC | Age: 64
End: 2024-04-11
Payer: MEDICARE

## 2024-04-11 VITALS
OXYGEN SATURATION: 96 % | BODY MASS INDEX: 30.66 KG/M2 | SYSTOLIC BLOOD PRESSURE: 160 MMHG | HEART RATE: 101 BPM | HEIGHT: 71 IN | DIASTOLIC BLOOD PRESSURE: 80 MMHG | WEIGHT: 219 LBS

## 2024-04-11 DIAGNOSIS — I10 ESSENTIAL (PRIMARY) HYPERTENSION: ICD-10-CM

## 2024-04-11 PROCEDURE — 99214 OFFICE O/P EST MOD 30 MIN: CPT

## 2024-04-11 RX ORDER — METOPROLOL SUCCINATE 200 MG/1
200 TABLET, EXTENDED RELEASE ORAL
Qty: 90 | Refills: 3 | Status: ACTIVE | COMMUNITY
Start: 2023-07-17 | End: 1900-01-01

## 2024-04-11 NOTE — ASSESSMENT
[FreeTextEntry1] : Hypertension: Today we increase his metoprolol succinate from 100 mg daily to 200 mg daily.  Invasive coronary angiography revealed normal coronary arteries on May 3, 2023  On May 1, 2023 transthoracic echocardiography revealed ejection fraction over 65%, moderate left ventricular hypertrophy, elevated left atrial filling pressures   Prolonged monitoring in December 2022 revealed 100% atrial fibrillation burden 5 runs of nonsustained ventricular tachycardia with the longest being 16 beats. The average heart rate was 100 bpm.  Cardiac arrest: The patient is status post ICD. The patient is scheduled for EP evaluation. Patient will continue to have ICD checks.  Atrial fibrillation: The patient is being treated with anticoagulant therapy. The patient's weight is over 60 kg and his age is under 82. He is on the correct dose of anticoagulant therapy. He is tolerated for quite some time. Will continue. Aspirin was discontinued.

## 2024-04-17 ENCOUNTER — APPOINTMENT (OUTPATIENT)
Dept: ELECTROPHYSIOLOGY | Facility: CLINIC | Age: 64
End: 2024-04-17
Payer: MEDICARE

## 2024-04-17 VITALS
SYSTOLIC BLOOD PRESSURE: 204 MMHG | HEIGHT: 71 IN | BODY MASS INDEX: 30.52 KG/M2 | WEIGHT: 218 LBS | HEART RATE: 63 BPM | DIASTOLIC BLOOD PRESSURE: 100 MMHG | OXYGEN SATURATION: 96 %

## 2024-04-17 DIAGNOSIS — I48.91 UNSPECIFIED ATRIAL FIBRILLATION: ICD-10-CM

## 2024-04-17 DIAGNOSIS — I47.29 OTHER VENTRICULAR TACHYCARDIA: ICD-10-CM

## 2024-04-17 DIAGNOSIS — Z95.810 PRESENCE OF AUTOMATIC (IMPLANTABLE) CARDIAC DEFIBRILLATOR: ICD-10-CM

## 2024-04-17 PROCEDURE — 99213 OFFICE O/P EST LOW 20 MIN: CPT

## 2024-04-17 RX ORDER — ASPIRIN 81 MG
81 TABLET, DELAYED RELEASE (ENTERIC COATED) ORAL DAILY
Refills: 0 | Status: DISCONTINUED | COMMUNITY
End: 2024-04-17

## 2024-04-29 ENCOUNTER — NON-APPOINTMENT (OUTPATIENT)
Age: 64
End: 2024-04-29

## 2024-04-30 ENCOUNTER — OFFICE (OUTPATIENT)
Dept: URBAN - METROPOLITAN AREA CLINIC 38 | Facility: CLINIC | Age: 64
Setting detail: OPHTHALMOLOGY
End: 2024-04-30
Payer: MEDICARE

## 2024-04-30 DIAGNOSIS — I63.50: ICD-10-CM

## 2024-04-30 DIAGNOSIS — H25.13: ICD-10-CM

## 2024-04-30 DIAGNOSIS — H40.1131: ICD-10-CM

## 2024-04-30 DIAGNOSIS — H35.033: ICD-10-CM

## 2024-04-30 DIAGNOSIS — E11.3293: ICD-10-CM

## 2024-04-30 DIAGNOSIS — H11.153: ICD-10-CM

## 2024-04-30 PROCEDURE — 99213 OFFICE O/P EST LOW 20 MIN: CPT | Performed by: OPHTHALMOLOGY

## 2024-04-30 PROCEDURE — 76514 ECHO EXAM OF EYE THICKNESS: CPT | Performed by: OPHTHALMOLOGY

## 2024-04-30 PROCEDURE — 92133 CPTRZD OPH DX IMG PST SGM ON: CPT | Performed by: OPHTHALMOLOGY

## 2024-05-01 PROBLEM — I48.91 AFIB: Status: ACTIVE | Noted: 2022-10-04

## 2024-05-01 PROBLEM — Z95.810 ICD (IMPLANTABLE CARDIOVERTER-DEFIBRILLATOR) IN PLACE: Status: ACTIVE | Noted: 2023-05-17

## 2024-05-01 PROBLEM — I47.29 NSVT (NONSUSTAINED VENTRICULAR TACHYCARDIA): Status: ACTIVE | Noted: 2022-11-21

## 2024-05-01 NOTE — PHYSICAL EXAM
[Normal S1, S2] : normal S1, S2 [Clear Lung Fields] : clear lung fields [Non Tender] : non-tender [No Edema] : no edema [de-identified] : irreg [de-identified] : left sided hemiparesis

## 2024-05-01 NOTE — HISTORY OF PRESENT ILLNESS
[FreeTextEntry1] : Patient  a 63-year-old man who is seen in follow-up evaluation regarding his ICD. He underwent implantation of an ICD single-chamber [employer] on 5/5/2023. Prior history of NSVT and prior out of hospital cardiac arrest.  He has nonobstructive arteries and had an EF of 65%. Other medical problems include COPD, DAYANA on CPAP, hypertension, dyslipidemia, diabetes.  Prior history of left lower extremity DVT. He has had persistent atrial fibrillation and is on Eliquis.  In  the office today in exam room he fell over.  Patient did not lose consciousness he felt that his leg gave out.   Previous history: He has a prior history of a cardiac arrest with August 2020 and was admitted to [facility].  The patient had a hemorrhagic CVA.  He has a long hospital course including prolonged respiratory failure and had a PEG placement.  He was discharged to a nursing facility where he resided for 2 years.  The patient had returned to [facility] with a left femoral DVT and he was started on Eliquis.  He was also noted to be in atrial fibrillation.  Patient not aware of A. fib.  He has been on metoprolol, hydralazine, enalapril, Eliquis and amlodipine.  His blood pressures has been elevated to the range of 160s to 170s over 90s to 100.  He had an echocardiogram performed at [facility] 9/18/2022 that showed EF 65%.  He had moderate left ventricular perjury.  His A. fib was mildly dilated.  The left atrial volume index was 36.8 cc per metered square.  There was no mitral regurgitation.  The patient is legally blind.

## 2024-05-16 ENCOUNTER — APPOINTMENT (OUTPATIENT)
Dept: CARDIOLOGY | Facility: CLINIC | Age: 64
End: 2024-05-16

## 2024-06-04 ENCOUNTER — OFFICE (OUTPATIENT)
Dept: URBAN - METROPOLITAN AREA CLINIC 38 | Facility: CLINIC | Age: 64
Setting detail: OPHTHALMOLOGY
End: 2024-06-04
Payer: MEDICARE

## 2024-06-04 DIAGNOSIS — H40.1111: ICD-10-CM

## 2024-06-04 PROCEDURE — 65855 TRABECULOPLASTY LASER SURG: CPT | Mod: RT | Performed by: OPHTHALMOLOGY

## 2024-06-04 ASSESSMENT — CONFRONTATIONAL VISUAL FIELD TEST (CVF)
OS_FINDINGS: FULL
OD_FINDINGS: FULL

## 2024-06-18 ENCOUNTER — OFFICE (OUTPATIENT)
Dept: URBAN - METROPOLITAN AREA CLINIC 38 | Facility: CLINIC | Age: 64
Setting detail: OPHTHALMOLOGY
End: 2024-06-18
Payer: MEDICARE

## 2024-06-18 DIAGNOSIS — H40.1121: ICD-10-CM

## 2024-06-18 PROCEDURE — 65855 TRABECULOPLASTY LASER SURG: CPT | Mod: LT | Performed by: OPHTHALMOLOGY

## 2024-06-18 ASSESSMENT — CONFRONTATIONAL VISUAL FIELD TEST (CVF)
OD_FINDINGS: FULL
OS_FINDINGS: FULL

## 2024-06-28 ENCOUNTER — NON-APPOINTMENT (OUTPATIENT)
Age: 64
End: 2024-06-28

## 2024-06-28 ENCOUNTER — APPOINTMENT (OUTPATIENT)
Dept: CARDIOLOGY | Facility: CLINIC | Age: 64
End: 2024-06-28
Payer: MEDICARE

## 2024-06-28 PROCEDURE — 93295 DEV INTERROG REMOTE 1/2/MLT: CPT

## 2024-06-28 PROCEDURE — 93296 REM INTERROG EVL PM/IDS: CPT

## 2024-07-15 ENCOUNTER — OFFICE (OUTPATIENT)
Dept: URBAN - METROPOLITAN AREA CLINIC 38 | Facility: CLINIC | Age: 64
Setting detail: OPHTHALMOLOGY
End: 2024-07-15
Payer: MEDICARE

## 2024-07-15 DIAGNOSIS — H16.223: ICD-10-CM

## 2024-07-15 DIAGNOSIS — H40.1131: ICD-10-CM

## 2024-07-15 PROCEDURE — 99213 OFFICE O/P EST LOW 20 MIN: CPT

## 2024-07-15 ASSESSMENT — CONFRONTATIONAL VISUAL FIELD TEST (CVF)
OS_FINDINGS: FULL
OD_FINDINGS: FULL

## 2024-08-13 NOTE — PROVIDER CONTACT NOTE (CRITICAL VALUE NOTIFICATION) - BACKGROUND
Patient: Dahiana Puentes    Procedure: Procedure(s):  ESOPHAGOGASTRODUODENOSCOPY, WITH FINE NEEDLE ASPIRATION BIOPSY, WITH ENDOSCOPIC ULTRASOUND GUIDANCE       Anesthesia Type:  MAC    Note:  Disposition: Outpatient   Postop Pain Control: Uneventful            Sign Out: Well controlled pain   PONV: No   Neuro/Psych: Uneventful            Sign Out: Acceptable/Baseline neuro status   Airway/Respiratory: Uneventful            Sign Out: Acceptable/Baseline resp. status   CV/Hemodynamics: Uneventful            Sign Out: Acceptable CV status; No obvious hypovolemia; No obvious fluid overload   Other NRE: NONE   DID A NON-ROUTINE EVENT OCCUR? No       Last vitals:  Vitals Value Taken Time   BP 99/68 08/13/24 1050   Temp     Pulse     Resp 14 08/13/24 1050   SpO2 95 % 08/13/24 1050       Electronically Signed By: Mk Medina MD  August 13, 2024  12:16 PM   Cardiac Arrest w/ ROSC after 25 mins w/ new onset SAH

## 2024-08-17 ENCOUNTER — OFFICE (OUTPATIENT)
Dept: URBAN - METROPOLITAN AREA CLINIC 38 | Facility: CLINIC | Age: 64
Setting detail: OPHTHALMOLOGY
End: 2024-08-17
Payer: MEDICARE

## 2024-08-17 DIAGNOSIS — H40.1131: ICD-10-CM

## 2024-08-17 PROCEDURE — 99213 OFFICE O/P EST LOW 20 MIN: CPT | Performed by: OPHTHALMOLOGY

## 2024-08-17 ASSESSMENT — CONFRONTATIONAL VISUAL FIELD TEST (CVF)
OD_FINDINGS: FULL
OS_FINDINGS: FULL

## 2024-08-22 ENCOUNTER — APPOINTMENT (OUTPATIENT)
Dept: NEPHROLOGY | Facility: CLINIC | Age: 64
End: 2024-08-22

## 2024-09-21 ENCOUNTER — OFFICE (OUTPATIENT)
Dept: URBAN - METROPOLITAN AREA CLINIC 38 | Facility: CLINIC | Age: 64
Setting detail: OPHTHALMOLOGY
End: 2024-09-21
Payer: MEDICARE

## 2024-09-21 ENCOUNTER — RX ONLY (RX ONLY)
Age: 64
End: 2024-09-21

## 2024-09-21 DIAGNOSIS — H16.223: ICD-10-CM

## 2024-09-21 DIAGNOSIS — H40.1131: ICD-10-CM

## 2024-09-21 PROCEDURE — 99213 OFFICE O/P EST LOW 20 MIN: CPT | Performed by: OPHTHALMOLOGY

## 2024-09-21 ASSESSMENT — CONFRONTATIONAL VISUAL FIELD TEST (CVF)
OD_FINDINGS: FULL
OS_FINDINGS: FULL

## 2024-09-27 NOTE — PROGRESS NOTE ADULT - ASSESSMENT
60-year-old male with a history of A-Fib on warfarin who presents with cardiac arrest at work with downtime of about 25 minutes prior to ROSC. S/p therapeutic hypothermia. 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 Now with resolved neurogenic/cardiogenic shock. Course complicated by GI bleed s/p PPI gtt, bleeding from scott s/p clot irrigation, prolonged respiratory failure s/p trach (8/24) and PEG (8/26). Currently with A-Fib with RVR and C diff colitis.  Also developed gross hematuria - Urology consulted +catheter with clot (irrigated and exchanged) suspected 2/2 traumatic catheterization. Additionally course complicated by fever and Pseudomonas PNA (s/p Rx) then developed C diff (now with resolved leukocytosis and improved stooling, now soft per report); remains on enteral Vanco    8/29: Received to [facility]  8/30: Less Agitated overnight, Afebrile, WBC decreasing, CR elevated but Stable, Tolerating TC all day yesterday, will Attempt TC ATC tonight and ABG in am, F/u Bcx from 8/30. Rectal tube dc'd.  Speech eval for AAC device and will F/u.  8/31: will attempt TC around the clock again tonight. Stopped amiodarone as per cards.  Will be cathed if cleared by ID and renal.  9/1: Elevated BP overnight, BP stable today, afebrile. Lethargic earlier today on exam; ABG Stable; Head CT done- no new ICH, ventricles enlarged ? Hydrocephalus, Spoke to Max from [facility] who will see- although felt no intervention required at this time. Tolerating TC ATC, will attempt cuffless trach tomorrow. C diff Iso discontinued as discussed w/ Inf control.  9/2: still with secretions, will downsize when improved. Will cath as outpatient. Decreased opioids  9/3: PICC dc'd, Trach downsized to #7 cuffless Portex. stomach distended  9/4: Occasionally Restless, afebrile. Tolerating cuffless trach, phonating w/ digital occlusion.  Spoke to speech for PMV re-eval soon than 9/6.  Elevated BP, Hydralazine increased.- will monitor.  9/5: trach dislodged, ENT changed to #8 cuffless Shiley. OOB to chair  9/6: OOB to chair today, gave dulcolax suppository   9/7: agitated while in chair today, gave Seroquel.  Producing thick, yellow secretions. Afebrile. Managing secretions with duoneb and hyper-sal 7%/Chest PT.  9/8: Had spurious leukocytosis. Afebrile, hemodynamically stable. Repeated cbc, sent Bcx.  Spiked temp of 102.5, cbc rechecked which was consistent with leukocytosis. PAN cultured. Noted urinary retention of 2600ml urine, abdomen distended with some tenderness on palpation.  F/U CT abd/pelvis/chest.  9/9: Patient appears well despite leukocytosis of 31. Sputum Cx growing gram neg rods. Remains on meropenem, added Vancoycin PO per ID for concern of recent Cdiff infection.  9/10: Afebrile over night. Leukocytosis decreasing. UCx growing pan-sensitive pseudomonas. Will continue meropenem and prophylactic vancomycin PO. Thick brown tinged sputum with suctioning, aggressive chest PT, nebs + hypersal.  9/11: Patient with episode of agitation and delirium early this morning. Seemingly did not respond to any anxiolytic or sedative meds given. Patient denies having any pain, states that he's "In the [place]". Thorough exam revealed IV access was not working. A new IV line was placed in RLE and 2mg IV ativan given which calmed the patient. Leukocytosis continues to improve on ABx. Noted to have -120s, EKG confirms AFIBB. Metoprolol increased.  9/12: Less Agitated today, improving CR and Leukocytosis on Cefepime; Still with Urinary retention and requiring prn straight cath- cardura increased.   9/13: No new events  9/14: Stable. Trach down-sized to #6 Cuffless Portex, obturator used. No complications, tolerated procedure well. Will complete 7-10 day course per Attending discretion.  9/15: Patient complaining of not being able to see, poor vision. States he sees shadows and unable ot identify objects with his left eye. Right remains with ptosis, fixed and dilated pupil. Hyponatremia noted, free water discontinued.  For PMV re-assessment tomorrow. Discharge planning in progress as trying to first obtain insurance.  9/16: called speech for re-eval of PMV. 1 episode of agitation. Gave Ativan 1mg ivp x1.  9/17: Tolerated PMV with speech  9/18: Patients agitation appears to be worse in the evening, [facility] requesting his agitation medication regimen be optimized prior to discharge. Will continue Seroquel 50 mg during the day/waking hours to prevent drowsiness and increase evening dose to 75mg as well as add prn ativan for agitation not treated by standing seroquel You might feel 'gassy' or "crampy'' for a few hours. This is because air was put into the stomach during the procedure. However, if you have continued abdominal (stomach) pain or swelling, contact your doctor right away. 60-year-old male with a history of A-Fib on warfarin who presents with cardiac arrest at work with downtime of about 25 minutes prior to ROSC. S/p therapeutic hypothermia. 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 Now with resolved neurogenic/cardiogenic shock. Course complicated by GI bleed s/p PPI gtt, bleeding from scott s/p clot irrigation, prolonged respiratory failure s/p trach (8/24) and PEG (8/26). Currently with A-Fib with RVR and C diff colitis.  Also developed gross hematuria - Urology consulted +catheter with clot (irrigated and exchanged) suspected 2/2 traumatic catheterization. Additionally course complicated by fever and Pseudomonas PNA (s/p Rx) then developed C diff (now with resolved leukocytosis and improved stooling, now soft per report); remains on enteral Vanco    8/29: Received to [facility]  8/30: Less Agitated overnight, Afebrile, WBC decreasing, CR elevated but Stable, Tolerating TC all day yesterday, will Attempt TC ATC tonight and ABG in am, F/u Bcx from 8/30. Rectal tube dc'd.  Speech eval for AAC device and will F/u.  8/31: will attempt TC around the clock again tonight. Stopped amiodarone as per cards.  Will be cathed if cleared by ID and renal.  9/1: Elevated BP overnight, BP stable today, afebrile. Lethargic earlier today on exam; ABG Stable; Head CT done- no new ICH, ventricles enlarged ? Hydrocephalus, Spoke to Max from [facility] who will see- although felt no intervention required at this time. Tolerating TC ATC, will attempt cuffless trach tomorrow. C diff Iso discontinued as discussed w/ Inf control.  9/2: still with secretions, will downsize when improved. Will cath as outpatient. Decreased opioids  9/3: PICC dc'd, Trach downsized to #7 cuffless Portex. stomach distended  9/4: Occasionally Restless, afebrile. Tolerating cuffless trach, phonating w/ digital occlusion.  Spoke to speech for PMV re-eval soon than 9/6.  Elevated BP, Hydralazine increased.- will monitor.  9/5: trach dislodged, ENT changed to #8 cuffless Shiley. OOB to chair  9/6: OOB to chair today, gave dulcolax suppository   9/7: agitated while in chair today, gave Seroquel.  Producing thick, yellow secretions. Afebrile. Managing secretions with duoneb and hyper-sal 7%/Chest PT.  9/8: Had spurious leukocytosis. Afebrile, hemodynamically stable. Repeated cbc, sent Bcx.  Spiked temp of 102.5, cbc rechecked which was consistent with leukocytosis. PAN cultured. Noted urinary retention of 2600ml urine, abdomen distended with some tenderness on palpation.  F/U CT abd/pelvis/chest.  9/9: Patient appears well despite leukocytosis of 31. Sputum Cx growing gram neg rods. Remains on meropenem, added Vancoycin PO per ID for concern of recent Cdiff infection.  9/10: Afebrile over night. Leukocytosis decreasing. UCx growing pan-sensitive pseudomonas. Will continue meropenem and prophylactic vancomycin PO. Thick brown tinged sputum with suctioning, aggressive chest PT, nebs + hypersal.  9/11: Patient with episode of agitation and delirium early this morning. Seemingly did not respond to any anxiolytic or sedative meds given. Patient denies having any pain, states that he's "In the [place]". Thorough exam revealed IV access was not working. A new IV line was placed in RLE and 2mg IV ativan given which calmed the patient. Leukocytosis continues to improve on ABx. Noted to have -120s, EKG confirms AFIBB. Metoprolol increased.  9/12: Less Agitated today, improving CR and Leukocytosis on Cefepime; Still with Urinary retention and requiring prn straight cath- cardura increased.   9/13: No new events  9/14: Stable. Trach down-sized to #6 Cuffless Portex, obturator used. No complications, tolerated procedure well. Will complete 7-10 day course per Attending discretion.  9/15: Patient complaining of not being able to see, poor vision. States he sees shadows and unable ot identify objects with his left eye. Right remains with ptosis, fixed and dilated pupil. Hyponatremia noted, free water discontinued.  For PMV re-assessment tomorrow. Discharge planning in progress as trying to first obtain insurance.  9/16: called speech for re-eval of PMV. 1 episode of agitation. Gave Ativan 1mg ivp x1.  9/17: Tolerated PMV with speech  9/18: Patients agitation appears to be worse in the evening, [facility] requesting his agitation medication regimen be optimized prior to discharge. Will continue Seroquel 50 mg during the day/waking hours to prevent drowsiness and increase evening dose to 75mg as well as add prn ativan for agitation not treated by standing seroquel.  9/19: Patient somnolent during daytime but arousable, alert and appropriately responsive. Brother updated at bedside. Will decrease fentanyl patch given recent adjustments to seroquel.

## 2024-10-01 ENCOUNTER — OUTPATIENT (OUTPATIENT)
Dept: OUTPATIENT SERVICES | Facility: HOSPITAL | Age: 64
LOS: 1 days | End: 2024-10-01

## 2024-10-01 DIAGNOSIS — R93.8 ABNORMAL FINDINGS ON DIAGNOSTIC IMAGING OF OTHER SPECIFIED BODY STRUCTURES: ICD-10-CM

## 2024-10-07 ENCOUNTER — APPOINTMENT (OUTPATIENT)
Dept: HEMATOLOGY ONCOLOGY | Facility: CLINIC | Age: 64
End: 2024-10-07
Payer: MEDICARE

## 2024-10-07 VITALS
SYSTOLIC BLOOD PRESSURE: 185 MMHG | HEART RATE: 91 BPM | WEIGHT: 216 LBS | HEIGHT: 71 IN | TEMPERATURE: 97.4 F | BODY MASS INDEX: 30.24 KG/M2 | DIASTOLIC BLOOD PRESSURE: 115 MMHG | OXYGEN SATURATION: 100 %

## 2024-10-07 DIAGNOSIS — R91.1 SOLITARY PULMONARY NODULE: ICD-10-CM

## 2024-10-07 DIAGNOSIS — I82.90 ACUTE EMBOLISM AND THROMBOSIS OF UNSPECIFIED VEIN: ICD-10-CM

## 2024-10-07 PROCEDURE — 99213 OFFICE O/P EST LOW 20 MIN: CPT

## 2024-10-08 DIAGNOSIS — D52.9 FOLATE DEFICIENCY ANEMIA, UNSPECIFIED: ICD-10-CM

## 2024-10-08 LAB
ALBUMIN SERPL ELPH-MCNC: 4.2 G/DL
ALP BLD-CCNC: 121 U/L
ALT SERPL-CCNC: 41 U/L
ANION GAP SERPL CALC-SCNC: 16 MMOL/L
APTT BLD: 38.7 SEC
AST SERPL-CCNC: 33 U/L
BASOPHILS # BLD AUTO: 0.11 K/UL
BASOPHILS NFR BLD AUTO: 1.3 %
BILIRUB SERPL-MCNC: 0.2 MG/DL
BUN SERPL-MCNC: 12 MG/DL
CALCIUM SERPL-MCNC: 9 MG/DL
CHLORIDE SERPL-SCNC: 98 MMOL/L
CO2 SERPL-SCNC: 25 MMOL/L
CREAT SERPL-MCNC: 1.18 MG/DL
EGFR: 70 ML/MIN/1.73M2
EOSINOPHIL # BLD AUTO: 0.46 K/UL
EOSINOPHIL NFR BLD AUTO: 5.5 %
FERRITIN SERPL-MCNC: 140 NG/ML
FOLATE SERPL-MCNC: 3.5 NG/ML
GLUCOSE SERPL-MCNC: 61 MG/DL
HCT VFR BLD CALC: 49.5 %
HGB BLD-MCNC: 15.5 G/DL
IMM GRANULOCYTES NFR BLD AUTO: 0.6 %
INR PPP: 0.86 RATIO
LYMPHOCYTES # BLD AUTO: 1.32 K/UL
LYMPHOCYTES NFR BLD AUTO: 15.7 %
MAN DIFF?: NORMAL
MCHC RBC-ENTMCNC: 27.5 PG
MCHC RBC-ENTMCNC: 31.3 GM/DL
MCV RBC AUTO: 87.9 FL
MONOCYTES # BLD AUTO: 0.59 K/UL
MONOCYTES NFR BLD AUTO: 7 %
NEUTROPHILS # BLD AUTO: 5.87 K/UL
NEUTROPHILS NFR BLD AUTO: 69.9 %
PLATELET # BLD AUTO: 243 K/UL
POTASSIUM SERPL-SCNC: 4.8 MMOL/L
PROT SERPL-MCNC: 7.4 G/DL
PT BLD: 10.2 SEC
RBC # BLD: 5.63 M/UL
RBC # FLD: 15.5 %
SODIUM SERPL-SCNC: 138 MMOL/L
VIT B12 SERPL-MCNC: 548 PG/ML
WBC # FLD AUTO: 8.4 K/UL

## 2024-10-08 RX ORDER — FOLIC ACID 1 MG/1
1 TABLET ORAL DAILY
Qty: 30 | Refills: 3 | Status: ACTIVE | COMMUNITY
Start: 2024-10-08 | End: 1900-01-01

## 2024-10-25 ENCOUNTER — APPOINTMENT (OUTPATIENT)
Dept: VASCULAR SURGERY | Facility: CLINIC | Age: 64
End: 2024-10-25
Payer: MEDICARE

## 2024-10-25 ENCOUNTER — APPOINTMENT (OUTPATIENT)
Dept: VASCULAR SURGERY | Facility: CLINIC | Age: 64
End: 2024-10-25

## 2024-10-25 VITALS
WEIGHT: 206 LBS | OXYGEN SATURATION: 99 % | DIASTOLIC BLOOD PRESSURE: 80 MMHG | HEIGHT: 71 IN | HEART RATE: 60 BPM | BODY MASS INDEX: 28.84 KG/M2 | RESPIRATION RATE: 15 BRPM | SYSTOLIC BLOOD PRESSURE: 142 MMHG

## 2024-10-25 DIAGNOSIS — I73.9 PERIPHERAL VASCULAR DISEASE, UNSPECIFIED: ICD-10-CM

## 2024-10-25 PROCEDURE — 93923 UPR/LXTR ART STDY 3+ LVLS: CPT

## 2024-10-25 PROCEDURE — 99203 OFFICE O/P NEW LOW 30 MIN: CPT

## 2024-10-25 RX ORDER — CILOSTAZOL 100 MG/1
100 TABLET ORAL TWICE DAILY
Qty: 180 | Refills: 3 | Status: ACTIVE | COMMUNITY
Start: 2024-10-25 | End: 1900-01-01

## 2024-10-26 ENCOUNTER — OFFICE (OUTPATIENT)
Dept: URBAN - METROPOLITAN AREA CLINIC 38 | Facility: CLINIC | Age: 64
Setting detail: OPHTHALMOLOGY
End: 2024-10-26
Payer: MEDICARE

## 2024-10-26 DIAGNOSIS — H52.4: ICD-10-CM

## 2024-10-26 DIAGNOSIS — H25.13: ICD-10-CM

## 2024-10-26 DIAGNOSIS — H40.1131: ICD-10-CM

## 2024-10-26 DIAGNOSIS — H16.223: ICD-10-CM

## 2024-10-26 PROCEDURE — 92015 DETERMINE REFRACTIVE STATE: CPT | Performed by: OPHTHALMOLOGY

## 2024-10-26 PROCEDURE — 99213 OFFICE O/P EST LOW 20 MIN: CPT | Performed by: OPHTHALMOLOGY

## 2024-10-26 ASSESSMENT — REFRACTION_CURRENTRX
OD_AXIS: 154
OS_SPHERE: +1.25
OS_CYLINDER: -0.50
OS_CYLINDER: SPH
OS_OVR_VA: 20/
OD_SPHERE: +2.00
OS_SPHERE: -2.50
OS_VPRISM_DIRECTION: SV
OD_VPRISM_DIRECTION: SV
OD_OVR_VA: 20/
OD_CYLINDER: SPH
OS_OVR_VA: 20/
OS_AXIS: 002
OD_VPRISM_DIRECTION: SV
OD_CYLINDER: -0.25
OS_VPRISM_DIRECTION: SV
OD_OVR_VA: 20/
OD_SPHERE: -1.00

## 2024-10-26 ASSESSMENT — REFRACTION_MANIFEST
OS_CYLINDER: SPH
OS_ADD: +3.00
OD_AXIS: 140
OD_ADD: +3.00
OD_CYLINDER: -0.25
OS_CYLINDER: SPHERE
OD_SPHERE: -1.00
OS_CYLINDER: -0.50
OD_CYLINDER: -1.00
OS_SPHERE: -2.50
OS_ADD: +2.75
OD_VA1: 20/NI
OS_SPHERE: -1.75
OS_VA2: UNABLE
OD_VA2: UNABLE
OD_SPHERE: -1.00
OD_VA2: UNABLE
OS_VA1: 20/125
OD_SPHERE: PLANO
OU_VA: 20/100-
OS_SPHERE: -2.25
OD_AXIS: 160
OS_VA2: UNABLE
OS_AXIS: 005
OD_CYLINDER: -0.25
OS_VA1: 20/150
OD_AXIS: 160
OD_VA1: 20/150
OS_VA1: 20/NI
OD_ADD: +2.75
OD_VA1: 20/125

## 2024-10-26 ASSESSMENT — KERATOMETRY
OD_K1POWER_DIOPTERS: 41.00
OS_K2POWER_DIOPTERS: 41.00
OS_K1POWER_DIOPTERS: 40.75
OD_AXISANGLE_DEGREES: 083
METHOD_AUTO_MANUAL: AUTO
OS_AXISANGLE_DEGREES: 104
OD_K2POWER_DIOPTERS: 41.50

## 2024-10-26 ASSESSMENT — PACHYMETRY
OS_CT_UM: 532
OS_CT_CORRECTION: 1
OD_CT_UM: 536
OD_CT_CORRECTION: 1

## 2024-10-26 ASSESSMENT — TONOMETRY: OS_IOP_MMHG: 11

## 2024-10-26 ASSESSMENT — SUPERFICIAL PUNCTATE KERATITIS (SPK)
OS_SPK: 2+
OD_SPK: 3+

## 2024-10-26 ASSESSMENT — REFRACTION_AUTOREFRACTION
OS_SPHERE: -2.25
OD_SPHERE: -1.00
OS_AXIS: 007
OS_CYLINDER: -0.25
OD_AXIS: 136
OD_CYLINDER: -1.00

## 2024-10-26 ASSESSMENT — VISUAL ACUITY
OS_BCVA: 20/200
OD_BCVA: 20/125-

## 2024-10-26 ASSESSMENT — CONFRONTATIONAL VISUAL FIELD TEST (CVF)
OS_FINDINGS: FULL
OD_FINDINGS: FULL

## 2024-11-13 ENCOUNTER — NON-APPOINTMENT (OUTPATIENT)
Age: 64
End: 2024-11-13

## 2024-11-13 ENCOUNTER — APPOINTMENT (OUTPATIENT)
Dept: ELECTROPHYSIOLOGY | Facility: CLINIC | Age: 64
End: 2024-11-13

## 2024-11-13 VITALS
HEIGHT: 71 IN | HEART RATE: 51 BPM | WEIGHT: 214 LBS | SYSTOLIC BLOOD PRESSURE: 204 MMHG | OXYGEN SATURATION: 98 % | DIASTOLIC BLOOD PRESSURE: 112 MMHG | BODY MASS INDEX: 29.96 KG/M2

## 2024-11-13 PROCEDURE — 99213 OFFICE O/P EST LOW 20 MIN: CPT

## 2024-12-06 ENCOUNTER — APPOINTMENT (OUTPATIENT)
Dept: VASCULAR SURGERY | Facility: CLINIC | Age: 64
End: 2024-12-06
Payer: MEDICARE

## 2024-12-06 VITALS
DIASTOLIC BLOOD PRESSURE: 90 MMHG | HEIGHT: 71 IN | OXYGEN SATURATION: 99 % | WEIGHT: 222 LBS | BODY MASS INDEX: 31.08 KG/M2 | RESPIRATION RATE: 15 BRPM | HEART RATE: 75 BPM | SYSTOLIC BLOOD PRESSURE: 180 MMHG

## 2024-12-06 DIAGNOSIS — I73.9 PERIPHERAL VASCULAR DISEASE, UNSPECIFIED: ICD-10-CM

## 2024-12-06 PROCEDURE — 99213 OFFICE O/P EST LOW 20 MIN: CPT

## 2024-12-06 RX ORDER — UBIDECARENONE/VIT E ACET 100MG-5
CAPSULE ORAL
Refills: 0 | Status: ACTIVE | COMMUNITY

## 2024-12-16 ENCOUNTER — NON-APPOINTMENT (OUTPATIENT)
Age: 64
End: 2024-12-16

## 2024-12-16 ENCOUNTER — OFFICE (OUTPATIENT)
Dept: URBAN - METROPOLITAN AREA CLINIC 38 | Facility: CLINIC | Age: 64
Setting detail: OPHTHALMOLOGY
End: 2024-12-16
Payer: MEDICARE

## 2024-12-16 VITALS — BODY MASS INDEX: 31.08 KG/M2 | HEIGHT: 71 IN | WEIGHT: 222 LBS

## 2024-12-16 DIAGNOSIS — H40.1134: ICD-10-CM

## 2024-12-16 DIAGNOSIS — F17.210 NICOTINE DEPENDENCE, CIGARETTES, UNCOMPLICATED: ICD-10-CM

## 2024-12-16 PROCEDURE — 92083 EXTENDED VISUAL FIELD XM: CPT | Performed by: STUDENT IN AN ORGANIZED HEALTH CARE EDUCATION/TRAINING PROGRAM

## 2024-12-16 PROCEDURE — 92020 GONIOSCOPY: CPT | Performed by: STUDENT IN AN ORGANIZED HEALTH CARE EDUCATION/TRAINING PROGRAM

## 2024-12-16 PROCEDURE — 99214 OFFICE O/P EST MOD 30 MIN: CPT | Performed by: STUDENT IN AN ORGANIZED HEALTH CARE EDUCATION/TRAINING PROGRAM

## 2024-12-16 PROCEDURE — 92133 CPTRZD OPH DX IMG PST SGM ON: CPT | Performed by: STUDENT IN AN ORGANIZED HEALTH CARE EDUCATION/TRAINING PROGRAM

## 2024-12-16 ASSESSMENT — REFRACTION_MANIFEST
OS_VA1: 20/125
OS_SPHERE: -2.25
OD_AXIS: 160
OD_ADD: +3.00
OS_AXIS: 005
OD_VA1: 20/125
OS_CYLINDER: SPH
OD_AXIS: 160
OS_VA1: 20/NI
OS_VA2: UNABLE
OD_CYLINDER: -0.25
OD_ADD: +2.75
OD_VA2: UNABLE
OD_AXIS: 140
OU_VA: 20/100-
OD_CYLINDER: -1.00
OS_VA2: UNABLE
OS_CYLINDER: -0.50
OS_VA1: 20/150
OD_VA2: UNABLE
OS_CYLINDER: SPHERE
OD_SPHERE: PLANO
OD_CYLINDER: -0.25
OD_VA1: 20/NI
OD_VA1: 20/150
OS_ADD: +3.00
OS_ADD: +2.75
OD_SPHERE: -1.00
OD_SPHERE: -1.00
OS_SPHERE: -2.50
OS_SPHERE: -1.75

## 2024-12-16 ASSESSMENT — SUPERFICIAL PUNCTATE KERATITIS (SPK)
OD_SPK: 3+
OS_SPK: 2+

## 2024-12-16 ASSESSMENT — PACHYMETRY
OS_CT_UM: 532
OD_CT_UM: 536
OD_CT_CORRECTION: 1
OS_CT_CORRECTION: 1

## 2024-12-16 ASSESSMENT — REFRACTION_CURRENTRX
OS_CYLINDER: SPH
OD_CYLINDER: SPH
OS_AXIS: 002
OS_OVR_VA: 20/
OD_CYLINDER: -0.25
OS_CYLINDER: -0.50
OD_AXIS: 154
OD_OVR_VA: 20/
OS_VPRISM_DIRECTION: SV
OD_VPRISM_DIRECTION: SV
OS_VPRISM_DIRECTION: SV
OD_VPRISM_DIRECTION: SV
OD_SPHERE: -1.00
OS_OVR_VA: 20/
OD_OVR_VA: 20/
OS_SPHERE: -2.50
OS_SPHERE: +1.25
OD_SPHERE: +2.00

## 2024-12-16 ASSESSMENT — REFRACTION_AUTOREFRACTION
OS_CYLINDER: SPH
OD_SPHERE: -1.25
OD_CYLINDER: -0.25
OS_SPHERE: -2.25
OD_AXIS: 135

## 2024-12-16 ASSESSMENT — KERATOMETRY
OD_AXISANGLE_DEGREES: 101
OS_AXISANGLE_DEGREES: 079
OD_K1POWER_DIOPTERS: 41.25
OD_K2POWER_DIOPTERS: 41.75
OS_K2POWER_DIOPTERS: 41.25
OS_K1POWER_DIOPTERS: 40.75
METHOD_AUTO_MANUAL: AUTO

## 2024-12-16 ASSESSMENT — VISUAL ACUITY
OS_BCVA: 20/125
OD_BCVA: 20/200

## 2024-12-16 ASSESSMENT — CONFRONTATIONAL VISUAL FIELD TEST (CVF)
OS_FINDINGS: FULL
OD_FINDINGS: FULL

## 2024-12-16 ASSESSMENT — TONOMETRY
OS_IOP_MMHG: 12
OD_IOP_MMHG: 14

## 2024-12-30 ENCOUNTER — APPOINTMENT (OUTPATIENT)
Dept: CT IMAGING | Facility: CLINIC | Age: 64
End: 2024-12-30
Payer: MEDICARE

## 2024-12-30 PROCEDURE — 71271 CT THORAX LUNG CANCER SCR C-: CPT

## 2024-12-31 ENCOUNTER — OFFICE (OUTPATIENT)
Dept: URBAN - METROPOLITAN AREA CLINIC 38 | Facility: CLINIC | Age: 64
Setting detail: OPHTHALMOLOGY
End: 2024-12-31
Payer: MEDICARE

## 2024-12-31 DIAGNOSIS — H34.8322: ICD-10-CM

## 2024-12-31 DIAGNOSIS — H34.8112: ICD-10-CM

## 2024-12-31 DIAGNOSIS — E11.3293: ICD-10-CM

## 2024-12-31 DIAGNOSIS — H40.1134: ICD-10-CM

## 2024-12-31 PROCEDURE — 92235 FLUORESCEIN ANGRPH MLTIFRAME: CPT | Performed by: OPHTHALMOLOGY

## 2024-12-31 PROCEDURE — 99213 OFFICE O/P EST LOW 20 MIN: CPT | Performed by: OPHTHALMOLOGY

## 2024-12-31 PROCEDURE — 92134 CPTRZ OPH DX IMG PST SGM RTA: CPT | Performed by: OPHTHALMOLOGY

## 2024-12-31 ASSESSMENT — VISUAL ACUITY
OS_BCVA: 20/125
OD_BCVA: 20/200

## 2024-12-31 ASSESSMENT — CONFRONTATIONAL VISUAL FIELD TEST (CVF)
OS_FINDINGS: FULL
OD_FINDINGS: FULL

## 2025-01-02 ENCOUNTER — NON-APPOINTMENT (OUTPATIENT)
Age: 65
End: 2025-01-02

## 2025-01-02 ENCOUNTER — APPOINTMENT (OUTPATIENT)
Dept: CARDIOLOGY | Facility: CLINIC | Age: 65
End: 2025-01-02
Payer: MEDICARE

## 2025-01-02 PROCEDURE — 93295 DEV INTERROG REMOTE 1/2/MLT: CPT

## 2025-01-02 PROCEDURE — 93296 REM INTERROG EVL PM/IDS: CPT

## 2025-01-03 RX ORDER — MEXILETINE HYDROCHLORIDE 200 MG/1
200 CAPSULE ORAL
Qty: 180 | Refills: 0 | Status: ACTIVE | COMMUNITY
Start: 2025-01-03 | End: 1900-01-01

## 2025-01-06 ENCOUNTER — RX RENEWAL (OUTPATIENT)
Age: 65
End: 2025-01-06

## 2025-01-22 ENCOUNTER — APPOINTMENT (OUTPATIENT)
Dept: ELECTROPHYSIOLOGY | Facility: CLINIC | Age: 65
End: 2025-01-22
Payer: MEDICARE

## 2025-01-22 VITALS
HEART RATE: 72 BPM | OXYGEN SATURATION: 98 % | WEIGHT: 222 LBS | SYSTOLIC BLOOD PRESSURE: 106 MMHG | DIASTOLIC BLOOD PRESSURE: 64 MMHG | BODY MASS INDEX: 30.96 KG/M2

## 2025-01-22 DIAGNOSIS — J44.9 CHRONIC OBSTRUCTIVE PULMONARY DISEASE, UNSPECIFIED: ICD-10-CM

## 2025-01-22 DIAGNOSIS — I10 ESSENTIAL (PRIMARY) HYPERTENSION: ICD-10-CM

## 2025-01-22 DIAGNOSIS — I25.2 OLD MYOCARDIAL INFARCTION: ICD-10-CM

## 2025-01-22 DIAGNOSIS — Z82.49 FAMILY HISTORY OF ISCHEMIC HEART DISEASE AND OTHER DISEASES OF THE CIRCULATORY SYSTEM: ICD-10-CM

## 2025-01-22 DIAGNOSIS — R97.20 ELEVATED PROSTATE, SPECIFIC ANTIGEN [PSA]: ICD-10-CM

## 2025-01-22 DIAGNOSIS — Z78.9 OTHER SPECIFIED HEALTH STATUS: ICD-10-CM

## 2025-01-22 DIAGNOSIS — R35.0 FREQUENCY OF MICTURITION: ICD-10-CM

## 2025-01-22 DIAGNOSIS — F12.90 CANNABIS USE, UNSPECIFIED, UNCOMPLICATED: ICD-10-CM

## 2025-01-22 DIAGNOSIS — Z83.3 FAMILY HISTORY OF DIABETES MELLITUS: ICD-10-CM

## 2025-01-22 DIAGNOSIS — E11.9 TYPE 2 DIABETES MELLITUS W/OUT COMPLICATIONS: ICD-10-CM

## 2025-01-22 DIAGNOSIS — I63.9 CEREBRAL INFARCTION, UNSPECIFIED: ICD-10-CM

## 2025-01-22 DIAGNOSIS — I48.91 UNSPECIFIED ATRIAL FIBRILLATION: ICD-10-CM

## 2025-01-22 DIAGNOSIS — F17.210 NICOTINE DEPENDENCE, CIGARETTES, UNCOMPLICATED: ICD-10-CM

## 2025-01-22 PROCEDURE — G2211 COMPLEX E/M VISIT ADD ON: CPT

## 2025-01-22 PROCEDURE — 99214 OFFICE O/P EST MOD 30 MIN: CPT

## 2025-02-11 ENCOUNTER — APPOINTMENT (OUTPATIENT)
Dept: UROLOGY | Facility: CLINIC | Age: 65
End: 2025-02-11
Payer: MEDICARE

## 2025-02-11 VITALS
SYSTOLIC BLOOD PRESSURE: 199 MMHG | HEART RATE: 77 BPM | WEIGHT: 222 LBS | HEIGHT: 71 IN | BODY MASS INDEX: 31.08 KG/M2 | TEMPERATURE: 97.3 F | DIASTOLIC BLOOD PRESSURE: 110 MMHG

## 2025-02-11 DIAGNOSIS — R33.8 OTHER RETENTION OF URINE: ICD-10-CM

## 2025-02-11 PROCEDURE — 99214 OFFICE O/P EST MOD 30 MIN: CPT

## 2025-02-12 LAB — PSA SERPL-MCNC: 60.8 NG/ML

## 2025-02-20 ENCOUNTER — APPOINTMENT (OUTPATIENT)
Dept: UROLOGY | Facility: CLINIC | Age: 65
End: 2025-02-20
Payer: MEDICARE

## 2025-02-20 VITALS — HEIGHT: 71 IN | TEMPERATURE: 97.3 F | WEIGHT: 222 LBS | BODY MASS INDEX: 31.08 KG/M2

## 2025-02-20 DIAGNOSIS — R97.20 ELEVATED PROSTATE, SPECIFIC ANTIGEN [PSA]: ICD-10-CM

## 2025-02-20 DIAGNOSIS — N40.1 BENIGN PROSTATIC HYPERPLASIA WITH LOWER URINARY TRACT SYMPMS: ICD-10-CM

## 2025-02-20 DIAGNOSIS — R35.1 BENIGN PROSTATIC HYPERPLASIA WITH LOWER URINARY TRACT SYMPMS: ICD-10-CM

## 2025-02-20 PROCEDURE — 51702 INSERT TEMP BLADDER CATH: CPT

## 2025-02-20 PROCEDURE — 99214 OFFICE O/P EST MOD 30 MIN: CPT | Mod: 25

## 2025-03-14 ENCOUNTER — OFFICE (OUTPATIENT)
Dept: URBAN - METROPOLITAN AREA CLINIC 38 | Facility: CLINIC | Age: 65
Setting detail: OPHTHALMOLOGY
End: 2025-03-14
Payer: MEDICARE

## 2025-03-14 ENCOUNTER — RX ONLY (RX ONLY)
Age: 65
End: 2025-03-14

## 2025-03-14 DIAGNOSIS — H40.1134: ICD-10-CM

## 2025-03-14 DIAGNOSIS — H11.153: ICD-10-CM

## 2025-03-14 DIAGNOSIS — H35.033: ICD-10-CM

## 2025-03-14 DIAGNOSIS — E11.3293: ICD-10-CM

## 2025-03-14 DIAGNOSIS — I63.50: ICD-10-CM

## 2025-03-14 DIAGNOSIS — H34.8322: ICD-10-CM

## 2025-03-14 DIAGNOSIS — H16.223: ICD-10-CM

## 2025-03-14 DIAGNOSIS — H25.13: ICD-10-CM

## 2025-03-14 DIAGNOSIS — H34.8112: ICD-10-CM

## 2025-03-14 PROCEDURE — 92014 COMPRE OPH EXAM EST PT 1/>: CPT | Performed by: STUDENT IN AN ORGANIZED HEALTH CARE EDUCATION/TRAINING PROGRAM

## 2025-03-14 PROCEDURE — 92250 FUNDUS PHOTOGRAPHY W/I&R: CPT | Performed by: STUDENT IN AN ORGANIZED HEALTH CARE EDUCATION/TRAINING PROGRAM

## 2025-03-14 ASSESSMENT — TONOMETRY
OS_IOP_MMHG: 16
OD_IOP_MMHG: 14

## 2025-03-14 ASSESSMENT — REFRACTION_MANIFEST
OS_SPHERE: -2.50
OD_SPHERE: -1.00
OD_CYLINDER: SPHERE
OU_VA: 20/125-1
OS_CYLINDER: SPHERE

## 2025-03-14 ASSESSMENT — KERATOMETRY
OS_K2POWER_DIOPTERS: 41.25
OD_K2POWER_DIOPTERS: 41.75
METHOD_AUTO_MANUAL: AUTO
OS_AXISANGLE_DEGREES: 079
OD_K1POWER_DIOPTERS: 41.25
OS_K1POWER_DIOPTERS: 40.75
OD_AXISANGLE_DEGREES: 101

## 2025-03-14 ASSESSMENT — CONFRONTATIONAL VISUAL FIELD TEST (CVF)
OS_FINDINGS: FULL
OD_FINDINGS: FULL

## 2025-03-14 ASSESSMENT — PACHYMETRY
OD_CT_UM: 536
OS_CT_UM: 532
OD_CT_CORRECTION: 1
OS_CT_CORRECTION: 1

## 2025-03-14 ASSESSMENT — REFRACTION_AUTOREFRACTION
OS_SPHERE: -2.25
OD_SPHERE: -1.25
OD_CYLINDER: -0.25
OD_AXIS: 135
OS_CYLINDER: SPH

## 2025-03-14 ASSESSMENT — SUPERFICIAL PUNCTATE KERATITIS (SPK)
OS_SPK: 2+
OD_SPK: 3+

## 2025-03-14 ASSESSMENT — VISUAL ACUITY
OD_BCVA: 20/150
OS_BCVA: 20/125

## 2025-03-18 ENCOUNTER — APPOINTMENT (OUTPATIENT)
Dept: VASCULAR SURGERY | Facility: CLINIC | Age: 65
End: 2025-03-18
Payer: MEDICARE

## 2025-03-18 VITALS
DIASTOLIC BLOOD PRESSURE: 70 MMHG | WEIGHT: 215 LBS | BODY MASS INDEX: 30.1 KG/M2 | HEIGHT: 71 IN | SYSTOLIC BLOOD PRESSURE: 140 MMHG

## 2025-03-18 DIAGNOSIS — I73.9 PERIPHERAL VASCULAR DISEASE, UNSPECIFIED: ICD-10-CM

## 2025-03-18 PROCEDURE — 93922 UPR/L XTREMITY ART 2 LEVELS: CPT

## 2025-03-18 PROCEDURE — 99213 OFFICE O/P EST LOW 20 MIN: CPT

## 2025-03-18 RX ORDER — CILOSTAZOL 100 MG/1
100 TABLET ORAL TWICE DAILY
Qty: 180 | Refills: 3 | Status: ACTIVE | COMMUNITY
Start: 2025-03-18 | End: 1900-01-01

## 2025-03-21 ENCOUNTER — APPOINTMENT (OUTPATIENT)
Dept: UROLOGY | Facility: CLINIC | Age: 65
End: 2025-03-21
Payer: MEDICARE

## 2025-03-21 ENCOUNTER — NON-APPOINTMENT (OUTPATIENT)
Age: 65
End: 2025-03-21

## 2025-03-21 DIAGNOSIS — R35.1 BENIGN PROSTATIC HYPERPLASIA WITH LOWER URINARY TRACT SYMPMS: ICD-10-CM

## 2025-03-21 DIAGNOSIS — R33.8 OTHER RETENTION OF URINE: ICD-10-CM

## 2025-03-21 DIAGNOSIS — N40.1 BENIGN PROSTATIC HYPERPLASIA WITH LOWER URINARY TRACT SYMPMS: ICD-10-CM

## 2025-03-21 PROCEDURE — 51700 IRRIGATION OF BLADDER: CPT

## 2025-03-21 PROCEDURE — A4216: CPT | Mod: NC

## 2025-03-26 ENCOUNTER — APPOINTMENT (OUTPATIENT)
Dept: NUCLEAR MEDICINE | Facility: CLINIC | Age: 65
End: 2025-03-26
Payer: MEDICARE

## 2025-03-26 PROCEDURE — A9595: CPT

## 2025-03-26 PROCEDURE — 78816 PET IMAGE W/CT FULL BODY: CPT | Mod: PI

## 2025-03-31 ENCOUNTER — OUTPATIENT (OUTPATIENT)
Dept: OUTPATIENT SERVICES | Facility: HOSPITAL | Age: 65
LOS: 1 days | End: 2025-03-31
Payer: MEDICARE

## 2025-03-31 DIAGNOSIS — R93.8 ABNORMAL FINDINGS ON DIAGNOSTIC IMAGING OF OTHER SPECIFIED BODY STRUCTURES: ICD-10-CM

## 2025-04-01 ENCOUNTER — RX RENEWAL (OUTPATIENT)
Age: 65
End: 2025-04-01

## 2025-04-03 DIAGNOSIS — R91.1 SOLITARY PULMONARY NODULE: ICD-10-CM

## 2025-04-03 DIAGNOSIS — D52.9 FOLATE DEFICIENCY ANEMIA, UNSPECIFIED: ICD-10-CM

## 2025-04-04 ENCOUNTER — NON-APPOINTMENT (OUTPATIENT)
Age: 65
End: 2025-04-04

## 2025-04-04 ENCOUNTER — APPOINTMENT (OUTPATIENT)
Dept: CARDIOLOGY | Facility: CLINIC | Age: 65
End: 2025-04-04
Payer: MEDICARE

## 2025-04-04 PROCEDURE — 93296 REM INTERROG EVL PM/IDS: CPT

## 2025-04-04 PROCEDURE — 93295 DEV INTERROG REMOTE 1/2/MLT: CPT

## 2025-04-07 ENCOUNTER — APPOINTMENT (OUTPATIENT)
Dept: HEMATOLOGY ONCOLOGY | Facility: CLINIC | Age: 65
End: 2025-04-07

## 2025-04-09 ENCOUNTER — APPOINTMENT (OUTPATIENT)
Dept: UROLOGY | Facility: CLINIC | Age: 65
End: 2025-04-09
Payer: MEDICARE

## 2025-04-09 VITALS
DIASTOLIC BLOOD PRESSURE: 101 MMHG | BODY MASS INDEX: 30.1 KG/M2 | WEIGHT: 215 LBS | SYSTOLIC BLOOD PRESSURE: 162 MMHG | TEMPERATURE: 97.3 F | HEIGHT: 71 IN | HEART RATE: 45 BPM

## 2025-04-09 DIAGNOSIS — R97.20 ELEVATED PROSTATE, SPECIFIC ANTIGEN [PSA]: ICD-10-CM

## 2025-04-09 PROCEDURE — 99214 OFFICE O/P EST MOD 30 MIN: CPT

## 2025-04-09 RX ORDER — (SALINE) 19; 7 G/133ML; G/133ML
ENEMA RECTAL
Qty: 1 | Refills: 0 | Status: ACTIVE | COMMUNITY
Start: 2025-04-09 | End: 1900-01-01

## 2025-04-09 RX ORDER — DIAZEPAM 10 MG/1
10 TABLET ORAL
Qty: 1 | Refills: 0 | Status: ACTIVE | COMMUNITY
Start: 2025-04-09 | End: 1900-01-01

## 2025-04-10 ENCOUNTER — APPOINTMENT (OUTPATIENT)
Dept: NEPHROLOGY | Facility: CLINIC | Age: 65
End: 2025-04-10

## 2025-04-10 VITALS
HEART RATE: 55 BPM | SYSTOLIC BLOOD PRESSURE: 146 MMHG | DIASTOLIC BLOOD PRESSURE: 88 MMHG | HEIGHT: 71 IN | BODY MASS INDEX: 30.1 KG/M2 | RESPIRATION RATE: 16 BRPM | WEIGHT: 215 LBS | OXYGEN SATURATION: 98 %

## 2025-04-10 PROCEDURE — 99214 OFFICE O/P EST MOD 30 MIN: CPT

## 2025-04-18 ENCOUNTER — APPOINTMENT (OUTPATIENT)
Dept: UROLOGY | Facility: CLINIC | Age: 65
End: 2025-04-18

## 2025-04-18 ENCOUNTER — APPOINTMENT (OUTPATIENT)
Dept: UROLOGY | Facility: CLINIC | Age: 65
End: 2025-04-18
Payer: MEDICARE

## 2025-04-18 VITALS — TEMPERATURE: 97.3 F | BODY MASS INDEX: 30.1 KG/M2 | WEIGHT: 215 LBS | HEIGHT: 71 IN

## 2025-04-18 DIAGNOSIS — N40.1 BENIGN PROSTATIC HYPERPLASIA WITH LOWER URINARY TRACT SYMPMS: ICD-10-CM

## 2025-04-18 DIAGNOSIS — R35.1 BENIGN PROSTATIC HYPERPLASIA WITH LOWER URINARY TRACT SYMPMS: ICD-10-CM

## 2025-04-18 DIAGNOSIS — R33.8 OTHER RETENTION OF URINE: ICD-10-CM

## 2025-04-18 PROCEDURE — 51702 INSERT TEMP BLADDER CATH: CPT

## 2025-05-05 ENCOUNTER — RESULT REVIEW (OUTPATIENT)
Age: 65
End: 2025-05-05

## 2025-05-05 ENCOUNTER — APPOINTMENT (OUTPATIENT)
Dept: HEMATOLOGY ONCOLOGY | Facility: CLINIC | Age: 65
End: 2025-05-05
Payer: MEDICARE

## 2025-05-05 VITALS
TEMPERATURE: 97.5 F | SYSTOLIC BLOOD PRESSURE: 136 MMHG | DIASTOLIC BLOOD PRESSURE: 88 MMHG | WEIGHT: 197 LBS | OXYGEN SATURATION: 99 % | BODY MASS INDEX: 27.48 KG/M2 | HEART RATE: 71 BPM

## 2025-05-05 DIAGNOSIS — R97.20 ELEVATED PROSTATE, SPECIFIC ANTIGEN [PSA]: ICD-10-CM

## 2025-05-05 DIAGNOSIS — I82.90 ACUTE EMBOLISM AND THROMBOSIS OF UNSPECIFIED VEIN: ICD-10-CM

## 2025-05-05 DIAGNOSIS — R91.1 SOLITARY PULMONARY NODULE: ICD-10-CM

## 2025-05-05 LAB
BASOPHILS # BLD AUTO: 0.06 K/UL — SIGNIFICANT CHANGE UP (ref 0–0.2)
BASOPHILS NFR BLD AUTO: 0.7 % — SIGNIFICANT CHANGE UP (ref 0–2)
EOSINOPHIL # BLD AUTO: 0.59 K/UL — HIGH (ref 0–0.5)
EOSINOPHIL NFR BLD AUTO: 7.2 % — HIGH (ref 0–6)
HCT VFR BLD CALC: 43.4 % — SIGNIFICANT CHANGE UP (ref 39–50)
HGB BLD-MCNC: 14.2 G/DL — SIGNIFICANT CHANGE UP (ref 13–17)
IMM GRANULOCYTES NFR BLD AUTO: 0.4 % — SIGNIFICANT CHANGE UP (ref 0–0.9)
LYMPHOCYTES # BLD AUTO: 0.96 K/UL — LOW (ref 1–3.3)
LYMPHOCYTES # BLD AUTO: 11.8 % — LOW (ref 13–44)
MCHC RBC-ENTMCNC: 28.1 PG — SIGNIFICANT CHANGE UP (ref 27–34)
MCHC RBC-ENTMCNC: 32.7 G/DL — SIGNIFICANT CHANGE UP (ref 32–36)
MCV RBC AUTO: 85.9 FL — SIGNIFICANT CHANGE UP (ref 80–100)
MONOCYTES # BLD AUTO: 0.55 K/UL — SIGNIFICANT CHANGE UP (ref 0–0.9)
MONOCYTES NFR BLD AUTO: 6.7 % — SIGNIFICANT CHANGE UP (ref 2–14)
NEUTROPHILS # BLD AUTO: 5.97 K/UL — SIGNIFICANT CHANGE UP (ref 1.8–7.4)
NEUTROPHILS NFR BLD AUTO: 73.2 % — SIGNIFICANT CHANGE UP (ref 43–77)
NRBC BLD AUTO-RTO: 0 /100 WBCS — SIGNIFICANT CHANGE UP (ref 0–0)
PLATELET # BLD AUTO: 280 K/UL — SIGNIFICANT CHANGE UP (ref 150–400)
RBC # BLD: 5.05 M/UL — SIGNIFICANT CHANGE UP (ref 4.2–5.8)
RBC # FLD: 13.4 % — SIGNIFICANT CHANGE UP (ref 10.3–14.5)
WBC # BLD: 8.16 K/UL — SIGNIFICANT CHANGE UP (ref 3.8–10.5)
WBC # FLD AUTO: 8.16 K/UL — SIGNIFICANT CHANGE UP (ref 3.8–10.5)

## 2025-05-05 PROCEDURE — 85027 COMPLETE CBC AUTOMATED: CPT

## 2025-05-05 PROCEDURE — 36415 COLL VENOUS BLD VENIPUNCTURE: CPT

## 2025-05-05 PROCEDURE — 99214 OFFICE O/P EST MOD 30 MIN: CPT

## 2025-05-05 PROCEDURE — G2211 COMPLEX E/M VISIT ADD ON: CPT

## 2025-05-08 ENCOUNTER — APPOINTMENT (OUTPATIENT)
Dept: UROLOGY | Facility: CLINIC | Age: 65
End: 2025-05-08
Payer: MEDICARE

## 2025-05-08 VITALS
TEMPERATURE: 97.3 F | DIASTOLIC BLOOD PRESSURE: 99 MMHG | WEIGHT: 197 LBS | HEART RATE: 81 BPM | BODY MASS INDEX: 27.58 KG/M2 | SYSTOLIC BLOOD PRESSURE: 154 MMHG | HEIGHT: 71 IN

## 2025-05-08 DIAGNOSIS — R33.8 OTHER RETENTION OF URINE: ICD-10-CM

## 2025-05-08 PROCEDURE — 51702 INSERT TEMP BLADDER CATH: CPT

## 2025-05-16 ENCOUNTER — NON-APPOINTMENT (OUTPATIENT)
Age: 65
End: 2025-05-16

## 2025-05-20 ENCOUNTER — NON-APPOINTMENT (OUTPATIENT)
Age: 65
End: 2025-05-20

## 2025-05-30 ENCOUNTER — APPOINTMENT (OUTPATIENT)
Dept: UROLOGY | Facility: CLINIC | Age: 65
End: 2025-05-30
Payer: MEDICARE

## 2025-05-30 VITALS
SYSTOLIC BLOOD PRESSURE: 178 MMHG | HEIGHT: 71 IN | TEMPERATURE: 97.3 F | HEART RATE: 103 BPM | BODY MASS INDEX: 27.58 KG/M2 | DIASTOLIC BLOOD PRESSURE: 105 MMHG | WEIGHT: 197 LBS

## 2025-05-30 PROCEDURE — 76872 US TRANSRECTAL: CPT

## 2025-05-30 PROCEDURE — 55700: CPT | Mod: 22

## 2025-05-30 PROCEDURE — 76942 ECHO GUIDE FOR BIOPSY: CPT | Mod: 59

## 2025-06-03 ENCOUNTER — NON-APPOINTMENT (OUTPATIENT)
Age: 65
End: 2025-06-03

## 2025-06-04 ENCOUNTER — NON-APPOINTMENT (OUTPATIENT)
Age: 65
End: 2025-06-04

## 2025-06-04 ENCOUNTER — APPOINTMENT (OUTPATIENT)
Dept: UROLOGY | Facility: CLINIC | Age: 65
End: 2025-06-04
Payer: MEDICARE

## 2025-06-04 ENCOUNTER — APPOINTMENT (OUTPATIENT)
Dept: UROLOGY | Facility: CLINIC | Age: 65
End: 2025-06-04

## 2025-06-04 DIAGNOSIS — R97.20 ELEVATED PROSTATE, SPECIFIC ANTIGEN [PSA]: ICD-10-CM

## 2025-06-04 PROCEDURE — 51702 INSERT TEMP BLADDER CATH: CPT

## 2025-06-05 LAB — CORE LAB BIOPSY: NORMAL

## 2025-06-12 ENCOUNTER — APPOINTMENT (OUTPATIENT)
Dept: UROLOGY | Facility: CLINIC | Age: 65
End: 2025-06-12
Payer: MEDICARE

## 2025-06-12 VITALS — TEMPERATURE: 97.3 F | BODY MASS INDEX: 27.3 KG/M2 | HEIGHT: 71 IN | WEIGHT: 195 LBS

## 2025-06-12 PROCEDURE — 99214 OFFICE O/P EST MOD 30 MIN: CPT

## 2025-06-19 ENCOUNTER — APPOINTMENT (OUTPATIENT)
Dept: NEPHROLOGY | Facility: CLINIC | Age: 65
End: 2025-06-19
Payer: MEDICARE

## 2025-06-19 VITALS
BODY MASS INDEX: 26.6 KG/M2 | RESPIRATION RATE: 16 BRPM | DIASTOLIC BLOOD PRESSURE: 90 MMHG | WEIGHT: 190 LBS | HEIGHT: 71 IN | SYSTOLIC BLOOD PRESSURE: 178 MMHG

## 2025-06-19 PROCEDURE — 99496 TRANSJ CARE MGMT HIGH F2F 7D: CPT

## 2025-06-19 PROCEDURE — 99215 OFFICE O/P EST HI 40 MIN: CPT

## 2025-07-01 ENCOUNTER — APPOINTMENT (OUTPATIENT)
Dept: CARDIOLOGY | Facility: CLINIC | Age: 65
End: 2025-07-01
Payer: MEDICARE

## 2025-07-01 VITALS
DIASTOLIC BLOOD PRESSURE: 100 MMHG | BODY MASS INDEX: 26.6 KG/M2 | HEIGHT: 71 IN | HEART RATE: 77 BPM | SYSTOLIC BLOOD PRESSURE: 150 MMHG | OXYGEN SATURATION: 99 % | WEIGHT: 190 LBS

## 2025-07-01 PROCEDURE — 99214 OFFICE O/P EST MOD 30 MIN: CPT

## 2025-07-01 PROCEDURE — G2211 COMPLEX E/M VISIT ADD ON: CPT

## 2025-07-03 ENCOUNTER — APPOINTMENT (OUTPATIENT)
Dept: UROLOGY | Facility: CLINIC | Age: 65
End: 2025-07-03
Payer: MEDICARE

## 2025-07-03 VITALS — TEMPERATURE: 97.3 F | WEIGHT: 190 LBS | HEIGHT: 71 IN | BODY MASS INDEX: 26.6 KG/M2

## 2025-07-03 PROCEDURE — 51702 INSERT TEMP BLADDER CATH: CPT

## 2025-07-08 ENCOUNTER — APPOINTMENT (OUTPATIENT)
Dept: CARDIOLOGY | Facility: CLINIC | Age: 65
End: 2025-07-08
Payer: MEDICARE

## 2025-07-08 VITALS
DIASTOLIC BLOOD PRESSURE: 102 MMHG | OXYGEN SATURATION: 98 % | HEART RATE: 64 BPM | HEIGHT: 71 IN | WEIGHT: 200 LBS | BODY MASS INDEX: 28 KG/M2 | SYSTOLIC BLOOD PRESSURE: 162 MMHG

## 2025-07-08 PROCEDURE — G2211 COMPLEX E/M VISIT ADD ON: CPT

## 2025-07-08 PROCEDURE — 99214 OFFICE O/P EST MOD 30 MIN: CPT

## 2025-07-18 ENCOUNTER — OFFICE (OUTPATIENT)
Dept: URBAN - METROPOLITAN AREA CLINIC 38 | Facility: CLINIC | Age: 65
Setting detail: OPHTHALMOLOGY
End: 2025-07-18
Payer: MEDICARE

## 2025-07-18 DIAGNOSIS — H16.223: ICD-10-CM

## 2025-07-18 DIAGNOSIS — H25.13: ICD-10-CM

## 2025-07-18 DIAGNOSIS — H35.033: ICD-10-CM

## 2025-07-18 DIAGNOSIS — H11.153: ICD-10-CM

## 2025-07-18 DIAGNOSIS — H40.1134: ICD-10-CM

## 2025-07-18 DIAGNOSIS — I63.50: ICD-10-CM

## 2025-07-18 PROCEDURE — 92133 CPTRZD OPH DX IMG PST SGM ON: CPT | Performed by: STUDENT IN AN ORGANIZED HEALTH CARE EDUCATION/TRAINING PROGRAM

## 2025-07-18 PROCEDURE — 99213 OFFICE O/P EST LOW 20 MIN: CPT | Performed by: STUDENT IN AN ORGANIZED HEALTH CARE EDUCATION/TRAINING PROGRAM

## 2025-07-18 PROCEDURE — 92083 EXTENDED VISUAL FIELD XM: CPT | Performed by: STUDENT IN AN ORGANIZED HEALTH CARE EDUCATION/TRAINING PROGRAM

## 2025-07-18 ASSESSMENT — SUPERFICIAL PUNCTATE KERATITIS (SPK)
OD_SPK: 3+
OS_SPK: 2+

## 2025-07-18 ASSESSMENT — TONOMETRY
OD_IOP_MMHG: 15
OS_IOP_MMHG: 15

## 2025-07-18 ASSESSMENT — KERATOMETRY
METHOD_AUTO_MANUAL: AUTO
OD_K1POWER_DIOPTERS: 40.25
OS_AXISANGLE_DEGREES: 059
OD_AXISANGLE_DEGREES: 088
OS_K2POWER_DIOPTERS: 40.25
OD_K2POWER_DIOPTERS: 40.75
OS_K1POWER_DIOPTERS: 40.00

## 2025-07-18 ASSESSMENT — REFRACTION_AUTOREFRACTION
OS_AXIS: 178
OS_CYLINDER: -0.50
OD_SPHERE: -1.25
OD_CYLINDER: -0.50
OS_SPHERE: -1.75
OD_AXIS: 123

## 2025-07-18 ASSESSMENT — CONFRONTATIONAL VISUAL FIELD TEST (CVF)
OS_FINDINGS: FULL
OD_FINDINGS: FULL

## 2025-07-18 ASSESSMENT — REFRACTION_MANIFEST
OS_VA1: 20/150
OS_AXIS: 180
OD_VA1: 20/125
OS_CYLINDER: -0.50
OD_AXIS: 125
OS_SPHERE: -1.75
OD_SPHERE: -1.25
OU_VA: 20/125-
OD_CYLINDER: -0.50

## 2025-07-18 ASSESSMENT — PACHYMETRY
OD_CT_UM: 536
OS_CT_CORRECTION: 1
OD_CT_CORRECTION: 1
OS_CT_UM: 532

## 2025-07-18 ASSESSMENT — VISUAL ACUITY
OD_BCVA: 20/200
OS_BCVA: 20/250

## 2025-07-22 ENCOUNTER — APPOINTMENT (OUTPATIENT)
Dept: CARDIOLOGY | Facility: CLINIC | Age: 65
End: 2025-07-22

## 2025-07-25 ENCOUNTER — OFFICE (OUTPATIENT)
Dept: URBAN - METROPOLITAN AREA CLINIC 38 | Facility: CLINIC | Age: 65
Setting detail: OPHTHALMOLOGY
End: 2025-07-25
Payer: MEDICARE

## 2025-07-25 DIAGNOSIS — H40.1124: ICD-10-CM

## 2025-07-25 PROCEDURE — 65855 TRABECULOPLASTY LASER SURG: CPT | Mod: LT | Performed by: STUDENT IN AN ORGANIZED HEALTH CARE EDUCATION/TRAINING PROGRAM

## 2025-07-25 ASSESSMENT — CONFRONTATIONAL VISUAL FIELD TEST (CVF)
OD_FINDINGS: FULL
OS_FINDINGS: FULL

## 2025-07-25 ASSESSMENT — REFRACTION_MANIFEST
OS_AXIS: 180
OD_SPHERE: -1.25
OS_SPHERE: -1.75
OU_VA: 20/125-
OD_AXIS: 125
OD_VA1: 20/125
OS_VA1: 20/150
OS_CYLINDER: -0.50
OD_CYLINDER: -0.50

## 2025-07-25 ASSESSMENT — REFRACTION_AUTOREFRACTION
OS_SPHERE: -1.75
OD_CYLINDER: -0.50
OD_AXIS: 123
OS_CYLINDER: -0.50
OS_AXIS: 178
OD_SPHERE: -1.25

## 2025-07-25 ASSESSMENT — KERATOMETRY
OD_K1POWER_DIOPTERS: 40.25
METHOD_AUTO_MANUAL: AUTO
OD_AXISANGLE_DEGREES: 088
OD_K2POWER_DIOPTERS: 40.75
OS_K1POWER_DIOPTERS: 40.00
OS_K2POWER_DIOPTERS: 40.25
OS_AXISANGLE_DEGREES: 059

## 2025-07-25 ASSESSMENT — VISUAL ACUITY
OD_BCVA: 20/200
OS_BCVA: 20/200

## 2025-07-25 ASSESSMENT — SUPERFICIAL PUNCTATE KERATITIS (SPK)
OS_SPK: 2+
OD_SPK: 3+

## 2025-08-07 ENCOUNTER — APPOINTMENT (OUTPATIENT)
Dept: UROLOGY | Facility: CLINIC | Age: 65
End: 2025-08-07
Payer: MEDICARE

## 2025-08-07 VITALS
WEIGHT: 200 LBS | SYSTOLIC BLOOD PRESSURE: 123 MMHG | BODY MASS INDEX: 28 KG/M2 | TEMPERATURE: 98.3 F | HEIGHT: 71 IN | DIASTOLIC BLOOD PRESSURE: 61 MMHG | HEART RATE: 39 BPM

## 2025-08-07 DIAGNOSIS — N39.0 URINARY TRACT INFECTION, SITE NOT SPECIFIED: ICD-10-CM

## 2025-08-07 DIAGNOSIS — N40.1 BENIGN PROSTATIC HYPERPLASIA WITH LOWER URINARY TRACT SYMPMS: ICD-10-CM

## 2025-08-07 DIAGNOSIS — R35.1 BENIGN PROSTATIC HYPERPLASIA WITH LOWER URINARY TRACT SYMPMS: ICD-10-CM

## 2025-08-07 PROCEDURE — 51702 INSERT TEMP BLADDER CATH: CPT

## 2025-08-08 ENCOUNTER — OFFICE (OUTPATIENT)
Dept: URBAN - METROPOLITAN AREA CLINIC 38 | Facility: CLINIC | Age: 65
Setting detail: OPHTHALMOLOGY
End: 2025-08-08
Payer: MEDICARE

## 2025-08-08 DIAGNOSIS — H40.1114: ICD-10-CM

## 2025-08-08 PROCEDURE — 65855 TRABECULOPLASTY LASER SURG: CPT | Mod: RT | Performed by: STUDENT IN AN ORGANIZED HEALTH CARE EDUCATION/TRAINING PROGRAM

## 2025-08-08 ASSESSMENT — REFRACTION_MANIFEST
OS_CYLINDER: -0.50
OS_VA1: 20/150
OD_VA1: 20/125
OS_AXIS: 180
OS_SPHERE: -1.75
OD_CYLINDER: -0.50
OD_SPHERE: -1.25
OD_AXIS: 125
OU_VA: 20/125-

## 2025-08-08 ASSESSMENT — SUPERFICIAL PUNCTATE KERATITIS (SPK)
OS_SPK: 2+
OD_SPK: 3+

## 2025-08-08 ASSESSMENT — KERATOMETRY
OS_AXISANGLE_DEGREES: 071
OS_K1POWER_DIOPTERS: 40.50
OD_AXISANGLE_DEGREES: 087
METHOD_AUTO_MANUAL: AUTO
OS_K2POWER_DIOPTERS: 41.25
OD_K1POWER_DIOPTERS: 41.25
OD_K2POWER_DIOPTERS: 42.25

## 2025-08-08 ASSESSMENT — VISUAL ACUITY
OS_BCVA: 20/200
OD_BCVA: 20/200

## 2025-08-08 ASSESSMENT — REFRACTION_AUTOREFRACTION
OD_CYLINDER: -1.00
OS_AXIS: 166
OS_SPHERE: -1.75
OD_SPHERE: -0.75
OS_CYLINDER: -0.50
OD_AXIS: 086

## 2025-08-08 ASSESSMENT — CONFRONTATIONAL VISUAL FIELD TEST (CVF)
OD_FINDINGS: FULL
OS_FINDINGS: FULL

## 2025-09-04 ENCOUNTER — APPOINTMENT (OUTPATIENT)
Dept: VASCULAR SURGERY | Facility: CLINIC | Age: 65
End: 2025-09-04
Payer: MEDICARE

## 2025-09-04 VITALS — SYSTOLIC BLOOD PRESSURE: 120 MMHG | DIASTOLIC BLOOD PRESSURE: 80 MMHG

## 2025-09-04 DIAGNOSIS — I73.9 PERIPHERAL VASCULAR DISEASE, UNSPECIFIED: ICD-10-CM

## 2025-09-04 PROCEDURE — 93922 UPR/L XTREMITY ART 2 LEVELS: CPT

## 2025-09-04 PROCEDURE — 99203 OFFICE O/P NEW LOW 30 MIN: CPT

## 2025-09-04 RX ORDER — CILOSTAZOL 100 MG/1
100 TABLET ORAL TWICE DAILY
Qty: 180 | Refills: 2 | Status: ACTIVE | COMMUNITY
Start: 2025-09-04 | End: 1900-01-01

## 2025-09-11 ENCOUNTER — APPOINTMENT (OUTPATIENT)
Dept: UROLOGY | Facility: CLINIC | Age: 65
End: 2025-09-11
Payer: MEDICARE

## 2025-09-11 VITALS
WEIGHT: 200 LBS | TEMPERATURE: 98 F | BODY MASS INDEX: 28 KG/M2 | DIASTOLIC BLOOD PRESSURE: 79 MMHG | HEIGHT: 71 IN | HEART RATE: 80 BPM | SYSTOLIC BLOOD PRESSURE: 191 MMHG

## 2025-09-11 DIAGNOSIS — R35.1 BENIGN PROSTATIC HYPERPLASIA WITH LOWER URINARY TRACT SYMPMS: ICD-10-CM

## 2025-09-11 DIAGNOSIS — N40.1 BENIGN PROSTATIC HYPERPLASIA WITH LOWER URINARY TRACT SYMPMS: ICD-10-CM

## 2025-09-11 DIAGNOSIS — R35.0 FREQUENCY OF MICTURITION: ICD-10-CM

## 2025-09-11 PROCEDURE — 51702 INSERT TEMP BLADDER CATH: CPT

## 2025-09-11 PROCEDURE — 99212 OFFICE O/P EST SF 10 MIN: CPT | Mod: 25
